# Patient Record
Sex: MALE | Race: WHITE | Employment: OTHER | URBAN - METROPOLITAN AREA
[De-identification: names, ages, dates, MRNs, and addresses within clinical notes are randomized per-mention and may not be internally consistent; named-entity substitution may affect disease eponyms.]

---

## 2017-03-05 DIAGNOSIS — I10 HYPERTENSION GOAL BP (BLOOD PRESSURE) < 140/90: ICD-10-CM

## 2017-03-07 NOTE — TELEPHONE ENCOUNTER
amLODIPine (NORVASC) 10 MG tablet      Last Written Prescription Date: 2/24/16  Last Fill Quantity: 90, # refills: 3    Last Office Visit with [facility], [facility] or [facility] prescribing provider:  2/24/16   Future Office Visit:        BP Readings from Last 3 Encounters:   02/24/16 138/82   02/02/15 126/79   01/15/14 138/80

## 2017-03-08 RX ORDER — AMLODIPINE BESYLATE 10 MG/1
TABLET ORAL
Qty: 90 TABLET | Refills: 0 | Status: SHIPPED | OUTPATIENT
Start: 2017-03-08 | End: 2017-06-27

## 2017-03-08 NOTE — TELEPHONE ENCOUNTER
Prescription approved per [facility] Refill Protocol.  Patient due for for office visit for further refills.    Sarahi Carroll RN - BC

## 2017-03-31 DIAGNOSIS — I10 HYPERTENSION GOAL BP (BLOOD PRESSURE) < 140/90: Primary | ICD-10-CM

## 2017-04-03 RX ORDER — METOPROLOL SUCCINATE 100 MG/1
TABLET, EXTENDED RELEASE ORAL
Qty: 90 TABLET | Refills: 0 | Status: ON HOLD | OUTPATIENT
Start: 2017-04-03 | End: 2017-07-25

## 2017-04-03 NOTE — TELEPHONE ENCOUNTER
metoprolol (TOPROL-XL) 100 MG 24 hr tablet      Last Written Prescription Date: 02/24/2016  Last Fill Quantity: 90, # refills: 3    Last Office Visit with AHMET, ALLIE or [facility] prescribing provider:  02/24/2016   Future Office Visit:        BP Readings from Last 3 Encounters:   02/24/16 138/82   02/02/15 126/79   01/15/14 138/80     Sanjuanita Liriano MA

## 2017-04-03 NOTE — TELEPHONE ENCOUNTER
Prescription approved per [facility] Refill Protocol. Patient due for office visit for further refills.    Sarahi Carroll RN - BC

## 2017-07-11 NOTE — PROGRESS NOTES
SUBJECTIVE:                                                    Dung Norton is a 71 year old male who presents to clinic today for the following health issues:      Chief Complaint   Patient presents with     Weight Loss     Dizziness     Pt comes in with 20 Pound weight loss  ,decreased appetite.  Pt says he Thinks he Lost weight because He is too lazy to cook for Himself  No abdominal pain  No change in Bowel habits  He has been seeing a chiropractor for Low back pain  Pt is a smoker        Problem list and histories reviewed & adjusted, as indicated.  Additional history: as documented    Patient Active Problem List   Diagnosis     Tobacco abuse     CARDIOVASCULAR SCREENING; LDL GOAL LESS THAN 130     Hypertension goal BP (blood pressure) < 140/90     Advanced directives, counseling/discussion     Elevated fasting glucose     Hypertriglyceridemia     CKD (chronic kidney disease) stage 3, GFR 30-59 ml/min     Past Surgical History:   Procedure Laterality Date     APPENDECTOMY  AGE 6     SINUS SURGERY  AGE 6     TONSILLECTOMY      CHILDHOOD       Social History   Substance Use Topics     Smoking status: Current Every Day Smoker     Packs/day: 0.25     Types: Cigarettes     Smokeless tobacco: Never Used      Comment: 3 cig a day     Alcohol use Yes      Comment: 2 per year     Family History   Problem Relation Age of Onset     C.A.D. Mother      d age 67         Current Outpatient Prescriptions   Medication Sig Dispense Refill     Multiple Vitamins-Minerals (MULTIVITAMIN MEN PO) Take 1 tablet by mouth daily       amLODIPine (NORVASC) 10 MG tablet TAKE 1 TABLET BY MOUTH ONCE DAILY 30 tablet 0     metoprolol (TOPROL-XL) 100 MG 24 hr tablet TAKE 1 TABLET BY MOUTH ONCE DAILY 90 tablet 0     nicotine (NICODERM CQ) 21 MG/24HR patch 2h hr Place 1 patch onto the skin every 24 hours 30 patch 1     fish oil-omega-3 fatty acids (FISH OIL) 1000 MG capsule Take 1 g by mouth daily       aspirin 81 MG tablet Take 1 tablet by  mouth daily.       hydrochlorothiazide (HYDRODIURIL) 25 MG tablet Take 0.5 tablets (12.5 mg) by mouth daily (Patient not taking: Reported on 7/12/2017) 45 tablet 1     No Known Allergies  Recent Labs   Lab Test  02/24/16   1044  02/02/15   1046  01/15/14   0950  02/28/13   0731   06/10/11   1028  09/21/09   1530   A1C  5.4   --   5.3   --    --    --    --    LDL  114*  128  119  111   --   105   --    HDL  29*  32*  33*  24*   --   32*   --    TRIG  199*  229*  230*  362*   --   234*   --    ALT   --    --   29  33   --    --    --    CR  2.56*  1.10  0.91  0.97   < >  0.97   --    GFRESTIMATED  25*  66  82  77   < >  77   --    GFRESTBLACK  30*  80  >90  >90   < >  >90   --    POTASSIUM  3.4  3.5  4.0  3.7   < >  4.4   --    TSH   --    --    --    --    --   4.86  4.59    < > = values in this interval not displayed.      BP Readings from Last 3 Encounters:   07/12/17 106/70   02/24/16 138/82   02/02/15 126/79    Wt Readings from Last 3 Encounters:   07/12/17 151 lb (68.5 kg)   02/24/16 181 lb 9.6 oz (82.4 kg)   02/02/15 188 lb (85.3 kg)                  Labs reviewed in EPIC    Reviewed and updated as needed this visit by clinical staff  Tobacco  Allergies  Meds  Problems  Med Hx  Surg Hx  Fam Hx  Soc Hx        Reviewed and updated as needed this visit by Provider  Patient Active Problem List   Diagnosis     Tobacco abuse     CARDIOVASCULAR SCREENING; LDL GOAL LESS THAN 130     Hypertension goal BP (blood pressure) < 140/90     Advanced directives, counseling/discussion     Elevated fasting glucose     Hypertriglyceridemia     CKD (chronic kidney disease) stage 3, GFR 30-59 ml/min       Family History   Problem Relation Age of Onset     C.A.D. Mother      d age 67           ROS:  C: NEGATIVE for fever, chills, change in weight  INTEGUMENTARY/SKIN: NEGATIVE for worrisome rashes, moles or lesions  E/M: NEGATIVE for ear, mouth and throat problems  R: NEGATIVE for significant cough or SOB  CV: NEGATIVE for  "chest pain, palpitations or peripheral edema  GI: as above  MUSCULOSKELETAL: NEGATIVE for significant arthralgias or myalgia  HEME/ALLERGY/IMMUNE: NEGATIVE for bleeding problems    OBJECTIVE:     /70  Pulse 89  Temp 97  F (36.1  C)  Resp 15  Ht 5' 9\" (1.753 m)  Wt 151 lb (68.5 kg)  SpO2 97%  BMI 22.3 kg/m2  Body mass index is 22.3 kg/(m^2).  GENERAL: alert elderly gentleman  HENT: ear canals and TM's normal, nose and mouth without ulcers or lesions  NECK: no adenopathy, no asymmetry, masses, or scars and thyroid normal to palpation  RESP: lungs clear to auscultation - no rales, rhonchi or wheezes  CV: regular rate and rhythm, normal S1 S2, no S3 or S4, no murmur, click or rub, no peripheral edema and peripheral pulses strong  ABDOMEN: bowel sounds normal and Mass palpable?-firmness  No tenderness   MS: no gross musculoskeletal defects noted, no edema  SKIN: no suspicious lesions or rashes    Diagnostic Test Results:  Results for orders placed or performed in visit on 07/12/17 (from the past 24 hour(s))   CBC with platelets differential   Result Value Ref Range    WBC 8.1 4.0 - 11.0 10e9/L    RBC Count 2.26 (L) 4.4 - 5.9 10e12/L    Hemoglobin 6.4 (LL) 13.3 - 17.7 g/dL    Hematocrit 19.6 (L) 40.0 - 53.0 %    MCV 87 78 - 100 fl    MCH 28.3 26.5 - 33.0 pg    MCHC 32.7 31.5 - 36.5 g/dL    RDW 14.5 10.0 - 15.0 %    Platelet Count 259 150 - 450 10e9/L    Diff Method Automated Method     % Neutrophils 73.6 %    % Lymphocytes 13.6 %    % Monocytes 8.0 %    % Eosinophils 4.2 %    % Basophils 0.6 %    Absolute Neutrophil 6.0 1.6 - 8.3 10e9/L    Absolute Lymphocytes 1.1 0.8 - 5.3 10e9/L    Absolute Monocytes 0.7 0.0 - 1.3 10e9/L    Absolute Eosinophils 0.3 0.0 - 0.7 10e9/L    Absolute Basophils 0.1 0.0 - 0.2 10e9/L   Erythrocyte sedimentation rate auto   Result Value Ref Range    Sed Rate 111 (H) 0 - 20 mm/h       ASSESSMENT/PLAN:         ICD-10-CM    1. Loss of weight R63.4 CBC with platelets differential     " Erythrocyte sedimentation rate auto     Comprehensive metabolic panel     TSH with free T4 reflex     CT Chest Lung Cancer Scrn Low Dose wo     CT Abdomen Pelvis w Contrast   2. Anemia, unspecified type D64.9    3. Tobacco abuse Z72.0    4. Hypertension goal BP (blood pressure) < 140/90 I10      Pt has had Decreased appetite /Loss of weight   Exam as above  Differential Includes Malignancy  Pt will need a CT scan abd /pelvis  Possible colonoscopy  After ths scan  He will also need to be Transfused  Haley Baker MD  [facility]

## 2017-07-11 NOTE — PATIENT INSTRUCTIONS
[facility]    If you have any questions regarding to your visit please contact your care team:       Team Red:   Clinic Hours Telephone Number   Dr. Toya Schwartz  (pediatrics)  Nohemy Rogers NP 7am-7pm  Monday - Thursday   7am-5pm  Fridays  [phone] (fax)    Nikki NAVA  [phone]   Urgent Care - [place] and [place] Monday-Friday  [place] - 11am-8pm  Saturday-Sunday  Both sites - 9am-5pm  [phone] - [facility]  [phone] - [place]       What options do I have for visits at the clinic other than the traditional office visit?  To expand how we care for you, many of our providers are utilizing electronic visits (e-visits) and telephone visits, when medically appropriate, for interactions with their patients rather than a visit in the clinic.   We also offer nurse visits for many medical concerns. Just like any other service, we will bill your insurance company for this type of visit based on time spent on the phone with your provider. Not all insurance companies cover these visits. Please check with your medical insurance if this type of visit is covered. You will be responsible for any charges that are not paid by your insurance.      E-visits via [employer]:  generally incur a $35.00 fee.  Telephone visits:  Time spent on the phone: *charged based on time that is spent on the phone in increments of 10 minutes. Estimated cost:   5-10 mins $30.00   11-20 mins. $59.00   21-30 mins. $85.00     As always, Thank you for trusting us with your health care needs!            Discharge VIANEY Cabrera CMA

## 2017-07-12 ENCOUNTER — HOSPITAL ENCOUNTER (INPATIENT)
Facility: CLINIC | Age: 72
LOS: 13 days | Discharge: HOME HEALTH | DRG: 668 | End: 2017-07-25
Attending: INTERNAL MEDICINE | Admitting: INTERNAL MEDICINE
Payer: MEDICARE

## 2017-07-12 ENCOUNTER — APPOINTMENT (OUTPATIENT)
Dept: GENERAL RADIOLOGY | Facility: CLINIC | Age: 72
DRG: 668 | End: 2017-07-12
Attending: INTERNAL MEDICINE
Payer: MEDICARE

## 2017-07-12 ENCOUNTER — OFFICE VISIT (OUTPATIENT)
Dept: FAMILY MEDICINE | Facility: CLINIC | Age: 72
End: 2017-07-12
Payer: COMMERCIAL

## 2017-07-12 VITALS
HEIGHT: 69 IN | OXYGEN SATURATION: 97 % | DIASTOLIC BLOOD PRESSURE: 70 MMHG | HEART RATE: 89 BPM | BODY MASS INDEX: 22.36 KG/M2 | SYSTOLIC BLOOD PRESSURE: 106 MMHG | WEIGHT: 151 LBS | RESPIRATION RATE: 15 BRPM | TEMPERATURE: 97 F

## 2017-07-12 DIAGNOSIS — E87.20 METABOLIC ACIDOSIS, NAG, BICARBONATE LOSSES: ICD-10-CM

## 2017-07-12 DIAGNOSIS — Z72.0 TOBACCO ABUSE: ICD-10-CM

## 2017-07-12 DIAGNOSIS — R63.4 LOSS OF WEIGHT: Primary | ICD-10-CM

## 2017-07-12 DIAGNOSIS — N40.1 BENIGN PROSTATIC HYPERPLASIA WITH URINARY RETENTION: ICD-10-CM

## 2017-07-12 DIAGNOSIS — I10 HYPERTENSION GOAL BP (BLOOD PRESSURE) < 140/90: ICD-10-CM

## 2017-07-12 DIAGNOSIS — R33.8 BENIGN PROSTATIC HYPERPLASIA WITH URINARY RETENTION: ICD-10-CM

## 2017-07-12 DIAGNOSIS — D64.9 ANEMIA, UNSPECIFIED TYPE: ICD-10-CM

## 2017-07-12 DIAGNOSIS — N18.30 CKD (CHRONIC KIDNEY DISEASE) STAGE 3, GFR 30-59 ML/MIN (H): Primary | ICD-10-CM

## 2017-07-12 LAB
ALBUMIN SERPL-MCNC: 3.1 G/DL (ref 3.4–5)
ALBUMIN SERPL-MCNC: 3.1 G/DL (ref 3.4–5)
ALP SERPL-CCNC: 65 U/L (ref 40–150)
ALP SERPL-CCNC: 67 U/L (ref 40–150)
ALT SERPL W P-5'-P-CCNC: 19 U/L (ref 0–70)
ALT SERPL W P-5'-P-CCNC: 20 U/L (ref 0–70)
ANION GAP SERPL CALCULATED.3IONS-SCNC: 15 MMOL/L (ref 3–14)
ANION GAP SERPL CALCULATED.3IONS-SCNC: 16 MMOL/L (ref 3–14)
AST SERPL W P-5'-P-CCNC: 12 U/L (ref 0–45)
AST SERPL W P-5'-P-CCNC: 8 U/L (ref 0–45)
BASOPHILS # BLD AUTO: 0 10E9/L (ref 0–0.2)
BASOPHILS # BLD AUTO: 0.1 10E9/L (ref 0–0.2)
BASOPHILS NFR BLD AUTO: 0.4 %
BASOPHILS NFR BLD AUTO: 0.6 %
BILIRUB DIRECT SERPL-MCNC: <0.1 MG/DL (ref 0–0.2)
BILIRUB SERPL-MCNC: 0.3 MG/DL (ref 0.2–1.3)
BILIRUB SERPL-MCNC: 0.6 MG/DL (ref 0.2–1.3)
BUN SERPL-MCNC: 173 MG/DL (ref 7–30)
BUN SERPL-MCNC: 177 MG/DL (ref 7–30)
CALCIUM SERPL-MCNC: 8.3 MG/DL (ref 8.5–10.1)
CALCIUM SERPL-MCNC: 8.5 MG/DL (ref 8.5–10.1)
CHLORIDE SERPL-SCNC: 108 MMOL/L (ref 94–109)
CHLORIDE SERPL-SCNC: 109 MMOL/L (ref 94–109)
CO2 SERPL-SCNC: 16 MMOL/L (ref 20–32)
CO2 SERPL-SCNC: 16 MMOL/L (ref 20–32)
CREAT SERPL-MCNC: 14.4 MG/DL (ref 0.66–1.25)
CREAT SERPL-MCNC: 14.7 MG/DL (ref 0.66–1.25)
DIFFERENTIAL METHOD BLD: ABNORMAL
DIFFERENTIAL METHOD BLD: NORMAL
EOSINOPHIL # BLD AUTO: 0.3 10E9/L (ref 0–0.7)
EOSINOPHIL # BLD AUTO: 0.3 10E9/L (ref 0–0.7)
EOSINOPHIL NFR BLD AUTO: 3.8 %
EOSINOPHIL NFR BLD AUTO: 4.2 %
ERYTHROCYTE [DISTWIDTH] IN BLOOD BY AUTOMATED COUNT: 14.5 % (ref 10–15)
ERYTHROCYTE [DISTWIDTH] IN BLOOD BY AUTOMATED COUNT: 14.8 % (ref 10–15)
ERYTHROCYTE [SEDIMENTATION RATE] IN BLOOD BY WESTERGREN METHOD: 111 MM/H (ref 0–20)
GFR SERPL CREATININE-BSD FRML MDRD: 3 ML/MIN/1.7M2
GFR SERPL CREATININE-BSD FRML MDRD: 3 ML/MIN/1.7M2
GLUCOSE SERPL-MCNC: 105 MG/DL (ref 70–99)
GLUCOSE SERPL-MCNC: 117 MG/DL (ref 70–99)
HCT VFR BLD AUTO: 19.6 % (ref 40–53)
HCT VFR BLD AUTO: 19.6 % (ref 40–53)
HGB BLD-MCNC: 6.4 G/DL (ref 13.3–17.7)
HGB BLD-MCNC: 6.5 G/DL (ref 13.3–17.7)
IMM GRANULOCYTES # BLD: 0 10E9/L (ref 0–0.4)
IMM GRANULOCYTES NFR BLD: 0.2 %
LYMPHOCYTES # BLD AUTO: 1.1 10E9/L (ref 0.8–5.3)
LYMPHOCYTES # BLD AUTO: 1.3 10E9/L (ref 0.8–5.3)
LYMPHOCYTES NFR BLD AUTO: 13.6 %
LYMPHOCYTES NFR BLD AUTO: 15.5 %
MCH RBC QN AUTO: 27.9 PG (ref 26.5–33)
MCH RBC QN AUTO: 28.3 PG (ref 26.5–33)
MCHC RBC AUTO-ENTMCNC: 32.7 G/DL (ref 31.5–36.5)
MCHC RBC AUTO-ENTMCNC: 33.2 G/DL (ref 31.5–36.5)
MCV RBC AUTO: 84 FL (ref 78–100)
MCV RBC AUTO: 87 FL (ref 78–100)
MONOCYTES # BLD AUTO: 0.5 10E9/L (ref 0–1.3)
MONOCYTES # BLD AUTO: 0.7 10E9/L (ref 0–1.3)
MONOCYTES NFR BLD AUTO: 5.7 %
MONOCYTES NFR BLD AUTO: 8 %
NEUTROPHILS # BLD AUTO: 6 10E9/L (ref 1.6–8.3)
NEUTROPHILS # BLD AUTO: 6 10E9/L (ref 1.6–8.3)
NEUTROPHILS NFR BLD AUTO: 73.6 %
NEUTROPHILS NFR BLD AUTO: 74.4 %
PLATELET # BLD AUTO: 259 10E9/L (ref 150–450)
PLATELET # BLD AUTO: 278 10E9/L (ref 150–450)
POTASSIUM SERPL-SCNC: 4.2 MMOL/L (ref 3.4–5.3)
POTASSIUM SERPL-SCNC: 4.5 MMOL/L (ref 3.4–5.3)
PROT SERPL-MCNC: 6.5 G/DL (ref 6.8–8.8)
PROT SERPL-MCNC: 6.8 G/DL (ref 6.8–8.8)
RBC # BLD AUTO: 2.26 10E12/L (ref 4.4–5.9)
RBC # BLD AUTO: 2.33 10E12/L (ref 4.4–5.9)
RETICS # AUTO: 25.9 10E9/L (ref 25–95)
RETICS/RBC NFR AUTO: 1.1 % (ref 0.5–2)
SODIUM SERPL-SCNC: 139 MMOL/L (ref 133–144)
SODIUM SERPL-SCNC: 140 MMOL/L (ref 133–144)
T4 FREE SERPL-MCNC: 0.7 NG/DL (ref 0.76–1.46)
TSH SERPL DL<=0.005 MIU/L-ACNC: 10.51 MU/L (ref 0.4–4)
WBC # BLD AUTO: 7.8 10E9/L (ref 4–11)
WBC # BLD AUTO: 8.1 10E9/L (ref 4–11)

## 2017-07-12 PROCEDURE — 85004 AUTOMATED DIFF WBC COUNT: CPT | Performed by: INTERNAL MEDICINE

## 2017-07-12 PROCEDURE — 86905 BLOOD TYPING RBC ANTIGENS: CPT | Performed by: INTERNAL MEDICINE

## 2017-07-12 PROCEDURE — 86901 BLOOD TYPING SEROLOGIC RH(D): CPT | Performed by: INTERNAL MEDICINE

## 2017-07-12 PROCEDURE — 36415 COLL VENOUS BLD VENIPUNCTURE: CPT | Performed by: FAMILY MEDICINE

## 2017-07-12 PROCEDURE — 71020 XR CHEST 2 VW: CPT

## 2017-07-12 PROCEDURE — 99214 OFFICE O/P EST MOD 30 MIN: CPT | Performed by: FAMILY MEDICINE

## 2017-07-12 PROCEDURE — 99223 1ST HOSP IP/OBS HIGH 75: CPT | Mod: AI | Performed by: INTERNAL MEDICINE

## 2017-07-12 PROCEDURE — 86850 RBC ANTIBODY SCREEN: CPT | Performed by: INTERNAL MEDICINE

## 2017-07-12 PROCEDURE — 84439 ASSAY OF FREE THYROXINE: CPT | Performed by: FAMILY MEDICINE

## 2017-07-12 PROCEDURE — 80048 BASIC METABOLIC PNL TOTAL CA: CPT | Performed by: INTERNAL MEDICINE

## 2017-07-12 PROCEDURE — 86900 BLOOD TYPING SEROLOGIC ABO: CPT | Performed by: INTERNAL MEDICINE

## 2017-07-12 PROCEDURE — 85045 AUTOMATED RETICULOCYTE COUNT: CPT | Performed by: INTERNAL MEDICINE

## 2017-07-12 PROCEDURE — 40000141 ZZH STATISTIC PERIPHERAL IV START W/O US GUIDANCE

## 2017-07-12 PROCEDURE — 80076 HEPATIC FUNCTION PANEL: CPT | Performed by: INTERNAL MEDICINE

## 2017-07-12 PROCEDURE — 80050 GENERAL HEALTH PANEL: CPT | Performed by: FAMILY MEDICINE

## 2017-07-12 PROCEDURE — 86922 COMPATIBILITY TEST ANTIGLOB: CPT | Performed by: INTERNAL MEDICINE

## 2017-07-12 PROCEDURE — 40000611 ZZHCL STATISTIC MORPHOLOGY W/INTERP HEMEPATH TC 85060: Performed by: INTERNAL MEDICINE

## 2017-07-12 PROCEDURE — 86870 RBC ANTIBODY IDENTIFICATION: CPT | Performed by: INTERNAL MEDICINE

## 2017-07-12 PROCEDURE — 85027 COMPLETE CBC AUTOMATED: CPT | Performed by: INTERNAL MEDICINE

## 2017-07-12 PROCEDURE — 85652 RBC SED RATE AUTOMATED: CPT | Performed by: FAMILY MEDICINE

## 2017-07-12 PROCEDURE — 12000001 ZZH R&B MED SURG/OB UMMC

## 2017-07-12 PROCEDURE — 25000128 H RX IP 250 OP 636: Performed by: STUDENT IN AN ORGANIZED HEALTH CARE EDUCATION/TRAINING PROGRAM

## 2017-07-12 PROCEDURE — 36415 COLL VENOUS BLD VENIPUNCTURE: CPT | Performed by: INTERNAL MEDICINE

## 2017-07-12 RX ORDER — NICOTINE 21 MG/24HR
1 PATCH, TRANSDERMAL 24 HOURS TRANSDERMAL EVERY 24 HOURS
Status: DISCONTINUED | OUTPATIENT
Start: 2017-07-12 | End: 2017-07-25 | Stop reason: HOSPADM

## 2017-07-12 RX ORDER — NALOXONE HYDROCHLORIDE 0.4 MG/ML
.1-.4 INJECTION, SOLUTION INTRAMUSCULAR; INTRAVENOUS; SUBCUTANEOUS
Status: DISCONTINUED | OUTPATIENT
Start: 2017-07-12 | End: 2017-07-25 | Stop reason: HOSPADM

## 2017-07-12 RX ORDER — SODIUM CHLORIDE 9 MG/ML
INJECTION, SOLUTION INTRAVENOUS CONTINUOUS
Status: DISCONTINUED | OUTPATIENT
Start: 2017-07-12 | End: 2017-07-13

## 2017-07-12 RX ORDER — METOPROLOL SUCCINATE 50 MG/1
100 TABLET, EXTENDED RELEASE ORAL DAILY
Status: DISCONTINUED | OUTPATIENT
Start: 2017-07-13 | End: 2017-07-15

## 2017-07-12 RX ORDER — ACETAMINOPHEN 325 MG/1
650 TABLET ORAL EVERY 4 HOURS PRN
Status: DISCONTINUED | OUTPATIENT
Start: 2017-07-12 | End: 2017-07-25 | Stop reason: HOSPADM

## 2017-07-12 RX ADMIN — SODIUM CHLORIDE 1000 ML: 9 INJECTION, SOLUTION INTRAVENOUS at 19:27

## 2017-07-12 NOTE — NURSING NOTE
"Chief Complaint   Patient presents with     Weight Loss     Dizziness       Initial /70  Pulse 89  Temp 97  F (36.1  C)  Resp 15  Ht 5' 9\" (1.753 m)  Wt 151 lb (68.5 kg)  SpO2 97%  BMI 22.3 kg/m2 Estimated body mass index is 22.3 kg/(m^2) as calculated from the following:    Height as of this encounter: 5' 9\" (1.753 m).    Weight as of this encounter: 151 lb (68.5 kg).  Medication Reconciliation: complete     Leticia Cabrera. MA      "

## 2017-07-12 NOTE — MR AVS SNAPSHOT
After Visit Summary   7/12/2017    Dung Norton    MRN: [id number]           Patient Information     Date Of Birth          1945        Visit Information        Provider Department      7/12/2017 2:00 PM Haley Baker MD [facility]        Today's Diagnoses     Loss of weight    -  1    Tobacco abuse        Encounter for abdominal aortic aneurysm screening        Abdominal swelling        Screen for colon cancer          Care Instructions    [facility]    If you have any questions regarding to your visit please contact your care team:       Team Red:   Clinic Hours Telephone Number   Dr. Toya Schwartz  (pediatrics)  Nohemy Rogers NP 7am-7pm  Monday - Thursday   7am-5pm  Fridays  [phone] (fax)    Nikki NAVA  [phone]   Urgent Care - [place] and [place] Monday-Friday  [place] - 11am-8pm  Saturday-Sunday  Both sites - 9am-5pm  [phone] - [facility]  [phone] - [place]       What options do I have for visits at the clinic other than the traditional office visit?  To expand how we care for you, many of our providers are utilizing electronic visits (e-visits) and telephone visits, when medically appropriate, for interactions with their patients rather than a visit in the clinic.   We also offer nurse visits for many medical concerns. Just like any other service, we will bill your insurance company for this type of visit based on time spent on the phone with your provider. Not all insurance companies cover these visits. Please check with your medical insurance if this type of visit is covered. You will be responsible for any charges that are not paid by your insurance.      E-visits via [employer]:  generally incur a $35.00 fee.  Telephone visits:  Time spent on the phone: *charged based on time that is spent on the phone in increments of 10 minutes. Estimated cost:   5-10 mins $30.00   11-20  mins. $59.00   21-30 mins. $85.00     As always, Thank you for trusting us with your health care needs!            Discharge VIANEY Cabrera  [facility]            Follow-ups after your visit        Your next 10 appointments already scheduled     Jul 13, 2017  9:45 AM CDT   US ABDOMINAL AORTIC LIMITED with BEUS1   [facility]    [address] [id number] [phone]           Please bring a list of your medicines (including vitamins, minerals and over-the-counter drugs). Also, tell your doctor about any allergies you may have. Wear comfortable clothes and leave your valuables at home.  Adults: No eating or drinking for 8 hours before the exam. You may take medicine with a small sip of water.  Children: - Children 6+ years: No food or drink for 6 hours before exam. - Children 1-5 years: No food or drink for 4 hours before exam. - Infants, breast-fed: may have breast milk up to 2 hours before exam. - Infants, formula: may have bottle until 4 hours before exam.  Please call the Imaging Department at your exam site with any questions.            Jul 13, 2017 12:30 PM CDT   CT LUNG RESEARCH VIGIL with BECT1   [facility]    [address] [id number] [phone]           Please bring any scans or X-rays taken at other hospitals, if similar tests were done. Also bring a list of your medicines, including vitamins, minerals and over-the-counter drugs. It is safest to leave personal items at home.  Be sure to tell your doctor:   If you have any allergies.   If there s any chance you are pregnant.   If you are breastfeeding.   If you have any special needs.  You do not need to do anything special to prepare.  Please wear loose clothing, such as a sweat suit or jogging clothes. Avoid snaps, zippers and other metal. We may ask you to undress and put on a hospital gown.            Jul 13, 2017 12:45 PM CDT    CT ABDOMEN PELVIS W CONTRAST with BECT1   [facility] [place] ([facility] [place])    [address] [address]  [place] [address] [phone]           Please bring any scans or X-rays taken at other hospitals, if similar tests were done. Also bring a list of your medicines, including vitamins, minerals and over-the-counter drugs. It is safest to leave personal items at home.  Be sure to tell your doctor:   If you have any allergies.   If there s any chance you are pregnant.   If you are breastfeeding.   If you have any special needs.  You may have contrast for this exam. To prepare:   Do not eat or drink for 2 hours before your exam. If you need to take medicine, you may take it with small sips of water. (We may ask you to take liquid medicine as well.)   The day before your exam, drink extra fluids at least six 8-ounce glasses (unless your doctor tells you to restrict your fluids).  Patients over 70 or patients with diabetes or kidney problems:   If you haven t had a blood test (creatinine test) within the last 30 days, go to your clinic or Diagnostic Imaging Department for this test.  If you have diabetes:   If your kidney function is normal, continue taking your metformin (Avandamet, Glucophage, Glucovance, Metaglip) on the day of your exam.   If your kidney function is abnormal, wait 48 hours before restarting this medicine.  You will have oral contrast for this exam:   You will drink the contrast at home. Get this from your clinic or Diagnostic Imaging Department. Please follow the directions given.  Please wear loose clothing, such as a sweat suit or jogging clothes. Avoid snaps, zippers and other metal. We may ask you to undress and put on a hospital gown.  If you have any questions, please call the Imaging Department where you will have your exam.            Jul 14, 2017  2:00 PM CDT   Office Visit with Haley Baker MD   [facility]    [address] [address]   [phone]           Bring a current list of meds and any records pertaining to this visit.  For Physicals, please bring immunization records and any forms needing to be filled out.  Please arrive 10 minutes early to complete paperwork.              Future tests that were ordered for you today     Open Future Orders        Priority Expected Expires Ordered    Fecal colorectal cancer screen FIT - Future (S+30) Routine 2017 8/10/2017 2017            Who to contact     If you have questions or need follow up information about today's clinic visit or your schedule please contact [facility] directly at [phone].  Normal or non-critical lab and imaging results will be communicated to you by [employer], letter or phone within 4 business days after the clinic has received the results. If you do not hear from us within 7 days, please contact the clinic through [employer] or phone. If you have a critical or abnormal lab result, we will notify you by phone as soon as possible.  Submit refill requests through [employer] or call your pharmacy and they will forward the refill request to us. Please allow 3 business days for your refill to be completed.          Additional Information About Your Visit        [employer] Information     [employer] lets you send messages to your doctor, view your test results, renew your prescriptions, schedule appointments and more. To sign up, go to [email or web address] . Click on \"Log in\" on the left side of the screen, which will take you to the Welcome page. Then click on \"Sign up Now\" on the right side of the page.     You will be asked to enter the access code listed below, as well as some personal information. Please follow the directions to create your username and password.     Your access code is: [email or web address]  Expires: 10/10/2017  2:21 PM     Your access code will  in 90 days. If you need help or a new code, please call your " "[facility] or [phone].        Care EveryWhere ID     This is your Care EveryWhere ID. This could be used by other organizations to access your [facility] medical records  [id number]        Your Vitals Were     Pulse Temperature Respirations Height Pulse Oximetry BMI (Body Mass Index)    89 97  F (36.1  C) 15 5' 9\" (1.753 m) 97% 22.3 kg/m2       Blood Pressure from Last 3 Encounters:   07/12/17 106/70   02/24/16 138/82   02/02/15 126/79    Weight from Last 3 Encounters:   07/12/17 151 lb (68.5 kg)   02/24/16 181 lb 9.6 oz (82.4 kg)   02/02/15 188 lb (85.3 kg)              We Performed the Following     CBC with platelets differential     Comprehensive metabolic panel     CT Abdomen Pelvis w Contrast     CT Chest Lung Cancer Scrn Low Dose wo     Erythrocyte sedimentation rate auto     TSH with free T4 reflex     US abdominal aorta limited        Primary Care Provider Office Phone # Fax #    Haley Baker [phone]       [address] [address]        Equal Access to Services     [facility] Center[facility]MPHREY : Hadii aad ku hadasho Soomaali, waaxda luqadaha, qaybta kaalmada adeegyada, gibson valenzuelain hayjefen paul green . So [facility] [phone].    ATENCIÓN: Si habla español, tiene a montague disposición servicios gratuitos de asistencia lingüística. Llame al [phone].    We comply with applicable federal civil rights laws and [place] laws. We do not discriminate on the basis of race, color, national origin, age, disability sex, sexual orientation or gender identity.            Thank you!     Thank you for choosing [facility]  for your care. Our goal is always to provide you with excellent care. Hearing back from our patients is one way we can continue to improve our services. Please take a few minutes to complete the written survey that you may receive in the mail after your visit with us. Thank you!             Your Updated Medication List - Protect " others around you: Learn how to safely use, store and throw away your medicines at www.disposemymeds.org.          This list is accurate as of: 7/12/17  2:37 PM.  Always use your most recent med list.                   Brand Name Dispense Instructions for use Diagnosis    amLODIPine 10 MG tablet    NORVASC    30 tablet    TAKE 1 TABLET BY MOUTH ONCE DAILY    Hypertension goal BP (blood pressure) < 140/90       aspirin 81 MG tablet      Take 1 tablet by mouth daily.        fish oil-omega-3 fatty acids 1000 MG capsule      Take 1 g by mouth daily        hydrochlorothiazide 25 MG tablet    HYDRODIURIL    45 tablet    Take 0.5 tablets (12.5 mg) by mouth daily    Hypertension goal BP (blood pressure) < 140/90       metoprolol 100 MG 24 hr tablet    TOPROL-XL    90 tablet    TAKE 1 TABLET BY MOUTH ONCE DAILY    Hypertension goal BP (blood pressure) < 140/90       MULTIVITAMIN MEN PO      Take 1 tablet by mouth daily    Screen for colon cancer, Loss of weight       nicotine 21 MG/24HR 24 hr patch    NICODERM CQ    30 patch    Place 1 patch onto the skin every 24 hours    Tobacco abuse

## 2017-07-12 NOTE — IP AVS SNAPSHOT
[address] [address]    Phone:  [phone]                                       After Visit Summary   7/12/2017    Dung Norton    MRN: [id number]           After Visit Summary Signature Page     I have received my discharge instructions, and my questions have been answered. I have discussed any challenges I see with this plan with the nurse or doctor.    ..........................................................................................................................................  Patient/Patient Representative Signature      ..........................................................................................................................................  Patient Representative Print Name and Relationship to Patient    ..................................................               ................................................  Date                                            Time    ..........................................................................................................................................  Reviewed by Signature/Title    ...................................................              ..............................................  Date                                                            Time

## 2017-07-12 NOTE — LETTER
Transition Communication Hand-off for Care Transitions to Next Level of Care Provider    Name: Dung Norton  MRN #: [id number]  Primary Care Provider: Haley Baker     Primary Clinic: [facility] [address]  [place] [address]     Reason for Hospitalization:  Hematuria [R31.9]  Admit Date/Time: 7/12/2017  4:06 PM  Discharge Date: 7/25/17  Payor Source: Payor: MEDICA / Plan: MEDICA PRIME SOLUTION / Product Type: Indemnity /            Reason for Communication Hand-off Referral: Fragility    Discharge Plan: home with [facility] and wood catheter        Concern for non-adherence with plan of care:   Y/N n  Discharge Needs Assessment:  Needs       Most Recent Value    Equipment Currently Used at Home none    Transportation Available car, family or friend will provide    Home Care [facility] [phone], Fax: [phone]          Already enrolled in Tele-monitoring program and name of program:    Follow-up specialty is recommended: Yes    Follow-up plan:  Future Appointments  Date Time Provider Department Center   7/26/2017 6:00 AM Irene Bass OT [facility]   7/28/2017 2:00 PM Nohemy Rogers, APRN CNP [facility]       Any outstanding tests or procedures:        Referrals     Future Labs/Procedures    Home care nursing referral     Comments:    [facility] [phone]  Fax [phone]    RN skilled nursing visit. RN to assess vital signs and weight, respiratory and cardiac status, pain level and activity tolerance, hydration, nutrition and bowel status and home safety.  RN to teach medication management.    Your provider has ordered home care nursing services. If you have not been contacted within 2 days of your discharge please call the inpatient department phone number at [phone] .    Home Care OT Referral for Hospital Discharge     Comments:    [facility] [phone]  Fax [phone]    OT to christie bullard  treat    Your provider has ordered home care - occupational therapy. If you have not been contacted within 2 days of your discharge please call the department phone number listed on the top of this document.    Home Care PT Referral for Hospital Discharge     Comments:    [facility] [phone]  Fax [phone]    PT to eval and treat    Your provider has ordered home care - physical therapy. If you have not been contacted within 2 days of your discharge please call the department phone number listed on the top of this document.            Key Recommendations:      Mindy Hughes    AVS/Discharge Summary is the source of truth; this is a helpful guide for improved communication of patient story

## 2017-07-12 NOTE — IP AVS SNAPSHOT
MRN:[id number]                      After Visit Summary   7/12/2017    Dung Norton    MRN: [id number]           Thank you!     Thank you for choosing [facility] for your care. Our goal is always to provide you with excellent care. Hearing back from our patients is one way we can continue to improve our services. Please take a few minutes to complete the written survey that you may receive in the mail after you visit with us. Thank you!        Patient Information     Date Of Birth          1945        Designated Caregiver       Most Recent Value    Caregiver    Will someone help with your care after discharge? no      About your hospital stay     You were admitted on:  July 12, 2017 You last received care in the:  [address] [facility]    You were discharged on:  July 25, 2017        Reason for your hospital stay       Urinary retention and kidney injury                  Who to Call     For medical emergencies, please call 911.  For non-urgent questions about your medical care, please call your primary care provider or clinic, [phone]  For questions related to your surgery, please call your surgery clinic        Attending Provider     Provider Specialty    Chau Sharma MD Internal Medicine    Demetrius Collins MD Internal Medicine       Primary Care Provider Office Phone # Fax #    Haley Baker [phone]       When to contact your care team       Call your primary doctor if you have any of the following: fevers, wood stops draining, increased pain of bladder or back, or large increase in bloody urine output.                  After Care Instructions     Activity       Your activity upon discharge: activity as tolerated            Diet       Follow this diet upon discharge:       Snacks/Supplements Adult: Nepro Oral Supplement; Between Meals      Snacks/Supplements Adult: Ensure Clear; Between Meals      Renal Diet            Supplies       List the  supplies the pt needs to go home: wood leg bag, teaching for wood.            Tubes and drains       You are going home with the following tubes or drains: wood catheter.  Tube cares per hospital or home care instructions.                  Follow-up Appointments     Follow Up and recommended labs and tests       Follow up with primary care provider, Haley Baker, this week (by Friday), for hospital follow- up. The following labs/tests are recommended: BMP.     Follow up in urology clinic, to discuss wood and discuss possible bladder outlet surgery. No follow up labs or test are needed.    Follow up with Nephrology, within one week  for hospital follow- up and kidney injury evaluation. The following labs/tests are recommended: BMP.                  Your next 10 appointments already scheduled     Jul 28, 2017  2:00 PM CDT   Office Visit with COY Atkinson CNP   [facility]    [address] [phone]           Bring a current list of meds and any records pertaining to this visit.  For Physicals, please bring immunization records and any forms needing to be filled out.  Please arrive 10 minutes early to complete paperwork.              Additional Services     Home Care OT Referral for Hospital Discharge       [facility] [phone]  Fax [phone]    OT to eval and treat    Your provider has ordered home care - occupational therapy. If you have not been contacted within 2 days of your discharge please call the department phone number listed on the top of this document.            Home Care PT Referral for Hospital Discharge       [facility] [phone]  Fax [phone]    PT to eval and treat    Your provider has ordered home care - physical therapy. If you have not been contacted within 2 days of your discharge please call the department phone number listed on the top of this document.            Home  "care nursing referral       [facility] [phone]  Fax [phone]    RN skilled nursing visit. RN to assess vital signs and weight, respiratory and cardiac status, pain level and activity tolerance, hydration, nutrition and bowel status and home safety.  RN to teach medication management.    Your provider has ordered home care nursing services. If you have not been contacted within 2 days of your discharge please call the inpatient department phone number at [phone] .                  Pending Results     No orders found from 7/10/2017 to 2017.            Statement of Approval     Ordered          17 1443  I have reviewed and agree with all the recommendations and orders detailed in this document.  EFFECTIVE NOW     Approved and electronically signed by:  Kay Bailey MD             Admission Information     Date & Time Provider Department Dept. Phone    2017 Demetrius Collins MD [address] [facility] [phone]      Your Vitals Were     Blood Pressure Pulse Temperature Respirations Weight Pulse Oximetry    128/70 (BP Location: Right arm) 70 98.1  F (36.7  C) (Oral) 16 76.6 kg (168 lb 14.4 oz) 100%    BMI (Body Mass Index)                   24.94 kg/m2           MyChart Information     [employer] lets you send messages to your doctor, view your test results, renew your prescriptions, schedule appointments and more. To sign up, go to [email or web address] . Click on \"Log in\" on the left side of the screen, which will take you to the Welcome page. Then click on \"Sign up Now\" on the right side of the page.     You will be asked to enter the access code listed below, as well as some personal information. Please follow the directions to create your username and password.     Your access code is: [email or web address]  Expires: 10/10/2017  2:21 PM     Your access code will  in 90 days. If you need help or a new code, please call your [facility] clinic or [phone].        Care " EveryWhere ID     This is your Care EveryWhere ID. This could be used by other organizations to access your [facility] medical records  [id number]        Equal Access to Services     BRIAN CERRATO : Justin Sagastume, osvaldo calle, lidatoño kayael jones, gibson biancain hayaahillary navarrokimberly fulton laJohnnysaadia solis. So [facility] [phone].    ATENCIÓN: Si habla español, tiene a montague disposición servicios gratuitos de asistencia lingüística. Llame al [phone].    We comply with applicable federal civil rights laws and [place] laws. We do not discriminate on the basis of race, color, national origin, age, disability sex, sexual orientation or gender identity.               Review of your medicines      START taking        Dose / Directions    finasteride 5 MG tablet   Commonly known as:  PROSCAR   Used for:  Benign prostatic hyperplasia with urinary retention        Dose:  5 mg   Take 1 tablet (5 mg) by mouth daily   Quantity:  30 tablet   Refills:  3       sodium bicarbonate 650 MG tablet   Used for:  Metabolic acidosis, NAG, bicarbonate losses        Dose:  650 mg   Take 1 tablet (650 mg) by mouth daily   Quantity:  30 tablet   Refills:  0       tamsulosin 0.4 MG capsule   Commonly known as:  FLOMAX   Used for:  Benign prostatic hyperplasia with urinary retention        Dose:  0.4 mg   Take 1 capsule (0.4 mg) by mouth daily   Quantity:  30 capsule   Refills:  3         CONTINUE these medicines which may have CHANGED, or have new prescriptions. If we are uncertain of the size of tablets/capsules you have at home, strength may be listed as something that might have changed.        Dose / Directions    amLODIPine 5 MG tablet   Commonly known as:  NORVASC   This may have changed:  See the new instructions.   Used for:  Hypertension goal BP (blood pressure) < 140/90        Dose:  5 mg   Take 1 tablet (5 mg) by mouth daily   Quantity:  30 tablet   Refills:  0         CONTINUE these medicines which have NOT CHANGED         Dose / Directions    aspirin 81 MG tablet        Dose:  1 tablet   Take 1 tablet by mouth daily.   Refills:  0       fish oil-omega-3 fatty acids 1000 MG capsule        Dose:  1 g   Take 1 g by mouth daily   Refills:  0       MULTIVITAMIN MEN PO        Dose:  1 tablet   Take 1 tablet by mouth daily   Refills:  0       nicotine 21 MG/24HR 24 hr patch   Commonly known as:  NICODERM CQ   Used for:  Tobacco abuse        Dose:  1 patch   Place 1 patch onto the skin every 24 hours   Quantity:  30 patch   Refills:  3         STOP taking     hydrochlorothiazide 25 MG tablet   Commonly known as:  HYDRODIURIL           metoprolol 100 MG 24 hr tablet   Commonly known as:  TOPROL-XL                Where to get your medicines      These medications were sent to [facility] [place], [place] - [address]  [address] [address]     Phone:  [phone]     amLODIPine 5 MG tablet    finasteride 5 MG tablet    nicotine 21 MG/24HR 24 hr patch    sodium bicarbonate 650 MG tablet    tamsulosin 0.4 MG capsule                Protect others around you: Learn how to safely use, store and throw away your medicines at www.disposemymeds.org.             Medication List: This is a list of all your medications and when to take them. Check marks below indicate your daily home schedule. Keep this list as a reference.      Medications           Morning Afternoon Evening Bedtime As Needed    amLODIPine 5 MG tablet   Commonly known as:  NORVASC   Take 1 tablet (5 mg) by mouth daily   Last time this was given:  5 mg on 7/25/2017  8:39 AM                                aspirin 81 MG tablet   Take 1 tablet by mouth daily.                                finasteride 5 MG tablet   Commonly known as:  PROSCAR   Take 1 tablet (5 mg) by mouth daily   Last time this was given:  5 mg on 7/25/2017  8:39 AM                                fish oil-omega-3 fatty acids 1000 MG capsule   Take 1 g by mouth daily                                 MULTIVITAMIN MEN PO   Take 1 tablet by mouth daily                                nicotine 21 MG/24HR 24 hr patch   Commonly known as:  NICODERM CQ   Place 1 patch onto the skin every 24 hours   Last time this was given:  1 patch on 7/24/2017  6:03 PM                                sodium bicarbonate 650 MG tablet   Take 1 tablet (650 mg) by mouth daily   Last time this was given:  650 mg on 7/25/2017  8:39 AM                                tamsulosin 0.4 MG capsule   Commonly known as:  FLOMAX   Take 1 capsule (0.4 mg) by mouth daily   Last time this was given:  0.4 mg on 7/25/2017  8:39 AM

## 2017-07-13 ENCOUNTER — APPOINTMENT (OUTPATIENT)
Dept: CT IMAGING | Facility: CLINIC | Age: 72
DRG: 668 | End: 2017-07-13
Attending: INTERNAL MEDICINE
Payer: MEDICARE

## 2017-07-13 ENCOUNTER — APPOINTMENT (OUTPATIENT)
Dept: ULTRASOUND IMAGING | Facility: CLINIC | Age: 72
DRG: 668 | End: 2017-07-13
Attending: INTERNAL MEDICINE
Payer: MEDICARE

## 2017-07-13 LAB
ALBUMIN UR-MCNC: 10 MG/DL
ALBUMIN UR-MCNC: 300 MG/DL
ANION GAP SERPL CALCULATED.3IONS-SCNC: 13 MMOL/L (ref 3–14)
ANION GAP SERPL CALCULATED.3IONS-SCNC: 13 MMOL/L (ref 3–14)
APPEARANCE UR: ABNORMAL
APPEARANCE UR: CLEAR
BACTERIA #/AREA URNS HPF: ABNORMAL /HPF
BILIRUB UR QL STRIP: NEGATIVE
BILIRUB UR QL STRIP: NEGATIVE
BLD PROD TYP BPU: NORMAL
BLD UNIT ID BPU: 0
BLOOD PRODUCT CODE: NORMAL
BPU ID: NORMAL
BUN SERPL-MCNC: 161 MG/DL (ref 7–30)
BUN SERPL-MCNC: 172 MG/DL (ref 7–30)
CALCIUM SERPL-MCNC: 7.8 MG/DL (ref 8.5–10.1)
CALCIUM SERPL-MCNC: 8 MG/DL (ref 8.5–10.1)
CHLORIDE SERPL-SCNC: 114 MMOL/L (ref 94–109)
CHLORIDE SERPL-SCNC: 115 MMOL/L (ref 94–109)
CO2 SERPL-SCNC: 16 MMOL/L (ref 20–32)
CO2 SERPL-SCNC: 16 MMOL/L (ref 20–32)
COLOR UR AUTO: ABNORMAL
COLOR UR AUTO: ABNORMAL
COPATH REPORT: NORMAL
CREAT SERPL-MCNC: 13.6 MG/DL (ref 0.66–1.25)
CREAT SERPL-MCNC: 14 MG/DL (ref 0.66–1.25)
ERYTHROCYTE [DISTWIDTH] IN BLOOD BY AUTOMATED COUNT: 14.7 % (ref 10–15)
GFR SERPL CREATININE-BSD FRML MDRD: 3 ML/MIN/1.7M2
GFR SERPL CREATININE-BSD FRML MDRD: 4 ML/MIN/1.7M2
GLUCOSE SERPL-MCNC: 158 MG/DL (ref 70–99)
GLUCOSE SERPL-MCNC: 91 MG/DL (ref 70–99)
GLUCOSE UR STRIP-MCNC: 30 MG/DL
GLUCOSE UR STRIP-MCNC: 70 MG/DL
HCT VFR BLD AUTO: 23.8 % (ref 40–53)
HGB BLD-MCNC: 7.8 G/DL (ref 13.3–17.7)
HGB UR QL STRIP: ABNORMAL
HGB UR QL STRIP: ABNORMAL
KETONES UR STRIP-MCNC: NEGATIVE MG/DL
KETONES UR STRIP-MCNC: NEGATIVE MG/DL
LEUKOCYTE ESTERASE UR QL STRIP: ABNORMAL
LEUKOCYTE ESTERASE UR QL STRIP: NEGATIVE
MCH RBC QN AUTO: 27.7 PG (ref 26.5–33)
MCHC RBC AUTO-ENTMCNC: 32.8 G/DL (ref 31.5–36.5)
MCV RBC AUTO: 84 FL (ref 78–100)
MUCOUS THREADS #/AREA URNS LPF: PRESENT /LPF
NITRATE UR QL: NEGATIVE
NITRATE UR QL: NEGATIVE
PH UR STRIP: 5.5 PH (ref 5–7)
PH UR STRIP: 7.5 PH (ref 5–7)
PLATELET # BLD AUTO: 246 10E9/L (ref 150–450)
POTASSIUM SERPL-SCNC: 4.2 MMOL/L (ref 3.4–5.3)
POTASSIUM SERPL-SCNC: 4.4 MMOL/L (ref 3.4–5.3)
PSA SERPL-MCNC: 1.72 UG/L (ref 0–4)
RBC # BLD AUTO: 2.82 10E12/L (ref 4.4–5.9)
RBC #/AREA URNS AUTO: 2 /HPF (ref 0–2)
RBC #/AREA URNS AUTO: >182 /HPF (ref 0–2)
SODIUM SERPL-SCNC: 143 MMOL/L (ref 133–144)
SODIUM SERPL-SCNC: 144 MMOL/L (ref 133–144)
SP GR UR STRIP: 1.01 (ref 1–1.03)
SP GR UR STRIP: 1.01 (ref 1–1.03)
TRANSFUSION STATUS PATIENT QL: NORMAL
TRANSFUSION STATUS PATIENT QL: NORMAL
URN SPEC COLLECT METH UR: ABNORMAL
URN SPEC COLLECT METH UR: ABNORMAL
UROBILINOGEN UR STRIP-MCNC: NORMAL MG/DL (ref 0–2)
UROBILINOGEN UR STRIP-MCNC: NORMAL MG/DL (ref 0–2)
WBC # BLD AUTO: 8.9 10E9/L (ref 4–11)
WBC #/AREA URNS AUTO: 9 /HPF (ref 0–2)
WBC #/AREA URNS AUTO: ABNORMAL /HPF (ref 0–2)

## 2017-07-13 PROCEDURE — 80048 BASIC METABOLIC PNL TOTAL CA: CPT | Performed by: INTERNAL MEDICINE

## 2017-07-13 PROCEDURE — 12000001 ZZH R&B MED SURG/OB UMMC

## 2017-07-13 PROCEDURE — 25000128 H RX IP 250 OP 636: Performed by: STUDENT IN AN ORGANIZED HEALTH CARE EDUCATION/TRAINING PROGRAM

## 2017-07-13 PROCEDURE — 25000128 H RX IP 250 OP 636

## 2017-07-13 PROCEDURE — 76770 US EXAM ABDO BACK WALL COMP: CPT

## 2017-07-13 PROCEDURE — 99233 SBSQ HOSP IP/OBS HIGH 50: CPT | Mod: GC | Performed by: INTERNAL MEDICINE

## 2017-07-13 PROCEDURE — 25000132 ZZH RX MED GY IP 250 OP 250 PS 637: Mod: GY | Performed by: STUDENT IN AN ORGANIZED HEALTH CARE EDUCATION/TRAINING PROGRAM

## 2017-07-13 PROCEDURE — 36415 COLL VENOUS BLD VENIPUNCTURE: CPT | Performed by: STUDENT IN AN ORGANIZED HEALTH CARE EDUCATION/TRAINING PROGRAM

## 2017-07-13 PROCEDURE — 85027 COMPLETE CBC AUTOMATED: CPT | Performed by: STUDENT IN AN ORGANIZED HEALTH CARE EDUCATION/TRAINING PROGRAM

## 2017-07-13 PROCEDURE — 81001 URINALYSIS AUTO W/SCOPE: CPT | Performed by: STUDENT IN AN ORGANIZED HEALTH CARE EDUCATION/TRAINING PROGRAM

## 2017-07-13 PROCEDURE — 74176 CT ABD & PELVIS W/O CONTRAST: CPT

## 2017-07-13 PROCEDURE — 81001 URINALYSIS AUTO W/SCOPE: CPT

## 2017-07-13 PROCEDURE — A9270 NON-COVERED ITEM OR SERVICE: HCPCS | Mod: GY | Performed by: STUDENT IN AN ORGANIZED HEALTH CARE EDUCATION/TRAINING PROGRAM

## 2017-07-13 PROCEDURE — 80048 BASIC METABOLIC PNL TOTAL CA: CPT | Performed by: STUDENT IN AN ORGANIZED HEALTH CARE EDUCATION/TRAINING PROGRAM

## 2017-07-13 PROCEDURE — P9016 RBC LEUKOCYTES REDUCED: HCPCS | Performed by: INTERNAL MEDICINE

## 2017-07-13 PROCEDURE — 84153 ASSAY OF PSA TOTAL: CPT | Performed by: STUDENT IN AN ORGANIZED HEALTH CARE EDUCATION/TRAINING PROGRAM

## 2017-07-13 RX ORDER — SODIUM BICARBONATE
POWDER (GRAM) MISCELLANEOUS EVERY 8 HOURS
Status: DISCONTINUED | OUTPATIENT
Start: 2017-07-13 | End: 2017-07-13

## 2017-07-13 RX ADMIN — SODIUM CHLORIDE, POTASSIUM CHLORIDE, SODIUM LACTATE AND CALCIUM CHLORIDE: 600; 310; 30; 20 INJECTION, SOLUTION INTRAVENOUS at 16:16

## 2017-07-13 RX ADMIN — SODIUM CHLORIDE, POTASSIUM CHLORIDE, SODIUM LACTATE AND CALCIUM CHLORIDE: 600; 310; 30; 20 INJECTION, SOLUTION INTRAVENOUS at 21:48

## 2017-07-13 RX ADMIN — SODIUM CHLORIDE, POTASSIUM CHLORIDE, SODIUM LACTATE AND CALCIUM CHLORIDE: 600; 310; 30; 20 INJECTION, SOLUTION INTRAVENOUS at 18:14

## 2017-07-13 RX ADMIN — SODIUM CHLORIDE, POTASSIUM CHLORIDE, SODIUM LACTATE AND CALCIUM CHLORIDE: 600; 310; 30; 20 INJECTION, SOLUTION INTRAVENOUS at 13:57

## 2017-07-13 RX ADMIN — METOPROLOL SUCCINATE 100 MG: 50 TABLET, FILM COATED, EXTENDED RELEASE ORAL at 08:22

## 2017-07-13 RX ADMIN — SODIUM CHLORIDE: 9 INJECTION, SOLUTION INTRAVENOUS at 01:37

## 2017-07-13 NOTE — PROGRESS NOTES
[facility] Internal Medicine Progress Note  Date: 07/13/2017  Date of admission: 7/12/2017          Assessment and Plan:   This is a 71 year old male with a history of CKD stage 3, HTN, and tobacco abuse admitted for anemia and >20 pound weight loss.     Postobstructive KRISTINA on CKD  Bladder Stretch Injury  Mass (bladder vs prostate)   Last creat in 2/2016 was 2.56. Creatinine on admission >14 with BUN >170. Wood placed with >4.5 L of urine output. Pt denying any urinary symptoms of issues emptying, weak stream or other difficulty urinating. Admits he was perhaps only urinating once a day.        -Follow BMP       -Avoid contrast and nephrotoxic agents       -Renal consulted   -Renal US today   -Monitor BMP       -Urology consulted    -PSA   -Cystoscopy (inpt if OR time available, otherwise outpt ok)   -Home with wood, to follow up outpt with urology      -CT scan chest/ab/pelvis today    Severe malnutrition  Anemia  Weight loss of >20 lbs  Hgb 6.5 on admission. Retic 1.1%, retic index is 0.2% - inadequate bone marrow response to anemia. Weight was 181 lbs at PCP appt 2/2016. Weight today is 150 lbs, per patient and daughter has lost at least 20 pounds in two months. Highly suspicious for malignancy (lung given smoking hx vs pancreas vs gastric vs renal vs other) vs chronic inflammatory disease vs GI blood loss (gastritis? Colon?) vs infection (although WBC wnl). Most likely related to bladder vs prostate mass seen on US 7/13.      -Follow up smear      -CT scan chest/ab/pelvis today      -CXR tonight      -Transfuse 1 unit today, recheck CBC in AM     Depression  Patient states feeling more depressed lately with weight loss and loss of independence. Talked about selling his home with his kids. Denies thoughts of suicide at this time.       -Continue to monitor       -Consider starting antidepressant     Chronic:  Hypertension  BP on admission 102/67.       -hold amlodipine and diuretic      -continue PTA  metoprolol     Tobacco abuse  Started smoking at age 12, at worst was smoking ~2 packs per day. Has cut way recently to 3-4 cigs per day.      -Continue PTA nicotine patch     FEN: Regular adult diet  Lines: PIV  DVT: ambulate (with anemia will hold off)  Code status: DNR/DNI  Dispo: Pending stabilization of kidney function and electrolytes as well as anemia, urology clearance for discharge, and evaluation by PT/OT    Clinical decision making discussed with Dr. Sharma who is in agreement with the above plan.     Kay Bailey MD  [facility] PGY-1   P: [phone]              Interval History:   Had wood placed overnight to have 4.5 L drained. Took a couple tries to pass wood, pt was uncomfortable with procedure - feeling better this AM.    Appetite improved today. Pt is nervous about possible upcoming procedures and overall change in his health within the past 24 hours.    Daughter updated at bedside. Concern about mass seen on US discussed with pt and daughter.           Last 24 hr care team notes reviewed.   ROS: 4 point ROS including Respiratory, CV, GI and , other than that noted in the HPI, is negative               Physical Exam:   Vitals were reviewed  /78 (BP Location: Right arm)  Pulse 71  Temp 96.8  F (36  C) (Oral)  Resp 16  Wt 62.3 kg (137 lb 6.4 oz)  SpO2 96%  BMI 20.29 kg/m2    Exam:  General: the patient is pleasant, resting comfortably, no acute distress. Cachectic.  HEENT: Normocephalic, atraumatic.  Lungs: Clear to auscultation, no wheezes or crackles.   CV: RRR, nl s1 and s2, systolic ejection murmur noted, greatest at left upper sternal boarder.  Abdomen: Nondistended. No fluid wave. Scar tissue in RLQ where appendectomy surgery performed. Soft, nontender. No rebound or guarding. Bowel sounds active.  Extremities: No lower extremity edema. Peripheral pulses strong and symmetric.   Skin: no appreciable skin lesions/rashes on exposed skin.   Neuro: Alert and oriented x4, grossly normal  neurologic exam..           Data:   Labs:  All laboratory and imaging data in the past 24 hours reviewed.    Imaging studies:  US renal complete 7/13:  1.  Vascular mass protruding into the bladder lumen originating at the  bladder neck, with bladder wall thickening, concerning for bladder or  prostate neoplasm.  Additional evaluation with CT would be  useful/recommended. Alternatively however given creatinine elevation,  may consider MRI or cystoscopy.    2.  Avascular complex material surrounding the Man balloon, which  could represent thrombus. Additional debris floating within the  bladder may represent blood products, infection/inflammatory cells, or  possibly tumor cells.  3.  Moderate hydroureteronephrosis bilaterally. No focal kidney mass  identified.        Internal Medicine Staff Addendum  Date of Service: 7/13/2017    I have seen and examined this patient, reviewed the data and discussed the plan of care. I agree with the above documentation including plan and ddx unless otherwise stated:     Garrett Sharma MD  Internal Medicine Hospitalist  [facility]  Attending pager: [phone]

## 2017-07-13 NOTE — CONSULTS
"  Nephrology Initial Consult  July 13, 2017      Dung Norton MRN:[id number] YOB: 1945  Date of Admission:7/12/2017  Primary care provider: Haley Baker  Requesting physician: Chau Sharma, *    ASSESSMENT AND RECOMMENDATIONS:   Dung Norton is a 71 year old with a PMH of CKDII , HTN . Presented for >20lbs weight loss , admitted with acute renal failure with 14 creatinine    1. Acute renal failure sec to obstructive nephropathy  Has a wood with bloody urine with debri  USG noted for the vascular mass and hydronephrosis  CT also shows a large prostate  At this time he likely has obstructive nephropathy and with this high creat acute rise this is expected ,   He is polyuric at this time with the relief of obstruction ,   Will recommend  - replete with 100ml/hr NSS for ongoing free water and solute losses  - continue with wood and monitor IO  - trend creatinine and daily electrolytes  Will follow on urology recommendations for further treatment plans    2. Electrolytes  At acceptable limits for now  Monitor electrolytes with mag and replete as needed with goal K ~4 and mag ~2    3. Volume status   euvolemic  Will hydrate IVF for ongoing losses from polyuria    4. Non gap acidosis from acute renal failure  Replete with PO bicarb  Goal >22     5. HTN   Management per primary    Recommendations were communicated to primary team     Seen and discussed with Dr. Kathie Colmenares MD   [phone]      REASON FOR CONSULT: acute renal failure    HISTORY OF PRESENT ILLNESS:  Dung Norton is a 71 year old with a PMH of CKDII , HTN . Presented for >20lbs weight loss , he reports poor appetite over the past few months , he also reports back pain that has been going on off and on. His creatinine at baseline is likely 2.5 from last year and he presented with acute increased creat at 14.4  He is not a good historian and reports that he has been urinating \"off and on\", he wakes uo at night but " not for urinary issues. He has had no prior obstructive issues with his urine and was not aware of any prostate issues  Since admission cathter was advised and he urinated >4L after the that. High volume outputs since then as expected from obstructive nephropathy.      PAST MEDICAL HISTORY:  Reviewed with patient on 07/13/2017     Past Medical History:   Diagnosis Date     HTN      Tobacco abuse        Past Surgical History:   Procedure Laterality Date     APPENDECTOMY  AGE 6     SINUS SURGERY  AGE 6     TONSILLECTOMY      CHILDHOOD        MEDICATIONS:  PTA Meds  Prior to Admission medications    Medication Sig Last Dose Taking? Auth Provider   Multiple Vitamins-Minerals (MULTIVITAMIN MEN PO) Take 1 tablet by mouth daily 7/12/2017 at AM Yes Reported, Patient   amLODIPine (NORVASC) 10 MG tablet TAKE 1 TABLET BY MOUTH ONCE DAILY 7/12/2017 at AM Yes Haley Baker MD   metoprolol (TOPROL-XL) 100 MG 24 hr tablet TAKE 1 TABLET BY MOUTH ONCE DAILY 7/12/2017 at AM Yes Haley Baker MD   fish oil-omega-3 fatty acids (FISH OIL) 1000 MG capsule Take 1 g by mouth daily 7/12/2017 at AM Yes Reported, Patient   aspirin 81 MG tablet Take 1 tablet by mouth daily. 7/12/2017 at AM Yes Reported, Patient   nicotine (NICODERM CQ) 21 MG/24HR patch 2h hr Place 1 patch onto the skin every 24 hours  Patient not taking: Reported on 7/13/2017 Not Taking at Unknown time  Haley Baker MD   hydrochlorothiazide (HYDRODIURIL) 25 MG tablet Take 0.5 tablets (12.5 mg) by mouth daily  Patient not taking: Reported on 7/12/2017 More than a month at Unknown time  Haley Baker MD      Current Meds    metoprolol  100 mg Oral Daily     nicotine  1 patch Transdermal Q24H     nicotine   Transdermal Daily     nicotine   Transdermal Q8H     Infusion Meds    IV fluid REPLACEMENT ONLY 125 mL/hr at 07/13/17 1616     - MEDICATION INSTRUCTIONS -         ALLERGIES:    No Known Allergies    REVIEW OF SYSTEMS:  A 10 point review of systems was negative except as noted  above.    SOCIAL HISTORY:   Social History     Social History     Marital status:      Spouse name: N/A     Number of children: 2     Years of education: N/A     Occupational History      [employer]     Social History Main Topics     Smoking status: Current Every Day Smoker     Packs/day: 0.25     Types: Cigarettes     Smokeless tobacco: Never Used      Comment: 3 cig a day     Alcohol use Yes      Comment: 2 per year     Drug use: No     Sexual activity: Not Currently     Other Topics Concern     Not on file     Social History Narrative     Reviewed with patient       FAMILY MEDICAL HISTORY:   Family History   Problem Relation Age of Onset     C.A.D. Mother      d age 67     Vision Loss Father      Hearing Loss Sister      DIABETES Sister      CANCER No family hx of      Reviewed with patient    PHYSICAL EXAM:   Temp  Av.5  F (35.8  C)  Min: 95.1  F (35.1  C)  Max: 97.3  F (36.3  C)      Pulse  Av.5  Min: 69  Max: 89 Resp  Av.7  Min: 15  Max: 18  SpO2  Av.7 %  Min: 96 %  Max: 100 %       /78 (BP Location: Right arm)  Pulse 71  Temp 96.8  F (36  C) (Oral)  Resp 16  Wt 62.3 kg (137 lb 6.4 oz)  SpO2 96%  BMI 20.29 kg/m2   Date 17 0700 - 17 0659   Shift 0076-8769 1354-1333 2578-0796 24 Hour Total   I  N  T  A  K  E   P.O. 120   120    Shift Total  (mL/kg) 120  (1.93)   120  (1.93)   O  U  T  P  U  T   Urine 1300   1300    Shift Total  (mL/kg) 1300  (20.86)   1300  (20.86)   Weight (kg) 62.32 62.32 62.32 62.32        Admit Weight: 68 kg (150 lb)     GENERAL APPEARANCE: no distress,  awake  EYES: - scleral icterus, pupils equal  HENT: NC/AT,  mouth  without ulcers or lesions  Lymphatics: no cervical or supraclavicular LAD  Pulmonary: lungs clear to auscultation with equal breath sounds bilaterally, no clubbing  CV: regular rhythm, normal rate, no rub   - JVP -   - Edema-  GI: soft, nontender, normal bowel sounds, no HSM   MS: no evidence of inflammation in  joints, no muscle tenderness  : no Man, - scrotal or labial edema  SKIN: no rash, warm, dry, no cyanosis  NEURO: mentation intact and speech normal    LABS:   CMP  Recent Labs  Lab 07/13/17  1503 07/13/17  0812 07/12/17  1740 07/12/17  1407    143 139 140   POTASSIUM 4.2 4.4 4.5 4.2   CHLORIDE 115* 114* 108 109   CO2 16* 16* 16* 16*   ANIONGAP 13 13 16* 15*   * 91 105* 117*   * 172* 173* 177*   CR 13.60* 14.00* 14.70* 14.40*   GFRESTIMATED 4* 3* 3* 3*   GFRESTBLACK 4* 4* 4* 4*   DERICK 7.8* 8.0* 8.5 8.3*   PROTTOTAL  --   --  6.8 6.5*   ALBUMIN  --   --  3.1* 3.1*   BILITOTAL  --   --  0.6 0.3   ALKPHOS  --   --  67 65   AST  --   --  12 8   ALT  --   --  19 20     CBC  Recent Labs  Lab 07/13/17  0812 07/12/17  1740 07/12/17  1407   HGB 7.8* 6.5* 6.4*   WBC 8.9 7.8 8.1   RBC 2.82* 2.33* 2.26*   HCT 23.8* 19.6* 19.6*   MCV 84 84 87   MCH 27.7 27.9 28.3   MCHC 32.8 33.2 32.7   RDW 14.7 14.8 14.5    278 259     INRNo lab results found in last 7 days.  ABGNo lab results found in last 7 days.   URINE STUDIES  Recent Labs   Lab Test  07/13/17   0650  07/13/17   0100  01/15/14   0936  06/10/11   1040  05/26/09   1137   COLOR  Light Yellow  Light Yellow  Yellow  Yellow  Yellow   APPEARANCE  Slightly Cloudy  Clear  Clear  Clear  Clear   URINEGLC  70*  30*  Negative  Negative  Negative   URINEBILI  Negative  Negative  Negative  Negative  Negative   URINEKETONE  Negative  Negative  Negative  Negative  Negative   SG  1.010  1.009  1.020  1.025  1.025   UBLD  Moderate*  Trace*  Small*  Negative  Trace*   URINEPH  7.5*  5.5  6.0  5.5  5.5   PROTEIN  300*  10*  Negative  Negative  Negative   UROBILINOGEN   --    --   0.2  0.2  0.2   NITRITE  Negative  Negative  Negative  Negative  Negative   LEUKEST  Negative  Small*  Negative  Negative  Negative   RBCU  >182*  2  10-25*  O - 2  O - 2   WBCU  None  9*  O - 2  O - 2  O - 2     No lab results found.  PTH  No lab results found.  IRON STUDIES  No lab  results found.    IMAGING:  All imaging studies reviewed by me.     USG:  FINDINGS:     Right kidney: Measures 10.4 cm in length. Thinning of the renal  cortical parenchyma. No focal mass. Moderate hydroureteronephrosis.     Left kidney: Measures 10.2 cm in length. Thinning of the renal  cortical parenchyma. No focal mass. Moderate hydroureteronephrosis.     Bladder: Man catheter and balloon present. Vascular mass protruding  into the bladder lumen originating at the bladder neck/prostate.  Avascular iso/hyperechoic complex material surrounding the Man  balloon. Debris is floating within the bladder.            IMPRESSION:  1.  Vascular mass protruding into the bladder lumen originating at the  bladder neck, with bladder wall thickening, concerning for bladder or  prostate neoplasm.  Additional evaluation with CT would be  useful/recommended. Alternatively however given creatinine elevation,  may consider MRI or cystoscopy.    2.  Avascular complex material surrounding the Man balloon, which  could represent thrombus. Additional debris floating within the  bladder may represent blood products, infection/inflammatory cells, or  possibly tumor cells.  3.  Moderate hydroureteronephrosis bilaterally. No focal kidney mass  identified.       CT chest abd pel  IMPRESSION:   1. Large amount of hyperdense clot within the urinary bladder. The  prostate is enlarged with a nodular projection into the base of the  bladder. Additionally, the bladder is moderately thickened with some  irregular asymmetrically thickened areas along its anterior wall.  These findings are nonspecific and could be explained by benign  prostate enlargement with a chronically obstructed trabeculated  bladder, however given the presence of hemorrhage is worrisome for  malignancy either of the bladder or prostate gland.  2. Moderate-severe bilateral hydronephrosis and hydroureter,  presumably related to obstructing mass at the trigone or  chronic  bladder outlet obstruction, either from benign prostate enlargement or  malignancy.  3. No evidence to suggest metastatic disease within the chest,  abdomen, pelvis or skeleton on this noncontrast CT.  4. 4.0 cm infrarenal abdominal aortic aneurysm, with moderate  aortobiiliac atherosclerotic disease.  5. A few small pulmonary nodules measuring up to 3 mm. These are  nonspecific. In a patient with a smoking history, a 12 month follow-up  CT is optional per Fleischner society criteria. If malignancy is  discovered, Fleischner society criteria no longer applies.     Allison Colmenares MD

## 2017-07-13 NOTE — PROGRESS NOTES
[facility], [place]    Internal Medicine Progress Note - [facility] Service    Main Plans for Today   -Follow Hgb, CBC  -Consult urology  -Plan outpatient management of prostate/kidney  -Recommend lung cancer screening outpatient (with PCP)    Assessment & Plan   Dung Norton is a 71 year old male admitted on 7/12/2017. He has a history of CKD stage 3, HTN and tobacco abuse since age 12 and is admitted for lower back pain, anemia and 20 lb weight loss over the last 4 months.    #Anemia  #Cachexia  #KRISTINA  #Weight loss of > 20 lbs  Hgb 6.5 on admission, has lost 20-30 lbs in last 4 months (weighed 181 with PCP 2/2016, 150 lbs yesterday before catheterization 7/12, and 137 lbs s/p catheterization 7/13). Retic 1.1%, index is 0.2%, showing inadequate bone marrow response. Renal US showed bladder mass, debris in bladder.  Suspicion is high for a malignant (or at least neoplastic) process causing his current obstruction and symptoms. It would explain his weight loss, hydronephrosis and acute kidney injury, as well as his anemia and decreased marrow response due to decreased EPO production.  -F/u with urology  -Consult nephrology  -Maintain wood catheter patency. If losing output, page urology to replace.  -F/u outpatient with oncology    #CKD Stage 3  Baseline creatinine in high 2 range (last one 2.56 2/2016).  -Follow BMP  -Avoid insulting kidneys (contrast, nephrotoxins)    #Depression  Patient grudgingly admits to feeling depressed with recent weakness, partial loss of independence and weight loss. Denies thoughts of self-harm or suicide.  -F/u outpatient, consider antidepressant therapy    Chronic:  Hypertension  BP on admission 102/67.       -hold amlodipine and diuretic      -continue PTA metoprolol     Tobacco abuse  Started smoking at age 12, cut back recently to 3-4 cigarettes/day. Has smoked up to 2 packs per day at times.      -Continue PTA nicotine patch    Diet: Combination Diet  Regular Diet Adult  Snacks/Supplements Adult: Ensure Plus (Adult); Between Meals  Fluids: PO, PIV x1  DVT Prophylaxis: Low Risk/Ambulatory with no VTE prophylaxis indicated  Code Status: DNR / DNI    Disposition Plan   Expected discharge: Today or tomorrow, recommended to prior living arrangement once OT evaluation conducted, anemia workup complete, and outpatient management planned.     Entered: Herbert Lopez 07/13/2017, 11:48 AM   Information in the above section will display in the discharge planner report.      The patient's care was discussed with the Attending Physician, Dr. Sharma and Patient.    Herbert Almodovar John  [facility]  Maroon: 2  Pager: [phone]  Please see sticky note for cross cover information    Interval History   Overnight course uncomplicated. Had 2 slightly loose BMs, wood catheter for urine. Drank some water, not feeling any nausea or pain.    4 point ROS negative except as noted above.    Physical Exam   Vital Signs: Temp: 96.1  F (35.6  C) Temp src: Oral BP: 121/72 Pulse: 70   Resp: 16 SpO2: 99 % O2 Device: None (Room air)    Weight: 137 lbs 6.4 oz  General Appearance: Alert, oriented, pleasant. In no acute distress. Noticeably thin with some visible bone structure, but would not describe as emaciated.  Respiratory: CTAB except for one possible inspiratory crackle in the right apex. No increased WOB.  Cardiovascular: RRR. Systolic ejection murmur detected most strongly at the base.  GI: Abdomen soft. No tenderness to palpation.  Extremities: intention tremor in arms and hands. Brief neuro exam WNL, strength equal and bilateral on hands and feet.    Data   Medications     - MEDICATION INSTRUCTIONS -       NaCl 50 mL/hr at 07/13/17 0137       metoprolol  100 mg Oral Daily     nicotine  1 patch Transdermal Q24H     nicotine   Transdermal Daily     nicotine   Transdermal Q8H     Data     Recent Labs  Lab 07/13/17  0812 07/12/17  1740 07/12/17  1407   WBC 8.9  7.8 8.1   HGB 7.8* 6.5* 6.4*   MCV 84 84 87    278 259    139 140   POTASSIUM 4.4 4.5 4.2   CHLORIDE 114* 108 109   CO2 16* 16* 16*   * 173* 177*   CR 14.00* 14.70* 14.40*   ANIONGAP 13 16* 15*   DERICK 8.0* 8.5 8.3*   GLC 91 105* 117*   ALBUMIN  --  3.1* 3.1*   PROTTOTAL  --  6.8 6.5*   BILITOTAL  --  0.6 0.3   ALKPHOS  --  67 65   ALT  --  19 20   AST  --  12 8     Recent Results (from the past 24 hour(s))   XR Chest 2 Views    Narrative    Examination: XR CHEST 2 VW, 7/12/2017 5:22 PM    Comparison: No comparison    History: Weight loss    Findings: The cardiomediastinal silhouette is within normal limits.  Calcification in the aortic arch. Presumed nipple shadows bilaterally.  Lungs are otherwise clear. No pleural effusion. No pneumothorax.      Impression    Impression: Presumed nipple shadows bilaterally. Lungs are otherwise  clear. If there is a high degree of clinical concern for a pulmonary  malignancy a CT would be more sensitive.    I have personally reviewed the examination and initial interpretation  and I agree with the findings.    GEOVANNA JUNIOR MD   US Renal Complete    Narrative    EXAMINATION: Renal ultrasound, 7/13/2017 10:36 AM     COMPARISON: None.    HISTORY: Elevated creatinine    FINDINGS:    Right kidney: Measures 10.4 cm in length. Thinning of the renal  cortical parenchyma. No focal mass. Moderate hydroureteronephrosis.    Left kidney: Measures 10.2 cm in length. Thinning of the renal  cortical parenchyma. No focal mass. Moderate hydroureteronephrosis.    Bladder: Man catheter and balloon present. Vascular mass protruding  into the bladder lumen originating at the bladder neck/prostate.  Avascular iso/hyperechoic complex material surrounding the Man  balloon. Debris is floating within the bladder.        Impression    IMPRESSION:  1.  Vascular mass protruding into the bladder lumen originating at the  bladder neck, with bladder wall thickening, concerning for  bladder or  prostate neoplasm.  Additional evaluation with CT is recommended.  Alternatively may be consider MRI or cystoscopy.    2.  Avascular complex material surrounding the Man balloon, most  likely thrombus. Additional debris floating within the bladder may  represent blood products, infection/inflammatory cells, or possibly  tumor cells.  3.  Moderate hydroureteronephrosis bilaterally. No focal kidney mass  identified.

## 2017-07-13 NOTE — PHARMACY-ADMISSION MEDICATION HISTORY
Admission medication history interview status for the 7/12/2017 admission is complete. See Epic admission navigator for allergy information, pharmacy, prior to admission medications and immunization status.     Medication history interview sources:  Patient    Changes made to PTA medication list (reason)  Added: none  Deleted: none  Changed: patient reports not taking HCTZ or nicotine patch    Additional medication history information (including reliability of information, actions taken by pharmacist):  -Patient was a moderate historian  -Patient reports he stopped taking his hydrochlorothiazide about 3 months ago (not at physician's orders)  -Patient reports not using nicotine patch  -Patient reports using Advil about once every 1-2 weeks (not added to list)  -Patient did not receive flu shot this past year      Prior to Admission medications    Medication Sig Last Dose Taking? Auth Provider   Multiple Vitamins-Minerals (MULTIVITAMIN MEN PO) Take 1 tablet by mouth daily 7/12/2017 at AM Yes Reported, Patient   amLODIPine (NORVASC) 10 MG tablet TAKE 1 TABLET BY MOUTH ONCE DAILY 7/12/2017 at AM Yes Haley Baker MD   metoprolol (TOPROL-XL) 100 MG 24 hr tablet TAKE 1 TABLET BY MOUTH ONCE DAILY 7/12/2017 at AM Yes Haley Baker MD   fish oil-omega-3 fatty acids (FISH OIL) 1000 MG capsule Take 1 g by mouth daily 7/12/2017 at AM Yes Reported, Patient   aspirin 81 MG tablet Take 1 tablet by mouth daily. 7/12/2017 at AM Yes Reported, Patient   nicotine (NICODERM CQ) 21 MG/24HR patch 2h hr Place 1 patch onto the skin every 24 hours  Patient not taking: Reported on 7/13/2017 Not Taking at Unknown time  Haley Baker MD   hydrochlorothiazide (HYDRODIURIL) 25 MG tablet Take 0.5 tablets (12.5 mg) by mouth daily  Patient not taking: Reported on 7/12/2017 More than a month at Unknown time  Haley Baker MD         Medication history completed by: Consuelo Rosado, Pharm.D. Student

## 2017-07-13 NOTE — PROGRESS NOTES
MAROON NIGHT PROGRESS NOTE:    Patient's BMP notable for a creatinine of >14 and BUN of 173. Potassium is normal and CO2 is 16. Patient is largely asymptomatic from this. He says that his urine has been normal (no blood, no difficulty). He denies itching, persistent nausea, or any other symptoms of uremia. BP is also low-normal at 102/67. Although patient is not meeting criteria for emergent dialysis at this time, given extensive nature of BUN/creatinine elevation I discussed the case with Nephrology staff. Their recs were as follows:    1) Place wood: both to relieve possible obstruction and for strict I/O monitoring  2) Gentle fluid hydration overnight  3) UA  4) Complete renal ultrasound with dopplers  5) Nephrology consult - they will see patient tomorrow    Migel Jones MD, MS  Internal Medicine-Pediatrics, PGY-4  [phone]

## 2017-07-13 NOTE — PLAN OF CARE
Problem: Goal Outcome Summary  Goal: Goal Outcome Summary  Outcome: No Change  Pt AOx4, VSS on RA. Transfers SBA. No c/o pain or nausea. Wood patent with red output. MD aware. Continuous LR infusing. Rate is determined by the amt of output of the wood checked every 4 hrs (i.e 500 mL output = 500 mL/hr). Family at bedside. No BM today. Regular diet, fair appetite. Received 1 unit PRBC overnight, recheck Hgb 7.8. Will continue to monitor and follow POC.

## 2017-07-13 NOTE — H&P
[facility] History and Physical    Dung Norton MRN# [id number]   Age: 71 year old YOB: 1945     Date of Admission:  7/12/2017    Home clinic: [facility]  Primary care provider: Haley Baker          Assessment and Plan:   This is a 71 year old male with a history of CKD stage 3, HTN, and tobacco abuse admitted for anemia and >20 pound weight loss.    Severe malnutrition  Anemia  Weight loss of >20 lbs  Hgb 6.5 on admission. Retic 1.1%, retic index is 0.2% - inadequate bone marrow response to anemia. Weight was 181 lbs at PCP appt 2/2016. Weight today is 150 lbs, per patient and daughter has lost at least 20 pounds in two months. Highly suspicious for malignancy (lung given smoking hx vs pancreas vs gastric vs renal vs other) vs chronic inflammatory disease vs GI blood loss (gastritis? Colon?) vs infection (although WBC wnl).      -Follow up smear      -CT scan chest/ab/pelvis tomorrow      -CXR tonight      -Transfuse 1 unit tonight, recheck CBC in AM      -CMP, TSH, free T4, ESR, CRP    CKD Stage 3   Last creat in 2/2016 was 2.56.       -Follow BMP       -Avoid contrast and nephrotoxic agents    Depression  Patient states feeling more depressed lately with weight loss and loss of independence. Talked about selling his home with his kids. Denies thoughts of suicide at this time.       -Continue to monitor       -Consider starting antidepressant    Chronic:  Hypertension  BP on admission 102/67.       -hold amlodipine and diuretic      -continue PTA metoprolol    Tobacco abuse  Started smoking at age 12, at worst was smoking ~2 packs per day. Has cut way recently to 3-4 cigs per day.      -Continue PTA nicotine patch    FEN: Regular adult diet  Lines: PIV  DVT: ambulate (with anemia will hold off)  Code status: DNR/DNI  Dispo: Pending workup for anemia and evaluation by PT/OT    Clinical decision making discussed with Dr. Sharma who is in agreement with the above plan.     Kay  "MD Leo  [facility] PGY-1   P: [phone]            Chief Complaint:   Anemia     History is obtained from the patient and patient's daughter          History of Present Illness:   This is a 71 year old male with a history of CKD stage 3, HTN, and tobacco abuse admitted from clinic for anemia and >20 pound weight loss.    Mr. Norton noticed his appetite decreasing about 4 months ago - feels like he doesn't have the ambition to cook and feels like that's why he lost weight. Although notes he may not eat a big meal even if it was right in front of him. His daughter notes increased belching with his eating. He does endorse vomiting after meals on occasion, the most recent episode being two days ago. No blood in emesis noted. He feels zantac has helped.    Weight loss was noted about 2 months ago by his daughter, but it became quite profound about a month ago. She notes it in his face and arms. She thinks in the last week he lost ten pounds since she saw him.    Four months ago his \"back went out\" and ever since then he has had a lot of back pain. Uses a heating pad at night and that helps a little bit. When he walks up and down stairs his back hurts on both sides and radiates down his right leg.     Having difficulty sleeping the past two weeks, waking up and unsure why. He notes some depression the last few weeks because he has not been able to do activities he used to be able to do. Talked about selling his home with his children. Denies any wishes or thoughts of suicide.     Always has a baseline cough, denies hemoptysis. No new lumps or bumps he has noticed. Bowel movements have been softer than normal lately, but not usually diarrhea. No BRBPR or melena.     Of note, he has been a smoker since age 12. Currently only smoking 3-4 cigarettes per day, but used to smoke 2 ppd when he was recently retired.             Past Medical History:     Past Medical History:   Diagnosis Date     HTN      Tobacco abuse           "    Past Surgical History:      Past Surgical History:   Procedure Laterality Date     APPENDECTOMY  AGE 6     SINUS SURGERY  AGE 6     TONSILLECTOMY      CHILDHOOD             Social History:   Lives independently alone in his own home in [place]. Two daughters nearby in [facility] and [place]. Grandkids come over to help out around the home.  Retired. Used to work as a  and a  - thinks he was around fumes of some sort at times, but not really sure.   Alcohol use: ~2 drinks per year.  Drug use: Denies.  Smoking: Since the age of 12, at the most two packs per day, now only 3-4 cigs per day.  Pets: None.         Family History:     Family History   Problem Relation Age of Onset     C.A.D. Mother      d age 67     Vision Loss Father      Hearing Loss Sister      DIABETES Sister      CANCER No family hx of               Allergies:   No Known Allergies          Medications:     Prescriptions Prior to Admission   Medication Sig Dispense Refill Last Dose     Multiple Vitamins-Minerals (MULTIVITAMIN MEN PO) Take 1 tablet by mouth daily        amLODIPine (NORVASC) 10 MG tablet TAKE 1 TABLET BY MOUTH ONCE DAILY 30 tablet 0 Taking     metoprolol (TOPROL-XL) 100 MG 24 hr tablet TAKE 1 TABLET BY MOUTH ONCE DAILY 90 tablet 0 Taking     nicotine (NICODERM CQ) 21 MG/24HR patch 2h hr Place 1 patch onto the skin every 24 hours 30 patch 1 Taking     hydrochlorothiazide (HYDRODIURIL) 25 MG tablet Take 0.5 tablets (12.5 mg) by mouth daily (Patient not taking: Reported on 7/12/2017) 45 tablet 1 Not Taking     fish oil-omega-3 fatty acids (FISH OIL) 1000 MG capsule Take 1 g by mouth daily   Taking     aspirin 81 MG tablet Take 1 tablet by mouth daily.   Taking             Review of Systems:   General: no fevers, chills. POsitive for weight loss.  Ears/Nose/Throat: no sinus congestion, sore throat, rhirorrhea, or ear pain  Eyes: No vision changes.    Skin: No rashes or lesions.   Respiratory: no shortness of breath.  Positive for cough.  Cardiovascular: no chest pain, lower extremity swelling, palpitations.   Gastrointestinal: no abdominal pain, nausea, diarrhea, constipation. Positive for vomiting after meals at times.  Genitourinary: no frequency, urgency or dysuria  MSK: no arthralgias or myalgias  Neuro: no headaches, weakness or numbness.  Psych: Has had some depression lately, denies suicidal thoughts.  Heme: no bleeding or bruising  Endocrine: no polyuria/polydipsia, no skin changes, no temperature intolerance.              Physical Exam:   Vitals were reviewed  /67 (BP Location: Right arm)  Pulse 79  Temp 96.1  F (35.6  C) (Oral)  Resp 16  Wt 68 kg (150 lb)  SpO2 96%  BMI 22.15 kg/m2    Exam:  General: the patient is pleasant, resting comfortably, no acute distress. Cachectic.  Neck: No lymphadenopathy.  HEENT: Normocephalic, atraumatic.  Lungs: Clear to auscultation, no wheezes or crackles.   CV: RRR, nl s1 and s2, prominent systolic ejection murmur noted, greatest at left upper sternal boarder.  Abdomen: Distended. No fluid wave. Scar tissue in RLQ where appendectomy surgery performed. Soft, nontender. No rebound or guarding. Bowel sounds active.  Extremities: No lower extremity edema. Peripheral pulses strong and symmetric.   Back: No point tenderness along spine.  Skin: no appreciable skin lesions/rashes on exposed skin.   Neuro: Alert and oriented x4, grossly normal neurologic exam.  Psych: normal affect, answering questions appropriately. No obvious depression or anxiety.           Data:   Labs:   Results for orders placed or performed during the hospital encounter of 07/12/17 (from the past 24 hour(s))   CBC with platelets   Result Value Ref Range    WBC 7.8 4.0 - 11.0 10e9/L    RBC Count 2.33 (L) 4.4 - 5.9 10e12/L    Hemoglobin 6.5 (LL) 13.3 - 17.7 g/dL    Hematocrit 19.6 (L) 40.0 - 53.0 %    MCV 84 78 - 100 fl    MCH 27.9 26.5 - 33.0 pg    MCHC 33.2 31.5 - 36.5 g/dL    RDW 14.8 10.0 - 15.0 %     Platelet Count 278 150 - 450 10e9/L   Reticulocyte count   Result Value Ref Range    % Retic 1.1 0.5 - 2.0 %    Absolute Retic 25.9 25 - 95 10e9/L   WBC Differential   Result Value Ref Range    Diff Method Automated Method     % Neutrophils 74.4 %    % Lymphocytes 15.5 %    % Monocytes 5.7 %    % Eosinophils 3.8 %    % Basophils 0.4 %    % Immature Granulocytes 0.2 %    Absolute Neutrophil 6.0 1.6 - 8.3 10e9/L    Absolute Lymphocytes 1.3 0.8 - 5.3 10e9/L    Absolute Monocytes 0.5 0.0 - 1.3 10e9/L    Absolute Eosinophils 0.3 0.0 - 0.7 10e9/L    Absolute Basophils 0.0 0.0 - 0.2 10e9/L    Abs Immature Granulocytes 0.0 0 - 0.4 10e9/L   ABO/Rh type and screen   Result Value Ref Range    ABO Pending     RH(D) Pending     Antibody Screen Pending     Test Valid Only At       [facility]    Specimen Expires 07/15/2017         Internal Medicine Staff Addendum  Date of Service: 7/12/2017    I have seen and examined this patient, reviewed the data and discussed the plan of care. I agree with the above documentation including plan and ddx unless otherwise stated:     Garrett Sharma MD  Internal Medicine Hospitalist  [facility]  Attending pager: [phone]

## 2017-07-13 NOTE — PLAN OF CARE
Problem: Goal Outcome Summary  Goal: Goal Outcome Summary  Outcome: No Change  Pt is an admit from [facility]. He is A&O x 4, VSS and on room air. Pt had lost over 25 lbs over the past 3-4 months. Hgb- 6.5, creat- 14.40, BUN- 177. Chest x-ray completed. Trying to get a blood type but antibodies came back positive. Strict I&O started, minimal urine output on shift, no BM. Neph consult scheduled for tomorrow. 1L bolus given at 250 ml/hr then switching to 50 ml/hr in L PIV. Unable to obtain UA sample. Pt tolerating regular diet well. Up independently in room and hallway. Pt complains of back pain but denies and medication. Continue to monitor and POC.

## 2017-07-13 NOTE — PLAN OF CARE
Problem: Goal Outcome Summary  Goal: Goal Outcome Summary  Outcome: No Change  A/Ox4, VSS on RA. Hgb 6.5, 1U PRBCs infusing. Denies dizziness. Wood placed, required 3 attempts. 5,000ml urine output since wood placement at 0100. Urine originally yellow, now red. UA sent x2. Small BM x2. Continuing MIVF at 50ml/hr. Up with SBA to BR. Plan for CT today.

## 2017-07-14 ENCOUNTER — APPOINTMENT (OUTPATIENT)
Dept: OCCUPATIONAL THERAPY | Facility: CLINIC | Age: 72
DRG: 668 | End: 2017-07-14
Attending: INTERNAL MEDICINE
Payer: MEDICARE

## 2017-07-14 ENCOUNTER — APPOINTMENT (OUTPATIENT)
Dept: PHYSICAL THERAPY | Facility: CLINIC | Age: 72
DRG: 668 | End: 2017-07-14
Attending: INTERNAL MEDICINE
Payer: MEDICARE

## 2017-07-14 LAB
ANION GAP SERPL CALCULATED.3IONS-SCNC: 12 MMOL/L (ref 3–14)
ANION GAP SERPL CALCULATED.3IONS-SCNC: 14 MMOL/L (ref 3–14)
BLD PROD TYP BPU: NORMAL
BLD UNIT ID BPU: 0
BLOOD PRODUCT CODE: NORMAL
BPU ID: NORMAL
BUN SERPL-MCNC: 140 MG/DL (ref 7–30)
BUN SERPL-MCNC: 148 MG/DL (ref 7–30)
CALCIUM SERPL-MCNC: 7.8 MG/DL (ref 8.5–10.1)
CALCIUM SERPL-MCNC: 7.9 MG/DL (ref 8.5–10.1)
CHLORIDE SERPL-SCNC: 111 MMOL/L (ref 94–109)
CHLORIDE SERPL-SCNC: 112 MMOL/L (ref 94–109)
CO2 SERPL-SCNC: 16 MMOL/L (ref 20–32)
CO2 SERPL-SCNC: 19 MMOL/L (ref 20–32)
CREAT SERPL-MCNC: 11 MG/DL (ref 0.66–1.25)
CREAT SERPL-MCNC: 12.2 MG/DL (ref 0.66–1.25)
ERYTHROCYTE [DISTWIDTH] IN BLOOD BY AUTOMATED COUNT: 14.9 % (ref 10–15)
ERYTHROCYTE [DISTWIDTH] IN BLOOD BY AUTOMATED COUNT: 15.1 % (ref 10–15)
GFR SERPL CREATININE-BSD FRML MDRD: 4 ML/MIN/1.7M2
GFR SERPL CREATININE-BSD FRML MDRD: 5 ML/MIN/1.7M2
GLUCOSE SERPL-MCNC: 106 MG/DL (ref 70–99)
GLUCOSE SERPL-MCNC: 130 MG/DL (ref 70–99)
HCT VFR BLD AUTO: 19.5 % (ref 40–53)
HCT VFR BLD AUTO: 20.8 % (ref 40–53)
HGB BLD-MCNC: 6.6 G/DL (ref 13.3–17.7)
HGB BLD-MCNC: 7.1 G/DL (ref 13.3–17.7)
MAGNESIUM SERPL-MCNC: 2.5 MG/DL (ref 1.6–2.3)
MAGNESIUM SERPL-MCNC: 2.6 MG/DL (ref 1.6–2.3)
MCH RBC QN AUTO: 28.3 PG (ref 26.5–33)
MCH RBC QN AUTO: 29 PG (ref 26.5–33)
MCHC RBC AUTO-ENTMCNC: 33.8 G/DL (ref 31.5–36.5)
MCHC RBC AUTO-ENTMCNC: 34.1 G/DL (ref 31.5–36.5)
MCV RBC AUTO: 84 FL (ref 78–100)
MCV RBC AUTO: 85 FL (ref 78–100)
PLATELET # BLD AUTO: 208 10E9/L (ref 150–450)
PLATELET # BLD AUTO: 214 10E9/L (ref 150–450)
POTASSIUM SERPL-SCNC: 4.1 MMOL/L (ref 3.4–5.3)
POTASSIUM SERPL-SCNC: 4.3 MMOL/L (ref 3.4–5.3)
RBC # BLD AUTO: 2.33 10E12/L (ref 4.4–5.9)
RBC # BLD AUTO: 2.45 10E12/L (ref 4.4–5.9)
SODIUM SERPL-SCNC: 142 MMOL/L (ref 133–144)
SODIUM SERPL-SCNC: 143 MMOL/L (ref 133–144)
TRANSFUSION STATUS PATIENT QL: NORMAL
TRANSFUSION STATUS PATIENT QL: NORMAL
WBC # BLD AUTO: 10 10E9/L (ref 4–11)
WBC # BLD AUTO: 8.9 10E9/L (ref 4–11)

## 2017-07-14 PROCEDURE — 97165 OT EVAL LOW COMPLEX 30 MIN: CPT | Mod: GO

## 2017-07-14 PROCEDURE — 97161 PT EVAL LOW COMPLEX 20 MIN: CPT | Mod: GP

## 2017-07-14 PROCEDURE — 85027 COMPLETE CBC AUTOMATED: CPT | Performed by: STUDENT IN AN ORGANIZED HEALTH CARE EDUCATION/TRAINING PROGRAM

## 2017-07-14 PROCEDURE — P9016 RBC LEUKOCYTES REDUCED: HCPCS | Performed by: INTERNAL MEDICINE

## 2017-07-14 PROCEDURE — 40000141 ZZH STATISTIC PERIPHERAL IV START W/O US GUIDANCE

## 2017-07-14 PROCEDURE — A9270 NON-COVERED ITEM OR SERVICE: HCPCS | Mod: GY | Performed by: STUDENT IN AN ORGANIZED HEALTH CARE EDUCATION/TRAINING PROGRAM

## 2017-07-14 PROCEDURE — 83735 ASSAY OF MAGNESIUM: CPT | Performed by: INTERNAL MEDICINE

## 2017-07-14 PROCEDURE — 80048 BASIC METABOLIC PNL TOTAL CA: CPT | Performed by: INTERNAL MEDICINE

## 2017-07-14 PROCEDURE — 36415 COLL VENOUS BLD VENIPUNCTURE: CPT | Performed by: STUDENT IN AN ORGANIZED HEALTH CARE EDUCATION/TRAINING PROGRAM

## 2017-07-14 PROCEDURE — 99233 SBSQ HOSP IP/OBS HIGH 50: CPT | Mod: GC | Performed by: INTERNAL MEDICINE

## 2017-07-14 PROCEDURE — 80048 BASIC METABOLIC PNL TOTAL CA: CPT | Performed by: STUDENT IN AN ORGANIZED HEALTH CARE EDUCATION/TRAINING PROGRAM

## 2017-07-14 PROCEDURE — 25000128 H RX IP 250 OP 636: Performed by: STUDENT IN AN ORGANIZED HEALTH CARE EDUCATION/TRAINING PROGRAM

## 2017-07-14 PROCEDURE — 97530 THERAPEUTIC ACTIVITIES: CPT | Mod: GP

## 2017-07-14 PROCEDURE — 36415 COLL VENOUS BLD VENIPUNCTURE: CPT | Performed by: INTERNAL MEDICINE

## 2017-07-14 PROCEDURE — 12000001 ZZH R&B MED SURG/OB UMMC

## 2017-07-14 PROCEDURE — 97535 SELF CARE MNGMENT TRAINING: CPT | Mod: GO

## 2017-07-14 PROCEDURE — 25000132 ZZH RX MED GY IP 250 OP 250 PS 637: Mod: GY | Performed by: STUDENT IN AN ORGANIZED HEALTH CARE EDUCATION/TRAINING PROGRAM

## 2017-07-14 PROCEDURE — 83735 ASSAY OF MAGNESIUM: CPT | Performed by: STUDENT IN AN ORGANIZED HEALTH CARE EDUCATION/TRAINING PROGRAM

## 2017-07-14 PROCEDURE — 40000193 ZZH STATISTIC PT WARD VISIT

## 2017-07-14 PROCEDURE — 40000133 ZZH STATISTIC OT WARD VISIT

## 2017-07-14 PROCEDURE — 25800025 ZZH RX 258: Performed by: STUDENT IN AN ORGANIZED HEALTH CARE EDUCATION/TRAINING PROGRAM

## 2017-07-14 PROCEDURE — 85027 COMPLETE CBC AUTOMATED: CPT | Performed by: INTERNAL MEDICINE

## 2017-07-14 RX ORDER — SODIUM BICARBONATE 650 MG/1
1300 TABLET ORAL 2 TIMES DAILY
Status: DISCONTINUED | OUTPATIENT
Start: 2017-07-14 | End: 2017-07-15

## 2017-07-14 RX ORDER — TAMSULOSIN HYDROCHLORIDE 0.4 MG/1
0.4 CAPSULE ORAL DAILY
Status: DISCONTINUED | OUTPATIENT
Start: 2017-07-14 | End: 2017-07-25 | Stop reason: HOSPADM

## 2017-07-14 RX ORDER — FINASTERIDE 5 MG/1
5 TABLET, FILM COATED ORAL DAILY
Status: DISCONTINUED | OUTPATIENT
Start: 2017-07-14 | End: 2017-07-25 | Stop reason: HOSPADM

## 2017-07-14 RX ADMIN — SODIUM CHLORIDE, POTASSIUM CHLORIDE, SODIUM LACTATE AND CALCIUM CHLORIDE: 600; 310; 30; 20 INJECTION, SOLUTION INTRAVENOUS at 17:07

## 2017-07-14 RX ADMIN — SODIUM CHLORIDE, POTASSIUM CHLORIDE, SODIUM LACTATE AND CALCIUM CHLORIDE: 600; 310; 30; 20 INJECTION, SOLUTION INTRAVENOUS at 12:28

## 2017-07-14 RX ADMIN — METOPROLOL SUCCINATE 100 MG: 50 TABLET, FILM COATED, EXTENDED RELEASE ORAL at 08:26

## 2017-07-14 RX ADMIN — SODIUM CHLORIDE, POTASSIUM CHLORIDE, SODIUM LACTATE AND CALCIUM CHLORIDE: 600; 310; 30; 20 INJECTION, SOLUTION INTRAVENOUS at 21:55

## 2017-07-14 RX ADMIN — SODIUM BICARBONATE 650 MG TABLET 1300 MG: at 08:26

## 2017-07-14 RX ADMIN — SODIUM CHLORIDE 3000 ML: 900 IRRIGANT IRRIGATION at 17:13

## 2017-07-14 RX ADMIN — TAMSULOSIN HYDROCHLORIDE 0.4 MG: 0.4 CAPSULE ORAL at 16:32

## 2017-07-14 RX ADMIN — FINASTERIDE 5 MG: 5 TABLET, FILM COATED ORAL at 16:32

## 2017-07-14 RX ADMIN — SODIUM CHLORIDE 3000 ML: 900 IRRIGANT IRRIGATION at 16:31

## 2017-07-14 RX ADMIN — SODIUM CHLORIDE 3000 ML: 900 IRRIGANT IRRIGATION at 14:58

## 2017-07-14 RX ADMIN — SODIUM CHLORIDE 3000 ML: 900 IRRIGANT IRRIGATION at 20:01

## 2017-07-14 RX ADMIN — SODIUM CHLORIDE 3000 ML: 900 IRRIGANT IRRIGATION at 21:47

## 2017-07-14 RX ADMIN — SODIUM BICARBONATE 650 MG TABLET 1300 MG: at 19:52

## 2017-07-14 RX ADMIN — SODIUM CHLORIDE 3000 ML: 900 IRRIGANT IRRIGATION at 19:24

## 2017-07-14 RX ADMIN — SODIUM CHLORIDE 3000 ML: 900 IRRIGANT IRRIGATION at 17:07

## 2017-07-14 RX ADMIN — SODIUM CHLORIDE 3000 ML: 900 IRRIGANT IRRIGATION at 18:08

## 2017-07-14 RX ADMIN — SODIUM CHLORIDE, POTASSIUM CHLORIDE, SODIUM LACTATE AND CALCIUM CHLORIDE: 600; 310; 30; 20 INJECTION, SOLUTION INTRAVENOUS at 08:23

## 2017-07-14 RX ADMIN — SODIUM CHLORIDE 3000 ML: 900 IRRIGANT IRRIGATION at 23:35

## 2017-07-14 RX ADMIN — SODIUM CHLORIDE 3000 ML: 900 IRRIGANT IRRIGATION at 21:01

## 2017-07-14 NOTE — PROGRESS NOTES
07/14/17 1500   Quick Adds   Type of Visit Initial PT Evaluation   Living Environment   Lives With alone   Living Arrangements house   Home Accessibility stairs to enter home;bed and bath on same level;stairs within home   Number of Stairs to Enter Home 2   Number of Stairs Within Home 10   Stair Railings at Home none   Transportation Available car;family or friend will provide   Living Environment Comment PT: Pt lives in single story home with 10 stairs to basement where the laundry is located.   Self-Care   Dominant Hand right   Usual Activity Tolerance moderate   Current Activity Tolerance moderate   Regular Exercise no   Equipment Currently Used at Home none   Activity/Exercise/Self-Care Comment Pt reports no regular exercise but stays active with mowing lawn and household chores   Functional Level Prior   Ambulation 0-->independent   Transferring 0-->independent   Toileting 0-->independent   Bathing 0-->independent   Dressing 0-->independent   Eating 0-->independent   Communication 0-->understands/communicates without difficulty   Swallowing 0-->swallows foods/liquids without difficulty   Cognition 0 - no cognition issues reported   Fall history within last six months no   Which of the above functional risks had a recent onset or change? ambulation   Prior Functional Level Comment Pt reports to be IND with all functional mobility and ADLs   General Information   Onset of Illness/Injury or Date of Surgery - Date 07/12/17   Referring Physician Kay Bailey MD   Pertinent History of Current Problem (include personal factors and/or comorbidities that impact the POC) Dung Norton is a 71 year old male admitted on 7/12/2017. He has a history of CKD stage 3, HTN and tobacco abuse since age 12 and is admitted for lower back pain, anemia and 20 lb weight loss over the last 4 months.   Precautions/Limitations fall precautions   Weight-Bearing Status - LUE full weight-bearing   Weight-Bearing Status - RUE full  weight-bearing   Weight-Bearing Status - LLE full weight-bearing   Weight-Bearing Status - RLE full weight-bearing   Heart Disease Risk Factors Smoking;Medical history   Cognitive Status Examination   Orientation orientation to person, place and time   Level of Consciousness alert   Follows Commands and Answers Questions 100% of the time   Personal Safety and Judgment intact   Memory intact   Pain Assessment   Patient Currently in Pain No   Integumentary/Edema   Integumentary/Edema no deficits were identifed   Posture    Posture Forward head position;Protracted shoulders   Posture Comments not disrupting pts mobility   Range of Motion (ROM)   ROM Comment WFL with all mobility performed   Strength   Strength Comments 4-/5 strength grossly in BLEs   Bed Mobility   Bed Mobility Comments IND   Transfer Skills   Transfer Comments sit<>stand transfers pt is SBA   Gait   Gait Comments Pt is close CGA with UE support on IV pole for 60 ft x2. 1 LOB noted with modA to recover   Balance   Balance Comments Normal sitting balance. Impaired dynamic standing balance d/t weakness   Sensory Examination   Sensory Perception no deficits were identified   General Therapy Interventions   Planned Therapy Interventions balance training;gait training;progressive activity/exercise;home program guidelines;risk factor education   Clinical Impression   Criteria for Skilled Therapeutic Intervention yes, treatment indicated   PT Diagnosis Impaired functional mobility   Influenced by the following impairments back pain, BLE weakness, deconditioning   Functional limitations due to impairments transfers, gait, stairs   Clinical Presentation Stable/Uncomplicated   Clinical Presentation Rationale Per pts PMHx and current medical and functional status are below baseline   Clinical Decision Making (Complexity) Low complexity   Therapy Frequency` 3 times/week   Predicted Duration of Therapy Intervention (days/wks) 1 week   Anticipated Equipment Needs at  "Discharge front wheeled walker   Anticipated Discharge Disposition Home with Outpatient Therapy   Risk & Benefits of therapy have been explained Yes   Patient, Family & other staff in agreement with plan of care Yes   [facility] TM \"6 Clicks\"   2016, Trustees of [facility], under license to [employer].  All rights reserved.   6 Clicks Short Forms Basic Mobility Inpatient Short Form   [facility] AM-PAC  \"6 Clicks\" V.2 Basic Mobility Inpatient Short Form   1. Turning from your back to your side while in a flat bed without using bedrails? 4 - None   2. Moving from lying on your back to sitting on the side of a flat bed without using bedrails? 4 - None   3. Moving to and from a bed to a chair (including a wheelchair)? 3 - A Little   4. Standing up from a chair using your arms (e.g., wheelchair, or bedside chair)? 3 - A Little   5. To walk in hospital room? 3 - A Little   6. Climbing 3-5 steps with a railing? 3 - A Little   Basic Mobility Raw Score (Score out of 24.Lower scores equate to lower levels of function) 20   Total Evaluation Time   Total Evaluation Time (Minutes) 10     "

## 2017-07-14 NOTE — PROGRESS NOTES
Care Coordinator Progress Note     Admission Date/Time:  7/12/2017  Attending MD:  Chau Sharma, *     Data  Chart reviewed, discussed with interdisciplinary team.   Patient was admitted for: CKD (chronic kidney disease) stage 3, GFR 30-59 ml/min.    Concerns with insurance coverage for discharge needs: None.  Current Living Situation: Patient lives alone.  Support System: Supportive and Involved  Services Involved: Home Care  Transportation: Family or Friend will provide  Barriers to Discharge: medical course    Coordination of Care and Referrals: Provided patient/family with options for Home Care.        Assessment  Patient admitted with anemia and KRISTINA. Patient lives alone and had been declining mobility wise. Met with patient and daughters at bedside, introduced self and RNCC role. Patient and family agreeable to home care for awhile so patient can get his strength up. After given choice, referral made to [facility] for RN, PT, and OT services. AVS updated. Family to proved transportation home at time of discharge.      Plan  Anticipated Discharge Date:  TBD  Anticipated Discharge Plan:  Home with home care    Mindy Hughes RN

## 2017-07-14 NOTE — PROGRESS NOTES
[facility], [place]    Internal Medicine Progress Note - [facility] Service    Main Plans for Today   -F/u Hgb, BMP, urine output and character  -Consult urology re possible cystoscopy    Assessment & Plan   Dung Norton is a 71 year old male admitted on 7/12/2017. He has a history of CKD stage 3, HTN and tobacco abuse since age 12 and is admitted for lower back pain, anemia and 20 lb weight loss over the last 4 months.     #Anemia  #Cachexia  #KRISTINA  #Weight loss of > 20 lbs  Hgb 6.5 on admission, has lost 20-30 lbs in last 4 months (weighed 181 with PCP 2/2016, 150 lbs 7/12, and 137 lbs s/p catheterization 7/13). Retic 1.1%, index is 0.2%, showing inadequate bone marrow response. Renal US showed bladder mass, debris in bladder. He has produced approximately 7L of moderately to very bloody urine over the last day. The volume is not unexpected considering his post-obstructive state, but it will be important to monitor his electrolytes to determine whether there is need for replacement. The blood could be from either stretch injury or the tumor, as it is very vascular, or a combination. Hgb was 6.6 today, gave 1 unit of blood and will f/u in evening.  CT abdomen and pelvis showed nodular mass projecting into bladder, concerning for malignancy. PSA 1.72 makes prostate cancer less likely than bladder cancer. Also moderate-severe hydroureter and hyrdonephros. Suspicion is high for a malignant (or at least neoplastic) process causing his symptoms. It would explain his weight loss, hydronephrosis and acute kidney injury, as well as his anemia and decreased marrow response due to decreased EPO production.  -F/u with urology re possible cystoscopy  -Maintain wood catheter patency. If losing output, page urology to replace.  -Follow BMP, Hgb every 12 hrs. Needed transfusion today. May need to replace electrolytes.  -Start on flomax, finasteride.  -Receiving CBI (continuous bladder  irrigation)     #CKD Stage 3  Baseline creatinine in high 2 range (last one 2.56 2/2016).  -Follow BMP  -Avoid insulting kidneys (contrast, nephrotoxins)     #Depression  Patient grudgingly admits to feeling depressed with recent weakness, partial loss of independence and weight loss. Denies thoughts of self-harm or suicide.  -F/u outpatient, consider antidepressant therapy     Chronic:  Hypertension  BP on admission 102/67.       -hold amlodipine and diuretic      -continue PTA metoprolol      Tobacco abuse  Started smoking at age 12, cut back recently to 3-4 cigarettes/day. Has smoked up to 2 packs per day at times.      -Continue PTA nicotine patch    Diet: Combination Diet Regular Diet Adult  Snacks/Supplements Adult: Ensure Plus (Adult); Between Meals  Fluids: PO, PIVx1  DVT Prophylaxis: Low Risk/Ambulatory with no VTE prophylaxis indicated  Code Status: DNR / DNI    Disposition Plan   Expected discharge: 2 - 3 days, recommended to prior living arrangement once KRISTINA resolving and bladder cancer evaluation conducted.     Entered: Herbert Lopez 07/14/2017, 10:45 AM   Information in the above section will display in the discharge planner report.      The patient's care was discussed with the Attending Physician, Dr. Sharma and Patient.    Herbert Lopez  [facility]  Maroon: 2  Pager: [phone]  Please see sticky note for cross cover information    Interval History   He slept well overnight and has a good appetite. Denies pain or nausea. Has not had a BM since yesterday, wood catheter in and drained approximately 2L very dark and bloody urine overnight. He is receiving IV fluids to compensate, continuously now at 250mL/h.    4 poin ROS negative except as noted above.    Physical Exam   Vital Signs: Temp: 96.5  F (35.8  C) Temp src: Oral BP: 123/68 Pulse: 70 Heart Rate: 66 Resp: 18 SpO2: 100 % O2 Device: None (Room air)    Weight: 137 lbs 6.4 oz  General Appearance: Alert,  oriented. Pleasant. In no acute distress.  Respiratory: CTAB. No increased WOB.  Cardiovascular: Systolic ejection murmur.   GI: Abdomen soft, no tenderness to palpation.     Data   Medications     IV fluid REPLACEMENT ONLY 250 mL/hr at 07/14/17 0932     - MEDICATION INSTRUCTIONS -         sodium bicarbonate  1,300 mg Oral BID     metoprolol  100 mg Oral Daily     nicotine  1 patch Transdermal Q24H     nicotine   Transdermal Daily     nicotine   Transdermal Q8H     Data     Recent Labs  Lab 07/14/17  0812 07/13/17  1503 07/13/17  0812 07/12/17  1740 07/12/17  1407   WBC 8.9  --  8.9 7.8 8.1   HGB 6.6*  --  7.8* 6.5* 6.4*   MCV 84  --  84 84 87     --  246 278 259    144 143 139 140   POTASSIUM 4.3 4.2 4.4 4.5 4.2   CHLORIDE 112* 115* 114* 108 109   CO2 16* 16* 16* 16* 16*   * 161* 172* 173* 177*   CR 12.20* 13.60* 14.00* 14.70* 14.40*   ANIONGAP 14 13 13 16* 15*   DERICK 7.9* 7.8* 8.0* 8.5 8.3*   * 158* 91 105* 117*   ALBUMIN  --   --   --  3.1* 3.1*   PROTTOTAL  --   --   --  6.8 6.5*   BILITOTAL  --   --   --  0.6 0.3   ALKPHOS  --   --   --  67 65   ALT  --   --   --  19 20   AST  --   --   --  12 8     Recent Results (from the past 24 hour(s))   US Renal Complete    Narrative    EXAMINATION: Renal ultrasound, 7/13/2017 10:36 AM     COMPARISON: None.    HISTORY: Elevated creatinine    FINDINGS:    Right kidney: Measures 10.4 cm in length. Thinning of the renal  cortical parenchyma. No focal mass. Moderate hydroureteronephrosis.    Left kidney: Measures 10.2 cm in length. Thinning of the renal  cortical parenchyma. No focal mass. Moderate hydroureteronephrosis.    Bladder: Man catheter and balloon present. Vascular mass protruding  into the bladder lumen originating at the bladder neck/prostate.  Avascular iso/hyperechoic complex material surrounding the Man  balloon. Debris is floating within the bladder.        Impression    IMPRESSION:  1.  Vascular mass protruding into the bladder  lumen originating at the  bladder neck, with bladder wall thickening, concerning for bladder or  prostate neoplasm.  Additional evaluation with CT would be  useful/recommended. Alternatively however given creatinine elevation,  may consider MRI or cystoscopy.    2.  Avascular complex material surrounding the Man balloon, which  could represent thrombus. Additional debris floating within the  bladder may represent blood products, infection/inflammatory cells, or  possibly tumor cells.  3.  Moderate hydroureteronephrosis bilaterally. No focal kidney mass  identified.    Findings discussed with Dr. Sharma by Dr. Yost.    I have personally reviewed the examination and initial interpretation  and I agree with the findings.    ELENI YOST MD   CT Chest Abdomen Pelvis w/o Contrast    Narrative    EXAMINATION: CT CHEST ABDOMEN PELVIS W/O CONTRAST, 7/13/2017 5:21 PM    TECHNIQUE:  Helical CT images from the thoracic inlet through the  symphysis pubis were obtained  without IV contrast.     COMPARISON: Ultrasound 7/13/2017, chest radiograph 7/12/2017    HISTORY: mass seen near bladder/prostate. Long time smoker. No  contrast - pt with severe kidney injury.    FINDINGS:    Chest: Heart size is normal. Borderline aneurysmal dilatation of the  ascending thoracic aorta, measuring up to 4.1 cm in diameter in the  coronal plane. Pulmonary artery is normal in caliber. Coronary artery,  aortic and mitral valve calcifications. Mildly prominent mediastinal  lymph nodes, for example 1.0 cm short axis diameter paratracheal node  (series 5 image 126). No pericardial or pleural effusion. The central  tracheobronchial tree is patent. Trace dependent atelectasis. There  are a few small pulmonary nodules, for example 3 mm left upper lobe  nodule (series 6 image 32) and 3 mm right apical nodule (series 6  image 31).    Abdomen and pelvis:     Moderate diffuse urinary bladder wall thickening, with asymmetric more  irregular  thickening along the anterior wall (series 5 image 536).  Bladder is filled with hyperdense clot. Smoothly marginated soft  tissue mass adjacent to the bladder neck appears to arise from the  prostate (series 5 image 561, series 9 image 66), which is enlarged.  Moderate-severe bilateral hydronephrosis and hydroureter. Bilateral  renal cortical thinning.    Normal unenhanced appearance of the liver, pancreas, spleen and  adrenals. Cholelithiasis without evidence of cholecystitis. The colon  and small bowel are within normal limits. Fusiform infrarenal  abdominal aortic aneurysm measuring up to 4.0 cm in diameter measured  in the coronal plane (series 7 image 50). Moderate aortobiiliac  atherosclerosis. No free air or significant free fluid. No evident  lymphadenopathy in the abdomen or pelvis.    Bones and soft tissues: No acute fracture or suspicious bone lesion.  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      Impression    IMPRESSION:   1. Large amount of hyperdense clot within the urinary bladder. The  prostate is enlarged with a nodular projection into the base of the  bladder. Additionally, the bladder is moderately thickened with some  irregular asymmetrically thickened areas along its anterior wall.  These findings could be explained by benign prostate enlargement with  a chronically obstructed trabeculated bladder, however the presence of  asymmetric thickening and hemorrhage are worrisome for malignancy of  the bladder.  2. Moderate-severe bilateral hydronephrosis and hydroureter of both  entire ureters to the bladder.  3. No evidence to suggest metastatic disease within the abdomen,  pelvis or skeleton on this noncontrast CT.   4. A few small pulmonary nodules measuring up to 3 mm. These are  nonspecific. In a patient with a smoking history, a 12 month follow-up  CT is optional per Fleischner society criteria. If malignancy is  discovered, Fleischner society  criteria no longer applies.  5. 4.0 cm infrarenal abdominal aortic aneurysm, with moderate  aortobiiliac atherosclerotic disease.    Pulmonary Nodule Size  Lung Nodule Type  Single vs. Multiple  Low Risk  Patient  High Risk Patient  < 6mm  Solid  Solitary  No Follow-Up  Optional CT in 12 months  (< 100mm3)      If suspicious morphology or upper lobe location,  consider 12-month follow-up.                        < 6mm    Multiple  No Follow-Up  Optional CT in 12 months  (< 100mm3)      If suspicious morphology or upper lobe location,  consider 12-month follow-up.                        < 6mm  Part-Solid  Solitary  No Follow-Up    (< 100mm3)  (Subsolid)                  < 6mm    Multiple  CT in 3 to 6 months. If unchanged, consider CT at 2  and 4 years.    (< 100mm3)                    < 6mm  Ground-Glass  Solitary  No Follow-Up    (< 100mm3)  (Subsolid)    If suspicious, consider follow-up at 2 and 4  years.? If grows or increasingly solid, consider resection.                        < 6mm    Multiple  CT in 3 to 6 months.? If unchanged, consider CT in  2 and 4 years.    (< 100mm3)                    6 to 8mm  Solid  Solitary  CT in 6 to 12 months, then?consider?CT in  18 to 24 months.  CT in 6 to 12 months, then?obtain?CT in 18 to 24  months.  (100-250mm3)                    6 to 8mm    Multiple  CT in 3 to 6 months, then?consider?CT in 18 to  24 months  CT in 3 to 6 months, then?obtain?CT in 18 to 24 months  (100-250mm3)                    6 to 8mm  Part-Solid  Solitary  CT in 3 to 6 months to confirm  persistence.? If unchanged and solid component below 6mm, CT annually  for 5 years.?    (100-250mm3)  (Subsolid)    Persistent part-solid nodules containing a  solid component > 6mm are highly suspicious.              6 to 8mm    Multiple  CT in 3 to 6 months.? Then management based on  most suspicious nodule(s).    (100-250mm3)                    6 to 8mm  Ground-Glass  Solitary  CT in 6 to 12 months to  confirm  persistence, then CT every 2 years until 5 years.    (100-250mm3)  (Subsolid)    If grows or increasingly solid, consider  resection.              6 to 8mm    Multiple  CT at 3 to 6 months.? Then management based on  most suspicious nodule(s).    (100-250mm3)                    > 8mm  Solid  Solitary  In 3 months consider either CT, Biopsy, or  PET-CT           (however, negative PET-CT does not exclude low-grade  malignancy, FDG uptake           may be underestimated in small nodules < 1cm, or those close to  diaphragm)    (> 250mm3)                    > 8mm    Multiple  CT in 3 to 6 months, then?consider?CT at 18 to 24  months  CT in 3 to 6 months, then?obtain?CT at 18 to 24 months  (> 250mm3)                    > 8mm  Part-Solid  Solitary  CT in 3 to 6 months to confirm  persistence.? If unchanged and solid component below 6mm, CT annually  for 5 years.    (> 250mm3)  (Subsolid)    Persistent part-solid nodules containing a  solid component > 6mm are highly suspicious.              > 8mm    Multiple  CT at 3 to 6 months.? Then management based on most  suspicious nodule(s).    (> 250mm3)                    > 8mm  Ground-Glass  Solitary  CT in 6 to 12 months to confirm  persistence, then CT every 2 years until 5 years.    (> 250mm3)      If grows or increasingly solid, consider resection.              > 8mm    Multiple  CT at 3 to 6 months.? Then management based on most  suspicious nodule(s).    (> 250mm3)            I have personally reviewed the examination and initial interpretation  and I agree with the findings.    IZA DUKE MD

## 2017-07-14 NOTE — PLAN OF CARE
Pt here w/ anemia and wt loss.  Calm and cooperative.  A&Ox4.  Up independently.  VSS on RA.  L PIV LR @ 50ml/hr.  Elevated BUN (>14) and Creat (>170) @ admission, w/ Hgb @ 6.5.  Man placed w/ 4.5L output w/in first hours.  RN to assess urine output q4h and adjust fluids, output @ 0100 was 225cc and IVF adjusted to 50ml/hr, output @ 0500 was 950cc and IVF adjusted to 100ml/hr per team. Team paged to verify amount to adjust IVF to and to discuss red, thick, bloody urine output.  Man flushed w/ 100ml NS this shift.  Urine continues to be bloody and red. No BM this shift, no c/o pain.  Pt has had >20# wt loss in 2 months, highly suspicious for malignancy.  Renal US on 7/13 shows mass (bladder vs prostate), f/u with smear and CT of abdomen, pelvis, and chest on eves.  TCY=181, Creat=13.6, Hgb=7.8, after 1 unit.  Tolerating regular diet.  Continue to monitor and follow POC.

## 2017-07-14 NOTE — PLAN OF CARE
Problem: Goal Outcome Summary  Goal: Goal Outcome Summary  Outcome: No Change  8145-3787: Vitals stable on room air. A/O. Up with SBA d/t lines and some unsteadiness. Hgb 6.6 this morning - 1 unit PRBC transfused with no s/s of transfusion reaction. Urology replaced wood catheter and placed patient on continuous bladder irrigation. Urine very red with gross blood in AM but after CBI started has been pink in color with some blood clots and sediment noted. Wood catheter bag has had to be replaced x 2 since placement d/t inability to empty bag (likely blood clot/sediment related at bottom of bag). Evening Hgb lab check in process. Titrating IV fluids approximately q 4 hours to urine output - last rate at 250mL/hr despite output that seemed to be up to 2000mL in a couple hours (nursing waiting to see if wood volume was related to issues measuring CBI fluid or actual increase in urine output). 3 soft/loose, urgent BMs today. PO sodium bicarb started. Nursing to continue CBI and monitoring urine/volume status.

## 2017-07-14 NOTE — PROGRESS NOTES
" 07/14/17 0802   Quick Adds   Type of Visit Initial Occupational Therapy Evaluation   Living Environment   Lives With alone   Living Arrangements house   Home Accessibility stairs to enter home;bed and bath on same level;stairs within home   Number of Stairs to Enter Home 2   Number of Stairs Within Home 10   Stair Railings at Home none   Transportation Available car;family or friend will provide   Living Environment Comment All needs met on main floor with exception of laundry in basement. Also, pt has access to tub shower on main floor but typically prefers using walk-in shower in basement. Non-slip mats in both showers.    Self-Care   Dominant Hand right   Usual Activity Tolerance moderate   Current Activity Tolerance fair   Regular Exercise no   Equipment Currently Used at Home none   Activity/Exercise/Self-Care Comment Pt reports significant decline in level of activity in last 3 months.    Functional Level Prior   Ambulation 0-->independent   Transferring 0-->independent   Toileting 0-->independent   Bathing 0-->independent   Dressing 0-->independent   Eating 0-->independent   Communication 0-->understands/communicates without difficulty   Swallowing 0-->swallows foods/liquids without difficulty   Cognition 0 - no cognition issues reported   Fall history within last six months no   Which of the above functional risks had a recent onset or change? ambulation   Prior Functional Level Comment Indepenent in all ADLs but endorses increasing difficulty secondary to fatigue and weakness.   General Information   Onset of Illness/Injury or Date of Surgery - Date 07/12/17   Referring Physician Kay Bailey MD   Patient/Family Goals Statement Return home   Additional Occupational Profile Info/Pertinent History of Current Problem \"71 year old male with a history of CKD stage 3, HTN, and tobacco abuse admitted for anemia and >20 pound weight loss.\"   Precautions/Limitations fall precautions   Weight-Bearing Status - LUE " full weight-bearing   Weight-Bearing Status - RUE full weight-bearing   Weight-Bearing Status - LLE full weight-bearing   Weight-Bearing Status - RLE full weight-bearing   General Info Comments Activity: up ad harper   Cognitive Status Examination   Orientation orientation to person, place and time   Level of Consciousness alert   Attention Distractible during evaluation   Cognitive Comment No notable concerns   Visual Perception   Visual Perception No deficits were identified;Wears glasses   Sensory Examination   Sensory Quick Adds No deficits were identified   Pain Assessment   Patient Currently in Pain No   Integumentary/Edema   Integumentary/Edema no deficits were identifed   Posture   Posture protracted shoulders;forward head position   Range of Motion (ROM)   ROM Comment BUE ROM WFL   Strength   Strength Comments MMT: shoulder flexion and elbow flex 5/5; shoulder abduction, elbow extension, internal and external rotation 4/5   Hand Strength   Hand Strength Comments bilateral gross grasp WFL   Coordination   Coordination Comments impaired FMC coordination bilaterally noted with thumb to fingertip assessment and manipulation of toothpaste container and cap. Pt also reports difficulty opening small containers at home   Mobility   Bed Mobility Bed mobility skill: Supine to sit   Bed Mobility Skill: Supine to Sit   Level of [place]: Supine/Sit stand-by assist   Transfer Skill: Sit to Stand   Level of [place]: Sit/Stand stand-by assist   Transfer Skill: Toilet Transfer   Level of [place]: Toilet stand-by assist   Balance   Balance Quick Add Sitting balance: static;Sitting balance: dynamic;Standing balance: static;Standing balance: dynamic   Sitting Balance: Static good balance   Sitting Balance: Dynamic good balance   Standing Balance: Static good balance   Standing Balance: Dynamic fair balance   Lower Body Dressing   Level of [place]: Dress Lower Body minimum assist (75% patients effort)  "  Toileting   Level of [place]: Toilet independent   Grooming   Level of [place]: Grooming stand-by assist   Eating/Self Feeding   Level of [place]: Eating independent   Instrumental Activities of Daily Living (IADL)   Previous Responsibilities meal prep;housekeeping;shopping;laundry;yardwork;driving;medication management;finances   IADL Comments Pt reports grandson has been assisting with yard work. Recently bought riding  to facilitate independence in IADLs.    Activities of Daily Living Analysis   Impairments Contributing to Impaired Activities of Daily Living strength decreased   General Therapy Interventions   Planned Therapy Interventions ADL retraining;IADL retraining;fine motor coordination training;strengthening;transfer training;visual perception;home program guidelines;progressive activity/exercise   Clinical Impression   Criteria for Skilled Therapeutic Interventions Met yes, treatment indicated   OT Diagnosis Decreased independence and tolerance for I/ADLs   Influenced by the following impairments strength, endurance   Assessment of Occupational Performance 1-3 Performance Deficits   Identified Performance Deficits home management, meal prep   Clinical Decision Making (Complexity) Low complexity   Therapy Frequency 5 times/wk   Predicted Duration of Therapy Intervention (days/wks) 2 week   Anticipated Equipment Needs at Discharge (tbd)   Anticipated Discharge Disposition Home with Outpatient Therapy   Risks and Benefits of Treatment have been explained. Yes   Patient, Family & other staff in agreement with plan of care Yes   [facility] AM-PAC TM \"6 Clicks\"   2016, Trustees of [facility], under license to [employer].  All rights reserved.   6 Clicks Short Forms Daily Activity Inpatient Short Form   [facility] AM-PAC  \"6 Clicks\" Daily Activity Inpatient Short Form   1. Putting on and taking off regular lower body clothing? 4 - None   2. Bathing (including " washing, rinsing, drying)? 3 - A Little   3. Toileting, which includes using toilet, bedpan or urinal? 4 - None   4. Putting on and taking off regular upper body clothing? 4 - None   5. Taking care of personal grooming such as brushing teeth? 4 - None   6. Eating meals? 4 - None   Daily Activity Raw Score (Score out of 24.Lower scores equate to lower levels of function) 23   Total Evaluation Time   Total Evaluation Time (Minutes) 10

## 2017-07-14 NOTE — PLAN OF CARE
Problem: Goal Outcome Summary  Goal: Goal Outcome Summary  PT/5B: PT evaluation complete and treatment initiated. Pt is IND with all bed mobility and SBA for sit<>stand transfers from various surface heights. Pt demonstrates impaired balance with dynamic standing and requires close CGA while amb. Pt amb in bennett 60 ft x2 with UE support on IV pole and had 1 LOB, requiring modA to recover. Pt denied feeling light headed when loosing balance. PT recommends DC home with HH PT and assist from family when medically stable.

## 2017-07-14 NOTE — PLAN OF CARE
Problem: Goal Outcome Summary  Goal: Goal Outcome Summary  Outcome: No Change  Pt is A&O x 4, VSS and on room air. Pt is having adequate out from wood, Q4 checks in place and volume change. 1800 check was 925, 2200 400, still red appearance. Q8 catheter flushes started. Pt denies any pain/nausea. Creat 13.6 and Hgb 7.8. CT completed of chest/abd/pelvis. Tolerating regular diet well. LR running in L PIV. Continue to monitor and POC.

## 2017-07-14 NOTE — PLAN OF CARE
Problem: Goal Outcome Summary  Goal: Goal Outcome Summary  OT 5B: OT eval and treatment initiated. Pt SBA for bed mobility, functional mobility within the room, and transfers, including toilet transfer with use of grab bar. Pt experience 1 minor LOB with change in body position standing at the sink; able to self-correct. Pt independent with pericares following bowel management and SBA for grooming/hygiene tasks at the sink. Pt and daughters educated on adaptive equipment to facilitate safety and independence in home environment, including shower chair vs extended bath bench and grab bars by toilet and in shower.  Rec: home with assist as needed for I/ADLs and outpatient PT to facilitate strength, endurance, and balance for functional mobility and daily activity.

## 2017-07-14 NOTE — PROGRESS NOTES
[facility]  Met with pt to discuss plans for HC.  Pt to be discharged home 7/15 or 7/16  and has agreed to have [facility] follow with services of SN, PT and OT.  Patient care support center processing referral.  Pt and family verbalized understanding that initial visit is scheduled for 1 to 2 days after discharge.    Pt has 24 hour phone number for [facility] for any questions or concerns.    Kalpana Huerta RN, BSN  [facility] Liaison  [phone]

## 2017-07-14 NOTE — PROGRESS NOTES
"CLINICAL NUTRITION SERVICES - ASSESSMENT NOTE      Nutrition Prescription     RECOMMENDATIONS FOR MDs/PROVIDERS TO ORDER:  -Recommend starting thera-vit-m (MVI w/ minerals) 1 tablet daily to meet micronutrient needs.   -Consider trial of appetite stimulant     Malnutrition Status:    Severe     Recommendations already ordered by Registered Dietitian (RD):  -Continue regular diet + supplements between meals     Future/Additional Recommendations:  -If patient eating <50% of meals recommend initiating calorie counts to assess need for further nutritional interventions.       REASON FOR ASSESSMENT  Dung Norton is a/an 71 year old male assessed by the dietitian for Admission Nutrition Risk Screen for unintentional loss of 10# or more in the past two months (25lbs in last 3-4 months)     NUTRITION HISTORY  Pt reports decreased appetite x 4 months with lack of ambition to cook. A usual day of eating includes:  B: usually skips  L: fruit (peaches, watermelon, pineapple)  D: 1 hamburger  Snacks: popcorn  *Pt notes eating ~50% of usual intake.     He vomits after meals occasionally.    PMH: CKD stage 3, HTN, and tobacco abuse admitted for anemia and >20 pound weight loss.      CURRENT NUTRITION ORDERS  Diet: Regular  Supplements:ensure chocolate between meals     LABS  7/12: , Cr 14.7- acute KRISTINA likely d/t obstructive nephropathy     MEDICATIONS  IVF @ 50mL/hr     ANTHROPOMETRICS  Height: 69\"  Most Recent Weight: 62.3 kg (137 lb 6.4 oz)    IBW: 72.7 kg  BMI: Normal BMI  Weight History:       Wt Readings from Last 15 Encounters:   07/13/17 62.3 kg (137 lb 6.4 oz)   07/12/17 68.5 kg (151 lb)   02/24/16 82.4 kg (181 lb 9.6 oz)   02/02/15 85.3 kg (188 lb)   01/15/14 86.2 kg (190 lb)   02/28/13 89.8 kg (198 lb)   06/04/12 89.6 kg (197 lb 9.6 oz)   05/21/12 91.3 kg (201 lb 3.2 oz)   04/23/12 92.1 kg (203 lb)   03/20/12 94.3 kg (208 lb)   08/16/11 88.5 kg (195 lb)   06/10/11 93 kg (205 lb)   12/02/10 92.1 kg (203 lb) "   03/12/10 92.1 kg (203 lb)   02/12/10 92.1 kg (203 lb)   ~24% weight loss in <5 months, 6.2kg weight loss in 1 day (likely fluids). Net I/O since admit: -5730mL     Dosing Weight: 62kg     ASSESSED NUTRITION NEEDS  Estimated Energy Needs: 4778-6580 kcals/day (30 - 35 kcals/kg )  Justification: Repletion and Underweight  Estimated Protein Needs: 74-93+ grams protein/day (1.2 - 1.5+ grams of pro/kg)  Justification: Hypercatabolism with critical illness  Estimated Fluid Needs: 1 mL/kcal  Justification: Maintenance     PHYSICAL FINDINGS  See malnutrition section below.        MALNUTRITION  % Intake: </=50% for >/= 1 month (severe)  % Weight Loss: ~24% weight loss in <5 months  Subcutaneous Fat Loss: Facial region/Upper arm:  moderate  Muscle Loss: Temporal/Thoracic region/ Upper arm (bicep, tricep):  moderate  Fluid Accumulation/Edema: None noted  Malnutrition Diagnosis: Severe malnutrition in the context of acute on likely chronic illness     NUTRITION DIAGNOSIS  Malnutrition related to decreased appetite/intake as evidenced by pt report of eating ~50% of usual intake, ~24% weight loss <5 months with moderate fat loss and muscle wasting     INTERVENTIONS  Implementation  Nutrition Education: Provided education on ways to increase kcals/protein in diet. Provided handouts: tips to increase calories in diet, tips to increase protein in diet, high calorie/protein recipes. Daughter wondering about meals on wheels or other delivery meal services. Writer left message for SW regarding this.       Goals  Patient to consume % of nutritionally adequate meal trays TID, or the equivalent with supplements/snacks.     Monitoring/Evaluation  Progress toward goals will be monitored and evaluated per protocol.     Saida Hernandez RD, LD  Pager: [phone]

## 2017-07-14 NOTE — PROGRESS NOTES
[facility] Internal Medicine Progress Note  Date: 07/14/2017  Date of admission: 7/12/2017          Assessment and Plan:   This is a 71 year old male with a history of CKD stage 3, HTN, and tobacco abuse admitted for anemia and >20 pound weight loss found to have acute renal failure secondary to postobstructive nephropathy now found to have bladder mass highly suspicious for malignancy, likely contributing to ongoing acute anemia.     Acute renal failure 2/2 Postobstructive nephropathy   Post-obstructive Diuresis  CKD  Bladder Stretch Injury  Mass (bladder vs prostate)  Non-anion gap acidosis 2/2 KRISTINA   Last creat in 2/2016 was 2.56. Creatinine on admission >14 with BUN >170. Wood placed with >4.5 L of urine output. Pt denying any urinary symptoms of issues emptying, weak stream or other difficulty urinating. Admits he was perhaps only urinating once a day. Now having post-obstructive diuresis with significant volume of bloody urine output.       -Follow BMP BID       -Avoid contrast and nephrotoxic agents       -Replace urine output with LR IVFs as able       -Renal consulted   -Supplementing with PO sodium bicarb 1,300 mg BID for goal bicarb >22   -Monitor BMP       -Urology consulted    -Cystoscopy (inpt if OR time available, otherwise outpt ok)   -Home with wood, to follow up outpt with urology              -Flush wood Qshift - if clogs/not draining - page urology emergently to replace wood    Severe malnutrition  Acute on Chronic Anemia 2/2 acute blood loss from likely bladder malignancy  Weight loss of >20 lbs  Hgb 6.5 on admission. Retic 1.1%, retic index is 0.2% - inadequate bone marrow response to anemia. Weight was 181 lbs at PCP appt 2/2016. Weight on 7/13 post-wood diuresis is 137 lbs. Highly suspicious for malignancy (lung given smoking hx vs pancreas vs gastric vs renal vs other) vs chronic inflammatory disease vs GI blood loss (gastritis? Colon?) vs infection (although WBC wnl). Most likely  related to bladder vs prostate mass seen on US 7/13. Chronic anemia likely related to CKD and possible malignancy. Acute anemia likely secondary bladder stretch injury vs vascular mass in bladder bleeding. S/p 2 units of PRBCs, last given on 7/14.      -Follow up smear      -Follow CBC BID, continue to transfuse if <7      -1 unit PRBCs 7/14      -PT/OT consulted to determine dispo     Depression  Patient states feeling more depressed lately with weight loss and loss of independence. Talked about selling his home with his kids. Denies thoughts of suicide at this time.       -Continue to monitor       -Consider starting antidepressant     Chronic:  Hypertension  BP on admission 102/67.       -hold amlodipine and diuretic      -continue PTA metoprolol     Tobacco abuse  Started smoking at age 12, at worst was smoking ~2 packs per day. Has cut way recently to 3-4 cigs per day.      -Continue PTA nicotine patch      -Cessation encouraged     FEN: Regular adult diet  Lines: PIV  DVT: ambulate (with anemia will hold off)  Code status: DNR/DNI  Dispo: Pending stabilization of kidney function and electrolytes as well as anemia, urology clearance for discharge, and evaluation by PT/OT    Clinical decision making discussed with Dr. Sharma who is in agreement with the above plan.     Kay Bailey MD  [facility] PGY-1   P: [phone]              Interval History:   No acute events overnight.    Feeling good this AM. No pain.  Nervous about possibly cystoscopy.  Hungry and eating well in hospital. No nausea/vomiting.    Slept well overnight. Looking forward to having some visitors today.      Last 24 hr care team notes reviewed.   ROS: 4 point ROS including Respiratory, CV, GI and , other than that noted in the HPI, is negative               Physical Exam:   Vitals were reviewed  /71 (BP Location: Right arm)  Pulse 70  Temp 96.5  F (35.8  C) (Oral)  Resp 18  Wt 62.3 kg (137 lb 6.4 oz)  SpO2 100%  BMI 20.29  kg/m2    Exam:  General: the patient is pleasant, resting comfortably, no acute distress. Cachectic.  HEENT: Normocephalic, atraumatic.  Lungs: Clear to auscultation, no wheezes or crackles.   CV: RRR, nl s1 and s2, systolic ejection murmur noted, greatest at left upper sternal boarder.  Abdomen: Nondistended. No fluid wave. Scar tissue in RLQ where appendectomy surgery performed. Soft, nontender. No rebound or guarding. Bowel sounds active.  Extremities: No lower extremity edema. Peripheral pulses strong and symmetric.   Skin: no appreciable skin lesions/rashes on exposed skin.   Man: Putting out very dark red urine - darker than on 7/13         Data:   Labs:  All laboratory and imaging data in the past 24 hours reviewed.    Imaging studies:  CT chest/ab/pelvis w/o contrast 7/13:  1. Large amount of hyperdense clot within the urinary bladder. The  prostate is enlarged with a nodular projection into the base of the  bladder. Additionally, the bladder is moderately thickened with some  irregular asymmetrically thickened areas along its anterior wall.  These findings could be explained by benign prostate enlargement with  a chronically obstructed trabeculated bladder, however the presence of  asymmetric thickening and hemorrhage are worrisome for malignancy of  the bladder.  2. Moderate-severe bilateral hydronephrosis and hydroureter of both  entire ureters to the bladder.  3. No evidence to suggest metastatic disease within the abdomen,  pelvis or skeleton on this noncontrast CT.   4. A few small pulmonary nodules measuring up to 3 mm. These are  nonspecific. In a patient with a smoking history, a 12 month follow-up  CT is optional per Fleischner society criteria. If malignancy is  discovered, Fleischner society criteria no longer applies.  5. 4.0 cm infrarenal abdominal aortic aneurysm, with moderate  aortobiiliac atherosclerotic disease.        Internal Medicine Staff Addendum  Date of Service: 7/14/2017    I  have seen and examined this patient, reviewed the data and discussed the plan of care. I agree with the above documentation including plan and ddx unless otherwise stated:     #    Chau Sharma MD  Internal Medicine [facility]  Attending pager: [phone]

## 2017-07-14 NOTE — PROGRESS NOTES
Nephrology Progress Note  07/14/2017         Assessment & Recommendations:     Dung Norton is a 71 year old with a PMH of CKDII , HTN . Presented for weight loss , and admitted with acute renal failure with 14 creatinine. He had a wood passed on admission that drained >4L urine and he has been urinating >1500 Qshift since then. Due to resistance 14Fr catheter was placed on admission and changed to 24 Fr rouche catheter placed and he is on continuous bladder irrigation started today 7/14     1. Acute renal failure sec to obstructive nephropathy  Creatinine is improving to 12 today and he is polyuric  >1500/shift with 150ml/hr IVF   USG noted for the vascular mass and hydronephrosis  CT also shows a large prostate    Will recommend  - replete with NSS/ isotonic IVF with  for ongoing free water and solute losses: noted 150ml/hr IVF  - continue with wood and monitor IO  - trend creatinine and daily electrolytes  Will follow on urology recommendations for further treatment plans     2. Electrolytes  At acceptable limits for now  Monitor electrolytes with mag and replete as needed with goal K ~4 and mag ~2  Bicarb replacement noted 1330 BID      3. Volume status   euvolemic  Will hydrate IVF for ongoing losses from polyuria     4. Non gap acidosis from acute renal failure  Replete with PO bicarb, dose noted   Goal >22      5. HTN   Management per primary     Recommendations were communicated to primary team     Seen and discussed with Dr. Kathie Colmenares MD   [phone]    Interval History :   In the last 24 hours Dung Norton has been improving renal functions and has been polyuric    Review of Systems:   (4 pt ROS reviewed alone is not adequate unless you detail systems you reviewed)  I reviewed the following systems:  GI: adequate  appetite. no nausea or vomiting or diarrhea.   Neuro:  no confusion  Constitutional:  no fever or chills  CV: - dyspnea or edema.  - chest pain.    Physical Exam:   I/O last  3 completed shifts:  In: 740 [P.O.:440]  Out: 4040 [Urine:4050]   /77  Pulse 70  Temp 96.2  F (35.7  C) (Oral)  Resp 16  Wt 63.9 kg (140 lb 14.4 oz)  SpO2 100%  BMI 20.81 kg/m2     GENERAL APPEARANCE: no acute distress  EYES:  - scleral icterus, pupils equal  HENT: mouth without ulcers or lesions  PULM: lungs clear to auscultation,  bilaterally, equal air movement, no clubbing  CV: regular rhythm, normal rate, no rub     -JVP -     -edema -   GI: soft, non tender, nondistended, bowel sounds are +  INTEGUMENT: no cyanosis, - rash  NEURO:  + mild asterixis         Labs:   All labs reviewed by me  Electrolytes/Renal -   Recent Labs   Lab Test  07/14/17   0812  07/13/17   1503  07/13/17   0812   NA  142  144  143   POTASSIUM  4.3  4.2  4.4   CHLORIDE  112*  115*  114*   CO2  16*  16*  16*   BUN  148*  161*  172*   CR  12.20*  13.60*  14.00*   GLC  106*  158*  91   DERICK  7.9*  7.8*  8.0*   MAG  2.6*   --    --        CBC -   Recent Labs   Lab Test  07/14/17   0812  07/13/17   0812  07/12/17   1740   WBC  8.9  8.9  7.8   HGB  6.6*  7.8*  6.5*   PLT  214  246  278       LFTs -   Recent Labs   Lab Test  07/12/17   1740  07/12/17   1407  01/15/14   0950   ALKPHOS  67  65  75   BILITOTAL  0.6  0.3  0.4   ALT  19  20  29   AST  12  8  27   PROTTOTAL  6.8  6.5*  7.2   ALBUMIN  3.1*  3.1*  3.9       Iron Panel - No lab results found.      Imaging:  All imaging studies reviewed by me.     Current Medications:    sodium bicarbonate  1,300 mg Oral BID     tamsulosin  0.4 mg Oral Daily     finasteride  5 mg Oral Daily     metoprolol  100 mg Oral Daily     nicotine  1 patch Transdermal Q24H     nicotine   Transdermal Daily     nicotine   Transdermal Q8H       sodium chloride 0.9% (bag)       IV fluid REPLACEMENT ONLY 150 mL/hr at 07/14/17 1436     - MEDICATION INSTRUCTIONS -       Allison Colmenares MD

## 2017-07-14 NOTE — PROGRESS NOTES
"Social Work: Assessment with Discharge Plan    Patient Name:  Dung Norton  :  1945  Age:  71 year old  MRN:  [id number]  Risk/Complexity Score:  Filed Complexity Screen Score: 4  Completed assessment with:  Patient, Chart review, two adult daugthers    Presenting Information   Reason for Referral:  Potential need for community services upon discharge Daughter requesting SW visit for resources for home delivered meals, and information about ongoing needs for services as patient ages.  Date of Intake:  2017  Referral Source:  Family  Decision Maker:  Self  Alternate Decision Maker:  Jacey Camarillo  Health Care Directive:  Declined completing  Living Situation:  House  Previous Functional Status:  Independent  Patient and family understanding of hospitalization:  appropriate  Cultural/Language/Spiritual Considerations:  None noted  Adjustment to Illness:  Appropriate, understanding of a gradual decline in recent independence. Family states patient stopped driving \"about a week\" prior to admission due to his health status. They state he was completely independent at home. He has a tub shower on main floor, and a walk in shower in the basement he prefers because of lifting his leg over the tub, however the stairs are becoming increasingly difficult for him to navigate. OT met with daughters and discussed installing grab bars in bathroom and shower. They are also going to get an extended bath bench that hangs over the edge of the tub so patient can sit prior to having to lift his legs over the tub, and also avoid the stairs. Daughters are aware of increasing needs, and insightful to likely future needs.    Physical Health  Reason for Admission:    1. CKD (chronic kidney disease) stage 3, GFR 30-59 ml/min      Services Needed/Recommended:  Home with homecare    Support System  Significant relationship at present time:  Daughter(s)Rabia  Family of origin is available for support:  yes  Other support " "available:  Two daughters, one son, patient has also had the same neighbors for \"about 40 years\" per daughter, who he is very connected with. Rabia states neighbors will be happy to check on him often. Patient's ex-wife lives in [place] and per daughter, they are still very good friends and the ex-wife often comes to support patient when she can and when he asks. Daughters also live in [place] and work, but do visit patient weekly, as well as the son.  Gaps in support system:  Patient is beginning to require more assistance at home. SW provided daughters with resources for \"[employer]\" representative Reena Kamon and explained how this free service can help them obtain proper in home assistance as patient continues to require more help at home. SW also gave them information on [employer] as they discussed wanting home delivered meals for patient. SW discussed at length how these services are billed and obtained and provided information to all of their questions, as well as a hand out of material and brochure.   Patient is caregiver to:  None     Provider Information   Primary Care Physician:  Haley Baker   [phone]   Cl[facility]  [address] [facility]      :  [place]    Financial   Income Source:  Social security  Financial Concerns:  None noted.  Insurance:    Payor/Plan Subscriber Name Rel Member # Group #   MEDICARE - MEDICARE F* FABRIZIO MIRANDA  [id number]       ATTN CLAIMS, [address]   MEDICA - MEDICA PRIME* FABRIZIO MIRANDA  [id number]      [address] [address]       Discharge Plan   Patient and family discharge goal:  Return to home with home care PT/OT, family and neighbor support  Provided education on discharge plan:  YES  Patient agreeable to discharge plan:  YES  A list of Medicare Certified Facilities was provided to the patient and/or family to encourage patient choice. Patient's choices for facility are:  " NA  Will NH provide Skilled rehabilitation or complex medical:  YES  General information regarding anticipated insurance coverage and possible out of pocket cost was discussed. Patient and patient's family are aware patient may incur the cost of transportation to the facility, pending insurance payment: YES  Barriers to discharge:  Medical readiness    Discharge Recommendations   Anticipated Disposition:  Home with services  Transportation Needs:  Family:  adult chlid  Name of Transportation Company and Phone:  NA    Additional comments   All questions addressed, resources provided.     Shayy CATALAN, MSW  5B  (Medical/Surgical)  Phone: [phone]  Pager: [phone]

## 2017-07-14 NOTE — PROGRESS NOTES
UROLOGY NOTE    CT reviewed with patient showing large prostate, no evidence of malignancy, however large amt of clot in bladder. Therefore patient counseled on irrigating clot out of bladder. Patient gave verbal consent.     PROCEDURE    Using sterile technique a sandhu wire was advanced through the lumen of the pre-existing 14 [place] catheter, the catheter was removed over there wire and a modified [place]-tipped 24 [place] 6 eye catheter was placed. The bladder was irrigated with normal saline, total of 3 liters, with return of about 50 cc of clot. The urine remained light pink after irrigation, but no further clots. At this time a wire was replaced, the 6 eye catheter was removed and a 24 [place] rouche catheter was placed with 30 cc of saline in the balloon. Continous bladder irrigation was then initiated.     Plan:   - continue continous bladder irrigation, titrate to clear but do not discontinue  - Start Flomax 0.4mg qhs.  - start Finasteride 5mg daily      Discussed with Xenia Dooley  PGY-5 Urology

## 2017-07-15 ENCOUNTER — APPOINTMENT (OUTPATIENT)
Dept: PHYSICAL THERAPY | Facility: CLINIC | Age: 72
DRG: 668 | End: 2017-07-15
Attending: INTERNAL MEDICINE
Payer: MEDICARE

## 2017-07-15 ENCOUNTER — APPOINTMENT (OUTPATIENT)
Dept: OCCUPATIONAL THERAPY | Facility: CLINIC | Age: 72
DRG: 668 | End: 2017-07-15
Attending: INTERNAL MEDICINE
Payer: MEDICARE

## 2017-07-15 LAB
ABO + RH BLD: ABNORMAL
ABO + RH BLD: ABNORMAL
ANION GAP SERPL CALCULATED.3IONS-SCNC: 13 MMOL/L (ref 3–14)
ANION GAP SERPL CALCULATED.3IONS-SCNC: 8 MMOL/L (ref 3–14)
BLD GP AB INVEST PLASRBC-IMP: ABNORMAL
BLD GP AB SCN SERPL QL: ABNORMAL
BLD PROD TYP BPU: ABNORMAL
BLD PROD TYP BPU: NORMAL
BLD UNIT ID BPU: 0
BLOOD BANK CMNT PATIENT-IMP: ABNORMAL
BLOOD PRODUCT CODE: NORMAL
BPU ID: NORMAL
BUN SERPL-MCNC: 128 MG/DL (ref 7–30)
BUN SERPL-MCNC: 135 MG/DL (ref 7–30)
CALCIUM SERPL-MCNC: 7.3 MG/DL (ref 8.5–10.1)
CALCIUM SERPL-MCNC: 7.9 MG/DL (ref 8.5–10.1)
CHLORIDE SERPL-SCNC: 113 MMOL/L (ref 94–109)
CHLORIDE SERPL-SCNC: 115 MMOL/L (ref 94–109)
CO2 SERPL-SCNC: 18 MMOL/L (ref 20–32)
CO2 SERPL-SCNC: 23 MMOL/L (ref 20–32)
CREAT SERPL-MCNC: 10.3 MG/DL (ref 0.66–1.25)
CREAT SERPL-MCNC: 9.52 MG/DL (ref 0.66–1.25)
ERYTHROCYTE [DISTWIDTH] IN BLOOD BY AUTOMATED COUNT: 15.1 % (ref 10–15)
ERYTHROCYTE [DISTWIDTH] IN BLOOD BY AUTOMATED COUNT: 15.2 % (ref 10–15)
GFR SERPL CREATININE-BSD FRML MDRD: 5 ML/MIN/1.7M2
GFR SERPL CREATININE-BSD FRML MDRD: 5 ML/MIN/1.7M2
GLUCOSE SERPL-MCNC: 105 MG/DL (ref 70–99)
GLUCOSE SERPL-MCNC: 132 MG/DL (ref 70–99)
HCT VFR BLD AUTO: 14.7 % (ref 40–53)
HCT VFR BLD AUTO: 20.5 % (ref 40–53)
HGB BLD-MCNC: 4.9 G/DL (ref 13.3–17.7)
HGB BLD-MCNC: 4.9 G/DL (ref 13.3–17.7)
HGB BLD-MCNC: 7 G/DL (ref 13.3–17.7)
MAGNESIUM SERPL-MCNC: 2.3 MG/DL (ref 1.6–2.3)
MCH RBC QN AUTO: 28.5 PG (ref 26.5–33)
MCH RBC QN AUTO: 28.9 PG (ref 26.5–33)
MCHC RBC AUTO-ENTMCNC: 33.3 G/DL (ref 31.5–36.5)
MCHC RBC AUTO-ENTMCNC: 34.1 G/DL (ref 31.5–36.5)
MCV RBC AUTO: 85 FL (ref 78–100)
MCV RBC AUTO: 86 FL (ref 78–100)
NUM BPU REQUESTED: 2
PLATELET # BLD AUTO: 176 10E9/L (ref 150–450)
PLATELET # BLD AUTO: 205 10E9/L (ref 150–450)
POTASSIUM SERPL-SCNC: 4.3 MMOL/L (ref 3.4–5.3)
POTASSIUM SERPL-SCNC: 4.5 MMOL/L (ref 3.4–5.3)
RBC # BLD AUTO: 1.72 10E12/L (ref 4.4–5.9)
RBC # BLD AUTO: 2.42 10E12/L (ref 4.4–5.9)
SODIUM SERPL-SCNC: 144 MMOL/L (ref 133–144)
SODIUM SERPL-SCNC: 146 MMOL/L (ref 133–144)
SPECIMEN EXP DATE BLD: ABNORMAL
TRANSFUSION STATUS PATIENT QL: NORMAL
TRANSFUSION STATUS PATIENT QL: NORMAL
WBC # BLD AUTO: 10.4 10E9/L (ref 4–11)
WBC # BLD AUTO: 8.6 10E9/L (ref 4–11)

## 2017-07-15 PROCEDURE — 86901 BLOOD TYPING SEROLOGIC RH(D): CPT | Performed by: INTERNAL MEDICINE

## 2017-07-15 PROCEDURE — 36415 COLL VENOUS BLD VENIPUNCTURE: CPT

## 2017-07-15 PROCEDURE — 25000132 ZZH RX MED GY IP 250 OP 250 PS 637: Mod: GY | Performed by: STUDENT IN AN ORGANIZED HEALTH CARE EDUCATION/TRAINING PROGRAM

## 2017-07-15 PROCEDURE — 40000193 ZZH STATISTIC PT WARD VISIT: Performed by: REHABILITATION PRACTITIONER

## 2017-07-15 PROCEDURE — 40000133 ZZH STATISTIC OT WARD VISIT

## 2017-07-15 PROCEDURE — 86922 COMPATIBILITY TEST ANTIGLOB: CPT | Performed by: INTERNAL MEDICINE

## 2017-07-15 PROCEDURE — 25800025 ZZH RX 258: Performed by: STUDENT IN AN ORGANIZED HEALTH CARE EDUCATION/TRAINING PROGRAM

## 2017-07-15 PROCEDURE — 97530 THERAPEUTIC ACTIVITIES: CPT | Mod: GP | Performed by: REHABILITATION PRACTITIONER

## 2017-07-15 PROCEDURE — A9270 NON-COVERED ITEM OR SERVICE: HCPCS | Mod: GY | Performed by: STUDENT IN AN ORGANIZED HEALTH CARE EDUCATION/TRAINING PROGRAM

## 2017-07-15 PROCEDURE — 25000128 H RX IP 250 OP 636: Performed by: STUDENT IN AN ORGANIZED HEALTH CARE EDUCATION/TRAINING PROGRAM

## 2017-07-15 PROCEDURE — 97535 SELF CARE MNGMENT TRAINING: CPT | Mod: GO

## 2017-07-15 PROCEDURE — 36415 COLL VENOUS BLD VENIPUNCTURE: CPT | Performed by: INTERNAL MEDICINE

## 2017-07-15 PROCEDURE — 80048 BASIC METABOLIC PNL TOTAL CA: CPT | Performed by: INTERNAL MEDICINE

## 2017-07-15 PROCEDURE — 83735 ASSAY OF MAGNESIUM: CPT | Performed by: INTERNAL MEDICINE

## 2017-07-15 PROCEDURE — 40000556 ZZH STATISTIC PERIPHERAL IV START W US GUIDANCE

## 2017-07-15 PROCEDURE — P9016 RBC LEUKOCYTES REDUCED: HCPCS | Performed by: INTERNAL MEDICINE

## 2017-07-15 PROCEDURE — 85018 HEMOGLOBIN: CPT

## 2017-07-15 PROCEDURE — 99233 SBSQ HOSP IP/OBS HIGH 50: CPT | Mod: GC | Performed by: INTERNAL MEDICINE

## 2017-07-15 PROCEDURE — 85027 COMPLETE CBC AUTOMATED: CPT | Performed by: INTERNAL MEDICINE

## 2017-07-15 PROCEDURE — 86900 BLOOD TYPING SEROLOGIC ABO: CPT | Performed by: INTERNAL MEDICINE

## 2017-07-15 PROCEDURE — 97116 GAIT TRAINING THERAPY: CPT | Mod: GP | Performed by: REHABILITATION PRACTITIONER

## 2017-07-15 PROCEDURE — 12000001 ZZH R&B MED SURG/OB UMMC

## 2017-07-15 PROCEDURE — 86850 RBC ANTIBODY SCREEN: CPT | Performed by: INTERNAL MEDICINE

## 2017-07-15 RX ORDER — METOPROLOL SUCCINATE 50 MG/1
50 TABLET, EXTENDED RELEASE ORAL DAILY
Status: DISCONTINUED | OUTPATIENT
Start: 2017-07-15 | End: 2017-07-15

## 2017-07-15 RX ORDER — SODIUM BICARBONATE 650 MG/1
1300 TABLET ORAL 3 TIMES DAILY
Status: DISCONTINUED | OUTPATIENT
Start: 2017-07-15 | End: 2017-07-16

## 2017-07-15 RX ADMIN — SODIUM CHLORIDE 3000 ML: 900 IRRIGANT IRRIGATION at 03:33

## 2017-07-15 RX ADMIN — TAMSULOSIN HYDROCHLORIDE 0.4 MG: 0.4 CAPSULE ORAL at 08:39

## 2017-07-15 RX ADMIN — SODIUM CHLORIDE, POTASSIUM CHLORIDE, SODIUM LACTATE AND CALCIUM CHLORIDE: 600; 310; 30; 20 INJECTION, SOLUTION INTRAVENOUS at 16:56

## 2017-07-15 RX ADMIN — SODIUM CHLORIDE 3000 ML: 900 IRRIGANT IRRIGATION at 01:51

## 2017-07-15 RX ADMIN — SODIUM CHLORIDE 3000 ML: 900 IRRIGANT IRRIGATION at 10:21

## 2017-07-15 RX ADMIN — SODIUM CHLORIDE 3000 ML: 900 IRRIGANT IRRIGATION at 07:58

## 2017-07-15 RX ADMIN — SODIUM CHLORIDE, POTASSIUM CHLORIDE, SODIUM LACTATE AND CALCIUM CHLORIDE: 600; 310; 30; 20 INJECTION, SOLUTION INTRAVENOUS at 12:41

## 2017-07-15 RX ADMIN — SODIUM BICARBONATE 650 MG TABLET 1300 MG: at 20:06

## 2017-07-15 RX ADMIN — FINASTERIDE 5 MG: 5 TABLET, FILM COATED ORAL at 08:39

## 2017-07-15 RX ADMIN — SODIUM CHLORIDE 3000 ML: 900 IRRIGANT IRRIGATION at 00:24

## 2017-07-15 RX ADMIN — SODIUM CHLORIDE, POTASSIUM CHLORIDE, SODIUM LACTATE AND CALCIUM CHLORIDE: 600; 310; 30; 20 INJECTION, SOLUTION INTRAVENOUS at 08:42

## 2017-07-15 RX ADMIN — SODIUM CHLORIDE 6000 ML: 900 IRRIGANT IRRIGATION at 18:05

## 2017-07-15 RX ADMIN — SODIUM CHLORIDE 3000 ML: 900 IRRIGANT IRRIGATION at 15:49

## 2017-07-15 RX ADMIN — SODIUM CHLORIDE 3000 ML: 900 IRRIGANT IRRIGATION at 05:43

## 2017-07-15 RX ADMIN — SODIUM CHLORIDE 3000 ML: 900 IRRIGANT IRRIGATION at 15:14

## 2017-07-15 RX ADMIN — SODIUM BICARBONATE 650 MG TABLET 1300 MG: at 08:39

## 2017-07-15 RX ADMIN — SODIUM BICARBONATE 650 MG TABLET 1300 MG: at 14:44

## 2017-07-15 RX ADMIN — SODIUM CHLORIDE 6000 ML: 900 IRRIGANT IRRIGATION at 22:19

## 2017-07-15 RX ADMIN — SODIUM CHLORIDE 3000 ML: 900 IRRIGANT IRRIGATION at 11:37

## 2017-07-15 RX ADMIN — SODIUM CHLORIDE, POTASSIUM CHLORIDE, SODIUM LACTATE AND CALCIUM CHLORIDE: 600; 310; 30; 20 INJECTION, SOLUTION INTRAVENOUS at 04:26

## 2017-07-15 RX ADMIN — SODIUM CHLORIDE 3000 ML: 900 IRRIGANT IRRIGATION at 05:19

## 2017-07-15 RX ADMIN — SODIUM CHLORIDE 3000 ML: 900 IRRIGANT IRRIGATION at 04:26

## 2017-07-15 RX ADMIN — SODIUM CHLORIDE 3000 ML: 900 IRRIGANT IRRIGATION at 09:25

## 2017-07-15 RX ADMIN — SODIUM CHLORIDE, POTASSIUM CHLORIDE, SODIUM LACTATE AND CALCIUM CHLORIDE: 600; 310; 30; 20 INJECTION, SOLUTION INTRAVENOUS at 21:52

## 2017-07-15 RX ADMIN — SODIUM CHLORIDE 3000 ML: 900 IRRIGANT IRRIGATION at 02:48

## 2017-07-15 RX ADMIN — SODIUM CHLORIDE 6000 ML: 900 IRRIGANT IRRIGATION at 20:27

## 2017-07-15 NOTE — PLAN OF CARE
"Problem: Goal Outcome Summary  Goal: Goal Outcome Summary  Outcome: No Change  8430-5336: Vitals stable on room air with continued soft BPs since this morning - patient asymptomatic. Metoprolol dose held and MD team aware of BPs. A/O. IV fluids infusing via PIV - LR at 250mL/hr entire shift d/t high urine output in Man catheter. Continuous bladder irrigation stopped by Urology team for a couple hours - Urology paged by this writer as urine in Man was dark red with clots. Urology restarted CBI. Pt worked with therapy today. Fair appetite. Pt reporting that his \"butt is sore\". Blanchable redness and tenderness noted on coccyx - pressure ulcer prevention teaching started with patient and pillows utilized to relieve pressure. Hgb 7.0 this AM. Recheck this evening in process.            "

## 2017-07-15 NOTE — PROGRESS NOTES
[facility], [place]    Internal Medicine Progress Note - [facility] Service    Main Plans for Today   -Continue CBI  -F/u Hgb, CBC, BMP q12h  -Hgb 7.0 AM, if drops PM transfuse  -Hold today's metoprolol due to low AM BP (106/68).    Assessment & Plan   Dung Norton is a 71 year old male admitted on 7/12/2017. He has a history of CKD stage 3, HTN and tobacco abuse, and has a bladder mass, acute blood loss anemia and post-obstructive KRISTINA s/p wood placement and drainage.     #Bladder mass  Visualized on 7/12 US and 7/13 CT as nodular, vascular and projecting into the bladder. Considering smoking history, this is very concerning for malignancy (especially of bladder). Ddx: bladder cancer vs prostate cancer with a component of BPH.  -F/u with urology--biopsy will determine etiology  -Started on flomax (0.4mg qd), finasteride (5mg qd) for BPH.    #Acute Post-Obstructive on Chronic Kidney Disease (Stage 3)  Post-obstructive etiology most likely (mass pressing on urethra, bladder), US and CT showed hydroureteronephrosis. Wood has drained over 10L urine mixed with blood (7/13-7/15), which is not unexpected given his post-obstructive state. His labs are showing gradual but not marked improvement, which will need to be followed.  -Continue CBI q12h  -Follow BMP q12h  -Titrate fluids to maintain euvolemia  -Maintain wood catheter patency. If losing output, page urology to replace.  -PO bicarb 1300mg TID  -Baseline creatinine in high 2 range (last one 2.56 2/2016).  -Avoid insulting kidneys (contrast, nephrotoxins)    #Anemia  Hgb 6.5 on admission. Retic 1.1%, index is 0.2%, showing inadequate bone marrow response, likely due to decreased EPO production from stage 3 CKD. Renal US showed debris in bladder, which are probably clots, also found on CT. The blood could be from either stretch injury or the (vascular) tumor. Has received 2 units of PRBC in the last 2 days to maintain Hgb above  "7.0.  -Follow CBC, Hgb every 12 hrs. Transfuse if under Hgb 7.0.    #Weight Loss of > 20 lbs  #Cachexia  Has lost 20-30 lbs in last 4 months (weighed 181 with PCP 2/2016, 150 lbs 7/12, and 137 lbs s/p catheterization 7/13). Weight loss likely due to combination of suspected malignancy and stage 3 CKD.   -Encurage PO intake  -PT/OT recommend outpatient therapy      #Depression  Patient grudgingly admits to feeling depressed with recent weakness, partial loss of independence and weight loss. Denies thoughts of self-harm or suicide.  -F/u outpatient, consider antidepressant therapy      Chronic:  Hypertension  BP on admission 102/67.       -hold amlodipine and diuretic      -continue PTA metoprolol 100mg qd      Tobacco abuse  Started smoking at age 12, cut back recently to 3-4 cigarettes/day. Has smoked up to 2 packs per day at times.      -Continue PTA nicotine patch    Diet: Combination Diet Regular Diet Adult  Snacks/Supplements Adult: Ensure Plus (Adult); Between Meals  Fluids: PIVx1, PO  DVT Prophylaxis: Not very ambulatory; no chemoprophylaxis indicated because of anemia  Code Status: DNR / DNI    Disposition Plan   Expected discharge: 2 - 3 days, recommended to prior living arrangement with outpatient PT/OT once KRISTINA resolving.     Entered: Herbert Lopez 07/15/2017, 11:23 AM   Information in the above section will display in the discharge planner report.      The patient's care was discussed with the Attending Physician, Dr. Sharma and Patient.    Herbert Lopez  [facility]  Maroon: 2  Pager: [phone]  Please see sticky note for cross cover information    Interval History   No acute events overnight. He was started on CBI yesterday afternoon (24F catheter), and fluids are being titrated to compensate. They were between 30mL/hr this morning to 250mL/hr last night. He reports 4-5 BMs over the last day that were \"soft.\" He has not had any pain around the catheter or with BMs. He " slept in between being woken up by nurses for meds. He has a good appetite and has been eating well. He denies pain and nausea, but endorses bladder spasms overnight lasting between 30 seconds and 15 minutes.    4 point ROS negative except as noted above.    Physical Exam   Vital Signs: Temp: 97.3  F (36.3  C) Temp src: Oral BP: 102/54   Heart Rate: 84 Resp: 18 SpO2: 100 % O2 Device: None (Room air)     /68 this AM  Weight: 143 lbs 11.2 oz  General Appearance: Alert, oriented, pleasant. In no acute distress.  Respiratory: CTAB. No increased WOB.  Cardiovascular: RRR. Mild systolic ejection murmur.  GI: Abdomen soft, nontender. No organomegaly.  Extremities: Radial pulses noted. Mild pitting edema in ankles on morning exam.  : No leakage around wood evident on exam. Urine in wood bag light pink.    Data   Medications     sodium chloride 0.9% (bag)       IV fluid REPLACEMENT ONLY 250 mL/hr at 07/15/17 0842     - MEDICATION INSTRUCTIONS -         sodium bicarbonate  1,300 mg Oral TID     tamsulosin  0.4 mg Oral Daily     finasteride  5 mg Oral Daily     nicotine  1 patch Transdermal Q24H     nicotine   Transdermal Daily     nicotine   Transdermal Q8H     Data     Recent Labs  Lab 07/15/17  0632 07/14/17  1817 07/14/17  0812  07/12/17  1740 07/12/17  1407   WBC 10.4 10.0 8.9  < > 7.8 8.1   HGB 7.0* 7.1* 6.6*  < > 6.5* 6.4*   MCV 85 85 84  < > 84 87    208 214  < > 278 259    143 142  < > 139 140   POTASSIUM 4.5 4.1 4.3  < > 4.5 4.2   CHLORIDE 113* 111* 112*  < > 108 109   CO2 18* 19* 16*  < > 16* 16*   * 140* 148*  < > 173* 177*   CR 10.30* 11.00* 12.20*  < > 14.70* 14.40*   ANIONGAP 13 12 14  < > 16* 15*   DERICK 7.9* 7.8* 7.9*  < > 8.5 8.3*   * 130* 106*  < > 105* 117*   ALBUMIN  --   --   --   --  3.1* 3.1*   PROTTOTAL  --   --   --   --  6.8 6.5*   BILITOTAL  --   --   --   --  0.6 0.3   ALKPHOS  --   --   --   --  67 65   ALT  --   --   --   --  19 20   AST  --   --   --   --  12 8    < > = values in this interval not displayed.  No results found for this or any previous visit (from the past 24 hour(s)).

## 2017-07-15 NOTE — PLAN OF CARE
Problem: Goal Outcome Summary  Goal: Goal Outcome Summary  Outcome: No Change  Pt A&O, VSS on RA, and up with SBA to bathroom.  Pt has no c/o pain.  Tolerating regular diet with no nausea reported.  Wood patent with CBI.  Urine output is pink color with clots/sedimentation noted.  Wood bag changed x 1 overnight, staff emptying bag frequently.  2 BM this shift.  IVMF titrated to net urine output, PIV currently infusing LR at 30 mL/hr.  Last Hgb check of 7.1, rechecks Q12hrs.  At 0600 wood started to leak at insertion site, writer unable to advance catheter, urology paged.  Awaiting provider call back.  Pt is able to call and make needs known.  Will continue to monitor and follow POC.

## 2017-07-15 NOTE — PLAN OF CARE
Problem: Goal Outcome Summary  Goal: Goal Outcome Summary  Outcome: Therapy, progress toward functional goals as expected  5A PT- Pt. ind with bed mobility, SBA sit<>stand, and close CGA with gait. Pt. Experienced dizziness upon sitting at EOB today, continue to monitor BP in sitting and standing, ensuring plenty of time before getting up and moving. Impulsive with transferring and ambulation. Recommend discharge to home with HH PT and assist from family when medically stable.

## 2017-07-15 NOTE — PLAN OF CARE
Problem: Goal Outcome Summary  Goal: Goal Outcome Summary  OT 5A: Education provided on ECWS strategies to promote fatigue management for participation in I/ADLs upon dc from hospital, pt receptive and thankful for all education provided, handout left to increase recall/carryover.      Per plan established by the OT, the recommendation for dc location is to home with assist as needed for I/ADLs and outpatient PT to facilitate strength, endurance, and balance for functional mobility and daily activity.

## 2017-07-15 NOTE — PROGRESS NOTES
[facility] Internal Medicine Progress Note  Date: 07/15/2017  Date of admission: 7/12/2017          Assessment and Plan:   This is a 71 year old male with a history of CKD stage 3, HTN, and tobacco abuse admitted for anemia and >20 pound weight loss found to have acute renal failure secondary to postobstructive nephropathy now found to have bladder mass highly suspicious for malignancy, likely contributing to ongoing acute anemia.     Today:  -Increase PO sodium bicarb from 1300 mg BID to TID  -Continue bladder irrigation per urology  -Hold metoprolol for SBP in 100s    Acute renal failure 2/2 Postobstructive nephropathy   Post-obstructive Diuresis  CKD  Bladder Stretch Injury  Vascular Mass (bladder vs prostate)  Non-anion gap acidosis 2/2 KRISTINA  Last creat in 2/2016 was 2.56. Creatinine on admission >14 with BUN >170. Wood placed with >4.5 L of urine output. Pt denying any urinary symptoms of issues emptying, weak stream or other difficulty urinating. Admits he was perhaps only urinating once a day. Now having post-obstructive diuresis with significant volume of bloody urine output. Concern is for bladder vs prostate malignancy. US noted vascular mass and hydronephrosis. CT on 7/13 demonstrated a large prostate and a large amount of clot in the bladder. PSA is wnl.       -Follow BMP BID       -Avoid contrast and nephrotoxic agents       -Replace urine output with LR IVFs as able       -VS by nursing Q4H       -Renal consulted   -Supplementing with PO sodium bicarb 1,300 mg TID for goal bicarb >22   -Monitor BMP   -Keep K~4 and mag ~2       -Urology consulted    -Cystoscopy (inpt if OR time available, otherwise outpt ok) - if pt continues to have lots of bleeding will plan for inpt cystoscopy   -Home with wood, to follow up outpt with urology              -Continuous bladder irrigation, titrate to clear, but do not discontinue   -Flomax 0.4mg qhs   -Finasteride 5 mg daily    Bladder Spasms  Started AM of  7/15 - having some urgency and leaking around the wood. First episode lasted ~15 min. Later episodes <30 seconds.           -Consider ditropan if persistent.      Severe malnutrition  Acute on Chronic Anemia 2/2 acute blood loss from likely bladder malignancy  Weight loss of >20 lbs  Hgb 6.5 on admission. Retic 1.1%, retic index is 0.2% - inadequate bone marrow response to anemia.  Weight was 181 lbs at PCP appt 2/2016. Weight on 7/13 post-wood diuresis is 137 lbs. Highly suspicious for malignancy - most likely related to bladder vs prostate mass seen on US 7/13. Chronic anemia likely related to CKD and possible malignancy. Acute anemia likely secondary bladder stretch injury vs vascular mass in bladder bleeding, large clot in bladder seen on 7/13 CT. S/p 2 units of PRBCs, last given on 7/14. Smear demonstrated marked normochromic, normocytic anemia; no increase in erythrocyte regeneration; occasional to numerous echinocytes.      -Follow CBC BID, continue to transfuse if <7      -PT/OT consulted to determine dispo     Depression  Patient states feeling more depressed lately with weight loss and loss of independence. Talked about selling his home with his kids. Denies thoughts of suicide at this time.       -Continue to monitor       -Consider starting antidepressant    Subclinical Hypothyroid  TSH 10.51, T4 free 0.70. Patient without clinical symptoms of hypothyroidism.        -Follow up outpatient with PCP for recheck     Chronic:  Hypertension  BP on admission 102/67.       -hold amlodipine and diuretic      -hold PTA metoprolol 7/15 for SBPs in 100s     Tobacco abuse  Started smoking at age 12, at worst was smoking ~2 packs per day. Has cut way recently to 3-4 cigs per day.      -Continue PTA nicotine patch      -Cessation encouraged     FEN: Regular adult diet  Lines: PIV  DVT: ambulate (with anemia will hold off)  Code status: DNR/DNI  Dispo: Pending stabilization of kidney function and electrolytes as well  as anemia, urology clearance for discharge, and evaluation by PT/OT    Clinical decision making discussed with Dr. Sharma who is in agreement with the above plan.     Kay Bailey MD  [facility] PGY-1   P: [phone]              Interval History:   No acute events overnight. Nursing notes reviewed.    Had wood replaced by urology team yesterday (from 14 to 24 Pashto) - continuous bladder irrigation was started. Patient feels good this AM. Had his brothers visit him yesterday.    This AM wood was leaking nursing noted, unable to advance wood. Urology was paged, felt this was a bladder spasm. Patient experienced urgency with this episode, now not having this feeling or leaking.     No pain, nausea, or vomiting. Eating well here. Not having as much belching with eating.  Tired this AM, was up a lot of the night with bladder cares.        Last 24 hr care team notes reviewed.   ROS: 4 point ROS including Respiratory, CV, GI and , other than that noted in the HPI, is negative               Physical Exam:   Vitals were reviewed  /68 (BP Location: Right arm)  Pulse 70  Temp 95.8  F (35.4  C) (Oral)  Resp 16  Wt 63.9 kg (140 lb 14.4 oz)  SpO2 97%  BMI 20.81 kg/m2    Exam:  General: the patient is pleasant, resting comfortably, no acute distress. Cachectic.  HEENT: Normocephalic, atraumatic.  Lungs: Clear to auscultation, no wheezes or crackles.   CV: RRR, nl s1 and s2, systolic ejection murmur noted, greatest at left upper sternal boarder.  Abdomen: Nondistended. No fluid wave. Scar tissue in RLQ where appendectomy surgery performed. Soft, nontender. No rebound or guarding. Bowel sounds active.  Extremities: No lower extremity edema. Peripheral pulses strong and symmetric.   Skin: no appreciable skin lesions/rashes on exposed skin.   Wood: Putting out light pink urine - continuous bladder irrigation in place.         Data:   Labs:  All laboratory and imaging data in the past 24 hours reviewed.    Imaging  studies:  No new studies.    Internal Medicine Staff Addendum  Date of Service: 7/15/2017    I have seen and examined this patient, reviewed the data and discussed the plan of care. I agree with the above documentation including plan and ddx unless otherwise stated:     #    Chau Sharma MD  Internal Medicine [facility]  Attending pager: [phone]

## 2017-07-15 NOTE — PROGRESS NOTES
Urology Daily Progress Note    24 hour events/Subjective:     - No acute events overnight   - Overnight catheter did not require irrigation for clots. Patient denies any bladder spasms or suprapubic pain       O:  Vitals: Afebrile, VSS  General: Alert, interactive, in NAD  Resp: Non-labored breathing on RA  Abdomen: Soft, non-tender, non distended.  Ext: Warm and well perfused, No LE edema   Wood: Light pink/watermelon with CBI slow rate    I&O  CBI     Labs/Imaging  Heme:  Recent Labs  Lab 07/14/17 1817 07/14/17  0812 07/13/17  0812 07/12/17  1740   WBC 10.0 8.9 8.9 7.8   HGB 7.1* 6.6* 7.8* 6.5*    214 246 278     Chem:  Recent Labs  Lab 07/14/17 1817 07/14/17  0812 07/13/17  1503 07/13/17  0812   POTASSIUM 4.1 4.3 4.2 4.4   CR 11.00* 12.20* 13.60* 14.00*       Assessment/Plan  71 year old y/o male admitted for obstructive uropathy,  hematuria and retention. Irrigated and started on CBI.      - CBI clamped this AM. Please have patient ambulate and call urology resident regarding color. If remains see through/pink in tube may discontinue wood catheter   - Urology will continue to follow   - Continue catheter                    Seen and examined with chief resident and urology staff.     --    Tamiko Brower MD  Urology Resident           Contacting the Urology Team     Please use the following job codes to reach the Urology Team. Note that you must use an in house phone and that job codes cannot receive text pages.     On weekdays, dial [phone] (or star-star-star [phone] on the new [employer] telephones) then [phone] to reach the Adult Urology resident or PA on call    On weekdays, dial [phone] (or star-star-star [phone] on the new [employer] telephones) then [phone] to reach the Pediatric Urology resident    On weeknights and weekends, dial [phone] (or star-star-star [phone] on the new [employer] telephones) then [phone] to reach the Urology resident on call (for both Adult and Pediatrics)

## 2017-07-15 NOTE — PROVIDER NOTIFICATION
MD team paged to notify that patient had 2600mL out in Man in past 4 hours (per nursing documentation). /65. Nursing increased LR rate to 250mL/hr and awaiting call back about giving scheduled AM metoprolol and about rate of IV fluids.

## 2017-07-15 NOTE — PROGRESS NOTES
Urology resident:    - Reassessed wood catheter. Urine dark red. Irrigated wood catheter with 1 L NS, 15 ml of clot retrieved. CBI resumed at moderate rate. Discussed with his nurse to irrigate wood catheter prn.     Tamiko Brower MD   Urology

## 2017-07-16 ENCOUNTER — ANESTHESIA (OUTPATIENT)
Dept: SURGERY | Facility: CLINIC | Age: 72
DRG: 668 | End: 2017-07-16
Payer: MEDICARE

## 2017-07-16 ENCOUNTER — APPOINTMENT (OUTPATIENT)
Dept: PHYSICAL THERAPY | Facility: CLINIC | Age: 72
DRG: 668 | End: 2017-07-16
Attending: INTERNAL MEDICINE
Payer: MEDICARE

## 2017-07-16 ENCOUNTER — ANESTHESIA EVENT (OUTPATIENT)
Dept: SURGERY | Facility: CLINIC | Age: 72
DRG: 668 | End: 2017-07-16
Payer: MEDICARE

## 2017-07-16 LAB
ANION GAP SERPL CALCULATED.3IONS-SCNC: 10 MMOL/L (ref 3–14)
ANION GAP SERPL CALCULATED.3IONS-SCNC: 9 MMOL/L (ref 3–14)
BLD PROD TYP BPU: NORMAL
BLD PROD TYP BPU: NORMAL
BLD UNIT ID BPU: 0
BLD UNIT ID BPU: 0
BLOOD PRODUCT CODE: NORMAL
BLOOD PRODUCT CODE: NORMAL
BPU ID: NORMAL
BPU ID: NORMAL
BUN SERPL-MCNC: 113 MG/DL (ref 7–30)
BUN SERPL-MCNC: 123 MG/DL (ref 7–30)
CALCIUM SERPL-MCNC: 7.2 MG/DL (ref 8.5–10.1)
CALCIUM SERPL-MCNC: 7.7 MG/DL (ref 8.5–10.1)
CHLORIDE SERPL-SCNC: 113 MMOL/L (ref 94–109)
CHLORIDE SERPL-SCNC: 114 MMOL/L (ref 94–109)
CO2 SERPL-SCNC: 22 MMOL/L (ref 20–32)
CO2 SERPL-SCNC: 24 MMOL/L (ref 20–32)
CREAT SERPL-MCNC: 8.61 MG/DL (ref 0.66–1.25)
CREAT SERPL-MCNC: 8.89 MG/DL (ref 0.66–1.25)
ERYTHROCYTE [DISTWIDTH] IN BLOOD BY AUTOMATED COUNT: 15.2 % (ref 10–15)
ERYTHROCYTE [DISTWIDTH] IN BLOOD BY AUTOMATED COUNT: 15.2 % (ref 10–15)
GFR SERPL CREATININE-BSD FRML MDRD: 6 ML/MIN/1.7M2
GFR SERPL CREATININE-BSD FRML MDRD: 6 ML/MIN/1.7M2
GLUCOSE SERPL-MCNC: 123 MG/DL (ref 70–99)
GLUCOSE SERPL-MCNC: 92 MG/DL (ref 70–99)
HCT VFR BLD AUTO: 19.4 % (ref 40–53)
HCT VFR BLD AUTO: 22.4 % (ref 40–53)
HGB BLD-MCNC: 6.3 G/DL (ref 13.3–17.7)
HGB BLD-MCNC: 7.4 G/DL (ref 13.3–17.7)
HGB BLD-MCNC: 7.4 G/DL (ref 13.3–17.7)
MCH RBC QN AUTO: 28.3 PG (ref 26.5–33)
MCH RBC QN AUTO: 28.9 PG (ref 26.5–33)
MCHC RBC AUTO-ENTMCNC: 32.5 G/DL (ref 31.5–36.5)
MCHC RBC AUTO-ENTMCNC: 33 G/DL (ref 31.5–36.5)
MCV RBC AUTO: 87 FL (ref 78–100)
MCV RBC AUTO: 88 FL (ref 78–100)
PLATELET # BLD AUTO: 134 10E9/L (ref 150–450)
PLATELET # BLD AUTO: 164 10E9/L (ref 150–450)
POTASSIUM SERPL-SCNC: 4.4 MMOL/L (ref 3.4–5.3)
POTASSIUM SERPL-SCNC: 4.9 MMOL/L (ref 3.4–5.3)
RBC # BLD AUTO: 2.23 10E12/L (ref 4.4–5.9)
RBC # BLD AUTO: 2.56 10E12/L (ref 4.4–5.9)
SODIUM SERPL-SCNC: 146 MMOL/L (ref 133–144)
SODIUM SERPL-SCNC: 146 MMOL/L (ref 133–144)
TRANSFUSION STATUS PATIENT QL: NORMAL
WBC # BLD AUTO: 10.2 10E9/L (ref 4–11)
WBC # BLD AUTO: 9.4 10E9/L (ref 4–11)

## 2017-07-16 PROCEDURE — 25000128 H RX IP 250 OP 636: Performed by: STUDENT IN AN ORGANIZED HEALTH CARE EDUCATION/TRAINING PROGRAM

## 2017-07-16 PROCEDURE — 40000170 ZZH STATISTIC PRE-PROCEDURE ASSESSMENT II: Performed by: UROLOGY

## 2017-07-16 PROCEDURE — 97530 THERAPEUTIC ACTIVITIES: CPT | Mod: GP | Performed by: REHABILITATION PRACTITIONER

## 2017-07-16 PROCEDURE — 97110 THERAPEUTIC EXERCISES: CPT | Mod: GP | Performed by: REHABILITATION PRACTITIONER

## 2017-07-16 PROCEDURE — 36000053 ZZH SURGERY LEVEL 2 EA 15 ADDTL MIN - UMMC: Performed by: UROLOGY

## 2017-07-16 PROCEDURE — 93005 ELECTROCARDIOGRAM TRACING: CPT

## 2017-07-16 PROCEDURE — 0TCB8ZZ EXTIRPATION OF MATTER FROM BLADDER, VIA NATURAL OR ARTIFICIAL OPENING ENDOSCOPIC: ICD-10-PCS | Performed by: UROLOGY

## 2017-07-16 PROCEDURE — 25000132 ZZH RX MED GY IP 250 OP 250 PS 637: Mod: GY | Performed by: STUDENT IN AN ORGANIZED HEALTH CARE EDUCATION/TRAINING PROGRAM

## 2017-07-16 PROCEDURE — P9016 RBC LEUKOCYTES REDUCED: HCPCS | Performed by: INTERNAL MEDICINE

## 2017-07-16 PROCEDURE — 36000051 ZZH SURGERY LEVEL 2 1ST 30 MIN - UMMC: Performed by: UROLOGY

## 2017-07-16 PROCEDURE — 12000001 ZZH R&B MED SURG/OB UMMC

## 2017-07-16 PROCEDURE — 25800025 ZZH RX 258: Performed by: UROLOGY

## 2017-07-16 PROCEDURE — 25800025 ZZH RX 258: Performed by: STUDENT IN AN ORGANIZED HEALTH CARE EDUCATION/TRAINING PROGRAM

## 2017-07-16 PROCEDURE — 99233 SBSQ HOSP IP/OBS HIGH 50: CPT | Mod: GC | Performed by: INTERNAL MEDICINE

## 2017-07-16 PROCEDURE — 93010 ELECTROCARDIOGRAM REPORT: CPT | Performed by: INTERNAL MEDICINE

## 2017-07-16 PROCEDURE — A9270 NON-COVERED ITEM OR SERVICE: HCPCS | Mod: GY | Performed by: STUDENT IN AN ORGANIZED HEALTH CARE EDUCATION/TRAINING PROGRAM

## 2017-07-16 PROCEDURE — 0TBB8ZX EXCISION OF BLADDER, VIA NATURAL OR ARTIFICIAL OPENING ENDOSCOPIC, DIAGNOSTIC: ICD-10-PCS | Performed by: UROLOGY

## 2017-07-16 PROCEDURE — C1769 GUIDE WIRE: HCPCS | Performed by: UROLOGY

## 2017-07-16 PROCEDURE — 85027 COMPLETE CBC AUTOMATED: CPT | Performed by: STUDENT IN AN ORGANIZED HEALTH CARE EDUCATION/TRAINING PROGRAM

## 2017-07-16 PROCEDURE — 97116 GAIT TRAINING THERAPY: CPT | Mod: GP | Performed by: REHABILITATION PRACTITIONER

## 2017-07-16 PROCEDURE — 25000128 H RX IP 250 OP 636: Performed by: ANESTHESIOLOGY

## 2017-07-16 PROCEDURE — 27210794 ZZH OR GENERAL SUPPLY STERILE: Performed by: UROLOGY

## 2017-07-16 PROCEDURE — 40000193 ZZH STATISTIC PT WARD VISIT: Performed by: REHABILITATION PRACTITIONER

## 2017-07-16 PROCEDURE — 25000125 ZZHC RX 250: Performed by: ANESTHESIOLOGY

## 2017-07-16 PROCEDURE — 80048 BASIC METABOLIC PNL TOTAL CA: CPT | Performed by: STUDENT IN AN ORGANIZED HEALTH CARE EDUCATION/TRAINING PROGRAM

## 2017-07-16 PROCEDURE — 85027 COMPLETE CBC AUTOMATED: CPT | Performed by: INTERNAL MEDICINE

## 2017-07-16 PROCEDURE — 25000566 ZZH SEVOFLURANE, EA 15 MIN: Performed by: UROLOGY

## 2017-07-16 PROCEDURE — 37000009 ZZH ANESTHESIA TECHNICAL FEE, EACH ADDTL 15 MIN: Performed by: UROLOGY

## 2017-07-16 PROCEDURE — 71000014 ZZH RECOVERY PHASE 1 LEVEL 2 FIRST HR: Performed by: UROLOGY

## 2017-07-16 PROCEDURE — 88305 TISSUE EXAM BY PATHOLOGIST: CPT | Performed by: UROLOGY

## 2017-07-16 PROCEDURE — 80048 BASIC METABOLIC PNL TOTAL CA: CPT | Performed by: INTERNAL MEDICINE

## 2017-07-16 PROCEDURE — 36415 COLL VENOUS BLD VENIPUNCTURE: CPT | Performed by: STUDENT IN AN ORGANIZED HEALTH CARE EDUCATION/TRAINING PROGRAM

## 2017-07-16 PROCEDURE — C9399 UNCLASSIFIED DRUGS OR BIOLOG: HCPCS | Performed by: ANESTHESIOLOGY

## 2017-07-16 PROCEDURE — 37000008 ZZH ANESTHESIA TECHNICAL FEE, 1ST 30 MIN: Performed by: UROLOGY

## 2017-07-16 PROCEDURE — 85018 HEMOGLOBIN: CPT | Performed by: STUDENT IN AN ORGANIZED HEALTH CARE EDUCATION/TRAINING PROGRAM

## 2017-07-16 PROCEDURE — 27211024 ZZHC OR SUPPLY OTHER OPNP: Performed by: UROLOGY

## 2017-07-16 RX ORDER — EPHEDRINE SULFATE 50 MG/ML
INJECTION, SOLUTION INTRAMUSCULAR; INTRAVENOUS; SUBCUTANEOUS PRN
Status: DISCONTINUED | OUTPATIENT
Start: 2017-07-16 | End: 2017-07-16

## 2017-07-16 RX ORDER — CEFAZOLIN SODIUM 1 G/3ML
1 INJECTION, POWDER, FOR SOLUTION INTRAMUSCULAR; INTRAVENOUS SEE ADMIN INSTRUCTIONS
Status: DISCONTINUED | OUTPATIENT
Start: 2017-07-16 | End: 2017-07-16 | Stop reason: HOSPADM

## 2017-07-16 RX ORDER — LIDOCAINE HYDROCHLORIDE 20 MG/ML
INJECTION, SOLUTION INFILTRATION; PERINEURAL PRN
Status: DISCONTINUED | OUTPATIENT
Start: 2017-07-16 | End: 2017-07-16

## 2017-07-16 RX ORDER — ONDANSETRON 2 MG/ML
INJECTION INTRAMUSCULAR; INTRAVENOUS PRN
Status: DISCONTINUED | OUTPATIENT
Start: 2017-07-16 | End: 2017-07-16

## 2017-07-16 RX ORDER — NALOXONE HYDROCHLORIDE 0.4 MG/ML
.1-.4 INJECTION, SOLUTION INTRAMUSCULAR; INTRAVENOUS; SUBCUTANEOUS
Status: DISCONTINUED | OUTPATIENT
Start: 2017-07-16 | End: 2017-07-16 | Stop reason: HOSPADM

## 2017-07-16 RX ORDER — ONDANSETRON 2 MG/ML
4 INJECTION INTRAMUSCULAR; INTRAVENOUS EVERY 30 MIN PRN
Status: DISCONTINUED | OUTPATIENT
Start: 2017-07-16 | End: 2017-07-16 | Stop reason: HOSPADM

## 2017-07-16 RX ORDER — FENTANYL CITRATE 50 UG/ML
25-50 INJECTION, SOLUTION INTRAMUSCULAR; INTRAVENOUS
Status: DISCONTINUED | OUTPATIENT
Start: 2017-07-16 | End: 2017-07-16 | Stop reason: HOSPADM

## 2017-07-16 RX ORDER — SODIUM BICARBONATE 650 MG/1
650 TABLET ORAL DAILY
Status: DISCONTINUED | OUTPATIENT
Start: 2017-07-17 | End: 2017-07-25 | Stop reason: HOSPADM

## 2017-07-16 RX ORDER — SODIUM CHLORIDE, SODIUM LACTATE, POTASSIUM CHLORIDE, CALCIUM CHLORIDE 600; 310; 30; 20 MG/100ML; MG/100ML; MG/100ML; MG/100ML
INJECTION, SOLUTION INTRAVENOUS CONTINUOUS
Status: DISCONTINUED | OUTPATIENT
Start: 2017-07-16 | End: 2017-07-16 | Stop reason: HOSPADM

## 2017-07-16 RX ORDER — PROPOFOL 10 MG/ML
INJECTION, EMULSION INTRAVENOUS PRN
Status: DISCONTINUED | OUTPATIENT
Start: 2017-07-16 | End: 2017-07-16

## 2017-07-16 RX ORDER — ALBUTEROL SULFATE 0.83 MG/ML
2.5 SOLUTION RESPIRATORY (INHALATION) EVERY 4 HOURS PRN
Status: DISCONTINUED | OUTPATIENT
Start: 2017-07-16 | End: 2017-07-16 | Stop reason: HOSPADM

## 2017-07-16 RX ORDER — MEPERIDINE HYDROCHLORIDE 25 MG/ML
12.5 INJECTION INTRAMUSCULAR; INTRAVENOUS; SUBCUTANEOUS
Status: DISCONTINUED | OUTPATIENT
Start: 2017-07-16 | End: 2017-07-16 | Stop reason: HOSPADM

## 2017-07-16 RX ORDER — ONDANSETRON 4 MG/1
4 TABLET, ORALLY DISINTEGRATING ORAL EVERY 30 MIN PRN
Status: DISCONTINUED | OUTPATIENT
Start: 2017-07-16 | End: 2017-07-16 | Stop reason: HOSPADM

## 2017-07-16 RX ORDER — CEFAZOLIN SODIUM 2 G/100ML
2 INJECTION, SOLUTION INTRAVENOUS
Status: COMPLETED | OUTPATIENT
Start: 2017-07-16 | End: 2017-07-16

## 2017-07-16 RX ORDER — SODIUM CHLORIDE, SODIUM LACTATE, POTASSIUM CHLORIDE, CALCIUM CHLORIDE 600; 310; 30; 20 MG/100ML; MG/100ML; MG/100ML; MG/100ML
INJECTION, SOLUTION INTRAVENOUS CONTINUOUS PRN
Status: DISCONTINUED | OUTPATIENT
Start: 2017-07-16 | End: 2017-07-16

## 2017-07-16 RX ORDER — HYDROMORPHONE HYDROCHLORIDE 1 MG/ML
.3-.5 INJECTION, SOLUTION INTRAMUSCULAR; INTRAVENOUS; SUBCUTANEOUS EVERY 10 MIN PRN
Status: DISCONTINUED | OUTPATIENT
Start: 2017-07-16 | End: 2017-07-16 | Stop reason: HOSPADM

## 2017-07-16 RX ORDER — FENTANYL CITRATE 50 UG/ML
INJECTION, SOLUTION INTRAMUSCULAR; INTRAVENOUS PRN
Status: DISCONTINUED | OUTPATIENT
Start: 2017-07-16 | End: 2017-07-16

## 2017-07-16 RX ADMIN — CEFAZOLIN SODIUM 2 G: 2 INJECTION, SOLUTION INTRAVENOUS at 16:32

## 2017-07-16 RX ADMIN — VASOPRESSIN 0.5 UNITS: 20 INJECTION, SOLUTION INTRAMUSCULAR; SUBCUTANEOUS at 17:07

## 2017-07-16 RX ADMIN — VASOPRESSIN 0.5 UNITS: 20 INJECTION, SOLUTION INTRAMUSCULAR; SUBCUTANEOUS at 17:08

## 2017-07-16 RX ADMIN — LIDOCAINE HYDROCHLORIDE 80 MG: 20 INJECTION, SOLUTION INFILTRATION; PERINEURAL at 16:29

## 2017-07-16 RX ADMIN — PHENYLEPHRINE HYDROCHLORIDE 100 MCG: 10 INJECTION, SOLUTION INTRAMUSCULAR; INTRAVENOUS; SUBCUTANEOUS at 17:03

## 2017-07-16 RX ADMIN — ONDANSETRON 4 MG: 2 INJECTION INTRAMUSCULAR; INTRAVENOUS at 18:12

## 2017-07-16 RX ADMIN — SODIUM CHLORIDE 6000 ML: 900 IRRIGANT IRRIGATION at 06:45

## 2017-07-16 RX ADMIN — SODIUM CHLORIDE 3000 ML: 900 IRRIGANT IRRIGATION at 19:19

## 2017-07-16 RX ADMIN — VASOPRESSIN 0.5 UNITS: 20 INJECTION, SOLUTION INTRAMUSCULAR; SUBCUTANEOUS at 17:40

## 2017-07-16 RX ADMIN — SODIUM CHLORIDE 3000 ML: 900 IRRIGANT IRRIGATION at 19:20

## 2017-07-16 RX ADMIN — VASOPRESSIN 0.5 UNITS: 20 INJECTION, SOLUTION INTRAMUSCULAR; SUBCUTANEOUS at 17:27

## 2017-07-16 RX ADMIN — SODIUM CHLORIDE, POTASSIUM CHLORIDE, SODIUM LACTATE AND CALCIUM CHLORIDE: 600; 310; 30; 20 INJECTION, SOLUTION INTRAVENOUS at 11:26

## 2017-07-16 RX ADMIN — SODIUM CHLORIDE 6000 ML: 900 IRRIGANT IRRIGATION at 00:03

## 2017-07-16 RX ADMIN — Medication 5 MG: at 17:00

## 2017-07-16 RX ADMIN — Medication 10 MG: at 16:45

## 2017-07-16 RX ADMIN — PHENYLEPHRINE HYDROCHLORIDE 100 MCG: 10 INJECTION, SOLUTION INTRAMUSCULAR; INTRAVENOUS; SUBCUTANEOUS at 16:38

## 2017-07-16 RX ADMIN — SODIUM CHLORIDE, POTASSIUM CHLORIDE, SODIUM LACTATE AND CALCIUM CHLORIDE: 600; 310; 30; 20 INJECTION, SOLUTION INTRAVENOUS at 05:12

## 2017-07-16 RX ADMIN — Medication 5 MG: at 17:02

## 2017-07-16 RX ADMIN — SODIUM CHLORIDE 6000 ML: 900 IRRIGANT IRRIGATION at 14:11

## 2017-07-16 RX ADMIN — SODIUM CHLORIDE, POTASSIUM CHLORIDE, SODIUM LACTATE AND CALCIUM CHLORIDE: 600; 310; 30; 20 INJECTION, SOLUTION INTRAVENOUS at 16:25

## 2017-07-16 RX ADMIN — SODIUM BICARBONATE 650 MG TABLET 1300 MG: at 07:43

## 2017-07-16 RX ADMIN — SUGAMMADEX 140 MG: 100 INJECTION, SOLUTION INTRAVENOUS at 18:13

## 2017-07-16 RX ADMIN — ROCURONIUM BROMIDE 25 MG: 10 INJECTION INTRAVENOUS at 16:29

## 2017-07-16 RX ADMIN — SODIUM CHLORIDE 6000 ML: 900 IRRIGANT IRRIGATION at 23:33

## 2017-07-16 RX ADMIN — PROPOFOL 100 MG: 10 INJECTION, EMULSION INTRAVENOUS at 16:29

## 2017-07-16 RX ADMIN — SODIUM CHLORIDE 6000 ML: 900 IRRIGANT IRRIGATION at 02:30

## 2017-07-16 RX ADMIN — SODIUM CHLORIDE 6000 ML: 900 IRRIGANT IRRIGATION at 15:27

## 2017-07-16 RX ADMIN — FENTANYL CITRATE 100 MCG: 50 INJECTION, SOLUTION INTRAMUSCULAR; INTRAVENOUS at 16:29

## 2017-07-16 RX ADMIN — SODIUM CHLORIDE 6000 ML: 900 IRRIGANT IRRIGATION at 04:47

## 2017-07-16 RX ADMIN — SODIUM CHLORIDE 3000 ML: 900 IRRIGANT IRRIGATION at 21:44

## 2017-07-16 RX ADMIN — FINASTERIDE 5 MG: 5 TABLET, FILM COATED ORAL at 07:43

## 2017-07-16 RX ADMIN — TAMSULOSIN HYDROCHLORIDE 0.4 MG: 0.4 CAPSULE ORAL at 07:43

## 2017-07-16 RX ADMIN — SODIUM CHLORIDE 6000 ML: 900 IRRIGANT IRRIGATION at 10:21

## 2017-07-16 ASSESSMENT — LIFESTYLE VARIABLES: TOBACCO_USE: 1

## 2017-07-16 NOTE — PROGRESS NOTES
Nephrology Progress Note  07/16/2017         Assessment & Recommendations:     Dung Norton is a 71 year old with a PMH of CKDII , HTN . Presented for weight loss , and admitted with acute renal failure with 14 creatinine. He had a wood passed on admission that drained >4L urine and he has been urinating >1500 Qshift since then. Due to resistance 14Fr catheter was placed on admission and changed to 24 Fr rouche catheter placed and he is on continuous bladder irrigation started 7/14     1. Acute renal failure sec to obstructive nephropathy  Creatinine is improving to 9 today awith good uop  USG noted for the vascular mass and hydronephrosis further urology eval in monday  CT also shows a large prostate    Will recommend  - cont ivf consider LR  - continue with wood and monitor IO  - trend creatinine and daily electrolytes  Will follow on urology recommendations for further treatment plans     2. Electrolytes  At acceptable limits for now  Monitor electrolytes with mag and replete as needed with goal K ~4 and mag ~2  Bicarb replacement noted 1330 BID   Hypernatremia encouraged free fluid intake     3. Volume status   euvolemic  Will hydrate IVF for ongoing losses from polyuria     4. Non gap acidosis from acute renal failure  Replete with PO bicarb, dose noted   Goal >22      5. HTN   Management per primary        6. Anemia due to hemorrrhage , transfuse PRBC as needed    Will sign off call if needed    Discharge recommendation please arrange follow up with outpt nephrology for KRISTINA  And kidney disease evaluation       Recommendations were communicated to primary team     Seen and discussed with Dr. Kathie Amaral MD   [phone]    Interval History :   In the last 24 hours Dung Norton has been improving renal functions and has been good urine output, no acute events, he received 2 unit PBC but no other events     Review of Systems:   (4 pt ROS reviewed alone is not adequate unless you detail  systems you reviewed)  I reviewed the following systems:  GI: adequate  appetite. no nausea or vomiting or diarrhea.   Neuro:  no confusion  Constitutional:  no fever or chills  CV: - dyspnea or edema.  - chest pain.    Physical Exam:   I/O last 3 completed shifts:  In: 5134.83 [P.O.:240; I.V.:4299]  Out: 36471 [Urine:75718]   /69 (BP Location: Left arm)  Pulse 77  Temp 97.5  F (36.4  C) (Oral)  Resp 18  Wt 67.2 kg (148 lb 1.6 oz)  SpO2 99%  BMI 21.87 kg/m2     GENERAL APPEARANCE: no acute distress  EYES:  - scleral icterus, pupils equal  HENT: mouth without ulcers or lesions  PULM: lungs clear to auscultation,  bilaterally, equal air movement, no clubbing  CV: regular rhythm, normal rate, no rub     -JVP -     -edema -   GI: soft, non tender, nondistended, bowel sounds are +  INTEGUMENT: no cyanosis, - rash  NEURO:  + mild asterixis         Labs:   All labs reviewed by me  Electrolytes/Renal -   Recent Labs   Lab Test  07/16/17   0835  07/15/17   1804  07/15/17   0632  07/14/17   1817  07/14/17   0812   NA  146*  146*  144  143  142   POTASSIUM  4.9  4.3  4.5  4.1  4.3   CHLORIDE  114*  115*  113*  111*  112*   CO2  22  23  18*  19*  16*   BUN  123*  128*  135*  140*  148*   CR  8.89*  9.52*  10.30*  11.00*  12.20*   GLC  92  132*  105*  130*  106*   DERICK  7.7*  7.3*  7.9*  7.8*  7.9*   MAG   --    --   2.3  2.5*  2.6*       CBC -   Recent Labs   Lab Test  07/16/17   0835  07/16/17   0346  07/15/17   2018  07/15/17   1804  07/15/17   0632   WBC  10.2   --    --   8.6  10.4   HGB  7.4*  7.4*  4.9*  4.9*  7.0*   PLT  164   --    --   176  205       LFTs -   Recent Labs   Lab Test  07/12/17   1740  07/12/17   1407  01/15/14   0950   ALKPHOS  67  65  75   BILITOTAL  0.6  0.3  0.4   ALT  19  20  29   AST  12  8  27   PROTTOTAL  6.8  6.5*  7.2   ALBUMIN  3.1*  3.1*  3.9       Iron Panel -     Imaging:  All imaging studies reviewed by me.     Current Medications:    [START ON 7/17/2017] sodium bicarbonate  650  mg Oral Daily     ceFAZolin  2 g Intravenous Pre-Op/Pre-procedure x 1 dose     ceFAZolin  1 g Intravenous See Admin Instructions     lidocaine 2 %  5-15 mL Urethral Once     tamsulosin  0.4 mg Oral Daily     finasteride  5 mg Oral Daily     nicotine  1 patch Transdermal Q24H     nicotine   Transdermal Daily     nicotine   Transdermal Q8H       sodium chloride 0.9% (bag) 0 mL (07/15/17 1241)     IV fluid REPLACEMENT ONLY 250 mL/hr at 07/16/17 1126     - MEDICATION INSTRUCTIONS -       Naresh Amaral MD

## 2017-07-16 NOTE — PLAN OF CARE
Problem: Goal Outcome Summary  Goal: Goal Outcome Summary  Outcome: No Change  1882-9301: Vitals stable on room air. A/O. PIV infusing LR at 250mL since 0900 for urine output >1000mL q 4 hours. Hgb stable at 7.4 today. Continuous bladder irrigation running - urine is pink to red at times with some blood clots while running at a moderate rate. Urology irrigated patient at bedside and then decided to take patient to OR. Consent signed with patient and daughter at bedside. SBA with gaitbelt. Walked in hallway with PT today. NPO ex oral meds x 1 in morning. Surgical scrub completed and patient left for pre-op area at 1555 with patient transport.

## 2017-07-16 NOTE — ANESTHESIA CARE TRANSFER NOTE
Patient: Dung Norton    Procedure(s):  NERVE BLOCK PERIPHERAL - Wound Class: II-Clean Contaminated    Diagnosis: Bladder Clots  Diagnosis Additional Information: No value filed.    Anesthesia Type:   General, ETT     Note:  Airway :Face Mask  Patient transferred to:PACU  Comments: VSS. Breathing spontaneously at a regular rate with adequate tidal volumes and maintaining O2 sats on 6L facemask. Denies nausea or pain. No apparent complications from anesthesia.     Julita Edwards MD  Anesthesiology resident   [facility]        Vitals: (Last set prior to Anesthesia Care Transfer)    CRNA VITALS  7/16/2017 1800 - 7/16/2017 1837      7/16/2017             NIBP: 124/71    Ht Rate: 81                Electronically Signed By: Julita Edwards MD  July 16, 2017  6:37 PM

## 2017-07-16 NOTE — ANESTHESIA PREPROCEDURE EVALUATION
Anesthesia Evaluation     . Pt has not had prior anesthetic            ROS/MED HX    ENT/Pulmonary:     (+)tobacco use, Current use , . .    Neurologic:  - neg neurologic ROS     Cardiovascular:     (+) Dyslipidemia, hypertension----. : . . . :. .       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 He has obstructive uropathy due to a mass in the bladder which has produced some KRISTINA in the context of some chronic renal disease. He otherwise is a smoker with HL and HTN, but it appears no significant cardiac disease and unknown pulmonary disease. The patient has had anesthesia previously without issue.     PMHx/PSHx/ROS:  Past Medical History:   Diagnosis Date     HTN      Tobacco abuse        Past Surgical History:   Procedure Laterality Date     APPENDECTOMY  AGE 6     SINUS SURGERY  AGE 6     TONSILLECTOMY      CHILDHOOD       ROS as stated above    Soc Hx:   Social History   Substance Use Topics     Smoking status: Current Every Day Smoker     Packs/day: 0.25     Types: Cigarettes     Smokeless tobacco: Never Used      Comment: 3 cig a day     Alcohol use Yes      Comment: 2 per year       Allergies: No Known Allergies    Meds:    Prescriptions Prior to Admission   Medication Sig Dispense Refill Last Dose     Multiple Vitamins-Minerals (MULTIVITAMIN MEN PO) Take 1 tablet by mouth daily   7/12/2017 at AM     amLODIPine (NORVASC) 10 MG tablet TAKE 1 TABLET BY MOUTH ONCE DAILY 30 tablet 0 7/12/2017 at AM     metoprolol (TOPROL-XL) 100 MG 24 hr tablet TAKE 1 TABLET BY MOUTH ONCE DAILY 90 tablet 0 7/12/2017 at AM     fish oil-omega-3 fatty acids (FISH OIL) 1000 MG capsule Take 1 g by mouth daily   7/12/2017 at AM     aspirin 81 MG tablet Take 1 tablet by mouth daily.   7/12/2017 at AM     nicotine (NICODERM CQ) 21 MG/24HR patch 2h hr Place 1 patch onto the skin every 24 hours (Patient not taking: Reported on 7/13/2017) 30 patch 1 Not Taking at Unknown time     hydrochlorothiazide (HYDRODIURIL) 25 MG tablet Take 0.5 tablets (12.5 mg) by mouth daily (Patient not taking: Reported on 7/12/2017) 45 tablet 1 More than a month at Unknown time       No current outpatient prescriptions on file.       Physical Exam:  VS: Temp:  [35.7  C (96.3  F)-37.2  C (98.9  F)] 35.7  C (96.3  F)  Pulse:  [77-87] 77  Heart Rate:  [80-94] 89  Resp:  [14-18] 17  BP: ()/(43-69) 98/58  SpO2:  [95 %-100 %] 100 %   100%, Weight   Wt Readings from Last 2 Encounters:   07/16/17 67.2 kg (148 lb 1.6 oz)   07/12/17 68.5 kg (151 lb)       Labs:    BMP:  Recent Labs   Lab Test  07/16/17   0835   NA  146*   POTASSIUM  4.9   CHLORIDE  114*   CO2  22   BUN  123*   CR  8.89*   GLC  92   DERICK  7.7*     LFTs:   Recent Labs   Lab Test  07/12/17   1740   PROTTOTAL  6.8   ALBUMIN  3.1*   BILITOTAL  0.6   ALKPHOS  67   AST  12   ALT  19     CBC:   Recent Labs   Lab Test  07/16/17   0835   WBC  10.2   RBC  2.56*   HGB  7.4*   HCT  22.4*   MCV  88   MCH  28.9   MCHC  33.0   RDW  15.2*   PLT  164     Coags:  No results for input(s): INR, PTT, FIBR in the last 75403 hours.  Physical Exam  Normal systems: pulmonary    Airway   Mallampati: II  TM distance: >3 FB  Neck ROM: full    Dental    (+) missing  Comment: Poor Dentition    Cardiovascular   Rhythm and rate: regular and normal  (+) murmur     PE comment: 1 to 2 / 6 systolic murmur    Pulmonary    breath sounds clear to auscultation                    Anesthesia Plan      History & Physical Review  History and physical reviewed and following examination; no interval change.    ASA Status:  3 emergent.    NPO Status:  > 8 hours    Plan for General and ETT with Intravenous and Propofol induction. Maintenance will be Balanced.    PONV prophylaxis:  Ondansetron (or other 5HT-3)       Postoperative Care  Postoperative pain management:  IV analgesics, Oral pain medications and Multi-modal analgesia.      Consents  Anesthetic plan, risks, benefits and alternatives discussed with:  Patient..          Patient discussed with Staff Anesthesiologist.    Ramone Layne  Anesthesia Resident CA2  Pager [phone]  July 16, 2017, 12:43 PM

## 2017-07-16 NOTE — OP NOTE
OPERATIVE REPORT    PREOPERATIVE DIAGNOSIS:  Hematuria     POSTOPERATIVE DIAGNOSIS: Same as above     PROCEDURES PERFORMED:   1. Cystourethroscopy  2. Clot Evacuation   3. Bladder biosy  3. Fulguration of biopsied areas  4. Fulguration of prostate     STAFF SURGEON: Tamiko Bond MD, available for entire case.     RESIDENT(S):  Tamiko Brower MD    ANESTHESIA: General    ESTIMATED BLOOD LOSS: < 10 mL.     DRAINS: None    OPERATIVE INDICATIONS:   Dung Norton is a(n) 71 year old male with a history of hypertension admitted for KRISTINA 2/2 to obstructive uropathy. Patient developed hematuria after wood catheter placement not managed with CBI requiring transfusion. Of note, there was also concern for possible prostatic or bladder tumor seen on US but not clearly identified on CT. After discussion of all risks and beenfits the patient and family elected for the procedures as stated above    DESCRIPTION OF PROCEDURE:   After verification of informed consent was obtained, the patient was brought to the operating room, and moved to the operating table. After adequate anesthesia was induced, the patient was repositioned in dorsal lithotomy position and prepped and draped in the usual sterile fashion. A formal timeout was performed to confirm the correct patient, procedure and operative site.     A 22-Kiswahili rigid cystoscope was inserted into a well lubricated urethra. We began by performing a white light cystourethroscopy. The urethra was unremarkable, and the prostate was 4.5 cm, hypervascular and with severe trilobar hypertrophy with median lobe ramp. Due to poor visualization,  we opted to use the 26 Kiswahili resectoscope to optimize visualization. Clot evacuation was perfromed using  Ellik evacuator to retrieve a ~ 15 ml of clot. Once the media was clear, a thorough cystoscopy was performed. The bladder was signficantly trabeculated with multiple large diverticuli. There was diffuse bladder wall changes consistent with  recent catheterization. We performed a bladder biopsy of this area and this was passed off for pathology. A bipolar rollerball was used to the prostatic tissues. The bladder was re-examined under low pressure and hemostasis was confirmed. At this point, the resectoscope was removed and a 24 Greek 3- way wood catheter was placed with 30 ml within the balloon.   The procedure was then concluded. The patient was transferred to the postanesthesia care unit in stable condition and tolerated the procedure well.    POSTOP PLAN:  1. Continue to wean CBI   2. Transfer to primary team   3. Transfuse as indicated   4. Would evaluate for other etiologies of anemia   5. Urology will continue to follow       I was present and scrubbed for the entire case on 07/16/2017.

## 2017-07-16 NOTE — PROGRESS NOTES
[facility] Internal Medicine Progress Note  Date: 07/16/2017  Date of admission: 7/12/2017          Assessment and Plan:   This is a 71 year old male with a history of CKD stage 3, HTN, and tobacco abuse admitted for anemia and >20 pound weight loss found to have acute renal failure secondary to postobstructive nephropathy now found to have bladder mass highly suspicious for malignancy contributing to ongoing acute anemia.     Today:  - cysto per urology   - NPO    Acute renal failure 2/2 Postobstructive nephropathy   Post-obstructive Diuresis  CKD  Bladder Stretch Injury  Vascular Mass (bladder vs prostate)  Non-anion gap acidosis 2/2 KRISTINA  Last creat in 2/2016 was 2.56. Creatinine on admission >14 with BUN >170. Wood placed with >4.5 L of urine output. Pt denying any urinary symptoms of issues emptying, weak stream or other difficulty urinating. Admits he was perhaps only urinating once a day. Now having post-obstructive diuresis with significant volume of bloody urine output. Concern is for bladder vs prostate malignancy. US noted vascular mass and hydronephrosis. CT on 7/13 demonstrated a large prostate and a large amount of clot in the bladder. PSA is wnl.       -Follow BMP BID        -Avoid contrast and nephrotoxic agents       -Replace urine output with LR IVFs as able       -VS by nursing Q4H       -Renal consulted   -Supplementing with PO sodium bicarb 650 mg daily    -Monitor BMP   -Keep K~4 and mag ~2       -Urology consulted    -Cystoscopy (inpt if OR time available, otherwise outpt ok) - if pt continues to have lots of bleeding will plan for  inpt cystoscopy, tentative plan for 7/16/2017   -Home with wood, to follow up outpt with urology              -Continuous bladder irrigation, titrate to clear, but do not discontinue   -Flomax 0.4mg qhs   -Finasteride 5 mg daily    Bladder Spasms  Started AM of 7/15 - having some urgency and leaking around the wood. First episode lasted ~15 min. Later  episodes <30 seconds.           -Consider ditropan if persistent.      Severe malnutrition  Acute on Chronic Anemia 2/2 acute blood loss from likely bladder malignancy  Weight loss of >20 lbs  Hgb 6.5 on admission. Retic 1.1%, retic index is 0.2% - inadequate bone marrow response to anemia.  Weight was 181 lbs at PCP appt 2/2016. Weight on 7/13 post-wood diuresis is 137 lbs. Highly suspicious for malignancy - most likely related to bladder vs prostate mass seen on US 7/13. Chronic anemia likely related to CKD and possible malignancy. Acute anemia likely secondary bladder stretch injury vs vascular mass in bladder bleeding, large clot in bladder seen on 7/13 CT. S/p 4 units of PRBCs, last given on 7/16. Smear demonstrated marked normochromic, normocytic anemia; no increase in erythrocyte regeneration; occasional to numerous echinocytes.      -Follow CBC BID, continue to transfuse if <7      -PT/OT consulted to determine dispo     Depression  Patient states feeling more depressed lately with weight loss and loss of independence. Talked about selling his home with his kids. Denies thoughts of suicide at this time.       -Continue to monitor       -Consider starting antidepressant    Subclinical Hypothyroid  TSH 10.51, T4 free 0.70. Patient without clinical symptoms of hypothyroidism.        -Follow up outpatient with PCP for recheck     Chronic:  Hypertension      -hold amlodipine and diuretic      -hold PTA metoprolol 7/15      Tobacco abuse  Started smoking at age 12, at worst was smoking ~2 packs per day. Has cut way recently to 3-4 cigs per day.      -Continue PTA nicotine patch      -Cessation encouraged     FEN: Regular adult diet  Lines: PIV  DVT: ambulate (with anemia will hold off)  Code status: DNR/DNI  Dispo: Pending stabilization of kidney function and electrolytes as well as anemia, urology clearance for discharge, and evaluation by PT/OT    Clinical decision making discussed with Dr. Sharma who is in  agreement with the above plan.     Kiel Saavedra   PGY-3 IM  Pager #[phone]                Interval History:   No acute events overnight. Nursing notes reviewed.    Anemia, 2 units overnight    Energy better after tranfussion. Otherwise no change in symptoms. Occasional bladder spasm short lasting. Denies fevers, chills. Hungry this morning. No chest pain        Last 24 hr care team notes reviewed.   ROS: 4 point ROS including Respiratory, CV, GI and , other than that noted in the HPI, is negative               Physical Exam:   Vitals were reviewed  /69 (BP Location: Left arm)  Pulse 77  Temp 97.6  F (36.4  C) (Oral)  Resp 14  Wt 65.2 kg (143 lb 11.2 oz)  SpO2 100%  BMI 21.22 kg/m2    Exam:  General: the patient is pleasant, resting comfortably, under weight   HEENT: Normocephalic, atraumatic.  Lungs: Clear to auscultation, no wheezes or crackles.   CV: RRR, nl s1 and s2, systolic ejection murmur noted,   Abdomen: Nondistended. No fluid wave. Scar tissue in RLQ where appendectomy surgery performed. Soft, nontender. No rebound or guarding. Bowel sounds active.  Extremities: No lower extremity edema.   Skin: no appreciable skin lesions/rashes on exposed skin.   Man: urine pink-dark pink          Data:   Labs:  All laboratory and imaging data in the past 24 hours reviewed.    Imaging studies:  No new studies.    Internal Medicine Staff Addendum  Date of Service: 7/16/2017    I have seen and examined this patient, reviewed the data and discussed the plan of care. I agree with the above documentation including plan and ddx unless otherwise stated:     # acute blood loss anemia 2/2 hematuria, likely 2/2 bladder tumor. Appreciate urology input. Pending cystoscopy. Consider IR embolization of bladder lesion if unable to cauterize via cystoscopy.    Chau Sharma MD  Internal Medicine Hospitalist  [facility]  Attending pager: [phone]

## 2017-07-16 NOTE — PROGRESS NOTES
Urology Daily Progress Note    24 hour events/Subjective:     - Patient required 2U PRBC for hgb 4.5. Hgb stable this Am    - Overnight catheter did not require irrigation for clots. Patient denies any bladder spasms or suprapubic pain       O:  Vitals: Afebrile, VSS  General: Alert, interactive, in NAD  Resp: Non-labored breathing on RA  Abdomen: Soft, non-tender, non distended.  Ext: Warm and well perfused, No LE edema   Man: Light pink/watermelon with CBI fast rate    I&O  CBI     Labs/Imaging  Heme:  Recent Labs  Lab 07/16/17  0835 07/16/17  0346 07/15/17  2018 07/15/17  1804 07/15/17  0632 07/14/17  1817   WBC 10.2  --   --  8.6 10.4 10.0   HGB 7.4* 7.4* 4.9* 4.9* 7.0* 7.1*     --   --  176 205 208     Chem:  Recent Labs  Lab 07/16/17  0835 07/15/17  1804 07/15/17  0632 07/14/17  1817   POTASSIUM 4.9 4.3 4.5 4.1   CR 8.89* 9.52* 10.30* 11.00*       Assessment/Plan  71 year old y/o male admitted for obstructive uropathy, hematuria and retention. Irrigated and started on CBI.        - Irrigated this AM on rounds with 15 ml of clot retrieved. Urine remained bright cherry red.   - Will plan for OR today. Please keep patient NPO   - Urology will continue to follow   - Continue CBI        Seen and examined with staff, Dr. Vanegas     --    Tamiko Brower MD  Urology Resident           Contacting the Urology Team     Please use the following job codes to reach the Urology Team. Note that you must use an in house phone and that job codes cannot receive text pages.     On weekdays, dial [phone] (or star-star-star [phone] on the new [employer] telephones) then [phone] to reach the Adult Urology resident or PA on call    On weekdays, dial [phone] (or star-star-star [phone] on the new [employer] telephones) then [phone] to reach the Pediatric Urology resident    On weeknights and weekends, dial [phone] (or star-star-star [phone] on the new [employer] telephones) then [phone] to reach the Urology resident on call (for both Adult and Pediatrics)

## 2017-07-16 NOTE — PLAN OF CARE
Problem: Goal Outcome Summary  Goal: Goal Outcome Summary  Outcome: No Change  Pt A&O, VSS on RA, and up to bathroom with SBA. Pt has no complaints of pain but does report a few bladder spasms overnight. Declined need for medications. CBI continued, running at moderate to high rate. Urine has been mostly clear, a few small blood clots noted, light pink in color overnight. 2 new PIVs placed, upper IV infusing LR at 80 mL/hr, lower PIV saline locked. Hgb of 4.9 resulted around 1800-STAT recheck of the same value. 2 units of RBCs given overnight with no complications or reactions. Recheck at 0345 of 7.4. Pt made NPO with ice chips at 0000 for possible urology procedure today. Buttocks has blanchable redness; encourage frequent repositioning and use of pillows. Able to call and make needs known. Will continue to monitor and follow POC.

## 2017-07-16 NOTE — PLAN OF CARE
Problem: Goal Outcome Summary  Goal: Goal Outcome Summary  OT 5A: Cancel - pt with nursing staff preparing to go to OR for procedure.

## 2017-07-16 NOTE — PLAN OF CARE
Problem: Goal Outcome Summary  Goal: Goal Outcome Summary  Outcome: Therapy, progress toward functional goals as expected  5A PT- Pt very motivated to walk today, significantly increased distance of ambulation to 495 ft. BP monitored with supine to sit and sit to stand as mild light headedness reported by pt. Close CGA with gait and wc follow at this date. Recommend discharge to home with  PT and assist from family when medically stable

## 2017-07-16 NOTE — PROGRESS NOTES
Nephrology Progress Note  07/15/2017         Assessment & Recommendations:     Dung Norton is a 71 year old with a PMH of CKDII , HTN . Presented for weight loss , and admitted with acute renal failure with 14 creatinine. He had a wood passed on admission that drained >4L urine and he has been urinating >1500 Qshift since then. Due to resistance 14Fr catheter was placed on admission and changed to 24 Fr rouche catheter placed and he is on continuous bladder irrigation started 7/14     1. Acute renal failure sec to obstructive nephropathy  Creatinine is improving to 9 today awith good uop  USG noted for the vascular mass and hydronephrosis further urology eval in monday  CT also shows a large prostate    Will recommend  - cont ivf consider LR  - continue with wood and monitor IO  - trend creatinine and daily electrolytes  Will follow on urology recommendations for further treatment plans     2. Electrolytes  At acceptable limits for now  Monitor electrolytes with mag and replete as needed with goal K ~4 and mag ~2  Bicarb replacement noted 1330 BID      3. Volume status   euvolemic  Will hydrate IVF for ongoing losses from polyuria     4. Non gap acidosis from acute renal failure  Replete with PO bicarb, dose noted   Goal >22      5. HTN   Management per primary    6. Anemia due to hemorrrhage , transfuse PRBC as needed     Recommendations were communicated to primary team     Seen and discussed with Dr. Kathie Amaral MD   [phone]    Interval History :   In the last 24 hours Dung Norton has been improving renal functions and has been good urine output, no acute events, his H&H drop and become anemic but no symptoms    Review of Systems:   (4 pt ROS reviewed alone is not adequate unless you detail systems you reviewed)  I reviewed the following systems:  GI: adequate  appetite. no nausea or vomiting or diarrhea.   Neuro:  no confusion  Constitutional:  no fever or chills  CV: - dyspnea or  edema.  - chest pain.    Physical Exam:   I/O last 3 completed shifts:  In: 7790.9 [P.O.:600; I.V.:7190.9]  Out: 8000 [Urine:8000]   /62 (BP Location: Right arm)  Pulse 70  Temp 98  F (36.7  C) (Oral)  Resp 16  Wt 65.2 kg (143 lb 11.2 oz)  SpO2 99%  BMI 21.22 kg/m2     GENERAL APPEARANCE: no acute distress  EYES:  - scleral icterus, pupils equal  HENT: mouth without ulcers or lesions  PULM: lungs clear to auscultation,  bilaterally, equal air movement, no clubbing  CV: regular rhythm, normal rate, no rub     -JVP -     -edema -   GI: soft, non tender, nondistended, bowel sounds are +  INTEGUMENT: no cyanosis, - rash  NEURO:  + mild asterixis         Labs:   All labs reviewed by me  Electrolytes/Renal -   Recent Labs   Lab Test  07/15/17   1804  07/15/17   0632  07/14/17   1817  07/14/17   0812   NA  146*  144  143  142   POTASSIUM  4.3  4.5  4.1  4.3   CHLORIDE  115*  113*  111*  112*   CO2  23  18*  19*  16*   BUN  128*  135*  140*  148*   CR  9.52*  10.30*  11.00*  12.20*   GLC  132*  105*  130*  106*   DERICK  7.3*  7.9*  7.8*  7.9*   MAG   --   2.3  2.5*  2.6*       CBC -   Recent Labs   Lab Test  07/15/17   1804  07/15/17   0632  07/14/17   1817   WBC  8.6  10.4  10.0   HGB  4.9*  7.0*  7.1*   PLT  176  205  208       LFTs -   Recent Labs   Lab Test  07/12/17   1740  07/12/17   1407  01/15/14   0950   ALKPHOS  67  65  75   BILITOTAL  0.6  0.3  0.4   ALT  19  20  29   AST  12  8  27   PROTTOTAL  6.8  6.5*  7.2   ALBUMIN  3.1*  3.1*  3.9       Iron Panel -     Imaging:  All imaging studies reviewed by me.     Current Medications:    sodium bicarbonate  1,300 mg Oral TID     tamsulosin  0.4 mg Oral Daily     finasteride  5 mg Oral Daily     nicotine  1 patch Transdermal Q24H     nicotine   Transdermal Daily     nicotine   Transdermal Q8H       sodium chloride 0.9% (bag) 0 mL (07/15/17 1241)     IV fluid REPLACEMENT ONLY 250 mL/hr at 07/15/17 1656     - MEDICATION INSTRUCTIONS -       Naresh Amaral,  MD

## 2017-07-16 NOTE — BRIEF OP NOTE
[facility], [place]    Brief Operative Note    Pre-operative diagnosis: Bladder Clots  Post-operative diagnosis Bladder clots, prostatomegaly   Procedure: Cystoscopy, clot evacuation, bladder biopsy, fulguration     Surgeon: Surgeon(s) and Role:     * Tamiko Vanegas MD - Primary     * Gianni Huerta MD - Resident - Assisting     * Tamiko Brower MD - Resident - Assisting  Anesthesia: General   Estimated blood loss: Less than 50 ml  Drains: 24 Swedish 3- way wood catheter   Specimens:   ID Type Source Tests Collected by Time Destination   A :  Biopsy Bladder SURGICAL PATHOLOGY EXAM Tamiko Vanegas MD 7/16/2017  6:02 PM      Findings:   ~ 20 ml of clot seen within the bladder, severe trabeculation, multiple diverticuli, prostatomegaly with 5 cm prostate with trilobar hypertropy and elevated ramp of median lobe. Areas of bladder with cystic changes most likely catheter changes, sample biopsied. No obvious tumors.  No active areas of bleeding seen in bladder. Small areas of  prostate with slow ooze which were fulgurated.   Complications: None.  Implants: None.    Plan:   - Return to floor   - Continue pre-op orders   - Would recommend evaluation of other etiologies for hemoglobin drop (i.e guaiac test)  - Continue wood catheter, wean as tolerated   - Patient will need to follow up with urology in 7-10 days for pathology review

## 2017-07-17 ENCOUNTER — APPOINTMENT (OUTPATIENT)
Dept: OCCUPATIONAL THERAPY | Facility: CLINIC | Age: 72
DRG: 668 | End: 2017-07-17
Attending: INTERNAL MEDICINE
Payer: MEDICARE

## 2017-07-17 ENCOUNTER — APPOINTMENT (OUTPATIENT)
Dept: PHYSICAL THERAPY | Facility: CLINIC | Age: 72
DRG: 668 | End: 2017-07-17
Attending: INTERNAL MEDICINE
Payer: MEDICARE

## 2017-07-17 LAB
ANION GAP SERPL CALCULATED.3IONS-SCNC: 12 MMOL/L (ref 3–14)
ANION GAP SERPL CALCULATED.3IONS-SCNC: 9 MMOL/L (ref 3–14)
BLD PROD TYP BPU: NORMAL
BLD UNIT ID BPU: 0
BLOOD PRODUCT CODE: NORMAL
BPU ID: NORMAL
BUN SERPL-MCNC: 104 MG/DL (ref 7–30)
BUN SERPL-MCNC: 111 MG/DL (ref 7–30)
CA-I SERPL ISE-MCNC: 4 MG/DL (ref 4.4–5.2)
CALCIUM SERPL-MCNC: 7.4 MG/DL (ref 8.5–10.1)
CALCIUM SERPL-MCNC: 7.4 MG/DL (ref 8.5–10.1)
CHLORIDE SERPL-SCNC: 109 MMOL/L (ref 94–109)
CHLORIDE SERPL-SCNC: 110 MMOL/L (ref 94–109)
CO2 SERPL-SCNC: 22 MMOL/L (ref 20–32)
CO2 SERPL-SCNC: 22 MMOL/L (ref 20–32)
CREAT SERPL-MCNC: 7.66 MG/DL (ref 0.66–1.25)
CREAT SERPL-MCNC: 7.86 MG/DL (ref 0.66–1.25)
ERYTHROCYTE [DISTWIDTH] IN BLOOD BY AUTOMATED COUNT: 15.5 % (ref 10–15)
ERYTHROCYTE [DISTWIDTH] IN BLOOD BY AUTOMATED COUNT: 15.6 % (ref 10–15)
GFR SERPL CREATININE-BSD FRML MDRD: 7 ML/MIN/1.7M2
GFR SERPL CREATININE-BSD FRML MDRD: 7 ML/MIN/1.7M2
GLUCOSE SERPL-MCNC: 134 MG/DL (ref 70–99)
GLUCOSE SERPL-MCNC: 141 MG/DL (ref 70–99)
HCT VFR BLD AUTO: 23.5 % (ref 40–53)
HCT VFR BLD AUTO: 27.5 % (ref 40–53)
HGB BLD-MCNC: 7.6 G/DL (ref 13.3–17.7)
HGB BLD-MCNC: 8.8 G/DL (ref 13.3–17.7)
INTERPRETATION ECG - MUSE: NORMAL
MAGNESIUM SERPL-MCNC: 1.9 MG/DL (ref 1.6–2.3)
MCH RBC QN AUTO: 29.2 PG (ref 26.5–33)
MCH RBC QN AUTO: 29.3 PG (ref 26.5–33)
MCHC RBC AUTO-ENTMCNC: 32 G/DL (ref 31.5–36.5)
MCHC RBC AUTO-ENTMCNC: 32.3 G/DL (ref 31.5–36.5)
MCV RBC AUTO: 91 FL (ref 78–100)
MCV RBC AUTO: 91 FL (ref 78–100)
PLATELET # BLD AUTO: 151 10E9/L (ref 150–450)
PLATELET # BLD AUTO: 153 10E9/L (ref 150–450)
POTASSIUM SERPL-SCNC: 4.5 MMOL/L (ref 3.4–5.3)
POTASSIUM SERPL-SCNC: 4.6 MMOL/L (ref 3.4–5.3)
RBC # BLD AUTO: 2.59 10E12/L (ref 4.4–5.9)
RBC # BLD AUTO: 3.01 10E12/L (ref 4.4–5.9)
SODIUM SERPL-SCNC: 141 MMOL/L (ref 133–144)
SODIUM SERPL-SCNC: 144 MMOL/L (ref 133–144)
TRANSFUSION STATUS PATIENT QL: NORMAL
TRANSFUSION STATUS PATIENT QL: NORMAL
WBC # BLD AUTO: 9.5 10E9/L (ref 4–11)
WBC # BLD AUTO: 9.6 10E9/L (ref 4–11)

## 2017-07-17 PROCEDURE — 40000193 ZZH STATISTIC PT WARD VISIT: Performed by: REHABILITATION PRACTITIONER

## 2017-07-17 PROCEDURE — 25800025 ZZH RX 258: Performed by: STUDENT IN AN ORGANIZED HEALTH CARE EDUCATION/TRAINING PROGRAM

## 2017-07-17 PROCEDURE — 25000128 H RX IP 250 OP 636: Performed by: STUDENT IN AN ORGANIZED HEALTH CARE EDUCATION/TRAINING PROGRAM

## 2017-07-17 PROCEDURE — A9270 NON-COVERED ITEM OR SERVICE: HCPCS | Mod: GY | Performed by: STUDENT IN AN ORGANIZED HEALTH CARE EDUCATION/TRAINING PROGRAM

## 2017-07-17 PROCEDURE — 36415 COLL VENOUS BLD VENIPUNCTURE: CPT | Performed by: STUDENT IN AN ORGANIZED HEALTH CARE EDUCATION/TRAINING PROGRAM

## 2017-07-17 PROCEDURE — 99233 SBSQ HOSP IP/OBS HIGH 50: CPT | Mod: GC | Performed by: INTERNAL MEDICINE

## 2017-07-17 PROCEDURE — 25000132 ZZH RX MED GY IP 250 OP 250 PS 637: Mod: GY | Performed by: STUDENT IN AN ORGANIZED HEALTH CARE EDUCATION/TRAINING PROGRAM

## 2017-07-17 PROCEDURE — 12000001 ZZH R&B MED SURG/OB UMMC

## 2017-07-17 PROCEDURE — 80048 BASIC METABOLIC PNL TOTAL CA: CPT | Performed by: STUDENT IN AN ORGANIZED HEALTH CARE EDUCATION/TRAINING PROGRAM

## 2017-07-17 PROCEDURE — 85027 COMPLETE CBC AUTOMATED: CPT | Performed by: STUDENT IN AN ORGANIZED HEALTH CARE EDUCATION/TRAINING PROGRAM

## 2017-07-17 PROCEDURE — 83735 ASSAY OF MAGNESIUM: CPT | Performed by: STUDENT IN AN ORGANIZED HEALTH CARE EDUCATION/TRAINING PROGRAM

## 2017-07-17 PROCEDURE — 97530 THERAPEUTIC ACTIVITIES: CPT | Mod: GP | Performed by: REHABILITATION PRACTITIONER

## 2017-07-17 PROCEDURE — 40000133 ZZH STATISTIC OT WARD VISIT

## 2017-07-17 PROCEDURE — 85027 COMPLETE CBC AUTOMATED: CPT | Performed by: INTERNAL MEDICINE

## 2017-07-17 PROCEDURE — 97110 THERAPEUTIC EXERCISES: CPT | Mod: GP | Performed by: REHABILITATION PRACTITIONER

## 2017-07-17 PROCEDURE — 97535 SELF CARE MNGMENT TRAINING: CPT | Mod: GO

## 2017-07-17 PROCEDURE — 97530 THERAPEUTIC ACTIVITIES: CPT | Mod: GO

## 2017-07-17 PROCEDURE — 82330 ASSAY OF CALCIUM: CPT | Performed by: STUDENT IN AN ORGANIZED HEALTH CARE EDUCATION/TRAINING PROGRAM

## 2017-07-17 PROCEDURE — P9016 RBC LEUKOCYTES REDUCED: HCPCS | Performed by: INTERNAL MEDICINE

## 2017-07-17 PROCEDURE — 97116 GAIT TRAINING THERAPY: CPT | Mod: GP | Performed by: REHABILITATION PRACTITIONER

## 2017-07-17 PROCEDURE — 36415 COLL VENOUS BLD VENIPUNCTURE: CPT | Performed by: INTERNAL MEDICINE

## 2017-07-17 RX ADMIN — SODIUM CHLORIDE 3000 ML: 900 IRRIGANT IRRIGATION at 19:00

## 2017-07-17 RX ADMIN — TAMSULOSIN HYDROCHLORIDE 0.4 MG: 0.4 CAPSULE ORAL at 07:51

## 2017-07-17 RX ADMIN — SODIUM CHLORIDE 3000 ML: 900 IRRIGANT IRRIGATION at 15:09

## 2017-07-17 RX ADMIN — SODIUM CHLORIDE 6000 ML: 900 IRRIGANT IRRIGATION at 02:32

## 2017-07-17 RX ADMIN — FINASTERIDE 5 MG: 5 TABLET, FILM COATED ORAL at 07:51

## 2017-07-17 RX ADMIN — SODIUM CHLORIDE 3000 ML: 900 IRRIGANT IRRIGATION at 21:33

## 2017-07-17 RX ADMIN — SODIUM BICARBONATE 650 MG TABLET 650 MG: at 07:51

## 2017-07-17 RX ADMIN — SODIUM CHLORIDE, POTASSIUM CHLORIDE, SODIUM LACTATE AND CALCIUM CHLORIDE: 600; 310; 30; 20 INJECTION, SOLUTION INTRAVENOUS at 16:55

## 2017-07-17 RX ADMIN — CALCIUM GLUCONATE 1 G: 94 INJECTION, SOLUTION INTRAVENOUS at 14:08

## 2017-07-17 RX ADMIN — SODIUM CHLORIDE 6000 ML: 900 IRRIGANT IRRIGATION at 05:00

## 2017-07-17 RX ADMIN — SODIUM CHLORIDE 3000 ML: 900 IRRIGANT IRRIGATION at 12:32

## 2017-07-17 RX ADMIN — SODIUM CHLORIDE 3000 ML: 900 IRRIGANT IRRIGATION at 23:27

## 2017-07-17 RX ADMIN — SODIUM CHLORIDE, POTASSIUM CHLORIDE, SODIUM LACTATE AND CALCIUM CHLORIDE: 600; 310; 30; 20 INJECTION, SOLUTION INTRAVENOUS at 11:38

## 2017-07-17 NOTE — PLAN OF CARE
Problem: Goal Outcome Summary  Goal: Goal Outcome Summary  Outcome: Therapy, progress toward functional goals as expected  5A PT- Pt motivated to walk and participate in PT today. Gait speed, technique, and step length significantly decreased when amb with SEC compared to FWW at this date. Difficulty following directions on proper use of SEC. Close CGA required during amb activity, increased unsteadiness when using SEC. Recommend TCU vs. discharge to home with use of walker at all times,  PT and 24 hr assist from family (unclear if has 24/7 A), pending progress with stairs and level of A at home

## 2017-07-17 NOTE — PLAN OF CARE
Problem: Goal Outcome Summary  Goal: Goal Outcome Summary  Outcome: Therapy, progress toward functional goals as expected  OT/5A - Facilitated functional mobility ~200 feet x2 with fww and CGA; pt demos impulsivity during mobility/transfers with lines.  Educated on adaptive equipment for increased safety with bathing. CGA for tub/shower transfer with use of grab bars and shower chair.       Recommend shower chair and grab bars inside shower and near toilet for increased safety and IND with ADL.     REC: Home with assist for ADL/IADL PRN and home PT to address remaining strength/activity tolerance deficits.

## 2017-07-17 NOTE — PLAN OF CARE
Problem: Goal Outcome Summary  Goal: Goal Outcome Summary  Outcome: No Change  Pt A&O, VSS, and up to bathroom with SBA. Pt has no complaints of pain or bladder spasms overnight. CBI continued following return from PACU. Urine remains light pink in color, few clots noted. No BM overnight. Hgb of 6.3, transfused 2 units overnight without complications. Recheck scheduled for this AM. LR infusing at 25 mL/hr. Second PIV saline locked following blood transfusions. Tolerating regular diet with good PO intake. No nicotine patch in place. Pt is able to call and make needs known. Will continue to monitor and follow POC.

## 2017-07-17 NOTE — OR NURSING
Lab called on critically low HGB 6.3 Dr. Vanegas notified and is ordering PRBC's to be given on 5A-VSS.

## 2017-07-17 NOTE — PROGRESS NOTES
Urology Daily Progress Note    24 hour events/Subjective:     - Cysto, clot evacuation yesterday    - Patient required 2U PRBC post operatively   - Overnight catheter did not require irrigation for clots. Patient denies any bladder spasms or suprapubic pain       O:  Vitals: Afebrile, VSS  General: Alert, interactive, in NAD  Resp: Non-labored breathing on RA  Abdomen: Soft, non-tender, non distended.  Ext: Warm and well perfused, No LE edema   Man: Light pink/watermelon off CBI     I&O  CBI     Labs/Imaging  Heme:  Recent Labs  Lab 07/17/17  0536 07/16/17  1835 07/16/17  0835 07/16/17  0346  07/15/17  1804   WBC 9.6 9.4 10.2  --   --  8.6   HGB 8.8* 6.3* 7.4* 7.4*  < > 4.9*    134* 164  --   --  176   < > = values in this interval not displayed.  Chem:    Recent Labs  Lab 07/16/17  1835 07/16/17  0835 07/15/17  1804 07/15/17  0632   POTASSIUM 4.4 4.9 4.3 4.5   CR 8.61* 8.89* 9.52* 10.30*       Assessment/Plan  71 year old y/o male admitted for obstructive uropathy, hematuria and retention. Irrigated and started on CBI.        - CBI stopped this AM. Please have patient ambulate. If urine remains clear and d/c CBI   - AM Hemoglobin   - Continue catheter to drainage       Seen and examined with chief resident to be discussed with  staff, Dr. Vanegas     --    Tamiko Brower MD  Urology Resident           Contacting the Urology Team     Please use the following job codes to reach the Urology Team. Note that you must use an in house phone and that job codes cannot receive text pages.     On weekdays, dial [phone] (or star-star-star [phone] on the new [employer] telephones) then [phone] to reach the Adult Urology resident or PA on call    On weekdays, dial [phone] (or star-star-star [phone] on the new [employer] telephones) then [phone] to reach the Pediatric Urology resident    On weeknights and weekends, dial [phone] (or star-star-star [phone] on the new [employer] telephones) then [phone] to reach the Urology resident on call (for both Adult and  Pediatrics)

## 2017-07-17 NOTE — PLAN OF CARE
Problem: Goal Outcome Summary  Goal: Goal Outcome Summary  Outcome: Improving  A&O. VSS on RA. Denies pain. CBI infusing at moderate rate. U.O. Watermelon color with small clots noted t/o day. LR infusing to match u.o. Trending Hgb. Calcium gluconate given as ordered. Tolerating diet. No BM this shift. Family at bedside. Will continue with POC.

## 2017-07-17 NOTE — PROGRESS NOTES
[facility] Internal Medicine Progress Note  Date: 07/17/2017  Date of admission: 7/12/2017          Assessment and Plan:   This is a 71 year old male with a history of CKD stage 3, HTN, and tobacco abuse admitted for anemia and >20 pound weight loss found to have acute renal failure secondary to postobstructive nephropathy now found to have bladder mass highly suspicious for malignancy contributing to ongoing acute anemia.     Today:  -IV calcium gluconate 1 g  -Check Magnesium   -CBI per urology  -Follow BMP and CBC Q12H    Acute renal failure 2/2 Postobstructive nephropathy   Post-obstructive/ATN Diuresis  CKD  Bladder Stretch Injury  Vascular Mass (bladder vs prostate)  Non-anion gap acidosis 2/2 KRISTINA  Last creat in 2/2016 was 2.56. Creatinine on admission >14 with BUN >170. Wood placed with >4.5 L of urine output. Pt denying any urinary symptoms of issues emptying, weak stream or other difficulty urinating. Admits he was perhaps only urinating once a day. Now having post-obstructive diuresis with significant volume of bloody urine output. Concern is for bladder vs prostate malignancy. US noted vascular mass and hydronephrosis. CT on 7/13 demonstrated a large prostate and a large amount of clot in the bladder. PSA is wnl.       -Follow BMP BID        -Avoid contrast and nephrotoxic agents       -Replace urine output with LR IVFs as able       -VS by nursing Q4H       -Renal consulted - signed off, can call with qs   -Supplementing with PO sodium bicarb 650 mg daily    -Monitor BMP   -Keep K~4 and mag ~2     -Monitor free water deficit (245 mL on 7/17 - will continue to monitor and encourage PO intake)       -Urology consulted    -Cystoscopy on 7/16 - biopsies taken - no active bleeding seen at that time - some areas of prostate with slow ooze were fulgurated   -Home with wood, to follow up outpt with urology              -Continuous bladder irrigation per urology   -Flomax 0.4mg qhs   -Finasteride 5 mg  daily    Hypocalcemia  Likely 2/2 post-obstructive/ATN diuresis.   -IV calcium gluconate 1 g on 7/17    -Recheck ionized calcium on 7/18    Bladder Spasms  Started AM of 7/15 - having some urgency and leaking around the wood. First episode lasted ~15 min. Later episodes <30 seconds.           -Consider ditropan if persistent.    Severe malnutrition  Acute on Chronic Anemia 2/2 acute blood loss from likely bladder malignancy  Weight loss of >20 lbs  Hgb 6.5 on admission. Retic 1.1%, retic index is 0.2% - inadequate bone marrow response to anemia.  Weight was 181 lbs at PCP appt 2/2016. Weight on 7/13 post-wood diuresis is 137 lbs. Highly suspicious for malignancy - most likely related to bladder vs prostate mass seen on US 7/13. Chronic anemia likely related to CKD and possible malignancy. Acute anemia likely secondary bladder stretch injury vs vascular mass in bladder bleeding, large clot in bladder seen on 7/13 CT. S/p 6 units of PRBCs, last given on 7/17. Smear demonstrated marked normochromic, normocytic anemia; no increase in erythrocyte regeneration; occasional to numerous echinocytes.      -Follow CBC BID, continue to transfuse if <7      -PT/OT consulted to determine dispo     Depression  Patient states feeling more depressed lately with weight loss and loss of independence. Talked about selling his home with his kids. Denies thoughts of suicide at this time.       -Continue to monitor       -Consider starting antidepressant    Subclinical Hypothyroid  TSH 10.51, T4 free 0.70. Patient without clinical symptoms of hypothyroidism.        -Follow up outpatient with PCP for recheck     Chronic:  Hypertension      -hold amlodipine and diuretic      -hold PTA metoprolol 7/15      Tobacco abuse  Started smoking at age 12, at worst was smoking ~2 packs per day. Has cut way recently to 3-4 cigs per day.      -Continue PTA nicotine patch      -Cessation encouraged     FEN: Regular adult diet  Lines: PIV  DVT:  "ambulate (with anemia will hold off)  Code status: DNR/DNI  Dispo: Pending stabilization of kidney function and electrolytes as well as anemia, urology clearance for discharge, and evaluation by PT/OT    Clinical decision making discussed with Dr. Sharma who is in agreement with the above plan.     Kay Bailey MD  [facility] PGY-1   P: [phone]            Interval History:   No acute events overnight. Nursing notes reviewed.    Anemia, 2 units overnight following cytoscopy.    Feels good this AM, \"better than in a long time.\" Back pain is completely resolved.    Had BM x1 this AM, brown. No melena or BRBPR.   No nausea or vomiting. No other signs of bleeding.         Last 24 hr care team notes reviewed.   ROS: 4 point ROS including Respiratory, CV, GI and , other than that noted in the HPI, is negative               Physical Exam:   Vitals were reviewed  /54 (BP Location: Left arm)  Pulse 100  Temp 97.3  F (36.3  C) (Oral)  Resp 15  Wt 69.1 kg (152 lb 6.4 oz)  SpO2 97%  BMI 22.51 kg/m2    Exam:  General: the patient is pleasant, resting comfortably, under weight   HEENT: Normocephalic, atraumatic.  Lungs: Clear to auscultation, some wheezing at lung bases bilaterally. Good air movement.  CV: RRR, nl s1 and s2, systolic ejection murmur noted. JVP ~10cm.  Abdomen: Nondistended. No fluid wave. Scar tissue in RLQ where appendectomy surgery performed. Soft, nontender. No rebound or guarding. Bowel sounds active.  Extremities: Trace lower extremity edema.   Skin: no appreciable skin lesions/rashes on exposed skin.   Mna: urine maroon with some clots noted         Data:   Labs:  All laboratory and imaging data in the past 24 hours reviewed.    Imaging studies:  No new studies.    Internal Medicine Staff Addendum  Date of Service: 7/17/2017    I have seen and examined this patient, reviewed the data and discussed the plan of care. I agree with the above documentation including plan and ddx unless otherwise " stated:     #    Chau Sharma MD  Internal Medicine Hospitalist  [facility]  Attending pager: [phone]

## 2017-07-17 NOTE — ANESTHESIA POSTPROCEDURE EVALUATION
Patient: Dung Norton    Procedure(s):  NERVE BLOCK PERIPHERAL - Wound Class: II-Clean Contaminated    Diagnosis:Bladder Clots  Diagnosis Additional Information: No value filed.    Anesthesia Type:  General, ETT    Note:  Anesthesia Post Evaluation    Patient location during evaluation: PACU  Patient participation: Able to fully participate in evaluation  Level of consciousness: awake  Pain management: adequate  Airway patency: patent  Cardiovascular status: acceptable  Respiratory status: acceptable  Hydration status: acceptable  PONV: none     Anesthetic complications: None          Last vitals:  Vitals:    07/17/17 0259 07/17/17 0414 07/17/17 0553   BP: 110/58 134/76 129/69   Pulse: 84 79    Resp: 16 14 14   Temp: 36.1  C (96.9  F) 35.6  C (96  F) 36.1  C (97  F)   SpO2: 99% 100% 100%         Electronically Signed By: Edward Green MD  July 17, 2017  6:15 AM

## 2017-07-17 NOTE — DISCHARGE SUMMARY
Medicine Discharge Summary  Dung Norton MRN: [id number]  1945  Primary care provider: Haley Baker  ___________________________________          Date of Admission:  7/12/2017  Date of Discharge:  7/25/2017  Admitting Physician:  Chau Sharma MD  Discharge Physician:  Mary Brock MD  Discharging Service:  Internal Medicine, [place] [address]     Primary Provider: Haley Baker           Outpatient To Do:     -Follow up with PCP or other provider Thursday or Friday this week, have BMP and CBC drawn.   -Follow up with PCP for TSH and free T4 recheck when not acutely ill    -Follow up with urology outpatient to discuss wood management.    -Follow up with nephrology in one week to discuss kidney function.    -Repeat CT chest in 12 months to evaluate pulmonary nodules seen on CT           Reason for Admission:   This is a 71 year old male with a history of CKD stage 3, HTN, and tobacco abuse admitted from clinic for anemia and >20 pound weight loss found to have acute renal failure secondary to postobstructive nephropathy, found to have bladder mass/clot contributing to ongoing acute anemia.    See HandP dated 7/12/2017 for further details         Discharge Diagnosis:     -Acute renal failure 2/2 postobstructive nephropathy  -Post obstructive/ATN diuresis  -CKD  -Non anion gap acidosis 2/2 KRISTINA  -BPH  -Severe malnutrition, weight loss >20 lbs  -Hypoalbuminemia  -Hypocalcemia  -Bladder spasms  -Subclinical hypothyroidism  -Acute on Chronic Anemia 2/2 acute blood loss from bladder  -Hypertension  -Tobacco Abuse  -Pulmonary nodules           Procedures & Significant Findings:     Cystoscopy 7/16:  ~ 20 ml of clot seen within the bladder, severe trabeculation, multiple diverticuli, prostatomegaly with 5 cm prostate with trilobar hypertropy and elevated ramp of median lobe. Areas of bladder with cystic changes most likely catheter changes, sample  biopsied. No obvious tumors.  No active areas of bleeding seen in bladder. Small areas of  prostate with slow ooze which were fulgurated.     CT Chest Abdomen Pelvis 7/13:  1. Large amount of hyperdense clot within the urinary bladder. The prostate is enlarged with a nodular projection into the base of the bladder. Additionally, the bladder is moderately thickened with some irregular asymmetrically thickened areas along its anterior wall. These findings could be explained by benign prostate enlargement with a chronically obstructed trabeculated bladder, however the presence of asymmetric thickening and hemorrhage are worrisome for malignancy of the bladder.  2. Moderate-severe bilateral hydronephrosis and hydroureter of both entire ureters to the bladder.  3. No evidence to suggest metastatic disease within the abdomen, pelvis or skeleton on this noncontrast CT.   4. A few small pulmonary nodules measuring up to 3 mm. These are nonspecific. In a patient with a smoking history, a 12 month follow-up CT is optional per Fleischner society criteria. If malignancy is discovered, Fleischner society criteria no longer applies.  5. 4.0 cm infrarenal abdominal aortic aneurysm, with moderate aortobiiliac atherosclerotic disease.    US Renal Complete 7/13:  1.  Vascular mass protruding into the bladder lumen originating at the bladder neck, with bladder wall thickening, concerning for bladder or prostate neoplasm.  Additional evaluation with CT would be useful/recommended. Alternatively however given creatinine elevation, may consider MRI or cystoscopy.    2.  Avascular complex material surrounding the Man balloon, which could represent thrombus. Additional debris floating within the bladder may represent blood products, infection/inflammatory cells, or possibly tumor cells.  3.  Moderate hydroureteronephrosis bilaterally. No focal kidney mass identified.         Consultations:     Nephrology     Urology     PT and OT          Hospital Course by Problem:      Acute renal failure 2/2 Postobstructive nephropathy   Post-obstructive/ATN Diuresis  CKD  Non-anion gap acidosis 2/2 KRISTINA  Creatinine on admission >14 with BUN >170. VS stable and no AMS. Patient with no complaints except some back pain. Wood placed with >4.5 L of urine output. Consulted nephrology and urology. Was having post-obstructive diuresis with significant volume of bloody urine output, resolved with CBI, wood traction, and bedrest for x1 day. Concern was for bladder vs prostate malignancy - PSA wnl and cystoscopy did not show any suspicious masses; biopsies taken in cystocscopy were negative for malignancy. Creatinine slowly improving with fluid management and IV albumin. Plateaued ~6.3 on discharge. Nephrology comfortable with discharge, no more to do inpatient to improve renal function.       -BMP with PCP on 7/28 - monitor electrolytes closely       -Renal diet (nutrition met with patient and daughter to discuss)       -Supplementing with PO sodium bicarb 650 mg daily to maintain bicarb >22 per nephrology recs       -Follow up with nephrology outpatient in 1 week    BPH  Likely the cause of urinary retention 2/2 obstruction. Urology was consulted. PSA wnl at 1.72. Patient had back pain that resolved with decompression of bladder. Cystoscopy on 7/16 - biopsies taken - negative for malignancy.   -Home with wood, leg bag, supplies, and teaching prior to discharge (for pt and daughter)   -Follow up with urology outpatient to discuss wood and management going forward   -Flomax 0.4mg qhs   -Finasteride 5 mg daily   -If wood stops draining, pt instructed to call urology or PCP office immediately       Severe malnutrition  Weight loss of >20 lbs  Weight 181 lbs 2/2016. Weight on 7/13 post-wood diuresis is 137 lbs. Likely 2/2 inability to eat much and vomiting following meals (due to size of bladder and compression). Bladder biopsies negative for malignancy. Weight gained  during inpatient stay due to fluids administered. Patient with no emesis after eating while inpatient since bladder pressure relieved.   -Follow up with PCP, continue to monitor nutrition   -Renal diet, low K supplements sure as ensure clear OK    Acute on Chronic Anemia 2/2 acute blood loss from bladder  Hgb 6.5 on admission. Retics with inadequate bone marrow response to anemia. Chronic anemia likely related to CKD. Acute anemia 2/2 bladder stretch injury and wood irritation in bladder. S/p 8 units of PRBCs, last given on 7/19. Cystoscopy did not reveal vascular mass, prostate was fulgurated at areas of oozing. Bleeding from wood resolved with continuous bladder irrigation, wood traction, and bedrest x1 day. Hgb stable upon discharge.       -Follow by PCP and nephrology       Hypoalbuminemia   Was 3.1 on 7/12, reassessed on 7/19 found to be 1.9. Likely 2/2 to ongoing kidney injury and diuresis. Third spaced lots of IV fluids. Given 100 g IV albumin daily 7/19 - 7/23 with good effect, kidney function improved.   -Continue to monitor with PCP and nephrology     Hypocalcemia  Likely 2/2 KRISTINA. Replaced x1 with IV calcium gluconate 1 g on 7/17. Stable.    Bladder Spasms, resolved  Secondary to wood placement and CBI. Consider ditropan if persists. Completely resolved while inpatient.     Subclinical Hypothyroid  TSH 10.51, T4 free 0.70. Patient without clinical symptoms of hypothyroidism.      -Follow up outpatient with PCP for recheck      Hypertension  Held BP meds upon admission. Blood pressure stable, restarted amlodipine 5 mg (PTA dose was 10mg).      -Continue amlodipine 5 mg      -Discontinue PTA metoprolol and hydrochlorothiazide       Tobacco abuse  Started smoking at age 12, at worst was smoking ~2 packs per day. Has cut way recently to 3-4 cigarettes per day.    -Continue nicotine patch outpatient   -Cessation encouraged - patient requesting patches and stating he is planning to quit upon  discharge.    Pulmonary Nodules  A few small pulmonary nodules measuring up to 3 mm. These are nonspecific. In a patient with a smoking history, a 12 month follow-up CT is optional per Fleischner society criteria. If malignancy is discovered, Fleischner society criteria no longer applies.   -Chest CT in 12 months      Physical Exam on day of Discharge:  Blood pressure 128/70, pulse 70, temperature 98.1  F (36.7  C), temperature source Oral, resp. rate 16, weight 76.6 kg (168 lb 14.4 oz), SpO2 100 %.  General: AAOx3, NAD, pleasant   Skin: Not jaundiced, no rash, no ecchymoses  HEENT: MMM, PERRLA, EOM intact  CV: RRR, normal S1S2, systolic ejection murmur present, no clicks, rubs  Resp: Clear to auscultation bilaterally, no wheezes, rhonchi  Abd: Soft, non-tender, BS+, no masses appreciated  Extremities: warm and well perfused, palpable pulses, trace edema  Neuro: No lateralizing symptoms or focal neurologic deficits    Lines/Tubes:  Man in place         Pending Results:     None.         Discharge Medications:     Discharge Medication List as of 7/25/2017  3:11 PM      START taking these medications    Details   sodium bicarbonate 650 MG tablet Take 1 tablet (650 mg) by mouth daily, Disp-30 tablet, R-0, E-Prescribe      finasteride (PROSCAR) 5 MG tablet Take 1 tablet (5 mg) by mouth daily, Disp-30 tablet, R-3, E-Prescribe      tamsulosin (FLOMAX) 0.4 MG capsule Take 1 capsule (0.4 mg) by mouth daily, Disp-30 capsule, R-3, E-Prescribe         CONTINUE these medications which have CHANGED    Details   amLODIPine (NORVASC) 5 MG tablet Take 1 tablet (5 mg) by mouth daily, Disp-30 tablet, R-0, E-Prescribe      nicotine (NICODERM CQ) 21 MG/24HR 24 hr patch Place 1 patch onto the skin every 24 hours, Disp-30 patch, R-3, E-Prescribe         CONTINUE these medications which have NOT CHANGED    Details   Multiple Vitamins-Minerals (MULTIVITAMIN MEN PO) Take 1 tablet by mouth daily, Historical      fish oil-omega-3 fatty  acids (FISH OIL) 1000 MG capsule Take 1 g by mouth daily, Historical      aspirin 81 MG tablet Take 1 tablet by mouth daily., Historical         STOP taking these medications       metoprolol (TOPROL-XL) 100 MG 24 hr tablet Comments:   Reason for Stopping:         hydrochlorothiazide (HYDRODIURIL) 25 MG tablet Comments:   Reason for Stopping:                    Discharge Instructions and Follow-Up:     Discharge Procedure Orders  Home care nursing referral   Referral Type: Home Health Therapies & Aides     Home Care PT Referral for Hospital Discharge   Referral Type: Home Health Therapies & Aides     Home Care OT Referral for Hospital Discharge     MD face to face encounter   Order Comments: Documentation of Face to Face and Certification for Home Health Services    I certify that patient: Dung Norton is under my care and that I, or a nurse practitioner or physician's assistant working with me, had a face-to-face encounter that meets the physician face-to-face encounter requirements with this patient on: 7/14/2017.    This encounter with the patient was in whole, or in part, for the following medical condition, which is the primary reason for home health care: lightheadedness and KRISTINA.    I certify that, based on my findings, the following services are medically necessary home health services: Nursing and Occupational Therapy and Physical therapy.    My clinical findings support the need for the above services because: Nurse is needed: assessment and treatment, VS, medication management., Occupational Therapy Services are needed to assess and treat cognitive ability and address ADL safety due to impairment in mobility. and Physical Therapy Services are needed to assess and treat the following functional impairments: mobility.    Further, I certify that my clinical findings support that this patient is homebound (i.e. absences from home require considerable and taxing effort and are for medical reasons or  [employer] services or infrequently or of short duration when for other reasons) because: Requires assistance of another person or specialized equipment to access medical services because patient: Requires supervision of another for safe transfer...    Based on the above findings. I certify that this patient is confined to the home and needs intermittent skilled nursing care, physical therapy and/or speech therapy.  The patient is under my care, and I have initiated the establishment of the plan of care.  This patient will be followed by a physician who will periodically review the plan of care.  Physician/Provider to provide follow up care: Haley Baker    Attending hospital physician (the Medicare certified PEC provider): Dr. Riky Sharma  Physician Signature: See electronic signature associated with these discharge orders.  Date: 7/14/2017     Reason for your hospital stay   Order Comments: Urinary retention and kidney injury     Activity   Order Comments: Your activity upon discharge: activity as tolerated   Order Specific Question Answer Comments   Is discharge order? Yes      Tubes and drains   Order Comments: You are going home with the following tubes or drains: wood catheter.  Tube cares per hospital or home care instructions.     Follow Up and recommended labs and tests   Order Comments: Follow up with primary care provider, Haley Baker, this week (by Friday), for hospital follow- up. The following labs/tests are recommended: BMP.     Follow up in urology clinic, to discuss wood and discuss possible bladder outlet surgery. No follow up labs or test are needed.    Follow up with Nephrology, within one week  for hospital follow- up and kidney injury evaluation. The following labs/tests are recommended: BMP.     When to contact your care team   Order Comments: Call your primary doctor if you have any of the following: fevers, wood stops draining, increased pain of bladder or back, or large increase in  bloody urine output.     Supplies   Order Comments: List the supplies the pt needs to go home: wood leg bag, teaching for wood.     DNR/DNI     Diet   Order Comments: Follow this diet upon discharge:      Snacks/Supplements Adult: Nepro Oral Supplement; Between Meals     Snacks/Supplements Adult: Ensure Clear; Between Meals     Renal Diet   Order Specific Question Answer Comments   Is discharge order? Yes                  Discharge Disposition:   To home with assist from family as well as home PT.         Condition on Discharge:   Discharge condition: Stable   Code status on discharge: DNR / DNI        Date of service: 7/17/2017    The patient was discussed with staff physician  Dr. Brock who agrees with the discharge plan detailed above    Kay Bailey MD  [facility] PGY-1   P: [phone]          Pt was seen and examined by me on the day of discharge; confirmed benign abdominal exam;  I agree with the detailed follow up plans as documented above.    Mary Jones MD

## 2017-07-17 NOTE — PROGRESS NOTES
[facility], [place]    Internal Medicine Progress Note - [facility] Service    Main Plans for Today   -F/u Hgb q12h, transfuse if below 7.0  -F/u BMP, CBC q12h  -Continue PO bicarb, dose changed to 1300mg qd    Assessment & Plan   Dung Norton is a 71 year old male admitted on 7/12/2017. He has a history of CKD stage 3, HTN and tobacco abuse, and has a bladder mass, acute blood loss anemia and post-obstructive KRISTINA s/p wood placement and drainage.      #Bladder mass  Visualized on 7/12 US and 7/13 CT as nodular, vascular and projecting into the bladder. Considering smoking history, this is concerning for malignancy (especially of bladder). Ddx: bladder cancer vs prostate cancer with a component of BPH.  -F/u with urology--biopsy will determine etiology--results pending  -Started on flomax (0.4mg qd), finasteride (5mg qd) for BPH.  -Cystoscopy found no obvious infiltration into bladder.     #Acute Post-Obstructive on Chronic Kidney Disease (Stage 3)  Post-obstructive etiology most likely (mass pressing on urethra, bladder), US and CT showed hydroureteronephrosis. Wood has drained over 10L urine mixed with blood (7/13-7/15), and several more liters since, which is not unexpected given his post-obstructive state. His labs are showing gradual but not marked improvement, which will need to be followed.  -Continue CBI q12h  -Follow BMP q12h  -Titrate fluids to maintain euvolemia  -Maintain wood catheter patency. If losing output, page urology to replace.  -PO bicarb 1300mg qD, goal > 22  -Baseline creatinine in high 2 range (last one 2.56 2/2016).  -Avoid insulting kidneys (contrast, nephrotoxins)     #Anemia  Hgb 6.5 on admission. Retic 1.1%, index is 0.2%, showing inadequate bone marrow response, likely due to decreased EPO production from stage 3 CKD. Renal US showed debris in bladder, which are probably clots, also found on CT. The blood could be from either stretch injury or the  "(vascular) tumor. Received 2 units of PRBC 7/16 to maintain Hgb above 7.0. Has received 6 units total since admission as of 7/17 10:19am. MARKED  -Cystoscopy found no sources of acute bleeding, fulgurated several areas of \"ooze\" on the prostate.  -Follow CBC, Hgb every 12 hrs. Transfuse if under Hgb 7.0.     #Weight Loss of > 20 lbs  #Cachexia  Has lost 20-30 lbs in last 4 months (weighed 181 with PCP 2/2016, 150 lbs 7/12, and 137 lbs s/p catheterization 7/13). Weight loss likely due to combination of suspected malignancy and stage 3 CKD.   -Encurage PO intake  -PT/OT recommend d/c to home when medically stable, then outpatient therapy      #Depression  Patient grudgingly admits to feeling depressed with recent weakness, partial loss of independence and weight loss. Denies thoughts of self-harm or suicide.  -F/u outpatient, consider antidepressant therapy      Chronic:  Hypertension  BP on admission 102/67.       -hold amlodipine and diuretic      -continue PTA metoprolol 100mg qd      Tobacco abuse  Started smoking at age 12, cut back recently to 3-4 cigarettes/day. Has smoked up to 2 packs per day at times.      -Continue PTA nicotine patch    Diet: Snacks/Supplements Adult: Ensure Plus (Adult); Between Meals  Regular Diet Adult  Fluids: PO, PIVx1  DVT Prophylaxis: Pneumatic Compression Devices  Code Status: DNR / DNI    Disposition Plan   Expected discharge: when medically stable, recommended to prior living arrangement.     Entered: Herbert Lopez 07/17/2017, 9:05 AM   Information in the above section will display in the discharge planner report.      The patient's care was discussed with the Attending Physician, Dr. Sharma and Patient.    Herbert Lopez  [facility]  Maroon: 2  Pager: [phone]  Please see sticky note for cross cover information    Interval History   Slept sporadically due to interruptions by nursing staff and his roommate, but no complaints. Underwent " "cystoscopy yesterday. Says his bladder spasms stopped since the cystoscopy. He was transfused with 2u PRBC yesterday evening due to low Hgb. He has a good appetite and ate dinner after the procedure after being NPO all day beforehand, and had a \"medium consistency\" BM. From 9376-1383 he had approx. 2500mL output, and roughly that much input through his IV. 250ML/hr much of yesterday, now at 25mL/hr.    4 point ROS negative except as noted above.    Physical Exam   Vital Signs: Temp: 97  F (36.1  C) Temp src: Oral BP: 129/69 Pulse: 79 Heart Rate: 85 Resp: 14 SpO2: 100 % O2 Device: None (Room air) Oxygen Delivery: 2 LPM  Weight: 148 lbs 1.6 oz  General Appearance: Alert, pleasant. In no acute distress.  Respiratory: CTAB. No increased WOB.  Cardiovascular: RRR. Systolic ejection murmur. Radial and pedal pulses present.  GI: Abdomen soft and nontender with no palpable organomegaly.  Extremities:  Mild peripheral edema in ankles bilaterally. Moderate/severe trembling in his hands and arms, also feet, when doing anything but resting on the bed.      Data   Medications     sodium chloride 0.9% (bag) 0 mL (07/15/17 1241)     IV fluid REPLACEMENT ONLY 25 mL/hr at 07/17/17 0755     - MEDICATION INSTRUCTIONS -         sodium bicarbonate  650 mg Oral Daily     tamsulosin  0.4 mg Oral Daily     finasteride  5 mg Oral Daily     nicotine  1 patch Transdermal Q24H     nicotine   Transdermal Daily     nicotine   Transdermal Q8H     Data     Recent Labs  Lab 07/17/17  0536 07/16/17  1835 07/16/17  0835  07/15/17  1804  07/12/17  1740 07/12/17  1407   WBC 9.6 9.4 10.2  --  8.6  < > 7.8 8.1   HGB 8.8* 6.3* 7.4*  < > 4.9*  < > 6.5* 6.4*   MCV 91 87 88  --  86  < > 84 87    134* 164  --  176  < > 278 259   NA  --  146* 146*  --  146*  < > 139 140   POTASSIUM  --  4.4 4.9  --  4.3  < > 4.5 4.2   CHLORIDE  --  113* 114*  --  115*  < > 108 109   CO2  --  24 22  --  23  < > 16* 16*   BUN  --  113* 123*  --  128*  < > 173* 177*   CR  " --  8.61* 8.89*  --  9.52*  < > 14.70* 14.40*   ANIONGAP  --  9 10  --  8  < > 16* 15*   DERICK  --  7.2* 7.7*  --  7.3*  < > 8.5 8.3*   GLC  --  123* 92  --  132*  < > 105* 117*   ALBUMIN  --   --   --   --   --   --  3.1* 3.1*   PROTTOTAL  --   --   --   --   --   --  6.8 6.5*   BILITOTAL  --   --   --   --   --   --  0.6 0.3   ALKPHOS  --   --   --   --   --   --  67 65   ALT  --   --   --   --   --   --  19 20   AST  --   --   --   --   --   --  12 8   < > = values in this interval not displayed.  No results found for this or any previous visit (from the past 24 hour(s)).

## 2017-07-18 ENCOUNTER — APPOINTMENT (OUTPATIENT)
Dept: PHYSICAL THERAPY | Facility: CLINIC | Age: 72
DRG: 668 | End: 2017-07-18
Attending: INTERNAL MEDICINE
Payer: MEDICARE

## 2017-07-18 ENCOUNTER — APPOINTMENT (OUTPATIENT)
Dept: OCCUPATIONAL THERAPY | Facility: CLINIC | Age: 72
DRG: 668 | End: 2017-07-18
Attending: INTERNAL MEDICINE
Payer: MEDICARE

## 2017-07-18 LAB
ABO + RH BLD: ABNORMAL
ABO + RH BLD: ABNORMAL
ANION GAP SERPL CALCULATED.3IONS-SCNC: 10 MMOL/L (ref 3–14)
ANION GAP SERPL CALCULATED.3IONS-SCNC: 5 MMOL/L (ref 3–14)
BLD GP AB SCN SERPL QL: ABNORMAL
BLD PROD TYP BPU: ABNORMAL
BLD PROD TYP BPU: NORMAL
BLD UNIT ID BPU: 0
BLOOD BANK CMNT PATIENT-IMP: ABNORMAL
BLOOD BANK CMNT PATIENT-IMP: ABNORMAL
BLOOD PRODUCT CODE: NORMAL
BPU ID: NORMAL
BUN SERPL-MCNC: 93 MG/DL (ref 7–30)
BUN SERPL-MCNC: 99 MG/DL (ref 7–30)
CA-I SERPL ISE-MCNC: 4 MG/DL (ref 4.4–5.2)
CALCIUM SERPL-MCNC: 7 MG/DL (ref 8.5–10.1)
CALCIUM SERPL-MCNC: 7.3 MG/DL (ref 8.5–10.1)
CHLORIDE SERPL-SCNC: 112 MMOL/L (ref 94–109)
CHLORIDE SERPL-SCNC: 112 MMOL/L (ref 94–109)
CO2 SERPL-SCNC: 22 MMOL/L (ref 20–32)
CO2 SERPL-SCNC: 26 MMOL/L (ref 20–32)
CREAT SERPL-MCNC: 7.02 MG/DL (ref 0.66–1.25)
CREAT SERPL-MCNC: 7.22 MG/DL (ref 0.66–1.25)
ERYTHROCYTE [DISTWIDTH] IN BLOOD BY AUTOMATED COUNT: 15.6 % (ref 10–15)
ERYTHROCYTE [DISTWIDTH] IN BLOOD BY AUTOMATED COUNT: 15.7 % (ref 10–15)
GFR SERPL CREATININE-BSD FRML MDRD: 7 ML/MIN/1.7M2
GFR SERPL CREATININE-BSD FRML MDRD: 8 ML/MIN/1.7M2
GLUCOSE SERPL-MCNC: 103 MG/DL (ref 70–99)
GLUCOSE SERPL-MCNC: 126 MG/DL (ref 70–99)
HCT VFR BLD AUTO: 21.3 % (ref 40–53)
HCT VFR BLD AUTO: 23.1 % (ref 40–53)
HGB BLD-MCNC: 6.8 G/DL (ref 13.3–17.7)
HGB BLD-MCNC: 7.6 G/DL (ref 13.3–17.7)
MAGNESIUM SERPL-MCNC: 1.9 MG/DL (ref 1.6–2.3)
MCH RBC QN AUTO: 29.6 PG (ref 26.5–33)
MCH RBC QN AUTO: 30.2 PG (ref 26.5–33)
MCHC RBC AUTO-ENTMCNC: 31.9 G/DL (ref 31.5–36.5)
MCHC RBC AUTO-ENTMCNC: 32.9 G/DL (ref 31.5–36.5)
MCV RBC AUTO: 92 FL (ref 78–100)
MCV RBC AUTO: 93 FL (ref 78–100)
NUM BPU REQUESTED: 5
PLATELET # BLD AUTO: 156 10E9/L (ref 150–450)
PLATELET # BLD AUTO: 165 10E9/L (ref 150–450)
POTASSIUM SERPL-SCNC: 4.9 MMOL/L (ref 3.4–5.3)
POTASSIUM SERPL-SCNC: 5.1 MMOL/L (ref 3.4–5.3)
RBC # BLD AUTO: 2.3 10E12/L (ref 4.4–5.9)
RBC # BLD AUTO: 2.52 10E12/L (ref 4.4–5.9)
SODIUM SERPL-SCNC: 143 MMOL/L (ref 133–144)
SODIUM SERPL-SCNC: 144 MMOL/L (ref 133–144)
SPECIMEN EXP DATE BLD: ABNORMAL
TRANSFUSION STATUS PATIENT QL: NORMAL
TRANSFUSION STATUS PATIENT QL: NORMAL
WBC # BLD AUTO: 8.3 10E9/L (ref 4–11)
WBC # BLD AUTO: 9 10E9/L (ref 4–11)

## 2017-07-18 PROCEDURE — 97530 THERAPEUTIC ACTIVITIES: CPT | Mod: GP | Performed by: REHABILITATION PRACTITIONER

## 2017-07-18 PROCEDURE — 36415 COLL VENOUS BLD VENIPUNCTURE: CPT | Performed by: STUDENT IN AN ORGANIZED HEALTH CARE EDUCATION/TRAINING PROGRAM

## 2017-07-18 PROCEDURE — 83735 ASSAY OF MAGNESIUM: CPT | Performed by: STUDENT IN AN ORGANIZED HEALTH CARE EDUCATION/TRAINING PROGRAM

## 2017-07-18 PROCEDURE — 97535 SELF CARE MNGMENT TRAINING: CPT | Mod: GO

## 2017-07-18 PROCEDURE — 25800025 ZZH RX 258: Performed by: STUDENT IN AN ORGANIZED HEALTH CARE EDUCATION/TRAINING PROGRAM

## 2017-07-18 PROCEDURE — 25000132 ZZH RX MED GY IP 250 OP 250 PS 637: Mod: GY | Performed by: STUDENT IN AN ORGANIZED HEALTH CARE EDUCATION/TRAINING PROGRAM

## 2017-07-18 PROCEDURE — 12000008 ZZH R&B INTERMEDIATE UMMC

## 2017-07-18 PROCEDURE — 25000128 H RX IP 250 OP 636: Performed by: STUDENT IN AN ORGANIZED HEALTH CARE EDUCATION/TRAINING PROGRAM

## 2017-07-18 PROCEDURE — 80048 BASIC METABOLIC PNL TOTAL CA: CPT | Performed by: STUDENT IN AN ORGANIZED HEALTH CARE EDUCATION/TRAINING PROGRAM

## 2017-07-18 PROCEDURE — 82330 ASSAY OF CALCIUM: CPT | Performed by: STUDENT IN AN ORGANIZED HEALTH CARE EDUCATION/TRAINING PROGRAM

## 2017-07-18 PROCEDURE — 40000133 ZZH STATISTIC OT WARD VISIT

## 2017-07-18 PROCEDURE — 25000125 ZZHC RX 250

## 2017-07-18 PROCEDURE — A9270 NON-COVERED ITEM OR SERVICE: HCPCS | Mod: GY | Performed by: STUDENT IN AN ORGANIZED HEALTH CARE EDUCATION/TRAINING PROGRAM

## 2017-07-18 PROCEDURE — 40000556 ZZH STATISTIC PERIPHERAL IV START W US GUIDANCE

## 2017-07-18 PROCEDURE — 99233 SBSQ HOSP IP/OBS HIGH 50: CPT | Mod: GC | Performed by: INTERNAL MEDICINE

## 2017-07-18 PROCEDURE — P9016 RBC LEUKOCYTES REDUCED: HCPCS | Performed by: INTERNAL MEDICINE

## 2017-07-18 PROCEDURE — 85027 COMPLETE CBC AUTOMATED: CPT | Performed by: STUDENT IN AN ORGANIZED HEALTH CARE EDUCATION/TRAINING PROGRAM

## 2017-07-18 PROCEDURE — 97116 GAIT TRAINING THERAPY: CPT | Mod: GP | Performed by: REHABILITATION PRACTITIONER

## 2017-07-18 PROCEDURE — 97530 THERAPEUTIC ACTIVITIES: CPT | Mod: GO

## 2017-07-18 PROCEDURE — 40000193 ZZH STATISTIC PT WARD VISIT: Performed by: REHABILITATION PRACTITIONER

## 2017-07-18 RX ORDER — SODIUM CHLORIDE, SODIUM LACTATE, POTASSIUM CHLORIDE, CALCIUM CHLORIDE 600; 310; 30; 20 MG/100ML; MG/100ML; MG/100ML; MG/100ML
INJECTION, SOLUTION INTRAVENOUS CONTINUOUS
Status: DISCONTINUED | OUTPATIENT
Start: 2017-07-18 | End: 2017-07-20

## 2017-07-18 RX ADMIN — SODIUM CHLORIDE 3000 ML: 900 IRRIGANT IRRIGATION at 07:41

## 2017-07-18 RX ADMIN — ACETAMINOPHEN 650 MG: 325 TABLET, FILM COATED ORAL at 18:11

## 2017-07-18 RX ADMIN — SODIUM CHLORIDE 3000 ML: 900 IRRIGANT IRRIGATION at 01:30

## 2017-07-18 RX ADMIN — ACETAMINOPHEN 650 MG: 325 TABLET, FILM COATED ORAL at 22:27

## 2017-07-18 RX ADMIN — SODIUM CHLORIDE 3000 ML: 900 IRRIGANT IRRIGATION at 09:42

## 2017-07-18 RX ADMIN — TAMSULOSIN HYDROCHLORIDE 0.4 MG: 0.4 CAPSULE ORAL at 07:45

## 2017-07-18 RX ADMIN — NICOTINE 1 PATCH: 21 PATCH, EXTENDED RELEASE TRANSDERMAL at 17:35

## 2017-07-18 RX ADMIN — FINASTERIDE 5 MG: 5 TABLET, FILM COATED ORAL at 07:45

## 2017-07-18 RX ADMIN — SODIUM CHLORIDE, POTASSIUM CHLORIDE, SODIUM LACTATE AND CALCIUM CHLORIDE: 600; 310; 30; 20 INJECTION, SOLUTION INTRAVENOUS at 12:05

## 2017-07-18 RX ADMIN — SODIUM CHLORIDE 3000 ML: 900 IRRIGANT IRRIGATION at 12:05

## 2017-07-18 RX ADMIN — SODIUM CHLORIDE 3000 ML: 900 IRRIGANT IRRIGATION at 12:49

## 2017-07-18 RX ADMIN — SODIUM CHLORIDE 3000 ML: 900 IRRIGANT IRRIGATION at 05:20

## 2017-07-18 RX ADMIN — SODIUM CHLORIDE, POTASSIUM CHLORIDE, SODIUM LACTATE AND CALCIUM CHLORIDE: 600; 310; 30; 20 INJECTION, SOLUTION INTRAVENOUS at 01:08

## 2017-07-18 RX ADMIN — SODIUM CHLORIDE 3000 ML: 900 IRRIGANT IRRIGATION at 02:34

## 2017-07-18 RX ADMIN — SODIUM BICARBONATE 650 MG TABLET 650 MG: at 07:45

## 2017-07-18 RX ADMIN — SODIUM CHLORIDE, POTASSIUM CHLORIDE, SODIUM LACTATE AND CALCIUM CHLORIDE: 600; 310; 30; 20 INJECTION, SOLUTION INTRAVENOUS at 02:34

## 2017-07-18 NOTE — PROGRESS NOTES
Urology Daily Progress Note    24 hour events/Subjective:     - NAEO   - Unable to wean CBI yesterday- continues to be cherry red on moderate drip       O:  Vitals: Afebrile, VSS  General: Alert, interactive, in NAD  Resp: Non-labored breathing on RA  Abdomen: Soft, non-tender, non distended.  Ext: Warm and well perfused, No LE edema   Man: Cherry red on moderate rate gtt    I&O  CBI     Labs/Imaging  Heme:    Recent Labs  Lab 07/18/17  0603 07/17/17  1803 07/17/17  0536 07/16/17  1835   WBC 9.0 9.5 9.6 9.4   HGB 7.6* 7.6* 8.8* 6.3*    153 151 134*     Chem:    Recent Labs  Lab 07/18/17  0603 07/17/17  1803 07/17/17  1118 07/16/17  1835   POTASSIUM 4.9 4.6 4.5 4.4   CR 7.22* 7.66* 7.86* 8.61*       Assessment/Plan  71 year old y/o male admitted with urinary retention, subsequently developed hematuria with clot retention.  To OR on 7/16 with Dr. Vanegas for cysto clot evac.  Suspected source is enlarged prostate.  Unable to wean CBI postop        - Catheter irrigated on rounds, 10 cc clot removed.  Remains red on moderate rate drip.  Catheter placed on traction with some improved in color  - Urology will reassess this afternoon.  If bleeding does not resolve with traction may require prostate embolization vs HoLEP       Seen and examined on rounds, to be discussed with staff, Dr. Vanegas     --    Jayson Izquierdo MD  Urology Resident         Contacting the Urology Team     Please use the following job codes to reach the Urology Team. Note that you must use an in house phone and that job codes cannot receive text pages.     On weekdays, dial [phone] (or star-star-star [phone] on the new [place] telephones) then [phone] to reach the Adult Urology resident or PA on call    On weekdays, dial [phone] (or star-star-star [phone] on the new [place] telephones) then [phone] to reach the Pediatric Urology resident    On weeknights and weekends, dial [phone] (or star-star-star [phone] on the new [place] telephones) then [phone] to reach the Urology resident  on call (for both Adult and Pediatrics)

## 2017-07-18 NOTE — PROGRESS NOTES
CLINICAL NUTRITION SERVICES - REASSESSMENT NOTE     Nutrition Prescription    RECOMMENDATIONS FOR MDs/PROVIDERS TO ORDER:  -None at this time    Malnutrition Status:    -Severe    Recommendations already ordered by Registered Dietitian (RD):  -Continue regular diet  -Continue ensure supplements     EVALUATION OF THE PROGRESS TOWARD GOALS   Diet: Regular  Supplements: ensure between meals  Intake: Pt and flowsheets report good PO intakes. Pt likes ensure and is also drinking this between meals.      NEW FINDINGS   -Some weight fluctuations: latest weight (7/17): 69.1kg, admit weight (7/12): 68kg, lowest weight this admit (7/13): 62.3kg- likely fluid related.     -SW met with patient and patient's daughters regarding meal delivery services available upon DC.     MALNUTRITION  % Intake: No decreased intake noted  % Weight Loss: ~24% weight loss in <5 months-based on lowest weight this admit  Subcutaneous Fat Loss: Facial region/Upper arm: moderate  Muscle Loss: Temporal/Thoracic region/ Upper/ower arm (bicep, tricep):  moderate  Fluid Accumulation/Edema: None noted  Malnutrition Diagnosis: Severe malnutrition in the context of chronic illness    Previous Goals   Patient to consume % of nutritionally adequate meal trays TID, or the equivalent with supplements/snacks.  Evaluation: Met    Previous Nutrition Diagnosis  Malnutrition related to decreased appetite/intake as evidenced by pt report of eating ~50% of usual intake, ~24% weight loss <5 months with moderate fat loss and muscle wasting  Evaluation: Improving    CURRENT NUTRITION DIAGNOSIS  Predicted inadequate nutrient intake related to history of decreased appetite/intake     INTERVENTIONS  Implementation  -Met with patient, encouraged patient to continue with good PO intakes including meals and supplements to optimize nutrition.     Goals  Patient to consume % of nutritionally adequate meal trays TID + 1-2 ensure  daily    Monitoring/Evaluation  Progress toward goals will be monitored and evaluated per protocol.    Saida Hernandez RD, LD  Pager: [phone]

## 2017-07-18 NOTE — PROGRESS NOTES
[facility] acute hospital, [place]    Internal Medicine Progress Note    Date of Service (when Attending saw the patient): 07/18/2017    Interval History   No acute events overnight. CBI ongoing, attempted to wean yesterday but unable to because he is still bleeding. He slept in between nursing visits and his 3 BMs, one of which was soft (but not loose) and urgent enough that he did not make it to the toilet. His appetite is good and he has been eating well.    4 point ROS negative except as noted above.    Assessment & Plan   Medical Student Note Resident Note   Assessment and Plan (Student)    Assessment:  Mr. Norton is a 72 yo male with HTN and h/o tobacco abuse who presents with acute blood loss anemia, post-obstructive acute on stage 3 chronic kidney injury, and bladder/prostate mass.    Plan:  #Bladder mass  Visualized on 7/12 US and 7/13 CT as nodular, vascular and projecting into the bladder. Considering smoking history, this is concerning for malignancy (especially of bladder). Ddx: bladder cancer vs prostate cancer with a component of BPH.  -F/u with urology--biopsy will determine etiology--results pending  -Started on flomax (0.4mg qd), finasteride (5mg qd) for BPH.  -Cystoscopy found no obvious infiltration into bladder.      #Acute Post-Obstructive on Chronic Kidney Disease (Stage 3)  Post-obstructive etiology most likely (mass pressing on urethra, bladder), US and CT showed hydroureteronephrosis. Wood has drained over 10L urine mixed with blood (7/13-7/15), and multiple liters per day consistently since, which is not unexpected given his post-obstructive state. His labs are showing gradual but not marked improvement, which will need to be followed.  -Continue CBI q12h  -Follow BMP q12h  -Titrate fluids to maintain euvolemia (hold for now, see below)  -Maintain wood catheter patency. If losing output, page urology to replace.  -PO bicarb 1300mg qD, goal > 22  -Baseline creatinine in high  "2 range (last one 2.56 2/2016).  -Avoid insulting kidneys (contrast, nephrotoxins)  -Concern for volume overload: maintain fluids at 100mL/hr starting 7/18 until discontinued, as was approx. 2000mL over output on 7/17 and weight has been increasing.  -Continue to monitor daily weights      #Anemia  Hgb 6.5 on admission. Retic 1.1%, index is 0.2%, showing inadequate bone marrow response, likely due to decreased EPO production from stage 3 CKD. Renal US showed debris in bladder, which are probably clots, also found on CT. The blood could be from either stretch injury or bleeding higher in the urinary tract, but seems unlikely to be from the mass as cystoscopy found no acute bleeding from it. Received 2 units of PRBC 7/16 to maintain Hgb above 7.0. Has received 6 units total since admission. MARKED 7/17 10:19am   -Cystoscopy found no sources of acute bleeding, fulgurated several areas of \"ooze\" on the prostate.  -Follow CBC, Hgb every 12 hrs. Transfuse if under Hgb 7.0.  -Urology monitoring, considering prostate embolization and/or HoLEP      #Weight Loss of > 20 lbs  #Cachexia  Has lost 20-30 lbs in last 4 months (weighed 181 with PCP 2/2016, 150 lbs 7/12, and 137 lbs s/p catheterization 7/13). Weight loss likely due to combination of suspected malignancy and stage 3 CKD.   -Encurage PO intake  -PT/OT recommend d/c to home when medically stable, then outpatient therapy      #Depression  Patient grudgingly admits to feeling depressed with recent weakness, partial loss of independence and weight loss. Denies thoughts of self-harm or suicide.  -F/u outpatient, consider antidepressant therapy      Chronic:  #Hypertension  BP on admission 102/67.       -hold amlodipine and diuretic      -continue PTA metoprolol 100mg qd      #Tobacco abuse  Started smoking at age 12, cut back recently to 3-4 cigarettes/day. Has smoked up to 2 packs per day at times.      -Continue PTA nicotine patch     Diet: Snacks/Supplements Adult: " Ensure Plus (Adult); Between Meals  Regular Diet Adult  Fluids: PO, PIVx1  DVT Prophylaxis: Pneumatic Compression Devices  Code Status: DNR / DNI    Disposition Plan: When medically stable, to home with outpatient PT/OT. Assessment and Plan (Resident)    Assessment:  71 year old male with a history of CKD stage 3, HTN, and tobacco abuse admitted for anemia and >20 pound weight loss found to have acute renal failure secondary to postobstructive nephropathy-found to have bladder mass suspicious for malignancy contributing to ongoing acute anemia.    Plan:  Acute renal failure 2/2 Postobstructive nephropathy   Post-obstructive/ATN Diuresis  CKD  Bladder Stretch Injury  Vascular Mass (bladder vs prostate)  Non-anion gap acidosis 2/2 KRISTINA  Last creat in 2/2016 was 2.56. Creatinine on admission >14 with BUN >170. Wood placed with >4.5 L of urine output. Pt denying any urinary symptoms of issues emptying, weak stream or other difficulty urinating. Admits he was perhaps only urinating once a day. Now having post-obstructive diuresis with significant volume of bloody urine output. Concern is for bladder vs prostate malignancy. US noted vascular mass and hydronephrosis. CT on 7/13 demonstrated a large prostate and a large amount of clot in the bladder. PSA is wnl.       -Follow BMP BID        -Avoid contrast and nephrotoxic agents       -Replace urine output with LR IVFs as able       -VS by nursing Q4H       -Renal consulted - signed off, can call with qs              -Supplementing with PO sodium bicarb 650 mg daily               -Keep K~4 and mag ~2       -Urology consulted               -Cystoscopy on 7/16 - biopsies taken - no active bleeding seen at that time - some areas of prostate with slow ooze were fulgurated              -Will go home with wood, follow up with urology              -Continuous bladder irrigation per urology              -Flomax 0.4mg qhs              -Finasteride 5 mg daily     Severe  malnutrition  Acute on Chronic Anemia 2/2 acute blood loss from bladder  Weight loss of >20 lbs  Hgb 6.5 on admission. Retics with inadequate bone marrow response to anemia.  Weight 181 lbs 2/2016. Weight on 7/13 post-wood diuresis is 137 lbs. Suspicious for malignancy - likely related to bladder vs prostate mass. Chronic anemia likely related to CKD and possible malignancy. Acute anemia 2/2 bladder stretch injury vs vascular mass in bladder bleeding. S/p 6 units of PRBCs, last given on 7/17.       -Follow CBC BID, continue to transfuse if <7      -PT/OT consulted to determine dispo - likely to home      -Follow up biopsies from cystoscopy       -Pending urology decision - if pt still actively bleeding, may require prostate embolization vs HoLEP    Hypocalcemia  Likely 2/2 post-obstructive/ATN diuresis. IV calcium gluconate 1 g on 7/17.           -Follow calcium     Bladder Spasms  Started AM of 7/15 - having some urgency and leaking around the wood. First episode lasted ~15 min. Later episodes <30 seconds.          -Consider ditropan if persistent.      Depression  More depressed lately with weight loss and loss of some independence. Talked about selling his home with his kids PTA. Denies thoughts of suicide at this time.       -Continue to monitor     Subclinical Hypothyroid  TSH 10.51, T4 free 0.70. Patient without clinical symptoms of hypothyroidism.        -Follow up outpatient with PCP for recheck      Chronic:  Hypertension      -hold amlodipine and diuretic      -hold PTA metoprolol 7/15       Tobacco abuse  Started smoking at age 12, at worst was smoking ~2 packs per day. Has cut way recently to 3-4 cigs per day.      -Continue PTA nicotine patch      -Cessation encouraged      FEN: Regular adult diet  Lines: PIV  DVT: ambulate (with anemia will hold off)  Code status: DNR/DNI  Dispo: Pending stabilization of kidney function and electrolytes as well as anemia, urology clearance for discharge, and  evaluation by PT/OT  - likely to home      Physical Exam (Student)  /78 (BP Location: Right arm)  Pulse 101  Temp 97.2  F (36.2  C) (Oral)  Resp 16  Wt 72.4 kg (159 lb 9.6 oz)  SpO2 98%  BMI 23.57 kg/m2    He was +2000mL for 7/17 as of 0000.  Output 5282-9928 approx. 2500mL, same as input through PIV at 250mL/hr.    Constitutional: Alert, oriented, pleasant. In no acute distress. Fairly emaciated.  HEENT: Normocephalic. Conjunctivae anicteric without injection.  Respiratory: CTAB. No increased WOB.  Cardiovascular: RRR, systolic ejection murmur.  GI: Abdomen soft, nontender.  Extremities: Trace edema. Moderate-severe intention tremor/shaking in hands which extends to his whole body when he sits up.    Physical Exam (Resident)  Vitals: /52 (BP Location: Right arm)  Pulse 117  Temp 97.2  F (36.2  C) (Oral)  Resp 16  Wt 72.4 kg (159 lb 9.6 oz)  SpO2 98%  BMI 23.57 kg/m2    I/Os: +2 L on 7/17    General: the patient is pleasant, resting comfortably, under weight  HEENT: Normocephalic, atraumatic.  Lungs: Clear to auscultation. Good air movement. Bedside US used, A lines present, no B lines seen.  CV: RRR, nl s1 and s2, systolic ejection murmur noted. Bedside US used to assess JVP ~10.  Abdomen: Nondistended. No fluid wave. Scar tissue in RLQ where appendectomy surgery performed. Soft, nontender. No rebound or guarding. Bowel sounds active.  Extremities: Trace lower extremity edema. Some generalized edema noted in UEs.   Skin: no appreciable skin lesions/rashes on exposed skin.   Man: urine red with CBI running.      Data Interpretation  I have reviewed today's vital signs, medications, labs and imaging.     Herbert Lopez 7/18/2017 2:53 PM Kay BARBI Bailey 7/18/2017 6:20 PM       Data     Recent Labs  Lab 07/18/17  0603 07/17/17  1803 07/17/17  1118 07/17/17  0536  07/12/17  1740 07/12/17  1407   WBC 9.0 9.5  --  9.6  < > 7.8 8.1   HGB 7.6* 7.6*  --  8.8*  < > 6.5* 6.4*   MCV 92 91  --   91  < > 84 87    153  --  151  < > 278 259    144 141  --   < > 139 140   POTASSIUM 4.9 4.6 4.5  --   < > 4.5 4.2   CHLORIDE 112* 110* 109  --   < > 108 109   CO2 22 22 22  --   < > 16* 16*   BUN 99* 104* 111*  --   < > 173* 177*   CR 7.22* 7.66* 7.86*  --   < > 14.70* 14.40*   ANIONGAP 10 12 9  --   < > 16* 15*   DERICK 7.3* 7.4* 7.4*  --   < > 8.5 8.3*   * 141* 134*  --   < > 105* 117*   ALBUMIN  --   --   --   --   --  3.1* 3.1*   PROTTOTAL  --   --   --   --   --  6.8 6.5*   BILITOTAL  --   --   --   --   --  0.6 0.3   ALKPHOS  --   --   --   --   --  67 65   ALT  --   --   --   --   --  19 20   AST  --   --   --   --   --  12 8   < > = values in this interval not displayed.    No results found for this or any previous visit (from the past 24 hour(s)).    Attending Attestation   This patient has been seen and evaluated by me, Ever Collins MD.  I have discussed the patient and today's care plan with the house staff team and agree with the findings and plan in this note and any edits by me are indicated above in blue.      I have reviewed today's care team notes, Medications, Vital Signs and Labs    Ever Collins MD  Med-Peds Hospitalist  Pager [phone]

## 2017-07-18 NOTE — PLAN OF CARE
Problem: Goal Outcome Summary  Goal: Goal Outcome Summary  Outcome: No Change  A/Ox4. VSS on RA. Denies pain. CBI infusing at moderate-high rate. Urine watermelon-red color, clots noted when irrigation slowed down. Continuing LR MIVF at 250ml/hr d/t wheezes in lungs. Trending Hgb, last check 7.6. BMx3 overnight. Will continue with POC.

## 2017-07-18 NOTE — PLAN OF CARE
Problem: Goal Outcome Summary  Goal: Goal Outcome Summary  Outcome: Therapy, progress toward functional goals as expected  OT/5A - Facilitated functional mobility >500 feet with fww and CGA with instability and balance deficits noted during turns.  Implemented MoCA version 7.1 to assess cognition. Pt scores 19/30 indicating cognitive impairment; a score of 26 or greater is considered normal.  Educated on score results and recommended use of pill box and assist/supervision with medication management for increased safety due to deficits with short term memory.      Recommend shower chair and grab bars inside shower and near toilet for increased safety and IND with ADL.     REC: Home with increased assist for ADL/IADL (including medication management) and home PT to address remaining deficits.

## 2017-07-18 NOTE — PLAN OF CARE
Problem: Goal Outcome Summary  Goal: Goal Outcome Summary  Outcome: No Change  A&O. VSS on RA. Denies pain. CBI infusing at moderate rate with cherry red output with small clots noted. LR infusing at 100ml/hr. Catheter to traction per urology. PT/OT consulted. BMx1. Tolerating diet. Ambulating SBA. Will continue with POC.

## 2017-07-18 NOTE — PLAN OF CARE
Problem: Goal Outcome Summary  Goal: Goal Outcome Summary  Outcome: Therapy, progress toward functional goals as expected  5A PT- Stairs initiated today, pt able to ascend/descend 5 stairs x2 with close CGA and use of rail. Pt impulsive following transfers and on stairs, frequent VCs required. Recommend discharge to TCU vs home with use of FWW at all times, HH PT, and assist from family (unsure if 24 hr assist available), pending assistance level at home.

## 2017-07-19 LAB
ABO + RH BLD: NORMAL
ABO + RH BLD: NORMAL
ALBUMIN SERPL-MCNC: 1.9 G/DL (ref 3.4–5)
ALP SERPL-CCNC: 51 U/L (ref 40–150)
ALT SERPL W P-5'-P-CCNC: 17 U/L (ref 0–70)
ANION GAP SERPL CALCULATED.3IONS-SCNC: 10 MMOL/L (ref 3–14)
ANION GAP SERPL CALCULATED.3IONS-SCNC: 7 MMOL/L (ref 3–14)
AST SERPL W P-5'-P-CCNC: 32 U/L (ref 0–45)
BILIRUB DIRECT SERPL-MCNC: <0.1 MG/DL (ref 0–0.2)
BILIRUB SERPL-MCNC: 0.2 MG/DL (ref 0.2–1.3)
BLD GP AB SCN SERPL QL: NORMAL
BLD PROD TYP BPU: NORMAL
BLD PROD TYP BPU: NORMAL
BLD UNIT ID BPU: 0
BLOOD BANK CMNT PATIENT-IMP: NORMAL
BLOOD PRODUCT CODE: NORMAL
BPU ID: NORMAL
BUN SERPL-MCNC: 86 MG/DL (ref 7–30)
BUN SERPL-MCNC: 91 MG/DL (ref 7–30)
CA-I SERPL ISE-MCNC: 3.9 MG/DL (ref 4.4–5.2)
CALCIUM SERPL-MCNC: 7 MG/DL (ref 8.5–10.1)
CALCIUM SERPL-MCNC: 7.3 MG/DL (ref 8.5–10.1)
CHLORIDE SERPL-SCNC: 112 MMOL/L (ref 94–109)
CHLORIDE SERPL-SCNC: 113 MMOL/L (ref 94–109)
CO2 SERPL-SCNC: 21 MMOL/L (ref 20–32)
CO2 SERPL-SCNC: 24 MMOL/L (ref 20–32)
COPATH REPORT: NORMAL
CREAT SERPL-MCNC: 6.87 MG/DL (ref 0.66–1.25)
CREAT SERPL-MCNC: 7.19 MG/DL (ref 0.66–1.25)
ERYTHROCYTE [DISTWIDTH] IN BLOOD BY AUTOMATED COUNT: 16.2 % (ref 10–15)
ERYTHROCYTE [DISTWIDTH] IN BLOOD BY AUTOMATED COUNT: 16.4 % (ref 10–15)
GFR SERPL CREATININE-BSD FRML MDRD: 8 ML/MIN/1.7M2
GFR SERPL CREATININE-BSD FRML MDRD: 8 ML/MIN/1.7M2
GLUCOSE SERPL-MCNC: 116 MG/DL (ref 70–99)
GLUCOSE SERPL-MCNC: 90 MG/DL (ref 70–99)
HCT VFR BLD AUTO: 20.7 % (ref 40–53)
HCT VFR BLD AUTO: 25.1 % (ref 40–53)
HGB BLD-MCNC: 6.7 G/DL (ref 13.3–17.7)
HGB BLD-MCNC: 7.9 G/DL (ref 13.3–17.7)
MAGNESIUM SERPL-MCNC: 1.8 MG/DL (ref 1.6–2.3)
MCH RBC QN AUTO: 28.9 PG (ref 26.5–33)
MCH RBC QN AUTO: 29 PG (ref 26.5–33)
MCHC RBC AUTO-ENTMCNC: 31.5 G/DL (ref 31.5–36.5)
MCHC RBC AUTO-ENTMCNC: 32.4 G/DL (ref 31.5–36.5)
MCV RBC AUTO: 90 FL (ref 78–100)
MCV RBC AUTO: 92 FL (ref 78–100)
NUM BPU REQUESTED: 1
PLATELET # BLD AUTO: 151 10E9/L (ref 150–450)
PLATELET # BLD AUTO: 178 10E9/L (ref 150–450)
POTASSIUM SERPL-SCNC: 5.1 MMOL/L (ref 3.4–5.3)
POTASSIUM SERPL-SCNC: 5.2 MMOL/L (ref 3.4–5.3)
PROT SERPL-MCNC: 4.4 G/DL (ref 6.8–8.8)
RBC # BLD AUTO: 2.31 10E12/L (ref 4.4–5.9)
RBC # BLD AUTO: 2.73 10E12/L (ref 4.4–5.9)
SODIUM SERPL-SCNC: 144 MMOL/L (ref 133–144)
SODIUM SERPL-SCNC: 144 MMOL/L (ref 133–144)
SPECIMEN EXP DATE BLD: NORMAL
TRANSFUSION STATUS PATIENT QL: NORMAL
TRANSFUSION STATUS PATIENT QL: NORMAL
WBC # BLD AUTO: 7.2 10E9/L (ref 4–11)
WBC # BLD AUTO: 8 10E9/L (ref 4–11)

## 2017-07-19 PROCEDURE — 99233 SBSQ HOSP IP/OBS HIGH 50: CPT | Mod: GC | Performed by: INTERNAL MEDICINE

## 2017-07-19 PROCEDURE — 80076 HEPATIC FUNCTION PANEL: CPT | Performed by: STUDENT IN AN ORGANIZED HEALTH CARE EDUCATION/TRAINING PROGRAM

## 2017-07-19 PROCEDURE — 40000141 ZZH STATISTIC PERIPHERAL IV START W/O US GUIDANCE

## 2017-07-19 PROCEDURE — 82330 ASSAY OF CALCIUM: CPT | Performed by: STUDENT IN AN ORGANIZED HEALTH CARE EDUCATION/TRAINING PROGRAM

## 2017-07-19 PROCEDURE — A9270 NON-COVERED ITEM OR SERVICE: HCPCS | Mod: GY | Performed by: STUDENT IN AN ORGANIZED HEALTH CARE EDUCATION/TRAINING PROGRAM

## 2017-07-19 PROCEDURE — P9016 RBC LEUKOCYTES REDUCED: HCPCS | Performed by: INTERNAL MEDICINE

## 2017-07-19 PROCEDURE — 25000132 ZZH RX MED GY IP 250 OP 250 PS 637: Mod: GY | Performed by: STUDENT IN AN ORGANIZED HEALTH CARE EDUCATION/TRAINING PROGRAM

## 2017-07-19 PROCEDURE — 86901 BLOOD TYPING SEROLOGIC RH(D): CPT | Performed by: INTERNAL MEDICINE

## 2017-07-19 PROCEDURE — 86923 COMPATIBILITY TEST ELECTRIC: CPT | Performed by: INTERNAL MEDICINE

## 2017-07-19 PROCEDURE — 12000008 ZZH R&B INTERMEDIATE UMMC

## 2017-07-19 PROCEDURE — 80048 BASIC METABOLIC PNL TOTAL CA: CPT | Performed by: STUDENT IN AN ORGANIZED HEALTH CARE EDUCATION/TRAINING PROGRAM

## 2017-07-19 PROCEDURE — 85027 COMPLETE CBC AUTOMATED: CPT | Performed by: STUDENT IN AN ORGANIZED HEALTH CARE EDUCATION/TRAINING PROGRAM

## 2017-07-19 PROCEDURE — P9047 ALBUMIN (HUMAN), 25%, 50ML: HCPCS | Performed by: STUDENT IN AN ORGANIZED HEALTH CARE EDUCATION/TRAINING PROGRAM

## 2017-07-19 PROCEDURE — 86850 RBC ANTIBODY SCREEN: CPT | Performed by: INTERNAL MEDICINE

## 2017-07-19 PROCEDURE — 83735 ASSAY OF MAGNESIUM: CPT | Performed by: STUDENT IN AN ORGANIZED HEALTH CARE EDUCATION/TRAINING PROGRAM

## 2017-07-19 PROCEDURE — 36415 COLL VENOUS BLD VENIPUNCTURE: CPT | Performed by: STUDENT IN AN ORGANIZED HEALTH CARE EDUCATION/TRAINING PROGRAM

## 2017-07-19 PROCEDURE — 86900 BLOOD TYPING SEROLOGIC ABO: CPT | Performed by: INTERNAL MEDICINE

## 2017-07-19 PROCEDURE — 25000128 H RX IP 250 OP 636: Performed by: STUDENT IN AN ORGANIZED HEALTH CARE EDUCATION/TRAINING PROGRAM

## 2017-07-19 RX ORDER — ALBUMIN (HUMAN) 12.5 G/50ML
100 SOLUTION INTRAVENOUS ONCE
Status: COMPLETED | OUTPATIENT
Start: 2017-07-19 | End: 2017-07-19

## 2017-07-19 RX ADMIN — NICOTINE 1 PATCH: 21 PATCH, EXTENDED RELEASE TRANSDERMAL at 16:59

## 2017-07-19 RX ADMIN — SODIUM CHLORIDE, POTASSIUM CHLORIDE, SODIUM LACTATE AND CALCIUM CHLORIDE: 600; 310; 30; 20 INJECTION, SOLUTION INTRAVENOUS at 14:40

## 2017-07-19 RX ADMIN — ACETAMINOPHEN 650 MG: 325 TABLET, FILM COATED ORAL at 19:03

## 2017-07-19 RX ADMIN — FINASTERIDE 5 MG: 5 TABLET, FILM COATED ORAL at 08:28

## 2017-07-19 RX ADMIN — ALBUMIN (HUMAN) 100 G: 12.5 SOLUTION INTRAVENOUS at 11:30

## 2017-07-19 RX ADMIN — TAMSULOSIN HYDROCHLORIDE 0.4 MG: 0.4 CAPSULE ORAL at 08:28

## 2017-07-19 RX ADMIN — SODIUM CHLORIDE 3000 ML: 900 IRRIGANT IRRIGATION at 06:21

## 2017-07-19 RX ADMIN — SODIUM CHLORIDE, POTASSIUM CHLORIDE, SODIUM LACTATE AND CALCIUM CHLORIDE: 600; 310; 30; 20 INJECTION, SOLUTION INTRAVENOUS at 04:49

## 2017-07-19 RX ADMIN — SODIUM BICARBONATE 650 MG TABLET 650 MG: at 08:28

## 2017-07-19 NOTE — PLAN OF CARE
Problem: Goal Outcome Summary  Goal: Goal Outcome Summary  5A PT- Cancel, due to pt on bedrest entire day per urology team.

## 2017-07-19 NOTE — PLAN OF CARE
Problem: Goal Outcome Summary  Goal: Goal Outcome Summary  OT 5A: Pt on bedrest with bathroom privileges upon attempt this AM. Unable to check back per schedule demands. Will reschedule

## 2017-07-19 NOTE — PROGRESS NOTES
Urology Daily Progress Note    24 hour events/Subjective:     - NAEO.  Catheter did not require irrigation   - Placed on traction yesterday       O:  Vitals: Afebrile, VSS  General: Alert, interactive, in NAD  Resp: Non-labored breathing on RA  Abdomen: Soft, non-tender, non distended.  Ext: Warm and well perfused, No LE edema   Man: Light pink with CBI clamped off x 3 hours    I&O  CBI     Labs/Imaging  Heme:    Recent Labs  Lab 07/19/17  0537 07/18/17  1755 07/18/17  0603 07/17/17  1803   WBC 8.0 8.3 9.0 9.5   HGB 7.9* 6.8* 7.6* 7.6*    165 156 153     Chem:    Recent Labs  Lab 07/19/17  0537 07/18/17  1755 07/18/17  0603 07/17/17  1803   POTASSIUM 5.2 5.1 4.9 4.6   CR 7.19* 7.02* 7.22* 7.66*       Assessment/Plan  71 year old y/o male admitted with urinary retention, subsequently developed hematuria with clot retention.  To OR on 7/16 with Dr. Vanegas for cysto clot evac.  Suspected source is enlarged prostate.  Unable to wean CBI postop. Placed on traction midday 7/18       - Continue catheter with CBI clamped off- urology will reassess in a few hours.  Page urology prior to restarting CBI if there are concerns  - Bedrest while catheter is on traction this morning  - Urology will reassess this afternoon, if urine stays light pink will take off traction.  If bleeding does not resolve with traction may require prostate embolization vs HoLEP       Seen and examined on rounds, to be discussed with staff, Dr. Vanegas     --    Jayson Izquierdo MD  Urology Resident         Contacting the Urology Team     Please use the following job codes to reach the Urology Team. Note that you must use an in house phone and that job codes cannot receive text pages.     On weekdays, dial [phone] (or star-star-star [phone] on the new [employer]) then [phone] to reach the Adult Urology resident or PA on call    On weekdays, dial [phone] (or star-star-star [phone] on the new [employer] telephones) then [phone] to reach the Pediatric Urology  resident    On weeknights and weekends, dial [phone] (or star-star-star [phone] on the new [employer] telephones) then [phone] to reach the Urology resident on call (for both Adult and Pediatrics)

## 2017-07-19 NOTE — PROGRESS NOTES
[facility], [place]    Internal Medicine Progress Note    Date of Service (when Attending saw the patient): 07/19/2017    Interval History   Mr. Norton was transfused 1u PRBC again last night due to low Hgb. Otherwise, he slept well and has good appetite. He had a BM this morning. Wood still placed and patent, placed to traction by urology. CBI discontinued this AM by urology, to reassess this PM. Denies pain or nausea.    4 point ROS negative except as noted above.    Assessment & Plan   Medical Student Note Resident Note   Assessment and Plan (Student)    Assessment:  Mr. Norton is a 70 yo male with HTN and h/o tobacco abuse who presents with acute blood loss anemia, post-obstructive acute on stage 3 chronic kidney injury, and bladder/prostate mass.     Plan:  #Bladder mass  Visualized on 7/12 US and 7/13 CT as nodular, vascular and projecting into the bladder. Considering smoking history, this is concerning for malignancy (especially of bladder). Ddx: bladder cancer vs prostate cancer with a component of BPH.  -F/u with urology--biopsy will determine etiology--results pending  -Started on flomax (0.4mg qd), finasteride (5mg qd) for BPH.  -Cystoscopy found no obvious infiltration into bladder.      #Acute Post-Obstructive on Chronic Kidney Disease (Stage 3)  Post-obstructive etiology most likely (mass pressing on urethra, bladder), US and CT showed hydroureteronephrosis. Wood has drained over 10L urine mixed with blood (7/13-7/15), and multiple liters per day consistently since, which is not unexpected given his post-obstructive state. His labs are showing gradual but not marked improvement, which will need to be followed.  -Continue CBI q12h  -Follow BMP q12h  -Titrate fluids to maintain euvolemia (hold for now, see below in bold)  -Maintain wood catheter patency. If losing output, page urology to replace.  -PO bicarb 1300mg qD, goal > 22  -Baseline creatinine in high 2 range (last  "one 2.56 2/2016).  -Avoid insulting kidneys (contrast, nephrotoxins)  -Concern for volume overload: maintain fluids at 100mL/hr starting 7/18 until discontinued, as was approx. 2000mL over output on 7/17 and weight has been increasing.  -Continue to monitor daily weights-up to 160 lbs 7/19 from 137 lbs 7/13  -Ordered 100g 25% albumin-creatinine values worsened after dropping fluids to 100mL/hr  -Monitor K+, gradually increasing, especially in the setting of multiple transfusions      #Anemia  Hgb 6.5 on admission. Retic 1.1%, index is 0.2%, showing inadequate bone marrow response, likely due to decreased EPO production from stage 3 CKD. Renal US showed debris in bladder, which are probably clots, also found on CT. The blood could be from either stretch injury or bleeding higher in the urinary tract, but seems unlikely to be from the mass as cystoscopy found no acute bleeding from it. Received 1 unit of PRBC 7/19 to maintain Hgb above 7.0. Has received 7 units total since admission. MARKED 7/19 12:03pm   -Cystoscopy found no sources of acute bleeding, fulgurated several areas of \"ooze\" on the prostate.  -Follow CBC, Hgb every 12 hrs. Transfuse if under Hgb 7.0.  -Urology monitoring, considering prostate embolization and/or HoLEP      #Weight Loss of > 20 lbs  #Cachexia  Has lost 20-30 lbs in last 4 months (weighed 181 with PCP 2/2016, 150 lbs 7/12, and 137 lbs s/p catheterization 7/13). Weight loss likely due to combination of suspected malignancy and stage 3 CKD.   -Encurage PO intake  -PT/OT recommend d/c to home when medically stable, then outpatient therapy      #Depression  Patient grudgingly admits to feeling depressed with recent weakness, partial loss of independence and weight loss. Denies thoughts of self-harm or suicide.  -F/u outpatient, consider antidepressant therapy      Chronic:  #Hypertension  BP on admission 102/67.       -hold amlodipine and diuretic      -continue PTA metoprolol 100mg " qd      #Tobacco abuse  Started smoking at age 12, cut back recently to 3-4 cigarettes/day. Has smoked up to 2 packs per day at times.      -Continue PTA nicotine patch      -Wore nicotine patch 7/19, helped his tremor      Diet: Snacks/Supplements Adult: Ensure Plus (Adult); Between Meals  Regular Diet Adult  Fluids: PO, PIVx1  DVT Prophylaxis: Pneumatic Compression Devices  Code Status: DNR / DNI     Disposition Plan: When medically stable, to home with outpatient PT/OT. Assessment and Plan (Resident)    Assessment:  71 year old male with a history of CKD stage 3, HTN, and tobacco abuse admitted for anemia and >20 pound weight loss found to have acute renal failure secondary to postobstructive nephropathy-found to have bladder mass suspicious for malignancy contributing to ongoing acute anemia.    Plan:    Acute renal failure 2/2 Postobstructive nephropathy   Post-obstructive/ATN Diuresis  CKD  Bladder Stretch Injury  Vascular Mass (bladder vs prostate)  Non-anion gap acidosis 2/2 KRISTINA  Last creat in 2/2016 was 2.56. Creatinine on admission >14 with BUN >170. Man placed with >4.5 L of urine output. Pt denying any urinary symptoms of issues emptying, weak stream or other difficulty urinating. Admits he was perhaps only urinating once a day. Now having post-obstructive diuresis with significant volume of bloody urine output. Concern is for bladder vs prostate malignancy. US noted vascular mass and hydronephrosis. CT on 7/13 demonstrated a large prostate and a large amount of clot in the bladder. PSA is wnl.       -Follow BMP BID        -Avoid contrast and nephrotoxic agents       -Replace urine output with LR IVFs as able       -VS by nursing Q4H       -Renal consulted - signed off, can call with qs              -Supplementing with PO sodium bicarb 650 mg daily              -Keep K~4 and mag ~2       -Urology consulted               -Cystoscopy on 7/16 - biopsies taken - no active bleeding seen at that time - some  areas of prostate with slow ooze were fulgurated              -Will go home with wood, follow up with urology              -Continuous bladder irrigation per urology              -Flomax 0.4mg qhs              -Finasteride 5 mg daily      Severe malnutrition  Acute on Chronic Anemia 2/2 acute blood loss from bladder  Weight loss of >20 lbs  Hgb 6.5 on admission. Retics with inadequate bone marrow response to anemia.  Weight 181 lbs 2/2016. Weight on 7/13 post-wood diuresis is 137 lbs. Suspicious for malignancy - likely related to bladder vs prostate mass. Chronic anemia likely related to CKD and possible malignancy. Acute anemia 2/2 bladder stretch injury vs vascular mass in bladder bleeding. S/p 7 units of PRBCs, last given on 7/18.       -Follow CBC BID, continue to transfuse if <7      -PT/OT consulted to determine dispo - likely to home      -Follow up biopsies from cystoscopy       -Pending urology decision - if pt still actively bleeding, may require prostate embolization vs HoLEP       -Wood to traction, pt not ambulating 7/19 per urology instruction - pending further decision     Hypoalbuminemia   Was 3.1 on 7/12, reassessed on 7/19 found to be 1.9. Likely 2/2 to ongoing kidney injury and diuresis. Is third spacing lots of IV fluids.       -100 g IV albumin today       -Recheck BMP post-albumin    Hypocalcemia  Likely 2/2 post-obstructive/ATN diuresis. IV calcium gluconate 1 g on 7/17.           -Follow calcium      Bladder Spasms  Started AM of 7/15 - having some urgency and leaking around the wood. First episode lasted ~15 min. Later episodes <30 seconds.          -Consider ditropan if persistent.      Depression  More depressed lately with weight loss and loss of some independence. Talked about selling his home with his kids PTA. Denies thoughts of suicide at this time.       -Continue to monitor      Subclinical Hypothyroid  TSH 10.51, T4 free 0.70. Patient without clinical symptoms of  hypothyroidism.        -Follow up outpatient with PCP for recheck      Chronic:  Hypertension      -hold amlodipine and diuretic      -hold PTA metoprolol 7/15       Tobacco abuse  Started smoking at age 12, at worst was smoking ~2 packs per day. Has cut way recently to 3-4 cigs per day.      -Continue PTA nicotine patch      -Cessation encouraged      FEN: Regular adult diet  Lines: PIV  DVT: ambulate (with anemia will hold off)  Code status: DNR/DNI  Dispo: Pending stabilization of kidney function and electrolytes as well as anemia, urology clearance for discharge, and evaluation by PT/OT  - likely to home    Physical Exam (Student)  /76 (BP Location: Right arm)  Pulse 78  Temp 97.2  F (36.2  C) (Oral)  Resp 16  Wt 72.6 kg (160 lb 1.6 oz)  SpO2 100%  BMI 23.64 kg/m2    Intake/Output Summary (Last 24 hours) at 07/19/17 1207  Last data filed at 07/19/17 0800   Gross per 24 hour   Intake          2832.92 ml   Output             1000 ml   Net          1832.92 ml   100mL/hr LR MIVF in PIV    Constitutional: Alert, oriented, pleasant. In no acute distress. Fairly emaciated.  HEENT: Normocephalic, atraumatic. Conjunctivae anicteric without injection.  Respiratory: CTAB. No increased WOB.  Cardiovascular: RRR, systolic ejection murmur.  GI: Abdomen soft, nontender. Bowel sounds present.  Extremities: Trace edema more noticeable than yesterday. Tremor less noticeable after nicotine patch application.    Physical Exam (Resident)  Vitals: /71 (BP Location: Right arm)  Pulse 78  Temp 98.7  F (37.1  C) (Oral)  Resp 16  Wt 72.6 kg (160 lb 1.6 oz)  SpO2 99%  BMI 23.64 kg/m2    Net -4 L yesterday per I/O review, per nursing may not be entirely accurate due to CBI running.    General: the patient is pleasant, resting comfortably, under weight, edematous  HEENT: Normocephalic, atraumatic.  Lungs: Clear to auscultation, faint crackles at right lung base. Good air movement.   CV: RRR, nl s1 and s2, systolic  ejection murmur noted.   Abdomen: Nondistended. No fluid wave. Scar tissue in RLQ where appendectomy surgery performed. Soft, nontender. No rebound or guarding. Bowel sounds active.  Extremities: Trace lower extremity edema. Some generalized edema noted in UEs.   Skin: no appreciable skin lesions/rashes on exposed skin.   Man: urine red with CBI running.    Data Interpretation  I have reviewed today's vital signs, medications, labs and imaging.   Herbert Abhilashzuleika John 7/19/2017 12:05 PM Kay Bailey 7/19/2017 1:16 PM       Data     Recent Labs  Lab 07/19/17  0537 07/18/17  1755 07/18/17  0603  07/12/17  1740   WBC 8.0 8.3 9.0  < > 7.8   HGB 7.9* 6.8* 7.6*  < > 6.5*   MCV 92 93 92  < > 84    165 156  < > 278    143 144  < > 139   POTASSIUM 5.2 5.1 4.9  < > 4.5   CHLORIDE 113* 112* 112*  < > 108   CO2 24 26 22  < > 16*   BUN 91* 93* 99*  < > 173*   CR 7.19* 7.02* 7.22*  < > 14.70*   ANIONGAP 7 5 10  < > 16*   DERICK 7.0* 7.0* 7.3*  < > 8.5   GLC 90 126* 103*  < > 105*   ALBUMIN 1.9*  --   --   --  3.1*   PROTTOTAL 4.4*  --   --   --  6.8   BILITOTAL 0.2  --   --   --  0.6   ALKPHOS 51  --   --   --  67   ALT 17  --   --   --  19   AST 32  --   --   --  12   < > = values in this interval not displayed.    No results found for this or any previous visit (from the past 24 hour(s)).     Attending Attestation   This patient has been seen and evaluated by me, Ever Collins MD.  I have discussed the patient and today's care plan with the house staff team and agree with the findings and plan in this note and any edits by me are indicated above in blue.      I have reviewed today's care team notes, Medications, Vital Signs and Labs    Ever Collins MD  Med-Peds Hospitalist  Pager [phone]

## 2017-07-19 NOTE — PLAN OF CARE
Problem: Goal Outcome Summary  Goal: Goal Outcome Summary  Outcome: No Change  VSS. On RA. A&O. CBI adjusted to a slower rate per urology and catheter is to traction. Output is light red/transparent. No clots seen. 650mg Tylenol given for discomfort to coccyx. Also enforced repositioning with pillows. Nicotine patch on left deltoid. 1800 Hmg check 6.8. 1 unit PRBC's currently infusing. Continue with POC.

## 2017-07-19 NOTE — PLAN OF CARE
Problem: Goal Outcome Summary  Goal: Goal Outcome Summary  Outcome: No Change  A/Ox4. VSS on RA. Denies pain. CBI infusing at slow rate, catheter to traction. Urine watermelon-red color, some clots noted. Continuing LR MIVF at 100ml/hr. Unit of blood completed, Hgb recheck 7.9. Nicotine patch in place. Will continue with POC.

## 2017-07-19 NOTE — PLAN OF CARE
Problem: Goal Outcome Summary  Goal: Goal Outcome Summary  Outcome: Improving  3358-4238: Vitals stable on room air. A/O. Bedrest with bathroom privileges per Urology team d/t traction on Man catheter. Man intact with pink output and a few blood clots. CBI has been off this entire shift (by Urology team this AM). LR running via PIV as ordered. Albumin given today via PIV. Some edema noted in legs and arms - MD team aware. No SOB noted. 1 BM today.

## 2017-07-20 ENCOUNTER — APPOINTMENT (OUTPATIENT)
Dept: PHYSICAL THERAPY | Facility: CLINIC | Age: 72
DRG: 668 | End: 2017-07-20
Attending: INTERNAL MEDICINE
Payer: MEDICARE

## 2017-07-20 ENCOUNTER — APPOINTMENT (OUTPATIENT)
Dept: OCCUPATIONAL THERAPY | Facility: CLINIC | Age: 72
DRG: 668 | End: 2017-07-20
Attending: INTERNAL MEDICINE
Payer: MEDICARE

## 2017-07-20 LAB
ANION GAP SERPL CALCULATED.3IONS-SCNC: 8 MMOL/L (ref 3–14)
ANION GAP SERPL CALCULATED.3IONS-SCNC: 8 MMOL/L (ref 3–14)
BUN SERPL-MCNC: 90 MG/DL (ref 7–30)
BUN SERPL-MCNC: 97 MG/DL (ref 7–30)
CALCIUM SERPL-MCNC: 7.2 MG/DL (ref 8.5–10.1)
CALCIUM SERPL-MCNC: 7.2 MG/DL (ref 8.5–10.1)
CHLORIDE SERPL-SCNC: 111 MMOL/L (ref 94–109)
CHLORIDE SERPL-SCNC: 113 MMOL/L (ref 94–109)
CO2 SERPL-SCNC: 23 MMOL/L (ref 20–32)
CO2 SERPL-SCNC: 24 MMOL/L (ref 20–32)
CREAT SERPL-MCNC: 6.83 MG/DL (ref 0.66–1.25)
CREAT SERPL-MCNC: 6.91 MG/DL (ref 0.66–1.25)
ERYTHROCYTE [DISTWIDTH] IN BLOOD BY AUTOMATED COUNT: 16.2 % (ref 10–15)
ERYTHROCYTE [DISTWIDTH] IN BLOOD BY AUTOMATED COUNT: 16.6 % (ref 10–15)
GFR SERPL CREATININE-BSD FRML MDRD: 8 ML/MIN/1.7M2
GFR SERPL CREATININE-BSD FRML MDRD: 8 ML/MIN/1.7M2
GLUCOSE SERPL-MCNC: 112 MG/DL (ref 70–99)
GLUCOSE SERPL-MCNC: 86 MG/DL (ref 70–99)
HCT VFR BLD AUTO: 24.1 % (ref 40–53)
HCT VFR BLD AUTO: 24.9 % (ref 40–53)
HGB BLD-MCNC: 7.7 G/DL (ref 13.3–17.7)
HGB BLD-MCNC: 7.8 G/DL (ref 13.3–17.7)
MCH RBC QN AUTO: 28.4 PG (ref 26.5–33)
MCH RBC QN AUTO: 29.4 PG (ref 26.5–33)
MCHC RBC AUTO-ENTMCNC: 30.9 G/DL (ref 31.5–36.5)
MCHC RBC AUTO-ENTMCNC: 32.4 G/DL (ref 31.5–36.5)
MCV RBC AUTO: 91 FL (ref 78–100)
MCV RBC AUTO: 92 FL (ref 78–100)
PLATELET # BLD AUTO: 166 10E9/L (ref 150–450)
PLATELET # BLD AUTO: 193 10E9/L (ref 150–450)
POTASSIUM SERPL-SCNC: 5.2 MMOL/L (ref 3.4–5.3)
POTASSIUM SERPL-SCNC: 5.2 MMOL/L (ref 3.4–5.3)
RBC # BLD AUTO: 2.65 10E12/L (ref 4.4–5.9)
RBC # BLD AUTO: 2.71 10E12/L (ref 4.4–5.9)
SODIUM SERPL-SCNC: 144 MMOL/L (ref 133–144)
SODIUM SERPL-SCNC: 144 MMOL/L (ref 133–144)
WBC # BLD AUTO: 8 10E9/L (ref 4–11)
WBC # BLD AUTO: 8.5 10E9/L (ref 4–11)

## 2017-07-20 PROCEDURE — A9270 NON-COVERED ITEM OR SERVICE: HCPCS | Mod: GY | Performed by: STUDENT IN AN ORGANIZED HEALTH CARE EDUCATION/TRAINING PROGRAM

## 2017-07-20 PROCEDURE — 25000132 ZZH RX MED GY IP 250 OP 250 PS 637: Mod: GY | Performed by: STUDENT IN AN ORGANIZED HEALTH CARE EDUCATION/TRAINING PROGRAM

## 2017-07-20 PROCEDURE — 97530 THERAPEUTIC ACTIVITIES: CPT | Mod: GO

## 2017-07-20 PROCEDURE — 36415 COLL VENOUS BLD VENIPUNCTURE: CPT | Performed by: STUDENT IN AN ORGANIZED HEALTH CARE EDUCATION/TRAINING PROGRAM

## 2017-07-20 PROCEDURE — 99233 SBSQ HOSP IP/OBS HIGH 50: CPT | Mod: GC | Performed by: INTERNAL MEDICINE

## 2017-07-20 PROCEDURE — 97110 THERAPEUTIC EXERCISES: CPT | Mod: GP

## 2017-07-20 PROCEDURE — 25000128 H RX IP 250 OP 636: Performed by: STUDENT IN AN ORGANIZED HEALTH CARE EDUCATION/TRAINING PROGRAM

## 2017-07-20 PROCEDURE — P9047 ALBUMIN (HUMAN), 25%, 50ML: HCPCS | Performed by: STUDENT IN AN ORGANIZED HEALTH CARE EDUCATION/TRAINING PROGRAM

## 2017-07-20 PROCEDURE — 40000133 ZZH STATISTIC OT WARD VISIT

## 2017-07-20 PROCEDURE — 97535 SELF CARE MNGMENT TRAINING: CPT | Mod: GO

## 2017-07-20 PROCEDURE — 40000193 ZZH STATISTIC PT WARD VISIT

## 2017-07-20 PROCEDURE — 85027 COMPLETE CBC AUTOMATED: CPT | Performed by: STUDENT IN AN ORGANIZED HEALTH CARE EDUCATION/TRAINING PROGRAM

## 2017-07-20 PROCEDURE — 97116 GAIT TRAINING THERAPY: CPT | Mod: GP

## 2017-07-20 PROCEDURE — 12000001 ZZH R&B MED SURG/OB UMMC

## 2017-07-20 PROCEDURE — 80048 BASIC METABOLIC PNL TOTAL CA: CPT | Performed by: STUDENT IN AN ORGANIZED HEALTH CARE EDUCATION/TRAINING PROGRAM

## 2017-07-20 RX ORDER — ALBUMIN (HUMAN) 12.5 G/50ML
100 SOLUTION INTRAVENOUS ONCE
Status: COMPLETED | OUTPATIENT
Start: 2017-07-20 | End: 2017-07-20

## 2017-07-20 RX ADMIN — ALBUMIN (HUMAN) 100 G: 12.5 SOLUTION INTRAVENOUS at 23:36

## 2017-07-20 RX ADMIN — TAMSULOSIN HYDROCHLORIDE 0.4 MG: 0.4 CAPSULE ORAL at 07:53

## 2017-07-20 RX ADMIN — SODIUM BICARBONATE 650 MG TABLET 650 MG: at 07:53

## 2017-07-20 RX ADMIN — FINASTERIDE 5 MG: 5 TABLET, FILM COATED ORAL at 07:53

## 2017-07-20 RX ADMIN — NICOTINE 1 PATCH: 21 PATCH, EXTENDED RELEASE TRANSDERMAL at 19:50

## 2017-07-20 RX ADMIN — SODIUM CHLORIDE, POTASSIUM CHLORIDE, SODIUM LACTATE AND CALCIUM CHLORIDE: 600; 310; 30; 20 INJECTION, SOLUTION INTRAVENOUS at 00:51

## 2017-07-20 NOTE — PLAN OF CARE
Problem: Goal Outcome Summary  Goal: Goal Outcome Summary  VSS, A&O x4. Per pt denied pain throughout shift. Able to get some rest between cares. Bedrest per traction on wood, bathroom privelege. SBA. UOP adequate, watermelon color with few small clots seen, CBI clamped per Urology. hgb 7.8 this am. Slight edema to legs and arms. LR infusing at 100cc/hr. Continue with POC.

## 2017-07-20 NOTE — PLAN OF CARE
Problem: Goal Outcome Summary  Goal: Goal Outcome Summary  Outcome: Improving  2726-8111: Vitals stable on room air. A/O. Ambulating often in hallway with SBA, gaitbelt belt and walker. PIV saline locked - D/Antwon IV fluids today. Man draining yellow/clear urine with slight pink tinge. No blood noted. Nephrology consult today. No reports of pain. Stable Hgb this morning (>7).

## 2017-07-20 NOTE — PLAN OF CARE
Problem: Goal Outcome Summary  Goal: Goal Outcome Summary  PT/5A: pt continues to make steady progress towards goals. Pt demonstrated improved tolerance to ambulation, in total ambulated ~10 minutes progressing from FWW > no AD however demo increased instability, path deviation, and LOB w/o AD, close CGA provided. Pt self corrected LOB. Pt cont to demo minor impulsivity as well as decreased control of movements (LE exercises). Pt limited by LE strength, balance, endurance at this time.         Rec Dc to home w/ 24/7 assist and HHPT, if assist is not available rec DC to TCu to improve balance and functional mobility

## 2017-07-20 NOTE — PROGRESS NOTES
Urology Daily Progress Note    24 hour events/Subjective:     - NAEO.  Catheter did not require irrigation   - Off CBI all day, off traction       O:  Vitals: Afebrile, VSS  General: Alert, interactive, in NAD  Resp: Non-labored breathing on RA  Abdomen: Soft, non-tender, non distended.  Ext: Warm and well perfused, No LE edema   Man: Crystal clear off traction and off CBI    Labs/Imaging  Heme:    Recent Labs  Lab 07/20/17  0354 07/19/17  1724 07/19/17  0537 07/18/17  1755   WBC 8.0 7.2 8.0 8.3   HGB 7.8* 6.7* 7.9* 6.8*    151 178 165     Chem:    Recent Labs  Lab 07/20/17  0354 07/19/17  1724 07/19/17  0537 07/18/17  1755   POTASSIUM 5.2 5.1 5.2 5.1   CR 6.83* 6.87* 7.19* 7.02*       Assessment/Plan  71 year old y/o male admitted with urinary retention, subsequently developed hematuria with clot retention.  To OR on 7/16 with Dr. Vanegas for cysto clot evac.  Suspected source is enlarged prostate.  Unable to wean CBI postop. Placed on traction midday 7/18 - now clear off traction and off CBI       - Okay to be up ad harper - expect urine to have some blood in it with ambulation. Call urology if any concerns  - Discharge to home with catheter, please provide leg bag, supplies, teaching  - Will arrange for follow-up with urology to discuss possible bladder outlet surgery  - Urology will sign off, please call with any questions or concerns       Seen and examined on rounds, to be discussed with staff, Dr. Vanegas     --    Jayson Izquierdo MD  Urology Resident         Contacting the Urology Team     Please use the following job codes to reach the Urology Team. Note that you must use an in house phone and that job codes cannot receive text pages.     On weekdays, dial [phone] (or star-star-star [phone] on the new [employer]) then [phone] to reach the Adult Urology resident or PA on call    On weekdays, dial [phone] (or star-star-star [phone] on the new [employer] telephones) then [phone] to reach the Pediatric Urology resident    On  weeknights and weekends, dial [phone] (or star-star-star [phone] on the new [employer] telephones) then [phone] to reach the Urology resident on call (for both Adult and Pediatrics)

## 2017-07-20 NOTE — PLAN OF CARE
Problem: Goal Outcome Summary  Goal: Goal Outcome Summary  Outcome: Therapy, progress toward functional goals as expected  OT/5A - Facilitated medication management activity with use of pillbox to assess safety. Pt requires specific verbal cues to complete 4/4 medications as he was unable to complete independently.  Educated on recommendation for assist and use of pillbox for increased safety with medication management. SBA for standing ADL at sink.     REC: Home with increased assist for IADLs due to cognitive deficits and safety concerns and home PT to address remaining deficits. Pt unable to complete medication management activity independently and requires cues for 75% of turns during path finding activity posing safety concerns for community mobility. Recommend shower chair and grab bars inside shower and near toilet for increased safety and IND with ADL.

## 2017-07-20 NOTE — PROGRESS NOTES
"[facility], [place]    Internal Medicine Progress Note    Date of Service (when Attending saw the patient): 07/20/2017    Interval History   Mr. Norton was transfused 1u PRBC again last night due to low Hgb. Otherwise, he slept well and has good appetite. He had a BM, which he describes as \"medium consistency\" this morning. Wood still placed and patent, no longer under traction. Urine clear and yellow. CBI completely discontinued now. Denies pain or nausea. Ambulated with PT this morning including stairs.    4 point ROS negative except as noted above.    Assessment & Plan   Medical Student Note Resident Note   Assessment and Plan (Student)    Assessment:  Mr. Norton is a 72 yo male with HTN and h/o tobacco abuse who presents with acute blood loss anemia, post-obstructive acute on stage 3 chronic kidney injury, and bladder/prostate mass.      Plan:  #Bladder mass  Visualized on 7/12 US and 7/13 CT as nodular, vascular and projecting into the bladder. Considering smoking history, this is concerning for malignancy (especially of bladder). Ddx: likely BPH.  -Biopsy negative for malignancy  -Started on flomax (0.4mg qd), finasteride (5mg qd) for BPH.  -Cystoscopy found no obvious infiltration into bladder.  -Will need to f/u outpatient      #Acute Post-Obstructive on Chronic Kidney Disease (Stage 3)  #Hypoalbuminemia  Post-obstructive etiology most likely (mass pressing on urethra, bladder), US and CT showed hydroureteronephrosis. Wood has drained over 10L urine mixed with blood (7/13-7/15), and multiple liters per day consistently since, which is not unexpected given his post-obstructive state. His labs are showing gradual but not marked improvement, which will need to be followed.  -CBI d/cd  -Follow BMP q12h  -Titrate fluids to maintain euvolemia (hold for now, see below in bold)  -Maintain wood catheter patency. If losing output, page urology to replace.  -PO bicarb 1300mg qD, goal > " "22  -Baseline creatinine in high 2 range (last one 2.56 2/2016).  -Avoid insulting kidneys (contrast, nephrotoxins)  -Concern for volume overload: trial of stopped IV fluids starting 7/20 until needs indicated, as was approx. 2000mL over output on 7/19, weight has been increasing, and he is becoming somewhat more edematous.  -Continue to monitor daily weights-up to 164 lbs 7/20 from 137 lbs 7/13  -Albumin 1.9 7/19, down from 3.1 on admission  -7/19 ordered 100g 25% albumin-helped drop Cr values very slightly  -Monitor K+, gradually increasing, especially in the setting of multiple transfusions-5.2 7/20 AM  -Nephrology consult initiated      #Anemia  Hgb 6.5 on admission. Retic 1.1%, index is 0.2%, showing inadequate bone marrow response, likely due to decreased EPO production from stage 3 CKD. Renal US showed debris in bladder, which are probably clots, also found on CT. The blood could be from either stretch injury or bleeding higher in the urinary tract, but seems unlikely to be from the mass as cystoscopy found no acute bleeding from it. Received 1 unit of PRBC 7/20 to maintain Hgb above 7.0. Has received 8 units total since admission. MARKED 7/20 11:26am   -Cystoscopy found no sources of acute bleeding, fulgurated several areas of \"ooze\" on the prostate.  -Follow CBC, Hgb every 12 hrs. Transfuse if under Hgb 7.0.  -Urology placed on traction with wood 7/18  -Urology monitoring, considering prostate embolization and/or HoLEP if bleeding continues  -7/20 AM d/cd traction and CBI, urine free of blood on morning rounds      #Weight Loss of > 20 lbs  #Cachexia  Has lost 20-30 lbs in last 4 months (weighed 181 with PCP 2/2016, 150 lbs 7/12, and 137 lbs s/p catheterization 7/13). Weight loss likely due to combination of suspected malignancy and stage 3 CKD.   -Encurage PO intake  -PT/OT recommend d/c to home when medically stable, then outpatient therapy      #Depression  Patient grudgingly admits to feeling depressed " with recent weakness, partial loss of independence and weight loss. Denies thoughts of self-harm or suicide.  -F/u outpatient, consider antidepressant therapy      Chronic:  #Hypertension  BP on admission 102/67.       -hold amlodipine and diuretic      -continue PTA metoprolol 100mg qd      -Cut metoprolol to 50mg qd inpatient due to low pressures      #Tobacco abuse  Started smoking at age 12, cut back recently to 3-4 cigarettes/day. Has smoked up to 2 packs per day at times.      -Continue PTA nicotine patch      -Wore nicotine patch 7/19, 7/20, helped his tremor      Diet: Snacks/Supplements Adult: Ensure Plus (Adult); Between Meals  Regular Diet Adult  Fluids: PO, PIVx2  DVT Prophylaxis: Mechanical  Code Status: DNR / DNI      Disposition Plan: When medically stable, to home with outpatient PT/OT. Assessment and Plan (Resident)    Assessment:  71 year old male with a history of CKD stage 3, HTN, and tobacco abuse admitted for anemia and >20 pound weight loss found to have acute renal failure secondary to postobstructive nephropathy-found to have bladder mass suspicious for malignancy contributing to ongoing acute anemia.    Today:  -Re consult nephrology  -Discontinue IVFs  -Discussed negative for malignancy biopsy results with pt and family  -Discontinue CBI  -Patient can ambulate with PT/OT    Plan:     Acute renal failure 2/2 Postobstructive nephropathy   Post-obstructive/ATN Diuresis  CKD  Non-anion gap acidosis 2/2 KRISTINA  Last creat in 2/2016 was 2.56. Creatinine on admission >14 with BUN >170. Man placed with >4.5 L of urine output. Pt denying any urinary symptoms of issues emptying, weak stream or other difficulty urinating. Admits he was perhaps only urinating once a day. Was having post-obstructive diuresis with significant volume of bloody urine output, now urine is slowing and is yellow. Concern was for bladder vs prostate malignancy - PSA wnl and cystoscopy did not show any suspicious masses and  biopsies taken were negative for malignancy.        -Follow BMP BID        -Avoid contrast and nephrotoxic agents       -Discontinue IVFs 7/20       -VS by nursing Q8H       -Renal consulted - signed off, can call with qs              -Supplementing with PO sodium bicarb 650 mg daily              -Keep K~4 and mag ~2              -Reconsulted nephrology on 7/20 to assist with management of fluids (pt third spacing, hypoalbuminemia, rising K)       -Urology consulted, signed off 7/20              -Cystoscopy on 7/16 - biopsies taken - negative for malignancy              -Will go home with wood, follow up with urology              -Need to provide leg bag, supplies, and teaching prior to discharge              -Flomax 0.4mg qhs              -Finasteride 5 mg daily    Severe malnutrition  Acute on Chronic Anemia 2/2 acute blood loss from bladder, resolving  Weight loss of >20 lbs  Hgb 6.5 on admission. Retics with inadequate bone marrow response to anemia.  Weight 181 lbs 2/2016. Weight on 7/13 post-wood diuresis is 137 lbs. Likely 2/2 inability to eat much and vomiting following meals (due to size of bladder and compression). Bladder biopsies negative for malignancy. Chronic anemia likely related to CKD. Acute anemia 2/2 bladder stretch injury and wood irrigation in bladder. S/p 7 units of PRBCs, last given on 7/18.       -Follow CBC BID, continue to transfuse if <7      -PT/OT consulted to determine dispo - likely to home      Hypoalbuminemia   Was 3.1 on 7/12, reassessed on 7/19 found to be 1.9. Likely 2/2 to ongoing kidney injury and diuresis. Is third spacing lots of IV fluids. S/p 100 g IV albumin on 7/19.       -Consult to nephrology     Hypocalcemia  Likely 2/2 post-obstructive/ATN diuresis. IV calcium gluconate 1 g on 7/17.           -Follow calcium      Bladder Spasms  Started AM of 7/15 - having some urgency and leaking around the wood. First episode lasted ~15 min. Later episodes <30  seconds.          -Consider ditropan if persistent.      Subclinical Hypothyroid  TSH 10.51, T4 free 0.70. Patient without clinical symptoms of hypothyroidism.        -Follow up outpatient with PCP for recheck      Chronic:  Hypertension      -hold amlodipine and diuretic      -hold PTA metoprolol 7/15       Tobacco abuse  Started smoking at age 12, at worst was smoking ~2 packs per day. Has cut way recently to 3-4 cigs per day.      -Continue PTA nicotine patch      -Cessation encouraged      FEN: Regular adult diet  Lines: PIV  DVT: ambulate (with anemia will hold off)  Code status: DNR/DNI  Dispo: Pending stabilization of kidney function and electrolytes as well as anemia - likely to home    Physical Exam (Student)  /70 (BP Location: Right arm)  Pulse 67  Temp 97.6  F (36.4  C) (Oral)  Resp 16  Wt 74.4 kg (164 lb)  SpO2 100%  BMI 24.22 kg/m2      Intake/Output Summary (Last 24 hours) at 07/20/17 1136  Last data filed at 07/20/17 0945   Gross per 24 hour   Intake             3390 ml   Output             3175 ml   Net              215 ml     Constitutional: Alert, oriented, pleasant. In no acute distress. Fairly emaciated.  HEENT: Normocephalic, atraumatic. Conjunctivae anicteric without injection.  Respiratory: CTAB. No increased WOB.  Cardiovascular: RRR, systolic ejection murmur.  GI: Abdomen soft, nontender. Bowel sounds present.  Extremities: Trace edema similar to yesterday. Tremor less noticeable after nicotine patch application.    Physical Exam (Resident)  Vitals: /70 (BP Location: Right arm)  Pulse 67  Temp 97.6  F (36.4  C) (Oral)  Resp 16  Wt 74.4 kg (164 lb)  SpO2 100%  BMI 24.22 kg/m2    I/Os: net positive 1.9 L yesterday    General: the patient is pleasant, resting comfortably, under weight, edematous, sitting up in chair  HEENT: Normocephalic, atraumatic.  Lungs: Clear to auscultation, faint crackles at right lung base. Good air movement.   CV: RRR, nl s1 and s2, systolic  ejection murmur noted.   Abdomen: Nondistended. No fluid wave. Scar tissue in RLQ where appendectomy surgery performed. Soft, nontender. Bowel sounds active.  Extremities: Trace lower extremity edema. Some generalized edema noted in UEs.   Skin: no appreciable skin lesions/rashes on exposed skin.   Man: urine yellow - no blood seen    Data Interpretation  I have reviewed today's vital signs, medications, labs and imaging.   Herbert Almodovar John 7/20/2017 11:25 AM Kay Bailey 7/20/2017 12:06 PM       Data     Recent Labs  Lab 07/20/17  0354 07/19/17  1724 07/19/17  0537   WBC 8.0 7.2 8.0   HGB 7.8* 6.7* 7.9*   MCV 91 90 92    151 178    144 144   POTASSIUM 5.2 5.1 5.2   CHLORIDE 113* 112* 113*   CO2 23 21 24   BUN 90* 86* 91*   CR 6.83* 6.87* 7.19*   ANIONGAP 8 10 7   DERICK 7.2* 7.3* 7.0*   GLC 86 116* 90   ALBUMIN  --   --  1.9*   PROTTOTAL  --   --  4.4*   BILITOTAL  --   --  0.2   ALKPHOS  --   --  51   ALT  --   --  17   AST  --   --  32       No results found for this or any previous visit (from the past 24 hour(s)).     Attending Attestation   This patient has been seen and evaluated by me, Ever Collins MD.  I have discussed the patient and today's care plan with the house staff team and agree with the findings and plan in this note and any edits by me are indicated above in blue.      I have reviewed today's care team notes, Medications, Vital Signs and Labs    Ever Collins MD  Med-Peds Hospitalist  Pager [phone]

## 2017-07-20 NOTE — PLAN OF CARE
Problem: Goal Outcome Summary  Goal: Goal Outcome Summary  Outcome: No Change  A&Ox4, VSS on RA. Tylenol given for T 99.6. 2 left PIV's. Infusing  ml/hr. Received 1 unit RBC for hgbn 6.7 (monitoring). Urine clear pink/red. 1,000 ml out this shift (wood). Pt on bedrest with bathroom privlages. BM today. Continue monitoring and following POC.

## 2017-07-21 ENCOUNTER — APPOINTMENT (OUTPATIENT)
Dept: OCCUPATIONAL THERAPY | Facility: CLINIC | Age: 72
DRG: 668 | End: 2017-07-21
Attending: INTERNAL MEDICINE
Payer: MEDICARE

## 2017-07-21 ENCOUNTER — APPOINTMENT (OUTPATIENT)
Dept: PHYSICAL THERAPY | Facility: CLINIC | Age: 72
DRG: 668 | End: 2017-07-21
Attending: INTERNAL MEDICINE
Payer: MEDICARE

## 2017-07-21 LAB
ANION GAP SERPL CALCULATED.3IONS-SCNC: 10 MMOL/L (ref 3–14)
BUN SERPL-MCNC: 94 MG/DL (ref 7–30)
CALCIUM SERPL-MCNC: 7.7 MG/DL (ref 8.5–10.1)
CHLORIDE SERPL-SCNC: 112 MMOL/L (ref 94–109)
CO2 SERPL-SCNC: 21 MMOL/L (ref 20–32)
CREAT SERPL-MCNC: 6.92 MG/DL (ref 0.66–1.25)
ERYTHROCYTE [DISTWIDTH] IN BLOOD BY AUTOMATED COUNT: 16 % (ref 10–15)
FERRITIN SERPL-MCNC: 1209 NG/ML (ref 26–388)
GFR SERPL CREATININE-BSD FRML MDRD: 8 ML/MIN/1.7M2
GLUCOSE SERPL-MCNC: 83 MG/DL (ref 70–99)
HCT VFR BLD AUTO: 24.9 % (ref 40–53)
HGB BLD-MCNC: 7.9 G/DL (ref 13.3–17.7)
IRON SATN MFR SERPL: 19 % (ref 15–46)
IRON SERPL-MCNC: 24 UG/DL (ref 35–180)
MCH RBC QN AUTO: 29.5 PG (ref 26.5–33)
MCHC RBC AUTO-ENTMCNC: 31.7 G/DL (ref 31.5–36.5)
MCV RBC AUTO: 93 FL (ref 78–100)
PLATELET # BLD AUTO: 192 10E9/L (ref 150–450)
POTASSIUM SERPL-SCNC: 5.2 MMOL/L (ref 3.4–5.3)
RBC # BLD AUTO: 2.68 10E12/L (ref 4.4–5.9)
SODIUM SERPL-SCNC: 144 MMOL/L (ref 133–144)
TIBC SERPL-MCNC: 125 UG/DL (ref 240–430)
TRANSFERRIN SERPL-MCNC: 76 MG/DL (ref 210–360)
WBC # BLD AUTO: 7.2 10E9/L (ref 4–11)

## 2017-07-21 PROCEDURE — 97116 GAIT TRAINING THERAPY: CPT | Mod: GP | Performed by: REHABILITATION PRACTITIONER

## 2017-07-21 PROCEDURE — 25000128 H RX IP 250 OP 636: Performed by: STUDENT IN AN ORGANIZED HEALTH CARE EDUCATION/TRAINING PROGRAM

## 2017-07-21 PROCEDURE — P9047 ALBUMIN (HUMAN), 25%, 50ML: HCPCS | Performed by: STUDENT IN AN ORGANIZED HEALTH CARE EDUCATION/TRAINING PROGRAM

## 2017-07-21 PROCEDURE — 25000128 H RX IP 250 OP 636

## 2017-07-21 PROCEDURE — A9270 NON-COVERED ITEM OR SERVICE: HCPCS | Mod: GY | Performed by: STUDENT IN AN ORGANIZED HEALTH CARE EDUCATION/TRAINING PROGRAM

## 2017-07-21 PROCEDURE — 83540 ASSAY OF IRON: CPT | Performed by: STUDENT IN AN ORGANIZED HEALTH CARE EDUCATION/TRAINING PROGRAM

## 2017-07-21 PROCEDURE — 82728 ASSAY OF FERRITIN: CPT | Performed by: STUDENT IN AN ORGANIZED HEALTH CARE EDUCATION/TRAINING PROGRAM

## 2017-07-21 PROCEDURE — 97110 THERAPEUTIC EXERCISES: CPT | Mod: GP | Performed by: REHABILITATION PRACTITIONER

## 2017-07-21 PROCEDURE — 85027 COMPLETE CBC AUTOMATED: CPT | Performed by: STUDENT IN AN ORGANIZED HEALTH CARE EDUCATION/TRAINING PROGRAM

## 2017-07-21 PROCEDURE — 40000133 ZZH STATISTIC OT WARD VISIT

## 2017-07-21 PROCEDURE — 36415 COLL VENOUS BLD VENIPUNCTURE: CPT | Performed by: STUDENT IN AN ORGANIZED HEALTH CARE EDUCATION/TRAINING PROGRAM

## 2017-07-21 PROCEDURE — 84466 ASSAY OF TRANSFERRIN: CPT | Performed by: STUDENT IN AN ORGANIZED HEALTH CARE EDUCATION/TRAINING PROGRAM

## 2017-07-21 PROCEDURE — 25000132 ZZH RX MED GY IP 250 OP 250 PS 637: Mod: GY | Performed by: STUDENT IN AN ORGANIZED HEALTH CARE EDUCATION/TRAINING PROGRAM

## 2017-07-21 PROCEDURE — 97535 SELF CARE MNGMENT TRAINING: CPT | Mod: GO

## 2017-07-21 PROCEDURE — 83550 IRON BINDING TEST: CPT | Performed by: STUDENT IN AN ORGANIZED HEALTH CARE EDUCATION/TRAINING PROGRAM

## 2017-07-21 PROCEDURE — 40000193 ZZH STATISTIC PT WARD VISIT: Performed by: REHABILITATION PRACTITIONER

## 2017-07-21 PROCEDURE — 40000141 ZZH STATISTIC PERIPHERAL IV START W/O US GUIDANCE

## 2017-07-21 PROCEDURE — 97530 THERAPEUTIC ACTIVITIES: CPT | Mod: GO

## 2017-07-21 PROCEDURE — 99233 SBSQ HOSP IP/OBS HIGH 50: CPT | Mod: GC | Performed by: INTERNAL MEDICINE

## 2017-07-21 PROCEDURE — 80048 BASIC METABOLIC PNL TOTAL CA: CPT | Performed by: STUDENT IN AN ORGANIZED HEALTH CARE EDUCATION/TRAINING PROGRAM

## 2017-07-21 PROCEDURE — 12000001 ZZH R&B MED SURG/OB UMMC

## 2017-07-21 RX ORDER — ALBUMIN (HUMAN) 12.5 G/50ML
100 SOLUTION INTRAVENOUS ONCE
Status: COMPLETED | OUTPATIENT
Start: 2017-07-21 | End: 2017-07-21

## 2017-07-21 RX ORDER — FUROSEMIDE 10 MG/ML
40 INJECTION INTRAMUSCULAR; INTRAVENOUS ONCE
Status: COMPLETED | OUTPATIENT
Start: 2017-07-21 | End: 2017-07-21

## 2017-07-21 RX ORDER — SODIUM CHLORIDE, SODIUM LACTATE, POTASSIUM CHLORIDE, CALCIUM CHLORIDE 600; 310; 30; 20 MG/100ML; MG/100ML; MG/100ML; MG/100ML
INJECTION, SOLUTION INTRAVENOUS CONTINUOUS
Status: DISCONTINUED | OUTPATIENT
Start: 2017-07-21 | End: 2017-07-22

## 2017-07-21 RX ADMIN — SODIUM CHLORIDE, POTASSIUM CHLORIDE, SODIUM LACTATE AND CALCIUM CHLORIDE: 600; 310; 30; 20 INJECTION, SOLUTION INTRAVENOUS at 15:35

## 2017-07-21 RX ADMIN — TAMSULOSIN HYDROCHLORIDE 0.4 MG: 0.4 CAPSULE ORAL at 07:53

## 2017-07-21 RX ADMIN — ALBUMIN (HUMAN) 100 G: 12.5 SOLUTION INTRAVENOUS at 10:55

## 2017-07-21 RX ADMIN — FINASTERIDE 5 MG: 5 TABLET, FILM COATED ORAL at 07:53

## 2017-07-21 RX ADMIN — SODIUM BICARBONATE 650 MG TABLET 650 MG: at 07:53

## 2017-07-21 RX ADMIN — FUROSEMIDE 40 MG: 10 INJECTION, SOLUTION INTRAVENOUS at 10:47

## 2017-07-21 RX ADMIN — NICOTINE 1 PATCH: 21 PATCH, EXTENDED RELEASE TRANSDERMAL at 19:39

## 2017-07-21 NOTE — PLAN OF CARE
Problem: Goal Outcome Summary  Goal: Goal Outcome Summary  Outcome: Improving  1900-0730  Pt A&O. VSS on RA. Pt up SBS with gait belt and walker. Calling appropriately. Catheter in place, good urine output. Catheter cares done last evening. Urine is yellow and pink tinged. No complaints of pain or nausea overnight. IV albumin given x1. Tolerating regular diet. Fair appetite. Nicotine patch placed on R deltoid. Pt slept well between cares. LPIV saline locked. Blanchable redness on bottom, repositioning self as needed. Nephrology following. Will eventually d/c home with owod.

## 2017-07-21 NOTE — PLAN OF CARE
Problem: Goal Outcome Summary  Goal: Goal Outcome Summary  Outcome: Improving  Pt AO. VSS on RA. Up with SBA, up in chair and ambulated halls. Denies pain, nausea, or SOB. Tolerating PO. Man in place with good UOP, urine clear with slight pink tinge. BM x2. New PIV placed, received IV lasix and albumin. Daughter and grandson visited and was updated. Seen by nephrology.

## 2017-07-21 NOTE — PROGRESS NOTES
Nephrology Progress Note  07/21/2017         Assessment & Recommendations:   Dung Norton is a 71 year old year old male with a PMH of CKDIII , HTN, presented for weight loss and poor PO intake and was found to have creat of 14.4 on admission with obstructive nephropathy from BPH and bladder mass. He has been polyuric since then and creatinine improved to 6.9-7 and has been in that range wo improvement, last creat available is 2.5 from a year before admission.   Will recommend renal diet with low K supplements    1. KRISTINA / acute renal failure sec to Obstructive nephropathy from BPH and bladder mass  Likely baseline CKDIII from the chronic obstruction and HTN disease  Creatinine is stuck between 6.9-7  This is likely sec to relative intravascular depletion with polyuria post obstruction   This is unlikely his baseline but we doubt he will improve to his baseline of 2.5 from the chronic obstructions ongoing an hx of back pain ongoing for months prior to presentation.    Will recommend  LR @100ml/hr for the nxt 24hrs and will reassess tomorrow  No need for additional diuresis at this time  Noted albumin infusion -- may benefit from it in light of hypoalbuminemia as well  Order iron studies with ferritin  Change diet to renal with low K supplements    2. Hyperkalemia  Has been stable at 5.1-5.2 in last few days  Likely from the KRISTINA and no improvement in the renal functions and dietary with KRISTINA  Will recommend renal diet with low K supplements  Bicarb is 21 on bicarb pills     3. Volume status  He looks more volume depleted katie in light of inappropriate polyuria with volume depletion  Hydration goals as above    4. Metabolic status acceptable    5. BPH and bladder mass  Urology following   Path for the mass is benign     6. Anemia  Hb 7.9  Transfuse goal<7  Will recommend iron studies    Recommendations were communicated to primary team     Seen and discussed with Dr. Jerson Colmenares MD    [phone]    Interval History :   In the last 24 hours Dung Norton has continued to be polyuric and creatinine has been plateau wo improvement in the last few days    Review of Systems:   (4 pt ROS reviewed alone is not adequate unless you detail systems you reviewed)  I reviewed the following systems:  GI: good appetite. - nausea or vomiting or diarrhea.   Neuro:  - confusion  Constitutional:  - fever or chills  CV: - dyspnea or edema.  - chest pain.    Physical Exam:   I/O last 3 completed shifts:  In: 1276.67 [P.O.:600; I.V.:676.67]  Out: 3250 [Urine:3250]   /72 (BP Location: Right arm)  Pulse 67  Temp 97.9  F (36.6  C) (Oral)  Resp 16  Wt 74.7 kg (164 lb 11.2 oz)  SpO2 100%  BMI 24.32 kg/m2     GENERAL APPEARANCE: well appearing  EYES:  - scleral icterus, pupils equal  HENT: mouth without ulcers or lesions  PULM: lungs clear to auscultation,  bilaterally, equal air movement, no clubbing  CV: regular rhythm, normal rate, no rub     -JVP -     -edema -   GI: soft, non tender, nondistended, bowel sounds are +  INTEGUMENT: no cyanosis, - rash  NEURO:  - asterixis         Labs:   All labs reviewed by me  Electrolytes/Renal -   Recent Labs   Lab Test  07/21/17   0652  07/20/17   1805  07/20/17   0354   07/19/17   0537   07/18/17   0603   07/17/17   1118   NA  144  144  144   < >  144   < >  144   < >  141   POTASSIUM  5.2  5.2  5.2   < >  5.2   < >  4.9   < >  4.5   CHLORIDE  112*  111*  113*   < >  113*   < >  112*   < >  109   CO2  21  24  23   < >  24   < >  22   < >  22   BUN  94*  97*  90*   < >  91*   < >  99*   < >  111*   CR  6.92*  6.91*  6.83*   < >  7.19*   < >  7.22*   < >  7.86*   GLC  83  112*  86   < >  90   < >  103*   < >  134*   DERICK  7.7*  7.2*  7.2*   < >  7.0*   < >  7.3*   < >  7.4*   MAG   --    --    --    --   1.8   --   1.9   --   1.9    < > = values in this interval not displayed.       CBC -   Recent Labs   Lab Test  07/21/17   0652  07/20/17   1805  07/20/17   0354   WBC   7.2  8.5  8.0   HGB  7.9*  7.7*  7.8*   PLT  192  193  166       LFTs -   Recent Labs   Lab Test  07/19/17   0537  07/12/17   1740  07/12/17   1407   ALKPHOS  51  67  65   BILITOTAL  0.2  0.6  0.3   ALT  17  19  20   AST  32  12  8   PROTTOTAL  4.4*  6.8  6.5*   ALBUMIN  1.9*  3.1*  3.1*       Iron Panel - No lab results found.      Imaging:  All imaging studies reviewed by me.     Current Medications:    sodium bicarbonate  650 mg Oral Daily     tamsulosin  0.4 mg Oral Daily     finasteride  5 mg Oral Daily     nicotine  1 patch Transdermal Q24H     nicotine   Transdermal Daily     nicotine   Transdermal Q8H       sodium chloride 0.9% (bag) 0 mL (07/15/17 1241)     - MEDICATION INSTRUCTIONS -       Allison Colmenares MD

## 2017-07-21 NOTE — PLAN OF CARE
Problem: Goal Outcome Summary  Goal: Goal Outcome Summary  PT - per plan established by the Physical Therapist, according to functional mobility the  discharge recommendation is  Home with 24/7 assist. Pt is progressing well with all PT. Pt limited by general fatigue and weakness. Pt demo amb with and without AD needing close SBA. Pt demo seated and standing there ex. 10.

## 2017-07-21 NOTE — PROGRESS NOTES
[facility], [place]    Internal Medicine Progress Note    Date of Service (when Attending saw the patient): 07/21/2017    Interval History   No acute events. Nursing notes reviewed. He received 100mg albumin overnight. His appetite is good and he ate well. He had a BM of medium consistency yesterday evening. He is feeling strong and ambulating well with PT. He is not receiving IV fluids. He denies nausea or pain, slept well and is alert and oriented.    4 point ROS negative except as noted above.    Assessment & Plan   Medical Student Note Resident Note   Assessment and Plan (Student)    Assessment:  Mr. Norton is a 70 yo male with HTN and h/o tobacco abuse who presents with acute blood loss anemia, post-obstructive acute on stage 3 chronic kidney injury, and bladder/prostate mass.      Plan:  #Bladder mass  Visualized on 7/12 US and 7/13 CT as nodular, vascular and projecting into the bladder. Ddx: likely BPH.  -Biopsy negative for malignancy  -Started on flomax (0.4mg qd), finasteride (5mg qd) for BPH.  -Cystoscopy found no obvious infiltration into bladder.  -Will need to f/u outpatient with cystoscopy      #Acute Post-Obstructive on Chronic Kidney Disease (Stage 3) with Hypoalbuminemia  Post-obstructive etiology most likely (mass pressing on urethra, bladder), US and CT showed hydroureteronephrosis. Wood still draining 2-3L urine per day, which is not unexpected given his post-obstructive state. His lab values have plateaued.  -CBI d/cd  -Follow BMP q24h  -Maintain wood catheter patency. If losing output, page urology to replace.  -PO bicarb 1300mg qD, goal > 22  -Baseline creatinine in high 2 range (last one 2.56 2/2016).  -Avoid insulting kidneys (contrast, nephrotoxins)  -Concern for volume overload (daily weights show up 25 lbs since admission): stopped IV fluids  -Albumin 1.9 7/19, down from 3.1 on admission  -Received 100mg albumin 7/19, 7/20, may have helped Cr values  "slightly  -7/21 receiving 100mg albumin and Lasix 80mg  -Monitor K+, gradually increasing, especially in the setting of multiple transfusions-5.2 7/21 AM  -Nephrology consult initiated       #Anemia  Hgb 6.5 on admission. Retic 1.1%, index is 0.2%, showing inadequate bone marrow response, likely due to decreased EPO production from stage 3 CKD. Renal US and CT showed debris in bladder, which are probably clots. The blood could have been from stretch injury, mass or higher in urinary tract. Has received 8 units PRBC total since admission. MARKED 7/21 11:59am    -Cystoscopy found no sources of acute bleeding, fulgurated several areas of \"ooze\" on the prostate.  -Follow CBC, Hgb every 24 hrs. Transfuse if under Hgb 7.0.  -Urology placed on traction with wood 7/18, d/cd traction and CBI 7/20  -Urine free of blood as of 7/20 morning rounds      #Cachexia, Weight Loss > 20 lbs  Has lost 20-30 lbs in last 4 months (weighed 181 with PCP 2/2016, 150 lbs 7/12, and 137 lbs s/p catheterization 7/13). Weight loss possibly due to stage 3 CKD.   -Encurage PO intake  -PT/OT recommend d/c to home with 24 hr assist when medically stable      #Depression  Patient grudgingly admits to feeling depressed with recent weakness, partial loss of independence and weight loss. Denies thoughts of self-harm or suicide.  -F/u outpatient, consider antidepressant therapy      Chronic:  #Hypertension  BP on admission 102/67.       -hold amlodipine and diuretic      -continue PTA metoprolol 100mg qd      -Cut metoprolol to 50mg qd inpatient due to low pressures      #Tobacco abuse  Started smoking at age 12, cut back recently to 3-4 cigarettes/day. Has smoked up to 2 packs per day at times.      -Continue PTA nicotine patch      -Wore nicotine patch 7/19-7/21, helped his tremor      Diet: Snacks/Supplements Adult: Ensure Plus (Adult); Between Meals  Regular Diet Adult  Fluids: PO  Lines: PIVx2  DVT Prophylaxis: Mechanical  Code Status: DNR / " DNI      Disposition Plan: When medically stable, to home with 24 hr assist. Assessment and Plan (Resident)    Assessment:  71 year old male with a history of CKD stage 3, HTN, and tobacco abuse admitted for anemia and >20 pound weight loss found to have acute renal failure secondary to postobstructive nephropathy, bladder now negative for malignancy, continuing to have elevated creatinine.    Plan:  Today:  -Discuss plan with nephrology  -100 g IV albumin  -40 mg IV lasix  -Follow I/Os  -Q24 BMP and CBC now  -Patient can ambulate with PT/OT    Plan:    Acute renal failure 2/2 Postobstructive nephropathy   Post-obstructive/ATN Diuresis  CKD  Non-anion gap acidosis 2/2 KRISTINA  Last creat in 2/2016 was 2.56. Creatinine on admission >14 with BUN >170. Wood placed with >4.5 L of urine output. Pt denying any urinary symptoms of issues emptying, weak stream or other difficulty urinating. Admits he was perhaps only urinating once a day. Was having post-obstructive diuresis with significant volume of bloody urine output, now urine is slowing and is yellow. Concern was for bladder vs prostate malignancy - PSA wnl and cystoscopy did not show any suspicious masses and biopsies taken were negative for malignancy.        -Follow BMP daily       -Avoid contrast and nephrotoxic agents       -Discontinue IVFs 7/20       -VS by nursing Q8H       -Renal consulted - signed off, can call with qs              -Supplementing with PO sodium bicarb 650 mg daily              -Keep K~4 and mag ~2              -Reconsulted nephrology on 7/20 to assist with management of fluids (pt third spacing, hypoalbuminemia, rising K)       -Urology consulted, signed off 7/20              -Cystoscopy on 7/16 - biopsies taken - negative for malignancy              -Will go home with wood, follow up with urology              -Need to provide leg bag, supplies, and teaching prior to discharge              -Flomax 0.4mg qhs              -Finasteride 5 mg  daily     Severe malnutrition  Acute on Chronic Anemia 2/2 acute blood loss from bladder, resolving  Weight loss of >20 lbs  Hgb 6.5 on admission. Retics with inadequate bone marrow response to anemia.  Weight 181 lbs 2/2016. Weight on 7/13 post-wood diuresis is 137 lbs. Likely 2/2 inability to eat much and vomiting following meals (due to size of bladder and compression). Bladder biopsies negative for malignancy. Chronic anemia likely related to CKD. Acute anemia 2/2 bladder stretch injury and wood irrigation in bladder. S/p 8 units of PRBCs, last given on 7/19.       -Follow CBC daily, continue to transfuse if <7      -Could consider epo in the future if persistently anemic      -PT/OT consulted to determine dispo - will need assist at home with home PT      Hypoalbuminemia   Was 3.1 on 7/12, reassessed on 7/19 found to be 1.9. Likely 2/2 to ongoing kidney injury and diuresis. Is third spacing lots of IV fluids. S/p 100 g IV albumin on 7/19, 7/20.       -Consult to nephrology       -IV albumin today 100 g    Hypocalcemia  Likely 2/2 post-obstructive/ATN diuresis. IV calcium gluconate 1 g on 7/17.           -Follow calcium      Bladder Spasms, resolved          -Consider ditropan if persistent.      Subclinical Hypothyroid  TSH 10.51, T4 free 0.70. Patient without clinical symptoms of hypothyroidism.        -Follow up outpatient with PCP for recheck      Chronic:  Hypertension      -hold amlodipine and diuretic      -hold PTA metoprolol 7/15       Tobacco abuse  Started smoking at age 12, at worst was smoking ~2 packs per day. Has cut way recently to 3-4 cigs per day.      -Continue PTA nicotine patch      -Cessation encouraged      FEN: Regular adult diet  Lines: PIV  DVT: ambulate (with anemia will hold off)  Code status: DNR/DNI  Dispo: Pending stabilization of kidney function and electrolytes as well as anemia - likely to home with assist and home PT      Physical Exam (Student)  /72 (BP Location: Right  arm)  Pulse 67  Temp 97.9  F (36.6  C) (Oral)  Resp 16  Wt 74.4 kg (164 lb)  SpO2 100%  BMI 24.22 kg/m2      Intake/Output Summary (Last 24 hours) at 07/21/17 0848  Last data filed at 07/21/17 0800   Gross per 24 hour   Intake          1396.67 ml   Output             3250 ml   Net         -1853.33 ml     Constitutional: Alert, oriented, pleasant. In no acute distress. Fairly emaciated.  Head: Normocephalic, atraumatic.  Eyes: Conjunctivae anicteric without injection.  Nose: Nasal passages patent.  Respiratory: CTAB. No increased WOB.  Cardiovascular: RRR, systolic ejection murmur.  GI: Abdomen soft, nontender. Bowel sounds present.  Extremities: Trace edema, not quite as severe as yesterday. Tremor less noticeable after nicotine patch application.     Physical Exam (Resident)  /72 (BP Location: Right arm)  Pulse 67  Temp 97.9  F (36.6  C) (Oral)  Resp 16  Wt 74.7 kg (164 lb 11.2 oz)  SpO2 100%  BMI 24.32 kg/m2    Net negative 1.5 L yesterday    General: the patient is pleasant, resting comfortably, under weight, edematous, sitting up in chair  HEENT: Normocephalic, atraumatic.  Lungs: Clear to auscultation. Good air movement.   CV: RRR, nl s1 and s2, systolic ejection murmur noted.   Extremities: Trace lower extremity edema. Some generalized edema noted in UEs.   Skin: no appreciable skin lesions/rashes on exposed skin.   Man: urine yellow - no blood seen    Data Interpretation  I have reviewed today's vital signs, medications, labs and imaging.   Herbert Lopez 7/21/2017 11:04 AM Kay Bailey 7/21/2017 12:30 PM       Data     Recent Labs  Lab 07/21/17  0652 07/20/17  1805 07/20/17  0354  07/19/17  0537   WBC 7.2 8.5 8.0  < > 8.0   HGB 7.9* 7.7* 7.8*  < > 7.9*   MCV 93 92 91  < > 92    193 166  < > 178    144 144  < > 144   POTASSIUM 5.2 5.2 5.2  < > 5.2   CHLORIDE 112* 111* 113*  < > 113*   CO2 21 24 23  < > 24   BUN 94* 97* 90*  < > 91*   CR 6.92* 6.91* 6.83*  < > 7.19*    ANIONGAP 10 8 8  < > 7   DERICK 7.7* 7.2* 7.2*  < > 7.0*   GLC 83 112* 86  < > 90   ALBUMIN  --   --   --   --  1.9*   PROTTOTAL  --   --   --   --  4.4*   BILITOTAL  --   --   --   --  0.2   ALKPHOS  --   --   --   --  51   ALT  --   --   --   --  17   AST  --   --   --   --  32   < > = values in this interval not displayed.    No results found for this or any previous visit (from the past 24 hour(s)).       Attending Attestation   This patient has been seen and evaluated by me, Ever Collins MD.  I have discussed the patient and today's care plan with the house staff team and agree with the findings and plan in this note and any edits by me are indicated above in blue.      I have reviewed today's care team notes, Medications, Vital Signs and Labs    Ever Collins MD  Med-Peds Hospitalist  Pager [phone]

## 2017-07-21 NOTE — PLAN OF CARE
Problem: Goal Outcome Summary  Goal: Goal Outcome Summary  Outcome: Therapy, progress toward functional goals as expected  OT/5A - SBA and FWW for functional mobility ~500 feet. Set up assist required to complete seated shower. CGA for transfer into shower with use of shower chair.      REC: Home with increased assist for IADLs due to cognitive deficits and safety concerns and home PT to address remaining activity tolerance and balance deficits.

## 2017-07-22 ENCOUNTER — APPOINTMENT (OUTPATIENT)
Dept: OCCUPATIONAL THERAPY | Facility: CLINIC | Age: 72
DRG: 668 | End: 2017-07-22
Attending: INTERNAL MEDICINE
Payer: MEDICARE

## 2017-07-22 ENCOUNTER — APPOINTMENT (OUTPATIENT)
Dept: PHYSICAL THERAPY | Facility: CLINIC | Age: 72
DRG: 668 | End: 2017-07-22
Attending: INTERNAL MEDICINE
Payer: MEDICARE

## 2017-07-22 LAB
ANION GAP SERPL CALCULATED.3IONS-SCNC: 8 MMOL/L (ref 3–14)
BUN SERPL-MCNC: 92 MG/DL (ref 7–30)
CALCIUM SERPL-MCNC: 7.9 MG/DL (ref 8.5–10.1)
CHLORIDE SERPL-SCNC: 111 MMOL/L (ref 94–109)
CO2 SERPL-SCNC: 24 MMOL/L (ref 20–32)
CREAT SERPL-MCNC: 6.78 MG/DL (ref 0.66–1.25)
ERYTHROCYTE [DISTWIDTH] IN BLOOD BY AUTOMATED COUNT: 15.7 % (ref 10–15)
GFR SERPL CREATININE-BSD FRML MDRD: 8 ML/MIN/1.7M2
GLUCOSE SERPL-MCNC: 78 MG/DL (ref 70–99)
HCT VFR BLD AUTO: 25.9 % (ref 40–53)
HGB BLD-MCNC: 8.1 G/DL (ref 13.3–17.7)
MCH RBC QN AUTO: 29.2 PG (ref 26.5–33)
MCHC RBC AUTO-ENTMCNC: 31.3 G/DL (ref 31.5–36.5)
MCV RBC AUTO: 94 FL (ref 78–100)
PLATELET # BLD AUTO: 226 10E9/L (ref 150–450)
POTASSIUM SERPL-SCNC: 5.4 MMOL/L (ref 3.4–5.3)
RBC # BLD AUTO: 2.77 10E12/L (ref 4.4–5.9)
SODIUM SERPL-SCNC: 143 MMOL/L (ref 133–144)
WBC # BLD AUTO: 8.6 10E9/L (ref 4–11)

## 2017-07-22 PROCEDURE — 99232 SBSQ HOSP IP/OBS MODERATE 35: CPT | Mod: GC | Performed by: INTERNAL MEDICINE

## 2017-07-22 PROCEDURE — 12000001 ZZH R&B MED SURG/OB UMMC

## 2017-07-22 PROCEDURE — 25000132 ZZH RX MED GY IP 250 OP 250 PS 637: Mod: GY | Performed by: STUDENT IN AN ORGANIZED HEALTH CARE EDUCATION/TRAINING PROGRAM

## 2017-07-22 PROCEDURE — 40000193 ZZH STATISTIC PT WARD VISIT: Performed by: REHABILITATION PRACTITIONER

## 2017-07-22 PROCEDURE — 97535 SELF CARE MNGMENT TRAINING: CPT | Mod: GO

## 2017-07-22 PROCEDURE — A9270 NON-COVERED ITEM OR SERVICE: HCPCS | Mod: GY | Performed by: STUDENT IN AN ORGANIZED HEALTH CARE EDUCATION/TRAINING PROGRAM

## 2017-07-22 PROCEDURE — 40000133 ZZH STATISTIC OT WARD VISIT

## 2017-07-22 PROCEDURE — 85027 COMPLETE CBC AUTOMATED: CPT | Performed by: STUDENT IN AN ORGANIZED HEALTH CARE EDUCATION/TRAINING PROGRAM

## 2017-07-22 PROCEDURE — 97112 NEUROMUSCULAR REEDUCATION: CPT | Mod: GP | Performed by: REHABILITATION PRACTITIONER

## 2017-07-22 PROCEDURE — 25000128 H RX IP 250 OP 636: Performed by: STUDENT IN AN ORGANIZED HEALTH CARE EDUCATION/TRAINING PROGRAM

## 2017-07-22 PROCEDURE — 97116 GAIT TRAINING THERAPY: CPT | Mod: GP | Performed by: REHABILITATION PRACTITIONER

## 2017-07-22 PROCEDURE — 36415 COLL VENOUS BLD VENIPUNCTURE: CPT | Performed by: STUDENT IN AN ORGANIZED HEALTH CARE EDUCATION/TRAINING PROGRAM

## 2017-07-22 PROCEDURE — P9047 ALBUMIN (HUMAN), 25%, 50ML: HCPCS | Performed by: STUDENT IN AN ORGANIZED HEALTH CARE EDUCATION/TRAINING PROGRAM

## 2017-07-22 PROCEDURE — 80048 BASIC METABOLIC PNL TOTAL CA: CPT | Performed by: STUDENT IN AN ORGANIZED HEALTH CARE EDUCATION/TRAINING PROGRAM

## 2017-07-22 RX ORDER — SODIUM CHLORIDE 9 MG/ML
INJECTION, SOLUTION INTRAVENOUS CONTINUOUS
Status: DISCONTINUED | OUTPATIENT
Start: 2017-07-22 | End: 2017-07-24

## 2017-07-22 RX ORDER — ALBUMIN (HUMAN) 12.5 G/50ML
100 SOLUTION INTRAVENOUS ONCE
Status: COMPLETED | OUTPATIENT
Start: 2017-07-22 | End: 2017-07-22

## 2017-07-22 RX ADMIN — ALBUMIN (HUMAN) 100 G: 12.5 SOLUTION INTRAVENOUS at 16:59

## 2017-07-22 RX ADMIN — SODIUM CHLORIDE: 9 INJECTION, SOLUTION INTRAVENOUS at 20:25

## 2017-07-22 RX ADMIN — FINASTERIDE 5 MG: 5 TABLET, FILM COATED ORAL at 08:20

## 2017-07-22 RX ADMIN — SODIUM BICARBONATE 650 MG TABLET 650 MG: at 08:20

## 2017-07-22 RX ADMIN — TAMSULOSIN HYDROCHLORIDE 0.4 MG: 0.4 CAPSULE ORAL at 08:20

## 2017-07-22 RX ADMIN — NICOTINE 1 PATCH: 21 PATCH, EXTENDED RELEASE TRANSDERMAL at 17:49

## 2017-07-22 NOTE — PLAN OF CARE
Problem: Goal Outcome Summary  Goal: Goal Outcome Summary  Outcome: Improving  Pt AO. VSS on RA. Up with SBA. Denies pain. Tolerating PO with no nausea. Man patent, good UOP noted. Reports having BM x1 this AM. Daughter visited. Albumin ordered, currently infusing. Cr 6.78. Followed by nephrology.

## 2017-07-22 NOTE — PLAN OF CARE
Problem: Goal Outcome Summary  Goal: Goal Outcome Summary  OT/5A: Pt performed standing ADL and G/H tasks with SBA.            Per plan established by the OT, the recommendation for dc location is Home with increased assist for IADLs due to cognitive deficits and safety concerns and home PT to address remaining activity tolerance and balance deficits.

## 2017-07-22 NOTE — PROGRESS NOTES
[facility] Internal Medicine Progress Note  Date: 07/22/2017  Date of admission: 7/12/2017          Assessment and Plan:   71 year old male with a history of CKD stage 3, HTN, and tobacco abuse admitted for anemia and >20 pound weight loss found to have acute renal failure secondary to postobstructive nephropathy, bladder now negative for malignancy, continuing to have elevated creatinine.     Today:  -Discontinue IV fluids  -Renal diet  -Nutrition consult     Plan:     Kidney Failure  Acute renal failure 2/2 Postobstructive nephropathy   Post-obstructive/ATN Diuresis  CKD  Non-anion gap acidosis 2/2 KRISTINA  Last creat in 2/2016 was 2.56. Creatinine on admission >14 with BUN >170. Wood placed with >4.5 L of urine output. Pt denying any urinary symptoms of issues emptying, weak stream or other difficulty urinating. Admits he was perhaps only urinating once a day. Was having post-obstructive diuresis with significant volume of bloody urine output, now urine is slowing and is yellow. Concern was for bladder vs prostate malignancy - PSA wnl and cystoscopy did not show any suspicious masses and biopsies taken were negative for malignancy.        -Follow BMP daily       -Avoid contrast and nephrotoxic agents       -Discontinue IVFs 7/20       -VS by nursing Q8H       -Renal diet - nutrition consult       -Renal consulted - signed off, can call with qs              -Supplementing with PO sodium bicarb 650 mg daily              -Keep K~4 and mag ~2              -Reconsulted nephrology on 7/20 to assist with management of fluids (pt third spacing, hypoalbuminemia, rising K)       -Urology consulted, signed off 7/20              -Cystoscopy on 7/16 - biopsies taken - negative for malignancy              -Will go home with wood, follow up with urology              -Need to provide leg bag, supplies, and teaching prior to discharge              -Flomax 0.4mg qhs              -Finasteride 5 mg daily      Severe  malnutrition  Acute on Chronic Anemia 2/2 acute blood loss from bladder, resolving  Weight loss of >20 lbs  Hgb 6.5 on admission. Retics with inadequate bone marrow response to anemia.  Weight 181 lbs 2/2016. Weight on 7/13 post-wood diuresis is 137 lbs. Likely 2/2 inability to eat much and vomiting following meals (due to size of bladder and compression). Bladder biopsies negative for malignancy. Chronic anemia likely related to CKD. Acute anemia 2/2 bladder stretch injury and wood irrigation in bladder. S/p 8 units of PRBCs, last given on 7/19.       -Follow CBC daily, continue to transfuse if <7      -Could consider epo in the future if persistently anemic      -PT/OT consulted to determine dispo - will need assist at home with home PT      Hypoalbuminemia   Was 3.1 on 7/12, reassessed on 7/19 found to be 1.9. Likely 2/2 to ongoing kidney injury and diuresis. Is third spacing lots of IV fluids. S/p 100 g IV albumin on 7/19, 7/20, and 7/21.       -Consult to nephrology     Hypocalcemia  Likely 2/2 post-obstructive/ATN diuresis. IV calcium gluconate 1 g on 7/17.           -Follow calcium      Bladder Spasms, resolved          -Consider ditropan if persistent.      Subclinical Hypothyroid  TSH 10.51, T4 free 0.70. Patient without clinical symptoms of hypothyroidism.        -Follow up outpatient with PCP for recheck      Chronic:  Hypertension      -hold amlodipine and diuretic      -hold PTA metoprolol 7/15       Tobacco abuse  Started smoking at age 12, at worst was smoking ~2 packs per day. Has cut way recently to 3-4 cigs per day.      -Continue PTA nicotine patch      -Cessation encouraged      FEN: Regular adult diet  Lines: PIV  DVT: ambulate (with anemia will hold off)  Code status: DNR/DNI  Dispo: Pending stabilization of kidney function and electrolytes as well as anemia - likely to home with assist and home PT    Clinical decision making discussed with Dr. Torres who is in agreement with the above plan.      Kay Bailey MD  MedPeds PGY-1   P: [phone]            Interval History:   No acute events overnight. Care notes reviewed.    Feeling well this morning. Started renal diet and would like to discuss diet with a nutritionist if possible.     Slept well overnight. Walking well with PT - feels he is getting stronger.      Last 24 hr care team notes reviewed.   ROS: 4 point ROS including Respiratory, CV, GI and , other than that noted in the HPI, is negative               Physical Exam:   Vitals were reviewed  /66 (BP Location: Left arm)  Pulse 85  Temp 97.3  F (36.3  C) (Oral)  Resp 18  Wt 74.4 kg (164 lb 1.6 oz)  SpO2 97%  BMI 24.23 kg/m2    Exam:  General: the patient is pleasant, resting comfortably, under weight, edematous, sitting back in bed.  HEENT: Normocephalic, atraumatic.  Lungs: Clear to auscultation. Good air movement.   CV: RRR, nl s1 and s2, systolic ejection murmur noted.   Extremities: Trace lower extremity edema. Some generalized edema noted in UEs.   Skin: no appreciable skin lesions/rashes on exposed skin.   Man: urine yellow - no blood seen           Data:   Labs:  All laboratory and imaging data in the past 24 hours reviewed.        Attending Attestation   This patient has been seen and evaluated by me, Ever Collins MD.  I have discussed the patient and today's care plan with the house staff team and agree with the findings and plan in this note and any edits by me are indicated above in blue.      I have reviewed today's care team notes, Medications, Vital Signs and Labs    Ever Collins MD  Med-Peds Hospitalist  Pager [phone]

## 2017-07-22 NOTE — PLAN OF CARE
Problem: Goal Outcome Summary  Goal: Goal Outcome Summary  Outcome: No Change  Patient quiet this evening.  No changes.  Still slight pink tinge to urine but barely detectable.  Up with assist of one. IVF infusing.  Continue to monitor kidney function.

## 2017-07-22 NOTE — PLAN OF CARE
Problem: Goal Outcome Summary  Goal: Goal Outcome Summary  Outcome: Therapy, progress toward functional goals as expected  5A PT- Pt continues to make progress towards goals. Demonstrates unsteadiness on stairs without railing, requiring close CGA for safety. Amb 300 ft with FWW, VCs for pathfinding to room. Standing balance exercises initiated, significant difficulty with SLS with frequent LOB requiring Taz to recover.      Anticipate discharge to home with occasional assist (pt notes that he will be home alone for most of day) and HHPT to progress strengthening, balance, and safe mobility pending progress with safety and impulsivity with transfers and gait.

## 2017-07-22 NOTE — PROGRESS NOTES
CLINICAL NUTRITION SERVICES    Reason for Assessment:  Renal diet education, received consult.    Diet History:  Pt reports no history of receiving renal diet nutrition education in the past. He is not on dialysis presently.   His potassium has ranged 5.2-5.4 in the past several days. He is eating well in addition to ensure plus BID, which he likes.    Nutrition Diagnosis:  Food- and nutrition-related knowledge deficit r/t no previous knowledge of renal diet AEB pt report of no previous formal renal diet education, high potassium of 5.4.    Nutrition Prescription/Recs:  Continue renal diet per providers.   Changed supplement to nepro berry and ensure clear per pt request.    Interventions:  Nutrition Education  1.  Provided verbal instruction on a renal diet   2.  Provided handouts: Controlling your potassium, Controlling your phosphorus, Foods Low in Potassium and Phosphorus,  renal menu   3.  Anticipate fair compliance.  Pt asked appropriate questions.    Goals:    1.  Pt will verbalize at least four foods high potassium and five foods high in potassium.      2.  Pt will list at least two interventions to make current meal plan closer to renal diet.     Follow-up:   Patient to ask any further nutrition-related questions before discharge.  In addition, pt may request outpatient RD appointment.    Herbert Damon RD, LD  Wkend Pager: [phone]  5A Wkday Pager: [phone]

## 2017-07-22 NOTE — PROGRESS NOTES
"  Nephrology Progress Note  07/22/2017         Assessment & Recommendations:   Dung Norton is a 71 year old year old male with a PMH of CKDIII , HTN, presented for weight loss and poor PO intake and was found to have creat of 14.4 on admission with obstructive nephropathy from BPH and bladder mass. He has been polyuric since then and creatinine improved to 6.9-7 and has been in that range wo improvement, last creat available is 2.5 from a year before admission.      # obstructive KRISTINA due to BPH and bladder mass on CKD  # polyuric  Creatinine continues to be plateaued and unclear volume status of pt, though previously creatinine improved with IVFs, including albumin. Unclear hypervolemia to drive polyuric state, c/f inappropriate UOP and thus would recommend another trial of IVFs, though didn't improve eGFR yesterday.   -Recommend 100g albumin followed by 125mL/hr of NS overnight    # hyperkalemia  Likely from KRISTINA on CKD, LR used, and prior not on low K diet, now on \"renal diet.\"   -continue renal diet   -NS as above, holding LR given it contains potassium   -recommend recheck in AM given only K 5.4    # anemia  Pt with elevated ferritin at 1209 and % sat of 19, suggesting inflammatory state and also WILVER. Ferritin elevated enough where we typically don't give iron despite low saturation. Transfusion per primary team. Hgb 8s, chronic goal 9-10 but inpt transfusion goals per primary team.     # acid-base  Serum bicarb WNL.     # BPH  # bladder mass  Urology following. Benign mass.       Recommendations were communicated to primary team via phone call.     Seen and discussed with Dr. Arthur.     Norris Cartagena MD   [phone]  Renal Fellow     Interval History :   In the last 24 hours Dung Norton without acute events. He reports drinking lots of fluids. Denies lightheadedness.       Review of Systems:   (4 pt ROS reviewed alone is not adequate unless you detail systems you reviewed)  I reviewed the " following systems:  GI: no loss of appetite. no nausea or vomiting or diarrhea.   Neuro:  no confusion  Constitutional:  no fever or chills  CV: no SOB    Physical Exam:   I/O last 3 completed shifts:  In: 1000 [P.O.:600]  Out: 2775 [Urine:2775]   /68 (BP Location: Right arm)  Pulse 79  Temp 98.9  F (37.2  C) (Oral)  Resp 16  Wt 74.4 kg (164 lb 1.6 oz)  SpO2 96%  BMI 24.23 kg/m2     GENERAL APPEARANCE: NAD  PULM: CTAB  CV: regular rhythm, normal rate, no rub  GI: soft, NTND  NEURO:  no asterixis   Ext: trace to no LE edema    Labs:   All labs reviewed by me  Electrolytes/Renal -   Recent Labs   Lab Test  07/22/17   0759  07/21/17   0652  07/20/17   1805   07/19/17   0537   07/18/17   0603   07/17/17   1118   NA  143  144  144   < >  144   < >  144   < >  141   POTASSIUM  5.4*  5.2  5.2   < >  5.2   < >  4.9   < >  4.5   CHLORIDE  111*  112*  111*   < >  113*   < >  112*   < >  109   CO2  24  21  24   < >  24   < >  22   < >  22   BUN  92*  94*  97*   < >  91*   < >  99*   < >  111*   CR  6.78*  6.92*  6.91*   < >  7.19*   < >  7.22*   < >  7.86*   GLC  78  83  112*   < >  90   < >  103*   < >  134*   DERICK  7.9*  7.7*  7.2*   < >  7.0*   < >  7.3*   < >  7.4*   MAG   --    --    --    --   1.8   --   1.9   --   1.9    < > = values in this interval not displayed.       CBC -   Recent Labs   Lab Test  07/22/17   0759  07/21/17   0652  07/20/17   1805   WBC  8.6  7.2  8.5   HGB  8.1*  7.9*  7.7*   PLT  226  192  193       LFTs -   Recent Labs   Lab Test  07/19/17   0537  07/12/17   1740  07/12/17   1407   ALKPHOS  51  67  65   BILITOTAL  0.2  0.6  0.3   ALT  17  19  20   AST  32  12  8   PROTTOTAL  4.4*  6.8  6.5*   ALBUMIN  1.9*  3.1*  3.1*       Iron Panel -   Recent Labs   Lab Test  07/21/17   0652   IRON  24*   IRONSAT  19   NO  1209*         Imaging:  None new    Current Medications:    sodium bicarbonate  650 mg Oral Daily     tamsulosin  0.4 mg Oral Daily     finasteride  5 mg Oral Daily     nicotine   1 patch Transdermal Q24H     nicotine   Transdermal Daily     nicotine   Transdermal Q8H       sodium chloride 0.9% (bag) 0 mL (07/15/17 1241)     - MEDICATION INSTRUCTIONS -       Norris Cartagena MD

## 2017-07-22 NOTE — PLAN OF CARE
Problem: Goal Outcome Summary  Goal: Goal Outcome Summary  Patient alert and oriented x4. Virals stable on room air Patient denies pain no nausea, vomiting or SOB. Man catheter patent, draining clear yellow urine. PIV patent infusing LR at 100 ml/hr. Patient slept between cares. Continue with POC

## 2017-07-23 ENCOUNTER — APPOINTMENT (OUTPATIENT)
Dept: PHYSICAL THERAPY | Facility: CLINIC | Age: 72
DRG: 668 | End: 2017-07-23
Attending: INTERNAL MEDICINE
Payer: MEDICARE

## 2017-07-23 LAB
ANION GAP SERPL CALCULATED.3IONS-SCNC: 8 MMOL/L (ref 3–14)
BUN SERPL-MCNC: 82 MG/DL (ref 7–30)
CALCIUM SERPL-MCNC: 8 MG/DL (ref 8.5–10.1)
CHLORIDE SERPL-SCNC: 111 MMOL/L (ref 94–109)
CO2 SERPL-SCNC: 22 MMOL/L (ref 20–32)
CREAT SERPL-MCNC: 6.63 MG/DL (ref 0.66–1.25)
ERYTHROCYTE [DISTWIDTH] IN BLOOD BY AUTOMATED COUNT: 15.3 % (ref 10–15)
GFR SERPL CREATININE-BSD FRML MDRD: 8 ML/MIN/1.7M2
GLUCOSE SERPL-MCNC: 105 MG/DL (ref 70–99)
HCT VFR BLD AUTO: 25.1 % (ref 40–53)
HGB BLD-MCNC: 7.8 G/DL (ref 13.3–17.7)
MCH RBC QN AUTO: 29 PG (ref 26.5–33)
MCHC RBC AUTO-ENTMCNC: 31.1 G/DL (ref 31.5–36.5)
MCV RBC AUTO: 93 FL (ref 78–100)
PLATELET # BLD AUTO: 240 10E9/L (ref 150–450)
POTASSIUM SERPL-SCNC: 4.9 MMOL/L (ref 3.4–5.3)
RBC # BLD AUTO: 2.69 10E12/L (ref 4.4–5.9)
SODIUM SERPL-SCNC: 141 MMOL/L (ref 133–144)
WBC # BLD AUTO: 8.2 10E9/L (ref 4–11)

## 2017-07-23 PROCEDURE — 36415 COLL VENOUS BLD VENIPUNCTURE: CPT | Performed by: STUDENT IN AN ORGANIZED HEALTH CARE EDUCATION/TRAINING PROGRAM

## 2017-07-23 PROCEDURE — 85027 COMPLETE CBC AUTOMATED: CPT | Performed by: STUDENT IN AN ORGANIZED HEALTH CARE EDUCATION/TRAINING PROGRAM

## 2017-07-23 PROCEDURE — 12000001 ZZH R&B MED SURG/OB UMMC

## 2017-07-23 PROCEDURE — 25000132 ZZH RX MED GY IP 250 OP 250 PS 637: Mod: GY | Performed by: STUDENT IN AN ORGANIZED HEALTH CARE EDUCATION/TRAINING PROGRAM

## 2017-07-23 PROCEDURE — 97116 GAIT TRAINING THERAPY: CPT | Mod: GP | Performed by: REHABILITATION PRACTITIONER

## 2017-07-23 PROCEDURE — 25000128 H RX IP 250 OP 636

## 2017-07-23 PROCEDURE — 99232 SBSQ HOSP IP/OBS MODERATE 35: CPT | Mod: GC | Performed by: INTERNAL MEDICINE

## 2017-07-23 PROCEDURE — 40000193 ZZH STATISTIC PT WARD VISIT: Performed by: REHABILITATION PRACTITIONER

## 2017-07-23 PROCEDURE — P9047 ALBUMIN (HUMAN), 25%, 50ML: HCPCS

## 2017-07-23 PROCEDURE — A9270 NON-COVERED ITEM OR SERVICE: HCPCS | Mod: GY | Performed by: STUDENT IN AN ORGANIZED HEALTH CARE EDUCATION/TRAINING PROGRAM

## 2017-07-23 PROCEDURE — 25000128 H RX IP 250 OP 636: Performed by: STUDENT IN AN ORGANIZED HEALTH CARE EDUCATION/TRAINING PROGRAM

## 2017-07-23 PROCEDURE — 80048 BASIC METABOLIC PNL TOTAL CA: CPT | Performed by: STUDENT IN AN ORGANIZED HEALTH CARE EDUCATION/TRAINING PROGRAM

## 2017-07-23 PROCEDURE — 97110 THERAPEUTIC EXERCISES: CPT | Mod: GP | Performed by: REHABILITATION PRACTITIONER

## 2017-07-23 RX ORDER — ALBUMIN (HUMAN) 12.5 G/50ML
100 SOLUTION INTRAVENOUS ONCE
Status: COMPLETED | OUTPATIENT
Start: 2017-07-23 | End: 2017-07-23

## 2017-07-23 RX ADMIN — TAMSULOSIN HYDROCHLORIDE 0.4 MG: 0.4 CAPSULE ORAL at 08:05

## 2017-07-23 RX ADMIN — FINASTERIDE 5 MG: 5 TABLET, FILM COATED ORAL at 08:05

## 2017-07-23 RX ADMIN — SODIUM BICARBONATE 650 MG TABLET 650 MG: at 08:05

## 2017-07-23 RX ADMIN — SODIUM CHLORIDE: 9 INJECTION, SOLUTION INTRAVENOUS at 04:17

## 2017-07-23 RX ADMIN — ALBUMIN (HUMAN) 100 G: 12.5 SOLUTION INTRAVENOUS at 12:20

## 2017-07-23 RX ADMIN — SODIUM CHLORIDE: 9 INJECTION, SOLUTION INTRAVENOUS at 12:18

## 2017-07-23 RX ADMIN — SODIUM CHLORIDE: 9 INJECTION, SOLUTION INTRAVENOUS at 20:21

## 2017-07-23 RX ADMIN — NICOTINE 1 PATCH: 21 PATCH, EXTENDED RELEASE TRANSDERMAL at 17:58

## 2017-07-23 NOTE — PLAN OF CARE
Problem: Goal Outcome Summary  Goal: Goal Outcome Summary  Outcome: No Change  Pt AO. VSS on RA. Up with SBA. Denies pain, nausea, or SOB. Tolerating renal diet. Donovan patent, good UOP. Reports BM this AM. MIVF infusing. Received albumin. Cr 6.63 today. Family visited.

## 2017-07-23 NOTE — PLAN OF CARE
Problem: Goal Outcome Summary  Goal: Goal Outcome Summary  Outcome: No Change  DNR/DNI. Pt A/Ox4, VSS and denies pain. Up with SBA to toilet, no BM. Wood catheter in place, cares done overnight. Clear output. Renal diet. R PIV infusing NS at 125/hr. Cr 6.78 (baseline around 2.5). Nephro following. Urology has signed off and is rec: F/U OP with Urology, home with wood and start meds (flomax and finasteride) for BPH. PT/OT rec home w/ assist and home PT at [place].

## 2017-07-23 NOTE — PROGRESS NOTES
"[facility], [place]    Internal Medicine Progress Note    Date of Service (when Attending saw the patient): 07/23/2017    Interval History   No acute events overnight, no pain or nausea. Slept poorly due to activity in his room. Had a \"normal\" BM, no discomfort with his wood. 850mL non-bloody output [id number], eating and drinking normally except on a renal diet.    4 point ROS negative except as noted above.    Assessment & Plan   Medical Student Note Resident Note   Assessment and Plan (Student)    Assessment:  Mr. Norton is a 70 yo male with HTN and h/o tobacco abuse who presents with acute blood loss anemia, post-obstructive acute on stage 3 chronic kidney injury, and bladder/prostate mass.      Plan:  #Acute Post-Obstructive on Chronic Kidney Disease (Stage 3) with Hypoalbuminemia  Post-obstructive etiology most likely (mass pressing on urethra, bladder), US and CT showed hydroureteronephrosis. Wood still draining substantial urine output per day, which is not unexpected given his post-obstructive state. His lab values have plateaued.  -CBI d/cd  -Follow BMP q24h  -Maintain wood catheter patency. If losing output, page urology to replace.  -PO bicarb 1300mg qD, goal > 22  -Baseline creatinine in high 2 range (last one 2.56 2/2016).  -Avoid insulting kidneys (contrast, nephrotoxins)  -Concern for volume overload (daily weights show up 25-30 lbs since admission)  -Albumin 1.9 7/19, down from 3.1 on admission  -Receiving 100mg albumin boluses several times over last several days to assess renal function. Results mixed to positive.  -Overnight received NS bolus + 100mg albumin, Cr dropped to 6.63 7/23AM. Will repeat today to assess value. Still suggests poor baseline kidney function.  -Monitor K+ (4.9 7/23 AM)  -Renal diet, avoid lactated ringers or other K+ vehicles  -Nephrology following      #Anemia  Hgb 6.5 on admission. Retic 1.1%, index is 0.2%, showing inadequate bone marrow " "response, likely due to decreased EPO production from stage 3 CKD. Renal US and CT showed debris in bladder, which are probably clots. The blood could have been from stretch injury, mass or higher in urinary tract.  -Has received 8 units PRBC total since admission. MARKED 7/23 9:38am    -Cystoscopy found no sources of acute bleeding, fulgurated several areas of \"ooze\" on the prostate.  -Follow CBC, Hgb every 24 hrs. Transfuse if under Hgb 7.0.  -Urology placed on traction with wood 7/18, d/cd traction and CBI 7/20  -Urine free of blood as of 7/23 morning rounds  -Hgb seems to be gradually improving since then, 8.1 7/22 PM but 7.8 7/23 AM, continue to follow      #Bladder mass  Visualized on 7/12 US and 7/13 CT as nodular, vascular and projecting into the bladder. Ddx: likely BPH.  -Biopsy negative for malignancy  -Started on flomax (0.4mg qd), finasteride (5mg qd) for BPH.  -Cystoscopy found no obvious infiltration into bladder.  -Will need to f/u outpatient with cystoscopy    #Cachexia, Weight Loss > 20 lbs  Has lost 20-30 lbs in last 4 months (weighed 181 with PCP 2/2016, 150 lbs 7/12, and 137 lbs s/p catheterization 7/13). Weight loss possibly due to stage 3 CKD.   -Encurage PO intake  -PT/OT recommend d/c to home with 24 hr assist when medically stable      #Depression  Patient grudgingly admits to feeling depressed with recent weakness, partial loss of independence and weight loss. Denies thoughts of self-harm or suicide.  -F/u outpatient, consider antidepressant therapy      Chronic:  #Hypertension  BP on admission 102/67.       -hold amlodipine and diuretic      -continue PTA metoprolol, normally 100mg qd but cut to 50mg qd inpatient due to low pressures      #Tobacco abuse  Started smoking at age 12, cut back recently to 3-4 cigarettes/day. Has smoked up to 2 packs per day at times.      -Continue PTA nicotine patch, helps his tremor      Diet: Snacks/Supplements Adult: Ensure Plus (Adult); Between Meals, " Renal diet  Fluids: PO  Lines: PIVx2  DVT Prophylaxis: Mechanical  Code Status: DNR / DNI      Disposition Plan: When medically stable, to home with 24 hr assist per PT/OT. Assessment and Plan (Resident)  71 year old male with a history of CKD stage 3, HTN, and tobacco abuse admitted for anemia and >20 pound weight loss found to have acute renal failure secondary to postobstructive nephropathy, bladder now negative for malignancy, continuing to have elevated creatinine.    Today:  -Discuss plan with nephrology - continue NS @ 125/hr  -100 g IV albumin  -Follow I/Os  -Q24 BMP and hgb  -Patient can ambulate with PT/OT    Acute renal failure 2/2 Postobstructive nephropathy   Post-obstructive/ATN Diuresis  CKD  Non-anion gap acidosis 2/2 KRISTINA  Creatinine on admission >14 with BUN >170. Wood placed with >4.5 L of urine output. Was having post-obstructive diuresis with significant volume of bloody urine output, now urine is slowing and is yellow. Concern was for bladder vs prostate malignancy - PSA wnl and cystoscopy did not show any suspicious masses and biopsies taken were negative for malignancy.        -Follow BMP daily       -Supplementing with PO sodium bicarb 650 mg daily              -Keep K~4 and mag ~2              -Reconsulted nephrology on 7/20 to assist with management of fluids       -Urology consulted, signed off 7/20              -Cystoscopy on 7/16 - biopsies taken - negative for malignancy              -Will go home with wood, follow up with urology              -Need to provide leg bag, supplies, and teaching prior to discharge              -Flomax 0.4mg qhs              -Finasteride 5 mg daily     Severe malnutrition  Acute on Chronic Anemia 2/2 acute blood loss from bladder, resolving  Weight loss of >20 lbs  Hgb 6.5 on admission. Retics with inadequate bone marrow response to anemia.  Weight 181 lbs 2/2016. Weight on 7/13 post-wood diuresis is 137 lbs. Likely 2/2 inability to eat much and vomiting  following meals (due to size of bladder and compression). Bladder biopsies negative for malignancy. Chronic anemia likely related to CKD. Acute anemia 2/2 bladder stretch injury and wood irrigation in bladder. S/p 8 units of PRBCs, last given on 7/19.       -Hgb daily, continue to transfuse if <7      -Could consider epo in the future if persistently anemic      Hypoalbuminemia   Was 3.1 on 7/12, reassessed on 7/19 found to be 1.9. Likely 2/2 to ongoing kidney injury and diuresis. Is third spacing lots of IV fluids. S/p 100 g IV albumin daily on 7/19 - 7/23.       -IV albumin today 100 g    Hypocalcemia: IV calcium gluconate 1 g on 7/17.  Bladder Spasms,resolved: Consider ditropan if persists   Subclinical Hypothyroid  TSH 10.51, T4 free 0.70. Patient without clinical symptoms of hypothyroidism.        -Follow up outpatient with PCP for recheck      Chronic:  Hypertension      -hold amlodipine and diuretic      -hold PTA metoprolol 7/15       Tobacco abuse  Started smoking at age 12, at worst was smoking ~2 packs per day. Has cut way recently to 3-4 cigs per day.      -Continue PTA nicotine patch      -Cessation encouraged      FEN: Renal diet  Lines: PIV  DVT: ambulate (with anemia will hold off)  Code status: DNR/DNI  Dispo: Pending stabilization of kidney function and electrolytes as well as anemia - likely to home with assist and home PT    Discussed with staff.    Migel Jones MD, MS  Internal Medicine-Pediatrics, PGY-4  [phone]      Physical Exam (Student)  /64 (BP Location: Right arm)  Pulse 68  Temp 98.6  F (37  C) (Oral)  Resp 18  Wt 78.8 kg (173 lb 11.6 oz)  SpO2 97%  BMI 25.65 kg/m2    Intake/Output Summary (Last 24 hours) at 07/23/17 0935  Last data filed at 07/23/17 0837   Gross per 24 hour   Intake          3071.67 ml   Output             2675 ml   Net           396.67 ml     Constitutional: Alert, oriented, pleasant. In no acute distress. Fairly emaciated.  Head: Normocephalic,  atraumatic.  Eyes: Conjunctivae anicteric without injection.  Nose: Nasal passages patent.  Respiratory: CTAB. No increased WOB.  Cardiovascular: RRR, systolic ejection murmur.  GI: Abdomen soft, nontender. Bowel sounds present.  Extremities: Trace edema in ankles, somewhat more severe than previously. Arms look a bit more filled out, but not obviously edematous. Tremor less noticeable after nicotine patch application.     Physical Exam (Resident)  /64 (BP Location: Right arm)  Pulse 68  Temp 98.6  F (37  C) (Oral)  Resp 18  Wt 78.8 kg (173 lb 11.6 oz)  SpO2 97%  BMI 25.65 kg/m2    Net negative 133 cc yesterday    General: NAD, pleasant  HEENT: NC/AT. EOMI  Lungs: CTAB. Good air movement.   CV: RRR, nl s1 and s2, systolic ejection murmur noted.   Extremities: Trace lower extremity edema unchanged from prior exam.   Skin: no appreciable skin lesions/rashes on exposed skin.   Man: urine yellow - no blood seen    Data Interpretation  I have reviewed today's vital signs, medications, labs and imaging.   Herbert Lopez 7/21/2017 11:04 AM Migel Jones MD, MS       Data     Recent Labs  Lab 07/23/17  0858 07/22/17  0759 07/21/17  0652  07/19/17  0537   WBC 8.2 8.6 7.2  < > 8.0   HGB 7.8* 8.1* 7.9*  < > 7.9*   MCV 93 94 93  < > 92    226 192  < > 178    143 144  < > 144   POTASSIUM 4.9 5.4* 5.2  < > 5.2   CHLORIDE 111* 111* 112*  < > 113*   CO2 22 24 21  < > 24   BUN 82* 92* 94*  < > 91*   CR 6.63* 6.78* 6.92*  < > 7.19*   ANIONGAP 8 8 10  < > 7   DERICK 8.0* 7.9* 7.7*  < > 7.0*   * 78 83  < > 90   ALBUMIN  --   --   --   --  1.9*   PROTTOTAL  --   --   --   --  4.4*   BILITOTAL  --   --   --   --  0.2   ALKPHOS  --   --   --   --  51   ALT  --   --   --   --  17   AST  --   --   --   --  32   < > = values in this interval not displayed.    No results found for this or any previous visit (from the past 24 hour(s)).       Attending Attestation   This patient has been seen and  evaluated by me, Ever Collins MD.  I have discussed the patient and today's care plan with the house staff team and agree with the findings and plan in this note and any edits by me are indicated above in blue.      I have reviewed today's care team notes, Medications, Vital Signs and Labs    Ever Collins MD  Med-Peds Hospitalist  Pager [phone]

## 2017-07-23 NOTE — PROGRESS NOTES
Nephrology Progress Note  07/23/2017         Assessment & Recommendations:   Dung Norton is a 71 year old year old male with a PMH of CKDIII , HTN, presented for weight loss and poor PO intake and was found to have creat of 14.4 on admission with obstructive nephropathy from BPH and bladder mass. He has been polyuric since then and creatinine improved to 6.9-7 and has been in that range wo improvement, last creat available is 2.5 from a year before admission.       # obstructive KRISTINA due to BPH and bladder mass on CKD  # polyuria   Slight creatinine improvement with albumin followed by mIVFs. I and O's matched. Would agree with another albumin bolus today and ongoing mIVFs.      # hyperkalemia  Resolved with renal diet and NS in place of LR.       # anemia  Pt with elevated ferritin at 1209 and % sat of 19, suggesting inflammatory state and also WILVER. Ferritin elevated enough where we typically don't give iron despite low saturation. Transfusion per primary team. Hgb 8s, chronic goal 9-10 but inpt transfusion goals per primary team.      # acid-base  Serum bicarb WNL.      # BPH  # bladder mass  Urology following. Benign mass.         Recommendations were communicated to primary team via phone call.     Seen and discussed with Dr. Arthur.     Norris Cartagena MD   [phone]  Renal Fellow    Interval History :   In the last 24 hours Dung Norton net even with IVFs, including albumin, yesterday with UOP 3.3L. Creatinine slightly improved.       Review of Systems:   (4 pt ROS reviewed alone is not adequate unless you detail systems you reviewed)  I reviewed the following systems:  GI: no change in appetite. no nausea or vomiting or diarrhea.   Neuro:  no confusion  Constitutional:  no fever or chills  CV: no dyspnea or edema.  no chest pain.    Physical Exam:   I/O last 3 completed shifts:  In: 3351.67 [P.O.:1080; I.V.:1871.67]  Out: 2675 [Urine:2675]   /64 (BP Location: Right arm)  Pulse 68  Temp  98.6  F (37  C) (Oral)  Resp 18  Wt 78.8 kg (173 lb 11.6 oz)  SpO2 97%  BMI 25.65 kg/m2     GENERAL APPEARANCE: NAD  PULM: CTAB  CV: regular rhythm, normal rate, no rub  GI: soft, NTND  NEURO:  no asterixis   Ext: trace to no LE edema    Labs:   All labs reviewed by me  Electrolytes/Renal -   Recent Labs   Lab Test  07/23/17   0858  07/22/17   0759  07/21/17   0652   07/19/17   0537   07/18/17   0603   07/17/17   1118   NA  141  143  144   < >  144   < >  144   < >  141   POTASSIUM  4.9  5.4*  5.2   < >  5.2   < >  4.9   < >  4.5   CHLORIDE  111*  111*  112*   < >  113*   < >  112*   < >  109   CO2  22  24  21   < >  24   < >  22   < >  22   BUN  82*  92*  94*   < >  91*   < >  99*   < >  111*   CR  6.63*  6.78*  6.92*   < >  7.19*   < >  7.22*   < >  7.86*   GLC  105*  78  83   < >  90   < >  103*   < >  134*   DERICK  8.0*  7.9*  7.7*   < >  7.0*   < >  7.3*   < >  7.4*   MAG   --    --    --    --   1.8   --   1.9   --   1.9    < > = values in this interval not displayed.       CBC -   Recent Labs   Lab Test  07/23/17   0858  07/22/17   0759  07/21/17   0652   WBC  8.2  8.6  7.2   HGB  7.8*  8.1*  7.9*   PLT  240  226  192       LFTs -   Recent Labs   Lab Test  07/19/17   0537  07/12/17   1740  07/12/17   1407   ALKPHOS  51  67  65   BILITOTAL  0.2  0.6  0.3   ALT  17  19  20   AST  32  12  8   PROTTOTAL  4.4*  6.8  6.5*   ALBUMIN  1.9*  3.1*  3.1*       Iron Panel -   Recent Labs   Lab Test  07/21/17   0652   IRON  24*   IRONSAT  19   NO  1209*         Imaging:  None new    Current Medications:    sodium bicarbonate  650 mg Oral Daily     tamsulosin  0.4 mg Oral Daily     finasteride  5 mg Oral Daily     nicotine  1 patch Transdermal Q24H     nicotine   Transdermal Daily     nicotine   Transdermal Q8H       NaCl 125 mL/hr at 07/23/17 1218     sodium chloride 0.9% (bag) 0 mL (07/15/17 1241)     - MEDICATION INSTRUCTIONS -       Norris Cartagena MD

## 2017-07-24 LAB
ALBUMIN SERPL-MCNC: 4 G/DL (ref 3.4–5)
ANION GAP SERPL CALCULATED.3IONS-SCNC: 12 MMOL/L (ref 3–14)
BUN SERPL-MCNC: 88 MG/DL (ref 7–30)
CALCIUM SERPL-MCNC: 7.9 MG/DL (ref 8.5–10.1)
CHLORIDE SERPL-SCNC: 111 MMOL/L (ref 94–109)
CO2 SERPL-SCNC: 20 MMOL/L (ref 20–32)
CREAT SERPL-MCNC: 6.33 MG/DL (ref 0.66–1.25)
GFR SERPL CREATININE-BSD FRML MDRD: 9 ML/MIN/1.7M2
GLUCOSE SERPL-MCNC: 82 MG/DL (ref 70–99)
HGB BLD-MCNC: 7.8 G/DL (ref 13.3–17.7)
POTASSIUM SERPL-SCNC: 5.3 MMOL/L (ref 3.4–5.3)
SODIUM SERPL-SCNC: 142 MMOL/L (ref 133–144)

## 2017-07-24 PROCEDURE — 25000128 H RX IP 250 OP 636

## 2017-07-24 PROCEDURE — 25000132 ZZH RX MED GY IP 250 OP 250 PS 637: Mod: GY | Performed by: STUDENT IN AN ORGANIZED HEALTH CARE EDUCATION/TRAINING PROGRAM

## 2017-07-24 PROCEDURE — 36415 COLL VENOUS BLD VENIPUNCTURE: CPT

## 2017-07-24 PROCEDURE — 80048 BASIC METABOLIC PNL TOTAL CA: CPT

## 2017-07-24 PROCEDURE — 12000001 ZZH R&B MED SURG/OB UMMC

## 2017-07-24 PROCEDURE — 85018 HEMOGLOBIN: CPT

## 2017-07-24 PROCEDURE — A9270 NON-COVERED ITEM OR SERVICE: HCPCS | Mod: GY | Performed by: STUDENT IN AN ORGANIZED HEALTH CARE EDUCATION/TRAINING PROGRAM

## 2017-07-24 PROCEDURE — 82040 ASSAY OF SERUM ALBUMIN: CPT

## 2017-07-24 PROCEDURE — 99232 SBSQ HOSP IP/OBS MODERATE 35: CPT | Mod: GC | Performed by: INTERNAL MEDICINE

## 2017-07-24 RX ORDER — AMLODIPINE BESYLATE 5 MG/1
5 TABLET ORAL DAILY
Status: DISCONTINUED | OUTPATIENT
Start: 2017-07-24 | End: 2017-07-25 | Stop reason: HOSPADM

## 2017-07-24 RX ADMIN — SODIUM BICARBONATE 650 MG TABLET 650 MG: at 09:27

## 2017-07-24 RX ADMIN — TAMSULOSIN HYDROCHLORIDE 0.4 MG: 0.4 CAPSULE ORAL at 09:27

## 2017-07-24 RX ADMIN — FINASTERIDE 5 MG: 5 TABLET, FILM COATED ORAL at 09:27

## 2017-07-24 RX ADMIN — NICOTINE 1 PATCH: 21 PATCH, EXTENDED RELEASE TRANSDERMAL at 18:03

## 2017-07-24 RX ADMIN — SODIUM CHLORIDE: 9 INJECTION, SOLUTION INTRAVENOUS at 04:04

## 2017-07-24 RX ADMIN — AMLODIPINE BESYLATE 5 MG: 5 TABLET ORAL at 09:27

## 2017-07-24 NOTE — PROGRESS NOTES
"[facility], [place]    Internal Medicine Progress Note    Date of Service (when Attending saw the patient): 07/24/2017    Interval History   No acute events overnight, no pain or nausea. Slept well. Had a \"soft\" BM, no discomfort with his wood. 1350mL non-bloody output [phone], eating and drinking normally except on a renal diet. +1890mL 7/24.    4 point ROS negative except as noted above.    Assessment & Plan   Medical Student Note Resident Note   Assessment and Plan (Student)    Assessment:  Mr. Norton is a 72 yo male with HTN and h/o tobacco abuse who presents with acute blood loss anemia, and post-obstructive acute on stage 3 chronic kidney injury.      Plan:  #Acute Post-Obstructive on Chronic Kidney Disease (Stage 3)  Post-obstructive etiology. US and CT showed hydroureteronephrosis. Wood still draining substantial urine output per day and his lab values have plateaued, indicating poor kidney function and little regained function.  -Follow BMP q24h  -Maintain wood catheter patency. If losing output, page urology to replace.  -PO bicarb 650mg qD, goal > 22  -Baseline creatinine in high 2 range (last one 2.56 2/2016).  -Avoid insulting kidneys (contrast, nephrotoxins)  -Concern for volume overload (daily weights show up 30 lbs since admission)-169lbs today (7/24) from 137lbs on admission after catheter drainage  -Albumin 1.9 7/19, down from 3.1 on admission  -Receiving 100mg albumin boluses several times over last several days to assess renal function. Results mixed to positive.  -Overnight received NS bolus + 100mg albumin, Cr dropped to 6.33 7/24AM. Will repeat 7/24 day.  -Monitor K+ (5.3 7/23 AM), renal diet, avoid lactated ringers or other K+ vehicles  -Nephrology following      #Anemia  Hgb 6.5 on admission. Retic 1.1%, index is 0.2%, showing inadequate bone marrow response, likely due to decreased EPO production from stage 3 CKD. Renal US and CT showed debris in bladder, " "which are probably clots. The blood could have been from stretch injury or higher in urinary tract.  -Has received 8 units PRBC total since admission. MARKED 7/24 11:32am    -Cystoscopy found no sources of acute bleeding, fulgurated several areas of \"ooze\" on the prostate.  -Follow CBC, Hgb every 24 hrs. Transfuse if under Hgb 7.0.      #Bladder mass  Visualized on 7/12 US and 7/13 CT as nodular, vascular and projecting into the bladder. Ddx: likely BPH.  -Biopsy negative for malignancy  -Started on flomax (0.4mg qd), finasteride (5mg qd)  -Cystoscopy found no obvious infiltration into bladder.  -Will need to f/u outpatient with cystoscopy  -Now suspected to be just clots from bleeding    #Cachexia, Weight Loss > 20 lbs  Has lost 20-30 lbs in last 4 months (weighed 181 with PCP 2/2016, 150 lbs 7/12, and 137 lbs s/p catheterization 7/13). Weight loss possibly due to stage 3 CKD.   -Encurage PO intake  -PT/OT recommend d/c to home with 24 hr assist when medically stable      #Depression  Patient grudgingly admits to feeling depressed with recent weakness, partial loss of independence and weight loss. Denies thoughts of self-harm or suicide.  -F/u outpatient, consider antidepressant therapy      Chronic:  #Hypertension  BP on admission 102/67.       -hold amlodipine and diuretic      -continue PTA metoprolol, normally 100mg qd but cut to 50mg qd inpatient due to low pressures      #Tobacco abuse  Started smoking at age 12, cut back recently to 3-4 cigarettes/day. Has smoked up to 2 packs per day at times.      -Continue PTA nicotine patch, helps his tremor      Diet: Snacks/Supplements Adult: Ensure Plus (Adult); Between Meals, Renal diet  Fluids: PO  Lines: PIVx2  DVT Prophylaxis: Mechanical  Code Status: DNR / DNI      Disposition Plan: When medically stable, to home with 24 hr assist per PT/OT. Assessment and Plan (Resident)  71 year old male with a history of CKD stage 3, HTN, and tobacco abuse admitted for anemia " and >20 pound weight loss found to have acute renal failure secondary to postobstructive nephropathy, bladder now negative for malignancy, continuing to have elevated creatinine.    Today:  -Discuss plan with nephrology - signing off, follow up with nephrology in 1 week. Renal panel with PCP within 1 week.  - Restart PTA amlodipine 5 mg  -Follow I/Os  -Q24 BMP and hgb  -Patient can ambulate with PT/OT  -Update family re discharge plan  -Ordered wood teaching, wood leg bag, and wood supplies  -Possible d/c tomorrow pending stable labs in AM    Acute renal failure 2/2 Postobstructive nephropathy   Post-obstructive/ATN Diuresis  CKD  Non-anion gap acidosis 2/2 KRISTINA  Creatinine on admission >14 with BUN >170. Wood placed with >4.5 L of urine output. Was having post-obstructive diuresis with significant volume of bloody urine output, now urine is slowing and is yellow. Concern was for bladder vs prostate malignancy - PSA wnl and cystoscopy did not show any suspicious masses and biopsies taken were negative for malignancy.        -Follow BMP daily       -Supplementing with PO sodium bicarb 650 mg daily              -Keep K~4 and mag ~2              -Reconsulted nephrology on 7/20 to assist with management of fluids - will sign off 7/24 - follow up outpt within one week. OK to d/c per nephrology standpoint       -Urology consulted, signed off 7/20              -Cystoscopy on 7/16 - biopsies taken - negative for malignancy              -Will go home with wood, follow up with urology              -Need to provide leg bag, supplies, and teaching prior to discharge              -Flomax 0.4mg qhs              -Finasteride 5 mg daily     Severe malnutrition  Acute on Chronic Anemia 2/2 acute blood loss from bladder, resolving  Weight loss of >20 lbs  Hgb 6.5 on admission. Retics with inadequate bone marrow response to anemia.  Weight 181 lbs 2/2016. Weight on 7/13 post-wood diuresis is 137 lbs. Likely 2/2 inability to eat  much and vomiting following meals (due to size of bladder and compression). Bladder biopsies negative for malignancy. Chronic anemia likely related to CKD. Acute anemia 2/2 bladder stretch injury and wood irrigation in bladder. S/p 8 units of PRBCs, last given on 7/19.       -Hgb daily, continue to transfuse if <7      -Could consider epo in the future if persistently anemic      Hypoalbuminemia   Was 3.1 on 7/12, reassessed on 7/19 found to be 1.9. Likely 2/2 to ongoing kidney injury and diuresis. Is third spacing lots of IV fluids. S/p 100 g IV albumin daily on 7/19 - 7/23.       -Hold off on any further IV albumin 7/24    Hypocalcemia: IV calcium gluconate 1 g on 7/17.  Bladder Spasms,resolved: Consider ditropan if persists   Subclinical Hypothyroid  TSH 10.51, T4 free 0.70. Patient without clinical symptoms of hypothyroidism.        -Follow up outpatient with PCP for recheck      Chronic:  Hypertension      -restart amlodipine 5 mg 7/24      -hold PTA metoprolol      Tobacco abuse  Started smoking at age 12, at worst was smoking ~2 packs per day. Has cut way recently to 3-4 cigs per day.      -Continue PTA nicotine patch      -Cessation encouraged      FEN: Renal diet  Lines: PIV  DVT: ambulate (with anemia will hold off)  Code status: DNR/DNI  Dispo: Pending stabilization of kidney function and electrolytes as well as anemia - likely to home with assist and home PT - possibly tomorrow pending BMP stable in the AM    Discussed with staff.    Kay Bailey MD  [facility] PGY[facility]   P: [phone]      Physical Exam (Student)  /68  Pulse 64  Temp 98.1  F (36.7  C) (Oral)  Resp 16  Wt 77 kg (169 lb 12.8 oz)  SpO2 98%  BMI 25.08 kg/m2    Intake/Output Summary (Last 24 hours) at 07/24/17 1135  Last data filed at 07/24/17 1000   Gross per 24 hour   Intake          5400.84 ml   Output             3650 ml   Net          1750.84 ml     Constitutional: Alert, oriented, pleasant. In no acute distress. Fairly  emaciated.  Head: Normocephalic, atraumatic.  Eyes: Conjunctivae anicteric without injection.  Nose: Nasal passages patent.  Respiratory: CTAB. No increased WOB.  Cardiovascular: RRR, systolic ejection murmur.  GI: Abdomen soft, nontender. Bowel sounds present.  Extremities: Trace edema in ankles. Arms look a bit more filled out, but not obviously edematous. Tremor less noticeable after nicotine patch application.     Physical Exam (Resident)  /68  Pulse 64  Temp 98.1  F (36.7  C) (Oral)  Resp 16  Wt 77 kg (169 lb 12.8 oz)  SpO2 98%  BMI 25.08 kg/m2    Net positive 1.9 L yesterday    General: NAD, pleasant  HEENT: NC/AT. EOMI  Lungs: CTAB. Good air movement.   CV: RRR, nl s1 and s2, systolic ejection murmur noted.  GI: Bowel sounds present. Nontender to exam.   Extremities: Trace lower extremity edema unchanged from prior exam.   Skin: no appreciable skin lesions/rashes on exposed skin.   Man: urine yellow - no blood seen    Data Interpretation  I have reviewed today's vital signs, medications, labs and imaging.   Herbert Abhilashduncanbernardo Lopez 7/21/2017 11:04 AM Kay Bailey MD  MedPeds PGY-1   P: [phone]       Data     Recent Labs  Lab 07/24/17  0745 07/23/17  0858 07/22/17  0759 07/21/17  0652  07/19/17  0537   WBC  --  8.2 8.6 7.2  < > 8.0   HGB 7.8* 7.8* 8.1* 7.9*  < > 7.9*   MCV  --  93 94 93  < > 92   PLT  --  240 226 192  < > 178    141 143 144  < > 144   POTASSIUM 5.3 4.9 5.4* 5.2  < > 5.2   CHLORIDE 111* 111* 111* 112*  < > 113*   CO2 20 22 24 21  < > 24   BUN 88* 82* 92* 94*  < > 91*   CR 6.33* 6.63* 6.78* 6.92*  < > 7.19*   ANIONGAP 12 8 8 10  < > 7   DERICK 7.9* 8.0* 7.9* 7.7*  < > 7.0*   GLC 82 105* 78 83  < > 90   ALBUMIN 4.0  --   --   --   --  1.9*   PROTTOTAL  --   --   --   --   --  4.4*   BILITOTAL  --   --   --   --   --  0.2   ALKPHOS  --   --   --   --   --  51   ALT  --   --   --   --   --  17   AST  --   --   --   --   --  32   < > = values in this interval not  displayed.    No results found for this or any previous visit (from the past 24 hour(s)).     Attending Attestation   This patient has been seen and evaluated by me, Ever Collins MD.  I have discussed the patient and today's care plan with the house staff team and agree with the findings and plan in this note and any edits by me are indicated above in blue.      I have reviewed today's care team notes, Medications, Vital Signs and Labs    Ever Collins MD  Med-Peds Hospitalist  Pager [phone]

## 2017-07-24 NOTE — PROGRESS NOTES
Nephrology Progress Note  07/24/2017         Assessment & Recommendations:   Dung Norton is a 71 year old year old male with a PMH of CKDIII , HTN, presented for weight loss and poor PO intake and was found to have creat of 14.4 on admission with obstructive nephropathy from BPH and bladder mass. He has been polyuric since then and creatinine improved to 6.9-7 and has been in that range wo improvement, last creat available is 2.5 from a year before admission.     todays recommendations  Will recommend renal diet with low K supplements at discharge  Outpatient nephrology follow up  DC IVF     1. KRISTINA / acute renal failure sec to Obstructive nephropathy from BPH and bladder mass  Likely baseline CKDIII from the chronic obstruction and HTN disease  Creatinine is stable in ~6 range with polyuria post obstruction  He has not shown any change with hydration and stayed stable  Will sign off for now and follow outpatient    2. Hyperkalemia  Has been stable at 5.1-5.2 in last few days  Stable  Dietary changes noted  Will recommend renal diet with low K supplements on discharge as well  Bicarb is 21 on bicarb pills     3. Volume status  Stable  Adequate PO intake    4. Metabolic status acceptable  On PO bicarb  Will continue at discharge    5. BPH and bladder mass  Urology following   Path for the mass is benign     6. Anemia  Transfuse goal<7  Will recommend iron studies    Recommendations were communicated to primary team     Seen and discussed with Dr. Jerson Colmenares MD   [phone]    Interval History :   In the last 24 hours Dung Norton has continued to be polyuric and creatinine has been plateau wo improvement in the last few days    Review of Systems:   (4 pt ROS reviewed alone is not adequate unless you detail systems you reviewed)  I reviewed the following systems:  GI: good appetite. - nausea or vomiting or diarrhea.   Neuro:  - confusion  Constitutional:  - fever or chills  CV: - dyspnea or edema.   - chest pain.    Physical Exam:   I/O last 3 completed shifts:  In: 5640.84 [P.O.:920; I.V.:4320.84]  Out: 3250 [Urine:3250]   /68  Pulse 64  Temp 98.1  F (36.7  C) (Oral)  Resp 16  Wt 77 kg (169 lb 12.8 oz)  SpO2 98%  BMI 25.08 kg/m2     GENERAL APPEARANCE: well appearing  EYES:  - scleral icterus, pupils equal  HENT: mouth without ulcers or lesions  PULM: lungs clear to auscultation,  bilaterally, equal air movement, no clubbing  CV: regular rhythm, normal rate, no rub     -JVP -     -edema -   GI: soft, non tender, nondistended, bowel sounds are +  INTEGUMENT: no cyanosis, - rash  NEURO:  - asterixis         Labs:   All labs reviewed by me  Electrolytes/Renal -   Recent Labs   Lab Test  07/24/17   0745  07/23/17   0858  07/22/17   0759   07/19/17   0537   07/18/17   0603   07/17/17   1118   NA  142  141  143   < >  144   < >  144   < >  141   POTASSIUM  5.3  4.9  5.4*   < >  5.2   < >  4.9   < >  4.5   CHLORIDE  111*  111*  111*   < >  113*   < >  112*   < >  109   CO2  20  22  24   < >  24   < >  22   < >  22   BUN  88*  82*  92*   < >  91*   < >  99*   < >  111*   CR  6.33*  6.63*  6.78*   < >  7.19*   < >  7.22*   < >  7.86*   GLC  82  105*  78   < >  90   < >  103*   < >  134*   DERICK  7.9*  8.0*  7.9*   < >  7.0*   < >  7.3*   < >  7.4*   MAG   --    --    --    --   1.8   --   1.9   --   1.9    < > = values in this interval not displayed.       CBC -   Recent Labs   Lab Test  07/24/17   0745  07/23/17   0858  07/22/17   0759  07/21/17   0652   WBC   --   8.2  8.6  7.2   HGB  7.8*  7.8*  8.1*  7.9*   PLT   --   240  226  192       LFTs -   Recent Labs   Lab Test  07/24/17   0745  07/19/17   0537  07/12/17   1740  07/12/17   1407   ALKPHOS   --   51  67  65   BILITOTAL   --   0.2  0.6  0.3   ALT   --   17 19 20   AST   --   32  12  8   PROTTOTAL   --   4.4*  6.8  6.5*   ALBUMIN  4.0  1.9*  3.1*  3.1*       Iron Panel -   Recent Labs   Lab Test  07/21/17   0652   IRON  24*   IRONSAT  19   NO   1204*         Imaging:  All imaging studies reviewed by me.     Current Medications:    amLODIPine  5 mg Oral Daily     sodium bicarbonate  650 mg Oral Daily     tamsulosin  0.4 mg Oral Daily     finasteride  5 mg Oral Daily     nicotine  1 patch Transdermal Q24H     nicotine   Transdermal Daily     nicotine   Transdermal Q8H       sodium chloride 0.9% (bag) 0 mL (07/15/17 1241)     - MEDICATION INSTRUCTIONS -       Allison Colmenares MD

## 2017-07-24 NOTE — PLAN OF CARE
Problem: Goal Outcome Summary  Goal: Goal Outcome Summary  Outcome: Improving  2202-5999: Vitals stable on room air. Restated one BP medication this AM. A/O. SBA. PIV saline locked. D/Antwon IV fluids in afternoon. Nephrology following. Planning to D/C home tomorrow if labs stable. Patient still needs wood teaching but family had wanted to be present for teaching and have not been here yet today. Yellow urine - no s/s bleeding.

## 2017-07-24 NOTE — PLAN OF CARE
Problem: Goal Outcome Summary  Goal: Goal Outcome Summary  Outcome: Improving  Pt. A/Ox4, VSS on RA. Denies pain/discomfort/nausea. Up w/ SBA. Man intact w/ good urine output. Man care completed. PIV infusing 100 mL NS. 1 BM overnight. Discharge pending on improvement of electrolytes and anemia, possible disposition to home w/ assist and home PT. Nursing will continue to monitor and follow POC.

## 2017-07-25 ENCOUNTER — APPOINTMENT (OUTPATIENT)
Dept: OCCUPATIONAL THERAPY | Facility: CLINIC | Age: 72
DRG: 668 | End: 2017-07-25
Attending: INTERNAL MEDICINE
Payer: MEDICARE

## 2017-07-25 ENCOUNTER — APPOINTMENT (OUTPATIENT)
Dept: PHYSICAL THERAPY | Facility: CLINIC | Age: 72
DRG: 668 | End: 2017-07-25
Attending: INTERNAL MEDICINE
Payer: MEDICARE

## 2017-07-25 LAB
ANION GAP SERPL CALCULATED.3IONS-SCNC: 11 MMOL/L (ref 3–14)
BUN SERPL-MCNC: 91 MG/DL (ref 7–30)
CALCIUM SERPL-MCNC: 8.2 MG/DL (ref 8.5–10.1)
CHLORIDE SERPL-SCNC: 111 MMOL/L (ref 94–109)
CO2 SERPL-SCNC: 19 MMOL/L (ref 20–32)
CREAT SERPL-MCNC: 6.25 MG/DL (ref 0.66–1.25)
ERYTHROCYTE [DISTWIDTH] IN BLOOD BY AUTOMATED COUNT: 15 % (ref 10–15)
GFR SERPL CREATININE-BSD FRML MDRD: 9 ML/MIN/1.7M2
GLUCOSE SERPL-MCNC: 74 MG/DL (ref 70–99)
HCT VFR BLD AUTO: 24 % (ref 40–53)
HGB BLD-MCNC: 7.5 G/DL (ref 13.3–17.7)
MCH RBC QN AUTO: 28.8 PG (ref 26.5–33)
MCHC RBC AUTO-ENTMCNC: 31.3 G/DL (ref 31.5–36.5)
MCV RBC AUTO: 92 FL (ref 78–100)
PLATELET # BLD AUTO: 253 10E9/L (ref 150–450)
POTASSIUM SERPL-SCNC: 4.6 MMOL/L (ref 3.4–5.3)
RBC # BLD AUTO: 2.6 10E12/L (ref 4.4–5.9)
SODIUM SERPL-SCNC: 142 MMOL/L (ref 133–144)
WBC # BLD AUTO: 5.8 10E9/L (ref 4–11)

## 2017-07-25 PROCEDURE — 25000132 ZZH RX MED GY IP 250 OP 250 PS 637: Mod: GY | Performed by: STUDENT IN AN ORGANIZED HEALTH CARE EDUCATION/TRAINING PROGRAM

## 2017-07-25 PROCEDURE — 85027 COMPLETE CBC AUTOMATED: CPT | Performed by: STUDENT IN AN ORGANIZED HEALTH CARE EDUCATION/TRAINING PROGRAM

## 2017-07-25 PROCEDURE — 40000133 ZZH STATISTIC OT WARD VISIT

## 2017-07-25 PROCEDURE — 97116 GAIT TRAINING THERAPY: CPT | Mod: GP | Performed by: REHABILITATION PRACTITIONER

## 2017-07-25 PROCEDURE — 97535 SELF CARE MNGMENT TRAINING: CPT | Mod: GO

## 2017-07-25 PROCEDURE — 97530 THERAPEUTIC ACTIVITIES: CPT | Mod: GO

## 2017-07-25 PROCEDURE — A9270 NON-COVERED ITEM OR SERVICE: HCPCS | Mod: GY | Performed by: STUDENT IN AN ORGANIZED HEALTH CARE EDUCATION/TRAINING PROGRAM

## 2017-07-25 PROCEDURE — 99238 HOSP IP/OBS DSCHRG MGMT 30/<: CPT | Mod: GC | Performed by: INTERNAL MEDICINE

## 2017-07-25 PROCEDURE — 36415 COLL VENOUS BLD VENIPUNCTURE: CPT | Performed by: STUDENT IN AN ORGANIZED HEALTH CARE EDUCATION/TRAINING PROGRAM

## 2017-07-25 PROCEDURE — 80048 BASIC METABOLIC PNL TOTAL CA: CPT | Performed by: STUDENT IN AN ORGANIZED HEALTH CARE EDUCATION/TRAINING PROGRAM

## 2017-07-25 PROCEDURE — 40000193 ZZH STATISTIC PT WARD VISIT: Performed by: REHABILITATION PRACTITIONER

## 2017-07-25 RX ORDER — TAMSULOSIN HYDROCHLORIDE 0.4 MG/1
0.4 CAPSULE ORAL DAILY
Qty: 30 CAPSULE | Refills: 3 | Status: SHIPPED | OUTPATIENT
Start: 2017-07-25 | End: 2017-08-16

## 2017-07-25 RX ORDER — NICOTINE 21 MG/24HR
1 PATCH, TRANSDERMAL 24 HOURS TRANSDERMAL EVERY 24 HOURS
Qty: 30 PATCH | Refills: 3 | Status: SHIPPED | OUTPATIENT
Start: 2017-07-25 | End: 2017-08-16

## 2017-07-25 RX ORDER — FINASTERIDE 5 MG/1
5 TABLET, FILM COATED ORAL DAILY
Qty: 30 TABLET | Refills: 3 | Status: SHIPPED | OUTPATIENT
Start: 2017-07-25 | End: 2017-08-16

## 2017-07-25 RX ORDER — AMLODIPINE BESYLATE 5 MG/1
5 TABLET ORAL DAILY
Qty: 30 TABLET | Refills: 0 | Status: SHIPPED | OUTPATIENT
Start: 2017-07-25 | End: 2017-08-16

## 2017-07-25 RX ORDER — SODIUM BICARBONATE 650 MG/1
650 TABLET ORAL DAILY
Qty: 30 TABLET | Refills: 0 | Status: SHIPPED | OUTPATIENT
Start: 2017-07-25 | End: 2017-08-16

## 2017-07-25 RX ADMIN — SODIUM BICARBONATE 650 MG TABLET 650 MG: at 08:39

## 2017-07-25 RX ADMIN — FINASTERIDE 5 MG: 5 TABLET, FILM COATED ORAL at 08:39

## 2017-07-25 RX ADMIN — TAMSULOSIN HYDROCHLORIDE 0.4 MG: 0.4 CAPSULE ORAL at 08:39

## 2017-07-25 RX ADMIN — AMLODIPINE BESYLATE 5 MG: 5 TABLET ORAL at 08:39

## 2017-07-25 NOTE — PLAN OF CARE
Problem: Goal Outcome Summary  Goal: Goal Outcome Summary  Outcome: Therapy, progress toward functional goals as expected  5A PT- Pt amb 500+ ft (400 ft FWW, 100 ft SEC), SBA-CGA with FWW, 2 LOB with SEC requiring Taz to recover balance, therefore strongly recommend use of FWW at all times upon discharge to home with pt in agreement.      Anticipate discharge later this afternoon to home with occasional assist (pt notes that he will be home alone for most of day; daughter will be able to assist occasionally and will be providing transportation), HHPT to progress strengthening, balance, and safe mobility pending progress with safety and impulsivity with transfers and gait, and use of FWW at all times in home and outside for safety.

## 2017-07-25 NOTE — PLAN OF CARE
Problem: Goal Outcome Summary  Goal: Goal Outcome Summary  Outcome: Therapy, progress toward functional goals as expected  OT/5A - Pt correctly and independently answers 6/10 recall questions and requires assist for 4/10 recall questions due to difficulty with short term memory.  SBA for functional mobility ~500 feet with FWW and SBA, no LOB noted.      REC: Home with increased assist for IADLs due to cognitive deficits (medication management) and safety concerns. Pt would benefit from home PT to address remaining activity tolerance and balance deficits.

## 2017-07-25 NOTE — PLAN OF CARE
Problem: Goal Outcome Summary  Goal: Goal Outcome Summary  Outcome: Improving  Blood pressure 132/71, pulse 64, temperature 98  F (36.7  C), temperature source Oral, resp. rate 15, weight 77 kg (169 lb 12.8 oz), SpO2 97 %.    Pt is A&Ox4, able to make needs known. SBA with transfers. PIV saline locked. Pt denies pain. Planning to discharge home today if labs stable. Man is intact, yellow urinary output, no bleeding. Pt has been resting appropriately. Will continue to assess per POC.

## 2017-07-25 NOTE — PROGRESS NOTES
Care Coordinator- Discharge Planning     Admission Date/Time:  7/12/2017  Attending MD:  Demetrius Collins MD     Data  Date of initial CC assessment:  7/14/17  Chart reviewed, discussed with interdisciplinary team.   Patient was admitted for:   1. CKD (chronic kidney disease) stage 3, GFR 30-59 ml/min         Assessment  Full assessment completed in previous note    Coordination of Care and Referrals: Provided patient/family with options for discharge.  Patient medically ready to discharge home today. HC notified. Daughter to provide transportation home.      Plan  Anticipated Discharge Date:  today  Anticipated Discharge Plan:  Home with home care        Mindy Hughes RN

## 2017-07-26 ENCOUNTER — CARE COORDINATION (OUTPATIENT)
Dept: CARE COORDINATION | Facility: CLINIC | Age: 72
End: 2017-07-26

## 2017-07-26 ENCOUNTER — TELEPHONE (OUTPATIENT)
Dept: FAMILY MEDICINE | Facility: CLINIC | Age: 72
End: 2017-07-26

## 2017-07-26 VITALS
HEART RATE: 70 BPM | WEIGHT: 168.9 LBS | RESPIRATION RATE: 16 BRPM | BODY MASS INDEX: 24.94 KG/M2 | SYSTOLIC BLOOD PRESSURE: 128 MMHG | DIASTOLIC BLOOD PRESSURE: 70 MMHG | TEMPERATURE: 98.1 F | OXYGEN SATURATION: 100 %

## 2017-07-26 NOTE — PLAN OF CARE
Problem: Goal Outcome Summary  Goal: Goal Outcome Summary  Outcome: Adequate for Discharge Date Met:  07/26/17  Physical Therapy Discharge Summary  Reason for discharge:   Discharge from hospital to home with assist and home PT.   Progress towards goals: See goals on Care Plan in [facility] electronic health record for goal details.  Goals partially met; CGA for gait and stairs. Barriers to achieving goals: decreased strength and balance, impulsivity.  Recommendation(s):   Continued therapy is recommended. Rationale/Recommendations: Discharge to home with occasional assist (pt notes that he will be home alone for most of day, daughter will be able to assist occasionally and will be providing transportation), HHPT to progress strengthening, balance, safe mobility, and address unmet goals; use of FWW at all times in home and outside for safety.

## 2017-07-26 NOTE — PROGRESS NOTES
Patient has clinic visit within 24-72 hours of Discharge so no post DC follow up call is needed          Jul 28, 2017  2:00 PM CDT   Office Visit with COY Atkinson CNP   [facility]     [address] [address]   [phone]         Follow-up Appointments           Follow Up and recommended labs and tests         Follow up with primary care provider, Haley Baker, this week (by Friday), for hospital follow- up.

## 2017-07-26 NOTE — TELEPHONE ENCOUNTER
This patient was discharged from [facility] on 7/25/2017.    Discharge Diagnosis:Ckd (Chronic Kidney Disease) Stage 3, Gfr 30-59 Ml/Min     A follow-up visit has been scheduled.   Next 5 appointments (look out 90 days)     Jul 28, 2017  2:00 PM CDT   [facility]    [address] [id number] [phone]              Number of ED/ER visits in the last 12 months:       Please follow-up with patient.    Bess Salomon,

## 2017-07-26 NOTE — PLAN OF CARE
Problem: Goal Outcome Summary  Goal: Goal Outcome Summary  Outcome: Adequate for Discharge Date Met:  07/25/17  Vitals stable. A/O. SBA with walker. Patient changed to leg bag for D/C. Teaching completed with patient and family for wood cares/maintanence. PIV removed. Discharge teaching completed with patient and daughter. Verbalized understanding. PT had obtained walker for patient. Patient dressed and packed belongings with assist of daughter. Family took patient via WC to discharge pharmacy and are transporting home.

## 2017-07-26 NOTE — PLAN OF CARE
Problem: Goal Outcome Summary  Goal: Goal Outcome Summary  Occupational Therapy Discharge Summary     Reason for therapy discharge:    Discharged to home with home therapy.     Progress towards therapy goal(s). See goals on Care Plan in [facility] electronic health record for goal details.  Goals partially met.  Barriers to achieving goals:   discharge from facility.     Therapy recommendation(s):    Continued therapy is recommended.  Rationale/Recommendations:  Decreased IND in ADLs and endurance for ADL tolerance.

## 2017-07-26 NOTE — TELEPHONE ENCOUNTER
ED / Discharge Outreach Protocol    Patient Contact    Attempt # 1    Was call answered?  No.  Left message on voicemail with information to call me back.      Nikki VALVERDE, RN, BSN

## 2017-07-28 ENCOUNTER — OFFICE VISIT (OUTPATIENT)
Dept: FAMILY MEDICINE | Facility: CLINIC | Age: 72
End: 2017-07-28
Payer: COMMERCIAL

## 2017-07-28 VITALS
TEMPERATURE: 97.5 F | OXYGEN SATURATION: 93 % | SYSTOLIC BLOOD PRESSURE: 120 MMHG | WEIGHT: 165 LBS | HEIGHT: 69 IN | DIASTOLIC BLOOD PRESSURE: 73 MMHG | HEART RATE: 81 BPM | BODY MASS INDEX: 24.44 KG/M2

## 2017-07-28 DIAGNOSIS — Z74.09 IMPAIRED MOBILITY: ICD-10-CM

## 2017-07-28 DIAGNOSIS — R91.8 PULMONARY NODULES: ICD-10-CM

## 2017-07-28 DIAGNOSIS — N40.0 BENIGN PROSTATIC HYPERPLASIA, PRESENCE OF LOWER URINARY TRACT SYMPTOMS UNSPECIFIED: ICD-10-CM

## 2017-07-28 DIAGNOSIS — D64.9 SYMPTOMATIC ANEMIA: ICD-10-CM

## 2017-07-28 DIAGNOSIS — N17.9 ACUTE RENAL FAILURE, UNSPECIFIED ACUTE RENAL FAILURE TYPE (H): ICD-10-CM

## 2017-07-28 DIAGNOSIS — Z09 HOSPITAL DISCHARGE FOLLOW-UP: Primary | ICD-10-CM

## 2017-07-28 LAB
SODIUM SERPL-SCNC: NORMAL MMOL/L (ref 133–144)
WBC # BLD AUTO: NORMAL 10E9/L (ref 4–11)

## 2017-07-28 PROCEDURE — 99214 OFFICE O/P EST MOD 30 MIN: CPT | Performed by: NURSE PRACTITIONER

## 2017-07-28 ASSESSMENT — PAIN SCALES - GENERAL: PAINLEVEL: NO PAIN (0)

## 2017-07-28 NOTE — MR AVS SNAPSHOT
After Visit Summary   7/28/2017    Dung Norton    MRN: [id number]           Patient Information     Date Of Birth          1945        Visit Information        Provider Department      7/28/2017 2:00 PM Nohemy Rogers APRN [facility]        Today's Diagnoses     Hospital discharge follow-up    -  1    Acute renal failure, unspecified acute renal failure type (H)        Symptomatic anemia        Benign prostatic hyperplasia, presence of lower urinary tract symptoms unspecified        Pulmonary nodules        Impaired mobility           Follow-ups after your visit        Additional Services     [facility] PT, HAND, AND CHIROPRACTIC REFERRAL       === This order will print in the [facility] Scheduling  Office ===    Physical therapy, hand therapy and chiropractic care are available through:    [facility]  [facility]    Call one easy number to schedule at any of the above locations:  [phone].    Your provider has referred you to Physical Therapy at [facility] or [facility]    Indication/Reason for Referral: Impaired mobility secondary to recent prolonged hospitalization and malnutrition  Onset of Illness:  Acute on chronic  Therapy Orders:  Evaluate and Treat  Special Programs:  None  Special Request:  Derrick Tony      Additional Comments for the therapist or chiropractor:      Please be aware that coverage of these services is subject to the terms and limitations of your health insurance plan.  Call member services at your health plan with any benefit or coverage questions.      Please bring the following to your appointment:    >>   Your personal calendar for scheduling future appointments  >>   Comfortable clothing                  Your next 10 appointments already scheduled     Aug 04, 2017  2:00 PM CDT   Lab with  LAB    [facility] ([facility])    [address] [place] [address]  "[facility] [address]   [phone]            Aug 04, 2017  3:00 PM CDT   (Arrive by 2:30 PM)   Return Visit with Oralia De La Torre NP   [facility]    [address] [facility]  [address]rd [facility] [address] [phone]              Who to contact     If you have questions or need follow up information about today's clinic visit or your schedule please contact [facility] [place] directly at [phone].  Normal or non-critical lab and imaging results will be communicated to you by Guerillappshart, letter or phone within 4 business days after the clinic has received the results. If you do not hear from us within 7 days, please contact the clinic through Guerillappshart or phone. If you have a critical or abnormal lab result, we will notify you by phone as soon as possible.  Submit refill requests through NetDragon or call your pharmacy and they will forward the refill request to us. Please allow 3 business days for your refill to be completed.          Additional Information About Your Visit        MyChart Information     NetDragon lets you send messages to your doctor, view your test results, renew your prescriptions, schedule appointments and more. To sign up, go to [email or web address] . Click on \"Log in\" on the left side of the screen, which will take you to the Welcome page. Then click on \"Sign up Now\" on the right side of the page.     You will be asked to enter the access code listed below, as well as some personal information. Please follow the directions to create your username and password.     Your access code is: [email or web address]  Expires: 10/10/2017  2:21 PM     Your access code will  in 90 days. If you need help or a new code, please call your [facility] or [phone].        Care EveryWhere ID     This is your Care EveryWhere ID. This could be used by other organizations to access your [place] medical records  [id number]        Your " "Vitals Were     Pulse Temperature Height Pulse Oximetry BMI (Body Mass Index)       81 97.5  F (36.4  C) (Oral) 5' 9\" (1.753 m) 93% 24.37 kg/m2        Blood Pressure from Last 3 Encounters:   07/28/17 120/73   07/25/17 128/70   07/12/17 106/70    Weight from Last 3 Encounters:   07/28/17 165 lb (74.8 kg)   07/25/17 168 lb 14.4 oz (76.6 kg)   07/12/17 151 lb (68.5 kg)              We Performed the Following     Basic metabolic panel     CBC with platelets differential     [facility] PT, HAND, AND CHIROPRACTIC REFERRAL        Primary Care Provider Office Phone # Fax #    Haley Baker [phone]       [address] [address]        Equal Access to Services     [facility] : Hadii aad ku hadasho Soomaali, waaxda luqadaha, qaybta kaalmada adeegyada, gibson valenzuelain hayjefen paul green . So [facility] [phone].    ATENCIÓN: Si habla español, tiene a montague disposición servicios gratuitos de asistencia lingüística. Llame al [phone].    We comply with applicable federal civil rights laws and [place] laws. We do not discriminate on the basis of race, color, national origin, age, disability sex, sexual orientation or gender identity.            Thank you!     Thank you for choosing [facility]  for your care. Our goal is always to provide you with excellent care. Hearing back from our patients is one way we can continue to improve our services. Please take a few minutes to complete the written survey that you may receive in the mail after your visit with us. Thank you!             Your Updated Medication List - Protect others around you: Learn how to safely use, store and throw away your medicines at www.disposemymeds.org.          This list is accurate as of: 7/28/17  3:09 PM.  Always use your most recent med list.                   Brand Name Dispense Instructions for use Diagnosis    amLODIPine 5 MG tablet    NORVASC    30 tablet    Take 1 tablet (5 " mg) by mouth daily    Hypertension goal BP (blood pressure) < 140/90       aspirin 81 MG tablet      Take 1 tablet by mouth daily.        finasteride 5 MG tablet    PROSCAR    30 tablet    Take 1 tablet (5 mg) by mouth daily    Benign prostatic hyperplasia with urinary retention       fish oil-omega-3 fatty acids 1000 MG capsule      Take 1 g by mouth daily        MULTIVITAMIN MEN PO      Take 1 tablet by mouth daily        nicotine 21 MG/24HR 24 hr patch    NICODERM CQ    30 patch    Place 1 patch onto the skin every 24 hours    Tobacco abuse       sodium bicarbonate 650 MG tablet     30 tablet    Take 1 tablet (650 mg) by mouth daily    Metabolic acidosis, NAG, bicarbonate losses       tamsulosin 0.4 MG capsule    FLOMAX    30 capsule    Take 1 capsule (0.4 mg) by mouth daily    Benign prostatic hyperplasia with urinary retention

## 2017-07-28 NOTE — NURSING NOTE
"Chief Complaint   Patient presents with     Hospital F/U       Initial /73  Pulse 81  Temp 97.5  F (36.4  C) (Oral)  Ht 5' 9\" (1.753 m)  Wt 165 lb (74.8 kg)  SpO2 93%  BMI 24.37 kg/m2 Estimated body mass index is 24.37 kg/(m^2) as calculated from the following:    Height as of this encounter: 5' 9\" (1.753 m).    Weight as of this encounter: 165 lb (74.8 kg).  Medication Reconciliation: complete       Peyton Jensen CMA      "

## 2017-07-28 NOTE — PROGRESS NOTES
SUBJECTIVE:                                                    Dung Norton is a 71 year old male who presents to clinic today for the following health issues:          Hospital Follow-up Visit:    Hospital/Nursing Home/IP Rehab Facility: [facility]  Date of Admission: 07/12/17  Date of Discharge: 07/25/17  Reason(s) for Admission: Anemia, Acute Kidney Failure            Problems taking medications regularly:  None       Medication changes since discharge: yes       Problems adhering to non-medication therapy:  None    Summary of hospitalization:  [facility] discharge summary reviewed  Diagnostic Tests/Treatments reviewed.  Follow up needed: CBC and BMP today  Other Healthcare Providers Involved in Patient s Care:         Homecare, Specialist appointment - Nephrology in 1 week, Urology in 2 weeks and Physical Therapy  Update since discharge: improved.     Post Discharge Medication Reconciliation: discharge medications reconciled, continue medications without change.  Plan of care communicated with patient     Coding guidelines for this visit:  Type of Medical   Decision Making Face-to-Face Visit       within 7 Days of discharge Face-to-Face Visit        within 14 days of discharge   Moderate Complexity [id number]   High Complexity 35611 [id number]          Urine is clear and Man draining well.  Hasn't smoked since discharge.   Energy levels improving. Strength improving and weight up- using walker when up at nighttime.    Problem list and histories reviewed & adjusted, as indicated.  Additional history: as documented    Patient Active Problem List   Diagnosis     Tobacco abuse     CARDIOVASCULAR SCREENING; LDL GOAL LESS THAN 130     Hypertension goal BP (blood pressure) < 140/90     Advanced directives, counseling/discussion     Elevated fasting glucose     Hypertriglyceridemia     CKD (chronic kidney disease) stage 3, GFR 30-59 ml/min     Symptomatic anemia     Pulmonary nodules      "Past Surgical History:   Procedure Laterality Date     APPENDECTOMY  AGE 6     CYSTOSCOPY, FULGURATE BLEEDERS, EVACUATE CLOT(S), COMBINED N/A 7/16/2017    Procedure: COMBINED CYSTOSCOPY, FULGURATE BLEEDERS, EVACUATE CLOT(S);  Cystoscopy clot evacuation, bladder biopsy and fulguration (per surgeons note) NERVE BLOCK PERIPHERAL;  Surgeon: Tamiko Vanegas MD;  Location: [facility] OR     SINUS SURGERY  AGE 6     TONSILLECTOMY      CHILDHOOD       Social History   Substance Use Topics     Smoking status: Current Every Day Smoker     Packs/day: 0.25     Types: Cigarettes     Smokeless tobacco: Never Used      Comment: 3 cig a day     Alcohol use Yes      Comment: 2 per year     Family History   Problem Relation Age of Onset     C.A.D. Mother      d age 67     Vision Loss Father      Hearing Loss Sister      DIABETES Sister      CANCER No family hx of              Reviewed and updated as needed this visit by clinical staffTobacco  Allergies  Meds  Med Hx  Surg Hx  Fam Hx  Soc Hx      Reviewed and updated as needed this visit by Provider         ROS:  Constitutional, cardiovascular, pulmonary, GI, , musculoskeletal, neuro, skin systems are negative, except as otherwise noted.      OBJECTIVE:   /73  Pulse 81  Temp 97.5  F (36.4  C) (Oral)  Ht 5' 9\" (1.753 m)  Wt 165 lb (74.8 kg)  SpO2 93%  BMI 24.37 kg/m2  Body mass index is 24.37 kg/(m^2).  GENERAL: healthy, alert and no distress  RESP: lungs clear to auscultation - no rales, rhonchi or wheezes  CV: regular rate and rhythm, normal S1 S2, no S3 or S4, no murmur, click or rub, 1+ peripheral edema and peripheral pulses strong  ABDOMEN: soft, nontender, no hepatosplenomegaly, no masses and bowel sounds normal  : Urine clear in Man  MS: no gross musculoskeletal defects noted, no edema  SKIN: pallor, no suspicious lesions or rashes  NEURO: Normal strength and tone, mentation intact and speech normal  PSYCH: mentation appears normal, affect " normal/bright    Diagnostic Test Results:  Results for orders placed or performed in visit on 07/28/17 (from the past 24 hour(s))   Basic metabolic panel   Result Value Ref Range    Sodium  133 - 144 mmol/L     Canceled, Test credited   Unable to obtain specimen  Patient will report to [place] location on 7/29/2017 to collect specimens.   Future orders entered.  Notification of test cancellation was given to  VIANEY Todd on 7/28/2017 at 15:48 by [facility].     CBC with platelets differential   Result Value Ref Range    WBC  4.0 - 11.0 10e9/L     Canceled, Test credited   Unable to obtain specimen  Patient will report to [place] location on 7/29/2017 to collect specimens.   Future orders entered.  Notification of test cancellation was given to  VIANEY Todd on 7/28/2017 at 15:48 by [facility].         ASSESSMENT/PLAN:       1. Hospital discharge follow-up      2. Acute renal failure, unspecified acute renal failure type (H)  Needs labs today, follow up with nephrology next week.  - Basic metabolic panel  - CBC with platelets differential  - Basic metabolic panel; Future  - CBC with platelets differential; Future    3. Symptomatic anemia  Needs labs today, follow up with nephrology next week.  - CBC with platelets differential  - CBC with platelets differential; Future    4. Benign prostatic hyperplasia, presence of lower urinary tract symptoms unspecified  Will follow up with Urology    5. Pulmonary nodules  Needs repeat imaging in 12 months    6. Impaired mobility  Was told he does not qualify for home Physical Therapy, patient agreeable to outpatient Physical Therapy  - [facility] PT, HAND, AND CHIROPRACTIC REFERRAL    Follow up with Nephrology and Urology    CO[facility]

## 2017-07-29 DIAGNOSIS — D64.9 SYMPTOMATIC ANEMIA: ICD-10-CM

## 2017-07-29 DIAGNOSIS — N17.9 ACUTE RENAL FAILURE, UNSPECIFIED ACUTE RENAL FAILURE TYPE (H): ICD-10-CM

## 2017-07-29 LAB
BASOPHILS # BLD AUTO: 0.1 10E9/L (ref 0–0.2)
BASOPHILS NFR BLD AUTO: 1.2 %
DIFFERENTIAL METHOD BLD: ABNORMAL
EOSINOPHIL # BLD AUTO: 0.7 10E9/L (ref 0–0.7)
EOSINOPHIL NFR BLD AUTO: 10.3 %
ERYTHROCYTE [DISTWIDTH] IN BLOOD BY AUTOMATED COUNT: 14.4 % (ref 10–15)
HCT VFR BLD AUTO: 24.5 % (ref 40–53)
HGB BLD-MCNC: 7.5 G/DL (ref 13.3–17.7)
LYMPHOCYTES # BLD AUTO: 1 10E9/L (ref 0.8–5.3)
LYMPHOCYTES NFR BLD AUTO: 15.5 %
MCH RBC QN AUTO: 29.2 PG (ref 26.5–33)
MCHC RBC AUTO-ENTMCNC: 30.6 G/DL (ref 31.5–36.5)
MCV RBC AUTO: 95 FL (ref 78–100)
MONOCYTES # BLD AUTO: 0.6 10E9/L (ref 0–1.3)
MONOCYTES NFR BLD AUTO: 8.8 %
NEUTROPHILS # BLD AUTO: 4.2 10E9/L (ref 1.6–8.3)
NEUTROPHILS NFR BLD AUTO: 64.2 %
PLATELET # BLD AUTO: 332 10E9/L (ref 150–450)
RBC # BLD AUTO: 2.57 10E12/L (ref 4.4–5.9)
WBC # BLD AUTO: 6.6 10E9/L (ref 4–11)

## 2017-07-29 PROCEDURE — 80048 BASIC METABOLIC PNL TOTAL CA: CPT | Performed by: NURSE PRACTITIONER

## 2017-07-29 PROCEDURE — 85025 COMPLETE CBC W/AUTO DIFF WBC: CPT | Performed by: NURSE PRACTITIONER

## 2017-07-29 PROCEDURE — 36415 COLL VENOUS BLD VENIPUNCTURE: CPT | Performed by: NURSE PRACTITIONER

## 2017-07-29 NOTE — LETTER
[address] [place] [address]    August 1, 2017    Dung Norton  [address] [facility] [address] [facility] [address]  [place] [address]          Dear Dung,    Your kidney function and anemia are stable since your discharge from the hospital. Please follow up with your Nephrologist (kidney doctor) as planned    Enclosed is a copy of your results.     Results for orders placed or performed in visit on 07/29/17   Basic metabolic panel   Result Value Ref Range    Sodium 142 133 - 144 mmol/L    Potassium 5.4 (H) 3.4 - 5.3 mmol/L    Chloride 109 94 - 109 mmol/L    Carbon Dioxide 22 20 - 32 mmol/L    Anion Gap 11 3 - 14 mmol/L    Glucose 86 70 - 99 mg/dL    Urea Nitrogen 89 (H) 7 - 30 mg/dL    Creatinine 6.43 (H) 0.66 - 1.25 mg/dL    GFR Estimate 9 (L) >60 mL/min/1.7m2    GFR Estimate If Black 10 (L) >60 mL/min/1.7m2    Calcium 8.7 8.5 - 10.1 mg/dL   CBC with platelets differential   Result Value Ref Range    WBC 6.6 4.0 - 11.0 10e9/L    RBC Count 2.57 (L) 4.4 - 5.9 10e12/L    Hemoglobin 7.5 (L) 13.3 - 17.7 g/dL    Hematocrit 24.5 (L) 40.0 - 53.0 %    MCV 95 78 - 100 fl    MCH 29.2 26.5 - 33.0 pg    MCHC 30.6 (L) 31.5 - 36.5 g/dL    RDW 14.4 10.0 - 15.0 %    Platelet Count 332 150 - 450 10e9/L    Diff Method Automated Method     % Neutrophils 64.2 %    % Lymphocytes 15.5 %    % Monocytes 8.8 %    % Eosinophils 10.3 %    % Basophils 1.2 %    Absolute Neutrophil 4.2 1.6 - 8.3 10e9/L    Absolute Lymphocytes 1.0 0.8 - 5.3 10e9/L    Absolute Monocytes 0.6 0.0 - 1.3 10e9/L    Absolute Eosinophils 0.7 0.0 - 0.7 10e9/L    Absolute Basophils 0.1 0.0 - 0.2 10e9/L       If you have any questions or concerns, please call myself or my nurse at [phone].      Sincerely,        Nohemy Rogers, APRN CNP /LOMBARDO

## 2017-07-31 LAB
ANION GAP SERPL CALCULATED.3IONS-SCNC: 11 MMOL/L (ref 3–14)
BUN SERPL-MCNC: 89 MG/DL (ref 7–30)
CALCIUM SERPL-MCNC: 8.7 MG/DL (ref 8.5–10.1)
CHLORIDE SERPL-SCNC: 109 MMOL/L (ref 94–109)
CO2 SERPL-SCNC: 22 MMOL/L (ref 20–32)
CREAT SERPL-MCNC: 6.43 MG/DL (ref 0.66–1.25)
GFR SERPL CREATININE-BSD FRML MDRD: 9 ML/MIN/1.7M2
GLUCOSE SERPL-MCNC: 86 MG/DL (ref 70–99)
POTASSIUM SERPL-SCNC: 5.4 MMOL/L (ref 3.4–5.3)
SODIUM SERPL-SCNC: 142 MMOL/L (ref 133–144)

## 2017-07-31 NOTE — PROGRESS NOTES
Please mail letter:    Your kidney function and anemia are stable since your discharge from the hospital. Please follow up with your Nephrologist (kidney doctor) as planned.    Nohemy Rogers, CNP

## 2017-07-31 NOTE — TELEPHONE ENCOUNTER
ED / Discharge Outreach Protocol    Patient Contact    Attempt # 2    Was call answered?  No.  Left message on voicemail with information to call me back.    Nikki VALVERDE, RN, BSN

## 2017-08-02 DIAGNOSIS — N18.30 CKD (CHRONIC KIDNEY DISEASE) STAGE 3, GFR 30-59 ML/MIN (H): Primary | ICD-10-CM

## 2017-08-04 ENCOUNTER — OFFICE VISIT (OUTPATIENT)
Dept: NEPHROLOGY | Facility: CLINIC | Age: 72
End: 2017-08-04
Payer: MEDICARE

## 2017-08-04 VITALS
DIASTOLIC BLOOD PRESSURE: 75 MMHG | SYSTOLIC BLOOD PRESSURE: 135 MMHG | OXYGEN SATURATION: 100 % | HEART RATE: 79 BPM | WEIGHT: 151.6 LBS | BODY MASS INDEX: 22.45 KG/M2 | TEMPERATURE: 98.2 F | HEIGHT: 69 IN

## 2017-08-04 DIAGNOSIS — N17.9 ACUTE KIDNEY INJURY (H): ICD-10-CM

## 2017-08-04 DIAGNOSIS — E87.5 HYPERKALEMIA: ICD-10-CM

## 2017-08-04 DIAGNOSIS — I10 BENIGN ESSENTIAL HYPERTENSION: ICD-10-CM

## 2017-08-04 DIAGNOSIS — N18.5 ANEMIA OF CHRONIC RENAL FAILURE, STAGE 5 (H): ICD-10-CM

## 2017-08-04 DIAGNOSIS — D63.1 ANEMIA OF CHRONIC RENAL FAILURE, STAGE 5 (H): ICD-10-CM

## 2017-08-04 DIAGNOSIS — N18.30 CKD (CHRONIC KIDNEY DISEASE) STAGE 3, GFR 30-59 ML/MIN (H): ICD-10-CM

## 2017-08-04 DIAGNOSIS — N18.5 CKD (CHRONIC KIDNEY DISEASE) STAGE 5, GFR LESS THAN 15 ML/MIN (H): Primary | ICD-10-CM

## 2017-08-04 LAB
ALBUMIN SERPL-MCNC: 4.1 G/DL (ref 3.4–5)
ANION GAP SERPL CALCULATED.3IONS-SCNC: 11 MMOL/L (ref 3–14)
BUN SERPL-MCNC: 91 MG/DL (ref 7–30)
CALCIUM SERPL-MCNC: 8.6 MG/DL (ref 8.5–10.1)
CHLORIDE SERPL-SCNC: 109 MMOL/L (ref 94–109)
CO2 SERPL-SCNC: 20 MMOL/L (ref 20–32)
CREAT SERPL-MCNC: 5.98 MG/DL (ref 0.66–1.25)
CREAT UR-MCNC: 55 MG/DL
ERYTHROCYTE [DISTWIDTH] IN BLOOD BY AUTOMATED COUNT: 14.7 % (ref 10–15)
GFR SERPL CREATININE-BSD FRML MDRD: 9 ML/MIN/1.7M2
GLUCOSE SERPL-MCNC: 100 MG/DL (ref 70–99)
HCT VFR BLD AUTO: 26.8 % (ref 40–53)
HGB BLD-MCNC: 8.3 G/DL (ref 13.3–17.7)
MCH RBC QN AUTO: 29.6 PG (ref 26.5–33)
MCHC RBC AUTO-ENTMCNC: 31 G/DL (ref 31.5–36.5)
MCV RBC AUTO: 96 FL (ref 78–100)
PHOSPHATE SERPL-MCNC: 5.6 MG/DL (ref 2.5–4.5)
PLATELET # BLD AUTO: 348 10E9/L (ref 150–450)
POTASSIUM SERPL-SCNC: 5.4 MMOL/L (ref 3.4–5.3)
PROT UR-MCNC: 1.56 G/L
PROT/CREAT 24H UR: 2.85 G/G CR (ref 0–0.2)
RBC # BLD AUTO: 2.8 10E12/L (ref 4.4–5.9)
SODIUM SERPL-SCNC: 140 MMOL/L (ref 133–144)
WBC # BLD AUTO: 8.3 10E9/L (ref 4–11)

## 2017-08-04 PROCEDURE — 36415 COLL VENOUS BLD VENIPUNCTURE: CPT

## 2017-08-04 PROCEDURE — 99213 OFFICE O/P EST LOW 20 MIN: CPT | Mod: ZF

## 2017-08-04 PROCEDURE — 84156 ASSAY OF PROTEIN URINE: CPT

## 2017-08-04 PROCEDURE — 80069 RENAL FUNCTION PANEL: CPT

## 2017-08-04 PROCEDURE — 85027 COMPLETE CBC AUTOMATED: CPT

## 2017-08-04 RX ORDER — FUROSEMIDE 20 MG
20 TABLET ORAL DAILY
Qty: 90 TABLET | Refills: 1 | Status: SHIPPED | OUTPATIENT
Start: 2017-08-04 | End: 2017-08-08

## 2017-08-04 ASSESSMENT — PAIN SCALES - GENERAL: PAINLEVEL: NO PAIN (0)

## 2017-08-04 NOTE — LETTER
8/4/2017      RE: Dung Norton  [address]  [place] [address]       Nephrology Clinic Visit 8/4/17    Assessment and Plan:     1. KRISTINA - Patient has had slow improvement of his KRISTINA, but it is unclear how long his creat had been so elevated. He feels good, no uremic symptoms and other than his borderline hyperkalemia has no indications to begin dialysis. We did discuss dialysis today and he would be willing if needed.    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 This is likely inaccurate given ongoing KRISTINA. Would recheck when stabilized    2. Electrolytes - Mild hyperkalemia with K 5.4   - Will begin Lasix 20 mg qd   - Reviewed Potassium foods to avoid    3. Volume status - Mild hypervolemia with edema. No dyspnea. Weight today 68.8 kg, down from 77 kg on 7/24/17. Albumin 4.1. B/P 130's/ x 3. Unclear where his ideal body weight would be.    - Begin Lasix 20 mg qd for treatment of hypervolemia and hyperkalemia    4. HTN - Well controlled on Amlodipine 5 mg qd. Previously also on Toprol  mg qd and HCTZ 25 mg qd. Currently holding both. Beginning Lasix    5. BPH/bladder mass - Has wood. To see Urology in 2 wks. Currently on Flomax and Proscar    6. Acid base - Borderline metabolic acidosis 2/2 KRISTINA. Bicarb is 20. On low dose Bicarb   - Continue current dose 650 mg qd for now    7. BMD - Ca 8.6, Phos 5.6, albumin 4.1   - Check PTH, Vit D    8. Nutrition - Has good appetite. Albumin is normal.     9. Anemia - Hgb 8.3, was 13.3 2016. Was 7 range during hospital admission. He received RBCs on 7/19/17. He has had no colon cancer screening.   Will continue to monitor for now given rise in Hgb since discharge.     10. Disposition - RTC in 2 wks for f/u    Assessment and plan was discussed with patient and he voiced his  "understanding and agreement.    Reason for Visit:  Post hospital KRISTINA follow up    HPI:  Dung Norton is a 71 year old year old male with a PMH of CKDIII , HTN, presented for weight loss and poor PO intake and was found to have creat of 14.4 on admission with obstructive nephropathy from BPH and bladder mass. Bladder mass benign. Creat declined to 6.3 on day of discharge on 7/24/17 but has not improved significantly since. Patient has wood, is living at home. He is tolerating diet.   Review of his renal function is sparse. He had normal renal function into the early 2015. A single creat was 2.5 on 2/24/16 and then upon admission was 14.4. He does have HTN, no diabetes. He is in clinic today with his daughter    ROS:  Major concern is \"what can I eat?\" He reports that he feels good. He is living independently. Has good appetite. No dyspnea, chest pain, abdominal pain. Has wood.     Chronic Medical Problems:    HTN  BPH  Obstructive uropathy   HTN  Tobacco abuse  HLD  Anemia  Pulmonary nodules  KRISTINA  CKD    Personal Hx:   Social History     Social History     Marital status:      Spouse name: N/A     Number of children: 2     Years of education: N/A     Occupational History     [employer]     Social History Main Topics     Smoking status: Current Every Day Smoker     Packs/day: 0.25     Types: Cigarettes     Smokeless tobacco: Never Used      Comment: 3 cig a day     Alcohol use Yes      Comment: 2 per year     Drug use: No     Sexual activity: Not Currently     Other Topics Concern     Not on file     Social History Narrative       Allergies:  No Known Allergies    Medications:  Prior to Admission medications    Medication Sig Start Date End Date Taking? Authorizing Provider   furosemide (LASIX) 20 MG tablet Take 1 tablet (20 mg) by mouth daily 8/4/17  Yes Oralia De La Torre NP   sodium bicarbonate 650 MG tablet Take 1 tablet (650 mg) by mouth daily 7/25/17  Yes Mary Avelar, " "MD   amLODIPine (NORVASC) 5 MG tablet Take 1 tablet (5 mg) by mouth daily 7/25/17  Yes Mary Avelar MD   finasteride (PROSCAR) 5 MG tablet Take 1 tablet (5 mg) by mouth daily 7/25/17  Yes Mary Avelar MD   tamsulosin (FLOMAX) 0.4 MG capsule Take 1 capsule (0.4 mg) by mouth daily 7/25/17  Yes Mary Avelar MD   nicotine (NICODERM CQ) 21 MG/24HR 24 hr patch Place 1 patch onto the skin every 24 hours 7/25/17  Yes Mary Avelar MD   Multiple Vitamins-Minerals (MULTIVITAMIN MEN PO) Take 1 tablet by mouth daily   Yes Reported, Patient   fish oil-omega-3 fatty acids (FISH OIL) 1000 MG capsule Take 1 g by mouth daily   Yes Reported, Patient   aspirin 81 MG tablet Take 1 tablet by mouth daily.   Yes Reported, Patient       Vitals:  /75  Pulse 79  Temp 98.2  F (36.8  C) (Oral)  Ht 1.753 m (5' 9\")  Wt 68.8 kg (151 lb 9.6 oz)  SpO2 100%  BMI 22.39 kg/m2    Exam:  GEN: Frail male in NAD. Well groomed. Dtr present  CARDIAC RRR  LUNGS: CTA  ABDOMEN: Soft, NT  : Man present  EXT: 1+ edema    Results:    Results for FABRIZIO MIRANDA (MRN [id number]) as of 8/5/2017 14:28   Ref. Range 8/4/2017 13:53 8/4/2017 13:59   Sodium Latest Ref Range: 133 - 144 mmol/L 140    Potassium Latest Ref Range: 3.4 - 5.3 mmol/L 5.4 (H)    Chloride Latest Ref Range: 94 - 109 mmol/L 109    Carbon Dioxide Latest Ref Range: 20 - 32 mmol/L 20    Urea Nitrogen Latest Ref Range: 7 - 30 mg/dL 91 (H)    Creatinine Latest Ref Range: 0.66 - 1.25 mg/dL 5.98 (H)    GFR Estimate Latest Ref Range: >60 mL/min/1.7m2 9 (L)    GFR Estimate If Black Latest Ref Range: >60 mL/min/1.7m2 11 (L)    Calcium Latest Ref Range: 8.5 - 10.1 mg/dL 8.6    Anion Gap Latest Ref Range: 3 - 14 mmol/L 11    Phosphorus Latest Ref Range: 2.5 - 4.5 mg/dL 5.6 (H)    Albumin Latest Ref Range: 3.4 - 5.0 g/dL 4.1    Creatinine Urine Latest Units: mg/dL  55   Protein Random Urine Latest Units: g/L  1.56   Protein Total " Urine g/gr Creatinine Latest Ref Range: 0 - 0.2 g/g Cr  2.85 (H)   Glucose Latest Ref Range: 70 - 99 mg/dL 100 (H)    WBC Latest Ref Range: 4.0 - 11.0 10e9/L 8.3    Hemoglobin Latest Ref Range: 13.3 - 17.7 g/dL 8.3 (L)    Hematocrit Latest Ref Range: 40.0 - 53.0 % 26.8 (L)    Platelet Count Latest Ref Range: 150 - 450 10e9/L 348    RBC Count Latest Ref Range: 4.4 - 5.9 10e12/L 2.80 (L)    MCV Latest Ref Range: 78 - 100 fl 96    MCH Latest Ref Range: 26.5 - 33.0 pg 29.6    MCHC Latest Ref Range: 31.5 - 36.5 g/dL 31.0 (L)    RDW Latest Ref Range: 10.0 - 15.0 % 14.7      7/16/17  SPECIMEN(S):   Bladder, biopsy     FINAL DIAGNOSIS:   Bladder, biopsy:   -Urothelial mucosa with lamina propria edema   -Detached fragment of blood and fibrin   -Negative for malignancy        Oralia De La Torre, NP

## 2017-08-04 NOTE — MR AVS SNAPSHOT
After Visit Summary   8/4/2017    Dung Norton    MRN: [id number]           Patient Information     Date Of Birth          1945        Visit Information        Provider Department      8/4/2017 3:00 PM Oralia De La Torre NP [facility]        Today's Diagnoses     CKD (chronic kidney disease) stage 5, GFR less than 15 ml/min (H)    -  1    Acute kidney injury (H)        Benign essential hypertension        Hyperkalemia           Follow-ups after your visit        Follow-up notes from your care team     Return in about 2 weeks (around 8/18/2017).      Your next 10 appointments already scheduled     Aug 11, 2017  4:00 PM CDT   MAY Extremity with Girish Warren, PT   [facility] (MAY Ajay)    [address] [facility] [address]  [facility] [address]   [phone]            Aug 16, 2017  3:30 PM CDT   (Arrive by 3:00 PM)   Return Visit with Oralia De La Torre NP   [facility] ([facility])    [address] [place] [id number] [phone]            Aug 18, 2017  2:40 PM CDT   MAY Extremity with Girish Warren, PT   [facility] PT (MAY Ajay)    [address] [facility] [address]  [facility] [address]   [phone]              Who to contact     If you have questions or need follow up information about today's clinic visit or your schedule please contact [facility] directly at [phone].  Normal or non-critical lab and imaging results will be communicated to you by MyChart, letter or phone within 4 business days after the clinic has received the results. If you do not hear from us within 7 days, please contact the clinic through MyChart or phone. If you have a critical or abnormal lab result, we will notify you by phone as soon as possible.  Submit refill requests through [employer] or call your pharmacy and they will forward the refill request to us. Please allow 3 business days for your refill to be  "completed.          Additional Information About Your Visit        [employer] Information     [employer] lets you send messages to your doctor, view your test results, renew your prescriptions, schedule appointments and more. To sign up, go to [email or web address] . Click on \"Log in\" on the left side of the screen, which will take you to the Welcome page. Then click on \"Sign up Now\" on the right side of the page.     You will be asked to enter the access code listed below, as well as some personal information. Please follow the directions to create your username and password.     Your access code is: [email or web address]  Expires: 10/10/2017  2:21 PM     Your access code will  in 90 days. If you need help or a new code, please call your [facility] clinic or [phone].        Care EveryWhere ID     This is your Care EveryWhere ID. This could be used by other organizations to access your [facility] medical records  [id number]        Your Vitals Were     Pulse Temperature Height Pulse Oximetry BMI (Body Mass Index)       79 98.2  F (36.8  C) (Oral) 1.753 m (5' 9\") 100% 22.39 kg/m2        Blood Pressure from Last 3 Encounters:   17 135/75   17 120/73   17 128/70    Weight from Last 3 Encounters:   17 68.8 kg (151 lb 9.6 oz)   17 74.8 kg (165 lb)   17 76.6 kg (168 lb 14.4 oz)              Today, you had the following     No orders found for display         Today's Medication Changes          These changes are accurate as of: 17  4:02 PM.  If you have any questions, ask your nurse or doctor.               Start taking these medicines.        Dose/Directions    furosemide 20 MG tablet   Commonly known as:  LASIX   Used for:  CKD (chronic kidney disease) stage 5, GFR less than 15 ml/min (H)   Started by:  Oralia De La Torre, INGE        Dose:  20 mg   Take 1 tablet (20 mg) by mouth daily   Quantity:  90 tablet   Refills:  1            Where to get your medicines      Some of these will " need a paper prescription and others can be bought over the counter.  Ask your nurse if you have questions.     Bring a paper prescription for each of these medications     furosemide 20 MG tablet                Primary Care Provider Office Phone # Fax #    Haley Baker [phone]       [facility] [address] [place] [address]        Equal Access to Services     [facility] : Hadii aad ku hadasho Soomaali, waaxda luqadaha, qaybta kaalmada adeegyada, waxay idiin hayaan adeeg kharash la'aan ah. So [facility] [phone].    ATENCIÓN: Si habla español, tiene a montague disposición servicios gratuitos de asistencia lingüística. Noéame al [phone].    We comply with applicable federal civil rights laws and [place] laws. We do not discriminate on the basis of race, color, national origin, age, disability sex, sexual orientation or gender identity.            Thank you!     Thank you for choosing [facility]  for your care. Our goal is always to provide you with excellent care. Hearing back from our patients is one way we can continue to improve our services. Please take a few minutes to complete the written survey that you may receive in the mail after your visit with us. Thank you!             Your Updated Medication List - Protect others around you: Learn how to safely use, store and throw away your medicines at www.disposemymeds.org.          This list is accurate as of: 8/4/17  4:02 PM.  Always use your most recent med list.                   Brand Name Dispense Instructions for use Diagnosis    amLODIPine 5 MG tablet    NORVASC    30 tablet    Take 1 tablet (5 mg) by mouth daily    Hypertension goal BP (blood pressure) < 140/90       aspirin 81 MG tablet      Take 1 tablet by mouth daily.        finasteride 5 MG tablet    PROSCAR    30 tablet    Take 1 tablet (5 mg) by mouth daily    Benign prostatic hyperplasia with urinary retention       fish oil-omega-3 fatty acids 1000  MG capsule      Take 1 g by mouth daily        furosemide 20 MG tablet    LASIX    90 tablet    Take 1 tablet (20 mg) by mouth daily    CKD (chronic kidney disease) stage 5, GFR less than 15 ml/min (H)       MULTIVITAMIN MEN PO      Take 1 tablet by mouth daily        nicotine 21 MG/24HR 24 hr patch    NICODERM CQ    30 patch    Place 1 patch onto the skin every 24 hours    Tobacco abuse       sodium bicarbonate 650 MG tablet     30 tablet    Take 1 tablet (650 mg) by mouth daily    Metabolic acidosis, NAG, bicarbonate losses       tamsulosin 0.4 MG capsule    FLOMAX    30 capsule    Take 1 capsule (0.4 mg) by mouth daily    Benign prostatic hyperplasia with urinary retention

## 2017-08-04 NOTE — NURSING NOTE
"Chief Complaint   Patient presents with     RECHECK     Follow up CKD stage 3.       Initial /75  Pulse 79  Temp 98.2  F (36.8  C) (Oral)  Ht 1.753 m (5' 9\")  Wt 68.8 kg (151 lb 9.6 oz)  SpO2 100%  BMI 22.39 kg/m2 Estimated body mass index is 22.39 kg/(m^2) as calculated from the following:    Height as of this encounter: 1.753 m (5' 9\").    Weight as of this encounter: 68.8 kg (151 lb 9.6 oz).  Medication Reconciliation: complete.  Peyton Calles., CMA    "

## 2017-08-05 NOTE — PROGRESS NOTES
Nephrology Clinic Visit 8/4/17    Assessment and Plan:     1. KRISTINA - Patient has had slow improvement of his KRISTINA, but it is unclear how long his creat had been so elevated. He feels good, no uremic symptoms and other than his borderline hyperkalemia has no indications to begin dialysis. We did discuss dialysis today and he would be willing if needed.    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 This is likely inaccurate given ongoing KRISTINA. Would recheck when stabilized    2. Electrolytes - Mild hyperkalemia with K 5.4   - Will begin Lasix 20 mg qd   - Reviewed Potassium foods to avoid    3. Volume status - Mild hypervolemia with edema. No dyspnea. Weight today 68.8 kg, down from 77 kg on 7/24/17. Albumin 4.1. B/P 130's/ x 3. Unclear where his ideal body weight would be.    - Begin Lasix 20 mg qd for treatment of hypervolemia and hyperkalemia    4. HTN - Well controlled on Amlodipine 5 mg qd. Previously also on Toprol  mg qd and HCTZ 25 mg qd. Currently holding both. Beginning Lasix    5. BPH/bladder mass - Has wood. To see Urology in 2 wks. Currently on Flomax and Proscar    6. Acid base - Borderline metabolic acidosis 2/2 KRISTINA. Bicarb is 20. On low dose Bicarb   - Continue current dose 650 mg qd for now    7. BMD - Ca 8.6, Phos 5.6, albumin 4.1   - Check PTH, Vit D    8. Nutrition - Has good appetite. Albumin is normal.     9. Anemia - Hgb 8.3, was 13.3 2016. Was 7 range during hospital admission. He received RBCs on 7/19/17. He has had no colon cancer screening.   Will continue to monitor for now given rise in Hgb since discharge.     10. Disposition - RTC in 2 wks for f/u    Assessment and plan was discussed with patient and he voiced his understanding and agreement.    Reason for Visit:  Post hospital KRISTINA follow  "up    HPI:  Dung Norton is a 71 year old year old male with a PMH of CKDIII , HTN, presented for weight loss and poor PO intake and was found to have creat of 14.4 on admission with obstructive nephropathy from BPH and bladder mass. Bladder mass benign. Creat declined to 6.3 on day of discharge on 7/24/17 but has not improved significantly since. Patient has wood, is living at home. He is tolerating diet.   Review of his renal function is sparse. He had normal renal function into the early 2015. A single creat was 2.5 on 2/24/16 and then upon admission was 14.4. He does have HTN, no diabetes. He is in clinic today with his daughter    ROS:  Major concern is \"what can I eat?\" He reports that he feels good. He is living independently. Has good appetite. No dyspnea, chest pain, abdominal pain. Has wood.     Chronic Medical Problems:    HTN  BPH  Obstructive uropathy   HTN  Tobacco abuse  HLD  Anemia  Pulmonary nodules  KRISTINA  CKD    Personal Hx:   Social History     Social History     Marital status:      Spouse name: N/A     Number of children: 2     Years of education: N/A     Occupational History     Conform[employer]     Social History Main Topics     Smoking status: Current Every Day Smoker     Packs/day: 0.25     Types: Cigarettes     Smokeless tobacco: Never Used      Comment: 3 cig a day     Alcohol use Yes      Comment: 2 per year     Drug use: No     Sexual activity: Not Currently     Other Topics Concern     Not on file     Social History Narrative       Allergies:  No Known Allergies    Medications:  Prior to Admission medications    Medication Sig Start Date End Date Taking? Authorizing Provider   furosemide (LASIX) 20 MG tablet Take 1 tablet (20 mg) by mouth daily 8/4/17  Yes Oralia De La Torre NP   sodium bicarbonate 650 MG tablet Take 1 tablet (650 mg) by mouth daily 7/25/17  Yes Mary Avelar MD   amLODIPine (NORVASC) 5 MG tablet Take 1 tablet (5 mg) by mouth daily " "7/25/17  Yes Mary Avelar MD   finasteride (PROSCAR) 5 MG tablet Take 1 tablet (5 mg) by mouth daily 7/25/17  Yes Mary Avelar MD   tamsulosin (FLOMAX) 0.4 MG capsule Take 1 capsule (0.4 mg) by mouth daily 7/25/17  Yes Mary Avelar MD   nicotine (NICODERM CQ) 21 MG/24HR 24 hr patch Place 1 patch onto the skin every 24 hours 7/25/17  Yes Mary Avelar MD   Multiple Vitamins-Minerals (MULTIVITAMIN MEN PO) Take 1 tablet by mouth daily   Yes Reported, Patient   fish oil-omega-3 fatty acids (FISH OIL) 1000 MG capsule Take 1 g by mouth daily   Yes Reported, Patient   aspirin 81 MG tablet Take 1 tablet by mouth daily.   Yes Reported, Patient       Vitals:  /75  Pulse 79  Temp 98.2  F (36.8  C) (Oral)  Ht 1.753 m (5' 9\")  Wt 68.8 kg (151 lb 9.6 oz)  SpO2 100%  BMI 22.39 kg/m2    Exam:  GEN: Frail male in NAD. Well groomed. Dtr present  CARDIAC RRR  LUNGS: CTA  ABDOMEN: Soft, NT  : Man present  EXT: 1+ edema    Results:    Results for FABRIZIO MIRANDA (MRN [id number]) as of 8/5/2017 14:28   Ref. Range 8/4/2017 13:53 8/4/2017 13:59   Sodium Latest Ref Range: 133 - 144 mmol/L 140    Potassium Latest Ref Range: 3.4 - 5.3 mmol/L 5.4 (H)    Chloride Latest Ref Range: 94 - 109 mmol/L 109    Carbon Dioxide Latest Ref Range: 20 - 32 mmol/L 20    Urea Nitrogen Latest Ref Range: 7 - 30 mg/dL 91 (H)    Creatinine Latest Ref Range: 0.66 - 1.25 mg/dL 5.98 (H)    GFR Estimate Latest Ref Range: >60 mL/min/1.7m2 9 (L)    GFR Estimate If Black Latest Ref Range: >60 mL/min/1.7m2 11 (L)    Calcium Latest Ref Range: 8.5 - 10.1 mg/dL 8.6    Anion Gap Latest Ref Range: 3 - 14 mmol/L 11    Phosphorus Latest Ref Range: 2.5 - 4.5 mg/dL 5.6 (H)    Albumin Latest Ref Range: 3.4 - 5.0 g/dL 4.1    Creatinine Urine Latest Units: mg/dL  55   Protein Random Urine Latest Units: g/L  1.56   Protein Total Urine g/gr Creatinine Latest Ref Range: 0 - 0.2 g/g Cr  2.85 (H)   Glucose " Latest Ref Range: 70 - 99 mg/dL 100 (H)    WBC Latest Ref Range: 4.0 - 11.0 10e9/L 8.3    Hemoglobin Latest Ref Range: 13.3 - 17.7 g/dL 8.3 (L)    Hematocrit Latest Ref Range: 40.0 - 53.0 % 26.8 (L)    Platelet Count Latest Ref Range: 150 - 450 10e9/L 348    RBC Count Latest Ref Range: 4.4 - 5.9 10e12/L 2.80 (L)    MCV Latest Ref Range: 78 - 100 fl 96    MCH Latest Ref Range: 26.5 - 33.0 pg 29.6    MCHC Latest Ref Range: 31.5 - 36.5 g/dL 31.0 (L)    RDW Latest Ref Range: 10.0 - 15.0 % 14.7      7/16/17  SPECIMEN(S):   Bladder, biopsy     FINAL DIAGNOSIS:   Bladder, biopsy:   -Urothelial mucosa with lamina propria edema   -Detached fragment of blood and fibrin   -Negative for malignancy

## 2017-08-08 ENCOUNTER — TELEPHONE (OUTPATIENT)
Dept: NEPHROLOGY | Facility: CLINIC | Age: 72
End: 2017-08-08

## 2017-08-08 ENCOUNTER — PRE VISIT (OUTPATIENT)
Dept: UROLOGY | Facility: CLINIC | Age: 72
End: 2017-08-08

## 2017-08-08 DIAGNOSIS — N18.5 CKD (CHRONIC KIDNEY DISEASE) STAGE 5, GFR LESS THAN 15 ML/MIN (H): ICD-10-CM

## 2017-08-08 DIAGNOSIS — D63.1 ANEMIA OF CHRONIC RENAL FAILURE, STAGE 5 (H): ICD-10-CM

## 2017-08-08 DIAGNOSIS — N18.5 ANEMIA OF CHRONIC RENAL FAILURE, STAGE 5 (H): ICD-10-CM

## 2017-08-08 LAB
ALBUMIN SERPL-MCNC: 4.3 G/DL (ref 3.4–5)
ANION GAP SERPL CALCULATED.3IONS-SCNC: 11 MMOL/L (ref 3–14)
BUN SERPL-MCNC: 119 MG/DL (ref 7–30)
CALCIUM SERPL-MCNC: 8.8 MG/DL (ref 8.5–10.1)
CHLORIDE SERPL-SCNC: 108 MMOL/L (ref 94–109)
CO2 SERPL-SCNC: 21 MMOL/L (ref 20–32)
CREAT SERPL-MCNC: 6.01 MG/DL (ref 0.66–1.25)
FERRITIN SERPL-MCNC: 679 NG/ML (ref 26–388)
GFR SERPL CREATININE-BSD FRML MDRD: 9 ML/MIN/1.7M2
GLUCOSE SERPL-MCNC: 122 MG/DL (ref 70–99)
IRON SATN MFR SERPL: 32 % (ref 15–46)
IRON SERPL-MCNC: 54 UG/DL (ref 35–180)
PHOSPHATE SERPL-MCNC: 6.5 MG/DL (ref 2.5–4.5)
POTASSIUM SERPL-SCNC: 5.4 MMOL/L (ref 3.4–5.3)
PTH-INTACT SERPL-MCNC: 120 PG/ML (ref 12–72)
SODIUM SERPL-SCNC: 140 MMOL/L (ref 133–144)
TIBC SERPL-MCNC: 167 UG/DL (ref 240–430)
TRANSFERRIN SERPL-MCNC: 144 MG/DL (ref 210–360)

## 2017-08-08 PROCEDURE — 82728 ASSAY OF FERRITIN: CPT

## 2017-08-08 PROCEDURE — 82306 VITAMIN D 25 HYDROXY: CPT

## 2017-08-08 PROCEDURE — 84466 ASSAY OF TRANSFERRIN: CPT

## 2017-08-08 PROCEDURE — 36415 COLL VENOUS BLD VENIPUNCTURE: CPT

## 2017-08-08 PROCEDURE — 80069 RENAL FUNCTION PANEL: CPT

## 2017-08-08 PROCEDURE — 83970 ASSAY OF PARATHORMONE: CPT

## 2017-08-08 PROCEDURE — 83540 ASSAY OF IRON: CPT

## 2017-08-08 RX ORDER — FUROSEMIDE 20 MG
20 TABLET ORAL 2 TIMES DAILY
Qty: 180 TABLET | Refills: 1 | COMMUNITY
Start: 2017-08-08 | End: 2017-08-09

## 2017-08-08 NOTE — TELEPHONE ENCOUNTER
Received call from Triage regarding critical value Cr 6.0, . Reviewed with Cecilia De La Torre NP, labs are about the same, no new changes. Would like patient to start lasix 20 mg BID. Call to patent, left VM with recommendation. Will try again.  Suad Cid LPN  Nephrology  [facility]  [phone]

## 2017-08-08 NOTE — TELEPHONE ENCOUNTER
DATE:  8/8/2017   TIME OF RECEIPT FROM LAB:  9269  LAB TEST:  Creatinine, BUN  LAB VALUE:  6.01; 119  RESULTS GIVEN WITH READ-BACK TO (PROVIDER):  LAKESHIA Borjas  TIME LAB VALUE REPORTED TO PROVIDER:   [id number]    Cayugajemal Moss reported critical values to Marcial Curtis RN, who reported it to Ryan Borjas LPN. Sent to LAKESHIA Borjas.

## 2017-08-09 LAB — DEPRECATED CALCIDIOL+CALCIFEROL SERPL-MC: 29 UG/L (ref 20–75)

## 2017-08-09 RX ORDER — FUROSEMIDE 20 MG
20 TABLET ORAL 2 TIMES DAILY
Qty: 180 TABLET | Refills: 1 | Status: SHIPPED | OUTPATIENT
Start: 2017-08-09 | End: 2017-08-16

## 2017-08-09 NOTE — TELEPHONE ENCOUNTER
Called patient to ensure he is aware of dose increase of furosemide from 20 mg daily to 20 mg BID. Patient verbalized understanding. Updated Rx sent to patient's pharmacy.    Jose Juan Shea RN

## 2017-08-11 ENCOUNTER — THERAPY VISIT (OUTPATIENT)
Dept: PHYSICAL THERAPY | Facility: CLINIC | Age: 72
End: 2017-08-11
Payer: COMMERCIAL

## 2017-08-11 DIAGNOSIS — R29.898 BILATERAL LEG WEAKNESS: Primary | ICD-10-CM

## 2017-08-11 DIAGNOSIS — R29.898 WEAKNESS OF SHOULDER: ICD-10-CM

## 2017-08-11 PROCEDURE — 97110 THERAPEUTIC EXERCISES: CPT | Mod: GP | Performed by: PHYSICAL THERAPIST

## 2017-08-11 PROCEDURE — 97161 PT EVAL LOW COMPLEX 20 MIN: CPT | Mod: GP | Performed by: PHYSICAL THERAPIST

## 2017-08-11 NOTE — PROGRESS NOTES
Subjective:    Patient is a 71 year old male presenting with rehab general hpi. The history is provided by the patient.   Dung Norton is a 71 year old male with deconditioning and weakness condition which occurred with .      This is a new condition  Pt was admitted to hospital during July, for 13 days. He was having abdominal pains.  He had a noninvasive surgery for internal bleeding. He now reports weakness/deconditioned state and desires home exs.  .    Site of Pain: Denies any pain             Symptoms are exacerbated by activity, standing, walking, lifting and carrying             Pertinent medical history includes:  Unexplained weight loss, high blood pressure, kidney disease and smoking.  Current occupation is Retired  .                                        Objective:    System    Physical Exam        General Evaluation:  AROM:  normal              Gross Strength:  Gross strength wnl general: UE 3/5 demo'd, quick fatique bilat                 Lower Extremity Strength:  Strength wnl lower extremity general: +3/5 hip abd bilat, 4/5 generally               Sensation:  normal      Edema:  normal        Balance:  normal                  Gait:    Significant findings:  Mild unsteadiness                                      ROS    Assessment/Plan:      Patient is a 71 year old male with weakness complaints.    Patient has the following significant findings with corresponding treatment plan.                Diagnosis 1:  Weakness UE and LE   Decreased strength - therapeutic exercise, therapuetic activities  and home program  Impaired muscle performance - neuro re-education and home program  Decreased function - therapeutic activities    Therapy Evaluation Codes:   1) History comprised of:   Personal factors that impact the plan of care:      Age and Past/current experiences.    Comorbidity factors that impact the plan of care are:      Osteoarthritis, Smoking and Weakness.     Medications impacting  care: Pain  2) Examination of Body Systems comprised of:   Body structures and functions that impact the plan of care:      Shoulder.   Activity limitations that impact the plan of care are:      Bathing, Cooking, Dressing, Lifting, Squatting/kneeling and Walking.  3) Clinical presentation characteristics are:   Stable/Uncomplicated.  4) Decision-Making    High complexity using standardized patient assessment instrument and/or measureable assessment of functional outcome.  Cumulative Therapy Evaluation is: Low complexity.    Previous and current functional limitations:  (See Goal Flow Sheet for this information)    Short term and Long term goals: (See Goal Flow Sheet for this information)     Communication ability:  Patient appears to be able to clearly communicate and understand verbal and written communication and follow directions correctly.  Treatment Explanation - The following has been discussed with the patient:   RX ordered/plan of care  Anticipated outcomes  Possible risks and side effects  This patient would benefit from PT intervention to resume normal activities.   Rehab potential is good.    Frequency:  1 X week, once daily every other week   Duration:  for 6 weeks  Discharge Plan:  Achieve all LTG.  Independent in home treatment program.  Reach maximal therapeutic benefit.    Please refer to the daily flowsheet for treatment today, total treatment time and time spent performing 1:1 timed codes.

## 2017-08-11 NOTE — MR AVS SNAPSHOT
After Visit Summary   8/11/2017    Dung Norton    MRN: [id number]           Patient Information     Date Of Birth          1945        Visit Information        Provider Department      8/11/2017 4:00 PM Girish Warren PT [facility]        Today's Diagnoses     Bilateral leg weakness    -  1    Weakness of shoulder           Follow-ups after your visit        Your next 10 appointments already scheduled     Aug 16, 2017  2:30 PM CDT   Lab with  LAB   [facility] ([facility])    [address] [place] [address]  1st [facility] [id number] [phone]            Aug 16, 2017  3:30 PM CDT   (Arrive by 3:00 PM)   Return Visit with Oralia De La Torre NP   [facility] Nephrology ([facility])    [address] [place] [address]  3rd [facility] [id number] [phone]            Sep 01, 2017  4:40 PM CDT   MAY Extremity with Girish Warren PT   [facility] ([facility])    [address] [facility]  [address]  [facility] [id number] [phone]            Sep 05, 2017  4:00 PM CDT   (Arrive by 3:45 PM)   New Patient Visit with WALESKA Valadez   [facility] Urology and [facility] for Prostate and Urologic Cancers ([facility])    [address] [place] [address]  4th [facility] [id number] [phone]              Who to contact     If you have questions or need follow up information about today's clinic visit or your schedule please contact [facility] directly at [phone].  Normal or non-critical lab and imaging results will be communicated to you by MyChart, letter or phone within 4 business days after the clinic has received the results. If you do not hear from us within 7 days, please contact the clinic through MyChart or phone. If you have a critical or abnormal lab result, we will notify you by phone as soon as possible.  Submit refill requests through [employer] or call your pharmacy and they will  "forward the refill request to us. Please allow 3 business days for your refill to be completed.          Additional Information About Your Visit        [employer] Information     [employer] lets you send messages to your doctor, view your test results, renew your prescriptions, schedule appointments and more. To sign up, go to [email or web address] . Click on \"Log in\" on the left side of the screen, which will take you to the Welcome page. Then click on \"Sign up Now\" on the right side of the page.     You will be asked to enter the access code listed below, as well as some personal information. Please follow the directions to create your username and password.     Your access code is: [email or web address]  Expires: 10/10/2017  2:21 PM     Your access code will  in 90 days. If you need help or a new code, please call your [facility] clinic or [phone].        Care EveryWhere ID     This is your Care EveryWhere ID. This could be used by other organizations to access your [facility] medical records  [id number]         Blood Pressure from Last 3 Encounters:   17 135/75   17 120/73   17 128/70    Weight from Last 3 Encounters:   17 68.8 kg (151 lb 9.6 oz)   17 74.8 kg (165 lb)   17 76.6 kg (168 lb 14.4 oz)              We Performed the Following     HC PT EVAL, LOW COMPLEXITY     MAY INITIAL EVAL REPORT     THERAPEUTIC EXERCISES        Primary Care Provider Office Phone # Fax #    Haley Baker [phone] [address] [facility]  [place] [address]        Equal Access to Services     [facility] Hospital[facility]MP[facility]REY : Hadii cristine Sagastume, osvaldo calle, qagibson gregorio . So [facility] [phone].    ATENCIÓN: Si habla español, tiene a montague disposición servicios gratuitos de asistencia lingüística. Llame al [phone].    We comply with applicable federal civil rights laws and [place] laws. We do not discriminate on the basis of " race, color, national origin, age, disability sex, sexual orientation or gender identity.            Thank you!     Thank you for choosing [facility]  for your care. Our goal is always to provide you with excellent care. Hearing back from our patients is one way we can continue to improve our services. Please take a few minutes to complete the written survey that you may receive in the mail after your visit with us. Thank you!             Your Updated Medication List - Protect others around you: Learn how to safely use, store and throw away your medicines at www.disposemymeds.org.          This list is accurate as of: 8/11/17  5:07 PM.  Always use your most recent med list.                   Brand Name Dispense Instructions for use Diagnosis    amLODIPine 5 MG tablet    NORVASC    30 tablet    Take 1 tablet (5 mg) by mouth daily    Hypertension goal BP (blood pressure) < 140/90       aspirin 81 MG tablet      Take 1 tablet by mouth daily.        finasteride 5 MG tablet    PROSCAR    30 tablet    Take 1 tablet (5 mg) by mouth daily    Benign prostatic hyperplasia with urinary retention       fish oil-omega-3 fatty acids 1000 MG capsule      Take 1 g by mouth daily        furosemide 20 MG tablet    LASIX    180 tablet    Take 1 tablet (20 mg) by mouth 2 times daily    CKD (chronic kidney disease) stage 5, GFR less than 15 ml/min (H)       MULTIVITAMIN MEN PO      Take 1 tablet by mouth daily        nicotine 21 MG/24HR 24 hr patch    NICODERM CQ    30 patch    Place 1 patch onto the skin every 24 hours    Tobacco abuse       sodium bicarbonate 650 MG tablet     30 tablet    Take 1 tablet (650 mg) by mouth daily    Metabolic acidosis, NAG, bicarbonate losses       tamsulosin 0.4 MG capsule    FLOMAX    30 capsule    Take 1 capsule (0.4 mg) by mouth daily    Benign prostatic hyperplasia with urinary retention

## 2017-08-15 DIAGNOSIS — N18.30 CKD (CHRONIC KIDNEY DISEASE) STAGE 3, GFR 30-59 ML/MIN (H): Primary | ICD-10-CM

## 2017-08-16 ENCOUNTER — OFFICE VISIT (OUTPATIENT)
Dept: NEPHROLOGY | Facility: CLINIC | Age: 72
End: 2017-08-16
Payer: MEDICARE

## 2017-08-16 VITALS
TEMPERATURE: 97.6 F | WEIGHT: 143.4 LBS | HEART RATE: 90 BPM | BODY MASS INDEX: 21.24 KG/M2 | HEIGHT: 69 IN | OXYGEN SATURATION: 100 % | SYSTOLIC BLOOD PRESSURE: 125 MMHG | DIASTOLIC BLOOD PRESSURE: 81 MMHG

## 2017-08-16 DIAGNOSIS — R33.8 BENIGN PROSTATIC HYPERPLASIA WITH URINARY RETENTION: ICD-10-CM

## 2017-08-16 DIAGNOSIS — N18.30 CKD (CHRONIC KIDNEY DISEASE) STAGE 3, GFR 30-59 ML/MIN (H): ICD-10-CM

## 2017-08-16 DIAGNOSIS — N40.1 BENIGN PROSTATIC HYPERPLASIA WITH URINARY RETENTION: ICD-10-CM

## 2017-08-16 DIAGNOSIS — I10 HYPERTENSION GOAL BP (BLOOD PRESSURE) < 140/90: ICD-10-CM

## 2017-08-16 DIAGNOSIS — E87.20 METABOLIC ACIDOSIS, NAG, BICARBONATE LOSSES: ICD-10-CM

## 2017-08-16 DIAGNOSIS — N18.5 CHRONIC KIDNEY DISEASE, STAGE V (H): Primary | ICD-10-CM

## 2017-08-16 LAB
ALBUMIN SERPL-MCNC: 3.9 G/DL (ref 3.4–5)
ANION GAP SERPL CALCULATED.3IONS-SCNC: 16 MMOL/L (ref 3–14)
BUN SERPL-MCNC: 136 MG/DL (ref 7–30)
CALCIUM SERPL-MCNC: 8.9 MG/DL (ref 8.5–10.1)
CHLORIDE SERPL-SCNC: 107 MMOL/L (ref 94–109)
CO2 SERPL-SCNC: 15 MMOL/L (ref 20–32)
CREAT SERPL-MCNC: 6.38 MG/DL (ref 0.66–1.25)
ERYTHROCYTE [DISTWIDTH] IN BLOOD BY AUTOMATED COUNT: 14.7 % (ref 10–15)
GFR SERPL CREATININE-BSD FRML MDRD: 9 ML/MIN/1.7M2
GLUCOSE SERPL-MCNC: 121 MG/DL (ref 70–99)
HCT VFR BLD AUTO: 29.1 % (ref 40–53)
HGB BLD-MCNC: 9.1 G/DL (ref 13.3–17.7)
MCH RBC QN AUTO: 29.2 PG (ref 26.5–33)
MCHC RBC AUTO-ENTMCNC: 31.3 G/DL (ref 31.5–36.5)
MCV RBC AUTO: 93 FL (ref 78–100)
PHOSPHATE SERPL-MCNC: 7 MG/DL (ref 2.5–4.5)
PLATELET # BLD AUTO: 245 10E9/L (ref 150–450)
POTASSIUM SERPL-SCNC: 5.1 MMOL/L (ref 3.4–5.3)
RBC # BLD AUTO: 3.12 10E12/L (ref 4.4–5.9)
SODIUM SERPL-SCNC: 138 MMOL/L (ref 133–144)
WBC # BLD AUTO: 10.6 10E9/L (ref 4–11)

## 2017-08-16 PROCEDURE — 85027 COMPLETE CBC AUTOMATED: CPT

## 2017-08-16 PROCEDURE — 80069 RENAL FUNCTION PANEL: CPT

## 2017-08-16 PROCEDURE — 36415 COLL VENOUS BLD VENIPUNCTURE: CPT

## 2017-08-16 PROCEDURE — 99212 OFFICE O/P EST SF 10 MIN: CPT

## 2017-08-16 RX ORDER — FINASTERIDE 5 MG/1
5 TABLET, FILM COATED ORAL DAILY
Qty: 90 TABLET | Refills: 3 | Status: SHIPPED | OUTPATIENT
Start: 2017-08-16 | End: 2018-07-25

## 2017-08-16 RX ORDER — SODIUM BICARBONATE 650 MG/1
650 TABLET ORAL DAILY
Qty: 90 TABLET | Refills: 3 | Status: SHIPPED | OUTPATIENT
Start: 2017-08-16 | End: 2017-08-24

## 2017-08-16 RX ORDER — TAMSULOSIN HYDROCHLORIDE 0.4 MG/1
0.4 CAPSULE ORAL DAILY
Qty: 90 CAPSULE | Refills: 3 | Status: ON HOLD | OUTPATIENT
Start: 2017-08-16 | End: 2017-09-05

## 2017-08-16 RX ORDER — AMLODIPINE BESYLATE 5 MG/1
5 TABLET ORAL DAILY
Qty: 90 TABLET | Refills: 3 | Status: ON HOLD | OUTPATIENT
Start: 2017-08-16 | End: 2017-09-05

## 2017-08-16 ASSESSMENT — PAIN SCALES - GENERAL: PAINLEVEL: NO PAIN (0)

## 2017-08-16 NOTE — NURSING NOTE
"Chief Complaint   Patient presents with     RECHECK     Follow up CKD stage 3.       Initial /81  Pulse 90  Temp 97.6  F (36.4  C) (Oral)  Ht 1.753 m (5' 9\")  Wt 65 kg (143 lb 6.4 oz)  SpO2 100%  BMI 21.18 kg/m2 Estimated body mass index is 21.18 kg/(m^2) as calculated from the following:    Height as of this encounter: 1.753 m (5' 9\").    Weight as of this encounter: 65 kg (143 lb 6.4 oz).  Medication Reconciliation: complete   Peyton Calles., CMA    "

## 2017-08-16 NOTE — LETTER
8/16/2017      RE: Dung Norton  [address]  [place] [address]       Nephrology Clinic Visit 8/16/17    Assessment and Plan:      1. KRISTINA - Patient had uptick in creat following increase in Furosemide. He is likely a bit dry. Creat 6.3 today from 6.0 on 8/8/17. No diarrhea and he is tolerating fluids w/o difficulty. He is not using NSAIDs. He has no uremic symptoms. We have discussed dialysis if needed and  he would be willing.    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 This is likely inaccurate given ongoing KRISTINA. Would recheck when stabilized     2. Electrolytes - No acute concerns. K 5.1.    - Reviewed Potassium foods to avoid and he appears to have a good understanding     3. Volume status - Mild hypovolemia with increase in Furosemide last week. No further edema. No dyspnea. Weight today 65.1 kg, down from 68.8 kg on 8/4 and 77 kg on 7/24/17. Albumin 3.9. B/P 120's/ x 3. Unclear where his ideal body weight would be.    -  Hold Lasix given increase in Creat     4. HTN - Well controlled on Amlodipine 5 mg qd and Furosemide 40 mg qd. Previously also on Toprol  mg qd and HCTZ 25 mg qd (both on hold).    - Hold Lasix for now     5. BPH/bladder mass - Has wood. To see Urology on 9/4/17 for f/u. Currently on Flomax and Proscar     6. Acid base - Metabolic acidosis 2/2 KRISTINA, worse today following rise in creat.  Bicarb is 15. On low dose Bicarb   - Continue current dose 650 mg qd for now. Will likely have to increase.      7. BMD - Ca 8.9, Phos 7.0, albumin 3.9, Vit D 29, PTH intact 120   -  Phos has increased 2/2 improvement in dietary intake and rise in creat. Will reassess when labs settle out. Will likely need to start a Phos Binder.      8. Nutrition - Has good appetite. Albumin is normal.      9. Anemia -  Hgb 9.1, was 13.3 2016. Was 7 range during hospital admission. He received RBCs on 7/19/17. He has had no colon cancer screening.   Will continue to monitor for now given rise in Hgb since discharge.      10. Disposition - RTC in 3 wks for f/u     Assessment and plan was discussed with patient and daughter, then both voice understanding and agreement.     Reason for Visit:  KRISTINA      HPI:  Dung Norton is a 71 year old year old male with a PMH of CKDIII , HTN, presented for weight loss and poor PO intake and was found to have creat of 14.4 on admission with obstructive nephropathy from BPH and bladder mass. Bladder mass benign. Creat declined to 6.3 on day of discharge on 7/24/17 but has not improved significantly since. Patient has wodo, is living at home. He is tolerating diet.   Review of his renal function is sparse. He had normal renal function into the early 2015. A single creat was 2.5 on 2/24/16 and then upon admission was 14.4. He does have HTN, no diabetes. He is in clinic today with his daughter for f/u. They have appt with Urology on 9/4/17     ROS:  Patient has no complaints. He is very happy to have increased his food selection and appears to have a good understanding of which foods to avoid. He reports that he feels good. Has more energy. He is living independently. Has good appetite. No dyspnea, chest pain, abdominal pain. Has wood. Edema has resolved.      Chronic Medical Problems:     HTN  BPH  Obstructive uropathy   HTN  Tobacco abuse  HLD  Anemia  Pulmonary nodules  KRISTINA  CKD    Personal Hx:   Social History     Social History     Marital status:      Spouse name: N/A     Number of children: 2     Years of education: N/A     Occupational History     [employer]     Social History Main Topics     Smoking status: Current Every Day Smoker     Packs/day: 0.25     Types: Cigarettes     Smokeless tobacco: Never Used      Comment: 3 cig a day     Alcohol use Yes      Comment: 2 per  "year     Drug use: No     Sexual activity: Not Currently     Other Topics Concern     Not on file     Social History Narrative       Allergies:  No Known Allergies    Medications:  Prior to Admission medications    Medication Sig Start Date End Date Taking? Authorizing Provider   amLODIPine (NORVASC) 5 MG tablet Take 1 tablet (5 mg) by mouth daily 8/16/17  Yes Oralia De La Torre NP   finasteride (PROSCAR) 5 MG tablet Take 1 tablet (5 mg) by mouth daily 8/16/17  Yes Oralia De La Torre NP   sodium bicarbonate 650 MG tablet Take 1 tablet (650 mg) by mouth daily 8/16/17  Yes Oralia De La Torre NP   tamsulosin (FLOMAX) 0.4 MG capsule Take 1 capsule (0.4 mg) by mouth daily 8/16/17  Yes Oralia De La Torre NP   Multiple Vitamins-Minerals (MULTIVITAMIN MEN PO) Take 1 tablet by mouth daily   Yes Reported, Patient   fish oil-omega-3 fatty acids (FISH OIL) 1000 MG capsule Take 1 g by mouth daily   Yes Reported, Patient   aspirin 81 MG tablet Take 1 tablet by mouth daily.   Yes Reported, Patient       Vitals:  /81  Pulse 90  Temp 97.6  F (36.4  C) (Oral)  Ht 1.753 m (5' 9\")  Wt 65 kg (143 lb 6.4 oz)  SpO2 100%  BMI 21.18 kg/m2    Exam:  GEN: Frail male in NAD. Well groomed. Dtr present  CARDIAC RRR  LUNGS: CTA  ABDOMEN: Soft, NT  : Man present  EXT: 0 edema    Results:  Results for RUTH FABRIZIO LAY (MRN [id number]) as of 8/17/2017 14:28   Ref. Range 8/16/2017 14:26   Sodium Latest Ref Range: 133 - 144 mmol/L 138   Potassium Latest Ref Range: 3.4 - 5.3 mmol/L 5.1   Chloride Latest Ref Range: 94 - 109 mmol/L 107   Carbon Dioxide Latest Ref Range: 20 - 32 mmol/L 15 (L)   Urea Nitrogen Latest Ref Range: 7 - 30 mg/dL 136 (H)   Creatinine Latest Ref Range: 0.66 - 1.25 mg/dL 6.38 (H)   GFR Estimate Latest Ref Range: >60 mL/min/1.7m2 9 (L)   GFR Estimate If Black Latest Ref Range: >60 mL/min/1.7m2 10 (L)   Calcium Latest Ref Range: 8.5 - 10.1 mg/dL 8.9   Anion Gap Latest Ref Range: 3 - 14 mmol/L 16 (H) "   Phosphorus Latest Ref Range: 2.5 - 4.5 mg/dL 7.0 (H)   Albumin Latest Ref Range: 3.4 - 5.0 g/dL 3.9   Glucose Latest Ref Range: 70 - 99 mg/dL 121 (H)   WBC Latest Ref Range: 4.0 - 11.0 10e9/L 10.6   Hemoglobin Latest Ref Range: 13.3 - 17.7 g/dL 9.1 (L)   Hematocrit Latest Ref Range: 40.0 - 53.0 % 29.1 (L)   Platelet Count Latest Ref Range: 150 - 450 10e9/L 245   RBC Count Latest Ref Range: 4.4 - 5.9 10e12/L 3.12 (L)   MCV Latest Ref Range: 78 - 100 fl 93   MCH Latest Ref Range: 26.5 - 33.0 pg 29.2   MCHC Latest Ref Range: 31.5 - 36.5 g/dL 31.3 (L)   RDW Latest Ref Range: 10.0 - 15.0 % 14.7       Oralia De La Torre, NP

## 2017-08-16 NOTE — MR AVS SNAPSHOT
After Visit Summary   8/16/2017    Dung Norton    MRN: [id number]           Patient Information     Date Of Birth          1945        Visit Information        Provider Department      8/16/2017 3:30 PM Oralia De La Torre NP [facility] Nephrology        Today's Diagnoses     Hypertension goal BP (blood pressure) < 140/90        Benign prostatic hyperplasia with urinary retention        Metabolic acidosis, NAG, bicarbonate losses          Care Instructions    1. Stop Lasix  2. Recheck labs on Monday          Follow-ups after your visit        Follow-up notes from your care team     Return in about 3 weeks (around 9/6/2017).      Your next 10 appointments already scheduled     Sep 01, 2017  4:40 PM CDT   MAY Extremity with Girish Warren PT   [facility] ([facility])    [address] [facility]  [address]  [facility] [address]   [phone]            Sep 05, 2017  4:00 PM CDT   (Arrive by 3:45 PM)   New Patient Visit with WALESKA Valadez   [facility] Urology and [facility] for Prostate and Urologic Cancers ([facility])    [address] [place] [address]  4th [facility] [address] [phone]            Sep 08, 2017  3:30 PM CDT   (Arrive by 3:00 PM)   Return Visit with Oralia De La Torre NP   [facility] Nephrology ([facility])    [address] [place] [id number] [phone]              Who to contact     If you have questions or need follow up information about today's clinic visit or your schedule please contact [facility] NEPHROLOGY directly at [phone].  Normal or non-critical lab and imaging results will be communicated to you by MyChart, letter or phone within 4 business days after the clinic has received the results. If you do not hear from us within 7 days, please contact the clinic through MyChart or phone. If you have a critical or abnormal lab result, we will notify you by phone as soon as  "possible.  Submit refill requests through [employer] or call your pharmacy and they will forward the refill request to us. Please allow 3 business days for your refill to be completed.          Additional Information About Your Visit        [employer] Information     [employer] lets you send messages to your doctor, view your test results, renew your prescriptions, schedule appointments and more. To sign up, go to [email or web address] . Click on \"Log in\" on the left side of the screen, which will take you to the Welcome page. Then click on \"Sign up Now\" on the right side of the page.     You will be asked to enter the access code listed below, as well as some personal information. Please follow the directions to create your username and password.     Your access code is: [email or web address]  Expires: 10/10/2017  2:21 PM     Your access code will  in 90 days. If you need help or a new code, please call your [facility] clinic or [phone].        Care EveryWhere ID     This is your Care EveryWhere ID. This could be used by other organizations to access your [facility] medical records  [id number]        Your Vitals Were     Pulse Temperature Height Pulse Oximetry BMI (Body Mass Index)       90 97.6  F (36.4  C) (Oral) 1.753 m (5' 9\") 100% 21.18 kg/m2        Blood Pressure from Last 3 Encounters:   17 125/81   17 135/75   17 120/73    Weight from Last 3 Encounters:   17 65 kg (143 lb 6.4 oz)   17 68.8 kg (151 lb 9.6 oz)   17 74.8 kg (165 lb)              Today, you had the following     No orders found for display         Where to get your medicines      These medications were sent to [id number] - [place] - [address] AT [facility] [address]  [address] [place] [address]     Phone:  [phone]     amLODIPine 5 MG tablet    finasteride 5 MG tablet    sodium bicarbonate 650 MG tablet    tamsulosin 0.4 MG capsule          Primary Care " Provider Office Phone # Fax #    Haley Baker [phone]       [address] [facility]  [place] [address]        Equal Access to Services     ROSEJOSS SAQIB : Justin cristine marin azamcallie Tanika, wakathleenda luqadaha, qaybta kanestorda robert, gibson fulton lamalickhillary irma. So [facility] [phone].    ATENCIÓN: Si habla español, tiene a montague disposición servicios gratuitos de asistencia lingüística. Llame al [phone].    We comply with applicable federal civil rights laws and [place] laws. We do not discriminate on the basis of race, color, national origin, age, disability sex, sexual orientation or gender identity.            Thank you!     Thank you for choosing [facility] NEPHROLOGY  for your care. Our goal is always to provide you with excellent care. Hearing back from our patients is one way we can continue to improve our services. Please take a few minutes to complete the written survey that you may receive in the mail after your visit with us. Thank you!             Your Updated Medication List - Protect others around you: Learn how to safely use, store and throw away your medicines at www.disposemymeds.org.          This list is accurate as of: 8/16/17  4:20 PM.  Always use your most recent med list.                   Brand Name Dispense Instructions for use Diagnosis    amLODIPine 5 MG tablet    NORVASC    90 tablet    Take 1 tablet (5 mg) by mouth daily    Hypertension goal BP (blood pressure) < 140/90       aspirin 81 MG tablet      Take 1 tablet by mouth daily.        finasteride 5 MG tablet    PROSCAR    90 tablet    Take 1 tablet (5 mg) by mouth daily    Benign prostatic hyperplasia with urinary retention       fish oil-omega-3 fatty acids 1000 MG capsule      Take 1 g by mouth daily        MULTIVITAMIN MEN PO      Take 1 tablet by mouth daily        sodium bicarbonate 650 MG tablet     90 tablet    Take 1 tablet (650 mg) by mouth daily    Metabolic acidosis, NAG, bicarbonate losses        tamsulosin 0.4 MG capsule    FLOMAX    90 capsule    Take 1 capsule (0.4 mg) by mouth daily    Benign prostatic hyperplasia with urinary retention

## 2017-08-17 ENCOUNTER — TELEPHONE (OUTPATIENT)
Dept: NEPHROLOGY | Facility: CLINIC | Age: 72
End: 2017-08-17

## 2017-08-17 DIAGNOSIS — R79.89 ELEVATED SERUM CREATININE: Primary | ICD-10-CM

## 2017-08-17 NOTE — TELEPHONE ENCOUNTER
Called patient and reminded him that he should have repeat labs on Monday 8/21 and then weekly. Labs entered. Patient said he would get done at [facility].    Jose Juan Shea RN

## 2017-08-17 NOTE — PROGRESS NOTES
Nephrology Clinic Visit 8/16/17    Assessment and Plan:      1. KRISTINA - Patient had uptick in creat following increase in Furosemide. He is likely a bit dry. Creat 6.3 today from 6.0 on 8/8/17. No diarrhea and he is tolerating fluids w/o difficulty. He is not using NSAIDs. He has no uremic symptoms. We have discussed dialysis if needed and  he would be willing.    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 This is likely inaccurate given ongoing KRISTINA. Would recheck when stabilized     2. Electrolytes - No acute concerns. K 5.1.    - Reviewed Potassium foods to avoid and he appears to have a good understanding     3. Volume status - Mild hypovolemia with increase in Furosemide last week. No further edema. No dyspnea. Weight today 65.1 kg, down from 68.8 kg on 8/4 and 77 kg on 7/24/17. Albumin 3.9. B/P 120's/ x 3. Unclear where his ideal body weight would be.    - Hold Lasix given increase in Creat     4. HTN - Well controlled on Amlodipine 5 mg qd and Furosemide 40 mg qd. Previously also on Toprol  mg qd and HCTZ 25 mg qd (both on hold).    - Hold Lasix for now     5. BPH/bladder mass - Has wood. To see Urology on 9/4/17 for f/u. Currently on Flomax and Proscar     6. Acid base - Metabolic acidosis 2/2 KRISTINA, worse today following rise in creat.  Bicarb is 15. On low dose Bicarb   - Continue current dose 650 mg qd for now. Will likely have to increase.      7. BMD - Ca 8.9, Phos 7.0, albumin 3.9, Vit D 29, PTH intact 120   - Phos has increased 2/2 improvement in dietary intake and rise in creat. Will reassess when labs settle out. Will likely need to start a Phos Binder.      8. Nutrition - Has good appetite. Albumin is normal.      9. Anemia - Hgb 9.1, was 13.3 2016. Was 7 range during hospital admission. He received RBCs on 7/19/17.  He has had no colon cancer screening.   Will continue to monitor for now given rise in Hgb since discharge.      10. Disposition - RTC in 3 wks for f/u     Assessment and plan was discussed with patient and daughter, then both voice understanding and agreement.     Reason for Visit:  KRISTINA      HPI:  Dung Norton is a 71 year old year old male with a PMH of CKDIII , HTN, presented for weight loss and poor PO intake and was found to have creat of 14.4 on admission with obstructive nephropathy from BPH and bladder mass. Bladder mass benign. Creat declined to 6.3 on day of discharge on 7/24/17 but has not improved significantly since. Patient has wood, is living at home. He is tolerating diet.   Review of his renal function is sparse. He had normal renal function into the early 2015. A single creat was 2.5 on 2/24/16 and then upon admission was 14.4. He does have HTN, no diabetes. He is in clinic today with his daughter for f/u. They have appt with Urology on 9/4/17     ROS:  Patient has no complaints. He is very happy to have increased his food selection and appears to have a good understanding of which foods to avoid. He reports that he feels good. Has more energy. He is living independently. Has good appetite. No dyspnea, chest pain, abdominal pain. Has wood. Edema has resolved.      Chronic Medical Problems:     HTN  BPH  Obstructive uropathy   HTN  Tobacco abuse  HLD  Anemia  Pulmonary nodules  KRISTINA  CKD    Personal Hx:   Social History     Social History     Marital status:      Spouse name: N/A     Number of children: 2     Years of education: N/A     Occupational History      [employer]     Social History Main Topics     Smoking status: Current Every Day Smoker     Packs/day: 0.25     Types: Cigarettes     Smokeless tobacco: Never Used      Comment: 3 cig a day     Alcohol use Yes      Comment: 2 per year     Drug use: No     Sexual activity: Not Currently     Other Topics Concern     Not on  "file     Social History Narrative       Allergies:  No Known Allergies    Medications:  Prior to Admission medications    Medication Sig Start Date End Date Taking? Authorizing Provider   amLODIPine (NORVASC) 5 MG tablet Take 1 tablet (5 mg) by mouth daily 8/16/17  Yes Oralia De La Torre NP   finasteride (PROSCAR) 5 MG tablet Take 1 tablet (5 mg) by mouth daily 8/16/17  Yes Oralia De La Torre NP   sodium bicarbonate 650 MG tablet Take 1 tablet (650 mg) by mouth daily 8/16/17  Yes Oralia De La Torre NP   tamsulosin (FLOMAX) 0.4 MG capsule Take 1 capsule (0.4 mg) by mouth daily 8/16/17  Yes Oralia De La Torre NP   Multiple Vitamins-Minerals (MULTIVITAMIN MEN PO) Take 1 tablet by mouth daily   Yes Reported, Patient   fish oil-omega-3 fatty acids (FISH OIL) 1000 MG capsule Take 1 g by mouth daily   Yes Reported, Patient   aspirin 81 MG tablet Take 1 tablet by mouth daily.   Yes Reported, Patient       Vitals:  /81  Pulse 90  Temp 97.6  F (36.4  C) (Oral)  Ht 1.753 m (5' 9\")  Wt 65 kg (143 lb 6.4 oz)  SpO2 100%  BMI 21.18 kg/m2    Exam:  GEN: Frail male in NAD. Well groomed. Dtr present  CARDIAC RRR  LUNGS: CTA  ABDOMEN: Soft, NT  : Man present  EXT: 0 edema    Results:  Results for FABRIZIO MIRANDA (MRN [id number]) as of 8/17/2017 14:28   Ref. Range 8/16/2017 14:26   Sodium Latest Ref Range: 133 - 144 mmol/L 138   Potassium Latest Ref Range: 3.4 - 5.3 mmol/L 5.1   Chloride Latest Ref Range: 94 - 109 mmol/L 107   Carbon Dioxide Latest Ref Range: 20 - 32 mmol/L 15 (L)   Urea Nitrogen Latest Ref Range: 7 - 30 mg/dL 136 (H)   Creatinine Latest Ref Range: 0.66 - 1.25 mg/dL 6.38 (H)   GFR Estimate Latest Ref Range: >60 mL/min/1.7m2 9 (L)   GFR Estimate If Black Latest Ref Range: >60 mL/min/1.7m2 10 (L)   Calcium Latest Ref Range: 8.5 - 10.1 mg/dL 8.9   Anion Gap Latest Ref Range: 3 - 14 mmol/L 16 (H)   Phosphorus Latest Ref Range: 2.5 - 4.5 mg/dL 7.0 (H)   Albumin Latest Ref Range: 3.4 - 5.0 g/dL " 3.9   Glucose Latest Ref Range: 70 - 99 mg/dL 121 (H)   WBC Latest Ref Range: 4.0 - 11.0 10e9/L 10.6   Hemoglobin Latest Ref Range: 13.3 - 17.7 g/dL 9.1 (L)   Hematocrit Latest Ref Range: 40.0 - 53.0 % 29.1 (L)   Platelet Count Latest Ref Range: 150 - 450 10e9/L 245   RBC Count Latest Ref Range: 4.4 - 5.9 10e12/L 3.12 (L)   MCV Latest Ref Range: 78 - 100 fl 93   MCH Latest Ref Range: 26.5 - 33.0 pg 29.2   MCHC Latest Ref Range: 31.5 - 36.5 g/dL 31.3 (L)   RDW Latest Ref Range: 10.0 - 15.0 % 14.7

## 2017-08-21 ENCOUNTER — TELEPHONE (OUTPATIENT)
Dept: NEPHROLOGY | Facility: CLINIC | Age: 72
End: 2017-08-21

## 2017-08-21 DIAGNOSIS — E87.20 METABOLIC ACIDOSIS, NAG, BICARBONATE LOSSES: ICD-10-CM

## 2017-08-21 DIAGNOSIS — N18.5 CHRONIC KIDNEY DISEASE, STAGE V (H): ICD-10-CM

## 2017-08-21 LAB
ALBUMIN SERPL-MCNC: 3.7 G/DL (ref 3.4–5)
ANION GAP SERPL CALCULATED.3IONS-SCNC: 17 MMOL/L (ref 3–14)
BUN SERPL-MCNC: 145 MG/DL (ref 7–30)
CALCIUM SERPL-MCNC: 8.8 MG/DL (ref 8.5–10.1)
CHLORIDE SERPL-SCNC: 107 MMOL/L (ref 94–109)
CO2 SERPL-SCNC: 14 MMOL/L (ref 20–32)
CREAT SERPL-MCNC: 6.45 MG/DL (ref 0.66–1.25)
GFR SERPL CREATININE-BSD FRML MDRD: 9 ML/MIN/1.7M2
GLUCOSE SERPL-MCNC: 143 MG/DL (ref 70–99)
PHOSPHATE SERPL-MCNC: 7.8 MG/DL (ref 2.5–4.5)
POTASSIUM SERPL-SCNC: 5 MMOL/L (ref 3.4–5.3)
SODIUM SERPL-SCNC: 138 MMOL/L (ref 133–144)

## 2017-08-21 PROCEDURE — 36415 COLL VENOUS BLD VENIPUNCTURE: CPT

## 2017-08-21 PROCEDURE — 80069 RENAL FUNCTION PANEL: CPT

## 2017-08-21 NOTE — TELEPHONE ENCOUNTER
Received call from Tonie at [facility] lab. , Creatinine 6.45. Paged Cecilia Negrete with the results.    Giselle Benoit RN

## 2017-08-22 NOTE — TELEPHONE ENCOUNTER
Per Cecilia:  Could you ask Mr Norton to increase his Bicarb to 650 mg TID?   Also, could you set up an IV fluid bolus of  cc x 1 for him somewhere close to home. He's dry from his current diuretic.     Left voicemail for patient to call back.    Giselle Benoit RN

## 2017-08-23 NOTE — TELEPHONE ENCOUNTER
Made attempt to contact patient. Line picked up but no one there. Will try again at a later time.    Giselle Benoit RN

## 2017-08-24 RX ORDER — SODIUM BICARBONATE 650 MG/1
650 TABLET ORAL 3 TIMES DAILY
Qty: 270 TABLET | Refills: 3 | Status: ON HOLD | OUTPATIENT
Start: 2017-08-24 | End: 2017-09-05

## 2017-08-24 NOTE — TELEPHONE ENCOUNTER
Spoke with Rabia, patient's daughter. Scheduled first available infusion at [facility] on 9/1 at 1:30pm. Denied further questions at this time.    Giselle Benoit RN

## 2017-08-24 NOTE — TELEPHONE ENCOUNTER
Spoke with patient, relayed provider's message. He agreed to dose increase. Will discuss infusion with his daughter and call back to schedule.    Giselle Benoit RN

## 2017-08-28 ENCOUNTER — HOSPITAL ENCOUNTER (INPATIENT)
Facility: CLINIC | Age: 72
LOS: 8 days | Discharge: HOME | DRG: 725 | End: 2017-09-05
Attending: FAMILY MEDICINE | Admitting: INTERNAL MEDICINE
Payer: MEDICARE

## 2017-08-28 ENCOUNTER — TELEPHONE (OUTPATIENT)
Dept: NEPHROLOGY | Facility: CLINIC | Age: 72
End: 2017-08-28
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
## 2017-08-28 DIAGNOSIS — N18.9 CKD (CHRONIC KIDNEY DISEASE): ICD-10-CM

## 2017-08-28 DIAGNOSIS — I95.1 ORTHOSTATIC HYPOTENSION: ICD-10-CM

## 2017-08-28 DIAGNOSIS — I10 HYPERTENSION GOAL BP (BLOOD PRESSURE) < 140/90: ICD-10-CM

## 2017-08-28 DIAGNOSIS — R79.89 ELEVATED SERUM CREATININE: ICD-10-CM

## 2017-08-28 DIAGNOSIS — N18.9 CHRONIC KIDNEY DISEASE, UNSPECIFIED CKD STAGE: ICD-10-CM

## 2017-08-28 PROBLEM — N17.9 AKI (ACUTE KIDNEY INJURY) (H): Status: ACTIVE | Noted: 2017-08-28

## 2017-08-28 LAB
ALBUMIN SERPL-MCNC: 3 G/DL (ref 3.4–5)
ALBUMIN SERPL-MCNC: 3.2 G/DL (ref 3.4–5)
ALBUMIN UR-MCNC: 300 MG/DL
ALP SERPL-CCNC: 95 U/L (ref 40–150)
ALT SERPL W P-5'-P-CCNC: 17 U/L (ref 0–70)
ANION GAP SERPL CALCULATED.3IONS-SCNC: 15 MMOL/L (ref 3–14)
ANION GAP SERPL CALCULATED.3IONS-SCNC: 17 MMOL/L (ref 3–14)
APPEARANCE UR: ABNORMAL
APTT PPP: 45 SEC (ref 22–37)
AST SERPL W P-5'-P-CCNC: 9 U/L (ref 0–45)
BACTERIA #/AREA URNS HPF: ABNORMAL /HPF
BASE DEFICIT BLDV-SCNC: 13.8 MMOL/L
BASOPHILS # BLD AUTO: 0 10E9/L (ref 0–0.2)
BASOPHILS NFR BLD AUTO: 0.1 %
BILIRUB SERPL-MCNC: 0.3 MG/DL (ref 0.2–1.3)
BILIRUB UR QL STRIP: NEGATIVE
BUN SERPL-MCNC: 162 MG/DL (ref 7–30)
BUN SERPL-MCNC: 170 MG/DL (ref 7–30)
CALCIUM SERPL-MCNC: 8.9 MG/DL (ref 8.5–10.1)
CALCIUM SERPL-MCNC: 9.1 MG/DL (ref 8.5–10.1)
CHLORIDE SERPL-SCNC: 104 MMOL/L (ref 94–109)
CHLORIDE SERPL-SCNC: 108 MMOL/L (ref 94–109)
CO2 SERPL-SCNC: 12 MMOL/L (ref 20–32)
CO2 SERPL-SCNC: 17 MMOL/L (ref 20–32)
COLOR UR AUTO: ABNORMAL
CREAT SERPL-MCNC: 7.49 MG/DL (ref 0.66–1.25)
CREAT SERPL-MCNC: 7.8 MG/DL (ref 0.66–1.25)
DIFFERENTIAL METHOD BLD: ABNORMAL
EOSINOPHIL # BLD AUTO: 0 10E9/L (ref 0–0.7)
EOSINOPHIL NFR BLD AUTO: 0.3 %
ERYTHROCYTE [DISTWIDTH] IN BLOOD BY AUTOMATED COUNT: 14.6 % (ref 10–15)
GFR SERPL CREATININE-BSD FRML MDRD: 7 ML/MIN/1.7M2
GFR SERPL CREATININE-BSD FRML MDRD: 7 ML/MIN/1.7M2
GLUCOSE SERPL-MCNC: 157 MG/DL (ref 70–99)
GLUCOSE SERPL-MCNC: 172 MG/DL (ref 70–99)
GLUCOSE UR STRIP-MCNC: NEGATIVE MG/DL
HCO3 BLDV-SCNC: 13 MMOL/L (ref 21–28)
HCT VFR BLD AUTO: 26.4 % (ref 40–53)
HGB BLD-MCNC: 8.6 G/DL (ref 13.3–17.7)
HGB UR QL STRIP: ABNORMAL
IMM GRANULOCYTES # BLD: 0.1 10E9/L (ref 0–0.4)
IMM GRANULOCYTES NFR BLD: 0.4 %
INR PPP: 1.19 (ref 0.86–1.14)
KETONES UR STRIP-MCNC: NEGATIVE MG/DL
LACTATE BLD-SCNC: 0.9 MMOL/L (ref 0.7–2)
LEUKOCYTE ESTERASE UR QL STRIP: ABNORMAL
LYMPHOCYTES # BLD AUTO: 0.7 10E9/L (ref 0.8–5.3)
LYMPHOCYTES NFR BLD AUTO: 5.7 %
MAGNESIUM SERPL-MCNC: 2.5 MG/DL (ref 1.6–2.3)
MCH RBC QN AUTO: 28 PG (ref 26.5–33)
MCHC RBC AUTO-ENTMCNC: 32.6 G/DL (ref 31.5–36.5)
MCV RBC AUTO: 86 FL (ref 78–100)
MONOCYTES # BLD AUTO: 0.7 10E9/L (ref 0–1.3)
MONOCYTES NFR BLD AUTO: 6 %
NEUTROPHILS # BLD AUTO: 10 10E9/L (ref 1.6–8.3)
NEUTROPHILS NFR BLD AUTO: 87.5 %
NITRATE UR QL: NEGATIVE
NRBC # BLD AUTO: 0 10*3/UL
NRBC BLD AUTO-RTO: 0 /100
O2/TOTAL GAS SETTING VFR VENT: 21 %
PCO2 BLDV: 30 MM HG (ref 40–50)
PH BLDV: 7.23 PH (ref 7.32–7.43)
PH UR STRIP: 6 PH (ref 5–7)
PHOSPHATE SERPL-MCNC: 8.4 MG/DL (ref 2.5–4.5)
PHOSPHATE SERPL-MCNC: 8.4 MG/DL (ref 2.5–4.5)
PLATELET # BLD AUTO: 256 10E9/L (ref 150–450)
PO2 BLDV: 24 MM HG (ref 25–47)
POTASSIUM SERPL-SCNC: 4.3 MMOL/L (ref 3.4–5.3)
POTASSIUM SERPL-SCNC: 4.7 MMOL/L (ref 3.4–5.3)
PROT SERPL-MCNC: 7.2 G/DL (ref 6.8–8.8)
RBC # BLD AUTO: 3.07 10E12/L (ref 4.4–5.9)
RBC #/AREA URNS AUTO: 15 /HPF (ref 0–2)
SODIUM SERPL-SCNC: 135 MMOL/L (ref 133–144)
SODIUM SERPL-SCNC: 137 MMOL/L (ref 133–144)
SOURCE: ABNORMAL
SP GR UR STRIP: 1.02 (ref 1–1.03)
SPECIMEN VOL UR: ABNORMAL ML
UROBILINOGEN UR STRIP-MCNC: NORMAL MG/DL (ref 0–2)
WBC # BLD AUTO: 11.4 10E9/L (ref 4–11)
WBC #/AREA URNS AUTO: 2598 /HPF (ref 0–2)
WBC CLUMPS #/AREA URNS HPF: PRESENT /HPF

## 2017-08-28 PROCEDURE — 99223 1ST HOSP IP/OBS HIGH 75: CPT | Mod: AI | Performed by: INTERNAL MEDICINE

## 2017-08-28 PROCEDURE — 99285 EMERGENCY DEPT VISIT HI MDM: CPT | Mod: 25 | Performed by: FAMILY MEDICINE

## 2017-08-28 PROCEDURE — 36415 COLL VENOUS BLD VENIPUNCTURE: CPT

## 2017-08-28 PROCEDURE — 83735 ASSAY OF MAGNESIUM: CPT | Performed by: FAMILY MEDICINE

## 2017-08-28 PROCEDURE — 84100 ASSAY OF PHOSPHORUS: CPT | Performed by: FAMILY MEDICINE

## 2017-08-28 PROCEDURE — 51798 US URINE CAPACITY MEASURE: CPT | Performed by: FAMILY MEDICINE

## 2017-08-28 PROCEDURE — 85610 PROTHROMBIN TIME: CPT | Performed by: FAMILY MEDICINE

## 2017-08-28 PROCEDURE — 87086 URINE CULTURE/COLONY COUNT: CPT | Performed by: FAMILY MEDICINE

## 2017-08-28 PROCEDURE — 93005 ELECTROCARDIOGRAM TRACING: CPT | Performed by: FAMILY MEDICINE

## 2017-08-28 PROCEDURE — 85730 THROMBOPLASTIN TIME PARTIAL: CPT | Performed by: FAMILY MEDICINE

## 2017-08-28 PROCEDURE — 87088 URINE BACTERIA CULTURE: CPT | Performed by: FAMILY MEDICINE

## 2017-08-28 PROCEDURE — 85025 COMPLETE CBC W/AUTO DIFF WBC: CPT | Performed by: FAMILY MEDICINE

## 2017-08-28 PROCEDURE — 83605 ASSAY OF LACTIC ACID: CPT | Performed by: PHYSICIAN ASSISTANT

## 2017-08-28 PROCEDURE — 82803 BLOOD GASES ANY COMBINATION: CPT | Performed by: PHYSICIAN ASSISTANT

## 2017-08-28 PROCEDURE — 80053 COMPREHEN METABOLIC PANEL: CPT | Performed by: FAMILY MEDICINE

## 2017-08-28 PROCEDURE — 36415 COLL VENOUS BLD VENIPUNCTURE: CPT | Performed by: PHYSICIAN ASSISTANT

## 2017-08-28 PROCEDURE — 12000025 ZZH R&B TRANSPLANT INTERMEDIATE

## 2017-08-28 PROCEDURE — 81001 URINALYSIS AUTO W/SCOPE: CPT | Performed by: FAMILY MEDICINE

## 2017-08-28 PROCEDURE — 87186 SC STD MICRODIL/AGAR DIL: CPT | Performed by: FAMILY MEDICINE

## 2017-08-28 PROCEDURE — 80069 RENAL FUNCTION PANEL: CPT

## 2017-08-28 PROCEDURE — 93010 ELECTROCARDIOGRAM REPORT: CPT | Mod: Z6 | Performed by: FAMILY MEDICINE

## 2017-08-28 RX ORDER — SODIUM BICARBONATE 650 MG/1
650 TABLET ORAL 3 TIMES DAILY
Status: DISCONTINUED | OUTPATIENT
Start: 2017-08-28 | End: 2017-08-31

## 2017-08-28 RX ORDER — LIDOCAINE 40 MG/G
CREAM TOPICAL
Status: DISCONTINUED | OUTPATIENT
Start: 2017-08-28 | End: 2017-09-05 | Stop reason: HOSPADM

## 2017-08-28 RX ORDER — SODIUM CHLORIDE 9 MG/ML
INJECTION, SOLUTION INTRAVENOUS CONTINUOUS
Status: DISCONTINUED | OUTPATIENT
Start: 2017-08-28 | End: 2017-08-30

## 2017-08-28 RX ORDER — FINASTERIDE 5 MG/1
5 TABLET, FILM COATED ORAL DAILY
Status: CANCELLED | OUTPATIENT
Start: 2017-08-29

## 2017-08-28 RX ORDER — NICOTINE 21 MG/24HR
1 PATCH, TRANSDERMAL 24 HOURS TRANSDERMAL DAILY
Status: DISCONTINUED | OUTPATIENT
Start: 2017-08-28 | End: 2017-09-05 | Stop reason: HOSPADM

## 2017-08-28 RX ORDER — LIDOCAINE 40 MG/G
CREAM TOPICAL
Status: DISCONTINUED | OUTPATIENT
Start: 2017-08-28 | End: 2017-08-28 | Stop reason: CLARIF

## 2017-08-28 RX ORDER — ASPIRIN 81 MG/1
81 TABLET ORAL DAILY
Status: DISCONTINUED | OUTPATIENT
Start: 2017-08-29 | End: 2017-09-05 | Stop reason: HOSPADM

## 2017-08-28 RX ORDER — TAMSULOSIN HYDROCHLORIDE 0.4 MG/1
0.4 CAPSULE ORAL DAILY
Status: CANCELLED | OUTPATIENT
Start: 2017-08-29

## 2017-08-28 NOTE — ED NOTES
Bed: IN01  Expected date: 8/28/17  Expected time:   Means of arrival:   Comments:  Dung [id number]  70 yo with ckd  With increasing  and Creat 7.5  From [facility].

## 2017-08-28 NOTE — ED PROVIDER NOTES
[facility] EMERGENCY DEPARTMENT ([facility])  8/28/17 ED 20 6:32 PM   History     Chief Complaint   Patient presents with     Abnormal Labs     The history is provided by the patient, medical records and a relative.     Dung Norton is a 71 year old male who presents for further evaluation of increased creatinine. Patient is accompanied by daughter who provides detailed history on patient. Patient had recent lengthy hospitalization from 7/12 to 7/25/17 with difficulty urinating,  KRISTINA with creatinine of 14 due to obstructive uropathy. He had a wood catheter placed at that time. He was found to have an enlarged prostate. He underwent workup including cystoscopy and biopsies. His doctors are trying to determine if it is acute vs traumatic hypertrophy of the prostate, and have been monitoring his kidney function as well as fluid balance. He still has a urinary catheter in place attached to a leg bag. He was discharged to home   Patient is doing better.  Last week they did increase his sodium bicarb to 3 times a day which he states now he feels worse.  Patient feels lightheaded and has been falling.  No reports of fever no chest pain or palpitations.  As noted catheter still draining.  Seen in clinic today labs are drawn and concern for escalating kidney function with weakness and falls.         Daughter notes that the cap on his Wood has been popping out and he has been pushing it back in; she is concerned it's not sterile.       Supposed to have infusion at [facility]     he has had some drainage in there    No blood clots    He denies bladder fullness, nausea or vomiting.       He has had some lightheadedness and fell a couple of times in the bathroom on his buttocks this morning. No head, neck or back injury with these falls.     He endorses back pain which is new. He states he didn t have any back pain at time of discharge, but now has back pain     Per [facility] records patient is on  anticoagulants.   Daughter states he had been doing well until about a week ago. he has had multiple med adjustments including titrated doses of his diuretics.   Took 2 doses of diuretics  151 lbs. had increased dose, dropped 10 lbs. told to stop diuretics  He was discontinued and then started on a new med on Thursday.   Daughter states that when he loses weight he does so precipitously.   Creatinine of 5.9  Seen in clinic weekly        I have reviewed the Medications, Allergies, Past Medical and Surgical History, and Social History in the Epic system.    Review of Systems   Constitutional: Positive for activity change (patient difficulty lightheaded), appetite change (decreased) and fatigue. Negative for fever.   HENT: Negative for congestion, facial swelling and trouble swallowing.    Eyes: Negative for redness and visual disturbance.   Respiratory: Negative for cough, chest tightness and shortness of breath.    Cardiovascular: Negative for chest pain, palpitations and leg swelling.   Gastrointestinal: Negative for abdominal pain, blood in stool, diarrhea, nausea and vomiting.   Genitourinary: Positive for difficulty urinating (Man catheter currently). Negative for flank pain (Minimal currently right flank).   Musculoskeletal: Positive for gait problem. Negative for arthralgias, neck pain and neck stiffness.   Skin: Negative for color change, rash and wound.   Allergic/Immunologic: Negative for immunocompromised state.   Neurological: Positive for weakness and light-headedness. Negative for seizures, syncope, numbness and headaches.   Psychiatric/Behavioral: Positive for decreased concentration and dysphoric mood. Negative for confusion.   All other systems reviewed and are negative.      Physical Exam   BP: 94/63  Heart Rate: 69  Temp: 96  F (35.6  C)  Resp: 16  SpO2: 98 %  Physical Exam   Constitutional: He is oriented to person, place, and time. He appears well-developed and well-nourished. He appears  distressed.   Patient mildly distressed.  Feels better laying flat daughter also present gives information records reviewed in the Epic also.   HENT:   Head: Normocephalic and atraumatic.   Dry mucous membranes   Eyes: Conjunctivae and EOM are normal. Pupils are equal, round, and reactive to light. No scleral icterus.   Neck: Normal range of motion. Neck supple.   Cardiovascular: Regular rhythm.    Pulmonary/Chest: No stridor. No respiratory distress.   Abdominal: He exhibits no distension. There is no tenderness. There is no rebound and no guarding.   Genitourinary:   Genitourinary Comments: Man catheter in place   Musculoskeletal: He exhibits no edema, tenderness or deformity.   Neurological: He is alert and oriented to person, place, and time. He has normal reflexes. No cranial nerve deficit. Coordination normal.   Skin: Skin is warm and dry. No rash noted. He is not diaphoretic. No erythema. No pallor.   Psychiatric:   Mildly flattened otherwise appropriate   Nursing note and vitals reviewed.      ED Course     ED Course       Patient evaluated in the ER EKG did not show ischemic changes.  IV established.  Labs are drawn.  As noted records reviewed and Epic with patient's recent hospitalization for obstructive uropathy and now increasing renal functions again.  Magnesium 2.5.  Sodium 135 potassium 4.7I 17 and a gap 15 creatinine 7.8 .    White count 11.4 hemoglobin 8.6.  Urine was sent off with many white cells and red cells.    Patient noted be markedly orthostatic discussed with medicine who is aware.    Patient is noted given a liter normal saline here in the ER.  Patient stable will be admitted to medicine for workup of recurrent acute on chronic kidney disease with orthostatic hypotension.    Procedures             EKG Interpretation:      Interpreted by Kevin Dudley  Time reviewed: 1947  Symptoms at time of EKG: Lightheadedness   Rhythm: normal sinus occasional premature atrial  complexes  Rate: normal  Axis: normal  Ectopy: none  Conduction: normal  ST Segments/ T Waves: No hyperacute ST-T wave changes  Q Waves: none  Comparison to prior: No old EKG available    Clinical Impression: Normal sinus rhythm with premature atrial complexes            Critical Care time:  none           Labs Ordered and Resulted from Time of ED Arrival Up to the Time of Departure from the ED   CBC WITH PLATELETS DIFFERENTIAL - Abnormal; Notable for the following:        Result Value    WBC 11.4 (*)     RBC Count 3.07 (*)     Hemoglobin 8.6 (*)     Hematocrit 26.4 (*)     Absolute Neutrophil 10.0 (*)     Absolute Lymphocytes 0.7 (*)     All other components within normal limits   PARTIAL THROMBOPLASTIN TIME - Abnormal; Notable for the following:     PTT 45 (*)     All other components within normal limits   INR - Abnormal; Notable for the following:     INR 1.19 (*)     All other components within normal limits   COMPREHENSIVE METABOLIC PANEL - Abnormal; Notable for the following:     Carbon Dioxide 17 (*)     Anion Gap 15 (*)     Glucose 157 (*)     Urea Nitrogen 162 (*)     Creatinine 7.80 (*)     GFR Estimate 7 (*)     GFR Estimate If Black 8 (*)     Albumin 3.0 (*)     All other components within normal limits   MAGNESIUM - Abnormal; Notable for the following:     Magnesium 2.5 (*)     All other components within normal limits   PHOSPHORUS - Abnormal; Notable for the following:     Phosphorus 8.4 (*)     All other components within normal limits   UA MACROSCOPIC WITH REFLEX TO MICRO AND CULTURE - Abnormal; Notable for the following:     Blood Urine Moderate (*)     Protein Albumin Urine 300 (*)     Leukocyte Esterase Urine Large (*)     RBC Urine 15 (*)     WBC Urine 2598 (*)     WBC Clumps Present (*)     Bacteria Urine Moderate (*)     All other components within normal limits   BLADDER SCAN - Abnormal; Notable for the following:    PULSE OXIMETRY NURSING   PERIPHERAL IV CATHETER     Results for orders  placed or performed during the hospital encounter of 08/28/17   CBC with platelets differential   Result Value Ref Range    WBC 11.4 (H) 4.0 - 11.0 10e9/L    RBC Count 3.07 (L) 4.4 - 5.9 10e12/L    Hemoglobin 8.6 (L) 13.3 - 17.7 g/dL    Hematocrit 26.4 (L) 40.0 - 53.0 %    MCV 86 78 - 100 fl    MCH 28.0 26.5 - 33.0 pg    MCHC 32.6 31.5 - 36.5 g/dL    RDW 14.6 10.0 - 15.0 %    Platelet Count 256 150 - 450 10e9/L    Diff Method Automated Method     % Neutrophils 87.5 %    % Lymphocytes 5.7 %    % Monocytes 6.0 %    % Eosinophils 0.3 %    % Basophils 0.1 %    % Immature Granulocytes 0.4 %    Nucleated RBCs 0 0 /100    Absolute Neutrophil 10.0 (H) 1.6 - 8.3 10e9/L    Absolute Lymphocytes 0.7 (L) 0.8 - 5.3 10e9/L    Absolute Monocytes 0.7 0.0 - 1.3 10e9/L    Absolute Eosinophils 0.0 0.0 - 0.7 10e9/L    Absolute Basophils 0.0 0.0 - 0.2 10e9/L    Abs Immature Granulocytes 0.1 0 - 0.4 10e9/L    Absolute Nucleated RBC 0.0    Partial thromboplastin time   Result Value Ref Range    PTT 45 (H) 22 - 37 sec   INR   Result Value Ref Range    INR 1.19 (H) 0.86 - 1.14   Comprehensive metabolic panel   Result Value Ref Range    Sodium 135 133 - 144 mmol/L    Potassium 4.7 3.4 - 5.3 mmol/L    Chloride 104 94 - 109 mmol/L    Carbon Dioxide 17 (L) 20 - 32 mmol/L    Anion Gap 15 (H) 3 - 14 mmol/L    Glucose 157 (H) 70 - 99 mg/dL    Urea Nitrogen 162 (H) 7 - 30 mg/dL    Creatinine 7.80 (H) 0.66 - 1.25 mg/dL    GFR Estimate 7 (L) >60 mL/min/1.7m2    GFR Estimate If Black 8 (L) >60 mL/min/1.7m2    Calcium 8.9 8.5 - 10.1 mg/dL    Bilirubin Total 0.3 0.2 - 1.3 mg/dL    Albumin 3.0 (L) 3.4 - 5.0 g/dL    Protein Total 7.2 6.8 - 8.8 g/dL    Alkaline Phosphatase 95 40 - 150 U/L    ALT 17 0 - 70 U/L    AST 9 0 - 45 U/L   Magnesium   Result Value Ref Range    Magnesium 2.5 (H) 1.6 - 2.3 mg/dL   Phosphorus   Result Value Ref Range    Phosphorus 8.4 (H) 2.5 - 4.5 mg/dL   UA reflex to Microscopic and Culture   Result Value Ref Range    Color Urine  Light Yellow     Appearance Urine Cloudy     Glucose Urine Negative NEG^Negative mg/dL    Bilirubin Urine Negative NEG^Negative    Ketones Urine Negative NEG^Negative mg/dL    Specific Gravity Urine 1.016 1.003 - 1.035    Blood Urine Moderate (A) NEG^Negative    pH Urine 6.0 5.0 - 7.0 pH    Protein Albumin Urine 300 (A) NEG^Negative mg/dL    Urobilinogen mg/dL Normal 0.0 - 2.0 mg/dL    Nitrite Urine Negative NEG^Negative    Leukocyte Esterase Urine Large (A) NEG^Negative    Source Midstream Urine     RBC Urine 15 (H) 0 - 2 /HPF    WBC Urine 2598 (H) 0 - 2 /HPF    WBC Clumps Present (A) NEG^Negative /HPF    Bacteria Urine Moderate (A) NEG^Negative /HPF   Blood gas venous   Result Value Ref Range    Ph Venous 7.23 (L) 7.32 - 7.43 pH    PCO2 Venous 30 (L) 40 - 50 mm Hg    PO2 Venous 24 (L) 25 - 47 mm Hg    Bicarbonate Venous 13 (L) 21 - 28 mmol/L    Base Deficit Venous 13.8 mmol/L    FIO2 21    Lactic acid whole blood   Result Value Ref Range    Lactic Acid 0.9 0.7 - 2.0 mmol/L   EKG 12 lead   Result Value Ref Range    Interpretation ECG Click View Image link to view waveform and result    Bladder scan   Result Value Ref Range    Urine Volume 171 mL    Urine Culture Aerobic Bacterial   Result Value Ref Range    Specimen Description Midstream Urine     Special Requests Specimen received in preservative     Culture Micro PENDING             Assessments & Plan (with Medical Decision Making)  71-year-old male history of recent obstructive uropathy with a Man catheter and now presents with orthostatic hypotension along with this worsening renal dysfunction again.  Creatinine 7.8 BUN was 162.   Given IV 1 L normal saline bolus.  Also some of the symptoms may be prerenal patient currently off diuretics states he feels worse taking sodium bicarbonate 3 times a day.  Will admit to medicine for further workup.             I have reviewed the nursing notes.    I have reviewed the findings, diagnosis, plan and need for follow up  with the patient.    Current Discharge Medication List          Final diagnoses:   CKD (chronic kidney disease)   Orthostatic hypotension   Fall, initial encounter   I, Belinda Jackson, am serving as a trained medical scribe to document services personally performed by Kevin Dudley MD, based on the provider's statements to me on August 28, 2017.  This document has been checked and approved by the attending provider.    IKevin MD, was physically present and have reviewed and verified the accuracy of this note documented by florecita Doran scribe.      8/28/2017   [facility] EMERGENCY DEPARTMENT    This note was created at least in part by the use of dragon voice dictation system. Inadvertent typographical errors may still exist.  Kevin Dudley MD.         Kevin Dudley MD  08/29/17 0123

## 2017-08-28 NOTE — TELEPHONE ENCOUNTER
Received call about critical labs for this patient: Cr 7.4, , and bicarb 12.     Called patient to check in; he said he's feeling okay other than some dizziness- he's been trying to push fluids today. Confirmed that he is not taking diuretics (his lasix has been on hold). He denies weakness, nausea, vomiting, chest pain, or shortness of breath.  Cecilia Negrete NP had arranged for patient to get an outpatient infusion to help with volume status, but unfortunately this couldn't be scheduled until Friday 9/1.    Paged Dr. Daley to discuss labs. She recommends that patient be seen in the ER for further evaluation and management. Called patient and he verbalized understanding. I also called patient's daughter and left a VM with these recommendations, per patient's request.    Jose Juan Shea, RN, BAN  Nephrology RN Care Coordinator

## 2017-08-28 NOTE — IP AVS SNAPSHOT
[address] [address]    Phone:  [phone]                                       After Visit Summary   8/28/2017    Dung Norton    MRN: [id number]           After Visit Summary Signature Page     I have received my discharge instructions, and my questions have been answered. I have discussed any challenges I see with this plan with the nurse or doctor.    ..........................................................................................................................................  Patient/Patient Representative Signature      ..........................................................................................................................................  Patient Representative Print Name and Relationship to Patient    ..................................................               ................................................  Date                                            Time    ..........................................................................................................................................  Reviewed by Signature/Title    ...................................................              ..............................................  Date                                                            Time

## 2017-08-28 NOTE — IP AVS SNAPSHOT
MRN:[id number]                      After Visit Summary   8/28/2017    Dung Norton    MRN: [id number]           Thank you!     Thank you for choosing [facility] for your care. Our goal is always to provide you with excellent care. Hearing back from our patients is one way we can continue to improve our services. Please take a few minutes to complete the written survey that you may receive in the mail after you visit with us. Thank you!        Patient Information     Date Of Birth          1945        Designated Caregiver       Most Recent Value    Caregiver    Will someone help with your care after discharge? no      About your hospital stay     You were admitted on:  August 28, 2017 You last received care in the:  [address] [facility]    You were discharged on:  September 5, 2017        Reason for your hospital stay       Obstructive BPH and orthostatic hypotension                  Who to Call     For medical emergencies, please call 911.  For non-urgent questions about your medical care, please call your primary care provider or clinic, [phone]          Attending Provider     Provider Specialty    Kevin Dudley MD Emergency Medicine    Alonso, Tammi Quiñones MD Internal Medicine    Shana Villegas MD Internal Medicine    Rich, Liseth Jernigan MD Internal Medicine       Primary Care Provider Office Phone # Fax #    Haley Baker [phone]       When to contact your care team       Call your primary doctor if you have any of the following: temperature greater than 100.4F or low urine output or dizziness/imbalance.                  After Care Instructions     Activity       Your activity upon discharge: activity as tolerated            Diet       Follow this diet upon discharge: Orders Placed This Encounter      Snacks/Supplements Adult: Other - Please comment; AM, HS snack times - please send berry Ensure Clear, PM snack time - send berry Nepro supplement;  Between Meals      3 Gram Sodium Diet                  Follow-up Appointments     Adult [facility] Follow-up and recommended labs and tests       Follow up with primary care provider, Haley Baker, within 7 days to evaluate medication change.  The following labs/tests are recommended: BMP.      Follow up with Nephrology on Sept 8 as previously planned.    Follow up with Urology for a trial of void.    Appointments on [place] and/or [facility] (with [facility] or [facility] provider or service). Call [phone] if you haven't heard regarding these appointments within 7 days of discharge.                  Your next 10 appointments already scheduled     Sep 08, 2017  2:30 PM CDT   Lab with  LAB   [facility] ([facility])    [address] [place] [address]  1st Floor  [facility] [address] [phone]            Sep 08, 2017  3:30 PM CDT   (Arrive by 3:00 PM)   Return Visit with Oralia De La Torre NP   [facility] ([facility])    [address] [place] [address]  3rd [facility] [id number] [phone]            Sep 12, 2017 10:30 AM CDT   (Arrive by 10:15 AM)   New Patient Visit with WALESKA Valadez   [facility] ([facility])    [address] [place] [address]  4th Floor  [facility] [id number] [phone]            Sep 12, 2017  2:00 PM CDT   Office Visit with Haley Baker MD   [facility] ([facility])    [address] [facility] [id number] [phone]           Bring a current list of meds and any records pertaining to this visit. For Physicals, please bring immunization records and any forms needing to be filled out. Please arrive 10 minutes early to complete paperwork.              Additional Services     Home care nursing referral       RN skilled nursing visit. RN to assess vital signs and weight and home safety.  RN to provide tube site care and  "management.    Your provider has ordered home care nursing services. If you have not been contacted within 2 days of your discharge please call the inpatient department phone number at [phone] .            Physical Therapy Referral       *This therapy referral will be filtered to a centralized scheduling office at [facility] and the patient will receive a call to schedule an appointment at a [facility] location most convenient for them. *     [facility] provides Physical Therapy evaluation and treatment and many specialty services across the [facility] system.  If requesting a specialty program, please choose from the list below.    If you have not heard from the scheduling office within 2 business days, please call [phone] for all locations, with the exception of [place], please call [phone].  Treatment: Evaluation & Treatment  Special Instructions/Modalities: balance training/ orthostatic hypotension  Special Programs: Balance/Vestibular    Please be aware that coverage of these services is subject to the terms and limitations of your health insurance plan.  Call member services at your health plan with any benefit or coverage questions.      **Note to Provider:  If you are referring outside of [facility] for the therapy appointment, please list the name of the location in the \"special instructions\" above, print the referral and give to the patient to schedule the appointment.                  Further instructions from your care team       You will be receiving a call from the ENT clinic to confirm the time and date of your follow up appointment. This appointment should be within 3-4 weeks of discharge. If you do not hear from them within 2 business days, please call them at [phone].      Pending Results     No orders found from 8/26/2017 to 8/29/2017.            Statement of Approval     Ordered          09/05/17 1635  I have reviewed and agree with all the " "recommendations and orders detailed in this document.  EFFECTIVE NOW     Approved and electronically signed by:  Liseth Villanueva MD             Admission Information     Date & Time Provider Department Dept. Phone    2017 Liseth Villanueva MD [address] [facility] [phone]      Your Vitals Were     Blood Pressure Pulse Temperature Respirations Height Weight    134/86 (BP Location: Right arm) 81 98.1  F (36.7  C) (Oral) 18 1.778 m (5' 10\") 65.1 kg (143 lb 9.6 oz)    Pulse Oximetry BMI (Body Mass Index)                100% 20.6 kg/m2          MyChart Information     Spotted lets you send messages to your doctor, view your test results, renew your prescriptions, schedule appointments and more. To sign up, go to [email or web address] . Click on \"Log in\" on the left side of the screen, which will take you to the Welcome page. Then click on \"Sign up Now\" on the right side of the page.     You will be asked to enter the access code listed below, as well as some personal information. Please follow the directions to create your username and password.     Your access code is: [email or web address]  Expires: 10/10/2017  2:21 PM     Your access code will  in 90 days. If you need help or a new code, please call your [facility] clinic or [phone].        Care EveryWhere ID     This is your Care EveryWhere ID. This could be used by other organizations to access your [facility] medical records  [id number]        Equal Access to Services     BRINA CERRATO : Hadjakob verdin Sochalo, waaxda luqadaha, qaybta kaalmada robert, gibson solis. So [facility] [phone].    ATENCIÓN: Si habla español, tiene a montague disposición servicios gratuitos de asistencia lingüística. Llame al [phone].    We comply with applicable federal civil rights laws and [place] laws. We do not discriminate on the basis of race, color, national origin, age, disability sex, sexual orientation or gender " identity.               Review of your medicines      START taking        Dose / Directions    calcium acetate 667 MG Caps capsule   Commonly known as:  PHOSLO   Used for:  Chronic kidney disease, unspecified CKD stage        Dose:  667 mg   Take 1 capsule (667 mg) by mouth 3 times daily (with meals)   Quantity:  180 capsule   Refills:  0       fludrocortisone 0.1 MG tablet   Commonly known as:  FLORINEF   Used for:  Orthostatic hypotension        Dose:  0.2 mg   Take 2 tablets (0.2 mg) by mouth daily   Quantity:  60 tablet   Refills:  0         CONTINUE these medicines which have NOT CHANGED        Dose / Directions    aspirin 81 MG tablet        Dose:  1 tablet   Take 1 tablet by mouth daily.   Refills:  0       finasteride 5 MG tablet   Commonly known as:  PROSCAR   Used for:  Benign prostatic hyperplasia with urinary retention        Dose:  5 mg   Take 1 tablet (5 mg) by mouth daily   Quantity:  90 tablet   Refills:  3       fish oil-omega-3 fatty acids 1000 MG capsule        Dose:  1 g   Take 1 g by mouth daily   Refills:  0       MULTIVITAMIN MEN PO        Dose:  1 tablet   Take 1 tablet by mouth daily   Refills:  0         STOP taking     amLODIPine 5 MG tablet   Commonly known as:  NORVASC           sodium bicarbonate 650 MG tablet           tamsulosin 0.4 MG capsule   Commonly known as:  FLOMAX                Where to get your medicines      [id number]  [place] - [address] AT [facility] & [place]  [address] [facility] [place] [address]     Phone:  [phone]     calcium acetate 667 MG Caps capsule    fludrocortisone 0.1 MG tablet                Protect others around you: Learn how to safely use, store and throw away your medicines at www.disposemymeds.org.             Medication List: This is a list of all your medications and when to take them. Check marks below indicate your daily home schedule. Keep this list as a  reference.      Medications           Morning Afternoon Evening Bedtime As Needed    aspirin 81 MG tablet   Take 1 tablet by mouth daily.                                calcium acetate 667 MG Caps capsule   Commonly known as:  PHOSLO   Take 1 capsule (667 mg) by mouth 3 times daily (with meals)   Last time this was given:  667 mg on 9/5/2017 11:54 AM                                finasteride 5 MG tablet   Commonly known as:  PROSCAR   Take 1 tablet (5 mg) by mouth daily   Last time this was given:  5 mg on 9/5/2017  9:04 AM                                fish oil-omega-3 fatty acids 1000 MG capsule   Take 1 g by mouth daily                                fludrocortisone 0.1 MG tablet   Commonly known as:  FLORINEF   Take 2 tablets (0.2 mg) by mouth daily   Last time this was given:  200 mcg on 9/5/2017  9:04 AM                                MULTIVITAMIN MEN PO   Take 1 tablet by mouth daily

## 2017-08-29 ENCOUNTER — APPOINTMENT (OUTPATIENT)
Dept: ULTRASOUND IMAGING | Facility: CLINIC | Age: 72
DRG: 725 | End: 2017-08-29
Attending: PHYSICIAN ASSISTANT
Payer: MEDICARE

## 2017-08-29 LAB
ALBUMIN UR-MCNC: 100 MG/DL
ANION GAP SERPL CALCULATED.3IONS-SCNC: 16 MMOL/L (ref 3–14)
APPEARANCE UR: ABNORMAL
BACTERIA #/AREA URNS HPF: ABNORMAL /HPF
BILIRUB UR QL STRIP: NEGATIVE
BUN SERPL-MCNC: 158 MG/DL (ref 7–30)
CALCIUM SERPL-MCNC: 8.4 MG/DL (ref 8.5–10.1)
CHLORIDE SERPL-SCNC: 109 MMOL/L (ref 94–109)
CO2 SERPL-SCNC: 13 MMOL/L (ref 20–32)
COLOR UR AUTO: ABNORMAL
CREAT SERPL-MCNC: 7.41 MG/DL (ref 0.66–1.25)
ERYTHROCYTE [DISTWIDTH] IN BLOOD BY AUTOMATED COUNT: 14.6 % (ref 10–15)
GFR SERPL CREATININE-BSD FRML MDRD: 7 ML/MIN/1.7M2
GLUCOSE SERPL-MCNC: 98 MG/DL (ref 70–99)
GLUCOSE UR STRIP-MCNC: NEGATIVE MG/DL
HCT VFR BLD AUTO: 26.7 % (ref 40–53)
HGB BLD-MCNC: 8.8 G/DL (ref 13.3–17.7)
HGB UR QL STRIP: ABNORMAL
INTERPRETATION ECG - MUSE: NORMAL
KETONES UR STRIP-MCNC: NEGATIVE MG/DL
LACTATE BLD-SCNC: 1.2 MMOL/L (ref 0.7–2)
LEUKOCYTE ESTERASE UR QL STRIP: ABNORMAL
MCH RBC QN AUTO: 28.9 PG (ref 26.5–33)
MCHC RBC AUTO-ENTMCNC: 33 G/DL (ref 31.5–36.5)
MCV RBC AUTO: 88 FL (ref 78–100)
NITRATE UR QL: NEGATIVE
PH UR STRIP: 6 PH (ref 5–7)
PLATELET # BLD AUTO: 246 10E9/L (ref 150–450)
POTASSIUM SERPL-SCNC: 4.5 MMOL/L (ref 3.4–5.3)
RBC # BLD AUTO: 3.04 10E12/L (ref 4.4–5.9)
RBC #/AREA URNS AUTO: 10 /HPF (ref 0–2)
SODIUM SERPL-SCNC: 137 MMOL/L (ref 133–144)
SOURCE: ABNORMAL
SP GR UR STRIP: 1.01 (ref 1–1.03)
TRANS CELLS #/AREA URNS HPF: <1 /HPF (ref 0–1)
UROBILINOGEN UR STRIP-MCNC: NORMAL MG/DL (ref 0–2)
WBC # BLD AUTO: 10.9 10E9/L (ref 4–11)
WBC #/AREA URNS AUTO: >182 /HPF (ref 0–2)
WBC CLUMPS #/AREA URNS HPF: PRESENT /HPF

## 2017-08-29 PROCEDURE — 99211 OFF/OP EST MAY X REQ PHY/QHP: CPT

## 2017-08-29 PROCEDURE — 83605 ASSAY OF LACTIC ACID: CPT | Performed by: PHYSICIAN ASSISTANT

## 2017-08-29 PROCEDURE — 25000128 H RX IP 250 OP 636: Performed by: INTERNAL MEDICINE

## 2017-08-29 PROCEDURE — 12000025 ZZH R&B TRANSPLANT INTERMEDIATE

## 2017-08-29 PROCEDURE — A9270 NON-COVERED ITEM OR SERVICE: HCPCS | Mod: GY | Performed by: INTERNAL MEDICINE

## 2017-08-29 PROCEDURE — 81001 URINALYSIS AUTO W/SCOPE: CPT | Performed by: PHYSICIAN ASSISTANT

## 2017-08-29 PROCEDURE — 99232 SBSQ HOSP IP/OBS MODERATE 35: CPT | Performed by: INTERNAL MEDICINE

## 2017-08-29 PROCEDURE — 36415 COLL VENOUS BLD VENIPUNCTURE: CPT | Performed by: PHYSICIAN ASSISTANT

## 2017-08-29 PROCEDURE — 87086 URINE CULTURE/COLONY COUNT: CPT | Performed by: PHYSICIAN ASSISTANT

## 2017-08-29 PROCEDURE — 25000125 ZZHC RX 250

## 2017-08-29 PROCEDURE — 87186 SC STD MICRODIL/AGAR DIL: CPT | Performed by: PHYSICIAN ASSISTANT

## 2017-08-29 PROCEDURE — 25000132 ZZH RX MED GY IP 250 OP 250 PS 637: Mod: GY | Performed by: PHYSICIAN ASSISTANT

## 2017-08-29 PROCEDURE — 76770 US EXAM ABDO BACK WALL COMP: CPT

## 2017-08-29 PROCEDURE — 80048 BASIC METABOLIC PNL TOTAL CA: CPT | Performed by: PHYSICIAN ASSISTANT

## 2017-08-29 PROCEDURE — 25000128 H RX IP 250 OP 636: Performed by: PHYSICIAN ASSISTANT

## 2017-08-29 PROCEDURE — A9270 NON-COVERED ITEM OR SERVICE: HCPCS | Mod: GY | Performed by: PHYSICIAN ASSISTANT

## 2017-08-29 PROCEDURE — 85027 COMPLETE CBC AUTOMATED: CPT | Performed by: PHYSICIAN ASSISTANT

## 2017-08-29 PROCEDURE — 87088 URINE BACTERIA CULTURE: CPT | Performed by: PHYSICIAN ASSISTANT

## 2017-08-29 PROCEDURE — 25000132 ZZH RX MED GY IP 250 OP 250 PS 637: Mod: GY | Performed by: INTERNAL MEDICINE

## 2017-08-29 RX ORDER — CEFTRIAXONE 1 G/1
1 INJECTION, POWDER, FOR SOLUTION INTRAMUSCULAR; INTRAVENOUS EVERY 24 HOURS
Status: DISCONTINUED | OUTPATIENT
Start: 2017-08-29 | End: 2017-08-31

## 2017-08-29 RX ORDER — FINASTERIDE 5 MG/1
5 TABLET, FILM COATED ORAL DAILY
Status: DISCONTINUED | OUTPATIENT
Start: 2017-08-29 | End: 2017-09-05 | Stop reason: HOSPADM

## 2017-08-29 RX ORDER — CEFTRIAXONE 1 G/1
1 INJECTION, POWDER, FOR SOLUTION INTRAMUSCULAR; INTRAVENOUS ONCE
Status: DISCONTINUED | OUTPATIENT
Start: 2017-08-29 | End: 2017-08-29

## 2017-08-29 RX ADMIN — SODIUM BICARBONATE 650 MG TABLET 650 MG: at 15:03

## 2017-08-29 RX ADMIN — NICOTINE 1 PATCH: 14 PATCH, EXTENDED RELEASE TRANSDERMAL at 00:36

## 2017-08-29 RX ADMIN — SODIUM CHLORIDE 1000 ML: 9 INJECTION, SOLUTION INTRAVENOUS at 13:22

## 2017-08-29 RX ADMIN — CEFTRIAXONE 1 G: 1 INJECTION, POWDER, FOR SOLUTION INTRAMUSCULAR; INTRAVENOUS at 05:34

## 2017-08-29 RX ADMIN — ASPIRIN 81 MG: 81 TABLET, COATED ORAL at 10:09

## 2017-08-29 RX ADMIN — NICOTINE 1 PATCH: 14 PATCH, EXTENDED RELEASE TRANSDERMAL at 10:09

## 2017-08-29 RX ADMIN — SODIUM BICARBONATE 650 MG TABLET 650 MG: at 11:59

## 2017-08-29 RX ADMIN — SODIUM CHLORIDE: 9 INJECTION, SOLUTION INTRAVENOUS at 00:16

## 2017-08-29 RX ADMIN — LIDOCAINE HYDROCHLORIDE: 20 JELLY TOPICAL at 02:32

## 2017-08-29 RX ADMIN — FINASTERIDE 5 MG: 5 TABLET, FILM COATED ORAL at 17:26

## 2017-08-29 RX ADMIN — SODIUM CHLORIDE: 9 INJECTION, SOLUTION INTRAVENOUS at 22:34

## 2017-08-29 RX ADMIN — SODIUM CHLORIDE: 9 INJECTION, SOLUTION INTRAVENOUS at 13:09

## 2017-08-29 RX ADMIN — SODIUM BICARBONATE 650 MG TABLET 650 MG: at 20:23

## 2017-08-29 ASSESSMENT — ENCOUNTER SYMPTOMS
DIFFICULTY URINATING: 1
LIGHT-HEADEDNESS: 1
ARTHRALGIAS: 0
ACTIVITY CHANGE: 1
SHORTNESS OF BREATH: 0
DECREASED CONCENTRATION: 1
FEVER: 0
APPETITE CHANGE: 1
WOUND: 0
TROUBLE SWALLOWING: 0
COLOR CHANGE: 0
FACIAL SWELLING: 0
SEIZURES: 0
CHEST TIGHTNESS: 0
BLOOD IN STOOL: 0
CONFUSION: 0
WEAKNESS: 1
VOMITING: 0
NECK STIFFNESS: 0
FLANK PAIN: 0
DYSPHORIC MOOD: 1
HEADACHES: 0
COUGH: 0
PALPITATIONS: 0
ABDOMINAL PAIN: 0
NAUSEA: 0
DIARRHEA: 0
FATIGUE: 1
NUMBNESS: 0
NECK PAIN: 0
EYE REDNESS: 0

## 2017-08-29 NOTE — PHARMACY-ADMISSION MEDICATION HISTORY
Admission medication history interview status for the 8/28/2017 admission is complete. See Epic admission navigator for allergy information, pharmacy, prior to admission medications and immunization status.     Medication history interview sources:  patient, pharmacy to verify strengths    Changes made to PTA medication list (reason)  Added: none  Deleted: none  Changed: none    Additional medication history information (including reliability of information, actions taken by pharmacist):  Patient knew how many medications he took at what time of the day but was unfamiliar with strengths & names -> verified with pharmacy  Patient was previously taking fish oil and multivitamin regularly but hasn't been for the past few weeks, he wanted them to remain on the list as he plans on continuing them in the future  Flu shot - patient unsure when he last received, per [facility] he last got flu shot in Fall 2016.      Prior to Admission medications    Medication Sig Last Dose Taking? Auth Provider   sodium bicarbonate 650 MG tablet Take 1 tablet (650 mg) by mouth 3 times daily 8/28/2017 at Unknown time Yes Oralia De La Torre NP   amLODIPine (NORVASC) 5 MG tablet Take 1 tablet (5 mg) by mouth daily 8/28/2017 at Unknown time Yes Oralia De La Torre NP   finasteride (PROSCAR) 5 MG tablet Take 1 tablet (5 mg) by mouth daily 8/28/2017 at Unknown time Yes Oralia De La Torre NP   tamsulosin (FLOMAX) 0.4 MG capsule Take 1 capsule (0.4 mg) by mouth daily 8/28/2017 at Unknown time Yes Oralia De La Torre NP   aspirin 81 MG tablet Take 1 tablet by mouth daily. 8/28/2017 at Unknown time Yes Reported, Patient   Multiple Vitamins-Minerals (MULTIVITAMIN MEN PO) Take 1 tablet by mouth daily Unknown at Unknown time  Reported, Patient   fish oil-omega-3 fatty acids (FISH OIL) 1000 MG capsule Take 1 g by mouth daily Unknown at Unknown time  Reported, Patient         Medication history completed by: Olivia Ty, PharmD

## 2017-08-29 NOTE — PLAN OF CARE
Problem: Goal Outcome Summary  Goal: Goal Outcome Summary  OT 7A: eval orders received, chart reviewed.  Pt. with another caregiver X 2 attempts; u/s, WOC nurse.  Re schedule eval for 08/30/17.

## 2017-08-29 NOTE — PROGRESS NOTES
Received consult for suspected pressure injury on coccyx. Assessed the area and noted blanchable erythema to coccyx, sacrum and perineal area. No pressure injury detected. He is skinny and less adipose tissue. Able to shift weight from side to side. Educated pt on pressure injury, risk factors, and preventive measures. Verbalized understanding. He is not able to clean himself completely, noted stool on his underwear. Mesh panties given to change.  P. Continue to follow pressure injury prevention and incontinence protocol. Apply criticaid barrier cream if needed. No further visit planned, will sign off.  Face to face time-15 mins

## 2017-08-29 NOTE — H&P
[facility] [place]    Internal Medicine History and Physical - Gold Service       Date of Admission: 8/28/2017    Assessment & Plan  Dung Norton is a 71 year old man with a history of HTN and tobacco abuse who was admitted to [facility] 7/12-7/25/17 w/ severe KRISTINA 2/2 obstructive uropathy attributed to BPH. Now readmitted w/ worsening KRISTINA - appears hypovolemic.     #KRISTINA w/ AGMA. Cr 7.8 and , up from more recent baseline of Cr low-mid 6s and BUN 80s-90s. K stable at 4.7. CO2 low at 17 (12 earlier today) from low 20s previously and reports difficulty tolerating PO sodium bicarb at currently prescribed TID dosing. Has new AGMA w/ AG 15. UA w/ large LE, moderate blood, protein, 2598 WBC w/ WBC clumps (? AIN but no recent exposures, no eosinophilia noted). Is orthostatic w/ BP 96/67-->63/30 and HR 75-->153 lying to standing - suspect that he is hypovolemic w/ recent OP initiation of Lasix 20 mg on 8/4, increased to BID 8/8, held since 8/16 when weight was down to 65.1 kg (from 68.8 kg on 8/4 and 77 kg on 7/24) although this doesn't necessarily seem like the lone contributor for this presentation. Suspect poor renal perfusion w/ hypotension and has continued on home Norvasc despite this (also Proscar, Flomax). No evidence of GIB. No NSAIDs. Emptying clear yellow UOP frequently but not measuring at home. No acute indication for dialysis although would be open to this as OP nephrology felt that return to normal baseline was unlikely at this time given many prior months w/ obstructive insult.   - Agree w/ 1L NS bolus started in ED and will continue maintenance IVF overnight w/ f/u orthostatics in AM.   - D/c Norvasc and avoid hypotension (also hold Proscar, Flomax for now).   - Continue Man but RN to exchange d/t leakage.   - Urine cx pending. Would also re-send UA/UC after Man is exchanged.  - VBG and lactic acid ordered.   - Will continue PO sodium bicarb TID if patient willing to take  (is attributing his malaise to this), no acute indication for bicarb gtt.   - Strict I&Os and daily weights.   - Avoid nephrotoxic meds, IV contrast.   - Renal US ordered (most recently done 7/13 w/ moderate hydroureteronephrosis bilaterally).   - Phos is high, would discuss starting binders.  - Nephrology consult ordered for AM (Dr. Daley had recommended that patient present to ED for admission).     #BPH. Had cystoscopy on 7/16 w/ no masses and biopsies taken w/o evidence of malignancy. PSA was wnl at 1.72. Was started on Flomax and Proscar w/ plan for OP urology f/u. Holding these for now as above, will need to reassess.     #Tobacco Abuse. Has not smoked since last d/c but remains on 21 mg nicotine patch which he thinks is too strong and perhaps making him feel shaky. Would like to continue patch but will step down to 14 mg and should have clear plan to taper off.    #Pulmonary Nodules. Noted last hospitalization w/ few small pulmonary nodules measuring up to 3 mm. A 12 month f/u chest CT was recommended per Fleischner society criteria.     #Chronic Normocytic Anemia. Hgb 8.6 from 7.5 at most recent d/c. Was thought d/t CKD but also w/ oozing from prostate during cystoscopy.     #Subclinical Hypothyroidism. TSH 10.51 and FT4 0.71 checked during last hospitalization. Not on meds and was advised to have recheck once over acute illness.     Diet: renal   Fluids: NS @ 100 cc/hr   DVT Prophylaxis: mechanical; consider pharmacologic in coming days if not ambulating and no plan for intervention (dialysis line)   Code Status: DNR/DNI - confirmed w/ patient, would benefit from completing formal healthcare directive     Disposition Plan   Expected discharge: 2 - 3 days; recommended to prior living arrangement once medically stable and cleared by therapy. Anticipate longer stay and need for new dialysis unit set up if initiating dialysis this admission.     Qiana Chaparro PA-C  Hospitalist Service  [facility]  [place] [facility]  Pager [phone]    Chief Complaint   Fatigue     History is obtained from the patient    History of Present Illness   Patient presented to the ED for further evaluation of fatigue and worsening renal function on outpatient labs. He was first admitted to [facility] 7/12-7/25/17 after presenting w/ fatigue and low back pain, and found to have severe KRISTINA due to obstructive uropathy. Was seen by urology w/ cystoscopy performed - found to have enlarged prostate but no evidence of malignancy. Has had Man in place since; changed x 1 about 2-3 weeks ago and has been leaking quite a bit. Has followed in urology clinic since d/c from hospital - first on 8/4 at which time he was started on Lasix 20 mg daily for mild hypervolemia (weight was noted to be 68.8 kg from 77 kg on 7/24, no dry weight had been established). This was increased to 20 mg BID on 8/8 and then stopped on 8/16 due to concern for hypovolemia w/ weight down to 65.1 kg. Had been on daily dose of sodium bicarb but notes that this was increased on 8/21 to TID and he has felt poorly ever since. Complains of extreme fatigue. No abdominal pain or N/V. No diarrhea or dark, tarry, bloody stools. Reports ok appetite eating oatmeal for breakfast, corn and chicken salad sandwich for lunch, and corn and chicken wings for dinner. Drinks 1L of 7 Up daily as well as multiple glasses of water. Has had to buy smaller pants. Still has some mild low back pain. Does not measure UOP but reports emptying multiple times per day and x 1 overnight w/ clear yellow non bloody output. Has been lightheaded when standing up (no symptoms lying down) and fell in his bathroom a couple of times a few days ago due to lightheadedness/shakiness (but denies LOC, head trauma, other injuries). Denies any NSAID use. Is interested in pursuing dialysis if this is recommended to him.     Review of Systems   10 point ROS performed and negative unless otherwise noted in HPI    Past  Medical History    I have reviewed this patient's medical history and updated it with pertinent information if needed.   Past Medical History:   Diagnosis Date     BPH (benign prostatic hyperplasia)      Chronic kidney disease      HTN      Pulmonary nodules      Tobacco abuse        Past Surgical History   I have reviewed this patient's surgical history and updated it with pertinent information if needed.  Past Surgical History:   Procedure Laterality Date     APPENDECTOMY  AGE 6     CYSTOSCOPY, FULGURATE BLEEDERS, EVACUATE CLOT(S), COMBINED N/A 7/16/2017    Procedure: COMBINED CYSTOSCOPY, FULGURATE BLEEDERS, EVACUATE CLOT(S);  Cystoscopy clot evacuation, bladder biopsy and fulguration (per surgeons note) NERVE BLOCK PERIPHERAL;  Surgeon: Tamiko Vanegas MD;  Location: [facility] OR     SINUS SURGERY  AGE 6     TONSILLECTOMY      CHILDHOOD       Social History   Social History   Substance Use Topics     Smoking status: Former Smoker - Quit since last hospitalization, and had been down to a few cigarettes per day prior to that. Smoked since teenage years. Continues to wear 21 mg nicotine patch but feels that this is too much.      Smokeless tobacco: Never Used     Alcohol use No     Lives at home alone. Daughter involved in care. Previously worked as  and  and was very active around home before last hospitalization.     Family History   I have reviewed this patient's family history and updated it with pertinent information if needed.   Family History   Problem Relation Age of Onset     C.A.D. Mother      d age 67     Vision Loss Father      Hearing Loss Sister      DIABETES Sister      CANCER No family hx of        Prior to Admission Medications   Prior to Admission Medications   Prescriptions Last Dose Informant Patient Reported? Taking?   Multiple Vitamins-Minerals (MULTIVITAMIN MEN PO)   Yes No   Sig: Take 1 tablet by mouth daily   amLODIPine (NORVASC) 5 MG tablet 8/28/2017 at Unknown time  No  Yes   Sig: Take 1 tablet (5 mg) by mouth daily   aspirin 81 MG tablet 8/28/2017 at Unknown time  Yes Yes   Sig: Take 1 tablet by mouth daily.   finasteride (PROSCAR) 5 MG tablet 8/28/2017 at Unknown time  No Yes   Sig: Take 1 tablet (5 mg) by mouth daily   fish oil-omega-3 fatty acids (FISH OIL) 1000 MG capsule   Yes No   Sig: Take 1 g by mouth daily   sodium bicarbonate 650 MG tablet 8/28/2017 at Unknown time  No Yes   Sig: Take 1 tablet (650 mg) by mouth 3 times daily   tamsulosin (FLOMAX) 0.4 MG capsule 8/28/2017 at Unknown time  No Yes   Sig: Take 1 capsule (0.4 mg) by mouth daily      Facility-Administered Medications: None     Allergies   No Known Allergies    Physical Exam   BP 96/67  Temp 96  F (35.6  C) (Oral)  Resp 16  SpO2 100%  GENERAL: Alert and oriented x 3. Sitting up in bed, appears comfortable. Pleasant and conversant.   HEENT: Anicteric sclera. PERRL. Mucous membranes moist.   CV: RRR. S1, S2. Systolic ejection murmur present.   RESPIRATORY: Effort normal on room air. Lungs CTAB with no wheezing, rales, rhonchi.   GI: Abdomen soft and non distended, bowel sounds present. No tenderness, rebound, guarding.   : Man w/ clear yellow UOP.   NEUROLOGICAL: No focal deficits. Moves all extremities.   EXTREMITIES: No peripheral edema. Intact bilateral pedal pulses.   SKIN: No jaundice. No rashes.     Data   Data reviewed today: I reviewed all medications, new labs and imaging results over the last 24 hours.

## 2017-08-29 NOTE — PROVIDER NOTIFICATION
Dr. Silva called to get clarification if pt needed his 3 way wood replaced with another 3 way wood.

## 2017-08-29 NOTE — PLAN OF CARE
Problem: Individualization  Goal: Patient Preferences  Outcome: Declining  AVSS. Man catheter exchanged and UA/UC was sent. UA positive and pt started on Rocephin. Man output has been 500 cc so far this shift. Denies pain or nausea. Non blanchable redness noted of coccyx. Pt informed of the redness and instructed to lay on his sides as much as possible.

## 2017-08-29 NOTE — PLAN OF CARE
Problem: Goal Outcome Summary  Goal: Goal Outcome Summary  Outcome: No Change  D: Herb seemed very calm throughout the day.  No c/o pain or nausea.  He had difficulty getting out of bed, however: he reported going to the bathroom once this morning, unassisted (see further).  Later, an orthostatic blood pressure was ordered and performed, revealing very strong orthostasis (111/76 laying down, 76/65 while trying to stand up): see record for more details.  I:   In light of his seeing himself as independent, the bed alarm has been activated to prevent a potential fall.  A:  Herb seems very accepting of this and states that he will call from now on when he gets out of bed.

## 2017-08-29 NOTE — PROGRESS NOTES
Care Coordinator Progress Note     Admission Date/Time:  8/28/2017  Attending MD:  Tammi Gupta MD     Data  Chart reviewed, discussed with interdisciplinary team.   Patient was admitted for:    CKD (chronic kidney disease)  Orthostatic hypotension  Fall, initial encounter  Chronic kidney disease, unspecified CKD stage.    Concerns with insurance coverage for discharge needs: None.  Current Living Situation: Patient lives alone.  Support System: Supportive and Involved. Daughters.  Services Involved:      Transportation: Family or Friend will provide  Barriers to Discharge: medical course    Coordination of Care and Referrals: Provided patient/family with options for Home Care.        Assessment  Patient known to writer from previous admission. Admitted with increasing creatinine. Upon last discharge, writer set him up with [facility].   Met with patient at bedside to discuss discharge planning and needs. Patient states that home care came out to visit him, but on assessment, they felt he did not require home care and discharge him. Patient agreed to this and still feels that he no longer needs home care. Writer explained that if patient were to change and feel different about it, his clinic could also assist with home care set up. RNCC to continue to monitor for discharge planning and needs.     Plan  Anticipated Discharge Date:  TBD  Anticipated Discharge Plan:  Home  Mindy Hughes RN

## 2017-08-29 NOTE — CONSULTS
Urology Consult    Name: Dung Norton    MRN: [id number]   YOB: 1945               Chief Complaint:   Concern for obstructive nephropathy     History is obtained from the patient and chart review          History of Present Illness:   Dung Norton is a 71 year old male with PMH of CKD, known to urology service for admission on 7/12/17 with urinary retention due to BPH with bilateral hydro and subsequent development of clot retention requiring takeback to OR on 7/16 for cysto clot evac and multiple days of CBI postop. Cystoscopy remarkable for BPH.  He was eventually discharged home on 7/25/17 with catheter in place on flomax and finasteride with plan to follow-up with urology for consideration of possible TURP. He never made it to this follow-up appointment before being readmitted.    Admitted last night with fatigue, worsening KRISTINA on CKD. Orthostatic hypotension.  Cr up to 7.8 from baseline of ~6.  Urology consulted for concern of obstructive nephropathy.  Being treated with fluids with presumed prerenal source. Renal u/s today demonstrates complete resolution of right sided hydro and improved left sided hydro with persistent mild left hydro.     He reports he feels better today with fluids.  3-way catheter from previous admission exchanged for 18Fr coude catheter, reports this feels much better.  Has been draining without problems.           Past Medical History:     Past Medical History:   Diagnosis Date     BPH (benign prostatic hyperplasia)      Chronic kidney disease      HTN      Pulmonary nodules      Tobacco abuse             Past Surgical History:     Past Surgical History:   Procedure Laterality Date     APPENDECTOMY  AGE 6     CYSTOSCOPY, FULGURATE BLEEDERS, EVACUATE CLOT(S), COMBINED N/A 7/16/2017    Procedure: COMBINED CYSTOSCOPY, FULGURATE BLEEDERS, EVACUATE CLOT(S);  Cystoscopy clot evacuation, bladder biopsy and fulguration (per surgeons note) NERVE BLOCK PERIPHERAL;  Surgeon:  Tamiko Vanegas MD;  Location: [facility] OR     SINUS SURGERY  AGE 6     TONSILLECTOMY      CHILDHOOD            Social History:     Social History   Substance Use Topics     Smoking status: Former Smoker     Types: Cigarettes     Smokeless tobacco: Never Used     Alcohol use No            Family History:     Family History   Problem Relation Age of Onset     C.A.D. Mother      d age 67     Vision Loss Father      Hearing Loss Sister      DIABETES Sister      CANCER No family hx of             Allergies:   No Known Allergies         Medications:     Current Facility-Administered Medications   Medication     cefTRIAXone (ROCEPHIN) 1 g vial to attach to  mL bag for ADULTS or NS 50 mL bag for PEDS     0.9% sodium chloride BOLUS     aspirin EC EC tablet 81 mg     lidocaine 1 % 1 mL     lidocaine (LMX4) kit     sodium chloride (PF) 0.9% PF flush 3 mL     sodium chloride (PF) 0.9% PF flush 3 mL     0.9% sodium chloride infusion     nicotine Patch in Place     nicotine patch REMOVAL     nicotine (NICODERM CQ) 14 MG/24HR 24 hr patch 1 patch     sodium bicarbonate tablet 650 mg             Review of Systems:    ROS: 10 point ROS neg other than the symptoms noted above in the HPI           Physical Exam:   VS:  T: 98.3    HR: Data Unavailable    BP: 76/65[standing-->became dizzy when standing: sat down during BP[    RR: 16   GEN:  AOx3.    CV:  RRR  LUNGS: Non-labored breathing.   BACK:  No midline or CVA tenderness.  ABD:  Soft.  NT.  ND.  No rebound or guarding.  No masses.  :  circumcised. 18Fr wood draining clear yellow urine  EXT:  Warm, well perfused.  .  SKIN:  Warm.  Dry.  No rashes.  NEURO:  CN grossly intact.            Data:   All laboratory data reviewed:      Recent Labs  Lab 08/29/17  0720 08/28/17 1909   WBC 10.9 11.4*   HGB 8.8* 8.6*    256       Recent Labs  Lab 08/29/17  0720 08/28/17 1909 08/28/17  1001    135 137   POTASSIUM 4.5 4.7 4.3   CHLORIDE 109 104 108   CO2 13* 17* 12*  "  * 162* 170*   CR 7.41* 7.80* 7.49*   GLC 98 157* 172*   DERICK 8.4* 8.9 9.1   MAG  --  2.5*  --    PHOS  --  8.4* 8.4*       Recent Labs  Lab 08/29/17  0235   COLOR Light Yellow   APPEARANCE Cloudy   URINEGLC Negative   URINEBILI Negative   URINEKETONE Negative   SG 1.014   URINEPH 6.0   PROTEIN 100*   NITRITE Negative   LEUKEST Large*   RBCU 10*   WBCU >182*       All pertinent imaging reviewed:    8/29/17 Renal ultrasound:   IMPRESSION:  1.  Resolution of right-sided hydronephrosis with persistent mild  left-sided hydronephrosis.  2.  Increased echogenicity of the renal parenchyma with mild loss of  cortical medullary differentiation can be seen in chronic medical  renal disease.  3.  A vascular mass protrudes into the bladder lumen with adjacent  debris. These findings remain concerning for malignancy with possible  superimposed bladder clot.            Impression and Plan:   Impression:   Dung Norton is a 71 year old male with PMH of CKD (baseline Cr 6) and urologic hx of BPH with urinary retention who is currently admitted with KRISTINA on CKD.  Urology consulted for consideration of obstructive nephropathy given patient's previous admission with retention, bilateral hydro and elevated Cr.    Suspect current admission is more likely pre-renal or intrarenal given patient has wood in place that is draining well, improvement in Cr with fluid resuscitation and improvement in previously visualized hydronephrosis on new renal u/s.  The \"vascular mass\" reported on ultrasound read is likely large median lobe of of prostate given there was no large bladder mass seen on recent cystoscopy.        Plan:     - Continue wood catheter drainage.  Instructed patient to keep urology follow-up as currently scheduled.     - Would restart finasteride 5 mg qd.  If there is concern about orthostatic hypotension can stop his flomax, however this typically has lowest rates of orthostatic hypotension among alpha blockers so there " probably isn't another good option.  He would likely benefit from bladder outlet procedure (TURP vs HoLEP) at some point in the future if he is deemed surgical candidate    - Urology will sign off. Please contact resident/PA on call with any questions or concerns.         This patient's exam findings, labs, and imaging discussed with urology staff surgeon Dr. Veronica, who developed the treatment plan.    Jayson Izquierdo MD  Urology Resident

## 2017-08-30 ENCOUNTER — APPOINTMENT (OUTPATIENT)
Dept: PHYSICAL THERAPY | Facility: CLINIC | Age: 72
DRG: 725 | End: 2017-08-30
Attending: PHYSICIAN ASSISTANT
Payer: MEDICARE

## 2017-08-30 LAB
ANION GAP SERPL CALCULATED.3IONS-SCNC: 12 MMOL/L (ref 3–14)
BACTERIA SPEC CULT: ABNORMAL
BASE DEFICIT BLDV-SCNC: 15.7 MMOL/L
BUN SERPL-MCNC: 138 MG/DL (ref 7–30)
CALCIUM SERPL-MCNC: 7.7 MG/DL (ref 8.5–10.1)
CHLORIDE SERPL-SCNC: 116 MMOL/L (ref 94–109)
CO2 SERPL-SCNC: 12 MMOL/L (ref 20–32)
CREAT SERPL-MCNC: 6.57 MG/DL (ref 0.66–1.25)
GFR SERPL CREATININE-BSD FRML MDRD: 8 ML/MIN/1.7M2
GLUCOSE SERPL-MCNC: 87 MG/DL (ref 70–99)
HCO3 BLDV-SCNC: 10 MMOL/L (ref 21–28)
LACTATE BLD-SCNC: 0.9 MMOL/L (ref 0.7–2)
Lab: ABNORMAL
MAGNESIUM SERPL-MCNC: 2 MG/DL (ref 1.6–2.3)
O2/TOTAL GAS SETTING VFR VENT: 21 %
PCO2 BLDV: 24 MM HG (ref 40–50)
PH BLDV: 7.25 PH (ref 7.32–7.43)
PHOSPHATE SERPL-MCNC: 6.5 MG/DL (ref 2.5–4.5)
PO2 BLDV: 59 MM HG (ref 25–47)
POTASSIUM SERPL-SCNC: 4.3 MMOL/L (ref 3.4–5.3)
SODIUM SERPL-SCNC: 141 MMOL/L (ref 133–144)
SPECIMEN SOURCE: ABNORMAL

## 2017-08-30 PROCEDURE — 40000193 ZZH STATISTIC PT WARD VISIT

## 2017-08-30 PROCEDURE — 97110 THERAPEUTIC EXERCISES: CPT | Mod: GP

## 2017-08-30 PROCEDURE — 82803 BLOOD GASES ANY COMBINATION: CPT | Performed by: INTERNAL MEDICINE

## 2017-08-30 PROCEDURE — 25000132 ZZH RX MED GY IP 250 OP 250 PS 637: Mod: GY | Performed by: PHYSICIAN ASSISTANT

## 2017-08-30 PROCEDURE — 99233 SBSQ HOSP IP/OBS HIGH 50: CPT | Performed by: INTERNAL MEDICINE

## 2017-08-30 PROCEDURE — 36415 COLL VENOUS BLD VENIPUNCTURE: CPT | Performed by: INTERNAL MEDICINE

## 2017-08-30 PROCEDURE — 84100 ASSAY OF PHOSPHORUS: CPT | Performed by: INTERNAL MEDICINE

## 2017-08-30 PROCEDURE — 25000128 H RX IP 250 OP 636: Performed by: INTERNAL MEDICINE

## 2017-08-30 PROCEDURE — A9270 NON-COVERED ITEM OR SERVICE: HCPCS | Mod: GY | Performed by: PHYSICIAN ASSISTANT

## 2017-08-30 PROCEDURE — 97116 GAIT TRAINING THERAPY: CPT | Mod: GP

## 2017-08-30 PROCEDURE — A9270 NON-COVERED ITEM OR SERVICE: HCPCS | Mod: GY | Performed by: INTERNAL MEDICINE

## 2017-08-30 PROCEDURE — 25000132 ZZH RX MED GY IP 250 OP 250 PS 637: Mod: GY | Performed by: INTERNAL MEDICINE

## 2017-08-30 PROCEDURE — 97530 THERAPEUTIC ACTIVITIES: CPT | Mod: GP

## 2017-08-30 PROCEDURE — 83605 ASSAY OF LACTIC ACID: CPT | Performed by: INTERNAL MEDICINE

## 2017-08-30 PROCEDURE — 80048 BASIC METABOLIC PNL TOTAL CA: CPT | Performed by: INTERNAL MEDICINE

## 2017-08-30 PROCEDURE — 83735 ASSAY OF MAGNESIUM: CPT | Performed by: INTERNAL MEDICINE

## 2017-08-30 PROCEDURE — 12000026 ZZH R&B TRANSPLANT

## 2017-08-30 PROCEDURE — 97161 PT EVAL LOW COMPLEX 20 MIN: CPT | Mod: GP

## 2017-08-30 RX ADMIN — SODIUM BICARBONATE 650 MG TABLET 650 MG: at 14:23

## 2017-08-30 RX ADMIN — CALCIUM ACETATE 667 MG: 667 CAPSULE ORAL at 17:42

## 2017-08-30 RX ADMIN — ASPIRIN 81 MG: 81 TABLET, COATED ORAL at 08:46

## 2017-08-30 RX ADMIN — SODIUM BICARBONATE 650 MG TABLET 650 MG: at 19:50

## 2017-08-30 RX ADMIN — Medication: at 13:20

## 2017-08-30 RX ADMIN — NICOTINE 1 PATCH: 14 PATCH, EXTENDED RELEASE TRANSDERMAL at 08:43

## 2017-08-30 RX ADMIN — SODIUM CHLORIDE: 9 INJECTION, SOLUTION INTRAVENOUS at 04:53

## 2017-08-30 RX ADMIN — FINASTERIDE 5 MG: 5 TABLET, FILM COATED ORAL at 08:45

## 2017-08-30 RX ADMIN — CEFTRIAXONE 1 G: 1 INJECTION, POWDER, FOR SOLUTION INTRAMUSCULAR; INTRAVENOUS at 04:52

## 2017-08-30 RX ADMIN — SODIUM BICARBONATE 650 MG TABLET 650 MG: at 08:45

## 2017-08-30 RX ADMIN — CALCIUM ACETATE 667 MG: 667 CAPSULE ORAL at 14:28

## 2017-08-30 NOTE — PROGRESS NOTES
Nephrology Progress Note  08/30/2017         Assessment & Recommendations:   Dung Norton is a 71 year old year old male    KRISTINA on CKD: stage 5 ckd GFR down to 7, Cr 6-7,due to obstructive uropathy started July this year and his KRISTINA fail to resolve he is in wood that was replaced, no metabolic or volume indication for acute HD no uremic symptoms.   8/30 improving non oliguric with IV volume expansion, Cr trending down,  UOP was 2500 over 24 hr this is approaching polyuria and raises concern of chronic tubular damage due to chronic obstruction.  Will need to consider that at this point fluids may be driving an osmotic diuresis.  If high UOP continues then would d/c IV fluids and monitor urine osm.       Orthostasis: cont IV isotonic fluids given his symptoms but this may need to be stopped, repeat orthostatic blood pressures.  Please check cortisol  May consider fludrocortisone regardless of cortisol level.     Prostate hypertrophy: cont proscar     Bladder mass benign: biopsy shows no malignancy      Electrolyte: Na 141, K4.3 - these are acceptable    BMD - Ca 7.7 with albumin of 3 corrects to 8.5, Phos 6.5 which is improved.  PTH was 120 on 8/8/17 - I would recommend against vitamin D at this time given recent significant hyperphosphatemia.  His vitamin D defic screen from 8/8/17 was 29 which is at goal.     Acid base metabolic acidosis with anion gap mostly related renal failure, bicarb worse since admission with getting high chloride solution.  consider bicard replacement with this isotonic volume expansion, calculate bicarb deficit.  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  Given persistent Hgb <9 I would consider EARELNE.     UTI : culture shows serratia and he is getting ceftriaxone.     Nutrition:  poor recommends dietary consult  "     Recommendations were communicated to primary team via note     Patient seen and discussed  with Dr. Daley.  Naresh Amaral  Nephrology Fellow  Pager: [phone]     Attestation:  This patient has been seen and evaluated by me, Ciera Daley MD on 8/30/17 .  Discussed with the fellow or resident and agree with the findings and plan in this note.     I have reviewed 8/30/17 Medications, Vital Signs and Labs.    Ciera Daley MD  [facility]  Department of Medicine  Division of Renal Disease and Hypertension  [phone]        Interval History :   In the last 24 hours Dung Norton has no specific complaint , good UOP 2500 cc, cr improved, no uremic symptoms.   Review of Systems:   I reviewed the following systems:  GI: good appetite. no nausea or vomiting or diarrhea.   Neuro:  no confusion  Constitutional:  no fever or chills  CV: no dyspnea or edema.  no chest pain.    Physical Exam:   I/O last 3 completed shifts:  In: 2510 [I.V.:2510]  Out: 3450 [Urine:3450]   BP (!) 83/58 (BP Location: Right arm)  Temp 97.8  F (36.6  C) (Oral)  Resp 18  Ht 1.778 m (5' 10\")  Wt 59.2 kg (130 lb 8 oz)  SpO2 100%  BMI 18.72 kg/m2     GENERAL APPEARANCE: NAD  EYES:  no scleral icterus, pupils equal  HENT: mouth without ulcers or lesions  PULM: lungs clear to auscultation,  bilaterally, equal air movement, no clubbing  CV: regular rhythm, normal rate, no rub     -JVP no     -edema no   GI: soft, not tender, not distended, bowel sounds are positive  INTEGUMENT: no cyanosis, no rash  NEURO:  no asterixis   Access no    Labs:   All labs reviewed by me  Electrolytes/Renal -   Recent Labs   Lab Test  08/30/17   0721  08/29/17   0720  08/28/17   1909  08/28/17   1001   07/19/17   0537   NA  141  137  135  137   < >  144   POTASSIUM  4.3  4.5  4.7  4.3   < >  5.2   CHLORIDE  116*  109  104  108   < >  113*   CO2  12*  13*  17*  12*   < >  24   BUN  138*  158*  162*  170*   < >  91*   CR  " 6.57*  7.41*  7.80*  7.49*   < >  7.19*   GLC  87  98  157*  172*   < >  90   DERICK  7.7*  8.4*  8.9  9.1   < >  7.0*   MAG  2.0   --   2.5*   --    --   1.8   PHOS  6.5*   --   8.4*  8.4*   < >   --     < > = values in this interval not displayed.       CBC -   Recent Labs   Lab Test  08/29/17   0720  08/28/17 1909 08/16/17   1426   WBC  10.9  11.4*  10.6   HGB  8.8*  8.6*  9.1*   PLT  246  256  245       LFTs -   Recent Labs   Lab Test  08/28/17   1909 08/28/17   1001  08/21/17   0952   07/19/17   0537  07/12/17   1740   ALKPHOS  95   --    --    --   51  67   BILITOTAL  0.3   --    --    --   0.2  0.6   ALT  17   --    --    --   17  19   AST  9   --    --    --   32  12   PROTTOTAL  7.2   --    --    --   4.4*  6.8   ALBUMIN  3.0*  3.2*  3.7   < >  1.9*  3.1*    < > = values in this interval not displayed.       Iron Panel -   Recent Labs   Lab Test  08/08/17   1051  07/21/17   0652   IRON  54  24*   IRONSAT  32  19   NO  679*  1209*         Imaging:  All imaging studies reviewed by me.     Current Medications:    calcium acetate  667 mg Oral TID w/meals     cefTRIAXone  1 g Intravenous Q24H     finasteride  5 mg Oral Daily     sodium chloride 0.9%  1,000 mL Intravenous Once     aspirin EC  81 mg Oral Daily     sodium chloride (PF)  3 mL Intracatheter Q8H     nicotine   Transdermal Q8H     nicotine   Transdermal Daily     nicotine  1 patch Transdermal Daily     sodium bicarbonate  650 mg Oral TID       sodium bicabonate in 5% dextrose for infusion 100 mL/hr at 08/30/17 1320     Naresh Amaral MD

## 2017-08-30 NOTE — PROGRESS NOTES
"CLINICAL NUTRITION SERVICES - ASSESSMENT NOTE     Nutrition Prescription    RECOMMENDATIONS FOR MDs/PROVIDERS TO ORDER:  Given poor weight and nutritional status - may consider liberalizing diet to \"Combination Diet: 3 gm K+ and Low Phosphorus\"    Malnutrition Status:    (at least) Non-severe malnutrition in the context of chronic illness     Recommendations already ordered by Registered Dietitian (RD):  Calorie counts  Ensure Clear and Nepro oral supplements     Future/Additional Recommendations:  Start daily MVI.  If patient begins dialysis, switch to renal MVI.       REASON FOR ASSESSMENT  Dung Norton is a/an 71 year old male assessed by the dietitian for Provider Order - significant wt loss, lack of appetite    NUTRITION HISTORY  PMH of CKD stage 5     Patient seen last month during previous hospital stay.  He noted eating about 50% of usual intakes in the 4 months prior d/t lack of ambition to cook.  He also noted some vomiting after meals.  He received renal diet education during this hospital stay as well.  Enjoys Ensure Plus but was encouraged to try Nepro and Ensure Clear instead.     CURRENT NUTRITION ORDERS  Diet: Renal  Intake/Tolerance:  Patient is ordering small meals.  Unable to assess intake/tolerance as patient sleeping during visit.     LABS  Phos 6.5 (elevated)    MEDICATIONS  PhosLo TID with meals    ANTHROPOMETRICS  Height: 177.8 cm (5' 10\")  Most Recent Weight: 59.2 kg (130 lb 8 oz)    IBW: 75.5 kg  BMI: Normal BMI (borderline underweight status)  Weight History:  Weight loss of 13% over the last few weeks per chart review, seems likely to be related to increase in diuretics.  During previous admission, lowest BW was noted to be 62.3 kg (7/13/17).  Down 3.1 kg (5%) over the last 6 weeks.   Wt Readings from Last 15 Encounters:   08/28/17 59.2 kg (130 lb 8 oz)   08/16/17 65 kg (143 lb 6.4 oz)   08/04/17 68.8 kg (151 lb 9.6 oz)   07/28/17 74.8 kg (165 lb)   07/25/17 76.6 kg (168 lb 14.4 oz) "   07/12/17 68.5 kg (151 lb)   02/24/16 82.4 kg (181 lb 9.6 oz)   02/02/15 85.3 kg (188 lb)   01/15/14 86.2 kg (190 lb)   02/28/13 89.8 kg (198 lb)   06/04/12 89.6 kg (197 lb 9.6 oz)   05/21/12 91.3 kg (201 lb 3.2 oz)   04/23/12 92.1 kg (203 lb)   03/20/12 94.3 kg (208 lb)   08/16/11 88.5 kg (195 lb)     Dosing Weight: 59 kg    ASSESSED NUTRITION NEEDS  Estimated Energy Needs: 4289-8377 kcals/day (30 - 35 kcals/kg )  Justification: Repletion  Estimated Protein Needs: 35-59 grams protein/day (0.6 - 1 grams of pro/kg)  Justification: CKD  Estimated Fluid Needs: Per provider pending fluid status    PHYSICAL FINDINGS  See malnutrition section below.     MALNUTRITION  % Intake: Suspect poor <75% for > 1 month per previous (severe)  % Weight Loss: Down 5% over the last 6 weeks (non-severe)  Subcutaneous Fat Loss: Unable to assess  Muscle Loss: Unable to assess  Fluid Accumulation/Edema: Unable to assess  Malnutrition Diagnosis: (at least) Non-severe malnutrition in the context of chronic illness     NUTRITION DIAGNOSIS  Inadequate oral intake related to poor appetite, weakness as evidenced by patient report of eating ~50% of oral intake over the last several months.      INTERVENTIONS  Implementation  Attempted to meet with patient, however patient asleep during visit.     Goals  PO intake of at least % of meals, or the equivalent with oral supplements.     Monitoring/Evaluation  Progress toward goals will be monitored and evaluated per protocol.    Melissa Vickers MS, RD, LD  Pager [phone]

## 2017-08-30 NOTE — PROVIDER NOTIFICATION
Critical result ABG bicarb 10 reported by lab. Reported to Dr. Villegas ([phone]) and patient's bedside RN, Fran. No new orders at this time.

## 2017-08-30 NOTE — PLAN OF CARE
Problem: Individualization  Goal: Patient Preferences  Outcome: No Change     RN:  Afebrile, 's/50-60's, -110's, patient received NS bolus at PM shift, bed alarm activated for safety d/t orthostatic hypotension, if patient forgot to use call light for assistance to get out of bed to use the bathroom. On Rocephin [id number],  MIV NS @150cc/hr. Urethral catheter with good urine output which was cloudy, urine culture positive for aerobic bacteria. Patient denies pain and nausea. Resting between cares, will continue to monitor.

## 2017-08-30 NOTE — PROGRESS NOTES
08/30/17 1300   Quick Adds   Type of Visit Initial PT Evaluation   Living Environment   Lives With alone   Living Arrangements house   Home Accessibility stairs to enter home;bed and bath on same level;stairs within home   Number of Stairs to Enter Home 2   Number of Stairs Within Home 10   Stair Railings at Home none   Transportation Available car;family or friend will provide   Living Environment Comment Pt does not need to access basement. 2 stairs to enter home    Self-Care   Usual Activity Tolerance moderate   Current Activity Tolerance fair   Regular Exercise no   Equipment Currently Used at Home none   Activity/Exercise/Self-Care Comment Pt reports sedentary lifestyle. Indep with ADLs, receives assist from family for iaDLs   Functional Level Prior   Ambulation 0-->independent   Transferring 0-->independent   Toileting 0-->independent   Bathing 0-->independent   Dressing 0-->independent   Eating 0-->independent   Communication 0-->understands/communicates without difficulty   Swallowing 0-->swallows foods/liquids without difficulty   Cognition 0 - no cognition issues reported   Fall history within last six months yes   Number of times patient has fallen within last six months 2   Which of the above functional risks had a recent onset or change? fall history   Prior Functional Level Comment Indep with functional mobility   General Information   Onset of Illness/Injury or Date of Surgery - Date 08/28/17   Referring Physician Qiana Chaparro PA   Patient/Family Goals Statement to return home   Pertinent History of Current Problem (include personal factors and/or comorbidities that impact the POC) Dung Norton is a 71 year old man with a history of HTN and tobacco abuse who was admitted to [facility] 7/12-7/25/17 w/ severe KRISTINA 2/2 obstructive uropathy attributed to BPH. Now readmitted w/ worsening KRISTINA - appears hypovolemic.    Precautions/Limitations fall precautions   General Info Comments Activity: up with  assist   Cognitive Status Examination   Orientation orientation to person, place and time   Level of Consciousness alert   Follows Commands and Answers Questions 100% of the time;able to follow multistep instructions   Personal Safety and Judgment intact   Memory intact   Integumentary/Edema   Integumentary/Edema no deficits were identifed   Posture    Posture Not impaired   Range of Motion (ROM)   ROM Quick Adds No deficits were identified   Strength   Strength Comments 3+/5 strength grossly assessed at B hip, knee, ankle   Bed Mobility   Bed Mobility Comments Indep   Transfer Skills   Transfer Comments Indep   Gait   Gait Comments  feet no AD   Balance   Balance Comments Dynamic balance impairments, patient with lateral path deviation and benefits from 1 UE support during ambulation    Sensory Examination   Sensory Perception no deficits were identified   Coordination   Coordination no deficits were identified   Muscle Tone   Muscle Tone no deficits were identified   General Therapy Interventions   Planned Therapy Interventions balance training;bed mobility training;gait training;neuromuscular re-education;strengthening;home program guidelines;progressive activity/exercise;risk factor education   Clinical Impression   Criteria for Skilled Therapeutic Intervention yes, treatment indicated   PT Diagnosis Decreased functional mobility   Influenced by the following impairments Weakness, decreased activity tolerance, impaired balance   Functional limitations due to impairments Decreased indep and impaired safety with functional mobility   Clinical Presentation Stable/Uncomplicated   Clinical Presentation Rationale Clinical judgment, uncomplicated medical history   Clinical Decision Making (Complexity) Low complexity   Therapy Frequency` 3 times/week   Predicted Duration of Therapy Intervention (days/wks) 1 week   Anticipated Equipment Needs at Discharge (none anticipated)   Anticipated Discharge Disposition Home  "with Home Therapy;Home with Outpatient Therapy   Risk & Benefits of therapy have been explained Yes   Patient, Family & other staff in agreement with plan of care Yes   [facility] TM \"6 Clicks\"   2016, Trustees of [facility], under license to [employer].  All rights reserved.   6 Clicks Short Forms Basic Mobility Inpatient Short Form   [facility] AM-PAC  \"6 Clicks\" V.2 Basic Mobility Inpatient Short Form   1. Turning from your back to your side while in a flat bed without using bedrails? 4 - None   2. Moving from lying on your back to sitting on the side of a flat bed without using bedrails? 4 - None   3. Moving to and from a bed to a chair (including a wheelchair)? 4 - None   4. Standing up from a chair using your arms (e.g., wheelchair, or bedside chair)? 3 - A Little   5. To walk in hospital room? 3 - A Little   6. Climbing 3-5 steps with a railing? 3 - A Little   Basic Mobility Raw Score (Score out of 24.Lower scores equate to lower levels of function) 21   Total Evaluation Time   Total Evaluation Time (Minutes) 10     "

## 2017-08-30 NOTE — PROGRESS NOTES
[facility], [place]    Internal Medicine Progress Note - Gold Service      Assessment & Plan   Dung Norton is a 71 year old man with a history of HTN and tobacco abuse who was admitted to [facility] 7/12-7/25/17 w/ severe KRISTINA 2/2 obstructive uropathy attributed to BPH. Now readmitted w/ worsening KRISTINA - appears hypovolemic.      #KRISTINA w/ AGMA. Cr 7.8 and , up from more recent baseline of Cr low-mid 6s and BUN 80s-90s. K stable at 4.7. CO2 low at 17 (12 earlier today) from low 20s previously and reports difficulty tolerating PO sodium bicarb at currently prescribed TID dosing. Has new AGMA w/ AG 15. UA w/ large LE, moderate blood, protein, 2598 WBC w/ WBC clumps (? AIN but no recent exposures, no eosinophilia noted). Is orthostatic w/ BP 96/67-->63/30 and HR 75-->153 lying to standing - suspect that he is hypovolemic w/ recent OP initiation of Lasix 20 mg on 8/4, increased to BID 8/8, held since 8/16 when weight was down to 65.1 kg (from 68.8 kg on 8/4 and 77 kg on 7/24) although this doesn't necessarily seem like the lone contributor for this presentation. Suspect poor renal perfusion w/ hypotension and has continued on home Norvasc despite this (also Proscar, Flomax). No evidence of GIB. No NSAIDs. Emptying clear yellow UOP frequently but not measuring at home. No acute indication for dialysis although would be open to this as OP nephrology felt that return to normal baseline was unlikely at this time given many prior months w/ obstructive insult.     - pt with still orthostatic hypotension ; continues to receive 1 bolus and increased maintenance changed to bicarb drip at 100ml/hr   -nephrology consulted -appreciate recs- no indication of HD  -continue to hold amlodipine and furosemide and flomax  -continue to use sodium bicarbonate 650 mg PO TID ( doubt his compliance at home )  -started on phoslo and bicarb drip    #Concern for UTI: pending culture and plan to complete 7 days course  of ab   -unclear picture with cath and lack of symptoms   -but significant WBC with large LE  -will continue with ceftriaxone and transition to levofloxacin on d/c         #BPH. Had cystoscopy on 7/16 w/ no masses and biopsies taken w/o evidence of malignancy. PSA was wnl at 1.72. Was started on Flomax and Proscar w/ plan for OP urology f/u.   -urology was consulted for eval of bladder mass which would be as an outpatient     #Tobacco Abuse. Has not smoked since last d/c but remains on 21 mg nicotine patch which he thinks is too strong and perhaps making him feel shaky. Would like to continue patch but will step down to 14 mg and should have clear plan to taper off.     #Pulmonary Nodules. Noted last hospitalization w/ few small pulmonary nodules measuring up to 3 mm. A 12 month f/u chest CT was recommended per Fleischner society criteria.      #Chronic Normocytic Anemia. Hgb 8.6 from 7.5 at most recent d/c. Was thought d/t CKD but also w/ oozing from prostate during cystoscopy.      #Subclinical Hypothyroidism. TSH 10.51 and FT4 0.71 checked during last hospitalization. Not on meds and was advised to have recheck once over acute illness.      Diet: renal   Fluids: NS @ 150 cc/hr   DVT Prophylaxis: mechanical; consider pharmacologic in coming days if not ambulating and no plan for intervention (dialysis line)   Code Status: DNR/DNI - confirmed w/ patient, would benefit from completing formal healthcare directive      Disposition Plan   Expected discharge: Tomorrow; recommended to prior living arrangement once   Pending eval for esolution of orthostatic hypotension.     Entered: Shana Villegas 08/30/2017, 5:36 PM   Information in the above section will display in the discharge planner report.      The patient's care was discussed with the Bedside Nurse and daughter    Shana Villegas  Internal Medicine Staff Hospitalist Service  [facility]  Pager: [phone]  Please see sticky note for cross cover  information    Interval History   Feels better in am ; continues to make urine     Last 24 hr care team notes reviewed.   ROS:  4 point ROS including Respiratory, CV, GI and , other than that noted in the HPI, is negative          Physical Exam   Vital Signs: Temp: 97.9  F (36.6  C) Temp src: Oral BP: 104/68 Pulse: 95 Heart Rate: 95 Resp: 16 SpO2: 100 % O2 Device: None (Room air)    Weight: 130 lbs 8 oz  GENERAL: Alert and oriented x 3. Sitting up in bed, appears comfortable. Pleasant and conversant.   HEENT: Anicteric sclera. PERRL. Mucous membranes moist.   CV: RRR. S1, S2. Systolic ejection murmur present.   RESPIRATORY: Effort normal on room air. Lungs CTAB with no wheezing, rales, rhonchi.   GI: Abdomen soft and non distended, bowel sounds present. No tenderness, rebound, guarding.   : Man w/ clear yellow UOP.   NEUROLOGICAL: No focal deficits. Moves all extremities.   EXTREMITIES: No peripheral edema. Intact bilateral pedal pulses.   SKIN: No jaundice. No rashes.       Data   Data     Recent Labs  Lab 08/30/17  0721 08/29/17  0720 08/28/17  1909 08/28/17  1001   WBC  --  10.9 11.4*  --    HGB  --  8.8* 8.6*  --    MCV  --  88 86  --    PLT  --  246 256  --    INR  --   --  1.19*  --     137 135 137   POTASSIUM 4.3 4.5 4.7 4.3   CHLORIDE 116* 109 104 108   CO2 12* 13* 17* 12*   * 158* 162* 170*   CR 6.57* 7.41* 7.80* 7.49*   ANIONGAP 12 16* 15* 17*   DERICK 7.7* 8.4* 8.9 9.1   GLC 87 98 157* 172*   ALBUMIN  --   --  3.0* 3.2*   PROTTOTAL  --   --  7.2  --    BILITOTAL  --   --  0.3  --    ALKPHOS  --   --  95  --    ALT  --   --  17  --    AST  --   --  9  --      Reviewed renal USG and UA

## 2017-08-30 NOTE — PLAN OF CARE
Problem: Goal Outcome Summary  Goal: Goal Outcome Summary  PT 7A: evaluation complete, treatment initiated. Patient continues with symptomatic OH; supine: 107/68 106 bpm, sitting 99/65 111 bpm, and standing 85/65 126 bpm. Patient reporting lightheadedness with position changes is improving. Education provided on OH and safety implications including fall risk. Patient performs seated BLE exercises for circulation (ankle pumps, long arc quad, and repeat sit to stands) prior to ambulating. Able to ambulate 150 feet x2 without AD, does present with some minor balance impairments with ambulation.     Anticipate patient will be safe to dc home, PT to follow for aerobic exercise and balance training while hospitalized; encourage OP PT to address strength and activity tolerance. Patient reports very sedentary at home.

## 2017-08-30 NOTE — PROGRESS NOTES
[facility], [place]    Internal Medicine Progress Note - Gold Service      Assessment & Plan   Dung Norton is a 71 year old man with a history of HTN and tobacco abuse who was admitted to [facility] 7/12-7/25/17 w/ severe KRISTINA 2/2 obstructive uropathy attributed to BPH. Now readmitted w/ worsening KRISTINA - appears hypovolemic.      #KRISTINA w/ AGMA. Cr 7.8 and , up from more recent baseline of Cr low-mid 6s and BUN 80s-90s. K stable at 4.7. CO2 low at 17 (12 earlier today) from low 20s previously and reports difficulty tolerating PO sodium bicarb at currently prescribed TID dosing. Has new AGMA w/ AG 15. UA w/ large LE, moderate blood, protein, 2598 WBC w/ WBC clumps (? AIN but no recent exposures, no eosinophilia noted). Is orthostatic w/ BP 96/67-->63/30 and HR 75-->153 lying to standing - suspect that he is hypovolemic w/ recent OP initiation of Lasix 20 mg on 8/4, increased to BID 8/8, held since 8/16 when weight was down to 65.1 kg (from 68.8 kg on 8/4 and 77 kg on 7/24) although this doesn't necessarily seem like the lone contributor for this presentation. Suspect poor renal perfusion w/ hypotension and has continued on home Norvasc despite this (also Proscar, Flomax). No evidence of GIB. No NSAIDs. Emptying clear yellow UOP frequently but not measuring at home. No acute indication for dialysis although would be open to this as OP nephrology felt that return to normal baseline was unlikely at this time given many prior months w/ obstructive insult.     - pt with still orthostatic hypotension ; continues to receive 1 bolus and increased maintenance to 150 ml/hr  -pending nephrology consult  -continue to hold amlodipine and furosemide and flomax  -continue to use sodium bicarbonate 650 mg PO TID ( doubt his compliance at home )  -not on phoslo , will discuss with renal    #Concern for UTI: pending culture and plan to complete 7 days course of ab   -unclear picture with cath and lack of  symptoms   -but significant WBC with large LE         #BPH. Had cystoscopy on 7/16 w/ no masses and biopsies taken w/o evidence of malignancy. PSA was wnl at 1.72. Was started on Flomax and Proscar w/ plan for OP urology f/u.   -urology was consulted for eval of bladder mass which would be as an outpatient     #Tobacco Abuse. Has not smoked since last d/c but remains on 21 mg nicotine patch which he thinks is too strong and perhaps making him feel shaky. Would like to continue patch but will step down to 14 mg and should have clear plan to taper off.     #Pulmonary Nodules. Noted last hospitalization w/ few small pulmonary nodules measuring up to 3 mm. A 12 month f/u chest CT was recommended per Fleischner society criteria.      #Chronic Normocytic Anemia. Hgb 8.6 from 7.5 at most recent d/c. Was thought d/t CKD but also w/ oozing from prostate during cystoscopy.      #Subclinical Hypothyroidism. TSH 10.51 and FT4 0.71 checked during last hospitalization. Not on meds and was advised to have recheck once over acute illness.      Diet: renal   Fluids: NS @ 150 cc/hr   DVT Prophylaxis: mechanical; consider pharmacologic in coming days if not ambulating and no plan for intervention (dialysis line)   Code Status: DNR/DNI - confirmed w/ patient, would benefit from completing formal healthcare directive      Disposition Plan   Expected discharge: Tomorrow; recommended to prior living arrangement once   Pending eval by renal and resolution of orthostatic hypotension.     Entered: Shana Villegas 08/29/2017, 7:15 PM   Information in the above section will display in the discharge planner report.      The patient's care was discussed with the Bedside Nurse and daughter    Shana Villegas  Internal Medicine Staff Hospitalist Service  [facility]  Pager: [phone]  Please see sticky note for cross cover information    Interval History   Feels better in am ; continues to make urine     Last 24 hr care team notes  reviewed.   ROS:  4 point ROS including Respiratory, CV, GI and , other than that noted in the HPI, is negative          Physical Exam   Vital Signs: Temp: 97.5  F (36.4  C) Temp src: Oral BP: 113/75   Heart Rate: 107 Resp: 16 SpO2: 100 % O2 Device: None (Room air)    Weight: 130 lbs 8 oz  GENERAL: Alert and oriented x 3. Sitting up in bed, appears comfortable. Pleasant and conversant.   HEENT: Anicteric sclera. PERRL. Mucous membranes moist.   CV: RRR. S1, S2. Systolic ejection murmur present.   RESPIRATORY: Effort normal on room air. Lungs CTAB with no wheezing, rales, rhonchi.   GI: Abdomen soft and non distended, bowel sounds present. No tenderness, rebound, guarding.   : Man w/ clear yellow UOP.   NEUROLOGICAL: No focal deficits. Moves all extremities.   EXTREMITIES: No peripheral edema. Intact bilateral pedal pulses.   SKIN: No jaundice. No rashes.       Data   Data     Recent Labs  Lab 08/29/17  0720 08/28/17  1909 08/28/17  1001   WBC 10.9 11.4*  --    HGB 8.8* 8.6*  --    MCV 88 86  --     256  --    INR  --  1.19*  --     135 137   POTASSIUM 4.5 4.7 4.3   CHLORIDE 109 104 108   CO2 13* 17* 12*   * 162* 170*   CR 7.41* 7.80* 7.49*   ANIONGAP 16* 15* 17*   DERICK 8.4* 8.9 9.1   GLC 98 157* 172*   ALBUMIN  --  3.0* 3.2*   PROTTOTAL  --  7.2  --    BILITOTAL  --  0.3  --    ALKPHOS  --  95  --    ALT  --  17  --    AST  --  9  --      Reviewed renal USG and UA

## 2017-08-30 NOTE — CONSULTS
Nephrology Initial Consult  August 29, 2017      Dung Norton MRN:[id number] YOB: 1945  Date of Admission:8/28/2017  Primary care provider: Haley Baker  Requesting physician: Shana Villegas MD    ASSESSMENT AND RECOMMENDATIONS:   KRISTINA on CKD: stage 5 ckd GFR down to 7, Cr 6-7,due to obstructive uropathy started July this year and his KRISTINA fail to resolve he is in wood that was replaced, no indication for acute HD no uremic symptoms.     Orthostasis: cont ivf hydration, repeat orthostasis     Prostate hypertrophy: cont proscar    Bladder mass benign: biopsy shows no malignancy     Electrolyte: Na 137, K4.5, Ca 8.9, Phos 8.4 consider phoslo, check PTH,     Acid base metabolic acidosis with anion gap mostly related renal failure, consider bicard replacement with the fluid hydration, calculate bicarb deficit.  Bicarb deficit 330MEq, can replace 150meq in 1 liter sterile water over 24 hr x2     Anemia : Hgb 8.8 no hx of bleeding anemia of chronic dz, check iron study    UTI : GNR follow up c/s cont abx     Nutrition:  poor recommends dietary consult     Recommendations were communicated to primary team via note    Patient seen and discussed  with Dr. Daley.  Naresh Amaral  Nephrology Fellow  Pager: [phone]       REASON FOR CONSULT: elevated Cr    HISTORY OF PRESENT ILLNESS:  Dung Norton is a 71 year old with PMH HTN, BPH with obstructive uropathy, he was initially presented to the clinic for weight loss 20lb and anemia, found out that his Cr 14, bun 173, wood was inserted and it was obstructive uropathy, large amount of bloody urine around 4 liter was obtained, US shows bladder mass,  large prostate with psa of 1.7, CT scan shows jf hydronephrosis and hydroureter, 4 cm infrarenal AAA, urology consult and cystoscopy and biopsy done shows benign lesion, pt was asymptomatic and his cr start to improve he was discharge to follow up with nephrology, pt with initial baseline Cr was  around 2, in clinic he was found to have proteinuria of 2.8 gram, hyperkalemia K wa 5.4,  after wood cathter it failed to go back to baseline he remain in the cr range of 6 to 7 with GFR below 10,  His Cr failed to go back to his baseline,  This time pt was feeling lethargic and lightheaded presented to the ER His CR 7.5, his BP drop from 111 laying to 76 sitting positive for orthostais, he was admitted for further evaluations, he was hydrated with fluid, nephrology consulted for evaluation and further recommendations.   Today he is feeling fine with out specific complaint, he denies fever no sob no c/p no nausea/vomiting or diarrhea, wood was changed and urine was moving freely, denies any abdominal pain no acute bleeding, but still feeling tired and fatigue, he denies any itching and no loss of appetite, no uremic symptoms.     PAST MEDICAL HISTORY:  Reviewed with patient on 08/29/2017     Past Medical History:   Diagnosis Date     BPH (benign prostatic hyperplasia)      Chronic kidney disease      HTN      Pulmonary nodules      Tobacco abuse        Past Surgical History:   Procedure Laterality Date     APPENDECTOMY  AGE 6     CYSTOSCOPY, FULGURATE BLEEDERS, EVACUATE CLOT(S), COMBINED N/A 7/16/2017    Procedure: COMBINED CYSTOSCOPY, FULGURATE BLEEDERS, EVACUATE CLOT(S);  Cystoscopy clot evacuation, bladder biopsy and fulguration (per surgeons note) NERVE BLOCK PERIPHERAL;  Surgeon: Tamiko Vanegas MD;  Location: [facility] OR     SINUS SURGERY  AGE 6     TONSILLECTOMY      CHILDHOOD        MEDICATIONS:  PTA Meds  Prior to Admission medications    Medication Sig Last Dose Taking? Auth Provider   sodium bicarbonate 650 MG tablet Take 1 tablet (650 mg) by mouth 3 times daily 8/28/2017 at Unknown time Yes Oralia De La Torre NP   amLODIPine (NORVASC) 5 MG tablet Take 1 tablet (5 mg) by mouth daily 8/28/2017 at Unknown time Yes Oralia De La Torre NP   finasteride (PROSCAR) 5 MG tablet Take 1 tablet (5 mg) by  mouth daily 2017 at Unknown time Yes Oralia De La Torre NP   tamsulosin (FLOMAX) 0.4 MG capsule Take 1 capsule (0.4 mg) by mouth daily 2017 at Unknown time Yes Oralia De La Torre NP   aspirin 81 MG tablet Take 1 tablet by mouth daily. 2017 at Unknown time Yes Reported, Patient   Multiple Vitamins-Minerals (MULTIVITAMIN MEN PO) Take 1 tablet by mouth daily Unknown at Unknown time  Reported, Patient   fish oil-omega-3 fatty acids (FISH OIL) 1000 MG capsule Take 1 g by mouth daily Unknown at Unknown time  Reported, Patient      Current Meds    cefTRIAXone  1 g Intravenous Q24H     finasteride  5 mg Oral Daily     sodium chloride 0.9%  1,000 mL Intravenous Once     aspirin EC  81 mg Oral Daily     sodium chloride (PF)  3 mL Intracatheter Q8H     nicotine   Transdermal Q8H     nicotine   Transdermal Daily     nicotine  1 patch Transdermal Daily     sodium bicarbonate  650 mg Oral TID     Infusion Meds    NaCl 150 mL/hr at 17 1316       ALLERGIES:    No Known Allergies    REVIEW OF SYSTEMS:  A comprehensive of systems was negative except as noted above.    SOCIAL HISTORY:   Social History     Social History     Marital status:      Spouse name: N/A     Number of children: 2     Years of education: N/A     Occupational History     [employer]     Social History Main Topics     Smoking status: Former Smoker     Types: Cigarettes     Smokeless tobacco: Never Used     Alcohol use No     Drug use: No     Sexual activity: Not Currently     Other Topics Concern     Not on file     Social History Narrative     Reviewed with patient   daughter accompanies Dung Norton in hospital room    FAMILY MEDICAL HISTORY:   Family History   Problem Relation Age of Onset     C.A.D. Mother      d age 67     Vision Loss Father      Hearing Loss Sister      DIABETES Sister      CANCER No family hx of      Reviewed with patient     PHYSICAL EXAM:   Temp  Av.7  F (36.5  C)  Min: 96  F (35.6  C)   "Max: 98.3  F (36.8  C)      Pulse  Av.5  Min: 79  Max: 90 Resp  Av.5  Min: 16  Max: 18  SpO2  Av.7 %  Min: 87 %  Max: 100 %       /75  Temp 97.5  F (36.4  C) (Oral)  Resp 16  Ht 1.778 m (5' 10\")  Wt 59.2 kg (130 lb 8 oz)  SpO2 100%  BMI 18.72 kg/m2   Date 17 07 - 17 0659   Shift 9199-9128 8317-8826 1581-0846 24 Hour Total   I  N  T  A  K  E   I.V. 428.33   428.33    IV Piggyback 350   350    Shift Total  (mL/kg) 778.33  (13.15)   778.33  (13.15)   O  U  T  P  U  T   Urine 620 825  1445    Shift Total  (mL/kg) 620  (10.47) 825  (13.94)  1445  (24.41)   Weight (kg) 59.19 59.19 59.19 59.19        Admit Weight: 59.2 kg (130 lb 8 oz)     GENERAL APPEARANCE: no distress,  awake  EYES: no scleral icterus, pupils equal  HENT: NC/AT,  mouth  without ulcers or lesions  Lymphatics: no cervical or supraclavicular LAD  Endo: no moon facies, no goiter  Pulmonary: lungs clear to auscultation with equal breath sounds bilaterally, no clubbing  CV: regular rhythm, normal rate, no rub   - JVP no   - [place]  GI: soft, nontender, normal bowel sounds, no HSM   MS: no evidence of inflammation in joints, no muscle tenderness  :  wood  SKIN: no rash, warm, dry, no cyanosis  NEURO: face symmetric, no asterixis     LABS:   CMP  Recent Labs  Lab 17  0720 17  1909 17  1001    135 137   POTASSIUM 4.5 4.7 4.3   CHLORIDE 109 104 108   CO2 13* 17* 12*   ANIONGAP 16* 15* 17*   GLC 98 157* 172*   * 162* 170*   CR 7.41* 7.80* 7.49*   GFRESTIMATED 7* 7* 7*   GFRESTBLACK 9* 8* 9*   DERICK 8.4* 8.9 9.1   MAG  --  2.5*  --    PHOS  --  8.4* 8.4*   PROTTOTAL  --  7.2  --    ALBUMIN  --  3.0* 3.2*   BILITOTAL  --  0.3  --    ALKPHOS  --  95  --    AST  --  9  --    ALT  --  17  --      CBC  Recent Labs  Lab 17  0720 17  1909   HGB 8.8* 8.6*   WBC 10.9 11.4*   RBC 3.04* 3.07*   HCT 26.7* 26.4*   MCV 88 86   MCH 28.9 28.0   MCHC 33.0 32.6   RDW 14.6 14.6    256 "     INR  Recent Labs  Lab 08/28/17  1909   INR 1.19*   PTT 45*     ABG  Recent Labs  Lab 08/28/17  2248   O2PER 21      URINE STUDIES  Recent Labs   Lab Test  08/29/17   0235  08/28/17   2100  07/13/17   0650  07/13/17   0100  01/15/14   0936  06/10/11   1040   COLOR  Light Yellow  Light Yellow  Light Yellow  Light Yellow  Yellow  Yellow   APPEARANCE  Cloudy  Cloudy  Slightly Cloudy  Clear  Clear  Clear   URINEGLC  Negative  Negative  70*  30*  Negative  Negative   URINEBILI  Negative  Negative  Negative  Negative  Negative  Negative   URINEKETONE  Negative  Negative  Negative  Negative  Negative  Negative   SG  1.014  1.016  1.010  1.009  1.020  1.025   UBLD  Moderate*  Moderate*  Moderate*  Trace*  Small*  Negative   URINEPH  6.0  6.0  7.5*  5.5  6.0  5.5   PROTEIN  100*  300*  300*  10*  Negative  Negative   UROBILINOGEN   --    --    --    --   0.2  0.2   NITRITE  Negative  Negative  Negative  Negative  Negative  Negative   LEUKEST  Large*  Large*  Negative  Small*  Negative  Negative   RBCU  10*  15*  >182*  2  10-25*  O - 2   WBCU  >182*  2598*  None  9*  O - 2  O - 2     Recent Labs   Lab Test  08/04/17   1359   UTPG  2.85*     PTH  Recent Labs   Lab Test  08/08/17   1051   PTHI  120*     IRON STUDIES  Recent Labs   Lab Test  08/08/17   1051  07/21/17   0652   IRON  54  24*   FEB  167*  125*   IRONSAT  32  19   NO  679*  1209*       IMAGING:  All imaging studies reviewed by me.     Naresh Amaral MD

## 2017-08-30 NOTE — PLAN OF CARE
Problem: Goal Outcome Summary  Goal: Goal Outcome Summary  OT 7A: OT orders received and acknowledged. Per discussion with PT and pt, pt with no acute OT needs as primarily limited by orthostatic hypertension and mild balance impairments. Since last admit, pt reports son installing handheld showerhead and obtaining shower chair to facilitate safety. OT to defer. Please see PT care plan note for discharge recommendations.

## 2017-08-31 LAB
ANION GAP SERPL CALCULATED.3IONS-SCNC: 12 MMOL/L (ref 3–14)
BACTERIA SPEC CULT: ABNORMAL
BASE DEFICIT BLDV-SCNC: 5.7 MMOL/L
BUN SERPL-MCNC: 125 MG/DL (ref 7–30)
CALCIUM SERPL-MCNC: 7.4 MG/DL (ref 8.5–10.1)
CHLORIDE SERPL-SCNC: 111 MMOL/L (ref 94–109)
CO2 SERPL-SCNC: 18 MMOL/L (ref 20–32)
CREAT SERPL-MCNC: 5.88 MG/DL (ref 0.66–1.25)
GFR SERPL CREATININE-BSD FRML MDRD: 9 ML/MIN/1.7M2
GLUCOSE SERPL-MCNC: 105 MG/DL (ref 70–99)
HBA1C MFR BLD: 5.6 % (ref 4.3–6)
HCO3 BLDV-SCNC: 18 MMOL/L (ref 21–28)
Lab: ABNORMAL
MAGNESIUM SERPL-MCNC: 1.8 MG/DL (ref 1.6–2.3)
O2/TOTAL GAS SETTING VFR VENT: 21 %
PCO2 BLDV: 29 MM HG (ref 40–50)
PH BLDV: 7.41 PH (ref 7.32–7.43)
PHOSPHATE SERPL-MCNC: 5.4 MG/DL (ref 2.5–4.5)
PO2 BLDV: 66 MM HG (ref 25–47)
POTASSIUM SERPL-SCNC: 3.6 MMOL/L (ref 3.4–5.3)
SODIUM SERPL-SCNC: 140 MMOL/L (ref 133–144)
SPECIMEN SOURCE: ABNORMAL

## 2017-08-31 PROCEDURE — 83735 ASSAY OF MAGNESIUM: CPT | Performed by: INTERNAL MEDICINE

## 2017-08-31 PROCEDURE — A9270 NON-COVERED ITEM OR SERVICE: HCPCS | Mod: GY | Performed by: PHYSICIAN ASSISTANT

## 2017-08-31 PROCEDURE — 83036 HEMOGLOBIN GLYCOSYLATED A1C: CPT | Performed by: INTERNAL MEDICINE

## 2017-08-31 PROCEDURE — 84100 ASSAY OF PHOSPHORUS: CPT | Performed by: INTERNAL MEDICINE

## 2017-08-31 PROCEDURE — 36415 COLL VENOUS BLD VENIPUNCTURE: CPT | Performed by: INTERNAL MEDICINE

## 2017-08-31 PROCEDURE — 25000132 ZZH RX MED GY IP 250 OP 250 PS 637: Mod: GY | Performed by: INTERNAL MEDICINE

## 2017-08-31 PROCEDURE — 25000132 ZZH RX MED GY IP 250 OP 250 PS 637: Mod: GY | Performed by: PHYSICIAN ASSISTANT

## 2017-08-31 PROCEDURE — 80048 BASIC METABOLIC PNL TOTAL CA: CPT | Performed by: INTERNAL MEDICINE

## 2017-08-31 PROCEDURE — 82306 VITAMIN D 25 HYDROXY: CPT | Performed by: INTERNAL MEDICINE

## 2017-08-31 PROCEDURE — 82803 BLOOD GASES ANY COMBINATION: CPT | Performed by: INTERNAL MEDICINE

## 2017-08-31 PROCEDURE — 25000128 H RX IP 250 OP 636: Performed by: INTERNAL MEDICINE

## 2017-08-31 PROCEDURE — A9270 NON-COVERED ITEM OR SERVICE: HCPCS | Mod: GY | Performed by: INTERNAL MEDICINE

## 2017-08-31 PROCEDURE — 12000026 ZZH R&B TRANSPLANT

## 2017-08-31 RX ORDER — FLUDROCORTISONE ACETATE 0.1 MG/1
100 TABLET ORAL DAILY
Status: DISCONTINUED | OUTPATIENT
Start: 2017-08-31 | End: 2017-09-01

## 2017-08-31 RX ORDER — LEVOFLOXACIN 250 MG/1
750 TABLET, FILM COATED ORAL ONCE
Status: COMPLETED | OUTPATIENT
Start: 2017-08-31 | End: 2017-08-31

## 2017-08-31 RX ORDER — FLUDROCORTISONE ACETATE 0.1 MG/1
100 TABLET ORAL DAILY
Status: DISCONTINUED | OUTPATIENT
Start: 2017-08-31 | End: 2017-08-31

## 2017-08-31 RX ORDER — LEVOFLOXACIN 250 MG/1
500 TABLET, FILM COATED ORAL EVERY OTHER DAY
Status: DISCONTINUED | OUTPATIENT
Start: 2017-09-01 | End: 2017-09-02

## 2017-08-31 RX ORDER — SODIUM BICARBONATE 650 MG/1
1300 TABLET ORAL 2 TIMES DAILY
Status: DISCONTINUED | OUTPATIENT
Start: 2017-08-31 | End: 2017-09-02

## 2017-08-31 RX ADMIN — CALCIUM ACETATE 667 MG: 667 CAPSULE ORAL at 13:44

## 2017-08-31 RX ADMIN — SODIUM BICARBONATE 650 MG TABLET 1300 MG: at 20:04

## 2017-08-31 RX ADMIN — Medication: at 00:50

## 2017-08-31 RX ADMIN — ASPIRIN 81 MG: 81 TABLET, COATED ORAL at 08:25

## 2017-08-31 RX ADMIN — CALCIUM ACETATE 667 MG: 667 CAPSULE ORAL at 08:27

## 2017-08-31 RX ADMIN — SODIUM CHLORIDE, POTASSIUM CHLORIDE, SODIUM LACTATE AND CALCIUM CHLORIDE 1000 ML: 600; 310; 30; 20 INJECTION, SOLUTION INTRAVENOUS at 13:10

## 2017-08-31 RX ADMIN — NICOTINE 1 PATCH: 14 PATCH, EXTENDED RELEASE TRANSDERMAL at 08:25

## 2017-08-31 RX ADMIN — SODIUM BICARBONATE 650 MG TABLET 650 MG: at 08:27

## 2017-08-31 RX ADMIN — CEFTRIAXONE 1 G: 1 INJECTION, POWDER, FOR SOLUTION INTRAMUSCULAR; INTRAVENOUS at 04:20

## 2017-08-31 RX ADMIN — SODIUM BICARBONATE 650 MG TABLET 650 MG: at 13:44

## 2017-08-31 RX ADMIN — FLUDROCORTISONE ACETATE 100 MCG: 0.1 TABLET ORAL at 17:20

## 2017-08-31 RX ADMIN — CALCIUM ACETATE 667 MG: 667 CAPSULE ORAL at 17:20

## 2017-08-31 RX ADMIN — FINASTERIDE 5 MG: 5 TABLET, FILM COATED ORAL at 08:27

## 2017-08-31 RX ADMIN — LEVOFLOXACIN 750 MG: 250 TABLET, FILM COATED ORAL at 17:21

## 2017-08-31 NOTE — PLAN OF CARE
"Problem: Goal Outcome Summary  Goal: Goal Outcome Summary  Outcome: No Change  /73 (BP Location: Right arm)  Pulse 114  Temp 99.2  F (37.3  C) (Oral)  Resp 16  Ht 1.778 m (5' 10\")  Wt 59.2 kg (130 lb 8 oz)  SpO2 100%  BMI 18.72 kg/m2  Pt resting throughout shift. Family in room throughout evening;very supportive. Sodium bicarc 150 mEq MIV running at 150ml/hr to MARIAM Man with adequate OP; clear/pale urine; total 1450 throughout shift. Plan for d/c today pending resolution of orthostatic hypotension.      "

## 2017-08-31 NOTE — PROGRESS NOTES
Nephrology Progress Note  08/31/2017         Assessment & Recommendations:   Dung Norton is a 71 year old year old male    KRISTINA on CKD: stage 5 ckd GFR down to 7, Cr 6-7,due to obstructive uropathy started July this year and his KRISTINA fail to resolve he is in wood that was replaced, no metabolic or volume indication for acute HD no uremic symptoms.   8/30 improving non oliguric with IV volume expansion, Cr trending down,  UOP was 2500 over 24 hr this is approaching polyuria and raises concern of chronic tubular damage due to chronic obstruction.  Will need to consider that at this point fluids may be driving an osmotic diuresis.  If high UOP continues then would d/c IV fluids and monitor urine osm.       Orthostasis: dc ivf , ok with florenif   Please check cortisol  May consider fludrocortisone regardless of cortisol level.     Prostate hypertrophy: cont proscar     Bladder mass benign: biopsy shows no malignancy      Electrolyte: Na 141, K4.3 - these are acceptable    BMD - Ca 7.7 with albumin of 3 corrects to 8.5, Phos 6.5 which is improved.  PTH was 120 on 8/8/17 - I would recommend against vitamin D at this time given recent significant hyperphosphatemia.  His vitamin D defic screen from 8/8/17 was 29 which is at goal.     Acid base metabolic acidosis with anion gap mostly related renal failure, bicarb worse since admission with getting high chloride solution.  consider bicard replacement with this isotonic volume expansion, calculate bicarb deficit.  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  Given persistent Hgb <9 I would consider EARLENE.     UTI : culture shows serratia and he is getting ceftriaxone.     Nutrition:  poor recommends dietary consult      Recommendations were communicated to primary team via note     Patient seen and discussed  with Dr. Daley.  Naresh  "Cristin  Nephrology Fellow  Pager: [phone]             Interval History :   In the last 24 hours Dung Norton has no specific complaint , good UOP 2500 cc, cr improved, no uremic symptoms. No dizziness or light headedness but his blood pressure still orthostatic   Review of Systems:   I reviewed the following systems:  GI: good appetite. no nausea or vomiting or diarrhea.   Neuro:  no confusion  Constitutional:  no fever or chills  CV: no dyspnea or edema.  no chest pain.    Physical Exam:   I/O last 3 completed shifts:  In: 1216.67 [P.O.:750; I.V.:466.67]  Out: 3430 [Urine:3430]   /72 (BP Location: Right arm)  Pulse 64  Temp 97.8  F (36.6  C) (Oral)  Resp 16  Ht 1.778 m (5' 10\")  Wt 61.4 kg (135 lb 6.4 oz)  SpO2 98%  BMI 19.43 kg/m2     GENERAL APPEARANCE: NAD  EYES:  no scleral icterus, pupils equal  HENT: mouth without ulcers or lesions  PULM: lungs clear to auscultation,  bilaterally, equal air movement, no clubbing  CV: regular rhythm, normal rate, no rub     -JVP no     -edema no   GI: soft, not tender, not distended, bowel sounds are positive  INTEGUMENT: no cyanosis, no rash  NEURO:  no asterixis   Access no    Labs:   All labs reviewed by me  Electrolytes/Renal -   Recent Labs   Lab Test  08/31/17   0609  08/30/17   0721  08/29/17   0720  08/28/17   1909   NA  140  141  137  135   POTASSIUM  3.6  4.3  4.5  4.7   CHLORIDE  111*  116*  109  104   CO2  18*  12*  13*  17*   BUN  125*  138*  158*  162*   CR  5.88*  6.57*  7.41*  7.80*   GLC  105*  87  98  157*   DERICK  7.4*  7.7*  8.4*  8.9   MAG  1.8  2.0   --   2.5*   PHOS  5.4*  6.5*   --   8.4*       CBC -   Recent Labs   Lab Test  08/29/17   0720  08/28/17   1909  08/16/17   1426   WBC  10.9  11.4*  10.6   HGB  8.8*  8.6*  9.1*   PLT  246  256  245       LFTs -   Recent Labs   Lab Test  08/28/17   1909  08/28/17   1001  08/21/17   0952   07/19/17   0537  07/12/17   1740   ALKPHOS  95   --    --    --   51  67   BILITOTAL  0.3   --    --   "  --   0.2  0.6   ALT  17   --    --    --   17  19   AST  9   --    --    --   32  12   PROTTOTAL  7.2   --    --    --   4.4*  6.8   ALBUMIN  3.0*  3.2*  3.7   < >  1.9*  3.1*    < > = values in this interval not displayed.       Iron Panel -   Recent Labs   Lab Test  08/08/17   1051  07/21/17   0652   IRON  54  24*   IRONSAT  32  19   NO  679*  1209*         Imaging:  All imaging studies reviewed by me.     Current Medications:    calcium acetate  667 mg Oral TID w/meals     cefTRIAXone  1 g Intravenous Q24H     finasteride  5 mg Oral Daily     aspirin EC  81 mg Oral Daily     sodium chloride (PF)  3 mL Intracatheter Q8H     nicotine   Transdermal Q8H     nicotine   Transdermal Daily     nicotine  1 patch Transdermal Daily     sodium bicarbonate  650 mg Oral TID       sodium bicabonate in 5% dextrose for infusion 100 mL/hr at 08/31/17 0050     Naresh Amaral MD

## 2017-08-31 NOTE — PROVIDER NOTIFICATION
"The following text message was just now sent to pager #[phone]: \"FYI: Herb's potassium level=3.6 and his magnesium level=1.8. Thank you.\"  "

## 2017-08-31 NOTE — PROGRESS NOTES
[facility], [place]    Internal Medicine Progress Note - Gold Service      Assessment & Plan   Dung Norton is a 71 year old man with a history of HTN and tobacco abuse who was admitted to [facility] 7/12-7/25/17 w/ severe KRISTINA 2/2 obstructive uropathy attributed to BPH. Now readmitted w/ worsening KRISTINA - appears hypovolemic.      #KRISTINA w/ AGMA. Cr 7.8 and , up from more recent baseline of Cr low-mid 6s and BUN 80s-90s. K stable at 4.7. CO2 low at 17 (12 earlier today) from low 20s previously and reports difficulty tolerating PO sodium bicarb at currently prescribed TID dosing. Has new AGMA w/ AG 15. UA w/ large LE, moderate blood, protein, 2598 WBC w/ WBC clumps (? AIN but no recent exposures, no eosinophilia noted). Is orthostatic w/ BP 96/67-->63/30 and HR 75-->153 lying to standing - suspect that he is hypovolemic w/ recent OP initiation of Lasix 20 mg on 8/4, increased to BID 8/8, held since 8/16 when weight was down to 65.1 kg (from 68.8 kg on 8/4 and 77 kg on 7/24) although this doesn't necessarily seem like the lone contributor for this presentation. Suspect poor renal perfusion w/ hypotension and has continued on home Norvasc despite this (also Proscar, Flomax). No evidence of GIB. No NSAIDs. Emptying clear yellow UOP frequently but not measuring at home. No acute indication for dialysis although would be open to this as OP nephrology felt that return to normal baseline was unlikely at this time given many prior months w/ obstructive insult.     - pt with still orthostatic hypotension ; continues to receive 1 bolus and increased maintenance changed to bicarb drip at 150 ml/hr  -currently pt feels asymptomatic on getting up but still has lower blood pressure of 71/49 when he gets up and HR goes to 126  -other possibilities of this orthostatic hypotension could be autonomic failure secondary to age or uremia; his hba1c last was 5.6   -nevertheless will send hba1c and vitamin D  (which has been associated with orthostatic hypotension), cortisol in am  -will initiate  Florinef 0.1 mg and increase 0.1 mg every week to dose of 1 mg depending on tolerance   -nephrology consulted -appreciate recs- no indication of HD  -continue to hold amlodipine and furosemide and flomax for now and on d/c   -continue to use sodium bicarbonate 650 mg PO TID ( doubt his compliance at home )  -continue on phoslo and bicarb drip    #Concern for UTI: serratia grew  -unclear picture with cath and lack of symptoms   -but significant WBC with large LE  -transition to levaquin today given uncomplicated course and sensitivity          #BPH. Had cystoscopy on 7/16 w/ no masses and biopsies taken w/o evidence of malignancy. PSA was wnl at 1.72. Was started on Flomax and Proscar w/ plan for OP urology f/u.   -urology was consulted for eval of bladder mass which would be as an outpatient     #Tobacco Abuse. Has not smoked since last d/c but remains on 21 mg nicotine patch which he thinks is too strong and perhaps making him feel shaky. Would like to continue patch but will step down to 14 mg and should have clear plan to taper off.     #Pulmonary Nodules. Noted last hospitalization w/ few small pulmonary nodules measuring up to 3 mm. A 12 month f/u chest CT was recommended per Fleischner society criteria.      #Chronic Normocytic Anemia. Hgb 8.6 from 7.5 at most recent d/c. Was thought d/t CKD but also w/ oozing from prostate during cystoscopy.      #Subclinical Hypothyroidism. TSH 10.51 and FT4 0.71 checked during last hospitalization. Not on meds and was advised to have recheck once over acute illness.      Diet: renal   Fluids: NS @ 150 cc/hr   DVT Prophylaxis: mechanical; consider pharmacologic in coming days if not ambulating and no plan for intervention (dialysis line)   Code Status: DNR/DNI - confirmed w/ patient, would benefit from completing formal healthcare directive      Disposition Plan   Expected discharge:  Tomorrow; recommended to prior living arrangement once   Pending eval for esolution of orthostatic hypotension.     Entered: Shana Villegas 08/31/2017, 3:01 PM   Information in the above section will display in the discharge planner report.      The patient's care was discussed with the Bedside Nurse and daughter    Shana Villegas  Internal Medicine Staff Hospitalist Service  [facility]  Pager: [phone]  Please see sticky note for cross cover information    Interval History   Feels better in am ; continues to feel better  Still orthostatic in the morning; ; BP from 120/76 to 71/49  HR to 125   Denies dizziness on standing   Last 24 hr care team notes reviewed.   ROS:  4 point ROS including Respiratory, CV, GI and , other than that noted in the HPI, is negative          Physical Exam   Vital Signs: Temp: 97.8  F (36.6  C) Temp src: Oral BP: 110/72 Pulse: 64 Heart Rate: 99 Resp: 16 SpO2: 98 % O2 Device: None (Room air)    Weight: 135 lbs 6.4 oz  GENERAL: Alert and oriented x 3. Sitting up in bed, appears comfortable. Pleasant and conversant.   HEENT: Anicteric sclera. PERRL. Mucous membranes moist.   CV: RRR. S1, S2. Systolic ejection murmur present.   RESPIRATORY: Effort normal on room air. Lungs CTAB with no wheezing, rales, rhonchi.   GI: Abdomen soft and non distended, bowel sounds present. No tenderness, rebound, guarding.   : Man w/ clear yellow UOP.    NEUROLOGICAL: No focal deficits. Moves all extremities.   EXTREMITIES: No peripheral edema. Intact bilateral pedal pulses.   SKIN: No jaundice. No rashes.       Data   Data     Recent Labs  Lab 08/31/17  0609 08/30/17  0721 08/29/17  0720 08/28/17  1909 08/28/17  1001   WBC  --   --  10.9 11.4*  --    HGB  --   --  8.8* 8.6*  --    MCV  --   --  88 86  --    PLT  --   --  246 256  --    INR  --   --   --  1.19*  --     141 137 135 137   POTASSIUM 3.6 4.3 4.5 4.7 4.3   CHLORIDE 111* 116* 109 104 108   CO2 18* 12* 13* 17* 12*   BUN  125* 138* 158* 162* 170*   CR 5.88* 6.57* 7.41* 7.80* 7.49*   ANIONGAP 12 12 16* 15* 17*   DERICK 7.4* 7.7* 8.4* 8.9 9.1   * 87 98 157* 172*   ALBUMIN  --   --   --  3.0* 3.2*   PROTTOTAL  --   --   --  7.2  --    BILITOTAL  --   --   --  0.3  --    ALKPHOS  --   --   --  95  --    ALT  --   --   --  17  --    AST  --   --   --  9  --      Reviewed renal USG and UA

## 2017-08-31 NOTE — PROVIDER NOTIFICATION
"The following text message was just now sent to pager #[phone]: \"His laying down BP = 112/76, and his standing up BP = 71/49, pulses = 82 & 121, respectively. He was completely asymptomatic when standing, and able to walk into the bathroom afterward (with assist, for safety).\"  "

## 2017-08-31 NOTE — PLAN OF CARE
Problem: Goal Outcome Summary  Goal: Goal Outcome Summary  Outcome: No Change  D: Herb's VSS, except when an orthostatic pressure was checked: very significant drop when he stood, with some symptoms; but the blood pressure and symptoms were not as severe today as they were yesterday.  Also, Herb's bicarb level was only 12, which the team noted.  I:   We now are increasing Herb's fall risk category, not allowing him to walk alone.  And sodium bicarb has now in been added to his MIV.  A:  Herb says that he does not mind the increased restriction on his freedom, for the sake of his safety.  P:  Reinforce safety issues with him.

## 2017-09-01 ENCOUNTER — PRE VISIT (OUTPATIENT)
Dept: UROLOGY | Facility: CLINIC | Age: 72
End: 2017-09-01

## 2017-09-01 ENCOUNTER — APPOINTMENT (OUTPATIENT)
Dept: PHYSICAL THERAPY | Facility: CLINIC | Age: 72
DRG: 725 | End: 2017-09-01
Payer: MEDICARE

## 2017-09-01 LAB
ANION GAP SERPL CALCULATED.3IONS-SCNC: 12 MMOL/L (ref 3–14)
BASE DEFICIT BLDV-SCNC: 0.2 MMOL/L
BUN SERPL-MCNC: 112 MG/DL (ref 7–30)
CALCIUM SERPL-MCNC: 7.5 MG/DL (ref 8.5–10.1)
CHLORIDE SERPL-SCNC: 108 MMOL/L (ref 94–109)
CO2 SERPL-SCNC: 23 MMOL/L (ref 20–32)
CORTIS SERPL-MCNC: 13.3 UG/DL (ref 4–22)
CREAT SERPL-MCNC: 5.26 MG/DL (ref 0.66–1.25)
GFR SERPL CREATININE-BSD FRML MDRD: 11 ML/MIN/1.7M2
GLUCOSE SERPL-MCNC: 104 MG/DL (ref 70–99)
HCO3 BLDV-SCNC: 24 MMOL/L (ref 21–28)
MAGNESIUM SERPL-MCNC: 1.7 MG/DL (ref 1.6–2.3)
O2/TOTAL GAS SETTING VFR VENT: 70 %
PCO2 BLDV: 35 MM HG (ref 40–50)
PH BLDV: 7.44 PH (ref 7.32–7.43)
PHOSPHATE SERPL-MCNC: 4.6 MG/DL (ref 2.5–4.5)
PO2 BLDV: 166 MM HG (ref 25–47)
POTASSIUM SERPL-SCNC: 3.6 MMOL/L (ref 3.4–5.3)
SODIUM SERPL-SCNC: 142 MMOL/L (ref 133–144)

## 2017-09-01 PROCEDURE — 40000193 ZZH STATISTIC PT WARD VISIT

## 2017-09-01 PROCEDURE — 83735 ASSAY OF MAGNESIUM: CPT | Performed by: INTERNAL MEDICINE

## 2017-09-01 PROCEDURE — 97750 PHYSICAL PERFORMANCE TEST: CPT | Mod: GP

## 2017-09-01 PROCEDURE — 80048 BASIC METABOLIC PNL TOTAL CA: CPT | Performed by: INTERNAL MEDICINE

## 2017-09-01 PROCEDURE — 40000141 ZZH STATISTIC PERIPHERAL IV START W/O US GUIDANCE

## 2017-09-01 PROCEDURE — 12000026 ZZH R&B TRANSPLANT

## 2017-09-01 PROCEDURE — A9270 NON-COVERED ITEM OR SERVICE: HCPCS | Mod: GY | Performed by: PHYSICIAN ASSISTANT

## 2017-09-01 PROCEDURE — 97530 THERAPEUTIC ACTIVITIES: CPT | Mod: GP

## 2017-09-01 PROCEDURE — 25000132 ZZH RX MED GY IP 250 OP 250 PS 637: Mod: GY | Performed by: PHYSICIAN ASSISTANT

## 2017-09-01 PROCEDURE — 97116 GAIT TRAINING THERAPY: CPT | Mod: GP

## 2017-09-01 PROCEDURE — 25000128 H RX IP 250 OP 636: Performed by: INTERNAL MEDICINE

## 2017-09-01 PROCEDURE — 99233 SBSQ HOSP IP/OBS HIGH 50: CPT | Performed by: INTERNAL MEDICINE

## 2017-09-01 PROCEDURE — 82533 TOTAL CORTISOL: CPT | Performed by: INTERNAL MEDICINE

## 2017-09-01 PROCEDURE — 84100 ASSAY OF PHOSPHORUS: CPT | Performed by: INTERNAL MEDICINE

## 2017-09-01 PROCEDURE — 36415 COLL VENOUS BLD VENIPUNCTURE: CPT | Performed by: INTERNAL MEDICINE

## 2017-09-01 PROCEDURE — 82803 BLOOD GASES ANY COMBINATION: CPT | Performed by: INTERNAL MEDICINE

## 2017-09-01 PROCEDURE — A9270 NON-COVERED ITEM OR SERVICE: HCPCS | Mod: GY | Performed by: INTERNAL MEDICINE

## 2017-09-01 PROCEDURE — 25000132 ZZH RX MED GY IP 250 OP 250 PS 637: Mod: GY | Performed by: INTERNAL MEDICINE

## 2017-09-01 RX ORDER — FLUDROCORTISONE ACETATE 0.1 MG/1
100 TABLET ORAL 2 TIMES DAILY
Status: DISCONTINUED | OUTPATIENT
Start: 2017-09-01 | End: 2017-09-02

## 2017-09-01 RX ORDER — HEPARIN SODIUM 5000 [USP'U]/.5ML
5000 INJECTION, SOLUTION INTRAVENOUS; SUBCUTANEOUS EVERY 12 HOURS
Status: DISCONTINUED | OUTPATIENT
Start: 2017-09-01 | End: 2017-09-05 | Stop reason: HOSPADM

## 2017-09-01 RX ADMIN — NICOTINE 1 PATCH: 14 PATCH, EXTENDED RELEASE TRANSDERMAL at 07:48

## 2017-09-01 RX ADMIN — SODIUM BICARBONATE 650 MG TABLET 1300 MG: at 20:05

## 2017-09-01 RX ADMIN — CALCIUM ACETATE 667 MG: 667 CAPSULE ORAL at 17:25

## 2017-09-01 RX ADMIN — LEVOFLOXACIN 500 MG: 250 TABLET, FILM COATED ORAL at 07:44

## 2017-09-01 RX ADMIN — ASPIRIN 81 MG: 81 TABLET, COATED ORAL at 07:43

## 2017-09-01 RX ADMIN — HEPARIN SODIUM 5000 UNITS: 5000 INJECTION, SOLUTION INTRAVENOUS; SUBCUTANEOUS at 18:18

## 2017-09-01 RX ADMIN — CALCIUM ACETATE 667 MG: 667 CAPSULE ORAL at 13:08

## 2017-09-01 RX ADMIN — FLUDROCORTISONE ACETATE 100 MCG: 0.1 TABLET ORAL at 20:05

## 2017-09-01 RX ADMIN — SODIUM BICARBONATE 650 MG TABLET 1300 MG: at 07:43

## 2017-09-01 RX ADMIN — FINASTERIDE 5 MG: 5 TABLET, FILM COATED ORAL at 07:43

## 2017-09-01 RX ADMIN — FLUDROCORTISONE ACETATE 100 MCG: 0.1 TABLET ORAL at 07:44

## 2017-09-01 RX ADMIN — CALCIUM ACETATE 667 MG: 667 CAPSULE ORAL at 07:43

## 2017-09-01 NOTE — PROGRESS NOTES
Nephrology Progress Note  09/01/2017         Assessment & Recommendations:   Dung Norton is a 71 year old year old male    KRISTINA on CKD: stage 5 ckd GFR down to 7, Cr 6-7,due to obstructive uropathy started July this year and his KRISTINA fail to resolve he is in wood that was replaced, no metabolic or volume indication for acute HD no uremic symptoms.   8/30 improving non oliguric with IV volume expansion, Cr trending down,  UOP was 2500 over 24 hr this is approaching polyuria and raises concern of chronic tubular damage due to chronic obstruction.  Will need to consider that at this point fluids may be driving an osmotic diuresis.  If high UOP continues then would d/c IV fluids and monitor urine osm.    9/1 kidney function improving cr down to 5.2, no indication for renal replacement therapy.     Orthostasis: thought could be osmotic diuresis due to ivf will dc ivf , possible mineralocorticoid defecincey ok with florenif   9/1  Probably not adrenal insuffiencey  With morning Cortisol 13.3 , cont florenif 0.1 mcg daily.     Prostate hypertrophy: cont proscar     Bladder mass benign: biopsy shows no malignancy      Electrolyte: no issue    BMD - Ca 7.7 with albumin of 3 corrects to 8.5, Phos 6.5 which is improved.  PTH was 120 on 8/8/17 - I would recommend against vitamin D at this time given recent significant hyperphosphatemia.  His vitamin D defic screen from 8/8/17 was 29 which is at goal.     Acid base metabolic acidosis with anion gap mostly related renal failure, bicarb worse since admission with getting high chloride solution.  consider bicard replacement with this isotonic volume expansion, calculate bicarb deficit.  Bicarb deficit 330MEq,  Can give oral bicarb 1300mg bid    9/1 bicarb is 23, decrease bicarb dose to 650 mg daily.     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  Given persistent Hgb <9 I would consider EARLENE.     UTI :  "culture shows serratia and he is getting ceftriaxone.     Nutrition:  poor recommends dietary consult      Recommendations were communicated to primary team via note     Patient seen and discussed  with Dr. Daley.  Naresh Amaral  Nephrology Fellow  Pager: [phone]             Interval History :   In the last 24 hours Dung Norton has no specific complaint , he denies any symptoms UOP slowed down from 3500 to 2500 , given dose of florenif, he is positive orthostatics with out any symptoms.   Review of Systems:   I reviewed the following systems:  GI: good appetite. no nausea or vomiting or diarrhea.   Neuro:  no confusion  Constitutional:  no fever or chills  CV: no dyspnea or edema.  no chest pain.    Physical Exam:   I/O last 3 completed shifts:  In: 2000 [P.O.:2000]  Out: 2350 [Urine:2350]   /69 (BP Location: Right arm)  Pulse 84  Temp 98  F (36.7  C) (Oral)  Resp 16  Ht 1.778 m (5' 10\")  Wt 61.4 kg (135 lb 6.4 oz)  SpO2 99%  BMI 19.43 kg/m2     GENERAL APPEARANCE: NAD  EYES:  no scleral icterus, pupils equal  HENT: mouth without ulcers or lesions  PULM: lungs clear to auscultation,  bilaterally, equal air movement, no clubbing  CV: regular rhythm, normal rate, no rub     -JVP no     -edema no   GI: soft, not tender, not distended, bowel sounds are positive  INTEGUMENT: no cyanosis, no rash  NEURO:  no asterixis   Access no    Labs:   All labs reviewed by me  Electrolytes/Renal -   Recent Labs   Lab Test  09/01/17   0633  08/31/17   0609  08/30/17   0721   NA  142  140  141   POTASSIUM  3.6  3.6  4.3   CHLORIDE  108  111*  116*   CO2  23  18*  12*   BUN  112*  125*  138*   CR  5.26*  5.88*  6.57*   GLC  104*  105*  87   DERICK  7.5*  7.4*  7.7*   MAG  1.7  1.8  2.0   PHOS  4.6*  5.4*  6.5*       CBC -   Recent Labs   Lab Test  08/29/17   0720  08/28/17   1909  08/16/17   1426   WBC  10.9  11.4*  10.6   HGB  8.8*  8.6*  9.1*   PLT  246  256  245       LFTs -   Recent Labs   Lab Test  " 08/28/17   1909  08/28/17   1001  08/21/17   0952   07/19/17   0537  07/12/17   1740   ALKPHOS  95   --    --    --   51  67   BILITOTAL  0.3   --    --    --   0.2  0.6   ALT  17   --    --    --   17  19   AST  9   --    --    --   32  12   PROTTOTAL  7.2   --    --    --   4.4*  6.8   ALBUMIN  3.0*  3.2*  3.7   < >  1.9*  3.1*    < > = values in this interval not displayed.       Iron Panel -   Recent Labs   Lab Test  08/08/17   1051  07/21/17   0652   IRON  54  24*   IRONSAT  32  19   NO  679*  1209*         Imaging:  All imaging studies reviewed by me.     Current Medications:    levofloxacin  500 mg Oral Every Other Day     sodium bicarbonate  1,300 mg Oral BID     fludrocortisone  100 mcg Oral Daily     calcium acetate  667 mg Oral TID w/meals     finasteride  5 mg Oral Daily     aspirin EC  81 mg Oral Daily     sodium chloride (PF)  3 mL Intracatheter Q8H     nicotine   Transdermal Q8H     nicotine   Transdermal Daily     nicotine  1 patch Transdermal Daily        Naresh Amaral MD

## 2017-09-01 NOTE — PROGRESS NOTES
[facility], [place]    Internal Medicine Progress Note - Gold Service      Assessment & Plan   Dung Norton is a 71 year old man with a history of HTN and tobacco abuse who was admitted to [facility] 7/12-7/25/17 w/ severe KRISTINA 2/2 obstructive uropathy attributed to BPH. Now readmitted w/ worsening KRISTINA - appears hypovolemic.      #KRISTINA w/ AGMA. Cr 7.8 and , up from more recent baseline of Cr low-mid 6s and BUN 80s-90s. K stable at 4.7. CO2 low at 17 (12 earlier today) from low 20s previously and reports difficulty tolerating PO sodium bicarb at currently prescribed TID dosing. Has new AGMA w/ AG 15. UA w/ large LE, moderate blood, protein, 2598 WBC w/ WBC clumps (? AIN but no recent exposures, no eosinophilia noted). Is orthostatic w/ BP 96/67-->63/30 and HR 75-->153 lying to standing - suspect that he is hypovolemic w/ recent OP initiation of Lasix 20 mg on 8/4, increased to BID 8/8, held since 8/16 when weight was down to 65.1 kg (from 68.8 kg on 8/4 and 77 kg on 7/24) although this doesn't necessarily seem like the lone contributor for this presentation. Suspect poor renal perfusion w/ hypotension and has continued on home Norvasc despite this (also Proscar, Flomax). No evidence of GIB. No NSAIDs. Emptying clear yellow UOP frequently but not measuring at home. No acute indication for dialysis although would be open to this as OP nephrology felt that return to normal baseline was unlikely at this time given many prior months w/ obstructive insult.     - pt with still orthostatic hypotension ; unclear etiology; still increased urine output  -increased fludrocortisone to 100 mcg BID  -currently pt feels asymptomatic on getting up but still has lower blood pressure of 71/49 when he gets up and HR goes to 126  -other possibilities of this orthostatic hypotension could be autonomic failure secondary to age or uremia; his hba1c last was 5.6   -nevertheless will send hba1c and vitamin D  (which has been associated with orthostatic hypotension), AM cortisol was normal  -florinef increased to 100 mcg BID  -nephrology consulted -appreciate recs- no indication of HD  -continue to hold amlodipine and furosemide and flomax for now and on d/c   -continue to use sodium bicarbonate 1300 BID  -continue phoslo    #Concern for UTI: serratia grew  -unclear picture with cath and lack of symptoms   -but significant WBC with large LE  -transition to levaquin today given uncomplicated course and sensitivity          #BPH. Had cystoscopy on 7/16 w/ no masses and biopsies taken w/o evidence of malignancy. PSA was wnl at 1.72. Was started on Flomax and Proscar w/ plan for OP urology f/u.   -urology was consulted for eval of bladder mass which would be as an outpatient     #Tobacco Abuse. Has not smoked since last d/c but remains on 21 mg nicotine patch which he thinks is too strong and perhaps making him feel shaky. Would like to continue patch but will step down to 14 mg and should have clear plan to taper off.     #Pulmonary Nodules. Noted last hospitalization w/ few small pulmonary nodules measuring up to 3 mm. A 12 month f/u chest CT was recommended per Fleischner society criteria.      #Chronic Normocytic Anemia. Hgb 8.6 from 7.5 at most recent d/c. Was thought d/t CKD but also w/ oozing from prostate during cystoscopy.      #Subclinical Hypothyroidism. TSH 10.51 and FT4 0.71 checked during last hospitalization. Not on meds and was advised to have recheck once over acute illness.      Diet: renal   Fluids: NS @ 150 cc/hr   DVT Prophylaxis: mechanical; consider pharmacologic in coming days if not ambulating and no plan for intervention (dialysis line)   Code Status: DNR/DNI - confirmed w/ patient, would benefit from completing formal healthcare directive      Disposition Plan   Expected discharge: Tomorrow; recommended to prior living arrangement once   Pending eval for esolution of orthostatic hypotension.      Entered: Shana Villegas 09/01/2017, 5:43 PM   Information in the above section will display in the discharge planner report.      The patient's care was discussed with the Bedside Nurse and daughter    Shana Villegas  Internal Medicine Staff Hospitalist Service  [facility]  Pager: [phone]  Please see sticky note for cross cover information    Interval History   Feels better in am ; continues to feel better  Still orthostatic in the morning; ; BP  Dropped to 60s on standing ; HR to 126    Denies dizziness on standing   Last 24 hr care team notes reviewed.   ROS:  4 point ROS including Respiratory, CV, GI and , other than that noted in the HPI, is negative          Physical Exam   Vital Signs: Temp: 98.1  F (36.7  C) Temp src: Oral BP: 107/76 Pulse: 80 Heart Rate: 98 Resp: 16 SpO2: 98 % O2 Device: None (Room air)    Weight: 135 lbs 8 oz  GENERAL: Alert and oriented x 3. Sitting up in bed, appears comfortable. Pleasant and conversant.   HEENT: Anicteric sclera. PERRL. Mucous membranes moist.   CV: RRR. S1, S2. Systolic ejection murmur present.   RESPIRATORY: Effort normal on room air. Lungs CTAB with no wheezing, rales, rhonchi.   GI: Abdomen soft and non distended, bowel sounds present. No tenderness, rebound, guarding.   : Man w/ clear yellow UOP.    NEUROLOGICAL: No focal deficits. Moves all extremities.   EXTREMITIES: No peripheral edema. Intact bilateral pedal pulses.   SKIN: No jaundice. No rashes.       Data   Data     Recent Labs  Lab 09/01/17  0633 08/31/17  0609 08/30/17  0721 08/29/17  0720 08/28/17  1909 08/28/17  1001   WBC  --   --   --  10.9 11.4*  --    HGB  --   --   --  8.8* 8.6*  --    MCV  --   --   --  88 86  --    PLT  --   --   --  246 256  --    INR  --   --   --   --  1.19*  --     140 141 137 135 137   POTASSIUM 3.6 3.6 4.3 4.5 4.7 4.3   CHLORIDE 108 111* 116* 109 104 108   CO2 23 18* 12* 13* 17* 12*   * 125* 138* 158* 162* 170*   CR 5.26* 5.88* 6.57*  7.41* 7.80* 7.49*   ANIONGAP 12 12 12 16* 15* 17*   DERICK 7.5* 7.4* 7.7* 8.4* 8.9 9.1   * 105* 87 98 157* 172*   ALBUMIN  --   --   --   --  3.0* 3.2*   PROTTOTAL  --   --   --   --  7.2  --    BILITOTAL  --   --   --   --  0.3  --    ALKPHOS  --   --   --   --  95  --    ALT  --   --   --   --  17  --    AST  --   --   --   --  9  --      Reviewed renal USG and UA

## 2017-09-01 NOTE — PLAN OF CARE
Problem: Goal Outcome Summary  Goal: Goal Outcome Summary  PT-7A: Pt demonstrating 14/24 on Dynamic Gait Index indicating increased risk for falling (see full note for details).  OH BP taken with functional mobility, asymptomatic throughout.  Bed mobility and transfers completed independently.  Demonstrating moderate pathway deviations and running into wall and carts in hallway with CGA, increased pathway deviations with added balance challenges.      Pt is not safe to discharge home secondary to increased risk for falling at this time.  Recommend TCU for safe discharge plan.  Will continue to assess need for use of AD with amb for safety and independence with functional mobility.

## 2017-09-01 NOTE — PLAN OF CARE
Problem: Goal Outcome Summary  Goal: Goal Outcome Summary  Outcome: Improving  D: Herb's VSS, except when he gets up: orthostatic BP dropped to 71/49, even though that was asymptomatic for him.  No c/o pain.  I:   LR bolus given today and MIV maintained at 150 cc/hr.  Assisted to bathroom, bed alarm set for safety.  P:  Possible DC tomorrow or the next day.

## 2017-09-01 NOTE — PLAN OF CARE
Problem: Individualization  Goal: Patient Preferences     Afebrile, orthostatic upon assessment, other VSS, and denies pain.  Patient up independently in room, wood patent with good output, passing flatus, reported BM this AM, and tolerating diet with good appetite.  Scheduled meds given as ordered.  Continue to monitor, treat as ordered, notify team with changes.

## 2017-09-01 NOTE — PLAN OF CARE
"Problem: Goal Outcome Summary  Goal: Goal Outcome Summary  Outcome: No Change  /76 (BP Location: Right arm)  Pulse 84  Temp 98  F (36.7  C) (Oral)  Resp 16  Ht 1.778 m (5' 10\")  Wt 61.4 kg (135 lb 6.4 oz)  SpO2 98%  BMI 19.43 kg/m2  No changes overnight. Pt stable. Plan for hba1c and vitamin D (which has been associated with orthostatic hypotension), cortisol this AM for orthostatic hypotension evaluation. Man in place with adequate OP. No pain or nausea. Complaints. Call light in reach. Able to voice needs and wants. Hourly rounding preformed.      "

## 2017-09-01 NOTE — PROGRESS NOTES
Dynamic Gait Index (DGI):The DGI is a measure of balance during gait that is reliable and valid for the elderly and individuals with Parkinson's disease, MS, vestibular disorders, or s/p stroke. Gait assistive device used: none    Patient score: 14/24  Scores ?19/24 indicate an increased risk for falls according to Qiana et al 2000  Minimal Detectable Change = 2.9 in community dwelling elderly according to Aguila et al 2011    Assessment (rationale for performing, application to patient s function & care plan): safety with amb, falls risk  Minutes billed as physical performance test: 4       09/01/17 1600   Signing Clinician's Name / Credentials   Signing clinician's name / credentials Melissa Boyd, MSPT   Dynamic Gait Index (Robert and Ramos [place], 1995)   Gait Level Surface 1   Change in Gait Speed 2   Gait and Horizontal Head Turns 2   Gait with Vertical Head Turns 2   Gait and Pivot Turns 2   Step Over Obstacle 2   Step Around Obstacles 2   Steps 1   Total Dynamic Gait Index Score  (A score of 19 or less has been correlated to an increased risk of falls in community dwelling older adults, patients with vestibular disorders, and patients with MS.)   Total Score (out of 24) 14

## 2017-09-02 LAB
ANION GAP SERPL CALCULATED.3IONS-SCNC: 11 MMOL/L (ref 3–14)
BUN SERPL-MCNC: 107 MG/DL (ref 7–30)
CALCIUM SERPL-MCNC: 7.9 MG/DL (ref 8.5–10.1)
CHLORIDE SERPL-SCNC: 106 MMOL/L (ref 94–109)
CO2 SERPL-SCNC: 24 MMOL/L (ref 20–32)
CREAT SERPL-MCNC: 5.31 MG/DL (ref 0.66–1.25)
GFR SERPL CREATININE-BSD FRML MDRD: 11 ML/MIN/1.7M2
GLUCOSE SERPL-MCNC: 95 MG/DL (ref 70–99)
POTASSIUM SERPL-SCNC: 3.5 MMOL/L (ref 3.4–5.3)
SODIUM SERPL-SCNC: 141 MMOL/L (ref 133–144)

## 2017-09-02 PROCEDURE — 80048 BASIC METABOLIC PNL TOTAL CA: CPT | Performed by: INTERNAL MEDICINE

## 2017-09-02 PROCEDURE — 25000132 ZZH RX MED GY IP 250 OP 250 PS 637: Mod: GY | Performed by: INTERNAL MEDICINE

## 2017-09-02 PROCEDURE — A9270 NON-COVERED ITEM OR SERVICE: HCPCS | Mod: GY | Performed by: INTERNAL MEDICINE

## 2017-09-02 PROCEDURE — 25000132 ZZH RX MED GY IP 250 OP 250 PS 637: Mod: GY | Performed by: PHYSICIAN ASSISTANT

## 2017-09-02 PROCEDURE — 25000128 H RX IP 250 OP 636: Performed by: INTERNAL MEDICINE

## 2017-09-02 PROCEDURE — 12000026 ZZH R&B TRANSPLANT

## 2017-09-02 PROCEDURE — 36415 COLL VENOUS BLD VENIPUNCTURE: CPT | Performed by: INTERNAL MEDICINE

## 2017-09-02 PROCEDURE — 99233 SBSQ HOSP IP/OBS HIGH 50: CPT | Performed by: INTERNAL MEDICINE

## 2017-09-02 PROCEDURE — A9270 NON-COVERED ITEM OR SERVICE: HCPCS | Mod: GY | Performed by: PHYSICIAN ASSISTANT

## 2017-09-02 RX ORDER — ONDANSETRON 2 MG/ML
4 INJECTION INTRAMUSCULAR; INTRAVENOUS EVERY 6 HOURS PRN
Status: DISCONTINUED | OUTPATIENT
Start: 2017-09-02 | End: 2017-09-05 | Stop reason: HOSPADM

## 2017-09-02 RX ORDER — FLUDROCORTISONE ACETATE 0.1 MG/1
200 TABLET ORAL DAILY
Status: DISCONTINUED | OUTPATIENT
Start: 2017-09-03 | End: 2017-09-05 | Stop reason: HOSPADM

## 2017-09-02 RX ORDER — SODIUM BICARBONATE 650 MG/1
650 TABLET ORAL 2 TIMES DAILY
Status: DISCONTINUED | OUTPATIENT
Start: 2017-09-02 | End: 2017-09-04

## 2017-09-02 RX ORDER — LEVOFLOXACIN 250 MG/1
250 TABLET, FILM COATED ORAL EVERY OTHER DAY
Status: DISCONTINUED | OUTPATIENT
Start: 2017-09-03 | End: 2017-09-04

## 2017-09-02 RX ADMIN — SODIUM CHLORIDE, POTASSIUM CHLORIDE, SODIUM LACTATE AND CALCIUM CHLORIDE 500 ML: 600; 310; 30; 20 INJECTION, SOLUTION INTRAVENOUS at 20:48

## 2017-09-02 RX ADMIN — FLUDROCORTISONE ACETATE 100 MCG: 0.1 TABLET ORAL at 07:21

## 2017-09-02 RX ADMIN — SODIUM BICARBONATE 650 MG TABLET 650 MG: at 20:48

## 2017-09-02 RX ADMIN — CALCIUM ACETATE 667 MG: 667 CAPSULE ORAL at 07:21

## 2017-09-02 RX ADMIN — SODIUM CHLORIDE 1000 ML: 9 INJECTION, SOLUTION INTRAVENOUS at 13:14

## 2017-09-02 RX ADMIN — HEPARIN SODIUM 5000 UNITS: 5000 INJECTION, SOLUTION INTRAVENOUS; SUBCUTANEOUS at 06:00

## 2017-09-02 RX ADMIN — SODIUM CHLORIDE, POTASSIUM CHLORIDE, SODIUM LACTATE AND CALCIUM CHLORIDE 1000 ML: 600; 310; 30; 20 INJECTION, SOLUTION INTRAVENOUS at 14:59

## 2017-09-02 RX ADMIN — ASPIRIN 81 MG: 81 TABLET, COATED ORAL at 07:21

## 2017-09-02 RX ADMIN — NICOTINE 1 PATCH: 14 PATCH, EXTENDED RELEASE TRANSDERMAL at 07:21

## 2017-09-02 RX ADMIN — HEPARIN SODIUM 5000 UNITS: 5000 INJECTION, SOLUTION INTRAVENOUS; SUBCUTANEOUS at 18:19

## 2017-09-02 RX ADMIN — CALCIUM ACETATE 667 MG: 667 CAPSULE ORAL at 18:19

## 2017-09-02 RX ADMIN — FINASTERIDE 5 MG: 5 TABLET, FILM COATED ORAL at 07:21

## 2017-09-02 RX ADMIN — CALCIUM ACETATE 667 MG: 667 CAPSULE ORAL at 11:58

## 2017-09-02 RX ADMIN — SODIUM BICARBONATE 650 MG TABLET 1300 MG: at 07:21

## 2017-09-02 NOTE — PROGRESS NOTES
Nephrology Progress Note  09/02/2017         Assessment & Recommendations:   Dung Norton is a 71 year old year old male    KRISTINA on CKD: stage 5 ckd GFR down to 7, Cr 6-7,due to obstructive uropathy started July this year and his KRISTINA fail to resolve he is in wood that was replaced, no metabolic or volume indication for acute HD no uremic symptoms.   8/30 improving non oliguric with IV volume expansion, Cr trending down,  UOP was 2500 over 24 hr this is approaching polyuria and raises concern of chronic tubular damage due to chronic obstruction.  Will need to consider that at this point fluids may be driving an osmotic diuresis.  If high UOP continues then would d/c IV fluids and monitor urine osm.    9/1 kidney function improving cr down to 5.2, no indication for renal replacement therapy.   9/2 improving KRISTINA on CKD, CR 5.3 , and UOP still high 2900, no indication for RRT now. Will cont to monitor.     Orthostasis: thought could be osmotic diuresis due to ivf will dc ivf , possible mineralocorticoid defecincey 9/1  Probably not adrenal insuffiencey  With morning Cortisol 13.3 , cont florenif 0.1 mcg daily. 9/2 increase florenif to 0.2 daily, high salt diet, if still symptomatic might bolus with fluid    Prostate hypertrophy: cont proscar     Bladder mass benign: biopsy shows no malignancy      Electrolyte: no issue    BMD - Ca 7.7 with albumin of 3 corrects to 8.5, Phos 6.5 which is improved.  PTH was 120 on 8/8/17 - I would recommend against vitamin D at this time given recent significant hyperphosphatemia.  His vitamin D defic screen from 8/8/17 was 29 which is at goal.     Acid base metabolic acidosis with anion gap mostly related renal failure, bicarb worse since admission with getting high chloride solution.  consider bicard replacement with this isotonic volume expansion, calculate bicarb deficit.  Bicarb deficit 330MEq,  Can give oral bicarb 1300mg bid    9/1 bicarb is 23, decrease bicarb dose to 650 mg  "daily.     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  Given persistent Hgb <9 I would consider EARLENE.   Would like him follow up with anemia clinic after discharge.     UTI : culture shows serratia and he is getting ceftriaxone.     Nutrition:  poor recommends dietary consult      Recommendations were communicated to primary team via in person     Patient seen and discussed  with Dr. Daley.  Naresh Amaral  Nephrology Fellow  Pager: [phone]             Interval History :   In the last 24 hours Dung sylvester am felt nauseated and then vomits, he felt better after that but he felt dizzy and light headed during orthostatic exam, his BP also drop down to 59 systolic standing from 97 sitting and 118 laying. No other issue his UOP 2800, he received 2 doses of florenif,   Review of Systems:   I reviewed the following systems:  GI: good appetite. no nausea or vomiting or diarrhea.   Neuro:  no confusion  Constitutional:  no fever or chills  CV: no dyspnea or edema.  no chest pain.    Physical Exam:   I/O last 3 completed shifts:  In: 480 [P.O.:480]  Out: 2925 [Urine:2925]   /75 (BP Location: Right arm)  Pulse 79  Temp 98.5  F (36.9  C) (Oral)  Resp 16  Ht 1.778 m (5' 10\")  Wt 61.1 kg (134 lb 11.2 oz)  SpO2 100%  BMI 19.33 kg/m2     GENERAL APPEARANCE: NAD  EYES:  no scleral icterus, pupils equal  HENT: mouth without ulcers or lesions  PULM: lungs clear to auscultation,  bilaterally, equal air movement, no clubbing  CV: regular rhythm, normal rate, no rub     -JVP not elevated      -edema no   GI: soft, not tender, not distended, bowel sounds are positive  INTEGUMENT: no cyanosis, no rash  NEURO:  no asterixis   Access no    Labs:   All labs reviewed by me  Electrolytes/Renal -   Recent Labs   Lab Test  09/02/17   0641  09/01/17   0633  08/31/17   0609  08/30/17   0721   NA  141  142  140  141   POTASSIUM  3.5  3.6  3.6  4.3 "   CHLORIDE  106  108  111*  116*   CO2  24  23  18*  12*   BUN  107*  112*  125*  138*   CR  5.31*  5.26*  5.88*  6.57*   GLC  95  104*  105*  87   DERICK  7.9*  7.5*  7.4*  7.7*   MAG   --   1.7  1.8  2.0   PHOS   --   4.6*  5.4*  6.5*       CBC -   Recent Labs   Lab Test  08/29/17   0720  08/28/17 1909  08/16/17   1426   WBC  10.9  11.4*  10.6   HGB  8.8*  8.6*  9.1*   PLT  246  256  245       LFTs -   Recent Labs   Lab Test  08/28/17   1909  08/28/17   1001  08/21/17   0952   07/19/17   0537  07/12/17   1740   ALKPHOS  95   --    --    --   51  67   BILITOTAL  0.3   --    --    --   0.2  0.6   ALT  17   --    --    --   17  19   AST  9   --    --    --   32  12   PROTTOTAL  7.2   --    --    --   4.4*  6.8   ALBUMIN  3.0*  3.2*  3.7   < >  1.9*  3.1*    < > = values in this interval not displayed.       Iron Panel -   Recent Labs   Lab Test  08/08/17   1051  07/21/17   0652   IRON  54  24*   IRONSAT  32  19   NO  679*  1209*         Imaging:  All imaging studies reviewed by me.     Current Medications:    sodium bicarbonate  650 mg Oral BID     fludrocortisone  100 mcg Oral BID     heparin  5,000 Units Subcutaneous Q12H     levofloxacin  500 mg Oral Every Other Day     calcium acetate  667 mg Oral TID w/meals     finasteride  5 mg Oral Daily     aspirin EC  81 mg Oral Daily     sodium chloride (PF)  3 mL Intracatheter Q8H     nicotine   Transdermal Q8H     nicotine   Transdermal Daily     nicotine  1 patch Transdermal Daily        Naresh Amaral MD

## 2017-09-02 NOTE — PLAN OF CARE
Problem: Individualization  Goal: Patient Preferences     Afebrile, orthostatic upon assessment this AM, other VSS, and denies pain.  Patient receive 2 1-liter boluses for orthostasis.  Orthostasis improved after intervention.  Patient up independently in room, wood patent with good output, passing flatus, reported BM this afternoon, and tolerating diet with good appetite (high salt diet encouraged) .  Scheduled meds given as ordered.  Continue to monitor, treat as ordered, notify team with changes.  Patient to received 500ml bolus this evening followed by reassessment of orthostasis.

## 2017-09-02 NOTE — PLAN OF CARE
Problem: Goal Outcome Summary  Goal: Goal Outcome Summary  Outcome: No Change  [id number]: Afebrile. VSS on RA. Orthostatic BPs checked around 2000. Pt denies pain. Pt on renal diet, no complaints of nausea. PIV L Forearm infiltrated at the beginning of the shift, new IV placed. PIV SL. Man catheter with adequate amounts of urine output, 1200 mL emptied this shift. No BM this shift, per pt report last BM was yesterday morning, pt is passing flatus. Pt up independently, pt has been sleeping throughout the night. Call light in reach. Will continue to monitor and follow plan of care.

## 2017-09-02 NOTE — PROGRESS NOTES
[facility], [place]    Internal Medicine Progress Note - Gold Service      Assessment & Plan   Dung Norton is a 71 year old man with a history of HTN and tobacco abuse who was admitted to [facility] 7/12-7/25/17 w/ severe KRISTINA 2/2 obstructive uropathy attributed to BPH. Now readmitted w/ worsening KRISTINA - appears hypovolemic.      #KRISTINA w/ AGMA. Cr 7.8 and , up from more recent baseline of Cr low-mid 6s and BUN 80s-90s. K stable at 4.7. CO2 low at 17 (12 earlier today) from low 20s previously and reports difficulty tolerating PO sodium bicarb at currently prescribed TID dosing. Has new AGMA w/ AG 15. UA w/ large LE, moderate blood, protein, 2598 WBC w/ WBC clumps (? AIN but no recent exposures, no eosinophilia noted). Is orthostatic w/ BP 96/67-->63/30 and HR 75-->153 lying to standing - suspect that he is hypovolemic w/ recent OP initiation of Lasix 20 mg on 8/4, increased to BID 8/8, held since 8/16 when weight was down to 65.1 kg (from 68.8 kg on 8/4 and 77 kg on 7/24) although this doesn't necessarily seem like the lone contributor for this presentation. Suspect poor renal perfusion w/ hypotension and has continued on home Norvasc despite this (also Proscar, Flomax). No evidence of GIB. No NSAIDs. Emptying clear yellow UOP frequently but not measuring at home. No acute indication for dialysis although would be open to this as OP nephrology felt that return to normal baseline was unlikely at this time given many prior months w/ obstructive insult.     - pt with still orthostatic hypotension ; worsened today and became symptomatic  -increased fludrocortisone to 200 mcg BID; did have good response with fluid bolus (trial of 1000ml bolus)  -work up negative with Hba1c, vitamin D (pending) and cortisol level in am was 13.  -reasoning could be osmotic diuresis 2/2 uremia leading to dehydration-  Will continue with fluid bolus aiming for net even;  -salt liberal diet, compression  stocking      -also discussed in details with patient about potential neurological symptoms- he denies any other neurological symptoms and seem like this orthostatic issue since past 2 week    #Concern for UTI: serratia grew  -unclear picture with cath and lack of symptoms   -but significant WBC with large LE  -transition to levaquin today given uncomplicated course and sensitivity -will continue total course of 5 days       #BPH. Had cystoscopy on 7/16 w/ no masses and biopsies taken w/o evidence of malignancy. PSA was wnl at 1.72. Was started on Flomax and Proscar w/ plan for OP urology f/u.   -urology was consulted for eval of bladder mass which would be as an outpatient     #Tobacco Abuse. Has not smoked since last d/c but remains on 21 mg nicotine patch which he thinks is too strong and perhaps making him feel shaky. Would like to continue patch but will step down to 14 mg and should have clear plan to taper off.     #Pulmonary Nodules. Noted last hospitalization w/ few small pulmonary nodules measuring up to 3 mm. A 12 month f/u chest CT was recommended per Fleischner society criteria.      #Chronic Normocytic Anemia. Hgb 8.6 from 7.5 at most recent d/c. Was thought d/t CKD but also w/ oozing from prostate during cystoscopy.      #Subclinical Hypothyroidism. TSH 10.51 and FT4 0.71 checked during last hospitalization. Not on meds and was advised to have recheck once over acute illness.      Diet: renal   Fluids: intermittent bolus (2.5 l today)  DVT Prophylaxis: mechanical; consider pharmacologic in coming days if not ambulating and no plan for intervention (dialysis line)   Code Status: DNR/DNI - confirmed w/ patient, would benefit from completing formal healthcare directive      Disposition Plan   Expected discharge: Tomorrow; recommended to TCU   Pending eval for resolution of orthostatic hypotension; will need reassessment by physical therapy     Entered: Shana Villegas 09/02/2017, 4:08 PM   Information in  the above section will display in the discharge planner report.      The patient's care was discussed with the Bedside Nurse     Shana Villegas  Internal Medicine Staff Hospitalist Service  [facility]  Pager: [phone]  Please see sticky note for cross cover information    Interval History   Feels better in am ; continues to feel better  Still orthostatic in the morning; ; BP  Dropped to 50s on standing ; HR to 126 and pt symptomatic with dizziness       Last 24 hr care team notes reviewed.   ROS:  4 point ROS including Respiratory, CV, GI and , other than that noted in the HPI, is negative          Physical Exam   Vital Signs: Temp: 98.5  F (36.9  C) Temp src: Oral BP: 122/77 Pulse: 82 Heart Rate: 82 Resp: 16 SpO2: 100 % O2 Device: None (Room air)    Weight: 134 lbs 11.2 oz  GENERAL: Alert and oriented x 3. Sitting up in bed, appears comfortable. Pleasant and conversant.   HEENT: Anicteric sclera. PERRL. Mucous membranes moist.   CV: RRR. S1, S2. Systolic ejection murmur present.   RESPIRATORY: Effort normal on room air. Lungs CTAB with no wheezing, rales, rhonchi.   GI: Abdomen soft and non distended, bowel sounds present. No tenderness, rebound, guarding.   : Man w/ clear yellow UOP.    NEUROLOGICAL: No focal deficits. Moves all extremities.   EXTREMITIES: No peripheral edema. Intact bilateral pedal pulses.   SKIN: No jaundice. No rashes.       Data   Data     Recent Labs  Lab 09/02/17  0641 09/01/17  0633 08/31/17  0609  08/29/17  0720 08/28/17  1909 08/28/17  1001   WBC  --   --   --   --  10.9 11.4*  --    HGB  --   --   --   --  8.8* 8.6*  --    MCV  --   --   --   --  88 86  --    PLT  --   --   --   --  246 256  --    INR  --   --   --   --   --  1.19*  --     142 140  < > 137 135 137   POTASSIUM 3.5 3.6 3.6  < > 4.5 4.7 4.3   CHLORIDE 106 108 111*  < > 109 104 108   CO2 24 23 18*  < > 13* 17* 12*   * 112* 125*  < > 158* 162* 170*   CR 5.31* 5.26* 5.88*  < > 7.41* 7.80*  7.49*   ANIONGAP 11 12 12  < > 16* 15* 17*   DERICK 7.9* 7.5* 7.4*  < > 8.4* 8.9 9.1   GLC 95 104* 105*  < > 98 157* 172*   ALBUMIN  --   --   --   --   --  3.0* 3.2*   PROTTOTAL  --   --   --   --   --  7.2  --    BILITOTAL  --   --   --   --   --  0.3  --    ALKPHOS  --   --   --   --   --  95  --    ALT  --   --   --   --   --  17  --    AST  --   --   --   --   --  9  --    < > = values in this interval not displayed.  Reviewed renal USG and UA

## 2017-09-03 ENCOUNTER — APPOINTMENT (OUTPATIENT)
Dept: PHYSICAL THERAPY | Facility: CLINIC | Age: 72
DRG: 725 | End: 2017-09-03
Payer: MEDICARE

## 2017-09-03 LAB
ANION GAP SERPL CALCULATED.3IONS-SCNC: 8 MMOL/L (ref 3–14)
BUN SERPL-MCNC: 91 MG/DL (ref 7–30)
CALCIUM SERPL-MCNC: 7.4 MG/DL (ref 8.5–10.1)
CHLORIDE SERPL-SCNC: 105 MMOL/L (ref 94–109)
CO2 SERPL-SCNC: 27 MMOL/L (ref 20–32)
CREAT SERPL-MCNC: 4.9 MG/DL (ref 0.66–1.25)
GFR SERPL CREATININE-BSD FRML MDRD: 12 ML/MIN/1.7M2
GLUCOSE SERPL-MCNC: 88 MG/DL (ref 70–99)
POTASSIUM SERPL-SCNC: 3.6 MMOL/L (ref 3.4–5.3)
SODIUM SERPL-SCNC: 141 MMOL/L (ref 133–144)

## 2017-09-03 PROCEDURE — 25000132 ZZH RX MED GY IP 250 OP 250 PS 637: Mod: GY | Performed by: PHYSICIAN ASSISTANT

## 2017-09-03 PROCEDURE — 36415 COLL VENOUS BLD VENIPUNCTURE: CPT | Performed by: INTERNAL MEDICINE

## 2017-09-03 PROCEDURE — 80048 BASIC METABOLIC PNL TOTAL CA: CPT | Performed by: INTERNAL MEDICINE

## 2017-09-03 PROCEDURE — 40000193 ZZH STATISTIC PT WARD VISIT: Performed by: PHYSICAL THERAPIST

## 2017-09-03 PROCEDURE — 25000132 ZZH RX MED GY IP 250 OP 250 PS 637: Mod: GY | Performed by: INTERNAL MEDICINE

## 2017-09-03 PROCEDURE — A9270 NON-COVERED ITEM OR SERVICE: HCPCS | Mod: GY | Performed by: INTERNAL MEDICINE

## 2017-09-03 PROCEDURE — 12000026 ZZH R&B TRANSPLANT

## 2017-09-03 PROCEDURE — 40000141 ZZH STATISTIC PERIPHERAL IV START W/O US GUIDANCE

## 2017-09-03 PROCEDURE — 25000128 H RX IP 250 OP 636: Performed by: INTERNAL MEDICINE

## 2017-09-03 PROCEDURE — 97530 THERAPEUTIC ACTIVITIES: CPT | Mod: GP | Performed by: PHYSICAL THERAPIST

## 2017-09-03 PROCEDURE — A9270 NON-COVERED ITEM OR SERVICE: HCPCS | Mod: GY | Performed by: PHYSICIAN ASSISTANT

## 2017-09-03 PROCEDURE — 99232 SBSQ HOSP IP/OBS MODERATE 35: CPT | Performed by: INTERNAL MEDICINE

## 2017-09-03 PROCEDURE — 97116 GAIT TRAINING THERAPY: CPT | Mod: GP | Performed by: PHYSICAL THERAPIST

## 2017-09-03 RX ADMIN — ASPIRIN 81 MG: 81 TABLET, COATED ORAL at 07:39

## 2017-09-03 RX ADMIN — NICOTINE 1 PATCH: 14 PATCH, EXTENDED RELEASE TRANSDERMAL at 07:39

## 2017-09-03 RX ADMIN — HEPARIN SODIUM 5000 UNITS: 5000 INJECTION, SOLUTION INTRAVENOUS; SUBCUTANEOUS at 07:00

## 2017-09-03 RX ADMIN — CALCIUM ACETATE 667 MG: 667 CAPSULE ORAL at 07:39

## 2017-09-03 RX ADMIN — HEPARIN SODIUM 5000 UNITS: 5000 INJECTION, SOLUTION INTRAVENOUS; SUBCUTANEOUS at 18:15

## 2017-09-03 RX ADMIN — CALCIUM ACETATE 667 MG: 667 CAPSULE ORAL at 18:10

## 2017-09-03 RX ADMIN — SODIUM CHLORIDE, POTASSIUM CHLORIDE, SODIUM LACTATE AND CALCIUM CHLORIDE 1000 ML: 600; 310; 30; 20 INJECTION, SOLUTION INTRAVENOUS at 11:00

## 2017-09-03 RX ADMIN — FINASTERIDE 5 MG: 5 TABLET, FILM COATED ORAL at 07:39

## 2017-09-03 RX ADMIN — SODIUM CHLORIDE, POTASSIUM CHLORIDE, SODIUM LACTATE AND CALCIUM CHLORIDE 1000 ML: 600; 310; 30; 20 INJECTION, SOLUTION INTRAVENOUS at 13:24

## 2017-09-03 RX ADMIN — CALCIUM ACETATE 667 MG: 667 CAPSULE ORAL at 11:03

## 2017-09-03 RX ADMIN — SODIUM CHLORIDE, POTASSIUM CHLORIDE, SODIUM LACTATE AND CALCIUM CHLORIDE 1000 ML: 600; 310; 30; 20 INJECTION, SOLUTION INTRAVENOUS at 18:08

## 2017-09-03 RX ADMIN — FLUDROCORTISONE ACETATE 200 MCG: 0.1 TABLET ORAL at 07:38

## 2017-09-03 RX ADMIN — LEVOFLOXACIN 250 MG: 250 TABLET, FILM COATED ORAL at 07:39

## 2017-09-03 RX ADMIN — SODIUM BICARBONATE 650 MG TABLET 650 MG: at 20:29

## 2017-09-03 RX ADMIN — SODIUM BICARBONATE 650 MG TABLET 650 MG: at 07:39

## 2017-09-03 RX ADMIN — SODIUM CHLORIDE, POTASSIUM CHLORIDE, SODIUM LACTATE AND CALCIUM CHLORIDE 1000 ML: 600; 310; 30; 20 INJECTION, SOLUTION INTRAVENOUS at 21:17

## 2017-09-03 NOTE — PROGRESS NOTES
Nephrology Progress Note  09/03/2017         Assessment & Recommendations:   Dung Norton is a 71 year old year old male    KRISTINA on CKD: stage 5 ckd GFR down to 7, Cr 6-7,due to obstructive uropathy started July this year and his KRISTINA fail to resolve he is in wood that was replaced, no metabolic or volume indication for acute HD no uremic symptoms.   8/30 improving non oliguric with IV volume expansion, Cr trending down,  UOP was 2500 over 24 hr this is approaching polyuria and raises concern of chronic tubular damage due to chronic obstruction.  Will need to consider that at this point fluids may be driving an osmotic diuresis.  If high UOP continues then would d/c IV fluids and monitor urine osm.    9/1 kidney function improving cr down to 5.2, no indication for renal replacement therapy.   9/2 improving KRISTINA on CKD, CR 5.3 , and UOP still high 2900, no indication for RRT now. Will cont to monitor.   9/3 recommendations for discharge is to follow up with nepherology office at [facility] in 1 wk    Orthostasis: thought could be osmotic diuresis due to ivf will dc ivf , possible mineralocorticoid defecincey 9/1  Probably not adrenal insuffiencey  With morning Cortisol 13.3 , cont florenif 0.1 mcg daily. 9/2 increase florenif to 0.2 daily, high salt diet, if still symptomatic might bolus with fluid  9/3 if no more orthostasis will need to be sent home in florenif po 0.2 daily    Prostate hypertrophy: cont proscar     Bladder mass benign: biopsy shows no malignancy      Electrolyte: no issue    BMD - Ca 7.7 with albumin of 3 corrects to 8.5, Phos 6.5 which is improved.  PTH was 120 on 8/8/17 - I would recommend against vitamin D at this time given recent significant hyperphosphatemia.  His vitamin D defic screen from 8/8/17 was 29 which is at goal.     Acid base metabolic acidosis with anion gap mostly related renal failure, bicarb worse since admission with getting high chloride solution.  consider bicard replacement  "with this isotonic volume expansion, calculate bicarb deficit.  Bicarb deficit 330MEq,  Can give oral bicarb 1300mg bid    9/1 bicarb is 23, decrease bicarb dose to 650 mg daily.     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  Given persistent Hgb <9 I would consider EARLENE.   Would like him follow up with anemia clinic after discharge.     UTI : culture shows serratia and he is getting ceftriaxone.     Nutrition:  poor recommends dietary consult      Recommendations were communicated to primary team via in person     Patient seen and discussed  with Dr. Daley.  Naresh Amaral  Nephrology Fellow  Pager: [phone]             Interval History :   In the last 24 hours Dung Norton he received 1500 cc of LR, 1liter NS and his orthostatic symptoms resolved, he also received 0.2 florenif, this am his BP drop from 125 laying to 104 sitting to 96 standing, but with out symptoms. No more lightheaded or dizziness, no NV no other complaint.      Review of Systems:   I reviewed the following systems:  GI: good appetite. no nausea or vomiting or diarrhea.   Neuro:  no confusion  Constitutional:  no fever or chills  CV: no dyspnea or edema.  no chest pain.    Physical Exam:   I/O last 3 completed shifts:  In: 3500 [IV Piggyback:3500]  Out: 2400 [Urine:2400]   /74 (BP Location: Left arm)  Pulse 87  Temp 98.3  F (36.8  C) (Oral)  Resp 16  Ht 1.778 m (5' 10\")  Wt 63 kg (138 lb 12.8 oz)  SpO2 99%  BMI 19.92 kg/m2     GENERAL APPEARANCE: NAD  EYES:  no scleral icterus, pupils equal  HENT: mouth without ulcers or lesions  PULM: lungs clear to auscultation,  bilaterally, equal air movement, no clubbing  CV: regular rhythm, normal rate, no rub     -JVP not elevated      -edema no   GI: soft, not tender, not distended, bowel sounds are positive  INTEGUMENT: no cyanosis, no rash  NEURO:  no asterixis   Access no    Labs:   All labs reviewed by " me  Electrolytes/Renal -   Recent Labs   Lab Test  09/03/17   0710  09/02/17   0641  09/01/17   0633  08/31/17   0609  08/30/17   0721   NA  141  141  142  140  141   POTASSIUM  3.6  3.5  3.6  3.6  4.3   CHLORIDE  105  106  108  111*  116*   CO2  27  24  23  18*  12*   BUN  91*  107*  112*  125*  138*   CR  4.90*  5.31*  5.26*  5.88*  6.57*   GLC  88  95  104*  105*  87   DERICK  7.4*  7.9*  7.5*  7.4*  7.7*   MAG   --    --   1.7  1.8  2.0   PHOS   --    --   4.6*  5.4*  6.5*       CBC -   Recent Labs   Lab Test  08/29/17   0720  08/28/17   1909  08/16/17   1426   WBC  10.9  11.4*  10.6   HGB  8.8*  8.6*  9.1*   PLT  246  256  245       LFTs -   Recent Labs   Lab Test  08/28/17   1909 08/28/17   1001  08/21/17   0952   07/19/17   0537  07/12/17   1740   ALKPHOS  95   --    --    --   51  67   BILITOTAL  0.3   --    --    --   0.2  0.6   ALT  17   --    --    --   17  19   AST  9   --    --    --   32  12   PROTTOTAL  7.2   --    --    --   4.4*  6.8   ALBUMIN  3.0*  3.2*  3.7   < >  1.9*  3.1*    < > = values in this interval not displayed.       Iron Panel -   Recent Labs   Lab Test  08/08/17   1051  07/21/17   0652   IRON  54  24*   IRONSAT  32  19   NO  679*  1209*         Imaging:  All imaging studies reviewed by me.     Current Medications:    sodium bicarbonate  650 mg Oral BID     fludrocortisone  200 mcg Oral Daily     levofloxacin  250 mg Oral Every Other Day     heparin  5,000 Units Subcutaneous Q12H     calcium acetate  667 mg Oral TID w/meals     finasteride  5 mg Oral Daily     aspirin EC  81 mg Oral Daily     sodium chloride (PF)  3 mL Intracatheter Q8H     nicotine   Transdermal Q8H     nicotine   Transdermal Daily     nicotine  1 patch Transdermal Daily        Naresh Amaral MD

## 2017-09-03 NOTE — PLAN OF CARE
Problem: Individualization  Goal: Patient Preferences     Afebrile, slightly orthostatic upon assessment this AM, other VSS, and denies pain.  Patient receive liter bolus for orthostasis.  Orthostasis improved after intervention.  Patient up independently in room, wood patent with good output, passing flatus, reported BM this AM, and tolerating diet with good appetite (high salt diet encouraged) .  Scheduled meds given as ordered.  Continue to monitor, treat as ordered, notify team with changes.

## 2017-09-03 NOTE — PLAN OF CARE
Problem: Goal Outcome Summary  Goal: Goal Outcome Summary  PT 7A: Pt is IND with bed mobility and STS. Pt amb ~1200 feet with SBA and no AD. Pt with slight instability d/t R hip weakness, however no overt LOB or need for physical assist with dynamic balance tasks such as: head turns, pivoting, or obstacle negotiation. Pt with slowed mirella and minimal change in gait speed when cued, however, able to follow commands and complete all mobility safely with mod I-supervision. Pt mod I with FWW (trialed x 200 ft), requires min VCs to prevent kyphotic posture. Per pt balance is much progressed this day with improved hemodynamic stability. Currently recommend disch home with FWW (pt owns) and HHPT to continue progressing IND with dynamic balance and community amb.    Strongly encourage pt amb 3x/day with Ax1 for supervision, no AD required, for continued progression of IND mobility.

## 2017-09-03 NOTE — PLAN OF CARE
Problem: Individualization  Goal: Patient Preferences  Outcome: No Change  AVSS.  Pt. denies pain or nausea.  PIV saline locked.  Man with good output, no stools reported overnight.  Pt. slept well between cares.  Continue to monitor & treat per POC & notify team with changes.

## 2017-09-03 NOTE — PROGRESS NOTES
[facility], [place]    Internal Medicine Progress Note - Gold Service      Assessment & Plan   Dung Norton is a 71 year old man with a history of HTN and tobacco abuse who was admitted to [facility] 7/12-7/25/17 w/ severe KRISTINA 2/2 obstructive uropathy attributed to BPH. Now readmitted w/ worsening KRISTINA - appears hypovolemic.      #KRISTINA w/ AGMA. Cr 7.8 and , up from more recent baseline of Cr low-mid 6s and BUN 80s-90s. K stable at 4.7. CO2 low at 17 (12 earlier today) from low 20s previously and reports difficulty tolerating PO sodium bicarb at currently prescribed TID dosing. Has new AGMA w/ AG 15. UA w/ large LE, moderate blood, protein, 2598 WBC w/ WBC clumps (? AIN but no recent exposures, no eosinophilia noted). Is orthostatic w/ BP 96/67-->63/30 and HR 75-->153 lying to standing - suspect that he is hypovolemic w/ recent OP initiation of Lasix 20 mg on 8/4, increased to BID 8/8, held since 8/16 when weight was down to 65.1 kg (from 68.8 kg on 8/4 and 77 kg on 7/24) although this doesn't necessarily seem like the lone contributor for this presentation. Suspect poor renal perfusion w/ hypotension and has continued on home Norvasc despite this (also Proscar, Flomax). No evidence of GIB. No NSAIDs. Emptying clear yellow UOP frequently but not measuring at home. No acute indication for dialysis although would be open to this as OP nephrology felt that return to normal baseline was unlikely at this time given many prior months w/ obstructive insult.     Overall- resolving and creatinine to 4.9 than before; related to resolution vs osmotic diuresis 2/2 uremia     - pt with still orthostatic hypotension but status improving 2.5 l of fluids  -surprisingly he is still clinically hypovolemic and but still orthostatic though asymptomatic = will continue intermittent bolus  -increased fludrocortisone to 200 mcg daily    -other possibilities of this orthostatic hypotension could be autonomic  failure secondary to age or uremia; his hba1c last was 5.6   -nevertheless will send hba1c and vitamin D (which has been associated with orthostatic hypotension), AM cortisol was normal  -nephrology consulted -appreciate recs- no indication of HD  -continue to hold amlodipine and furosemide and flomax for now and on d/c   -continue to use sodium bicarbonate 1300 BID  -continue phoslo    #Concern for UTI: serratia grew  -unclear picture with cath and lack of symptoms   -but significant WBC with large LE  -transition to levaquin today given uncomplicated course and sensitivity ; plan to complete total 7 day course which will be tomorrow         #BPH. Had cystoscopy on 7/16 w/ no masses and biopsies taken w/o evidence of malignancy. PSA was wnl at 1.72. Was started on Flomax and Proscar w/ plan for OP urology f/u.   -urology was consulted for eval of bladder mass which would be as an outpatient     #Tobacco Abuse. Has not smoked since last d/c but remains on 21 mg nicotine patch which he thinks is too strong and perhaps making him feel shaky. Would like to continue patch but will step down to 14 mg and should have clear plan to taper off.     #Pulmonary Nodules. Noted last hospitalization w/ few small pulmonary nodules measuring up to 3 mm. A 12 month f/u chest CT was recommended per Fleischner society criteria.      #Chronic Normocytic Anemia. Hgb 8.6 from 7.5 at most recent d/c. Was thought d/t CKD but also w/ oozing from prostate during cystoscopy.      #Subclinical Hypothyroidism. TSH 10.51 and FT4 0.71 checked during last hospitalization. Not on meds and was advised to have recheck once over acute illness.      Diet: renal   Fluids: intermittent boluses   DVT Prophylaxis: mechanical; consider pharmacologic in coming days if not ambulating and no plan for intervention (dialysis line)   Code Status: DNR/DNI - confirmed w/ patient, would benefit from completing formal healthcare directive      Disposition Plan    Expected discharge: Tomorrow; recommended to prior living arrangement once   Pending eval for esolution of orthostatic hypotension.     Entered: Shana Villegas 09/03/2017, 5:43 PM   Information in the above section will display in the discharge planner report.      The patient's care was discussed with the Bedside Nurse and daughter    Shana Villegas  Internal Medicine Staff Hospitalist Service  [facility]  Pager: [phone]  Please see sticky note for cross cover information    Interval History   Feels better in am ; continues to feel better  Orthostatic vitals better in am; to 96/65  Denies dizziness on standing   Last 24 hr care team notes reviewed.   ROS:  4 point ROS including Respiratory, CV, GI and , other than that noted in the HPI, is negative          Physical Exam   Vital Signs: Temp: 98.3  F (36.8  C) Temp src: Oral BP: 123/74 Pulse: 87 Heart Rate: 87 Resp: 16 SpO2: 99 % O2 Device: None (Room air)    Weight: 138 lbs 12.8 oz  GENERAL: Alert and oriented x 3. Sitting up in bed, appears comfortable. Pleasant and conversant.   HEENT: Anicteric sclera. PERRL. Mucous membranes moist.   CV: RRR. S1, S2. Systolic ejection murmur present.   RESPIRATORY: Effort normal on room air. Lungs CTAB with no wheezing, rales, rhonchi.   GI: Abdomen soft and non distended, bowel sounds present. No tenderness, rebound, guarding.   : Man w/ clear yellow UOP.    NEUROLOGICAL: No focal deficits. Moves all extremities.   EXTREMITIES: No peripheral edema. Intact bilateral pedal pulses.   SKIN: No jaundice. No rashes.       Data   Data     Recent Labs  Lab 09/03/17  0710 09/02/17  0641 09/01/17  0633  08/29/17  0720 08/28/17  1909 08/28/17  1001   WBC  --   --   --   --  10.9 11.4*  --    HGB  --   --   --   --  8.8* 8.6*  --    MCV  --   --   --   --  88 86  --    PLT  --   --   --   --  246 256  --    INR  --   --   --   --   --  1.19*  --     141 142  < > 137 135 137   POTASSIUM 3.6 3.5 3.6  < > 4.5  4.7 4.3   CHLORIDE 105 106 108  < > 109 104 108   CO2 27 24 23  < > 13* 17* 12*   BUN 91* 107* 112*  < > 158* 162* 170*   CR 4.90* 5.31* 5.26*  < > 7.41* 7.80* 7.49*   ANIONGAP 8 11 12  < > 16* 15* 17*   DEIRCK 7.4* 7.9* 7.5*  < > 8.4* 8.9 9.1   GLC 88 95 104*  < > 98 157* 172*   ALBUMIN  --   --   --   --   --  3.0* 3.2*   PROTTOTAL  --   --   --   --   --  7.2  --    BILITOTAL  --   --   --   --   --  0.3  --    ALKPHOS  --   --   --   --   --  95  --    ALT  --   --   --   --   --  17  --    AST  --   --   --   --   --  9  --    < > = values in this interval not displayed.  Reviewed renal USG and UA

## 2017-09-04 ENCOUNTER — APPOINTMENT (OUTPATIENT)
Dept: PHYSICAL THERAPY | Facility: CLINIC | Age: 72
DRG: 725 | End: 2017-09-04
Payer: MEDICARE

## 2017-09-04 LAB
ANION GAP SERPL CALCULATED.3IONS-SCNC: 7 MMOL/L (ref 3–14)
BUN SERPL-MCNC: 79 MG/DL (ref 7–30)
CALCIUM SERPL-MCNC: 7.4 MG/DL (ref 8.5–10.1)
CHLORIDE SERPL-SCNC: 106 MMOL/L (ref 94–109)
CO2 SERPL-SCNC: 29 MMOL/L (ref 20–32)
CREAT SERPL-MCNC: 4.6 MG/DL (ref 0.66–1.25)
GFR SERPL CREATININE-BSD FRML MDRD: 13 ML/MIN/1.7M2
GLUCOSE SERPL-MCNC: 86 MG/DL (ref 70–99)
MAGNESIUM SERPL-MCNC: 1.6 MG/DL (ref 1.6–2.3)
PHOSPHATE SERPL-MCNC: 3.2 MG/DL (ref 2.5–4.5)
POTASSIUM SERPL-SCNC: 3.7 MMOL/L (ref 3.4–5.3)
SODIUM SERPL-SCNC: 142 MMOL/L (ref 133–144)

## 2017-09-04 PROCEDURE — 97750 PHYSICAL PERFORMANCE TEST: CPT | Mod: GP | Performed by: PHYSICAL THERAPIST

## 2017-09-04 PROCEDURE — 84100 ASSAY OF PHOSPHORUS: CPT | Performed by: INTERNAL MEDICINE

## 2017-09-04 PROCEDURE — 25000128 H RX IP 250 OP 636: Performed by: INTERNAL MEDICINE

## 2017-09-04 PROCEDURE — 97110 THERAPEUTIC EXERCISES: CPT | Mod: GP | Performed by: PHYSICAL THERAPIST

## 2017-09-04 PROCEDURE — 83735 ASSAY OF MAGNESIUM: CPT | Performed by: INTERNAL MEDICINE

## 2017-09-04 PROCEDURE — 99232 SBSQ HOSP IP/OBS MODERATE 35: CPT | Performed by: INTERNAL MEDICINE

## 2017-09-04 PROCEDURE — A9270 NON-COVERED ITEM OR SERVICE: HCPCS | Mod: GY | Performed by: PHYSICIAN ASSISTANT

## 2017-09-04 PROCEDURE — A9270 NON-COVERED ITEM OR SERVICE: HCPCS | Mod: GY | Performed by: INTERNAL MEDICINE

## 2017-09-04 PROCEDURE — 36415 COLL VENOUS BLD VENIPUNCTURE: CPT | Performed by: INTERNAL MEDICINE

## 2017-09-04 PROCEDURE — 40000193 ZZH STATISTIC PT WARD VISIT: Performed by: PHYSICAL THERAPIST

## 2017-09-04 PROCEDURE — 25000132 ZZH RX MED GY IP 250 OP 250 PS 637: Mod: GY | Performed by: PHYSICIAN ASSISTANT

## 2017-09-04 PROCEDURE — 97116 GAIT TRAINING THERAPY: CPT | Mod: GP | Performed by: PHYSICAL THERAPIST

## 2017-09-04 PROCEDURE — 25000132 ZZH RX MED GY IP 250 OP 250 PS 637: Mod: GY | Performed by: INTERNAL MEDICINE

## 2017-09-04 PROCEDURE — 12000026 ZZH R&B TRANSPLANT

## 2017-09-04 PROCEDURE — 80048 BASIC METABOLIC PNL TOTAL CA: CPT | Performed by: INTERNAL MEDICINE

## 2017-09-04 RX ORDER — MAGNESIUM OXIDE 400 MG/1
400 TABLET ORAL DAILY
Status: DISCONTINUED | OUTPATIENT
Start: 2017-09-04 | End: 2017-09-05 | Stop reason: HOSPADM

## 2017-09-04 RX ADMIN — FLUDROCORTISONE ACETATE 200 MCG: 0.1 TABLET ORAL at 08:45

## 2017-09-04 RX ADMIN — CALCIUM ACETATE 667 MG: 667 CAPSULE ORAL at 08:45

## 2017-09-04 RX ADMIN — SODIUM BICARBONATE 650 MG TABLET 650 MG: at 08:45

## 2017-09-04 RX ADMIN — MAGNESIUM OXIDE TAB 400 MG (241.3 MG ELEMENTAL MG) 400 MG: 400 (241.3 MG) TAB at 13:53

## 2017-09-04 RX ADMIN — HEPARIN SODIUM 5000 UNITS: 5000 INJECTION, SOLUTION INTRAVENOUS; SUBCUTANEOUS at 05:29

## 2017-09-04 RX ADMIN — FINASTERIDE 5 MG: 5 TABLET, FILM COATED ORAL at 08:45

## 2017-09-04 RX ADMIN — ASPIRIN 81 MG: 81 TABLET, COATED ORAL at 08:45

## 2017-09-04 RX ADMIN — CALCIUM ACETATE 667 MG: 667 CAPSULE ORAL at 18:43

## 2017-09-04 RX ADMIN — HEPARIN SODIUM 5000 UNITS: 5000 INJECTION, SOLUTION INTRAVENOUS; SUBCUTANEOUS at 18:43

## 2017-09-04 RX ADMIN — NICOTINE 1 PATCH: 14 PATCH, EXTENDED RELEASE TRANSDERMAL at 08:47

## 2017-09-04 RX ADMIN — CALCIUM ACETATE 667 MG: 667 CAPSULE ORAL at 13:26

## 2017-09-04 NOTE — PLAN OF CARE
Problem: Goal Outcome Summary  Goal: Goal Outcome Summary  PT 7A: Pt is IND with bed mobility, STS and amb within room. Pt amb with no AD and SBA in halls x 10 minutes consecutively, with added balance challenges. Pt demonstrating improved gait speed and stability, 1 minor LOB noted which pt self-corrected with stepping strategy. Completed DGI assessment (see below), pt scoring at cusp of cut-off for increased falls risk. Educated pt on results of assessment, recommending use of FWW for any community/extended mobility and no stairs without close SBA from family. Recommend disch home with HHPT vs OP PT for further balance training.            Dynamic Gait Index (DGI):The DGI is a measure of balance during gait that is reliable and valid for the elderly and individuals with Parkinson's disease, MS, vestibular disorders, or s/p stroke. Gait assistive device used: none     Patient score: 19/24  Scores ?19/24 indicate an increased risk for falls according to Qiana et al 2000  Minimal Detectable Change = 2.9 in community dwelling elderly according to Aguila et al 2011     Assessment (rationale for performing, application to patient s function & care plan): Falls risk assessment for disch home, AD recommendations.  Minutes billed as physical performance test: 8

## 2017-09-04 NOTE — PLAN OF CARE
Problem: Goal Outcome Summary  Goal: Goal Outcome Summary  Outcome: Improving  VSS on RA. Denies pain or nausea. Tolerating a renal diet. PIV to RFA saline locked. Man has had 1125mL of cloudy, yellow urine this shift. No reports of a BM. Skin intact. Able to ambulate independently.

## 2017-09-04 NOTE — PLAN OF CARE
Problem: Urinary Elimination/Continence, Impaired (Adult)  Intervention: Promote Urinary Elimination/Continence     Afebrile, VSS with RA.  Positive orthostatic BP when checked this AM, order modified to have ortho BP checked q6hr now.  MD notified of Mg++ level 1.6 with AM labs, ordered for oral replacement.  Tolerating renal diet, eating well; Nephrology indicated pt could advance to regular, no new order as of this time.  Up in room with SBA, gait steady.  Using call light appropriately.  Adequate UOP via wood, BM x1 this shift.  Discharge plans  to return home today now on hold, will re-assess tomorrow per Gold 1 team.  Pt's daughter, Rabia, aware of this and aware of plan to return home rather than TCU.  Showered this afternoon.  Continue to encourage activity with assistance.  Update Gold team prn any change in pt status.

## 2017-09-04 NOTE — PLAN OF CARE
Problem: Goal Outcome Summary  Goal: Goal Outcome Summary     Orthostatic hypertension (see vital sign flow sheet), 2 liter LR boluses given per orders. No complaints of pain or nausea. Up and walking halls with PT. Good appetite on renal diet. Adequate urine output via Man, no BM this shift.

## 2017-09-04 NOTE — PROGRESS NOTES
[facility], [place]    Internal Medicine Progress Note - Gold Service      Assessment & Plan   Dung Norton is a 71 year old man with a history of HTN and tobacco abuse who was admitted to [facility] 7/12-7/25/17 w/ severe KRISTINA 2/2 obstructive uropathy attributed to BPH, previously d/c on wood cath and baseline crt to 6 on d.c . Now readmitted w/ worsening KRISTINA and orthostatic hypotension    Overall clinical issue of orthostatic hypotension which seem to be consistent with recovery diuresis post obstruction and dehydration as as a result of polyuria      #KRISTINA w/ AGMA.   Due to hypovolemia 2/2 polyuria related to post obstructive diuresis  Resolving; he is achieving his new baseline at this time ; previous discharge baseline of 6, was 7.49 on presentation and 4.6; unclear what would be his new baseline    AGMA related to ATN and now back to baseline; will d/c sodium bicarb     Continue with wood catheter and appt with renal on 9/6 after d/c   Will need to contact urology to visit him tm as he was supposed to have appointment with them tm        #Orthostatic hypotension:  Related to post obstructive diuresis vs osmotic diuresis 2/2 uremia leading to dehydration  - pt with still orthostatic hypotension but status improving 2.5 l of fluids  -received 4 l of IV fluids yesterday to achieve net even during this hospitalization  --increased fludrocortisone to 200 mcg daily  -will monitor him today with no IV fluids to see if resolution of orthostatic hypotension persists and also needs to have better control on polyuria     -other possibilities of this orthostatic hypotension could be autonomic failure secondary to age or uremia; his hba1c last was 5.6   -nevertheless will send hba1c and vitamin D (which has been associated with orthostatic hypotension), AM cortisol was normal  --continue to hold amlodipine and furosemide and flomax for now and on d/c   -continue phoslo      #Concern for UTI:  serratia grew  -unclear picture with cath and lack of symptoms   -but significant WBC with large LE  -transition to levaquin  given uncomplicated course and sensitivity ; completed 7 days course while in hospital       #BPH. Had cystoscopy on 7/16 w/ no masses and biopsies taken w/o evidence of malignancy. PSA was wnl at 1.72. Was started on Flomax and Proscar w/ plan for OP urology f/u.   -continue to be in wood cath  -consulted urology during this hospitalization and will need to talk them tm to see him while he is inpatient as he was supposed to have outpatient appointment         #Tobacco Abuse. Has not smoked since last d/c but remains on 21 mg nicotine patch which he thinks is too strong and perhaps making him feel shaky. Would like to continue patch but will step down to 14 mg.  -continue same dose      #Pulmonary Nodules. Noted last hospitalization w/ few small pulmonary nodules measuring up to 3 mm. A 12 month f/u chest CT was recommended per Fleischner society criteria.      #Chronic Normocytic Anemia. Hgb 8.6 from 7.5 at most recent d/c. Was thought d/t CKD but also w/ oozing from prostate during cystoscopy.     Stable during this hospitalization     #Subclinical Hypothyroidism. TSH 10.51 and FT4 0.71 checked during last hospitalization. Not on meds and was advised to have recheck once over acute illness.      Diet: changed to high salt diet   Fluids: oral   DVT Prophylaxis: chemical prophylaxis  Code Status: DNR/DNI - confirmed w/ patient, would benefit from completing formal healthcare directive      Disposition Plan   Expected discharge: Tomorrow; recommended to prior living arrangement ; needs home PT on d/c     Monitoring of orthostatic BP with no IV fluids      Entered: Shana Villegas 09/04/2017, 3:35 PM   Information in the above section will display in the discharge planner report.      The patient's care was discussed with the Bedside Nurse and daughter    Shana Villegas  Internal Medicine  Staff Hospitalist Service  [facility]  Pager: [phone]  Please see sticky note for cross cover information    Interval History   Feels better in am ; continues to feel better  Orthostatic vitals better in am; to 108/71 and HR to 109  Denies dizziness on standing   Last 24 hr care team notes reviewed.   ROS:  4 point ROS including Respiratory, CV, GI and , other than that noted in the HPI, is negative          Physical Exam   Vital Signs: Temp: 98  F (36.7  C) Temp src: Oral BP: 114/79 Pulse: 77 Heart Rate: 79 Resp: 18 SpO2: 100 % O2 Device: None (Room air)    Weight: 138 lbs 12.8 oz  GENERAL: Alert and oriented x 3. Sitting up in bed, appears comfortable. Pleasant and conversant.   HEENT: Anicteric sclera. PERRL. Mucous membranes moist.   CV: RRR. S1, S2. Systolic ejection murmur present.   RESPIRATORY: Effort normal on room air. Lungs CTAB with no wheezing, rales, rhonchi.   GI: Abdomen soft and non distended, bowel sounds present. No tenderness, rebound, guarding.   : Man w/ clear yellow UOP.    NEUROLOGICAL: No focal deficits. Moves all extremities.   EXTREMITIES: No peripheral edema. Intact bilateral pedal pulses.   SKIN: No jaundice. No rashes.       Data   Data     Recent Labs  Lab 09/04/17  0607 09/03/17  0710 09/02/17  0641  08/29/17  0720 08/28/17  1909   WBC  --   --   --   --  10.9 11.4*   HGB  --   --   --   --  8.8* 8.6*   MCV  --   --   --   --  88 86   PLT  --   --   --   --  246 256   INR  --   --   --   --   --  1.19*    141 141  < > 137 135   POTASSIUM 3.7 3.6 3.5  < > 4.5 4.7   CHLORIDE 106 105 106  < > 109 104   CO2 29 27 24  < > 13* 17*   BUN 79* 91* 107*  < > 158* 162*   CR 4.60* 4.90* 5.31*  < > 7.41* 7.80*   ANIONGAP 7 8 11  < > 16* 15*   DERICK 7.4* 7.4* 7.9*  < > 8.4* 8.9   GLC 86 88 95  < > 98 157*   ALBUMIN  --   --   --   --   --  3.0*   PROTTOTAL  --   --   --   --   --  7.2   BILITOTAL  --   --   --   --   --  0.3   ALKPHOS  --   --   --   --   --  95   ALT  --    --   --   --   --  17   AST  --   --   --   --   --  9   < > = values in this interval not displayed.  Reviewed renal USG and UA

## 2017-09-04 NOTE — PROGRESS NOTES
Nephrology Progress Note  09/04/2017         Assessment & Recommendations:   Dung Norton is a 71 year old year old male  With Kristina due to obstructive uropathy started July this year and his KRISTINA fail to resolve he is in wood that was replaced, no metabolic or volume indication for acute HD no uremic symptoms.     KRISTINA on CKD: he was seen with Cr of 14 in July due to obstructive uropathy now kidney function UOP 2700 /day, improving CR down to 4.3, would recommends fowllow up nephrology outpt.     Orthostasis: started on florenif po 0.2 daily, he was +ve yesterday and was given 4 liter LR  Would recommends to stop IVF today and reassess orthostatics if normal then can be discharge with florenif 0.2 daily. Recommends high salt diet , please discontinue renal diet.     Acid base 9/4 bicarb now 29, metabolic acidosis resolved please stop sodium bicarb.    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  Given persistent Hgb <9 I would consider EARLENE.   Would like him follow up with anemia clinic after discharge.     Prostate hypertrophy: cont proscar  Bladder mass benign: biopsy shows no malignancy   Electrolyte: no issue  BMD - Ca 7.7 with albumin of 3 corrects to 8.5, Phos 6.5 which is improved.  PTH was 120 on 8/8/17 - I would recommend against vitamin D at this time given recent significant hyperphosphatemia.  His vitamin D defic screen from 8/8/17 was 29 which is at goal.  UTI : culture shows serratia and he is getting ceftriaxone.   Nutrition:  poor, recommends dietary consult      Recommendations were communicated to primary team via in person     Patient seen and discussed  with Dr. Daley.  Naresh Amaral  Nephrology Fellow  Pager: [phone]             Interval History :   In the last 24 hours Dugn Norton he received 4 liter of Lr yesterday because he remain orthostatic, his UOP 2700, he is less orthostatic today and denies any  "symptoms, //81--116/77     Review of Systems:   I reviewed the following systems:  GI: good appetite. no nausea or vomiting or diarrhea.   Neuro:  no confusion  Constitutional:  no fever or chills  CV: no dyspnea or edema.  no chest pain.    Physical Exam:   I/O last 3 completed shifts:  In: 4000 [IV Piggyback:4000]  Out: 3125 [Urine:3125]   /79 (BP Location: Left arm)  Pulse 77  Temp 98  F (36.7  C) (Oral)  Resp 18  Ht 1.778 m (5' 10\")  Wt 63 kg (138 lb 12.8 oz)  SpO2 100%  BMI 19.92 kg/m2     GENERAL APPEARANCE: NAD  EYES:  no scleral icterus, pupils equal  HENT: mouth without ulcers or lesions  PULM: lungs clear to auscultation,  bilaterally, equal air movement, no clubbing  CV: regular rhythm, normal rate, no rub     -JVP not elevated      -edema no   GI: soft, not tender, not distended, bowel sounds are positive  INTEGUMENT: no cyanosis, no rash  NEURO:  no asterixis   Access no    Labs:   All labs reviewed by me  Electrolytes/Renal -   Recent Labs   Lab Test  09/04/17   0607  09/03/17   0710  09/02/17   0641  09/01/17   0633  08/31/17   0609   NA  142  141  141  142  140   POTASSIUM  3.7  3.6  3.5  3.6  3.6   CHLORIDE  106  105  106  108  111*   CO2  29  27  24  23  18*   BUN  79*  91*  107*  112*  125*   CR  4.60*  4.90*  5.31*  5.26*  5.88*   GLC  86  88  95  104*  105*   DERICK  7.4*  7.4*  7.9*  7.5*  7.4*   MAG  1.6   --    --   1.7  1.8   PHOS  3.2   --    --   4.6*  5.4*       CBC -   Recent Labs   Lab Test  08/29/17   0720  08/28/17   1909  08/16/17   1426   WBC  10.9  11.4*  10.6   HGB  8.8*  8.6*  9.1*   PLT  246  256  245       LFTs -   Recent Labs   Lab Test  08/28/17   1909 08/28/17   1001  08/21/17   0952   07/19/17   0537  07/12/17   1740   ALKPHOS  95   --    --    --   51  67   BILITOTAL  0.3   --    --    --   0.2  0.6   ALT  17   --    --    --   17  19   AST  9   --    --    --   32  12   PROTTOTAL  7.2   --    --    --   4.4*  6.8   ALBUMIN  3.0*  3.2*  3.7   < >  " 1.9*  3.1*    < > = values in this interval not displayed.       Iron Panel -   Recent Labs   Lab Test  08/08/17   1051  07/21/17   0652   IRON  54  24*   IRONSAT  32  19   NO  679*  1209*         Imaging:  All imaging studies reviewed by me.     Current Medications:    magnesium oxide  400 mg Oral Daily     sodium bicarbonate  650 mg Oral BID     fludrocortisone  200 mcg Oral Daily     heparin  5,000 Units Subcutaneous Q12H     calcium acetate  667 mg Oral TID w/meals     finasteride  5 mg Oral Daily     aspirin EC  81 mg Oral Daily     sodium chloride (PF)  3 mL Intracatheter Q8H     nicotine   Transdermal Q8H     nicotine   Transdermal Daily     nicotine  1 patch Transdermal Daily        Naresh Amaral MD

## 2017-09-05 ENCOUNTER — APPOINTMENT (OUTPATIENT)
Dept: PHYSICAL THERAPY | Facility: CLINIC | Age: 72
DRG: 725 | End: 2017-09-05
Payer: MEDICARE

## 2017-09-05 VITALS
HEIGHT: 70 IN | OXYGEN SATURATION: 100 % | HEART RATE: 81 BPM | WEIGHT: 143.6 LBS | BODY MASS INDEX: 20.56 KG/M2 | TEMPERATURE: 98.1 F | DIASTOLIC BLOOD PRESSURE: 86 MMHG | SYSTOLIC BLOOD PRESSURE: 134 MMHG | RESPIRATION RATE: 18 BRPM

## 2017-09-05 LAB
ANION GAP SERPL CALCULATED.3IONS-SCNC: 8 MMOL/L (ref 3–14)
BUN SERPL-MCNC: 74 MG/DL (ref 7–30)
CALCIUM SERPL-MCNC: 7.4 MG/DL (ref 8.5–10.1)
CHLORIDE SERPL-SCNC: 105 MMOL/L (ref 94–109)
CO2 SERPL-SCNC: 29 MMOL/L (ref 20–32)
CREAT SERPL-MCNC: 4.64 MG/DL (ref 0.66–1.25)
DEPRECATED CALCIDIOL+CALCIFEROL SERPL-MC: <31 UG/L (ref 20–75)
GFR SERPL CREATININE-BSD FRML MDRD: 12 ML/MIN/1.7M2
GLUCOSE SERPL-MCNC: 83 MG/DL (ref 70–99)
MAGNESIUM SERPL-MCNC: 1.7 MG/DL (ref 1.6–2.3)
POTASSIUM SERPL-SCNC: 3.7 MMOL/L (ref 3.4–5.3)
SODIUM SERPL-SCNC: 141 MMOL/L (ref 133–144)
VITAMIN D2 SERPL-MCNC: <5 UG/L
VITAMIN D3 SERPL-MCNC: 26 UG/L

## 2017-09-05 PROCEDURE — 97110 THERAPEUTIC EXERCISES: CPT | Mod: GP

## 2017-09-05 PROCEDURE — 36415 COLL VENOUS BLD VENIPUNCTURE: CPT | Performed by: INTERNAL MEDICINE

## 2017-09-05 PROCEDURE — A9270 NON-COVERED ITEM OR SERVICE: HCPCS | Mod: GY | Performed by: PHYSICIAN ASSISTANT

## 2017-09-05 PROCEDURE — 25000128 H RX IP 250 OP 636: Performed by: INTERNAL MEDICINE

## 2017-09-05 PROCEDURE — 83735 ASSAY OF MAGNESIUM: CPT | Performed by: INTERNAL MEDICINE

## 2017-09-05 PROCEDURE — 40000193 ZZH STATISTIC PT WARD VISIT

## 2017-09-05 PROCEDURE — 97116 GAIT TRAINING THERAPY: CPT | Mod: GP

## 2017-09-05 PROCEDURE — A9270 NON-COVERED ITEM OR SERVICE: HCPCS | Mod: GY | Performed by: INTERNAL MEDICINE

## 2017-09-05 PROCEDURE — 99239 HOSP IP/OBS DSCHRG MGMT >30: CPT | Performed by: INTERNAL MEDICINE

## 2017-09-05 PROCEDURE — 80048 BASIC METABOLIC PNL TOTAL CA: CPT | Performed by: INTERNAL MEDICINE

## 2017-09-05 PROCEDURE — 97530 THERAPEUTIC ACTIVITIES: CPT | Mod: GP

## 2017-09-05 PROCEDURE — 25000132 ZZH RX MED GY IP 250 OP 250 PS 637: Mod: GY | Performed by: INTERNAL MEDICINE

## 2017-09-05 PROCEDURE — 25000132 ZZH RX MED GY IP 250 OP 250 PS 637: Mod: GY | Performed by: PHYSICIAN ASSISTANT

## 2017-09-05 RX ORDER — FLUDROCORTISONE ACETATE 0.1 MG/1
0.2 TABLET ORAL DAILY
Qty: 60 TABLET | Refills: 0 | Status: SHIPPED | OUTPATIENT
Start: 2017-09-05 | End: 2017-10-06

## 2017-09-05 RX ADMIN — MAGNESIUM OXIDE TAB 400 MG (241.3 MG ELEMENTAL MG) 400 MG: 400 (241.3 MG) TAB at 09:04

## 2017-09-05 RX ADMIN — HEPARIN SODIUM 5000 UNITS: 5000 INJECTION, SOLUTION INTRAVENOUS; SUBCUTANEOUS at 06:01

## 2017-09-05 RX ADMIN — ASPIRIN 81 MG: 81 TABLET, COATED ORAL at 09:04

## 2017-09-05 RX ADMIN — FINASTERIDE 5 MG: 5 TABLET, FILM COATED ORAL at 09:04

## 2017-09-05 RX ADMIN — NICOTINE 1 PATCH: 14 PATCH, EXTENDED RELEASE TRANSDERMAL at 09:04

## 2017-09-05 RX ADMIN — CALCIUM ACETATE 667 MG: 667 CAPSULE ORAL at 09:04

## 2017-09-05 RX ADMIN — FLUDROCORTISONE ACETATE 200 MCG: 0.1 TABLET ORAL at 09:04

## 2017-09-05 RX ADMIN — CALCIUM ACETATE 667 MG: 667 CAPSULE ORAL at 11:54

## 2017-09-05 NOTE — PROGRESS NOTES
UROLOGY BRIEF NOTE    Paged regarding patient discharging today. He has urologic follow-up arranged for 9/12/17. Please keep catheter in and provide and catheter teaching as necessary.      Jayson Izquierdo MD  Urology Resident

## 2017-09-05 NOTE — PATIENT INSTRUCTIONS
[facility]    If you have any questions regarding to your visit please contact your care team:       Team Red:   Clinic Hours Telephone Number   Dr. Toya Schwartz  (pediatrics)  Nohemy Rogers NP 7am-7pm  Monday - Thursday   7am-5pm  Fridays  [phone] (fax)    Nikki NAVA  [phone]   Urgent Care - [place] and [place] Monday-Friday  [place] - 11am-8pm  Saturday-Sunday  Both sites - 9am-5pm  [phone] - [facility]  [phone] - [place]       What options do I have for visits at the clinic other than the traditional office visit?  To expand how we care for you, many of our providers are utilizing electronic visits (e-visits) and telephone visits, when medically appropriate, for interactions with their patients rather than a visit in the clinic.   We also offer nurse visits for many medical concerns. Just like any other service, we will bill your insurance company for this type of visit based on time spent on the phone with your provider. Not all insurance companies cover these visits. Please check with your medical insurance if this type of visit is covered. You will be responsible for any charges that are not paid by your insurance.      E-visits via [employer]:  generally incur a $35.00 fee.  Telephone visits:  Time spent on the phone: *charged based on time that is spent on the phone in increments of 10 minutes. Estimated cost:   5-10 mins $30.00   11-20 mins. $59.00   21-30 mins. $85.00     As always, Thank you for trusting us with your health care needs!            Discharged by Bev Rosa MA.

## 2017-09-05 NOTE — PROGRESS NOTES
SUBJECTIVE:   Dung Norton is a 71 year old male who presents to clinic today for the following health issues:          Hospital Follow-up Visit:    Hospital/Nursing Home/IP Rehab Facility: [facility]  Date of Admission: 08/28/2017  Date of Discharge: 09/05/2017  Reason(s) for Admission: Acute kidney injury with anion gap metabolic acidosis secondary to post-obstructive diuresis in the setting of obstructive uropathy from BPH  Orthostatic hypotension  Concern for serratia UTI  Pulmonary nodules  Subclinical hypothyroidism            Problems taking medications regularly:  None       Medication changes since discharge: None       Problems adhering to non-medication therapy:  None    Summary of hospitalization:  [facility] discharge summary reviewed  Diagnostic Tests/Treatments reviewed.  Follow up needed: yes with Nephrologist  Pt saw Urology Today  Other Healthcare Providers Involved in Patient s Care:         nephrology  Update since discharge: stable.     Post Discharge Medication Reconciliation: discharge medications reconciled, continue medications without change.  Plan of care communicated with patient     Coding guidelines for this visit:  Type of Medical   Decision Making Face-to-Face Visit       within 7 Days of discharge Face-to-Face Visit        within 14 days of discharge   Moderate Complexity [id number]   High Complexity [id number]                  Problem list and histories reviewed & adjusted, as indicated.  Additional history: as documented    Patient Active Problem List   Diagnosis     Tobacco abuse     CARDIOVASCULAR SCREENING; LDL GOAL LESS THAN 130     Hypertension goal BP (blood pressure) < 140/90     Advanced directives, counseling/discussion     Elevated fasting glucose     Hypertriglyceridemia     CKD (chronic kidney disease) stage 3, GFR 30-59 ml/min     Symptomatic anemia     Pulmonary nodules     Bilateral leg weakness     Weakness of shoulder     KRISTINA  (acute kidney injury) (H)     Past Surgical History:   Procedure Laterality Date     APPENDECTOMY  AGE 6     CYSTOSCOPY, FULGURATE BLEEDERS, EVACUATE CLOT(S), COMBINED N/A 7/16/2017    Procedure: COMBINED CYSTOSCOPY, FULGURATE BLEEDERS, EVACUATE CLOT(S);  Cystoscopy clot evacuation, bladder biopsy and fulguration (per surgeons note) NERVE BLOCK PERIPHERAL;  Surgeon: Tamiko Vanegas MD;  Location: [facility] OR     SINUS SURGERY  AGE 6     TONSILLECTOMY      CHILDHOOD       Social History   Substance Use Topics     Smoking status: Former Smoker     Types: Cigarettes     Smokeless tobacco: Never Used     Alcohol use No     Family History   Problem Relation Age of Onset     C.A.D. Mother      d age 67     Vision Loss Father      Hearing Loss Sister      DIABETES Sister      CANCER No family hx of          Current Outpatient Prescriptions   Medication Sig Dispense Refill     nicotine (NICODERM CQ) 14 MG/24HR 24 hr patch Place 1 patch onto the skin every 24 hours 30 patch 0     fludrocortisone (FLORINEF) 0.1 MG tablet Take 2 tablets (0.2 mg) by mouth daily 60 tablet 0     calcium acetate (PHOSLO) 667 MG CAPS capsule Take 1 capsule (667 mg) by mouth 3 times daily (with meals) 180 capsule 0     finasteride (PROSCAR) 5 MG tablet Take 1 tablet (5 mg) by mouth daily 90 tablet 3     Multiple Vitamins-Minerals (MULTIVITAMIN MEN PO) Take 1 tablet by mouth daily       fish oil-omega-3 fatty acids (FISH OIL) 1000 MG capsule Take 1 g by mouth daily       aspirin 81 MG tablet Take 1 tablet by mouth daily.       NICOTINE STEP 1 21 MG/24HR 24 hr patch PLACE 1 PATCH ONTO SKIN Q 24 H.  1     sodium bicarbonate 650 MG tablet TK 1 T PO TID  3     No Known Allergies  Recent Labs   Lab Test  09/08/17   1449  09/05/17   0645   08/31/17   1738   08/28/17   1909   07/19/17   0537   07/12/17   1740  07/12/17   1407  02/24/16   1044  02/02/15   1046  01/15/14   0950   06/10/11   1028   A1C   --    --    --   5.6   --    --    --    --    --     "--    --   5.4   --   5.3   --    --    LDL   --    --    --    --    --    --    --    --    --    --    --   114*  128  119   < >  105   HDL   --    --    --    --    --    --    --    --    --    --    --   29*  32*  33*   < >  32*   TRIG   --    --    --    --    --    --    --    --    --    --    --   199*  229*  230*   < >  234*   ALT   --    --    --    --    --   17   --   17   --   19  20   --    --   29   < >   --    CR  5.31*  4.64*   < >   --    < >  7.80*   < >  7.19*   < >  14.70*  14.40*  2.56*  1.10  0.91   < >  0.97   GFRESTIMATED  11*  12*   < >   --    < >  7*   < >  8*   < >  3*  3*  25*  66  82   < >  77   GFRESTBLACK  13*  15*   < >   --    < >  8*   < >  9*   < >  4*  4*  30*  80  >90   < >  >90   POTASSIUM  3.8  3.7   < >   --    < >  4.7   < >  5.2   < >  4.5  4.2  3.4  3.5  4.0   < >  4.4   TSH   --    --    --    --    --    --    --    --    --    --   10.51*   --    --    --    --   4.86    < > = values in this interval not displayed.      BP Readings from Last 3 Encounters:   09/12/17 118/70   09/12/17 119/70   09/08/17 111/74    Wt Readings from Last 3 Encounters:   09/12/17 150 lb (68 kg)   09/12/17 150 lb 11.2 oz (68.4 kg)   09/08/17 147 lb 9.6 oz (67 kg)                  Labs reviewed in EPIC          Reviewed and updated as needed this visit by clinical staff       Reviewed and updated as needed this visit by Provider         ROS:  C: NEGATIVE for fever, chills, change in weight  E/M: NEGATIVE for ear, mouth and throat problems  R: NEGATIVE for significant cough or SOB  CV: NEGATIVE for chest pain, palpitations or peripheral edema  GI: NEGATIVE for nausea, abdominal pain, heartburn, or change in bowel habits  : pt is going to self cath  NEURO: NEGATIVE for weakness, dizziness or paresthesias    OBJECTIVE:     /70 (BP Location: Right arm, Patient Position: Chair, Cuff Size: Adult Regular)  Pulse 93  Temp 98.3  F (36.8  C) (Oral)  Ht 5' 10\" (1.778 m)  Wt 150 lb (68 kg)  " SpO2 99%  BMI 21.52 kg/m2  Body mass index is 21.52 kg/(m^2).  GENERAL: alert and no distress  EYES: Eyes grossly normal to inspection, PERRL and conjunctivae and sclerae normal  HENT: ear canals and TM's normal, nose and mouth without ulcers or lesions  NECK: no adenopathy, no asymmetry, masses, or scars and thyroid normal to palpation  RESP: lungs clear to auscultation - no rales, rhonchi or wheezes  CV: regular rate and rhythm, normal S1 S2, no S3 or S4, no murmur, click or rub, no peripheral edema and peripheral pulses strong  ABDOMEN: soft, nontender, no hepatosplenomegaly, no masses and bowel sounds normal  MS: no gross musculoskeletal defects noted, no edema    Diagnostic Test Results:  No results found for this or any previous visit (from the past 24 hour(s)).  Results for orders placed or performed in visit on 09/08/17   Renal panel   Result Value Ref Range    Sodium 142 133 - 144 mmol/L    Potassium 3.8 3.4 - 5.3 mmol/L    Chloride 107 94 - 109 mmol/L    Carbon Dioxide 28 20 - 32 mmol/L    Anion Gap 8 3 - 14 mmol/L    Glucose 112 (H) 70 - 99 mg/dL    Urea Nitrogen 82 (H) 7 - 30 mg/dL    Creatinine 5.31 (H) 0.66 - 1.25 mg/dL    GFR Estimate 11 (L) >60 mL/min/1.7m2    GFR Estimate If Black 13 (L) >60 mL/min/1.7m2    Calcium 7.8 (L) 8.5 - 10.1 mg/dL    Phosphorus 3.8 2.5 - 4.5 mg/dL    Albumin 2.4 (L) 3.4 - 5.0 g/dL       ASSESSMENT/PLAN:         ICD-10-CM    1. Orthostatic hypotension I95.1    2. Acute renal failure, unspecified acute renal failure type (H) N17.9 CANCELED: Basic metabolic panel   3. End stage renal disease (H) N18.6    4. Acquired hypothyroidism E03.9 TSH with free T4 reflex     CALL IT QUITS (QUITPLAN) REFERRAL   5. Anemia of chronic renal failure, stage 5 (H) N18.5     D63.1    6. Obstructive uropathy N13.9    7. Ex-smoker Z87.891 nicotine (NICODERM CQ) 14 MG/24HR 24 hr patch   8. Screen for colon cancer Z12.11 Fecal colorectal cancer screen (FIT)   9. Need for prophylactic vaccination  and inoculation against influenza Z23 FLU VACCINE, INCREASED ANTIGEN, PRESV FREE, AGE 65+ [21053]     Vaccine Administration, Initial [82189]     Pt is Going to be starting Dialysis  Renal Failure due to obstructive Uropathy-/BPH-notes reviewed   He is also going to get epo shots  Pt saw Urology and is going to Self cath  He is in the process of moving to a senior Building  Had a Long discussion with daughter  Will also have care coordination follow up   Pt has quit smoking but unable to afford patches  Will see if quit smoking plan can help  Follow up 3 months  Advised To hold on amlodipine and lasix for Now as Blood Pressure is normal  Haley Baker MD  [facility]

## 2017-09-05 NOTE — DISCHARGE INSTRUCTIONS
You will be receiving a call from the ENT clinic to confirm the time and date of your follow up appointment. This appointment should be within 3-4 weeks of discharge. If you do not hear from them within 2 business days, please call them at [phone].

## 2017-09-05 NOTE — PLAN OF CARE
Problem: Goal Outcome Summary  Goal: Goal Outcome Summary  PT: patient on track for d/c to home from a PT stand point. Patient displayed improvement with gait training for continuous activity for 15 minutes and transfers. Patient met 2 out of 4 goals with 2 out of 4 goals continue to be progressing. Patient ambulated 15 minutes continuous with supervision and modified independent with transfers. Patient tolerated standing exercises x 10 minutes. Balance assessment yesterday and scored 19/24  With an increase of 3 items from evaluation. Recommend d/c to home with family assist and home PT per plan of care established by physical therapist.

## 2017-09-05 NOTE — PROGRESS NOTES
Care Coordinator Progress Note     Admission Date/Time:  8/28/2017  Attending MD:  Liseth Villanueva, *     Data  Chart reviewed, discussed with interdisciplinary team.   Patient was admitted for:    CKD (chronic kidney disease)  Orthostatic hypotension  Fall, initial encounter  Chronic kidney disease, unspecified CKD stage.    Concerns with insurance coverage for discharge needs: None.  Current Living Situation: Patient lives alone.  Support System: Supportive and Involved  Services Involved: none prior to admission  Transportation: Family or Friend will provide  Barriers to Discharge: pending urology to see then dc to home today per care team     Assessment  Dung Norton is a 71 year old man with a history of HTN and tobacco abuse who was admitted to [facility] 7/12-7/25/17 w/ severe KRISTINA 2/2 obstructive uropathy attributed to BPH, previously d/c on wood cath and baseline crt to 6 on d.c . Now readmitted w/ worsening KRISTINA and orthostatic hypotension.  Per care team pt to Dc to home, pt states that he lives alone, but his children can assist as needed.  Pt states his daughter will pick him up today at time of dc.  Had wood prior to admission but is unsure as to how to get additional bags and clean bag/wood.  Bedside RN to teach and send pt home with leg bag and night bag/ instructions.  Pt declined [facility] and wants to go to out pt PT at a clinic in [place].  Out pt referral placed in chart for MD review.       Plan  Dc home today with wood.  No additional needs.      Ruchi Catherine, RNCC, BSN    [facility]    Medicine Group  [address] [place] [id number]    [email or web address]  [email or web address]    Office: [phone] Pager: [phone]

## 2017-09-05 NOTE — PROGRESS NOTES
Nephrology Progress Note  09/05/2017       Mr Norton is a 70 yo male with h/o obstructive uropathy from BPH/bladder mass with KRISTINA (peak 14.7 on 7/12/17), CKD3, HTN admitted on 8/28 due to hypovolemia.     Assessment & Recommendations:     1. KRISTINA - Improved with rehydration. Creat has declined to 4.6 over the last 48 hrs. Peak creat was 7.8 on 8/28. He remains non oliguric. Has wood.     - Has appt already scheduled with me in Nephrology clinic for f/u on Friday, 9/8/17 at 3:30 pm.    - Unclear if he will return to his previous baseline. He has likely developed ESRD due to prolonged obstructive uropathy   - Urine protein is 2.8 g/gCr. This is likely inaccurate given ongoing KRISTINA. Would recheck when stabilized      2. Electrolytes - No acute concerns. K 3.7      3. Volume status - Euvolemic. No edema, dyspnea. Tolerating oral intake. UO 2575 cc over last 24 hrs. / lying, teens - 130/ sitting, / standing. Weight  65.1 kg. Admission weight 59.2 kg. Was 65.1 kg at my last clinic visit with him 3 wks ago. Unclear where his ideal body weight would be.       4. HTN - Was hypotensive upon admission with hypovolemia. Amlodipine and Furosemide on hold. On Florinef 200 mcg daily for orthostasis       5. BPH/bladder mass - Has nina. Was to see Urology on 9/4/17 for f/u. Currently on Proscar. Had also been on Flomax which can cause hypotension.       6. Acid base - No acute concerns. Bicarb 29.    - Bicarb has been discontinued.       7. BMD - Ca 7.4, no recent albumin, Phos 3.2. Vit D 29, PTH intact 120   - Continue Phoslo 667 mg TID w/meals      8. Nutrition - Has good appetite. Albumin was 3.0 on 8/28      9. Anemia - Hgb 8.8 on 8/29. Was 13.3 2016.  Iron studies 8/8/17: Tsat 32 %, Ferritin 679, Iron 54. He has had no colon cancer screening.     - Will set up for anemia management in outpt Nephrology clinic Friday    Recommendations were communicated to primary team via progress note    Seen and discussed with  "Dr. Donovan De La Torre, NP   [phone]    Interval History :   Creat is stable  Remains non oliguric  No further dizziness  Appetite has increased with liberalization of diet    Review of Systems:   I reviewed the following systems:  GI: Good appetite. No nausea/vomiting or diarrhea.   Neuro:  no confusion. No further dizziness  Constitutional:  no fever or chills  CV: denies dyspnea, CP or edema.      Physical Exam:   I/O last 3 completed shifts:  In: 400 [P.O.:400]  Out: 2525 [Urine:2525]   /82 (BP Location: Right arm)  Pulse 74  Temp 98  F (36.7  C) (Oral)  Resp 18  Ht 1.778 m (5' 10\")  Wt 65.1 kg (143 lb 9.6 oz)  SpO2 100%  BMI 20.6 kg/m2     GENERAL APPEARANCE: comfortable, pleasant  EYES:  no scleral icterus, pupils equal  PULM: lungs CTA. Breathing is non labored.   CV: RRR       -edema - none  GI: soft, NT, Non distended.   : wood present  INTEGUMENT: no cyanosis or rash  NEURO:  A/O    Labs:   All labs reviewed by me  Electrolytes/Renal -   Recent Labs   Lab Test  09/05/17   0645  09/04/17   0607  09/03/17   0710   09/01/17   0633  08/31/17   0609   NA  141  142  141   < >  142  140   POTASSIUM  3.7  3.7  3.6   < >  3.6  3.6   CHLORIDE  105  106  105   < >  108  111*   CO2  29  29  27   < >  23  18*   BUN  74*  79*  91*   < >  112*  125*   CR  4.64*  4.60*  4.90*   < >  5.26*  5.88*   GLC  83  86  88   < >  104*  105*   DERICK  7.4*  7.4*  7.4*   < >  7.5*  7.4*   MAG  1.7  1.6   --    --   1.7  1.8   PHOS   --   3.2   --    --   4.6*  5.4*    < > = values in this interval not displayed.       CBC -   Recent Labs   Lab Test  08/29/17   0720  08/28/17   1909  08/16/17   1426   WBC  10.9  11.4*  10.6   HGB  8.8*  8.6*  9.1*   PLT  246  256  245       LFTs -   Recent Labs   Lab Test  08/28/17   1909  08/28/17   1001  08/21/17   0952   07/19/17   0537  07/12/17   1740   ALKPHOS  95   --    --    --   51  67   BILITOTAL  0.3   --    --    --   0.2  0.6   ALT  17   --    --    --   17  19 "   AST  9   --    --    --   32  12   PROTTOTAL  7.2   --    --    --   4.4*  6.8   ALBUMIN  3.0*  3.2*  3.7   < >  1.9*  3.1*    < > = values in this interval not displayed.       Iron Panel -   Recent Labs   Lab Test  08/08/17   1051  07/21/17   0652   IRON  54  24*   IRONSAT  32  19   NO  679*  1209*         Imaging:  EXAMINATION: US RENAL COMPLETE, 8/29/2017 8:45 AM      COMPARISON: CT dated 7/13/2017, ultrasound dated 7/13/2017     HISTORY: Acute kidney injury with recent obstructive uropathy     FINDINGS:     Right kidney: Measures 9.7 cm in length. Parenchyma is of normal  thickness. Slightly increased echogenicity with mild loss of cortical  medullary differentiation. No focal mass. Resolution of hydronephrosis  compared with exam dated 7/13/2017.     Left kidney: Measures approximately 10.5 cm in length. Parenchyma is  of normal thickness. Slightly increased echogenicity with mild loss of  cortical medullary differentiation. No focal mass. Mild  hydronephrosis, improved when compared with exam dated 7/13/2017.     Bladder: The bladder is largely decompressed with Man catheter in  place. There is heterogeneous debris within the urinary bladder and  vascular mass protruding into the bladder lumen. This is similar in  appearance to exam dated 7/13/2017.         IMPRESSION:  1.  Resolution of right-sided hydronephrosis with persistent mild  left-sided hydronephrosis.  2.  Increased echogenicity of the renal parenchyma with mild loss of  cortical medullary differentiation can be seen in chronic medical  renal disease.  3.  A vascular mass protrudes into the bladder lumen with adjacent  debris. These findings remain concerning for malignancy with possible  superimposed bladder clot.    Current Medications:    magnesium oxide  400 mg Oral Daily     fludrocortisone  200 mcg Oral Daily     heparin  5,000 Units Subcutaneous Q12H     calcium acetate  667 mg Oral TID w/meals     finasteride  5 mg Oral Daily      aspirin EC  81 mg Oral Daily     sodium chloride (PF)  3 mL Intracatheter Q8H     nicotine   Transdermal Q8H     nicotine   Transdermal Daily     nicotine  1 patch Transdermal Daily        Oralia De La Torre, NP

## 2017-09-05 NOTE — PLAN OF CARE
Problem: Goal Outcome Summary  Goal: Goal Outcome Summary  Outcome: Improving  VSS on RA. Ortho's this shift as follows, 125/83, 113/74, 93/68. Denies pain or nausea. Tolerating a 3gm Na+ diet. PIV saline locked. Wood patent draining cloudy, pale urine. No BM this shift. Skin intact. Able to ambulate with standby assistance. Plan to discharge home today with wood catheter.

## 2017-09-05 NOTE — PLAN OF CARE
"Problem: Goal Outcome Summary  Goal: Goal Outcome Summary  Outcome: Improving  /75 (BP Location: Right arm)  Pulse 74  Temp 98.1  F (36.7  C) (Oral)  Resp 16  Ht 1.778 m (5' 10\")  Wt 65.7 kg (144 lb 12.8 oz)  SpO2 100%  BMI 20.78 kg/m2    Pt not complained of any pain or nausea. On a 3 gram Na+ diet. PIV SL'd. Wood with adequate amounts of clear yellow output. Stand by assist. Orthostatic BP every 6 hours. Last set at 2100. Pt is DNR, DNI. Gold 1. Goal to DC tomorrow and will be going home with a wood.   "

## 2017-09-06 ENCOUNTER — CARE COORDINATION (OUTPATIENT)
Dept: CARE COORDINATION | Facility: CLINIC | Age: 72
End: 2017-09-06

## 2017-09-06 NOTE — PROGRESS NOTES
Patient has clinic visit within 24-72 hours of Discharge so no post DC follow up call is needed          Follow-up Appointments           Adult [facility] Follow-up and recommended labs and tests         Follow up with primary care provider, Haley Baker, within 7 days to evaluate medication change.  The following labs/tests are recommended: BMP.       Follow up with Nephrology on Sept 8 as previously planned.     Follow up with Urology for a trial of void.     Appointments on [place] and/or [facility] (with [facility] or [facility] provider or service). Call [phone] if you haven't heard regarding these appointments within 7 days of discharge.                        Your next 10 appointments already scheduled            Sep 08, 2017  2:30 PM CDT   Lab with  LAB   [facility] ([facility])     [address] [place] [id number] [phone]                  Sep 08, 2017  3:30 PM CDT   (Arrive by 3:00 PM)   Return Visit with Oralia De La Torre NP   [facility] Nephrology ([facility])     [address] [place] [id number] [phone]

## 2017-09-06 NOTE — DISCHARGE SUMMARY
[facility]    Internal Medicine Discharge Summary- Gold Service    Date of Admission:  8/28/2017  Date of Discharge:  9/5/2017  Discharging Provider: Liseth Villanueva  Discharge Team: Gold 1    Discharge Diagnoses   Acute kidney injury with anion gap metabolic acidosis secondary to post-obstructive diuresis in the setting of obstructive uropathy from BPH  Orthostatic hypotension  Concern for serratia UTI  Pulmonary nodules  Subclinical hypothyroidism    Follow-ups Needed After Discharge   Urology -- September 12 -- To address obstructive uropathy from BPH, catheter needs, when to resume tamsulosin (held during admission and on discharge given orthostasis)  Nephrology -- September 8 -- To follow his creatinine, titrate meds for orthostatic hypotension, hx of hypertension  PCP  --  To address orthostatic hypotension, when to resume tamsulosin, furosemide, amlodipine (held during admission and on discharge given orthostasis), also to recheck TSH when over his acute illness  Home health RN  --  Check vitals, home safety check, assist with catheter management    Hospital Course   Dung Norton was admitted on 8/28/2017 for worsening KRISTINA and orthostatic hypotension.  The following problems were addressed during his hospitalization:    Acute kidney injury with anion gap metabolic acidosis secondary to post-obstructive diuresis in the setting of obstructive uropathy from BPH  BPH led to obstructive uropathy.  Had hypovolemia from polyuria related to post-obstructive diuresis.  Creatinine was up to 7.49, improved throughout admission down to 4.6.  Unclear what his baseline will be.  Previous discharge creatinine was 6.    Orthostatic hypotension  Likely related to hypovolemia from marked diuresis.  Had improvement with IV fluids, fludrocortisone, and holding meds that may contribute to hypotension.  Holding amlodipine, furosemide, and tamsulosin for now.  Will need to be reviewed  by PCP regarding appropriate time to resume.    Concern for serratia UTI  Asymptomatic, but unclear situation given wood catheter and significant WBC and large LE.  Therefore, elected to treat.  Completed 7 day course of levofloxacin while inpatient.    Pulmonary nodules  Noted last hospitalization w/ few small pulmonary nodules measuring up to 3 mm. A 12 month f/u chest CT was recommended per Fleischner society criteria.     Subclinical hypothyroidism  TSH 10.51 and FT4 0.71 checked during last hospitalization. Not on meds and was advised to have recheck once over acute illness.     Consultations This Hospital Stay   NEPHROLOGY GENERAL ADULT IP CONSULT  PHYSICAL THERAPY ADULT IP CONSULT  OCCUPATIONAL THERAPY ADULT IP CONSULT  WOUND OSTOMY CONTINENCE NURSE  IP CONSULT  UROLOGY IP CONSULT  MEDICATION HISTORY IP PHARMACY CONSULT  NUTRITION SERVICES ADULT IP CONSULT  VASCULAR ACCESS CARE ADULT IP CONSULT  VASCULAR ACCESS CARE ADULT IP CONSULT     Code Status   DNR / DNI    Time Spent on this Encounter   I, Liseth Villanueva, personally saw the patient today and spent greater than 30 minutes discharging this patient.       Liseth Villanueva  Internal Medicine Staff Hospitalist Service  [facility]  Pager: [phone]  ______________________________________________________________________    Physical Exam   Vital Signs: Temp: 98.1  F (36.7  C) Temp src: Oral BP: 134/86 Pulse: 81 Heart Rate: 81 Resp: 18 SpO2: 100 % O2 Device: None (Room air)    Weight: 143 lbs 9.6 oz    General Appearance: Sitting in bed in NAD  Respiratory: CTAB, breathing comfortably on room air  Cardiovascular: RRR, no m/r/g  GI: NABS, soft, NT, ND.  :  Wood in place, draining clear yellow urine    Significant Results and Procedures   Results for orders placed or performed during the hospital encounter of 08/28/17   US Renal Complete    Narrative    EXAMINATION: US RENAL COMPLETE, 8/29/2017 8:45 AM     COMPARISON: CT dated  7/13/2017, ultrasound dated 7/13/2017    HISTORY: Acute kidney injury with recent obstructive uropathy    FINDINGS:    Right kidney: Measures 9.7 cm in length. Parenchyma is of normal  thickness. Slightly increased echogenicity with mild loss of cortical  medullary differentiation. No focal mass. Resolution of hydronephrosis  compared with exam dated 7/13/2017.    Left kidney: Measures approximately 10.5 cm in length. Parenchyma is  of normal thickness. Slightly increased echogenicity with mild loss of  cortical medullary differentiation. No focal mass. Mild  hydronephrosis, improved when compared with exam dated 7/13/2017.    Bladder: The bladder is largely decompressed with Man catheter in  place. There is heterogeneous debris within the urinary bladder and  vascular mass protruding into the bladder lumen. This is similar in  appearance to exam dated 7/13/2017.      Impression    IMPRESSION:  1.  Resolution of right-sided hydronephrosis with persistent mild  left-sided hydronephrosis.  2.  Increased echogenicity of the renal parenchyma with mild loss of  cortical medullary differentiation can be seen in chronic medical  renal disease.  3.  A vascular mass protrudes into the bladder lumen with adjacent  debris. These findings remain concerning for malignancy with possible  superimposed bladder clot.    I have personally reviewed the examination and initial interpretation  and I agree with the findings.    PRADEEP JJ MD           Primary Care Physician   Haley Baker    Discharge Disposition   Discharged to home  Condition at discharge: Good    Discharge Orders     Physical Therapy Referral     Home care nursing referral     Reason for your hospital stay   Obstructive BPH and orthostatic hypotension     Adult [facility] Follow-up and recommended labs and tests   Follow up with primary care provider, Haley Baker, within 7 days to evaluate medication change.  The following labs/tests are recommended: BMP.       Follow up with Nephrology on Sept 8 as previously planned.    Follow up with Urology for a trial of void.    Appointments on [place] and/or [facility] (with [facility] or [facility] provider or service). Call [phone] if you haven't heard regarding these appointments within 7 days of discharge.     Activity   Your activity upon discharge: activity as tolerated     When to contact your care team   Call your primary doctor if you have any of the following: temperature greater than 100.4F or low urine output or dizziness/imbalance.     DNR/DNI     Diet   Follow this diet upon discharge: Orders Placed This Encounter     Snacks/Supplements Adult: Other - Please comment; AM, HS snack times - please send berry Ensure Clear, PM snack time - send berry Nepro supplement; Between Meals     3 Gram Sodium Diet       Discharge Medications   Current Discharge Medication List      START taking these medications    Details   fludrocortisone (FLORINEF) 0.1 MG tablet Take 2 tablets (0.2 mg) by mouth daily  Qty: 60 tablet, Refills: 0    Associated Diagnoses: Orthostatic hypotension      calcium acetate (PHOSLO) 667 MG CAPS capsule Take 1 capsule (667 mg) by mouth 3 times daily (with meals)  Qty: 180 capsule, Refills: 0    Associated Diagnoses: Chronic kidney disease, unspecified CKD stage         CONTINUE these medications which have NOT CHANGED    Details   finasteride (PROSCAR) 5 MG tablet Take 1 tablet (5 mg) by mouth daily  Qty: 90 tablet, Refills: 3    Associated Diagnoses: Benign prostatic hyperplasia with urinary retention      aspirin 81 MG tablet Take 1 tablet by mouth daily.      Multiple Vitamins-Minerals (MULTIVITAMIN MEN PO) Take 1 tablet by mouth daily      fish oil-omega-3 fatty acids (FISH OIL) 1000 MG capsule Take 1 g by mouth daily         STOP taking these medications       sodium bicarbonate 650 MG tablet Comments:   Reason for Stopping:         amLODIPine (NORVASC) 5 MG tablet Comments:   Reason for Stopping:          tamsulosin (FLOMAX) 0.4 MG capsule Comments:   Reason for Stopping:             Allergies   No Known Allergies

## 2017-09-08 ENCOUNTER — OFFICE VISIT (OUTPATIENT)
Dept: NEPHROLOGY | Facility: CLINIC | Age: 72
End: 2017-09-08
Payer: MEDICARE

## 2017-09-08 VITALS
HEART RATE: 95 BPM | DIASTOLIC BLOOD PRESSURE: 74 MMHG | BODY MASS INDEX: 21.13 KG/M2 | OXYGEN SATURATION: 99 % | HEIGHT: 70 IN | SYSTOLIC BLOOD PRESSURE: 111 MMHG | WEIGHT: 147.6 LBS

## 2017-09-08 DIAGNOSIS — R79.89 ELEVATED SERUM CREATININE: ICD-10-CM

## 2017-09-08 DIAGNOSIS — D63.1 ANEMIA OF CHRONIC RENAL FAILURE, STAGE 5 (H): ICD-10-CM

## 2017-09-08 DIAGNOSIS — N18.5 ANEMIA OF CHRONIC RENAL FAILURE, STAGE 5 (H): ICD-10-CM

## 2017-09-08 DIAGNOSIS — N18.5 CHRONIC KIDNEY DISEASE, STAGE V (H): Primary | ICD-10-CM

## 2017-09-08 LAB
ALBUMIN SERPL-MCNC: 2.4 G/DL (ref 3.4–5)
ANION GAP SERPL CALCULATED.3IONS-SCNC: 8 MMOL/L (ref 3–14)
BUN SERPL-MCNC: 82 MG/DL (ref 7–30)
CALCIUM SERPL-MCNC: 7.8 MG/DL (ref 8.5–10.1)
CHLORIDE SERPL-SCNC: 107 MMOL/L (ref 94–109)
CO2 SERPL-SCNC: 28 MMOL/L (ref 20–32)
CREAT SERPL-MCNC: 5.31 MG/DL (ref 0.66–1.25)
GFR SERPL CREATININE-BSD FRML MDRD: 11 ML/MIN/1.7M2
GLUCOSE SERPL-MCNC: 112 MG/DL (ref 70–99)
PHOSPHATE SERPL-MCNC: 3.8 MG/DL (ref 2.5–4.5)
POTASSIUM SERPL-SCNC: 3.8 MMOL/L (ref 3.4–5.3)
SODIUM SERPL-SCNC: 142 MMOL/L (ref 133–144)

## 2017-09-08 PROCEDURE — 99213 OFFICE O/P EST LOW 20 MIN: CPT | Mod: ZF

## 2017-09-08 PROCEDURE — 36415 COLL VENOUS BLD VENIPUNCTURE: CPT

## 2017-09-08 PROCEDURE — 80069 RENAL FUNCTION PANEL: CPT

## 2017-09-08 ASSESSMENT — PAIN SCALES - GENERAL: PAINLEVEL: NO PAIN (0)

## 2017-09-08 NOTE — PATIENT INSTRUCTIONS
1. You will be hearing from the Kidney Smart program  2. You will be hearing from Tereza Paul regarding the Aranesp shot for anemia  3. No medications changes  4. RTC in one month

## 2017-09-08 NOTE — MR AVS SNAPSHOT
After Visit Summary   9/8/2017    Dung Norton    MRN: [id number]           Patient Information     Date Of Birth          1945        Visit Information        Provider Department      9/8/2017 3:30 PM Oralia De La Torre NP [facility] Nephrology        Care Instructions    1. You will be hearing from the Kidney Smart program  2. You will be hearing from Tereza Paul regarding the Aranesp shot for anemia  3. No medications changes  4. RTC in one month          Follow-ups after your visit        Follow-up notes from your care team     Return in about 4 weeks (around 10/6/2017).      Your next 10 appointments already scheduled     Sep 12, 2017 10:30 AM CDT   (Arrive by 10:15 AM)   New Patient Visit with WALESKA Valadez   [facility] ([facility])    [address] [place] [address]  4th [facility] [id number] [phone]            Sep 12, 2017  2:00 PM CDT   Office Visit with Haley Baker MD   [facility] ([address] [id number] [phone]           Bring a current list of meds and any records pertaining to this visit. For Physicals, please bring immunization records and any forms needing to be filled out. Please arrive 10 minutes early to complete paperwork.            Oct 06, 2017  1:00 PM CDT   (Arrive by 12:30 PM)   Return Visit with Oralia De La Torre NP   [facility] Nephrology ([facility])    [address] [place] [address]  3rd [facility] [address] [phone]              Who to contact     If you have questions or need follow up information about today's clinic visit or your schedule please contact [facility] NEPHROLOGY directly at [phone].  Normal or non-critical lab and imaging results will be communicated to you by MyChart, letter or phone within 4 business days after the clinic has received the results. If  "you do not hear from us within 7 days, please contact the clinic through [employer] or phone. If you have a critical or abnormal lab result, we will notify you by phone as soon as possible.  Submit refill requests through [employer] or call your pharmacy and they will forward the refill request to us. Please allow 3 business days for your refill to be completed.          Additional Information About Your Visit        [employer] Information     [employer] lets you send messages to your doctor, view your test results, renew your prescriptions, schedule appointments and more. To sign up, go to [email or web address] . Click on \"Log in\" on the left side of the screen, which will take you to the Welcome page. Then click on \"Sign up Now\" on the right side of the page.     You will be asked to enter the access code listed below, as well as some personal information. Please follow the directions to create your username and password.     Your access code is: [email or web address]  Expires: 10/10/2017  2:21 PM     Your access code will  in 90 days. If you need help or a new code, please call your [place] clinic or [phone].        Care EveryWhere ID     This is your Care EveryWhere ID. This could be used by other organizations to access your [place] medical records  [id number]        Your Vitals Were     Pulse Height Pulse Oximetry BMI (Body Mass Index)          95 1.778 m (5' 10\") 99% 21.18 kg/m2         Blood Pressure from Last 3 Encounters:   17 111/74   17 134/86   17 125/81    Weight from Last 3 Encounters:   17 67 kg (147 lb 9.6 oz)   17 65.1 kg (143 lb 9.6 oz)   17 65 kg (143 lb 6.4 oz)              Today, you had the following     No orders found for display       Primary Care Provider Office Phone # Fax #    Haley Baker [phone] [address]  [place] [address]        Equal Access to Services     BRIAN CERRATO AH: osvaldo Romero " alva sandralonnie mooreyael ramongibson bran. So [facility] [phone].    ATENCIÓN: Si betty hernandez, tiene a montague disposición servicios gratuitos de asistencia lingüística. Jose Manuel al [phone].    We comply with applicable federal civil rights laws and [place] laws. We do not discriminate on the basis of race, color, national origin, age, disability sex, sexual orientation or gender identity.            Thank you!     Thank you for choosing [facility] NEPHROLOGY  for your care. Our goal is always to provide you with excellent care. Hearing back from our patients is one way we can continue to improve our services. Please take a few minutes to complete the written survey that you may receive in the mail after your visit with us. Thank you!             Your Updated Medication List - Protect others around you: Learn how to safely use, store and throw away your medicines at www.disposemymeds.org.          This list is accurate as of: 9/8/17  4:05 PM.  Always use your most recent med list.                   Brand Name Dispense Instructions for use Diagnosis    aspirin 81 MG tablet      Take 1 tablet by mouth daily.        calcium acetate 667 MG Caps capsule    PHOSLO    180 capsule    Take 1 capsule (667 mg) by mouth 3 times daily (with meals)    Chronic kidney disease, unspecified CKD stage       finasteride 5 MG tablet    PROSCAR    90 tablet    Take 1 tablet (5 mg) by mouth daily    Benign prostatic hyperplasia with urinary retention       fish oil-omega-3 fatty acids 1000 MG capsule      Take 1 g by mouth daily        fludrocortisone 0.1 MG tablet    FLORINEF    60 tablet    Take 2 tablets (0.2 mg) by mouth daily    Orthostatic hypotension       MULTIVITAMIN MEN PO      Take 1 tablet by mouth daily

## 2017-09-08 NOTE — LETTER
9/8/2017      RE: Dung Norton  [address]  [place] [address]       Nephrology clinic Visit 9/8/17    Assessment and Plan:       1. KRISTINA/CKD - It is unlikely that patient will return to his baseline creat of 2.5 from 2/24/16. He has had long standing obstructive uropathy and has developed ESRD. Even with a creat of 4.6 his GFR is 13 ml/mn and this is likely over estimated given patient's poor muscle mass.    - Had long discussion with patient and daughter today regarding preparations for dialysis. We discussed the benefits of being proactive. He is not interested in PD. He is interested in HD.    - Patient interested in Kidney Smart program so this will be arranged   - Will arrange for Vasc Surgery consult next visit   - He is drinking fluids adequately and does not use NSAIDs   - Wood remains intact. He is measuring his urine and is putting out > 1.5 liters/day.       2. Electrolytes - No acute concerns. K 3.8.    -  continue Florinef      3. Volume status - Euvolemic. No dyspnea, has trace edema. Making ~ 1.5 liters/urine/day. Weight 67 kg, up from 65.1 kg at our last visit. Albumin 2.4 on 9/8/17. B/P teens/ x 3. Unclear where his ideal body weight would be.    - No need for diuretics at this time      4. HTN - Normotensive off antihypertensives. Clinic B/Ps today all in the teens/. Was taken off Amlodipine 5 mg qd and Furosemide 40 mg qd during hospital admission and started on Florinef for orthostasis. Flomax held given hypotension.       5. BPH/bladder mass - Has wood. To see Urology on 9/12/17 for f/u. Currently on Proscar. Flomax discontinued due to hypotension      6. Acid base - No acute concerns. Bicarb is 28. Bicarb was discontinued during admission.       7. BMD - Corrected Ca 9.0, Phos 3.8, albumin 2.4, Vit D 29, PTH intact 120   - Continue Phoslo      8. Nutrition - Despite good appetite albumin has declined from 3.9 to 2.4.    - Will monitor nutrition status closely   - Will check  urine protein next visit       9. Anemia - Hgb 8.8 w/normal RBC indices on 8/29/17.  Etiology is likely anemia of renal disease   - He should have routine colon cancer screening   - Iron studies 8/8/17: Fe 54, Ferritin 679, Tsat 32%   - Will enroll in anemia management program for EARLENE       10. Disposition - RTC in 4 wks for f/u with ongoing weekly labs      Assessment and plan was discussed with patient and daughter, then both voice understanding and agreement.      Reason for Visit:  KRISTINA/CKD      HPI:  Dung Norton is a 71 year old year old male with a PMH of CKDIII , HTN, Obstructive uropathy 2/2 BPH/benign bladder mass with creat as high as 14.4. He did not require RRT and creat declined with insertion of wood catheter. At the time of  hospital discharge on 7/24/17 creat was 6.3. Post hospital creat declined to a low of 5.9 on 8/4, but then began rising to a high of 7.8 on the day of most recent hospital admission on 8/28. He was also found to be hypovolemic and was given IVF.   During hospital admission creat declined to a low of 4.6 while getting IVF, but now has begun to rise again and is 5.3 today. Patient was also treated for a Serratia UTI during admission and new wood was inserted. He was found to have orthostatic hypotension and was started on Florinef      ROS:  Patient has no complaints. He is very happy that his dietary restriction was lifted while in hospital, but continues to be careful with his potassium intake. Energy is not quite what is has been in the past per daughter. He is living independently. Has good appetite. No dyspnea, chest pain, abdominal pain. Has wood. Has mild Edema.       Chronic Medical Problems:      HTN  BPH  Obstructive uropathy   HTN  Tobacco abuse  HLD  Anemia  Pulmonary nodules  KRISTINA  CKD5    Personal Hx:   Social History     Social History     Marital status:      Spouse name: N/A     Number of children: 2     Years of education: N/A     Occupational History  "    [employer]     Social History Main Topics     Smoking status: Former Smoker     Types: Cigarettes     Smokeless tobacco: Never Used     Alcohol use No     Drug use: No     Sexual activity: Not Currently     Other Topics Concern     Not on file     Social History Narrative       Allergies:  No Known Allergies    Medications:  Prior to Admission medications    Medication Sig Start Date End Date Taking? Authorizing Provider   finasteride (PROSCAR) 5 MG tablet Take 1 tablet (5 mg) by mouth daily 8/16/17  Yes BrittOralia, NP   fludrocortisone (FLORINEF) 0.1 MG tablet Take 2 tablets (0.2 mg) by mouth daily 9/5/17   Liseth Villanueva MD   calcium acetate (PHOSLO) 667 MG CAPS capsule Take 1 capsule (667 mg) by mouth 3 times daily (with meals) 9/5/17   Liseth Villanueva MD   Multiple Vitamins-Minerals (MULTIVITAMIN MEN PO) Take 1 tablet by mouth daily    Reported, Patient   fish oil-omega-3 fatty acids (FISH OIL) 1000 MG capsule Take 1 g by mouth daily    Reported, Patient   aspirin 81 MG tablet Take 1 tablet by mouth daily.    Reported, Patient       Vitals:  /74  Pulse 95  Ht 1.778 m (5' 10\")  Wt 67 kg (147 lb 9.6 oz)  SpO2 99%  BMI 21.18 kg/m2    Exam:  GEN: Frail male in NAD. Well groomed. Dtr present  CARDIAC RRR  LUNGS: CTA  ABDOMEN: Soft, NT  : Man present  EXT: tr edema    Results:  Results for FABRIZIO MIRANDA (MRN [id number]) as of 9/9/2017 08:41   Ref. Range 9/8/2017 14:49   Sodium Latest Ref Range: 133 - 144 mmol/L 142   Potassium Latest Ref Range: 3.4 - 5.3 mmol/L 3.8   Chloride Latest Ref Range: 94 - 109 mmol/L 107   Carbon Dioxide Latest Ref Range: 20 - 32 mmol/L 28   Urea Nitrogen Latest Ref Range: 7 - 30 mg/dL 82 (H)   Creatinine Latest Ref Range: 0.66 - 1.25 mg/dL 5.31 (H)   GFR Estimate Latest Ref Range: >60 mL/min/1.7m2 11 (L)   GFR Estimate If Black Latest Ref Range: >60 mL/min/1.7m2 13 (L)   Calcium Latest Ref Range: 8.5 - 10.1 mg/dL 7.8 (L)   Anion Gap " Latest Ref Range: 3 - 14 mmol/L 8   Phosphorus Latest Ref Range: 2.5 - 4.5 mg/dL 3.8   Albumin Latest Ref Range: 3.4 - 5.0 g/dL 2.4 (L)   Glucose Latest Ref Range: 70 - 99 mg/dL 112 (H)               Oralia De La Torre, NP

## 2017-09-08 NOTE — NURSING NOTE
"Chief Complaint   Patient presents with     RECHECK     Follow up KRISTINA, CKD stage 3.       Initial /74  Pulse 95  Ht 1.778 m (5' 10\")  Wt 67 kg (147 lb 9.6 oz)  SpO2 99%  BMI 21.18 kg/m2 Estimated body mass index is 21.18 kg/(m^2) as calculated from the following:    Height as of this encounter: 1.778 m (5' 10\").    Weight as of this encounter: 67 kg (147 lb 9.6 oz).  Medication Reconciliation: incomplete   Pt did not bring list of medications with him and he does not know them by name. All he knows is there are some changes that have been made recently to his medications at the hospital.  Peyton Calles., CMA    "

## 2017-09-09 NOTE — PROGRESS NOTES
Nephrology clinic Visit 9/8/17    Assessment and Plan:       1. KRISTINA/CKD - It is unlikely that patient will return to his baseline creat of 2.5 from 2/24/16. He has had long standing obstructive uropathy and has developed ESRD. Even with a creat of 4.6 his GFR is 13 ml/mn and this is likely over estimated given patient's poor muscle mass.    - Had long discussion with patient and daughter today regarding preparations for dialysis. We discussed the benefits of being proactive. He is not interested in PD. He is interested in HD.    - Patient interested in Kidney Smart program so this will be arranged   - Will arrange for Vasc Surgery consult next visit   - He is drinking fluids adequately and does not use NSAIDs   - Wood remains intact. He is measuring his urine and is putting out > 1.5 liters/day.       2. Electrolytes - No acute concerns. K 3.8.    - continue Florinef      3. Volume status - Euvolemic. No dyspnea, has trace edema. Making ~ 1.5 liters/urine/day. Weight 67 kg, up from 65.1 kg at our last visit. Albumin 2.4 on 9/8/17. B/P teens/ x 3. Unclear where his ideal body weight would be.    - No need for diuretics at this time      4. HTN - Normotensive off antihypertensives. Clinic B/Ps today all in the teens/. Was taken off Amlodipine 5 mg qd and Furosemide 40 mg qd during hospital admission and started on Florinef for orthostasis. Flomax held given hypotension.       5. BPH/bladder mass - Has wood. To see Urology on 9/12/17 for f/u. Currently on Proscar. Flomax discontinued due to hypotension      6. Acid base - No acute concerns. Bicarb is 28. Bicarb was discontinued during admission.       7. BMD - Corrected Ca 9.0, Phos 3.8, albumin 2.4, Vit D 29, PTH intact 120   - Continue Phoslo      8. Nutrition - Despite good appetite albumin has declined from 3.9 to 2.4.    - Will monitor nutrition status closely   - Will check urine protein next visit       9. Anemia - Hgb 8.8 w/normal RBC indices on 8/29/17.   Etiology is likely anemia of renal disease   - He should have routine colon cancer screening   - Iron studies 8/8/17: Fe 54, Ferritin 679, Tsat 32%   - Will enroll in anemia management program for EARLENE       10. Disposition - RTC in 4 wks for f/u with ongoing weekly labs      Assessment and plan was discussed with patient and daughter, then both voice understanding and agreement.      Reason for Visit:  KRISTINA/CKD      HPI:  Dung Norton is a 71 year old year old male with a PMH of CKDIII , HTN, Obstructive uropathy 2/2 BPH/benign bladder mass with creat as high as 14.4. He did not require RRT and creat declined with insertion of wood catheter. At the time of  hospital discharge on 7/24/17 creat was 6.3. Post hospital creat declined to a low of 5.9 on 8/4, but then began rising to a high of 7.8 on the day of most recent hospital admission on 8/28. He was also found to be hypovolemic and was given IVF.   During hospital admission creat declined to a low of 4.6 while getting IVF, but now has begun to rise again and is 5.3 today. Patient was also treated for a Serratia UTI during admission and new wood was inserted. He was found to have orthostatic hypotension and was started on Florinef      ROS:  Patient has no complaints. He is very happy that his dietary restriction was lifted while in hospital, but continues to be careful with his potassium intake. Energy is not quite what is has been in the past per daughter. He is living independently. Has good appetite. No dyspnea, chest pain, abdominal pain. Has wood. Has mild Edema.       Chronic Medical Problems:      HTN  BPH  Obstructive uropathy   HTN  Tobacco abuse  HLD  Anemia  Pulmonary nodules  KRISTINA  CKD5    Personal Hx:   Social History     Social History     Marital status:      Spouse name: N/A     Number of children: 2     Years of education: N/A     Occupational History      [employer]     Social History Main Topics     Smoking status: Former  "Smoker     Types: Cigarettes     Smokeless tobacco: Never Used     Alcohol use No     Drug use: No     Sexual activity: Not Currently     Other Topics Concern     Not on file     Social History Narrative       Allergies:  No Known Allergies    Medications:  Prior to Admission medications    Medication Sig Start Date End Date Taking? Authorizing Provider   finasteride (PROSCAR) 5 MG tablet Take 1 tablet (5 mg) by mouth daily 8/16/17  Yes Britt, Oralia Santiago, NP   fludrocortisone (FLORINEF) 0.1 MG tablet Take 2 tablets (0.2 mg) by mouth daily 9/5/17   Liseth Villanueva MD   calcium acetate (PHOSLO) 667 MG CAPS capsule Take 1 capsule (667 mg) by mouth 3 times daily (with meals) 9/5/17   Liseth Villanueva MD   Multiple Vitamins-Minerals (MULTIVITAMIN MEN PO) Take 1 tablet by mouth daily    Reported, Patient   fish oil-omega-3 fatty acids (FISH OIL) 1000 MG capsule Take 1 g by mouth daily    Reported, Patient   aspirin 81 MG tablet Take 1 tablet by mouth daily.    Reported, Patient       Vitals:  /74  Pulse 95  Ht 1.778 m (5' 10\")  Wt 67 kg (147 lb 9.6 oz)  SpO2 99%  BMI 21.18 kg/m2    Exam:  GEN: Frail male in NAD. Well groomed. Dtr present  CARDIAC RRR  LUNGS: CTA  ABDOMEN: Soft, NT  : Man present  EXT: tr edema    Results:  Results for FABRIZIO MIRANDA (MRN [id number]) as of 9/9/2017 08:41   Ref. Range 9/8/2017 14:49   Sodium Latest Ref Range: 133 - 144 mmol/L 142   Potassium Latest Ref Range: 3.4 - 5.3 mmol/L 3.8   Chloride Latest Ref Range: 94 - 109 mmol/L 107   Carbon Dioxide Latest Ref Range: 20 - 32 mmol/L 28   Urea Nitrogen Latest Ref Range: 7 - 30 mg/dL 82 (H)   Creatinine Latest Ref Range: 0.66 - 1.25 mg/dL 5.31 (H)   GFR Estimate Latest Ref Range: >60 mL/min/1.7m2 11 (L)   GFR Estimate If Black Latest Ref Range: >60 mL/min/1.7m2 13 (L)   Calcium Latest Ref Range: 8.5 - 10.1 mg/dL 7.8 (L)   Anion Gap Latest Ref Range: 3 - 14 mmol/L 8   Phosphorus Latest Ref Range: 2.5 - 4.5 mg/dL " 3.8   Albumin Latest Ref Range: 3.4 - 5.0 g/dL 2.4 (L)   Glucose Latest Ref Range: 70 - 99 mg/dL 112 (H)

## 2017-09-11 DIAGNOSIS — N18.5 CKD (CHRONIC KIDNEY DISEASE) STAGE 5, GFR LESS THAN 15 ML/MIN (H): Primary | ICD-10-CM

## 2017-09-12 ENCOUNTER — TELEPHONE (OUTPATIENT)
Dept: PHARMACY | Facility: CLINIC | Age: 72
End: 2017-09-12

## 2017-09-12 ENCOUNTER — OFFICE VISIT (OUTPATIENT)
Dept: FAMILY MEDICINE | Facility: CLINIC | Age: 72
End: 2017-09-12
Payer: COMMERCIAL

## 2017-09-12 ENCOUNTER — OFFICE VISIT (OUTPATIENT)
Dept: UROLOGY | Facility: CLINIC | Age: 72
End: 2017-09-12

## 2017-09-12 VITALS
HEIGHT: 70 IN | TEMPERATURE: 98.3 F | SYSTOLIC BLOOD PRESSURE: 118 MMHG | OXYGEN SATURATION: 99 % | DIASTOLIC BLOOD PRESSURE: 70 MMHG | HEART RATE: 93 BPM | WEIGHT: 150 LBS | BODY MASS INDEX: 21.47 KG/M2

## 2017-09-12 VITALS
DIASTOLIC BLOOD PRESSURE: 70 MMHG | HEART RATE: 92 BPM | SYSTOLIC BLOOD PRESSURE: 119 MMHG | HEIGHT: 70 IN | WEIGHT: 150.7 LBS | BODY MASS INDEX: 21.58 KG/M2

## 2017-09-12 DIAGNOSIS — Z12.11 SCREEN FOR COLON CANCER: ICD-10-CM

## 2017-09-12 DIAGNOSIS — D63.1 ANEMIA OF CHRONIC RENAL FAILURE, STAGE 5 (H): ICD-10-CM

## 2017-09-12 DIAGNOSIS — N18.5 CKD (CHRONIC KIDNEY DISEASE) STAGE 5, GFR LESS THAN 15 ML/MIN (H): ICD-10-CM

## 2017-09-12 DIAGNOSIS — E03.9 ACQUIRED HYPOTHYROIDISM: ICD-10-CM

## 2017-09-12 DIAGNOSIS — N18.5 ANEMIA OF CHRONIC RENAL FAILURE, STAGE 5 (H): ICD-10-CM

## 2017-09-12 DIAGNOSIS — Z87.891 EX-SMOKER: ICD-10-CM

## 2017-09-12 DIAGNOSIS — N18.5 ANEMIA OF CHRONIC RENAL FAILURE, STAGE 5 (H): Primary | ICD-10-CM

## 2017-09-12 DIAGNOSIS — I95.1 ORTHOSTATIC HYPOTENSION: Primary | ICD-10-CM

## 2017-09-12 DIAGNOSIS — N18.6 END STAGE RENAL DISEASE (H): ICD-10-CM

## 2017-09-12 DIAGNOSIS — N13.9 OBSTRUCTIVE UROPATHY: ICD-10-CM

## 2017-09-12 DIAGNOSIS — R33.9 URINARY RETENTION: Primary | ICD-10-CM

## 2017-09-12 DIAGNOSIS — N17.9 ACUTE RENAL FAILURE, UNSPECIFIED ACUTE RENAL FAILURE TYPE (H): ICD-10-CM

## 2017-09-12 DIAGNOSIS — D63.1 ANEMIA OF CHRONIC RENAL FAILURE, STAGE 5 (H): Primary | ICD-10-CM

## 2017-09-12 DIAGNOSIS — Z23 NEED FOR PROPHYLACTIC VACCINATION AND INOCULATION AGAINST INFLUENZA: ICD-10-CM

## 2017-09-12 PROCEDURE — G0008 ADMIN INFLUENZA VIRUS VAC: HCPCS | Performed by: FAMILY MEDICINE

## 2017-09-12 PROCEDURE — 99214 OFFICE O/P EST MOD 30 MIN: CPT | Mod: 25 | Performed by: FAMILY MEDICINE

## 2017-09-12 PROCEDURE — 84439 ASSAY OF FREE THYROXINE: CPT | Performed by: FAMILY MEDICINE

## 2017-09-12 PROCEDURE — 90662 IIV NO PRSV INCREASED AG IM: CPT | Performed by: FAMILY MEDICINE

## 2017-09-12 PROCEDURE — 36415 COLL VENOUS BLD VENIPUNCTURE: CPT | Performed by: FAMILY MEDICINE

## 2017-09-12 PROCEDURE — 84443 ASSAY THYROID STIM HORMONE: CPT | Performed by: FAMILY MEDICINE

## 2017-09-12 RX ORDER — TAMSULOSIN HYDROCHLORIDE 0.4 MG/1
CAPSULE ORAL
Refills: 3 | COMMUNITY
Start: 2017-08-17 | End: 2017-09-12

## 2017-09-12 RX ORDER — AMLODIPINE BESYLATE 5 MG/1
TABLET ORAL
Refills: 3 | COMMUNITY
Start: 2017-08-17 | End: 2017-09-12

## 2017-09-12 RX ORDER — NICOTINE 21 MG/24HR
1 PATCH, TRANSDERMAL 24 HOURS TRANSDERMAL EVERY 24 HOURS
Qty: 30 PATCH | Refills: 0 | Status: SHIPPED | OUTPATIENT
Start: 2017-09-12 | End: 2017-10-06

## 2017-09-12 RX ORDER — SODIUM BICARBONATE 650 MG/1
TABLET ORAL
Refills: 3 | COMMUNITY
Start: 2017-08-24 | End: 2017-10-06

## 2017-09-12 RX ORDER — FUROSEMIDE 20 MG
TABLET ORAL
Refills: 1 | COMMUNITY
Start: 2017-08-04 | End: 2017-09-12

## 2017-09-12 ASSESSMENT — ENCOUNTER SYMPTOMS
CHILLS: 0
BLOOD IN STOOL: 0
DIZZINESS: 1
NAUSEA: 1
JAUNDICE: 0
ALTERED TEMPERATURE REGULATION: 0
TREMORS: 0
HYPOTENSION: 1
ARTHRALGIAS: 0
WEIGHT GAIN: 0
VOMITING: 1
CLAUDICATION: 0
MEMORY LOSS: 0
NECK PAIN: 0
BACK PAIN: 0
MYALGIAS: 0
DIARRHEA: 0
BOWEL INCONTINENCE: 0
POLYPHAGIA: 0
WEIGHT LOSS: 1
ABDOMINAL PAIN: 1
MUSCLE WEAKNESS: 1
INCREASED ENERGY: 1
DECREASED APPETITE: 1
DISTURBANCES IN COORDINATION: 0
HALLUCINATIONS: 0
CONSTIPATION: 0
MUSCLE CRAMPS: 0
STIFFNESS: 1
HEMATURIA: 0
SPEECH CHANGE: 0
POLYDIPSIA: 1
HEARTBURN: 0
BRUISES/BLEEDS EASILY: 0
SYNCOPE: 1
RECTAL PAIN: 0
JOINT SWELLING: 0
PARALYSIS: 0
SWOLLEN GLANDS: 0
FATIGUE: 1
SEIZURES: 0
DYSURIA: 0
LOSS OF CONSCIOUSNESS: 1
ORTHOPNEA: 0
PALPITATIONS: 0
NUMBNESS: 1
SLEEP DISTURBANCES DUE TO BREATHING: 0
FLANK PAIN: 0
DIFFICULTY URINATING: 0
FEVER: 0
HEADACHES: 1
RECTAL BLEEDING: 0
HYPERTENSION: 1
LEG PAIN: 1
LIGHT-HEADEDNESS: 1
TINGLING: 0
TACHYCARDIA: 0
EXERCISE INTOLERANCE: 0
NIGHT SWEATS: 0
BLOATING: 0
WEAKNESS: 1
LEG SWELLING: 1

## 2017-09-12 ASSESSMENT — PAIN SCALES - GENERAL: PAINLEVEL: NO PAIN (0)

## 2017-09-12 NOTE — MR AVS SNAPSHOT
After Visit Summary   9/12/2017    Dung Norton    MRN: [id number]           Patient Information     Date Of Birth          1945        Visit Information        Provider Department      9/12/2017 2:00 PM Haley Baker MD [facility]        Today's Diagnoses     Orthostatic hypotension    -  1    Screen for colon cancer        Need for prophylactic vaccination and inoculation against influenza        Acute renal failure, unspecified acute renal failure type (H)        Acquired hypothyroidism        Ex-smoker        ESRD (end stage renal disease) on dialysis (H)          Care Instructions    [facility]    If you have any questions regarding to your visit please contact your care team:       Team Red:   Clinic Hours Telephone Number   Dr. Toya Schwartz  (pediatrics)  Nohemy Rogers NP 7am-7pm  Monday - Thursday   7am-5pm  Fridays  [phone] (fax)    Nikki NAVA  [phone]   Urgent Care - [place] and [place] Monday-Friday  [place] - 11am-8pm  Saturday-Sunday  Both sites - 9am-5pm  [phone] - [facility]  [phone] [facility]       What options do I have for visits at the clinic other than the traditional office visit?  To expand how we care for you, many of our providers are utilizing electronic visits (e-visits) and telephone visits, when medically appropriate, for interactions with their patients rather than a visit in the clinic.   We also offer nurse visits for many medical concerns. Just like any other service, we will bill your insurance company for this type of visit based on time spent on the phone with your provider. Not all insurance companies cover these visits. Please check with your medical insurance if this type of visit is covered. You will be responsible for any charges that are not paid by your insurance.      E-visits via EDMdesigner:  generally incur a $35.00 fee.  Telephone  visits:  Time spent on the phone: *charged based on time that is spent on the phone in increments of 10 minutes. Estimated cost:   5-10 mins $30.00   11-20 mins. $59.00   21-30 mins. $85.00     As always, Thank you for trusting us with your health care needs!            Discharged by Bev Rosa MA.            Follow-ups after your visit        Additional Services     CALL IT QUITS (QUITPLAN) REFERRAL       [place] [employer] QUITLINES FAX FORM  Fax form to: 1 [phone]    The clinic will facilitate the referral to the quitline.    Provider Information:  ===============================================================  Haley Baker MD  ID#: [id number] - [place] [facility] [place] [phone] Fax: [phone]   [email or web address]  Payor: MEDICA / Plan: MEDICA PRIME SOLUTION / Product Type: Indemnity /   ===============================================================    The Public Health Service Guideline does not recommend providing over-the-counter nicotine replacement therapy products without physician authorization to patients with the following conditions: pregnancy, uncontrolled high blood pressure, or cardiovascular diseases.     I authorize the [place] Tobacco Quitlines to provide over-the-counter nicotine replacement products for the patient listed below if the patient's health plan benefits cover NRT or if the patient is eligible for QUITPLAN services.    Patient Consented to:  ===============================================================  - YES - I am ready to quit tobacco and request the above information be given to the quitline so they may contact me.  I understand that one of [place]'s Tobacco Quitlines will inform my provider about my participation.  ===============================================================  Please check the BEST 3-hour call window for them to reach you: 7am - 11am  May we leave a message?  YES  Language Preference:  English  Phone  Number: Home Phone      [phone]  Mobile          [phone]     E-mail Address: No e-mail address on record    ========================================================================  FOR QUITLINE USE ONLY:  THIS INFORMATION WILL BE PROVIDED BACK TO THE PROVIDER  Contact date: __/ __/__ or ____ Did not reach after three attempts.    Outcome:  __ Enrolled in telephone counseling program  __ Declined  __ Not Reached    Stage of readiness: _______________________  Planned Quit Date: ___/ ___/ ___  Comments:      2011 [facility]   This message funded by Blue Cross and Blue [employer] [facility], an independent licensee of the Blue Cross and Blue Shield Association. Rev. 11/1/12            CARE COORDINATION REFERRAL       Services are provided by a Care Coordinator for people with complex needs such as: medical, social, or financial troubles.  The Care Coordinator works with the patient and their Primary Care Provider to determine health goals, obtain resources, achieve outcomes, and develop care plans that help coordinate the patient's care.     Reason for Referral: Recent Discharge From Hospital and Uncontrolled Chronic Disease    Provide additional details for Care Coordination to best meet the patient's current needs:     Clinical Staff have discussed the Care Coordination Referral with the patient and/or caregiver: yes                  Your next 10 appointments already scheduled     Sep 22, 2017  1:30 PM CDT   (Arrive by 1:15 PM)   Return Visit with  Prostate Cancer Ctr Nurse   [facility] Prostate and Urologic Cancers ([facility])    [address]  [address] [facility] [id number] [phone]            Oct 06, 2017  1:00 PM CDT   (Arrive by 12:30 PM)   Return Visit with Oralia De La Torre NP   [facility] ([facility])    [address]  [address] [facility] [id number] [phone]          "   Oct 13, 2017  8:15 AM CDT   (Arrive by 8:00 AM)   Return Visit with Tamiko Vanegas MD   [facility]rolog[facility] ([facility])    [address] [place] [id number] [phone]              Future tests that were ordered for you today     Open Future Orders        Priority Expected Expires Ordered    Fecal colorectal cancer screen (FIT) Routine 10/3/2017 2017 2017            Who to contact     If you have questions or need follow up information about today's clinic visit or your schedule please contact [facility] directly at [phone].  Normal or non-critical lab and imaging results will be communicated to you by Nautithart, letter or phone within 4 business days after the clinic has received the results. If you do not hear from us within 7 days, please contact the clinic through Nautithart or phone. If you have a critical or abnormal lab result, we will notify you by phone as soon as possible.  Submit refill requests through Clipmarks or call your pharmacy and they will forward the refill request to us. Please allow 3 business days for your refill to be completed.          Additional Information About Your Visit        Nautithart Information     Clipmarks lets you send messages to your doctor, view your test results, renew your prescriptions, schedule appointments and more. To sign up, go to [email or web address] . Click on \"Log in\" on the left side of the screen, which will take you to the Welcome page. Then click on \"Sign up Now\" on the right side of the page.     You will be asked to enter the access code listed below, as well as some personal information. Please follow the directions to create your username and password.     Your access code is: [email or web address]  Expires: 10/10/2017  2:21 PM     Your access code will  in 90 days. If you need help or a new code, please call your [facility] or " "[phone].        Care EveryWhere ID     This is your Care EveryWhere ID. This could be used by other organizations to access your [facility] medical records  [id number]        Your Vitals Were     Pulse Temperature Height Pulse Oximetry BMI (Body Mass Index)       93 98.3  F (36.8  C) (Oral) 5' 10\" (1.778 m) 99% 21.52 kg/m2        Blood Pressure from Last 3 Encounters:   09/12/17 118/70   09/12/17 119/70   09/08/17 111/74    Weight from Last 3 Encounters:   09/12/17 150 lb (68 kg)   09/12/17 150 lb 11.2 oz (68.4 kg)   09/08/17 147 lb 9.6 oz (67 kg)              We Performed the Following     CALL IT QUITS (QUITPLAN) REFERRAL     CARE COORDINATION REFERRAL     TSH with free T4 reflex          Today's Medication Changes          These changes are accurate as of: 9/12/17  2:58 PM.  If you have any questions, ask your nurse or doctor.               These medicines have changed or have updated prescriptions.        Dose/Directions    * NICOTINE STEP 1 21 MG/24HR 24 hr patch   This may have changed:  Another medication with the same name was added. Make sure you understand how and when to take each.   Generic drug:  nicotine   Changed by:  Leda Baxter PA        PLACE 1 PATCH ONTO SKIN Q 24 H.   Refills:  1       * nicotine 14 MG/24HR 24 hr patch   Commonly known as:  NICODERM CQ   This may have changed:  You were already taking a medication with the same name, and this prescription was added. Make sure you understand how and when to take each.   Used for:  Ex-smoker   Changed by:  Haley Baker MD        Dose:  1 patch   Place 1 patch onto the skin every 24 hours   Quantity:  30 patch   Refills:  0       * Notice:  This list has 2 medication(s) that are the same as other medications prescribed for you. Read the directions carefully, and ask your doctor or other care provider to review them with you.      Stop taking these medicines if you haven't already. Please contact your care team if you have questions.  "    amLODIPine 5 MG tablet   Commonly known as:  NORVASC   Stopped by:  Haley Baker MD           furosemide 20 MG tablet   Commonly known as:  LASIX   Stopped by:  Haley Baker MD           tamsulosin 0.4 MG capsule   Commonly known as:  FLOMAX   Stopped by:  Haley Baker MD                Where to get your medicines      These medications were sent to [id number] - [place], [place] - [address] AT [facility] [address] [place]  [address], [place] [address]     Phone:  [phone]     nicotine 14 MG/24HR 24 hr patch                Primary Care Provider Office Phone # Fax #    Haley Baker [phone] [address]  [place] [address]        Equal Access to Services     [facility] : Hadii cristine marin hadasho Soomaali, waaxda luqadaha, qaybta kaalmada adeegyada, gibson duong haysaadia green . So [facility] [phone].    ATENCIÓN: Si habla español, tiene a montague disposición servicios gratuitos de asistencia lingüística. Llame al [phone].    We comply with applicable federal civil rights laws and [place] laws. We do not discriminate on the basis of race, color, national origin, age, disability sex, sexual orientation or gender identity.            Thank you!     Thank you for choosing [facility]  for your care. Our goal is always to provide you with excellent care. Hearing back from our patients is one way we can continue to improve our services. Please take a few minutes to complete the written survey that you may receive in the mail after your visit with us. Thank you!             Your Updated Medication List - Protect others around you: Learn how to safely use, store and throw away your medicines at www.disposemymeds.org.          This list is accurate as of: 9/12/17  2:58 PM.  Always use your most recent med list.                   Brand Name Dispense Instructions for use Diagnosis    aspirin 81 MG tablet      Take 1  tablet by mouth daily.        calcium acetate 667 MG Caps capsule    PHOSLO    180 capsule    Take 1 capsule (667 mg) by mouth 3 times daily (with meals)    Chronic kidney disease, unspecified CKD stage       finasteride 5 MG tablet    PROSCAR    90 tablet    Take 1 tablet (5 mg) by mouth daily    Benign prostatic hyperplasia with urinary retention       fish oil-omega-3 fatty acids 1000 MG capsule      Take 1 g by mouth daily        fludrocortisone 0.1 MG tablet    FLORINEF    60 tablet    Take 2 tablets (0.2 mg) by mouth daily    Orthostatic hypotension       MULTIVITAMIN MEN PO      Take 1 tablet by mouth daily        * NICOTINE STEP 1 21 MG/24HR 24 hr patch   Generic drug:  nicotine      PLACE 1 PATCH ONTO SKIN Q 24 H.        * nicotine 14 MG/24HR 24 hr patch    NICODERM CQ    30 patch    Place 1 patch onto the skin every 24 hours    Ex-smoker       sodium bicarbonate 650 MG tablet      TK 1 T PO TID        * Notice:  This list has 2 medication(s) that are the same as other medications prescribed for you. Read the directions carefully, and ask your doctor or other care provider to review them with you.

## 2017-09-12 NOTE — LETTER
"9/12/2017       RE: Dung Norton  [address]  [place] [address]     Dear Colleague,    Thank you for referring your patient, Dung Norton, to the [facility] at [facility]. Please see a copy of my visit note below.    It was my pleasure to meet . Dung Norton, a 71 year old year old male seen in consultation today for chief complaint: Consult (New patient consult for urinary retention)    HPI: . Dung Norton has PMH significant for HTN, CKD, BPH, tobacco abuse, pumonary nodules who was admitted 7/12/17 for weight loss and ARF (Creatinine 14) with a CT showing BL hydronephrosis and a vascular bladder mass.  A Man was placed precipitating gross hematuria.  CBI was begun amnd patient was taken to the OR the same date for cystoscopy with clot evac, revealing severe trilobar hypertrophy.  A region of \"diffuse bladder wall changes\" d/t catheterization was biopsied but this was negative for malignancy.  No masses were seen.  CBI was able to be weaned and he was discharged with plans for outpatient Urology voiding trial.  Unfortunately before this could occur, he was readmitted 8/28 with orthostatic hypotension and worsening renal function (Cr 7.8mg/dL).  A renal US on that date showed resolution of right-sided hydro with persistent mild left-sided hydro.   The vascular bladder mass was again noted.  Urology was consulted and explained that the Man was draining, UOP was robust and the bladder mass was merely a median prostatic lobe.  It was recommended that both finasteride and flomax be restarted with followup in Urology Clinic.  The KRISTINA was thought to be prerenal in nature and Cr came down to 4.64mg/dL prior to discharge on 9/5.     Today patient is feeling well.  Not dizzy.     Not on antibiotics currently   On finasteride.  Tamsulosin has been d/c'd due to dizziness.  Fludrocortisone has been started. "   Prior to July was voiding poorly - with hesitancy and sensation of incomplete emptying. No previous urologic visits or procedures. No h/o UTIs. No previous episodes of hematuria (prior to July hospitalization), stones. No PSAs in our EMR  Drinking and eating fine    Two adult children.  Presents with his daughter.   Has quit - last cigarette was in July. Nicoderm patches.     Past Medical History:   Diagnosis Date     BPH (benign prostatic hyperplasia)      Chronic kidney disease      HTN      Pulmonary nodules      Tobacco abuse        Past Surgical History:   Procedure Laterality Date     APPENDECTOMY  AGE 6     CYSTOSCOPY, FULGURATE BLEEDERS, EVACUATE CLOT(S), COMBINED N/A 7/16/2017    Procedure: COMBINED CYSTOSCOPY, FULGURATE BLEEDERS, EVACUATE CLOT(S);  Cystoscopy clot evacuation, bladder biopsy and fulguration (per surgeons note) NERVE BLOCK PERIPHERAL;  Surgeon: Tamiko Vanegas MD;  Location: [facility] OR     SINUS SURGERY  AGE 6     TONSILLECTOMY      CHILDHOOD       FAMILY HISTORY: Denies family history of urologic cancer.     SOCIAL HISTORY:   Retired /    reports that he has quit smoking. His smoking use included Cigarettes. He has never used smokeless tobacco.    Current Outpatient Prescriptions   Medication Sig Dispense Refill     amLODIPine (NORVASC) 5 MG tablet   3     furosemide (LASIX) 20 MG tablet TK 1 T PO ONCE D  1     NICOTINE STEP 1 21 MG/24HR 24 hr patch PLACE 1 PATCH ONTO SKIN Q 24 H.  1     sodium bicarbonate 650 MG tablet TK 1 T PO TID  3     tamsulosin (FLOMAX) 0.4 MG capsule   3     fludrocortisone (FLORINEF) 0.1 MG tablet Take 2 tablets (0.2 mg) by mouth daily 60 tablet 0     calcium acetate (PHOSLO) 667 MG CAPS capsule Take 1 capsule (667 mg) by mouth 3 times daily (with meals) 180 capsule 0     finasteride (PROSCAR) 5 MG tablet Take 1 tablet (5 mg) by mouth daily 90 tablet 3     Multiple Vitamins-Minerals (MULTIVITAMIN MEN PO) Take 1 tablet by mouth daily       fish  oil-omega-3 fatty acids (FISH OIL) 1000 MG capsule Take 1 g by mouth daily       aspirin 81 MG tablet Take 1 tablet by mouth daily.         ALLERGIES: Review of patient's allergies indicates no known allergies.      REVIEW OF SYSTEMS:  Answers for HPI/ROS submitted by the patient on 9/12/2017   General Symptoms: Yes  Skin Symptoms: No  HENT Symptoms: No  EYE SYMPTOMS: No  HEART SYMPTOMS: Yes  LUNG SYMPTOMS: No  INTESTINAL SYMPTOMS: Yes  URINARY SYMPTOMS: Yes  REPRODUCTIVE SYMPTOMS: No  SKELETAL SYMPTOMS: Yes  BLOOD SYMPTOMS: Yes  NERVOUS SYSTEM SYMPTOMS: Yes  MENTAL HEALTH SYMPTOMS: No  Fever: No  Loss of appetite: Yes  Weight loss: Yes  Weight gain: No  Fatigue: Yes  Night sweats: No  Chills: No  Increased stress: Yes  Excessive hunger: No  Excessive thirst: Yes  Feeling hot or cold when others believe the temperature is normal: No  Loss of height: No  Post-operative complications: No  Surgical site pain: No  Hallucinations: No  Change in or Loss of Energy: Yes  Hyperactivity: No  Confusion: No  Chest pain or pressure: No  Fast or irregular heartbeat: No  Pain in legs with walking: Yes  Swelling in feet or ankles: Yes  Trouble breathing while lying down: No  Fingers or Toes appear blue: No  High blood pressure: Yes  Low blood pressure: Yes  Fainting: Yes  Murmurs: Yes  Chest pain on exertion: No  Chest pain at rest: No  Cramping pain in leg during exercise: No  Pacemaker: No  Varicose veins: No  Edema or swelling: Yes  Fast heart beat: No  Wake up at night with shortness of breath: No  Heart flutters: No  Light-headedness: Yes  Exercise intolerance: No  Heart burn or indigestion: No  Nausea: Yes  Vomiting: Yes  Abdominal pain: Yes  Bloating: No  Constipation: No  Diarrhea: No  Blood in stool: No  Black stools: No  Rectal or Anal pain: No  Fecal incontinence: No  Rectal bleeding: No  Yellowing of skin or eyes: No  Vomit with blood: No  Change in stools: No  Hemorrhoids: No  Trouble holding urine or incontinence:  "No  Pain or burning: No  Trouble starting or stopping: No  Increased frequency of urination: Yes  Blood in urine: No  Decreased frequency of urination: No  Frequent nighttime urination: Yes  Flank pain: No  Difficulty emptying bladder: No  Back pain: No  Muscle aches: No  Neck pain: No  Swollen joints: No  Joint pain: No  Bone pain: No  Muscle cramps: No  Muscle weakness: Yes  Joint stiffness: Yes  Bone fracture: No  Anemia: Yes  Swollen glands: No  Easy bleeding or bruising: No  Trouble with coordination: No  Dizziness or trouble with balance: Yes  Fainting or black-out spells: Yes  Memory loss: No  Headache: Yes  Seizures: No  Speech problems: No  Tingling: No  Tremor: No  Weakness: Yes  Difficulty walking: Yes  Paralysis: No  Numbness: Yes    GENERAL PHYSICAL EXAM:   Vitals: /70  Pulse 92  Ht 1.778 m (5' 10\")  Wt 68.4 kg (150 lb 11.2 oz)  BMI 21.62 kg/m2  Body mass index is 21.62 kg/(m^2).    GENERAL: Well groomed, well developed, thin male in NAD.  NECK: Neck supple. No adenopathy. Thyroid symmetric, normal size  GI: Soft, NT, ND, no palpable masses.  + scar RLQ from open appendectomy.   SKIN: Warm to touch, dry.  No visible rashes or lesions on examined areas.  HEMATOLOGIC/LYMPHATIC/IMMUNOLOGIC: normal inguinal nodes. No LE edema.  NEURO: Alert and oriented x 3.  PSYCH: Normal mood and affect, pleasant and agreeable during interview and exam.     :      Inguinal: no hernias or palpable lymph nodes      Circumcised penis, no penile plaques or lesions. Orthotopic location of the urethral meatus.       Scrotum normal.      Testicles of normal firmness and consistency, no masses. + BL varicoceles L>R    LEONELA:      normal rectal tone, small soft amount of stool in the rectal vault.      Large sized prostate, firm but without tenderness, nodules or asymmetry.    PVR: Residual urine by ultrasound was ~700 ml.      RADIOLOGY: The following tests were reviewed:   EXAMINATION: US RENAL COMPLETE, 8/29/2017 8:45 " AM      COMPARISON: CT dated 7/13/2017, ultrasound dated 7/13/2017     HISTORY: Acute kidney injury with recent obstructive uropathy     FINDINGS:     Right kidney: Measures 9.7 cm in length. Parenchyma is of normal  thickness. Slightly increased echogenicity with mild loss of cortical  medullary differentiation. No focal mass. Resolution of hydronephrosis  compared with exam dated 7/13/2017.     Left kidney: Measures approximately 10.5 cm in length. Parenchyma is  of normal thickness. Slightly increased echogenicity with mild loss of  cortical medullary differentiation. No focal mass. Mild  hydronephrosis, improved when compared with exam dated 7/13/2017.     Bladder: The bladder is largely decompressed with Man catheter in  place. There is heterogeneous debris within the urinary bladder and  vascular mass protruding into the bladder lumen. This is similar in  appearance to exam dated 7/13/2017.         IMPRESSION:  1.  Resolution of right-sided hydronephrosis with persistent mild  left-sided hydronephrosis.  2.  Increased echogenicity of the renal parenchyma with mild loss of  cortical medullary differentiation can be seen in chronic medical  renal disease.  3.  A vascular mass protrudes into the bladder lumen with adjacent  debris. These findings remain concerning for malignancy with possible  superimposed bladder clot.    EXAMINATION: CT CHEST ABDOMEN PELVIS W/O CONTRAST, 7/13/2017 5:21 PM  TECHNIQUE:  Helical CT images from the thoracic inlet through the  symphysis pubis were obtained  without IV contrast.      COMPARISON: Ultrasound 7/13/2017, chest radiograph 7/12/2017     HISTORY: mass seen near bladder/prostate. Long time smoker. No  contrast - pt with severe kidney injury.     FINDINGS:     Chest: Heart size is normal. Borderline aneurysmal dilatation of the  ascending thoracic aorta, measuring up to 4.1 cm in diameter in the  coronal plane. Pulmonary artery is normal in caliber. Coronary artery,  aortic  and mitral valve calcifications. Mildly prominent mediastinal  lymph nodes, for example 1.0 cm short axis diameter paratracheal node  (series 5 image 126). No pericardial or pleural effusion. The central  tracheobronchial tree is patent. Trace dependent atelectasis. There  are a few small pulmonary nodules, for example 3 mm left upper lobe  nodule (series 6 image 32) and 3 mm right apical nodule (series 6  image 31).     Abdomen and pelvis:      Moderate diffuse urinary bladder wall thickening, with asymmetric more  irregular thickening along the anterior wall (series 5 image 536).  Bladder is filled with hyperdense clot. Smoothly marginated soft  tissue mass adjacent to the bladder neck appears to arise from the  prostate (series 5 image 561, series 9 image 66), which is enlarged.  Moderate-severe bilateral hydronephrosis and hydroureter. Bilateral  renal cortical thinning.     Normal unenhanced appearance of the liver, pancreas, spleen and  adrenals. Cholelithiasis without evidence of cholecystitis. The colon  and small bowel are within normal limits. Fusiform infrarenal  abdominal aortic aneurysm measuring up to 4.0 cm in diameter measured  in the coronal plane (series 7 image 50). Moderate aortobiiliac  atherosclerosis. No free air or significant free fluid. No evident  lymphadenopathy in the abdomen or pelvis.     Bones and soft tissues: No acute fracture or suspicious bone lesion.  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         IMPRESSION:   1. Large amount of hyperdense clot within the urinary bladder. The  prostate is enlarged with a nodular projection into the base of the  bladder. Additionally, the bladder is moderately thickened with some  irregular asymmetrically thickened areas along its anterior wall.  These findings could be explained by benign prostate enlargement with  a chronically obstructed trabeculated bladder, however the presence of  asymmetric  thickening and hemorrhage are worrisome for malignancy of  the bladder.  2. Moderate-severe bilateral hydronephrosis and hydroureter of both  entire ureters to the bladder.  3. No evidence to suggest metastatic disease within the abdomen,  pelvis or skeleton on this noncontrast CT.   4. A few small pulmonary nodules measuring up to 3 mm. These are  nonspecific. In a patient with a smoking history, a 12 month follow-up  CT is optional per Fleischner society criteria. If malignancy is  discovered, Fleischner society criteria no longer applies.  5. 4.0 cm infrarenal abdominal aortic aneurysm, with moderate  aortobiiliac atherosclerotic disease.       LABS: The last test results for Ms. Dung Norton were reviewed.   PSA -   Lab Results   Component Value Date    PSA 1.72 07/13/2017     BMP -   Recent Labs   Lab Test  09/08/17   1449  09/05/17   0645  09/04/17   0607   09/01/17   0633   NA  142  141  142   < >  142   POTASSIUM  3.8  3.7  3.7   < >  3.6   CHLORIDE  107  105  106   < >  108   CO2  28  29  29   < >  23   BUN  82*  74*  79*   < >  112*   CR  5.31*  4.64*  4.60*   < >  5.26*   GLC  112*  83  86   < >  104*   DERICK  7.8*  7.4*  7.4*   < >  7.5*   MAG   --   1.7  1.6   --   1.7   PHOS  3.8   --   3.2   --   4.6*    < > = values in this interval not displayed.       CBC -   Recent Labs   Lab Test  08/29/17   0720  08/28/17   1909  08/16/17   1426   WBC  10.9  11.4*  10.6   HGB  8.8*  8.6*  9.1*   PLT  246  256  245       ASSESSMENT:   1) BPH h/o gross hematuria (resolved) presumed 2/2 friable prostate  2) Fragile renal fxn --> h/o KRISTINA on CKD initially d/t urinary retention with hydronephrosis, most recently d/t prerenal etiology  3) Residual mild left hydronephrosis (s/p Man placement) in a patient with smoking history    4) Recent hospitalization for dizziness - Flomax has since been stopped, but patient continues on finasteride    PLAN:   - 250mg CIpro given today prior to a voiding trial, which he failed.   > 700cc instilled until patient had urge to void.  Man removed and patient voided 50cc.  At that point his urge to void completely dissipated.    - Taught straight catheterization using a 16Fr latex coude catheter.  He will perform four times daily.    - Followup in 10-14 days with a Urology nurse.  At this appointment patient should DEMONSTRATE ability to catheterize himself and DESCRIBE a consistent schedule of straight-catheterizing four times daily. Please record the output when the patient catheterizes himself - would anticipate that he would put out no more than 500cc.  With any concerns (ie. He cannot catheterize, he doesn't sound like he is cath'ing reliably, or the output is considerably more than 500cc), would place a Man and have him follow up for urodynamics. He has had acute renal failure in the past secondary to urinary retention.   - Continue finasteride  - Will stay off the FLomax.  Patient was recently hospitalized for dizziness and is now taking fludrocortisone.   - Follow up with Dr. Vanegas on 10-13-17 to discuss possible surgical options.  She has seen him in the hospital and scoped him (see OpReport from 7/12/17)    Anaid Baxter PA-C  Department of Urologic Surgery      Again, thank you for allowing me to participate in the care of your patient.      Sincerely,    WALESKA Branham

## 2017-09-12 NOTE — PROGRESS NOTES
Injectable Influenza Immunization Documentation    1.  Are you sick today? (Fever of 100.5 or higher on the day of the clinic)   No    2.  Have you ever had Guillain-Newkirk Syndrome within 6 weeks of an influenza vaccionation?  No    3. Do you have a life-threatening allergy to eggs?  No    4. Do you have a life-threatening allergy to a component of the vaccine? May include antibiotics, gelatin or latex.  No     5. Have you ever had a reaction to a dose of flu vaccine that needed immediate medical attention?  No     Form completed by Bev Rosa MA

## 2017-09-12 NOTE — NURSING NOTE
"Chief Complaint   Patient presents with     Hospital F/U     Flu Shot       Initial /70 (BP Location: Right arm, Patient Position: Chair, Cuff Size: Adult Regular)  Pulse 93  Temp 98.3  F (36.8  C) (Oral)  Ht 5' 10\" (1.778 m)  Wt 150 lb (68 kg)  SpO2 99%  BMI 21.52 kg/m2 Estimated body mass index is 21.52 kg/(m^2) as calculated from the following:    Height as of this encounter: 5' 10\" (1.778 m).    Weight as of this encounter: 150 lb (68 kg).  Medication Reconciliation: complete    "

## 2017-09-12 NOTE — LETTER
[address] [place] [address]    September 14, 2017    Dung Norton  [address] [place] [id number]          Dear Dung,    Thyroid test is Borderline   Take care    Enclosed is a copy of your results.     Results for orders placed or performed in visit on 09/12/17   TSH with free T4 reflex   Result Value Ref Range    TSH 5.32 (H) 0.40 - 4.00 mU/L   T4 free   Result Value Ref Range    T4 Free 0.77 0.76 - 1.46 ng/dL       If you have any questions or concerns, please call myself or my nurse at [phone].      Sincerely,        Haley Baker MD/GRUPO

## 2017-09-12 NOTE — PATIENT INSTRUCTIONS
Please catheterize yourself FOUR times per day.  Cathing supplies have been sent home with you today.  A prescription for catheters has been faxed.  A representative will be in contact with you today.    Please follow up with a Urology nurse on 9-22-17 to demonstrate how you are catheterizing yourself at home.    Follow up with Dr. Vanegas on 10-13-17 to discuss possible surgical options.    It was a pleasure meeting with you today.  Thank you for allowing me and my team the privilege of caring for you today.  YOU are the reason we are here, and I truly hope we provided you with the excellent service you deserve.  Please let us know if there is anything else we can do for you so that we can be sure you are leaving completely satisfied with your care experience.      JENY Evans

## 2017-09-12 NOTE — NURSING NOTE
"Chief Complaint   Patient presents with     Consult     New patient consult for urinary retention       Initial Ht 1.778 m (5' 10\")  Wt 68.4 kg (150 lb 11.2 oz)  BMI 21.62 kg/m2 Estimated body mass index is 21.62 kg/(m^2) as calculated from the following:    Height as of this encounter: 1.778 m (5' 10\").    Weight as of this encounter: 68.4 kg (150 lb 11.2 oz).  Medication Reconciliation: complete     JENY Evans    "

## 2017-09-12 NOTE — LETTER
September 13, 2017      Dung Norton  [address] [place] [address]  [place] [address]        Dear Dung,     Welcome to the [facility]emia Clinic!  You have been referred to our clinic by Cecilia De La Torre CNP for the monitoring of your anemia therapy.  The Anemia Clinic is a referral clinic staffed by [facility] pharmacists.    Our goals in monitoring your anemia therapy include:       Optimizing your anemia therapy.    Providing you with appropriate education and resources concerning your anemia therapy.       Monitoring your lab values (Hemoglobin and iron) and adjusting your anemia therapy as needed.  Your Hemoglobin Goal = 9-10 g/dl    If you use an outside lab to obtain blood draws, it will be your responsibility to report all lab results to the Anemia Clinic.  Follow-up attempts will be made for one month, at that time if we have not heard back from you we will inactive your anemia prescriptions and refer your management back to the referring provider.    Please call the Anemia Clinic at [phone] if you have any questions.  Sincerely,    Tereza Paul, GabriellaD, BCACP  Malina Hussein,[facility]  [phone]  September 13, 2017

## 2017-09-12 NOTE — PROGRESS NOTES
"It was my pleasure to meet Mr. Dung Norton, a 71 year old year old male seen in consultation today for chief complaint: Consult (New patient consult for urinary retention)    HPI: Mr. Dung Norton has PMH significant for HTN, CKD, BPH, tobacco abuse, pumonary nodules who was admitted 7/12/17 for weight loss and ARF (Creatinine 14) with a CT showing BL hydronephrosis and a vascular bladder mass.  A Man was placed precipitating gross hematuria.  CBI was begun amnd patient was taken to the OR the same date for cystoscopy with clot evac, revealing severe trilobar hypertrophy.  A region of \"diffuse bladder wall changes\" d/t catheterization was biopsied but this was negative for malignancy.  No masses were seen.  CBI was able to be weaned and he was discharged with plans for outpatient Urology voiding trial.  Unfortunately before this could occur, he was readmitted 8/28 with orthostatic hypotension and worsening renal function (Cr 7.8mg/dL).  A renal US on that date showed resolution of right-sided hydro with persistent mild left-sided hydro.   The vascular bladder mass was again noted.  Urology was consulted and explained that the Man was draining, UOP was robust and the bladder mass was merely a median prostatic lobe.  It was recommended that both finasteride and flomax be restarted with followup in Urology Clinic.  The KRISTINA was thought to be prerenal in nature and Cr came down to 4.64mg/dL prior to discharge on 9/5.     Today patient is feeling well.  Not dizzy.     Not on antibiotics currently   On finasteride.  Tamsulosin has been d/c'd due to dizziness.  Fludrocortisone has been started.   Prior to July was voiding poorly - with hesitancy and sensation of incomplete emptying. No previous urologic visits or procedures. No h/o UTIs. No previous episodes of hematuria (prior to July hospitalization), stones. No PSAs in our EMR  Drinking and eating fine    Two adult children.  Presents with his daughter.   Has " quit - last cigarette was in July. Nicoderm patches.     Past Medical History:   Diagnosis Date     BPH (benign prostatic hyperplasia)      Chronic kidney disease      HTN      Pulmonary nodules      Tobacco abuse        Past Surgical History:   Procedure Laterality Date     APPENDECTOMY  AGE 6     CYSTOSCOPY, FULGURATE BLEEDERS, EVACUATE CLOT(S), COMBINED N/A 7/16/2017    Procedure: COMBINED CYSTOSCOPY, FULGURATE BLEEDERS, EVACUATE CLOT(S);  Cystoscopy clot evacuation, bladder biopsy and fulguration (per surgeons note) NERVE BLOCK PERIPHERAL;  Surgeon: Tamiko Vanegas MD;  Location: [facility] OR     SINUS SURGERY  AGE 6     TONSILLECTOMY      CHILDHOOD       FAMILY HISTORY: Denies family history of urologic cancer.     SOCIAL HISTORY:   Retired /    reports that he has quit smoking. His smoking use included Cigarettes. He has never used smokeless tobacco.    Current Outpatient Prescriptions   Medication Sig Dispense Refill     amLODIPine (NORVASC) 5 MG tablet   3     furosemide (LASIX) 20 MG tablet TK 1 T PO ONCE D  1     NICOTINE STEP 1 21 MG/24HR 24 hr patch PLACE 1 PATCH ONTO SKIN Q 24 H.  1     sodium bicarbonate 650 MG tablet TK 1 T PO TID  3     tamsulosin (FLOMAX) 0.4 MG capsule   3     fludrocortisone (FLORINEF) 0.1 MG tablet Take 2 tablets (0.2 mg) by mouth daily 60 tablet 0     calcium acetate (PHOSLO) 667 MG CAPS capsule Take 1 capsule (667 mg) by mouth 3 times daily (with meals) 180 capsule 0     finasteride (PROSCAR) 5 MG tablet Take 1 tablet (5 mg) by mouth daily 90 tablet 3     Multiple Vitamins-Minerals (MULTIVITAMIN MEN PO) Take 1 tablet by mouth daily       fish oil-omega-3 fatty acids (FISH OIL) 1000 MG capsule Take 1 g by mouth daily       aspirin 81 MG tablet Take 1 tablet by mouth daily.         ALLERGIES: Review of patient's allergies indicates no known allergies.      REVIEW OF SYSTEMS:  Answers for HPI/ROS submitted by the patient on 9/12/2017   General Symptoms: Yes  Skin  Symptoms: No  HENT Symptoms: No  EYE SYMPTOMS: No  HEART SYMPTOMS: Yes  LUNG SYMPTOMS: No  INTESTINAL SYMPTOMS: Yes  URINARY SYMPTOMS: Yes  REPRODUCTIVE SYMPTOMS: No  SKELETAL SYMPTOMS: Yes  BLOOD SYMPTOMS: Yes  NERVOUS SYSTEM SYMPTOMS: Yes  MENTAL HEALTH SYMPTOMS: No  Fever: No  Loss of appetite: Yes  Weight loss: Yes  Weight gain: No  Fatigue: Yes  Night sweats: No  Chills: No  Increased stress: Yes  Excessive hunger: No  Excessive thirst: Yes  Feeling hot or cold when others believe the temperature is normal: No  Loss of height: No  Post-operative complications: No  Surgical site pain: No  Hallucinations: No  Change in or Loss of Energy: Yes  Hyperactivity: No  Confusion: No  Chest pain or pressure: No  Fast or irregular heartbeat: No  Pain in legs with walking: Yes  Swelling in feet or ankles: Yes  Trouble breathing while lying down: No  Fingers or Toes appear blue: No  High blood pressure: Yes  Low blood pressure: Yes  Fainting: Yes  Murmurs: Yes  Chest pain on exertion: No  Chest pain at rest: No  Cramping pain in leg during exercise: No  Pacemaker: No  Varicose veins: No  Edema or swelling: Yes  Fast heart beat: No  Wake up at night with shortness of breath: No  Heart flutters: No  Light-headedness: Yes  Exercise intolerance: No  Heart burn or indigestion: No  Nausea: Yes  Vomiting: Yes  Abdominal pain: Yes  Bloating: No  Constipation: No  Diarrhea: No  Blood in stool: No  Black stools: No  Rectal or Anal pain: No  Fecal incontinence: No  Rectal bleeding: No  Yellowing of skin or eyes: No  Vomit with blood: No  Change in stools: No  Hemorrhoids: No  Trouble holding urine or incontinence: No  Pain or burning: No  Trouble starting or stopping: No  Increased frequency of urination: Yes  Blood in urine: No  Decreased frequency of urination: No  Frequent nighttime urination: Yes  Flank pain: No  Difficulty emptying bladder: No  Back pain: No  Muscle aches: No  Neck pain: No  Swollen joints: No  Joint pain:  "No  Bone pain: No  Muscle cramps: No  Muscle weakness: Yes  Joint stiffness: Yes  Bone fracture: No  Anemia: Yes  Swollen glands: No  Easy bleeding or bruising: No  Trouble with coordination: No  Dizziness or trouble with balance: Yes  Fainting or black-out spells: Yes  Memory loss: No  Headache: Yes  Seizures: No  Speech problems: No  Tingling: No  Tremor: No  Weakness: Yes  Difficulty walking: Yes  Paralysis: No  Numbness: Yes    GENERAL PHYSICAL EXAM:   Vitals: /70  Pulse 92  Ht 1.778 m (5' 10\")  Wt 68.4 kg (150 lb 11.2 oz)  BMI 21.62 kg/m2  Body mass index is 21.62 kg/(m^2).    GENERAL: Well groomed, well developed, thin male in NAD.  NECK: Neck supple. No adenopathy. Thyroid symmetric, normal size  GI: Soft, NT, ND, no palpable masses.  + scar RLQ from open appendectomy.   SKIN: Warm to touch, dry.  No visible rashes or lesions on examined areas.  HEMATOLOGIC/LYMPHATIC/IMMUNOLOGIC: normal inguinal nodes. No LE edema.  NEURO: Alert and oriented x 3.  PSYCH: Normal mood and affect, pleasant and agreeable during interview and exam.     :      Inguinal: no hernias or palpable lymph nodes      Circumcised penis, no penile plaques or lesions. Orthotopic location of the urethral meatus.       Scrotum normal.      Testicles of normal firmness and consistency, no masses. + BL varicoceles L>R    LEONELA:      normal rectal tone, small soft amount of stool in the rectal vault.      Large sized prostate, firm but without tenderness, nodules or asymmetry.    PVR: Residual urine by ultrasound was ~700 ml.      RADIOLOGY: The following tests were reviewed:   EXAMINATION: US RENAL COMPLETE, 8/29/2017 8:45 AM      COMPARISON: CT dated 7/13/2017, ultrasound dated 7/13/2017     HISTORY: Acute kidney injury with recent obstructive uropathy     FINDINGS:     Right kidney: Measures 9.7 cm in length. Parenchyma is of normal  thickness. Slightly increased echogenicity with mild loss of cortical  medullary differentiation. No " focal mass. Resolution of hydronephrosis  compared with exam dated 7/13/2017.     Left kidney: Measures approximately 10.5 cm in length. Parenchyma is  of normal thickness. Slightly increased echogenicity with mild loss of  cortical medullary differentiation. No focal mass. Mild  hydronephrosis, improved when compared with exam dated 7/13/2017.     Bladder: The bladder is largely decompressed with Man catheter in  place. There is heterogeneous debris within the urinary bladder and  vascular mass protruding into the bladder lumen. This is similar in  appearance to exam dated 7/13/2017.         IMPRESSION:  1.  Resolution of right-sided hydronephrosis with persistent mild  left-sided hydronephrosis.  2.  Increased echogenicity of the renal parenchyma with mild loss of  cortical medullary differentiation can be seen in chronic medical  renal disease.  3.  A vascular mass protrudes into the bladder lumen with adjacent  debris. These findings remain concerning for malignancy with possible  superimposed bladder clot.    EXAMINATION: CT CHEST ABDOMEN PELVIS W/O CONTRAST, 7/13/2017 5:21 PM  TECHNIQUE:  Helical CT images from the thoracic inlet through the  symphysis pubis were obtained  without IV contrast.      COMPARISON: Ultrasound 7/13/2017, chest radiograph 7/12/2017     HISTORY: mass seen near bladder/prostate. Long time smoker. No  contrast - pt with severe kidney injury.     FINDINGS:     Chest: Heart size is normal. Borderline aneurysmal dilatation of the  ascending thoracic aorta, measuring up to 4.1 cm in diameter in the  coronal plane. Pulmonary artery is normal in caliber. Coronary artery,  aortic and mitral valve calcifications. Mildly prominent mediastinal  lymph nodes, for example 1.0 cm short axis diameter paratracheal node  (series 5 image 126). No pericardial or pleural effusion. The central  tracheobronchial tree is patent. Trace dependent atelectasis. There  are a few small pulmonary nodules, for  example 3 mm left upper lobe  nodule (series 6 image 32) and 3 mm right apical nodule (series 6  image 31).     Abdomen and pelvis:      Moderate diffuse urinary bladder wall thickening, with asymmetric more  irregular thickening along the anterior wall (series 5 image 536).  Bladder is filled with hyperdense clot. Smoothly marginated soft  tissue mass adjacent to the bladder neck appears to arise from the  prostate (series 5 image 561, series 9 image 66), which is enlarged.  Moderate-severe bilateral hydronephrosis and hydroureter. Bilateral  renal cortical thinning.     Normal unenhanced appearance of the liver, pancreas, spleen and  adrenals. Cholelithiasis without evidence of cholecystitis. The colon  and small bowel are within normal limits. Fusiform infrarenal  abdominal aortic aneurysm measuring up to 4.0 cm in diameter measured  in the coronal plane (series 7 image 50). Moderate aortobiiliac  atherosclerosis. No free air or significant free fluid. No evident  lymphadenopathy in the abdomen or pelvis.     Bones and soft tissues: No acute fracture or suspicious bone lesion.  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         IMPRESSION:   1. Large amount of hyperdense clot within the urinary bladder. The  prostate is enlarged with a nodular projection into the base of the  bladder. Additionally, the bladder is moderately thickened with some  irregular asymmetrically thickened areas along its anterior wall.  These findings could be explained by benign prostate enlargement with  a chronically obstructed trabeculated bladder, however the presence of  asymmetric thickening and hemorrhage are worrisome for malignancy of  the bladder.  2. Moderate-severe bilateral hydronephrosis and hydroureter of both  entire ureters to the bladder.  3. No evidence to suggest metastatic disease within the abdomen,  pelvis or skeleton on this noncontrast CT.   4. A few small pulmonary  nodules measuring up to 3 mm. These are  nonspecific. In a patient with a smoking history, a 12 month follow-up  CT is optional per Fleischner society criteria. If malignancy is  discovered, Fleischner society criteria no longer applies.  5. 4.0 cm infrarenal abdominal aortic aneurysm, with moderate  aortobiiliac atherosclerotic disease.       LABS: The last test results for Ms. Dung Norton were reviewed.   PSA -   Lab Results   Component Value Date    PSA 1.72 07/13/2017     BMP -   Recent Labs   Lab Test  09/08/17   1449  09/05/17   0645  09/04/17   0607   09/01/17   0633   NA  142  141  142   < >  142   POTASSIUM  3.8  3.7  3.7   < >  3.6   CHLORIDE  107  105  106   < >  108   CO2  28  29  29   < >  23   BUN  82*  74*  79*   < >  112*   CR  5.31*  4.64*  4.60*   < >  5.26*   GLC  112*  83  86   < >  104*   DERICK  7.8*  7.4*  7.4*   < >  7.5*   MAG   --   1.7  1.6   --   1.7   PHOS  3.8   --   3.2   --   4.6*    < > = values in this interval not displayed.       CBC -   Recent Labs   Lab Test  08/29/17   0720  08/28/17   1909  08/16/17   1426   WBC  10.9  11.4*  10.6   HGB  8.8*  8.6*  9.1*   PLT  246  256  245       ASSESSMENT:   1) BPH h/o gross hematuria (resolved) presumed 2/2 friable prostate  2) Fragile renal fxn --> h/o KRISTINA on CKD initially d/t urinary retention with hydronephrosis, most recently d/t prerenal etiology  3) Residual mild left hydronephrosis (s/p Man placement) in a patient with smoking history    4) Recent hospitalization for dizziness - Flomax has since been stopped, but patient continues on finasteride    PLAN:   - 250mg CIpro given today prior to a voiding trial, which he failed.  > 700cc instilled until patient had urge to void.  Man removed and patient voided 50cc.  At that point his urge to void completely dissipated.    - Taught straight catheterization using a 16Fr latex coude catheter.  He will perform four times daily.    - Followup in 10-14 days with a Urology nurse.  At this  appointment patient should DEMONSTRATE ability to catheterize himself and DESCRIBE a consistent schedule of straight-catheterizing four times daily. Please record the output when the patient catheterizes himself - would anticipate that he would put out no more than 500cc.  With any concerns (ie. He cannot catheterize, he doesn't sound like he is cath'ing reliably, or the output is considerably more than 500cc), would place a Man and have him follow up for urodynamics. He has had acute renal failure in the past secondary to urinary retention.   - Continue finasteride  - Will stay off the FLomax.  Patient was recently hospitalized for dizziness and is now taking fludrocortisone.   - Follow up with Dr. Vanegas on 10-13-17 to discuss possible surgical options.  She has seen him in the hospital and scoped him (see OpReport from 7/12/17)    Anaid Baxter PA-C  Department of Urologic Surgery

## 2017-09-12 NOTE — MR AVS SNAPSHOT
After Visit Summary   9/12/2017    Dung Norton    MRN: [id number]           Patient Information     Date Of Birth          1945        Visit Information        Provider Department      9/12/2017 10:30 AM Leda Baxter PA [facility] and [facility] [place] for Prostate and Urologic Cancers        Care Instructions    Please catheterize yourself FOUR times per day.  Cathing supplies have been sent home with you today.  A prescription for catheters has been faxed.  A representative will be in contact with you today.    Please follow up with a Urology nurse on 9-22-17 to demonstrate how you are catheterizing yourself at home.    Follow up with Dr. Vanegas on 10-13-17 to discuss possible surgical options.    It was a pleasure meeting with you today.  Thank you for allowing me and my team the privilege of caring for you today.  YOU are the reason we are here, and I truly hope we provided you with the excellent service you deserve.  Please let us know if there is anything else we can do for you so that we can be sure you are leaving completely satisfied with your care experience.      JENY Evans          Follow-ups after your visit        Your next 10 appointments already scheduled     Sep 12, 2017  2:00 PM CDT   Office Visit with Haley Baker MD   [facility] ([facility])    [address] [place] [id number] [phone]           Bring a current list of meds and any records pertaining to this visit. For Physicals, please bring immunization records and any forms needing to be filled out. Please arrive 10 minutes early to complete paperwork.            Sep 22, 2017  1:30 PM CDT   (Arrive by 1:15 PM)   Return Visit with  Prostate Cancer Ctr Nurse   [facility] and [facility] [place] for Prostate and Urologic Cancers ([facility])    [address]  [address] [facility] [address] [phone]            Oct 06, 2017  1:00  PM CDT   (Arrive by 12:30 PM)   Return Visit with Oralia De La Torre NP   [facility] ([facility])    [address]  [address] [facility] [address] [phone]            Oct 13, 2017  8:15 AM CDT   (Arrive by 8:00 AM)   Return Visit with Tamiko Vanegas MD   [facility] and [facility] for Prostate and Urologic Cancers ([facility])    [address] [place] [address]  [address] [facility] [address] [phone]              Who to contact     Please call your clinic at [phone] to:    Ask questions about your health    Make or cancel appointments    Discuss your medicines    Learn about your test results    Speak to your doctor   If you have compliments or concerns about an experience at your clinic, or if you wish to file a complaint, please contact [facility] Patient Relations at [phone] or email us at [email or web address]         Additional Information About Your Visit        [employer] Information     [employer] is an electronic gateway that provides easy, online access to your medical records. With [employer], you can request a clinic appointment, read your test results, renew a prescription or communicate with your care team.     To sign up for [employer] visit the website at [email or web address]   You will be asked to enter the access code listed below, as well as some personal information. Please follow the directions to create your username and password.     Your access code is: [email or web address]  Expires: 10/10/2017  2:21 PM     Your access code will  in 90 days. If you need help or a new code, please contact your [facility] or call [phone] for assistance.        Care EveryWhere ID     This is your Care EveryWhere ID. This could be used by other organizations to access your [place] medical records  [id number]        Your Vitals Were     Pulse  "Height BMI (Body Mass Index)             92 1.778 m (5' 10\") 21.62 kg/m2          Blood Pressure from Last 3 Encounters:   09/12/17 119/70   09/08/17 111/74   09/05/17 134/86    Weight from Last 3 Encounters:   09/12/17 68.4 kg (150 lb 11.2 oz)   09/08/17 67 kg (147 lb 9.6 oz)   09/05/17 65.1 kg (143 lb 9.6 oz)              Today, you had the following     No orders found for display       Primary Care Provider Office Phone # Fax #    Haley Baker [phone]       [address]  [place] [address]        Equal Access to Services     BRIAN CERRATO : Hadii cristine nascimentoo Sochalo, waaxda luqadaha, qaybta kaalmada adeegyada, gibson green . So [facility] [phone].    ATENCIÓN: Si habla español, tiene a montague disposición servicios gratuitos de asistencia lingüística. [facility] [phone].    We comply with applicable federal civil rights laws and [place] laws. We do not discriminate on the basis of race, color, national origin, age, disability sex, sexual orientation or gender identity.            Thank you!     Thank you for choosing [facility]OLOGY AND [facility] FOR PROSTATE AND UROLOGIC CANCERS  for your care. Our goal is always to provide you with excellent care. Hearing back from our patients is one way we can continue to improve our services. Please take a few minutes to complete the written survey that you may receive in the mail after your visit with us. Thank you!             Your Updated Medication List - Protect others around you: Learn how to safely use, store and throw away your medicines at www.disposemymeds.org.          This list is accurate as of: 9/12/17 11:42 AM.  Always use your most recent med list.                   Brand Name Dispense Instructions for use Diagnosis    amLODIPine 5 MG tablet    NORVASC          aspirin 81 MG tablet      Take 1 tablet by mouth daily.        calcium acetate 667 MG Caps capsule    PHOSLO    180 capsule    Take 1 capsule " (667 mg) by mouth 3 times daily (with meals)    Chronic kidney disease, unspecified CKD stage       finasteride 5 MG tablet    PROSCAR    90 tablet    Take 1 tablet (5 mg) by mouth daily    Benign prostatic hyperplasia with urinary retention       fish oil-omega-3 fatty acids 1000 MG capsule      Take 1 g by mouth daily        fludrocortisone 0.1 MG tablet    FLORINEF    60 tablet    Take 2 tablets (0.2 mg) by mouth daily    Orthostatic hypotension       furosemide 20 MG tablet    LASIX     TK 1 T PO ONCE D        MULTIVITAMIN MEN PO      Take 1 tablet by mouth daily        NICOTINE STEP 1 21 MG/24HR 24 hr patch   Generic drug:  nicotine      PLACE 1 PATCH ONTO SKIN Q 24 H.        sodium bicarbonate 650 MG tablet      TK 1 T PO TID        tamsulosin 0.4 MG capsule    FLOMAX

## 2017-09-12 NOTE — NURSING NOTE
Chief Complaint   Patient presents with     Consult     New patient consult for urinary retention       Patient Active Problem List   Diagnosis     Tobacco abuse     CARDIOVASCULAR SCREENING; LDL GOAL LESS THAN 130     Hypertension goal BP (blood pressure) < 140/90     Advanced directives, counseling/discussion     Elevated fasting glucose     Hypertriglyceridemia     CKD (chronic kidney disease) stage 3, GFR 30-59 ml/min     Symptomatic anemia     Pulmonary nodules     Bilateral leg weakness     Weakness of shoulder     KRISTINA (acute kidney injury) (H)       No Known Allergies    Current Outpatient Prescriptions   Medication Sig Dispense Refill     NICOTINE STEP 1 21 MG/24HR 24 hr patch PLACE 1 PATCH ONTO SKIN Q 24 H.  1     fludrocortisone (FLORINEF) 0.1 MG tablet Take 2 tablets (0.2 mg) by mouth daily 60 tablet 0     calcium acetate (PHOSLO) 667 MG CAPS capsule Take 1 capsule (667 mg) by mouth 3 times daily (with meals) 180 capsule 0     finasteride (PROSCAR) 5 MG tablet Take 1 tablet (5 mg) by mouth daily 90 tablet 3     Multiple Vitamins-Minerals (MULTIVITAMIN MEN PO) Take 1 tablet by mouth daily       fish oil-omega-3 fatty acids (FISH OIL) 1000 MG capsule Take 1 g by mouth daily       aspirin 81 MG tablet Take 1 tablet by mouth daily.       amLODIPine (NORVASC) 5 MG tablet   3     furosemide (LASIX) 20 MG tablet TK 1 T PO ONCE D  1     sodium bicarbonate 650 MG tablet TK 1 T PO TID  3     tamsulosin (FLOMAX) 0.4 MG capsule   3       Social History   Substance Use Topics     Smoking status: Former Smoker     Types: Cigarettes     Smokeless tobacco: Never Used     Alcohol use No       Dung Norton comes into clinic today at the request of Referred Self for trial of void.    This service provided today was under the direct supervision of Anaid Baxter PA-C, who was available if needed.    Dung Norton presented today for a trial of void.  Approximately 700 mL of normal saline instilled into bladder via  catheter.  Patient stated He had urge to urinate and catheter was removed without difficulty.  Patient was given a urinal to measure urine output.  Patient voided approximately 50 mL of clear urine.    Cipro 250 given per protocol: Yes    Patient did tolerate procedure well.    Teaching done with patient verbally as where to call or go if pain, fever, or unable to urinate post catheter removal.    JENY Evans  9/12/2017  11:15 AM

## 2017-09-12 NOTE — NURSING NOTE
Patient taught SIC using a 16 F coude Tiemann catheter.  Patient was able to cath himself without difficulty.  Patient was sent home with cathing supplies and RX for catheters faxed to [facility].    JENY Evans

## 2017-09-13 LAB
T4 FREE SERPL-MCNC: 0.77 NG/DL (ref 0.76–1.46)
TSH SERPL DL<=0.005 MIU/L-ACNC: 5.32 MU/L (ref 0.4–4)

## 2017-09-13 NOTE — TELEPHONE ENCOUNTER
Anemia Management Note - Enrollment  SUBJECTIVE/OBJECTIVE:  Patient called today for enrollment in Anemia Management Service.      Referred by Dr. Cecilia De La Torre on 9/11/2017  Primary Diagnosis: Anemia in Chronic Kidney Disease (N18.5, D63.1)     Secondary Diagnosis:  Chronic Kidney Disease, Stage 5 (N18.5)  Hgb goal range:  9-10  Epo/Darbo: None  Iron regimen:  None  Labs exp: 9/12/2018    Anemia Latest Ref Rng & Units 7/25/2017 7/29/2017 8/4/2017 8/8/2017 8/16/2017 8/28/2017 8/29/2017   Hemoglobin 13.3 - 17.7 g/dL 7.5(L) 7.5(L) 8.3(L) - 9.1(L) 8.6(L) 8.8(L)   TSAT 15 - 46 % - - - 32 - - -   Ferritin 26 - 388 ng/mL - - - 679(H) - - -     BP Readings from Last 3 Encounters:   09/12/17 118/70   09/12/17 119/70   09/08/17 111/74     Wt Readings from Last 2 Encounters:   09/12/17 150 lb (68 kg)   09/12/17 150 lb 11.2 oz (68.4 kg)     Current Outpatient Prescriptions   Medication Sig Dispense Refill     NICOTINE STEP 1 21 MG/24HR 24 hr patch PLACE 1 PATCH ONTO SKIN Q 24 H.  1     sodium bicarbonate 650 MG tablet TK 1 T PO TID  3     nicotine (NICODERM CQ) 14 MG/24HR 24 hr patch Place 1 patch onto the skin every 24 hours 30 patch 0     fludrocortisone (FLORINEF) 0.1 MG tablet Take 2 tablets (0.2 mg) by mouth daily 60 tablet 0     calcium acetate (PHOSLO) 667 MG CAPS capsule Take 1 capsule (667 mg) by mouth 3 times daily (with meals) 180 capsule 0     finasteride (PROSCAR) 5 MG tablet Take 1 tablet (5 mg) by mouth daily 90 tablet 3     Multiple Vitamins-Minerals (MULTIVITAMIN MEN PO) Take 1 tablet by mouth daily       fish oil-omega-3 fatty acids (FISH OIL) 1000 MG capsule Take 1 g by mouth daily       aspirin 81 MG tablet Take 1 tablet by mouth daily.       ASSESSMENT:  Hgb Not at goal   Ferritin: At goal (>100ng/mL)  TSat: at goal >30%  Iron regimen recommended: None  Recommended EARLENE regimen: Aranesp 60mcg every 2 weeks    PLAN:  Discussed:  anemia overview, monitoring service and goal hemoglobin range and rationale and  risks of EARLENE blood clots, stroke and increase in blood pressure  Dose location: [facility]    He will come to [facility] 9/15 10am for hgb and aranesp    Iron labs due:  With next labs    Next call date:  9/15    New patient letter with medication guide was sent to patient.  Patient verbalized understanding of the plan.     Anemia Management Service  Tereza Paul,GabriellaD and Malina Hussein CPhT  Phone: [phone]  Fax: [phone]

## 2017-09-15 ENCOUNTER — TELEPHONE (OUTPATIENT)
Dept: PHARMACY | Facility: CLINIC | Age: 72
End: 2017-09-15

## 2017-09-15 ENCOUNTER — ALLIED HEALTH/NURSE VISIT (OUTPATIENT)
Dept: TRANSPLANT | Facility: CLINIC | Age: 72
End: 2017-09-15
Payer: MEDICARE

## 2017-09-15 ENCOUNTER — TELEPHONE (OUTPATIENT)
Dept: FAMILY MEDICINE | Facility: CLINIC | Age: 72
End: 2017-09-15

## 2017-09-15 VITALS — DIASTOLIC BLOOD PRESSURE: 78 MMHG | SYSTOLIC BLOOD PRESSURE: 130 MMHG | HEART RATE: 99 BPM

## 2017-09-15 DIAGNOSIS — D63.1 ANEMIA OF CHRONIC RENAL FAILURE, STAGE 5 (H): ICD-10-CM

## 2017-09-15 DIAGNOSIS — N18.5 ANEMIA OF CHRONIC RENAL FAILURE, STAGE 5 (H): ICD-10-CM

## 2017-09-15 DIAGNOSIS — R79.89 ELEVATED SERUM CREATININE: ICD-10-CM

## 2017-09-15 DIAGNOSIS — N18.5 CHRONIC KIDNEY DISEASE, STAGE V (H): ICD-10-CM

## 2017-09-15 DIAGNOSIS — N18.5 CKD (CHRONIC KIDNEY DISEASE) STAGE 5, GFR LESS THAN 15 ML/MIN (H): ICD-10-CM

## 2017-09-15 DIAGNOSIS — N18.5 CKD (CHRONIC KIDNEY DISEASE) STAGE 5, GFR LESS THAN 15 ML/MIN (H): Primary | ICD-10-CM

## 2017-09-15 LAB
ALBUMIN SERPL-MCNC: 2.6 G/DL (ref 3.4–5)
ANION GAP SERPL CALCULATED.3IONS-SCNC: 8 MMOL/L (ref 3–14)
BASOPHILS # BLD AUTO: 0 10E9/L (ref 0–0.2)
BASOPHILS NFR BLD AUTO: 0.4 %
BUN SERPL-MCNC: 87 MG/DL (ref 7–30)
CALCIUM SERPL-MCNC: 8 MG/DL (ref 8.5–10.1)
CHLORIDE SERPL-SCNC: 109 MMOL/L (ref 94–109)
CO2 SERPL-SCNC: 24 MMOL/L (ref 20–32)
CREAT SERPL-MCNC: 5.29 MG/DL (ref 0.66–1.25)
DIFFERENTIAL METHOD BLD: ABNORMAL
EOSINOPHIL # BLD AUTO: 0.3 10E9/L (ref 0–0.7)
EOSINOPHIL NFR BLD AUTO: 4.9 %
ERYTHROCYTE [DISTWIDTH] IN BLOOD BY AUTOMATED COUNT: 13.7 % (ref 10–15)
FERRITIN SERPL-MCNC: 923 NG/ML (ref 26–388)
GFR SERPL CREATININE-BSD FRML MDRD: 11 ML/MIN/1.7M2
GLUCOSE SERPL-MCNC: 111 MG/DL (ref 70–99)
HCT VFR BLD AUTO: 24.1 % (ref 40–53)
HGB BLD-MCNC: 7.1 G/DL (ref 13.3–17.7)
IMM GRANULOCYTES # BLD: 0 10E9/L (ref 0–0.4)
IMM GRANULOCYTES NFR BLD: 0.4 %
IRON SATN MFR SERPL: 18 % (ref 15–46)
IRON SERPL-MCNC: 33 UG/DL (ref 35–180)
LYMPHOCYTES # BLD AUTO: 0.7 10E9/L (ref 0.8–5.3)
LYMPHOCYTES NFR BLD AUTO: 14.4 %
MCH RBC QN AUTO: 28.1 PG (ref 26.5–33)
MCHC RBC AUTO-ENTMCNC: 29.5 G/DL (ref 31.5–36.5)
MCV RBC AUTO: 95 FL (ref 78–100)
MONOCYTES # BLD AUTO: 0.6 10E9/L (ref 0–1.3)
MONOCYTES NFR BLD AUTO: 10.7 %
NEUTROPHILS # BLD AUTO: 3.6 10E9/L (ref 1.6–8.3)
NEUTROPHILS NFR BLD AUTO: 69.2 %
NRBC # BLD AUTO: 0 10*3/UL
NRBC BLD AUTO-RTO: 0 /100
PHOSPHATE SERPL-MCNC: 5 MG/DL (ref 2.5–4.5)
PLATELET # BLD AUTO: 308 10E9/L (ref 150–450)
POTASSIUM SERPL-SCNC: 4 MMOL/L (ref 3.4–5.3)
RBC # BLD AUTO: 2.53 10E12/L (ref 4.4–5.9)
SODIUM SERPL-SCNC: 141 MMOL/L (ref 133–144)
TIBC SERPL-MCNC: 184 UG/DL (ref 240–430)
WBC # BLD AUTO: 5.1 10E9/L (ref 4–11)

## 2017-09-15 PROCEDURE — 83550 IRON BINDING TEST: CPT

## 2017-09-15 PROCEDURE — 83540 ASSAY OF IRON: CPT

## 2017-09-15 PROCEDURE — 96372 THER/PROPH/DIAG INJ SC/IM: CPT

## 2017-09-15 PROCEDURE — 82728 ASSAY OF FERRITIN: CPT

## 2017-09-15 PROCEDURE — 85025 COMPLETE CBC W/AUTO DIFF WBC: CPT

## 2017-09-15 PROCEDURE — 25000128 H RX IP 250 OP 636: Mod: ZF,EC

## 2017-09-15 PROCEDURE — 36415 COLL VENOUS BLD VENIPUNCTURE: CPT

## 2017-09-15 PROCEDURE — 80069 RENAL FUNCTION PANEL: CPT

## 2017-09-15 RX ADMIN — DARBEPOETIN ALFA 60 MCG: 60 INJECTION, SOLUTION INTRAVENOUS; SUBCUTANEOUS at 10:52

## 2017-09-15 NOTE — TELEPHONE ENCOUNTER
Follow-up with anemia management service:    Documented aranesp dose.     Anemia Latest Ref Rng & Units 2017 2017 2017 2017 2017 2017 9/15/2017   Hemoglobin 13.3 - 17.7 g/dL 7.5(L) 8.3(L) - 9.1(L) 8.6(L) 8.8(L) 7.1(L)   TSAT 15 - 46 % - - 32 - - - 18   Ferritin 26 - 388 ng/mL - - 679(H) - - - 923(H)     Orders needed to be renewed (for next follow-up date) in EPIC: None   Med order expires: 18   Lab orders : 18    Follow-up call date:     [facility]    Anemia Management Service  Tereza Paul,PharmD and Malina Hussein CPhT  Phone: [phone]  Fax: [phone]

## 2017-09-15 NOTE — TELEPHONE ENCOUNTER
RN spoke with Rabia and states patient will need to get an injection of Darbepoetin william every 2 weeks which is ordered by his nephrologist over at [facility].   Rabia is wondering if this can be administrated in the clinic every 2 weeks for him for his anemia?   Will huddle with Tonie Rojas, SANA for further advise.    Nikki VALVERDE RN, BSN

## 2017-09-15 NOTE — MR AVS SNAPSHOT
After Visit Summary   9/15/2017    Dung Norton    MRN: [id number]           Patient Information     Date Of Birth          1945        Visit Information        Provider Department      9/15/2017 10:30 AM Nurse, [facility] Solid Organ Transplant        Today's Diagnoses     CKD (chronic kidney disease) stage 5, GFR less than 15 ml/min (H)    -  1    Anemia of chronic renal failure, stage 5 (H)           Follow-ups after your visit        Your next 10 appointments already scheduled     Sep 22, 2017  1:30 PM CDT   (Arrive by 1:15 PM)   Return Visit with  Prostate Cancer Ctr Nurse   [facility] Urology and Inst for Prostate and Urologic Cancers ([facility])    [address]  [address] [facility] [address]   [phone]            Sep 29, 2017 10:00 AM CDT   Lab with  LAB   [facility] Lab ([facility])    [address]  1st Floor  [facility] [address]   [phone]            Sep 29, 2017 10:30 AM CDT   (Arrive by 10:15 AM)   INJECTION with [facility] Nurse   [facility] Solid Organ Transplant ([facility])    [address]  [address] [facility] [address]   [phone]            Oct 06, 2017  1:00 PM CDT   (Arrive by 12:30 PM)   Return Visit with Oralia De La Torre NP   [facility] Nephrology ([facility])    [address]  [address] [facility] [address]   [phone]            Oct 13, 2017  8:15 AM CDT   (Arrive by 8:00 AM)   Return Visit with Tamiko Vanegas MD   [facility] Inst for Prostate and Urologic Cancers ([facility])    [address]  4th Floor  [facility] [address] [phone]            Dec 12, 2017 12:10 PM CST   Office Visit with Haley Baker MD   [facility] [place] ([facility])    [address] [place] [address]  [place] [address]   [phone]           Bring a  "current list of meds and any records pertaining to this visit. For Physicals, please bring immunization records and any forms needing to be filled out. Please arrive 10 minutes early to complete paperwork.              Who to contact     If you have questions or need follow up information about today's clinic visit or your schedule please contact [facility] directly at [phone].  Normal or non-critical lab and imaging results will be communicated to you by MyChart, letter or phone within 4 business days after the clinic has received the results. If you do not hear from us within 7 days, please contact the clinic through Nodalityhart or phone. If you have a critical or abnormal lab result, we will notify you by phone as soon as possible.  Submit refill requests through SOHM or call your pharmacy and they will forward the refill request to us. Please allow 3 business days for your refill to be completed.          Additional Information About Your Visit        NodalityharAlwaySupport Information     SOHM lets you send messages to your doctor, view your test results, renew your prescriptions, schedule appointments and more. To sign up, go to [email or web address] . Click on \"Log in\" on the left side of the screen, which will take you to the Welcome page. Then click on \"Sign up Now\" on the right side of the page.     You will be asked to enter the access code listed below, as well as some personal information. Please follow the directions to create your username and password.     Your access code is: [email or web address]  Expires: 10/10/2017  2:21 PM     Your access code will  in 90 days. If you need help or a new code, please call your [place] clinic or [phone].        Care EveryWhere ID     This is your Care EveryWhere ID. This could be used by other organizations to access your [facility] medical records  [id number]        Your Vitals Were     Pulse                   99            Blood Pressure from " Last 3 Encounters:   09/15/17 130/78   09/12/17 118/70   09/12/17 119/70    Weight from Last 3 Encounters:   09/12/17 68 kg (150 lb)   09/12/17 68.4 kg (150 lb 11.2 oz)   09/08/17 67 kg (147 lb 9.6 oz)              Today, you had the following     No orders found for display       Primary Care Provider Office Phone # Fax #    Haley Baker [phone] [address] [facility]  [facility] [address]        Equal Access to Services     [facility]: Hadii aad ku hadasho Soomaali, waaxda luqadaha, qaybta kaalmada robert, gibson green . So [facility] [phone].    ATENCIÓN: Si habla español, tiene a montague disposición servicios gratuitos de asistencia lingüística. [facility] [phone].    We comply with applicable federal civil rights laws and [place] laws. We do not discriminate on the basis of race, color, national origin, age, disability sex, sexual orientation or gender identity.            Thank you!     Thank you for choosing [facility]  for your care. Our goal is always to provide you with excellent care. Hearing back from our patients is one way we can continue to improve our services. Please take a few minutes to complete the written survey that you may receive in the mail after your visit with us. Thank you!             Your Updated Medication List - Protect others around you: Learn how to safely use, store and throw away your medicines at www.disposemymeds.org.          This list is accurate as of: 9/15/17 10:58 AM.  Always use your most recent med list.                   Brand Name Dispense Instructions for use Diagnosis    aspirin 81 MG tablet      Take 1 tablet by mouth daily.        calcium acetate 667 MG Caps capsule    PHOSLO    180 capsule    Take 1 capsule (667 mg) by mouth 3 times daily (with meals)    Chronic kidney disease, unspecified CKD stage       darbepoetin william 60 MCG/0.3ML injection    ARANESP (ALBUMIN FREE)    0.3 mL    Inject  0.3 mLs (60 mcg) Subcutaneous every 14 days As needed for hgb<10g/dL.  If Hgb increases >1 point in 2 weeks (if blood transfusion given, use hgb PRIOR to this), SYSTOLIC BP > 180 mmHg or hgb>=10g/dL, HOLD DOSE. Dose must be within 1 week of Hgb.  Per anemia protocol with Cecilia De La Torre MD/Tereza Paul,PharmD [phone]    Anemia of chronic renal failure, stage 5 (H), CKD (chronic kidney disease) stage 5, GFR less than 15 ml/min (H)       finasteride 5 MG tablet    PROSCAR    90 tablet    Take 1 tablet (5 mg) by mouth daily    Benign prostatic hyperplasia with urinary retention       fish oil-omega-3 fatty acids 1000 MG capsule      Take 1 g by mouth daily        fludrocortisone 0.1 MG tablet    FLORINEF    60 tablet    Take 2 tablets (0.2 mg) by mouth daily    Orthostatic hypotension       MULTIVITAMIN MEN PO      Take 1 tablet by mouth daily        * NICOTINE STEP 1 21 MG/24HR 24 hr patch   Generic drug:  nicotine      PLACE 1 PATCH ONTO SKIN Q 24 H.        * nicotine 14 MG/24HR 24 hr patch    NICODERM CQ    30 patch    Place 1 patch onto the skin every 24 hours    Ex-smoker       sodium bicarbonate 650 MG tablet      TK 1 T PO TID        * Notice:  This list has 2 medication(s) that are the same as other medications prescribed for you. Read the directions carefully, and ask your doctor or other care provider to review them with you.

## 2017-09-15 NOTE — TELEPHONE ENCOUNTER
Reason for Call:  Other call back    Detailed comments: Rabia the caller would like to know if the Aranes injection can be administered here within the clinic, please call to discuss further    Phone Number Patient can be reached at: Home number on file [phone] (H)  Best Time: today    Can we leave a detailed message on this number? YES    Call taken on 9/15/2017 at 11:31 AM by Milton Alexander

## 2017-09-15 NOTE — NURSING NOTE
Frequency: 14 days  Dose: 60 mcg  Route: SQ  Location: RIGHT ARM  Lot #: [id number]  Exp: 05/31/2018    Most recent or today's HGB: 7.1 Date:9.15.17  Blood Pressure: 130/78    Diagnosis:Ckd stage 5    Ordered by:Oralia De La Torre MD  VIS Offered:yes    Double Checked by:Brittney rosenbaum

## 2017-09-18 NOTE — TELEPHONE ENCOUNTER
Back charting - 9/15/17.  RN huddled with Tonie saleh, SANA and per Tonie she will need to check on this and will get back to writer with an answer sometimes this week.    Nikki VALVERDE RN, BSN

## 2017-09-19 ENCOUNTER — PRE VISIT (OUTPATIENT)
Dept: UROLOGY | Facility: CLINIC | Age: 72
End: 2017-09-19

## 2017-09-19 NOTE — TELEPHONE ENCOUNTER
Per Tonie - Patient can be scheduled with ancillary for this injection, Tonie will be ordering the medication.   Per Tonie to have patient check with his insurance to be sure they will cover it and he won't get billed. Patient will be signing a waiver in the clinic that he is responsible for the cost if his insurance doesn't cover it.    RN called and spoke with Rabia and notified of the message above per Tonie.  Rabia agrees to check with patient's insurance and states his next appointment for the injection should be scheduled for 9/29/17.  Rabia states she will call back to schedule with ancillary and notify the team once they get an answer from their insurance.     Tonie has been notified of this plan as well.  We will wait for Rabia to call back.     Nikki VALVERDE RN, BSN

## 2017-09-22 ENCOUNTER — ALLIED HEALTH/NURSE VISIT (OUTPATIENT)
Dept: UROLOGY | Facility: CLINIC | Age: 72
End: 2017-09-22

## 2017-09-22 VITALS
SYSTOLIC BLOOD PRESSURE: 143 MMHG | WEIGHT: 151.4 LBS | HEIGHT: 72 IN | DIASTOLIC BLOOD PRESSURE: 88 MMHG | BODY MASS INDEX: 20.51 KG/M2 | HEART RATE: 89 BPM

## 2017-09-22 DIAGNOSIS — R33.9 URINARY RETENTION: Primary | ICD-10-CM

## 2017-09-22 ASSESSMENT — PAIN SCALES - GENERAL: PAINLEVEL: NO PAIN (0)

## 2017-09-22 NOTE — NURSING NOTE
Patient states that he is catheterizing at home without diffuculty, generally 4 times daily, lastly just before bedtime.    He states that he generally has volumes less than 500, with the exception of first thing in the morning, when he sometimes gets as much as 900.    Today in clinic, he demonstrated catheterization technique satisfactorily, however we did review some necessary steps to reduce possibility of infection including the importance of setting all supplies up on sterile field, and washing hands needs to be done directly before handling the catheter for insertion.  I explained that he can touch the colored plastic end of the catheter in order to set it upon the sterile field as this part does not enter his body.  Showed him how to wash hands, dry hands, use towel to turn off faucet handle, and keep hands away from body and clothing after washing.    250ml yellow clear urine returned when he demonstrated catheterization today.    Patient instructed to call clinic with any concerns.     Jabier Garay LPN

## 2017-09-22 NOTE — MR AVS SNAPSHOT
After Visit Summary   9/22/2017    Dung Norton    MRN: [id number]           Patient Information     Date Of Birth          1945        Visit Information        Provider Department      9/22/2017 1:30 PM Nurse, [facility] and Inst for Prostate and Urologic Cancers        Today's Diagnoses     Urinary retention    -  1       Follow-ups after your visit        Your next 10 appointments already scheduled     Sep 25, 2017 10:45 AM CDT   LAB with  LAB   [facility] ([facility])    [address] [facility] [address] [phone]           Patient must bring picture ID. Patient should be prepared to give a urine specimen  Please do not eat 10-12 hours before your appointment if you are coming in fasting for labs on lipids, cholesterol, or glucose (sugar). Pregnant women should follow their Care Team instructions. Water with medications is okay. Do not drink coffee or other fluids. If you have concerns about taking  your medications, please ask at office or if scheduling via SustainUhart, send a message by clicking on Secure Messaging, Message Your Care Team.            Sep 29, 2017 10:00 AM CDT   Lab with [facility]   [facility] Lab ([facility])    [address] [address] [phone]            Sep 29, 2017 10:30 AM CDT   (Arrive by 10:15 AM)   INJECTION with  [facility] Nurse   [facility] Solid Organ Transplant ([facility])    [address]  [address] [facility] [address] [phone]            Oct 06, 2017  1:00 PM CDT   (Arrive by 12:30 PM)   Return Visit with Oralia De La Torre NP   [facility] Nephrology ([facility])    [address]  [address] [facility] [address] [phone]            Oct 13, 2017  8:15 AM CDT   (Arrive by 8:00 AM)   Return Visit with Tamiko Vanegas MD   [facility] Urology  and [facility] for Prostate and Urologic Cancers ([facility])    [address]  4th Floor  [facility] [address] [phone]            Dec 12, 2017 12:10 PM CST   Office Visit with Haley Baker MD   [facility] ([facility])    [address] [facility]  [place] [address] [phone]           Bring a current list of meds and any records pertaining to this visit. For Physicals, please bring immunization records and any forms needing to be filled out. Please arrive 10 minutes early to complete paperwork.              Who to contact     Please call your clinic at [phone] to:    Ask questions about your health    Make or cancel appointments    Discuss your medicines    Learn about your test results    Speak to your doctor   If you have compliments or concerns about an experience at your clinic, or if you wish to file a complaint, please contact [facility] Patient Relations at [phone] or email us at [email or web address]         Additional Information About Your Visit        MailFrontier Information     MailFrontier is an electronic gateway that provides easy, online access to your medical records. With MailFrontier, you can request a clinic appointment, read your test results, renew a prescription or communicate with your care team.     To sign up for MailFrontier visit the website at [email or web address]   You will be asked to enter the access code listed below, as well as some personal information. Please follow the directions to create your username and password.     Your access code is: [email or web address]  Expires: 10/10/2017  2:21 PM     Your access code will  in 90 days. If you need help or a new code, please contact your [facility] Clinic or call [phone] for assistance.        Care EveryWhere ID     This is your Care EveryWhere ID. This could be used by other organizations to access your  [place] medical records  [id number]        Your Vitals Were     Pulse Height BMI (Body Mass Index)             89 1.829 m (6') 20.53 kg/m2          Blood Pressure from Last 3 Encounters:   09/22/17 143/88   09/15/17 130/78   09/12/17 118/70    Weight from Last 3 Encounters:   09/22/17 68.7 kg (151 lb 6.4 oz)   09/12/17 68 kg (150 lb)   09/12/17 68.4 kg (150 lb 11.2 oz)              Today, you had the following     No orders found for display       Primary Care Provider Office Phone # Fax #    Haley Baker [phone] [address]  [place] [address]        Equal Access to Services     BRIAN CERRATO : Hadjakob nascimentoo Sochalo, waaxda luqadaha, qaybta kaalmada adeegyada, gibson green . So [facility] [phone].    ATENCIÓN: Si habla español, tiene a montague disposición servicios gratuitos de asistencia lingüística. Llame al [phone].    We comply with applicable federal civil rights laws and [place] laws. We do not discriminate on the basis of race, color, national origin, age, disability sex, sexual orientation or gender identity.            Thank you!     Thank you for choosing [facility] UROLOGY AND [facility] FOR PROSTATE AND UROLOGIC CANCERS  for your care. Our goal is always to provide you with excellent care. Hearing back from our patients is one way we can continue to improve our services. Please take a few minutes to complete the written survey that you may receive in the mail after your visit with us. Thank you!             Your Updated Medication List - Protect others around you: Learn how to safely use, store and throw away your medicines at www.disposemymeds.org.          This list is accurate as of: 9/22/17  3:24 PM.  Always use your most recent med list.                   Brand Name Dispense Instructions for use Diagnosis    aspirin 81 MG tablet      Take 1 tablet by mouth daily.        calcium acetate 667 MG Caps capsule    PHOSLO    180 capsule     Take 1 capsule (667 mg) by mouth 3 times daily (with meals)    Chronic kidney disease, unspecified CKD stage       darbepoetin william 60 MCG/0.3ML injection    ARANESP (ALBUMIN FREE)    0.3 mL    Inject 0.3 mLs (60 mcg) Subcutaneous every 14 days As needed for hgb<10g/dL.  If Hgb increases >1 point in 2 weeks (if blood transfusion given, use hgb PRIOR to this), SYSTOLIC BP > 180 mmHg or hgb>=10g/dL, HOLD DOSE. Dose must be within 1 week of Hgb.  Per anemia protocol with Cecilia De La Torre MD/Tereza Paul,PharmD [phone]    Anemia of chronic renal failure, stage 5 (H), CKD (chronic kidney disease) stage 5, GFR less than 15 ml/min (H)       finasteride 5 MG tablet    PROSCAR    90 tablet    Take 1 tablet (5 mg) by mouth daily    Benign prostatic hyperplasia with urinary retention       fish oil-omega-3 fatty acids 1000 MG capsule      Take 1 g by mouth daily        fludrocortisone 0.1 MG tablet    FLORINEF    60 tablet    Take 2 tablets (0.2 mg) by mouth daily    Orthostatic hypotension       MULTIVITAMIN MEN PO      Take 1 tablet by mouth daily        * NICOTINE STEP 1 21 MG/24HR 24 hr patch   Generic drug:  nicotine      PLACE 1 PATCH ONTO SKIN Q 24 H.        * nicotine 14 MG/24HR 24 hr patch    NICODERM CQ    30 patch    Place 1 patch onto the skin every 24 hours    Ex-smoker       sodium bicarbonate 650 MG tablet      TK 1 T PO TID        * Notice:  This list has 2 medication(s) that are the same as other medications prescribed for you. Read the directions carefully, and ask your doctor or other care provider to review them with you.

## 2017-09-25 ENCOUNTER — DOCUMENTATION ONLY (OUTPATIENT)
Dept: LAB | Facility: CLINIC | Age: 72
End: 2017-09-25

## 2017-09-25 ENCOUNTER — TELEPHONE (OUTPATIENT)
Dept: NEPHROLOGY | Facility: CLINIC | Age: 72
End: 2017-09-25

## 2017-09-25 ENCOUNTER — CARE COORDINATION (OUTPATIENT)
Dept: CARE COORDINATION | Facility: CLINIC | Age: 72
End: 2017-09-25

## 2017-09-25 DIAGNOSIS — N18.5 CKD (CHRONIC KIDNEY DISEASE) STAGE 5, GFR LESS THAN 15 ML/MIN (H): ICD-10-CM

## 2017-09-25 DIAGNOSIS — D63.1 ANEMIA OF CHRONIC RENAL FAILURE, STAGE 5 (H): ICD-10-CM

## 2017-09-25 DIAGNOSIS — E78.1 HYPERTRIGLYCERIDEMIA: ICD-10-CM

## 2017-09-25 DIAGNOSIS — R79.89 ELEVATED SERUM CREATININE: ICD-10-CM

## 2017-09-25 DIAGNOSIS — E78.1 HYPERTRIGLYCERIDEMIA: Primary | ICD-10-CM

## 2017-09-25 DIAGNOSIS — N18.5 ANEMIA OF CHRONIC RENAL FAILURE, STAGE 5 (H): ICD-10-CM

## 2017-09-25 DIAGNOSIS — N18.5 CHRONIC KIDNEY DISEASE, STAGE V (H): ICD-10-CM

## 2017-09-25 LAB
ALBUMIN SERPL-MCNC: 2.8 G/DL (ref 3.4–5)
ANION GAP SERPL CALCULATED.3IONS-SCNC: 10 MMOL/L (ref 3–14)
BUN SERPL-MCNC: 83 MG/DL (ref 7–30)
CALCIUM SERPL-MCNC: 8.6 MG/DL (ref 8.5–10.1)
CHLORIDE SERPL-SCNC: 110 MMOL/L (ref 94–109)
CHOLEST SERPL-MCNC: 161 MG/DL
CO2 SERPL-SCNC: 24 MMOL/L (ref 20–32)
CREAT SERPL-MCNC: 5.32 MG/DL (ref 0.66–1.25)
GFR SERPL CREATININE-BSD FRML MDRD: 11 ML/MIN/1.7M2
GLUCOSE SERPL-MCNC: 103 MG/DL (ref 70–99)
HCT VFR BLD AUTO: 26.8 % (ref 40–53)
HDLC SERPL-MCNC: 38 MG/DL
HGB BLD-MCNC: 8.2 G/DL (ref 13.3–17.7)
LDLC SERPL CALC-MCNC: 100 MG/DL
NONHDLC SERPL-MCNC: 123 MG/DL
PHOSPHATE SERPL-MCNC: 4.7 MG/DL (ref 2.5–4.5)
POTASSIUM SERPL-SCNC: 3.9 MMOL/L (ref 3.4–5.3)
SODIUM SERPL-SCNC: 144 MMOL/L (ref 133–144)
TRIGL SERPL-MCNC: 114 MG/DL

## 2017-09-25 PROCEDURE — 80061 LIPID PANEL: CPT | Performed by: FAMILY MEDICINE

## 2017-09-25 PROCEDURE — 85014 HEMATOCRIT: CPT | Performed by: FAMILY MEDICINE

## 2017-09-25 PROCEDURE — 36415 COLL VENOUS BLD VENIPUNCTURE: CPT | Performed by: FAMILY MEDICINE

## 2017-09-25 PROCEDURE — 85018 HEMOGLOBIN: CPT | Performed by: FAMILY MEDICINE

## 2017-09-25 PROCEDURE — 80069 RENAL FUNCTION PANEL: CPT | Performed by: FAMILY MEDICINE

## 2017-09-25 NOTE — TELEPHONE ENCOUNTER
Critical Cr 5.3 called in from [place] [facility]. Will update Cecilia De La Torre NP.  Suad Cid LPN  Nephrology  [facility]  [phone]

## 2017-09-25 NOTE — LETTER
[address] [place] [address]  [phone]    September 25, 2017    Dung Norton  [address] [facility] [address] [place] [address]    Dear Dung,  I am the Clinic Care Coordinator that works with your primary care provider's clinic. I wanted to thank you for spending the time to talk with me.  Below is a description of what Clinic Care Coordination is and how I can further assist you.      The Clinic Care Coordinator role is a Registered Nurse and/or  who understands the health care system. The goal of Clinic Care Coordination is to help you manage your health and improve access to the [facility] in the most efficient manner.  The Registered Nurse can assist you in meeting your health care goals by providing education, coordinating services, and strengthening the communication among your providers. The  can assist you with financial, behavioral, psychosocial, and chemical dependency and counseling/psychiatric resources.    Please feel free to keep this letter and contact information to contact me at [phone] with any further questions or concerns that may arise. We at [facility] are focused on providing you with the highest-quality healthcare experience possible and that all starts with you.       Sincerely,     Belkis Sexton RN BSN, PHN RN Care Coordinator  [facility]  [email or web address]  [phone]  9/25/2017 2:50 PM    Enclosed: I have enclosed a copy of a 24 Hour Access Plan. This has helpful phone numbers for you to call when needed. Please keep this in an easy to access place to use as needed.

## 2017-09-25 NOTE — PROGRESS NOTES
Please review, associate diagnosis and sign laboratory orders for patient upcoming appointment on 9/25/17. Thank you.    Koki Laura  [facility]

## 2017-09-25 NOTE — PROGRESS NOTES
Clinic Care Coordination Contact  OUTREACH    Referral Information:  Referral Source: PCP  Reason for Contact: Referral from PCP  Concerns noted with uncontrolled chronic disease  Care Conference: No     Universal Utilization:   ED Visits in last year: 1  Hospital visits in last year: 1  Last PCP appointment: 09/12/17  Missed Appointments: 1  Concerns: CKD  Multiple Providers or Specialists: Urology    Clinical Concerns:  Current Medical Concerns: Patient notes no concerns at this time.  Pt is self cathing 4x's per day.  He is recording his urine output.  Pt states dialysis may not be needed.   Pt is keeping himself hydrated.  General urine output in a 24 hour period is 1500 cc's (900 cc in the morning and 200-300 cc during the day and prior to going to bed).  Pt is following up with urology as recommended.        Current Behavioral Concerns: No concerns  Education Provided to patient:CC role  Clinical Pathway Name: None    Medication Management:  Pt has no questions concerning his medications.  Patient manages his own medications.    Functional Status:  Mobility Status: Independent     Transportation: Pt is independent with is driving.        Psychosocial:  Current living arrangement:: I live alone  Financial/Insurance: No concerns noted at this time.   Resources and Interventions:  Current Resources: Pt is not currently receiving any home care services.  Pt lives in his own home and is considering moving into a senior living facility - independent vs AL.        Goals: TBD     Frequency of Care Coordination: TBD  Upcoming appointment: 09/27/17 (Lab draw)     Plan:  Pt will follow current plan of care as directed by his PCP and speciality providers.  Will mail patient information on CC role and agreed to f/u with pt in 10-14 days.    Belkis Sexton RN BSN, PHN RN Care Coordinator  [facility]  [email or web address]  [phone]  9/25/2017 2:48 PM

## 2017-09-25 NOTE — LETTER
Health Care Home - Access Care Plan    About Me  Patient Name:  Dung Norton    YOB: 1945  Age:                            71 year old   Hayden MRN:         [id number] Telephone Information:     Home Phone [phone]   Mobile [phone]       [place]    [address]  [place] [address] Email address:  No e-mail address on record      Emergency Contact(s)  Name Relationship Lgl Grd Work Phone Home Phone Mobile Phone   1. CHENTE, H* Daughter   [phone]    2. JOANNE NORTON Brother   [phone]              Health Maintenance: Routine Health maintenance Reviewed: Up to date    My Access Plan  Medical Emergency 911   Questions or concerns during clinic hours Primary Clinic Line, I will call the clinic directly: Primary Clinic: [facility] [phone]   24 Hour Appointment Line [phone] or  [phone])  (toll free)   24 Hour Nurse Line [phone] (toll free)   Questions or concerns outside clinic hours 24 Hour Appointment Line, I will call the after-hours on-call line:   [facility] [phone] or [phone]) (toll-free)   Preferred Urgent Care     Preferred Hospital     Preferred Pharmacy [facility] [address] [place] [address] [address] AT [facility] [place]     Behavioral Health Crisis Line The National Suicide Prevention Lifeline at 1-853.810.4579 or 911     My Care Team Members  Patient Care Team       Relationship Specialty Notifications Start End    Haley Baker MD PCP - General   3/8/09     Phone: [phone] Fax: [phone] [address] [facility] [address]    Leda Baxter PA Physician Assistant Physician Assistant  8/7/17     Phone: [phone] Fax: [phone]         [address] [address]    Melissa Gaytan, RN Registered Nurse Oncology  8/8/17     Phone: [phone] Pager: [phone]            My Medical and Care  Information  Problem List   Patient Active Problem List   Diagnosis     Tobacco abuse     CARDIOVASCULAR SCREENING; LDL GOAL LESS THAN 130     Hypertension goal BP (blood pressure) < 140/90     Advanced directives, counseling/discussion     Elevated fasting glucose     Hypertriglyceridemia     Symptomatic anemia     Pulmonary nodules     Bilateral leg weakness     Weakness of shoulder     KRISTINA (acute kidney injury) (H)     Anemia of chronic renal failure, stage 5 (H)     Orthostatic hypotension     Acute renal failure, unspecified acute renal failure type (H)     End stage renal disease (H)     Acquired hypothyroidism     Obstructive uropathy     CKD (chronic kidney disease) stage 5, GFR less than 15 ml/min (H)      Current Medications and Allergies:  See printed Medication Report

## 2017-09-26 NOTE — TELEPHONE ENCOUNTER
Patient's daughter didn't call back the clinic.  RN noted patient has been scheduled with [facility] for the Aranesp injection on 9/29/17.  No further action needed.    Nikki VALVERDE RN, BSN

## 2017-09-28 ENCOUNTER — TELEPHONE (OUTPATIENT)
Dept: FAMILY MEDICINE | Facility: CLINIC | Age: 72
End: 2017-09-28

## 2017-09-28 NOTE — TELEPHONE ENCOUNTER
See telephone encounter on 9/15/17.  RN informed patient that we can give the injection as long as his insurance covers it and if they don't he will be responsible of paying for it.  Also, medication has to be picked up from our pharmacy or order by our clinic in order for us to administrate the injection. As we can't administrate medications that comes from outside.  Patient agrees and verbalized understanding.       Per Tonie - Patient can be scheduled with ancillary for this injection, Tonie will be ordering the medication.   Per Tonie to have patient check with his insurance to be sure they will cover it and he won't get billed. Patient will be signing a waiver in the clinic that he is responsible for the cost if his insurance doesn't cover it.     RN called and spoke with Rabia and notified of the message above per Tonie.  Rabia agrees to check with patient's insurance and states his next appointment for the injection should be scheduled for 9/29/17.  Rabia states she will call back to schedule with ancillary and notify the team once they get an answer from their insurance.      Tonie has been notified of this plan as well.  We will wait for Rabia to call back.      Nikki VALVERDE, RN, BSN             Nikki VALVERDE RN, BSN

## 2017-09-28 NOTE — TELEPHONE ENCOUNTER
Reason for Call:  Other Questions regarding a injection    Detailed comments: Patient came into clinic wondering if he could have a Darbepoetin Emerson 60 injection here at [place] instead of having to go all the way down to the [facility] to have this done. Please call to discuss thank you    Phone Number Patient can be reached at: Home number on file [phone] (home)    Best Time: any    Can we leave a detailed message on this number? YES    Call taken on 9/28/2017 at 10:38 AM by SHEBA PIERCE

## 2017-09-29 ENCOUNTER — TELEPHONE (OUTPATIENT)
Dept: PHARMACY | Facility: CLINIC | Age: 72
End: 2017-09-29

## 2017-09-29 NOTE — TELEPHONE ENCOUNTER
Anemia Management Note  SUBJECTIVE/OBJECTIVE:  Referred by Dr. Cecilia De La Torre on 9/11/2017  Primary Diagnosis: Anemia in Chronic Kidney Disease (N18.5, D63.1)     Secondary Diagnosis:  Chronic Kidney Disease, Stage 5 (N18.5)  Hgb goal range:  9-10  Epo/Darbo: None  Iron regimen:  None  Labs exp: 9/12/2018    Anemia Latest Ref Rng & Units 8/4/2017 8/8/2017 8/16/2017 8/28/2017 8/29/2017 9/15/2017 9/25/2017   EARLENE Dose - - - - - - 60 mcg -   Hemoglobin 13.3 - 17.7 g/dL 8.3(L) - 9.1(L) 8.6(L) 8.8(L) 7.1(L) 8.2(L)   TSAT 15 - 46 % - 32 - - - 18 -   Ferritin 26 - 388 ng/mL - 679(H) - - - 923(H) -     BP Readings from Last 3 Encounters:   09/22/17 143/88   09/15/17 130/78   09/12/17 118/70     Wt Readings from Last 2 Encounters:   09/22/17 151 lb 6.4 oz (68.7 kg)   09/12/17 150 lb (68 kg)     Dung cancelled his appt and will try to schedule at [place].    ASSESSMENT:  Hgb:At goal but rapid rise in hgb (>1pt/2 weeks) - recommend hold dose. However, hgb was drawn at 10 days and >1 pt increase so will need another blood draw to determine if aranesp is neeced  TSat: not at goal (>30%) but ferritin >500ng/mL.  IV iron not indicated at this time per anemia protocol.     PLAN:  RTC for hgb then aranesp if needed next week. Lab appt scheduled for 11am. Ask if he had success scheduling aranesp injection at [place].  If not, he can go to [place], [place] if any of these are more convenient.    Orders needed to be renewed (for next follow-up date) in EPIC: None    Iron labs due:  10/15    Plan discussed with:  No call made yet, call 10/2  Plan provided by:  Tereza    NEXT FOLLOW-UP DATE:  10/2    Anemia Management Service  Gabriella PalD and Malina Hussein CPhT  Phone: [phone]  Fax: [phone]

## 2017-10-02 ENCOUNTER — TELEPHONE (OUTPATIENT)
Dept: PHARMACY | Facility: CLINIC | Age: 72
End: 2017-10-02

## 2017-10-02 ENCOUNTER — TELEPHONE (OUTPATIENT)
Dept: FAMILY MEDICINE | Facility: CLINIC | Age: 72
End: 2017-10-02

## 2017-10-02 DIAGNOSIS — N18.5 CKD (CHRONIC KIDNEY DISEASE) STAGE 5, GFR LESS THAN 15 ML/MIN (H): ICD-10-CM

## 2017-10-02 DIAGNOSIS — N18.5 ANEMIA OF CHRONIC RENAL FAILURE, STAGE 5 (H): ICD-10-CM

## 2017-10-02 DIAGNOSIS — D63.1 ANEMIA OF CHRONIC RENAL FAILURE, STAGE 5 (H): ICD-10-CM

## 2017-10-02 DIAGNOSIS — R79.89 ELEVATED SERUM CREATININE: ICD-10-CM

## 2017-10-02 LAB
ALBUMIN SERPL-MCNC: 2.8 G/DL (ref 3.4–5)
ANION GAP SERPL CALCULATED.3IONS-SCNC: 11 MMOL/L (ref 3–14)
BUN SERPL-MCNC: 99 MG/DL (ref 7–30)
CALCIUM SERPL-MCNC: 8.8 MG/DL (ref 8.5–10.1)
CHLORIDE SERPL-SCNC: 105 MMOL/L (ref 94–109)
CO2 SERPL-SCNC: 25 MMOL/L (ref 20–32)
CREAT SERPL-MCNC: 5.44 MG/DL (ref 0.66–1.25)
GFR SERPL CREATININE-BSD FRML MDRD: 10 ML/MIN/1.7M2
GLUCOSE SERPL-MCNC: 92 MG/DL (ref 70–99)
HCT VFR BLD AUTO: 27.6 % (ref 40–53)
HGB BLD-MCNC: 8.5 G/DL (ref 13.3–17.7)
PHOSPHATE SERPL-MCNC: 6.2 MG/DL (ref 2.5–4.5)
POTASSIUM SERPL-SCNC: 4.5 MMOL/L (ref 3.4–5.3)
SODIUM SERPL-SCNC: 141 MMOL/L (ref 133–144)

## 2017-10-02 PROCEDURE — 85018 HEMOGLOBIN: CPT

## 2017-10-02 PROCEDURE — 36415 COLL VENOUS BLD VENIPUNCTURE: CPT

## 2017-10-02 PROCEDURE — 80069 RENAL FUNCTION PANEL: CPT

## 2017-10-02 PROCEDURE — 85014 HEMATOCRIT: CPT

## 2017-10-02 NOTE — TELEPHONE ENCOUNTER
Received critical creatinine value from lab. Patient with known renal failure and following with Nephrology, creatinine stable from last check. Routing to Primary Care Provider and ordering provider as KIMI.  Nohemy Rogers CNP

## 2017-10-03 NOTE — TELEPHONE ENCOUNTER
Anemia Management Note  SUBJECTIVE/OBJECTIVE:  Referred by Cecilia De La Torre on 9/11/2017  Primary Diagnosis: Anemia in Chronic Kidney Disease (N18.5, D63.1)     Secondary Diagnosis:  Chronic Kidney Disease, Stage 5 (N18.5)  Hgb goal range:  9-10  Epo/Darbo: Aranesp 60 mcg/0.3 ml every 14 days As needed for hgb<10g/dL  RX expires on 9/12/2018  Iron regimen:  None  Labs exp: 9/12/2018    Anemia Latest Ref Rng & Units 8/8/2017 8/16/2017 8/28/2017 8/29/2017 9/15/2017 9/25/2017 10/2/2017   EARLENE Dose - - - - - 60 mcg - -   Hemoglobin 13.3 - 17.7 g/dL - 9.1(L) 8.6(L) 8.8(L) 7.1(L) 8.2(L) 8.5(L)   TSAT 15 - 46 % 32 - - - 18 - -   Ferritin 26 - 388 ng/mL 679(H) - - - 923(H) - -     BP Readings from Last 3 Encounters:   09/22/17 143/88   09/15/17 130/78   09/12/17 118/70     Wt Readings from Last 2 Encounters:   09/22/17 151 lb 6.4 oz (68.7 kg)   09/12/17 150 lb (68 kg)     ASSESSMENT:  Hgb: Not at goal but rapid rise in hgb (>1pt/2 weeks) - recommend hold dose  TSat: not at goal (>30%) but ferritin >500ng/mL.  IV iron not indicated at this time per anemia protocol. Ferritin: At goal (>100ng/mL)    PLAN:  Hold Aranesp  10/3: I spoke to Rabia daughter. Would like to get dose at [facility]. They have checked with their insurance as the clinic requested and were told they need to schedule appt but this is not done yet. I called [facility] to make sure they have all they need from Cecilia De La Torre and myself. If so, Rabia would like a call to schedule. I will f/u tomorrow to see where this is at. -keiko    Orders needed to be renewed (for next follow-up date) in EPIC: None    Iron labs due:  10/13/17    Plan discussed with:  Dung  Plan provided by:  Judith    NEXT FOLLOW-UP DATE:  10/4    Anemia Management Service  Tereza Paul PharmD and Malina Hussein CPhT  Phone: [phone]  Fax: [phone]

## 2017-10-03 NOTE — TELEPHONE ENCOUNTER
Labs are in EPIC this was viewed via EPIC. No need to fax.     Patient is scheduled 10/6/2017 with Nephrology. Bess Salomon,

## 2017-10-04 ENCOUNTER — TELEPHONE (OUTPATIENT)
Dept: PHARMACY | Facility: CLINIC | Age: 72
End: 2017-10-04

## 2017-10-04 NOTE — TELEPHONE ENCOUNTER
10/6: LM for RN at [facility] to call back to f/u on status of patient getting aranesp injections at their clinic. Then f/u with daughter Rabia.  He will have labs 10/9. I gave Rabia update.    Call date: 10/10    Tereza Paul, PharmD, BCACP  [phone]  October 6, 2017

## 2017-10-06 ENCOUNTER — TELEPHONE (OUTPATIENT)
Dept: FAMILY MEDICINE | Facility: CLINIC | Age: 72
End: 2017-10-06

## 2017-10-06 ENCOUNTER — OFFICE VISIT (OUTPATIENT)
Dept: NEPHROLOGY | Facility: CLINIC | Age: 72
End: 2017-10-06
Payer: MEDICARE

## 2017-10-06 VITALS
DIASTOLIC BLOOD PRESSURE: 90 MMHG | WEIGHT: 148.6 LBS | BODY MASS INDEX: 20.15 KG/M2 | RESPIRATION RATE: 18 BRPM | SYSTOLIC BLOOD PRESSURE: 159 MMHG | HEART RATE: 94 BPM

## 2017-10-06 DIAGNOSIS — I95.1 ORTHOSTATIC HYPOTENSION: ICD-10-CM

## 2017-10-06 DIAGNOSIS — N18.5 CKD (CHRONIC KIDNEY DISEASE) STAGE 5, GFR LESS THAN 15 ML/MIN (H): Primary | ICD-10-CM

## 2017-10-06 DIAGNOSIS — I10 BENIGN ESSENTIAL HYPERTENSION: ICD-10-CM

## 2017-10-06 DIAGNOSIS — N25.81 SECONDARY RENAL HYPERPARATHYROIDISM (H): ICD-10-CM

## 2017-10-06 PROCEDURE — 99213 OFFICE O/P EST LOW 20 MIN: CPT | Mod: ZF

## 2017-10-06 RX ORDER — FLUDROCORTISONE ACETATE 0.1 MG/1
0.2 TABLET ORAL DAILY
Qty: 60 TABLET | Refills: 0 | Status: CANCELLED | OUTPATIENT
Start: 2017-10-06

## 2017-10-06 ASSESSMENT — PAIN SCALES - GENERAL: PAINLEVEL: NO PAIN (0)

## 2017-10-06 NOTE — TELEPHONE ENCOUNTER
Geneva Ruso can administer Aranesp injections and patient can be scheduled on Ancillary Nurse Schedule. Waiting to hear back from patients daughter to know if the patients insurance will cover the injection to be given in clinic (clinic will order the medication) or a prescription should be sent to the [facility] for the patient to pay for and clinical staff  to administer in clinic.  Nikki Rojas RN 10/6/2017 3:26 PM

## 2017-10-06 NOTE — TELEPHONE ENCOUNTER
Please check with Clinic Manager if we can Give Aranesp Injections in clinic  And call patient and Tereza

## 2017-10-06 NOTE — TELEPHONE ENCOUNTER
Reason for Call:  Other call back    Detailed comments: Tereza calling from the Anemia clinic at the [facility] requesting a return call to discuss patients care    Phone Number Patient can be reached at: Other phone number:  [phone]    Best Time: any    Can we leave a detailed message on this number? Not Applicable    Call taken on 10/6/2017 at 11:06 AM by SHEBA PIERCE

## 2017-10-06 NOTE — PATIENT INSTRUCTIONS
1. Stop the Florinef  2. Take Phoslo right before your meals  3. Have blood pressure checked every Monday with labs, record and bring next time  4. Call if you start feeling dizzy  5. We will call you regarding the Vascular Surgery appt for fistula placement

## 2017-10-06 NOTE — NURSING NOTE
Chief Complaint   Patient presents with     RECHECK     kidney follow up       Initial /90  Pulse 94  Resp 18  Wt 67.4 kg (148 lb 9.6 oz)  BMI 20.15 kg/m2 Estimated body mass index is 20.15 kg/(m^2) as calculated from the following:    Height as of 9/22/17: 1.829 m (6').    Weight as of this encounter: 67.4 kg (148 lb 9.6 oz).  Medication Reconciliation: aly MUÑOZ CMA

## 2017-10-06 NOTE — PROGRESS NOTES
Nephrology Clinic Visit 10/6/17    Assessment and Plan:       1. CKD5 - Patient has developed CKD 2/2 long standing obstructive uropathy. His GFR has been < 15 ml/mn since July 2017. He is not uremic.       - Patient and daughter have decided upon HD as his RRT option.     - He is scheduled to attend the Kidney Smart program later this month   - Will arrange for [facility] consult for access placement   - He is drinking fluids adequately and does not use NSAIDs   - He is straight cathing QID w/o difficulty      2. Electrolytes - No acute concerns. K 4.5.      3. Volume status - Euvolemic. No dyspnea or edema.  Making ~ 1.5 liters/urine/day. Weight 67.5 kg which is stable from last visit. Albumin 2.8. B/P 150-160/ on Florinef. Unclear where his ideal body weight would be.    - No need for diuretics at this time      4. HTN - Blood pressure has become elevated in setting of being off antihypertensives and taking Florinef 0.1 mcg bid. No dizziness. No home blood pressures.  Clinic B/Ps today all in the 150-160/. Was taken off Amlodipine 5 mg qd and Furosemide 40 mg qd during hospital admission in August and started on Florinef due to orthostatic hypotension. His Flomax was stopped at that time also.    - Discontinue Florinef at this time   - Check blood pressure weekly when getting labs drawn. He will record and bring to next visit       5. BPH - Man has been removed. He is straight cathing QID w/o difficulty. Currently on Proscar. Flomax discontinued due to hypotension.    - Urology managing      6. Acid base - No acute concerns. Bicarb is 25. Bicarb was discontinued during admission.       7. BMD - Corrected Ca 9.0, Phos 6.2, albumin 2.8, Vit D 29, PTH intact 120   - Continue Phoslo, but encouraged to take at the beginning of his meals. He was taking post meals      8. Nutrition - Improving. Albumin now up to 2.8. Reports good appetite.     - Will monitor nutrition status closely      9. Anemia - Hgb 8.5 w/normal  RBC indices on 8/29/17.  Etiology is likely anemia of renal disease   - He should have routine colon cancer screening   - Iron studies 9/15/17: Fe 33, Ferritin 923, Tsat 18%   - Has been enrolled in anemia management program for EARLENE       10. Disposition - RTC in 4 wks for f/u with ongoing weekly labs      Assessment and plan was discussed with patient and daughter, then both voice understanding and agreement.      Reason for Visit:  CKD5 follow up      HPI:  Dung Norton is a 71 year old year old male with a PMH of CKDIII , HTN, Obstructive uropathy 2/2 BPH/benign bladder mass with creat as high as 14.4. He did not require RRT and creat declined with insertion of wood catheter. At the time of  hospital discharge on 7/24/17 creat was 6.3. Post hospital creat declined to a low of 5.9 on 8/4, but then began rising to a high of 7.8 on the day of most recent hospital admission on 8/28. He was also found to be hypovolemic and was given IVF.   During hospital admission creat declined to a low of 4.6 while getting IVF, but began rising at the time of our last visit on 9/8/17 to 5.3. Patient was also treated for a Serratia UTI during admission and new wood was inserted. He was found to have orthostatic hypotension and was started on Florinef    Since our last visit patient has had no hospital admissions. Wood was removed and patient taught to straight cath 4 times daily. Our Anemia manager is working on arranging Aranesp injections closer to his home in [place].       ROS:  Patient feeling well. He is having no difficulty with straight cathing 4 times daily. No dizziness or syncope. Energy is stable. He is living independently. Has good appetite. No dyspnea, chest pain, abdominal pain, or edema.        Chronic Medical Problems:      HTN  BPH  Obstructive uropathy   HTN  Tobacco abuse  HLD  Anemia  Pulmonary nodules  KRISTINA  CKD5    Personal Hx:   Social History     Social History     Marital status:      Spouse  name: N/A     Number of children: 2     Years of education: N/A     Occupational History      [employer]     Social History Main Topics     Smoking status: Former Smoker     Types: Cigarettes     Smokeless tobacco: Never Used     Alcohol use No     Drug use: No     Sexual activity: Not Currently     Other Topics Concern     Not on file     Social History Narrative       Allergies:  No Known Allergies    Medications:  Prior to Admission medications    Medication Sig Start Date End Date Taking? Authorizing Provider   darbepoetin william (ARANESP, ALBUMIN FREE,) 60 MCG/0.3ML injection Inject 0.3 mLs (60 mcg) Subcutaneous every 14 days As needed for hgb<10g/dL.  If Hgb increases >1 point in 2 weeks (if blood transfusion given, use hgb PRIOR to this), SYSTOLIC BP > 180 mmHg or hgb>=10g/dL, HOLD DOSE. Dose must be within 1 week of Hgb.  Per anemia protocol with Cecilia De La Torre MD/Tereza Paul,PharmD [phone] 9/13/17  Yes Oralia De La Torre NP   calcium acetate (PHOSLO) 667 MG CAPS capsule Take 1 capsule (667 mg) by mouth 3 times daily (with meals) 9/5/17  Yes Liseth Villanueva MD   finasteride (PROSCAR) 5 MG tablet Take 1 tablet (5 mg) by mouth daily 8/16/17  Yes Oralia De La Torre NP   Multiple Vitamins-Minerals (MULTIVITAMIN MEN PO) Take 1 tablet by mouth daily   Yes Reported, Patient   fish oil-omega-3 fatty acids (FISH OIL) 1000 MG capsule Take 1 g by mouth daily   Yes Reported, Patient   aspirin 81 MG tablet Take 1 tablet by mouth daily.   Yes Reported, Patient       Vitals:  /90  Pulse 94  Resp 18  Wt 67.4 kg (148 lb 9.6 oz)  BMI 20.15 kg/m2    Exam:  GEN: Frail male in NAD. Well groomed. Dtr present  CARDIAC RRR  LUNGS: CTA  ABDOMEN: Soft, NT  EXT: no edema    Results:  Results for FABRIZIO MIRANDA (MRN [id number]) as of 10/6/2017 14:50   Ref. Range 10/2/2017 10:57   Sodium Latest Ref Range: 133 - 144 mmol/L 141   Potassium Latest Ref Range: 3.4 - 5.3 mmol/L 4.5   Chloride Latest Ref  Range: 94 - 109 mmol/L 105   Carbon Dioxide Latest Ref Range: 20 - 32 mmol/L 25   Urea Nitrogen Latest Ref Range: 7 - 30 mg/dL 99 (H)   Creatinine Latest Ref Range: 0.66 - 1.25 mg/dL 5.44 (H)   GFR Estimate Latest Ref Range: >60 mL/min/1.7m2 10 (L)   GFR Estimate If Black Latest Ref Range: >60 mL/min/1.7m2 13 (L)   Calcium Latest Ref Range: 8.5 - 10.1 mg/dL 8.8   Anion Gap Latest Ref Range: 3 - 14 mmol/L 11   Phosphorus Latest Ref Range: 2.5 - 4.5 mg/dL 6.2 (H)   Albumin Latest Ref Range: 3.4 - 5.0 g/dL 2.8 (L)   Glucose Latest Ref Range: 70 - 99 mg/dL 92   Hemoglobin Latest Ref Range: 13.3 - 17.7 g/dL 8.5 (L)   Hematocrit Latest Ref Range: 40.0 - 53.0 % 27.6 (L)

## 2017-10-06 NOTE — LETTER
10/6/2017      RE: Dung Norton  [address]  [place] [address]       Nephrology Clinic Visit 10/6/17    Assessment and Plan:       1. CKD5 -  Patient has developed CKD 2/2 long standing obstructive uropathy. His GFR has been < 15 ml/mn since July 2017. He is not uremic.       -  Patient and daughter have decided upon HD as his RRT option.     -  He is scheduled to attend the Kidney Smart program later this month   - Will arrange for [facility] Surgery consult for access placement   - He is drinking fluids adequately and does not use NSAIDs   -  He is straight cathing QID w/o difficulty      2. Electrolytes - No acute concerns. K 4.5.      3. Volume status - Euvolemic. No dyspnea or edema.  Making ~ 1.5 liters/urine/day. Weight 67.5 kg which is stable from last visit. Albumin 2.8. B/P 150-160/ on Florinef. Unclear where his ideal body weight would be.    - No need for diuretics at this time      4. HTN - Blood pressure has become elevated in setting of being off antihypertensives and taking Florinef 0.1 mcg bid. No dizziness. No home blood pressures.  Clinic B/Ps today all in the 150-160/. Was taken off Amlodipine 5 mg qd and Furosemide 40 mg qd during hospital admission in August and started on Florinef due to orthostatic hypotension. His Flomax was stopped at that time also.    - Discontinue Florinef at this time   - Check blood pressure weekly when getting labs drawn. He will record and bring to next visit       5. BPH - Man has been removed. He is straight cathing QID w/o difficulty. Currently on Proscar. Flomax discontinued due to hypotension.    - Urology managing      6. Acid base - No acute concerns. Bicarb is 25. Bicarb was discontinued during admission.       7. BMD - Corrected Ca 9.0, Phos 6.2, albumin 2.8, Vit D 29, PTH intact 120   - Continue Phoslo, but encouraged to take at the beginning of his meals. He was taking post meals      8. Nutrition - Improving. Albumin now up to 2.8. Reports  good appetite.     - Will monitor nutrition status closely      9. Anemia - Hgb 8.5 w/normal RBC indices on 8/29/17.  Etiology is likely anemia of renal disease   - He should have routine colon cancer screening   - Iron studies 9/15/17: Fe 33, Ferritin 923, Tsat 18%   -  Has been enrolled in anemia management program for EARLENE       10. Disposition - RTC in 4 wks for f/u with ongoing weekly labs      Assessment and plan was discussed with patient and daughter, then both voice understanding and agreement.      Reason for Visit:  CKD5 follow up      HPI:  Dung Norton is a 71 year old year old male with a PMH of CKDIII , HTN, Obstructive uropathy 2/2 BPH/benign bladder mass with creat as high as 14.4. He did not require RRT and creat declined with insertion of wood catheter. At the time of  hospital discharge on 7/24/17 creat was 6.3. Post hospital creat declined to a low of 5.9 on 8/4, but then began rising to a high of 7.8 on the day of most recent hospital admission on 8/28. He was also found to be hypovolemic and was given IVF.   During hospital admission creat declined to a low of 4.6 while getting IVF, but began rising at the time of our last visit on 9/8/17 to 5.3. Patient was also treated for a Serratia UTI during admission and new wood was inserted. He was found to have orthostatic hypotension and was started on Florinef    Since our last visit patient has had no hospital admissions. Wood was removed and patient taught to straight cath 4 times daily. Our Anemia manager is working on arranging Aranesp injections closer to his home in [place].       ROS:  Patient feeling well. He is having no difficulty with straight cathing 4 times daily. No dizziness or syncope. Energy is stable. He is living independently. Has good appetite. No dyspnea, chest pain, abdominal pain, or edema.        Chronic Medical Problems:      HTN  BPH  Obstructive uropathy   HTN  Tobacco abuse  HLD  Anemia  Pulmonary  nodules  KRISTINA  CKD5    Personal Hx:   Social History     Social History     Marital status:      Spouse name: N/A     Number of children: 2     Years of education: N/A     Occupational History      [employer]     Social History Main Topics     Smoking status: Former Smoker     Types: Cigarettes     Smokeless tobacco: Never Used     Alcohol use No     Drug use: No     Sexual activity: Not Currently     Other Topics Concern     Not on file     Social History Narrative       Allergies:  No Known Allergies    Medications:  Prior to Admission medications    Medication Sig Start Date End Date Taking? Authorizing Provider   darbepoetin william (ARANESP, ALBUMIN FREE,) 60 MCG/0.3ML injection Inject 0.3 mLs (60 mcg) Subcutaneous every 14 days As needed for hgb<10g/dL.  If Hgb increases >1 point in 2 weeks (if blood transfusion given, use hgb PRIOR to this), SYSTOLIC BP > 180 mmHg or hgb>=10g/dL, HOLD DOSE. Dose must be within 1 week of Hgb.  Per anemia protocol with Cecilia De La Torre MD/Tereza Paul,PharmD [phone] 9/13/17  Yes Oralia De La Torre NP   calcium acetate (PHOSLO) 667 MG CAPS capsule Take 1 capsule (667 mg) by mouth 3 times daily (with meals) 9/5/17  Yes Liseth Villanueva MD   finasteride (PROSCAR) 5 MG tablet Take 1 tablet (5 mg) by mouth daily 8/16/17  Yes Oralia De La Torre NP   Multiple Vitamins-Minerals (MULTIVITAMIN MEN PO) Take 1 tablet by mouth daily   Yes Reported, Patient   fish oil-omega-3 fatty acids (FISH OIL) 1000 MG capsule Take 1 g by mouth daily   Yes Reported, Patient   aspirin 81 MG tablet Take 1 tablet by mouth daily.   Yes Reported, Patient       Vitals:  /90  Pulse 94  Resp 18  Wt 67.4 kg (148 lb 9.6 oz)  BMI 20.15 kg/m2    Exam:  GEN: Frail male in NAD. Well groomed. Dtr present  CARDIAC RRR  LUNGS: CTA  ABDOMEN: Soft, NT  EXT: no edema    Results:  Results for FABRIZIO MIRANDA (MRN [id number]) as of 10/6/2017 14:50   Ref. Range 10/2/2017 10:57   Sodium  Latest Ref Range: 133 - 144 mmol/L 141   Potassium Latest Ref Range: 3.4 - 5.3 mmol/L 4.5   Chloride Latest Ref Range: 94 - 109 mmol/L 105   Carbon Dioxide Latest Ref Range: 20 - 32 mmol/L 25   Urea Nitrogen Latest Ref Range: 7 - 30 mg/dL 99 (H)   Creatinine Latest Ref Range: 0.66 - 1.25 mg/dL 5.44 (H)   GFR Estimate Latest Ref Range: >60 mL/min/1.7m2 10 (L)   GFR Estimate If Black Latest Ref Range: >60 mL/min/1.7m2 13 (L)   Calcium Latest Ref Range: 8.5 - 10.1 mg/dL 8.8   Anion Gap Latest Ref Range: 3 - 14 mmol/L 11   Phosphorus Latest Ref Range: 2.5 - 4.5 mg/dL 6.2 (H)   Albumin Latest Ref Range: 3.4 - 5.0 g/dL 2.8 (L)   Glucose Latest Ref Range: 70 - 99 mg/dL 92   Hemoglobin Latest Ref Range: 13.3 - 17.7 g/dL 8.5 (L)   Hematocrit Latest Ref Range: 40.0 - 53.0 % 27.6 (L)           Oralia De La Torre, NP

## 2017-10-06 NOTE — MR AVS SNAPSHOT
After Visit Summary   10/6/2017    Dung Norton    MRN: [id number]           Patient Information     Date Of Birth          1945        Visit Information        Provider Department      10/6/2017 1:00 PM Oralia De La Torre NP [facility] Nephrology        Today's Diagnoses     Orthostatic hypotension          Care Instructions    1. Stop the Florinef  2. Take Phoslo right before your meals  3. Have blood pressure checked every Monday with labs, record and bring next time  4. Call if you start feeling dizzy  5. We will call you regarding the Vascular Surgery appt for fistula placement          Follow-ups after your visit        Follow-up notes from your care team     Return in about 4 weeks (around 11/3/2017).      Your next 10 appointments already scheduled     Oct 09, 2017 10:00 AM CDT   LAB with  LAB   [facility] ([address] [address] [phone]           Patient must bring picture ID. Patient should be prepared to give a urine specimen  Please do not eat 10-12 hours before your appointment if you are coming in fasting for labs on lipids, cholesterol, or glucose (sugar). Pregnant women should follow their Care Team instructions. Water with medications is okay. Do not drink coffee or other fluids. If you have concerns about taking  your medications, please ask at office or if scheduling via [employer], send a message by clicking on Secure Messaging, Message Your Care Team.            Oct 13, 2017  8:15 AM CDT   (Arrive by 8:00 AM)   Return Visit with Tamiko Vanegas MD   [facility] Urology and [facility] Pro[facility]rolog[facility] C[facility] ([facility])    [address] [place] [address] [phone]            Nov 08, 2017  1:00 PM CST   (Arrive by 12:30 PM)   Return Visit with Oralia De La Torre NP   [facility] Nephrology ([facility])    [facility]  "[facility]  [address] [facility] [address] [phone]            Dec 12, 2017 12:10 PM CST   Office Visit with Haley Baker MD   [facility] ([address]  [place] [address] [phone]           Bring a current list of meds and any records pertaining to this visit. For Physicals, please bring immunization records and any forms needing to be filled out. Please arrive 10 minutes early to complete paperwork.              Who to contact     If you have questions or need follow up information about today's clinic visit or your schedule please contact [facility] NEPHROLOGY directly at [phone].  Normal or non-critical lab and imaging results will be communicated to you by [employer], letter or phone within 4 business days after the clinic has received the results. If you do not hear from us within 7 days, please contact the clinic through [employer] or phone. If you have a critical or abnormal lab result, we will notify you by phone as soon as possible.  Submit refill requests through [facility] or call your pharmacy and they will forward the refill request to us. Please allow 3 business days for your refill to be completed.          Additional Information About Your Visit        [facility] Information     [facility] lets you send messages to your doctor, view your test results, renew your prescriptions, schedule appointments and more. To sign up, go to [email or web address] . Click on \"Log in\" on the left side of the screen, which will take you to the Welcome page. Then click on \"Sign up Now\" on the right side of the page.     You will be asked to enter the access code listed below, as well as some personal information. Please follow the directions to create your username and password.     Your access code is: [email or web address]  Expires: 10/10/2017  2:21 PM     Your access code will  in 90 days. If you need help or a new code, please call your [place] " clinic or [phone].        Care EveryWhere ID     This is your Care EveryWhere ID. This could be used by other organizations to access your [place] medical records  [id number]        Your Vitals Were     Pulse Respirations BMI (Body Mass Index)             94 18 20.15 kg/m2          Blood Pressure from Last 3 Encounters:   10/06/17 159/90   09/22/17 143/88   09/15/17 130/78    Weight from Last 3 Encounters:   10/06/17 67.4 kg (148 lb 9.6 oz)   09/22/17 68.7 kg (151 lb 6.4 oz)   09/12/17 68 kg (150 lb)              Today, you had the following     No orders found for display       Primary Care Provider Office Phone # Fax #    Haley Baker [phone] [address] [facility] [address]        Equal Access to Services     [facility]: Hadii cristine marin hadasho Soomaali, waaxda luqadaha, qaybta kaalmada adeegyada, gibson green . So [facility] [phone].    ATENCIÓN: Si habla español, tiene a montague disposición servicios gratuitos de asistencia lingüística. Llame al [phone].    We comply with applicable federal civil rights laws and [place] laws. We do not discriminate on the basis of race, color, national origin, age, disability, sex, sexual orientation, or gender identity.            Thank you!     Thank you for choosing [facility] NEPHROLOGY  for your care. Our goal is always to provide you with excellent care. Hearing back from our patients is one way we can continue to improve our services. Please take a few minutes to complete the written survey that you may receive in the mail after your visit with us. Thank you!             Your Updated Medication List - Protect others around you: Learn how to safely use, store and throw away your medicines at www.disposemymeds.org.          This list is accurate as of: 10/6/17  1:36 PM.  Always use your most recent med list.                   Brand Name Dispense Instructions for use Diagnosis    aspirin 81 MG tablet       Take 1 tablet by mouth daily.        calcium acetate 667 MG Caps capsule    PHOSLO    180 capsule    Take 1 capsule (667 mg) by mouth 3 times daily (with meals)    Chronic kidney disease, unspecified CKD stage       darbepoetin william 60 MCG/0.3ML injection    ARANESP (ALBUMIN FREE)    0.3 mL    Inject 0.3 mLs (60 mcg) Subcutaneous every 14 days As needed for hgb<10g/dL.  If Hgb increases >1 point in 2 weeks (if blood transfusion given, use hgb PRIOR to this), SYSTOLIC BP > 180 mmHg or hgb>=10g/dL, HOLD DOSE. Dose must be within 1 week of Hgb.  Per anemia protocol with Cecilia De La Torre MD/Tereza aPul,PharmD [phone]    Anemia of chronic renal failure, stage 5 (H), CKD (chronic kidney disease) stage 5, GFR less than 15 ml/min (H)       finasteride 5 MG tablet    PROSCAR    90 tablet    Take 1 tablet (5 mg) by mouth daily    Benign prostatic hyperplasia with urinary retention       fish oil-omega-3 fatty acids 1000 MG capsule      Take 1 g by mouth daily        MULTIVITAMIN MEN PO      Take 1 tablet by mouth daily

## 2017-10-09 ENCOUNTER — ALLIED HEALTH/NURSE VISIT (OUTPATIENT)
Dept: FAMILY MEDICINE | Facility: CLINIC | Age: 72
End: 2017-10-09
Payer: COMMERCIAL

## 2017-10-09 VITALS — SYSTOLIC BLOOD PRESSURE: 138 MMHG | DIASTOLIC BLOOD PRESSURE: 75 MMHG

## 2017-10-09 DIAGNOSIS — R79.89 ELEVATED SERUM CREATININE: ICD-10-CM

## 2017-10-09 DIAGNOSIS — N18.5 CKD (CHRONIC KIDNEY DISEASE) STAGE 5, GFR LESS THAN 15 ML/MIN (H): ICD-10-CM

## 2017-10-09 DIAGNOSIS — I10 HYPERTENSION GOAL BP (BLOOD PRESSURE) < 140/90: Primary | ICD-10-CM

## 2017-10-09 DIAGNOSIS — D63.1 ANEMIA OF CHRONIC RENAL FAILURE, STAGE 5 (H): ICD-10-CM

## 2017-10-09 DIAGNOSIS — N18.5 ANEMIA OF CHRONIC RENAL FAILURE, STAGE 5 (H): ICD-10-CM

## 2017-10-09 LAB
ALBUMIN SERPL-MCNC: 3.1 G/DL (ref 3.4–5)
ANION GAP SERPL CALCULATED.3IONS-SCNC: 14 MMOL/L (ref 3–14)
BUN SERPL-MCNC: 105 MG/DL (ref 7–30)
CALCIUM SERPL-MCNC: 9.6 MG/DL (ref 8.5–10.1)
CHLORIDE SERPL-SCNC: 106 MMOL/L (ref 94–109)
CO2 SERPL-SCNC: 19 MMOL/L (ref 20–32)
CREAT SERPL-MCNC: 5.2 MG/DL (ref 0.66–1.25)
GFR SERPL CREATININE-BSD FRML MDRD: 11 ML/MIN/1.7M2
GLUCOSE SERPL-MCNC: 91 MG/DL (ref 70–99)
HCT VFR BLD AUTO: 28.7 % (ref 40–53)
HGB BLD-MCNC: 8.9 G/DL (ref 13.3–17.7)
PHOSPHATE SERPL-MCNC: 5.5 MG/DL (ref 2.5–4.5)
POTASSIUM SERPL-SCNC: 4.3 MMOL/L (ref 3.4–5.3)
SODIUM SERPL-SCNC: 139 MMOL/L (ref 133–144)

## 2017-10-09 PROCEDURE — 80069 RENAL FUNCTION PANEL: CPT

## 2017-10-09 PROCEDURE — 85018 HEMOGLOBIN: CPT

## 2017-10-09 PROCEDURE — 85014 HEMATOCRIT: CPT

## 2017-10-09 PROCEDURE — 36415 COLL VENOUS BLD VENIPUNCTURE: CPT

## 2017-10-09 PROCEDURE — 99207 ZZC NO CHARGE NURSE ONLY: CPT | Performed by: FAMILY MEDICINE

## 2017-10-09 NOTE — MR AVS SNAPSHOT
After Visit Summary   10/9/2017    Dung Norton    MRN: [id number]           Patient Information     Date Of Birth          1945        Visit Information        Provider Department      10/9/2017 11:11 AM Haley Baker MD [facility]        Today's Diagnoses     Hypertension goal BP (blood pressure) < 140/90    -  1       Follow-ups after your visit        Your next 10 appointments already scheduled     Oct 13, 2017  8:15 AM CDT   (Arrive by 8:00 AM)   Return Visit with Tamiko Vanegas MD   [facility] Urology and [facility]rologic C[facility] ([facility] [place])    [address]  4th Floor  [facility] [id number] [phone]            Oct 13, 2017 10:30 AM CDT   US UPPER EXTREMITY VENOUS MAPPING BILATERAL with [email or web address]   [facility] [place] US ([facility])    [address]  [address] [facility] [id number] [phone]           Please bring a list of your medicines (including vitamins, minerals and over-the-counter drugs). Also, tell your doctor about any allergies you may have. Wear comfortable clothes and leave your valuables at home.  You do not need to do anything special to prepare for your exam.  Please call the Imaging Department at your exam site with any questions.            Oct 16, 2017 11:15 AM CDT   LAB with FZ LAB   [facility] ([facility] [place])    [address] [address]   [phone]           Patient must bring picture ID. Patient should be prepared to give a urine specimen  Please do not eat 10-12 hours before your appointment if you are coming in fasting for labs on lipids, cholesterol, or glucose (sugar). Pregnant women should follow their Care Team instructions. Water with medications is okay. Do not drink coffee or other fluids. If you have concerns about taking  your medications, please ask at office or if scheduling via  [employer], send a message by clicking on Secure Messaging, Message Your Care Team.            Nov 07, 2017  9:20 AM CST   (Arrive by 9:05 AM)   Fistula Consult with Eduardo Pabon MD   [facility] Solid Organ Transplant ([facility])    [address] [place] [address]   [phone]            Nov 08, 2017  1:00 PM CST   (Arrive by 12:30 PM)   Return Visit with Oralia De La Torre NP   [facility] Nephrology ([facility])    [address] [place] [address]   [phone]            Dec 12, 2017 12:10 PM CST   [facility] Visit with [facility]   [facility] ([address] [address] [phone]           Bring a current list of meds and any records pertaining to this visit. For Physicals, please bring immunization records and any forms needing to be filled out. Please arrive 10 minutes early to complete paperwork.              Future tests that were ordered for you today     Open Future Orders        Priority Expected Expires Ordered    US Upper Extremity Venous Mapping Bilateral Routine 10/10/2017 10/11/2018 10/10/2017            Who to contact     If you have questions or need follow up information about today's clinic visit or your schedule please contact [facility] directly at [phone].  Normal or non-critical lab and imaging results will be communicated to you by [employer], letter or phone within 4 business days after the clinic has received the results. If you do not hear from us within 7 days, please contact the clinic through [employer] or phone. If you have a critical or abnormal lab result, we will notify you by phone as soon as possible.  Submit refill requests through [employer] or call your pharmacy and they will forward the refill request to us. Please allow 3 business days for your refill to be completed.          Additional  "Information About Your Visit        MyChart Information     [employer] lets you send messages to your doctor, view your test results, renew your prescriptions, schedule appointments and more. To sign up, go to [email or web address] . Click on \"Log in\" on the left side of the screen, which will take you to the Welcome page. Then click on \"Sign up Now\" on the right side of the page.     You will be asked to enter the access code listed below, as well as some personal information. Please follow the directions to create your username and password.     Your access code is: [email or web address]  Expires: 2018  4:46 PM     Your access code will  in 90 days. If you need help or a new code, please call your [place] clinic or [phone].        Care EveryWhere ID     This is your Care EveryWhere ID. This could be used by other organizations to access your [place] medical records  [id number]         Blood Pressure from Last 3 Encounters:   10/09/17 138/75   10/06/17 159/90   17 143/88    Weight from Last 3 Encounters:   10/06/17 148 lb 9.6 oz (67.4 kg)   17 151 lb 6.4 oz (68.7 kg)   17 150 lb (68 kg)              Today, you had the following     No orders found for display       Primary Care Provider Office Phone # Fax #    Haley Baker [phone] [address] [facility] [address]        Equal Access to Services     [facility]: Hadii aad ku hadasho Soomaali, waaxda luqadaha, qaybta kaalmada adeegyada, gibson solis. So [facility] [phone].    ATENCIÓN: Si habla español, tiene a montague disposición servicios gratuitos de asistencia lingüística. Llame al [phone].    We comply with applicable federal civil rights laws and [place] laws. We do not discriminate on the basis of race, color, national origin, age, disability, sex, sexual orientation, or gender identity.            Thank you!     Thank you for choosing [facility]  for " your care. Our goal is always to provide you with excellent care. Hearing back from our patients is one way we can continue to improve our services. Please take a few minutes to complete the written survey that you may receive in the mail after your visit with us. Thank you!             Your Updated Medication List - Protect others around you: Learn how to safely use, store and throw away your medicines at www.disposemymeds.org.          This list is accurate as of: 10/9/17 11:59 PM.  Always use your most recent med list.                   Brand Name Dispense Instructions for use Diagnosis    aspirin 81 MG tablet      Take 1 tablet by mouth daily.        calcium acetate 667 MG Caps capsule    PHOSLO    180 capsule    Take 1 capsule (667 mg) by mouth 3 times daily (with meals)    Chronic kidney disease, unspecified CKD stage       darbepoetin william 60 MCG/0.3ML injection    ARANESP (ALBUMIN FREE)    0.3 mL    Inject 0.3 mLs (60 mcg) Subcutaneous every 14 days As needed for hgb<10g/dL.  If Hgb increases >1 point in 2 weeks (if blood transfusion given, use hgb PRIOR to this), SYSTOLIC BP > 180 mmHg or hgb>=10g/dL, HOLD DOSE. Dose must be within 1 week of Hgb.  Per anemia protocol with Cecilia De La Torre MD/Tereza Paul,PharmD [phone]    Anemia of chronic renal failure, stage 5 (H), CKD (chronic kidney disease) stage 5, GFR less than 15 ml/min (H)       finasteride 5 MG tablet    PROSCAR    90 tablet    Take 1 tablet (5 mg) by mouth daily    Benign prostatic hyperplasia with urinary retention       fish oil-omega-3 fatty acids 1000 MG capsule      Take 1 g by mouth daily        MULTIVITAMIN MEN PO      Take 1 tablet by mouth daily

## 2017-10-09 NOTE — PROGRESS NOTES
Dung Norton is enrolled/participating in the retail pharmacy Blood Pressure Goals Achievement Program (BPGAP).  Dung Norton was evaluated at [facility] on October 9, 2017 at which time his blood pressure was:    BP Readings from Last 3 Encounters:   10/09/17 138/75   10/06/17 159/90   09/22/17 143/88     Reviewed lifestyle modifications for blood pressure control and reduction: including making healthy food choices, managing weight, getting regular exercise, smoking cessation, reducing alcohol consumption, monitoring blood pressure regularly.     Dung Norton is not experiencing symptoms.    Follow-Up: BP is at goal of < 140/90mmHg (patient 18+ years of age with or without diabetes).  Recommended follow-up at pharmacy in 6 months.     Recommendation to Provider: None at this time. Patient stated that his blood pressure was elevated at one of his [facility] appts and to get his blood pressure checked when taking labs.     Dung Norton was evaluated for enrollment into the PGEN study today.    Patient eligible for enrollment:  No  Patient interested in enrollment:  No    Completed by: Thank you,  Cheri Winslow, PharmD  [facility]  [phone]

## 2017-10-10 ENCOUNTER — TELEPHONE (OUTPATIENT)
Dept: PHARMACY | Facility: CLINIC | Age: 72
End: 2017-10-10

## 2017-10-10 DIAGNOSIS — N18.5 CKD (CHRONIC KIDNEY DISEASE) STAGE 5, GFR LESS THAN 15 ML/MIN (H): Primary | ICD-10-CM

## 2017-10-10 DIAGNOSIS — Z45.2 ADJUSTMENT AND MANAGEMENT OF VASCULAR ACCESS DEVICE: ICD-10-CM

## 2017-10-10 NOTE — TELEPHONE ENCOUNTER
Anemia Management Note  SUBJECTIVE/OBJECTIVE:  Referred by Cecilia De La Torre on 9/11/2017  Primary Diagnosis: Anemia in Chronic Kidney Disease (N18.5, D63.1)     Secondary Diagnosis:  Chronic Kidney Disease, Stage 5 (N18.5)  Hgb goal range:  9-10  Epo/Darbo: Aranesp 60 mcg/0.3 ml every 14 days As needed for hgb<10g/dL  RX expires on 9/12/2018  Iron regimen:  None  Labs exp: 9/12/2018    Anemia Latest Ref Rng & Units 8/16/2017 8/28/2017 8/29/2017 9/15/2017 9/25/2017 10/2/2017 10/9/2017   EARLENE Dose - - - - 60 mcg - - -   Hemoglobin 13.3 - 17.7 g/dL 9.1(L) 8.6(L) 8.8(L) 7.1(L) 8.2(L) 8.5(L) 8.9(L)   TSAT 15 - 46 % - - - 18 - - -   Ferritin 26 - 388 ng/mL - - - 923(H) - - -     BP Readings from Last 3 Encounters:   10/09/17 138/75   10/06/17 159/90   09/22/17 143/88     Wt Readings from Last 2 Encounters:   10/06/17 148 lb 9.6 oz (67.4 kg)   09/22/17 151 lb 6.4 oz (68.7 kg)     ASSESSMENT:  Hgb:At goal but rapid rise in hgb (>1pt/2 weeks) - recommend hold dose  TSat: not at goal (>30%) but ferritin >500ng/mL.  IV iron not indicated at this time per anemia protocol.    PLAN:  RTC for hgb then aranesp if needed in 1 week(s)  He has labs mondays    Orders needed to be renewed (for next follow-up date) in EPIC: None    Iron labs due:  10/13    Plan discussed with:  No call made, Rabia is working with [facility] to get doses there  Plan provided by: Tereza    NEXT FOLLOW-UP DATE:  10/17    Anemia Management Service  Tereza Paul,GabriellaD and Malina Hussein,CPMurali  Phone: [phone]  Fax: [phone]

## 2017-10-11 ENCOUNTER — CARE COORDINATION (OUTPATIENT)
Dept: CARE COORDINATION | Facility: CLINIC | Age: 72
End: 2017-10-11

## 2017-10-11 NOTE — PROGRESS NOTES
"Clinic Care Coordination Contact  OUTREACH    Referral Information:  Referral Source:PCP  Reason for Contact: Proactive outreach  Care Conference: No     Universal Utilization:   ED Visits in last year: 1  Hospital visits in last year: 1  Last PCP appointment: 09/12/17  Missed Appointments: 1  Concerns: CKD  Multiple Providers or Specialists: Urology    Clinical Concerns:  Current Medical Concerns: Patient notes no concerns at this time.   Discussed/reviewed upcoming appointments.  Discussed q week BP checks.  Discussed fluid intake and diet.     Pt is scheduled for \"Kidney Smart Program\" on 10/17.  Pt has vascular surgery evaluation on 10/13 as well as mapping for possible fistula placement.  Pt notes that plan for dialysis has not been finalized but has f/u with Nephrology in November for further evaluation/discussion.  Pt is coming in q week for BP checks at the [facility].    Current Behavioral Concerns: No concerns noted    Education Provided to patient: f/u plan, diet, fluid intake.  Clinical Pathway Name: None    Medication Management:  Reviewed.  Pt confirmed recent changes made by Nephrologist - stopped Florinef and is taking Phoslo before meals.  .  Pt notes no questions or concerns.    Functional Status:  Mobility Status: Independent     Transportation: Self/family     Psychosocial:  Current living arrangement:: I live alone.  Pt states daughter is actively involved and supportive.  Financial/Insurance: [employer]     Resources and Interventions:  Current Resources:  None       Frequency of Care Coordination: q month  Upcoming appointment: 10/13 - vascular surgery and vein mapping  Plan:   Pt will continue to follow plan of care as directed by PCP and speciality providers.  RN CC to f/u with patient in one month.    Belkis Sexton RN BSN, PHN RN Care Coordinator  [facility][note 3]  [email or web address]  [phone]  10/11/2017 12:42 " PM

## 2017-10-13 ENCOUNTER — OFFICE VISIT (OUTPATIENT)
Dept: UROLOGY | Facility: CLINIC | Age: 72
End: 2017-10-13

## 2017-10-13 VITALS
HEART RATE: 102 BPM | SYSTOLIC BLOOD PRESSURE: 126 MMHG | WEIGHT: 147 LBS | HEIGHT: 72 IN | BODY MASS INDEX: 19.91 KG/M2 | DIASTOLIC BLOOD PRESSURE: 80 MMHG

## 2017-10-13 DIAGNOSIS — R33.9 URINARY RETENTION: Primary | ICD-10-CM

## 2017-10-13 ASSESSMENT — PAIN SCALES - GENERAL: PAINLEVEL: NO PAIN (0)

## 2017-10-13 NOTE — PROGRESS NOTES
UROLOGIC DIAGNOSES:       CURRENT INTERVENTIONS:       HISTORY:     Patient with history of AUR and hematuria requiring cystoscopy and clot evacuation. Had failed TOV and has been performing CIC.   Patient states he is performing CIC regularly about 4x per trady.   He would like to discuss further management of urinary retention.     We discussed UDS and possible TURP. Also noted that TURP would likely help long term even if patient has poor detrusor function as this would make CIC easier in the long term.         PAST MEDICAL HISTORY:   Past Medical History:   Diagnosis Date     BPH (benign prostatic hyperplasia)      Chronic kidney disease      HTN      Pulmonary nodules      Tobacco abuse        PAST SURGICAL HISTORY:   Past Surgical History:   Procedure Laterality Date     APPENDECTOMY  AGE 6     CYSTOSCOPY, FULGURATE BLEEDERS, EVACUATE CLOT(S), COMBINED N/A 7/16/2017    Procedure: COMBINED CYSTOSCOPY, FULGURATE BLEEDERS, EVACUATE CLOT(S);  Cystoscopy clot evacuation, bladder biopsy and fulguration (per surgeons note) NERVE BLOCK PERIPHERAL;  Surgeon: Tamiko Vanegas MD;  Location: [facility]     SINUS SURGERY  AGE 6     TONSILLECTOMY      CHILDHOOD       FAMILY HISTORY:   Family History   Problem Relation Age of Onset     C.A.D. Mother      d age 67     Vision Loss Father      Hearing Loss Sister      DIABETES Sister      CANCER No family hx of        SOCIAL HISTORY:   Social History   Substance Use Topics     Smoking status: Former Smoker     Types: Cigarettes     Smokeless tobacco: Never Used     Alcohol use No       Current Outpatient Prescriptions   Medication     darbepoetin william (ARANESP, ALBUMIN FREE,) 60 MCG/0.3ML injection     calcium acetate (PHOSLO) 667 MG CAPS capsule     finasteride (PROSCAR) 5 MG tablet     Multiple Vitamins-Minerals (MULTIVITAMIN MEN PO)     fish oil-omega-3 fatty acids (FISH OIL) 1000 MG capsule     aspirin 81 MG tablet     No current facility-administered medications for  this visit.          PHYSICAL EXAM:    /80  Pulse 102  Ht 1.829 m (6')  Wt 66.7 kg (147 lb)  BMI 19.94 kg/m2    HEENT: Normocephalic and atraumatic   Cardiac: Not done  Back/Flank: Not done  CNS/PNS: Not done  Respiratory: Normal non-labored breathing  Abdomen: Soft nontender and nondistended  Peripheral Vascular: Not done  Mental Status: Not done    Penis: Not done  Scrotal Skin: Not done  Testicles: Not done  Epididymis: Not done  Digital Rectal Exam:     Cystoscopy: Not done    Imaging: None    Urinalysis: [facility] RESULTS:  Recent Labs   Lab Test  08/29/17   0235   01/15/14   0936   COLOR  Light Yellow   < >  Yellow   APPEARANCE  Cloudy   < >  Clear   URINEGLC  Negative   < >  Negative   URINEBILI  Negative   < >  Negative   URINEKETONE  Negative   < >  Negative   SG  1.014   < >  1.020   UBLD  Moderate*   < >  Small*   URINEPH  6.0   < >  6.0   PROTEIN  100*   < >  Negative   UROBILINOGEN   --    --   0.2   NITRITE  Negative   < >  Negative   LEUKEST  Large*   < >  Negative   RBCU  10*   < >  10-25*   WBCU  >182*   < >  O - 2    < > = values in this interval not displayed.       PSA:     Post Void Residual:     Other labs: None today      IMPRESSION:  72 y/o M with urinary retention requiring CIC     PLAN:  Will refer for UDS   Follow up after UDS       Total Time: 15 minutes                                       Total in Consultation: greater than 50%

## 2017-10-13 NOTE — LETTER
10/13/2017       RE: Dung Norton  [address] [place] [address]     Dear Colleague,    Thank you for referring your patient, Dung Norton, to the [facility] UROLOGY AND INST FOR PROSTATE AND UROLOGIC CANCERS at [facility]. Please see a copy of my visit note below.    UROLOGIC DIAGNOSES:       CURRENT INTERVENTIONS:       HISTORY:     Patient with history of AUR and hematuria requiring cystoscopy and clot evacuation. Had failed TOV and has been performing CIC.   Patient states he is performing CIC regularly about 4x per trady.   He would like to discuss further management of urinary retention.     We discussed UDS and possible TURP. Also noted that TURP would likely help long term even if patient has poor detrusor function as this would make CIC easier in the long term.         PAST MEDICAL HISTORY:   Past Medical History:   Diagnosis Date     BPH (benign prostatic hyperplasia)      Chronic kidney disease      HTN      Pulmonary nodules      Tobacco abuse        PAST SURGICAL HISTORY:   Past Surgical History:   Procedure Laterality Date     APPENDECTOMY  AGE 6     CYSTOSCOPY, FULGURATE BLEEDERS, EVACUATE CLOT(S), COMBINED N/A 7/16/2017    Procedure: COMBINED CYSTOSCOPY, FULGURATE BLEEDERS, EVACUATE CLOT(S);  Cystoscopy clot evacuation, bladder biopsy and fulguration (per surgeons note) NERVE BLOCK PERIPHERAL;  Surgeon: Tamiko Vanegas MD;  Location: [facility]     SINUS SURGERY  AGE 6     TONSILLECTOMY      CHILDHOOD       FAMILY HISTORY:   Family History   Problem Relation Age of Onset     C.A.D. Mother      d age 67     Vision Loss Father      Hearing Loss Sister      DIABETES Sister      CANCER No family hx of        SOCIAL HISTORY:   Social History   Substance Use Topics     Smoking status: Former Smoker     Types: Cigarettes     Smokeless tobacco: Never Used     Alcohol use No       Current Outpatient Prescriptions   Medication     darbepoetin william  (ARANESP, ALBUMIN FREE,) 60 MCG/0.3ML injection     calcium acetate (PHOSLO) 667 MG CAPS capsule     finasteride (PROSCAR) 5 MG tablet     Multiple Vitamins-Minerals (MULTIVITAMIN MEN PO)     fish oil-omega-3 fatty acids (FISH OIL) 1000 MG capsule     aspirin 81 MG tablet     No current facility-administered medications for this visit.          PHYSICAL EXAM:    /80  Pulse 102  Ht 1.829 m (6')  Wt 66.7 kg (147 lb)  BMI 19.94 kg/m2    HEENT: Normocephalic and atraumatic   Cardiac: Not done  Back/Flank: Not done  CNS/PNS: Not done  Respiratory: Normal non-labored breathing  Abdomen: Soft nontender and nondistended  Peripheral Vascular: Not done  Mental Status: Not done    Penis: Not done  Scrotal Skin: Not done  Testicles: Not done  Epididymis: Not done  Digital Rectal Exam:     Cystoscopy: Not done    Imaging: None    Urinalysis: UA RESULTS:  Recent Labs   Lab Test  08/29/17   0235   01/15/14   0936   COLOR  Light Yellow   < >  Yellow   APPEARANCE  Cloudy   < >  Clear   URINEGLC  Negative   < >  Negative   URINEBILI  Negative   < >  Negative   URINEKETONE  Negative   < >  Negative   SG  1.014   < >  1.020   UBLD  Moderate*   < >  Small*   URINEPH  6.0   < >  6.0   PROTEIN  100*   < >  Negative   UROBILINOGEN   --    --   0.2   NITRITE  Negative   < >  Negative   LEUKEST  Large*   < >  Negative   RBCU  10*   < >  10-25*   WBCU  >182*   < >  O - 2    < > = values in this interval not displayed.       PSA:     Post Void Residual:     Other labs: None today      IMPRESSION:  72 y/o M with urinary retention requiring CIC     PLAN:  Will refer for UDS   Follow up after UDS       Total Time: 15 minutes                                       Total in Consultation: greater than 50%         Again, thank you for allowing me to participate in the care of your patient.      Sincerely,    Tamiko Vanegas MD

## 2017-10-13 NOTE — MR AVS SNAPSHOT
After Visit Summary   10/13/2017    Dung Norton    MRN: [id number]           Patient Information     Date Of Birth          1945        Visit Information        Provider Department      10/13/2017 8:15 AM Tamiko Vanegas MD [facility] Urology and [facility] for Prostate and Urologic Cancers        Care Instructions    Schedule Urodynamics and follow up with Dr. Vanegas to discuss results.    It was a pleasure meeting with you today.  Thank you for allowing me and my team the privilege of caring for you today.  YOU are the reason we are here, and I truly hope we provided you with the excellent service you deserve.  Please let us know if there is anything else we can do for you so that we can be sure you are leaving completely satisfied with your care experience.                  Follow-ups after your visit        Your next 10 appointments already scheduled     Oct 13, 2017 10:30 AM CDT   US UPPER EXTREMITY VENOUS MAPPING BILATERAL with UCUSV2   [facility] ([facility])    [address] [address] [phone]           Please bring a list of your medicines (including vitamins, minerals and over-the-counter drugs). Also, tell your doctor about any allergies you may have. Wear comfortable clothes and leave your valuables at home.  You do not need to do anything special to prepare for your exam.  Please call the Imaging Department at your exam site with any questions.            Oct 16, 2017 11:15 AM CDT   LAB with FZ LAB   [facility] ([facility])    [address] [address] [phone]           Patient must bring picture ID. Patient should be prepared to give a urine specimen  Please do not eat 10-12 hours before your appointment if you are coming in fasting for labs on lipids, cholesterol, or glucose (sugar). Pregnant women should follow their Care Team instructions.  Water with medications is okay. Do not drink coffee or other fluids. If you have concerns about taking  your medications, please ask at office or if scheduling via Deskwantedhart, send a message by clicking on Secure Messaging, Message Your Care Team.            Nov 07, 2017  9:20 AM CST   (Arrive by 9:05 AM)   Fistula Consult with Eduardo Pabon MD   [facility] ([facility])    [address] [place] [address]   [phone]            Nov 08, 2017  1:00 PM CST   (Arrive by 12:30 PM)   Return Visit with Oralia De La Torre NP   [facility]phrology ([facility])    [address] [place] [address]   [phone]            Nov 29, 2017 11:30 AM CST   (Arrive by 11:15 AM)   Urodynamics with Nicki Spear PA-C   [facility] for Prostate and Urologic Cancers ([facility])    [address] [place] [id number] [phone]            Dec 01, 2017 10:45 AM CST   (Arrive by 10:30 AM)   Return Visit with Tamiko Vanegas MD   [facility] for Prostate and Urologic Cancers ([facility])    [address] [place] [id number] [phone]            Dec 12, 2017 12:10 PM CST   [facility]    [address] [id number] [phone]           Bring a current list of meds and any records pertaining to this visit. For Physicals, please bring immunization records and any forms needing to be filled out. Please arrive 10 minutes early to complete paperwork.              Who to contact     Please call your clinic at [phone] to:    Ask questions about your health    Make or cancel appointments    Discuss your medicines    Learn about your test results    Speak to your doctor   If you  have compliments or concerns about an experience at your clinic, or if you wish to file a complaint, please contact [facility] Patient Relations at [phone] or email us at [email or web address]         Additional Information About Your Visit        Quinyx ABhart Information     SimScalet is an electronic gateway that provides easy, online access to your medical records. With RuffaloCODY, you can request a clinic appointment, read your test results, renew a prescription or communicate with your care team.     To sign up for RuffaloCODY visit the website at [email or web address]   You will be asked to enter the access code listed below, as well as some personal information. Please follow the directions to create your username and password.     Your access code is: [email or web address]  Expires: 2018  4:46 PM     Your access code will  in 90 days. If you need help or a new code, please contact your [facility] or call [phone] for assistance.        Care EveryWhere ID     This is your Care EveryWhere ID. This could be used by other organizations to access your [facility] medical records  [id number]        Your Vitals Were     Pulse Height BMI (Body Mass Index)             102 1.829 m (6') 19.94 kg/m2          Blood Pressure from Last 3 Encounters:   10/13/17 126/80   10/09/17 138/75   10/06/17 159/90    Weight from Last 3 Encounters:   10/13/17 66.7 kg (147 lb)   10/06/17 67.4 kg (148 lb 9.6 oz)   17 68.7 kg (151 lb 6.4 oz)              Today, you had the following     No orders found for display       Primary Care Provider Office Phone # Fax #    Haley Baker [phone] [address] [place] [address]        Equal Access to Services     BRIAN CERRATO : Justin Sagastume, osvaldo calle, gibson nesbitt. [facility] [phone].    ATENCIÓN: Si habla  español, tiene a montague disposición servicios gratuitos de asistencia lingüística. Jose Manuel brink [phone].    We comply with applicable federal civil rights laws and [place] laws. We do not discriminate on the basis of race, color, national origin, age, disability, sex, sexual orientation, or gender identity.            Thank you!     Thank you for choosing [facility] UROLOGY AND [facility] FOR PROSTATE AND UROLOGIC CANCERS  for your care. Our goal is always to provide you with excellent care. Hearing back from our patients is one way we can continue to improve our services. Please take a few minutes to complete the written survey that you may receive in the mail after your visit with us. Thank you!             Your Updated Medication List - Protect others around you: Learn how to safely use, store and throw away your medicines at www.disposemymeds.org.          This list is accurate as of: 10/13/17  9:02 AM.  Always use your most recent med list.                   Brand Name Dispense Instructions for use Diagnosis    aspirin 81 MG tablet      Take 1 tablet by mouth daily.        calcium acetate 667 MG Caps capsule    PHOSLO    180 capsule    Take 1 capsule (667 mg) by mouth 3 times daily (with meals)    Chronic kidney disease, unspecified CKD stage       darbepoetin william 60 MCG/0.3ML injection    ARANESP (ALBUMIN FREE)    0.3 mL    Inject 0.3 mLs (60 mcg) Subcutaneous every 14 days As needed for hgb<10g/dL.  If Hgb increases >1 point in 2 weeks (if blood transfusion given, use hgb PRIOR to this), SYSTOLIC BP > 180 mmHg or hgb>=10g/dL, HOLD DOSE. Dose must be within 1 week of Hgb.  Per anemia protocol with Cecilia De La Torre MD/Tereza Paul,PharmD [phone]    Anemia of chronic renal failure, stage 5 (H), CKD (chronic kidney disease) stage 5, GFR less than 15 ml/min (H)       finasteride 5 MG tablet    PROSCAR    90 tablet    Take 1 tablet (5 mg) by mouth daily    Benign prostatic hyperplasia with urinary retention       fish  oil-omega-3 fatty acids 1000 MG capsule      Take 1 g by mouth daily        MULTIVITAMIN MEN PO      Take 1 tablet by mouth daily

## 2017-10-13 NOTE — NURSING NOTE
Chief Complaint   Patient presents with     RECHECK     BPH consult       Blood pressure 126/80, pulse 102, height 1.829 m (6'), weight 66.7 kg (147 lb). Body mass index is 19.94 kg/(m^2).    Patient Active Problem List   Diagnosis     Tobacco abuse     CARDIOVASCULAR SCREENING; LDL GOAL LESS THAN 130     Hypertension goal BP (blood pressure) < 140/90     Advanced directives, counseling/discussion     Elevated fasting glucose     Hypertriglyceridemia     Symptomatic anemia     Pulmonary nodules     Bilateral leg weakness     Weakness of shoulder     KRISTINA (acute kidney injury) (H)     Anemia of chronic renal failure, stage 5 (H)     Orthostatic hypotension     Acute renal failure, unspecified acute renal failure type (H)     End stage renal disease (H)     Acquired hypothyroidism     Obstructive uropathy     CKD (chronic kidney disease) stage 5, GFR less than 15 ml/min (H)       No Known Allergies    Current Outpatient Prescriptions   Medication Sig Dispense Refill     darbepoetin william (ARANESP, ALBUMIN FREE,) 60 MCG/0.3ML injection Inject 0.3 mLs (60 mcg) Subcutaneous every 14 days As needed for hgb<10g/dL.  If Hgb increases >1 point in 2 weeks (if blood transfusion given, use hgb PRIOR to this), SYSTOLIC BP > 180 mmHg or hgb>=10g/dL, HOLD DOSE. Dose must be within 1 week of Hgb.  Per anemia protocol with Cecilia De La Torre MD/Tereza Paul,PharmD [phone] 0.3 mL 99     calcium acetate (PHOSLO) 667 MG CAPS capsule Take 1 capsule (667 mg) by mouth 3 times daily (with meals) 180 capsule 0     finasteride (PROSCAR) 5 MG tablet Take 1 tablet (5 mg) by mouth daily 90 tablet 3     Multiple Vitamins-Minerals (MULTIVITAMIN MEN PO) Take 1 tablet by mouth daily       fish oil-omega-3 fatty acids (FISH OIL) 1000 MG capsule Take 1 g by mouth daily       aspirin 81 MG tablet Take 1 tablet by mouth daily.         Social History   Substance Use Topics     Smoking status: Former Smoker     Types: Cigarettes     Smokeless tobacco: Never  Used     Alcohol use No       JENY Monk  10/13/2017  8:14 AM

## 2017-10-13 NOTE — PATIENT INSTRUCTIONS
Schedule Urodynamics and follow up with Dr. Vanegas to discuss results.    It was a pleasure meeting with you today.  Thank you for allowing me and my team the privilege of caring for you today.  YOU are the reason we are here, and I truly hope we provided you with the excellent service you deserve.  Please let us know if there is anything else we can do for you so that we can be sure you are leaving completely satisfied with your care experience.

## 2017-10-16 ENCOUNTER — TELEPHONE (OUTPATIENT)
Dept: NEPHROLOGY | Facility: CLINIC | Age: 72
End: 2017-10-16

## 2017-10-16 ENCOUNTER — ALLIED HEALTH/NURSE VISIT (OUTPATIENT)
Dept: FAMILY MEDICINE | Facility: CLINIC | Age: 72
End: 2017-10-16
Payer: COMMERCIAL

## 2017-10-16 VITALS — SYSTOLIC BLOOD PRESSURE: 136 MMHG | DIASTOLIC BLOOD PRESSURE: 82 MMHG | HEART RATE: 96 BPM

## 2017-10-16 DIAGNOSIS — R79.89 ELEVATED SERUM CREATININE: ICD-10-CM

## 2017-10-16 DIAGNOSIS — Z01.30 BP CHECK: Primary | ICD-10-CM

## 2017-10-16 DIAGNOSIS — N18.5 CKD (CHRONIC KIDNEY DISEASE) STAGE 5, GFR LESS THAN 15 ML/MIN (H): ICD-10-CM

## 2017-10-16 DIAGNOSIS — N18.5 ANEMIA OF CHRONIC RENAL FAILURE, STAGE 5 (H): ICD-10-CM

## 2017-10-16 DIAGNOSIS — D63.1 ANEMIA OF CHRONIC RENAL FAILURE, STAGE 5 (H): ICD-10-CM

## 2017-10-16 LAB
ALBUMIN SERPL-MCNC: 3 G/DL (ref 3.4–5)
ANION GAP SERPL CALCULATED.3IONS-SCNC: 8 MMOL/L (ref 3–14)
BUN SERPL-MCNC: 115 MG/DL (ref 7–30)
CALCIUM SERPL-MCNC: 9.6 MG/DL (ref 8.5–10.1)
CHLORIDE SERPL-SCNC: 106 MMOL/L (ref 94–109)
CO2 SERPL-SCNC: 24 MMOL/L (ref 20–32)
CREAT SERPL-MCNC: 5.66 MG/DL (ref 0.66–1.25)
FERRITIN SERPL-MCNC: 1110 NG/ML (ref 26–388)
GFR SERPL CREATININE-BSD FRML MDRD: 10 ML/MIN/1.7M2
GLUCOSE SERPL-MCNC: 111 MG/DL (ref 70–99)
HCT VFR BLD AUTO: 28.9 % (ref 40–53)
HGB BLD-MCNC: 9.1 G/DL (ref 13.3–17.7)
IRON SATN MFR SERPL: 19 % (ref 15–46)
IRON SERPL-MCNC: 36 UG/DL (ref 35–180)
PHOSPHATE SERPL-MCNC: 6.7 MG/DL (ref 2.5–4.5)
POTASSIUM SERPL-SCNC: 4.7 MMOL/L (ref 3.4–5.3)
SODIUM SERPL-SCNC: 138 MMOL/L (ref 133–144)
TIBC SERPL-MCNC: 192 UG/DL (ref 240–430)

## 2017-10-16 PROCEDURE — 83540 ASSAY OF IRON: CPT

## 2017-10-16 PROCEDURE — 99207 ZZC NO CHARGE NURSE ONLY: CPT | Performed by: FAMILY MEDICINE

## 2017-10-16 PROCEDURE — 85018 HEMOGLOBIN: CPT

## 2017-10-16 PROCEDURE — 80069 RENAL FUNCTION PANEL: CPT

## 2017-10-16 PROCEDURE — 36415 COLL VENOUS BLD VENIPUNCTURE: CPT

## 2017-10-16 PROCEDURE — 82728 ASSAY OF FERRITIN: CPT

## 2017-10-16 PROCEDURE — 83550 IRON BINDING TEST: CPT

## 2017-10-16 PROCEDURE — 85014 HEMATOCRIT: CPT

## 2017-10-16 NOTE — PROGRESS NOTES
Dung Norton is enrolled/participating in the retail pharmacy Blood Pressure Goals Achievement Program (BPGAP).  Dung Norton was evaluated at [facility] on October 16, 2017 at which time his blood pressure was:    BP Readings from Last 3 Encounters:   10/16/17 136/82   10/13/17 126/80   10/09/17 138/75     Reviewed lifestyle modifications for blood pressure control and reduction: including making healthy food choices, managing weight, getting regular exercise, smoking cessation, reducing alcohol consumption, monitoring blood pressure regularly.     Dung Norton is not experiencing symptoms.    Follow-Up: BP is at goal of < 140/90mmHg (patient 18+ years of age with or without diabetes).  Recommended follow-up at pharmacy in 6 months.     Recommendation to Provider: non    Dung Norton was evaluated for enrollment into the PGEN study today.    Patient eligible for enrollment unknown  Patient interested in enrollment unknown    Completed by: Ricarda Andino RPh  [facility]  Phone [phone]  Fax      [phone]

## 2017-10-16 NOTE — MR AVS SNAPSHOT
After Visit Summary   10/16/2017    Dung Norton    MRN: [id number]           Patient Information     Date Of Birth          1945        Visit Information        Provider Department      10/16/2017 1:28 PM Haley Baker MD [facility]        Today's Diagnoses     BP check    -  1       Follow-ups after your visit        Your next 10 appointments already scheduled     Oct 23, 2017 10:15 AM CDT   LAB with FZ LAB   [facility] ([facility])    [address] [facility] [address] [phone]           Patient must bring picture ID. Patient should be prepared to give a urine specimen  Please do not eat 10-12 hours before your appointment if you are coming in fasting for labs on lipids, cholesterol, or glucose (sugar). Pregnant women should follow their Care Team instructions. Water with medications is okay. Do not drink coffee or other fluids. If you have concerns about taking  your medications, please ask at office or if scheduling via [employer], send a message by clicking on Secure Messaging, Message Your Care Team.            Nov 07, 2017  9:20 AM CST   (Arrive by 9:05 AM)   Fistula Consult with Eduardo Pabon MD   [facility] ([facility])    [address] [place] [address]  3rd [facility] [id number] [phone]            Nov 08, 2017  1:00 PM CST   (Arrive by 12:30 PM)   Return Visit with Oralia De La Torre NP   [facility] Nephrology ([facility])    [address] [id number] [phone]            Nov 29, 2017 11:30 AM CST   (Arrive by 11:15 AM)   Urodynamics with Nicki Spear PA-C   [facility] for Prostate and Urologic Cancers ([facility])    [address] [place] [address]  4th [facility] [id number] [phone]            Dec 01, 2017 10:45 AM CST   (Arrive by 10:30 AM)   Return Visit  "with Tamiko Vanegas MD   [facility] for Prostate and Urologic Cancers ([facility])    [address] [facility] [address]  4th Floor  [facility] [address] [phone]            Dec 12, 2017 12:10 PM CST   Office Visit with Haley Baker MD   [facility] ([facility])    [address] [place] [id number] [phone]           Bring a current list of meds and any records pertaining to this visit. For Physicals, please bring immunization records and any forms needing to be filled out. Please arrive 10 minutes early to complete paperwork.              Who to contact     If you have questions or need follow up information about today's clinic visit or your schedule please contact [facility] directly at [phone].  Normal or non-critical lab and imaging results will be communicated to you by MyChart, letter or phone within 4 business days after the clinic has received the results. If you do not hear from us within 7 days, please contact the clinic through MyChart or phone. If you have a critical or abnormal lab result, we will notify you by phone as soon as possible.  Submit refill requests through [employer] or call your pharmacy and they will forward the refill request to us. Please allow 3 business days for your refill to be completed.          Additional Information About Your Visit        MyChart Information     Adimab lets you send messages to your doctor, view your test results, renew your prescriptions, schedule appointments and more. To sign up, go to [email or web address] . Click on \"Log in\" on the left side of the screen, which will take you to the Welcome page. Then click on \"Sign up Now\" on the right side of the page.     You will be asked to enter the access code listed below, as well as some personal information. Please follow the directions to create your username and password.     Your access code is: " [email or web address]  Expires: 2018  4:46 PM     Your access code will  in 90 days. If you need help or a new code, please call your [place] clinic or [phone].        Care EveryWhere ID     This is your Care EveryWhere ID. This could be used by other organizations to access your [place] medical records  [id number]        Your Vitals Were     Pulse                   96            Blood Pressure from Last 3 Encounters:   10/16/17 136/82   10/13/17 126/80   10/09/17 138/75    Weight from Last 3 Encounters:   10/13/17 147 lb (66.7 kg)   10/06/17 148 lb 9.6 oz (67.4 kg)   17 151 lb 6.4 oz (68.7 kg)              Today, you had the following     No orders found for display       Primary Care Provider Office Phone # Fax #    Haley Baker [phone] [address] [facility] [address]        Equal Access to Services     [facility]: Hadii aad ku hadasho Soomaali, waaxda luqadaha, qaybta kaalmada adeegyada, waxay biancain haysaadia green . So [facility] [phone].    ATENCIÓN: Si habla español, tiene a montague disposición servicios gratuitos de asistencia lingüística. Llame al [phone].    We comply with applicable federal civil rights laws and [place] laws. We do not discriminate on the basis of race, color, national origin, age, disability, sex, sexual orientation, or gender identity.            Thank you!     Thank you for choosing [facility]  for your care. Our goal is always to provide you with excellent care. Hearing back from our patients is one way we can continue to improve our services. Please take a few minutes to complete the written survey that you may receive in the mail after your visit with us. Thank you!             Your Updated Medication List - Protect others around you: Learn how to safely use, store and throw away your medicines at www.disposemymeds.org.          This list is accurate as of: 10/16/17 11:59 PM.  Always use your most  recent med list.                   Brand Name Dispense Instructions for use Diagnosis    aspirin 81 MG tablet      Take 1 tablet by mouth daily.        calcium acetate 667 MG Caps capsule    PHOSLO    180 capsule    Take 1 capsule (667 mg) by mouth 3 times daily (with meals)    Chronic kidney disease, unspecified CKD stage       darbepoetin william 60 MCG/0.3ML injection    ARANESP (ALBUMIN FREE)    0.3 mL    Inject 0.3 mLs (60 mcg) Subcutaneous every 14 days As needed for hgb<10g/dL.  If Hgb increases >1 point in 2 weeks (if blood transfusion given, use hgb PRIOR to this), SYSTOLIC BP > 180 mmHg or hgb>=10g/dL, HOLD DOSE. Dose must be within 1 week of Hgb.  Per anemia protocol with Cecilia De La Torre MD/Tereza Paul,PharmD [phone]    Anemia of chronic renal failure, stage 5 (H), CKD (chronic kidney disease) stage 5, GFR less than 15 ml/min (H)       finasteride 5 MG tablet    PROSCAR    90 tablet    Take 1 tablet (5 mg) by mouth daily    Benign prostatic hyperplasia with urinary retention       fish oil-omega-3 fatty acids 1000 MG capsule      Take 1 g by mouth daily        MULTIVITAMIN MEN PO      Take 1 tablet by mouth daily

## 2017-10-16 NOTE — TELEPHONE ENCOUNTER
Time of Call: 353 pm    Person reporting results:     Depart/facility results are coming from:[facility]    Phone number:     CRITICAL RESULTS: Cr 5.66,      Results taken by: Suad Cid LPN                Diagnosis: CKD 5     Ordering provider: Cecilia De La Torre NP    Course of Action: message sent to Cecilia

## 2017-10-17 ENCOUNTER — TELEPHONE (OUTPATIENT)
Dept: PHARMACY | Facility: CLINIC | Age: 72
End: 2017-10-17

## 2017-10-17 NOTE — TELEPHONE ENCOUNTER
Spoke to Mireya. Mireya will put note in patients chart regarding message below. Will have Tereza return call tomorrow.   Tereza Garcia RN

## 2017-10-18 NOTE — TELEPHONE ENCOUNTER
Anemia Management Note  SUBJECTIVE/OBJECTIVE:  Referred by Cecilia De La Torre on 9/11/2017  Primary Diagnosis: Anemia in Chronic Kidney Disease (N18.5, D63.1)     Secondary Diagnosis:  Chronic Kidney Disease, Stage 5 (N18.5)  Hgb goal range:  9-10  Epo/Darbo: Aranesp 60 mcg/0.3 ml every 14 days As needed for hgb<10g/dL  RX expires on 9/12/2018  Iron regimen:  None  Labs exp: 9/12/2018    Anemia Latest Ref Rng & Units 8/28/2017 8/29/2017 9/15/2017 9/25/2017 10/2/2017 10/9/2017 10/16/2017   EARLENE Dose - - - 60 mcg - - - -   Hemoglobin 13.3 - 17.7 g/dL 8.6(L) 8.8(L) 7.1(L) 8.2(L) 8.5(L) 8.9(L) 9.1(L)   TSAT 15 - 46 % - - 18 - - - 19   Ferritin 26 - 388 ng/mL - - 923(H) - - - 1110(H)     BP Readings from Last 3 Encounters:   10/16/17 136/82   10/13/17 126/80   10/09/17 138/75     Wt Readings from Last 2 Encounters:   10/13/17 147 lb (66.7 kg)   10/06/17 148 lb 9.6 oz (67.4 kg)     ASSESSMENT:  Hgb continues to improve right at max recommended rate 1pt/2weeks. Will hold off on EARLENE this week and determine need for dose next week  TSat: not at goal (>30%) but ferritin >1000ng/mL.  PO iron not indicated at this time per anemia protocol.      PLAN:  RTC for hgb then aranesp if needed in 1 week(s)  If dose needed, wants to get at [facility]. Unclear if this is worked out yet. Will probably be best to touch base with daughter Rabia though she wants her dad to take over responsibility for this.    Orders needed to be renewed (for next follow-up date) in EPIC: None    Iron labs due:  11/16    Plan discussed with:  No call made yet  Plan provided by:  Tereza    NEXT FOLLOW-UP DATE:  10/24    Anemia Management Service  Tereza Anabell Paul and Malina Hussein CPhT  Phone: [phone]  Fax: [phone]

## 2017-10-23 ENCOUNTER — ALLIED HEALTH/NURSE VISIT (OUTPATIENT)
Dept: FAMILY MEDICINE | Facility: CLINIC | Age: 72
End: 2017-10-23
Payer: COMMERCIAL

## 2017-10-23 ENCOUNTER — NURSE TRIAGE (OUTPATIENT)
Dept: NURSING | Facility: CLINIC | Age: 72
End: 2017-10-23

## 2017-10-23 VITALS — SYSTOLIC BLOOD PRESSURE: 130 MMHG | DIASTOLIC BLOOD PRESSURE: 70 MMHG

## 2017-10-23 DIAGNOSIS — I10 HYPERTENSION GOAL BP (BLOOD PRESSURE) < 140/90: Primary | ICD-10-CM

## 2017-10-23 DIAGNOSIS — R79.89 ELEVATED SERUM CREATININE: ICD-10-CM

## 2017-10-23 DIAGNOSIS — N18.5 CKD (CHRONIC KIDNEY DISEASE) STAGE 5, GFR LESS THAN 15 ML/MIN (H): ICD-10-CM

## 2017-10-23 DIAGNOSIS — N18.5 ANEMIA OF CHRONIC RENAL FAILURE, STAGE 5 (H): ICD-10-CM

## 2017-10-23 DIAGNOSIS — D63.1 ANEMIA OF CHRONIC RENAL FAILURE, STAGE 5 (H): ICD-10-CM

## 2017-10-23 LAB
ALBUMIN SERPL-MCNC: 3.2 G/DL (ref 3.4–5)
ANION GAP SERPL CALCULATED.3IONS-SCNC: 10 MMOL/L (ref 3–14)
BUN SERPL-MCNC: 118 MG/DL (ref 7–30)
CALCIUM SERPL-MCNC: 10.3 MG/DL (ref 8.5–10.1)
CHLORIDE SERPL-SCNC: 108 MMOL/L (ref 94–109)
CO2 SERPL-SCNC: 21 MMOL/L (ref 20–32)
CREAT SERPL-MCNC: 5.82 MG/DL (ref 0.66–1.25)
GFR SERPL CREATININE-BSD FRML MDRD: 10 ML/MIN/1.7M2
GLUCOSE SERPL-MCNC: 97 MG/DL (ref 70–99)
HCT VFR BLD AUTO: 29.1 % (ref 40–53)
HGB BLD-MCNC: 9.1 G/DL (ref 13.3–17.7)
PHOSPHATE SERPL-MCNC: 7.9 MG/DL (ref 2.5–4.5)
POTASSIUM SERPL-SCNC: 4.6 MMOL/L (ref 3.4–5.3)
SODIUM SERPL-SCNC: 139 MMOL/L (ref 133–144)

## 2017-10-23 PROCEDURE — 85018 HEMOGLOBIN: CPT

## 2017-10-23 PROCEDURE — 36415 COLL VENOUS BLD VENIPUNCTURE: CPT

## 2017-10-23 PROCEDURE — 99207 ZZC NO CHARGE NURSE ONLY: CPT | Performed by: FAMILY MEDICINE

## 2017-10-23 PROCEDURE — 85014 HEMATOCRIT: CPT

## 2017-10-23 PROCEDURE — 80069 RENAL FUNCTION PANEL: CPT

## 2017-10-23 NOTE — MR AVS SNAPSHOT
After Visit Summary   10/23/2017    Dung Norton    MRN: [id number]           Patient Information     Date Of Birth          1945        Visit Information        Provider Department      10/23/2017 11:04 AM Haley Baker MD [place] [facility]        Today's Diagnoses     Hypertension goal BP (blood pressure) < 140/90    -  1       Follow-ups after your visit        Your next 10 appointments already scheduled     Oct 30, 2017 10:45 AM CDT   LAB with FZ LAB   [facility] ([facility])    [address] [facility] [address] [phone]           Patient must bring picture ID. Patient should be prepared to give a urine specimen  Please do not eat 10-12 hours before your appointment if you are coming in fasting for labs on lipids, cholesterol, or glucose (sugar). Pregnant women should follow their Care Team instructions. Water with medications is okay. Do not drink coffee or other fluids. If you have concerns about taking  your medications, please ask at office or if scheduling via Local[employer], send a message by clicking on Secure Messaging, Message Your Care Team.            Nov 07, 2017  9:20 AM CST   (Arrive by 9:05 AM)   Fistula Consult with Eduardo Pabon MD   [facility] Trans[facility] ([facility])    [address] [place] [id number] [phone]            Nov 08, 2017  1:00 PM CST   (Arrive by 12:30 PM)   Return Visit with Oralia De La Torre NP   [facility] Neph[facility] ([facility])    [address] [place] [id number] [phone]            Nov 29, 2017 11:30 AM CST   (Arrive by 11:15 AM)   Urodynamics with Nicki Spear PA-C   [facility] for Prostate and Urologic Cancers ([facility])    [address] [place] [id number] [phone]            Dec 01, 2017 10:45 AM CST  "  (Arrive by 10:30 AM)   Return Visit with Tamiko Vanegas MD   [facility] for Prostate and Urologic Cancers ([facility])    [address] [facility]  4th Floor  [facility] [address] [phone]            Dec 12, 2017 12:10 PM CST   Office Visit with Haley Baker MD   [facility] ([facility])    [address] [place] [id number] [phone]           Bring a current list of meds and any records pertaining to this visit. For Physicals, please bring immunization records and any forms needing to be filled out. Please arrive 10 minutes early to complete paperwork.              Who to contact     If you have questions or need follow up information about today's clinic visit or your schedule please contact [facility] directly at [phone].  Normal or non-critical lab and imaging results will be communicated to you by MyChart, letter or phone within 4 business days after the clinic has received the results. If you do not hear from us within 7 days, please contact the clinic through [employer] or phone. If you have a critical or abnormal lab result, we will notify you by phone as soon as possible.  Submit refill requests through [employer] or call your pharmacy and they will forward the refill request to us. Please allow 3 business days for your refill to be completed.          Additional Information About Your Visit        [employer] Qu[employer] Information     [employer] lets you send messages to your doctor, view your test results, renew your prescriptions, schedule appointments and more. To sign up, go to [email or web address] . Click on \"Log in\" on the left side of the screen, which will take you to the Welcome page. Then click on \"Sign up Now\" on the right side of the page.     You will be asked to enter the access code listed below, as well as some personal information. Please follow the directions to create your username and " password.     Your access code is: [email or web address]  Expires: 2018  4:46 PM     Your access code will  in 90 days. If you need help or a new code, please call your [place] clinic or [phone].        Care EveryWhere ID     This is your Care EveryWhere ID. This could be used by other organizations to access your [place] medical records  [id number]         Blood Pressure from Last 3 Encounters:   10/23/17 130/70   10/16/17 136/82   10/13/17 126/80    Weight from Last 3 Encounters:   10/13/17 147 lb (66.7 kg)   10/06/17 148 lb 9.6 oz (67.4 kg)   17 151 lb 6.4 oz (68.7 kg)              Today, you had the following     No orders found for display       Primary Care Provider Office Phone # Fax #    Haley Baker [phone] [address] [facility] [address]        Equal Access to Services     [facility]: Hadii aad ku hadasho Soomaali, waaxda luqadaha, qaybta kaalmada adeegyada, gibson green . So [facility] [place] [phone].    ATENCIÓN: Si habla español, tiene a montague disposición servicios gratuitos de asistencia lingüística. Llame al [phone].    We comply with applicable federal civil rights laws and [place] laws. We do not discriminate on the basis of race, color, national origin, age, disability, sex, sexual orientation, or gender identity.            Thank you!     Thank you for choosing [facility]  for your care. Our goal is always to provide you with excellent care. Hearing back from our patients is one way we can continue to improve our services. Please take a few minutes to complete the written survey that you may receive in the mail after your visit with us. Thank you!             Your Updated Medication List - Protect others around you: Learn how to safely use, store and throw away your medicines at www.disposemymeds.org.          This list is accurate as of: 10/23/17 11:05 AM.  Always use your most recent med list.                    Brand Name Dispense Instructions for use Diagnosis    aspirin 81 MG tablet      Take 1 tablet by mouth daily.        calcium acetate 667 MG Caps capsule    PHOSLO    180 capsule    Take 1 capsule (667 mg) by mouth 3 times daily (with meals)    Chronic kidney disease, unspecified CKD stage       darbepoetin william 60 MCG/0.3ML injection    ARANESP (ALBUMIN FREE)    0.3 mL    Inject 0.3 mLs (60 mcg) Subcutaneous every 14 days As needed for hgb<10g/dL.  If Hgb increases >1 point in 2 weeks (if blood transfusion given, use hgb PRIOR to this), SYSTOLIC BP > 180 mmHg or hgb>=10g/dL, HOLD DOSE. Dose must be within 1 week of Hgb.  Per anemia protocol with Cecilia De La Torre MD/Tereza Paul,PharmD [phone]    Anemia of chronic renal failure, stage 5 (H), CKD (chronic kidney disease) stage 5, GFR less than 15 ml/min (H)       finasteride 5 MG tablet    PROSCAR    90 tablet    Take 1 tablet (5 mg) by mouth daily    Benign prostatic hyperplasia with urinary retention       fish oil-omega-3 fatty acids 1000 MG capsule      Take 1 g by mouth daily        MULTIVITAMIN MEN PO      Take 1 tablet by mouth daily

## 2017-10-23 NOTE — PROGRESS NOTES
Dung Norton is enrolled/participating in the retail pharmacy Blood Pressure Goals Achievement Program (BPGAP).  Dung Norton was evaluated at [facility] on October 23, 2017 at which time his blood pressure was:    BP Readings from Last 3 Encounters:   10/23/17 130/70   10/16/17 136/82   10/13/17 126/80     Reviewed lifestyle modifications for blood pressure control and reduction: including making healthy food choices, managing weight, getting regular exercise, smoking cessation, reducing alcohol consumption, monitoring blood pressure regularly.     Dung Norton is not experiencing symptoms.    Follow-Up: BP is at goal of < 140/90mmHg (patient 18+ years of age with or without diabetes).  Recommended follow-up at pharmacy in 6 months.     Recommendation to Provider: None at this time.     Dung Norton was evaluated for enrollment into the PGEN study today.    Patient eligible for enrollment:  No  Patient interested in enrollment:  No    Completed by: Thank you,  Cheri Winslow, PharmD  [facility]  [phone]

## 2017-10-23 NOTE — TELEPHONE ENCOUNTER
Clinic Action Needed:No  Reason for Call: Ivone with [facility] calling to report a critical lab value to the on call provider for [facility].  Paged on call provider to speak to caller at [phone].  Dr. Chambers is on call, page sent via [facility]  at 6:15 pm.   Routed to: Not routed.    Sofia Worrell, RN  [facility]

## 2017-10-24 ENCOUNTER — TELEPHONE (OUTPATIENT)
Dept: PHARMACY | Facility: CLINIC | Age: 72
End: 2017-10-24

## 2017-10-25 ENCOUNTER — TELEPHONE (OUTPATIENT)
Dept: FAMILY MEDICINE | Facility: CLINIC | Age: 72
End: 2017-10-25

## 2017-10-25 NOTE — TELEPHONE ENCOUNTER
Follow-up with anemia management service:    I spoke with Safia at [facility] to f/u if aranesp injections can be administered there. [phone]. She will take a look and call me back.  Per Safia, insurance issues are resolved and he can call to schedule ancillary visit for appt for aranesp if needed [phone]. She will watch if appt is scheduled to make sure that the drug gets ordered.     Anemia Latest Ref Rng & Units 2017 9/15/2017 2017 10/2/2017 10/9/2017 10/16/2017 10/23/2017   EARLENE Dose - - 60 mcg - - - - -   Hemoglobin 13.3 - 17.7 g/dL 8.8(L) 7.1(L) 8.2(L) 8.5(L) 8.9(L) 9.1(L) 9.1(L)   TSAT 15 - 46 % - 18 - - - 19 -   Ferritin 26 - 388 ng/mL - 923(H) - - - 1110(H) -     Orders needed to be renewed (for next follow-up date) in EPIC: None   Med order expires: 2018   Lab orders : 2018    Follow-up call date: 10/31    [facility]    Anemia Management Service  Tereza Paul,Anabell and Malina Hussein CPhT  Phone: [phone]  Fax: [phone]

## 2017-10-25 NOTE — TELEPHONE ENCOUNTER
See 10/2/17 TE, per daughter Aransep injections are covered through insurance and patient can have injection at clinic.  See following TE  Safia Elder RN

## 2017-10-25 NOTE — TELEPHONE ENCOUNTER
Tereza the [facility] pharmacist called asking if patient has been cleared to have injections at [facility].    Per 10/6/17 TE we are awaiting call back from daughter regarding coverage for medication.   Per 10/2/17 TE insurance issues have been resolved, patient can come in to clinic to schedule.   Spoke with Tereza and confirmed insurance issues have been resolved.  Patient can schedule ancillary visit for injection once scheduled medication will be ordered.     Safia Elder RN

## 2017-10-27 ENCOUNTER — TELEPHONE (OUTPATIENT)
Dept: NEPHROLOGY | Facility: CLINIC | Age: 72
End: 2017-10-27

## 2017-10-27 DIAGNOSIS — N18.9 CHRONIC KIDNEY DISEASE, UNSPECIFIED CKD STAGE: ICD-10-CM

## 2017-10-27 NOTE — TELEPHONE ENCOUNTER
Per Cecilia: Could you have Dung increase his Phoslo to 2 with each meal ( 2 tabs TID)     Attempted to call patient. Call rang out, no voicemail. Will try again at a later time.    Giselle Benoit RN

## 2017-10-27 NOTE — TELEPHONE ENCOUNTER
ancillary will order medication prior to injection date, see Pharm D encounters, patient has injection depending on labs, patient will only be scheduled if he needs the injection.   Safia Elder RN

## 2017-10-30 ENCOUNTER — TELEPHONE (OUTPATIENT)
Dept: NEPHROLOGY | Facility: CLINIC | Age: 72
End: 2017-10-30

## 2017-10-30 ENCOUNTER — ALLIED HEALTH/NURSE VISIT (OUTPATIENT)
Dept: FAMILY MEDICINE | Facility: CLINIC | Age: 72
End: 2017-10-30
Payer: COMMERCIAL

## 2017-10-30 VITALS — SYSTOLIC BLOOD PRESSURE: 134 MMHG | DIASTOLIC BLOOD PRESSURE: 78 MMHG

## 2017-10-30 DIAGNOSIS — R79.89 ELEVATED SERUM CREATININE: ICD-10-CM

## 2017-10-30 DIAGNOSIS — D63.1 ANEMIA OF CHRONIC RENAL FAILURE, STAGE 5 (H): ICD-10-CM

## 2017-10-30 DIAGNOSIS — I10 HYPERTENSION GOAL BP (BLOOD PRESSURE) < 140/90: Primary | ICD-10-CM

## 2017-10-30 DIAGNOSIS — N18.5 ANEMIA OF CHRONIC RENAL FAILURE, STAGE 5 (H): ICD-10-CM

## 2017-10-30 DIAGNOSIS — N18.5 CKD (CHRONIC KIDNEY DISEASE) STAGE 5, GFR LESS THAN 15 ML/MIN (H): ICD-10-CM

## 2017-10-30 LAB
ALBUMIN SERPL-MCNC: 3 G/DL (ref 3.4–5)
ANION GAP SERPL CALCULATED.3IONS-SCNC: 14 MMOL/L (ref 3–14)
BUN SERPL-MCNC: 133 MG/DL (ref 7–30)
CALCIUM SERPL-MCNC: 9.7 MG/DL (ref 8.5–10.1)
CHLORIDE SERPL-SCNC: 107 MMOL/L (ref 94–109)
CO2 SERPL-SCNC: 20 MMOL/L (ref 20–32)
CREAT SERPL-MCNC: 5.9 MG/DL (ref 0.66–1.25)
GFR SERPL CREATININE-BSD FRML MDRD: 9 ML/MIN/1.7M2
GLUCOSE SERPL-MCNC: 99 MG/DL (ref 70–99)
HCT VFR BLD AUTO: 28.9 % (ref 40–53)
HGB BLD-MCNC: 9 G/DL (ref 13.3–17.7)
PHOSPHATE SERPL-MCNC: 7.4 MG/DL (ref 2.5–4.5)
POTASSIUM SERPL-SCNC: 5 MMOL/L (ref 3.4–5.3)
SODIUM SERPL-SCNC: 141 MMOL/L (ref 133–144)

## 2017-10-30 PROCEDURE — 85018 HEMOGLOBIN: CPT

## 2017-10-30 PROCEDURE — 80069 RENAL FUNCTION PANEL: CPT

## 2017-10-30 PROCEDURE — 99207 ZZC NO CHARGE NURSE ONLY: CPT | Performed by: FAMILY MEDICINE

## 2017-10-30 PROCEDURE — 36415 COLL VENOUS BLD VENIPUNCTURE: CPT

## 2017-10-30 PROCEDURE — 85014 HEMATOCRIT: CPT

## 2017-10-30 NOTE — PROGRESS NOTES
Dung Norton is enrolled/participating in the retail pharmacy Blood Pressure Goals Achievement Program (BPGAP).  Dung Norton was evaluated at [facility] on October 30, 2017 at which time his blood pressure was:    BP Readings from Last 3 Encounters:   10/30/17 134/78   10/23/17 130/70   10/16/17 136/82     Reviewed lifestyle modifications for blood pressure control and reduction: including making healthy food choices, managing weight, getting regular exercise, smoking cessation, reducing alcohol consumption, monitoring blood pressure regularly.     Dung Norton is not experiencing symptoms.    Follow-Up: BP is at goal of < 140/90mmHg (patient 18+ years of age with or without diabetes).  Recommended follow-up at pharmacy in 6 months.     Recommendation to Provider: None at this time.     Dung Norton was evaluated for enrollment into the PGEN study today.    Patient eligible for enrollment:  No  Patient interested in enrollment:  No    Completed by: Thank you,  Cheri Winslow, PharmD  [facility]  [phone]

## 2017-10-30 NOTE — TELEPHONE ENCOUNTER
DATE:  10/30/2017   TIME OF RECEIPT FROM LAB:  1950  LAB TEST:  BUN, Creatinine  LAB VALUE:  133; 5.90  RESULTS GIVEN WITH READ-BACK TO (PROVIDER):  BRANDY Desai  TIME LAB VALUE REPORTED TO PROVIDER:   6780    Ivone (lab, [facility]) reported critical values to Ever Triage RN, who reported them to Giselle, nephrology RN. Ivone can be reached at [phone]. Sent to BRANDY Desai.

## 2017-10-30 NOTE — TELEPHONE ENCOUNTER
Spoke with patient, relayed Cecilia's message. Patient agreed to plan. Updated rx sent.    Giselle Benoit RN

## 2017-10-30 NOTE — TELEPHONE ENCOUNTER
Received update of critical BUN, Creatinine lab values. Update sent to Cecilia Negrete.    Giselle Benoit RN

## 2017-10-30 NOTE — MR AVS SNAPSHOT
After Visit Summary   10/30/2017    Dung Norton    MRN: [id number]           Patient Information     Date Of Birth          1945        Visit Information        Provider Department      10/30/2017 11:12 AM Haley Baker MD [facility]        Today's Diagnoses     Hypertension goal BP (blood pressure) < 140/90    -  1       Follow-ups after your visit        Your next 10 appointments already scheduled     Nov 06, 2017 10:45 AM CST   LAB with FZ LAB   [facility] ([facility])    [address] [place] [id number] [phone]           Patient must bring picture ID. Patient should be prepared to give a urine specimen  Please do not eat 10-12 hours before your appointment if you are coming in fasting for labs on lipids, cholesterol, or glucose (sugar). Pregnant women should follow their Care Team instructions. Water with medications is okay. Do not drink coffee or other fluids. If you have concerns about taking  your medications, please ask at office or if scheduling via Tufinhart, send a message by clicking on Secure Messaging, Message Your Care Team.            Nov 07, 2017  9:20 AM CST   (Arrive by 9:05 AM)   Fistula Consult with Eduardo Pabon MD   [facility] Transplant ([facility])    [address] [place] [address] [id number] [phone]            Nov 08, 2017  1:00 PM CST   (Arrive by 12:30 PM)   Return Visit with Oralia De La Torre NP   [facility] Nephrology ([facility])    [address] [place] [address] [id number] [phone]            Nov 29, 2017 11:30 AM CST   (Arrive by 11:15 AM)   Urodynamics with Nicki Spear PA-C   [facility] for Prostate and Urologic Cancers ([facility])    [address] [place] [id number] [phone]            Dec 01, 2017 10:45 AM CST  "  (Arrive by 10:30 AM)   Return Visit with Tamiko Vanegas MD   [facility] for Prostate and Urologic Cancers ([facility])    [address]  4th Floor  [facility] [address] [phone]            Dec 12, 2017 12:10 PM CST   Office Visit with Haley Baker MD   [facility] ([facility])    [address] [place] [id number] [phone]           Bring a current list of meds and any records pertaining to this visit. For Physicals, please bring immunization records and any forms needing to be filled out. Please arrive 10 minutes early to complete paperwork.              Who to contact     If you have questions or need follow up information about today's clinic visit or your schedule please contact [facility] directly at [phone].  Normal or non-critical lab and imaging results will be communicated to you by MyChart, letter or phone within 4 business days after the clinic has received the results. If you do not hear from us within 7 days, please contact the clinic through Post.Bid.Shiphart or phone. If you have a critical or abnormal lab result, we will notify you by phone as soon as possible.  Submit refill requests through [employer] or call your pharmacy and they will forward the refill request to us. Please allow 3 business days for your refill to be completed.          Additional Information About Your Visit        [employer] Information     [employer] lets you send messages to your doctor, view your test results, renew your prescriptions, schedule appointments and more. To sign up, go to [email or web address] . Click on \"Log in\" on the left side of the screen, which will take you to the Welcome page. Then click on \"Sign up Now\" on the right side of the page.     You will be asked to enter the access code listed below, as well as some personal information. Please follow the directions to create your username and " password.     Your access code is: [email or web address]  Expires: 2018  4:46 PM     Your access code will  in 90 days. If you need help or a new code, please call your [facility] clinic or [phone].        Care EveryWhere ID     This is your Care EveryWhere ID. This could be used by other organizations to access your [facility] medical records  [id number]         Blood Pressure from Last 3 Encounters:   10/30/17 134/78   10/23/17 130/70   10/16/17 136/82    Weight from Last 3 Encounters:   10/13/17 147 lb (66.7 kg)   10/06/17 148 lb 9.6 oz (67.4 kg)   17 151 lb 6.4 oz (68.7 kg)              Today, you had the following     No orders found for display       Primary Care Provider Office Phone # Fax #    Haley Baker [phone] [address] [facility] [address]        Equal Access to Services     [facility]: Hadii aad ku hadasho Soomaali, waaxda luqadaha, qaybta kaalmada adeegyada, gibson green . So [facility] [phone].    ATENCIÓN: Si habla español, tiene a montague disposición servicios gratuitos de asistencia lingüística. Llame al [phone].    We comply with applicable federal civil rights laws and [place] laws. We do not discriminate on the basis of race, color, national origin, age, disability, sex, sexual orientation, or gender identity.            Thank you!     Thank you for choosing [facility]  for your care. Our goal is always to provide you with excellent care. Hearing back from our patients is one way we can continue to improve our services. Please take a few minutes to complete the written survey that you may receive in the mail after your visit with us. Thank you!             Your Updated Medication List - Protect others around you: Learn how to safely use, store and throw away your medicines at www.disposemymeds.org.          This list is accurate as of: 10/30/17 11:13 AM.  Always use your most recent med list.                    Brand Name Dispense Instructions for use Diagnosis    aspirin 81 MG tablet      Take 1 tablet by mouth daily.        calcium acetate 667 MG Caps capsule    PHOSLO    540 capsule    Take 2 capsules (1,334 mg) by mouth 3 times daily (with meals)    Chronic kidney disease, unspecified CKD stage       darbepoetin william 60 MCG/0.3ML injection    ARANESP (ALBUMIN FREE)    0.3 mL    Inject 0.3 mLs (60 mcg) Subcutaneous every 14 days As needed for hgb<10g/dL.  If Hgb increases >1 point in 2 weeks (if blood transfusion given, use hgb PRIOR to this), SYSTOLIC BP > 180 mmHg or hgb>=10g/dL, HOLD DOSE. Dose must be within 1 week of Hgb.  Per anemia protocol with Cecilia De La Torre MD/Tereza Paul,PharmD [phone]    Anemia of chronic renal failure, stage 5 (H), CKD (chronic kidney disease) stage 5, GFR less than 15 ml/min (H)       finasteride 5 MG tablet    PROSCAR    90 tablet    Take 1 tablet (5 mg) by mouth daily    Benign prostatic hyperplasia with urinary retention       fish oil-omega-3 fatty acids 1000 MG capsule      Take 1 g by mouth daily        MULTIVITAMIN MEN PO      Take 1 tablet by mouth daily

## 2017-10-31 ENCOUNTER — TELEPHONE (OUTPATIENT)
Dept: PHARMACY | Facility: CLINIC | Age: 72
End: 2017-10-31

## 2017-10-31 ENCOUNTER — TELEPHONE (OUTPATIENT)
Dept: FAMILY MEDICINE | Facility: CLINIC | Age: 72
End: 2017-10-31

## 2017-10-31 ENCOUNTER — PRE VISIT (OUTPATIENT)
Dept: UROLOGY | Facility: CLINIC | Age: 72
End: 2017-10-31

## 2017-10-31 NOTE — TELEPHONE ENCOUNTER
Anemia Management Note  SUBJECTIVE/OBJECTIVE:  Referred by Cecilia De La Torre on 9/11/2017  Primary Diagnosis: Anemia in Chronic Kidney Disease (N18.5, D63.1)     Secondary Diagnosis:  Chronic Kidney Disease, Stage 5 (N18.5)  Hgb goal range:  9-10  Epo/Darbo: Aranesp 60 mcg/0.3 ml every 14 days As needed for hgb<10g/dL  RX expires on 9/12/2018  Iron regimen:  None  Labs exp: 9/12/2018    Anemia Latest Ref Rng & Units 9/15/2017 9/25/2017 10/2/2017 10/9/2017 10/16/2017 10/23/2017 10/30/2017   EARLENE Dose - 60 mcg - - - - - -   Hemoglobin 13.3 - 17.7 g/dL 7.1(L) 8.2(L) 8.5(L) 8.9(L) 9.1(L) 9.1(L) 9.0(L)   TSAT 15 - 46 % 18 - - - 19 - -   Ferritin 26 - 388 ng/mL 923(H) - - - 1110(H) - -     BP Readings from Last 3 Encounters:   10/30/17 134/78   10/23/17 130/70   10/16/17 136/82     Wt Readings from Last 2 Encounters:   10/13/17 147 lb (66.7 kg)   10/06/17 148 lb 9.6 oz (67.4 kg)     ASSESSMENT:  Hgb:at goal - recommend dose  TSat: not at goal (>30%) but ferritin >1000ng/mL.  PO iron not indicated at this time per anemia protocol.      PLAN:  Dose with aranesp and RTC for hgb then aranesp if needed in 2 week(s)  He will call [facility] to schedule. Provided phone number [phone] and instruction to schedule ancillary visit for aranesp injection. Sent message to Safia Elder RN as they will need to order med    Orders needed to be renewed (for next follow-up date) in EPIC: None    Iron labs due:  1/16    Plan discussed with:  Dung  Plan provided by:  Tereza    NEXT FOLLOW-UP DATE:  11/2 to document dose then 11/14    Anemia Management Service  Gabriella PalD and Malina Hussein CPhT  Phone: [phone]  Fax: [phone]

## 2017-10-31 NOTE — TELEPHONE ENCOUNTER
Reason for Call:  Other returning call    Detailed comments:  Daughter calling back regarding getting an injection. Please call her.     Phone Number Patient can be reached at: Other phone number:   [phone].     Best Time:  Any     Can we leave a detailed message on this number? YES    Call taken on 10/31/2017 at 4:14 PM by Reena Izquierdo

## 2017-11-01 DIAGNOSIS — N18.5 CKD (CHRONIC KIDNEY DISEASE) STAGE 5, GFR LESS THAN 15 ML/MIN (H): Primary | ICD-10-CM

## 2017-11-01 NOTE — TELEPHONE ENCOUNTER
Spoke with daughter, patient was not sure how to schedule Aranesp injection.  See note from Pharm D with injection orders. Ancillary appointment scheduled, daughter will notify patient.   Safia Elder RN

## 2017-11-02 ENCOUNTER — ALLIED HEALTH/NURSE VISIT (OUTPATIENT)
Dept: NURSING | Facility: CLINIC | Age: 72
End: 2017-11-02
Payer: COMMERCIAL

## 2017-11-02 ENCOUNTER — TELEPHONE (OUTPATIENT)
Dept: PHARMACY | Facility: CLINIC | Age: 72
End: 2017-11-02

## 2017-11-02 DIAGNOSIS — N18.5 ANEMIA OF CHRONIC RENAL FAILURE, STAGE 5 (H): Primary | ICD-10-CM

## 2017-11-02 DIAGNOSIS — D63.1 ANEMIA OF CHRONIC RENAL FAILURE, STAGE 5 (H): Primary | ICD-10-CM

## 2017-11-02 PROCEDURE — 96372 THER/PROPH/DIAG INJ SC/IM: CPT

## 2017-11-02 PROCEDURE — 99207 ZZC NO CHARGE LOS: CPT

## 2017-11-02 NOTE — TELEPHONE ENCOUNTER
Patient was in today and received Aransep injection. Patient would like to know when he needs to come in for his next appointment. Please advise.  Zainab SERRATO CMA ([facility])

## 2017-11-02 NOTE — NURSING NOTE
Chief Complaint   Patient presents with     Imm/Inj     Aranesp       Initial There were no vitals taken for this visit. Estimated body mass index is 19.94 kg/(m^2) as calculated from the following:    Height as of 10/13/17: 6' (1.829 m).    Weight as of 10/13/17: 147 lb (66.7 kg).  Medication Reconciliation: unable or not appropriate to perform   Prior to injection verified patient identity using patient's name and date of birth.  The following medication was given:     MEDICATION: Aranesp 60 mcg/0.3 mL  ROUTE: SQ  SITE: Arm - Right  DOSE: 60 mcg/0.3 mL  LOT #: [id number]  :  Jumia  EXPIRATION DATE:  12/2018  NDC#: 46401-162-79  Zainab SERRATO CMA ([facility])

## 2017-11-02 NOTE — MR AVS SNAPSHOT
After Visit Summary   11/2/2017    Dung Norton    MRN: [id number]           Patient Information     Date Of Birth          1945        Visit Information        Provider Department      11/2/2017 10:00 AM FZ ANCILL[facility]        Today's Diagnoses     Anemia of chronic renal failure, stage 5 (H)    -  1       Follow-ups after your visit        Your next 10 appointments already scheduled     Nov 06, 2017 10:45 AM CST   LAB with FZ LAB   [facility] ([facility])    [address] [place] [address] [address] [phone]           Please do not eat 10-12 hours before your appointment if you are coming in fasting for labs on lipids, cholesterol, or glucose (sugar). This does not apply to pregnant women. Water, hot tea and black coffee (with nothing added) are okay. Do not drink other fluids, diet soda or chew gum.            Nov 07, 2017  9:20 AM CST   (Arrive by 9:05 AM)   Fistula Consult with Eduardo Pabon MD   [facility] Solid Organ Transplant ([facility])    [address] [place] [address] [id number] [phone]            Nov 08, 2017  1:00 PM CST   (Arrive by 12:30 PM)   Return Visit with Oralia De La Torre NP   [facility] Nephrology ([facility])    [address] [place] [phone] [phone]            Nov 29, 2017 11:30 AM CST   (Arrive by 11:15 AM)   Urodynamics with Nicki Spear PA-C   [facility] Inst for Prostate and Urologic Cancers ([facility])    [address] [place] [id number] [phone]            Dec 01, 2017 10:45 AM CST   (Arrive by 10:30 AM)   Return Visit with Tamiko Vanegas MD   [facility] for Prostate and Urologic Cancers ([facility])    [address] [place] [phone]  "  [phone]            Dec 12, 2017 12:10 PM CST   Office Visit with Haley Baker MD   [facility] ([facility])    [address]  [place] [address] [phone]           Bring a current list of meds and any records pertaining to this visit. For Physicals, please bring immunization records and any forms needing to be filled out. Please arrive 10 minutes early to complete paperwork.              Future tests that were ordered for you today     Open Future Orders        Priority Expected Expires Ordered    Renal panel Routine 11/8/2017 1/30/2018 11/1/2017    CBC with platelets differential Routine 11/8/2017 1/30/2018 11/1/2017            Who to contact     If you have questions or need follow up information about today's clinic visit or your schedule please contact [facility] directly at [phone].  Normal or non-critical lab and imaging results will be communicated to you by MyChart, letter or phone within 4 business days after the clinic has received the results. If you do not hear from us within 7 days, please contact the clinic through [employer] or phone. If you have a critical or abnormal lab result, we will notify you by phone as soon as possible.  Submit refill requests through [employer] or call your pharmacy and they will forward the refill request to us. Please allow 3 business days for your refill to be completed.          Additional Information About Your Visit        [employer] Information     [employer] lets you send messages to your doctor, view your test results, renew your prescriptions, schedule appointments and more. To sign up, go to [email or web address] . Click on \"Log in\" on the left side of the screen, which will take you to the Welcome page. Then click on \"Sign up Now\" on the right side of the page.     You will be asked to enter the access code listed below, as well as some personal information. Please follow the directions to create your " username and password.     Your access code is: [email or web address]  Expires: 2018  4:46 PM     Your access code will  in 90 days. If you need help or a new code, please call your [facility] clinic or [phone].        Care EveryWhere ID     This is your Care EveryWhere ID. This could be used by other organizations to access your [facility] medical records  [id number]         Blood Pressure from Last 3 Encounters:   10/30/17 134/78   10/23/17 130/70   10/16/17 136/82    Weight from Last 3 Encounters:   10/13/17 147 lb (66.7 kg)   10/06/17 148 lb 9.6 oz (67.4 kg)   17 151 lb 6.4 oz (68.7 kg)              Today, you had the following     No orders found for display       Primary Care Provider Office Phone # Fax #    Haley Baker [phone]       [address] [facility] [address]        Equal Access to Services     [facility]: Hadii aad ku hadasho Soomaali, waaxda luqadaha, qaybta kaalmada adeegyada, waxniall duong haysaadia green . So [facility] [phone].    ATENCIÓN: Si habla español, tiene a montague disposición servicios gratuitos de asistencia lingüística. Llame al [phone].    We comply with applicable federal civil rights laws and [place] laws. We do not discriminate on the basis of race, color, national origin, age, disability, sex, sexual orientation, or gender identity.            Thank you!     Thank you for choosing [facility]  for your care. Our goal is always to provide you with excellent care. Hearing back from our patients is one way we can continue to improve our services. Please take a few minutes to complete the written survey that you may receive in the mail after your visit with us. Thank you!             Your Updated Medication List - Protect others around you: Learn how to safely use, store and throw away your medicines at www.disposemymeds.org.          This list is accurate as of: 17 10:11 AM.  Always use your most recent med list.                    Brand Name Dispense Instructions for use Diagnosis    aspirin 81 MG tablet      Take 1 tablet by mouth daily.        calcium acetate 667 MG Caps capsule    PHOSLO    540 capsule    Take 2 capsules (1,334 mg) by mouth 3 times daily (with meals)    Chronic kidney disease, unspecified CKD stage       darbepoetin william 60 MCG/0.3ML injection    ARANESP (ALBUMIN FREE)    0.3 mL    Inject 0.3 mLs (60 mcg) Subcutaneous every 14 days As needed for hgb<10g/dL.  If Hgb increases >1 point in 2 weeks (if blood transfusion given, use hgb PRIOR to this), SYSTOLIC BP > 180 mmHg or hgb>=10g/dL, HOLD DOSE. Dose must be within 1 week of Hgb.  Per anemia protocol with Cecilia De La Torre MD/Tereza Paul,PharmD [phone]    Anemia of chronic renal failure, stage 5 (H), CKD (chronic kidney disease) stage 5, GFR less than 15 ml/min (H)       finasteride 5 MG tablet    PROSCAR    90 tablet    Take 1 tablet (5 mg) by mouth daily    Benign prostatic hyperplasia with urinary retention       fish oil-omega-3 fatty acids 1000 MG capsule      Take 1 g by mouth daily        MULTIVITAMIN MEN PO      Take 1 tablet by mouth daily

## 2017-11-02 NOTE — TELEPHONE ENCOUNTER
Anemia Management Note  SUBJECTIVE/OBJECTIVE:  Referred by Cecilia De La Torre on 9/11/2017  Primary Diagnosis: Anemia in Chronic Kidney Disease (N18.5, D63.1)     Secondary Diagnosis:  Chronic Kidney Disease, Stage 5 (N18.5)  Hgb goal range:  9-10  Epo/Darbo: Aranesp 60 mcg/0.3 ml every 14 days As needed for hgb<10g/dL  RX expires on 9/12/2018  Iron regimen:  None  Labs exp: 9/12/2018    Anemia Latest Ref Rng & Units 9/25/2017 10/2/2017 10/9/2017 10/16/2017 10/23/2017 10/30/2017 11/2/2017   EARLENE Dose - - - - - - - 60 mcg   Hemoglobin 13.3 - 17.7 g/dL 8.2(L) 8.5(L) 8.9(L) 9.1(L) 9.1(L) 9.0(L) -   TSAT 15 - 46 % - - - 19 - - -   Ferritin 26 - 388 ng/mL - - - 1110(H) - - -     BP Readings from Last 3 Encounters:   10/30/17 134/78   10/23/17 130/70   10/16/17 136/82     Wt Readings from Last 2 Encounters:   10/13/17 147 lb (66.7 kg)   10/06/17 148 lb 9.6 oz (67.4 kg)     ASSESSMENT:  Hgb: at goal - received dose in clinic - recommend continue current regimen  TSat: not at goal of >30% Ferritin: Elevated (>1000ng/mL)    PLAN:  RTC for hgb, ferritin and iron labs then aranesp if needed in 2 week(s)    Orders needed to be renewed (for next follow-up date) in EPIC: None    Iron labs due:  11/16/17    Plan discussed with:  No call made  Plan provided by:  Judith    NEXT FOLLOW-UP DATE:  11/16/17    Anemia Management Service  Tereza Paul,GabriellaD and Malina Hussein CPhT  Phone: [phone]  Fax: [phone]

## 2017-11-06 ENCOUNTER — TELEPHONE (OUTPATIENT)
Dept: NEPHROLOGY | Facility: CLINIC | Age: 72
End: 2017-11-06

## 2017-11-06 ENCOUNTER — ALLIED HEALTH/NURSE VISIT (OUTPATIENT)
Dept: FAMILY MEDICINE | Facility: CLINIC | Age: 72
End: 2017-11-06
Payer: COMMERCIAL

## 2017-11-06 VITALS — SYSTOLIC BLOOD PRESSURE: 118 MMHG | DIASTOLIC BLOOD PRESSURE: 74 MMHG

## 2017-11-06 DIAGNOSIS — I10 HYPERTENSION GOAL BP (BLOOD PRESSURE) < 140/90: Primary | ICD-10-CM

## 2017-11-06 DIAGNOSIS — N18.5 CKD (CHRONIC KIDNEY DISEASE) STAGE 5, GFR LESS THAN 15 ML/MIN (H): ICD-10-CM

## 2017-11-06 LAB
ALBUMIN SERPL-MCNC: 2.8 G/DL (ref 3.4–5)
ANION GAP SERPL CALCULATED.3IONS-SCNC: 7 MMOL/L (ref 3–14)
BASOPHILS # BLD AUTO: 0 10E9/L (ref 0–0.2)
BASOPHILS NFR BLD AUTO: 0.4 %
BUN SERPL-MCNC: 132 MG/DL (ref 7–30)
CALCIUM SERPL-MCNC: 10 MG/DL (ref 8.5–10.1)
CHLORIDE SERPL-SCNC: 110 MMOL/L (ref 94–109)
CO2 SERPL-SCNC: 23 MMOL/L (ref 20–32)
CREAT SERPL-MCNC: 5.82 MG/DL (ref 0.66–1.25)
DIFFERENTIAL METHOD BLD: ABNORMAL
EOSINOPHIL # BLD AUTO: 0.4 10E9/L (ref 0–0.7)
EOSINOPHIL NFR BLD AUTO: 3.3 %
ERYTHROCYTE [DISTWIDTH] IN BLOOD BY AUTOMATED COUNT: 15.4 % (ref 10–15)
GFR SERPL CREATININE-BSD FRML MDRD: 10 ML/MIN/1.7M2
GLUCOSE SERPL-MCNC: 103 MG/DL (ref 70–99)
HCT VFR BLD AUTO: 27.7 % (ref 40–53)
HGB BLD-MCNC: 8.4 G/DL (ref 13.3–17.7)
LYMPHOCYTES # BLD AUTO: 1.3 10E9/L (ref 0.8–5.3)
LYMPHOCYTES NFR BLD AUTO: 11.1 %
MCH RBC QN AUTO: 27.1 PG (ref 26.5–33)
MCHC RBC AUTO-ENTMCNC: 30.3 G/DL (ref 31.5–36.5)
MCV RBC AUTO: 89 FL (ref 78–100)
MONOCYTES # BLD AUTO: 0.8 10E9/L (ref 0–1.3)
MONOCYTES NFR BLD AUTO: 7.3 %
NEUTROPHILS # BLD AUTO: 8.8 10E9/L (ref 1.6–8.3)
NEUTROPHILS NFR BLD AUTO: 77.9 %
PHOSPHATE SERPL-MCNC: 5.7 MG/DL (ref 2.5–4.5)
PLATELET # BLD AUTO: 374 10E9/L (ref 150–450)
POTASSIUM SERPL-SCNC: 5 MMOL/L (ref 3.4–5.3)
RBC # BLD AUTO: 3.1 10E12/L (ref 4.4–5.9)
SODIUM SERPL-SCNC: 140 MMOL/L (ref 133–144)
WBC # BLD AUTO: 11.3 10E9/L (ref 4–11)

## 2017-11-06 PROCEDURE — 99207 ZZC NO CHARGE NURSE ONLY: CPT | Performed by: FAMILY MEDICINE

## 2017-11-06 PROCEDURE — 85025 COMPLETE CBC W/AUTO DIFF WBC: CPT

## 2017-11-06 PROCEDURE — 36415 COLL VENOUS BLD VENIPUNCTURE: CPT

## 2017-11-06 PROCEDURE — 80069 RENAL FUNCTION PANEL: CPT

## 2017-11-06 NOTE — TELEPHONE ENCOUNTER
DATE:  11/6/2017   TIME OF RECEIPT FROM LAB:  2050  LAB TEST:  BUN, Creatinine  LAB VALUE:  132, 5.82  RESULTS GIVEN WITH READ-BACK TO (PROVIDER):  BRANDY Desai  TIME LAB VALUE REPORTED TO PROVIDER:   1672    Ivone ([facility] lab) reported critical value to Marcial Curtis RN, who reported it to BRANDY Desai.  Ivone can be reached at [phone]. Sent to BRANDY Desai.

## 2017-11-06 NOTE — PROGRESS NOTES
Dung Norton is enrolled/participating in the retail pharmacy Blood Pressure Goals Achievement Program (BPGAP).  Dung Norton was evaluated at [facility] on November 6, 2017 at which time his blood pressure was:    BP Readings from Last 3 Encounters:   11/06/17 118/74   10/30/17 134/78   10/23/17 130/70     Reviewed lifestyle modifications for blood pressure control and reduction: including making healthy food choices, managing weight, getting regular exercise, smoking cessation, reducing alcohol consumption, monitoring blood pressure regularly.     Dung Norton is not experiencing symptoms.    Follow-Up: BP is at goal of < 140/90mmHg (patient 18+ years of age with or without diabetes).  Recommended follow-up at pharmacy in 6 months.     Recommendation to Provider: None at this time.     Dung Norton was evaluated for enrollment into the PGEN study today.    Patient eligible for enrollment:  No  Patient interested in enrollment:  No    Completed by: Thank you,  Cheri Winslow, PharmD  [facility]  [phone]

## 2017-11-06 NOTE — TELEPHONE ENCOUNTER
Updated Cecilia with critical lab results. Labs are stable or unchanged from last draw, he has an appointment on 11/8.    Giselle Benoit RN

## 2017-11-06 NOTE — MR AVS SNAPSHOT
After Visit Summary   11/6/2017    Dung Norton    MRN: [id number]           Patient Information     Date Of Birth          1945        Visit Information        Provider Department      11/6/2017 11:42 AM Haley Baker MD [facility]        Today's Diagnoses     Hypertension goal BP (blood pressure) < 140/90    -  1       Follow-ups after your visit        Your next 10 appointments already scheduled     Nov 07, 2017  9:20 AM CST   (Arrive by 9:05 AM)   Fistula Consult with Eduardo Pabon MD   [facility] ([facility])    [address] [place] [address]   [phone]            Nov 08, 2017  1:00 PM CST   (Arrive by 12:30 PM)   Return Visit with Oralia De La Torre NP   [facility] Nephrology ([facility])    [address] [place] [address]   [phone]            Nov 13, 2017 10:30 AM CST   LAB with  LAB   [facility] ([facility])    [address] [place] [id number] [phone]           Please do not eat 10-12 hours before your appointment if you are coming in fasting for labs on lipids, cholesterol, or glucose (sugar). This does not apply to pregnant women. Water, hot tea and black coffee (with nothing added) are okay. Do not drink other fluids, diet soda or chew gum.            Nov 29, 2017 11:30 AM CST   (Arrive by 11:15 AM)   Urodynamics with Nicki Spear PA-C   [facility] for Prostate and Urologic Cancers ([facility])    [address] [place] [address] [phone]            Dec 01, 2017 10:45 AM CST   (Arrive by 10:30 AM)   Return Visit with Tamiko Vanegas MD   [facility] Urologic C[facility] ([facility])    [address] [place] [address]  "  [phone]            Dec 12, 2017 12:10 PM CST   Office Visit with Haley Baker MD   [facility] ([facility])    [address]  [place] [address] [phone]           Bring a current list of meds and any records pertaining to this visit. For Physicals, please bring immunization records and any forms needing to be filled out. Please arrive 10 minutes early to complete paperwork.              Who to contact     If you have questions or need follow up information about today's clinic visit or your schedule please contact [facility] directly at [phone].  Normal or non-critical lab and imaging results will be communicated to you by MyChart, letter or phone within 4 business days after the clinic has received the results. If you do not hear from us within 7 days, please contact the clinic through [employer] or phone. If you have a critical or abnormal lab result, we will notify you by phone as soon as possible.  Submit refill requests through [employer] or call your pharmacy and they will forward the refill request to us. Please allow 3 business days for your refill to be completed.          Additional Information About Your Visit        MyChart Information     [employer] lets you send messages to your doctor, view your test results, renew your prescriptions, schedule appointments and more. To sign up, go to [email or web address] . Click on \"Log in\" on the left side of the screen, which will take you to the Welcome page. Then click on \"Sign up Now\" on the right side of the page.     You will be asked to enter the access code listed below, as well as some personal information. Please follow the directions to create your username and password.     Your access code is: [email or web address]  Expires: 2018  3:46 PM     Your access code will  in 90 days. If you need help or a new code, please call your [facility] or [phone].        Care EveryWhere ID     " This is your Care EveryWhere ID. This could be used by other organizations to access your [facility] medical records  [id number]         Blood Pressure from Last 3 Encounters:   11/06/17 118/74   10/30/17 134/78   10/23/17 130/70    Weight from Last 3 Encounters:   10/13/17 147 lb (66.7 kg)   10/06/17 148 lb 9.6 oz (67.4 kg)   09/22/17 151 lb 6.4 oz (68.7 kg)              Today, you had the following     No orders found for display       Primary Care Provider Office Phone # Fax #    Haley Baker [phone]       [address] [facility] [address]        Equal Access to Services     JOSS CERRATO : Hadii aad ku hadasho Sochalo, waaxda luqadaha, qaybta kaalmada adeegyada, gibson green . So [facility] [phone].    ATENCIÓN: Si habla español, tiene a montague disposición servicios gratuitos de asistencia lingüística. [facility] [phone].    We comply with applicable federal civil rights laws and [place] laws. We do not discriminate on the basis of race, color, national origin, age, disability, sex, sexual orientation, or gender identity.            Thank you!     Thank you for choosing [facility]  for your care. Our goal is always to provide you with excellent care. Hearing back from our patients is one way we can continue to improve our services. Please take a few minutes to complete the written survey that you may receive in the mail after your visit with us. Thank you!             Your Updated Medication List - Protect others around you: Learn how to safely use, store and throw away your medicines at www.disposemymeds.org.          This list is accurate as of: 11/6/17 11:43 AM.  Always use your most recent med list.                   Brand Name Dispense Instructions for use Diagnosis    aspirin 81 MG tablet      Take 1 tablet by mouth daily.        calcium acetate 667 MG Caps capsule    PHOSLO    540 capsule    Take 2 capsules (1,334 mg) by mouth 3 times  daily (with meals)    Chronic kidney disease, unspecified CKD stage       darbepoetin william 60 MCG/0.3ML injection    ARANESP (ALBUMIN FREE)    0.3 mL    Inject 0.3 mLs (60 mcg) Subcutaneous every 14 days As needed for hgb<10g/dL.  If Hgb increases >1 point in 2 weeks (if blood transfusion given, use hgb PRIOR to this), SYSTOLIC BP > 180 mmHg or hgb>=10g/dL, HOLD DOSE. Dose must be within 1 week of Hgb.  Per anemia protocol with Cecilia De La Torre MD/Tereza Paul,PharmD [phone]    Anemia of chronic renal failure, stage 5 (H), CKD (chronic kidney disease) stage 5, GFR less than 15 ml/min (H)       finasteride 5 MG tablet    PROSCAR    90 tablet    Take 1 tablet (5 mg) by mouth daily    Benign prostatic hyperplasia with urinary retention       fish oil-omega-3 fatty acids 1000 MG capsule      Take 1 g by mouth daily        MULTIVITAMIN MEN PO      Take 1 tablet by mouth daily

## 2017-11-07 ENCOUNTER — CARE COORDINATION (OUTPATIENT)
Dept: CARE COORDINATION | Facility: CLINIC | Age: 72
End: 2017-11-07

## 2017-11-07 ENCOUNTER — OFFICE VISIT (OUTPATIENT)
Dept: TRANSPLANT | Facility: CLINIC | Age: 72
End: 2017-11-07
Attending: SURGERY
Payer: MEDICARE

## 2017-11-07 VITALS
HEIGHT: 72 IN | TEMPERATURE: 97.7 F | SYSTOLIC BLOOD PRESSURE: 122 MMHG | DIASTOLIC BLOOD PRESSURE: 66 MMHG | OXYGEN SATURATION: 100 % | HEART RATE: 98 BPM | WEIGHT: 145.72 LBS | BODY MASS INDEX: 19.74 KG/M2 | RESPIRATION RATE: 16 BRPM

## 2017-11-07 DIAGNOSIS — N18.5 CKD (CHRONIC KIDNEY DISEASE) STAGE 5, GFR LESS THAN 15 ML/MIN (H): Primary | ICD-10-CM

## 2017-11-07 PROCEDURE — 99212 OFFICE O/P EST SF 10 MIN: CPT | Mod: ZF

## 2017-11-07 ASSESSMENT — PAIN SCALES - GENERAL: PAINLEVEL: NO PAIN (0)

## 2017-11-07 NOTE — MR AVS SNAPSHOT
After Visit Summary   11/7/2017    Dung Norton    MRN: [id number]           Patient Information     Date Of Birth          1945        Visit Information        Provider Department      11/7/2017 9:20 AM Eduardo Pabon MD [facility]        Today's Diagnoses     CKD (chronic kidney disease) stage 5, GFR less than 15 ml/min (H)    -  1       Follow-ups after your visit        Your next 10 appointments already scheduled     Nov 08, 2017  1:00 PM CST   (Arrive by 12:30 PM)   Return Visit with Oralia De La Torre NP   [facility] ([facility])    [address] [place] [address]  [address] [facility] [address]   [phone]            Nov 13, 2017 10:30 AM CST   LAB with  LAB   [facility] ([facility])    [address] [place] [address] [phone]           Please do not eat 10-12 hours before your appointment if you are coming in fasting for labs on lipids, cholesterol, or glucose (sugar). This does not apply to pregnant women. Water, hot tea and black coffee (with nothing added) are okay. Do not drink other fluids, diet soda or chew gum.            Nov 29, 2017 11:30 AM CST   (Arrive by 11:15 AM)   Urodynamics with Nicki Spear PA-C   [facility] Prostate and Urologic Cancers ([facility])    [address] [place] [address]   [phone]            Dec 01, 2017 10:45 AM CST   (Arrive by 10:30 AM)   Return Visit with Tamiko Vanegas MD   [facility]state [facility] Urologic Cancers ([facility])    [address] [place] [address]   [phone]            Dec 12, 2017 12:10 PM CST   Office Visit with Haley Baker MD   [facility] ([facility])    [address] [place] [place] [address] [address] [phone]           Bring a  "current list of meds and any records pertaining to this visit. For Physicals, please bring immunization records and any forms needing to be filled out. Please arrive 10 minutes early to complete paperwork.              Who to contact     If you have questions or need follow up information about today's clinic visit or your schedule please contact [facility] directly at [phone].  Normal or non-critical lab and imaging results will be communicated to you by MyChart, letter or phone within 4 business days after the clinic has received the results. If you do not hear from us within 7 days, please contact the clinic through [employer] or phone. If you have a critical or abnormal lab result, we will notify you by phone as soon as possible.  Submit refill requests through [employer] or call your pharmacy and they will forward the refill request to us. Please allow 3 business days for your refill to be completed.          Additional Information About Your Visit        [employer] Information     [employer] lets you send messages to your doctor, view your test results, renew your prescriptions, schedule appointments and more. To sign up, go to [email or web address] . Click on \"Log in\" on the left side of the screen, which will take you to the Welcome page. Then click on \"Sign up Now\" on the right side of the page.     You will be asked to enter the access code listed below, as well as some personal information. Please follow the directions to create your username and password.     Your access code is: [email or web address]  Expires: 2018  3:46 PM     Your access code will  in 90 days. If you need help or a new code, please call your [facility] clinic or [phone].        Care EveryWhere ID     This is your Care EveryWhere ID. This could be used by other organizations to access your [facility] medical records  [id number]        Your Vitals Were     Pulse Temperature Respirations Height Pulse Oximetry BMI " (Body Mass Index)    98 97.7  F (36.5  C) (Oral) 16 1.829 m (6') 100% 19.76 kg/m2       Blood Pressure from Last 3 Encounters:   11/07/17 122/66   11/06/17 118/74   10/30/17 134/78    Weight from Last 3 Encounters:   11/07/17 66.1 kg (145 lb 11.6 oz)   10/13/17 66.7 kg (147 lb)   10/06/17 67.4 kg (148 lb 9.6 oz)              Today, you had the following     No orders found for display       Primary Care Provider Office Phone # Fax #    Haley Baker [phone] [address]  [place] [address]        Equal Access to Services     BRIAN CERRATO : Justin verdin Sochalo, waaxda luqadaha, qaybta kaalmada adeegyada, gibson green . So [facility] [phone].    ATENCIÓN: Si habla español, tiene a montague disposición servicios gratuitos de asistencia lingüística. Llame al [phone].    We comply with applicable federal civil rights laws and [place] laws. We do not discriminate on the basis of race, color, national origin, age, disability, sex, sexual orientation, or gender identity.            Thank you!     Thank you for choosing [facility]  for your care. Our goal is always to provide you with excellent care. Hearing back from our patients is one way we can continue to improve our services. Please take a few minutes to complete the written survey that you may receive in the mail after your visit with us. Thank you!             Your Updated Medication List - Protect others around you: Learn how to safely use, store and throw away your medicines at www.disposemymeds.org.          This list is accurate as of: 11/7/17 11:34 AM.  Always use your most recent med list.                   Brand Name Dispense Instructions for use Diagnosis    aspirin 81 MG tablet      Take 1 tablet by mouth daily.        calcium acetate 667 MG Caps capsule    PHOSLO    540 capsule    Take 2 capsules (1,334 mg) by mouth 3 times daily (with meals)    Chronic kidney  disease, unspecified CKD stage       darbepoetin william 60 MCG/0.3ML injection    ARANESP (ALBUMIN FREE)    0.3 mL    Inject 0.3 mLs (60 mcg) Subcutaneous every 14 days As needed for hgb<10g/dL.  If Hgb increases >1 point in 2 weeks (if blood transfusion given, use hgb PRIOR to this), SYSTOLIC BP > 180 mmHg or hgb>=10g/dL, HOLD DOSE. Dose must be within 1 week of Hgb.  Per anemia protocol with Cecilia De La Torre MD/Tereza Paul,PharmD [phone]    Anemia of chronic renal failure, stage 5 (H), CKD (chronic kidney disease) stage 5, GFR less than 15 ml/min (H)       finasteride 5 MG tablet    PROSCAR    90 tablet    Take 1 tablet (5 mg) by mouth daily    Benign prostatic hyperplasia with urinary retention       fish oil-omega-3 fatty acids 1000 MG capsule      Take 1 g by mouth daily        MULTIVITAMIN MEN PO      Take 1 tablet by mouth daily

## 2017-11-07 NOTE — NURSING NOTE
Chief Complaint   Patient presents with     Vascular Access Problem       Initial /66  Pulse 98  Temp 97.7  F (36.5  C) (Oral)  Resp 16  Ht 1.829 m (6')  Wt 66.1 kg (145 lb 11.6 oz)  SpO2 100%  BMI 19.76 kg/m2 Estimated body mass index is 19.76 kg/(m^2) as calculated from the following:    Height as of this encounter: 1.829 m (6').    Weight as of this encounter: 66.1 kg (145 lb 11.6 oz).

## 2017-11-07 NOTE — PROGRESS NOTES
Clinic Care Coordination Contact  [facility]    Referral Source: PCP  Clinical Data: Care Coordinator Outreach  Outreach attempted x 1.  Unable to leave a .  Plan: Care Coordinator mailed out care coordination introduction letter on 10/11/17. Care Coordinator will try to reach patient again in 3-5 business days.      Belkis Sexton, RN BSN, PHN RN Care Coordinator  [facility]  [email or web address]  [phone]  11/7/2017 9:10 AM

## 2017-11-07 NOTE — LETTER
11/7/2017       RE: Dung Norton  [address]  [place] [address]     Dear Colleague,    Thank you for referring your patient, Dung Norton, to the [facility] at [facility]. Please see a copy of my visit note below.    Dialysis Access Service  Consult Note    Referred by Dr. Daley for creation of permanent dialysis access.    HPI: Mr. Norton is being seen today for placement of permanent dialysis access due to Chronic renal failure from Obstructive Uropathy.  He is right handed. Mr. Norton is not dialyzing at (Not currently on dialysis).      Past Surgical History:   Procedure Laterality Date     APPENDECTOMY  AGE 6     CYSTOSCOPY, FULGURATE BLEEDERS, EVACUATE CLOT(S), COMBINED N/A 7/16/2017    Procedure: COMBINED CYSTOSCOPY, FULGURATE BLEEDERS, EVACUATE CLOT(S);  Cystoscopy clot evacuation, bladder biopsy and fulguration (per surgeons note) NERVE BLOCK PERIPHERAL;  Surgeon: Tamiko Vanegas MD;  Location: [facility] OR     SINUS SURGERY  AGE 6     TONSILLECTOMY      CHILDHOOD       Risk factors for vascular access are:     Hx of CVC    []    Comment:   Hx of PICC line         []    Comment:   Hx of Pacemaker    []    Comment:   History of failed access:  []           SVC syndrome   []       Heart Failure    []    EF:    Periph arterial disease  []      Prior Fracture/Surgery  []    Location:   DVT    []    Location:  Diabetes   []         Neuropathy   []    Comment:   Anticoagulation:   [x]    Agent:    ASAS [id number]  Anticoagulation contraindication: []    Details:                   Pediatric    []                           Hx of transplant   []   Comment:     Current immunosuppression []   Comment:                                  PHYSICAL EXAM:  Temp:  [97.7  F (36.5  C)] 97.7  F (36.5  C)  Pulse:  [98] 98  Resp:  [16] 16  BP: (122)/(66) 122/66  SpO2:  [100 %] 100 %  healthy, alert and cooperative  EXTREMITY EXAM:   Vein Exam: Obvious  suitable veins?    Left arm: YES   []           NO  []       Comment:    Right arm: YES   []           NO  []       Comment:   Arterial Exam:   Radial   L: 2+ R: 2+   Ulnar   L: 2+;R: 2+  Patent Palmar arch  L: YES   []  NO   []       R: YES   []   NO   []        Capillary refill:  L: <2sec, R:<2sec    Sensory exam:   Left hand: Normal   []       Abnormal   []     Comment:    Right hand: Normal   []       Abnormal   []     Comment:     Motor exam normal:   Left hand: Normal   []       Abnormal   []     Comment:     Right hand: Normal   []       Abnormal   []     Comment:                       Mapping Reviewed:  Target vein: 2.5-3mm left cephalic vein to radial artery at the wrist or upper arm.   Exam is better than mapping and Block may better inform suitable site under intraop ultrasound.    Assessment & Plan: Mr. Norton is a good candidate for placement of permanent dialysis access.  I would recommend left cephalic vein to radial artery AVF creation under Local with MAC with Block.     The surgical risks and benefits were reviewed and questions were answered. We discussed the day of surgery plan, anesthesia, postop care, risk of infection, numbness, injury to surrounding structures, bleeding, thrombosis, steal syndrome, possible need for future angioplasty or surgical revision, as well as nonmaturation or need for site abandonment. This was contrasted with morbidity and mortality risk of long-term catheter based hemodialysis access. The patient does wish to proceed with surgery for permanent access creation at this time. The patient was counselled to contact our nurse coordinator, COY Paulino (Sum), CNS at [phone] with any questions or concerns.  Thank you for the opportunity to participate in Mr. Norton's care.    TT: 35 min, CT: 25 min.            Eduardo Pabon MD  Department of Surgery

## 2017-11-07 NOTE — PROGRESS NOTES
Dialysis Access Service  Consult Note    Referred by Dr. Daley for creation of permanent dialysis access.    HPI: Mr. Norton is being seen today for placement of permanent dialysis access due to Chronic renal failure from Obstructive Uropathy.  He is right handed. Mr. Norton is not dialyzing at (Not currently on dialysis).      Past Surgical History:   Procedure Laterality Date     APPENDECTOMY  AGE 6     CYSTOSCOPY, FULGURATE BLEEDERS, EVACUATE CLOT(S), COMBINED N/A 7/16/2017    Procedure: COMBINED CYSTOSCOPY, FULGURATE BLEEDERS, EVACUATE CLOT(S);  Cystoscopy clot evacuation, bladder biopsy and fulguration (per surgeons note) NERVE BLOCK PERIPHERAL;  Surgeon: Tamiko Vanegas MD;  Location: [facility] OR     SINUS SURGERY  AGE 6     TONSILLECTOMY      CHILDHOOD       Risk factors for vascular access are:     Hx of CVC    []    Comment:   Hx of PICC line         []    Comment:   Hx of Pacemaker    []    Comment:   History of failed access:  []           SVC syndrome   []       Heart Failure    []    EF:    Periph arterial disease  []      Prior Fracture/Surgery  []    Location:   DVT    []    Location:  Diabetes   []         Neuropathy   []    Comment:   Anticoagulation:   [x]    Agent:    ASAS [id number]  Anticoagulation contraindication: []    Details:                   Pediatric    []                           Hx of transplant   []   Comment:     Current immunosuppression []   Comment:                                  PHYSICAL EXAM:  Temp:  [97.7  F (36.5  C)] 97.7  F (36.5  C)  Pulse:  [98] 98  Resp:  [16] 16  BP: (122)/(66) 122/66  SpO2:  [100 %] 100 %  healthy, alert and cooperative  EXTREMITY EXAM:   Vein Exam: Obvious suitable veins?    Left arm: YES   []           NO  []       Comment:    Right arm: YES   []           NO  []       Comment:   Arterial Exam:   Radial   L: 2+ R: 2+   Ulnar   L: 2+;R: 2+  Patent Palmar arch  L: YES   []  NO   []       R: YES   []   NO   []        Capillary refill:  L: <2sec,  R:<2sec    Sensory exam:   Left hand: Normal   []       Abnormal   []     Comment:    Right hand: Normal   []       Abnormal   []     Comment:     Motor exam normal:   Left hand: Normal   []       Abnormal   []     Comment:     Right hand: Normal   []       Abnormal   []     Comment:                       Mapping Reviewed:  Target vein: 2.5-3mm left cephalic vein to radial artery at the wrist or upper arm.   Exam is better than mapping and Block may better inform suitable site under intraop ultrasound.    Assessment & Plan: Mr. Norton is a good candidate for placement of permanent dialysis access.  I would recommend left cephalic vein to radial artery AVF creation under Local with MAC with Block.     The surgical risks and benefits were reviewed and questions were answered. We discussed the day of surgery plan, anesthesia, postop care, risk of infection, numbness, injury to surrounding structures, bleeding, thrombosis, steal syndrome, possible need for future angioplasty or surgical revision, as well as nonmaturation or need for site abandonment. This was contrasted with morbidity and mortality risk of long-term catheter based hemodialysis access. The patient does wish to proceed with surgery for permanent access creation at this time. The patient was counselled to contact our nurse coordinator, COY Paulino (Sum), CNS at [phone] with any questions or concerns.  Thank you for the opportunity to participate in Mr. Norton's care.    TT: 35 min, CT: 25 min.            Eduardo Pabon MD  Department of Surgery

## 2017-11-08 ENCOUNTER — OFFICE VISIT (OUTPATIENT)
Dept: NEPHROLOGY | Facility: CLINIC | Age: 72
End: 2017-11-08
Payer: MEDICARE

## 2017-11-08 ENCOUNTER — PRE VISIT (OUTPATIENT)
Dept: UROLOGY | Facility: CLINIC | Age: 72
End: 2017-11-08

## 2017-11-08 VITALS
WEIGHT: 148 LBS | BODY MASS INDEX: 21.19 KG/M2 | HEART RATE: 95 BPM | OXYGEN SATURATION: 100 % | DIASTOLIC BLOOD PRESSURE: 72 MMHG | HEIGHT: 70 IN | SYSTOLIC BLOOD PRESSURE: 112 MMHG

## 2017-11-08 DIAGNOSIS — N18.5 CKD (CHRONIC KIDNEY DISEASE) STAGE 5, GFR LESS THAN 15 ML/MIN (H): Primary | ICD-10-CM

## 2017-11-08 DIAGNOSIS — I10 BENIGN ESSENTIAL HYPERTENSION: ICD-10-CM

## 2017-11-08 DIAGNOSIS — Z01.818 PRE-OP EXAM: ICD-10-CM

## 2017-11-08 DIAGNOSIS — E86.1 HYPOVOLEMIA: ICD-10-CM

## 2017-11-08 DIAGNOSIS — Z01.818 PRE-OP EXAM: Primary | ICD-10-CM

## 2017-11-08 PROCEDURE — 99212 OFFICE O/P EST SF 10 MIN: CPT | Mod: ZF

## 2017-11-08 ASSESSMENT — PAIN SCALES - GENERAL: PAINLEVEL: MILD PAIN (2)

## 2017-11-08 NOTE — LETTER
11/8/2017      RE: Dung Norton  [address] [facility] [address]  [place] [address]       Nephrology Clinic Visit 11/8/17  Pre op Physical for AVF Placement 11/17/17    Assessment and Plan:       1. CKD5 - Patient has developed ESRD 2/2 long standing obstructive uropathy. His GFR has been < 15 ml/mn since July 2017. He is not uremic.       - Patient and daughter have decided upon HD as his RRT option.     - He has attended the Kidney Smart program    - He will undergo AVF placement with Dr Eduardo Pabon on 11/17/17.    - We reviewed today that there is a high likelihood of needing to initiate HD before his AVF is mature enough to use and in that situation a TDC would be placed   - I suspect he is not drinking fluids adequately given his high BUN. I have encouraged him to try to drink more fluids.    - He does not use NSAIDs   - He is straight cathing QID w/o difficulty      2. Electrolytes - No acute concerns. K 5.0.      3. Volume status - Hypervolemia with lower extremity edema, but this is likely third spaced. No dyspnea. Albumin only 2.8. Not on diuretics. Making ~ 1 liter/urine/day. Weight 67.2 #, stable from last visit. B/P teens - 120/ x 3 today in clinic.     - No need for diuretics at this time   - Recommend compression wraps if edema persists      4. HTN - Well controlled off Florinef. SBP teens - 120/ x 3 today in clinic. Home blood pressures teens - 130/.  Off all antihypertensives.    -  No intervention required at this time.       5. BPH - He is straight cathing QID w/o difficulty. Currently on Proscar. Flomax discontinued due to hypotension.    - Urologist wants him to continue Proscar in order to keep prostate small and allow for more easy straight cathing   - Urology managing      6. Acid base - No acute concerns. Bicarb is 23. Bicarb was discontinued during admission.       7. BMD - Ca 10.0, Phos 5.7, albumin 2.8, Vit D 29, PTH intact 120   - He is doing much better with taking the Phoslo correctly   - Ca  is elevated 2/2 hypovolemia      8. Nutrition - Improving. Albumin stable at 2.8. Reports good appetite.     - Will monitor nutrition status closely      9. Anemia - Hgb 8.4 w/normal RBC indices on 8/29/17.  Etiology is likely anemia of renal disease   - He should have routine colon cancer screening   - Iron studies 10/16/17: Fe 36, Ferritin 1110, Tsat 19%   - Has been enrolled in anemia management program for EARLENE     10. Pre OP for AVF - Surgery scheduled with Dr Pabon for 11/17/17. Anesthesia is nerve block. He is on ASA 81 mg qd.    - Will have him hold ASA beginning 11/13/17   - ECG was normal   - Has routine weekly labs drawn for CKD, so another set will be available prior to surgery   - No contraindications to surgery      11. Disposition - RTC in 4 wks for f/u with ongoing weekly labs      Assessment and plan was discussed with patient and daughter, then both voice understanding and agreement.      Reason for Visit:  CKD5 follow up      HPI:  Dung CHUN Norton is a 71 year old year old male with a PMH of CKDIII , HTN, Obstructive uropathy 2/2 BPH/benign bladder mass with creat as high as 14.7 in July 2017. He has not required RRT in the past, but renal function has not recovered despite correction of the obstructive uropathy and he has developed End stage Kidney disease.      Since our last visit patient has had no hospital admissions. Patient continues to straight cath 4 times daily. He has begun Aranesp through our Anemia management clinic.        ROS:  Patient has no complaints. He reports feeling well. He is having no difficulty with straight cathing 4 times daily. No dizziness or syncope. Energy is stable. He is living independently. Has good appetite. No dyspnea, chest pain, abdominal pain, or edema.        Chronic Medical Problems:      HTN  BPH  Obstructive uropathy   HTN  Tobacco abuse  HLD  Anemia  Pulmonary nodules  KRISTINA  CKD5    Personal Hx:   Social History     Social History     Marital status:  "     Spouse name: N/A     Number of children: 2     Years of education: N/A     Occupational History      [employer]     Social History Main Topics     Smoking status: Former Smoker     Types: Cigarettes     Smokeless tobacco: Never Used     Alcohol use No     Drug use: No     Sexual activity: Not Currently     Other Topics Concern     Not on file     Social History Narrative       Allergies:  No Known Allergies    Medications:  Prior to Admission medications    Medication Sig Start Date End Date Taking? Authorizing Provider   calcium acetate (PHOSLO) 667 MG CAPS capsule Take 2 capsules (1,334 mg) by mouth 3 times daily (with meals) 10/30/17  Yes Oralia De La Torre NP   darbepoetin william (ARANESP, ALBUMIN FREE,) 60 MCG/0.3ML injection Inject 0.3 mLs (60 mcg) Subcutaneous every 14 days As needed for hgb<10g/dL.  If Hgb increases >1 point in 2 weeks (if blood transfusion given, use hgb PRIOR to this), SYSTOLIC BP > 180 mmHg or hgb>=10g/dL, HOLD DOSE. Dose must be within 1 week of Hgb.  Per anemia protocol with Cecilia De La Torre MD/Tereza Paul,PharmD [phone] 9/13/17  Yes Oralia De La Torre NP   finasteride (PROSCAR) 5 MG tablet Take 1 tablet (5 mg) by mouth daily 8/16/17  Yes Oralia De La Torre NP   Multiple Vitamins-Minerals (MULTIVITAMIN MEN PO) Take 1 tablet by mouth daily   Yes Reported, Patient   fish oil-omega-3 fatty acids (FISH OIL) 1000 MG capsule Take 1 g by mouth daily   Yes Reported, Patient   aspirin 81 MG tablet Take 1 tablet by mouth daily.   Yes Reported, Patient       Vitals:  /72  Pulse 95  Ht 1.778 m (5' 10\")  Wt 67.1 kg (148 lb)  SpO2 100%  BMI 21.24 kg/m2    Exam:  GEN: Frail male in NAD. Well groomed. Dtr present  CARDIAC RRR  LUNGS: CTA  ABDOMEN: Soft, NT  EXT: 1+ edema    Results:  Results for FABRIZIO MIRANDA (MRN [id number]) as of 11/10/2017 08:17   Ref. Range 11/6/2017 10:29 11/8/2017 14:22   Sodium Latest Ref Range: 133 - 144 mmol/L 140    Potassium " Latest Ref Range: 3.4 - 5.3 mmol/L 5.0    Chloride Latest Ref Range: 94 - 109 mmol/L 110 (H)    Carbon Dioxide Latest Ref Range: 20 - 32 mmol/L 23    Urea Nitrogen Latest Ref Range: 7 - 30 mg/dL 132 (H)    Creatinine Latest Ref Range: 0.66 - 1.25 mg/dL 5.82 (H)    GFR Estimate Latest Ref Range: >60 mL/min/1.7m2 10 (L)    GFR Estimate If Black Latest Ref Range: >60 mL/min/1.7m2 12 (L)    Calcium Latest Ref Range: 8.5 - 10.1 mg/dL 10.0    Anion Gap Latest Ref Range: 3 - 14 mmol/L 7    Phosphorus Latest Ref Range: 2.5 - 4.5 mg/dL 5.7 (H)    Albumin Latest Ref Range: 3.4 - 5.0 g/dL 2.8 (L)    Glucose Latest Ref Range: 70 - 99 mg/dL 103 (H)    WBC Latest Ref Range: 4.0 - 11.0 10e9/L 11.3 (H)    Hemoglobin Latest Ref Range: 13.3 - 17.7 g/dL 8.4 (L)    Hematocrit Latest Ref Range: 40.0 - 53.0 % 27.7 (L)    Platelet Count Latest Ref Range: 150 - 450 10e9/L 374    RBC Count Latest Ref Range: 4.4 - 5.9 10e12/L 3.10 (L)    MCV Latest Ref Range: 78 - 100 fl 89    MCH Latest Ref Range: 26.5 - 33.0 pg 27.1    MCHC Latest Ref Range: 31.5 - 36.5 g/dL 30.3 (L)    RDW Latest Ref Range: 10.0 - 15.0 % 15.4 (H)    Diff Method Unknown Automated Method    % Neutrophils Latest Units: % 77.9    % Lymphocytes Latest Units: % 11.1    % Monocytes Latest Units: % 7.3    % Eosinophils Latest Units: % 3.3    % Basophils Latest Units: % 0.4    Absolute Neutrophil Latest Ref Range: 1.6 - 8.3 10e9/L 8.8 (H)    Absolute Lymphocytes Latest Ref Range: 0.8 - 5.3 10e9/L 1.3    Absolute Monocytes Latest Ref Range: 0.0 - 1.3 10e9/L 0.8    Absolute Eosinophils Latest Ref Range: 0.0 - 0.7 10e9/L 0.4    Absolute Basophils Latest Ref Range: 0.0 - 0.2 10e9/L 0.0    EKG 12-LEAD COMPLETE W/READ - CLINICS Unknown  Rpt       Oralia De La Torre, NP

## 2017-11-08 NOTE — MR AVS SNAPSHOT
After Visit Summary   11/8/2017    Dung Norton    MRN: [id number]           Patient Information     Date Of Birth          1945        Visit Information        Provider Department      11/8/2017 1:00 PM Oralia De La Torre NP [facility] Nephrology        Today's Diagnoses     CKD (chronic kidney disease) stage 5, GFR less than 15 ml/min (H)    -  1    Pre-op exam        Benign essential hypertension        Hypovolemia           Follow-ups after your visit        Follow-up notes from your care team     Return in about 4 weeks (around 12/6/2017).      Your next 10 appointments already scheduled     Nov 13, 2017 10:30 AM CST   LAB with  LAB   [facility] ([facility])    [address] [place] [id number] [phone]           Please do not eat 10-12 hours before your appointment if you are coming in fasting for labs on lipids, cholesterol, or glucose (sugar). This does not apply to pregnant women. Water, hot tea and black coffee (with nothing added) are okay. Do not drink other fluids, diet soda or chew gum.            Nov 17, 2017   Procedure with Eduardo Pabon MD   [facility], [place], Same Day Surgery (--)    [address] [address]   [phone]            Nov 29, 2017 11:30 AM CST   (Arrive by 11:15 AM)   Urodynamics with Nicki Spear PA-C   [facility] Pro[facility] Urologic C[facility] ([facility])    [address] [place] [address]   [phone]            Dec 01, 2017 10:45 AM CST   (Arrive by 10:30 AM)   Return Visit with Tamiko Vanegas MD   [facility]rologic Cancers ([facility])    [address] [place] [address]   [phone]            Dec 01, 2017  1:00 PM CST   (Arrive by 12:45 PM)   Post-Op with COY Vargas [facility] Solid Organ Transplant ([facility]  "Clinics and Surgery Center)    [address]  3rd Floor  [facility] [address] [phone]            Dec 12, 2017 12:10 PM CST   Office Visit with Haley Baker MD   [facility] ([facility])    [address] [place] [address]  [place] [address] [phone]           Bring a current list of meds and any records pertaining to this visit. For Physicals, please bring immunization records and any forms needing to be filled out. Please arrive 10 minutes early to complete paperwork.              Who to contact     If you have questions or need follow up information about today's clinic visit or your schedule please contact [facility] NEPHROLOGY directly at [phone].  Normal or non-critical lab and imaging results will be communicated to you by MyChart, letter or phone within 4 business days after the clinic has received the results. If you do not hear from us within 7 days, please contact the clinic through [employer] or phone. If you have a critical or abnormal lab result, we will notify you by phone as soon as possible.  Submit refill requests through [employer] or call your pharmacy and they will forward the refill request to us. Please allow 3 business days for your refill to be completed.          Additional Information About Your Visit        [employer] Information     [employer] lets you send messages to your doctor, view your test results, renew your prescriptions, schedule appointments and more. To sign up, go to [email or web address] . Click on \"Log in\" on the left side of the screen, which will take you to the Welcome page. Then click on \"Sign up Now\" on the right side of the page.     You will be asked to enter the access code listed below, as well as some personal information. Please follow the directions to create your username and password.     Your access code is: [email or web address]  Expires: 2018  3:46 PM     Your access code will  in 90 days. If you need help or a new " "code, please call your [place] clinic or [phone].        Care EveryWhere ID     This is your Care EveryWhere ID. This could be used by other organizations to access your [place] medical records  [id number]        Your Vitals Were     Pulse Height Pulse Oximetry BMI (Body Mass Index)          95 1.778 m (5' 10\") 100% 21.24 kg/m2         Blood Pressure from Last 3 Encounters:   11/08/17 112/72   11/07/17 122/66   11/06/17 118/74    Weight from Last 3 Encounters:   11/08/17 67.1 kg (148 lb)   11/07/17 66.1 kg (145 lb 11.6 oz)   10/13/17 66.7 kg (147 lb)              Today, you had the following     No orders found for display       Primary Care Provider Office Phone # Fax #    Haley Baker [phone] [address] [facility] [address]        Equal Access to Services     [facility]: Hadii aad ku hadasho Soomaali, waaxda luqadaha, qaybta kaalmada adeegyada, waxay idiin hayjefen paul green . So [facility] [phone].    ATENCIÓN: Si habla español, tiene a montague disposición servicios gratuitos de asistencia lingüística. Llame al [phone].    We comply with applicable federal civil rights laws and [place] laws. We do not discriminate on the basis of race, color, national origin, age, disability, sex, sexual orientation, or gender identity.            Thank you!     Thank you for choosing [facility] NEPHROLOGY  for your care. Our goal is always to provide you with excellent care. Hearing back from our patients is one way we can continue to improve our services. Please take a few minutes to complete the written survey that you may receive in the mail after your visit with us. Thank you!             Your Updated Medication List - Protect others around you: Learn how to safely use, store and throw away your medicines at www.disposemymeds.org.          This list is accurate as of: 11/8/17 11:59 PM.  Always use your most recent med list.                   Brand Name Dispense " Instructions for use Diagnosis    aspirin 81 MG tablet      Take 1 tablet by mouth daily.        calcium acetate 667 MG Caps capsule    PHOSLO    540 capsule    Take 2 capsules (1,334 mg) by mouth 3 times daily (with meals)    Chronic kidney disease, unspecified CKD stage       darbepoetin william 60 MCG/0.3ML injection    ARANESP (ALBUMIN FREE)    0.3 mL    Inject 0.3 mLs (60 mcg) Subcutaneous every 14 days As needed for hgb<10g/dL.  If Hgb increases >1 point in 2 weeks (if blood transfusion given, use hgb PRIOR to this), SYSTOLIC BP > 180 mmHg or hgb>=10g/dL, HOLD DOSE. Dose must be within 1 week of Hgb.  Per anemia protocol with Cecilia De La Torre MD/Tereza Paul,PharmD [phone]    Anemia of chronic renal failure, stage 5 (H), CKD (chronic kidney disease) stage 5, GFR less than 15 ml/min (H)       finasteride 5 MG tablet    PROSCAR    90 tablet    Take 1 tablet (5 mg) by mouth daily    Benign prostatic hyperplasia with urinary retention       fish oil-omega-3 fatty acids 1000 MG capsule      Take 1 g by mouth daily        MULTIVITAMIN MEN PO      Take 1 tablet by mouth daily

## 2017-11-08 NOTE — NURSING NOTE
"Chief Complaint   Patient presents with     RECHECK     Follow up for CKD stage 5       Initial /72  Pulse 95  Ht 1.778 m (5' 10\")  Wt 67.1 kg (148 lb)  SpO2 100%  BMI 21.24 kg/m2 Estimated body mass index is 21.24 kg/(m^2) as calculated from the following:    Height as of this encounter: 1.778 m (5' 10\").    Weight as of this encounter: 67.1 kg (148 lb).  Medication Reconciliation: complete   Christine Bermudez CMA    "

## 2017-11-09 ENCOUNTER — TELEPHONE (OUTPATIENT)
Dept: NEPHROLOGY | Facility: CLINIC | Age: 72
End: 2017-11-09

## 2017-11-09 LAB — INTERPRETATION ECG - MUSE: NORMAL

## 2017-11-09 NOTE — TELEPHONE ENCOUNTER
Per Cecilia De La Torre, NP please call patient and let him know that spoke to Urologist and would like patient to continue taking Proscar. Communicated to patient, no other questions or concerns.  Suad Cid LPN  Nephrology  [facility]  [phone]

## 2017-11-10 ENCOUNTER — TELEPHONE (OUTPATIENT)
Dept: NEPHROLOGY | Facility: CLINIC | Age: 72
End: 2017-11-10

## 2017-11-10 NOTE — PROGRESS NOTES
Nephrology Clinic Visit 11/8/17  Pre op Physical for AVF Placement 11/17/17    Assessment and Plan:       1. CKD5 - Patient has developed ESRD 2/2 long standing obstructive uropathy. His GFR has been < 15 ml/mn since July 2017. He is not uremic.       - Patient and daughter have decided upon HD as his RRT option.     - He has attended the Kidney Smart program    - He will undergo AVF placement with Dr Eduardo Pabon on 11/17/17.    - We reviewed today that there is a high likelihood of needing to initiate HD before his AVF is mature enough to use and in that situation a TDC would be placed   - I suspect he is not drinking fluids adequately given his high BUN. I have encouraged him to try to drink more fluids.    - He does not use NSAIDs   - He is straight cathing QID w/o difficulty      2. Electrolytes - No acute concerns. K 5.0.      3. Volume status - Hypervolemia with lower extremity edema, but this is likely third spaced. No dyspnea. Albumin only 2.8. Not on diuretics. Making ~ 1 liter/urine/day. Weight 67.2 #, stable from last visit. B/P teens - 120/ x 3 today in clinic.     - No need for diuretics at this time   - Recommend compression wraps if edema persists      4. HTN - Well controlled off Florinef. SBP teens - 120/ x 3 today in clinic. Home blood pressures teens - 130/.  Off all antihypertensives.    - No intervention required at this time.       5. BPH - He is straight cathing QID w/o difficulty. Currently on Proscar. Flomax discontinued due to hypotension.    - Urologist wants him to continue Proscar in order to keep prostate small and allow for more easy straight cathing   - Urology managing      6. Acid base - No acute concerns. Bicarb is 23. Bicarb was discontinued during admission.       7. BMD - Ca 10.0, Phos 5.7, albumin 2.8, Vit D 29, PTH intact 120   - He is doing much better with taking the Phoslo correctly   - Ca is elevated 2/2 hypovolemia      8. Nutrition - Improving. Albumin stable at 2.8. Reports  good appetite.     - Will monitor nutrition status closely      9. Anemia - Hgb 8.4 w/normal RBC indices on 8/29/17.  Etiology is likely anemia of renal disease   - He should have routine colon cancer screening   - Iron studies 10/16/17: Fe 36, Ferritin 1110, Tsat 19%   - Has been enrolled in anemia management program for EARLENE     10. Pre OP for AVF - Surgery scheduled with Dr Pabon for 11/17/17. Anesthesia is nerve block. He is on ASA 81 mg qd.    - Will have him hold ASA beginning 11/13/17   - ECG was normal   - Has routine weekly labs drawn for CKD, so another set will be available prior to surgery   - No contraindications to surgery      11. Disposition - RTC in 4 wks for f/u with ongoing weekly labs      Assessment and plan was discussed with patient and daughter, then both voice understanding and agreement.      Reason for Visit:  CKD5 follow up      HPI:  Dung Norton is a 71 year old year old male with a PMH of CKDIII , HTN, Obstructive uropathy 2/2 BPH/benign bladder mass with creat as high as 14.7 in July 2017. He has not required RRT in the past, but renal function has not recovered despite correction of the obstructive uropathy and he has developed End stage Kidney disease.      Since our last visit patient has had no hospital admissions. Patient continues to straight cath 4 times daily. He has begun Aranesp through our Anemia management clinic.        ROS:  Patient has no complaints. He reports feeling well. He is having no difficulty with straight cathing 4 times daily. No dizziness or syncope. Energy is stable. He is living independently. Has good appetite. No dyspnea, chest pain, abdominal pain, or edema.        Chronic Medical Problems:      HTN  BPH  Obstructive uropathy   HTN  Tobacco abuse  HLD  Anemia  Pulmonary nodules  KRISTINA  CKD5    Personal Hx:   Social History     Social History     Marital status:      Spouse name: N/A     Number of children: 2     Years of education: N/A  "    Occupational History      [employer]     Social History Main Topics     Smoking status: Former Smoker     Types: Cigarettes     Smokeless tobacco: Never Used     Alcohol use No     Drug use: No     Sexual activity: Not Currently     Other Topics Concern     Not on file     Social History Narrative       Allergies:  No Known Allergies    Medications:  Prior to Admission medications    Medication Sig Start Date End Date Taking? Authorizing Provider   calcium acetate (PHOSLO) 667 MG CAPS capsule Take 2 capsules (1,334 mg) by mouth 3 times daily (with meals) 10/30/17  Yes Oralia De La Torre NP   darbepoetin william (ARANESP, ALBUMIN FREE,) 60 MCG/0.3ML injection Inject 0.3 mLs (60 mcg) Subcutaneous every 14 days As needed for hgb<10g/dL.  If Hgb increases >1 point in 2 weeks (if blood transfusion given, use hgb PRIOR to this), SYSTOLIC BP > 180 mmHg or hgb>=10g/dL, HOLD DOSE. Dose must be within 1 week of Hgb.  Per anemia protocol with Cecilia De La Torre MD/Tereza Paul,PharmD [phone] 9/13/17  Yes Oralia De La Torre NP   finasteride (PROSCAR) 5 MG tablet Take 1 tablet (5 mg) by mouth daily 8/16/17  Yes Oralia De La Torre NP   Multiple Vitamins-Minerals (MULTIVITAMIN MEN PO) Take 1 tablet by mouth daily   Yes Reported, Patient   fish oil-omega-3 fatty acids (FISH OIL) 1000 MG capsule Take 1 g by mouth daily   Yes Reported, Patient   aspirin 81 MG tablet Take 1 tablet by mouth daily.   Yes Reported, Patient       Vitals:  /72  Pulse 95  Ht 1.778 m (5' 10\")  Wt 67.1 kg (148 lb)  SpO2 100%  BMI 21.24 kg/m2    Exam:  GEN: Frail male in NAD. Well groomed. Dtr present  CARDIAC RRR  LUNGS: CTA  ABDOMEN: Soft, NT  EXT: 1+ edema    Results:  Results for FABRIZIO MIRANDA (MRN [id number]) as of 11/10/2017 08:17   Ref. Range 11/6/2017 10:29 11/8/2017 14:22   Sodium Latest Ref Range: 133 - 144 mmol/L 140    Potassium Latest Ref Range: 3.4 - 5.3 mmol/L 5.0    Chloride Latest Ref Range: 94 - 109 mmol/L 110 " (H)    Carbon Dioxide Latest Ref Range: 20 - 32 mmol/L 23    Urea Nitrogen Latest Ref Range: 7 - 30 mg/dL 132 (H)    Creatinine Latest Ref Range: 0.66 - 1.25 mg/dL 5.82 (H)    GFR Estimate Latest Ref Range: >60 mL/min/1.7m2 10 (L)    GFR Estimate If Black Latest Ref Range: >60 mL/min/1.7m2 12 (L)    Calcium Latest Ref Range: 8.5 - 10.1 mg/dL 10.0    Anion Gap Latest Ref Range: 3 - 14 mmol/L 7    Phosphorus Latest Ref Range: 2.5 - 4.5 mg/dL 5.7 (H)    Albumin Latest Ref Range: 3.4 - 5.0 g/dL 2.8 (L)    Glucose Latest Ref Range: 70 - 99 mg/dL 103 (H)    WBC Latest Ref Range: 4.0 - 11.0 10e9/L 11.3 (H)    Hemoglobin Latest Ref Range: 13.3 - 17.7 g/dL 8.4 (L)    Hematocrit Latest Ref Range: 40.0 - 53.0 % 27.7 (L)    Platelet Count Latest Ref Range: 150 - 450 10e9/L 374    RBC Count Latest Ref Range: 4.4 - 5.9 10e12/L 3.10 (L)    MCV Latest Ref Range: 78 - 100 fl 89    MCH Latest Ref Range: 26.5 - 33.0 pg 27.1    MCHC Latest Ref Range: 31.5 - 36.5 g/dL 30.3 (L)    RDW Latest Ref Range: 10.0 - 15.0 % 15.4 (H)    Diff Method Unknown Automated Method    % Neutrophils Latest Units: % 77.9    % Lymphocytes Latest Units: % 11.1    % Monocytes Latest Units: % 7.3    % Eosinophils Latest Units: % 3.3    % Basophils Latest Units: % 0.4    Absolute Neutrophil Latest Ref Range: 1.6 - 8.3 10e9/L 8.8 (H)    Absolute Lymphocytes Latest Ref Range: 0.8 - 5.3 10e9/L 1.3    Absolute Monocytes Latest Ref Range: 0.0 - 1.3 10e9/L 0.8    Absolute Eosinophils Latest Ref Range: 0.0 - 0.7 10e9/L 0.4    Absolute Basophils Latest Ref Range: 0.0 - 0.2 10e9/L 0.0    EKG 12-LEAD COMPLETE W/READ - CLINICS Unknown  Rpt

## 2017-11-10 NOTE — TELEPHONE ENCOUNTER
Per Cecilia Negrete:    Looks like Mr Norton is scheduled for his AVF surgery on 11/17/17. Could you ask him to hold his ASA beginning 11/13/17?    Called patient, who verbalized understanding.    Giselle Benoit RN

## 2017-11-12 ENCOUNTER — ANESTHESIA EVENT (OUTPATIENT)
Dept: SURGERY | Facility: CLINIC | Age: 72
End: 2017-11-12
Payer: MEDICARE

## 2017-11-13 ENCOUNTER — ALLIED HEALTH/NURSE VISIT (OUTPATIENT)
Dept: FAMILY MEDICINE | Facility: CLINIC | Age: 72
End: 2017-11-13
Payer: COMMERCIAL

## 2017-11-13 ENCOUNTER — CARE COORDINATION (OUTPATIENT)
Dept: CARE COORDINATION | Facility: CLINIC | Age: 72
End: 2017-11-13

## 2017-11-13 ENCOUNTER — TELEPHONE (OUTPATIENT)
Dept: NEPHROLOGY | Facility: CLINIC | Age: 72
End: 2017-11-13

## 2017-11-13 VITALS — SYSTOLIC BLOOD PRESSURE: 116 MMHG | DIASTOLIC BLOOD PRESSURE: 69 MMHG

## 2017-11-13 DIAGNOSIS — R79.89 ELEVATED SERUM CREATININE: ICD-10-CM

## 2017-11-13 DIAGNOSIS — D63.1 ANEMIA OF CHRONIC RENAL FAILURE, STAGE 5 (H): ICD-10-CM

## 2017-11-13 DIAGNOSIS — N18.5 ANEMIA OF CHRONIC RENAL FAILURE, STAGE 5 (H): ICD-10-CM

## 2017-11-13 DIAGNOSIS — N18.5 CKD (CHRONIC KIDNEY DISEASE) STAGE 5, GFR LESS THAN 15 ML/MIN (H): ICD-10-CM

## 2017-11-13 DIAGNOSIS — I10 HYPERTENSION GOAL BP (BLOOD PRESSURE) < 140/90: Primary | ICD-10-CM

## 2017-11-13 LAB
ALBUMIN SERPL-MCNC: 2.6 G/DL (ref 3.4–5)
ANION GAP SERPL CALCULATED.3IONS-SCNC: 12 MMOL/L (ref 3–14)
BUN SERPL-MCNC: 116 MG/DL (ref 7–30)
CALCIUM SERPL-MCNC: 9.4 MG/DL (ref 8.5–10.1)
CHLORIDE SERPL-SCNC: 106 MMOL/L (ref 94–109)
CO2 SERPL-SCNC: 20 MMOL/L (ref 20–32)
CREAT SERPL-MCNC: 5.57 MG/DL (ref 0.66–1.25)
FERRITIN SERPL-MCNC: 1206 NG/ML (ref 26–388)
GFR SERPL CREATININE-BSD FRML MDRD: 10 ML/MIN/1.7M2
GLUCOSE SERPL-MCNC: 107 MG/DL (ref 70–99)
HCT VFR BLD AUTO: 25.5 % (ref 40–53)
HGB BLD-MCNC: 7.9 G/DL (ref 13.3–17.7)
IRON SATN MFR SERPL: 12 % (ref 15–46)
IRON SERPL-MCNC: 21 UG/DL (ref 35–180)
PHOSPHATE SERPL-MCNC: 5.8 MG/DL (ref 2.5–4.5)
POTASSIUM SERPL-SCNC: 4.4 MMOL/L (ref 3.4–5.3)
SODIUM SERPL-SCNC: 138 MMOL/L (ref 133–144)
TIBC SERPL-MCNC: 175 UG/DL (ref 240–430)

## 2017-11-13 PROCEDURE — 83550 IRON BINDING TEST: CPT

## 2017-11-13 PROCEDURE — 85014 HEMATOCRIT: CPT

## 2017-11-13 PROCEDURE — 83540 ASSAY OF IRON: CPT

## 2017-11-13 PROCEDURE — 36415 COLL VENOUS BLD VENIPUNCTURE: CPT

## 2017-11-13 PROCEDURE — 82728 ASSAY OF FERRITIN: CPT

## 2017-11-13 PROCEDURE — 80069 RENAL FUNCTION PANEL: CPT

## 2017-11-13 PROCEDURE — 85018 HEMOGLOBIN: CPT

## 2017-11-13 PROCEDURE — 99207 ZZC NO CHARGE NURSE ONLY: CPT | Performed by: FAMILY MEDICINE

## 2017-11-13 NOTE — TELEPHONE ENCOUNTER
DATE:  11/13/2017   TIME OF RECEIPT FROM LAB:  11:38am  LAB TEST:  Hemoglobin  LAB VALUE:  7.8  RESULTS GIVEN WITH READ-BACK TO (PROVIDER):  Message sent to Cecilia Negrete and Tereza Paul  TIME LAB VALUE REPORTED TO PROVIDER:   11:40am       Giselle Benoit RN

## 2017-11-13 NOTE — MR AVS SNAPSHOT
After Visit Summary   11/13/2017    Dung Norton    MRN: [id number]           Patient Information     Date Of Birth          1945        Visit Information        Provider Department      11/13/2017 11:14 AM Haley Baker MD [facility]        Today's Diagnoses     Hypertension goal BP (blood pressure) < 140/90    -  1       Follow-ups after your visit        Your next 10 appointments already scheduled     Nov 17, 2017   Procedure with Eduardo Pabon MD   [facility] [place], Same Day Surgery (--)    [address]  [place] [address]   [phone]            Nov 20, 2017 10:15 AM CST   LAB with FZ LAB   [facility] ([facility]    [address] [place] [address] [phone]           Please do not eat 10-12 hours before your appointment if you are coming in fasting for labs on lipids, cholesterol, or glucose (sugar). This does not apply to pregnant women. Water, hot tea and black coffee (with nothing added) are okay. Do not drink other fluids, diet soda or chew gum.            Nov 29, 2017 11:30 AM CST   (Arrive by 11:15 AM)   Urodynamics with Nicki Spear PA-C   [facility] ([facility])    [address] [place] [address]   [phone]            Dec 01, 2017 10:45 AM CST   (Arrive by 10:30 AM)   Return Visit with Tamiko Vanegas MD   [facility] ([facility])    [address] [place] [address]   [phone]            Dec 01, 2017  1:00 PM CST   (Arrive by 12:45 PM)   Post-Op with COY Vargas [facility] Trans[facility] ([facility])    [address] [place] [address]   [phone]            Dec 08, 2017  1:30 PM CST   (Arrive by 1:00 PM)   Return Visit with  "Oralia De La Torre NP   [facility] ([facility])    [address]  3rd Floor  [facility] [address] [phone]            Dec 12, 2017 12:10 PM CST   Office Visit with Haley Baker MD   [facility] ([facility])    [address] [place] [address]  [place] [address] [phone]           Bring a current list of meds and any records pertaining to this visit. For Physicals, please bring immunization records and any forms needing to be filled out. Please arrive 10 minutes early to complete paperwork.              Who to contact     If you have questions or need follow up information about today's clinic visit or your schedule please contact [facility] directly at [phone].  Normal or non-critical lab and imaging results will be communicated to you by MyChart, letter or phone within 4 business days after the clinic has received the results. If you do not hear from us within 7 days, please contact the clinic through MyChart or phone. If you have a critical or abnormal lab result, we will notify you by phone as soon as possible.  Submit refill requests through [employer] or call your pharmacy and they will forward the refill request to us. Please allow 3 business days for your refill to be completed.          Additional Information About Your Visit        [employer] Information     [employer] lets you send messages to your doctor, view your test results, renew your prescriptions, schedule appointments and more. To sign up, go to [email or web address] . Click on \"Log in\" on the left side of the screen, which will take you to the Welcome page. Then click on \"Sign up Now\" on the right side of the page.     You will be asked to enter the access code listed below, as well as some personal information. Please follow the directions to create your username and password.     Your access code is: [email or web address]  Expires: 1/9/2018  3:46 PM     Your " access code will  in 90 days. If you need help or a new code, please call your [facility] clinic or [phone].        Care EveryWhere ID     This is your Care EveryWhere ID. This could be used by other organizations to access your [facility] medical records  [id number]         Blood Pressure from Last 3 Encounters:   17 116/69   17 112/72   17 122/66    Weight from Last 3 Encounters:   17 148 lb (67.1 kg)   17 145 lb 11.6 oz (66.1 kg)   10/13/17 147 lb (66.7 kg)              Today, you had the following     No orders found for display       Primary Care Provider Office Phone # Fax #    Haley Baker [phone] [address] [facility] [address]        Equal Access to Services     [facility]: Hadii aad ku hadasho Soomaali, waaxda luqadaha, qaybta kaalmada adeegyada, waxay biancain hayaan adekimberly green . So [facility] [phone].    ATENCIÓN: Si habla español, tiene a motnague disposición servicios gratuitos de asistencia lingüística. Llame al [phone].    We comply with applicable federal civil rights laws and [place] laws. We do not discriminate on the basis of race, color, national origin, age, disability, sex, sexual orientation, or gender identity.            Thank you!     Thank you for choosing [facility]  for your care. Our goal is always to provide you with excellent care. Hearing back from our patients is one way we can continue to improve our services. Please take a few minutes to complete the written survey that you may receive in the mail after your visit with us. Thank you!             Your Updated Medication List - Protect others around you: Learn how to safely use, store and throw away your medicines at www.disposemymeds.org.          This list is accurate as of: 17 11:15 AM.  Always use your most recent med list.                   Brand Name Dispense Instructions for use Diagnosis    aspirin 81 MG tablet       Take 1 tablet by mouth daily.        calcium acetate 667 MG Caps capsule    PHOSLO    540 capsule    Take 2 capsules (1,334 mg) by mouth 3 times daily (with meals)    Chronic kidney disease, unspecified CKD stage       darbepoetin william 60 MCG/0.3ML injection    ARANESP (ALBUMIN FREE)    0.3 mL    Inject 0.3 mLs (60 mcg) Subcutaneous every 14 days As needed for hgb<10g/dL.  If Hgb increases >1 point in 2 weeks (if blood transfusion given, use hgb PRIOR to this), SYSTOLIC BP > 180 mmHg or hgb>=10g/dL, HOLD DOSE. Dose must be within 1 week of Hgb.  Per anemia protocol with Cecilia De La Torre MD/Tereza Paul,PharmD [phone]    Anemia of chronic renal failure, stage 5 (H), CKD (chronic kidney disease) stage 5, GFR less than 15 ml/min (H)       finasteride 5 MG tablet    PROSCAR    90 tablet    Take 1 tablet (5 mg) by mouth daily    Benign prostatic hyperplasia with urinary retention       fish oil-omega-3 fatty acids 1000 MG capsule      Take 1 g by mouth daily        MULTIVITAMIN MEN PO      Take 1 tablet by mouth daily

## 2017-11-13 NOTE — PROGRESS NOTES
Clinic Care Coordination Contact  OUTREACH    Referral Information:  Referral Source: Proactive outreach  Reason for Contact: Follow up with patient.  Care Conference: No     Universal Utilization:   ED Visits in last year: 1  Hospital visits in last year: 1  Last PCP appointment: 09/12/17  Missed Appointments: 1  Concerns: CKD  Multiple Providers or Specialists: Urology    Clinical Concerns:  Current Medical Concerns:  Pt notes concerns at this time.  Confirmed that he is scheduled to have a AV Fistula built on Friday 11/17.  Pt notes that he has been in contact with the Nephrology team and has had his questions answered.   Pt requested confirmation of the address of the [facility] hospital.   Pt notes that he comes into clinic every Monday to have his BP checked and that he has labs scheduled to be drawn today.    Current Behavioral Concerns: No concerns  Education Provided to patient: CC role, address to [facility]  Clinical Pathway Name: None    Medication Management:  Pt notes no concerns or questions regarding his medications.  Pt states he is holding his ASA as directed d/t his upcoming procedure.    Functional Status:  Mobility Status: Independent     Transportation: Family/self      Psychosocial:  Current living arrangement:: I live alone  Financial/Insurance: No concerns noted     Resources and Interventions:  Current Resources:       Patient/Caregiver understanding: Pt is engaged and asking appropriate questions regarding his medical care/status.     Frequency of Care Coordination: TBD  Upcoming appointment: Nov 13th for labs     Plan:   Patient will continue to follow his plan of care as directed by his specialists and primary care provider.  Pt has CC contact information should he have any questions or concerns.  RN CC to f/u with patient in one month.    Belkis Sexton RN BSN, PHN RN Care Coordinator  [facility] [place]  [facility]  [email or web address]  [phone]  11/13/2017 9:11 AM

## 2017-11-13 NOTE — TELEPHONE ENCOUNTER
Ena called back from [place] lab with Critical Cr 5.57 and . Will update Cecilia De La Torre. NP,.  Suad Cid LPN  Nephrology  [facility]  [phone]

## 2017-11-13 NOTE — TELEPHONE ENCOUNTER
Writer received message regarding critical value from [facility]. Writer called [facility] [phone] and was told that there was no critical value to report as she (Janet) did not see any call or note.  Suad Cid LPN  Nephrology  [facility]  [phone]

## 2017-11-13 NOTE — PROGRESS NOTES
Dung Norton is enrolled/participating in the retail pharmacy Blood Pressure Goals Achievement Program (BPGAP).  Dung Norton was evaluated at [facility] on November 13, 2017 at which time his blood pressure was:    BP Readings from Last 3 Encounters:   11/13/17 116/69   11/08/17 112/72   11/07/17 122/66     Reviewed lifestyle modifications for blood pressure control and reduction: including making healthy food choices, managing weight, getting regular exercise, smoking cessation, reducing alcohol consumption, monitoring blood pressure regularly.     Dung Norton is not experiencing symptoms.    Follow-Up: BP is at goal of < 140/90mmHg (patient 18+ years of age with or without diabetes).  Recommended follow-up at pharmacy in 6 months.     Recommendation to Provider: None at this time.     Dung Norton was evaluated for enrollment into the PGEN study today.    Patient eligible for enrollment:  No  Patient interested in enrollment:  No    Completed by: Thank you,  Cheri Winslow, PharmD  [facility]  [phone]

## 2017-11-14 ENCOUNTER — TELEPHONE (OUTPATIENT)
Dept: PHARMACY | Facility: CLINIC | Age: 72
End: 2017-11-14

## 2017-11-14 NOTE — TELEPHONE ENCOUNTER
Anemia Management Note  SUBJECTIVE/OBJECTIVE:  Referred by Cecilia De La Torre on 9/11/2017  Primary Diagnosis: Anemia in Chronic Kidney Disease (N18.5, D63.1)     Secondary Diagnosis:  Chronic Kidney Disease, Stage 5 (N18.5)  Hgb goal range:  9-10  Epo/Darbo: Aranesp 60 mcg/0.3 ml every 14 days As needed for hgb<10g/dL  RX expires on 9/12/2018  Iron regimen:  None  Labs exp: 9/12/2018  **Provide phone number for [facility] [phone] and instruction to schedule ancillary visit for aranesp injection. Send message to Safia Elder RN as they will need to order med**    Anemia Latest Ref Rng & Units 10/9/2017 10/16/2017 10/23/2017 10/30/2017 11/2/2017 11/6/2017 11/13/2017   EARLENE Dose - - - - - 60 mcg - -   Hemoglobin 13.3 - 17.7 g/dL 8.9(L) 9.1(L) 9.1(L) 9.0(L) - 8.4(L) 7.9(L)   TSAT 15 - 46 % - 19 - - - - 12(L)   Ferritin 26 - 388 ng/mL - 1110(H) - - - - 1206(H)     BP Readings from Last 3 Encounters:   11/13/17 116/69   11/08/17 112/72   11/07/17 122/66     Wt Readings from Last 2 Encounters:   11/08/17 148 lb (67.1 kg)   11/07/17 145 lb 11.6 oz (66.1 kg)     ASSESSMENT:  Hgb:Not at goal - recommend dose and continue current regimen  TSat: not at goal (>30%) but ferritin >1000ng/mL.  PO iron not indicated at this time per anemia protocol.       PLAN:  Dose with aranesp. He will call to schedule at  Clinic  Then RTC for hgb and aranesp if needed in 2 weeks    Orders needed to be renewed (for next follow-up date) in EPIC: None    Iron labs due:  2/13    Plan discussed with:  Dung  Plan provided by:  Tereza    NEXT FOLLOW-UP DATE:  11/16 to document dose then 11/28    Anemia Management Service  Tereza Paul,Anabell and Malina Hussein CPhT  Phone: [phone]  Fax: [phone]

## 2017-11-16 ENCOUNTER — ALLIED HEALTH/NURSE VISIT (OUTPATIENT)
Dept: NURSING | Facility: CLINIC | Age: 72
End: 2017-11-16
Payer: COMMERCIAL

## 2017-11-16 DIAGNOSIS — D64.9 SYMPTOMATIC ANEMIA: Primary | ICD-10-CM

## 2017-11-16 PROCEDURE — 96372 THER/PROPH/DIAG INJ SC/IM: CPT

## 2017-11-16 NOTE — PROGRESS NOTES
Prior to injection verified patient identity using patient's name and date of birth.        Peyton Jensen, CMA

## 2017-11-16 NOTE — MR AVS SNAPSHOT
After Visit Summary   11/16/2017    Dung Norton    MRN: [id number]           Patient Information     Date Of Birth          1945        Visit Information        Provider Department      11/16/2017 10:30 AM FZ ANCILLARY [facility]        Today's Diagnoses     Symptomatic anemia    -  1       Follow-ups after your visit        Your next 10 appointments already scheduled     Nov 17, 2017   Procedure with Eduardo Pabon MD   [facility] [place], Same Day Surgery (--)    [address]  [place] [address]   [phone]            Nov 20, 2017 10:15 AM CST   LAB with FZ LAB   [facility] ([facility])    [address] [address] [phone]           Please do not eat 10-12 hours before your appointment if you are coming in fasting for labs on lipids, cholesterol, or glucose (sugar). This does not apply to pregnant women. Water, hot tea and black coffee (with nothing added) are okay. Do not drink other fluids, diet soda or chew gum.            Nov 22, 2017  7:30 AM CST   (Arrive by 7:15 AM)   Urodynamics with Nicki Spear PA-C   [facility]rolog[facility] ([facility])    [address] [place] [address] [phone]            Dec 01, 2017 10:45 AM CST   (Arrive by 10:30 AM)   Return Visit with Tamiko Vanegas MD   [facility]rolog[facility] ([facility])    [address] [place] [address]   [phone]            Dec 01, 2017  1:00 PM CST   (Arrive by 12:45 PM)   Post-Op with COY Vargas [facility] ([facility])    [address] [place] [address]  [address] [facility] [address]   [phone]            Dec 08, 2017  1:30 PM CST   (Arrive by 1:00 PM)   Return Visit with Oralia De La Torre NP   [facility]  "Nephrology ([facility] [place] [facility])    [address]  3rd Floor  [facility] [id number] [phone]            Dec 12, 2017 12:10 PM CST   Office Visit with Haley Baker MD   [facility]    [address] [place] [address]  [place] [id number] [phone]           Bring a current list of meds and any records pertaining to this visit. For Physicals, please bring immunization records and any forms needing to be filled out. Please arrive 10 minutes early to complete paperwork.              Who to contact     If you have questions or need follow up information about today's clinic visit or your schedule please contact [facility] directly at [phone].  Normal or non-critical lab and imaging results will be communicated to you by MyChart, letter or phone within 4 business days after the clinic has received the results. If you do not hear from us within 7 days, please contact the clinic through [employer] or phone. If you have a critical or abnormal lab result, we will notify you by phone as soon as possible.  Submit refill requests through [employer] or call your pharmacy and they will forward the refill request to us. Please allow 3 business days for your refill to be completed.          Additional Information About Your Visit        [employer] Information     [employer] lets you send messages to your doctor, view your test results, renew your prescriptions, schedule appointments and more. To sign up, go to [email or web address] . Click on \"Log in\" on the left side of the screen, which will take you to the Welcome page. Then click on \"Sign up Now\" on the right side of the page.     You will be asked to enter the access code listed below, as well as some personal information. Please follow the directions to create your username and password.     Your access code is: [email or web address]  Expires: 2018  3:46 PM     Your access code will  in 90 days. If " you need help or a new code, please call your [facility] clinic or [phone].        Care EveryWhere ID     This is your Care EveryWhere ID. This could be used by other organizations to access your [facility] medical records  [id number]         Blood Pressure from Last 3 Encounters:   11/13/17 116/69   11/08/17 112/72   11/07/17 122/66    Weight from Last 3 Encounters:   11/08/17 148 lb (67.1 kg)   11/07/17 145 lb 11.6 oz (66.1 kg)   10/13/17 147 lb (66.7 kg)              Today, you had the following     No orders found for display       Primary Care Provider Office Phone # Fax #    Haley Baker [phone] [address] [facility] [address]        Equal Access to Services     [facility] : Hadii cristine marin hadasho Soomaali, waaxda luqadaha, qaybta kaalmada adekimberlyyada, gibson green . So [facility] [phone].    ATENCIÓN: Si habla español, tiene a montague disposición servicios gratuitos de asistencia lingüística. Jose Manuel al [phone].    We comply with applicable federal civil rights laws and [place] laws. We do not discriminate on the basis of race, color, national origin, age, disability, sex, sexual orientation, or gender identity.            Thank you!     Thank you for choosing [facility]  for your care. Our goal is always to provide you with excellent care. Hearing back from our patients is one way we can continue to improve our services. Please take a few minutes to complete the written survey that you may receive in the mail after your visit with us. Thank you!             Your Updated Medication List - Protect others around you: Learn how to safely use, store and throw away your medicines at www.disposemymeds.org.          This list is accurate as of: 11/16/17 10:52 AM.  Always use your most recent med list.                   Brand Name Dispense Instructions for use Diagnosis    aspirin 81 MG tablet      Take 1 tablet by mouth daily.         calcium acetate 667 MG Caps capsule    PHOSLO    540 capsule    Take 2 capsules (1,334 mg) by mouth 3 times daily (with meals)    Chronic kidney disease, unspecified CKD stage       darbepoetin william 60 MCG/0.3ML injection    ARANESP (ALBUMIN FREE)    0.3 mL    Inject 0.3 mLs (60 mcg) Subcutaneous every 14 days As needed for hgb<10g/dL.  If Hgb increases >1 point in 2 weeks (if blood transfusion given, use hgb PRIOR to this), SYSTOLIC BP > 180 mmHg or hgb>=10g/dL, HOLD DOSE. Dose must be within 1 week of Hgb.  Per anemia protocol with Cecilia De La Torre MD/Tereza Paul,PharmD [phone]    Anemia of chronic renal failure, stage 5 (H), CKD (chronic kidney disease) stage 5, GFR less than 15 ml/min (H)       finasteride 5 MG tablet    PROSCAR    90 tablet    Take 1 tablet (5 mg) by mouth daily    Benign prostatic hyperplasia with urinary retention       fish oil-omega-3 fatty acids 1000 MG capsule      Take 1 g by mouth daily        MULTIVITAMIN MEN PO      Take 1 tablet by mouth daily

## 2017-11-17 ENCOUNTER — HOSPITAL ENCOUNTER (OUTPATIENT)
Facility: CLINIC | Age: 72
Discharge: HOME | End: 2017-11-17
Attending: SURGERY | Admitting: SURGERY
Payer: MEDICARE

## 2017-11-17 ENCOUNTER — SURGERY (OUTPATIENT)
Age: 72
End: 2017-11-17

## 2017-11-17 ENCOUNTER — ANESTHESIA (OUTPATIENT)
Dept: SURGERY | Facility: CLINIC | Age: 72
End: 2017-11-17
Payer: MEDICARE

## 2017-11-17 VITALS
WEIGHT: 152.12 LBS | RESPIRATION RATE: 20 BRPM | HEIGHT: 70 IN | TEMPERATURE: 97.4 F | DIASTOLIC BLOOD PRESSURE: 72 MMHG | SYSTOLIC BLOOD PRESSURE: 119 MMHG | BODY MASS INDEX: 21.78 KG/M2 | OXYGEN SATURATION: 100 %

## 2017-11-17 DIAGNOSIS — N17.9 ACUTE RENAL FAILURE, UNSPECIFIED ACUTE RENAL FAILURE TYPE (H): ICD-10-CM

## 2017-11-17 DIAGNOSIS — I77.0 ARTERIOVENOUS FISTULA (H): ICD-10-CM

## 2017-11-17 DIAGNOSIS — N18.5 CKD (CHRONIC KIDNEY DISEASE) STAGE 5, GFR LESS THAN 15 ML/MIN (H): ICD-10-CM

## 2017-11-17 DIAGNOSIS — R73.01 ELEVATED FASTING GLUCOSE: ICD-10-CM

## 2017-11-17 DIAGNOSIS — N18.5 ANEMIA OF CHRONIC RENAL FAILURE, STAGE 5 (H): ICD-10-CM

## 2017-11-17 DIAGNOSIS — I10 HYPERTENSION GOAL BP (BLOOD PRESSURE) < 140/90: ICD-10-CM

## 2017-11-17 DIAGNOSIS — N17.9 AKI (ACUTE KIDNEY INJURY) (H): ICD-10-CM

## 2017-11-17 DIAGNOSIS — D64.9 SYMPTOMATIC ANEMIA: ICD-10-CM

## 2017-11-17 DIAGNOSIS — N13.9 OBSTRUCTIVE UROPATHY: ICD-10-CM

## 2017-11-17 DIAGNOSIS — Z72.0 TOBACCO ABUSE: Primary | ICD-10-CM

## 2017-11-17 DIAGNOSIS — E03.9 ACQUIRED HYPOTHYROIDISM: ICD-10-CM

## 2017-11-17 DIAGNOSIS — N18.6 END STAGE RENAL DISEASE (H): ICD-10-CM

## 2017-11-17 DIAGNOSIS — D63.1 ANEMIA OF CHRONIC RENAL FAILURE, STAGE 5 (H): ICD-10-CM

## 2017-11-17 DIAGNOSIS — Z13.6 CARDIOVASCULAR SCREENING; LDL GOAL LESS THAN 130: ICD-10-CM

## 2017-11-17 DIAGNOSIS — E78.1 HYPERTRIGLYCERIDEMIA: ICD-10-CM

## 2017-11-17 DIAGNOSIS — R91.8 PULMONARY NODULES: ICD-10-CM

## 2017-11-17 DIAGNOSIS — R29.898 BILATERAL LEG WEAKNESS: ICD-10-CM

## 2017-11-17 DIAGNOSIS — I95.1 ORTHOSTATIC HYPOTENSION: ICD-10-CM

## 2017-11-17 DIAGNOSIS — R29.898 WEAKNESS OF SHOULDER: ICD-10-CM

## 2017-11-17 DIAGNOSIS — Z71.89 ADVANCED DIRECTIVES, COUNSELING/DISCUSSION: ICD-10-CM

## 2017-11-17 LAB
ABO + RH BLD: ABNORMAL
ABO + RH BLD: ABNORMAL
APTT PPP: 51 SEC (ref 22–37)
BASOPHILS # BLD AUTO: 0.1 10E9/L (ref 0–0.2)
BASOPHILS NFR BLD AUTO: 0.6 %
BLD GP AB SCN SERPL QL: ABNORMAL
BLD PROD TYP BPU: ABNORMAL
BLOOD BANK CMNT PATIENT-IMP: ABNORMAL
BLOOD BANK CMNT PATIENT-IMP: ABNORMAL
CREAT SERPL-MCNC: 5.3 MG/DL (ref 0.66–1.25)
DIFFERENTIAL METHOD BLD: ABNORMAL
EOSINOPHIL # BLD AUTO: 0.5 10E9/L (ref 0–0.7)
EOSINOPHIL NFR BLD AUTO: 4.2 %
ERYTHROCYTE [DISTWIDTH] IN BLOOD BY AUTOMATED COUNT: 14.9 % (ref 10–15)
GFR SERPL CREATININE-BSD FRML MDRD: 11 ML/MIN/1.7M2
GLUCOSE BLDC GLUCOMTR-MCNC: 100 MG/DL (ref 70–99)
GLUCOSE SERPL-MCNC: 95 MG/DL (ref 70–99)
HCT VFR BLD AUTO: 26.1 % (ref 40–53)
HGB BLD-MCNC: 7.8 G/DL (ref 13.3–17.7)
IMM GRANULOCYTES # BLD: 0.1 10E9/L (ref 0–0.4)
IMM GRANULOCYTES NFR BLD: 0.5 %
INR PPP: 1.11 (ref 0.86–1.14)
LYMPHOCYTES # BLD AUTO: 1.3 10E9/L (ref 0.8–5.3)
LYMPHOCYTES NFR BLD AUTO: 10.3 %
MCH RBC QN AUTO: 26.4 PG (ref 26.5–33)
MCHC RBC AUTO-ENTMCNC: 29.9 G/DL (ref 31.5–36.5)
MCV RBC AUTO: 88 FL (ref 78–100)
MONOCYTES # BLD AUTO: 1.1 10E9/L (ref 0–1.3)
MONOCYTES NFR BLD AUTO: 8.5 %
NEUTROPHILS # BLD AUTO: 9.7 10E9/L (ref 1.6–8.3)
NEUTROPHILS NFR BLD AUTO: 75.9 %
NRBC # BLD AUTO: 0 10*3/UL
NRBC BLD AUTO-RTO: 0 /100
NUM BPU REQUESTED: 1
PLATELET # BLD AUTO: 421 10E9/L (ref 150–450)
POTASSIUM SERPL-SCNC: 4.4 MMOL/L (ref 3.4–5.3)
RBC # BLD AUTO: 2.96 10E12/L (ref 4.4–5.9)
SPECIMEN EXP DATE BLD: ABNORMAL
WBC # BLD AUTO: 12.8 10E9/L (ref 4–11)

## 2017-11-17 PROCEDURE — 85730 THROMBOPLASTIN TIME PARTIAL: CPT | Performed by: CLINICAL NURSE SPECIALIST

## 2017-11-17 PROCEDURE — 86901 BLOOD TYPING SEROLOGIC RH(D): CPT | Performed by: SURGERY

## 2017-11-17 PROCEDURE — 25000125 ZZHC RX 250: Performed by: ANESTHESIOLOGY

## 2017-11-17 PROCEDURE — 37000009 ZZH ANESTHESIA TECHNICAL FEE, EACH ADDTL 15 MIN: Performed by: SURGERY

## 2017-11-17 PROCEDURE — 36415 COLL VENOUS BLD VENIPUNCTURE: CPT | Performed by: CLINICAL NURSE SPECIALIST

## 2017-11-17 PROCEDURE — 36000057 ZZH SURGERY LEVEL 3 1ST 30 MIN - UMMC: Performed by: SURGERY

## 2017-11-17 PROCEDURE — 25000128 H RX IP 250 OP 636: Performed by: SURGERY

## 2017-11-17 PROCEDURE — 25000128 H RX IP 250 OP 636: Performed by: CLINICAL NURSE SPECIALIST

## 2017-11-17 PROCEDURE — 40000171 ZZH STATISTIC PRE-PROCEDURE ASSESSMENT III: Performed by: SURGERY

## 2017-11-17 PROCEDURE — 85025 COMPLETE CBC W/AUTO DIFF WBC: CPT | Performed by: CLINICAL NURSE SPECIALIST

## 2017-11-17 PROCEDURE — 27210794 ZZH OR GENERAL SUPPLY STERILE: Performed by: SURGERY

## 2017-11-17 PROCEDURE — 82947 ASSAY GLUCOSE BLOOD QUANT: CPT | Mod: 59 | Performed by: CLINICAL NURSE SPECIALIST

## 2017-11-17 PROCEDURE — 82962 GLUCOSE BLOOD TEST: CPT

## 2017-11-17 PROCEDURE — 37000008 ZZH ANESTHESIA TECHNICAL FEE, 1ST 30 MIN: Performed by: SURGERY

## 2017-11-17 PROCEDURE — 36000059 ZZH SURGERY LEVEL 3 EA 15 ADDTL MIN UMMC: Performed by: SURGERY

## 2017-11-17 PROCEDURE — 84132 ASSAY OF SERUM POTASSIUM: CPT | Performed by: CLINICAL NURSE SPECIALIST

## 2017-11-17 PROCEDURE — S0020 INJECTION, BUPIVICAINE HYDRO: HCPCS | Performed by: ANESTHESIOLOGY

## 2017-11-17 PROCEDURE — 25000128 H RX IP 250 OP 636: Performed by: STUDENT IN AN ORGANIZED HEALTH CARE EDUCATION/TRAINING PROGRAM

## 2017-11-17 PROCEDURE — 86900 BLOOD TYPING SEROLOGIC ABO: CPT | Performed by: SURGERY

## 2017-11-17 PROCEDURE — 82565 ASSAY OF CREATININE: CPT | Performed by: CLINICAL NURSE SPECIALIST

## 2017-11-17 PROCEDURE — 71000027 ZZH RECOVERY PHASE 2 EACH 15 MINS: Performed by: SURGERY

## 2017-11-17 PROCEDURE — 85610 PROTHROMBIN TIME: CPT | Performed by: CLINICAL NURSE SPECIALIST

## 2017-11-17 PROCEDURE — A9270 NON-COVERED ITEM OR SERVICE: HCPCS | Mod: GY | Performed by: STUDENT IN AN ORGANIZED HEALTH CARE EDUCATION/TRAINING PROGRAM

## 2017-11-17 PROCEDURE — 25000128 H RX IP 250 OP 636: Performed by: NURSE ANESTHETIST, CERTIFIED REGISTERED

## 2017-11-17 PROCEDURE — 25000132 ZZH RX MED GY IP 250 OP 250 PS 637: Mod: GY | Performed by: STUDENT IN AN ORGANIZED HEALTH CARE EDUCATION/TRAINING PROGRAM

## 2017-11-17 PROCEDURE — 86922 COMPATIBILITY TEST ANTIGLOB: CPT | Performed by: SURGERY

## 2017-11-17 PROCEDURE — 86850 RBC ANTIBODY SCREEN: CPT | Performed by: SURGERY

## 2017-11-17 RX ORDER — PROPOFOL 10 MG/ML
INJECTION, EMULSION INTRAVENOUS CONTINUOUS PRN
Status: DISCONTINUED | OUTPATIENT
Start: 2017-11-17 | End: 2017-11-17

## 2017-11-17 RX ORDER — NALOXONE HYDROCHLORIDE 0.4 MG/ML
.1-.4 INJECTION, SOLUTION INTRAMUSCULAR; INTRAVENOUS; SUBCUTANEOUS
Status: DISCONTINUED | OUTPATIENT
Start: 2017-11-17 | End: 2017-11-17 | Stop reason: HOSPADM

## 2017-11-17 RX ORDER — ACETAMINOPHEN 325 MG/1
975 TABLET ORAL ONCE
Status: COMPLETED | OUTPATIENT
Start: 2017-11-17 | End: 2017-11-17

## 2017-11-17 RX ORDER — ONDANSETRON 4 MG/1
4 TABLET, ORALLY DISINTEGRATING ORAL EVERY 30 MIN PRN
Status: DISCONTINUED | OUTPATIENT
Start: 2017-11-17 | End: 2017-11-17 | Stop reason: HOSPADM

## 2017-11-17 RX ORDER — HEPARIN SODIUM 1000 [USP'U]/ML
INJECTION, SOLUTION INTRAVENOUS; SUBCUTANEOUS PRN
Status: DISCONTINUED | OUTPATIENT
Start: 2017-11-17 | End: 2017-11-17

## 2017-11-17 RX ORDER — FENTANYL CITRATE 50 UG/ML
25-50 INJECTION, SOLUTION INTRAMUSCULAR; INTRAVENOUS
Status: DISCONTINUED | OUTPATIENT
Start: 2017-11-17 | End: 2017-11-17 | Stop reason: HOSPADM

## 2017-11-17 RX ORDER — CEFAZOLIN SODIUM 2 G/100ML
2 INJECTION, SOLUTION INTRAVENOUS
Status: DISCONTINUED | OUTPATIENT
Start: 2017-11-17 | End: 2017-11-17 | Stop reason: HOSPADM

## 2017-11-17 RX ORDER — OXYCODONE HYDROCHLORIDE 5 MG/1
5 TABLET ORAL EVERY 4 HOURS PRN
Qty: 18 TABLET | Refills: 0 | Status: SHIPPED | OUTPATIENT
Start: 2017-11-17 | End: 2018-01-17

## 2017-11-17 RX ORDER — SODIUM CHLORIDE, SODIUM LACTATE, POTASSIUM CHLORIDE, CALCIUM CHLORIDE 600; 310; 30; 20 MG/100ML; MG/100ML; MG/100ML; MG/100ML
INJECTION, SOLUTION INTRAVENOUS CONTINUOUS
Status: DISCONTINUED | OUTPATIENT
Start: 2017-11-17 | End: 2017-11-17 | Stop reason: HOSPADM

## 2017-11-17 RX ORDER — MEPERIDINE HYDROCHLORIDE 25 MG/ML
12.5 INJECTION INTRAMUSCULAR; INTRAVENOUS; SUBCUTANEOUS
Status: DISCONTINUED | OUTPATIENT
Start: 2017-11-17 | End: 2017-11-17 | Stop reason: HOSPADM

## 2017-11-17 RX ORDER — HEPARIN SODIUM 1000 [USP'U]/ML
INJECTION, SOLUTION INTRAVENOUS; SUBCUTANEOUS PRN
Status: DISCONTINUED | OUTPATIENT
Start: 2017-11-17 | End: 2017-11-17 | Stop reason: HOSPADM

## 2017-11-17 RX ORDER — FENTANYL CITRATE 50 UG/ML
25-50 INJECTION, SOLUTION INTRAMUSCULAR; INTRAVENOUS EVERY 5 MIN PRN
Status: DISCONTINUED | OUTPATIENT
Start: 2017-11-17 | End: 2017-11-17 | Stop reason: HOSPADM

## 2017-11-17 RX ORDER — SENNOSIDES A AND B 8.6 MG/1
1 TABLET, FILM COATED ORAL 2 TIMES DAILY PRN
Qty: 20 TABLET | Refills: 0 | Status: SHIPPED | OUTPATIENT
Start: 2017-11-17 | End: 2018-01-17

## 2017-11-17 RX ORDER — FLUMAZENIL 0.1 MG/ML
0.2 INJECTION, SOLUTION INTRAVENOUS
Status: DISCONTINUED | OUTPATIENT
Start: 2017-11-17 | End: 2017-11-17 | Stop reason: HOSPADM

## 2017-11-17 RX ORDER — CEFAZOLIN SODIUM 500 MG/2.2ML
500 INJECTION, POWDER, FOR SOLUTION INTRAMUSCULAR; INTRAVENOUS SEE ADMIN INSTRUCTIONS
Status: DISCONTINUED | OUTPATIENT
Start: 2017-11-17 | End: 2017-11-17 | Stop reason: HOSPADM

## 2017-11-17 RX ORDER — FENTANYL CITRATE 50 UG/ML
INJECTION, SOLUTION INTRAMUSCULAR; INTRAVENOUS PRN
Status: DISCONTINUED | OUTPATIENT
Start: 2017-11-17 | End: 2017-11-17

## 2017-11-17 RX ORDER — BUPIVACAINE HYDROCHLORIDE 5 MG/ML
INJECTION, SOLUTION EPIDURAL; INTRACAUDAL PRN
Status: DISCONTINUED | OUTPATIENT
Start: 2017-11-17 | End: 2017-11-17

## 2017-11-17 RX ORDER — ONDANSETRON 4 MG/1
4 TABLET, FILM COATED ORAL EVERY 6 HOURS PRN
Qty: 20 TABLET | Refills: 0 | Status: SHIPPED | OUTPATIENT
Start: 2017-11-17 | End: 2018-01-17

## 2017-11-17 RX ORDER — ONDANSETRON 2 MG/ML
4 INJECTION INTRAMUSCULAR; INTRAVENOUS EVERY 30 MIN PRN
Status: DISCONTINUED | OUTPATIENT
Start: 2017-11-17 | End: 2017-11-17 | Stop reason: HOSPADM

## 2017-11-17 RX ORDER — LIDOCAINE 40 MG/G
CREAM TOPICAL
Status: DISCONTINUED | OUTPATIENT
Start: 2017-11-17 | End: 2017-11-17 | Stop reason: HOSPADM

## 2017-11-17 RX ORDER — SODIUM CHLORIDE 9 MG/ML
INJECTION, SOLUTION INTRAVENOUS CONTINUOUS PRN
Status: DISCONTINUED | OUTPATIENT
Start: 2017-11-17 | End: 2017-11-17

## 2017-11-17 RX ADMIN — MIDAZOLAM HYDROCHLORIDE 0.5 MG: 1 INJECTION, SOLUTION INTRAMUSCULAR; INTRAVENOUS at 07:35

## 2017-11-17 RX ADMIN — PHENYLEPHRINE HYDROCHLORIDE 100 MCG: 10 INJECTION, SOLUTION INTRAMUSCULAR; INTRAVENOUS; SUBCUTANEOUS at 08:27

## 2017-11-17 RX ADMIN — CEFAZOLIN 500 MG: 225 INJECTION, POWDER, FOR SOLUTION INTRAMUSCULAR; INTRAVENOUS at 07:50

## 2017-11-17 RX ADMIN — HEPARIN SODIUM 3000 UNITS: 1000 INJECTION, SOLUTION INTRAVENOUS; SUBCUTANEOUS at 08:34

## 2017-11-17 RX ADMIN — BUPIVACAINE HYDROCHLORIDE 30 ML: 5 INJECTION, SOLUTION EPIDURAL; INTRACAUDAL at 06:50

## 2017-11-17 RX ADMIN — PROPOFOL 75 MCG/KG/MIN: 10 INJECTION, EMULSION INTRAVENOUS at 07:35

## 2017-11-17 RX ADMIN — SODIUM CHLORIDE: 9 INJECTION, SOLUTION INTRAVENOUS at 07:22

## 2017-11-17 RX ADMIN — FENTANYL CITRATE 50 MCG: 50 INJECTION, SOLUTION INTRAMUSCULAR; INTRAVENOUS at 07:35

## 2017-11-17 RX ADMIN — ACETAMINOPHEN 975 MG: 325 TABLET, FILM COATED ORAL at 06:00

## 2017-11-17 RX ADMIN — FENTANYL CITRATE 25 MCG: 50 INJECTION INTRAMUSCULAR; INTRAVENOUS at 06:56

## 2017-11-17 RX ADMIN — PHENYLEPHRINE HYDROCHLORIDE 100 MCG: 10 INJECTION, SOLUTION INTRAMUSCULAR; INTRAVENOUS; SUBCUTANEOUS at 09:04

## 2017-11-17 RX ADMIN — HEPARIN SODIUM 75 ML: 1000 INJECTION, SOLUTION INTRAVENOUS; SUBCUTANEOUS at 09:22

## 2017-11-17 ASSESSMENT — LIFESTYLE VARIABLES: TOBACCO_USE: 1

## 2017-11-17 NOTE — IP AVS SNAPSHOT
MRN:[id number]                      After Visit Summary   11/17/2017    Dung Norton    MRN: [id number]           Thank you!     Thank you for choosing [facility] for your care. Our goal is always to provide you with excellent care. Hearing back from our patients is one way we can continue to improve our services. Please take a few minutes to complete the written survey that you may receive in the mail after you visit with us. Thank you!        Patient Information     Date Of Birth          1945        About your hospital stay     You were admitted on:  November 17, 2017 You last received care in the:  Same Day Surgery [facility]    You were discharged on:  November 17, 2017        Reason for your hospital stay       The patient underwent on 11/17/17 a left side radial artery cephalic vein arteriovenous fistula under MAC and scalene block. The patient tolerated well the anesthesia and surgery.                  Who to Call     For medical emergencies, please call [phone].  For non-urgent questions about your medical care, please call your primary care provider or clinic, [phone]  For questions related to your surgery, please call your surgery clinic        Attending Provider     Provider Specialty    Eduardo Pabon MD Transplant       Primary Care Provider Office Phone # Fax #    Haley Baker [phone]      After Care Instructions     Diet       Follow this diet upon discharge: Regular            Discharge Instructions       Call [phone] (Sum) or [phone] (after hours) if you have any of the following:   - Temperature greater than 101 F ,   - increased drainage from the incision,  - increased swelling,  - increased pain,   - not able to feel the thrill or auscultate the bruit in the graft;   - numbness, tingling, cold fingers or loss of strength in the right hand.    Keep the wound clean and dry  You can shower after 2 days, but do not scrub over the incision                   Follow-up Appointments     Adult [facility] Follow-up and recommended labs and tests       Follow up with Dr. Chawla, at (location with clinic name or city) Outpatient Transplant Clinic, within 2 weeks  to evaluate after surgery.     Appointments on [place] and/or [facility] (with [facility] or [facility] provider or service). Call [phone] if you haven't heard regarding these appointments within 7 days of discharge.                  Your next 10 appointments already scheduled     Nov 20, 2017 10:15 AM CST   LAB with  LAB   [facility]    [address] [place] [id number] [phone]           Please do not eat 10-12 hours before your appointment if you are coming in fasting for labs on lipids, cholesterol, or glucose (sugar). This does not apply to pregnant women. Water, hot tea and black coffee (with nothing added) are okay. Do not drink other fluids, diet soda or chew gum.            Nov 22, 2017  7:30 AM CST   (Arrive by 7:15 AM)   Urodynamics with Nicki Spear PA-C   [facility] ([facility])    [address] [place] [address]  [address] [facility] [id number] [phone]            Dec 01, 2017 10:45 AM CST   (Arrive by 10:30 AM)   Return Visit with Tmaiko Vanegas MD   [facility] ([facility])    [address] [place] [address]  [address] [facility] [address]   [phone]            Dec 01, 2017  1:00 PM CST   (Arrive by 12:45 PM)   Post-Op with COY Vargas   [facility] ([facility])    [address] [place] [address]  [address] [facility] [address]   [phone]            Dec 08, 2017  1:30 PM CST   (Arrive by 1:00 PM)   Return Visit with Oralia De La Torre NP   [facility] ([facility])    [address] [place] [address]  [address]  [address] [facility] [address] [phone]            Dec 12, 2017 12:10 PM CST   Office Visit with Haley Baker MD   [facility] [place] ([facility])    [address] [facility]  [place] [address] [phone]           Bring a current list of meds and any records pertaining to this visit. For Physicals, please bring immunization records and any forms needing to be filled out. Please arrive 10 minutes early to complete paperwork.              Further instructions from your care team       [facility]  Same-Day Surgery   Adult Discharge Orders & Instructions     For 24 hours after surgery    1. Get plenty of rest.  A responsible adult must stay with you for at least 24 hours after you leave the hospital.   2. Do not drive or use heavy equipment.  If you have weakness or tingling, don't drive or use heavy equipment until this feeling goes away.  3. Do not drink alcohol.  4. Avoid strenuous or risky activities.  Ask for help when climbing stairs.   5. You may feel lightheaded.  IF so, sit for a few minutes before standing.  Have someone help you get up.   6. If you have nausea (feel sick to your stomach): Drink only clear liquids such as apple juice, ginger ale, broth or 7-Up.  Rest may also help.  Be sure to drink enough fluids.  Move to a regular diet as you feel able.  7. You may have a slight fever. Call the doctor if your fever is over 100 F (37.7 C) (taken under the tongue) or lasts longer than 24 hours.  8. You may have a dry mouth, a sore throat, muscle aches or trouble sleeping.  These should go away after 24 hours.  9. Do not make important or legal decisions.   Call your doctor for any of the followin.  Signs of infection (fever, growing tenderness at the surgery site, a large amount of drainage or bleeding, severe pain, foul-smelling drainage, redness, swelling).    2. It has been over 8 to 10 hours since surgery and you  "are still not able to urinate (pass water).    3.  Headache for over 24 hours.    To contact a doctor, call Dr Pabon's office at [phone] or:        [phone] and ask for the resident on call for Transplant (answered 24 hours a day)      Emergency Department:    [facility]: [phone]       (TTY for hearing impaired: [phone])    [facility]: [phone]       (TTY for hearing impaired: [phone])        Pending Results     No orders found from 11/15/2017 to 2017.            Admission Information     Date & Time Provider Department Dept. Phone    2017 Eduardo Pabon MD Same Day Surgery [facility] [phone]      Your Vitals Were     Blood Pressure Temperature Respirations Height Weight Pulse Oximetry    94/62 97.5  F (36.4  C) (Oral) 16 1.778 m (5' 10\") 69 kg (152 lb 1.9 oz) 100%    BMI (Body Mass Index)                   21.83 kg/m2           [employer] Information     [employer] lets you send messages to your doctor, view your test results, renew your prescriptions, schedule appointments and more. To sign up, go to [email or web address] . Click on \"Log in\" on the left side of the screen, which will take you to the Welcome page. Then click on \"Sign up Now\" on the right side of the page.     You will be asked to enter the access code listed below, as well as some personal information. Please follow the directions to create your username and password.     Your access code is: [email or web address]  Expires: 2018  3:46 PM     Your access code will  in 90 days. If you need help or a new code, please call your [facility] clinic or [phone].        Care EveryWhere ID     This is your Care EveryWhere ID. This could be used by other organizations to access your [facility] medical records  [id number]        Equal Access to Services     BRIAN CERRATO : Justin Sagastume, osvaldo calle, qaybta kaalmada adegibson bran. So [facility] " [phone].    ATENCIÓN: Si betty hernandez, tiene a montague disposición servicios gratuitos de asistencia lingüística. Jose Manuel brink [phone].    We comply with applicable federal civil rights laws and [place] laws. We do not discriminate on the basis of race, color, national origin, age, disability, sex, sexual orientation, or gender identity.               Review of your medicines      START taking        Dose / Directions    ondansetron 4 MG tablet   Commonly known as:  ZOFRAN   Used for:  Arteriovenous fistula (H)        Dose:  4 mg   Take 1 tablet (4 mg) by mouth every 6 hours as needed for nausea   Quantity:  20 tablet   Refills:  0       oxyCODONE IR 5 MG tablet   Commonly known as:  ROXICODONE   Used for:  Arteriovenous fistula (H)        Dose:  5 mg   Take 1 tablet (5 mg) by mouth every 4 hours as needed for pain maximum 6 tablet(s) per day   Quantity:  18 tablet   Refills:  0       senna 8.6 MG tablet   Commonly known as:  SENOKOT   Used for:  Arteriovenous fistula (H)        Dose:  1 tablet   Take 1 tablet by mouth 2 times daily as needed for constipation   Quantity:  20 tablet   Refills:  0         CONTINUE these medicines which have NOT CHANGED        Dose / Directions    aspirin 81 MG tablet        Dose:  1 tablet   Take 1 tablet by mouth daily.   Refills:  0       calcium acetate 667 MG Caps capsule   Commonly known as:  PHOSLO   Used for:  Chronic kidney disease, unspecified CKD stage        Dose:  1334 mg   Take 2 capsules (1,334 mg) by mouth 3 times daily (with meals)   Quantity:  540 capsule   Refills:  3       darbepoetin william 60 MCG/0.3ML injection   Commonly known as:  ARANESP (ALBUMIN FREE)   Used for:  Anemia of chronic renal failure, stage 5 (H), CKD (chronic kidney disease) stage 5, GFR less than 15 ml/min (H)        Dose:  60 mcg   Inject 0.3 mLs (60 mcg) Subcutaneous every 14 days As needed for hgb<10g/dL.  If Hgb increases >1 point in 2 weeks (if blood transfusion given, use hgb PRIOR to  this), SYSTOLIC BP > 180 mmHg or hgb>=10g/dL, HOLD DOSE. Dose must be within 1 week of Hgb.  Per anemia protocol with Cecilia De La Torre MD/Tereza Paul,PharmD [phone]   Quantity:  0.3 mL   Refills:  99       finasteride 5 MG tablet   Commonly known as:  PROSCAR   Used for:  Benign prostatic hyperplasia with urinary retention        Dose:  5 mg   Take 1 tablet (5 mg) by mouth daily   Quantity:  90 tablet   Refills:  3       fish oil-omega-3 fatty acids 1000 MG capsule        Dose:  1 g   Take 1 g by mouth daily   Refills:  0       MULTIVITAMIN MEN PO        Dose:  1 tablet   Take 1 tablet by mouth daily   Refills:  0            Where to get your medicines      Some of these will need a paper prescription and others can be bought over the counter. Ask your nurse if you have questions.     Bring a paper prescription for each of these medications     ondansetron 4 MG tablet    oxyCODONE IR 5 MG tablet    senna 8.6 MG tablet                Protect others around you: Learn how to safely use, store and throw away your medicines at www.disposemymeds.org.             Medication List: This is a list of all your medications and when to take them. Check marks below indicate your daily home schedule. Keep this list as a reference.      Medications           Morning Afternoon Evening Bedtime As Needed    aspirin 81 MG tablet   Take 1 tablet by mouth daily.                                calcium acetate 667 MG Caps capsule   Commonly known as:  PHOSLO   Take 2 capsules (1,334 mg) by mouth 3 times daily (with meals)                                darbepoetin william 60 MCG/0.3ML injection   Commonly known as:  ARANESP (ALBUMIN FREE)   Inject 0.3 mLs (60 mcg) Subcutaneous every 14 days As needed for hgb<10g/dL.  If Hgb increases >1 point in 2 weeks (if blood transfusion given, use hgb PRIOR to this), SYSTOLIC BP > 180 mmHg or hgb>=10g/dL, HOLD DOSE. Dose must be within 1 week of Hgb.  Per anemia protocol with Cecilia De La Torre MD/Tereza  Beverly,PharmD [phone]                                finasteride 5 MG tablet   Commonly known as:  PROSCAR   Take 1 tablet (5 mg) by mouth daily                                fish oil-omega-3 fatty acids 1000 MG capsule   Take 1 g by mouth daily                                MULTIVITAMIN MEN PO   Take 1 tablet by mouth daily                                ondansetron 4 MG tablet   Commonly known as:  ZOFRAN   Take 1 tablet (4 mg) by mouth every 6 hours as needed for nausea                                oxyCODONE IR 5 MG tablet   Commonly known as:  ROXICODONE   Take 1 tablet (5 mg) by mouth every 4 hours as needed for pain maximum 6 tablet(s) per day                                senna 8.6 MG tablet   Commonly known as:  SENOKOT   Take 1 tablet by mouth 2 times daily as needed for constipation

## 2017-11-17 NOTE — IP AVS SNAPSHOT
Same Day Surgery [address] [address]    Phone:  [phone]                                       After Visit Summary   11/17/2017    Dung Norton    MRN: [id number]           After Visit Summary Signature Page     I have received my discharge instructions, and my questions have been answered. I have discussed any challenges I see with this plan with the nurse or doctor.    ..........................................................................................................................................  Patient/Patient Representative Signature      ..........................................................................................................................................  Patient Representative Print Name and Relationship to Patient    ..................................................               ................................................  Date                                            Time    ..........................................................................................................................................  Reviewed by Signature/Title    ...................................................              ..............................................  Date                                                            Time

## 2017-11-17 NOTE — OP NOTE
Transplant Surgery  Operative Note  PREOP DIAGNOSIS: Chronic Renal Failure   POSTOP DIAGNOSIS: Same  OPERATION: Arteriovenous Fistula creation, left radial artery to cephalic vein. Intraoperative ultrasound  FACULTY: Eduardo Pabon M.D., M.S.  FELLOW: Munir Field,   ASSISTANT: Munir Field, fellow.    ANESTHESIA: Local with MAC and Scalene Block  EBL: 10 ml.  SPECIMEN: none  FLUIDS: 150 cc crystalloid  DRAIN: None    INDICATION: The patient was referred by Dr. Daley for permanent dialysis access creation due to renal failure. The indications, benefits, and risks of permanent vascular access creation were discussed, questions answered and informed consent was provided.   FINDINGS:   Artery quality was: Normal, with diameter of 3 mm.  Anastomosis diameter: 7 mm.  Vein quality was: Normal with diameter of 4 mm.     COMPLICATIONS: None.    PROCEDURE: The patient was positioned supine, and the left upper extremity was positioned, prepped and draped in the usual sterile fashion. An ultrasound was performed to assess the size, quality, and position of the artery and vein. The patient received preoperative IV antibiotics. An incision was made and extended through the subcutaneous tissues below the antecubital crease. The proximal radial artery and cephalic vein were circumferentially dissected. The median antecubital vein and a superficial inflow vein were divided, isolating the the cephalic vein for planned outflow.  The vein was probed and found to be without stricture, and flushed and distended with heparinized saline. The patient was heparinized.  Arterial clamps were placed and arteriotomy was made. The vein on a branch patch was tailored and anastomosed with 6-0 Prolene. The clamps were removed sequentially. The anastomosis was tied after vessel expansion. Hemostasis was obtained.  Fistula flow was excellent. The distal radial artery pulse was excellent and capillary refill excellent. The wound was closed in layers  with absorbable suture and dressed with Dermabond. Counts were correct. Faculty was present for the key portions of the procedure. The patient was then awakened and transferred to PACU in good condition.     Physician Attestation   I was present for the key portions of the procedure and I was immediately available for the entire procedure between opening and closing.    Eduardo Pabon  Date of Service (when I saw the patient): 11/17/17

## 2017-11-17 NOTE — ANESTHESIA POSTPROCEDURE EVALUATION
Patient: Dung Norton    Procedure(s):  Left Cephalic Vein To Radial Artery Arteriovenous Fistula Creation, Anesthesia Block - Wound Class: I-Clean    Diagnosis:Chronic Kidney Disease Stage 5  Diagnosis Additional Information: No value filed.    Anesthesia Type:  MAC, Peripheral Nerve Block    Note:  Anesthesia Post Evaluation    Patient location during evaluation: PACU  Patient participation: Able to fully participate in evaluation  Level of consciousness: awake and alert  Pain management: adequate  Airway patency: patent  Cardiovascular status: acceptable  Respiratory status: acceptable  Hydration status: acceptable  PONV: none     Anesthetic complications: None          Last vitals:  Vitals:    11/17/17 0541 11/17/17 0655 11/17/17 0700   BP: 126/81 108/69 114/71   Resp: 16 16 13   Temp: 36.7  C (98.1  F)     SpO2: 98% 100% 100%         Electronically Signed By: Kar Rincon MD  November 17, 2017  9:57 AM

## 2017-11-17 NOTE — OR NURSING
Pending transport to take patient and daughter to discharge pharmacy followed by lobby. Pt and family aware to POC. Pt discharged - daughter and patient verbalized understanding.

## 2017-11-17 NOTE — ANESTHESIA CARE TRANSFER NOTE
Patient: Dung Norton    Procedure(s):  Left Cephalic Vein To Radial Artery Arteriovenous Fistula Creation, Anesthesia Block - Wound Class: I-Clean    Diagnosis: Chronic Kidney Disease Stage 5  Diagnosis Additional Information: No value filed.    Anesthesia Type:   MAC, Peripheral Nerve Block     Note:    Patient transferred to:Phase II  Comments: Pt remains stable, monitors on alarms in place, report to PACU RN, no complicationsHandoff Report: Identifed the Patient, Identified the Reponsible Provider, Reviewed the pertinent medical history, Discussed the surgical course, Reviewed Intra-OP anesthesia mangement and issues during anesthesia, Set expectations for post-procedure period and Allowed opportunity for questions and acknowledgement of understanding      Vitals: (Last set prior to Anesthesia Care Transfer)    CRNA VITALS  11/17/2017 0918 - 11/17/2017 0952      11/17/2017             Resp Rate (observed): 12    EKG: NSR                Electronically Signed By: COY Esparza CRNA  November 17, 2017  9:52 AM

## 2017-11-17 NOTE — TELEPHONE ENCOUNTER
Documented aranesp dose was given.    Anemia Latest Ref Rng & Units 10/23/2017 10/30/2017 11/2/2017 11/6/2017 11/13/2017 11/16/2017 11/17/2017   EARLENE Dose - - - 60 mcg - - 60 mcg -   Hemoglobin 13.3 - 17.7 g/dL 9.1(L) 9.0(L) - 8.4(L) 7.9(L) - 7.8(L)   TSAT 15 - 46 % - - - - 12(L) - -   Ferritin 26 - 388 ng/mL - - - - 1206(H) - -      Call date: 11/28    Tereza Paul, PharmD, BCACP  [phone]  November 17, 2017

## 2017-11-17 NOTE — PROGRESS NOTES
Left supraclavicular block performed without complications.  VSS.  Pt tolerated well. 25mcg of Fentanyl given. Will continue to monitor.

## 2017-11-17 NOTE — DISCHARGE INSTRUCTIONS
[facility]  Same-Day Surgery   Adult Discharge Orders & Instructions     For 24 hours after surgery    1. Get plenty of rest.  A responsible adult must stay with you for at least 24 hours after you leave the hospital.   2. Do not drive or use heavy equipment.  If you have weakness or tingling, don't drive or use heavy equipment until this feeling goes away.  3. Do not drink alcohol.  4. Avoid strenuous or risky activities.  Ask for help when climbing stairs.   5. You may feel lightheaded.  IF so, sit for a few minutes before standing.  Have someone help you get up.   6. If you have nausea (feel sick to your stomach): Drink only clear liquids such as apple juice, ginger ale, broth or 7-Up.  Rest may also help.  Be sure to drink enough fluids.  Move to a regular diet as you feel able.  7. You may have a slight fever. Call the doctor if your fever is over 100 F (37.7 C) (taken under the tongue) or lasts longer than 24 hours.  8. You may have a dry mouth, a sore throat, muscle aches or trouble sleeping.  These should go away after 24 hours.  9. Do not make important or legal decisions.   Call your doctor for any of the followin.  Signs of infection (fever, growing tenderness at the surgery site, a large amount of drainage or bleeding, severe pain, foul-smelling drainage, redness, swelling).    2. It has been over 8 to 10 hours since surgery and you are still not able to urinate (pass water).    3.  Headache for over 24 hours.    To contact a doctor, call Dr Pabon's office at [phone] or:        [phone] and ask for the resident on call for Transplant (answered 24 hours a day)      Emergency Department:    [facility]: [phone]       (TTY for hearing impaired: [phone])    [facility]: [phone]       (TTY for hearing impaired: [phone])

## 2017-11-17 NOTE — ANESTHESIA PREPROCEDURE EVALUATION
Anesthesia Evaluation     . Pt has not had prior anesthetic            ROS/MED HX    ENT/Pulmonary:     (+)tobacco use, Current use , . .    Neurologic:  - neg neurologic ROS     Cardiovascular:     (+) Dyslipidemia, hypertension----. : . . . :. .       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    ASA Status:  3 .    NPO Status:  > 8 hours    Plan for MAC and Peripheral Nerve Block with Intravenous and Propofol induction. Maintenance will be Balanced.  Reason for MAC:  Chronic cardiopulmonary disease (G9) and Deep or markedly invasive procedure (G8)  PONV prophylaxis:  Ondansetron (or other 5HT-3) and Dexamethasone or Solumedrol  Additional equipment: Videolaryngoscope      Postoperative Care  Postoperative pain management:  IV analgesics, Multi-modal analgesia and Peripheral nerve block (Single Shot).  Plan for postoperative opioid use.    Consents  Anesthetic plan, risks, benefits and alternatives discussed with:  Patient.  Use of blood products discussed: Yes.   Use of blood products discussed with Patient.  Consented to blood products.  .                           .

## 2017-11-17 NOTE — OR NURSING
MDA at bedside and will place sign-out. Dr. Field paged for discharge to home order needed. Prescriptions sent to pharmacy for filling. Pt doing well, VSS.

## 2017-11-17 NOTE — ADDENDUM NOTE
Addendum  created 11/17/17 1011 by Willis Zuniga MD    Anesthesia Intra Blocks edited, Anesthesia Intra Meds edited, Child order released for a procedure order, Sign clinical note

## 2017-11-17 NOTE — ANESTHESIA PROCEDURE NOTES
Peripheral Nerve Block Procedure Note    Staff:     Anesthesiologist:  TY MARIN    Resident/CRNA:  STEPHIE QUIÑONES    Block performed by resident/CRNA in the presence of a teaching physician    Location: Pre-op  Procedure Start/Stop TImes:      11/17/2017 6:50 AM     11/17/2017 6:56 AM    patient identified, IV checked, site marked, risks and benefits discussed, informed consent, monitors and equipment checked, pre-op evaluation, at physician/surgeon's request and post-op pain management      Correct Patient: Yes      Correct Position: Yes      Correct Site: Yes      Correct Procedure: Yes      Correct Laterality:  Yes    Site Marked:  Yes  Procedure details:     Procedure:  Supraclavicular    Laterality:  Left    Position:  Sitting    Sterile Prep: chloraprep, mask and sterile gloves      Needle:  Short bevel and insulated    Needle length (inches):  2    Ultrasound: Yes      Ultrasound used to identify targeted nerve, plexus, or vascular structure and placed a needle adjacent to it      Permanent Image entered into patiient's record      Abnormal pain on injection: No      Blood Aspirated: No      Paresthesias:  No    Bleeding at site: No      Bolus via:  Needle    Infusion Method:  Single Shot    Complications:  None

## 2017-11-19 LAB
BLD PROD TYP BPU: NORMAL
BLD UNIT ID BPU: 0
BLOOD PRODUCT CODE: NORMAL
BPU ID: NORMAL
TRANSFUSION STATUS PATIENT QL: NORMAL
TRANSFUSION STATUS PATIENT QL: NORMAL

## 2017-11-20 ENCOUNTER — CARE COORDINATION (OUTPATIENT)
Dept: NEPHROLOGY | Facility: CLINIC | Age: 72
End: 2017-11-20

## 2017-11-20 ENCOUNTER — ALLIED HEALTH/NURSE VISIT (OUTPATIENT)
Dept: FAMILY MEDICINE | Facility: CLINIC | Age: 72
End: 2017-11-20
Payer: COMMERCIAL

## 2017-11-20 ENCOUNTER — TELEPHONE (OUTPATIENT)
Dept: NEPHROLOGY | Facility: CLINIC | Age: 72
End: 2017-11-20

## 2017-11-20 VITALS — DIASTOLIC BLOOD PRESSURE: 68 MMHG | SYSTOLIC BLOOD PRESSURE: 124 MMHG

## 2017-11-20 DIAGNOSIS — I10 HYPERTENSION GOAL BP (BLOOD PRESSURE) < 140/90: Primary | ICD-10-CM

## 2017-11-20 DIAGNOSIS — N18.5 ANEMIA OF CHRONIC RENAL FAILURE, STAGE 5 (H): ICD-10-CM

## 2017-11-20 DIAGNOSIS — N18.5 CKD (CHRONIC KIDNEY DISEASE) STAGE 5, GFR LESS THAN 15 ML/MIN (H): ICD-10-CM

## 2017-11-20 DIAGNOSIS — R79.89 ELEVATED SERUM CREATININE: ICD-10-CM

## 2017-11-20 DIAGNOSIS — D63.1 ANEMIA OF CHRONIC RENAL FAILURE, STAGE 5 (H): ICD-10-CM

## 2017-11-20 LAB
ALBUMIN SERPL-MCNC: 2.6 G/DL (ref 3.4–5)
ANION GAP SERPL CALCULATED.3IONS-SCNC: 11 MMOL/L (ref 3–14)
BUN SERPL-MCNC: 102 MG/DL (ref 7–30)
CALCIUM SERPL-MCNC: 9.2 MG/DL (ref 8.5–10.1)
CHLORIDE SERPL-SCNC: 108 MMOL/L (ref 94–109)
CO2 SERPL-SCNC: 21 MMOL/L (ref 20–32)
CREAT SERPL-MCNC: 5.29 MG/DL (ref 0.66–1.25)
GFR SERPL CREATININE-BSD FRML MDRD: 11 ML/MIN/1.7M2
GLUCOSE SERPL-MCNC: 97 MG/DL (ref 70–99)
HCT VFR BLD AUTO: 27.4 % (ref 40–53)
HGB BLD-MCNC: 8.2 G/DL (ref 13.3–17.7)
PHOSPHATE SERPL-MCNC: 4.8 MG/DL (ref 2.5–4.5)
POTASSIUM SERPL-SCNC: 4.5 MMOL/L (ref 3.4–5.3)
SODIUM SERPL-SCNC: 140 MMOL/L (ref 133–144)

## 2017-11-20 PROCEDURE — 85018 HEMOGLOBIN: CPT

## 2017-11-20 PROCEDURE — 99207 ZZC NO CHARGE NURSE ONLY: CPT | Performed by: FAMILY MEDICINE

## 2017-11-20 PROCEDURE — 85014 HEMATOCRIT: CPT

## 2017-11-20 PROCEDURE — 80069 RENAL FUNCTION PANEL: CPT

## 2017-11-20 PROCEDURE — 36415 COLL VENOUS BLD VENIPUNCTURE: CPT

## 2017-11-20 NOTE — TELEPHONE ENCOUNTER
DATE:  11/20/2017   TIME OF RECEIPT FROM LAB:  4:49pm  LAB TEST:  Creatinine  LAB VALUE:  5.29  RESULTS GIVEN WITH READ-BACK TO (PROVIDER):  NEETA JOSEPH  TIME LAB VALUE REPORTED TO PROVIDER:   4:50pm    Giselle Benoit RN

## 2017-11-20 NOTE — MR AVS SNAPSHOT
After Visit Summary   11/20/2017    Dung Norton    MRN: [id number]           Patient Information     Date Of Birth          1945        Visit Information        Provider Department      11/20/2017 11:40 AM Haley Baker MD [facility]        Today's Diagnoses     Hypertension goal BP (blood pressure) < 140/90    -  1       Follow-ups after your visit        Your next 10 appointments already scheduled     Nov 22, 2017  7:30 AM CST   (Arrive by 7:15 AM)   Urodynamics with Nicki Spear PA-C   [facility] Urology and [facility] Prostate and Urologic C[facility] ([facility])    [address] [place] [id number] [phone]            Nov 27, 2017 10:45 AM CST   LAB with  LAB   [facility] [place] ([facility])    [address] [place] [address] [id number] [phone]           Please do not eat 10-12 hours before your appointment if you are coming in fasting for labs on lipids, cholesterol, or glucose (sugar). This does not apply to pregnant women. Water, hot tea and black coffee (with nothing added) are okay. Do not drink other fluids, diet soda or chew gum.            Dec 01, 2017 10:45 AM CST   (Arrive by 10:30 AM)   Return Visit with Tamiko Vanegas MD   [facility] Urology and [facility] ([facility])    [address] [place] [address] [id number] [phone]            Dec 01, 2017  1:00 PM CST   (Arrive by 12:45 PM)   Post-Op with COY Vargas   [facility] Solid Organ Transplant ([facility])    [address] [place] [address] [id number] [phone]            Dec 08, 2017  1:30 PM CST   (Arrive by 1:00 PM)   Return Visit with Oralia De La Torre NP   [facility] Nephrology ([facility])    [address] [place] [id number]  "  [phone]            Dec 12, 2017 12:10 PM CST   Office Visit with Haley Baker MD   [facility]    [address]  [place] [address] [phone]           Bring a current list of meds and any records pertaining to this visit. For Physicals, please bring immunization records and any forms needing to be filled out. Please arrive 10 minutes early to complete paperwork.              Who to contact     If you have questions or need follow up information about today's clinic visit or your schedule please contact [facility] directly at [phone].  Normal or non-critical lab and imaging results will be communicated to you by MyChart, letter or phone within 4 business days after the clinic has received the results. If you do not hear from us within 7 days, please contact the clinic through [employer] or phone. If you have a critical or abnormal lab result, we will notify you by phone as soon as possible.  Submit refill requests through [employer] or call your pharmacy and they will forward the refill request to us. Please allow 3 business days for your refill to be completed.          Additional Information About Your Visit        MyChart Information     [employer] lets you send messages to your doctor, view your test results, renew your prescriptions, schedule appointments and more. To sign up, go to [email or web address] . Click on \"Log in\" on the left side of the screen, which will take you to the Welcome page. Then click on \"Sign up Now\" on the right side of the page.     You will be asked to enter the access code listed below, as well as some personal information. Please follow the directions to create your username and password.     Your access code is: [email or web address]  Expires: 2018  3:46 PM     Your access code will  in 90 days. If you need help or a new code, please call your [facility] or [phone].        Care EveryWhere ID     " This is your Care EveryWhere ID. This could be used by other organizations to access your [place] medical records  [id number]         Blood Pressure from Last 3 Encounters:   11/20/17 124/68   11/17/17 119/72   11/13/17 116/69    Weight from Last 3 Encounters:   11/17/17 152 lb 1.9 oz (69 kg)   11/08/17 148 lb (67.1 kg)   11/07/17 145 lb 11.6 oz (66.1 kg)              Today, you had the following     No orders found for display       Primary Care Provider Office Phone # Fax #    Haley Baker [phone]       [address] [address]        Equal Access to Services     BRIAN CERRATO : Hadii cristine marin hadasho Soomaali, waaxda luqadaha, qaybta kaalmada adeegyada, gibson green . So [facility] [phone].    ATENCIÓN: Si habla español, tiene a montague disposición servicios gratuitos de asistencia lingüística. [facility] [phone].    We comply with applicable federal civil rights laws and [place] laws. We do not discriminate on the basis of race, color, national origin, age, disability, sex, sexual orientation, or gender identity.            Thank you!     Thank you for choosing [facility]  for your care. Our goal is always to provide you with excellent care. Hearing back from our patients is one way we can continue to improve our services. Please take a few minutes to complete the written survey that you may receive in the mail after your visit with us. Thank you!             Your Updated Medication List - Protect others around you: Learn how to safely use, store and throw away your medicines at www.disposemymeds.org.          This list is accurate as of: 11/20/17 11:41 AM.  Always use your most recent med list.                   Brand Name Dispense Instructions for use Diagnosis    aspirin 81 MG tablet      Take 1 tablet by mouth daily.        calcium acetate 667 MG Caps capsule    PHOSLO    540 capsule    Take 2 capsules (1,334 mg) by mouth 3 times  daily (with meals)    Chronic kidney disease, unspecified CKD stage       darbepoetin william 60 MCG/0.3ML injection    ARANESP (ALBUMIN FREE)    0.3 mL    Inject 0.3 mLs (60 mcg) Subcutaneous every 14 days As needed for hgb<10g/dL.  If Hgb increases >1 point in 2 weeks (if blood transfusion given, use hgb PRIOR to this), SYSTOLIC BP > 180 mmHg or hgb>=10g/dL, HOLD DOSE. Dose must be within 1 week of Hgb.  Per anemia protocol with Cecilia De La Torre MD/Tereza Paul,PharmD [phone]    Anemia of chronic renal failure, stage 5 (H), CKD (chronic kidney disease) stage 5, GFR less than 15 ml/min (H)       finasteride 5 MG tablet    PROSCAR    90 tablet    Take 1 tablet (5 mg) by mouth daily    Benign prostatic hyperplasia with urinary retention       fish oil-omega-3 fatty acids 1000 MG capsule      Take 1 g by mouth daily        MULTIVITAMIN MEN PO      Take 1 tablet by mouth daily        ondansetron 4 MG tablet    ZOFRAN    20 tablet    Take 1 tablet (4 mg) by mouth every 6 hours as needed for nausea    Arteriovenous fistula (H)       oxyCODONE IR 5 MG tablet    ROXICODONE    18 tablet    Take 1 tablet (5 mg) by mouth every 4 hours as needed for pain maximum 6 tablet(s) per day    Arteriovenous fistula (H)       senna 8.6 MG tablet    SENOKOT    20 tablet    Take 1 tablet by mouth 2 times daily as needed for constipation    Arteriovenous fistula (H)

## 2017-11-20 NOTE — PROGRESS NOTES
Followed up with patient post fistula creation. Said he was a bit sore for the first day, but has felt fine since then. No symptoms or concerns at this time. Will call if any questions prior to scheduled follow up.    Giselle Benoit RN

## 2017-11-20 NOTE — PROGRESS NOTES
Dung Norton is enrolled/participating in the retail pharmacy Blood Pressure Goals Achievement Program (BPGAP).  Dung Norton was evaluated at [facility] on November 20, 2017 at which time his blood pressure was:    BP Readings from Last 3 Encounters:   11/20/17 124/68   11/17/17 119/72   11/13/17 116/69     Reviewed lifestyle modifications for blood pressure control and reduction: including making healthy food choices, managing weight, getting regular exercise, smoking cessation, reducing alcohol consumption, monitoring blood pressure regularly.     Dung Norton is not experiencing symptoms.    Follow-Up: BP is at goal of < 140/90mmHg (patient 18+ years of age with or without diabetes).  Recommended follow-up at pharmacy in 6 months.     Recommendation to Provider: None at this time.     Dung Norton was evaluated for enrollment into the PGEN study today.    Patient eligible for enrollment:  No  Patient interested in enrollment:  No    Completed by: Thank you,  Cheri Winslow, PharmD  [facility]  [phone]

## 2017-11-22 ENCOUNTER — OFFICE VISIT (OUTPATIENT)
Dept: UROLOGY | Facility: CLINIC | Age: 72
End: 2017-11-22

## 2017-11-22 VITALS
HEIGHT: 70 IN | HEART RATE: 93 BPM | SYSTOLIC BLOOD PRESSURE: 121 MMHG | WEIGHT: 152.8 LBS | BODY MASS INDEX: 21.88 KG/M2 | DIASTOLIC BLOOD PRESSURE: 75 MMHG

## 2017-11-22 DIAGNOSIS — R33.9 URINARY RETENTION: Primary | ICD-10-CM

## 2017-11-22 LAB
APPEARANCE UR: CLEAR
BILIRUB UR QL: NORMAL
COLOR UR: YELLOW
GLUCOSE URINE: NORMAL MG/DL
HGB UR QL: NORMAL
KETONES UR QL: NORMAL MG/DL
LEUKOCYTE ESTERASE URINE: NORMAL
NITRITE UR QL STRIP: NORMAL
PH UR STRIP: 5 PH (ref 5–7)
PROTEIN ALBUMIN URINE: 30 MG/DL
SOURCE: NORMAL
SP GR UR STRIP: 1.01 (ref 1–1.03)
UROBILINOGEN UR QL STRIP: 1 EU/DL (ref 0.2–1)

## 2017-11-22 RX ORDER — FUROSEMIDE 20 MG
TABLET ORAL
Refills: 1 | COMMUNITY
Start: 2017-11-05 | End: 2018-01-05

## 2017-11-22 ASSESSMENT — PAIN SCALES - GENERAL: PAINLEVEL: NO PAIN (0)

## 2017-11-22 NOTE — LETTER
11/22/2017   RE: Dung Norton  [address] [place] [address]     Dear Colleague,    Thank you for referring your patient, Dung Norton, to the [facility] UROLOGY AND INST FOR PROSTATE AND UROLOGIC CANCERS at [facility]. Please see a copy of my visit note below.    PREPROCEDURE DIAGNOSES:    1. Urinary retention - on CIC regimen 4x/day  2. BPH - on finasteride (did not tolerate Flomax 2/2 dizziness)  3. H/o gross hematuria - presumed secondary to friable prostate    POSTPROCEDURE DIAGNOSES:  1. Same as above.   2. UDS shows:  -Large bladder capacity (able to fill to 1200 mL without reaching subjective capacity; final PVR 1350 mL)  -Good bladder compliance  -Sensation: impaired/delayed  -Incontinence: none  -Detrusor activity during filling: no appreciable DO/UDC's  -Detrusor activity during voiding: no appreciable detrusor contraction after permission to void given. He is unable to void any volume.  -Detrusor sphincter dyssenergia: could not definitively assess as patient did not void, but no increased EMG activity during attempt to void to suggest this.  -Outlet obstruction: could not definitively assess as patient did not void.  -Fluoroscopy reveals a severely trabeculated bladder wall with few small diverticulae and cellules. No vesicoureteral reflux was observed.  The bladder neck was closed during filling and closed during attempt to void.     PROCEDURE:    1. Sterile urethral catheterization for measurement of postvoid residual urine volume.  2. Complex filling cystometrogram with measurement of bladder and rectal pressures.  3. Electromyography of the pelvic floor during urodynamics.  4. Fluoroscopic imaging of the bladder during urodynamics, at least 3 views.    5. Interpretation of urodynamics and flouroscopic imaging.      INDICATIONS FOR PROCEDURE:  Mr. Dung Norton is a pleasant 72 year old male with BPH and acute urinary retention. He currently  performs clean intermittent catheterization 4 times per day. On finasteride but did not tolerate flomax 2/2 dizziness. Baseline video urodynamic assessment is requested today by Dr. Vanegas to better characterize . Dung Norton's voiding dysfunction.      VOIDING DIARY:  Catheterizes 4x/day. Volumes range from 250-850 cc.   Does not void volitionally between catheterizations.   Episodes of incontinence: none.  Incontinence associated with: n/a.  Total Volume Intake: 2500 mL; mix of water and 7 up    DESCRIPTION OF PROCEDURE:  Risks, benefits, and alternatives to urodynamics were discussed with the patient and he wished to proceed.  Urodynamics are planned to better assess the primary etiology for Mr. Norton's urologic dysfunction.  The patient is taking finasteride but no alpha blockers or anticholinergic medications.  After informed consent was obtained, the patient was taken to the procedure room where uroflowmetry was performed. Findings below.     UROFLOWMETRY:  No pre-study uroflow performed as patient had no urge to void and does not currently void volitionally (performing CIC).  Postvoid residual by catheter: 800 mL.  Pretest urine dipstick was negative for nitrites but positive for small leukocytes. He denies any UTI symptoms - specifically denies fevers, chills, N/V, dysuria, frequency, urgency, hematuria. We discussed the risks vs benefits of proceeding with today's study. The patient verbalized understanding and wishes to proceed. In someone who self-catheterizes multiple times per day, colonization is likely and therefore some degree of pyuria is to be expected.    Next a 7F double-lumen urodynamics catheter was inserted into the bladder under sterile technique.  A 7F abdominal manometry catheter was placed in the rectum.  EMG pads were placed on both sides of the anal verge.  The bladder was filled with 200 mL of Cystografin at 35 mL/minute and serial pressures were recorded.  With coughing there  was an appropriate rise in vesical and abdominal pressures with no change in detrusor pressure, confirming good study catheter placement.    DURING THE FILLING PHASE:  First sensation: 463 mL.  First Desire: 1187 mL.  Strong Desire: not reported  Maximum Capacity: not reported. We were able to fill to 1200 mL at which point the filling was terminated per clinician discretion.    Uninhibited detrusor contractions: none.  Compliance: excellent. Pdet is essentially 0 cm H2O throughout filling.  Continence: no stress or urge incontinence  EMG: concordant during filling.    DURING THE VOIDING PHASE:  Maximum detrusor contraction with attempt to void: no appreciable detrusor contraction after permission to void given. He attempts to void with some Valsalva effort, but ultimately does not void any volume.  Voided volume: 0 mL.  Detrusor sphincter dyssynergia: no increased EMG activity during attempt to void to suggest DSD.  Postvoid Residual: 1350 mL.    FLUOROSCOPIC IMAGING OF THE BLADDER DURING URODYNAMICS:  Fluoroscopy during today's procedure demonstrated a severely trabeculated bladder wall with few small diverticulae and cellules.  No vesicoureteral reflux was observed.  The bladder neck was closed during filling and closed during attempt to void.  At the completion of today's study, all catheters were removed, the patient was straight catheterized for residual volume, and was brought back into the consultation room to further discuss today's study results.      ASSESSMENT/PLAN:  Mr. Dung Norton is a pleasant 72 year old male with BPH and acute urinary retention who demonstrated the following findings today on urodynamic evaluation:    -Large bladder capacity (able to fill to 1200 mL without reaching subjective capacity)  -Good bladder compliance  -Sensation: impaired/delayed  -Incontinence: none  -Detrusor activity during filling: no appreciable DO/UDC's  -Detrusor activity during voiding: no appreciable  detrusor contraction after permission to void given. He is unable to void any volume.  -Detrusor sphincter dyssenergia: could not definitively assess as patient did not void, but no increased EMG activity during attempt to void to suggest this.  -Outlet obstruction: could not definitively assess as patient did not void.  -Fluoroscopy reveals a severely trabeculated bladder wall with few small diverticulae and cellules. No vesicoureteral reflux was observed.  The bladder neck was closed during filling and closed during attempt to void.      The patient will follow up as scheduled with Dr. Vanegas to further discuss today's study results and make plans for how best to proceed.      - A single Cipro antibiotic was provided for UTI prophylaxis following completion of today's study per department protocol.  The risk of UTI with VUDS is low at ~2.5-3%.      Thank you for allowing me to participate in the care of Mr. Dung Norton and please don't hesitate to contact me with any questions or concerns.      Nicki Spear PA-C  Urology Physician Assistant

## 2017-11-22 NOTE — MR AVS SNAPSHOT
After Visit Summary   11/22/2017    Dung Norton    MRN: [id number]           Patient Information     Date Of Birth          1945        Visit Information        Provider Department      11/22/2017 7:30 AM Nicki Spear PA-C [facility] and [facility]rolog[facility]        Today's Diagnoses     Urinary retention    -  1      Care Instructions    UROLOGY CLINIC VISIT PATIENT INSTRUCTIONS    1) Follow up with Dr. Vanegas (bladder doctor) to review today's test results and discuss next steps. Your appointment is on Friday, 12/1/17 at 10:45 AM.    Continue to catheterize 4 times per day as you are currently doing.     If you have any issues, questions or concerns in the meantime, do not hesitate to contact us at [phone] or via [employer].     It was a pleasure meeting with you today.  Thank you for allowing me and my team the privilege of caring for you today.  YOU are the reason we are here, and I truly hope we provided you with the excellent service you deserve.  Please let us know if there is anything else we can do for you so that we can be sure you are leaving completely satisfied with your care experience.                Follow-ups after your visit        Your next 10 appointments already scheduled     Nov 27, 2017 10:45 AM CST   [facility]    [address] [place] [id number] [phone]           Please do not eat 10-12 hours before your appointment if you are coming in fasting for labs on lipids, cholesterol, or glucose (sugar). This does not apply to pregnant women. Water, hot tea and black coffee (with nothing added) are okay. Do not drink other fluids, diet soda or chew gum.            Dec 01, 2017 10:45 AM CST   (Arrive by 10:30 AM)   Return Visit with Tamiko Vanegas MD   [facility]rolog[facility] ([facility])    [address]  [address] [facility] [address]   [phone]            Dec 01, 2017  1:00 PM CST   (Arrive by 12:45 PM)   Post-Op with COY Vargas   [facility] Solid Organ Transplant ([facility])    [address]  [address] [facility] [address]   [phone]            Dec 08, 2017  1:30 PM CST   (Arrive by 1:00 PM)   Return Visit with Oarlia De La Torre NP   [facility] Nephrology ([facility])    [address]  [address] [facility] [address]   [phone]            Dec 12, 2017 12:10 PM CST   Office Visit with Haley Baker MD   [facility] ([address] [id number] [phone]           Bring a current list of meds and any records pertaining to this visit. For Physicals, please bring immunization records and any forms needing to be filled out. Please arrive 10 minutes early to complete paperwork.              Who to contact     Please call your clinic at [phone] to:    Ask questions about your health    Make or cancel appointments    Discuss your medicines    Learn about your test results    Speak to your doctor   If you have compliments or concerns about an experience at your clinic, or if you wish to file a complaint, please contact [facility] Patient Relations at [phone] or email us at [email or web address]         Additional Information About Your Visit        [employer] Information     [employer] is an electronic gateway that provides easy, online access to your medical records. With [employer], you can request a clinic appointment, read your test results, renew a prescription or communicate with your care team.     To sign up for [employer] visit the website at [email or web address]   You will be asked to enter the access code listed below, as well as some personal information. Please follow the directions to  "create your username and password.     Your access code is: [email or web address]  Expires: 2018  3:46 PM     Your access code will  in 90 days. If you need help or a new code, please contact your [facility] or call [phone] for assistance.        Care EveryWhere ID     This is your Care EveryWhere ID. This could be used by other organizations to access your [place] medical records  [id number]        Your Vitals Were     Pulse Height BMI (Body Mass Index)             93 1.778 m (5' 10\") 21.92 kg/m2          Blood Pressure from Last 3 Encounters:   17 121/75   17 124/68   17 119/72    Weight from Last 3 Encounters:   17 69.3 kg (152 lb 12.8 oz)   17 69 kg (152 lb 1.9 oz)   17 67.1 kg (148 lb)              We Performed the Following     Urinalysis chemical screen POCT        Primary Care Provider Office Phone # Fax #    Haley Baker [phone] [address] [address]        Equal Access to Services     JOSS CERRATO AH: Hadii cristine marin hadasho Soomaali, waaxda luqadaha, qaybta kaalmada adeegyada, gibson solis. So [facility] [phone].    ATENCIÓN: Si habla español, tiene a montague disposición servicios gratuitos de asistencia lingüística. Llame al [phone].    We comply with applicable federal civil rights laws and [place] laws. We do not discriminate on the basis of race, color, national origin, age, disability, sex, sexual orientation, or gender identity.            Thank you!     Thank you for choosing [facility]Y AND [facility] FOR PROSTATE AND UROLOGIC CANCERS  for your care. Our goal is always to provide you with excellent care. Hearing back from our patients is one way we can continue to improve our services. Please take a few minutes to complete the written survey that you may receive in the mail after your visit with us. Thank you!             Your Updated Medication List - " Protect others around you: Learn how to safely use, store and throw away your medicines at www.disposemymeds.org.          This list is accurate as of: 11/22/17  9:00 AM.  Always use your most recent med list.                   Brand Name Dispense Instructions for use Diagnosis    aspirin 81 MG tablet      Take 1 tablet by mouth daily.        calcium acetate 667 MG Caps capsule    PHOSLO    540 capsule    Take 2 capsules (1,334 mg) by mouth 3 times daily (with meals)    Chronic kidney disease, unspecified CKD stage       darbepoetin william 60 MCG/0.3ML injection    ARANESP (ALBUMIN FREE)    0.3 mL    Inject 0.3 mLs (60 mcg) Subcutaneous every 14 days As needed for hgb<10g/dL.  If Hgb increases >1 point in 2 weeks (if blood transfusion given, use hgb PRIOR to this), SYSTOLIC BP > 180 mmHg or hgb>=10g/dL, HOLD DOSE. Dose must be within 1 week of Hgb.  Per anemia protocol with Cecilia De La Torre MD/Tereza Paul,PharmD [phone]    Anemia of chronic renal failure, stage 5 (H), CKD (chronic kidney disease) stage 5, GFR less than 15 ml/min (H)       finasteride 5 MG tablet    PROSCAR    90 tablet    Take 1 tablet (5 mg) by mouth daily    Benign prostatic hyperplasia with urinary retention       fish oil-omega-3 fatty acids 1000 MG capsule      Take 1 g by mouth daily        furosemide 20 MG tablet    LASIX     TK 1 T PO ONCE D        MULTIVITAMIN MEN PO      Take 1 tablet by mouth daily        ondansetron 4 MG tablet    ZOFRAN    20 tablet    Take 1 tablet (4 mg) by mouth every 6 hours as needed for nausea    Arteriovenous fistula (H)       oxyCODONE IR 5 MG tablet    ROXICODONE    18 tablet    Take 1 tablet (5 mg) by mouth every 4 hours as needed for pain maximum 6 tablet(s) per day    Arteriovenous fistula (H)       senna 8.6 MG tablet    SENOKOT    20 tablet    Take 1 tablet by mouth 2 times daily as needed for constipation    Arteriovenous fistula (H)

## 2017-11-22 NOTE — PATIENT INSTRUCTIONS
UROLOGY CLINIC VISIT PATIENT INSTRUCTIONS    1) Follow up with Dr. Vanegas (bladder doctor) to review today's test results and discuss next steps. Your appointment is on Friday, 12/1/17 at 10:45 AM.    Continue to catheterize 4 times per day as you are currently doing.     If you have any issues, questions or concerns in the meantime, do not hesitate to contact us at [phone] or via [employer].     It was a pleasure meeting with you today.  Thank you for allowing me and my team the privilege of caring for you today.  YOU are the reason we are here, and I truly hope we provided you with the excellent service you deserve.  Please let us know if there is anything else we can do for you so that we can be sure you are leaving completely satisfied with your care experience.

## 2017-11-22 NOTE — PROGRESS NOTES
PREPROCEDURE DIAGNOSES:    1. Urinary retention - on CIC regimen 4x/day  2. BPH - on finasteride (did not tolerate Flomax 2/2 dizziness)  3. H/o gross hematuria - presumed secondary to friable prostate    POSTPROCEDURE DIAGNOSES:  1. Same as above.   2. UDS shows:  -Large bladder capacity (able to fill to 1200 mL without reaching subjective capacity; final PVR 1350 mL)  -Good bladder compliance  -Sensation: impaired/delayed  -Incontinence: none  -Detrusor activity during filling: no appreciable DO/UDC's  -Detrusor activity during voiding: no appreciable detrusor contraction after permission to void given. He is unable to void any volume.  -Detrusor sphincter dyssenergia: could not definitively assess as patient did not void, but no increased EMG activity during attempt to void to suggest this.  -Outlet obstruction: could not definitively assess as patient did not void.  -Fluoroscopy reveals a severely trabeculated bladder wall with few small diverticulae and cellules. No vesicoureteral reflux was observed.  The bladder neck was closed during filling and closed during attempt to void.     PROCEDURE:    1. Sterile urethral catheterization for measurement of postvoid residual urine volume.  2. Complex filling cystometrogram with measurement of bladder and rectal pressures.  3. Electromyography of the pelvic floor during urodynamics.  4. Fluoroscopic imaging of the bladder during urodynamics, at least 3 views.    5. Interpretation of urodynamics and flouroscopic imaging.      INDICATIONS FOR PROCEDURE:  Mr. Dung Norton is a pleasant 72 year old male with BPH and acute urinary retention. He currently performs clean intermittent catheterization 4 times per day. On finasteride but did not tolerate flomax 2/2 dizziness. Baseline video urodynamic assessment is requested today by Dr. Vanegas to better characterize Mr. Dung Norton's voiding dysfunction.      VOIDING DIARY:  Catheterizes 4x/day. Volumes range from  250-850 cc.   Does not void volitionally between catheterizations.   Episodes of incontinence: none.  Incontinence associated with: n/a.  Total Volume Intake: 2500 mL; mix of water and 7 up    DESCRIPTION OF PROCEDURE:  Risks, benefits, and alternatives to urodynamics were discussed with the patient and he wished to proceed.  Urodynamics are planned to better assess the primary etiology for Mr. Norton's urologic dysfunction.  The patient is taking finasteride but no alpha blockers or anticholinergic medications.  After informed consent was obtained, the patient was taken to the procedure room where uroflowmetry was performed. Findings below.     UROFLOWMETRY:  No pre-study uroflow performed as patient had no urge to void and does not currently void volitionally (performing CIC).  Postvoid residual by catheter: 800 mL.  Pretest urine dipstick was negative for nitrites but positive for small leukocytes. He denies any UTI symptoms - specifically denies fevers, chills, N/V, dysuria, frequency, urgency, hematuria. We discussed the risks vs benefits of proceeding with today's study. The patient verbalized understanding and wishes to proceed. In someone who self-catheterizes multiple times per day, colonization is likely and therefore some degree of pyuria is to be expected.    Next a 7F double-lumen urodynamics catheter was inserted into the bladder under sterile technique.  A 7F abdominal manometry catheter was placed in the rectum.  EMG pads were placed on both sides of the anal verge.  The bladder was filled with 200 mL of Cystografin at 35 mL/minute and serial pressures were recorded.  With coughing there was an appropriate rise in vesical and abdominal pressures with no change in detrusor pressure, confirming good study catheter placement.    DURING THE FILLING PHASE:  First sensation: 463 mL.  First Desire: 1187 mL.  Strong Desire: not reported  Maximum Capacity: not reported. We were able to fill to 1200 mL at  which point the filling was terminated per clinician discretion.    Uninhibited detrusor contractions: none.  Compliance: excellent. Pdet is essentially 0 cm H2O throughout filling.  Continence: no stress or urge incontinence  EMG: concordant during filling.    DURING THE VOIDING PHASE:  Maximum detrusor contraction with attempt to void: no appreciable detrusor contraction after permission to void given. He attempts to void with some Valsalva effort, but ultimately does not void any volume.  Voided volume: 0 mL.  Detrusor sphincter dyssynergia: no increased EMG activity during attempt to void to suggest DSD.  Postvoid Residual: 1350 mL.    FLUOROSCOPIC IMAGING OF THE BLADDER DURING URODYNAMICS:  Fluoroscopy during today's procedure demonstrated a severely trabeculated bladder wall with few small diverticulae and cellules.  No vesicoureteral reflux was observed.  The bladder neck was closed during filling and closed during attempt to void.  At the completion of today's study, all catheters were removed, the patient was straight catheterized for residual volume, and was brought back into the consultation room to further discuss today's study results.      ASSESSMENT/PLAN:  Mr. Dung Norton is a pleasant 72 year old male with BPH and acute urinary retention who demonstrated the following findings today on urodynamic evaluation:    -Large bladder capacity (able to fill to 1200 mL without reaching subjective capacity)  -Good bladder compliance  -Sensation: impaired/delayed  -Incontinence: none  -Detrusor activity during filling: no appreciable DO/UDC's  -Detrusor activity during voiding: no appreciable detrusor contraction after permission to void given. He is unable to void any volume.  -Detrusor sphincter dyssenergia: could not definitively assess as patient did not void, but no increased EMG activity during attempt to void to suggest this.  -Outlet obstruction: could not definitively assess as patient did not  void.  -Fluoroscopy reveals a severely trabeculated bladder wall with few small diverticulae and cellules. No vesicoureteral reflux was observed.  The bladder neck was closed during filling and closed during attempt to void.      The patient will follow up as scheduled with Dr. Vanegas to further discuss today's study results and make plans for how best to proceed.      - A single Cipro antibiotic was provided for UTI prophylaxis following completion of today's study per department protocol.  The risk of UTI with VUDS is low at ~2.5-3%.      Thank you for allowing me to participate in the care of . Dung Norton and please don't hesitate to contact me with any questions or concerns.      Nicki Spear PA-C  Urology Physician Assistant

## 2017-11-27 ENCOUNTER — TELEPHONE (OUTPATIENT)
Dept: NEPHROLOGY | Facility: CLINIC | Age: 72
End: 2017-11-27

## 2017-11-27 ENCOUNTER — ALLIED HEALTH/NURSE VISIT (OUTPATIENT)
Dept: FAMILY MEDICINE | Facility: CLINIC | Age: 72
End: 2017-11-27
Payer: COMMERCIAL

## 2017-11-27 VITALS — DIASTOLIC BLOOD PRESSURE: 72 MMHG | SYSTOLIC BLOOD PRESSURE: 126 MMHG

## 2017-11-27 DIAGNOSIS — N18.5 CKD (CHRONIC KIDNEY DISEASE) STAGE 5, GFR LESS THAN 15 ML/MIN (H): ICD-10-CM

## 2017-11-27 DIAGNOSIS — R79.89 ELEVATED SERUM CREATININE: ICD-10-CM

## 2017-11-27 DIAGNOSIS — I10 HYPERTENSION GOAL BP (BLOOD PRESSURE) < 140/90: Primary | ICD-10-CM

## 2017-11-27 DIAGNOSIS — N18.5 ANEMIA OF CHRONIC RENAL FAILURE, STAGE 5 (H): ICD-10-CM

## 2017-11-27 DIAGNOSIS — D63.1 ANEMIA OF CHRONIC RENAL FAILURE, STAGE 5 (H): ICD-10-CM

## 2017-11-27 LAB
ALBUMIN SERPL-MCNC: 2.6 G/DL (ref 3.4–5)
ANION GAP SERPL CALCULATED.3IONS-SCNC: 13 MMOL/L (ref 3–14)
BUN SERPL-MCNC: 104 MG/DL (ref 7–30)
CALCIUM SERPL-MCNC: 9.3 MG/DL (ref 8.5–10.1)
CHLORIDE SERPL-SCNC: 109 MMOL/L (ref 94–109)
CO2 SERPL-SCNC: 19 MMOL/L (ref 20–32)
CREAT SERPL-MCNC: 5.48 MG/DL (ref 0.66–1.25)
FERRITIN SERPL-MCNC: 818 NG/ML (ref 26–388)
GFR SERPL CREATININE-BSD FRML MDRD: 10 ML/MIN/1.7M2
GLUCOSE SERPL-MCNC: 99 MG/DL (ref 70–99)
HCT VFR BLD AUTO: 27.1 % (ref 40–53)
HGB BLD-MCNC: 8.1 G/DL (ref 13.3–17.7)
IRON SATN MFR SERPL: 14 % (ref 15–46)
IRON SERPL-MCNC: 23 UG/DL (ref 35–180)
PHOSPHATE SERPL-MCNC: 4.8 MG/DL (ref 2.5–4.5)
POTASSIUM SERPL-SCNC: 4.9 MMOL/L (ref 3.4–5.3)
SODIUM SERPL-SCNC: 141 MMOL/L (ref 133–144)
TIBC SERPL-MCNC: 169 UG/DL (ref 240–430)

## 2017-11-27 PROCEDURE — 83540 ASSAY OF IRON: CPT

## 2017-11-27 PROCEDURE — 99207 ZZC NO CHARGE NURSE ONLY: CPT | Performed by: FAMILY MEDICINE

## 2017-11-27 PROCEDURE — 36415 COLL VENOUS BLD VENIPUNCTURE: CPT

## 2017-11-27 PROCEDURE — 85014 HEMATOCRIT: CPT

## 2017-11-27 PROCEDURE — 82728 ASSAY OF FERRITIN: CPT

## 2017-11-27 PROCEDURE — 85018 HEMOGLOBIN: CPT

## 2017-11-27 PROCEDURE — 80069 RENAL FUNCTION PANEL: CPT

## 2017-11-27 PROCEDURE — 83550 IRON BINDING TEST: CPT

## 2017-11-27 NOTE — MR AVS SNAPSHOT
After Visit Summary   11/27/2017    Dung Norton    MRN: [id number]           Patient Information     Date Of Birth          1945        Visit Information        Provider Department      11/27/2017 1:06 PM Haley Baker MD [facility]        Today's Diagnoses     Hypertension goal BP (blood pressure) < 140/90    -  1       Follow-ups after your visit        Your next 10 appointments already scheduled     Dec 01, 2017 10:45 AM CST   (Arrive by 10:30 AM)   Return Visit with Tamiko Vanegas MD   [facility] for Prostate and Urologic Cancers ([facility])    [address] [place] [address]  [address] [facility] [address]   [phone]            Dec 01, 2017  1:00 PM CST   (Arrive by 12:45 PM)   Post-Op with COY Vargas [facility] Solid Organ Transplant ([facility])    [address] [place] [address]  [address] [facility] [address] [phone]            Dec 04, 2017 10:45 AM CST   LAB with  LAB   [facility] ([facility])    [address] [facility] [address] [address] [phone]           Please do not eat 10-12 hours before your appointment if you are coming in fasting for labs on lipids, cholesterol, or glucose (sugar). This does not apply to pregnant women. Water, hot tea and black coffee (with nothing added) are okay. Do not drink other fluids, diet soda or chew gum.            Dec 08, 2017  1:30 PM CST   (Arrive by 1:00 PM)   Return Visit with Oralia De La Torre NP   [facility] Nephrology ([facility])    [address] [facility]  [address] [facility] [address]   [phone]            Dec 12, 2017 12:10 PM CST   [facility]    [address] [place] [address] [phone]           Bring a current list of meds and any records pertaining to this visit. For  "Physicals, please bring immunization records and any forms needing to be filled out. Please arrive 10 minutes early to complete paperwork.              Who to contact     If you have questions or need follow up information about today's clinic visit or your schedule please contact [facility] [place] directly at [phone].  Normal or non-critical lab and imaging results will be communicated to you by MyChart, letter or phone within 4 business days after the clinic has received the results. If you do not hear from us within 7 days, please contact the clinic through MyChart or phone. If you have a critical or abnormal lab result, we will notify you by phone as soon as possible.  Submit refill requests through [employer] or call your pharmacy and they will forward the refill request to us. Please allow 3 business days for your refill to be completed.          Additional Information About Your Visit        MyChart Information     [employer] lets you send messages to your doctor, view your test results, renew your prescriptions, schedule appointments and more. To sign up, go to [email or web address] . Click on \"Log in\" on the left side of the screen, which will take you to the Welcome page. Then click on \"Sign up Now\" on the right side of the page.     You will be asked to enter the access code listed below, as well as some personal information. Please follow the directions to create your username and password.     Your access code is: [email or web address]  Expires: 2018  3:46 PM     Your access code will  in 90 days. If you need help or a new code, please call your [place] clinic or [phone].        Care EveryWhere ID     This is your Care EveryWhere ID. This could be used by other organizations to access your [place] medical records  [id number]         Blood Pressure from Last 3 Encounters:   17 126/72   17 121/75   17 124/68    Weight from Last 3 Encounters:   17 152 lb 12.8 oz " (69.3 kg)   11/17/17 152 lb 1.9 oz (69 kg)   11/08/17 148 lb (67.1 kg)              Today, you had the following     No orders found for display       Primary Care Provider Office Phone # Fax #    Haley Baker [phone] [address] [facility]  [place] [address]        Equal Access to Services     [facility]: Hadii aad ku hadasho Soomaali, waaxda luqadaha, qaybta kaalmada adeegyada, waxay idiin hayaan adeeg khjennifersh la'aan . So [facility] [phone].    ATENCIÓN: Si habla español, tiene a montague disposición servicios gratuitos de asistencia lingüística. Llame al [phone].    We comply with applicable federal civil rights laws and [place] laws. We do not discriminate on the basis of race, color, national origin, age, disability, sex, sexual orientation, or gender identity.            Thank you!     Thank you for choosing [facility]  for your care. Our goal is always to provide you with excellent care. Hearing back from our patients is one way we can continue to improve our services. Please take a few minutes to complete the written survey that you may receive in the mail after your visit with us. Thank you!             Your Updated Medication List - Protect others around you: Learn how to safely use, store and throw away your medicines at www.disposemymeds.org.          This list is accurate as of: 11/27/17  1:07 PM.  Always use your most recent med list.                   Brand Name Dispense Instructions for use Diagnosis    aspirin 81 MG tablet      Take 1 tablet by mouth daily.        calcium acetate 667 MG Caps capsule    PHOSLO    540 capsule    Take 2 capsules (1,334 mg) by mouth 3 times daily (with meals)    Chronic kidney disease, unspecified CKD stage       darbepoetin william 60 MCG/0.3ML injection    ARANESP (ALBUMIN FREE)    0.3 mL    Inject 0.3 mLs (60 mcg) Subcutaneous every 14 days As needed for hgb<10g/dL.  If Hgb increases >1 point in 2 weeks (if blood transfusion  given, use hgb PRIOR to this), SYSTOLIC BP > 180 mmHg or hgb>=10g/dL, HOLD DOSE. Dose must be within 1 week of Hgb.  Per anemia protocol with Cecilia De La Torre MD/Tereza Paul,PharmD [phone]    Anemia of chronic renal failure, stage 5 (H), CKD (chronic kidney disease) stage 5, GFR less than 15 ml/min (H)       finasteride 5 MG tablet    PROSCAR    90 tablet    Take 1 tablet (5 mg) by mouth daily    Benign prostatic hyperplasia with urinary retention       fish oil-omega-3 fatty acids 1000 MG capsule      Take 1 g by mouth daily        furosemide 20 MG tablet    LASIX     TK 1 T PO ONCE D        MULTIVITAMIN MEN PO      Take 1 tablet by mouth daily        ondansetron 4 MG tablet    ZOFRAN    20 tablet    Take 1 tablet (4 mg) by mouth every 6 hours as needed for nausea    Arteriovenous fistula (H)       oxyCODONE IR 5 MG tablet    ROXICODONE    18 tablet    Take 1 tablet (5 mg) by mouth every 4 hours as needed for pain maximum 6 tablet(s) per day    Arteriovenous fistula (H)       senna 8.6 MG tablet    SENOKOT    20 tablet    Take 1 tablet by mouth 2 times daily as needed for constipation    Arteriovenous fistula (H)

## 2017-11-27 NOTE — TELEPHONE ENCOUNTER
DATE:  11/27/2017   TIME OF RECEIPT FROM LAB:  4:35pm  LAB TEST:  Creatinine and BUN  LAB VALUE:  Cr 5.48,   RESULTS GIVEN WITH READ-BACK TO (PROVIDER):  NEETA JOSEPH  TIME LAB VALUE REPORTED TO PROVIDER:   4:44pm.    Giselle Benoit RN

## 2017-11-27 NOTE — PROGRESS NOTES
Dung Norton is enrolled/participating in the retail pharmacy Blood Pressure Goals Achievement Program (BPGAP).  Dung Norton was evaluated at [facility] on November 27, 2017 at which time his blood pressure was:    BP Readings from Last 3 Encounters:   11/27/17 126/72   11/22/17 121/75   11/20/17 124/68     Reviewed lifestyle modifications for blood pressure control and reduction: including making healthy food choices, managing weight, getting regular exercise, smoking cessation, reducing alcohol consumption, monitoring blood pressure regularly.     Dung Norton is not experiencing symptoms.    Follow-Up: BP is at goal of < 140/90 mmHg (patient 60+ years of age without diabetes).  Recommended follow-up at pharmacy in 6 months.     Recommendation to Provider: None at this time.     Dung Norton was evaluated for enrollment into the PGEN study today.    Patient eligible for enrollment:  No  Patient interested in enrollment:  No    Completed by: Thank you,  Cheri Winslow, PharmD  [facility]  [phone]

## 2017-11-28 ENCOUNTER — TELEPHONE (OUTPATIENT)
Dept: PHARMACY | Facility: CLINIC | Age: 72
End: 2017-11-28

## 2017-11-28 NOTE — TELEPHONE ENCOUNTER
Anemia Management Note  SUBJECTIVE/OBJECTIVE:  Referred by Cecilia De La Torre on 9/11/2017  Primary Diagnosis: Anemia in Chronic Kidney Disease (N18.5, D63.1)     Secondary Diagnosis:  Chronic Kidney Disease, Stage 5 (N18.5)  Hgb goal range:  9-10  Epo/Darbo: Aranesp 60 mcg/0.3 ml every 14 days As needed for hgb<10g/dL  RX expires on 9/12/2018  Iron regimen:  None  Labs exp: 9/12/2018  **Provide phone number for [facility] [phone] and instruction to schedule ancillary visit for aranesp injection. Send message to Safia Elder RN as they will need to order med**    Anemia Latest Ref Rng & Units 11/2/2017 11/6/2017 11/13/2017 11/16/2017 11/17/2017 11/20/2017 11/27/2017   EARLENE Dose - 60 mcg - - 60 mcg - - -   Hemoglobin 13.3 - 17.7 g/dL - 8.4(L) 7.9(L) - 7.8(L) 8.2(L) 8.1(L)   TSAT 15 - 46 % - - 12(L) - - - 14(L)   Ferritin 26 - 388 ng/mL - - 1206(H) - - - 818(H)     BP Readings from Last 3 Encounters:   11/27/17 126/72   11/22/17 121/75   11/20/17 124/68     Wt Readings from Last 2 Encounters:   11/22/17 152 lb 12.8 oz (69.3 kg)   11/17/17 152 lb 1.9 oz (69 kg)         ASSESSMENT:  Hgb: Not at goal but improving - recommend dose and continue current regimen  TSat: not at goal (>30%) but ferritin >500ng/mL.  IV iron not indicated at this time per anemia protocol. Ferritin: At goal (>100ng/mL)    PLAN:  I spoke to Dung, he will call to schedule an appt for his aranesp dose and RTC for hgb then aranesp if needed in 2 week(s)    Orders needed to be renewed (for next follow-up date) in EPIC: None    Iron labs due:  12/26/17    Plan discussed with:  Dung  Plan provided by:  Judith    NEXT FOLLOW-UP DATE:  11/30 to document dose then 12/14/17    Anemia Management Service  Tereza Paul PharmD and Malina Hussein CPhT  Phone: [phone]  Fax: [phone]

## 2017-11-29 ENCOUNTER — TELEPHONE (OUTPATIENT)
Dept: FAMILY MEDICINE | Facility: CLINIC | Age: 72
End: 2017-11-29

## 2017-11-29 NOTE — TELEPHONE ENCOUNTER
Patient's Hgb was 8.1 so he will need an aranesp dose.  Please order the med and I will call the patient to schedule an appt.     Mireya Hussein, [facility]   Anemia Clinic   [phone]

## 2017-11-30 ENCOUNTER — ALLIED HEALTH/NURSE VISIT (OUTPATIENT)
Dept: NURSING | Facility: CLINIC | Age: 72
End: 2017-11-30
Payer: COMMERCIAL

## 2017-11-30 ENCOUNTER — TELEPHONE (OUTPATIENT)
Dept: PHARMACY | Facility: CLINIC | Age: 72
End: 2017-11-30

## 2017-11-30 ENCOUNTER — CARE COORDINATION (OUTPATIENT)
Dept: NEPHROLOGY | Facility: CLINIC | Age: 72
End: 2017-11-30

## 2017-11-30 DIAGNOSIS — N18.5 ANEMIA OF CHRONIC RENAL FAILURE, STAGE 5 (H): Primary | ICD-10-CM

## 2017-11-30 DIAGNOSIS — D63.1 ANEMIA OF CHRONIC RENAL FAILURE, STAGE 5 (H): Primary | ICD-10-CM

## 2017-11-30 DIAGNOSIS — N18.5 CKD (CHRONIC KIDNEY DISEASE) STAGE 5, GFR LESS THAN 15 ML/MIN (H): ICD-10-CM

## 2017-11-30 PROCEDURE — 96372 THER/PROPH/DIAG INJ SC/IM: CPT

## 2017-11-30 NOTE — TELEPHONE ENCOUNTER
Anemia Management Note  SUBJECTIVE/OBJECTIVE:  Referred by Cecilia De La Torre on 9/11/2017  Primary Diagnosis: Anemia in Chronic Kidney Disease (N18.5, D63.1)     Secondary Diagnosis:  Chronic Kidney Disease, Stage 5 (N18.5)  Hgb goal range:  9-10  Epo/Darbo: Aranesp 100 mcg every 14 days As needed for hgb<10g/dL  RX expires on 12/5/2018  Iron regimen:  None  Labs exp: 9/12/2018  **Provide phone number for [facility] [phone] and instruction to schedule ancillary visit for aranesp injection. Send message to Safia Elder RN as they will need to order med**    Anemia Latest Ref Rng & Units 11/6/2017 11/13/2017 11/16/2017 11/17/2017 11/20/2017 11/27/2017 11/30/2017   EARLENE Dose - - - 60 mcg - - - 60 mcg   Hemoglobin 13.3 - 17.7 g/dL 8.4(L) 7.9(L) - 7.8(L) 8.2(L) 8.1(L) -   TSAT 15 - 46 % - 12(L) - - - 14(L) -   Ferritin 26 - 388 ng/mL - 1206(H) - - - 818(H) -     BP Readings from Last 3 Encounters:   11/27/17 126/72   11/22/17 121/75   11/20/17 124/68     Wt Readings from Last 2 Encounters:   11/22/17 152 lb 12.8 oz (69.3 kg)   11/17/17 152 lb 1.9 oz (69 kg)     Patient has his labs drawn every Monday    ASSESSMENT:  Hgb: Not at goal but improving - received aranesp dose in clinic. Recommend increase dose to 100mcg every 2 weeks. -lah12/6/17  TSat: not at goal (>30%) but ferritin >500ng/mL.  IV iron not indicated at this time per anemia protocol. Ferritin: At goal (>100ng/mL)    PLAN:  I spoke to Dung, he received his aranesp dose in clinic today (11/30/17). He will  RTC for hgb lab on 12/11 then aranesp if needed in 2 week(s) (12/14/17)  12/6: increased aranesp dose to 100mcg every 2 weeks. -keiko    Orders needed to be renewed (for next follow-up date) in EPIC: None    Iron labs due:  12/26/17    Plan discussed with:  Dung  Plan provided by:  Judith    NEXT FOLLOW-UP DATE:  12/11/17    Anemia Management Service  Tereza Paul PharmD and Malina Hussein CPhT  Phone: [phone]  Fax: [phone]

## 2017-11-30 NOTE — MR AVS SNAPSHOT
After Visit Summary   11/30/2017    Dung Norton    MRN: [id number]           Patient Information     Date Of Birth          1945        Visit Information        Provider Department      11/30/2017 10:15 AM [facility] [place] Chastity Moss        Today's Diagnoses     Anemia of chronic renal failure, stage 5 (H)    -  1       Follow-ups after your visit        Your next 10 appointments already scheduled     Dec 01, 2017 10:45 AM CST   (Arrive by 10:30 AM)   Return Visit with Tamiko Vanegas MD   [facility] Prostate [facility] Urologic Cancers ([facility])    [address] [place] [address]  4th [facility] [address]   [phone]            Dec 01, 2017  1:00 PM CST   (Arrive by 12:45 PM)   Post-Op with COY Vargas [facility] Solid Organ Transplant ([facility])    [address] [place] [id number]   [phone]            Dec 04, 2017 10:45 AM CST   LAB with [facility]   [facility] [place] ([facility])    [address] [place] [address]   [phone]           Please do not eat 10-12 hours before your appointment if you are coming in fasting for labs on lipids, cholesterol, or glucose (sugar). This does not apply to pregnant women. Water, hot tea and black coffee (with nothing added) are okay. Do not drink other fluids, diet soda or chew gum.            Dec 08, 2017  1:30 PM CST   (Arrive by 1:00 PM)   Return Visit with Oralia De La Torre NP   [facility] Nephrology ([facility])    [address] [place] [phone]   [phone]            Dec 12, 2017 12:10 PM CST   Office Visit with Haley Baker MD   [facility] [place] ([facility]    [address] [facility]  [place] [address] [phone]           Bring a current list of meds and any records pertaining to this visit. For  "Physicals, please bring immunization records and any forms needing to be filled out. Please arrive 10 minutes early to complete paperwork.              Who to contact     If you have questions or need follow up information about today's clinic visit or your schedule please contact [facility] directly at [phone].  Normal or non-critical lab and imaging results will be communicated to you by MyChart, letter or phone within 4 business days after the clinic has received the results. If you do not hear from us within 7 days, please contact the clinic through MyChart or phone. If you have a critical or abnormal lab result, we will notify you by phone as soon as possible.  Submit refill requests through [employer] or call your pharmacy and they will forward the refill request to us. Please allow 3 business days for your refill to be completed.          Additional Information About Your Visit        MyChart Information     [employer]eta[employer]ity lets you send messages to your doctor, view your test results, renew your prescriptions, schedule appointments and more. To sign up, go to [email or web address] . Click on \"Log in\" on the left side of the screen, which will take you to the Welcome page. Then click on \"Sign up Now\" on the right side of the page.     You will be asked to enter the access code listed below, as well as some personal information. Please follow the directions to create your username and password.     Your access code is: [email or web address]  Expires: 2018  3:46 PM     Your access code will  in 90 days. If you need help or a new code, please call your [place] clinic or [phone].        Care EveryWhere ID     This is your Care EveryWhere ID. This could be used by other organizations to access your [place] medical records  [id number]         Blood Pressure from Last 3 Encounters:   17 126/72   17 121/75   17 124/68    Weight from Last 3 Encounters:   17 152 lb 12.8 oz " (69.3 kg)   11/17/17 152 lb 1.9 oz (69 kg)   11/08/17 148 lb (67.1 kg)              We Performed the Following     DARBEPOETIN WILLIAM 1 MCG (NON-ESRD USE)        Primary Care Provider Office Phone # Fax #    Haley Baker [phone] [address] [facility] [address]        Equal Access to Services     [facility] AreaHUMPHREY : Hadii aad ku hadasho Soomaali, waaxda luqadaha, qaybta kaalmada adeegyada, waxay idiin hayaan adeeg donaldo latunde . So [facility] [phone].    ATENCIÓN: Si habla español, tiene a montague disposición servicios gratuitos de asistencia lingüística. Llame al [phone].    We comply with applicable federal civil rights laws and [place] laws. We do not discriminate on the basis of race, color, national origin, age, disability, sex, sexual orientation, or gender identity.            Thank you!     Thank you for choosing [facility]  for your care. Our goal is always to provide you with excellent care. Hearing back from our patients is one way we can continue to improve our services. Please take a few minutes to complete the written survey that you may receive in the mail after your visit with us. Thank you!             Your Updated Medication List - Protect others around you: Learn how to safely use, store and throw away your medicines at www.disposemymeds.org.          This list is accurate as of: 11/30/17 11:11 AM.  Always use your most recent med list.                   Brand Name Dispense Instructions for use Diagnosis    aspirin 81 MG tablet      Take 1 tablet by mouth daily.        calcium acetate 667 MG Caps capsule    PHOSLO    540 capsule    Take 2 capsules (1,334 mg) by mouth 3 times daily (with meals)    Chronic kidney disease, unspecified CKD stage       darbepoetin william 60 MCG/0.3ML injection    ARANESP (ALBUMIN FREE)    0.3 mL    Inject 0.3 mLs (60 mcg) Subcutaneous every 14 days As needed for hgb<10g/dL.  If Hgb increases >1 point in 2 weeks (if blood  transfusion given, use hgb PRIOR to this), SYSTOLIC BP > 180 mmHg or hgb>=10g/dL, HOLD DOSE. Dose must be within 1 week of Hgb.  Per anemia protocol with Cecilia De La Torre MD/Tereza Paul,PharmD [phone]    Anemia of chronic renal failure, stage 5 (H), CKD (chronic kidney disease) stage 5, GFR less than 15 ml/min (H)       finasteride 5 MG tablet    PROSCAR    90 tablet    Take 1 tablet (5 mg) by mouth daily    Benign prostatic hyperplasia with urinary retention       fish oil-omega-3 fatty acids 1000 MG capsule      Take 1 g by mouth daily        furosemide 20 MG tablet    LASIX     TK 1 T PO ONCE D        MULTIVITAMIN MEN PO      Take 1 tablet by mouth daily        ondansetron 4 MG tablet    ZOFRAN    20 tablet    Take 1 tablet (4 mg) by mouth every 6 hours as needed for nausea    Arteriovenous fistula (H)       oxyCODONE IR 5 MG tablet    ROXICODONE    18 tablet    Take 1 tablet (5 mg) by mouth every 4 hours as needed for pain maximum 6 tablet(s) per day    Arteriovenous fistula (H)       senna 8.6 MG tablet    SENOKOT    20 tablet    Take 1 tablet by mouth 2 times daily as needed for constipation    Arteriovenous fistula (H)

## 2017-11-30 NOTE — NURSING NOTE
"Chief Complaint   Patient presents with     Imm/Inj     Aranesp       Initial There were no vitals taken for this visit. Estimated body mass index is 21.92 kg/(m^2) as calculated from the following:    Height as of 11/22/17: 5' 10\" (1.778 m).    Weight as of 11/22/17: 152 lb 12.8 oz (69.3 kg).  Medication Reconciliation: unable or not appropriate to perform   Prior to injection verified patient identity using patient's name and date of birth.  The following medication was given:     MEDICATION: Aranesp 60 mcg/0.3 mL  ROUTE: SQ  SITE: Arm - Right- Patient had surgery on left   DOSE: 0.3 mL  LOT #: [id number]  :  [employer]  EXPIRATION DATE:  12/2018  NDC#: 98476-604-86  Zainab SERRATO CMA ([facility])         "

## 2017-11-30 NOTE — PROGRESS NOTES
Reason for Call    Followed up with patient on symptoms. Said he's feeling great at this time, denied any uremic symptoms. Discussed end stage renal disease, and the progression to dialysis. He didn't think he'd need to start soon. Will continue to monitor symptoms closely. He will call if any changes prior to 12/8 appointment.    Patient Education    1. Uremic symptoms and when to call clinic.    Plan    1. Follow up with Cecilia on 12/8  2. Call if any change with symptoms or concerns    Patient was given an opportunity to ask questions and have those questions answered to his satisfaction.  Patient verbalized understanding of instructions provided and agreed to plan of care.    Giselle Benoit RN

## 2017-12-01 ENCOUNTER — OFFICE VISIT (OUTPATIENT)
Dept: TRANSPLANT | Facility: CLINIC | Age: 72
End: 2017-12-01
Attending: CLINICAL NURSE SPECIALIST
Payer: MEDICARE

## 2017-12-01 ENCOUNTER — OFFICE VISIT (OUTPATIENT)
Dept: UROLOGY | Facility: CLINIC | Age: 72
End: 2017-12-01

## 2017-12-01 VITALS
HEIGHT: 70 IN | SYSTOLIC BLOOD PRESSURE: 123 MMHG | BODY MASS INDEX: 21.47 KG/M2 | DIASTOLIC BLOOD PRESSURE: 70 MMHG | WEIGHT: 150 LBS | HEART RATE: 92 BPM

## 2017-12-01 DIAGNOSIS — R33.9 URINARY RETENTION: Primary | ICD-10-CM

## 2017-12-01 DIAGNOSIS — N18.5 CKD (CHRONIC KIDNEY DISEASE) STAGE 5, GFR LESS THAN 15 ML/MIN (H): Primary | ICD-10-CM

## 2017-12-01 PROCEDURE — 99211 OFF/OP EST MAY X REQ PHY/QHP: CPT | Mod: ZF

## 2017-12-01 RX ORDER — CEFAZOLIN SODIUM 1 G/3ML
1 INJECTION, POWDER, FOR SOLUTION INTRAMUSCULAR; INTRAVENOUS SEE ADMIN INSTRUCTIONS
Status: CANCELLED | OUTPATIENT
Start: 2017-12-01

## 2017-12-01 ASSESSMENT — PAIN SCALES - GENERAL: PAINLEVEL: NO PAIN (0)

## 2017-12-01 NOTE — MR AVS SNAPSHOT
After Visit Summary   12/1/2017    Dung Norton    MRN: [id number]           Patient Information     Date Of Birth          1945        Visit Information        Provider Department      12/1/2017 1:00 PM Aida Bryant APRN CNS [facility] Solid Organ Transplant        Today's Diagnoses     CKD (chronic kidney disease) stage 5, GFR less than 15 ml/min (H)    -  1       Follow-ups after your visit        Your next 10 appointments already scheduled     Dec 04, 2017 10:45 AM CST   LAB with  LAB   [facility] ([facility])    [address] [place] [address]   [phone]           Please do not eat 10-12 hours before your appointment if you are coming in fasting for labs on lipids, cholesterol, or glucose (sugar). This does not apply to pregnant women. Water, hot tea and black coffee (with nothing added) are okay. Do not drink other fluids, diet soda or chew gum.            Dec 08, 2017  1:30 PM CST   (Arrive by 1:00 PM)   Return Visit with Oralia De La Torre NP   [facility] Nephrology ([facility])    [address] [place] [id number] [phone]            Dec 12, 2017 12:10 PM CST   Office Visit with Haley Baker MD   [facility] ([facility])    [address] [place] [id number] [phone]           Bring a current list of meds and any records pertaining to this visit. For Physicals, please bring immunization records and any forms needing to be filled out. Please arrive 10 minutes early to complete paperwork.            Jan 02, 2018 10:00 AM CST   (Arrive by 9:45 AM)   FISTULA FOLLOW UP with Eduardo Pabon MD   [facility] Solid Organ Transplant ([facility])    [address] [place] [id number] [phone]              Who to contact     If you have questions or need follow up information about today's clinic visit  "or your schedule please contact [facility] directly at [phone].  Normal or non-critical lab and imaging results will be communicated to you by MyChart, letter or phone within 4 business days after the clinic has received the results. If you do not hear from us within 7 days, please contact the clinic through [employer] or phone. If you have a critical or abnormal lab result, we will notify you by phone as soon as possible.  Submit refill requests through [employer] or call your pharmacy and they will forward the refill request to us. Please allow 3 business days for your refill to be completed.          Additional Information About Your Visit        15MinutesNOWhart Information     ActX lets you send messages to your doctor, view your test results, renew your prescriptions, schedule appointments and more. To sign up, go to [email or web address] . Click on \"Log in\" on the left side of the screen, which will take you to the Welcome page. Then click on \"Sign up Now\" on the right side of the page.     You will be asked to enter the access code listed below, as well as some personal information. Please follow the directions to create your username and password.     Your access code is: [email or web address]  Expires: 2018  3:46 PM     Your access code will  in 90 days. If you need help or a new code, please call your [place] clinic or [phone].        Care EveryWhere ID     This is your Care EveryWhere ID. This could be used by other organizations to access your [place] medical records  [id number]         Blood Pressure from Last 3 Encounters:   17 123/70   17 126/72   17 121/75    Weight from Last 3 Encounters:   17 68 kg (150 lb)   17 69.3 kg (152 lb 12.8 oz)   17 69 kg (152 lb 1.9 oz)              Today, you had the following     No orders found for display       Primary Care Provider Office Phone # Fax #    Haley Baker [phone]       " [address] [facility] [address]        Equal Access to Services     BRIAN VALENTINOHUMPHREY : Hadii aad ku lambert Sochalo, wakathleenda luqadaha, qaybta kanestorda robert, gibson biancain hayaahillary navarrokimberly fulton latunde solis. So [facility] [phone].    ATENCIÓN: Si habla español, tiene a montague disposición servicios gratuitos de asistencia lingüística. Llame al [phone].    We comply with applicable federal civil rights laws and [place] laws. We do not discriminate on the basis of race, color, national origin, age, disability, sex, sexual orientation, or gender identity.            Thank you!     Thank you for choosing [facility]  for your care. Our goal is always to provide you with excellent care. Hearing back from our patients is one way we can continue to improve our services. Please take a few minutes to complete the written survey that you may receive in the mail after your visit with us. Thank you!             Your Updated Medication List - Protect others around you: Learn how to safely use, store and throw away your medicines at www.disposemymeds.org.          This list is accurate as of: 12/1/17  1:03 PM.  Always use your most recent med list.                   Brand Name Dispense Instructions for use Diagnosis    aspirin 81 MG tablet      Take 1 tablet by mouth daily.        calcium acetate 667 MG Caps capsule    PHOSLO    540 capsule    Take 2 capsules (1,334 mg) by mouth 3 times daily (with meals)    Chronic kidney disease, unspecified CKD stage       darbepoetin william 60 MCG/0.3ML injection    ARANESP (ALBUMIN FREE)    0.3 mL    Inject 0.3 mLs (60 mcg) Subcutaneous every 14 days As needed for hgb<10g/dL.  If Hgb increases >1 point in 2 weeks (if blood transfusion given, use hgb PRIOR to this), SYSTOLIC BP > 180 mmHg or hgb>=10g/dL, HOLD DOSE. Dose must be within 1 week of Hgb.  Per anemia protocol with Cecilia De La Torre MD/Tereza Paul,PharmD [phone]    Anemia of chronic renal failure, stage 5 (H),  CKD (chronic kidney disease) stage 5, GFR less than 15 ml/min (H)       finasteride 5 MG tablet    PROSCAR    90 tablet    Take 1 tablet (5 mg) by mouth daily    Benign prostatic hyperplasia with urinary retention       fish oil-omega-3 fatty acids 1000 MG capsule      Take 1 g by mouth daily        furosemide 20 MG tablet    LASIX     TK 1 T PO ONCE D        MULTIVITAMIN MEN PO      Take 1 tablet by mouth daily        ondansetron 4 MG tablet    ZOFRAN    20 tablet    Take 1 tablet (4 mg) by mouth every 6 hours as needed for nausea    Arteriovenous fistula (H)       oxyCODONE IR 5 MG tablet    ROXICODONE    18 tablet    Take 1 tablet (5 mg) by mouth every 4 hours as needed for pain maximum 6 tablet(s) per day    Arteriovenous fistula (H)       senna 8.6 MG tablet    SENOKOT    20 tablet    Take 1 tablet by mouth 2 times daily as needed for constipation    Arteriovenous fistula (H)

## 2017-12-01 NOTE — LETTER
12/1/2017      RE: Dung Norton  [address] [facility] [place] [address]       UROLOGIC DIAGNOSES:       CURRENT INTERVENTIONS:       HISTORY:     Patient with history of AUR and hematuria requiring cystoscopy and clot evacuation. Had failed TOV and has been performing CIC.   Patient states he is performing CIC regularly about 4x per trady.   He would like to discuss further management of urinary retention.     We discussed UDS and possible TURP. Also noted that TURP would likely help long term even if patient has poor detrusor function as this would make CIC easier in the long term.      PAST MEDICAL HISTORY:   Past Medical History:   Diagnosis Date     BPH (benign prostatic hyperplasia)      Chronic kidney disease      HTN      Pulmonary nodules      Tobacco abuse        PAST SURGICAL HISTORY:   Past Surgical History:   Procedure Laterality Date     APPENDECTOMY  AGE 6     CREATE FISTULA ARTERIOVENOUS UPPER EXTREMITY Left 11/17/2017    Procedure: CREATE FISTULA ARTERIOVENOUS UPPER EXTREMITY;  Left Cephalic Vein To Radial Artery Arteriovenous Fistula Creation, Anesthesia Block;  Surgeon: Eduardo Pabon MD;  Location: [facility] OR     CYSTOSCOPY, FULGURATE BLEEDERS, EVACUATE CLOT(S), COMBINED N/A 7/16/2017    Procedure: COMBINED CYSTOSCOPY, FULGURATE BLEEDERS, EVACUATE CLOT(S);  Cystoscopy clot evacuation, bladder biopsy and fulguration (per surgeons note) NERVE BLOCK PERIPHERAL;  Surgeon: Tamiko Vanegas MD;  Location: [facility] OR     SINUS SURGERY  AGE 6     TONSILLECTOMY      CHILDHOOD       FAMILY HISTORY:   Family History   Problem Relation Age of Onset     C.A.D. Mother      d age 67     Vision Loss Father      Hearing Loss Sister      DIABETES Sister      CANCER No family hx of        SOCIAL HISTORY:   Social History   Substance Use Topics     Smoking status: Former Smoker     Types: Cigarettes     Smokeless tobacco: Never Used     Alcohol use No       Current Outpatient Prescriptions   Medication      "furosemide (LASIX) 20 MG tablet     ondansetron (ZOFRAN) 4 MG tablet     calcium acetate (PHOSLO) 667 MG CAPS capsule     darbepoetin william (ARANESP, ALBUMIN FREE,) 60 MCG/0.3ML injection     finasteride (PROSCAR) 5 MG tablet     Multiple Vitamins-Minerals (MULTIVITAMIN MEN PO)     fish oil-omega-3 fatty acids (FISH OIL) 1000 MG capsule     aspirin 81 MG tablet     oxyCODONE IR (ROXICODONE) 5 MG tablet     senna (SENOKOT) 8.6 MG tablet     No current facility-administered medications for this visit.          PHYSICAL EXAM:    /70  Pulse 92  Ht 1.778 m (5' 10\")  Wt 68 kg (150 lb)  BMI 21.52 kg/m2    HEENT: Normocephalic and atraumatic   Cardiac: Not done  Back/Flank: Not done  CNS/PNS: Not done  Respiratory: Normal non-labored breathing  Abdomen: Soft nontender and nondistended  Peripheral Vascular: Not done  Mental Status: Not done    Penis: Not done  Scrotal Skin: Not done  Testicles: Not done  Epididymis: Not done  Digital Rectal Exam:     Cystoscopy: Not done    Imaging: None    Urinalysis: [facility] RESULTS:  Recent Labs   Lab Test  08/29/17   0235   01/15/14   0936   COLOR  Light Yellow   < >  Yellow   APPEARANCE  Cloudy   < >  Clear   URINEGLC  Negative   < >  Negative   URINEBILI  Negative   < >  Negative   URINEKETONE  Negative   < >  Negative   SG  1.014   < >  1.020   UBLD  Moderate*   < >  Small*   URINEPH  6.0   < >  6.0   PROTEIN  100*   < >  Negative   UROBILINOGEN   --    --   0.2   NITRITE  Negative   < >  Negative   LEUKEST  Large*   < >  Negative   RBCU  10*   < >  10-25*   WBCU  >182*   < >  O - 2    < > = values in this interval not displayed.       PSA:     Post Void Residual:     Other labs: None today      IMPRESSION:  72 y/o M with urinary retention requiring CIC     PLAN:  Will schedule for TURP; patient understands that he may still require CIC post op but would like to proceed.       Total Time: 15 minutes                                       Total in Consultation: greater than 50% "         Tamiko Vanegas MD

## 2017-12-01 NOTE — LETTER
12/1/2017       RE: Dung Norton  [address] [facility] [place] [address]     Dear Colleague,    Thank you for referring your patient, Dung Norton, to the [facility] UROLOGY AND INST FOR PROSTATE AND UROLOGIC CANCERS at [facility]. Please see a copy of my visit note below.    UROLOGIC DIAGNOSES:       CURRENT INTERVENTIONS:       HISTORY:     Patient with history of AUR and hematuria requiring cystoscopy and clot evacuation. Had failed TOV and has been performing CIC.   Patient states he is performing CIC regularly about 4x per trady.   He would like to discuss further management of urinary retention.     We discussed UDS and possible TURP. Also noted that TURP would likely help long term even if patient has poor detrusor function as this would make CIC easier in the long term.      PAST MEDICAL HISTORY:   Past Medical History:   Diagnosis Date     BPH (benign prostatic hyperplasia)      Chronic kidney disease      HTN      Pulmonary nodules      Tobacco abuse        PAST SURGICAL HISTORY:   Past Surgical History:   Procedure Laterality Date     APPENDECTOMY  AGE 6     CREATE FISTULA ARTERIOVENOUS UPPER EXTREMITY Left 11/17/2017    Procedure: CREATE FISTULA ARTERIOVENOUS UPPER EXTREMITY;  Left Cephalic Vein To Radial Artery Arteriovenous Fistula Creation, Anesthesia Block;  Surgeon: Eduardo Pabon MD;  Location[facility]     CYSTOSCOPY, FULGURATE BLEEDERS, EVACUATE CLOT(S), COMBINED N/A 7/16/2017    Procedure: COMBINED CYSTOSCOPY, FULGURATE BLEEDERS, EVACUATE CLOT(S);  Cystoscopy clot evacuation, bladder biopsy and fulguration (per surgeons note) NERVE BLOCK PERIPHERAL;  Surgeon: Tamiko Vanegas MD;  Location: [facility] OR     SINUS SURGERY  AGE 6     TONSILLECTOMY      CHILDHOOD       FAMILY HISTORY:   Family History   Problem Relation Age of Onset     C.A.D. Mother      d age 67     Vision Loss Father      Hearing Loss Sister      DIABETES Sister      CANCER No  "family hx of        SOCIAL HISTORY:   Social History   Substance Use Topics     Smoking status: Former Smoker     Types: Cigarettes     Smokeless tobacco: Never Used     Alcohol use No       Current Outpatient Prescriptions   Medication     furosemide (LASIX) 20 MG tablet     ondansetron (ZOFRAN) 4 MG tablet     calcium acetate (PHOSLO) 667 MG CAPS capsule     darbepoetin william (ARANESP, ALBUMIN FREE,) 60 MCG/0.3ML injection     finasteride (PROSCAR) 5 MG tablet     Multiple Vitamins-Minerals (MULTIVITAMIN MEN PO)     fish oil-omega-3 fatty acids (FISH OIL) 1000 MG capsule     aspirin 81 MG tablet     oxyCODONE IR (ROXICODONE) 5 MG tablet     senna (SENOKOT) 8.6 MG tablet     No current facility-administered medications for this visit.          PHYSICAL EXAM:    /70  Pulse 92  Ht 1.778 m (5' 10\")  Wt 68 kg (150 lb)  BMI 21.52 kg/m2    HEENT: Normocephalic and atraumatic   Cardiac: Not done  Back/Flank: Not done  CNS/PNS: Not done  Respiratory: Normal non-labored breathing  Abdomen: Soft nontender and nondistended  Peripheral Vascular: Not done  Mental Status: Not done    Penis: Not done  Scrotal Skin: Not done  Testicles: Not done  Epididymis: Not done  Digital Rectal Exam:     Cystoscopy: Not done    Imaging: None    Urinalysis: [facility] RESULTS:  Recent Labs   Lab Test  08/29/17   0235   01/15/14   0936   COLOR  Light Yellow   < >  Yellow   APPEARANCE  Cloudy   < >  Clear   URINEGLC  Negative   < >  Negative   URINEBILI  Negative   < >  Negative   URINEKETONE  Negative   < >  Negative   SG  1.014   < >  1.020   UBLD  Moderate*   < >  Small*   URINEPH  6.0   < >  6.0   PROTEIN  100*   < >  Negative   UROBILINOGEN   --    --   0.2   NITRITE  Negative   < >  Negative   LEUKEST  Large*   < >  Negative   RBCU  10*   < >  10-25*   WBCU  >182*   < >  O - 2    < > = values in this interval not displayed.       PSA:     Post Void Residual:     Other labs: None today      IMPRESSION:  70 y/o M with urinary retention " requiring CIC     PLAN:  Will schedule for TURP; patient understands that he may still require CIC post op but would like to proceed.       Total Time: 15 minutes                                       Total in Consultation: greater than 50%         Again, thank you for allowing me to participate in the care of your patient.      Sincerely,    Tamiko Vanegas MD

## 2017-12-01 NOTE — LETTER
12/1/2017       RE: Dung Norton  [address] [facility] [place] [address]     Dear Colleague,    Thank you for referring your patient, Dung Norton, to the [facility] SOLID ORGAN TRANSPLANT at [facility]. Please see a copy of my visit note below.    Dialysis Access Service   Progress Note    S:  Mr. Norton is being seen today for surgical followup of his dialysis access.  He reports no issues with the wound, and  no steal syndrome of the distal extremity.  Denies pain, swelling, tingling/numbness or a change of color/temperature in left arm. S/P left upper arm radial artery to cephalic vein AV Fistula creation on 11/17/2017.    O:  Pulse:  [92] 92  BP: (123)/(70) 123/70  GENERAL: no distress  Circulation:   Radial pulse 3+  Ulnar pulse  3+   Capillary refill:  capillary refill < 3 sec    Sensory exam:   arm: Normal   [x]           Abnormal   []          Comment:    hand: Normal   [x]           Abnormal   []          Comment:   Motor exam:   arm: Normal   [x]           Abnormal   []          Comment:    hand: Normal   [x]           Abnormal   []          Comment:    Access: L upper extremity wound(s) healed. non-tender. Capillary refill < 3 sec, ++ thrill and bruit present via hand held doppler. No edema in left arm, without apparent infection.    Assessment & Plan: Mr. Weaver dialysis access has matured well at this time point.    1. Continue no blood draw and BP from left arm  2. Check thrill of LUE AV fistula twice a day  3. May start left arm hammer curl exercise with a squeeze ball. 15 minutes a time., 3-4 times a day as tolerated  4. F/U with Dr. Pabon in 4 weeks     We would like to see the patient back in the clinic in 4 weeks time to assess progress. The patient was counselled to contact our nurse coordinator, COY Paulino (Sum), CNS at [phone] with any questions or concerns.  Thank you for the opportunity to participate in Mr. Norton's care.    TT: 15  min  CT: 10 min    Aida (Ruiz) Manny CESPEDES, CNS  Dialysis access program   [facility]  Pager # [phone]

## 2017-12-01 NOTE — LETTER
12/1/2017       RE: Dung Norton  [address] [facility] [address]     Dear Colleague,    Thank you for referring your patient, Dung Norton, to the [facility] SOLID ORGAN TRANSPLANT at [facility]. Please see a copy of my visit note below.    Dialysis Access Service   Progress Note    S:  Mr. Norton is being seen today for surgical followup of his dialysis access.  He reports no issues with the wound, and  no steal syndrome of the distal extremity.  Denies pain, swelling, tingling/numbness or a change of color/temperature in left arm. S/P left upper arm radial artery to cephalic vein AV Fistula creation on 11/17/2017.    O:  Pulse:  [92] 92  BP: (123)/(70) 123/70  GENERAL: no distress  Circulation:   Radial pulse 3+  Ulnar pulse  3+   Capillary refill:  capillary refill < 3 sec    Sensory exam:   arm: Normal   [x]           Abnormal   []          Comment:    hand: Normal   [x]           Abnormal   []          Comment:   Motor exam:   arm: Normal   [x]           Abnormal   []          Comment:    hand: Normal   [x]           Abnormal   []          Comment:    Access: L upper extremity wound(s) healed. non-tender. Capillary refill < 3 sec, ++ thrill and bruit present via hand held doppler. No edema in left arm, without apparent infection.    Assessment & Plan: Mr. Weaver dialysis access has matured well at this time point.    1. Continue no blood draw and BP from left arm  2. Check thrill of LUE AV fistula twice a day  3. May start left arm hammer curl exercise with a squeeze ball. 15 minutes a time., 3-4 times a day as tolerated  4. F/U with Dr. Pabon in 4 weeks     We would like to see the patient back in the clinic in 4 weeks time to assess progress. The patient was counselled to contact our nurse coordinator, COY Paulino (Sum), CNS at [phone] with any questions or concerns.  Thank you for the opportunity to participate in Mr. Norton's care.    TT: 15  min  CT: 10 min    Aida (Ruiz) Manny CESPEDES, CNS  Dialysis access program   [facility]  Pager # [phone]    Again, thank you for allowing me to participate in the care of your patient.      Sincerely,    COY Arellano CNS

## 2017-12-01 NOTE — PROGRESS NOTES
Dialysis Access Service   Progress Note    S:  Mr. Norton is being seen today for surgical followup of his dialysis access.  He reports no issues with the wound, and  no steal syndrome of the distal extremity.  Denies pain, swelling, tingling/numbness or a change of color/temperature in left arm. S/P left upper arm radial artery to cephalic vein AV Fistula creation on 11/17/2017.    O:  Pulse:  [92] 92  BP: (123)/(70) 123/70  GENERAL: no distress  Circulation:   Radial pulse 3+  Ulnar pulse  3+   Capillary refill:  capillary refill < 3 sec    Sensory exam:   arm: Normal   [x]           Abnormal   []          Comment:    hand: Normal   [x]           Abnormal   []          Comment:   Motor exam:   arm: Normal   [x]           Abnormal   []          Comment:    hand: Normal   [x]           Abnormal   []          Comment:    Access: L upper extremity wound(s) healed. non-tender. Capillary refill < 3 sec, ++ thrill and bruit present via hand held doppler. No edema in left arm, without apparent infection.    Assessment & Plan: Mr. Weaver dialysis access has matured well at this time point.    1. Continue no blood draw and BP from left arm  2. Check thrill of LUE AV fistula twice a day  3. May start left arm hammer curl exercise with a squeeze ball. 15 minutes a time., 3-4 times a day as tolerated  4. F/U with Dr. Pabon in 4 weeks     We would like to see the patient back in the clinic in 4 weeks time to assess progress. The patient was counselled to contact our nurse coordinator, COY Paulino CNS (Sum) at [phone] with any questions or concerns.  Thank you for the opportunity to participate in Mr. Norton's care.    TT: 15 min  CT: 10 min    ROMMEL Mao (Sum)  Dialysis access program   [facility]  Pager # [phone]

## 2017-12-01 NOTE — MR AVS SNAPSHOT
After Visit Summary   12/1/2017    Dung Norton    MRN: [id number]           Patient Information     Date Of Birth          1945        Visit Information        Provider Department      12/1/2017 10:45 AM Tamiko Vanegas MD [facility] Urology and [facility] for Prostate and Urologic Cancers        Today's Diagnoses     Urinary retention    -  1       Follow-ups after your visit        Additional Services     PAC Visit Referral (For [facility] Only)       Does this visit require an Anesthesia consult?  No -  If this visit is not for evaluation for co-morbidity (such as patient request due to anxiety), what is the purpose of the visit?: H and P     H&P done by:  N/A      Please be aware that coverage of these services is subject to the terms and limitations of your health insurance plan.  Call member services at your health plan with any benefit or coverage questions.      Please bring the following to your appointment:  >>   Any x-rays, CTs or MRIs which have been performed.  Contact the facility where they were done to arrange for  prior to your scheduled appointment.  Any new CT, MRI or other procedures ordered by your specialist must be performed at a [facility] facility or coordinated by your clinic's referral office.    >>   List of current medications  >>   This referral request   >>   Any documents/labs given to you for this referral                  Your next 10 appointments already scheduled     Dec 08, 2017  1:30 PM CST   (Arrive by 1:00 PM)   Return Visit with Oralia De La Torre NP   [facility] Nephrology ([facility])    [address]  3rd Floor  [facility] [address] [phone]            Dec 11, 2017 10:45 AM CST   LAB with FZ LAB   [facility] ([facility])    [address] [place] [id number] [phone]           Please do not eat 10-12 hours before your appointment if you are coming in fasting for  labs on lipids, cholesterol, or glucose (sugar). This does not apply to pregnant women. Water, hot tea and black coffee (with nothing added) are okay. Do not drink other fluids, diet soda or chew gum.            Dec 12, 2017 12:10 PM CST   Office Visit with Haley Baker MD   [facility] ([facility])    [address] [facility] [id number] [phone]           Bring a current list of meds and any records pertaining to this visit. For Physicals, please bring immunization records and any forms needing to be filled out. Please arrive 10 minutes early to complete paperwork.            Jan 02, 2018 10:00 AM CST   (Arrive by 9:45 AM)   FISTULA FOLLOW UP with Eduardo Pabon MD   [facility] Solid Organ Transplant ([facility])    [address]  3rd Floor  [facility] [place] [id number] [phone]            Jan 29, 2018   Procedure with Tamiko Vanegas MD   [facility], Same Day Surgery (--)    [address] [id number] [phone]            Feb 09, 2018  9:15 AM CST   (Arrive by 9:00 AM)   Post-Op with Tamiko Vanegsa MD   [facility] Urology and [facility] for Prostate and Urologic Cancers ([facility])    [address]  4th Floor  [facility] [place] [address] [phone]              Who to contact     Please call your clinic at [phone] to:    Ask questions about your health    Make or cancel appointments    Discuss your medicines    Learn about your test results    Speak to your doctor   If you have compliments or concerns about an experience at your clinic, or if you wish to file a complaint, please contact [facility] Patient Relations at [phone] or email us at [email or web address]         Additional Information About Your Visit        LETSGROOPhart Information     Labfolder is an electronic gateway that provides easy, online access to your medical records.  "With MyChart, you can request a clinic appointment, read your test results, renew a prescription or communicate with your care team.     To sign up for [employer] visit the website at [email or web address]   You will be asked to enter the access code listed below, as well as some personal information. Please follow the directions to create your username and password.     Your access code is: [email or web address]  Expires: 2018  3:46 PM     Your access code will  in 90 days. If you need help or a new code, please contact your [facility] or call [phone] for assistance.        Care EveryWhere ID     This is your Care EveryWhere ID. This could be used by other organizations to access your [place] medical records  [id number]        Your Vitals Were     Pulse Height BMI (Body Mass Index)             92 1.778 m (5' 10\") 21.52 kg/m2          Blood Pressure from Last 3 Encounters:   17 132/70   17 123/70   17 126/72    Weight from Last 3 Encounters:   17 68 kg (150 lb)   17 69.3 kg (152 lb 12.8 oz)   17 69 kg (152 lb 1.9 oz)              We Performed the Following     PAC Visit Referral (For [facility] Only)     Nneka-Operative Worksheet  (Urology General)        Primary Care Provider Office Phone # Fax #    Haley Baker [phone] [address] [address]        Equal Access to Services     BRIAN CERRATO AH: Hadii cristine marin hadasho Sochalo, waaxda luqadaha, qaybta kaalmada robert, gibson solis. So [facility] [phone].    ATENCIÓN: Si habla español, tiene a montague disposición servicios gratuitos de asistencia lingüística. Llame al [phone].    We comply with applicable federal civil rights laws and [place] laws. We do not discriminate on the basis of race, color, national origin, age, disability, sex, sexual orientation, or gender identity.            Thank you!     Thank you for " choosing [facility] UROLOGY AND [facility] FOR PROSTATE AND UROLOGIC CANCERS  for your care. Our goal is always to provide you with excellent care. Hearing back from our patients is one way we can continue to improve our services. Please take a few minutes to complete the written survey that you may receive in the mail after your visit with us. Thank you!             Your Updated Medication List - Protect others around you: Learn how to safely use, store and throw away your medicines at www.disposemymeds.org.          This list is accurate as of: 12/1/17 11:59 PM.  Always use your most recent med list.                   Brand Name Dispense Instructions for use Diagnosis    aspirin 81 MG tablet      Take 1 tablet by mouth daily.        calcium acetate 667 MG Caps capsule    PHOSLO    540 capsule    Take 2 capsules (1,334 mg) by mouth 3 times daily (with meals)    Chronic kidney disease, unspecified CKD stage       darbepoetin william 60 MCG/0.3ML injection    ARANESP (ALBUMIN FREE)    0.3 mL    Inject 0.3 mLs (60 mcg) Subcutaneous every 14 days As needed for hgb<10g/dL.  If Hgb increases >1 point in 2 weeks (if blood transfusion given, use hgb PRIOR to this), SYSTOLIC BP > 180 mmHg or hgb>=10g/dL, HOLD DOSE. Dose must be within 1 week of Hgb.  Per anemia protocol with Cecilia De La Torre MD/Tereza Paul,PharmD [phone]    Anemia of chronic renal failure, stage 5 (H), CKD (chronic kidney disease) stage 5, GFR less than 15 ml/min (H)       finasteride 5 MG tablet    PROSCAR    90 tablet    Take 1 tablet (5 mg) by mouth daily    Benign prostatic hyperplasia with urinary retention       fish oil-omega-3 fatty acids 1000 MG capsule      Take 1 g by mouth daily        furosemide 20 MG tablet    LASIX     TK 1 T PO ONCE D        MULTIVITAMIN MEN PO      Take 1 tablet by mouth daily        ondansetron 4 MG tablet    ZOFRAN    20 tablet    Take 1 tablet (4 mg) by mouth every 6 hours as needed for nausea    Arteriovenous fistula (H)        oxyCODONE IR 5 MG tablet    ROXICODONE    18 tablet    Take 1 tablet (5 mg) by mouth every 4 hours as needed for pain maximum 6 tablet(s) per day    Arteriovenous fistula (H)       senna 8.6 MG tablet    SENOKOT    20 tablet    Take 1 tablet by mouth 2 times daily as needed for constipation    Arteriovenous fistula (H)

## 2017-12-02 NOTE — PROGRESS NOTES
UROLOGIC DIAGNOSES:       CURRENT INTERVENTIONS:       HISTORY:     Patient with history of AUR and hematuria requiring cystoscopy and clot evacuation. Had failed TOV and has been performing CIC.   Patient states he is performing CIC regularly about 4x per trady.   He would like to discuss further management of urinary retention.     We discussed UDS and possible TURP. Also noted that TURP would likely help long term even if patient has poor detrusor function as this would make CIC easier in the long term.      PAST MEDICAL HISTORY:   Past Medical History:   Diagnosis Date     BPH (benign prostatic hyperplasia)      Chronic kidney disease      HTN      Pulmonary nodules      Tobacco abuse        PAST SURGICAL HISTORY:   Past Surgical History:   Procedure Laterality Date     APPENDECTOMY  AGE 6     CREATE FISTULA ARTERIOVENOUS UPPER EXTREMITY Left 11/17/2017    Procedure: CREATE FISTULA ARTERIOVENOUS UPPER EXTREMITY;  Left Cephalic Vein To Radial Artery Arteriovenous Fistula Creation, Anesthesia Block;  Surgeon: Eduardo Pabon MD;  [facility]     CYSTOSCOPY, FULGURATE BLEEDERS, EVACUATE CLOT(S), COMBINED N/A 7/16/2017    Procedure: COMBINED CYSTOSCOPY, FULGURATE BLEEDERS, EVACUATE CLOT(S);  Cystoscopy clot evacuation, bladder biopsy and fulguration (per surgeons note) NERVE BLOCK PERIPHERAL;  Surgeon: Tamiko Vanegas MD;  Location: [facility] OR     SINUS SURGERY  AGE 6     TONSILLECTOMY      CHILDHOOD       FAMILY HISTORY:   Family History   Problem Relation Age of Onset     C.A.D. Mother      d age 67     Vision Loss Father      Hearing Loss Sister      DIABETES Sister      CANCER No family hx of        SOCIAL HISTORY:   Social History   Substance Use Topics     Smoking status: Former Smoker     Types: Cigarettes     Smokeless tobacco: Never Used     Alcohol use No       Current Outpatient Prescriptions   Medication     furosemide (LASIX) 20 MG tablet     ondansetron (ZOFRAN) 4 MG tablet     calcium acetate  "(PHOSLO) 667 MG CAPS capsule     darbepoetin william (ARANESP, ALBUMIN FREE,) 60 MCG/0.3ML injection     finasteride (PROSCAR) 5 MG tablet     Multiple Vitamins-Minerals (MULTIVITAMIN MEN PO)     fish oil-omega-3 fatty acids (FISH OIL) 1000 MG capsule     aspirin 81 MG tablet     oxyCODONE IR (ROXICODONE) 5 MG tablet     senna (SENOKOT) 8.6 MG tablet     No current facility-administered medications for this visit.          PHYSICAL EXAM:    /70  Pulse 92  Ht 1.778 m (5' 10\")  Wt 68 kg (150 lb)  BMI 21.52 kg/m2    HEENT: Normocephalic and atraumatic   Cardiac: Not done  Back/Flank: Not done  CNS/PNS: Not done  Respiratory: Normal non-labored breathing  Abdomen: Soft nontender and nondistended  Peripheral Vascular: Not done  Mental Status: Not done    Penis: Not done  Scrotal Skin: Not done  Testicles: Not done  Epididymis: Not done  Digital Rectal Exam:     Cystoscopy: Not done    Imaging: None    Urinalysis: [facility] RESULTS:  Recent Labs   Lab Test  08/29/17   0235   01/15/14   0936   COLOR  Light Yellow   < >  Yellow   APPEARANCE  Cloudy   < >  Clear   URINEGLC  Negative   < >  Negative   URINEBILI  Negative   < >  Negative   URINEKETONE  Negative   < >  Negative   SG  1.014   < >  1.020   UBLD  Moderate*   < >  Small*   URINEPH  6.0   < >  6.0   PROTEIN  100*   < >  Negative   UROBILINOGEN   --    --   0.2   NITRITE  Negative   < >  Negative   LEUKEST  Large*   < >  Negative   RBCU  10*   < >  10-25*   WBCU  >182*   < >  O - 2    < > = values in this interval not displayed.       PSA:     Post Void Residual:     Other labs: None today      IMPRESSION:  72 y/o M with urinary retention requiring CIC     PLAN:  Will schedule for TURP; patient understands that he may still require CIC post op but would like to proceed.       Total Time: 15 minutes                                       Total in Consultation: greater than 50%       "

## 2017-12-04 ENCOUNTER — ALLIED HEALTH/NURSE VISIT (OUTPATIENT)
Dept: FAMILY MEDICINE | Facility: CLINIC | Age: 72
End: 2017-12-04
Payer: COMMERCIAL

## 2017-12-04 VITALS — SYSTOLIC BLOOD PRESSURE: 132 MMHG | DIASTOLIC BLOOD PRESSURE: 70 MMHG

## 2017-12-04 DIAGNOSIS — N18.5 CKD (CHRONIC KIDNEY DISEASE) STAGE 5, GFR LESS THAN 15 ML/MIN (H): ICD-10-CM

## 2017-12-04 DIAGNOSIS — D63.1 ANEMIA OF CHRONIC RENAL FAILURE, STAGE 5 (H): ICD-10-CM

## 2017-12-04 DIAGNOSIS — I10 HYPERTENSION GOAL BP (BLOOD PRESSURE) < 140/90: Primary | ICD-10-CM

## 2017-12-04 DIAGNOSIS — N18.5 ANEMIA OF CHRONIC RENAL FAILURE, STAGE 5 (H): ICD-10-CM

## 2017-12-04 LAB
HCT VFR BLD AUTO: 26.9 % (ref 40–53)
HGB BLD-MCNC: 8.1 G/DL (ref 13.3–17.7)

## 2017-12-04 PROCEDURE — 99207 ZZC NO CHARGE NURSE ONLY: CPT | Performed by: FAMILY MEDICINE

## 2017-12-04 PROCEDURE — 36415 COLL VENOUS BLD VENIPUNCTURE: CPT

## 2017-12-04 PROCEDURE — 85018 HEMOGLOBIN: CPT

## 2017-12-04 PROCEDURE — 85014 HEMATOCRIT: CPT

## 2017-12-04 NOTE — PROGRESS NOTES
Dung Norton is enrolled/participating in the retail pharmacy Blood Pressure Goals Achievement Program (BPGAP).  Dung Norton was evaluated at [facility] on December 4, 2017 at which time his blood pressure was:    BP Readings from Last 3 Encounters:   12/04/17 132/70   12/01/17 123/70   11/27/17 126/72     Reviewed lifestyle modifications for blood pressure control and reduction: including making healthy food choices, managing weight, getting regular exercise, smoking cessation, reducing alcohol consumption, monitoring blood pressure regularly.     Dung Norton is not experiencing symptoms.    Follow-Up: BP is at goal of < 140/90mmHg (patient 18+ years of age with or without diabetes).  Recommended follow-up at pharmacy in 6 months.     Recommendation to Provider: None at this time.     Dung Norton was evaluated for enrollment into the PGEN study today.    Patient eligible for enrollment:  No  Patient interested in enrollment:  No    Completed by: Thank you,  Cheri Winslow, PharmD  [facility]  [phone]

## 2017-12-04 NOTE — MR AVS SNAPSHOT
After Visit Summary   12/4/2017    Dung Norton    MRN: [id number]           Patient Information     Date Of Birth          1945        Visit Information        Provider Department      12/4/2017 10:55 AM Haley Baker MD [facility]        Today's Diagnoses     Hypertension goal BP (blood pressure) < 140/90    -  1       Follow-ups after your visit        Your next 10 appointments already scheduled     Dec 08, 2017  1:30 PM CST   (Arrive by 1:00 PM)   Return Visit with Oralia De La Torre NP   [facility] ([facility])    [address] [place] [id number] [phone]            Dec 11, 2017 10:45 AM CST   LAB with  LAB   [facility] ([facility])    [address] [address] [phone]           Please do not eat 10-12 hours before your appointment if you are coming in fasting for labs on lipids, cholesterol, or glucose (sugar). This does not apply to pregnant women. Water, hot tea and black coffee (with nothing added) are okay. Do not drink other fluids, diet soda or chew gum.            Dec 12, 2017 12:10 PM CST   [facility]    [address] [address] [phone]           Bring a current list of meds and any records pertaining to this visit. For Physicals, please bring immunization records and any forms needing to be filled out. Please arrive 10 minutes early to complete paperwork.            Jan 02, 2018 10:00 AM CST   (Arrive by 9:45 AM)   FISTULA FOLLOW UP with Eduardo Pabon MD   [facility] Solid Organ Transplant ([facility])    [address] [place] [address] [phone]              Who to contact     If you have questions or need follow up information about today's clinic visit or your schedule please contact  "[facility] [place] directly at [phone].  Normal or non-critical lab and imaging results will be communicated to you by MyChart, letter or phone within 4 business days after the clinic has received the results. If you do not hear from us within 7 days, please contact the clinic through MyChart or phone. If you have a critical or abnormal lab result, we will notify you by phone as soon as possible.  Submit refill requests through [employer] or call your pharmacy and they will forward the refill request to us. Please allow 3 business days for your refill to be completed.          Additional Information About Your Visit        [facility] Information     [employer] lets you send messages to your doctor, view your test results, renew your prescriptions, schedule appointments and more. To sign up, go to [email or web address] . Click on \"Log in\" on the left side of the screen, which will take you to the Welcome page. Then click on \"Sign up Now\" on the right side of the page.     You will be asked to enter the access code listed below, as well as some personal information. Please follow the directions to create your username and password.     Your access code is: [email or web address]  Expires: 2018  3:46 PM     Your access code will  in 90 days. If you need help or a new code, please call your [place] clinic or [phone].        Care EveryWhere ID     This is your Care EveryWhere ID. This could be used by other organizations to access your [place] medical records  [id number]         Blood Pressure from Last 3 Encounters:   17 132/70   17 123/70   17 126/72    Weight from Last 3 Encounters:   17 150 lb (68 kg)   17 152 lb 12.8 oz (69.3 kg)   17 152 lb 1.9 oz (69 kg)              Today, you had the following     No orders found for display       Primary Care Provider Office Phone # Fax #    Haley Baker [phone] [address] " [phone]        Equal Access to Services     [facility]: Hadii cristine tomas Sagastume, waaxda luqadaha, qaybta kaalray jones, gibson biancain hayaahillary cotajenniefrkarsten solis. So [facility] [phone].    ATENCIÓN: Si habla español, tiene a montague disposición servicios gratuitos de asistencia lingüística. Llame al [phone].    We comply with applicable federal civil rights laws and [place] laws. We do not discriminate on the basis of race, color, national origin, age, disability, sex, sexual orientation, or gender identity.            Thank you!     Thank you for choosing [facility]  for your care. Our goal is always to provide you with excellent care. Hearing back from our patients is one way we can continue to improve our services. Please take a few minutes to complete the written survey that you may receive in the mail after your visit with us. Thank you!             Your Updated Medication List - Protect others around you: Learn how to safely use, store and throw away your medicines at www.disposemymeds.org.          This list is accurate as of: 12/4/17 10:56 AM.  Always use your most recent med list.                   Brand Name Dispense Instructions for use Diagnosis    aspirin 81 MG tablet      Take 1 tablet by mouth daily.        calcium acetate 667 MG Caps capsule    PHOSLO    540 capsule    Take 2 capsules (1,334 mg) by mouth 3 times daily (with meals)    Chronic kidney disease, unspecified CKD stage       darbepoetin william 60 MCG/0.3ML injection    ARANESP (ALBUMIN FREE)    0.3 mL    Inject 0.3 mLs (60 mcg) Subcutaneous every 14 days As needed for hgb<10g/dL.  If Hgb increases >1 point in 2 weeks (if blood transfusion given, use hgb PRIOR to this), SYSTOLIC BP > 180 mmHg or hgb>=10g/dL, HOLD DOSE. Dose must be within 1 week of Hgb.  Per anemia protocol with Cecilia De La Torre MD/Tereza Paul,PharmD [phone]    Anemia of chronic renal failure, stage 5 (H), CKD (chronic kidney disease) stage 5, GFR  less than 15 ml/min (H)       finasteride 5 MG tablet    PROSCAR    90 tablet    Take 1 tablet (5 mg) by mouth daily    Benign prostatic hyperplasia with urinary retention       fish oil-omega-3 fatty acids 1000 MG capsule      Take 1 g by mouth daily        furosemide 20 MG tablet    LASIX     TK 1 T PO ONCE D        MULTIVITAMIN MEN PO      Take 1 tablet by mouth daily        ondansetron 4 MG tablet    ZOFRAN    20 tablet    Take 1 tablet (4 mg) by mouth every 6 hours as needed for nausea    Arteriovenous fistula (H)       oxyCODONE IR 5 MG tablet    ROXICODONE    18 tablet    Take 1 tablet (5 mg) by mouth every 4 hours as needed for pain maximum 6 tablet(s) per day    Arteriovenous fistula (H)       senna 8.6 MG tablet    SENOKOT    20 tablet    Take 1 tablet by mouth 2 times daily as needed for constipation    Arteriovenous fistula (H)

## 2017-12-06 DIAGNOSIS — E55.9 VITAMIN D DEFICIENCY: ICD-10-CM

## 2017-12-06 DIAGNOSIS — R79.89 ELEVATED SERUM CREATININE: Primary | ICD-10-CM

## 2017-12-08 ENCOUNTER — OFFICE VISIT (OUTPATIENT)
Dept: NEPHROLOGY | Facility: CLINIC | Age: 72
End: 2017-12-08
Payer: MEDICARE

## 2017-12-08 VITALS
BODY MASS INDEX: 22.45 KG/M2 | WEIGHT: 156.8 LBS | TEMPERATURE: 97.6 F | SYSTOLIC BLOOD PRESSURE: 142 MMHG | DIASTOLIC BLOOD PRESSURE: 83 MMHG | HEIGHT: 70 IN | OXYGEN SATURATION: 100 % | HEART RATE: 82 BPM

## 2017-12-08 DIAGNOSIS — I10 BENIGN ESSENTIAL HYPERTENSION: ICD-10-CM

## 2017-12-08 DIAGNOSIS — E83.52 HYPERCALCEMIA: ICD-10-CM

## 2017-12-08 DIAGNOSIS — N18.5 ANEMIA OF CHRONIC RENAL FAILURE, STAGE 5 (H): ICD-10-CM

## 2017-12-08 DIAGNOSIS — E88.09 HYPOALBUMINEMIA: ICD-10-CM

## 2017-12-08 DIAGNOSIS — D63.1 ANEMIA OF CHRONIC RENAL FAILURE, STAGE 5 (H): ICD-10-CM

## 2017-12-08 DIAGNOSIS — N18.5 CKD (CHRONIC KIDNEY DISEASE) STAGE 5, GFR LESS THAN 15 ML/MIN (H): Primary | ICD-10-CM

## 2017-12-08 PROCEDURE — 99212 OFFICE O/P EST SF 10 MIN: CPT

## 2017-12-08 PROCEDURE — 99213 OFFICE O/P EST LOW 20 MIN: CPT | Mod: ZF

## 2017-12-08 ASSESSMENT — PAIN SCALES - GENERAL: PAINLEVEL: NO PAIN (0)

## 2017-12-08 NOTE — MR AVS SNAPSHOT
After Visit Summary   12/8/2017    Dung Norton    MRN: [id number]           Patient Information     Date Of Birth          1945        Visit Information        Provider Department      12/8/2017 1:30 PM Oralia De La Torre NP [facility] Nephrology         Follow-ups after your visit        Follow-up notes from your care team     Return in about 4 weeks (around 1/5/2018).      Your next 10 appointments already scheduled     Dec 11, 2017 10:45 AM CST   LAB with FZ LAB   [facility] ([facility])    [address] [address]   [phone]           Please do not eat 10-12 hours before your appointment if you are coming in fasting for labs on lipids, cholesterol, or glucose (sugar). This does not apply to pregnant women. Water, hot tea and black coffee (with nothing added) are okay. Do not drink other fluids, diet soda or chew gum.            Dec 12, 2017 12:10 PM CST   Office Visit with Haley Baker MD   [facility] ([facility])    [address] [address]   [phone]           Bring a current list of meds and any records pertaining to this visit. For Physicals, please bring immunization records and any forms needing to be filled out. Please arrive 10 minutes early to complete paperwork.            Jan 02, 2018 10:00 AM CST   (Arrive by 9:45 AM)   FISTULA FOLLOW UP with Eduardo Pabon MD   [facility] Solid Organ Transplant ([facility])    [address] [place] [id number] [phone]            Jan 05, 2018  1:30 PM CST   (Arrive by 1:00 PM)   Return Visit with Oralia De La Torre NP   [facility] Neph[facility] ([facility])    [address] [address] [phone]            Jan 17, 2018 10:30 AM CST   (Arrive by 10:15 AM)   PAC EVALUATION with  Pac Roopa [address] [place] [address] Preoperative Assessment  Center ([facility])    [address] [place] [address]   [phone]            Jan 17, 2018 11:30 AM CST   (Arrive by 11:15 AM)   PAC RN ASSESSMENT with Uc Pac Rn   [facility] Preoperative Assessment [place] ([facility])    [address] [place] [address]   [phone]            Jan 17, 2018 12:00 PM CST   (Arrive by 11:45 AM)   PAC Anesthesia Consult with Uc Pac Anesthesiologist   [facility] Preoperative Assessment [place] ([facility])    [address] [place] [address]   [phone]            Jan 29, 2018   Procedure with Tamiko Vanegas MD   [facility] [place] Same Day Surgery (--)    [address] [address]   [phone]            Feb 09, 2018  9:15 AM CST   (Arrive by 9:00 AM)   Post-Op with Tamiko Vanegas MD   [facility] Prostate and Urologic Cancers ([facility])    [address] [place] [address]   [phone]              Who to contact     If you have questions or need follow up information about today's clinic visit or your schedule please contact [facility] directly at [phone].  Normal or non-critical lab and imaging results will be communicated to you by [employer], letter or phone within 4 business days after the clinic has received the results. If you do not hear from us within 7 days, please contact the clinic through [employer] or phone. If you have a critical or abnormal lab result, we will notify you by phone as soon as possible.  Submit refill requests through [employer] or call your pharmacy and they will forward the refill request to us. Please allow 3 business days for your refill to be completed.          Additional Information About Your Visit        [employer] Information     [employer] lets you send messages to your doctor, view your test  "results, renew your prescriptions, schedule appointments and more. To sign up, go to [email or web address] . Click on \"Log in\" on the left side of the screen, which will take you to the Welcome page. Then click on \"Sign up Now\" on the right side of the page.     You will be asked to enter the access code listed below, as well as some personal information. Please follow the directions to create your username and password.     Your access code is: [email or web address]  Expires: 2018  3:46 PM     Your access code will  in 90 days. If you need help or a new code, please call your [place] clinic or [phone].        Care EveryWhere ID     This is your Care EveryWhere ID. This could be used by other organizations to access your [place] medical records  [id number]        Your Vitals Were     Pulse Temperature Height Pulse Oximetry BMI (Body Mass Index)       82 97.6  F (36.4  C) (Oral) 1.778 m (5' 10\") 100% 22.5 kg/m2        Blood Pressure from Last 3 Encounters:   17 142/83   17 132/70   17 123/70    Weight from Last 3 Encounters:   17 71.1 kg (156 lb 12.8 oz)   17 68 kg (150 lb)   17 69.3 kg (152 lb 12.8 oz)              Today, you had the following     No orders found for display       Primary Care Provider Office Phone # Fax #    Haley Baker [phone] [address] [facility] [address]        Equal Access to Services     [facility] : Hadjakob Sagastume, osvaldo calle, gibson nesbitt. So [facility] [phone].    ATENCIÓN: Si habla español, tiene a montague disposición servicios gratuitos de asistencia lingüística. Jose Manuel al [phone].    We comply with applicable federal civil rights laws and [place] laws. We do not discriminate on the basis of race, color, national origin, age, disability, sex, sexual orientation, or gender identity.            Thank you!     Thank you for " choosing [facility] NEPHROLOGY  for your care. Our goal is always to provide you with excellent care. Hearing back from our patients is one way we can continue to improve our services. Please take a few minutes to complete the written survey that you may receive in the mail after your visit with us. Thank you!             Your Updated Medication List - Protect others around you: Learn how to safely use, store and throw away your medicines at www.disposemymeds.org.          This list is accurate as of: 12/8/17  2:05 PM.  Always use your most recent med list.                   Brand Name Dispense Instructions for use Diagnosis    aspirin 81 MG tablet      Take 1 tablet by mouth daily.        calcium acetate 667 MG Caps capsule    PHOSLO    540 capsule    Take 2 capsules (1,334 mg) by mouth 3 times daily (with meals)    Chronic kidney disease, unspecified CKD stage       darbepoetin william 100 MCG/0.5ML injection    ARANESP (ALBUMIN FREE)    0.5 mL    Inject 0.5 mLs (100 mcg) Subcutaneous every 14 days As needed for hgb<10g/dL.  If Hgb increases >1 point in 2 weeks (if blood transfusion given, use hgb PRIOR to this), SYSTOLIC BP > 180 mmHg or hgb>=10g/dL, HOLD DOSE. Dose must be within 1 week of Hgb.  Per anemia protocol with Cecilia De La Torre CNP    Anemia of chronic renal failure, stage 5 (H), CKD (chronic kidney disease) stage 5, GFR less than 15 ml/min (H)       finasteride 5 MG tablet    PROSCAR    90 tablet    Take 1 tablet (5 mg) by mouth daily    Benign prostatic hyperplasia with urinary retention       fish oil-omega-3 fatty acids 1000 MG capsule      Take 1 g by mouth daily        furosemide 20 MG tablet    LASIX     TK 1 T PO ONCE D        MULTIVITAMIN MEN PO      Take 1 tablet by mouth daily        ondansetron 4 MG tablet    ZOFRAN    20 tablet    Take 1 tablet (4 mg) by mouth every 6 hours as needed for nausea    Arteriovenous fistula (H)       oxyCODONE IR 5 MG tablet    ROXICODONE    18 tablet    Take 1 tablet (5  mg) by mouth every 4 hours as needed for pain maximum 6 tablet(s) per day    Arteriovenous fistula (H)       senna 8.6 MG tablet    SENOKOT    20 tablet    Take 1 tablet by mouth 2 times daily as needed for constipation    Arteriovenous fistula (H)

## 2017-12-08 NOTE — NURSING NOTE
"Chief Complaint   Patient presents with     RECHECK     Follow up CKD stage 5.       Initial /83  Pulse 82  Temp 97.6  F (36.4  C) (Oral)  Ht 1.778 m (5' 10\")  Wt 71.1 kg (156 lb 12.8 oz)  SpO2 100%  BMI 22.5 kg/m2 Estimated body mass index is 22.5 kg/(m^2) as calculated from the following:    Height as of this encounter: 1.778 m (5' 10\").    Weight as of this encounter: 71.1 kg (156 lb 12.8 oz).  Medication Reconciliation: complete   Peyton Calles., CMA    "

## 2017-12-08 NOTE — LETTER
12/8/2017      RE: Dung Norton  [address]  [place] [address]       Nephrology Clinic Visit 12/8/17    Assessment and Plan:       1. CKD5 - Patient has developed ESRD 2/2 long standing obstructive uropathy. His GFR has been < 15 ml/mn since July 2017. He is not uremic.       - Patient and daughter have decided upon HD as his RRT option.     - He has attended the Kidney Smart program    - He underwent AVF creation with Dr Eduardo Pabon on 11/22/17. The AVF is maturing nicely   - Would like to consider initiating HD during admission for TURP at the end of January.    - Patient would like to have HD at [facility] unit, will d/w my nurse   - He does not use NSAIDs   - He is straight cathing QID w/o difficulty      2. Electrolytes - No acute concerns. K 4.9.      3. Volume status - Hypervolemia with lower extremity edema, but this is likely third spaced. No dyspnea. Albumin only 2.6. Not on diuretics. Making ~ 1 liter/urine/day. Weight 71 kg, up from last visit at 67.2 #. Clinic blood pressures elevated in the 140-150/ range, but he was upset about surgery scheduling. Home blood pressures remain in the 120-130/ range.      - No need for diuretics at this time   - Recommend compression wraps if edema persists      4. HTN - Generally well controlled. Home blood pressures 120 - 130/.  Off all antihypertensives.    - No intervention required at this time.       5. BPH - He is straight cathing QID w/o difficulty. Currently on Proscar. Flomax discontinued due to hypotension. Scheduled for cysto/TURP 1/29/17   - Urologist wants him to continue Proscar in order to keep prostate small and allow for more easy straight cathing   - Urology managing      6. Acid base - Bicarb is drifting down, currently 19. Bicarb was discontinued during admission, may need to restart. Will monitor       7. BMD - Corrected Ca 10.4, Phos 4.8, albumin 2.6, Vit D 29, PTH intact 120   - He is doing much better with taking the Phoslo  correctly   - Will check an ionized ca      8. Nutrition - Reports good appetite, but albumin remains low at 2.6.    - Check LFTs     - Will monitor nutrition status closely      9. Anemia - Hgb 8.1 w/normal RBC indices on 8/29/17.  Etiology is likely anemia of renal disease   - He should have routine colon cancer screening   - Iron studies 11/27/17: Fe 23, Ferritin 818, Tsat 14%   - Has been enrolled in anemia management program for EARLENE       10. Disposition - RTC in 4 wks for f/u with ongoing weekly labs      Assessment and plan was discussed with patient and daughter, they both voice understanding and agreement.      Reason for Visit:  CKD5 follow up      HPI:  Dung Norton is a 71 year old year old male with a PMH of CKDIII , HTN, Obstructive uropathy 2/2 BPH/benign bladder mass with creat as high as 14.7 in July 2017. He has not required RRT in the past, but renal function has not recovered despite correction of the obstructive uropathy and he has developed End stage Kidney disease.       Since our last visit patient has had no hospital admissions.  He did have AVF creation on 11/22/17. Patient continues to straight cath 4 times daily and will be undergoing TURP end of Jan.       ROS:  Patient has no complaints. He reports feeling well. He is exercising his AVF regularly. He is having no difficulty with straight cathing 4 times daily, but will be undergoing cysto/TURP in Jan. No dizziness or syncope. Energy is stable. He is living independently. Has good appetite. No dyspnea, chest pain, abdominal pain, or edema.        Chronic Medical Problems:      HTN  BPH  Obstructive uropathy   HTN  Tobacco abuse  HLD  Anemia  Pulmonary nodules  KRISTINA  CKD5      Personal Hx:   Social History     Social History     Marital status:      Spouse name: N/A     Number of children: 2     Years of education: N/A     Occupational History      [employer]     Social History Main Topics     Smoking status: Former  Smoker     Types: Cigarettes     Smokeless tobacco: Never Used     Alcohol use No     Drug use: No     Sexual activity: Not Currently     Other Topics Concern     Not on file     Social History Narrative       Allergies:  No Known Allergies    Medications:  Prior to Admission medications    Medication Sig Start Date End Date Taking? Authorizing Provider   darbepoetin william (ARANESP, ALBUMIN FREE,) 100 MCG/0.5ML injection Inject 0.5 mLs (100 mcg) Subcutaneous every 14 days As needed for hgb<10g/dL.  If Hgb increases >1 point in 2 weeks (if blood transfusion given, use hgb PRIOR to this), SYSTOLIC BP > 180 mmHg or hgb>=10g/dL, HOLD DOSE. Dose must be within 1 week of Hgb.  Per anemia protocol with Cecilia De La Torre CNP 12/6/17  Yes Oralia De La Torre NP   furosemide (LASIX) 20 MG tablet TK 1 T PO ONCE D 11/5/17  Yes Reported, Patient   ondansetron (ZOFRAN) 4 MG tablet Take 1 tablet (4 mg) by mouth every 6 hours as needed for nausea 11/17/17  Yes Munir Field MD   calcium acetate (PHOSLO) 667 MG CAPS capsule Take 2 capsules (1,334 mg) by mouth 3 times daily (with meals) 10/30/17  Yes Oralia De La Torre NP   finasteride (PROSCAR) 5 MG tablet Take 1 tablet (5 mg) by mouth daily 8/16/17  Yes Oralia De La Torre NP   Multiple Vitamins-Minerals (MULTIVITAMIN MEN PO) Take 1 tablet by mouth daily   Yes Reported, Patient   fish oil-omega-3 fatty acids (FISH OIL) 1000 MG capsule Take 1 g by mouth daily   Yes Reported, Patient   aspirin 81 MG tablet Take 1 tablet by mouth daily.   Yes Reported, Patient   oxyCODONE IR (ROXICODONE) 5 MG tablet Take 1 tablet (5 mg) by mouth every 4 hours as needed for pain maximum 6 tablet(s) per day  Patient not taking: Reported on 12/1/2017 11/17/17   Munir Field MD   senna (SENOKOT) 8.6 MG tablet Take 1 tablet by mouth 2 times daily as needed for constipation  Patient not taking: Reported on 12/1/2017 11/17/17   Munir Field MD       Vitals:  /83  Pulse 82  Temp 97.6  F (36.4  C)  "(Oral)  Ht 1.778 m (5' 10\")  Wt 71.1 kg (156 lb 12.8 oz)  SpO2 100%  BMI 22.5 kg/m2    Exam:  GEN: Pleasant, Well groomed. Dtr present  CARDIAC RRR  LUNGS: CTA  ABDOMEN: Soft, NT  EXT: 1+ edema  Access: CLIFFORD AVF + bruit    Results:  Results for FABRIZIO MIRANDA (MRN [id number]) as of 12/9/2017 13:02   Ref. Range 11/27/2017 10:39 12/4/2017 10:44   Sodium Latest Ref Range: 133 - 144 mmol/L 141    Potassium Latest Ref Range: 3.4 - 5.3 mmol/L 4.9    Chloride Latest Ref Range: 94 - 109 mmol/L 109    Carbon Dioxide Latest Ref Range: 20 - 32 mmol/L 19 (L)    Urea Nitrogen Latest Ref Range: 7 - 30 mg/dL 104 (H)    Creatinine Latest Ref Range: 0.66 - 1.25 mg/dL 5.48 (H)    GFR Estimate Latest Ref Range: >60 mL/min/1.7m2 10 (L)    GFR Estimate If Black Latest Ref Range: >60 mL/min/1.7m2 12 (L)    Calcium Latest Ref Range: 8.5 - 10.1 mg/dL 9.3    Anion Gap Latest Ref Range: 3 - 14 mmol/L 13    Phosphorus Latest Ref Range: 2.5 - 4.5 mg/dL 4.8 (H)    Albumin Latest Ref Range: 3.4 - 5.0 g/dL 2.6 (L)    Ferritin Latest Ref Range: 26 - 388 ng/mL 818 (H)    Iron Latest Ref Range: 35 - 180 ug/dL 23 (L)    Iron Binding Cap Latest Ref Range: 240 - 430 ug/dL 169 (L)    Iron Saturation Index Latest Ref Range: 15 - 46 % 14 (L)    Glucose Latest Ref Range: 70 - 99 mg/dL 99    Hemoglobin Latest Ref Range: 13.3 - 17.7 g/dL 8.1 (L) 8.1 (L)   Hematocrit Latest Ref Range: 40.0 - 53.0 % 27.1 (L) 26.9 (L)     Oralia De La Torre, NP      "

## 2017-12-09 NOTE — PROGRESS NOTES
Nephrology Clinic Visit 12/8/17    Assessment and Plan:       1. CKD5 - Patient has developed ESRD 2/2 long standing obstructive uropathy. His GFR has been < 15 ml/mn since July 2017. He is not uremic.       - Patient and daughter have decided upon HD as his RRT option.     - He has attended the Kidney Smart program    - He underwent AVF creation with Dr Eduardo Pabon on 11/22/17. The AVF is maturing nicely   - Would like to consider initiating HD during admission for TURP at the end of January.    - Patient would like to have HD at [facility], will d/w my nurse   - He does not use NSAIDs   - He is straight cathing QID w/o difficulty      2. Electrolytes - No acute concerns. K 4.9.      3. Volume status - Hypervolemia with lower extremity edema, but this is likely third spaced. No dyspnea. Albumin only 2.6. Not on diuretics. Making ~ 1 liter/urine/day. Weight 71 kg, up from last visit at 67.2 #. Clinic blood pressures elevated in the 140-150/ range, but he was upset about surgery scheduling. Home blood pressures remain in the 120-130/ range.      - No need for diuretics at this time   - Recommend compression wraps if edema persists      4. HTN - Generally well controlled. Home blood pressures 120 - 130/.  Off all antihypertensives.    - No intervention required at this time.       5. BPH - He is straight cathing QID w/o difficulty. Currently on Proscar. Flomax discontinued due to hypotension. Scheduled for cysto/TURP 1/29/17   - Urologist wants him to continue Proscar in order to keep prostate small and allow for more easy straight cathing   - Urology managing      6. Acid base - Bicarb is drifting down, currently 19. Bicarb was discontinued during admission, may need to restart. Will monitor       7. BMD - Corrected Ca 10.4, Phos 4.8, albumin 2.6, Vit D 29, PTH intact 120   - He is doing much better with taking the Phoslo correctly   - Will check an ionized ca      8. Nutrition - Reports good appetite, but  albumin remains low at 2.6.    - Check LFTs     - Will monitor nutrition status closely      9. Anemia - Hgb 8.1 w/normal RBC indices on 8/29/17.  Etiology is likely anemia of renal disease   - He should have routine colon cancer screening   - Iron studies 11/27/17: Fe 23, Ferritin 818, Tsat 14%   - Has been enrolled in anemia management program for EARLENE       10. Disposition - RTC in 4 wks for f/u with ongoing weekly labs      Assessment and plan was discussed with patient and daughter, they both voice understanding and agreement.      Reason for Visit:  CKD5 follow up      HPI:  Dung Norton is a 71 year old year old male with a PMH of CKDIII , HTN, Obstructive uropathy 2/2 BPH/benign bladder mass with creat as high as 14.7 in July 2017. He has not required RRT in the past, but renal function has not recovered despite correction of the obstructive uropathy and he has developed End stage Kidney disease.       Since our last visit patient has had no hospital admissions.  He did have AVF creation on 11/22/17. Patient continues to straight cath 4 times daily and will be undergoing TURP end of Jan.       ROS:  Patient has no complaints. He reports feeling well. He is exercising his AVF regularly. He is having no difficulty with straight cathing 4 times daily, but will be undergoing cysto/TURP in Jan. No dizziness or syncope. Energy is stable. He is living independently. Has good appetite. No dyspnea, chest pain, abdominal pain, or edema.        Chronic Medical Problems:      HTN  BPH  Obstructive uropathy   HTN  Tobacco abuse  HLD  Anemia  Pulmonary nodules  KRISTINA  CKD5      Personal Hx:   Social History     Social History     Marital status:      Spouse name: N/A     Number of children: 2     Years of education: N/A     Occupational History     [employer]     Social History Main Topics     Smoking status: Former Smoker     Types: Cigarettes     Smokeless tobacco: Never Used     Alcohol use No      "Drug use: No     Sexual activity: Not Currently     Other Topics Concern     Not on file     Social History Narrative       Allergies:  No Known Allergies    Medications:  Prior to Admission medications    Medication Sig Start Date End Date Taking? Authorizing Provider   darbepoetin william (ARANESP, ALBUMIN FREE,) 100 MCG/0.5ML injection Inject 0.5 mLs (100 mcg) Subcutaneous every 14 days As needed for hgb<10g/dL.  If Hgb increases >1 point in 2 weeks (if blood transfusion given, use hgb PRIOR to this), SYSTOLIC BP > 180 mmHg or hgb>=10g/dL, HOLD DOSE. Dose must be within 1 week of Hgb.  Per anemia protocol with Cecilia De La Torre CNP 12/6/17  Yes Oralia De La Torre NP   furosemide (LASIX) 20 MG tablet TK 1 T PO ONCE D 11/5/17  Yes Reported, Patient   ondansetron (ZOFRAN) 4 MG tablet Take 1 tablet (4 mg) by mouth every 6 hours as needed for nausea 11/17/17  Yes Munir Field MD   calcium acetate (PHOSLO) 667 MG CAPS capsule Take 2 capsules (1,334 mg) by mouth 3 times daily (with meals) 10/30/17  Yes Oralia De La Torre NP   finasteride (PROSCAR) 5 MG tablet Take 1 tablet (5 mg) by mouth daily 8/16/17  Yes Oralia De La Torre NP   Multiple Vitamins-Minerals (MULTIVITAMIN MEN PO) Take 1 tablet by mouth daily   Yes Reported, Patient   fish oil-omega-3 fatty acids (FISH OIL) 1000 MG capsule Take 1 g by mouth daily   Yes Reported, Patient   aspirin 81 MG tablet Take 1 tablet by mouth daily.   Yes Reported, Patient   oxyCODONE IR (ROXICODONE) 5 MG tablet Take 1 tablet (5 mg) by mouth every 4 hours as needed for pain maximum 6 tablet(s) per day  Patient not taking: Reported on 12/1/2017 11/17/17   Munir Field MD   senna (SENOKOT) 8.6 MG tablet Take 1 tablet by mouth 2 times daily as needed for constipation  Patient not taking: Reported on 12/1/2017 11/17/17   Munir Field MD       Vitals:  /83  Pulse 82  Temp 97.6  F (36.4  C) (Oral)  Ht 1.778 m (5' 10\")  Wt 71.1 kg (156 lb 12.8 oz)  SpO2 100%  BMI 22.5 " kg/m2    Exam:  GEN: Pleasant, Well groomed. Dtr present  CARDIAC RRR  LUNGS: CTA  ABDOMEN: Soft, NT  EXT: 1+ edema  Access: CLIFFORD AVF + bruit    Results:  Results for FABRIZIO MIRANDA (MRN [id number]) as of 12/9/2017 13:02   Ref. Range 11/27/2017 10:39 12/4/2017 10:44   Sodium Latest Ref Range: 133 - 144 mmol/L 141    Potassium Latest Ref Range: 3.4 - 5.3 mmol/L 4.9    Chloride Latest Ref Range: 94 - 109 mmol/L 109    Carbon Dioxide Latest Ref Range: 20 - 32 mmol/L 19 (L)    Urea Nitrogen Latest Ref Range: 7 - 30 mg/dL 104 (H)    Creatinine Latest Ref Range: 0.66 - 1.25 mg/dL 5.48 (H)    GFR Estimate Latest Ref Range: >60 mL/min/1.7m2 10 (L)    GFR Estimate If Black Latest Ref Range: >60 mL/min/1.7m2 12 (L)    Calcium Latest Ref Range: 8.5 - 10.1 mg/dL 9.3    Anion Gap Latest Ref Range: 3 - 14 mmol/L 13    Phosphorus Latest Ref Range: 2.5 - 4.5 mg/dL 4.8 (H)    Albumin Latest Ref Range: 3.4 - 5.0 g/dL 2.6 (L)    Ferritin Latest Ref Range: 26 - 388 ng/mL 818 (H)    Iron Latest Ref Range: 35 - 180 ug/dL 23 (L)    Iron Binding Cap Latest Ref Range: 240 - 430 ug/dL 169 (L)    Iron Saturation Index Latest Ref Range: 15 - 46 % 14 (L)    Glucose Latest Ref Range: 70 - 99 mg/dL 99    Hemoglobin Latest Ref Range: 13.3 - 17.7 g/dL 8.1 (L) 8.1 (L)   Hematocrit Latest Ref Range: 40.0 - 53.0 % 27.1 (L) 26.9 (L)

## 2017-12-11 ENCOUNTER — TELEPHONE (OUTPATIENT)
Dept: NEPHROLOGY | Facility: CLINIC | Age: 72
End: 2017-12-11

## 2017-12-11 ENCOUNTER — ALLIED HEALTH/NURSE VISIT (OUTPATIENT)
Dept: FAMILY MEDICINE | Facility: CLINIC | Age: 72
End: 2017-12-11
Payer: COMMERCIAL

## 2017-12-11 VITALS — DIASTOLIC BLOOD PRESSURE: 84 MMHG | SYSTOLIC BLOOD PRESSURE: 136 MMHG

## 2017-12-11 DIAGNOSIS — E88.09 HYPOALBUMINEMIA: ICD-10-CM

## 2017-12-11 DIAGNOSIS — N18.5 ANEMIA OF CHRONIC RENAL FAILURE, STAGE 5 (H): ICD-10-CM

## 2017-12-11 DIAGNOSIS — I10 HYPERTENSION GOAL BP (BLOOD PRESSURE) < 140/90: Primary | ICD-10-CM

## 2017-12-11 DIAGNOSIS — E55.9 VITAMIN D DEFICIENCY: ICD-10-CM

## 2017-12-11 DIAGNOSIS — R79.89 ELEVATED SERUM CREATININE: ICD-10-CM

## 2017-12-11 DIAGNOSIS — D63.1 ANEMIA OF CHRONIC RENAL FAILURE, STAGE 5 (H): ICD-10-CM

## 2017-12-11 DIAGNOSIS — E83.52 HYPERCALCEMIA: ICD-10-CM

## 2017-12-11 DIAGNOSIS — N18.5 CKD (CHRONIC KIDNEY DISEASE) STAGE 5, GFR LESS THAN 15 ML/MIN (H): ICD-10-CM

## 2017-12-11 LAB
ALBUMIN SERPL-MCNC: 3 G/DL (ref 3.4–5)
ALP SERPL-CCNC: 63 U/L (ref 40–150)
ALT SERPL W P-5'-P-CCNC: 17 U/L (ref 0–70)
ANION GAP SERPL CALCULATED.3IONS-SCNC: 12 MMOL/L (ref 3–14)
AST SERPL W P-5'-P-CCNC: 13 U/L (ref 0–45)
BASOPHILS # BLD AUTO: 0.1 10E9/L (ref 0–0.2)
BASOPHILS NFR BLD AUTO: 0.8 %
BILIRUB DIRECT SERPL-MCNC: <0.1 MG/DL (ref 0–0.2)
BILIRUB SERPL-MCNC: 0.2 MG/DL (ref 0.2–1.3)
BUN SERPL-MCNC: 105 MG/DL (ref 7–30)
CA-I BLD-MCNC: 5 MG/DL (ref 4.4–5.2)
CALCIUM SERPL-MCNC: 9 MG/DL (ref 8.5–10.1)
CHLORIDE SERPL-SCNC: 107 MMOL/L (ref 94–109)
CO2 SERPL-SCNC: 23 MMOL/L (ref 20–32)
CREAT SERPL-MCNC: 5.2 MG/DL (ref 0.66–1.25)
DEPRECATED CALCIDIOL+CALCIFEROL SERPL-MC: 36 UG/L (ref 20–75)
DIFFERENTIAL METHOD BLD: ABNORMAL
EOSINOPHIL # BLD AUTO: 0.6 10E9/L (ref 0–0.7)
EOSINOPHIL NFR BLD AUTO: 6 %
ERYTHROCYTE [DISTWIDTH] IN BLOOD BY AUTOMATED COUNT: 16.9 % (ref 10–15)
GFR SERPL CREATININE-BSD FRML MDRD: 11 ML/MIN/1.7M2
GLUCOSE SERPL-MCNC: 89 MG/DL (ref 70–99)
HCT VFR BLD AUTO: 28.2 % (ref 40–53)
HGB BLD-MCNC: 8.5 G/DL (ref 13.3–17.7)
LYMPHOCYTES # BLD AUTO: 1.4 10E9/L (ref 0.8–5.3)
LYMPHOCYTES NFR BLD AUTO: 15.1 %
MCH RBC QN AUTO: 26.7 PG (ref 26.5–33)
MCHC RBC AUTO-ENTMCNC: 30.1 G/DL (ref 31.5–36.5)
MCV RBC AUTO: 89 FL (ref 78–100)
MONOCYTES # BLD AUTO: 0.7 10E9/L (ref 0–1.3)
MONOCYTES NFR BLD AUTO: 8.1 %
NEUTROPHILS # BLD AUTO: 6.4 10E9/L (ref 1.6–8.3)
NEUTROPHILS NFR BLD AUTO: 70 %
PHOSPHATE SERPL-MCNC: 5.3 MG/DL (ref 2.5–4.5)
PLATELET # BLD AUTO: 336 10E9/L (ref 150–450)
POTASSIUM SERPL-SCNC: 4.3 MMOL/L (ref 3.4–5.3)
PROT SERPL-MCNC: 7.3 G/DL (ref 6.8–8.8)
PTH-INTACT SERPL-MCNC: 40 PG/ML (ref 12–72)
RBC # BLD AUTO: 3.18 10E12/L (ref 4.4–5.9)
SODIUM SERPL-SCNC: 142 MMOL/L (ref 133–144)
WBC # BLD AUTO: 9.1 10E9/L (ref 4–11)

## 2017-12-11 PROCEDURE — 82330 ASSAY OF CALCIUM: CPT

## 2017-12-11 PROCEDURE — 84450 TRANSFERASE (AST) (SGOT): CPT

## 2017-12-11 PROCEDURE — 36415 COLL VENOUS BLD VENIPUNCTURE: CPT

## 2017-12-11 PROCEDURE — 83970 ASSAY OF PARATHORMONE: CPT

## 2017-12-11 PROCEDURE — 82306 VITAMIN D 25 HYDROXY: CPT

## 2017-12-11 PROCEDURE — 82247 BILIRUBIN TOTAL: CPT

## 2017-12-11 PROCEDURE — 85025 COMPLETE CBC W/AUTO DIFF WBC: CPT

## 2017-12-11 PROCEDURE — 84460 ALANINE AMINO (ALT) (SGPT): CPT

## 2017-12-11 PROCEDURE — 84155 ASSAY OF PROTEIN SERUM: CPT

## 2017-12-11 PROCEDURE — 80069 RENAL FUNCTION PANEL: CPT

## 2017-12-11 PROCEDURE — 99207 ZZC NO CHARGE NURSE ONLY: CPT | Performed by: FAMILY MEDICINE

## 2017-12-11 PROCEDURE — 84075 ASSAY ALKALINE PHOSPHATASE: CPT

## 2017-12-11 PROCEDURE — 82248 BILIRUBIN DIRECT: CPT

## 2017-12-11 NOTE — MR AVS SNAPSHOT
After Visit Summary   12/11/2017    Dung Norton    MRN: [id number]           Patient Information     Date Of Birth          1945        Visit Information        Provider Department      12/11/2017 11:41 AM Haley Baker MD [facility]        Today's Diagnoses     Hypertension goal BP (blood pressure) < 140/90    -  1       Follow-ups after your visit        Your next 10 appointments already scheduled     Dec 12, 2017 12:10 PM CST   Office Visit with Haley Baker MD   [facility] ([facility]    [address] [id number] [phone]           Bring a current list of meds and any records pertaining to this visit. For Physicals, please bring immunization records and any forms needing to be filled out. Please arrive 10 minutes early to complete paperwork.            Dec 18, 2017 11:30 AM CST   LAB with [facility]   [facility] ([facility])    [address] [id number] [phone]           Please do not eat 10-12 hours before your appointment if you are coming in fasting for labs on lipids, cholesterol, or glucose (sugar). This does not apply to pregnant women. Water, hot tea and black coffee (with nothing added) are okay. Do not drink other fluids, diet soda or chew gum.            Jan 02, 2018 10:00 AM CST   (Arrive by 9:45 AM)   FISTULA FOLLOW UP with Eduardo Pabon MD   [facility] Solid Organ Transplant ([facility] [place])    [address] [place] [id number] [phone]            Jan 05, 2018  1:30 PM CST   (Arrive by 1:00 PM)   Return Visit with Oralia De La Torre NP   [facility] Nephrology ([facility] [place])    [address] [id number] [phone]            Jan 17, 2018 10:30 AM CST   (Arrive by 10:15 AM)   PAC EVALUATION with  Pac Roopa [address] [place] [address] Preoperative Assessment Center (  [facility])    [address] [place] [id number]   [phone]            Jan 17, 2018 11:30 AM CST   (Arrive by 11:15 AM)   PAC RN ASSESSMENT with Uc Pac Rn   [facility] Pre[facility] [place] ([facility])    [address] [place] [address]   [phone]            Jan 17, 2018 12:00 PM CST   (Arrive by 11:45 AM)   PAC Anesthesia Consult with Manohar Pac Anesthesiologist   [facility] Preoperative [facility] [place] ([facility])    [address] [place] [phone]   [phone]            Jan 29, 2018   Procedure with Tamiko Vanegas MD   [facility] [place], Same Day Surgery (--)    [address] [address]   [phone]            Feb 09, 2018  9:15 AM CST   (Arrive by 9:00 AM)   Post-Op with Tamiko Vanegas MD   [facility] ([facility])    [address] [place] [id number]   [phone]              Future tests that were ordered for you today     Open Future Orders        Priority Expected Expires Ordered    Protein  random urine with Creat Ratio Routine  12/11/2018 12/11/2017            Who to contact     If you have questions or need follow up information about today's clinic visit or your schedule please contact [facility] directly at [phone].  Normal or non-critical lab and imaging results will be communicated to you by MyChart, letter or phone within 4 business days after the clinic has received the results. If you do not hear from us within 7 days, please contact the clinic through [employer] or phone. If you have a critical or abnormal lab result, we will notify you by phone as soon as possible.  Submit refill requests through [employer] or call your pharmacy and they will forward the refill request to us. Please allow 3  "business days for your refill to be completed.          Additional Information About Your Visit        MyChart Information     Stio lets you send messages to your doctor, view your test results, renew your prescriptions, schedule appointments and more. To sign up, go to [email or web address] . Click on \"Log in\" on the left side of the screen, which will take you to the Welcome page. Then click on \"Sign up Now\" on the right side of the page.     You will be asked to enter the access code listed below, as well as some personal information. Please follow the directions to create your username and password.     Your access code is: [email or web address]  Expires: 2018  3:46 PM     Your access code will  in 90 days. If you need help or a new code, please call your [place] clinic or [phone].        Care EveryWhere ID     This is your Care EveryWhere ID. This could be used by other organizations to access your [place] medical records  [id number]         Blood Pressure from Last 3 Encounters:   17 136/84   17 142/83   17 132/70    Weight from Last 3 Encounters:   17 156 lb 12.8 oz (71.1 kg)   17 150 lb (68 kg)   17 152 lb 12.8 oz (69.3 kg)              Today, you had the following     No orders found for display       Primary Care Provider Office Phone # Fax #    Haley Baker [phone] [address] [facility] [address]        Equal Access to Services     [facility]: Hadii aad ku hadasho Soomaali, waaxda luqadaha, qaybta kaalmada robert, [facility] . So [facility] [phone].    ATENCIÓN: Si habla español, tiene a montague disposición servicios gratuitos de asistencia lingüística. Llame al [phone].    We comply with applicable federal civil rights laws and [place] laws. We do not discriminate on the basis of race, color, national origin, age, disability, sex, sexual orientation, or gender identity.          "   Thank you!     Thank you for choosing [facility]  for your care. Our goal is always to provide you with excellent care. Hearing back from our patients is one way we can continue to improve our services. Please take a few minutes to complete the written survey that you may receive in the mail after your visit with us. Thank you!             Your Updated Medication List - Protect others around you: Learn how to safely use, store and throw away your medicines at www.disposemymeds.org.          This list is accurate as of: 12/11/17 11:42 AM.  Always use your most recent med list.                   Brand Name Dispense Instructions for use Diagnosis    aspirin 81 MG tablet      Take 1 tablet by mouth daily.        calcium acetate 667 MG Caps capsule    PHOSLO    540 capsule    Take 2 capsules (1,334 mg) by mouth 3 times daily (with meals)    Chronic kidney disease, unspecified CKD stage       darbepoetin william 100 MCG/0.5ML injection    ARANESP (ALBUMIN FREE)    0.5 mL    Inject 0.5 mLs (100 mcg) Subcutaneous every 14 days As needed for hgb<10g/dL.  If Hgb increases >1 point in 2 weeks (if blood transfusion given, use hgb PRIOR to this), SYSTOLIC BP > 180 mmHg or hgb>=10g/dL, HOLD DOSE. Dose must be within 1 week of Hgb.  Per anemia protocol with Cecilia De La Torre CNP    Anemia of chronic renal failure, stage 5 (H), CKD (chronic kidney disease) stage 5, GFR less than 15 ml/min (H)       finasteride 5 MG tablet    PROSCAR    90 tablet    Take 1 tablet (5 mg) by mouth daily    Benign prostatic hyperplasia with urinary retention       fish oil-omega-3 fatty acids 1000 MG capsule      Take 1 g by mouth daily        furosemide 20 MG tablet    LASIX     TK 1 T PO ONCE D        MULTIVITAMIN MEN PO      Take 1 tablet by mouth daily        ondansetron 4 MG tablet    ZOFRAN    20 tablet    Take 1 tablet (4 mg) by mouth every 6 hours as needed for nausea    Arteriovenous fistula (H)       oxyCODONE IR 5 MG tablet     ROXICODONE    18 tablet    Take 1 tablet (5 mg) by mouth every 4 hours as needed for pain maximum 6 tablet(s) per day    Arteriovenous fistula (H)       senna 8.6 MG tablet    SENOKOT    20 tablet    Take 1 tablet by mouth 2 times daily as needed for constipation    Arteriovenous fistula (H)

## 2017-12-11 NOTE — TELEPHONE ENCOUNTER
DATE:  12/11/2017   TIME OF RECEIPT FROM LAB:  15:45  LAB TEST:  BUN, Creatinine  LAB VALUE:  105, 5.20  RESULTS GIVEN WITH READ-BACK TO (PROVIDER):  NEETA NEGRETE  TIME LAB VALUE REPORTED TO PROVIDER:   15:45      Labs are stable. Update sent to Cecilia Negrete.    Giselle Benoit RN

## 2017-12-11 NOTE — PROGRESS NOTES
Dung Norton is enrolled/participating in the retail pharmacy Blood Pressure Goals Achievement Program (BPGAP).  Dung Norton was evaluated at [facility] on December 11, 2017 at which time his blood pressure was:    BP Readings from Last 3 Encounters:   12/11/17 136/84   12/08/17 142/83   12/04/17 132/70     Reviewed lifestyle modifications for blood pressure control and reduction: including making healthy food choices, managing weight, getting regular exercise, smoking cessation, reducing alcohol consumption, monitoring blood pressure regularly.     Dung Norton is not experiencing symptoms.    Follow-Up: BP is at goal of < 140/90mmHg (patient 18+ years of age with or without diabetes).  Recommended follow-up at pharmacy in 6 months.     Recommendation to Provider: None at this time.     Dung Norton was evaluated for enrollment into the PGEN study today.    Patient eligible for enrollment:  No  Patient interested in enrollment:  No    Completed by: Thank you,  Cheri Winslow, PharmD  [facility]  [phone]

## 2017-12-12 ENCOUNTER — TELEPHONE (OUTPATIENT)
Dept: PHARMACY | Facility: CLINIC | Age: 72
End: 2017-12-12

## 2017-12-12 ENCOUNTER — OFFICE VISIT (OUTPATIENT)
Dept: FAMILY MEDICINE | Facility: CLINIC | Age: 72
End: 2017-12-12
Payer: COMMERCIAL

## 2017-12-12 VITALS
TEMPERATURE: 97 F | HEART RATE: 80 BPM | SYSTOLIC BLOOD PRESSURE: 140 MMHG | WEIGHT: 156 LBS | BODY MASS INDEX: 22.33 KG/M2 | DIASTOLIC BLOOD PRESSURE: 70 MMHG | HEIGHT: 70 IN | RESPIRATION RATE: 16 BRPM

## 2017-12-12 DIAGNOSIS — N18.6 END STAGE RENAL DISEASE (H): ICD-10-CM

## 2017-12-12 DIAGNOSIS — Z12.11 SPECIAL SCREENING FOR MALIGNANT NEOPLASMS, COLON: ICD-10-CM

## 2017-12-12 DIAGNOSIS — Z87.891 EX-SMOKER: ICD-10-CM

## 2017-12-12 DIAGNOSIS — R01.1 HEART MURMUR: ICD-10-CM

## 2017-12-12 DIAGNOSIS — N18.5 CHRONIC KIDNEY DISEASE, STAGE V (H): ICD-10-CM

## 2017-12-12 DIAGNOSIS — D63.1 ANEMIA OF CHRONIC RENAL FAILURE, STAGE 5 (H): ICD-10-CM

## 2017-12-12 DIAGNOSIS — N18.5 ANEMIA OF CHRONIC RENAL FAILURE, STAGE 5 (H): ICD-10-CM

## 2017-12-12 DIAGNOSIS — I10 HYPERTENSION GOAL BP (BLOOD PRESSURE) < 140/90: Primary | ICD-10-CM

## 2017-12-12 DIAGNOSIS — N13.9 OBSTRUCTIVE UROPATHY: ICD-10-CM

## 2017-12-12 LAB
CREAT UR-MCNC: 51 MG/DL
PROT UR-MCNC: 0.49 G/L
PROT/CREAT 24H UR: 0.96 G/G CR (ref 0–0.2)

## 2017-12-12 PROCEDURE — 99214 OFFICE O/P EST MOD 30 MIN: CPT | Performed by: FAMILY MEDICINE

## 2017-12-12 PROCEDURE — 84156 ASSAY OF PROTEIN URINE: CPT | Performed by: FAMILY MEDICINE

## 2017-12-12 RX ORDER — AMLODIPINE BESYLATE 2.5 MG/1
2.5 TABLET ORAL DAILY
Qty: 30 TABLET | Refills: 1 | Status: SHIPPED | OUTPATIENT
Start: 2017-12-12 | End: 2017-12-26

## 2017-12-12 NOTE — PROGRESS NOTES
SUBJECTIVE:   Dung Norton is a 72 year old male who presents to clinic today for the following health issues:      Hypertension Follow-up      Outpatient blood pressures are not being checked.    Low Salt Diet: no added salt        Amount of exercise or physical activity: walking     Problems taking medications regularly: No    Medication side effects: none    Diet: row salt    Pt is seeing Nephrology for ESRD  And is going to be starting Dialysis            Problem list and histories reviewed & adjusted, as indicated.  Additional history: as documented    Patient Active Problem List   Diagnosis     Tobacco abuse     CARDIOVASCULAR SCREENING; LDL GOAL LESS THAN 130     Hypertension goal BP (blood pressure) < 140/90     Advanced directives, counseling/discussion     Elevated fasting glucose     Hypertriglyceridemia     Symptomatic anemia     Pulmonary nodules     Bilateral leg weakness     Weakness of shoulder     KRISTINA (acute kidney injury) (H)     Anemia of chronic renal failure, stage 5 (H)     Orthostatic hypotension     Acute renal failure, unspecified acute renal failure type (H)     End stage renal disease (H)     Acquired hypothyroidism     Obstructive uropathy     CKD (chronic kidney disease) stage 5, GFR less than 15 ml/min (H)     Past Surgical History:   Procedure Laterality Date     APPENDECTOMY  AGE 6     CREATE FISTULA ARTERIOVENOUS UPPER EXTREMITY Left 11/17/2017    Procedure: CREATE FISTULA ARTERIOVENOUS UPPER EXTREMITY;  Left Cephalic Vein To Radial Artery Arteriovenous Fistula Creation, Anesthesia Block;  Surgeon: Eduardo Pabon MD;  Location: [facility]     CYSTOSCOPY, FULGURATE BLEEDERS, EVACUATE CLOT(S), COMBINED N/A 7/16/2017    Procedure: COMBINED CYSTOSCOPY, FULGURATE BLEEDERS, EVACUATE CLOT(S);  Cystoscopy clot evacuation, bladder biopsy and fulguration (per surgeons note) NERVE BLOCK PERIPHERAL;  Surgeon: Tamiko Vanegas MD;  Location: [facility] OR     SINUS SURGERY  AGE 6      TONSILLECTOMY      CHILDHOOD       Social History   Substance Use Topics     Smoking status: Former Smoker     Types: Cigarettes     Quit date: 6/12/2017     Smokeless tobacco: Never Used     Alcohol use No     Family History   Problem Relation Age of Onset     C.A.D. Mother      d age 67     Vision Loss Father      Hearing Loss Sister      DIABETES Sister      CANCER No family hx of          Current Outpatient Prescriptions   Medication Sig Dispense Refill     amLODIPine (NORVASC) 2.5 MG tablet Take 1 tablet (2.5 mg) by mouth daily 30 tablet 1     darbepoetin william (ARANESP, ALBUMIN FREE,) 100 MCG/0.5ML injection Inject 0.5 mLs (100 mcg) Subcutaneous every 14 days As needed for hgb<10g/dL.  If Hgb increases >1 point in 2 weeks (if blood transfusion given, use hgb PRIOR to this), SYSTOLIC BP > 180 mmHg or hgb>=10g/dL, HOLD DOSE. Dose must be within 1 week of Hgb.  Per anemia protocol with Cecilia Britt,CNP 0.5 mL 99     furosemide (LASIX) 20 MG tablet TK 1 T PO ONCE D  1     ondansetron (ZOFRAN) 4 MG tablet Take 1 tablet (4 mg) by mouth every 6 hours as needed for nausea 20 tablet 0     oxyCODONE IR (ROXICODONE) 5 MG tablet Take 1 tablet (5 mg) by mouth every 4 hours as needed for pain maximum 6 tablet(s) per day (Patient not taking: Reported on 12/1/2017) 18 tablet 0     senna (SENOKOT) 8.6 MG tablet Take 1 tablet by mouth 2 times daily as needed for constipation (Patient not taking: Reported on 12/1/2017) 20 tablet 0     calcium acetate (PHOSLO) 667 MG CAPS capsule Take 2 capsules (1,334 mg) by mouth 3 times daily (with meals) 540 capsule 3     finasteride (PROSCAR) 5 MG tablet Take 1 tablet (5 mg) by mouth daily 90 tablet 3     Multiple Vitamins-Minerals (MULTIVITAMIN MEN PO) Take 1 tablet by mouth daily       fish oil-omega-3 fatty acids (FISH OIL) 1000 MG capsule Take 1 g by mouth daily       aspirin 81 MG tablet Take 1 tablet by mouth daily.       No Known Allergies  Recent Labs   Lab Test  12/11/17   1018   11/27/17   1039   09/25/17   1028   09/12/17   1508   08/31/17   1738   08/28/17   1909   07/19/17   0537   07/12/17   1407  02/24/16   1044  02/02/15   1046  01/15/14   0950   A1C   --    --    --    --    --    --    --   5.6   --    --    --    --    --    --   5.4   --   5.3   LDL   --    --    --   100*   --    --    --    --    --    --    --    --    --    --   114*  128  119   HDL   --    --    --   38*   --    --    --    --    --    --    --    --    --    --   29*  32*  33*   TRIG   --    --    --   114   --    --    --    --    --    --    --    --    --    --   199*  229*  230*   ALT  17   --    --    --    --    --    --    --    --   17   --   17   < >  20   --    --   29   CR  5.20*  5.48*   < >  5.32*   < >   --    < >   --    < >  7.80*   < >  7.19*   < >  14.40*  2.56*  1.10  0.91   GFRESTIMATED  11*  10*   < >  11*   < >   --    < >   --    < >  7*   < >  8*   < >  3*  25*  66  82   GFRESTBLACK  13*  12*   < >  13*   < >   --    < >   --    < >  8*   < >  9*   < >  4*  30*  80  >90   POTASSIUM  4.3  4.9   < >  3.9   < >   --    < >   --    < >  4.7   < >  5.2   < >  4.2  3.4  3.5  4.0   TSH   --    --    --    --    --   5.32*   --    --    --    --    --    --    --   10.51*   --    --    --     < > = values in this interval not displayed.      BP Readings from Last 3 Encounters:   12/12/17 140/70   12/11/17 136/84   12/08/17 142/83    Wt Readings from Last 3 Encounters:   12/12/17 156 lb (70.8 kg)   12/08/17 156 lb 12.8 oz (71.1 kg)   12/01/17 150 lb (68 kg)                  Labs reviewed in EPIC          Reviewed and updated as needed this visit by clinical staff     Reviewed and updated as needed this visit by Provider         ROS:  C: NEGATIVE for fever, chills, change in weight  E/M: NEGATIVE for ear, mouth and throat problems  R: NEGATIVE for significant cough or SOB  CV: NEGATIVE for chest pain, palpitations or peripheral edema  GI: NEGATIVE for nausea, abdominal pain, heartburn, or  "change in bowel habits  HEME/ALLERGY/IMMUNE: NEGATIVE for bleeding problems  PSYCHIATRIC: NEGATIVE for changes in mood or affect    OBJECTIVE:     /70  Pulse 80  Temp 97  F (36.1  C)  Resp 16  Ht 5' 10\" (1.778 m)  Wt 156 lb (70.8 kg)  BMI 22.38 kg/m2  Body mass index is 22.38 kg/(m^2).  GENERAL: healthy, alert and no distress  NECK: no adenopathy, no asymmetry, masses, or scars and thyroid normal to palpation  RESP: lungs clear to auscultation - no rales, rhonchi or wheezes  CV: regular rates and rhythm, grade 3/6 systolic murmur murmur heard best over the LSB, peripheral pulses strong and no peripheral edema  ABDOMEN: soft, nontender, no hepatosplenomegaly, no masses and bowel sounds normal  MS: no gross musculoskeletal defects noted, no edema  NEURO: Normal strength and tone, mentation intact and speech normal    Diagnostic Test Results:  none     ASSESSMENT/PLAN:         1. Hypertension goal BP (blood pressure) < 140/90  Advised start Norvasc   SEE [facility] orders  The potential side effects of this medication have been discussed with the patient.  Call if any significant problems with these are experienced.  Follow up BP check 2 weeks  - amLODIPine (NORVASC) 2.5 MG tablet; Take 1 tablet (2.5 mg) by mouth daily  Dispense: 30 tablet; Refill: 1    2. End stage renal disease (H)  Sees Nephrology and starting Dialysis    3. Obstructive uropathy  Pt scheduled for surgery    4. Anemia of chronic renal failure, stage 5 (H)  Pt in anemia management Program    5. Special screening for malignant neoplasms, colon  Advised   - Fecal colorectal cancer screen (FIT); Future    6. Heart murmur  Advised   - Echocardiogram Complete; Future    7. Ex-smoker      Haley Baker MD  [facility]  "

## 2017-12-12 NOTE — PATIENT INSTRUCTIONS
[facility]    If you have any questions regarding to your visit please contact your care team:       Team Red:   Clinic Hours Telephone Number   Dr. Toya Schwartz  (pediatrics)  Nohemy Rogers NP 7am-7pm  Monday - Thursday   7am-5pm  Fridays  [phone] (fax)    Nikki NAVA  [phone]   Urgent Care - [place] and [place] Monday-Friday  [place] - 11am-8pm  Saturday-Sunday  Both sites - 9am-5pm  [phone] - [facility]  [phone] - [place]       What options do I have for visits at the clinic other than the traditional office visit?  To expand how we care for you, many of our providers are utilizing electronic visits (e-visits) and telephone visits, when medically appropriate, for interactions with their patients rather than a visit in the clinic.   We also offer nurse visits for many medical concerns. Just like any other service, we will bill your insurance company for this type of visit based on time spent on the phone with your provider. Not all insurance companies cover these visits. Please check with your medical insurance if this type of visit is covered. You will be responsible for any charges that are not paid by your insurance.      E-visits via ioBridge:  generally incur a $35.00 fee.  Telephone visits:  Time spent on the phone: *charged based on time that is spent on the phone in increments of 10 minutes. Estimated cost:   5-10 mins $30.00   11-20 mins. $59.00   21-30 mins. $85.00     As always, Thank you for trusting us with your health care needs!            Discharged by Bev Rosa MA.

## 2017-12-12 NOTE — PATIENT INSTRUCTIONS
[facility]    If you have any questions regarding to your visit please contact your care team:       Team Red:   Clinic Hours Telephone Number   Dr. Toya Schwartz  (pediatrics)  Nohemy Rogers NP 7am-7pm  Monday - Thursday   7am-5pm  Fridays  [phone] (fax)    Nikki NAVA  [phone]   Urgent Care - [place] and [place] Monday-Friday  [place] - 11am-8pm  Saturday-Sunday  Both sites - 9am-5pm  [phone] - [facility]  [phone] - [place]       What options do I have for visits at the clinic other than the traditional office visit?  To expand how we care for you, many of our providers are utilizing electronic visits (e-visits) and telephone visits, when medically appropriate, for interactions with their patients rather than a visit in the clinic.   We also offer nurse visits for many medical concerns. Just like any other service, we will bill your insurance company for this type of visit based on time spent on the phone with your provider. Not all insurance companies cover these visits. Please check with your medical insurance if this type of visit is covered. You will be responsible for any charges that are not paid by your insurance.      E-visits via CO-Value:  generally incur a $35.00 fee.  Telephone visits:  Time spent on the phone: *charged based on time that is spent on the phone in increments of 10 minutes. Estimated cost:   5-10 mins $30.00   11-20 mins. $59.00   21-30 mins. $85.00     As always, Thank you for trusting us with your health care needs!    Eli IQBAL MA    Please schedule CT chest as recommended   Please call [facility] at [phone] to schedule your  Imaging appointment .  Please discussed stool test  Please schedule Echocardiogram  Start Norvasc 2.5 mg daily for Blood Pressure  See Dr Baker in 2 weeks  Take care  Haley Baker MD

## 2017-12-12 NOTE — MR AVS SNAPSHOT
After Visit Summary   12/12/2017    Dung Norton    MRN: [id number]           Patient Information     Date Of Birth          1945        Visit Information        Provider Department      12/12/2017 12:10 PM Haley Baker MD [facility]        Today's Diagnoses     Hypertension goal BP (blood pressure) < 140/90    -  1    End stage renal disease (H)        Obstructive uropathy        Anemia of chronic renal failure, stage 5 (H)        Tobacco abuse        Special screening for malignant neoplasms, colon        Heart murmur          Care Instructions    [facility]    If you have any questions regarding to your visit please contact your care team:       Team Red:   Clinic Hours Telephone Number   Dr. Toya Schwartz  (pediatrics)  Nohemy Rogers NP 7am-7pm  Monday - Thursday   7am-5pm  Fridays  [phone] (fax)    Nikki NAVA  [phone]   Urgent Care - [place] and [place] Monday-Friday  [place] - 11am-8pm  Saturday-Sunday  Both sites - 9am-5pm  [phone] - [facility]  [phone] - [place]       What options do I have for visits at the clinic other than the traditional office visit?  To expand how we care for you, many of our providers are utilizing electronic visits (e-visits) and telephone visits, when medically appropriate, for interactions with their patients rather than a visit in the clinic.   We also offer nurse visits for many medical concerns. Just like any other service, we will bill your insurance company for this type of visit based on time spent on the phone with your provider. Not all insurance companies cover these visits. Please check with your medical insurance if this type of visit is covered. You will be responsible for any charges that are not paid by your insurance.      E-visits via Infogram:  generally incur a $35.00 fee.  Telephone visits:  Time spent on the phone: *charged  based on time that is spent on the phone in increments of 10 minutes. Estimated cost:   5-10 mins $30.00   11-20 mins. $59.00   21-30 mins. $85.00     As always, Thank you for trusting us with your health care needs!    lEi IQBAL MA    Please schedule CT chest as recommended   Please call [facility] at [phone] to schedule your  Imaging appointment .  Please discussed stool test  Please schedule Echocardiogram  Start Norvasc 2.5 mg daily for Blood Pressure  See Dr Baker in 2 weeks  Take care  Haley Baker MD                        Follow-ups after your visit        Your next 10 appointments already scheduled     Dec 12, 2017 12:10 PM CST   Office Visit with Haley Baker MD   [address] [id number] [phone]           Bring a current list of meds and any records pertaining to this visit. For Physicals, please bring immunization records and any forms needing to be filled out. Please arrive 10 minutes early to complete paperwork.            Dec 18, 2017 11:30 AM CST   LAB with [facility] LAB   [facility] ([address] [address]   [phone]           Please do not eat 10-12 hours before your appointment if you are coming in fasting for labs on lipids, cholesterol, or glucose (sugar). This does not apply to pregnant women. Water, hot tea and black coffee (with nothing added) are okay. Do not drink other fluids, diet soda or chew gum.            Jan 02, 2018 10:00 AM CST   (Arrive by 9:45 AM)   FISTULA FOLLOW UP with Eduardo Pabon MD   [facility] Solid Organ Transplant ([facility])    [address] [place] [id number] [phone]            Jan 05, 2018  1:30 PM CST   (Arrive by 1:00 PM)   Return Visit with Oralia De La Torre NP   [facility] Nephrology ([facility])    [address] [place] [address]  [facility] [address]   [phone]            Jan 17, 2018 10:30 AM CST   (Arrive by 10:15 AM)   PAC EVALUATION with Uc Pac Roopa 5   [facility] ([facility])    [address] [address]   [phone]            Jan 17, 2018 11:30 AM CST   (Arrive by 11:15 AM)   PAC RN ASSESSMENT with Uc Pac Rn   [facility] [place] ([facility])    [address] [address]   [phone]            Jan 17, 2018 12:00 PM CST   (Arrive by 11:45 AM)   PAC Anesthesia Consult with Uc Pac Anesthesiologist   [facility] [place] ([facility])    [address] [place] [address]   [phone]            Jan 29, 2018   Procedure with Tamiko Vanegas MD   [facility] [place] Same Day Surgery (--)    [address] [facility] [address]   [phone]            Feb 09, 2018  9:15 AM CST   (Arrive by 9:00 AM)   Post-Op with Tamiko Vanegas MD   [facility]rologic [facility] ([facility])    [address] [place] [address]   [phone]              Future tests that were ordered for you today     Open Future Orders        Priority Expected Expires Ordered    Echocardiogram Complete Routine  12/12/2018 12/12/2017    Fecal colorectal cancer screen (FIT) Routine 1/2/2018 3/6/2018 12/12/2017    Protein  random urine with Creat Ratio Routine  12/11/2018 12/11/2017            Who to contact     If you have questions or need follow up information about today's clinic visit or your schedule please contact [facility] directly at [phone].  Normal or non-critical lab and imaging results will be communicated to you by MyChart, letter or phone within 4 business days after the clinic has received the  "results. If you do not hear from us within 7 days, please contact the clinic through CloudTags or phone. If you have a critical or abnormal lab result, we will notify you by phone as soon as possible.  Submit refill requests through CloudTags or call your pharmacy and they will forward the refill request to us. Please allow 3 business days for your refill to be completed.          Additional Information About Your Visit        CloudTags Information     CloudTags lets you send messages to your doctor, view your test results, renew your prescriptions, schedule appointments and more. To sign up, go to [email or web address] . Click on \"Log in\" on the left side of the screen, which will take you to the Welcome page. Then click on \"Sign up Now\" on the right side of the page.     You will be asked to enter the access code listed below, as well as some personal information. Please follow the directions to create your username and password.     Your access code is: [email or web address]  Expires: 2018  3:46 PM     Your access code will  in 90 days. If you need help or a new code, please call your [place] clinic or [phone].        Care EveryWhere ID     This is your Care EveryWhere ID. This could be used by other organizations to access your [place] medical records  [id number]        Your Vitals Were     Pulse Temperature Respirations Height BMI (Body Mass Index)       80 97  F (36.1  C) 16 5' 10\" (1.778 m) 22.38 kg/m2        Blood Pressure from Last 3 Encounters:   17 140/70   17 136/84   17 142/83    Weight from Last 3 Encounters:   17 156 lb (70.8 kg)   17 156 lb 12.8 oz (71.1 kg)   17 150 lb (68 kg)                 Today's Medication Changes          These changes are accurate as of: 17 11:50 AM.  If you have any questions, ask your nurse or doctor.               Start taking these medicines.        Dose/Directions    amLODIPine 2.5 MG tablet   Commonly known as:  NORVASC "   Used for:  Hypertension goal BP (blood pressure) < 140/90   Started by:  Haley Baker MD        Dose:  2.5 mg   Take 1 tablet (2.5 mg) by mouth daily   Quantity:  30 tablet   Refills:  1            Where to get your medicines      These medications were sent to [facility]  [place], [place] - [address]  [address], [place] [address]     Phone:  [phone]     amLODIPine 2.5 MG tablet                Primary Care Provider Office Phone # Fax #    Haley Baker [phone] [address] [facility]  [place] [address]        Equal Access to Services     [facility]: Hadii cristine marin hadasho Soomaali, waaxda luqadaha, qaybta kaalmada adeegyada, gibson green . So [facility] [phone].    ATENCIÓN: Si habla español, tiene a montague disposición servicios gratuitos de asistencia lingüística. Llame al [phone].    We comply with applicable federal civil rights laws and [place] laws. We do not discriminate on the basis of race, color, national origin, age, disability, sex, sexual orientation, or gender identity.            Thank you!     Thank you for choosing [facility]  for your care. Our goal is always to provide you with excellent care. Hearing back from our patients is one way we can continue to improve our services. Please take a few minutes to complete the written survey that you may receive in the mail after your visit with us. Thank you!             Your Updated Medication List - Protect others around you: Learn how to safely use, store and throw away your medicines at www.disposemymeds.org.          This list is accurate as of: 12/12/17 11:50 AM.  Always use your most recent med list.                   Brand Name Dispense Instructions for use Diagnosis    amLODIPine 2.5 MG tablet    NORVASC    30 tablet    Take 1 tablet (2.5 mg) by mouth daily    Hypertension goal BP (blood pressure) < 140/90       aspirin 81 MG  tablet      Take 1 tablet by mouth daily.        calcium acetate 667 MG Caps capsule    PHOSLO    540 capsule    Take 2 capsules (1,334 mg) by mouth 3 times daily (with meals)    Chronic kidney disease, unspecified CKD stage       darbepoetin william 100 MCG/0.5ML injection    ARANESP (ALBUMIN FREE)    0.5 mL    Inject 0.5 mLs (100 mcg) Subcutaneous every 14 days As needed for hgb<10g/dL.  If Hgb increases >1 point in 2 weeks (if blood transfusion given, use hgb PRIOR to this), SYSTOLIC BP > 180 mmHg or hgb>=10g/dL, HOLD DOSE. Dose must be within 1 week of Hgb.  Per anemia protocol with Cecilia De La Torre CNP    Anemia of chronic renal failure, stage 5 (H), CKD (chronic kidney disease) stage 5, GFR less than 15 ml/min (H)       finasteride 5 MG tablet    PROSCAR    90 tablet    Take 1 tablet (5 mg) by mouth daily    Benign prostatic hyperplasia with urinary retention       fish oil-omega-3 fatty acids 1000 MG capsule      Take 1 g by mouth daily        furosemide 20 MG tablet    LASIX     TK 1 T PO ONCE D        MULTIVITAMIN MEN PO      Take 1 tablet by mouth daily        ondansetron 4 MG tablet    ZOFRAN    20 tablet    Take 1 tablet (4 mg) by mouth every 6 hours as needed for nausea    Arteriovenous fistula (H)       oxyCODONE IR 5 MG tablet    ROXICODONE    18 tablet    Take 1 tablet (5 mg) by mouth every 4 hours as needed for pain maximum 6 tablet(s) per day    Arteriovenous fistula (H)       senna 8.6 MG tablet    SENOKOT    20 tablet    Take 1 tablet by mouth 2 times daily as needed for constipation    Arteriovenous fistula (H)

## 2017-12-12 NOTE — PROGRESS NOTES
SUBJECTIVE:   Dung Norton is a 72 year old male who presents to clinic today for the following health issues:      Hypertension Follow-up      Outpatient blood pressures are being checked at the pharmacy.  Results have been 128/74.    Low Salt Diet: low salt        Amount of exercise or physical activity: exercises and shoveling    Problems taking medications regularly: No    Medication side effects: none    Diet: low salt            Problem list and histories reviewed & adjusted, as indicated.  Additional history: as documented    Patient Active Problem List   Diagnosis     CARDIOVASCULAR SCREENING; LDL GOAL LESS THAN 130     Hypertension goal BP (blood pressure) < 140/90     Advanced directives, counseling/discussion     Elevated fasting glucose     Hypertriglyceridemia     Symptomatic anemia     Pulmonary nodules     Bilateral leg weakness     Weakness of shoulder     KRISTINA (acute kidney injury) (H)     Anemia of chronic renal failure, stage 5 (H)     Orthostatic hypotension     Acute renal failure, unspecified acute renal failure type (H)     End stage renal disease (H)     Acquired hypothyroidism     Obstructive uropathy     CKD (chronic kidney disease) stage 5, GFR less than 15 ml/min (H)     Ex-smoker     Past Surgical History:   Procedure Laterality Date     APPENDECTOMY  AGE 6     CREATE FISTULA ARTERIOVENOUS UPPER EXTREMITY Left 11/17/2017    Procedure: CREATE FISTULA ARTERIOVENOUS UPPER EXTREMITY;  Left Cephalic Vein To Radial Artery Arteriovenous Fistula Creation, Anesthesia Block;  Surgeon: Eduardo Pabon MD;  Location: [facility] OR     CYSTOSCOPY, FULGURATE BLEEDERS, EVACUATE CLOT(S), COMBINED N/A 7/16/2017    Procedure: COMBINED CYSTOSCOPY, FULGURATE BLEEDERS, EVACUATE CLOT(S);  Cystoscopy clot evacuation, bladder biopsy and fulguration (per surgeons note) NERVE BLOCK PERIPHERAL;  Surgeon: Tamiko Vanegas MD;  Location: [facility] OR     SINUS SURGERY  AGE 6     TONSILLECTOMY      CHILDHOOD        Social History   Substance Use Topics     Smoking status: Former Smoker     Types: Cigarettes     Quit date: 6/12/2017     Smokeless tobacco: Never Used     Alcohol use No     Family History   Problem Relation Age of Onset     C.A.D. Mother      d age 67     Vision Loss Father      Hearing Loss Sister      DIABETES Sister      CANCER No family hx of          Current Outpatient Prescriptions   Medication Sig Dispense Refill     amLODIPine (NORVASC) 2.5 MG tablet Take 1 tablet (2.5 mg) by mouth daily 90 tablet 3     darbepoetin william (ARANESP, ALBUMIN FREE,) 100 MCG/0.5ML injection Inject 0.5 mLs (100 mcg) Subcutaneous every 14 days As needed for hgb<10g/dL.  If Hgb increases >1 point in 2 weeks (if blood transfusion given, use hgb PRIOR to this), SYSTOLIC BP > 180 mmHg or hgb>=10g/dL, HOLD DOSE. Dose must be within 1 week of Hgb.  Per anemia protocol with Cecilia Britt,CNP 0.5 mL 99     furosemide (LASIX) 20 MG tablet TK 1 T PO ONCE D  1     calcium acetate (PHOSLO) 667 MG CAPS capsule Take 2 capsules (1,334 mg) by mouth 3 times daily (with meals) 540 capsule 3     finasteride (PROSCAR) 5 MG tablet Take 1 tablet (5 mg) by mouth daily 90 tablet 3     Multiple Vitamins-Minerals (MULTIVITAMIN MEN PO) Take 1 tablet by mouth daily       fish oil-omega-3 fatty acids (FISH OIL) 1000 MG capsule Take 1 g by mouth daily       aspirin 81 MG tablet Take 1 tablet by mouth daily.       [DISCONTINUED] amLODIPine (NORVASC) 2.5 MG tablet Take 1 tablet (2.5 mg) by mouth daily 30 tablet 1     ondansetron (ZOFRAN) 4 MG tablet Take 1 tablet (4 mg) by mouth every 6 hours as needed for nausea (Patient not taking: Reported on 12/26/2017) 20 tablet 0     oxyCODONE IR (ROXICODONE) 5 MG tablet Take 1 tablet (5 mg) by mouth every 4 hours as needed for pain maximum 6 tablet(s) per day (Patient not taking: Reported on 12/1/2017) 18 tablet 0     senna (SENOKOT) 8.6 MG tablet Take 1 tablet by mouth 2 times daily as needed for constipation (Patient  not taking: Reported on 12/1/2017) 20 tablet 0     No Known Allergies  Recent Labs   Lab Test  12/18/17   1115  12/11/17   1018   09/25/17   1028   09/12/17   1508   08/31/17   1738   08/28/17   1909   07/19/17   0537   07/12/17   1407  02/24/16   1044  02/02/15   1046  01/15/14   0950   A1C   --    --    --    --    --    --    --   5.6   --    --    --    --    --    --   5.4   --   5.3   LDL   --    --    --   100*   --    --    --    --    --    --    --    --    --    --   114*  128  119   HDL   --    --    --   38*   --    --    --    --    --    --    --    --    --    --   29*  32*  33*   TRIG   --    --    --   114   --    --    --    --    --    --    --    --    --    --   199*  229*  230*   ALT   --   17   --    --    --    --    --    --    --   17   --   17   < >  20   --    --   29   CR  4.99*  5.20*   < >  5.32*   < >   --    < >   --    < >  7.80*   < >  7.19*   < >  14.40*  2.56*  1.10  0.91   GFRESTIMATED  11*  11*   < >  11*   < >   --    < >   --    < >  7*   < >  8*   < >  3*  25*  66  82   GFRESTBLACK  14*  13*   < >  13*   < >   --    < >   --    < >  8*   < >  9*   < >  4*  30*  80  >90   POTASSIUM  4.5  4.3   < >  3.9   < >   --    < >   --    < >  4.7   < >  5.2   < >  4.2  3.4  3.5  4.0   TSH   --    --    --    --    --   5.32*   --    --    --    --    --    --    --   10.51*   --    --    --     < > = values in this interval not displayed.      BP Readings from Last 3 Encounters:   12/26/17 134/82   12/18/17 128/76   12/12/17 140/70    Wt Readings from Last 3 Encounters:   12/26/17 164 lb (74.4 kg)   12/12/17 156 lb (70.8 kg)   12/08/17 156 lb 12.8 oz (71.1 kg)                  Labs reviewed in EPIC          Reviewed and updated as needed this visit by clinical staff       Reviewed and updated as needed this visit by Provider         ROS:  C: NEGATIVE for fever, chills, change in weight  E/M: NEGATIVE for ear, mouth and throat problems  R: NEGATIVE for significant cough or SOB  CV:  "NEGATIVE for chest pain, palpitations or peripheral edema  GI: NEGATIVE for nausea, abdominal pain, heartburn, or change in bowel habits    OBJECTIVE:     /82 (BP Location: Left arm, Patient Position: Chair, Cuff Size: Adult Regular)  Pulse 91  Temp 97.1  F (36.2  C) (Oral)  Ht 5' 10\" (1.778 m)  Wt 164 lb (74.4 kg)  SpO2 99%  BMI 23.53 kg/m2  Body mass index is 23.53 kg/(m^2).  GENERAL: healthy, alert and no distress  NECK: no adenopathy, no asymmetry, masses, or scars and thyroid normal to palpation  RESP: lungs clear to auscultation - no rales, rhonchi or wheezes  CV: regular rate and rhythm, normal S1 S2, no S3 or S4, no murmur, click or rub, no peripheral edema and peripheral pulses strong  ABDOMEN: soft, nontender, no hepatosplenomegaly, no masses and bowel sounds normal  MS: no gross musculoskeletal defects noted, no edema    Diagnostic Test Results:  No results found for this or any previous visit (from the past 24 hour(s)).  Results for orders placed or performed in visit on 12/18/17   Renal panel   Result Value Ref Range    Sodium 141 133 - 144 mmol/L    Potassium 4.5 3.4 - 5.3 mmol/L    Chloride 110 (H) 94 - 109 mmol/L    Carbon Dioxide 19 (L) 20 - 32 mmol/L    Anion Gap 12 3 - 14 mmol/L    Glucose 92 70 - 99 mg/dL    Urea Nitrogen 102 (H) 7 - 30 mg/dL    Creatinine 4.99 (H) 0.66 - 1.25 mg/dL    GFR Estimate 11 (L) >60 mL/min/1.7m2    GFR Estimate If Black 14 (L) >60 mL/min/1.7m2    Calcium 9.3 8.5 - 10.1 mg/dL    Phosphorus 6.5 (H) 2.5 - 4.5 mg/dL    Albumin 3.2 (L) 3.4 - 5.0 g/dL   Hemoglobin and hematocrit   Result Value Ref Range    Hemoglobin 9.1 (L) 13.3 - 17.7 g/dL    Hematocrit 30.3 (L) 40.0 - 53.0 %       ASSESSMENT/PLAN:       ICD-10-CM    1. Renal hypertension I12.9 amLODIPine (NORVASC) 2.5 MG tablet   2. ESRD (end stage renal disease) on dialysis (H) N18.6     Z99.2    3. Screen for colon cancer Z12.11      BP is stable   Pt seeing nephrology  Advised FIT card  [facility] " [place]

## 2017-12-12 NOTE — TELEPHONE ENCOUNTER
Anemia Management Note  SUBJECTIVE/OBJECTIVE:  Referred by Cecilia De La Torre on 9/11/2017  Primary Diagnosis: Anemia in Chronic Kidney Disease (N18.5, D63.1)     Secondary Diagnosis:  Chronic Kidney Disease, Stage 5 (N18.5)  Hgb goal range:  9-10  Epo/Darbo: Aranesp 100 mcg every 14 days As needed for hgb<10g/dL  RX expires on 12/5/2018  Iron regimen:  None  Labs exp: 9/12/2018  **Provide phone number for [facility] [phone] and instruction to schedule ancillary visit for aranesp injection. Send message to Zainab Heller and Emily Starkey as they will need to order med**    Contact: **AUTH TO DISCUSS**Tereza Norton(daughter) [phone], Rabia Purcell (daughter)[phone]     Ok to leave message regarding labs and appts per consent to communicate dated 12/12/17    OK to speak with daughters Tereza and Rabia regarding Dung's anemia care plan per consent to communicate dated 12/12/17    Anemia Latest Ref Rng & Units 11/16/2017 11/17/2017 11/20/2017 11/27/2017 11/30/2017 12/4/2017 12/11/2017   EARLENE Dose - 60 mcg - - - 60 mcg - -   Hemoglobin 13.3 - 17.7 g/dL - 7.8(L) 8.2(L) 8.1(L) - 8.1(L) 8.5(L)   TSAT 15 - 46 % - - - 14(L) - - -   Ferritin 26 - 388 ng/mL - - - 818(H) - - -     BP Readings from Last 3 Encounters:   12/12/17 140/70   12/11/17 136/84   12/08/17 142/83     Wt Readings from Last 2 Encounters:   12/12/17 156 lb (70.8 kg)   12/08/17 156 lb 12.8 oz (71.1 kg)     Sent message to Safia Elder RN to order aranesp on 12/12/17  Due for aranesp dose on 12/13/17    ASSESSMENT:  Hgb: Not at goal but improving - recommend dose and continue current regimen  TSat: not at goal (>30%) but ferritin >500ng/mL.  IV iron not indicated at this time per anemia protocol.    PLAN:  I spoke to Dung to schedule an appt to get an aranesp dose now and then he should return in 2 weeks for Hgb, ferritin and iron labs and another dose if his Hgb is < 9.5    Orders needed to be renewed (for next follow-up date) in EPIC:  None    Iron labs due:  12/26/17    Plan discussed with:  Dung  Plan provided by:  Judith    NEXT FOLLOW-UP DATE:  12/14 to document dose if given then 12/26/17    Anemia Management Service  Gabriella PalD and Malina Hussein CPhT  Phone: [phone]  Fax: [phone]

## 2017-12-14 ENCOUNTER — ALLIED HEALTH/NURSE VISIT (OUTPATIENT)
Dept: NURSING | Facility: CLINIC | Age: 72
End: 2017-12-14
Payer: COMMERCIAL

## 2017-12-14 DIAGNOSIS — Z53.9 ERRONEOUS ENCOUNTER--DISREGARD: Primary | ICD-10-CM

## 2017-12-14 NOTE — MR AVS SNAPSHOT
After Visit Summary   12/14/2017    Dung Norton    MRN: [id number]           Patient Information     Date Of Birth          1945        Visit Information        Provider Department      12/14/2017 10:30 AM [facility] ANCILLARY [facility]        Today's Diagnoses     ERRONEOUS ENCOUNTER--DISREGARD    -  1       Follow-ups after your visit        Your next 10 appointments already scheduled     Dec 18, 2017 11:30 AM CST   LAB with [facility]   [facility]    [address] [address]   [phone]           Please do not eat 10-12 hours before your appointment if you are coming in fasting for labs on lipids, cholesterol, or glucose (sugar). This does not apply to pregnant women. Water, hot tea and black coffee (with nothing added) are okay. Do not drink other fluids, diet soda or chew gum.            Dec 26, 2017 10:40 AM CST   SHORT with Haley Baker MD   [facility] ([facility]    [address] [address]   [phone]            Jan 02, 2018 10:00 AM CST   (Arrive by 9:45 AM)   FISTULA FOLLOW UP with Eduardo Pabon MD   [facility] Solid Organ Transplant ([facility])    [address] [place] [id number] [phone]            Jan 05, 2018  1:30 PM CST   (Arrive by 1:00 PM)   Return Visit with Oralia De La Torre NP   [facility] Nephrology ([facility])    [address] [place] [address]  [address] [facility] [id number] [phone]            Jan 17, 2018 10:30 AM CST   (Arrive by 10:15 AM)   PAC EVALUATION with Uc Pac Roopa 5   [facility] Preoperative Assessment [place] ([facility])    [address] [place] [address]  [address] [facility] [id number] [phone]            Jan 17, 2018 11:30 AM CST   (Arrive by 11:15 AM)   PAC RN ASSESSMENT with Uc Pac Rn   [facility] [place] (  "[facility] [place])    [address] [place] [address]   [phone]            Jan 17, 2018 12:00 PM CST   (Arrive by 11:45 AM)   PAC Anesthesia Consult with  Pac Anesthesiologist   [facility] ([facility] [place])    [address] [place] [address]   [phone]            Jan 29, 2018   Procedure with Tamiko Vanegas MD   [facility] [place] Same Day Surgery (--)    [address] [facility] [address]   [phone]            Feb 09, 2018  9:15 AM CST   (Arrive by 9:00 AM)   Post-Op with Tamiko Vanegas MD   [facility] Urology and [facility] ([facility])    [address] [place] [id number] [phone]              Who to contact     If you have questions or need follow up information about today's clinic visit or your schedule please contact [facility] directly at [phone].  Normal or non-critical lab and imaging results will be communicated to you by MyChart, letter or phone within 4 business days after the clinic has received the results. If you do not hear from us within 7 days, please contact the clinic through MyChart or phone. If you have a critical or abnormal lab result, we will notify you by phone as soon as possible.  Submit refill requests through [employer] or call your pharmacy and they will forward the refill request to us. Please allow 3 business days for your refill to be completed.          Additional Information About Your Visit        MyChart Information     [employer] lets you send messages to your doctor, view your test results, renew your prescriptions, schedule appointments and more. To sign up, go to [email or web address] . Click on \"Log in\" on the left side of the screen, which will take you to the Welcome page. Then click on \"Sign up Now\" on the right side of the " page.     You will be asked to enter the access code listed below, as well as some personal information. Please follow the directions to create your username and password.     Your access code is: [email or web address]  Expires: 2018  3:46 PM     Your access code will  in 90 days. If you need help or a new code, please call your [facility] clinic or [phone].        Care EveryWhere ID     This is your Care EveryWhere ID. This could be used by other organizations to access your [facility] medical records  [id number]         Blood Pressure from Last 3 Encounters:   17 140/70   17 136/84   17 142/83    Weight from Last 3 Encounters:   17 156 lb (70.8 kg)   17 156 lb 12.8 oz (71.1 kg)   17 150 lb (68 kg)              Today, you had the following     No orders found for display       Primary Care Provider Office Phone # Fax #    Haley Baker [phone] [address] [address]        Equal Access to Services     [facility]: Hadii aad ku hadasho Sokatali, waaxda luqadaha, qaybta kaalmada adekimberlyyada, gibson green . So [facility] [phone].    ATENCIÓN: Si habla español, tiene a montague disposición servicios gratuitos de asistencia lingüística. Llame al [phone].    We comply with applicable federal civil rights laws and [place] laws. We do not discriminate on the basis of race, color, national origin, age, disability, sex, sexual orientation, or gender identity.            Thank you!     Thank you for choosing [facility]  for your care. Our goal is always to provide you with excellent care. Hearing back from our patients is one way we can continue to improve our services. Please take a few minutes to complete the written survey that you may receive in the mail after your visit with us. Thank you!             Your Updated Medication List - Protect others around you: Learn how to safely use, store and throw  away your medicines at www.disposemymeds.org.          This list is accurate as of: 12/14/17 10:36 AM.  Always use your most recent med list.                   Brand Name Dispense Instructions for use Diagnosis    amLODIPine 2.5 MG tablet    NORVASC    30 tablet    Take 1 tablet (2.5 mg) by mouth daily    Hypertension goal BP (blood pressure) < 140/90       aspirin 81 MG tablet      Take 1 tablet by mouth daily.        calcium acetate 667 MG Caps capsule    PHOSLO    540 capsule    Take 2 capsules (1,334 mg) by mouth 3 times daily (with meals)    Chronic kidney disease, unspecified CKD stage       darbepoetin william 100 MCG/0.5ML injection    ARANESP (ALBUMIN FREE)    0.5 mL    Inject 0.5 mLs (100 mcg) Subcutaneous every 14 days As needed for hgb<10g/dL.  If Hgb increases >1 point in 2 weeks (if blood transfusion given, use hgb PRIOR to this), SYSTOLIC BP > 180 mmHg or hgb>=10g/dL, HOLD DOSE. Dose must be within 1 week of Hgb.  Per anemia protocol with Cecilia De La Torre CNP    Anemia of chronic renal failure, stage 5 (H), CKD (chronic kidney disease) stage 5, GFR less than 15 ml/min (H)       finasteride 5 MG tablet    PROSCAR    90 tablet    Take 1 tablet (5 mg) by mouth daily    Benign prostatic hyperplasia with urinary retention       fish oil-omega-3 fatty acids 1000 MG capsule      Take 1 g by mouth daily        furosemide 20 MG tablet    LASIX     TK 1 T PO ONCE D        MULTIVITAMIN MEN PO      Take 1 tablet by mouth daily        ondansetron 4 MG tablet    ZOFRAN    20 tablet    Take 1 tablet (4 mg) by mouth every 6 hours as needed for nausea    Arteriovenous fistula (H)       oxyCODONE IR 5 MG tablet    ROXICODONE    18 tablet    Take 1 tablet (5 mg) by mouth every 4 hours as needed for pain maximum 6 tablet(s) per day    Arteriovenous fistula (H)       senna 8.6 MG tablet    SENOKOT    20 tablet    Take 1 tablet by mouth 2 times daily as needed for constipation    Arteriovenous fistula (H)

## 2017-12-15 ENCOUNTER — TELEPHONE (OUTPATIENT)
Dept: PHARMACY | Facility: CLINIC | Age: 72
End: 2017-12-15

## 2017-12-15 NOTE — TELEPHONE ENCOUNTER
I spoke to Dung, he said they ordered the wrong dose for him. They will call him when they get the right dose in.    Call date: 12/18    Tereza Paul, PharmD, [facility]  [phone]  December 15, 2017

## 2017-12-18 ENCOUNTER — ALLIED HEALTH/NURSE VISIT (OUTPATIENT)
Dept: FAMILY MEDICINE | Facility: CLINIC | Age: 72
End: 2017-12-18

## 2017-12-18 VITALS — DIASTOLIC BLOOD PRESSURE: 76 MMHG | SYSTOLIC BLOOD PRESSURE: 128 MMHG

## 2017-12-18 DIAGNOSIS — N18.5 CKD (CHRONIC KIDNEY DISEASE) STAGE 5, GFR LESS THAN 15 ML/MIN (H): ICD-10-CM

## 2017-12-18 DIAGNOSIS — D63.1 ANEMIA OF CHRONIC RENAL FAILURE, STAGE 5 (H): ICD-10-CM

## 2017-12-18 DIAGNOSIS — R79.89 ELEVATED SERUM CREATININE: ICD-10-CM

## 2017-12-18 DIAGNOSIS — N18.5 ANEMIA OF CHRONIC RENAL FAILURE, STAGE 5 (H): ICD-10-CM

## 2017-12-18 DIAGNOSIS — I10 HYPERTENSION GOAL BP (BLOOD PRESSURE) < 140/90: Primary | ICD-10-CM

## 2017-12-18 LAB
ALBUMIN SERPL-MCNC: 3.2 G/DL (ref 3.4–5)
ANION GAP SERPL CALCULATED.3IONS-SCNC: 12 MMOL/L (ref 3–14)
BUN SERPL-MCNC: 102 MG/DL (ref 7–30)
CALCIUM SERPL-MCNC: 9.3 MG/DL (ref 8.5–10.1)
CHLORIDE SERPL-SCNC: 110 MMOL/L (ref 94–109)
CO2 SERPL-SCNC: 19 MMOL/L (ref 20–32)
CREAT SERPL-MCNC: 4.99 MG/DL (ref 0.66–1.25)
GFR SERPL CREATININE-BSD FRML MDRD: 11 ML/MIN/1.7M2
GLUCOSE SERPL-MCNC: 92 MG/DL (ref 70–99)
HCT VFR BLD AUTO: 30.3 % (ref 40–53)
HGB BLD-MCNC: 9.1 G/DL (ref 13.3–17.7)
PHOSPHATE SERPL-MCNC: 6.5 MG/DL (ref 2.5–4.5)
POTASSIUM SERPL-SCNC: 4.5 MMOL/L (ref 3.4–5.3)
SODIUM SERPL-SCNC: 141 MMOL/L (ref 133–144)

## 2017-12-18 PROCEDURE — 99207 ZZC NO CHARGE NURSE ONLY: CPT | Performed by: FAMILY MEDICINE

## 2017-12-18 PROCEDURE — 85018 HEMOGLOBIN: CPT

## 2017-12-18 PROCEDURE — 36415 COLL VENOUS BLD VENIPUNCTURE: CPT

## 2017-12-18 PROCEDURE — 80069 RENAL FUNCTION PANEL: CPT

## 2017-12-18 PROCEDURE — 85014 HEMATOCRIT: CPT

## 2017-12-18 NOTE — PROGRESS NOTES
Dung Norton is enrolled/participating in the retail pharmacy Blood Pressure Goals Achievement Program (BPGAP).  Dung Norton was evaluated at [facility] on December 18, 2017 at which time his blood pressure was:    BP Readings from Last 3 Encounters:   12/18/17 128/76   12/12/17 140/70   12/11/17 136/84     Reviewed lifestyle modifications for blood pressure control and reduction: including making healthy food choices, managing weight, getting regular exercise, smoking cessation, reducing alcohol consumption, monitoring blood pressure regularly.     Dung Norton is not experiencing symptoms.    Follow-Up: BP is at goal of < 150/90 mmHg (patient 60+ years of age without diabetes).  Recommended follow-up at pharmacy in 6 months.     Recommendation to Provider: None at this time.     Dung Norton was evaluated for enrollment into the PGEN study today.    Patient eligible for enrollment:  No  Patient interested in enrollment:  No    Completed by: Thank you,  Cheri Winslow, PharmD  [facility]  [phone]

## 2017-12-18 NOTE — MR AVS SNAPSHOT
After Visit Summary   12/18/2017    Dung Norton    MRN: [id number]           Patient Information     Date Of Birth          1945        Visit Information        Provider Department      12/18/2017 11:45 AM Haley Baker MD [facility]        Today's Diagnoses     Hypertension goal BP (blood pressure) < 140/90    -  1       Follow-ups after your visit        Your next 10 appointments already scheduled     Dec 19, 2017 11:00 AM CST   Nurse Only with FZ ANCILLARY   [facility]    [address] [address]   [phone]            Dec 26, 2017 10:15 AM CST   LAB with FZ LAB   [facility]    [address] [address]   [phone]           Please do not eat 10-12 hours before your appointment if you are coming in fasting for labs on lipids, cholesterol, or glucose (sugar). This does not apply to pregnant women. Water, hot tea and black coffee (with nothing added) are okay. Do not drink other fluids, diet soda or chew gum.            Dec 26, 2017 10:40 AM CST   SHORT with Haley Baker MD   [facility]    [address] [address]   [phone]            Jan 02, 2018 10:00 AM CST   (Arrive by 9:45 AM)   FISTULA FOLLOW UP with Eduardo Pabon MD   [facility] Solid Organ Transplant ([facility])    [address] [address] [phone]            Jan 04, 2018 11:00 AM CST   Ech Complete with FKECHR1   [facility] [place] ([facility])    [address] [place] [address] [phone]           1. Please bring or wear a comfortable two-piece outfit. 2. You may eat, drink and take your normal medicines. 3. For any questions that cannot be answered, please contact the ordering physician             Jan 05, 2018  1:30 PM CST   (Arrive by 1:00 PM)   Return Visit with Oralia De La Torre NP   [facility] Nephrology ([facility])    [address] [facility]  [address] [facility] [address]   [phone]            Jan 17, 2018 10:30 AM CST   (Arrive by 10:15 AM)   PAC EVALUATION with  Pac Roopa 5   [facility] Preoperative Assessment [place] ([facility])    [address] [facility] [address] [facility]  [address] [facility] [address]   [phone]            Jan 17, 2018 11:30 AM CST   (Arrive by 11:15 AM)   PAC RN ASSESSMENT with Manohar Pac Rn   [facility] Preoperative [facility] [place] ([facility])    [address] [place] [address]  4th [facility] [address]   [phone]            Jan 17, 2018 12:00 PM CST   (Arrive by 11:45 AM)   PAC Anesthesia Consult with  Pac Anesthesiologist   [facility] Preoperative [facility] [place] ([facility])    [address] [place] [address]  [address] [facility] [address]   [phone]            Jan 29, 2018   Procedure with Tamiko Vanegas MD   [facility] [place], Same Day Surgery (--)    [address] [facility] [address] [phone]              Who to contact     If you have questions or need follow up information about today's clinic visit or your schedule please contact [facility] directly at [phone].  Normal or non-critical lab and imaging results will be communicated to you by MyChart, letter or phone within 4 business days after the clinic has received the results. If you do not hear from us within 7 days, please contact the clinic through MyChart or phone. If you have a critical or abnormal lab result, we will notify you by phone as soon as possible.  Submit refill requests through [employer] or call your pharmacy and they will forward the refill request to us. Please allow 3 business days for your refill to be completed.          Additional Information About Your Visit       "  MyChart Information     [employer] lets you send messages to your doctor, view your test results, renew your prescriptions, schedule appointments and more. To sign up, go to [email or web address] . Click on \"Log in\" on the left side of the screen, which will take you to the Welcome page. Then click on \"Sign up Now\" on the right side of the page.     You will be asked to enter the access code listed below, as well as some personal information. Please follow the directions to create your username and password.     Your access code is: [email or web address]  Expires: 2018  3:46 PM     Your access code will  in 90 days. If you need help or a new code, please call your [place] clinic or [phone].        Care EveryWhere ID     This is your Care EveryWhere ID. This could be used by other organizations to access your [place] medical records  [id number]         Blood Pressure from Last 3 Encounters:   17 128/76   17 140/70   17 136/84    Weight from Last 3 Encounters:   17 156 lb (70.8 kg)   17 156 lb 12.8 oz (71.1 kg)   17 150 lb (68 kg)              Today, you had the following     No orders found for display       Primary Care Provider Office Phone # Fax #    Haley Baker [phone]       [address] [facility] [address]        Equal Access to Services     [facility]: Hadii aad ku hadasho Soomaali, waaxda luqadaha, qaybta kaalmada adeegyada, waxay rhoda green . So [facility] [phone].    ATENCIÓN: Si habla español, tiene a montague disposición servicios gratuitos de asistencia lingüística. Llame al [phone].    We comply with applicable federal civil rights laws and [place] laws. We do not discriminate on the basis of race, color, national origin, age, disability, sex, sexual orientation, or gender identity.            Thank you!     Thank you for choosing [facility] [place]  for your care. Our goal is always to provide " you with excellent care. Hearing back from our patients is one way we can continue to improve our services. Please take a few minutes to complete the written survey that you may receive in the mail after your visit with us. Thank you!             Your Updated Medication List - Protect others around you: Learn how to safely use, store and throw away your medicines at www.disposemymeds.org.          This list is accurate as of: 12/18/17 11:46 AM.  Always use your most recent med list.                   Brand Name Dispense Instructions for use Diagnosis    amLODIPine 2.5 MG tablet    NORVASC    30 tablet    Take 1 tablet (2.5 mg) by mouth daily    Hypertension goal BP (blood pressure) < 140/90       aspirin 81 MG tablet      Take 1 tablet by mouth daily.        calcium acetate 667 MG Caps capsule    PHOSLO    540 capsule    Take 2 capsules (1,334 mg) by mouth 3 times daily (with meals)    Chronic kidney disease, unspecified CKD stage       darbepoetin william 100 MCG/0.5ML injection    ARANESP (ALBUMIN FREE)    0.5 mL    Inject 0.5 mLs (100 mcg) Subcutaneous every 14 days As needed for hgb<10g/dL.  If Hgb increases >1 point in 2 weeks (if blood transfusion given, use hgb PRIOR to this), SYSTOLIC BP > 180 mmHg or hgb>=10g/dL, HOLD DOSE. Dose must be within 1 week of Hgb.  Per anemia protocol with Cecilia De La Torre CNP    Anemia of chronic renal failure, stage 5 (H), CKD (chronic kidney disease) stage 5, GFR less than 15 ml/min (H)       finasteride 5 MG tablet    PROSCAR    90 tablet    Take 1 tablet (5 mg) by mouth daily    Benign prostatic hyperplasia with urinary retention       fish oil-omega-3 fatty acids 1000 MG capsule      Take 1 g by mouth daily        furosemide 20 MG tablet    LASIX     TK 1 T PO ONCE D        MULTIVITAMIN MEN PO      Take 1 tablet by mouth daily        ondansetron 4 MG tablet    ZOFRAN    20 tablet    Take 1 tablet (4 mg) by mouth every 6 hours as needed for nausea    Arteriovenous fistula (H)        oxyCODONE IR 5 MG tablet    ROXICODONE    18 tablet    Take 1 tablet (5 mg) by mouth every 4 hours as needed for pain maximum 6 tablet(s) per day    Arteriovenous fistula (H)       senna 8.6 MG tablet    SENOKOT    20 tablet    Take 1 tablet by mouth 2 times daily as needed for constipation    Arteriovenous fistula (H)

## 2017-12-18 NOTE — NURSING NOTE
Patient was given a script on 9.12.17.  Patient needs a coude due to inability to pass straight tipped catheter.    Prema Pitts, BRANDY-BC, BSN  Care Coordinator - Reconstructive Urology

## 2017-12-19 ENCOUNTER — TELEPHONE (OUTPATIENT)
Dept: PHARMACY | Facility: CLINIC | Age: 72
End: 2017-12-19

## 2017-12-19 ENCOUNTER — ALLIED HEALTH/NURSE VISIT (OUTPATIENT)
Dept: NURSING | Facility: CLINIC | Age: 72
End: 2017-12-19
Payer: COMMERCIAL

## 2017-12-19 DIAGNOSIS — N18.5 ANEMIA OF CHRONIC RENAL FAILURE, STAGE 5 (H): Primary | ICD-10-CM

## 2017-12-19 DIAGNOSIS — D63.1 ANEMIA OF CHRONIC RENAL FAILURE, STAGE 5 (H): Primary | ICD-10-CM

## 2017-12-19 PROCEDURE — 96372 THER/PROPH/DIAG INJ SC/IM: CPT

## 2017-12-19 NOTE — MR AVS SNAPSHOT
After Visit Summary   12/19/2017    Dung Norton    MRN: [id number]           Patient Information     Date Of Birth          1945        Visit Information        Provider Department      12/19/2017 11:00 AM [facility]CILL[facility]        Today's Diagnoses     Anemia of chronic renal failure, stage 5 (H)    -  1       Follow-ups after your visit        Your next 10 appointments already scheduled     Dec 26, 2017 10:15 AM CST   [facility] LAB   [facility]    [address] [facility] [address]   [phone]           Please do not eat 10-12 hours before your appointment if you are coming in fasting for labs on lipids, cholesterol, or glucose (sugar). This does not apply to pregnant women. Water, hot tea and black coffee (with nothing added) are okay. Do not drink other fluids, diet soda or chew gum.            Dec 26, 2017 10:40 AM CST   SHORT [facility]   [facility]    [address] [facility] [address]   [phone]            Jan 02, 2018 10:00 AM CST   (Arrive by 9:45 AM)   FISTULA FOLLOW UP with Eduardo Pabon MD   [facility] Solid Organ Transplant ([facility])    [address] [place] [id number] [phone]            Jan 04, 2018 11:00 AM CST   Ech Complete with FKECHR1   [facility] ([facility])    [address] [facility] [address] [facility] [address]   [phone]           1. Please bring or wear a comfortable two-piece outfit. 2. You may eat, drink and take your normal medicines. 3. For any questions that cannot be answered, please contact the ordering physician            Jan 05, 2018  1:30 PM CST   (Arrive by 1:00 PM)   Return Visit with Oralia De La Torre NP   [facility] Nephrology ([facility])    [address] [place] [address]  [facility] [address]   [phone]            Jan 17, 2018 10:30 AM CST   (Arrive by 10:15 AM)   PAC EVALUATION with Uc Pac Roopa 5   [facility] Pre[facility] ([facility])    [address] [place] [address]   [phone]            Jan 17, 2018 11:30 AM CST   (Arrive by 11:15 AM)   PAC RN ASSESSMENT with Uc Pac Rn   [facility] Preoperative Assessment [place] ([facility])    [address] [place] [address]   [phone]            Jan 17, 2018 12:00 PM CST   (Arrive by 11:45 AM)   PAC Anesthesia Consult with  Pac Anesthesiologist   [facility] [place] ([facility])    [address] [place] [address]   [phone]            Jan 29, 2018   Procedure with Tamiko Vanegas MD   [facility], [place], Same Day Surgery (--)    [address] [facility] [address]   [phone]            Feb 09, 2018  9:15 AM CST   (Arrive by 9:00 AM)   Post-Op with Tamiko Vanegas MD   [facility] ([facility])    [address] [place] [address]   [phone]              Who to contact     If you have questions or need follow up information about today's clinic visit or your schedule please contact [facility] directly at [phone].  Normal or non-critical lab and imaging results will be communicated to you by MyChart, letter or phone within 4 business days after the clinic has received the results. If you do not hear from us within 7 days, please contact the clinic through MyChart or phone. If you have a critical or abnormal lab result, we will notify you by phone as soon as possible.  Submit refill requests through [employer] or call your pharmacy and they will forward the refill request to us. Please allow 3  "business days for your refill to be completed.          Additional Information About Your Visit        MyChart Information     [employer] lets you send messages to your doctor, view your test results, renew your prescriptions, schedule appointments and more. To sign up, go to [email or web address] . Click on \"Log in\" on the left side of the screen, which will take you to the Welcome page. Then click on \"Sign up Now\" on the right side of the page.     You will be asked to enter the access code listed below, as well as some personal information. Please follow the directions to create your username and password.     Your access code is: [email or web address]  Expires: 2018  3:46 PM     Your access code will  in 90 days. If you need help or a new code, please call your [place] clinic or [phone].        Care EveryWhere ID     This is your Care EveryWhere ID. This could be used by other organizations to access your [place] medical records  [id number]         Blood Pressure from Last 3 Encounters:   17 128/76   17 140/70   17 136/84    Weight from Last 3 Encounters:   17 156 lb (70.8 kg)   17 156 lb 12.8 oz (71.1 kg)   17 150 lb (68 kg)              We Performed the Following     DARBEPOETIN RONNIE 1 MCG (NON-ESRD USE)        Primary Care Provider Office Phone # Fax #    Haley Baker [phone]       [address] [facility] [address]        Equal Access to Services     [facility]: Hadii aad ku hadasho Soomaali, waaxda luqadaha, qaybta kaalmada robert, gibson green . So [facility] [phone].    ATENCIÓN: Si habla español, tiene a montague disposición servicios gratuitos de asistencia lingüística. Llame al [phone].    We comply with applicable federal civil rights laws and [place] laws. We do not discriminate on the basis of race, color, national origin, age, disability, sex, sexual orientation, or gender identity.          "   Thank you!     Thank you for choosing [facility]  for your care. Our goal is always to provide you with excellent care. Hearing back from our patients is one way we can continue to improve our services. Please take a few minutes to complete the written survey that you may receive in the mail after your visit with us. Thank you!             Your Updated Medication List - Protect others around you: Learn how to safely use, store and throw away your medicines at www.disposemymeds.org.          This list is accurate as of: 12/19/17 11:12 AM.  Always use your most recent med list.                   Brand Name Dispense Instructions for use Diagnosis    amLODIPine 2.5 MG tablet    NORVASC    30 tablet    Take 1 tablet (2.5 mg) by mouth daily    Hypertension goal BP (blood pressure) < 140/90       aspirin 81 MG tablet      Take 1 tablet by mouth daily.        calcium acetate 667 MG Caps capsule    PHOSLO    540 capsule    Take 2 capsules (1,334 mg) by mouth 3 times daily (with meals)    Chronic kidney disease, unspecified CKD stage       darbepoetin william 100 MCG/0.5ML injection    ARANESP (ALBUMIN FREE)    0.5 mL    Inject 0.5 mLs (100 mcg) Subcutaneous every 14 days As needed for hgb<10g/dL.  If Hgb increases >1 point in 2 weeks (if blood transfusion given, use hgb PRIOR to this), SYSTOLIC BP > 180 mmHg or hgb>=10g/dL, HOLD DOSE. Dose must be within 1 week of Hgb.  Per anemia protocol with Cecilia De La Torre CNP    Anemia of chronic renal failure, stage 5 (H), CKD (chronic kidney disease) stage 5, GFR less than 15 ml/min (H)       finasteride 5 MG tablet    PROSCAR    90 tablet    Take 1 tablet (5 mg) by mouth daily    Benign prostatic hyperplasia with urinary retention       fish oil-omega-3 fatty acids 1000 MG capsule      Take 1 g by mouth daily        furosemide 20 MG tablet    LASIX     TK 1 T PO ONCE D        MULTIVITAMIN MEN PO      Take 1 tablet by mouth daily        ondansetron 4 MG tablet    ZOFRAN    20  tablet    Take 1 tablet (4 mg) by mouth every 6 hours as needed for nausea    Arteriovenous fistula (H)       oxyCODONE IR 5 MG tablet    ROXICODONE    18 tablet    Take 1 tablet (5 mg) by mouth every 4 hours as needed for pain maximum 6 tablet(s) per day    Arteriovenous fistula (H)       senna 8.6 MG tablet    SENOKOT    20 tablet    Take 1 tablet by mouth 2 times daily as needed for constipation    Arteriovenous fistula (H)

## 2017-12-19 NOTE — NURSING NOTE
The following medication was given:     MEDICATION: darbepoetin william (ARANESP, ALBUMIN FREE,) 100 MCG/0.5ML injection  ROUTE: SQ  SITE: Arm - Left  DOSE: 60mcg/0.3mL  LOT #: [id number]  :  David  EXPIRATION DATE:  03/2019  NDC#: 70800-548-57    Tia Rendon MA

## 2017-12-19 NOTE — TELEPHONE ENCOUNTER
Anemia Management Note  SUBJECTIVE/OBJECTIVE:  Referred by Cecilia De La Torre on 9/11/2017  Primary Diagnosis: Anemia in Chronic Kidney Disease (N18.5, D63.1)     Secondary Diagnosis:  Chronic Kidney Disease, Stage 5 (N18.5)  Hgb goal range:  9-10  Epo/Darbo: Aranesp 100 mcg every 14 days As needed for hgb<10g/dL  RX expires on 12/5/2018  Iron regimen:  None  Labs exp: 9/12/2018  **Provide phone number for [facility] [phone] and instruction to schedule ancillary visit for aranesp injection. Send message to Zainab Heller and Emily Starkey as they will need to order med**     Contact: **AUTH TO DISCUSS**Tereza Norton(daughter) [phone], Rabia Purcell (daughter)[phone]                                                              Ok to leave message regarding labs and appts per consent to communicate dated 12/12/17                                                  OK to speak with daughters Tereza and Rabia regarding Dung's anemia care plan per consent to communicate dated 12/12/17       Anemia Latest Ref Rng & Units 11/20/2017 11/27/2017 11/30/2017 12/4/2017 12/11/2017 12/18/2017 12/19/2017   EARLENE Dose - - - 60 mcg - - - 60 mcg   Hemoglobin 13.3 - 17.7 g/dL 8.2(L) 8.1(L) - 8.1(L) 8.5(L) 9.1(L) -   TSAT 15 - 46 % - 14(L) - - - - -   Ferritin 26 - 388 ng/mL - 818(H) - - - - -     BP Readings from Last 3 Encounters:   12/18/17 128/76   12/12/17 140/70   12/11/17 136/84     Wt Readings from Last 2 Encounters:   12/12/17 156 lb (70.8 kg)   12/08/17 156 lb 12.8 oz (71.1 kg)     ASSESSMENT:  Hgb: at goal - received dose in clinic - recommend continue current regimen  TSat: not at goal (>30%) but ferritin >500ng/mL.  IV iron not indicated at this time per anemia protocol.     PLAN:  RTC for hgb, ferritin and iron labs then aranesp if needed in 2 week(s); labs mondays and dose if needed thursdays    Orders needed to be renewed (for next follow-up date) in EPIC: None    Iron labs due:  1/2/18    Plan discussed  with:  Dung  Plan provided by:  Judith    NEXT FOLLOW-UP DATE:  1/3/18    Anemia Management Service  Tereza Paul PharmD and Malina Hussein CPhT  Phone: [phone]  Fax: [phone]

## 2017-12-26 ENCOUNTER — OFFICE VISIT (OUTPATIENT)
Dept: FAMILY MEDICINE | Facility: CLINIC | Age: 72
End: 2017-12-26
Payer: COMMERCIAL

## 2017-12-26 ENCOUNTER — TELEPHONE (OUTPATIENT)
Dept: FAMILY MEDICINE | Facility: CLINIC | Age: 72
End: 2017-12-26

## 2017-12-26 VITALS
HEART RATE: 91 BPM | SYSTOLIC BLOOD PRESSURE: 134 MMHG | TEMPERATURE: 97.1 F | HEIGHT: 70 IN | WEIGHT: 164 LBS | OXYGEN SATURATION: 99 % | BODY MASS INDEX: 23.48 KG/M2 | DIASTOLIC BLOOD PRESSURE: 82 MMHG

## 2017-12-26 DIAGNOSIS — N18.5 CKD (CHRONIC KIDNEY DISEASE) STAGE 5, GFR LESS THAN 15 ML/MIN (H): ICD-10-CM

## 2017-12-26 DIAGNOSIS — I12.9 RENAL HYPERTENSION: Primary | ICD-10-CM

## 2017-12-26 DIAGNOSIS — R79.89 ELEVATED SERUM CREATININE: ICD-10-CM

## 2017-12-26 DIAGNOSIS — N18.6 ESRD (END STAGE RENAL DISEASE) ON DIALYSIS (H): ICD-10-CM

## 2017-12-26 DIAGNOSIS — D63.1 ANEMIA OF CHRONIC RENAL FAILURE, STAGE 5 (H): ICD-10-CM

## 2017-12-26 DIAGNOSIS — Z12.11 SCREEN FOR COLON CANCER: ICD-10-CM

## 2017-12-26 DIAGNOSIS — N18.5 ANEMIA OF CHRONIC RENAL FAILURE, STAGE 5 (H): ICD-10-CM

## 2017-12-26 DIAGNOSIS — Z99.2 ESRD (END STAGE RENAL DISEASE) ON DIALYSIS (H): ICD-10-CM

## 2017-12-26 LAB
ALBUMIN SERPL-MCNC: 3.3 G/DL (ref 3.4–5)
ANION GAP SERPL CALCULATED.3IONS-SCNC: 7 MMOL/L (ref 3–14)
BUN SERPL-MCNC: 104 MG/DL (ref 7–30)
CALCIUM SERPL-MCNC: 9 MG/DL (ref 8.5–10.1)
CHLORIDE SERPL-SCNC: 110 MMOL/L (ref 94–109)
CO2 SERPL-SCNC: 24 MMOL/L (ref 20–32)
CREAT SERPL-MCNC: 5.17 MG/DL (ref 0.66–1.25)
FERRITIN SERPL-MCNC: 518 NG/ML (ref 26–388)
GFR SERPL CREATININE-BSD FRML MDRD: 11 ML/MIN/1.7M2
GLUCOSE SERPL-MCNC: 88 MG/DL (ref 70–99)
HCT VFR BLD AUTO: 31.6 % (ref 40–53)
HGB BLD-MCNC: 9.6 G/DL (ref 13.3–17.7)
IRON SATN MFR SERPL: 19 % (ref 15–46)
IRON SERPL-MCNC: 46 UG/DL (ref 35–180)
PHOSPHATE SERPL-MCNC: 4.8 MG/DL (ref 2.5–4.5)
POTASSIUM SERPL-SCNC: 5 MMOL/L (ref 3.4–5.3)
SODIUM SERPL-SCNC: 141 MMOL/L (ref 133–144)
TIBC SERPL-MCNC: 237 UG/DL (ref 240–430)

## 2017-12-26 PROCEDURE — 83550 IRON BINDING TEST: CPT

## 2017-12-26 PROCEDURE — 85014 HEMATOCRIT: CPT

## 2017-12-26 PROCEDURE — 83540 ASSAY OF IRON: CPT

## 2017-12-26 PROCEDURE — 99213 OFFICE O/P EST LOW 20 MIN: CPT | Performed by: FAMILY MEDICINE

## 2017-12-26 PROCEDURE — 82728 ASSAY OF FERRITIN: CPT

## 2017-12-26 PROCEDURE — 85018 HEMOGLOBIN: CPT

## 2017-12-26 PROCEDURE — 80069 RENAL FUNCTION PANEL: CPT

## 2017-12-26 PROCEDURE — 36415 COLL VENOUS BLD VENIPUNCTURE: CPT

## 2017-12-26 RX ORDER — AMLODIPINE BESYLATE 2.5 MG/1
2.5 TABLET ORAL DAILY
Qty: 90 TABLET | Refills: 3 | Status: SHIPPED | OUTPATIENT
Start: 2017-12-26 | End: 2018-12-29

## 2017-12-26 NOTE — NURSING NOTE
"Chief Complaint   Patient presents with     Hypertension       Initial /82 (BP Location: Left arm, Patient Position: Chair, Cuff Size: Adult Regular)  Pulse 91  Temp 97.1  F (36.2  C) (Oral)  Ht 5' 10\" (1.778 m)  Wt 164 lb (74.4 kg)  SpO2 99%  BMI 23.53 kg/m2 Estimated body mass index is 23.53 kg/(m^2) as calculated from the following:    Height as of this encounter: 5' 10\" (1.778 m).    Weight as of this encounter: 164 lb (74.4 kg).  Medication Reconciliation: complete    "

## 2017-12-26 NOTE — TELEPHONE ENCOUNTER
RN received Critical lab value from  after patients PCP has left for the day.  RN huddled with another provider, Iram Hoskins and decided this was a normal lab for this patient.  -ESRD, Acute Renal Failure, CKD Stage 5 on Dialysis.   Last creatinine was 5.2 on 12/11/17, today 12/26/17 creatinine was 5.17.  Provider noticed this is trending and no emergent action was taken.     Dalia Poon RN

## 2017-12-26 NOTE — MR AVS SNAPSHOT
After Visit Summary   12/26/2017    Dung Norton    MRN: [id number]           Patient Information     Date Of Birth          1945        Visit Information        Provider Department      12/26/2017 10:40 AM Haley Baker MD [facility]        Today's Diagnoses     Screen for colon cancer    -  1    Hypertension goal BP (blood pressure) < 140/90          Care Instructions    [facility]    If you have any questions regarding to your visit please contact your care team:       Team Red:   Clinic Hours Telephone Number   Dr. Toya Schwartz  (pediatrics)  Nohemy Rogers NP 7am-7pm  Monday - Thursday   7am-5pm  Fridays  [phone] (fax)    Nikki NAVA  [phone]   Urgent Care - [place] and [place] Monday-Friday  [place] - 11am-8pm  Saturday-Sunday  Both sites - 9am-5pm  [phone] - [facility]  [phone] - [place]       What options do I have for visits at the clinic other than the traditional office visit?  To expand how we care for you, many of our providers are utilizing electronic visits (e-visits) and telephone visits, when medically appropriate, for interactions with their patients rather than a visit in the clinic.   We also offer nurse visits for many medical concerns. Just like any other service, we will bill your insurance company for this type of visit based on time spent on the phone with your provider. Not all insurance companies cover these visits. Please check with your medical insurance if this type of visit is covered. You will be responsible for any charges that are not paid by your insurance.      E-visits via [employer]:  generally incur a $35.00 fee.  Telephone visits:  Time spent on the phone: *charged based on time that is spent on the phone in increments of 10 minutes. Estimated cost:   5-10 mins $30.00   11-20 mins. $59.00   21-30 mins. $85.00     As always, Thank you for  trusting us with your health care needs!            Discharged by Bev Rosa MA.            Follow-ups after your visit        Your next 10 appointments already scheduled     Jan 02, 2018 10:00 AM CST   (Arrive by 9:45 AM)   FISTULA FOLLOW UP with Eduardo Pabon MD   [facility] Solid Organ Transplant ([facility] [place])    [address]  3rd [facility] [address] [phone]            Jan 03, 2018 10:30 AM CST   LAB with [facility] ([facility])    [address] [place] [address] [phone]           Please do not eat 10-12 hours before your appointment if you are coming in fasting for labs on lipids, cholesterol, or glucose (sugar). This does not apply to pregnant women. Water, hot tea and black coffee (with nothing added) are okay. Do not drink other fluids, diet soda or chew gum.            Jan 04, 2018 11:00 AM CST   Ech Complete with FKECHR1   [facility] ([facility])    [address] [place] [address] [address]nd [facility] [address]   [phone]           1. Please bring or wear a comfortable two-piece outfit. 2. You may eat, drink and take your normal medicines. 3. For any questions that cannot be answered, please contact the ordering physician            Jan 05, 2018  1:30 PM CST   (Arrive by 1:00 PM)   Return Visit with Oralia De La Torre NP   [facility] Nephrology ([facility] [place])    [address]  3rd [facility] [address] [phone]            Jan 17, 2018 10:30 AM CST   (Arrive by 10:15 AM)   PAC EVALUATION with Uc Pac Roopa 5   [facility] Pre[facility] ([facility] [place])    [address]  4th [facility] [address] [phone]            Jan 17, 2018 11:30 AM CST   (Arrive by 11:15 AM)   PAC RN ASSESSMENT with Uc Pac Rn   [facility] ([facility]  "[facility] [place] [facility])    [address] [place] [address]   [phone]            Jan 17, 2018 12:00 PM CST   (Arrive by 11:45 AM)   PAC Anesthesia Consult with  Pac Anesthesiologist   [facility] ([facility] [place])    [address] [place] [address] [phone]            Jan 29, 2018   Procedure with Tamiko Vanegas MD   [facility] [place], Same Day Surgery (--)    [address] [facility] [address]   [phone]            Feb 09, 2018  9:15 AM CST   (Arrive by 9:00 AM)   Post-Op with Tamiko Vanegas MD   [facility]rology and [facility] for Prostate and Urologic Cancers ([facility] [place])    [address] [place] [address] [phone]              Who to contact     If you have questions or need follow up information about today's clinic visit or your schedule please contact [facility] directly at [phone].  Normal or non-critical lab and imaging results will be communicated to you by MyChart, letter or phone within 4 business days after the clinic has received the results. If you do not hear from us within 7 days, please contact the clinic through goBramblehart or phone. If you have a critical or abnormal lab result, we will notify you by phone as soon as possible.  Submit refill requests through [employer] or call your pharmacy and they will forward the refill request to us. Please allow 3 business days for your refill to be completed.          Additional Information About Your Visit        MyChart Information     Mes[employer] lets you send messages to your doctor, view your test results, renew your prescriptions, schedule appointments and more. To sign up, go to [email or web address] . Click on \"Log in\" on the left side of the screen, which will take you to the Welcome page. Then click on \"Sign up Now\" on the right side of the page. " "    You will be asked to enter the access code listed below, as well as some personal information. Please follow the directions to create your username and password.     Your access code is: [email or web address]  Expires: 2018  3:46 PM     Your access code will  in 90 days. If you need help or a new code, please call your [place] clinic or [phone].        Care EveryWhere ID     This is your Care EveryWhere ID. This could be used by other organizations to access your [facility] medical records  [id number]        Your Vitals Were     Pulse Temperature Height Pulse Oximetry BMI (Body Mass Index)       91 97.1  F (36.2  C) (Oral) 5' 10\" (1.778 m) 99% 23.53 kg/m2        Blood Pressure from Last 3 Encounters:   17 134/82   17 128/76   17 140/70    Weight from Last 3 Encounters:   17 164 lb (74.4 kg)   17 156 lb (70.8 kg)   17 156 lb 12.8 oz (71.1 kg)              Today, you had the following     No orders found for display         Where to get your medicines      Information about where to get these medications is not yet available     ! Ask your nurse or doctor about these medications     amLODIPine 2.5 MG tablet          Primary Care Provider Office Phone # Fax #    Haley Baker [phone] [address] [facility] [address]        Equal Access to Services     [facility]: Hadii cristine ku hadasho Soomaali, waaxda luqadaha, qaybta kaalmada robert, gibson green . So [facility] [phone].    ATENCIÓN: Si habla español, tiene a montague disposición servicios gratuitos de asistencia lingüística. Llame al [phone].    We comply with applicable federal civil rights laws and [place] laws. We do not discriminate on the basis of race, color, national origin, age, disability, sex, sexual orientation, or gender identity.            Thank you!     Thank you for choosing [facility]  for your care. Our goal is always to " provide you with excellent care. Hearing back from our patients is one way we can continue to improve our services. Please take a few minutes to complete the written survey that you may receive in the mail after your visit with us. Thank you!             Your Updated Medication List - Protect others around you: Learn how to safely use, store and throw away your medicines at www.disposemymeds.org.          This list is accurate as of: 12/26/17 10:52 AM.  Always use your most recent med list.                   Brand Name Dispense Instructions for use Diagnosis    amLODIPine 2.5 MG tablet    NORVASC    90 tablet    Take 1 tablet (2.5 mg) by mouth daily    Hypertension goal BP (blood pressure) < 140/90       aspirin 81 MG tablet      Take 1 tablet by mouth daily.        calcium acetate 667 MG Caps capsule    PHOSLO    540 capsule    Take 2 capsules (1,334 mg) by mouth 3 times daily (with meals)    Chronic kidney disease, unspecified CKD stage       darbepoetin william 100 MCG/0.5ML injection    ARANESP (ALBUMIN FREE)    0.5 mL    Inject 0.5 mLs (100 mcg) Subcutaneous every 14 days As needed for hgb<10g/dL.  If Hgb increases >1 point in 2 weeks (if blood transfusion given, use hgb PRIOR to this), SYSTOLIC BP > 180 mmHg or hgb>=10g/dL, HOLD DOSE. Dose must be within 1 week of Hgb.  Per anemia protocol with Cecilia De La Torre CNP    Anemia of chronic renal failure, stage 5 (H), CKD (chronic kidney disease) stage 5, GFR less than 15 ml/min (H)       finasteride 5 MG tablet    PROSCAR    90 tablet    Take 1 tablet (5 mg) by mouth daily    Benign prostatic hyperplasia with urinary retention       fish oil-omega-3 fatty acids 1000 MG capsule      Take 1 g by mouth daily        furosemide 20 MG tablet    LASIX     TK 1 T PO ONCE D        MULTIVITAMIN MEN PO      Take 1 tablet by mouth daily        ondansetron 4 MG tablet    ZOFRAN    20 tablet    Take 1 tablet (4 mg) by mouth every 6 hours as needed for nausea    Arteriovenous fistula  (H)       oxyCODONE IR 5 MG tablet    ROXICODONE    18 tablet    Take 1 tablet (5 mg) by mouth every 4 hours as needed for pain maximum 6 tablet(s) per day    Arteriovenous fistula (H)       senna 8.6 MG tablet    SENOKOT    20 tablet    Take 1 tablet by mouth 2 times daily as needed for constipation    Arteriovenous fistula (H)

## 2018-01-02 ENCOUNTER — OFFICE VISIT (OUTPATIENT)
Dept: TRANSPLANT | Facility: CLINIC | Age: 73
End: 2018-01-02
Attending: SURGERY
Payer: MEDICARE

## 2018-01-02 VITALS
OXYGEN SATURATION: 97 % | WEIGHT: 165.34 LBS | HEART RATE: 80 BPM | HEIGHT: 70 IN | TEMPERATURE: 97.7 F | RESPIRATION RATE: 16 BRPM | DIASTOLIC BLOOD PRESSURE: 78 MMHG | SYSTOLIC BLOOD PRESSURE: 152 MMHG | BODY MASS INDEX: 23.67 KG/M2

## 2018-01-02 DIAGNOSIS — N18.5 CKD (CHRONIC KIDNEY DISEASE) STAGE 5, GFR LESS THAN 15 ML/MIN (H): Primary | ICD-10-CM

## 2018-01-02 PROCEDURE — G0463 HOSPITAL OUTPT CLINIC VISIT: HCPCS | Mod: ZF

## 2018-01-02 ASSESSMENT — PAIN SCALES - GENERAL: PAINLEVEL: NO PAIN (0)

## 2018-01-02 NOTE — LETTER
1/2/2018      RE: Dung Norton  [address]  [place] [address]       Dialysis Access Service   Progress Note    S:  Mr. Norton is being seen today for surgical followup of his dialysis access.  He reports no issues with the wound, and  no steal syndrome of the distal extremity.  He did not some transient Left elbow swelling which responded moderately to ice. Has no pain or fever.      O:  Temp:  [97.7  F (36.5  C)] 97.7  F (36.5  C)  Pulse:  [80] 80  Resp:  [16] 16  BP: (152)/(78) 152/78  SpO2:  [97 %] 97 %  GENERAL: alert, cooperative  Circulation:   Radial pulse 1+  Ulnar pulse  1+   Capillary refill:  capillary refill < 2 sec    Sensory exam:   arm: Normal   [x]           Abnormal   []          Comment:    hand: Normal   [x]           Abnormal   []          Comment:   Motor exam:   arm: Normal   [x]           Abnormal   []          Comment:    hand: Normal   [x]           Abnormal   []          Comment:    Access: L upper extremity wound(s) healed. non-tender. capillary refill < 2 sec, mild venous hypertension, normal maturation and edema isolated to the posterior elbow, non tender.  Doppler interrogation reveals patent palmar arch with biphasic flow.  No clear steal syndrome, but some pallor on elevation.     Assessment & Plan: Mr. Weaver dialysis access has matured very well at this time point.  It is ready to use if needed. We would like to see the patient back in the clinic on an as-needed basis for any access or left hand concerns. The patient was counselled to contact our nurse coordinator, COY Paulino (Sum), CNS at [phone] with any questions or concerns.  Thank you for the opportunity to participate in Mr. Norton's care.    TT: 25 min  CT: 20 min            Eduardo Pabon MD  Department of Surgery

## 2018-01-02 NOTE — PROGRESS NOTES
Dialysis Access Service   Progress Note    S:  Mr. Norton is being seen today for surgical followup of his dialysis access.  He reports no issues with the wound, and  no steal syndrome of the distal extremity.  He did not some transient Left elbow swelling which responded moderately to ice. Has no pain or fever.      O:  Temp:  [97.7  F (36.5  C)] 97.7  F (36.5  C)  Pulse:  [80] 80  Resp:  [16] 16  BP: (152)/(78) 152/78  SpO2:  [97 %] 97 %  GENERAL: alert, cooperative  Circulation:   Radial pulse 1+  Ulnar pulse  1+   Capillary refill:  capillary refill < 2 sec    Sensory exam:   arm: Normal   [x]           Abnormal   []          Comment:    hand: Normal   [x]           Abnormal   []          Comment:   Motor exam:   arm: Normal   [x]           Abnormal   []          Comment:    hand: Normal   [x]           Abnormal   []          Comment:    Access: L upper extremity wound(s) healed. non-tender. capillary refill < 2 sec, mild venous hypertension, normal maturation and edema isolated to the posterior elbow, non tender.  Doppler interrogation reveals patent palmar arch with biphasic flow.  No clear steal syndrome, but some pallor on elevation.     Assessment & Plan: Mr. Figueroas dialysis access has matured very well at this time point.  It is ready to use if needed. We would like to see the patient back in the clinic on an as-needed basis for any access or left hand concerns. The patient was counselled to contact our nurse coordinator, COY Paulino CNS (Sum) at [phone] with any questions or concerns.  Thank you for the opportunity to participate in Mr. Norton's care.    TT: 25 min  CT: 20 min            Eduardo Pabon MD  Department of Surgery

## 2018-01-02 NOTE — MR AVS SNAPSHOT
After Visit Summary   1/2/2018    Dung Norton    MRN: [id number]           Patient Information     Date Of Birth          1945        Visit Information        Provider Department      1/2/2018 10:00 AM Eduardo Pabon MD [facility] Solid Organ Transplant        Today's Diagnoses     CKD (chronic kidney disease) stage 5, GFR less than 15 ml/min (H)    -  1       Follow-ups after your visit        Your next 10 appointments already scheduled     Jan 03, 2018 10:30 AM CST   LAB with  LAB   [facility] ([facility])    [address] [place] [address] [phone]           Please do not eat 10-12 hours before your appointment if you are coming in fasting for labs on lipids, cholesterol, or glucose (sugar). This does not apply to pregnant women. Water, hot tea and black coffee (with nothing added) are okay. Do not drink other fluids, diet soda or chew gum.            Jan 04, 2018 11:00 AM CST   Ech Complete with FKECHR1   [facility] ([facility])    [address] [place] [address] 2nd [facility] [address] [phone]           1. Please bring or wear a comfortable two-piece outfit. 2. You may eat, drink and take your normal medicines. 3. For any questions that cannot be answered, please contact the ordering physician            Jan 05, 2018  1:30 PM CST   (Arrive by 1:00 PM)   Return Visit with Oralia De La Torre NP   [facility] Nephrology ([facility] [place])    [address] [facility] [address]  3rd Floor  [facility] [address] [phone]            Jan 17, 2018 10:30 AM CST   (Arrive by 10:15 AM)   PAC EVALUATION with Uc Pac Roopa [address] [place] [address] Preoperative Assessment Center ([facility] [place])    [address]  4th [facility] [address] [phone]            Jan 17, 2018 11:30 AM CST   (Arrive by 11:15 AM)   PAC RN ASSESSMENT with Uc Pac Rn   [facility]  "Preoperative Assessment Center ([facility] [place])    [address] [address] [facility]  4th [facility] [address] [phone]            Jan 17, 2018 12:00 PM CST   (Arrive by 11:45 AM)   PAC Anesthesia Consult with  Pac Anesthesiologist   [facility] Preoperative Assessment Center ([facility] [place])    [address] [address] [phone]            Jan 29, 2018   Procedure with Tamiko Vanegas MD   [facility] [place], Same Day Surgery (--)    [address] [facility] [address]   [phone]            Feb 09, 2018  9:15 AM CST   (Arrive by 9:00 AM)   Post-Op with Tamiko Vanegas MD   [facility] Urology and [facility] for Prostate and Urologic Cancers ([facility])    [address] [place] [id number] [phone]              Who to contact     If you have questions or need follow up information about today's clinic visit or your schedule please contact [facility] directly at [phone].  Normal or non-critical lab and imaging results will be communicated to you by Audem[employer]art, letter or phone within 4 business days after the clinic has received the results. If you do not hear from us within 7 days, please contact the clinic through Aud[employer] or phone. If you have a critical or abnormal lab result, we will notify you by phone as soon as possible.  Submit refill requests through [employer] or call your pharmacy and they will forward the refill request to us. Please allow 3 business days for your refill to be completed.          Additional Information About Your Visit        [email or web address] Information     [employer] lets you send messages to your doctor, view your test results, renew your prescriptions, schedule appointments and more. To sign up, go to [email or web address] . Click on \"Log in\" on the left side of the screen, which will take you to the Welcome page. Then click on " "\"Sign up Now\" on the right side of the page.     You will be asked to enter the access code listed below, as well as some personal information. Please follow the directions to create your username and password.     Your access code is: [email or web address]  Expires: 2018  3:46 PM     Your access code will  in 90 days. If you need help or a new code, please call your [place] clinic or [phone].        Care EveryWhere ID     This is your Care EveryWhere ID. This could be used by other organizations to access your [place] medical records  [id number]        Your Vitals Were     Pulse Temperature Respirations Height Pulse Oximetry BMI (Body Mass Index)    80 97.7  F (36.5  C) (Oral) 16 1.778 m (5' 10\") 97% 23.72 kg/m2       Blood Pressure from Last 3 Encounters:   18 152/78   17 134/82   17 128/76    Weight from Last 3 Encounters:   18 75 kg (165 lb 5.5 oz)   17 74.4 kg (164 lb)   17 70.8 kg (156 lb)              Today, you had the following     No orders found for display       Primary Care Provider Office Phone # Fax #    Haley Baker [phone] [address] [facility] [address]        Equal Access to Services     [facility]: Hadii cristine ku hadasho Sochalo, waaxda luqadaha, qaybta kaalmada adekimberlyyada, gibson green . So [facility] [phone].    ATENCIÓN: Si habla español, tiene a montague disposición servicios gratuitos de asistencia lingüística. Llame al [phone].    We comply with applicable federal civil rights laws and [place] laws. We do not discriminate on the basis of race, color, national origin, age, disability, sex, sexual orientation, or gender identity.            Thank you!     Thank you for choosing [facility]  for your care. Our goal is always to provide you with excellent care. Hearing back from our patients is one way we can continue to improve our services. Please take a few " minutes to complete the written survey that you may receive in the mail after your visit with us. Thank you!             Your Updated Medication List - Protect others around you: Learn how to safely use, store and throw away your medicines at www.disposemymeds.org.          This list is accurate as of: 1/2/18  5:19 PM.  Always use your most recent med list.                   Brand Name Dispense Instructions for use Diagnosis    amLODIPine 2.5 MG tablet    NORVASC    90 tablet    Take 1 tablet (2.5 mg) by mouth daily    Renal hypertension       aspirin 81 MG tablet      Take 1 tablet by mouth daily.        calcium acetate 667 MG Caps capsule    PHOSLO    540 capsule    Take 2 capsules (1,334 mg) by mouth 3 times daily (with meals)    Chronic kidney disease, unspecified CKD stage       darbepoetin william 100 MCG/0.5ML injection    ARANESP (ALBUMIN FREE)    0.5 mL    Inject 0.5 mLs (100 mcg) Subcutaneous every 14 days As needed for hgb<10g/dL.  If Hgb increases >1 point in 2 weeks (if blood transfusion given, use hgb PRIOR to this), SYSTOLIC BP > 180 mmHg or hgb>=10g/dL, HOLD DOSE. Dose must be within 1 week of Hgb.  Per anemia protocol with Cecilia De La Torre CNP    Anemia of chronic renal failure, stage 5 (H), CKD (chronic kidney disease) stage 5, GFR less than 15 ml/min (H)       finasteride 5 MG tablet    PROSCAR    90 tablet    Take 1 tablet (5 mg) by mouth daily    Benign prostatic hyperplasia with urinary retention       fish oil-omega-3 fatty acids 1000 MG capsule      Take 1 g by mouth daily        furosemide 20 MG tablet    LASIX     TK 1 T PO ONCE D        MULTIVITAMIN MEN PO      Take 1 tablet by mouth daily        ondansetron 4 MG tablet    ZOFRAN    20 tablet    Take 1 tablet (4 mg) by mouth every 6 hours as needed for nausea    Arteriovenous fistula (H)       oxyCODONE IR 5 MG tablet    ROXICODONE    18 tablet    Take 1 tablet (5 mg) by mouth every 4 hours as needed for pain maximum 6 tablet(s) per day     Arteriovenous fistula (H)       senna 8.6 MG tablet    SENOKOT    20 tablet    Take 1 tablet by mouth 2 times daily as needed for constipation    Arteriovenous fistula (H)

## 2018-01-03 ENCOUNTER — TELEPHONE (OUTPATIENT)
Dept: PHARMACY | Facility: CLINIC | Age: 73
End: 2018-01-03

## 2018-01-03 ENCOUNTER — ALLIED HEALTH/NURSE VISIT (OUTPATIENT)
Dept: FAMILY MEDICINE | Facility: CLINIC | Age: 73
End: 2018-01-03
Payer: COMMERCIAL

## 2018-01-03 ENCOUNTER — TELEPHONE (OUTPATIENT)
Dept: NEPHROLOGY | Facility: CLINIC | Age: 73
End: 2018-01-03

## 2018-01-03 VITALS — DIASTOLIC BLOOD PRESSURE: 74 MMHG | SYSTOLIC BLOOD PRESSURE: 140 MMHG

## 2018-01-03 DIAGNOSIS — N18.5 ANEMIA OF CHRONIC RENAL FAILURE, STAGE 5 (H): ICD-10-CM

## 2018-01-03 DIAGNOSIS — N18.5 CKD (CHRONIC KIDNEY DISEASE) STAGE 5, GFR LESS THAN 15 ML/MIN (H): ICD-10-CM

## 2018-01-03 DIAGNOSIS — Z01.30 BP CHECK: Primary | ICD-10-CM

## 2018-01-03 DIAGNOSIS — R79.89 ELEVATED SERUM CREATININE: ICD-10-CM

## 2018-01-03 DIAGNOSIS — D63.1 ANEMIA OF CHRONIC RENAL FAILURE, STAGE 5 (H): ICD-10-CM

## 2018-01-03 LAB
ALBUMIN SERPL-MCNC: 3.4 G/DL (ref 3.4–5)
ANION GAP SERPL CALCULATED.3IONS-SCNC: 10 MMOL/L (ref 3–14)
BUN SERPL-MCNC: 111 MG/DL (ref 7–30)
CALCIUM SERPL-MCNC: 9.1 MG/DL (ref 8.5–10.1)
CHLORIDE SERPL-SCNC: 108 MMOL/L (ref 94–109)
CO2 SERPL-SCNC: 24 MMOL/L (ref 20–32)
CREAT SERPL-MCNC: 5.26 MG/DL (ref 0.66–1.25)
GFR SERPL CREATININE-BSD FRML MDRD: 11 ML/MIN/1.7M2
GLUCOSE SERPL-MCNC: 92 MG/DL (ref 70–99)
HCT VFR BLD AUTO: 32.9 % (ref 40–53)
HGB BLD-MCNC: 10.2 G/DL (ref 13.3–17.7)
PHOSPHATE SERPL-MCNC: 5.8 MG/DL (ref 2.5–4.5)
POTASSIUM SERPL-SCNC: 4.8 MMOL/L (ref 3.4–5.3)
SODIUM SERPL-SCNC: 142 MMOL/L (ref 133–144)

## 2018-01-03 PROCEDURE — 80069 RENAL FUNCTION PANEL: CPT

## 2018-01-03 PROCEDURE — 85014 HEMATOCRIT: CPT

## 2018-01-03 PROCEDURE — 36415 COLL VENOUS BLD VENIPUNCTURE: CPT

## 2018-01-03 PROCEDURE — 85018 HEMOGLOBIN: CPT

## 2018-01-03 PROCEDURE — 99207 ZZC NO CHARGE NURSE ONLY: CPT | Performed by: FAMILY MEDICINE

## 2018-01-03 NOTE — MR AVS SNAPSHOT
After Visit Summary   1/3/2018    Dung Norton    MRN: [id number]           Patient Information     Date Of Birth          1945        Visit Information        Provider Department      1/3/2018 10:40 AM Haley Baker MD [facility]        Today's Diagnoses     BP check    -  1       Follow-ups after your visit        Your next 10 appointments already scheduled     Jan 04, 2018 11:00 AM CST   Ech Complete with [id number]   [facility] ([facility])    [address] [facility] [address] [phone]           1. Please bring or wear a comfortable two-piece outfit. 2. You may eat, drink and take your normal medicines. 3. For any questions that cannot be answered, please contact the ordering physician            Jan 05, 2018  1:30 PM CST   (Arrive by 1:00 PM)   Return Visit with Oralia De LaT orre NP   [facility] Nephrology ([facility])    [address]  [address]  [facility] [id number] [phone]            Jan 08, 2018 10:30 AM CST   [facility]Z [facility]   [facility]    [address] [place] [address] [phone]           Please do not eat 10-12 hours before your appointment if you are coming in fasting for labs on lipids, cholesterol, or glucose (sugar). This does not apply to pregnant women. Water, hot tea and black coffee (with nothing added) are okay. Do not drink other fluids, diet soda or chew gum.            Jan 17, 2018 10:30 AM CST   (Arrive by 10:15 AM)   PAC EVALUATION with Uc Pac Roopa 5   [facility] Preoperative Assessment Center ([facility])    [address]  4th Floor  [facility] [id number] [phone]            Jan 17, 2018 11:30 AM CST   (Arrive by 11:15 AM)   PAC RN ASSESSMENT with Manohar Pac Rn   [facility] Preoperative Assessment Center ([facility])     "[address] [place] [address] [phone]            Jan 17, 2018 12:00 PM CST   (Arrive by 11:45 AM)   PAC Anesthesia Consult with  Pac Anesthesiologist   [facility] Preoperative Assessment Center ([facility])    [address] [place] [address] [phone]            Jan 29, 2018   Procedure with Tamiko Vanegas MD   [facility] [place], Same Day Surgery (--)    [address] [address]   [phone]            Feb 09, 2018  9:15 AM CST   (Arrive by 9:00 AM)   Post-Op with Tamiko Vanegas MD   [facility] Urology and [facility] for Prostate and Urologic Cancers ([facility])    [address] [place] [id number] [phone]              Who to contact     If you have questions or need follow up information about today's clinic visit or your schedule please contact [facility] directly at [phone].  Normal or non-critical lab and imaging results will be communicated to you by Metrasenshart, letter or phone within 4 business days after the clinic has received the results. If you do not hear from us within 7 days, please contact the clinic through Metrasenshart or phone. If you have a critical or abnormal lab result, we will notify you by phone as soon as possible.  Submit refill requests through [employer] or call your pharmacy and they will forward the refill request to us. Please allow 3 business days for your refill to be completed.          Additional Information About Your Visit        MetrasensharTwillion Information     [employer] lets you send messages to your doctor, view your test results, renew your prescriptions, schedule appointments and more. To sign up, go to [email or web address] . Click on \"Log in\" on the left side of the screen, which will take you to the Welcome page. Then click on \"Sign up Now\" on the right side of the page.     You will be asked to enter " the access code listed below, as well as some personal information. Please follow the directions to create your username and password.     Your access code is: [email or web address]  Expires: 2018  3:46 PM     Your access code will  in 90 days. If you need help or a new code, please call your [place] clinic or [phone].        Care EveryWhere ID     This is your Care EveryWhere ID. This could be used by other organizations to access your [place] medical records  [id number]         Blood Pressure from Last 3 Encounters:   18 140/74   18 152/78   17 134/82    Weight from Last 3 Encounters:   18 165 lb 5.5 oz (75 kg)   17 164 lb (74.4 kg)   17 156 lb (70.8 kg)              Today, you had the following     No orders found for display       Primary Care Provider Office Phone # Fax #    Haley Baker [phone] [address] [facility] [address]        Equal Access to Services     [facility]: Hadii aad ku hadasho Sochalo, waaxda luqadaha, qaybta kaalmada robert, gibson green . So [facility] [phone].    ATENCIÓN: Si habla español, tiene a montague disposición servicios gratuitos de asistencia lingüística. [facility] [phone].    We comply with applicable federal civil rights laws and [place] laws. We do not discriminate on the basis of race, color, national origin, age, disability, sex, sexual orientation, or gender identity.            Thank you!     Thank you for choosing [facility]  for your care. Our goal is always to provide you with excellent care. Hearing back from our patients is one way we can continue to improve our services. Please take a few minutes to complete the written survey that you may receive in the mail after your visit with us. Thank you!             Your Updated Medication List - Protect others around you: Learn how to safely use, store and throw away your medicines at  www.disposemymeds.org.          This list is accurate as of: 1/3/18 11:59 PM.  Always use your most recent med list.                   Brand Name Dispense Instructions for use Diagnosis    amLODIPine 2.5 MG tablet    NORVASC    90 tablet    Take 1 tablet (2.5 mg) by mouth daily    Renal hypertension       aspirin 81 MG tablet      Take 1 tablet by mouth daily.        calcium acetate 667 MG Caps capsule    PHOSLO    540 capsule    Take 2 capsules (1,334 mg) by mouth 3 times daily (with meals)    Chronic kidney disease, unspecified CKD stage       darbepoetin william 100 MCG/0.5ML injection    ARANESP (ALBUMIN FREE)    0.5 mL    Inject 0.5 mLs (100 mcg) Subcutaneous every 14 days As needed for hgb<10g/dL.  If Hgb increases >1 point in 2 weeks (if blood transfusion given, use hgb PRIOR to this), SYSTOLIC BP > 180 mmHg or hgb>=10g/dL, HOLD DOSE. Dose must be within 1 week of Hgb.  Per anemia protocol with Cecilia De La Torre CNP    Anemia of chronic renal failure, stage 5 (H), CKD (chronic kidney disease) stage 5, GFR less than 15 ml/min (H)       finasteride 5 MG tablet    PROSCAR    90 tablet    Take 1 tablet (5 mg) by mouth daily    Benign prostatic hyperplasia with urinary retention       fish oil-omega-3 fatty acids 1000 MG capsule      Take 1 g by mouth daily        furosemide 20 MG tablet    LASIX     TK 1 T PO ONCE D        MULTIVITAMIN MEN PO      Take 1 tablet by mouth daily        ondansetron 4 MG tablet    ZOFRAN    20 tablet    Take 1 tablet (4 mg) by mouth every 6 hours as needed for nausea    Arteriovenous fistula (H)       oxyCODONE IR 5 MG tablet    ROXICODONE    18 tablet    Take 1 tablet (5 mg) by mouth every 4 hours as needed for pain maximum 6 tablet(s) per day    Arteriovenous fistula (H)       senna 8.6 MG tablet    SENOKOT    20 tablet    Take 1 tablet by mouth 2 times daily as needed for constipation    Arteriovenous fistula (H)

## 2018-01-03 NOTE — TELEPHONE ENCOUNTER
DATE:  1/3/2018   TIME OF RECEIPT FROM LAB:  4:12pm  LAB TEST:  Creatinine  LAB VALUE:  5.26  RESULTS GIVEN WITH READ-BACK TO (PROVIDER):  NEETA JOSEPH  TIME LAB VALUE REPORTED TO PROVIDER:   4:15pm    Giselle Benoit RN

## 2018-01-04 ENCOUNTER — RADIANT APPOINTMENT (OUTPATIENT)
Dept: CARDIOLOGY | Facility: CLINIC | Age: 73
End: 2018-01-04
Attending: FAMILY MEDICINE
Payer: COMMERCIAL

## 2018-01-04 DIAGNOSIS — R93.1 ABNORMAL ECHOCARDIOGRAM: ICD-10-CM

## 2018-01-04 DIAGNOSIS — I35.0 NONRHEUMATIC AORTIC VALVE STENOSIS: ICD-10-CM

## 2018-01-04 DIAGNOSIS — I71.20 THORACIC AORTIC ANEURYSM WITHOUT RUPTURE (H): Primary | ICD-10-CM

## 2018-01-04 PROCEDURE — 93306 TTE W/DOPPLER COMPLETE: CPT | Mod: GC | Performed by: INTERNAL MEDICINE

## 2018-01-04 NOTE — TELEPHONE ENCOUNTER
Anemia Management Note  SUBJECTIVE/OBJECTIVE:  Referred by Cecilia De La Torre on 9/11/2017  Primary Diagnosis: Anemia in Chronic Kidney Disease (N18.5, D63.1)     Secondary Diagnosis:  Chronic Kidney Disease, Stage 5 (N18.5)  Hgb goal range:  9-10  Epo/Darbo: Aranesp 100 mcg every 14 days As needed for hgb<10g/dL  RX expires on 12/5/2018  Iron regimen:  None  Labs exp: 9/12/2018  **Provide phone number for [facility] [phone] and instruction to schedule ancillary visit for aranesp injection. Send message to Zainab Heller and Emily Starkey as they will need to order med**      Contact: **AUTH TO DISCUSS**Tereza Norton(daughter) [phone], Rabia Purcell (daughter)[phone]                                                              Ok to leave message regarding labs and appts per consent to communicate dated 12/12/17                                                  OK to speak with daughters Tereza and Rabia regarding Dung's anemia care plan per consent to communicate dated 12/12/17    Anemia Latest Ref Rng & Units 11/30/2017 12/4/2017 12/11/2017 12/18/2017 12/19/2017 12/26/2017 1/3/2018   EARLENE Dose - 60 mcg - - - 60 mcg - -   Hemoglobin 13.3 - 17.7 g/dL - 8.1(L) 8.5(L) 9.1(L) - 9.6(L) 10.2(L)   TSAT 15 - 46 % - - - - - 19 -   Ferritin 26 - 388 ng/mL - - - - - 518(H) -     BP Readings from Last 3 Encounters:   01/03/18 140/74   01/02/18 152/78   12/26/17 134/82     Wt Readings from Last 2 Encounters:   01/02/18 165 lb 5.5 oz (75 kg)   12/26/17 164 lb (74.4 kg)     Usually has labs drawn on Mondays at [facility]    ASSESSMENT:  Hgb: Above goal - recommend hold dose  TSat: not at goal (>30%) but ferritin >500ng/mL.  IV iron not indicated at this time per anemia protocol. Ferritin: At goal (>100ng/mL)    PLAN:  Hold Aranesp and RTC for hgb then aranesp if needed in 2 week(s)    Orders needed to be renewed (for next follow-up date) in EPIC: None    Iron labs due:  1/31/18    Plan discussed with:   Dung  Plan provided by:  Judith    NEXT FOLLOW-UP DATE:  1/8/18    Anemia Management Service  Tereza Paul PharmD and Malina Hussein CPhT  Phone: [phone]  Fax: [phone]

## 2018-01-05 ENCOUNTER — OFFICE VISIT (OUTPATIENT)
Dept: NEPHROLOGY | Facility: CLINIC | Age: 73
End: 2018-01-05
Payer: MEDICARE

## 2018-01-05 VITALS
OXYGEN SATURATION: 100 % | DIASTOLIC BLOOD PRESSURE: 83 MMHG | HEART RATE: 80 BPM | BODY MASS INDEX: 23.45 KG/M2 | TEMPERATURE: 97.6 F | HEIGHT: 70 IN | WEIGHT: 163.8 LBS | SYSTOLIC BLOOD PRESSURE: 154 MMHG

## 2018-01-05 DIAGNOSIS — E87.20 ACIDOSIS: ICD-10-CM

## 2018-01-05 DIAGNOSIS — N18.5 CKD (CHRONIC KIDNEY DISEASE) STAGE 5, GFR LESS THAN 15 ML/MIN (H): Primary | ICD-10-CM

## 2018-01-05 DIAGNOSIS — I10 BENIGN ESSENTIAL HYPERTENSION: ICD-10-CM

## 2018-01-05 PROCEDURE — G0463 HOSPITAL OUTPT CLINIC VISIT: HCPCS | Mod: ZF

## 2018-01-05 ASSESSMENT — PAIN SCALES - GENERAL: PAINLEVEL: NO PAIN (0)

## 2018-01-05 NOTE — MR AVS SNAPSHOT
After Visit Summary   1/5/2018    Dung Norton    MRN: [id number]           Patient Information     Date Of Birth          1945        Visit Information        Provider Department      1/5/2018 1:30 PM Oralia De La Torre NP [facility] Nephrology        Care Instructions    1. Check blood pressure daily and record  2. Bring in blood pressure machine to next visit  3. Take blood pressures on right wrist           Follow-ups after your visit        Follow-up notes from your care team     Return in about 8 weeks (around 3/2/2018).      Your next 10 appointments already scheduled     Jan 08, 2018 10:30 AM CST   LAB with FZ LAB   [facility]    [address] [place] [address] [phone]           Please do not eat 10-12 hours before your appointment if you are coming in fasting for labs on lipids, cholesterol, or glucose (sugar). This does not apply to pregnant women. Water, hot tea and black coffee (with nothing added) are okay. Do not drink other fluids, diet soda or chew gum.            Jan 09, 2018  2:30 PM CST   New Visit with Lizandro Ng MD   [facility] PHYSICIANS HEART AT [facility] ([facility])    [address] [facility] [address]   [phone]            Jan 17, 2018 10:30 AM CST   (Arrive by 10:15 AM)   PAC EVALUATION with  Pac Roopa 5   [facility] Preoperative Assessment Center ([facility])    [address] [address] [phone]            Jan 17, 2018 11:30 AM CST   (Arrive by 11:15 AM)   PAC RN ASSESSMENT with Manohar Pac Rn   [facility] Preoperative Assessment Center ([facility])    [address] [address] [phone]            Jan 17, 2018 12:00 PM CST   (Arrive by 11:45 AM)   PAC Anesthesia Consult with Manohar Pac Anesthesiologist   [facility] Preoperative Assessment  "Center ([facility])    [address]  4th Floor  [facility] [address]   [phone]            Jan 29, 2018   Procedure with Tamiko Vanegas MD   [facility] [place], Same Day Surgery (--)    [address]  [facility] [address]   [phone]            Feb 09, 2018  9:15 AM CST   (Arrive by 9:00 AM)   Post-Op with Tamiko Vanegas MD   [facility] Urology and Inst for Prostate and Urologic Cancers ([facility])    [address]  4th Floor  [facility] [address]   [phone]            Mar 02, 2018  1:00 PM CST   (Arrive by 12:30 PM)   Return Visit with Oralia De La Torre NP   [facility] Nephrology ([facility])    [address]  [address]  [facility] [address] [phone]              Who to contact     If you have questions or need follow up information about today's clinic visit or your schedule please contact [facility] NEPHROLOGY directly at [phone].  Normal or non-critical lab and imaging results will be communicated to you by MyChart, letter or phone within 4 business days after the clinic has received the results. If you do not hear from us within 7 days, please contact the clinic through Fieldbookhart or phone. If you have a critical or abnormal lab result, we will notify you by phone as soon as possible.  Submit refill requests through Pharm[employer] or call your pharmacy and they will forward the refill request to us. Please allow 3 business days for your refill to be completed.          Additional Information About Your Visit        Pharmap[employer] Information     [employer] lets you send messages to your doctor, view your test results, renew your prescriptions, schedule appointments and more. To sign up, go to [email or web address] . Click on \"Log in\" on the left side of the screen, which will take you to the Welcome page. Then click on \"Sign up Now\" on the right side of the page.     You will be " "asked to enter the access code listed below, as well as some personal information. Please follow the directions to create your username and password.     Your access code is: [email or web address]  Expires: 2018  3:46 PM     Your access code will  in 90 days. If you need help or a new code, please call your [place] clinic or [phone].        Care EveryWhere ID     This is your Care EveryWhere ID. This could be used by other organizations to access your [place] medical records  [id number]        Your Vitals Were     Pulse Temperature Height Pulse Oximetry BMI (Body Mass Index)       80 97.6  F (36.4  C) (Oral) 1.778 m (5' 10\") 100% 23.5 kg/m2        Blood Pressure from Last 3 Encounters:   18 154/83   18 140/74   18 152/78    Weight from Last 3 Encounters:   18 74.3 kg (163 lb 12.8 oz)   18 75 kg (165 lb 5.5 oz)   17 74.4 kg (164 lb)              Today, you had the following     No orders found for display       Primary Care Provider Office Phone # Fax #    Haley Baker [phone] [address] [facility] [address]        Equal Access to Services     : Hadii cristine ku hadasho Sochalo, waaxda luqadaha, qaybta kaalmada adeshant, gibson green . So [facility] [place] [phone].    ATENCIÓN: Si habla español, tiene a montague disposición servicios gratuitos de asistencia lingüística. Llame al [phone].    We comply with applicable federal civil rights laws and [place] laws. We do not discriminate on the basis of race, color, national origin, age, disability, sex, sexual orientation, or gender identity.            Thank you!     Thank you for choosing [facility] NEPHROLOGY  for your care. Our goal is always to provide you with excellent care. Hearing back from our patients is one way we can continue to improve our services. Please take a few minutes to complete the written survey that you may receive in the mail after your " visit with us. Thank you!             Your Updated Medication List - Protect others around you: Learn how to safely use, store and throw away your medicines at www.disposemymeds.org.          This list is accurate as of: 1/5/18  2:09 PM.  Always use your most recent med list.                   Brand Name Dispense Instructions for use Diagnosis    amLODIPine 2.5 MG tablet    NORVASC    90 tablet    Take 1 tablet (2.5 mg) by mouth daily    Renal hypertension       aspirin 81 MG tablet      Take 1 tablet by mouth daily.        calcium acetate 667 MG Caps capsule    PHOSLO    540 capsule    Take 2 capsules (1,334 mg) by mouth 3 times daily (with meals)    Chronic kidney disease, unspecified CKD stage       darbepoetin william 100 MCG/0.5ML injection    ARANESP (ALBUMIN FREE)    0.5 mL    Inject 0.5 mLs (100 mcg) Subcutaneous every 14 days As needed for hgb<10g/dL.  If Hgb increases >1 point in 2 weeks (if blood transfusion given, use hgb PRIOR to this), SYSTOLIC BP > 180 mmHg or hgb>=10g/dL, HOLD DOSE. Dose must be within 1 week of Hgb.  Per anemia protocol with Cecilia De La Torre CNP    Anemia of chronic renal failure, stage 5 (H), CKD (chronic kidney disease) stage 5, GFR less than 15 ml/min (H)       finasteride 5 MG tablet    PROSCAR    90 tablet    Take 1 tablet (5 mg) by mouth daily    Benign prostatic hyperplasia with urinary retention       fish oil-omega-3 fatty acids 1000 MG capsule      Take 1 g by mouth daily        MULTIVITAMIN MEN PO      Take 1 tablet by mouth daily        ondansetron 4 MG tablet    ZOFRAN    20 tablet    Take 1 tablet (4 mg) by mouth every 6 hours as needed for nausea    Arteriovenous fistula (H)       oxyCODONE IR 5 MG tablet    ROXICODONE    18 tablet    Take 1 tablet (5 mg) by mouth every 4 hours as needed for pain maximum 6 tablet(s) per day    Arteriovenous fistula (H)       senna 8.6 MG tablet    SENOKOT    20 tablet    Take 1 tablet by mouth 2 times daily as needed for constipation     Arteriovenous fistula (H)

## 2018-01-05 NOTE — PATIENT INSTRUCTIONS
1. Check blood pressure daily and record  2. Bring in blood pressure machine to next visit  3. Take blood pressures on right wrist

## 2018-01-05 NOTE — NURSING NOTE
"Chief Complaint   Patient presents with     RECHECK     4 wk follow up       Initial /83  Pulse 80  Temp 97.6  F (36.4  C) (Oral)  Ht 1.778 m (5' 10\")  Wt 74.3 kg (163 lb 12.8 oz)  SpO2 100%  BMI 23.5 kg/m2 Estimated body mass index is 23.5 kg/(m^2) as calculated from the following:    Height as of this encounter: 1.778 m (5' 10\").    Weight as of this encounter: 74.3 kg (163 lb 12.8 oz).  Medication Reconciliation: complete    "

## 2018-01-05 NOTE — LETTER
1/5/2018      RE: Dung Norton  [address]  [place] [address]       Nephrology Clinic Visit 1/5/18    Assessment and Plan:       1. CKD5 - Patient has developed ESRD 2/2 long standing obstructive uropathy. His GFR has been < 15 ml/mn since July 2017. He is not uremic.       - Patient and daughter have decided upon HD as his RRT option.     - He has attended the [facility] Smart program    - He underwent AVF creation with Dr Eduardo Pabon on 11/22/17. It has matured nicely and ready to use when needed. Last seen by Dr Pabon on 1/2/18   - Given stability of renal function can hold off on initiating HD for now   - He does not use NSAIDs   - He is straight cathing QID w/o difficulty      2. Electrolytes - No acute concerns. K 4.8.      3. Volume status - Mild hypervolemia with trace lower extremity edema. No dyspnea. Albumin 3.4. Not on diuretics. Making ~ 1 liter/urine/day. Weight 74.3 kg, but appears more related to increase in body weight given improvement in albumin. Clinic blood pressures elevated in the 150/ range, but he notes that outside clinic blood pressures in the 130-140/ range.       - No need for diuretics at this time   - Recommend compression wraps if edema persists      4. HTN - Has been gradually increasing with increase in body weight. He was started on Amlodipine 2.5 mg daily by PCP. Does not check B/P at home, but will get a home monitor and begin checking.     - Will hold off on increasing amlodipine at this time until we have home blood pressure readings.       5. BPH - He is straight cathing QID w/o difficulty. Currently on Proscar. Flomax discontinued due to hypotension. Scheduled for cysto/TURP 1/29/17   - Urologist wants him to continue Proscar in order to keep prostate small and allow for more easy straight cathing   - Urology managing      6. Acid base - No acute concerns. Bicarb 23    7. BMD - Ca 9.1, Phos 5.8, albumin 3.4, Vit D 29, PTH intact 120   - He is doing much better with  taking the Phoslo correctly      8. Nutrition - Reports good appetite and albumin is increasing. Now up to 3.4. LFTs normal      9. Anemia - Hgb 10.2. Etiology is likely anemia of renal disease   - He should have routine colon cancer screening   - Iron studies 12/26/17: Fe 46, Ferritin 518, Tsat 19%   - Has been enrolled in anemia management program for EARLENE     10. Cardiac - It appears patient had echo for evaluation of heart murmur by PCP. Per Echo he does have valvular heart disease, but also has lateral wall hypokinesis. Patient not having CP with exertion or dyspnea   - Given advanced renal disease would prefer to hold off on CorAngio until after HD has been initiated if possible.       11. Disposition - RTC in 8 wks for f/u with ongoing weekly labs      Assessment and plan was discussed with patient and daughter, they both voice understanding and agreement.    Attestation:  This patient has been seen, examined and evaluated by me, Ciera Daley as a shared visit with the NP/PA above.      In brief:  Dung Norton is a 72 year old male with CKD stage 5 due to chronic obstruction.  He has fistula in place that is ready to be used though he does not have any signs or symptoms of uremia, including no nausea or vomiting, no weight loss (he is gaining weight) and his appetite is good.  He has no leg edema and no orthopnea or PND.  Home blood pressure is not recorded.  He is on low dose amlodipine for HTN secondary to renal disease.    Physical exam:  Vitals along with Ins/outs reviewed.    My key findings:  CV: no edema  Pulm: normal work of breathing, no cyanosis or clubbing    Key management decisions made by me:  Plan:  1. CKD 5 - due to chronic obstruction.  Dialysis access in place.  - no indication for dialysis.  OK to space labs to every 2 weeks and clinic visits every 6 weeks to assess for uremia or other indication to initiate dialysis  2. Wall motion abnormality on echo - new, referred to cardiology  by PCP to assess for CAD.  From renal perspective, we would prefer to hold off on any angiogram if able to preserve current level of kidney function  - OK to start low dose lisinopril and beta blocker    I have reviewed today's vitals and labs.    Ciera Daley MD   Date of service (date I saw patient) 1/5/18       Reason for Visit:  CKD5 follow up      HPI:  Dung Norton is a 71 year old year old male with a PMH of CKDIII , HTN, Obstructive uropathy 2/2 BPH/benign bladder mass with creat as high as 14.7 in July 2017. He did not required RRT at that time and renal function improved to a low of 4.6 in Sept . However despite correction of the obstructive uropathy his renal function did not normalize and he has developed End stage Kidney disease.       Since our last visit patient has had no hospital admissions.  He did have AVF creation on 11/22/17. Patient continues to straight cath 4 times daily and will be undergoing TURP end of Jan.   PCP began patient on low dose Amlodipine and obtained an Echocardiogram for evaluation of heart murmur.       ROS:  Patient feeling well. Shoveled his and his neighbors driveway w/o CP, dyspnea. He has been very compliant with AVF exercise and it is now ready for HD when the time comes. He continues to straight cathing 4 times daily. He will be undergoing cysto/TURP at the end of this month. No dizziness or syncope. Energy is stable. He is living independently. Has good appetite. No dyspnea, chest pain, abdominal pain. Only has trace  edema.        Chronic Medical Problems:      HTN  BPH  Obstructive uropathy   HTN  Tobacco abuse  HLD  Anemia  Pulmonary nodules  KRISTINA  CKD5    Personal Hx:   Single, has 2 daughters very involved in his care. Currently living in a house, but will be moving in the Spring into an apt. Former smoker    Allergies:  No Known Allergies    Medications:  Prior to Admission medications    Medication Sig Start Date End Date Taking? Authorizing Provider  "  amLODIPine (NORVASC) 2.5 MG tablet Take 1 tablet (2.5 mg) by mouth daily 12/26/17  Yes Haley Baker MD   calcium acetate (PHOSLO) 667 MG CAPS capsule Take 2 capsules (1,334 mg) by mouth 3 times daily (with meals) 10/30/17  Yes Oralia De La Torre NP   finasteride (PROSCAR) 5 MG tablet Take 1 tablet (5 mg) by mouth daily 8/16/17  Yes Oralia De La Torre NP   Multiple Vitamins-Minerals (MULTIVITAMIN MEN PO) Take 1 tablet by mouth daily   Yes Reported, Patient   fish oil-omega-3 fatty acids (FISH OIL) 1000 MG capsule Take 1 g by mouth daily   Yes Reported, Patient   aspirin 81 MG tablet Take 1 tablet by mouth daily.   Yes Reported, Patient   darbepoetin william (ARANESP, ALBUMIN FREE,) 100 MCG/0.5ML injection Inject 0.5 mLs (100 mcg) Subcutaneous every 14 days As needed for hgb<10g/dL.  If Hgb increases >1 point in 2 weeks (if blood transfusion given, use hgb PRIOR to this), SYSTOLIC BP > 180 mmHg or hgb>=10g/dL, HOLD DOSE. Dose must be within 1 week of Hgb.  Per anemia protocol with Cecilia De La Torre CNP  Patient not taking: Reported on 1/5/2018 12/6/17   Oralia De La Torre NP                               Vitals:  /83  Pulse 80  Temp 97.6  F (36.4  C) (Oral)  Ht 1.778 m (5' 10\")  Wt 74.3 kg (163 lb 12.8 oz)  SpO2 100%  BMI 23.5 kg/m2    Exam:  Repeat Blood pressure: 158/80  GEN: Pleasant, Well groomed. Dtr present  CARDIAC RRR  LUNGS: CTA  ABDOMEN: Soft, NT  EXT: trace edema  Access: CLIFFORD AVF + bruit, well developed    Results:    Results for FABRIZIO MIRANDA (MRN [id number]) as of 1/6/2018 09:05   Ref. Range 1/3/2018 10:29 1/4/2018 11:49   Sodium Latest Ref Range: 133 - 144 mmol/L 142    Potassium Latest Ref Range: 3.4 - 5.3 mmol/L 4.8    Chloride Latest Ref Range: 94 - 109 mmol/L 108    Carbon Dioxide Latest Ref Range: 20 - 32 mmol/L 24    Urea Nitrogen Latest Ref Range: 7 - 30 mg/dL 111 (H)    Creatinine Latest Ref Range: 0.66 - 1.25 mg/dL 5.26 (H)    GFR Estimate Latest Ref Range: >60 mL/min/1.7m2 11 " (L)    GFR Estimate If Black Latest Ref Range: >60 mL/min/1.7m2 13 (L)    Calcium Latest Ref Range: 8.5 - 10.1 mg/dL 9.1    Anion Gap Latest Ref Range: 3 - 14 mmol/L 10    Phosphorus Latest Ref Range: 2.5 - 4.5 mg/dL 5.8 (H)    Albumin Latest Ref Range: 3.4 - 5.0 g/dL 3.4    Glucose Latest Ref Range: 70 - 99 mg/dL 92    Hemoglobin Latest Ref Range: 13.3 - 17.7 g/dL 10.2 (L)    Hematocrit Latest Ref Range: 40.0 - 53.0 % 32.9 (L)    ECHO COMPLETE Unknown  Rpt     Interpretation Summary     The Ejection Fraction is estimated at 55-60%. Lateral wall hypokinesis is  present.  The right ventricle is normal size. Global right ventricular function is  normal.  Pulmonary artery systolic pressure is normal.  Mild aortic stenosis (Vmax 2.7 m/s, mean pressure gradient 14 mmHg, JESSIKA 2.1  cm2).  Dilated proximal ascending aorta measuring 4.0 cm (indexed at 2.1 cm/m2, Z-  score +2.5)  No pericardial effusion is present.  Previous study not available for comparison.  _____________________________________________________________________________  __        Left Ventricle  Left ventricular wall thickness is normal. Left ventricular size is normal.  The Ejection Fraction is estimated at 55-60%. Left ventricular diastolic  function is indeterminate. Lateral wall hypokinesis is present.     Right Ventricle  The right ventricle is normal size. Global right ventricular function is  normal.     Atria  Severe left atrial enlargement is present. Moderate right atrial enlargement  is present. The atrial septum is intact as assessed by color Doppler .     Mitral Valve  Moderate to severe mitral annular calcification is present. Mild to moderate  mitral insufficiency is present.        Aortic Valve  Mild AS (Vmax 2.7 m/s, mean pressure gradient 14 mmHg, JESSIKA 2.1 cm2). The  aortic valve is tricuspid.     Tricuspid Valve  Trace to mild tricuspid insufficiency is present. The right ventricular  systolic pressure is approximated at 25.8 mmHg plus the  right atrial pressure.  Pulmonary artery systolic pressure is normal.     Pulmonic Valve  The pulmonic valve is normal. Trace pulmonic insufficiency is present.     Vessels  Dilated proximal ascending aorta measuring 4.0 cm (indexed at 2.1 cm/m2, Z-  score +2.5). Dilated IVC (2.1 cm) with normal respirophasic variation.  Estimated mean right atrial pressure is 8 mmHg.     Pericardium  No pericardial effusion is present.        Compared to Previous Study  Previous study not available for comparison.     Attestation  I have personally viewed the imaging and agree with the interpretation and  report as documented by the fellow, Ezra Sanchez, and/or edited by me.      Oralia De La Torre, NP

## 2018-01-06 NOTE — PROGRESS NOTES
Nephrology Clinic Visit 1/5/18    Assessment and Plan:       1. CKD5 - Patient has developed ESRD 2/2 long standing obstructive uropathy. His GFR has been < 15 ml/mn since July 2017. He is not uremic.       - Patient and daughter have decided upon HD as his RRT option.     - He has attended the Kidney Smart program    - He underwent AVF creation with Dr Eduardo Pabon on 11/22/17. It has matured nicely and ready to use when needed. Last seen by Dr Pabon on 1/2/18   - Given stability of renal function can hold off on initiating HD for now   - He does not use NSAIDs   - He is straight cathing QID w/o difficulty      2. Electrolytes - No acute concerns. K 4.8.      3. Volume status - Mild hypervolemia with trace lower extremity edema. No dyspnea. Albumin 3.4. Not on diuretics. Making ~ 1 liter/urine/day. Weight 74.3 kg, but appears more related to increase in body weight given improvement in albumin. Clinic blood pressures elevated in the 150/ range, but he notes that outside clinic blood pressures in the 130-140/ range.       - No need for diuretics at this time   - Recommend compression wraps if edema persists      4. HTN - Has been gradually increasing with increase in body weight. He was started on Amlodipine 2.5 mg daily by PCP. Does not check B/P at home, but will get a home monitor and begin checking.     - Will hold off on increasing amlodipine at this time until we have home blood pressure readings.       5. BPH - He is straight cathing QID w/o difficulty. Currently on Proscar. Flomax discontinued due to hypotension. Scheduled for cysto/TURP 1/29/17   - Urologist wants him to continue Proscar in order to keep prostate small and allow for more easy straight cathing   - Urology managing      6. Acid base - No acute concerns. Bicarb 23    7. BMD - Ca 9.1, Phos 5.8, albumin 3.4, Vit D 29, PTH intact 120   - He is doing much better with taking the Phoslo correctly      8. Nutrition - Reports good appetite and albumin is  increasing. Now up to 3.4. LFTs normal      9. Anemia - Hgb 10.2. Etiology is likely anemia of renal disease   - He should have routine colon cancer screening   - Iron studies 12/26/17: Fe 46, Ferritin 518, Tsat 19%   - Has been enrolled in anemia management program for EARLENE     10. Cardiac - It appears patient had echo for evaluation of heart murmur by PCP. Per Echo he does have valvular heart disease, but also has lateral wall hypokinesis. Patient not having CP with exertion or dyspnea   - Given advanced renal disease would prefer to hold off on CorAngio until after HD has been initiated if possible.       11. Disposition - RTC in 8 wks for f/u with ongoing weekly labs      Assessment and plan was discussed with patient and daughter, they both voice understanding and agreement.    Attestation:  This patient has been seen, examined and evaluated by me, Ciera Daley as a shared visit with the NP/PA above.      In brief:  Dung Norton is a 72 year old male with CKD stage 5 due to chronic obstruction.  He has fistula in place that is ready to be used though he does not have any signs or symptoms of uremia, including no nausea or vomiting, no weight loss (he is gaining weight) and his appetite is good.  He has no leg edema and no orthopnea or PND.  Home blood pressure is not recorded.  He is on low dose amlodipine for HTN secondary to renal disease.    Physical exam:  Vitals along with Ins/outs reviewed.    My key findings:  CV: no edema  Pulm: normal work of breathing, no cyanosis or clubbing    Key management decisions made by me:  Plan:  1. CKD 5 - due to chronic obstruction.  Dialysis access in place.  - no indication for dialysis.  OK to space labs to every 2 weeks and clinic visits every 6 weeks to assess for uremia or other indication to initiate dialysis  2. Wall motion abnormality on echo - new, referred to cardiology by PCP to assess for CAD.  From renal perspective, we would prefer to hold off on any  angiogram if able to preserve current level of kidney function  - OK to start low dose lisinopril and beta blocker    I have reviewed today's vitals and labs.    Ciera Daley MD   Date of service (date I saw patient) 1/5/18       Reason for Visit:  CKD5 follow up      HPI:  Dung Norton is a 71 year old year old male with a PMH of CKDIII , HTN, Obstructive uropathy 2/2 BPH/benign bladder mass with creat as high as 14.7 in July 2017. He did not required RRT at that time and renal function improved to a low of 4.6 in Sept . However despite correction of the obstructive uropathy his renal function did not normalize and he has developed End stage Kidney disease.       Since our last visit patient has had no hospital admissions.  He did have AVF creation on 11/22/17. Patient continues to straight cath 4 times daily and will be undergoing TURP end of Jan.   PCP began patient on low dose Amlodipine and obtained an Echocardiogram for evaluation of heart murmur.       ROS:  Patient feeling well. Shoveled his and his neighbors driveway w/o CP, dyspnea. He has been very compliant with AVF exercise and it is now ready for HD when the time comes. He continues to straight cathing 4 times daily. He will be undergoing cysto/TURP at the end of this month. No dizziness or syncope. Energy is stable. He is living independently. Has good appetite. No dyspnea, chest pain, abdominal pain. Only has trace  edema.        Chronic Medical Problems:      HTN  BPH  Obstructive uropathy   HTN  Tobacco abuse  HLD  Anemia  Pulmonary nodules  KRISTINA  CKD5    Personal Hx:   Single, has 2 daughters very involved in his care. Currently living in a house, but will be moving in the Spring into an apt. Former smoker    Allergies:  No Known Allergies    Medications:  Prior to Admission medications    Medication Sig Start Date End Date Taking? Authorizing Provider   amLODIPine (NORVASC) 2.5 MG tablet Take 1 tablet (2.5 mg) by mouth daily 12/26/17   "Yes Haley Baker MD   calcium acetate (PHOSLO) 667 MG CAPS capsule Take 2 capsules (1,334 mg) by mouth 3 times daily (with meals) 10/30/17  Yes Oralia De La Torre NP   finasteride (PROSCAR) 5 MG tablet Take 1 tablet (5 mg) by mouth daily 8/16/17  Yes Oralia De La Torre NP   Multiple Vitamins-Minerals (MULTIVITAMIN MEN PO) Take 1 tablet by mouth daily   Yes Reported, Patient   fish oil-omega-3 fatty acids (FISH OIL) 1000 MG capsule Take 1 g by mouth daily   Yes Reported, Patient   aspirin 81 MG tablet Take 1 tablet by mouth daily.   Yes Reported, Patient   darbepoetin william (ARANESP, ALBUMIN FREE,) 100 MCG/0.5ML injection Inject 0.5 mLs (100 mcg) Subcutaneous every 14 days As needed for hgb<10g/dL.  If Hgb increases >1 point in 2 weeks (if blood transfusion given, use hgb PRIOR to this), SYSTOLIC BP > 180 mmHg or hgb>=10g/dL, HOLD DOSE. Dose must be within 1 week of Hgb.  Per anemia protocol with Cecilia De La Torre CNP  Patient not taking: Reported on 1/5/2018 12/6/17   Oralia De La Torre NP                               Vitals:  /83  Pulse 80  Temp 97.6  F (36.4  C) (Oral)  Ht 1.778 m (5' 10\")  Wt 74.3 kg (163 lb 12.8 oz)  SpO2 100%  BMI 23.5 kg/m2    Exam:  Repeat Blood pressure: 158/80  GEN: Pleasant, Well groomed. Dtr present  CARDIAC RRR  LUNGS: CTA  ABDOMEN: Soft, NT  EXT: trace edema  Access: CLIFFORD AVF + bruit, well developed    Results:    Results for FABRIZIO MIRANDA (MRN [id number]) as of 1/6/2018 09:05   Ref. Range 1/3/2018 10:29 1/4/2018 11:49   Sodium Latest Ref Range: 133 - 144 mmol/L 142    Potassium Latest Ref Range: 3.4 - 5.3 mmol/L 4.8    Chloride Latest Ref Range: 94 - 109 mmol/L 108    Carbon Dioxide Latest Ref Range: 20 - 32 mmol/L 24    Urea Nitrogen Latest Ref Range: 7 - 30 mg/dL 111 (H)    Creatinine Latest Ref Range: 0.66 - 1.25 mg/dL 5.26 (H)    GFR Estimate Latest Ref Range: >60 mL/min/1.7m2 11 (L)    GFR Estimate If Black Latest Ref Range: >60 mL/min/1.7m2 13 (L)    Calcium " Latest Ref Range: 8.5 - 10.1 mg/dL 9.1    Anion Gap Latest Ref Range: 3 - 14 mmol/L 10    Phosphorus Latest Ref Range: 2.5 - 4.5 mg/dL 5.8 (H)    Albumin Latest Ref Range: 3.4 - 5.0 g/dL 3.4    Glucose Latest Ref Range: 70 - 99 mg/dL 92    Hemoglobin Latest Ref Range: 13.3 - 17.7 g/dL 10.2 (L)    Hematocrit Latest Ref Range: 40.0 - 53.0 % 32.9 (L)    ECHO COMPLETE Unknown  Rpt     Interpretation Summary     The Ejection Fraction is estimated at 55-60%. Lateral wall hypokinesis is  present.  The right ventricle is normal size. Global right ventricular function is  normal.  Pulmonary artery systolic pressure is normal.  Mild aortic stenosis (Vmax 2.7 m/s, mean pressure gradient 14 mmHg, JESSIKA 2.1  cm2).  Dilated proximal ascending aorta measuring 4.0 cm (indexed at 2.1 cm/m2, Z-  score +2.5)  No pericardial effusion is present.  Previous study not available for comparison.  _____________________________________________________________________________  __        Left Ventricle  Left ventricular wall thickness is normal. Left ventricular size is normal.  The Ejection Fraction is estimated at 55-60%. Left ventricular diastolic  function is indeterminate. Lateral wall hypokinesis is present.     Right Ventricle  The right ventricle is normal size. Global right ventricular function is  normal.     Atria  Severe left atrial enlargement is present. Moderate right atrial enlargement  is present. The atrial septum is intact as assessed by color Doppler .     Mitral Valve  Moderate to severe mitral annular calcification is present. Mild to moderate  mitral insufficiency is present.        Aortic Valve  Mild AS (Vmax 2.7 m/s, mean pressure gradient 14 mmHg, JESSIKA 2.1 cm2). The  aortic valve is tricuspid.     Tricuspid Valve  Trace to mild tricuspid insufficiency is present. The right ventricular  systolic pressure is approximated at 25.8 mmHg plus the right atrial pressure.  Pulmonary artery systolic pressure is normal.     Pulmonic  Valve  The pulmonic valve is normal. Trace pulmonic insufficiency is present.     Vessels  Dilated proximal ascending aorta measuring 4.0 cm (indexed at 2.1 cm/m2, Z-  score +2.5). Dilated IVC (2.1 cm) with normal respirophasic variation.  Estimated mean right atrial pressure is 8 mmHg.     Pericardium  No pericardial effusion is present.        Compared to Previous Study  Previous study not available for comparison.     Attestation  I have personally viewed the imaging and agree with the interpretation and  report as documented by the fellow, Ezra Sanchez, and/or edited by me.

## 2018-01-08 ENCOUNTER — TELEPHONE (OUTPATIENT)
Dept: PHARMACY | Facility: CLINIC | Age: 73
End: 2018-01-08

## 2018-01-08 ENCOUNTER — ALLIED HEALTH/NURSE VISIT (OUTPATIENT)
Dept: FAMILY MEDICINE | Facility: CLINIC | Age: 73
End: 2018-01-08

## 2018-01-08 ENCOUNTER — TELEPHONE (OUTPATIENT)
Dept: GASTROENTEROLOGY | Facility: CLINIC | Age: 73
End: 2018-01-08

## 2018-01-08 VITALS — DIASTOLIC BLOOD PRESSURE: 78 MMHG | SYSTOLIC BLOOD PRESSURE: 138 MMHG

## 2018-01-08 DIAGNOSIS — N18.5 ANEMIA OF CHRONIC RENAL FAILURE, STAGE 5 (H): ICD-10-CM

## 2018-01-08 DIAGNOSIS — N18.5 CKD (CHRONIC KIDNEY DISEASE) STAGE 5, GFR LESS THAN 15 ML/MIN (H): ICD-10-CM

## 2018-01-08 DIAGNOSIS — R79.89 ELEVATED SERUM CREATININE: ICD-10-CM

## 2018-01-08 DIAGNOSIS — I10 HYPERTENSION GOAL BP (BLOOD PRESSURE) < 140/90: Primary | ICD-10-CM

## 2018-01-08 DIAGNOSIS — D63.1 ANEMIA OF CHRONIC RENAL FAILURE, STAGE 5 (H): ICD-10-CM

## 2018-01-08 LAB
ALBUMIN SERPL-MCNC: 3.4 G/DL (ref 3.4–5)
ANION GAP SERPL CALCULATED.3IONS-SCNC: 9 MMOL/L (ref 3–14)
BUN SERPL-MCNC: 115 MG/DL (ref 7–30)
CALCIUM SERPL-MCNC: 9.4 MG/DL (ref 8.5–10.1)
CHLORIDE SERPL-SCNC: 108 MMOL/L (ref 94–109)
CO2 SERPL-SCNC: 25 MMOL/L (ref 20–32)
CREAT SERPL-MCNC: 5.29 MG/DL (ref 0.66–1.25)
GFR SERPL CREATININE-BSD FRML MDRD: 11 ML/MIN/1.7M2
GLUCOSE SERPL-MCNC: 81 MG/DL (ref 70–99)
HCT VFR BLD AUTO: 33.4 % (ref 40–53)
HGB BLD-MCNC: 10.2 G/DL (ref 13.3–17.7)
PHOSPHATE SERPL-MCNC: 6 MG/DL (ref 2.5–4.5)
POTASSIUM SERPL-SCNC: 4.8 MMOL/L (ref 3.4–5.3)
SODIUM SERPL-SCNC: 142 MMOL/L (ref 133–144)

## 2018-01-08 PROCEDURE — 80069 RENAL FUNCTION PANEL: CPT

## 2018-01-08 PROCEDURE — 85018 HEMOGLOBIN: CPT

## 2018-01-08 PROCEDURE — 99207 ZZC NO CHARGE NURSE ONLY: CPT | Performed by: FAMILY MEDICINE

## 2018-01-08 PROCEDURE — 36415 COLL VENOUS BLD VENIPUNCTURE: CPT

## 2018-01-08 PROCEDURE — 85014 HEMATOCRIT: CPT

## 2018-01-08 NOTE — PROGRESS NOTES
Dung Norton is enrolled/participating in the retail pharmacy Blood Pressure Goals Achievement Program (BPGAP).  Dung Norton was evaluated at [facility] on January 8, 2018 at which time his blood pressure was:    BP Readings from Last 3 Encounters:   01/08/18 138/78   01/05/18 154/83   01/03/18 140/74     Reviewed lifestyle modifications for blood pressure control and reduction: including making healthy food choices, managing weight, getting regular exercise, smoking cessation, reducing alcohol consumption, monitoring blood pressure regularly.     Dung Norton is not experiencing symptoms.    Follow-Up: BP is at goal of < 140/90mmHg (patient 18+ years of age with or without diabetes).  Recommended follow-up at pharmacy in 6 months.     Recommendation to Provider: None at this time.     Dung Norton was evaluated for enrollment into the PGEN study today.    Patient eligible for enrollment:  No  Patient interested in enrollment:  No    Completed by: Thank you,  Cheri Winslow, PharmD  [facility]  [phone]

## 2018-01-08 NOTE — TELEPHONE ENCOUNTER
DATE:  1/8/2018   TIME OF RECEIPT FROM LAB:  3:35pm  LAB TEST:  Creatinine  LAB VALUE:  5.29  RESULTS GIVEN WITH READ-BACK TO (PROVIDER):  NEETA JOSEPH  TIME LAB VALUE REPORTED TO PROVIDER:   4:39pm    Lab is stable in comparison to recent labs.     Giselle Benoit RN

## 2018-01-08 NOTE — MR AVS SNAPSHOT
After Visit Summary   1/8/2018    Dung Norton    MRN: [id number]           Patient Information     Date Of Birth          1945        Visit Information        Provider Department      1/8/2018 11:33 AM Haley Baker MD [facility]        Today's Diagnoses     Hypertension goal BP (blood pressure) < 140/90    -  1       Follow-ups after your visit        Your next 10 appointments already scheduled     Jan 09, 2018  9:30 AM CST   [facility]urse [facility] with [facility]   [facility]    [address] [address]   [phone]            Jan 09, 2018  2:30 PM CST   New Visit with Lizandro Ng MD   [facility]YS[facility] HEART AT [facility] ([facility])    [address] [facility] [address]   [phone]            Gutierrez 15, 2018 10:30 AM CST   LAB with [facility] LAB   [facility]    [address] [address]   [phone]           Please do not eat 10-12 hours before your appointment if you are coming in fasting for labs on lipids, cholesterol, or glucose (sugar). This does not apply to pregnant women. Water, hot tea and black coffee (with nothing added) are okay. Do not drink other fluids, diet soda or chew gum.            Jan 17, 2018 10:30 AM CST   (Arrive by 10:15 AM)   PAC EVALUATION with  Pac Roopa 5   [facility] ([facility])    [address] [address]   [phone]            Jan 17, 2018 11:30 AM CST   (Arrive by 11:15 AM)   PAC RN ASSESSMENT with Manohar Pac Rn   [facility] ([facility])    [address] [address]   [phone]            Jan 17, 2018 12:00 PM CST   (Arrive by 11:45 AM)   PAC Anesthesia Consult with  Pac Anesthesiologist   [facility]  "Preoperative Assessment Center ([facility])    [address]  4th Floor  [place] [address]   [phone]            Jan 29, 2018   Procedure with Tamiko Vanegas MD   [facility] (--)    [address]  [facility] [facility] [address]   [phone]            Feb 09, 2018  9:15 AM CST   (Arrive by 9:00 AM)   Post-Op with Tamiko Vanegas MD   [facility]rologic [facility] ([facility])    [address]  4th [address]  [place] [address]   [phone]            Mar 02, 2018  1:00 PM CST   (Arrive by 12:30 PM)   Return Visit with Oralia De La Torre NP   [facility] Nephrology ([facility])    [address]  [address]  [place] [address] [phone]              Who to contact     If you have questions or need follow up information about today's clinic visit or your schedule please contact [facility] directly at [phone].  Normal or non-critical lab and imaging results will be communicated to you by MyChart, letter or phone within 4 business days after the clinic has received the results. If you do not hear from us within 7 days, please contact the clinic through MyChart or phone. If you have a critical or abnormal lab result, we will notify you by phone as soon as possible.  Submit refill requests through [employer] or call your pharmacy and they will forward the refill request to us. Please allow 3 business days for your refill to be completed.          Additional Information About Your Visit        [employer] Information     [employer] lets you send messages to your doctor, view your test results, renew your prescriptions, schedule appointments and more. To sign up, go to [email or web address] . Click on \"Log in\" on the left side of the screen, which will take you to the Welcome page. Then click on \"Sign up Now\" on the right side of " the page.     You will be asked to enter the access code listed below, as well as some personal information. Please follow the directions to create your username and password.     Your access code is: [email or web address]  Expires: 2018  3:46 PM     Your access code will  in 90 days. If you need help or a new code, please call your [facility] clinic or [phone].        Care EveryWhere ID     This is your Care EveryWhere ID. This could be used by other organizations to access your [facility] medical records  [id number]         Blood Pressure from Last 3 Encounters:   18 138/78   18 154/83   18 140/74    Weight from Last 3 Encounters:   18 163 lb 12.8 oz (74.3 kg)   18 165 lb 5.5 oz (75 kg)   17 164 lb (74.4 kg)              Today, you had the following     No orders found for display       Primary Care Provider Office Phone # Fax #    Haley Baker [phone]       [address] [address]        Equal Access to Services     [facility] AH: Hadii aad ku hadasho Soomaali, waaxda luqadaha, qaybta kaalmada adeegyada, gibson duong hayjefen paul perrinn ah. So [facility] [phone].    ATENCIÓN: Si habla español, tiene a montague disposición servicios gratuitos de asistencia lingüística. Llame al [phone].    We comply with applicable federal civil rights laws and [place] laws. We do not discriminate on the basis of race, color, national origin, age, disability, sex, sexual orientation, or gender identity.            Thank you!     Thank you for choosing [facility]  for your care. Our goal is always to provide you with excellent care. Hearing back from our patients is one way we can continue to improve our services. Please take a few minutes to complete the written survey that you may receive in the mail after your visit with us. Thank you!             Your Updated Medication List - Protect others around you: Learn how to safely use,  store and throw away your medicines at www.disposemymeds.org.          This list is accurate as of: 1/8/18 11:34 AM.  Always use your most recent med list.                   Brand Name Dispense Instructions for use Diagnosis    amLODIPine 2.5 MG tablet    NORVASC    90 tablet    Take 1 tablet (2.5 mg) by mouth daily    Renal hypertension       aspirin 81 MG tablet      Take 1 tablet by mouth daily.        calcium acetate 667 MG Caps capsule    PHOSLO    540 capsule    Take 2 capsules (1,334 mg) by mouth 3 times daily (with meals)    Chronic kidney disease, unspecified CKD stage       darbepoetin william 100 MCG/0.5ML injection    ARANESP (ALBUMIN FREE)    0.5 mL    Inject 0.5 mLs (100 mcg) Subcutaneous every 14 days As needed for hgb<10g/dL.  If Hgb increases >1 point in 2 weeks (if blood transfusion given, use hgb PRIOR to this), SYSTOLIC BP > 180 mmHg or hgb>=10g/dL, HOLD DOSE. Dose must be within 1 week of Hgb.  Per anemia protocol with Cecilia De La Torre CNP    Anemia of chronic renal failure, stage 5 (H), CKD (chronic kidney disease) stage 5, GFR less than 15 ml/min (H)       finasteride 5 MG tablet    PROSCAR    90 tablet    Take 1 tablet (5 mg) by mouth daily    Benign prostatic hyperplasia with urinary retention       fish oil-omega-3 fatty acids 1000 MG capsule      Take 1 g by mouth daily        MULTIVITAMIN MEN PO      Take 1 tablet by mouth daily        ondansetron 4 MG tablet    ZOFRAN    20 tablet    Take 1 tablet (4 mg) by mouth every 6 hours as needed for nausea    Arteriovenous fistula (H)       oxyCODONE IR 5 MG tablet    ROXICODONE    18 tablet    Take 1 tablet (5 mg) by mouth every 4 hours as needed for pain maximum 6 tablet(s) per day    Arteriovenous fistula (H)       senna 8.6 MG tablet    SENOKOT    20 tablet    Take 1 tablet by mouth 2 times daily as needed for constipation    Arteriovenous fistula (H)

## 2018-01-09 ENCOUNTER — ALLIED HEALTH/NURSE VISIT (OUTPATIENT)
Dept: NURSING | Facility: CLINIC | Age: 73
End: 2018-01-09
Payer: COMMERCIAL

## 2018-01-09 ENCOUNTER — OFFICE VISIT (OUTPATIENT)
Dept: CARDIOLOGY | Facility: CLINIC | Age: 73
End: 2018-01-09
Payer: COMMERCIAL

## 2018-01-09 VITALS
DIASTOLIC BLOOD PRESSURE: 79 MMHG | WEIGHT: 165 LBS | BODY MASS INDEX: 23.68 KG/M2 | SYSTOLIC BLOOD PRESSURE: 163 MMHG | OXYGEN SATURATION: 100 % | HEART RATE: 92 BPM

## 2018-01-09 VITALS — HEART RATE: 91 BPM | DIASTOLIC BLOOD PRESSURE: 80 MMHG | OXYGEN SATURATION: 100 % | SYSTOLIC BLOOD PRESSURE: 130 MMHG

## 2018-01-09 DIAGNOSIS — I35.0 NONRHEUMATIC AORTIC (VALVE) STENOSIS: Primary | ICD-10-CM

## 2018-01-09 DIAGNOSIS — I10 HYPERTENSION GOAL BP (BLOOD PRESSURE) < 140/90: Primary | ICD-10-CM

## 2018-01-09 PROCEDURE — 99207 ZZC NO CHARGE NURSE ONLY: CPT

## 2018-01-09 PROCEDURE — 99205 OFFICE O/P NEW HI 60 MIN: CPT | Performed by: INTERNAL MEDICINE

## 2018-01-09 ASSESSMENT — PAIN SCALES - GENERAL
PAINLEVEL: NO PAIN (0)
PAINLEVEL: NO PAIN (0)

## 2018-01-09 NOTE — LETTER
2018      RE: Dung Norton  [address]  [place] [address]       Dear Colleague,    Thank you for the opportunity to participate in the care of your patient, Dung Norton, at the [facility] HEART AT [facility]. Please see a copy of my visit note below.    Referring provider: Haley Baker MD    Chief complaint: Discuss TTE results.    HPI: . Dung Norton is a 72 year old  male with PMH significant for CKD, HTN and hx of heavy tobacco abuse.    Mr. Norton has recently been diagnosed with ESRD, has had his AVF and almost ready ready for HD though he doesnot need that right now. He has HT and is on amlodipine 2.5 mg. His BP in the office is high but he reports lower BP outside the hospital.     He has been diagnosed with heart murmur and subsequently underwent a TTE planned by  which showed mild aortic stenosis with mild dilatation of ascending aorta at 4 cm and hypokinesis of LV lateral wall. He is currently asymptomatic. He denies a history of chest discomfort, dyspnea, PND, orthopnea, pedal edema, palpitations, lightheadedness, and syncope. He has HTN and hx of 50 pack year tobacco abuse. He quit smoking last summer. He has family hx of CAD as well. His mother had MI and  at the age of 66.     I have reviewed his echocardiogram from 2018 which shows mild AS, normal LV function with LV lateral wall hypokinesis (contrast was not used to enhance endocardial borders). His ECG dated 2017 is normal.    Medications, personal, family, and social history reviewed with patient and revised.    PAST MEDICAL HISTORY:  Past Medical History:   Diagnosis Date     BPH (benign prostatic hyperplasia)      Chronic kidney disease      HTN      Pulmonary nodules      Tobacco abuse        CURRENT MEDICATIONS:  Current Outpatient Prescriptions   Medication Sig Dispense Refill     amLODIPine (NORVASC) 2.5 MG  tablet Take 1 tablet (2.5 mg) by mouth daily 90 tablet 3     calcium acetate (PHOSLO) 667 MG CAPS capsule Take 2 capsules (1,334 mg) by mouth 3 times daily (with meals) 540 capsule 3     aspirin 81 MG tablet Take 1 tablet by mouth daily.       darbepoetin william (ARANESP, ALBUMIN FREE,) 100 MCG/0.5ML injection Inject 0.5 mLs (100 mcg) Subcutaneous every 14 days As needed for hgb<10g/dL.  If Hgb increases >1 point in 2 weeks (if blood transfusion given, use hgb PRIOR to this), SYSTOLIC BP > 180 mmHg or hgb>=10g/dL, HOLD DOSE. Dose must be within 1 week of Hgb.  Per anemia protocol with Cecilia De La Torre CNP (Patient not taking: Reported on 1/5/2018) 0.5 mL 99     ondansetron (ZOFRAN) 4 MG tablet Take 1 tablet (4 mg) by mouth every 6 hours as needed for nausea (Patient not taking: Reported on 12/26/2017) 20 tablet 0     oxyCODONE IR (ROXICODONE) 5 MG tablet Take 1 tablet (5 mg) by mouth every 4 hours as needed for pain maximum 6 tablet(s) per day (Patient not taking: Reported on 12/1/2017) 18 tablet 0     senna (SENOKOT) 8.6 MG tablet Take 1 tablet by mouth 2 times daily as needed for constipation (Patient not taking: Reported on 12/1/2017) 20 tablet 0     finasteride (PROSCAR) 5 MG tablet Take 1 tablet (5 mg) by mouth daily (Patient not taking: Reported on 1/9/2018) 90 tablet 3     Multiple Vitamins-Minerals (MULTIVITAMIN MEN PO) Take 1 tablet by mouth daily       fish oil-omega-3 fatty acids (FISH OIL) 1000 MG capsule Take 1 g by mouth daily         PAST SURGICAL HISTORY:  Past Surgical History:   Procedure Laterality Date     APPENDECTOMY  AGE 6     CREATE FISTULA ARTERIOVENOUS UPPER EXTREMITY Left 11/17/2017    Procedure: CREATE FISTULA ARTERIOVENOUS UPPER EXTREMITY;  Left Cephalic Vein To Radial Artery Arteriovenous Fistula Creation, Anesthesia Block;  Surgeon: Eduardo Pabon MD;  Location: [facility] OR     CYSTOSCOPY, FULGURATE BLEEDERS, EVACUATE CLOT(S), COMBINED N/A 7/16/2017    Procedure: COMBINED CYSTOSCOPY, FULGURATE  BLEEDERS, EVACUATE CLOT(S);  Cystoscopy clot evacuation, bladder biopsy and fulguration (per surgeons note) NERVE BLOCK PERIPHERAL;  Surgeon: Tamiko Vanegas MD;  Location: [facility] OR     SINUS SURGERY  AGE 6     TONSILLECTOMY      CHILDHOOD       ALLERGIES:   No Known Allergies    FAMILY HISTORY:  Family History   Problem Relation Age of Onset     C.A.D. Mother      d age 67     Vision Loss Father      Hearing Loss Sister      DIABETES Sister      CANCER No family hx of          SOCIAL HISTORY:  Social History   Substance Use Topics     Smoking status: Former Smoker     Packs/day: 1.00     Years: 50.00     Types: Cigarettes     Quit date: 6/12/2017     Smokeless tobacco: Never Used     Alcohol use No       ROS:   Constitutional: No fever, chills, or sweats. Weight stable.   ENT: No visual disturbance, ear ache, epistaxis, sore throat.   Cardiovascular: As per HPI.   Respiratory: No cough, hemoptysis.    GI: No nausea, vomiting, hematemesis, melena, or hematochezia.   : No hematuria.   Integument: Negative.   Psychiatric: Negative.   Hematologic:  Easy bruising, no easy bleeding.  Neuro: Negative.   Endocrinology: No significant heat or cold intolerance   Musculoskeletal: No myalgia.    Exam:  /79 (BP Location: Right arm, Patient Position: Chair, Cuff Size: Adult Regular)  Pulse 92  Wt 74.8 kg (165 lb)  SpO2 100%  BMI 23.68 kg/m2  GENERAL APPEARANCE: healthy, alert and no distress  HEENT: no icterus, no central cyanosis  LYMPH/NECK: no adenopathy, no asymmetry, JVP not elevated, no carotid bruits.  RESPIRATORY: lungs clear to auscultation - no rales, rhonchi or wheezes, no use of accessory muscles, no retractions, respirations are unlabored, normal respiratory rate  CARDIOVASCULAR: regular rhythm, normal S1, S2, no S3 or S4, 2/6 LYLE on the LSB and aortic area.  GI: soft, non tender  EXTREMITIES: peripheral pulses normal, 1+ bilateral pretibial edema  NEURO: alert and oriented to person/place/time,  normal speech,and affect  VASC: Radial, dorsalis pedis and posterior tibialis pulses are normal in volumes and symmetric bilaterally.   SKIN: no ecchymoses, no rashes     I have reviewed the labs and personally reviewed the imaging below and made my comment in the assessment and plan.    Labs:  CBC RESULTS:   Lab Results   Component Value Date    WBC 9.1 12/11/2017    RBC 3.18 (L) 12/11/2017    HGB 10.2 (L) 01/08/2018    HCT 33.4 (L) 01/08/2018    MCV 89 12/11/2017    MCH 26.7 12/11/2017    MCHC 30.1 (L) 12/11/2017    RDW 16.9 (H) 12/11/2017     12/11/2017       BMP RESULTS:  Lab Results   Component Value Date     01/08/2018    POTASSIUM 4.8 01/08/2018    CHLORIDE 108 01/08/2018    CO2 25 01/08/2018    ANIONGAP 9 01/08/2018    GLC 81 01/08/2018     (H) 01/08/2018    CR 5.29 (H) 01/08/2018    GFRESTIMATED 11 (L) 01/08/2018    GFRESTBLACK 13 (L) 01/08/2018    DERICK 9.4 01/08/2018        INR RESULTS:  Lab Results   Component Value Date    INR 1.11 11/17/2017    INR 1.19 (H) 08/28/2017       Procedures:  TT Echocardiogram 1/4/2018  The Ejection Fraction is estimated at 55-60%. Lateral wall hypokinesis is  present.  The right ventricle is normal size. Global right ventricular function is normal.  Pulmonary artery systolic pressure is normal.  Mild aortic stenosis (Vmax 2.7 m/s, mean pressure gradient 14 mmHg, JESSIKA 2.1 cm2).  Dilated proximal ascending aorta measuring 4.0 cm (indexed at 2.1 cm/m2, Z-score +2.5)  No pericardial effusion is present.  Previous study not available for comparison.    EKG dated 11/8/2017 is normal    Assessment and Plan:   Mr. Dung Norton is a 72 year old  male with PMH significant for CKD, HTN and hx of heavy tobacco abuse.    1. Mild aortic stenosis: Asymptomatic pt. Will follow-up with repeat TTE next year.    2. Lateral wall hypokinesis reported on TTE: I personally reviewed the images. The hypokinesis is mild. He has ESRD and he was a heavy smoker for 50 years. Planned  exercise stress echocardiogram.    3. Hypertension: Followed up closely by his PCP. Currently on amlodipine 2.5 mg qday and ASA 81 mg.    He is scheduled for cysto/TURP on 1/29/2017. He has good functional capacity >4 METS.     It was my absolute pleasure to meet  in the office today. Please donot hesitate to contact me if you have any questions or concerns.    Lizandro DEVINE MD  [facility] Division of Cardiology  Pager [phone]    Haley Vazquez MD

## 2018-01-09 NOTE — NURSING NOTE
Cardiac Testing: Patient given instructions regarding  stress echocardiogram (on 1-10-18 at 11:00am). Discussed purpose, preparation, procedure and when to expect results reported back to the patient. Patient demonstrated understanding of this information and agreed to call with further questions or concerns.    Med Reconcile: Reviewed and verified all current medications with the patient. The updated medication list was printed and given to the patient.    Return Appointment: Patient given instructions regarding scheduling next clinic visit, in 1 year (January 2019 with echo prior). Patient demonstrated understanding of this information and agreed to call with further questions or concerns.    Patient stated he understood all health information given and agreed to call with further questions or concerns.    Sofie Page RN

## 2018-01-09 NOTE — PATIENT INSTRUCTIONS
Thank you for coming to the [facility] Heart @ [facility]; please note the following instructions:    1. Dr. Lizandro Ng has ordered a stress echocardiogram to be performed on you.  This test has been scheduled at the [facility] Imaging Department ([address] [place] [address]) on January 10th, 2018 at 11:00a.m.  Please arrive 15 minutes early to allow enough time for registration.  If this appointment should conflict with your schedule, please call [phone] to reschedule.     *PLEASE REFRAIN FROM USING SCENTED LOTIONS OR PERFUMES  *NO CAFFEINE, ALCOHOL, TOBACCO for 12 HOURS PRIOR.  *NOTHING TO EAT OR DRINK (except water) FOR 3 HOURS PRIOR.      2. Dr. Lizandro Ng would like you to return for a cardiac follow up in 1 year  (January 2019) with echocardiogram prior.  We will contact you regarding your appointment when the time draws closer or you may call [phone] to arrange an appointment.        If you have any questions regarding your visit please contact your care team:     Cardiology  Telephone Number   Stephanie TESFAYE, RN  Sofie IQBAL, RN   Pati LANDON, JENY LOUIE, ZOYA DOWNEY, MA   [phone]    *After hours: [phone]   For scheduling appts:     [phone] or    [phone] (select option 1)    *After hours: [phone]     For the Device Clinic (Pacemakers and ICD's)  RN's :  Beti Simons   During business hours: [phone]    *After business hours:  [phone] (select option 4)      Normal test result notifications will be released via [email or web address] or mailed within 7 business days.  All other test results, will be communicated via telephone once reviewed by your cardiologist.    If you need a medication refill please contact your pharmacy.  Please allow 3 business days for your refill to be completed.    As always, thank you for trusting us with your health care needs!

## 2018-01-09 NOTE — PROGRESS NOTES
Referring provider: Haley Baker MD    Chief complaint: Discuss TTE results.    HPI: Mr. Dung Norton is a 72 year old  male with PMH significant for CKD, HTN and hx of heavy tobacco abuse.    Mr. Norton has recently been diagnosed with ESRD, has had his AVF and almost ready ready for HD though he doesnot need that right now. He has HT and is on amlodipine 2.5 mg. His BP in the office is high but he reports lower BP outside the hospital.     He has been diagnosed with heart murmur and subsequently underwent a TTE planned by  which showed mild aortic stenosis with mild dilatation of ascending aorta at 4 cm and hypokinesis of LV lateral wall. He is currently asymptomatic. He denies a history of chest discomfort, dyspnea, PND, orthopnea, pedal edema, palpitations, lightheadedness, and syncope. He has HTN and hx of 50 pack year tobacco abuse. He quit smoking last summer. He has family hx of CAD as well. His mother had MI and  at the age of 66.     I have reviewed his echocardiogram from 2018 which shows mild AS, normal LV function with LV lateral wall hypokinesis (contrast was not used to enhance endocardial borders). His ECG dated 2017 is normal.    Medications, personal, family, and social history reviewed with patient and revised.    PAST MEDICAL HISTORY:  Past Medical History:   Diagnosis Date     BPH (benign prostatic hyperplasia)      Chronic kidney disease      HTN      Pulmonary nodules      Tobacco abuse        CURRENT MEDICATIONS:  Current Outpatient Prescriptions   Medication Sig Dispense Refill     amLODIPine (NORVASC) 2.5 MG tablet Take 1 tablet (2.5 mg) by mouth daily 90 tablet 3     calcium acetate (PHOSLO) 667 MG CAPS capsule Take 2 capsules (1,334 mg) by mouth 3 times daily (with meals) 540 capsule 3     aspirin 81 MG tablet Take 1 tablet by mouth daily.       darbepoetin william (ARANESP, ALBUMIN FREE,) 100 MCG/0.5ML injection Inject 0.5 mLs (100 mcg) Subcutaneous every 14 days As  needed for hgb<10g/dL.  If Hgb increases >1 point in 2 weeks (if blood transfusion given, use hgb PRIOR to this), SYSTOLIC BP > 180 mmHg or hgb>=10g/dL, HOLD DOSE. Dose must be within 1 week of Hgb.  Per anemia protocol with Cecilia De La Torre CNP (Patient not taking: Reported on 1/5/2018) 0.5 mL 99     ondansetron (ZOFRAN) 4 MG tablet Take 1 tablet (4 mg) by mouth every 6 hours as needed for nausea (Patient not taking: Reported on 12/26/2017) 20 tablet 0     oxyCODONE IR (ROXICODONE) 5 MG tablet Take 1 tablet (5 mg) by mouth every 4 hours as needed for pain maximum 6 tablet(s) per day (Patient not taking: Reported on 12/1/2017) 18 tablet 0     senna (SENOKOT) 8.6 MG tablet Take 1 tablet by mouth 2 times daily as needed for constipation (Patient not taking: Reported on 12/1/2017) 20 tablet 0     finasteride (PROSCAR) 5 MG tablet Take 1 tablet (5 mg) by mouth daily (Patient not taking: Reported on 1/9/2018) 90 tablet 3     Multiple Vitamins-Minerals (MULTIVITAMIN MEN PO) Take 1 tablet by mouth daily       fish oil-omega-3 fatty acids (FISH OIL) 1000 MG capsule Take 1 g by mouth daily         PAST SURGICAL HISTORY:  Past Surgical History:   Procedure Laterality Date     APPENDECTOMY  AGE 6     CREATE FISTULA ARTERIOVENOUS UPPER EXTREMITY Left 11/17/2017    Procedure: CREATE FISTULA ARTERIOVENOUS UPPER EXTREMITY;  Left Cephalic Vein To Radial Artery Arteriovenous Fistula Creation, Anesthesia Block;  Surgeon: Eduardo Pabon MD;  Location: [facility] OR     CYSTOSCOPY, FULGURATE BLEEDERS, EVACUATE CLOT(S), COMBINED N/A 7/16/2017    Procedure: COMBINED CYSTOSCOPY, FULGURATE BLEEDERS, EVACUATE CLOT(S);  Cystoscopy clot evacuation, bladder biopsy and fulguration (per surgeons note) NERVE BLOCK PERIPHERAL;  Surgeon: Tamiko Vanegas MD;  Location: [facility] OR     SINUS SURGERY  AGE 6     TONSILLECTOMY      CHILDHOOD       ALLERGIES:   No Known Allergies    FAMILY HISTORY:  Family History   Problem Relation Age of Onset     C.A.D.  Mother      d age 67     Vision Loss Father      Hearing Loss Sister      DIABETES Sister      CANCER No family hx of          SOCIAL HISTORY:  Social History   Substance Use Topics     Smoking status: Former Smoker     Packs/day: 1.00     Years: 50.00     Types: Cigarettes     Quit date: 6/12/2017     Smokeless tobacco: Never Used     Alcohol use No       ROS:   Constitutional: No fever, chills, or sweats. Weight stable.   ENT: No visual disturbance, ear ache, epistaxis, sore throat.   Cardiovascular: As per HPI.   Respiratory: No cough, hemoptysis.    GI: No nausea, vomiting, hematemesis, melena, or hematochezia.   : No hematuria.   Integument: Negative.   Psychiatric: Negative.   Hematologic:  Easy bruising, no easy bleeding.  Neuro: Negative.   Endocrinology: No significant heat or cold intolerance   Musculoskeletal: No myalgia.    Exam:  /79 (BP Location: Right arm, Patient Position: Chair, Cuff Size: Adult Regular)  Pulse 92  Wt 74.8 kg (165 lb)  SpO2 100%  BMI 23.68 kg/m2  GENERAL APPEARANCE: healthy, alert and no distress  HEENT: no icterus, no central cyanosis  LYMPH/NECK: no adenopathy, no asymmetry, JVP not elevated, no carotid bruits.  RESPIRATORY: lungs clear to auscultation - no rales, rhonchi or wheezes, no use of accessory muscles, no retractions, respirations are unlabored, normal respiratory rate  CARDIOVASCULAR: regular rhythm, normal S1, S2, no S3 or S4, 2/6 LYLE on the LSB and aortic area.  GI: soft, non tender  EXTREMITIES: peripheral pulses normal, 1+ bilateral pretibial edema  NEURO: alert and oriented to person/place/time, normal speech,and affect  VASC: Radial, dorsalis pedis and posterior tibialis pulses are normal in volumes and symmetric bilaterally.   SKIN: no ecchymoses, no rashes     I have reviewed the labs and personally reviewed the imaging below and made my comment in the assessment and plan.    Labs:  CBC RESULTS:   Lab Results   Component Value Date    WBC 9.1  12/11/2017    RBC 3.18 (L) 12/11/2017    HGB 10.2 (L) 01/08/2018    HCT 33.4 (L) 01/08/2018    MCV 89 12/11/2017    MCH 26.7 12/11/2017    MCHC 30.1 (L) 12/11/2017    RDW 16.9 (H) 12/11/2017     12/11/2017       BMP RESULTS:  Lab Results   Component Value Date     01/08/2018    POTASSIUM 4.8 01/08/2018    CHLORIDE 108 01/08/2018    CO2 25 01/08/2018    ANIONGAP 9 01/08/2018    GLC 81 01/08/2018     (H) 01/08/2018    CR 5.29 (H) 01/08/2018    GFRESTIMATED 11 (L) 01/08/2018    GFRESTBLACK 13 (L) 01/08/2018    DERICK 9.4 01/08/2018        INR RESULTS:  Lab Results   Component Value Date    INR 1.11 11/17/2017    INR 1.19 (H) 08/28/2017       Procedures:  TT Echocardiogram 1/4/2018  The Ejection Fraction is estimated at 55-60%. Lateral wall hypokinesis is  present.  The right ventricle is normal size. Global right ventricular function is normal.  Pulmonary artery systolic pressure is normal.  Mild aortic stenosis (Vmax 2.7 m/s, mean pressure gradient 14 mmHg, JESSIKA 2.1 cm2).  Dilated proximal ascending aorta measuring 4.0 cm (indexed at 2.1 cm/m2, Z-score +2.5)  No pericardial effusion is present.  Previous study not available for comparison.    EKG dated 11/8/2017 is normal    Assessment and Plan:   Mr. Dung Norton is a 72 year old  male with PMH significant for CKD, HTN and hx of heavy tobacco abuse.    1. Mild aortic stenosis: Asymptomatic pt. Will follow-up with repeat TTE next year.    2. Lateral wall hypokinesis reported on TTE: I personally reviewed the images. The hypokinesis is mild. He has ESRD and he was a heavy smoker for 50 years. Planned exercise stress echocardiogram.    3. Hypertension: Followed up closely by his PCP. Currently on amlodipine 2.5 mg qday and ASA 81 mg.    He is scheduled for cysto/TURP on 1/29/2017. He has good functional capacity >4 METS.     It was my absolute pleasure to meet  in the office today. Please donot hesitate to contact me if you have any questions  or concerns.    Lizandro DEVINE MD  [facility] Division of Cardiology  Pager [phone]    Haley Vazquez MD

## 2018-01-09 NOTE — TELEPHONE ENCOUNTER
Anemia Management Note  SUBJECTIVE/OBJECTIVE:  Referred by Cecilia De La Torre on 9/11/2017  Primary Diagnosis: Anemia in Chronic Kidney Disease (N18.5, D63.1)     Secondary Diagnosis:  Chronic Kidney Disease, Stage 5 (N18.5)  Hgb goal range:  9-10  Epo/Darbo: Aranesp 100 mcg every 14 days As needed for hgb<10g/dL  RX expires on 12/5/2018  Iron regimen:  None  Labs exp: 9/12/2018  **Provide phone number for [facility] [phone] and instruction to schedule ancillary visit for aranesp injection. Send message to Zainab Heller and Emily Starkey as they will need to order med**      Contact: **AUTH TO DISCUSS**Tereza Norton(daughter) [phone], Rabia Purcell (daughter)[phone]                                        Ok to leave message regarding labs and appts per consent to communicate dated 12/12/17                              OK to speak with daughters Tereza and Rabia regarding Dung's anemia care plan per consent to communicate dated 12/12/17    Anemia Latest Ref Rng & Units 12/4/2017 12/11/2017 12/18/2017 12/19/2017 12/26/2017 1/3/2018 1/8/2018   EARLENE Dose - - - - 60 mcg - - -   Hemoglobin 13.3 - 17.7 g/dL 8.1(L) 8.5(L) 9.1(L) - 9.6(L) 10.2(L) 10.2(L)   TSAT 15 - 46 % - - - - 19 - -   Ferritin 26 - 388 ng/mL - - - - 518(H) - -     BP Readings from Last 3 Encounters:   01/08/18 138/78   01/05/18 154/83   01/03/18 140/74     Wt Readings from Last 2 Encounters:   01/05/18 163 lb 12.8 oz (74.3 kg)   01/02/18 165 lb 5.5 oz (75 kg)     Usually has labs drawn on Mondays at the [facility]    ASSESSMENT:  Hgb:Above goal - recommend hold dose  TSat: not at goal (>30%) but ferritin >500ng/mL.  IV iron not indicated at this time per anemia protocol. Ferritin: At goal (>100ng/mL)    PLAN:  Hold Aranesp and RTC for hgb then aranesp if needed in 2 week(s)  1/9: he has labs weekly on mondays so would look at next week's hgb to determine need for EARLENE. -keiko    Orders needed to be renewed (for next follow-up date)  in EPIC: None    Iron labs due:  2/5/18    Plan discussed with:  No call made  Plan provided by:  Judith    NEXT FOLLOW-UP DATE:  1/15/18    Anemia Management Service  Tereza Paul PharmD and Malina Hussein CPhT  Phone: [phone]  Fax: [phone]

## 2018-01-09 NOTE — NURSING NOTE
Dung Norton is a 72 year old male who comes in today for a Blood Pressure check because of ongoing blood pressure monitoring.    *Document pulse and BP  *Use new set of vitals button for multiple readings.  *Use extended vitals for orthostatic    Vitals as recorded, a regular cuff was used.    Patient is taking medication as prescribed  Patient is tolerating medications well.  Patient is monitoring Blood Pressure at home.  Average readings if yes are 130's/70's    Current complaints: none    Disposition: results routed to MD/SNOW Tinajero CMA

## 2018-01-09 NOTE — MR AVS SNAPSHOT
After Visit Summary   1/9/2018    Dung Nortno    MRN: [id number]           Patient Information     Date Of Birth          1945        Visit Information        Provider Department      1/9/2018 2:30 PM Lizandro Ng MD [facility] AT [facility]'s Diagnoses     Nonrheumatic aortic (valve) stenosis    -  1      Care Instructions    Thank you for coming to the [facility] @ [facility]; please note the following instructions:    1. Dr. Lizandro Ng has ordered a stress echocardiogram to be performed on you.  This test has been scheduled at the [facility] Department ([address] [place] [address]) on January 10th, 2018 at 11:00a.m.  Please arrive 15 minutes early to allow enough time for registration.  If this appointment should conflict with your schedule, please call [phone] to reschedule.     *PLEASE REFRAIN FROM USING SCENTED LOTIONS OR PERFUMES  *NO CAFFEINE, ALCOHOL, TOBACCO for 12 HOURS PRIOR.  *NOTHING TO EAT OR DRINK (except water) FOR 3 HOURS PRIOR.      2. Dr. Lizandro Ng would like you to return for a cardiac follow up in 1 year  (January 2019) with echocardiogram prior.  We will contact you regarding your appointment when the time draws closer or you may call [phone] to arrange an appointment.        If you have any questions regarding your visit please contact your care team:     Cardiology  Telephone Number   Stephanie TESFAYE, BRANDY IQBAL, RN   Pati LANDON, JENY LOUIE, ZOYA DOWNEY MA   [phone]    *After hours: [phone]   For scheduling appts:     [phone] or    [phone] (select option 1)    *After hours: [phone]     For the Device Clinic (Pacemakers and ICD's)  RN's :  Beti Simons   During business hours: [phone]    *After business hours:  [phone] (select option 4)      Normal test result notifications will be released via fotopedia or mailed  within 7 business days.  All other test results, will be communicated via telephone once reviewed by your cardiologist.    If you need a medication refill please contact your pharmacy.  Please allow 3 business days for your refill to be completed.    As always, thank you for trusting us with your health care needs!            Follow-ups after your visit        Additional Services     Follow-Up with Cardiologist                 Your next 10 appointments already scheduled     Gutierrez 10, 2018 11:00 AM CST   Ech Stress Test with FKECHR1, FK STRESS RM   [facility] ([facility])    [address] [place] [address] [phone]           1. Please bring or wear a comfortable two-piece outfit and walking shoes. 2. Stop eating 3 hours before the test. You may drink water or juice. 3. Stop all caffeine 12 hours before the test. This includes coffee, tea, soda pop, chocolate and certain medicines (such as Anacin and Excederin). Also avoid decaf coffee and tea, as these contain small amounts of caffeine. 4. No alcohol, smoking or use of other tobacco products for 12 hours before the test. 5. Refer to your provider instructions to see if you need to stop any medications (such as beta-blockers or nitrates) for this test. 6. For patients with diabetes: - If you take insulin, call your diabetes care team. Ask if you should take a   dose the morning of your test. - If you take diabetes medicine by mouth, dont take it on the morning of your test. Bring it with you to take after the test. (If you have questions, call your diabetes care team) 7. When you arrive, please tell us if: - You have diabetes. - You have taken Viagra, Cialis or Levitra in the past 48 hours. 8. For any questions that cannot be answered, please contact the ordering physician            Gutierrez 15, 2018 10:30 AM CST   LAB with  LAB   [facility] ([facility])    [address]  [facility]  [place] [address]   [phone]           Please do not eat 10-12 hours before your appointment if you are coming in fasting for labs on lipids, cholesterol, or glucose (sugar). This does not apply to pregnant women. Water, hot tea and black coffee (with nothing added) are okay. Do not drink other fluids, diet soda or chew gum.            Jan 17, 2018 10:30 AM CST   (Arrive by 10:15 AM)   PAC EVALUATION with  Pac Roopa 5   [facility] ([facility])    [address] [place] [address]   [phone]            Jan 17, 2018 11:30 AM CST   (Arrive by 11:15 AM)   PAC RN ASSESSMENT with  Pac Rn   [facility] ([facility])    [address] [place] [address]   [phone]            Jan 17, 2018 12:00 PM CST   (Arrive by 11:45 AM)   PAC Anesthesia Consult with  Pac Anesthesiologist   [facility] ([facility])    [address] [place] [address]   [phone]            Jan 29, 2018   Procedure with Tamiko Vanegas MD   [facility] [place], Same Day Surgery (--)    [address] [facility] [address]   [phone]            Feb 09, 2018  9:15 AM CST   (Arrive by 9:00 AM)   Post-Op with Tamiko Vanegas MD   [facility] ([facility])    [address] [place] [address]  [address] [facility] [address] [phone]            Mar 02, 2018  1:00 PM CST   (Arrive by 12:30 PM)   Return Visit with Oralia De La Torre NP   [facility] ([facility])    [address] [place] [address]  [address]  [facility] [address] [phone]              Future tests that were ordered for you today     Open Future Orders        Priority Expected Expires Ordered    Exercise  "Stress Echocardiogram Routine  2019    Follow-Up with Cardiologist Routine 2019 1/10/2019 2018    Echo Complete Routine 2019 1/10/2019 2018            Who to contact     If you have questions or need follow up information about today's clinic visit or your schedule please contact [facility] PHYSIC[facility]ANS HEART AT [facility] directly at [phone].  Normal or non-critical lab and imaging results will be communicated to you by Tricidahart, letter or phone within 4 business days after the clinic has received the results. If you do not hear from us within 7 days, please contact the clinic through Careemt or phone. If you have a critical or abnormal lab result, we will notify you by phone as soon as possible.  Submit refill requests through Muxlim or call your pharmacy and they will forward the refill request to us. Please allow 3 business days for your refill to be completed.          Additional Information About Your Visit        Muxlim Information     Muxlim lets you send messages to your doctor, view your test results, renew your prescriptions, schedule appointments and more. To sign up, go to [email or web address] . Click on \"Log in\" on the left side of the screen, which will take you to the Welcome page. Then click on \"Sign up Now\" on the right side of the page.     You will be asked to enter the access code listed below, as well as some personal information. Please follow the directions to create your username and password.     Your access code is: [email or web address]  Expires: 2018  3:46 PM     Your access code will  in 90 days. If you need help or a new code, please call your [place] clinic or [phone].        Care EveryWhere ID     This is your Care EveryWhere ID. This could be used by other organizations to access your [place] medical records  [id number]        Your Vitals Were     Pulse Pulse Oximetry BMI (Body Mass Index)             92 100% 23.68 " kg/m2          Blood Pressure from Last 3 Encounters:   01/09/18 163/79   01/09/18 130/80   01/08/18 138/78    Weight from Last 3 Encounters:   01/09/18 74.8 kg (165 lb)   01/05/18 74.3 kg (163 lb 12.8 oz)   01/02/18 75 kg (165 lb 5.5 oz)               Primary Care Provider Office Phone # Fax #    Haley Baker [phone] [address] [facility] [address]        Equal Access to Services     [facility]: Hadii cristine ku hadasho Soomaali, waaxda luqadaha, qaybta kaalmada adekimberlyyarahul, [facility]son green . So [facility] [phone].    ATENCIÓN: Si habla español, tiene a montague disposición servicios gratuitos de asistencia lingüística. [facility] [phone].    We comply with applicable federal civil rights laws and [place] laws. We do not discriminate on the basis of race, color, national origin, age, disability, sex, sexual orientation, or gender identity.            Thank you!     Thank you for choosing [facility] PHYSICIANS HEART AT [facility]  for your care. Our goal is always to provide you with excellent care. Hearing back from our patients is one way we can continue to improve our services. Please take a few minutes to complete the written survey that you may receive in the mail after your visit with us. Thank you!             Your Updated Medication List - Protect others around you: Learn how to safely use, store and throw away your medicines at www.disposemymeds.org.          This list is accurate as of: 1/9/18  3:19 PM.  Always use your most recent med list.                   Brand Name Dispense Instructions for use Diagnosis    amLODIPine 2.5 MG tablet    NORVASC    90 tablet    Take 1 tablet (2.5 mg) by mouth daily    Renal hypertension       aspirin 81 MG tablet      Take 1 tablet by mouth daily.        calcium acetate 667 MG Caps capsule    PHOSLO    540 capsule    Take 2 capsules (1,334 mg) by mouth 3 times daily (with meals)    Chronic kidney  disease, unspecified CKD stage       darbepoetin william 100 MCG/0.5ML injection    ARANESP (ALBUMIN FREE)    0.5 mL    Inject 0.5 mLs (100 mcg) Subcutaneous every 14 days As needed for hgb<10g/dL.  If Hgb increases >1 point in 2 weeks (if blood transfusion given, use hgb PRIOR to this), SYSTOLIC BP > 180 mmHg or hgb>=10g/dL, HOLD DOSE. Dose must be within 1 week of Hgb.  Per anemia protocol with Cecilia De La Torre CNP    Anemia of chronic renal failure, stage 5 (H), CKD (chronic kidney disease) stage 5, GFR less than 15 ml/min (H)       finasteride 5 MG tablet    PROSCAR    90 tablet    Take 1 tablet (5 mg) by mouth daily    Benign prostatic hyperplasia with urinary retention       fish oil-omega-3 fatty acids 1000 MG capsule      Take 1 g by mouth daily        MULTIVITAMIN MEN PO      Take 1 tablet by mouth daily        ondansetron 4 MG tablet    ZOFRAN    20 tablet    Take 1 tablet (4 mg) by mouth every 6 hours as needed for nausea    Arteriovenous fistula (H)       oxyCODONE IR 5 MG tablet    ROXICODONE    18 tablet    Take 1 tablet (5 mg) by mouth every 4 hours as needed for pain maximum 6 tablet(s) per day    Arteriovenous fistula (H)       senna 8.6 MG tablet    SENOKOT    20 tablet    Take 1 tablet by mouth 2 times daily as needed for constipation    Arteriovenous fistula (H)

## 2018-01-09 NOTE — NURSING NOTE
"Chief Complaint   Patient presents with     New Patient     NEW Dr. Ng abnormal echo showing 4.0 cm aortic dilation and wall hypokinesis. Pt denies any cardiac sx's.       Initial /79 (BP Location: Right arm, Patient Position: Chair, Cuff Size: Adult Regular)  Pulse 92  Wt 74.8 kg (165 lb)  SpO2 100%  BMI 23.68 kg/m2 Estimated body mass index is 23.68 kg/(m^2) as calculated from the following:    Height as of 1/5/18: 1.778 m (5' 10\").    Weight as of this encounter: 74.8 kg (165 lb)..  BP completed using cuff size: regular    Nuria Guzmán MA    "

## 2018-01-09 NOTE — MR AVS SNAPSHOT
After Visit Summary   1/9/2018    Dung Norton    MRN: [id number]           Patient Information     Date Of Birth          1945        Visit Information        Provider Department      1/9/2018 9:30 AM FZ ANCILLARY [facility]        Today's Diagnoses     Hypertension goal BP (blood pressure) < 140/90    -  1       Follow-ups after your visit        Your next 10 appointments already scheduled     Jan 09, 2018  2:30 PM CST   New Visit with Lizandro Ng MD   [facility] PHYSICIANS HEART AT [facility] ([facility])    [address] [address]   [phone]            Gutierrez 15, 2018 10:30 AM CST   LAB with FZ LAB   [facility] ([facility])    [address] [place] [address] [phone]           Please do not eat 10-12 hours before your appointment if you are coming in fasting for labs on lipids, cholesterol, or glucose (sugar). This does not apply to pregnant women. Water, hot tea and black coffee (with nothing added) are okay. Do not drink other fluids, diet soda or chew gum.            Jan 17, 2018 10:30 AM CST   (Arrive by 10:15 AM)   PAC EVALUATION with  Pac Roopa 5   [facility] Preoperative Assessment [place] ([facility])    [address] [place] [address] [phone]            Jan 17, 2018 11:30 AM CST   (Arrive by 11:15 AM)   PAC RN ASSESSMENT with Manohar Pac Rn   [facility] Preoperative Assessment [place] ([facility])    [address] [place] [address]   [phone]            Jan 17, 2018 12:00 PM CST   (Arrive by 11:45 AM)   PAC Anesthesia Consult with Manohar Pac Anesthesiologist   [facility] Preoperative Assessment [place] ([facility])    [address] [place] [address]   [phone]            Jan 29, 2018   Procedure with Tamiko  "Susan Vanegas MD   [facility] [place], Same Day Surgery (--)    [address]  [facility] [place] [address]   [phone]            Feb 09, 2018  9:15 AM CST   (Arrive by 9:00 AM)   Post-Op with Tamiko Vanegas MD   [facility] Urology and [facility] for Prostate and Urologic Cancers ([facility] [place])    [address]  4th Floor  [facility] [id number] [phone]            Mar 02, 2018  1:00 PM CST   (Arrive by 12:30 PM)   Return Visit with Oralia De La Torre NP   [facility] Nephrology ([facility])    [address]  [address]  [facility] [address] [phone]              Who to contact     If you have questions or need follow up information about today's clinic visit or your schedule please contact [facility] directly at [phone].  Normal or non-critical lab and imaging results will be communicated to you by [employer], letter or phone within 4 business days after the clinic has received the results. If you do not hear from us within 7 days, please contact the clinic through M[employer]rx[employer] or phone. If you have a critical or abnormal lab result, we will notify you by phone as soon as possible.  Submit refill requests through [employer] or call your pharmacy and they will forward the refill request to us. Please allow 3 business days for your refill to be completed.          Additional Information About Your Visit        [employer] Information     [employer] lets you send messages to your doctor, view your test results, renew your prescriptions, schedule appointments and more. To sign up, go to [email or web address] . Click on \"Log in\" on the left side of the screen, which will take you to the Welcome page. Then click on \"Sign up Now\" on the right side of the page.     You will be asked to enter the access code listed below, as well as some personal information. Please follow the directions to create your username and password.     Your access " code is: [email or web address]  Expires: 2018  3:46 PM     Your access code will  in 90 days. If you need help or a new code, please call your [place] clinic or [phone].        Care EveryWhere ID     This is your Care EveryWhere ID. This could be used by other organizations to access your [place] medical records  [id number]        Your Vitals Were     Pulse Pulse Oximetry                91 100%           Blood Pressure from Last 3 Encounters:   18 130/80   18 138/78   18 154/83    Weight from Last 3 Encounters:   18 163 lb 12.8 oz (74.3 kg)   18 165 lb 5.5 oz (75 kg)   17 164 lb (74.4 kg)              Today, you had the following     No orders found for display       Primary Care Provider Office Phone # Fax #    Haley Baker [phone] [address] [facility] [address]        Equal Access to Services     [facility]: Hadii aad ku hadasho Soomaali, waaxda luqadaha, qaybta kaalmada adeegyada, gibson duong haysaadia green . So [facility] [phone].    ATENCIÓN: Si habla español, tiene a montague disposición servicios gratuitos de asistencia lingüística. Llame al [phone].    We comply with applicable federal civil rights laws and [place] laws. We do not discriminate on the basis of race, color, national origin, age, disability, sex, sexual orientation, or gender identity.            Thank you!     Thank you for choosing [facility]  for your care. Our goal is always to provide you with excellent care. Hearing back from our patients is one way we can continue to improve our services. Please take a few minutes to complete the written survey that you may receive in the mail after your visit with us. Thank you!             Your Updated Medication List - Protect others around you: Learn how to safely use, store and throw away your medicines at www.disposemymeds.org.          This list is accurate as of: 18  9:50 AM.   Always use your most recent med list.                   Brand Name Dispense Instructions for use Diagnosis    amLODIPine 2.5 MG tablet    NORVASC    90 tablet    Take 1 tablet (2.5 mg) by mouth daily    Renal hypertension       aspirin 81 MG tablet      Take 1 tablet by mouth daily.        calcium acetate 667 MG Caps capsule    PHOSLO    540 capsule    Take 2 capsules (1,334 mg) by mouth 3 times daily (with meals)    Chronic kidney disease, unspecified CKD stage       darbepoetin william 100 MCG/0.5ML injection    ARANESP (ALBUMIN FREE)    0.5 mL    Inject 0.5 mLs (100 mcg) Subcutaneous every 14 days As needed for hgb<10g/dL.  If Hgb increases >1 point in 2 weeks (if blood transfusion given, use hgb PRIOR to this), SYSTOLIC BP > 180 mmHg or hgb>=10g/dL, HOLD DOSE. Dose must be within 1 week of Hgb.  Per anemia protocol with Cecilia De La Torre,BRANDEN    Anemia of chronic renal failure, stage 5 (H), CKD (chronic kidney disease) stage 5, GFR less than 15 ml/min (H)       finasteride 5 MG tablet    PROSCAR    90 tablet    Take 1 tablet (5 mg) by mouth daily    Benign prostatic hyperplasia with urinary retention       fish oil-omega-3 fatty acids 1000 MG capsule      Take 1 g by mouth daily        MULTIVITAMIN MEN PO      Take 1 tablet by mouth daily        ondansetron 4 MG tablet    ZOFRAN    20 tablet    Take 1 tablet (4 mg) by mouth every 6 hours as needed for nausea    Arteriovenous fistula (H)       oxyCODONE IR 5 MG tablet    ROXICODONE    18 tablet    Take 1 tablet (5 mg) by mouth every 4 hours as needed for pain maximum 6 tablet(s) per day    Arteriovenous fistula (H)       senna 8.6 MG tablet    SENOKOT    20 tablet    Take 1 tablet by mouth 2 times daily as needed for constipation    Arteriovenous fistula (H)

## 2018-01-10 ENCOUNTER — RADIANT APPOINTMENT (OUTPATIENT)
Dept: CARDIOLOGY | Facility: CLINIC | Age: 73
End: 2018-01-10
Attending: INTERNAL MEDICINE
Payer: COMMERCIAL

## 2018-01-10 DIAGNOSIS — R94.31 ABNORMAL ELECTROCARDIOGRAM: ICD-10-CM

## 2018-01-10 DIAGNOSIS — I35.0 NONRHEUMATIC AORTIC (VALVE) STENOSIS: ICD-10-CM

## 2018-01-10 DIAGNOSIS — I35.0 NON-RHEUMATIC AORTIC STENOSIS: Primary | ICD-10-CM

## 2018-01-10 PROCEDURE — 40000264 ZZHC STATISTIC IV PUSH SINGLE INITIAL SUBSTANCE: Performed by: INTERNAL MEDICINE

## 2018-01-10 PROCEDURE — 93308 TTE F-UP OR LMTD: CPT | Performed by: INTERNAL MEDICINE

## 2018-01-10 PROCEDURE — 93321 DOPPLER ECHO F-UP/LMTD STD: CPT | Performed by: INTERNAL MEDICINE

## 2018-01-10 PROCEDURE — 93325 DOPPLER ECHO COLOR FLOW MAPG: CPT | Performed by: INTERNAL MEDICINE

## 2018-01-10 RX ADMIN — Medication 2.5 ML: at 11:30

## 2018-01-15 ENCOUNTER — TELEPHONE (OUTPATIENT)
Dept: NEPHROLOGY | Facility: CLINIC | Age: 73
End: 2018-01-15

## 2018-01-15 ENCOUNTER — TELEPHONE (OUTPATIENT)
Dept: PHARMACY | Facility: CLINIC | Age: 73
End: 2018-01-15

## 2018-01-15 ENCOUNTER — ALLIED HEALTH/NURSE VISIT (OUTPATIENT)
Dept: FAMILY MEDICINE | Facility: CLINIC | Age: 73
End: 2018-01-15

## 2018-01-15 VITALS — SYSTOLIC BLOOD PRESSURE: 140 MMHG | DIASTOLIC BLOOD PRESSURE: 80 MMHG

## 2018-01-15 DIAGNOSIS — N18.5 ANEMIA OF CHRONIC RENAL FAILURE, STAGE 5 (H): ICD-10-CM

## 2018-01-15 DIAGNOSIS — D63.1 ANEMIA OF CHRONIC RENAL FAILURE, STAGE 5 (H): ICD-10-CM

## 2018-01-15 DIAGNOSIS — I10 HYPERTENSION GOAL BP (BLOOD PRESSURE) < 140/90: Primary | ICD-10-CM

## 2018-01-15 DIAGNOSIS — R79.89 ELEVATED SERUM CREATININE: ICD-10-CM

## 2018-01-15 DIAGNOSIS — N18.5 CKD (CHRONIC KIDNEY DISEASE) STAGE 5, GFR LESS THAN 15 ML/MIN (H): ICD-10-CM

## 2018-01-15 LAB
ALBUMIN SERPL-MCNC: 3.6 G/DL (ref 3.4–5)
ANION GAP SERPL CALCULATED.3IONS-SCNC: 11 MMOL/L (ref 3–14)
BUN SERPL-MCNC: 110 MG/DL (ref 7–30)
CALCIUM SERPL-MCNC: 9.5 MG/DL (ref 8.5–10.1)
CHLORIDE SERPL-SCNC: 109 MMOL/L (ref 94–109)
CO2 SERPL-SCNC: 22 MMOL/L (ref 20–32)
CREAT SERPL-MCNC: 5.48 MG/DL (ref 0.66–1.25)
GFR SERPL CREATININE-BSD FRML MDRD: 10 ML/MIN/1.7M2
GLUCOSE SERPL-MCNC: 85 MG/DL (ref 70–99)
HCT VFR BLD AUTO: 33.1 % (ref 40–53)
HGB BLD-MCNC: 10.2 G/DL (ref 13.3–17.7)
PHOSPHATE SERPL-MCNC: 6.7 MG/DL (ref 2.5–4.5)
POTASSIUM SERPL-SCNC: 4.6 MMOL/L (ref 3.4–5.3)
SODIUM SERPL-SCNC: 142 MMOL/L (ref 133–144)

## 2018-01-15 PROCEDURE — 85018 HEMOGLOBIN: CPT

## 2018-01-15 PROCEDURE — 99207 ZZC NO CHARGE NURSE ONLY: CPT | Performed by: FAMILY MEDICINE

## 2018-01-15 PROCEDURE — 85014 HEMATOCRIT: CPT

## 2018-01-15 PROCEDURE — 36415 COLL VENOUS BLD VENIPUNCTURE: CPT

## 2018-01-15 PROCEDURE — 80069 RENAL FUNCTION PANEL: CPT

## 2018-01-15 NOTE — TELEPHONE ENCOUNTER
Anemia Management Note  SUBJECTIVE/OBJECTIVE:  Referred by Cecilia De La Torre on 9/11/2017  Primary Diagnosis: Anemia in Chronic Kidney Disease (N18.5, D63.1)     Secondary Diagnosis:  Chronic Kidney Disease, Stage 5 (N18.5)  Hgb goal range:  9-10  Epo/Darbo: Aranesp 100 mcg every 14 days As needed for hgb<10g/dL  RX expires on 12/5/2018  Iron regimen:  None  Labs exp: 9/12/2018  **Provide phone number for [facility] [phone] and instruction to schedule ancillary visit for aranesp injection. Send message to Zainab Heller and Emily Starkey as they will need to order med**      Contact: **AUTH TO DISCUSS**Tereza Norton(daughter) [phone], Rabia Purcell (daughter)[phone]                                        Ok to leave message regarding labs and appts per consent to communicate dated 12/12/17                              OK to speak with daughters Tereza and Rbaia regarding Dung's anemia care plan per consent to communicate dated 12/12/17       Anemia Latest Ref Rng & Units 12/11/2017 12/18/2017 12/19/2017 12/26/2017 1/3/2018 1/8/2018 1/15/2018   EARLENE Dose - - - 60 mcg - - - -   Hemoglobin 13.3 - 17.7 g/dL 8.5(L) 9.1(L) - 9.6(L) 10.2(L) 10.2(L) 10.2(L)   TSAT 15 - 46 % - - - 19 - - -   Ferritin 26 - 388 ng/mL - - - 518(H) - - -     BP Readings from Last 3 Encounters:   01/15/18 140/80   01/09/18 163/79   01/09/18 130/80     Wt Readings from Last 2 Encounters:   01/09/18 165 lb (74.8 kg)   01/05/18 163 lb 12.8 oz (74.3 kg)     Dung has his blood drawn every Monday    ASSESSMENT:  Hgb:Above goal - recommend hold dose  TSat: not at goal (>30%) but ferritin >500ng/mL.  IV iron not indicated at this time per anemia protocol.     PLAN:  Hold Aranesp and RTC for hgb, ferritin, and iron labs then aranesp if needed in 2 week(s)    Orders needed to be renewed (for next follow-up date) in EPIC: None    Iron labs due:  1/23/18    Plan discussed with:  Dung  Plan provided by:  Judith    NEXT FOLLOW-UP DATE:   1/29/18    Anemia Management Service  Tereza Paul,Anabell and Malina Hussein CPhT  Phone: [phone]  Fax: [phone]

## 2018-01-15 NOTE — TELEPHONE ENCOUNTER
DATE:  1/15/2018   TIME OF RECEIPT FROM LAB:  3:25pm  LAB TEST:  Creatinine  LAB VALUE:  5.48  RESULTS GIVEN WITH READ-BACK TO (PROVIDER):  NEETA JOSEPH  TIME LAB VALUE REPORTED TO PROVIDER:   3:26pm      Giselle Benoit RN

## 2018-01-15 NOTE — PROGRESS NOTES
Dung Norton is enrolled/participating in the retail pharmacy Blood Pressure Goals Achievement Program (BPGAP).  Dung Norton was evaluated at [facility] on January 15, 2018 at which time his blood pressure was:    BP Readings from Last 3 Encounters:   01/15/18 140/80   01/09/18 163/79   01/09/18 130/80     Reviewed lifestyle modifications for blood pressure control and reduction: including making healthy food choices, managing weight, getting regular exercise, smoking cessation, reducing alcohol consumption, monitoring blood pressure regularly.     Dung Norton is not experiencing symptoms.    Follow-Up: BP is at goal of < 140/90mmHg (patient 18+ years of age with or without diabetes).  Recommended follow-up at pharmacy in 6 months.     Recommendation to Provider: None at this time.  The patient came in to get his blood pressure checked after shoveling snow.     Dung Norton was evaluated for enrollment into the PGEN study today.    Patient eligible for enrollment:  No  Patient interested in enrollment:  No    Completed by: Thank you,  Cheri Winslow, PharmD  [facility]  [phone]

## 2018-01-15 NOTE — MR AVS SNAPSHOT
After Visit Summary   1/15/2018    Dung Norton    MRN: [id number]           Patient Information     Date Of Birth          1945        Visit Information        Provider Department      1/15/2018 11:15 AM Haley Baker MD [facility] [place]        Today's Diagnoses     Hypertension goal BP (blood pressure) < 140/90    -  1       Follow-ups after your visit        Your next 10 appointments already scheduled     Jan 17, 2018 10:30 AM CST   (Arrive by 10:15 AM)   PAC EVALUATION with  Pac Roopa 5   [facility] Preoperative Assessment [place] ([facility])    [address] [place] [address]   [phone]            Jan 17, 2018 11:30 AM CST   (Arrive by 11:15 AM)   PAC RN ASSESSMENT with Manohar Pac Rn   [facility] Preoperative Assessment [place] ([facility])    [address] [place] [id number] [phone]            Jan 17, 2018 12:00 PM CST   (Arrive by 11:45 AM)   PAC Anesthesia Consult with  Pac Anesthesiologist   [facility] Preoperative Assessment [place] ([facility])    [address] [place] [id number] [phone]            Jan 18, 2018  9:00 AM CST   (Arrive by 8:45 AM)   NM INJECTION with UUNMINJ2   [facility], Nuclear Medicine ([facility])    [address] [facility] [id number] [phone]            Jan 18, 2018  9:45 AM CST   (Arrive by 9:30 AM)   NM SCAN3 with UUNM1   [facility], Nuclear Medicine ([facility])    [address] [facility] [id number] [phone]            Jan 18, 2018 10:15 AM CST   Ekg Stress Nm Lexiscan with UUEKGNMS   [facility] ([facility])    [address] [place] [address]               Jan 18, 2018 11:15 AM CST   (Arrive by 11:00  AM)   NM MPI WITH LEXISCAN with UUNM1   [facility] [place], Nuclear Medicine ([facility] [place])    [address] [facility] [address] [phone]           For a ONE day exam: Allow 3-4 hours for test. For a TWO day exam: Allow 2 hours PER day for test.  You may need to stop some medicines before the test. Follow your doctor s orders. - If you take a beta blocker: Follow your doctor s specific instructions on taking it prior to and on the day of your exam. - If you take Aggrenox or dipyridamole (Persantine, Permole), stop taking it 48 hours before your test. - If you take Viagra, Cialis or Levitra, stop taking it 48 hours before your test. - If you take theophylline or aminophylline, stop taking it 12 hours before your test.  For patients with diabetes: - If you take insulin, call your diabetes care team. Ask if you should take a 1/2 dose the morning of your test. - If you take diabetes medicine by mouth, don t take it on the morning of your test. Bring it with you to take after the test. (If you have questions, call your diabetes care team.)  Do not take nitrates on the day of your test. Do not wear your Nitro-Patch.  Stop all caffeine 12 hours before the test. This includes coffee, tea, soda pop, chocolate and certain medicines (such as Anacin, Excedrin and NoDoz). Also avoid decaf coffee and tea, as these contain small amounts of caffeine.  No alcohol, smoking or other tobacco for 12 hours before the test.  Stop eating 3 hours before the test. You may drink water.  Please wear a loose two-piece outfit. If you will have an exercise test, bring rubber-soled walking shoes.  When you arrive, please tell us if you: - Have diabetes - Are breastfeeding - May be pregnant - Have a pacemaker of ICD (implantable defibrillator).  Please call your Imaging Department at your exam site with any questions.            Jan 22, 2018 11:30 AM CST   LAB with FZ LAB   [facility]  "[place] ([facility])    [address]  [place] [address]   [phone]           Please do not eat 10-12 hours before your appointment if you are coming in fasting for labs on lipids, cholesterol, or glucose (sugar). This does not apply to pregnant women. Water, hot tea and black coffee (with nothing added) are okay. Do not drink other fluids, diet soda or chew gum.            Jan 29, 2018   Procedure with Tamiko Vanegas MD   [facility], [place], Same Day Surgery (--)    [address]  [place] [address]   [phone]            Feb 09, 2018  9:15 AM CST   (Arrive by 9:00 AM)   Post-Op with Tamiko Vanegas MD   [facility] Urology and [facility] for Prostate and Urologic Cancers ([facility])    [address]  [address] [facility] [address] [phone]              Who to contact     If you have questions or need follow up information about today's clinic visit or your schedule please contact [facility] directly at [phone].  Normal or non-critical lab and imaging results will be communicated to you by MyChart, letter or phone within 4 business days after the clinic has received the results. If you do not hear from us within 7 days, please contact the clinic through MyChart or phone. If you have a critical or abnormal lab result, we will notify you by phone as soon as possible.  Submit refill requests through [employer] or call your pharmacy and they will forward the refill request to us. Please allow 3 business days for your refill to be completed.          Additional Information About Your Visit        [employer] Information     [employer] lets you send messages to your doctor, view your test results, renew your prescriptions, schedule appointments and more. To sign up, go to [email or web address] . Click on \"Log in\" on the left side of the screen, which will take you to the Welcome page. Then click on \"Sign up Now\" on the right " side of the page.     You will be asked to enter the access code listed below, as well as some personal information. Please follow the directions to create your username and password.     Your access code is: [email or web address]  Expires: 4/15/2018 11:20 AM     Your access code will  in 90 days. If you need help or a new code, please call your [place] clinic or [phone].        Care EveryWhere ID     This is your Care EveryWhere ID. This could be used by other organizations to access your [place] medical records  [id number]         Blood Pressure from Last 3 Encounters:   01/15/18 140/80   18 163/79   18 130/80    Weight from Last 3 Encounters:   18 165 lb (74.8 kg)   18 163 lb 12.8 oz (74.3 kg)   18 165 lb 5.5 oz (75 kg)              Today, you had the following     No orders found for display       Primary Care Provider Office Phone # Fax #    Haley Baker [phone] [address] [facility] [address]        Equal Access to Services     [facility]: Hadii aad ku hadasho Soomaali, waaxda luqadaha, qaybta kaalmada adeegyada, gibson duong hayjefen paul green ah. So [facility] [phone].    ATENCIÓN: Si habla español, tiene a montague disposición servicios gratuitos de asistencia lingüística. Jose Manuel al [phone].    We comply with applicable federal civil rights laws and [place] laws. We do not discriminate on the basis of race, color, national origin, age, disability, sex, sexual orientation, or gender identity.            Thank you!     Thank you for choosing [facility]  for your care. Our goal is always to provide you with excellent care. Hearing back from our patients is one way we can continue to improve our services. Please take a few minutes to complete the written survey that you may receive in the mail after your visit with us. Thank you!             Your Updated Medication List - Protect others around you: Learn how to safely  use, store and throw away your medicines at www.disposemymeds.org.          This list is accurate as of: 1/15/18 11:20 AM.  Always use your most recent med list.                   Brand Name Dispense Instructions for use Diagnosis    amLODIPine 2.5 MG tablet    NORVASC    90 tablet    Take 1 tablet (2.5 mg) by mouth daily    Renal hypertension       aspirin 81 MG tablet      Take 1 tablet by mouth daily.        calcium acetate 667 MG Caps capsule    PHOSLO    540 capsule    Take 2 capsules (1,334 mg) by mouth 3 times daily (with meals)    Chronic kidney disease, unspecified CKD stage       darbepoetin william 100 MCG/0.5ML injection    ARANESP (ALBUMIN FREE)    0.5 mL    Inject 0.5 mLs (100 mcg) Subcutaneous every 14 days As needed for hgb<10g/dL.  If Hgb increases >1 point in 2 weeks (if blood transfusion given, use hgb PRIOR to this), SYSTOLIC BP > 180 mmHg or hgb>=10g/dL, HOLD DOSE. Dose must be within 1 week of Hgb.  Per anemia protocol with Cecilia De La Torre CNP    Anemia of chronic renal failure, stage 5 (H), CKD (chronic kidney disease) stage 5, GFR less than 15 ml/min (H)       finasteride 5 MG tablet    PROSCAR    90 tablet    Take 1 tablet (5 mg) by mouth daily    Benign prostatic hyperplasia with urinary retention       fish oil-omega-3 fatty acids 1000 MG capsule      Take 1 g by mouth daily        MULTIVITAMIN MEN PO      Take 1 tablet by mouth daily        ondansetron 4 MG tablet    ZOFRAN    20 tablet    Take 1 tablet (4 mg) by mouth every 6 hours as needed for nausea    Arteriovenous fistula (H)       oxyCODONE IR 5 MG tablet    ROXICODONE    18 tablet    Take 1 tablet (5 mg) by mouth every 4 hours as needed for pain maximum 6 tablet(s) per day    Arteriovenous fistula (H)       senna 8.6 MG tablet    SENOKOT    20 tablet    Take 1 tablet by mouth 2 times daily as needed for constipation    Arteriovenous fistula (H)

## 2018-01-17 ENCOUNTER — ALLIED HEALTH/NURSE VISIT (OUTPATIENT)
Dept: SURGERY | Facility: CLINIC | Age: 73
End: 2018-01-17
Payer: COMMERCIAL

## 2018-01-17 ENCOUNTER — OFFICE VISIT (OUTPATIENT)
Dept: SURGERY | Facility: CLINIC | Age: 73
End: 2018-01-17
Payer: COMMERCIAL

## 2018-01-17 ENCOUNTER — APPOINTMENT (OUTPATIENT)
Dept: SURGERY | Facility: CLINIC | Age: 73
End: 2018-01-17
Payer: COMMERCIAL

## 2018-01-17 ENCOUNTER — ANESTHESIA EVENT (OUTPATIENT)
Dept: SURGERY | Facility: CLINIC | Age: 73
End: 2018-01-17

## 2018-01-17 ENCOUNTER — OFFICE VISIT (OUTPATIENT)
Dept: FAMILY MEDICINE | Facility: CLINIC | Age: 73
End: 2018-01-17
Payer: COMMERCIAL

## 2018-01-17 VITALS
OXYGEN SATURATION: 98 % | TEMPERATURE: 97.6 F | BODY MASS INDEX: 23.59 KG/M2 | HEART RATE: 90 BPM | DIASTOLIC BLOOD PRESSURE: 72 MMHG | SYSTOLIC BLOOD PRESSURE: 137 MMHG | HEIGHT: 70 IN | RESPIRATION RATE: 16 BRPM | WEIGHT: 164.8 LBS

## 2018-01-17 VITALS
HEART RATE: 85 BPM | WEIGHT: 166.4 LBS | SYSTOLIC BLOOD PRESSURE: 136 MMHG | DIASTOLIC BLOOD PRESSURE: 84 MMHG | BODY MASS INDEX: 23.82 KG/M2 | OXYGEN SATURATION: 95 % | RESPIRATION RATE: 14 BRPM | TEMPERATURE: 96.7 F | HEIGHT: 70 IN

## 2018-01-17 DIAGNOSIS — N40.1 BENIGN PROSTATIC HYPERPLASIA WITH URINARY RETENTION: ICD-10-CM

## 2018-01-17 DIAGNOSIS — M70.22 OLECRANON BURSITIS OF LEFT ELBOW: Primary | ICD-10-CM

## 2018-01-17 DIAGNOSIS — Z01.818 PRE-OP EXAMINATION: Primary | ICD-10-CM

## 2018-01-17 DIAGNOSIS — I10 HYPERTENSION GOAL BP (BLOOD PRESSURE) < 140/90: ICD-10-CM

## 2018-01-17 DIAGNOSIS — R33.8 BENIGN PROSTATIC HYPERPLASIA WITH URINARY RETENTION: ICD-10-CM

## 2018-01-17 PROCEDURE — 99213 OFFICE O/P EST LOW 20 MIN: CPT | Performed by: FAMILY MEDICINE

## 2018-01-17 ASSESSMENT — LIFESTYLE VARIABLES: TOBACCO_USE: 1

## 2018-01-17 NOTE — H&P
Pre-Operative H & P     CC:  Preoperative exam to assess for increased cardiopulmonary risk while undergoing surgery and anesthesia.    Date of Encounter: 1/17/2018  Primary Care Physician:  Haley Baker  Dung Norton is a 72 year old male who presents for pre-operative H & P in preparation for Cystoscopy, Transurethral Resection of Prostate on 1/29/2018 with Dr. Vanegas at [facility] under general anesthesia.  Mr. Norton was seen by Dr. Vanegas on 12/1/2017 a h/o urinary retention requiring clean intermittent catheterization.  The above procedure was recommended.  PAC referral for risk assessment and optimization of anesthesia with comorbid conditions of:  HTN; hypokinesis of LV lateral wall; mild aortic stenosis with mild dilation of ascending aorta at 4 cm;  h/o pulmonary nodules; ESRD with AVF placed; BPH and 50+ pack year smoking history.        History is obtained from the patient, electronic health record and patient's daughter.     Past Medical History  Past Medical History:   Diagnosis Date     BPH (benign prostatic hyperplasia)      Chronic kidney disease      HTN      Pulmonary nodules      Tobacco abuse        Past Surgical History  Past Surgical History:   Procedure Laterality Date     APPENDECTOMY  AGE 6     CREATE FISTULA ARTERIOVENOUS UPPER EXTREMITY Left 11/17/2017    Procedure: CREATE FISTULA ARTERIOVENOUS UPPER EXTREMITY;  Left Cephalic Vein To Radial Artery Arteriovenous Fistula Creation, Anesthesia Block;  Surgeon: Eduardo Pabon MD;  Location: [facility] OR     CYSTOSCOPY, FULGURATE BLEEDERS, EVACUATE CLOT(S), COMBINED N/A 7/16/2017    Procedure: COMBINED CYSTOSCOPY, FULGURATE BLEEDERS, EVACUATE CLOT(S);  Cystoscopy clot evacuation, bladder biopsy and fulguration (per surgeons note) NERVE BLOCK PERIPHERAL;  Surgeon: Tamiko Vanegas MD;  Location: [facility] OR     SINUS SURGERY  AGE 6     TONSILLECTOMY      CHILDHOOD       Hx of Blood  transfusions/reactions: yes with reaction.     Hx of abnormal bleeding or anti-platelet use: yes - ASA    Menstrual history: No LMP for male patient.: na    Steroid use in the last year: denies    Personal or FH with difficulty with Anesthesia:  denies    Prior to Admission Medications  Current Outpatient Prescriptions   Medication Sig Dispense Refill     amLODIPine (NORVASC) 2.5 MG tablet Take 1 tablet (2.5 mg) by mouth daily (Patient taking differently: Take 2.5 mg by mouth every morning ) 90 tablet 3     calcium acetate (PHOSLO) 667 MG CAPS capsule Take 2 capsules (1,334 mg) by mouth 3 times daily (with meals) 540 capsule 3     finasteride (PROSCAR) 5 MG tablet Take 1 tablet (5 mg) by mouth daily (Patient taking differently: Take 5 mg by mouth every morning ) 90 tablet 3     Multiple Vitamins-Minerals (MULTIVITAMIN MEN PO) Take 1 tablet by mouth every morning        fish oil-omega-3 fatty acids (FISH OIL) 1000 MG capsule Take 1 g by mouth every morning        aspirin 81 MG tablet Take 1 tablet by mouth every morning        darbepoetin william (ARANESP, ALBUMIN FREE,) 100 MCG/0.5ML injection Inject 0.5 mLs (100 mcg) Subcutaneous every 14 days As needed for hgb<10g/dL.  If Hgb increases >1 point in 2 weeks (if blood transfusion given, use hgb PRIOR to this), SYSTOLIC BP > 180 mmHg or hgb>=10g/dL, HOLD DOSE. Dose must be within 1 week of Hgb.  Per anemia protocol with Cecilia De La Torre CNP (Patient not taking: Reported on 1/5/2018) 0.5 mL 99       Allergies  No Known Allergies    Social History  Social History     Social History     Marital status:      Spouse name: N/A     Number of children: 2     Years of education: N/A     Occupational History      [employer]     Social History Main Topics     Smoking status: Former Smoker     Packs/day: 1.00     Years: 53.00     Types: Cigarettes     Quit date: 6/12/2017     Smokeless tobacco: Never Used     Alcohol use No     Drug use: No     Sexual activity: Not  "Currently     Other Topics Concern     Not on file     Social History Narrative       Family History  Family History   Problem Relation Age of Onset     C.A.D. Mother      d age 67     Vision Loss Father      Hearing Loss Sister      DIABETES Sister      CANCER No family hx of          ROS/MED HX    ENT/Pulmonary: Comment: H/O sinus surgery    (+)SONNY risk factors snores loudly, hypertension, tobacco use (50+ years), Past use 1 PPD packs/day  , . .    Neurologic:  - neg neurologic ROS     Cardiovascular:     (+) hypertension----. Taking blood thinners Pt has received instructions: Instructions Given to patient: ASA 81 mg po QD. . . :. valvular problems/murmurs type: AS . Previous cardiac testing Echodate:results:date: results:ECG reviewed date: results: date: results:          METS/Exercise Tolerance: Comment: Is able go up a flight of stairs and shovel his own driveway. >4 METS   Hematologic:     (+) Anemia, History of Transfusion no previous transfusion reaction -      Musculoskeletal:  - neg musculoskeletal ROS       GI/Hepatic:     (+) appendicitis (s/p appendectomy),       Renal/Genitourinary:     (+) chronic renal disease, type: ESRD, Pt does not require dialysis, Pt has no history of transplant, BPH,       Endo:  - neg endo ROS       Psychiatric:  - neg psychiatric ROS       Infectious Disease:  - neg infectious disease ROS       Malignancy:      - no malignancy   Other:    (+) No chance of pregnancy C-spine cleared: N/A, no H/O Chronic Pain,         Temp: 97.6  F (36.4  C) Temp src: Oral BP: 137/72 Pulse: 90   Resp: 16 SpO2: 98 %         164 lbs 12.8 oz  5' 10\"   Body mass index is 23.65 kg/(m^2).       Physical Exam  Constitutional: Awake, alert, cooperative, no apparent distress, and appears stated age.  Eyes: Pupils equal, round and reactive to light, extra ocular muscles intact, sclera clear, conjunctiva normal.  HENT: Normocephalic, oral pharynx with moist mucus membranes,  Dentition in poor repair with " chipped and missing teeth.  Denies any loose teeth. No goiter appreciated.   Respiratory: Clear to auscultation bilaterally, no crackles or wheezing.  Cardiovascular: Regular rate and rhythm, normal S1 and S2, and III/VI systolic murmur heard best at LSB.  Carotids +2, no bruits. No edema. Palpable pulses to radial  DP and PT arteries.   GI: Normal bowel sounds, soft, non-distended, non-tender, no masses palpated, no hepatosplenomegaly.   Lymph/Hematologic: No cervical lymphadenopathy and no supraclavicular lymphadenopathy.  Genitourinary:  deferred  Skin: Warm and dry.  Left posterior aspect of elbow has evidence of fluid filled mobile sack about the size of a ping-pong ball without tenderness.   Musculoskeletal: Full ROM of neck. Gross motor strength is normal.    Neurologic: Awake, alert, oriented to name, place and time. Cranial nerves II-XII are grossly intact. Gait is normal.   Neuropsychiatric: Calm, cooperative. Normal affect.     Labs: (personally reviewed)  Lab Results   Component Value Date    WBC 9.1 12/11/2017     Lab Results   Component Value Date    RBC 3.18 12/11/2017     Lab Results   Component Value Date    HGB 10.2 01/15/2018     Lab Results   Component Value Date    HCT 33.1 01/15/2018     Lab Results   Component Value Date    MCV 89 12/11/2017     Lab Results   Component Value Date    MCH 26.7 12/11/2017     Lab Results   Component Value Date    MCHC 30.1 12/11/2017     Lab Results   Component Value Date    RDW 16.9 12/11/2017     Lab Results   Component Value Date     12/11/2017       Last Basic Metabolic Panel:  Lab Results   Component Value Date     01/15/2018      Lab Results   Component Value Date    POTASSIUM 4.6 01/15/2018     Lab Results   Component Value Date    CHLORIDE 109 01/15/2018     Lab Results   Component Value Date    DERICK 9.5 01/15/2018     Lab Results   Component Value Date    CO2 22 01/15/2018     Lab Results   Component Value Date     01/15/2018     Lab  Results   Component Value Date    CR 5.48 01/15/2018     Lab Results   Component Value Date    GLC 85 01/15/2018     Procedures  Echo limited with definity 1/10/2018  Interpretation Summary     Technically limited study. Contrast utilized to enhance endocardial  delineation.     Left ventricular function, chamber size, wall motion, and wall thickness are  normal.The EF is 55-60%.  On contrast imaging, there is base-mid inferior, inferolateral, mid  anterolateral wall and apical lateral wall hypokinesis.  Mild aortic stenosis. AV Vmax 2.6 cm/s, mean gradient 14 mmHg.  Ascending aorta mildly dilated 4.0 cm (indexed to BSA 2.1 cm/m2). Root  similarly dilated at 4.0 cm with no effacement of the sinotubular junction.     Compared to prior study (1/4/2018), contrast was used to enhance wall motion  assessment and there appears to also be inferior wall hypokinesis. Findings of  lateral wall hypokinesis are also present on this study.    ECG SR 91 bpm 11/8/2017    ASSESSMENT and PLAN  Dung Norton is a 72 year old male scheduled to undergo Cystoscopy, Transurethral Resection of Prostate on 1/29/2018 with Dr. Vanegas at [facility] under general anesthesia.       He has the following specific operative considerations:     1.  Cardiology - Denies cardiopulmonary symptoms.  METS>4.   STEVE given cardiac murmur, longstanding smoking hx, HTN and CKD on 1/4/2018 >>> EF 55-60%, mild aortic stenosis with mild dilation of ascending aorta at 4 cm and hypokinesis of LV lateral wall.  Mr. Norton was seen by cardiology, Dr. Ng, on 1/9/2018.  He ordered Lexiscan which is scheduled for tomorrow.  Please see cardiology's note for cardiac clearance.       - HTN, take CCB DOS        - ASA for primary prevention.  Hold according to protocol for surgery.  2.  Pulmonary - 50+ pack smoking history.  Quit smoking July 2017.        - SONNY # of risks 3/8 = imtermediate       - Pulmonary nodules:  CT C/A/P  "7/13/17>>> \"A few small pulmonary nodules measuring up to 3 mm. These are nonspecific. In a patient with a smoking history, a 12 month follow-up CT is optional per Fleischner society criteria\".   3.  Hematology - VTE risk:  1.8%.  Increased VTE risk: Recommend use of mechanical/chemical VTE prophylaxis in the yoli- and postoperative periods.          - h/o multiple transfusions in last year>>>ordered T&S within three days of surgery  4.  GI - Risk of PONV score = 1.  If > 2, anti-emetic intervention recommended.  5.  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 Cr 5.29, GFR 11 (1/15/18).   LUE AVF placed 11/17/17 but has not started dialysis.  Patient reports swelling in left posterior aspect of elbow since fistula placed.  Instructed to have evaluated by PCP prior to surgery.          - BPH with urinary retention requiring CIC>>>planned procedure as above.     - Anesthesia considerations:  Refer to PAC assessment in anesthesia records  - Airway:  Appears feasible  - Post-op delirium risk: elevated given age    Arrival time, NPO, shower and medication instructions provided by nursing staff today.  Preparing For Your Surgery handout given.  Patient was discussed with Dr Hearn. I spent 20 minutes face to face with patient, assessing, examining, and educating.      COY Zavala CNP  Preoperative Assessment Center  [facility]  [facility]  Phone: [phone]  Fax: [phone]  "

## 2018-01-17 NOTE — MR AVS SNAPSHOT
After Visit Summary   1/17/2018    Dung Norton    MRN: [id number]           Patient Information     Date Of Birth          1945        Visit Information        Provider Department      1/17/2018 1:40 PM Haley Baker MD [facility]        Today's Diagnoses     Olecranon bursitis of left elbow    -  1    Hypertension goal BP (blood pressure) < 140/90          Care Instructions      Bursitis of the Elbow (Olecranon)  Your elbow joint contains a small fluid-filled sac called a bursa. The bursa helps the muscles and tendons move smoothly over the bone. It also cushions and protects your elbow. Bursitis is when the bursa is inflamed or swollen. This is most often due to overuse of or injury to the elbow. Symptoms include swelling and pain. If the elbow is red and feels warm to the touch, the bursa itself may be infected.  In most cases, elbow bursitis resolves with medicine and self-care at home. It may take several weeks for the bursa to heal and the swelling to go away. In some cases, your healthcare provider may drain excess fluid from the bursa. Or, he or she may inject medicine directly into the bursa to help relieve symptoms. In severe cases, you may need surgery to remove the bursa may. If there is concern that the bursa is infected, your healthcare provider may prescribe antibiotics to treat the infection.    Home care  Your healthcare provider may prescribe medicine to help relieve pain and swelling. This may be an over-the-counter pain reliever or prescription pain medicine. Take all medicines as directed. To help treat or prevent infection, your provider may prescribe antibiotics. If these are prescribed, take them as directed until they are gone.  The following are general care guidelines:    Apply an ice pack or bag of frozen peas wrapped in a thin towel to your elbow for 15 to 20 minutes at a time. Do this 3 to 4 times a day until pain and swelling improve.    Keep your  elbow raised above the level of your heart whenever possible. This helps reduce swelling. When sitting or lying down, place your arm on a pillow that rests on your chest or on a pillow at your side.    Use an elastic wrap around the elbow joint to compress the area while it is healing. Make the wrap snug but not tight to the point of causing pain.    Rest your elbow to give it time to heal. You may need to wear an elbow pad to help protect and limit the movement of your elbow. During and after healing, avoid leaning on your elbows.  Follow-up care  Follow up with your healthcare provider, or as advised. If you have been referred to a specialist, make that appointment promptly.  When to seek medical advice  Call your healthcare provider right away if any of these occur:    Fever of 100.4 F (38 C) or higher, or as advised    Chills    Increased pain, swelling, warmth, redness, or drainage from the joint    Trouble moving the elbow joint    Numbness or tingling in the hand    Severe pain or swelling in forearm or hand    Loss of pink color and slow return of color after squeezing fingertip or hand  Date Last Reviewed: 6/1/2016 2000-2017 [employer]. [address] [place] [address]. All rights reserved. This information is not intended as a substitute for professional medical care. Always follow your healthcare professional's instructions.      [facility]    If you have any questions regarding to your visit please contact your care team:       Team Red:   Clinic Hours Telephone Number   Dr. Toya Rogers, NP   7am-7pm  Monday - Thursday   7am-5pm  Fridays  [phone]  (Appointment scheduling available 24/7)    Questions about your visit?   Team Line  [phone]   Urgent Care - Amelie Muñoz and Daryl Muñoz - 11am-9pm Monday-Friday Saturday-Sunday- 9am-5pm   [place] - 5pm-9pm Monday-Friday Saturday-Sunday- 9am-5pm   [phone] - [place]   [phone] - [place]       What options do I have for visits at the clinic other than the traditional office visit?  To expand how we care for you, many of our providers are utilizing electronic visits (e-visits) and telephone visits, when medically appropriate, for interactions with their patients rather than a visit in the clinic.   We also offer nurse visits for many medical concerns. Just like any other service, we will bill your insurance company for this type of visit based on time spent on the phone with your provider. Not all insurance companies cover these visits. Please check with your medical insurance if this type of visit is covered. You will be responsible for any charges that are not paid by your insurance.      E-visits via [employer]:  generally incur a $35.00 fee.  Telephone visits:  Time spent on the phone: *charged based on time that is spent on the phone in increments of 10 minutes. Estimated cost:   5-10 mins $30.00   11-20 mins. $59.00   21-30 mins. $85.00     Use [employer] (secure email communication and access to your chart) to send your primary care provider a message or make an appointment. Ask someone on your Team how to sign up for [employer].  For a Price Quote for your services, please call our Consumer Price Line at [phone].      As always, Thank you for trusting us with your health care needs!    Tia Rendon MA            Follow-ups after your visit        Additional Services     ORTHOPEDICS ADULT REFERRAL       Your provider has referred you to: [facility] [place]: [facility] [place] [phone]    [email or web address]    Please be aware that coverage of these services is subject to the terms and limitations of your health insurance plan.  Call member services at your health plan with any benefit or coverage questions.      Please bring the following to your appointment:    >>   Any x-rays, CTs or MRIs which have been  performed.  Contact the facility where they were done to arrange for  prior to your scheduled appointment.    >>   List of current medications   >>   This referral request   >>   Any documents/labs given to you for this referral                  Your next 10 appointments already scheduled     Jan 18, 2018  9:00 AM CST   (Arrive by 8:45 AM)   NM INJECTION with UUNMINJ2   [facility], Nuclear Medicine ([facility])    [address] [address] [phone]            Jan 18, 2018  9:45 AM CST   (Arrive by 9:30 AM)   NM SCAN3 with UUNM1   [facility], Nuclear Medicine ([facility])    [address] [address] [phone]            Jan 18, 2018 10:15 AM CST   Ekg Stress Nm Lexiscan with UUEKGNMS   [facility] ([facility])    [address] [address]               Jan 18, 2018 11:15 AM CST   (Arrive by 11:00 AM)   NM MPI WITH LEXISCAN with UUNM1   [facility] Nuclear Medicine ([facility])    [address] [address] [phone]           For a ONE day exam: Allow 3-4 hours for test. For a TWO day exam: Allow 2 hours PER day for test.  You may need to stop some medicines before the test. Follow your doctor s orders. - If you take a beta blocker: Follow your doctor s specific instructions on taking it prior to and on the day of your exam. - If you take Aggrenox or dipyridamole (Persantine, Permole), stop taking it 48 hours before your test. - If you take Viagra, Cialis or Levitra, stop taking it 48 hours before your test. - If you take theophylline or aminophylline, stop taking it 12 hours before your test.  For patients with diabetes: - If you take insulin, call your diabetes care team. Ask if you should take a 1/2 dose the morning of  your test. - If you take diabetes medicine by mouth, don t take it on the morning of your test. Bring it with you to take after the test. (If you have questions, call your diabetes care team.)  Do not take nitrates on the day of your test. Do not wear your Nitro-Patch.  Stop all caffeine 12 hours before the test. This includes coffee, tea, soda pop, chocolate and certain medicines (such as Anacin, Excedrin and NoDoz). Also avoid decaf coffee and tea, as these contain small amounts of caffeine.  No alcohol, smoking or other tobacco for 12 hours before the test.  Stop eating 3 hours before the test. You may drink water.  Please wear a loose two-piece outfit. If you will have an exercise test, bring rubber-soled walking shoes.  When you arrive, please tell us if you: - Have diabetes - Are breastfeeding - May be pregnant - Have a pacemaker of ICD (implantable defibrillator).  Please call your Imaging Department at your exam site with any questions.            Jan 22, 2018 11:30 AM CST   LAB with  LAB   [facility] ([facility])    [address] [place] [address]   [phone]           Please do not eat 10-12 hours before your appointment if you are coming in fasting for labs on lipids, cholesterol, or glucose (sugar). This does not apply to pregnant women. Water, hot tea and black coffee (with nothing added) are okay. Do not drink other fluids, diet soda or chew gum.            Jan 29, 2018   Procedure with Tamiko Vanegas MD   [facility], Same Day Surgery (--)    [address] [address]   [phone]            Feb 09, 2018  9:15 AM CST   (Arrive by 9:00 AM)   Post-Op with Tamiko Vanegas MD   [facility] ([facility])    [address]  [address] [facility] [address]   [phone]            Mar 02, 2018  1:00 PM CST   (Arrive by 12:30 PM)   Return Visit with  "Oralia De La Torre NP   [facility] ([facility])    [address]  [address]  [facility] [place] [facility] [address] [phone]              Future tests that were ordered for you today     Open Future Orders        Priority Expected Expires Ordered    ABO/Rh type and screen Routine 2018            Who to contact     If you have questions or need follow up information about today's clinic visit or your schedule please contact [facility] [place] directly at [phone].  Normal or non-critical lab and imaging results will be communicated to you by [employer], letter or phone within 4 business days after the clinic has received the results. If you do not hear from us within 7 days, please contact the clinic through [employer] or phone. If you have a critical or abnormal lab result, we will notify you by phone as soon as possible.  Submit refill requests through [facility] or call your pharmacy and they will forward the refill request to us. Please allow 3 business days for your refill to be completed.          Additional Information About Your Visit        MyChart Information     [employer] [facility] lets you send messages to your doctor, view your test results, renew your prescriptions, schedule appointments and more. To sign up, go to [email or web address] . Click on \"Log in\" on the left side of the screen, which will take you to the Welcome page. Then click on \"Sign up Now\" on the right side of the page.     You will be asked to enter the access code listed below, as well as some personal information. Please follow the directions to create your username and password.     Your access code is: [email or web address]  Expires: 4/15/2018 11:20 AM     Your access code will  in 90 days. If you need help or a new code, please call your [facility] or [phone].        Care EveryWhere ID     This is your Care EveryWhere ID. This could be used by other organizations to access " "your [place] medical records  [id number]        Your Vitals Were     Pulse Temperature Respirations Height Pulse Oximetry BMI (Body Mass Index)    85 96.7  F (35.9  C) (Oral) 14 5' 10\" (1.778 m) 95% 23.88 kg/m2       Blood Pressure from Last 3 Encounters:   01/17/18 136/84   01/17/18 137/72   01/15/18 140/80    Weight from Last 3 Encounters:   01/17/18 166 lb 6.4 oz (75.5 kg)   01/17/18 164 lb 12.8 oz (74.8 kg)   01/09/18 165 lb (74.8 kg)              We Performed the Following     ORTHOPEDICS ADULT REFERRAL          Today's Medication Changes          These changes are accurate as of: 1/17/18  1:47 PM.  If you have any questions, ask your nurse or doctor.               Start taking these medicines.        Dose/Directions    order for DME   Used for:  Olecranon bursitis of left elbow   Started by:  Haley Baker MD        Equipment being ordered: olecranon bursa brace   Quantity:  1 each   Refills:  0         These medicines have changed or have updated prescriptions.        Dose/Directions    amLODIPine 2.5 MG tablet   Commonly known as:  NORVASC   This may have changed:  when to take this   Used for:  Renal hypertension        Dose:  2.5 mg   Take 1 tablet (2.5 mg) by mouth daily   Quantity:  90 tablet   Refills:  3       finasteride 5 MG tablet   Commonly known as:  PROSCAR   This may have changed:  when to take this   Used for:  Benign prostatic hyperplasia with urinary retention        Dose:  5 mg   Take 1 tablet (5 mg) by mouth daily   Quantity:  90 tablet   Refills:  3            Where to get your medicines      Some of these will need a paper prescription and others can be bought over the counter.  Ask your nurse if you have questions.     Bring a paper prescription for each of these medications     order for DME                Primary Care Provider Office Phone # Fax #    Haley Baker [phone] [address] [facility]  [place] [address]        Equal Access to Services     [facility] " GAAR : Hadii aad ku lambert Sagastume, waaxda luqadaha, qaybta kanestorda robert, gibson biancain hayaahillary miller moreliakarsten green . So [facility] [phone].    ATENCIÓN: Si habla mary, tiene a montague disposición servicios gratuitos de asistencia lingüística. Llame al [phone].    We comply with applicable federal civil rights laws and [place] laws. We do not discriminate on the basis of race, color, national origin, age, disability, sex, sexual orientation, or gender identity.            Thank you!     Thank you for choosing [facility] [place]  for your care. Our goal is always to provide you with excellent care. Hearing back from our patients is one way we can continue to improve our services. Please take a few minutes to complete the written survey that you may receive in the mail after your visit with us. Thank you!             Your Updated Medication List - Protect others around you: Learn how to safely use, store and throw away your medicines at www.disposemymeds.org.          This list is accurate as of: 1/17/18  1:47 PM.  Always use your most recent med list.                   Brand Name Dispense Instructions for use Diagnosis    amLODIPine 2.5 MG tablet    NORVASC    90 tablet    Take 1 tablet (2.5 mg) by mouth daily    Renal hypertension       aspirin 81 MG tablet      Take 1 tablet by mouth every morning        calcium acetate 667 MG Caps capsule    PHOSLO    540 capsule    Take 2 capsules (1,334 mg) by mouth 3 times daily (with meals)    Chronic kidney disease, unspecified CKD stage       darbepoetin william 100 MCG/0.5ML injection    ARANESP (ALBUMIN FREE)    0.5 mL    Inject 0.5 mLs (100 mcg) Subcutaneous every 14 days As needed for hgb<10g/dL.  If Hgb increases >1 point in 2 weeks (if blood transfusion given, use hgb PRIOR to this), SYSTOLIC BP > 180 mmHg or hgb>=10g/dL, HOLD DOSE. Dose must be within 1 week of Hgb.  Per anemia protocol with Cecilia De La Torre CNP    Anemia of chronic renal failure, stage 5  (H), CKD (chronic kidney disease) stage 5, GFR less than 15 ml/min (H)       finasteride 5 MG tablet    PROSCAR    90 tablet    Take 1 tablet (5 mg) by mouth daily    Benign prostatic hyperplasia with urinary retention       fish oil-omega-3 fatty acids 1000 MG capsule      Take 1 g by mouth every morning        MULTIVITAMIN MEN PO      Take 1 tablet by mouth every morning        order for DME     1 each    Equipment being ordered: olecranon bursa brace    Olecranon bursitis of left elbow

## 2018-01-17 NOTE — NURSING NOTE
"Chief Complaint   Patient presents with     Musculoskeletal Problem     big on the left elbow        Initial /85 (BP Location: Left arm, Patient Position: Chair, Cuff Size: Adult Regular)  Pulse 85  Temp 96.7  F (35.9  C) (Oral)  Resp 14  Ht 5' 10\" (1.778 m)  Wt 166 lb 6.4 oz (75.5 kg)  SpO2 95%  BMI 23.88 kg/m2 Estimated body mass index is 23.88 kg/(m^2) as calculated from the following:    Height as of this encounter: 5' 10\" (1.778 m).    Weight as of this encounter: 166 lb 6.4 oz (75.5 kg).  Medication Reconciliation: complete     Willis Sharma      "

## 2018-01-17 NOTE — PROGRESS NOTES
SUBJECTIVE:   Dung Norton is a 72 year old male who presents to clinic today for the following health issues:    Musculoskeletal problem/pain    Duration: x 2 weeks     Description  Location: Left elbow    Intensity:  mild, moderate    Accompanying signs and symptoms: swelling    No pain    History  Previous similar problem: no   Previous evaluation:  Her nephrologist wants This Drained    Precipitating or alleviating factors:  Trauma or overuse: no   Aggravating factors include: none    Therapies tried and outcome: ice        Problem list and histories reviewed & adjusted, as indicated.  Additional history: as documented    Patient Active Problem List   Diagnosis     CARDIOVASCULAR SCREENING; LDL GOAL LESS THAN 130     Hypertension goal BP (blood pressure) < 140/90     Advanced directives, counseling/discussion     Elevated fasting glucose     Hypertriglyceridemia     Symptomatic anemia     Pulmonary nodules     Bilateral leg weakness     Weakness of shoulder     KRISTINA (acute kidney injury) (H)     Anemia of chronic renal failure, stage 5 (H)     Orthostatic hypotension     Acute renal failure, unspecified acute renal failure type (H)     End stage renal disease (H)     Acquired hypothyroidism     Obstructive uropathy     CKD (chronic kidney disease) stage 5, GFR less than 15 ml/min (H)     Ex-smoker     Thoracic aortic aneurysm without rupture (H)     Nonrheumatic aortic valve stenosis     Past Surgical History:   Procedure Laterality Date     APPENDECTOMY  AGE 6     CREATE FISTULA ARTERIOVENOUS UPPER EXTREMITY Left 11/17/2017    Procedure: CREATE FISTULA ARTERIOVENOUS UPPER EXTREMITY;  Left Cephalic Vein To Radial Artery Arteriovenous Fistula Creation, Anesthesia Block;  Surgeon: Eduardo Pabon MD;  Location: [facility] OR     CYSTOSCOPY, FULGURATE BLEEDERS, EVACUATE CLOT(S), COMBINED N/A 7/16/2017    Procedure: COMBINED CYSTOSCOPY, FULGURATE BLEEDERS, EVACUATE CLOT(S);  Cystoscopy clot evacuation, bladder biopsy  and fulguration (per surgeons note) NERVE BLOCK PERIPHERAL;  Surgeon: Tamiko Vanegas MD;  Location: [facility] OR     SINUS SURGERY  AGE 6     TONSILLECTOMY      CHILDHOOD       Social History   Substance Use Topics     Smoking status: Former Smoker     Packs/day: 1.00     Years: 53.00     Types: Cigarettes     Quit date: 6/12/2017     Smokeless tobacco: Never Used     Alcohol use No     Family History   Problem Relation Age of Onset     C.A.D. Mother      d age 67     Vision Loss Father      Hearing Loss Sister      DIABETES Sister      CANCER No family hx of          Current Outpatient Prescriptions   Medication Sig Dispense Refill     order for DME Equipment being ordered: olecranon bursa brace 1 each 0     amLODIPine (NORVASC) 2.5 MG tablet Take 1 tablet (2.5 mg) by mouth daily (Patient taking differently: Take 2.5 mg by mouth every morning ) 90 tablet 3     darbepoetin william (ARANESP, ALBUMIN FREE,) 100 MCG/0.5ML injection Inject 0.5 mLs (100 mcg) Subcutaneous every 14 days As needed for hgb<10g/dL.  If Hgb increases >1 point in 2 weeks (if blood transfusion given, use hgb PRIOR to this), SYSTOLIC BP > 180 mmHg or hgb>=10g/dL, HOLD DOSE. Dose must be within 1 week of Hgb.  Per anemia protocol with Cecilia Britt,CNP 0.5 mL 99     calcium acetate (PHOSLO) 667 MG CAPS capsule Take 2 capsules (1,334 mg) by mouth 3 times daily (with meals) 540 capsule 3     finasteride (PROSCAR) 5 MG tablet Take 1 tablet (5 mg) by mouth daily (Patient taking differently: Take 5 mg by mouth every morning ) 90 tablet 3     Multiple Vitamins-Minerals (MULTIVITAMIN MEN PO) Take 1 tablet by mouth every morning        fish oil-omega-3 fatty acids (FISH OIL) 1000 MG capsule Take 1 g by mouth every morning        aspirin 81 MG tablet Take 1 tablet by mouth every morning        No Known Allergies  Recent Labs   Lab Test  01/15/18   1022  01/08/18   1023   12/11/17   1018   09/25/17   1028   09/12/17   1508   08/31/17   1738   08/28/17   1901    07/19/17   0537   07/12/17   1407  02/24/16   1044  02/02/15   1046  01/15/14   0950   A1C   --    --    --    --    --    --    --    --    --   5.6   --    --    --    --    --    --   5.4   --   5.3   LDL   --    --    --    --    --   100*   --    --    --    --    --    --    --    --    --    --   114*  128  119   HDL   --    --    --    --    --   38*   --    --    --    --    --    --    --    --    --    --   29*  32*  33*   TRIG   --    --    --    --    --   114   --    --    --    --    --    --    --    --    --    --   199*  229*  230*   ALT   --    --    --   17   --    --    --    --    --    --    --   17   --   17   < >  20   --    --   29   CR  5.48*  5.29*   < >  5.20*   < >  5.32*   < >   --    < >   --    < >  7.80*   < >  7.19*   < >  14.40*  2.56*  1.10  0.91   GFRESTIMATED  10*  11*   < >  11*   < >  11*   < >   --    < >   --    < >  7*   < >  8*   < >  3*  25*  66  82   GFRESTBLACK  12*  13*   < >  13*   < >  13*   < >   --    < >   --    < >  8*   < >  9*   < >  4*  30*  80  >90   POTASSIUM  4.6  4.8   < >  4.3   < >  3.9   < >   --    < >   --    < >  4.7   < >  5.2   < >  4.2  3.4  3.5  4.0   TSH   --    --    --    --    --    --    --   5.32*   --    --    --    --    --    --    --   10.51*   --    --    --     < > = values in this interval not displayed.      BP Readings from Last 3 Encounters:   01/17/18 136/84   01/17/18 137/72   01/15/18 140/80    Wt Readings from Last 3 Encounters:   01/17/18 166 lb 6.4 oz (75.5 kg)   01/17/18 164 lb 12.8 oz (74.8 kg)   01/09/18 165 lb (74.8 kg)                  Labs reviewed in EPIC          Reviewed and updated as needed this visit by clinical staffTobacco  Allergies  Meds  Med Hx  Surg Hx  Fam Hx  Soc Hx      Reviewed and updated as needed this visit by Provider       ROS:  C: NEGATIVE for fever, chills, change in weight  R: NEGATIVE for significant cough or SOB  CV: NEGATIVE for chest pain, palpitations or peripheral edema  GI:  "NEGATIVE for nausea, abdominal pain, heartburn, or change in bowel habits  NEURO: NEGATIVE for weakness, dizziness or paresthesias    OBJECTIVE:     /84  Pulse 85  Temp 96.7  F (35.9  C) (Oral)  Resp 14  Ht 5' 10\" (1.778 m)  Wt 166 lb 6.4 oz (75.5 kg)  SpO2 95%  BMI 23.88 kg/m2  Body mass index is 23.88 kg/(m^2).  GENERAL: healthy, alert and no distress  NECK: no adenopathy, no asymmetry, masses, or scars and thyroid normal to palpation  RESP: lungs clear to auscultation - no rales, rhonchi or wheezes  CV: regular rate and rhythm, normal S1 S2, no S3 or S4, no murmur, click or rub, no peripheral edema and peripheral pulses strong  ABDOMEN: soft, nontender, no hepatosplenomegaly, no masses and bowel sounds normal  MS: no gross musculoskeletal defects noted, no edema  Left elbow-he has Olecranon Bursitis  Swelling  No erythema  No tenderness   Diagnostic Test Results:  none     ASSESSMENT/PLAN:     1. Olecranon bursitis of left elbow  Advised wear Brace  - ORTHOPEDICS ADULT REFERRAL  - order for DME; Equipment being ordered: olecranon bursa brace  Dispense: 1 each; Refill: 0  Discussed if we Drain the fluid can come back  Follow up if any pain    2. Hypertension goal BP (blood pressure) < 140/90  controlled    Haley Baker MD  [facility]  "

## 2018-01-17 NOTE — PATIENT INSTRUCTIONS
Preparing for Your Surgery      Name:  Dung Norton   MRN:  [id number]   :  1945   Today's Date:  2018     Arriving for surgery:  Surgery date:  18  Arrival time:  1:10 p.m.  Please come to:       [facility] [address]  [address] [place]  [address]    -   parking is available in front of the hospital from 5:15 am to 8:00 pm    -  Stop at the Information Desk in the lobby    -   Inform the information person that you are here for surgery. An escort to 3c will be provided. If you would not like an escort, please proceed to 3C on the 3rd floor. [phone]     What can I eat or drink?  -  You may have solid food or milk products until 8 hours prior to your surgery  06:30 a.m.  -  You may have water, apple juice or 7up/Sprite until 2 hours prior to your surgery  1:10 p.m.    Which medicines can I take?(Please hold multivitamin and fish oil 7 days prior to procedure.  Hold aspirin 5 days prior to procedure)  -  Do NOT take these medications in the morning, the day of surgery:          -  Please take these medications the day of surgery:     Calcium acetate, finasteride, amlodipine    How do I prepare myself?  -  Take two showers: one the night before surgery; and one the morning of surgery.         Use Scrubcare or Hibiclens to wash from neck down.  You may use your own shampoo and conditioner. No other hair products.   -  Do NOT use lotion, powder, deodorant, or antiperspirant the day of your surgery.  -  Do NOT wear any makeup, fingernail polish or jewelry.  -Do not bring your own medications to the hospital, except for inhalers and eye drops.  -  Bring your ID and insurance card.    Questions or Concerns:  If you have questions or concerns, please call the  Preoperative Assessment Center, Monday-Friday 7AM-7PM:  [phone]          AFTER YOUR SURGERY  Breathing exercises   Breathing exercises help you recover faster. Take deep breaths and let  the air out slowly. This will:     Help you wake up after surgery.    Help prevent complications like pneumonia.  Preventing complications will help you go home sooner.   We may give you a breathing device (incentive spirometer) to encourage you to breathe deeply.   Nausea and vomiting   You may feel sick to your stomach after surgery; if so, let your nurse know.    Pain control:  After surgery, you may have pain. Our goal is to help you manage your pain. Pain medicine will help you feel comfortable enough to do activities that will help you heal.  These activities may include breathing exercises, walking and physical therapy.   To help your health care team treat your pain we will ask: 1) If you have pain  2) where it is located 3) describe your pain in your words  Methods of pain control include medications given by mouth, vein or by nerve block for some surgeries.  We may give you a pain control pump that will:  1) Deliver the medicine through a tube placed in your vein  2) Control the amount of medicine you receive  3) Allow you to push a button to deliver a dose of pain medicine  Sequential Compression Device (SCD) or Pneumo Boots:  You may need to wear SCD S on your legs or feet. These are wraps connected to a machine that pumps in air and releases it. The repeated pumping helps prevent blood clots from forming.

## 2018-01-17 NOTE — PATIENT INSTRUCTIONS
Bursitis of the Elbow (Olecranon)  Your elbow joint contains a small fluid-filled sac called a bursa. The bursa helps the muscles and tendons move smoothly over the bone. It also cushions and protects your elbow. Bursitis is when the bursa is inflamed or swollen. This is most often due to overuse of or injury to the elbow. Symptoms include swelling and pain. If the elbow is red and feels warm to the touch, the bursa itself may be infected.  In most cases, elbow bursitis resolves with medicine and self-care at home. It may take several weeks for the bursa to heal and the swelling to go away. In some cases, your healthcare provider may drain excess fluid from the bursa. Or, he or she may inject medicine directly into the bursa to help relieve symptoms. In severe cases, you may need surgery to remove the bursa may. If there is concern that the bursa is infected, your healthcare provider may prescribe antibiotics to treat the infection.    Home care  Your healthcare provider may prescribe medicine to help relieve pain and swelling. This may be an over-the-counter pain reliever or prescription pain medicine. Take all medicines as directed. To help treat or prevent infection, your provider may prescribe antibiotics. If these are prescribed, take them as directed until they are gone.  The following are general care guidelines:    Apply an ice pack or bag of frozen peas wrapped in a thin towel to your elbow for 15 to 20 minutes at a time. Do this 3 to 4 times a day until pain and swelling improve.    Keep your elbow raised above the level of your heart whenever possible. This helps reduce swelling. When sitting or lying down, place your arm on a pillow that rests on your chest or on a pillow at your side.    Use an elastic wrap around the elbow joint to compress the area while it is healing. Make the wrap snug but not tight to the point of causing pain.    Rest your elbow to give it time to heal. You may need to wear an  elbow pad to help protect and limit the movement of your elbow. During and after healing, avoid leaning on your elbows.  Follow-up care  Follow up with your healthcare provider, or as advised. If you have been referred to a specialist, make that appointment promptly.  When to seek medical advice  Call your healthcare provider right away if any of these occur:    Fever of 100.4 F (38 C) or higher, or as advised    Chills    Increased pain, swelling, warmth, redness, or drainage from the joint    Trouble moving the elbow joint    Numbness or tingling in the hand    Severe pain or swelling in forearm or hand    Loss of pink color and slow return of color after squeezing fingertip or hand  Date Last Reviewed: 6/1/2016 2000-2017 [employer]. [address] [place] [address]. All rights reserved. This information is not intended as a substitute for professional medical care. Always follow your healthcare professional's instructions.      [facility]    If you have any questions regarding to your visit please contact your care team:       Team Red:   Clinic Hours Telephone Number   Dr. Toya Rogers, NP   7am-7pm  Monday - Thursday   7am-5pm  Fridays  [phone]  (Appointment scheduling available 24/7)    Questions about your visit?   Team Line  [phone]   Urgent Care - [facility] - 11am-9pm Monday-Friday Saturday-Sunday- 9am-5pm   [facility] - 5pm-9pm Monday-Friday Saturday-Sunday- 9am-5pm  [phone] - [place]   [phone] - [facility]       What options do I have for visits at the clinic other than the traditional office visit?  To expand how we care for you, many of our providers are utilizing electronic visits (e-visits) and telephone visits, when medically appropriate, for interactions with their patients rather than a visit in the clinic.   We also offer nurse visits for many medical  concerns. Just like any other service, we will bill your insurance company for this type of visit based on time spent on the phone with your provider. Not all insurance companies cover these visits. Please check with your medical insurance if this type of visit is covered. You will be responsible for any charges that are not paid by your insurance.      E-visits via [email or web address]:  generally incur a $35.00 fee.  Telephone visits:  Time spent on the phone: *charged based on time that is spent on the phone in increments of 10 minutes. Estimated cost:   5-10 mins $30.00   11-20 mins. $59.00   21-30 mins. $85.00     Use [employer] (secure email communication and access to your chart) to send your primary care provider a message or make an appointment. Ask someone on your Team how to sign up for [employer].  For a Price Quote for your services, please call our Consumer Price Line at [phone].      As always, Thank you for trusting us with your health care needs!    Tia Rendon MA

## 2018-01-17 NOTE — MR AVS SNAPSHOT
After Visit Summary   2018    uDng Norton    MRN: [id number]           Patient Information     Date Of Birth          1945        Visit Information        Provider Department      2018 11:30 AM Rn, [facility]        Care Instructions    Preparing for Your Surgery      Name:  Dung Norton   MRN:  [id number]   :  1945   Today's Date:  2018     Arriving for surgery:  Surgery date:  18  Arrival time:  1:10 p.m.  Please come to:       [facility] [address]  [address] [place]  [address]    -   parking is available in front of the hospital from 5:15 am to 8:00 pm    -  Stop at the Information Desk in the lobby    -   Inform the information person that you are here for surgery. An escort to 3c will be provided. If you would not like an escort, please proceed to 3C on the 3rd floor. [phone]     What can I eat or drink?  -  You may have solid food or milk products until 8 hours prior to your surgery  06:30 a.m.  -  You may have water, apple juice or 7up/Sprite until 2 hours prior to your surgery  1:10 p.m.    Which medicines can I take?(Please hold multivitamin and fish oil 7 days prior to procedure.  Hold aspirin 5 days prior to procedure)  -  Do NOT take these medications in the morning, the day of surgery:          -  Please take these medications the day of surgery:     Calcium acetate, finasteride, amlodipine    How do I prepare myself?  -  Take two showers: one the night before surgery; and one the morning of surgery.         Use Scrubcare or Hibiclens to wash from neck down.  You may use your own shampoo and conditioner. No other hair products.   -  Do NOT use lotion, powder, deodorant, or antiperspirant the day of your surgery.  -  Do NOT wear any makeup, fingernail polish or jewelry.  -Do not bring your own medications to the hospital, except for inhalers and eye drops.  -   Bring your ID and insurance card.    Questions or Concerns:  If you have questions or concerns, please call the  Preoperative Assessment Center, Monday-Friday 7AM-7PM:  [phone]          AFTER YOUR SURGERY  Breathing exercises   Breathing exercises help you recover faster. Take deep breaths and let the air out slowly. This will:     Help you wake up after surgery.    Help prevent complications like pneumonia.  Preventing complications will help you go home sooner.   We may give you a breathing device (incentive spirometer) to encourage you to breathe deeply.   Nausea and vomiting   You may feel sick to your stomach after surgery; if so, let your nurse know.    Pain control:  After surgery, you may have pain. Our goal is to help you manage your pain. Pain medicine will help you feel comfortable enough to do activities that will help you heal.  These activities may include breathing exercises, walking and physical therapy.   To help your health care team treat your pain we will ask: 1) If you have pain  2) where it is located 3) describe your pain in your words  Methods of pain control include medications given by mouth, vein or by nerve block for some surgeries.  We may give you a pain control pump that will:  1) Deliver the medicine through a tube placed in your vein  2) Control the amount of medicine you receive  3) Allow you to push a button to deliver a dose of pain medicine  Sequential Compression Device (SCD) or Pneumo Boots:  You may need to wear SCD S on your legs or feet. These are wraps connected to a machine that pumps in air and releases it. The repeated pumping helps prevent blood clots from forming.           Follow-ups after your visit        Your next 10 appointments already scheduled     Jan 17, 2018 11:30 AM CST   (Arrive by 11:15 AM)   PAC RN ASSESSMENT with Manohar Pac Rn   [facility] ([facility])    [address] [facility]  [address] [facility]  [place] [address]   [phone]            Jan 17, 2018 12:00 PM CST   (Arrive by 11:45 AM)   PAC Anesthesia Consult with  Pac Anesthesiologist   [facility] ([facility])    [address] [place] [address]  [address] [facility] [address]   [phone]            Jan 17, 2018 12:15 PM CST   LAB with  LAB   [facility] ([facility])    [address] [place] [address]  1st [facility] [address]   [phone]           Please do not eat 10-12 hours before your appointment if you are coming in fasting for labs on lipids, cholesterol, or glucose (sugar). This does not apply to pregnant women. Water, hot tea and black coffee (with nothing added) are okay. Do not drink other fluids, diet soda or chew gum.            Jan 18, 2018  9:00 AM CST   (Arrive by 8:45 AM)   NM INJECTION with UUNMINJ2   [facility], Nuclear Medicine ([facility])    [address] [facility] [address] [phone]            Jan 18, 2018  9:45 AM CST   (Arrive by 9:30 AM)   NM SCAN3 with UUNM1   [facility], Nuclear Medicine ([facility])    [address] [facility] [address] [phone]            Jan 18, 2018 10:15 AM CST   Ekg Stress Nm Lexiscan with UUEKGNMS   [facility] ([facility])    [address] [place] [address]               Jan 18, 2018 11:15 AM CST   (Arrive by 11:00 AM)   NM MPI WITH LEXISCAN with UUNM1   [facility], Nuclear Medicine ([facility])    [address] [facility] [address] [phone]           For a ONE day exam: Allow 3-4 hours for test. For a TWO day exam: Allow 2 hours PER day for test.  You may need to stop some medicines before the test. Follow your doctor s orders. - If you take a beta blocker:  Follow your doctor s specific instructions on taking it prior to and on the day of your exam. - If you take Aggrenox or dipyridamole (Persantine, Permole), stop taking it 48 hours before your test. - If you take Viagra, Cialis or Levitra, stop taking it 48 hours before your test. - If you take theophylline or aminophylline, stop taking it 12 hours before your test.  For patients with diabetes: - If you take insulin, call your diabetes care team. Ask if you should take a 1/2 dose the morning of your test. - If you take diabetes medicine by mouth, don t take it on the morning of your test. Bring it with you to take after the test. (If you have questions, call your diabetes care team.)  Do not take nitrates on the day of your test. Do not wear your Nitro-Patch.  Stop all caffeine 12 hours before the test. This includes coffee, tea, soda pop, chocolate and certain medicines (such as Anacin, Excedrin and NoDoz). Also avoid decaf coffee and tea, as these contain small amounts of caffeine.  No alcohol, smoking or other tobacco for 12 hours before the test.  Stop eating 3 hours before the test. You may drink water.  Please wear a loose two-piece outfit. If you will have an exercise test, bring rubber-soled walking shoes.  When you arrive, please tell us if you: - Have diabetes - Are breastfeeding - May be pregnant - Have a pacemaker of ICD (implantable defibrillator).  Please call your Imaging Department at your exam site with any questions.            Jan 22, 2018 11:30 AM CST   LAB with FZ LAB   [facility] ([facility])    [address] [facility] [address] [phone]           Please do not eat 10-12 hours before your appointment if you are coming in fasting for labs on lipids, cholesterol, or glucose (sugar). This does not apply to pregnant women. Water, hot tea and black coffee (with nothing added) are okay. Do not drink other fluids, diet soda or chew gum.            Jan 29,  2018   Procedure with Tamiko Vanegas MD   [facility] [place], Same Day Surgery (--)    [address]  [facility] [address] [phone]            2018  9:15 AM CST   (Arrive by 9:00 AM)   Post-Op with Tamiko Vanegas MD   [facility]rologic [facility] ([facility])    [address] [facility] [address] [facility]  4th Floor  [facility] [address] [phone]              Future tests that were ordered for you today     Open Future Orders        Priority Expected Expires Ordered    ABO/Rh type and screen Routine 2018            Who to contact     Please call your clinic at [phone] to:    Ask questions about your health    Make or cancel appointments    Discuss your medicines    Learn about your test results    Speak to your doctor   If you have compliments or concerns about an experience at your clinic, or if you wish to file a complaint, please contact [facility] Patient Relations at [phone] or email us at [email or web address]         Additional Information About Your Visit        Savor Information     Monkimunt is an electronic gateway that provides easy, online access to your medical records. With Savor, you can request a clinic appointment, read your test results, renew a prescription or communicate with your care team.     To sign up for Savor visit the website at [email or web address]   You will be asked to enter the access code listed below, as well as some personal information. Please follow the directions to create your username and password.     Your access code is: [email or web address]  Expires: 4/15/2018 11:20 AM     Your access code will  in 90 days. If you need help or a new code, please contact your [facility] Clinic or call [phone] for assistance.        Care EveryWhere ID     This is your Care EveryWhere ID. This could be used by  other organizations to access your [place] medical records  [id number]         Blood Pressure from Last 3 Encounters:   01/17/18 137/72   01/15/18 140/80   01/09/18 163/79    Weight from Last 3 Encounters:   01/17/18 74.8 kg (164 lb 12.8 oz)   01/09/18 74.8 kg (165 lb)   01/05/18 74.3 kg (163 lb 12.8 oz)              Today, you had the following     No orders found for display         Today's Medication Changes          These changes are accurate as of: 1/17/18 11:14 AM.  If you have any questions, ask your nurse or doctor.               These medicines have changed or have updated prescriptions.        Dose/Directions    amLODIPine 2.5 MG tablet   Commonly known as:  NORVASC   This may have changed:  when to take this   Used for:  Renal hypertension        Dose:  2.5 mg   Take 1 tablet (2.5 mg) by mouth daily   Quantity:  90 tablet   Refills:  3       finasteride 5 MG tablet   Commonly known as:  PROSCAR   This may have changed:  when to take this   Used for:  Benign prostatic hyperplasia with urinary retention        Dose:  5 mg   Take 1 tablet (5 mg) by mouth daily   Quantity:  90 tablet   Refills:  3                Primary Care Provider Office Phone # Fax #    Haley Baker [phone] [address] [facility] [address]        Equal Access to Services     [facility]: Hadii cristine marin hadasho Sochalo, waaxda luqadaha, qaybta kaalmada adeegyada, gibson green . So [facility] [phone].    ATENCIÓN: Si habla español, tiene a montague disposición servicios gratuitos de asistencia lingüística. Llame al [phone].    We comply with applicable federal civil rights laws and [place] laws. We do not discriminate on the basis of race, color, national origin, age, disability, sex, sexual orientation, or gender identity.            Thank you!     Thank you for choosing [facility]  for your care. Our goal is always to provide you with excellent  care. Hearing back from our patients is one way we can continue to improve our services. Please take a few minutes to complete the written survey that you may receive in the mail after your visit with us. Thank you!             Your Updated Medication List - Protect others around you: Learn how to safely use, store and throw away your medicines at www.disposemymeds.org.          This list is accurate as of: 1/17/18 11:14 AM.  Always use your most recent med list.                   Brand Name Dispense Instructions for use Diagnosis    amLODIPine 2.5 MG tablet    NORVASC    90 tablet    Take 1 tablet (2.5 mg) by mouth daily    Renal hypertension       aspirin 81 MG tablet      Take 1 tablet by mouth every morning        calcium acetate 667 MG Caps capsule    PHOSLO    540 capsule    Take 2 capsules (1,334 mg) by mouth 3 times daily (with meals)    Chronic kidney disease, unspecified CKD stage       darbepoetin william 100 MCG/0.5ML injection    ARANESP (ALBUMIN FREE)    0.5 mL    Inject 0.5 mLs (100 mcg) Subcutaneous every 14 days As needed for hgb<10g/dL.  If Hgb increases >1 point in 2 weeks (if blood transfusion given, use hgb PRIOR to this), SYSTOLIC BP > 180 mmHg or hgb>=10g/dL, HOLD DOSE. Dose must be within 1 week of Hgb.  Per anemia protocol with Cecilia De La Torre CNP    Anemia of chronic renal failure, stage 5 (H), CKD (chronic kidney disease) stage 5, GFR less than 15 ml/min (H)       finasteride 5 MG tablet    PROSCAR    90 tablet    Take 1 tablet (5 mg) by mouth daily    Benign prostatic hyperplasia with urinary retention       fish oil-omega-3 fatty acids 1000 MG capsule      Take 1 g by mouth every morning        MULTIVITAMIN MEN PO      Take 1 tablet by mouth every morning

## 2018-01-18 ENCOUNTER — HOSPITAL ENCOUNTER (OUTPATIENT)
Dept: NUCLEAR MEDICINE | Facility: CLINIC | Age: 73
Setting detail: NUCLEAR MEDICINE
End: 2018-01-18
Attending: INTERNAL MEDICINE
Payer: MEDICARE

## 2018-01-18 ENCOUNTER — TELEPHONE (OUTPATIENT)
Dept: FAMILY MEDICINE | Facility: CLINIC | Age: 73
End: 2018-01-18

## 2018-01-18 ENCOUNTER — OFFICE VISIT (OUTPATIENT)
Dept: ORTHOPEDICS | Facility: CLINIC | Age: 73
End: 2018-01-18
Payer: COMMERCIAL

## 2018-01-18 ENCOUNTER — HOSPITAL ENCOUNTER (OUTPATIENT)
Dept: CARDIOLOGY | Facility: CLINIC | Age: 73
Discharge: HOME | End: 2018-01-18
Attending: INTERNAL MEDICINE | Admitting: INTERNAL MEDICINE
Payer: MEDICARE

## 2018-01-18 VITALS — RESPIRATION RATE: 18 BRPM | TEMPERATURE: 97 F | WEIGHT: 165 LBS | BODY MASS INDEX: 23.62 KG/M2 | HEIGHT: 70 IN

## 2018-01-18 DIAGNOSIS — M70.22 OLECRANON BURSITIS OF LEFT ELBOW: Primary | ICD-10-CM

## 2018-01-18 DIAGNOSIS — R94.31 ABNORMAL ELECTROCARDIOGRAM: ICD-10-CM

## 2018-01-18 DIAGNOSIS — I35.0 NON-RHEUMATIC AORTIC STENOSIS: ICD-10-CM

## 2018-01-18 PROCEDURE — 25000128 H RX IP 250 OP 636: Performed by: INTERNAL MEDICINE

## 2018-01-18 PROCEDURE — 34300033 ZZH RX 343: Performed by: INTERNAL MEDICINE

## 2018-01-18 PROCEDURE — 93018 CV STRESS TEST I&R ONLY: CPT | Performed by: INTERNAL MEDICINE

## 2018-01-18 PROCEDURE — 93016 CV STRESS TEST SUPVJ ONLY: CPT | Performed by: INTERNAL MEDICINE

## 2018-01-18 PROCEDURE — 93017 CV STRESS TEST TRACING ONLY: CPT

## 2018-01-18 PROCEDURE — 78452 HT MUSCLE IMAGE SPECT MULT: CPT

## 2018-01-18 PROCEDURE — 78452 HT MUSCLE IMAGE SPECT MULT: CPT | Mod: 26 | Performed by: INTERNAL MEDICINE

## 2018-01-18 PROCEDURE — 99203 OFFICE O/P NEW LOW 30 MIN: CPT | Mod: 25 | Performed by: ORTHOPAEDIC SURGERY

## 2018-01-18 PROCEDURE — 20605 DRAIN/INJ JOINT/BURSA W/O US: CPT | Mod: LT | Performed by: ORTHOPAEDIC SURGERY

## 2018-01-18 PROCEDURE — A9502 TC99M TETROFOSMIN: HCPCS | Performed by: INTERNAL MEDICINE

## 2018-01-18 RX ORDER — AMINOPHYLLINE 25 MG/ML
50-100 INJECTION, SOLUTION INTRAVENOUS
Status: DISCONTINUED | OUTPATIENT
Start: 2018-01-18 | End: 2018-01-19 | Stop reason: HOSPADM

## 2018-01-18 RX ORDER — ACYCLOVIR 200 MG/1
0-1 CAPSULE ORAL
Status: DISCONTINUED | OUTPATIENT
Start: 2018-01-18 | End: 2018-01-19 | Stop reason: HOSPADM

## 2018-01-18 RX ORDER — REGADENOSON 0.08 MG/ML
0.4 INJECTION, SOLUTION INTRAVENOUS ONCE
Status: COMPLETED | OUTPATIENT
Start: 2018-01-18 | End: 2018-01-18

## 2018-01-18 RX ORDER — ALBUTEROL SULFATE 90 UG/1
2 AEROSOL, METERED RESPIRATORY (INHALATION) EVERY 5 MIN PRN
Status: DISCONTINUED | OUTPATIENT
Start: 2018-01-18 | End: 2018-01-19 | Stop reason: HOSPADM

## 2018-01-18 RX ADMIN — TETROFOSMIN 39.8 MCI.: 1.38 INJECTION, POWDER, LYOPHILIZED, FOR SOLUTION INTRAVENOUS at 10:21

## 2018-01-18 RX ADMIN — REGADENOSON 0.4 MG: 0.08 INJECTION, SOLUTION INTRAVENOUS at 10:19

## 2018-01-18 RX ADMIN — TETROFOSMIN 11.4 MCI.: 1.38 INJECTION, POWDER, LYOPHILIZED, FOR SOLUTION INTRAVENOUS at 09:21

## 2018-01-18 NOTE — MR AVS SNAPSHOT
After Visit Summary   1/18/2018    Dung Norton    MRN: [id number]           Patient Information     Date Of Birth          1945        Visit Information        Provider Department      1/18/2018 2:45 PM Ming Angulo MD [facility]        Today's Diagnoses     Olecranon bursitis of left elbow    -  1      Care Instructions    Do not lean on the elbow.  Use tubigrip until swelling is gone.    You have had a steroid injection today.  For the first 2 hours there will likely be some numbing in the joint from the lidocaine.  This is a good sign, indicating that the injection is in the right place.  In 2 hours the lidocaine will wear off, and the joint will hurt like you had a shot.  Each day the cortisone makes it feel better.  It reaches peak effect in 2 weeks.  We expect it to last for 3 months.  You may resume regular activity when you feel ready.  If you are diabetic, your glucoses will be quite high for several days.            Follow-ups after your visit        Your next 10 appointments already scheduled     Jan 22, 2018 11:30 AM CST   LAB with  LAB   [facility] ([facility])    [address] [place] [address]   [phone]           Please do not eat 10-12 hours before your appointment if you are coming in fasting for labs on lipids, cholesterol, or glucose (sugar). This does not apply to pregnant women. Water, hot tea and black coffee (with nothing added) are okay. Do not drink other fluids, diet soda or chew gum.            Jan 29, 2018   Procedure with Tamiko Vanegas MD   [facility], Same Day Surgery (--)    [address] [facility] [place] [address]   [phone]            Feb 09, 2018  9:15 AM CST   (Arrive by 9:00 AM)   Post-Op with Tamiko Vanegas MD   [facility] and [facility]rolog[facility] ([facility])    [address]  "[address]   [phone]            Mar 02, 2018  1:00 PM CST   (Arrive by 12:30 PM)   Return Visit with Oralia De La Torre NP   [facility] Nephrology ([facility])    [address] [facility]  [address]  [facility] [address] [phone]              Who to contact     If you have questions or need follow up information about today's clinic visit or your schedule please contact [facility] directly at [phone].  Normal or non-critical lab and imaging results will be communicated to you by Aventeonhart, letter or phone within 4 business days after the clinic has received the results. If you do not hear from us within 7 days, please contact the clinic through Aventeonhart or phone. If you have a critical or abnormal lab result, we will notify you by phone as soon as possible.  Submit refill requests through Limtel or call your pharmacy and they will forward the refill request to us. Please allow 3 business days for your refill to be completed.          Additional Information About Your Visit        [employer] Information     Limtel lets you send messages to your doctor, view your test results, renew your prescriptions, schedule appointments and more. To sign up, go to [email or web address] . Click on \"Log in\" on the left side of the screen, which will take you to the Welcome page. Then click on \"Sign up Now\" on the right side of the page.     You will be asked to enter the access code listed below, as well as some personal information. Please follow the directions to create your username and password.     Your access code is: [email or web address]  Expires: 4/15/2018 11:20 AM     Your access code will  in 90 days. If you need help or a new code, please call your [facility] or [phone].        Care EveryWhere ID     This is your Care EveryWhere ID. This could be used by other organizations to access your [place] medical records  [id number]        Your Vitals Were     " "Temperature Respirations Height BMI (Body Mass Index)          97  F (36.1  C) 18 1.778 m (5' 10\") 23.68 kg/m2         Blood Pressure from Last 3 Encounters:   01/17/18 136/84   01/17/18 137/72   01/15/18 140/80    Weight from Last 3 Encounters:   01/18/18 74.8 kg (165 lb)   01/17/18 75.5 kg (166 lb 6.4 oz)   01/17/18 74.8 kg (164 lb 12.8 oz)              We Performed the Following     DRAIN/INJECT MEDIUM JOINT/BURSA     TRIAMCINOLONE ACET INJ NOS          Today's Medication Changes          These changes are accurate as of: 1/18/18 11:59 PM.  If you have any questions, ask your nurse or doctor.               Start taking these medicines.        Dose/Directions    triamcinolone acetonide 40 MG/ML injection   Commonly known as:  KENALOG   Used for:  Olecranon bursitis of left elbow   Started by:  Ming Angulo MD        Dose:  40 mg   1 mL (40 mg) by INTRA-ARTICULAR route once for 1 dose   Quantity:  1 mL   Refills:  0         These medicines have changed or have updated prescriptions.        Dose/Directions    amLODIPine 2.5 MG tablet   Commonly known as:  NORVASC   This may have changed:  when to take this   Used for:  Renal hypertension        Dose:  2.5 mg   Take 1 tablet (2.5 mg) by mouth daily   Quantity:  90 tablet   Refills:  3       finasteride 5 MG tablet   Commonly known as:  PROSCAR   This may have changed:  when to take this   Used for:  Benign prostatic hyperplasia with urinary retention        Dose:  5 mg   Take 1 tablet (5 mg) by mouth daily   Quantity:  90 tablet   Refills:  3            Where to get your medicines      Some of these will need a paper prescription and others can be bought over the counter.  Ask your nurse if you have questions.     You don't need a prescription for these medications     triamcinolone acetonide 40 MG/ML injection                Primary Care Provider Office Phone # Fax #    Haley Baker [phone] [address] [facility]  [place] " [phone]        Equal Access to Services     [facility]: Hadii cristine marni lambert Sagastume, wakathleenda luqerin, qaybta kanestorrahul jones, gibson cotajenniferkarsten solis. So [facility] [phone].    ATENCIÓN: Si habla español, tiene a montague disposición servicios gratuitos de asistencia lingüística. Noéame al [phone].    We comply with applicable federal civil rights laws and [place] laws. We do not discriminate on the basis of race, color, national origin, age, disability, sex, sexual orientation, or gender identity.            Thank you!     Thank you for choosing [facility]  for your care. Our goal is always to provide you with excellent care. Hearing back from our patients is one way we can continue to improve our services. Please take a few minutes to complete the written survey that you may receive in the mail after your visit with us. Thank you!             Your Updated Medication List - Protect others around you: Learn how to safely use, store and throw away your medicines at www.disposemymeds.org.          This list is accurate as of: 1/18/18 11:59 PM.  Always use your most recent med list.                   Brand Name Dispense Instructions for use Diagnosis    amLODIPine 2.5 MG tablet    NORVASC    90 tablet    Take 1 tablet (2.5 mg) by mouth daily    Renal hypertension       aspirin 81 MG tablet      Take 1 tablet by mouth every morning        calcium acetate 667 MG Caps capsule    PHOSLO    540 capsule    Take 2 capsules (1,334 mg) by mouth 3 times daily (with meals)    Chronic kidney disease, unspecified CKD stage       darbepoetin william 100 MCG/0.5ML injection    ARANESP (ALBUMIN FREE)    0.5 mL    Inject 0.5 mLs (100 mcg) Subcutaneous every 14 days As needed for hgb<10g/dL.  If Hgb increases >1 point in 2 weeks (if blood transfusion given, use hgb PRIOR to this), SYSTOLIC BP > 180 mmHg or hgb>=10g/dL, HOLD DOSE. Dose must be within 1 week of Hgb.  Per anemia protocol with Cecilia  Britt,CNP    Anemia of chronic renal failure, stage 5 (H), CKD (chronic kidney disease) stage 5, GFR less than 15 ml/min (H)       finasteride 5 MG tablet    PROSCAR    90 tablet    Take 1 tablet (5 mg) by mouth daily    Benign prostatic hyperplasia with urinary retention       fish oil-omega-3 fatty acids 1000 MG capsule      Take 1 g by mouth every morning        MULTIVITAMIN MEN PO      Take 1 tablet by mouth every morning        order for DME     1 each    Equipment being ordered: olecranon bursa brace    Olecranon bursitis of left elbow       triamcinolone acetonide 40 MG/ML injection    KENALOG    1 mL    1 mL (40 mg) by INTRA-ARTICULAR route once for 1 dose    Olecranon bursitis of left elbow

## 2018-01-18 NOTE — TELEPHONE ENCOUNTER
Called daughter. Advised that we do not have HIC PIC codes, we use ICD 10 codes. She found a supply company in pt's network. Advised to call them or bring script to them and see if they can advise on how much this will cost as pt is concerned about the cost of the brace. She will call back if any further information is needed.    Zainab Powell RN  [facility], [place]

## 2018-01-18 NOTE — PROGRESS NOTES
The patient's left elbow was prepped with betadine solution after verification of allergies. Area approximately 10 cm x 10 cm prepped in a sterile fashion. The skin was infiltrated with 1% lidocaine by Dr. Ming Angulo. Approximately 10 cc's of bloody fluid were removed. At this time the syringe was removed and 1mL kenalog was injected into the left olecranon bursa. After injection, betadine removed with soap and water and band-aids applied.    1ml kenalog with 1% lidocaine plain injected into patient's left olecranon bursa by Dr. Ming Angulo  LOT# [id number]  Exp. 02/2019    Alexis Bowers PA-C  Supervising physician: Ming Angulo MD  Dept. of Orthopedics  [facility]

## 2018-01-18 NOTE — LETTER
1/18/2018         RE: Dung Norton  [address]  [place] [address]        Dear Colleague,    Thank you for referring your patient, Dung Norton, to the [facility]. Please see a copy of my visit note below.    The patient's left elbow was prepped with betadine solution after verification of allergies. Area approximately 10 cm x 10 cm prepped in a sterile fashion. The skin was infiltrated with 1% lidocaine by Dr. Ming Angulo. Approximately 10 cc's of bloody fluid were removed. At this time the syringe was removed and 1mL kenalog was injected into the left olecranon bursa. After injection, betadine removed with soap and water and band-aids applied.    1ml kenalog with 1% lidocaine plain injected into patient's left olecranon bursa by Dr. Ming Angulo  LOT# [id number]  Exp. 02/2019    Alexis Bowers PA-C  Supervising physician: Ming Angulo MD  Dept. of Orthopedics  [facility]          Dung Norton is a 72 year old male who is seen in consultation at the request of Dr. Haley Baker  for left elbow swelling.    Past Medical History:   Diagnosis Date     BPH (benign prostatic hyperplasia)      Chronic kidney disease      HTN      Pulmonary nodules      Tobacco abuse        Past Surgical History:   Procedure Laterality Date     APPENDECTOMY  AGE 6     CREATE FISTULA ARTERIOVENOUS UPPER EXTREMITY Left 11/17/2017    Procedure: CREATE FISTULA ARTERIOVENOUS UPPER EXTREMITY;  Left Cephalic Vein To Radial Artery Arteriovenous Fistula Creation, Anesthesia Block;  Surgeon: Eduardo Pabon MD;  Location: [facility] OR     CYSTOSCOPY, FULGURATE BLEEDERS, EVACUATE CLOT(S), COMBINED N/A 7/16/2017    Procedure: COMBINED CYSTOSCOPY, FULGURATE BLEEDERS, EVACUATE CLOT(S);  Cystoscopy clot evacuation, bladder biopsy and fulguration (per surgeons note) NERVE BLOCK PERIPHERAL;  Surgeon: Tamiko Vanegas MD;  Location: [facility] OR     SINUS SURGERY  AGE 6     TONSILLECTOMY       CHILDHOOD       Family History   Problem Relation Age of Onset     C.A.D. Mother      d age 67     Vision Loss Father      Hearing Loss Sister      DIABETES Sister      CANCER No family hx of        Social History     Social History     Marital status:      Spouse name: N/A     Number of children: 2     Years of education: N/A     Occupational History      [employer]     Social History Main Topics     Smoking status: Former Smoker     Packs/day: 1.00     Years: 53.00     Types: Cigarettes     Quit date: 6/12/2017     Smokeless tobacco: Never Used     Alcohol use No     Drug use: No     Sexual activity: Not Currently     Other Topics Concern     Not on file     Social History Narrative       Current Outpatient Prescriptions   Medication Sig Dispense Refill     triamcinolone acetonide (KENALOG) 40 MG/ML injection 1 mL (40 mg) by INTRA-ARTICULAR route once for 1 dose 1 mL 0     order for DME Equipment being ordered: olecranon bursa brace 1 each 0     amLODIPine (NORVASC) 2.5 MG tablet Take 1 tablet (2.5 mg) by mouth daily (Patient taking differently: Take 2.5 mg by mouth every morning ) 90 tablet 3     darbepoetin william (ARANESP, ALBUMIN FREE,) 100 MCG/0.5ML injection Inject 0.5 mLs (100 mcg) Subcutaneous every 14 days As needed for hgb<10g/dL.  If Hgb increases >1 point in 2 weeks (if blood transfusion given, use hgb PRIOR to this), SYSTOLIC BP > 180 mmHg or hgb>=10g/dL, HOLD DOSE. Dose must be within 1 week of Hgb.  Per anemia protocol with Cecilia Britt,CNP 0.5 mL 99     calcium acetate (PHOSLO) 667 MG CAPS capsule Take 2 capsules (1,334 mg) by mouth 3 times daily (with meals) 540 capsule 3     finasteride (PROSCAR) 5 MG tablet Take 1 tablet (5 mg) by mouth daily (Patient taking differently: Take 5 mg by mouth every morning ) 90 tablet 3     Multiple Vitamins-Minerals (MULTIVITAMIN MEN PO) Take 1 tablet by mouth every morning        fish oil-omega-3 fatty acids (FISH OIL) 1000 MG capsule Take 1 g  "by mouth every morning        aspirin 81 MG tablet Take 1 tablet by mouth every morning          No Known Allergies    REVIEW OF SYSTEMS:  CONSTITUTIONAL:  NEGATIVE for fever, chills, change in weight, not feeling tired  SKIN:  NEGATIVE for worrisome rashes, no skin lumps, no skin ulcers and no non-healing wounds  EYES:  NEGATIVE for vision changes or irritation.  ENT/MOUTH:  NEGATIVE.  No hearing loss, no hoarseness, no difficulty swallowing.  RESP:  NEGATIVE. No cough or shortness of breath.  CV:  NEGATIVE for chest pain, palpitations or peripheral edema  GI:  NEGATIVE for nausea, abdominal pain, heartburn, or change in bowel habits  :  Negative. No dysuria, no hematuria  MUSCULOSKELETAL:  See HPI above  NEURO:  NEGATIVE . No headaches, no dizziness,  no numbness  ENDOCRINE:  NEGATIVE for temperature intolerance, skin/hair changes  HEME/ALLERGY/IMMUNE:  NEGATIVE for bleeding problems  PSYCHIATRIC:  NEGATIVE. no anxiety, no depression.     Exam:  Vitals: Temp 97  F (36.1  C)  Resp 18  Ht 1.778 m (5' 10\")  Wt 74.8 kg (165 lb)  BMI 23.68 kg/m2  BMI= Body mass index is 23.68 kg/(m^2).  Constitutional:  healthy, alert and no distress  Neuro: Alert and Oriented x 3, Gait normal. Sensation grossly WNL.  Psych: Affect normal   Respiratory: Breathing not labored.  Cardiovascular: normal peripheral pulses  Lymph: no adenopathy  Skin: No rashes,worrisome lesions or skin problems    Again, thank you for allowing me to participate in the care of your patient.        Sincerely,        Ming Angulo MD    "

## 2018-01-18 NOTE — TELEPHONE ENCOUNTER
Spoke to patients daughter, Renae, and informed her she should call patients insurance company and find out if there is a preferred medical supply store. No further action needed.  Zainab SERRATO CMA ([facility])

## 2018-01-18 NOTE — TELEPHONE ENCOUNTER
Reason for Call:  Other call back    Detailed comments:  Daughter calling. Where is she to go to get this elbow brace. Please call and let know.     Phone Number Patient can be reached at: Cell number on file:    Telephone Information:   Mobile [phone]       Best Time:  Any     Can we leave a detailed message on this number? YES    Call taken on 1/18/2018 at 10:43 AM by Reena Izquierdo

## 2018-01-18 NOTE — TELEPHONE ENCOUNTER
Reason for call:  Other   Patient called regarding (reason for call): call back  Additional comments: Daughter Tereza is calling back for brace information; [facility] # ([phone] Please return call asap. Patient has appointment today and they would like to know before then.    Phone number to reach patient:  Cell number on file:  [phone] mary kay Starks  Telephone Information:   Mobile [phone]       Best Time:  ASAP    Can we leave a detailed message on this number?  YES

## 2018-01-18 NOTE — NURSING NOTE
"Chief Complaint   Patient presents with     Consult     Left olecranon bursitis.       Initial Temp 97  F (36.1  C)  Resp 18  Ht 1.778 m (5' 10\")  Wt 74.8 kg (165 lb)  BMI 23.68 kg/m2 Estimated body mass index is 23.68 kg/(m^2) as calculated from the following:    Height as of this encounter: 1.778 m (5' 10\").    Weight as of this encounter: 74.8 kg (165 lb).  Medication Reconciliation: complete   Mirna Tate MA      "

## 2018-01-19 ENCOUNTER — CARE COORDINATION (OUTPATIENT)
Dept: NEPHROLOGY | Facility: CLINIC | Age: 73
End: 2018-01-19

## 2018-01-19 PROBLEM — M70.22 OLECRANON BURSITIS OF LEFT ELBOW: Status: ACTIVE | Noted: 2018-01-19

## 2018-01-19 PROBLEM — R29.898 BILATERAL LEG WEAKNESS: Status: RESOLVED | Noted: 2017-08-11 | Resolved: 2018-01-19

## 2018-01-19 PROBLEM — R29.898 WEAKNESS OF SHOULDER: Status: RESOLVED | Noted: 2017-08-11 | Resolved: 2018-01-19

## 2018-01-19 RX ORDER — TRIAMCINOLONE ACETONIDE 40 MG/ML
40 INJECTION, SUSPENSION INTRA-ARTICULAR; INTRAMUSCULAR ONCE
Qty: 1 ML | Refills: 0 | OUTPATIENT
Start: 2018-01-19 | End: 2018-01-19

## 2018-01-19 NOTE — PROGRESS NOTES
Subjective:  HPI                    Objective:  System    Physical Exam    General     ROS    Assessment/Plan:    DISCHARGE REPORT    Progress reporting period is from 8.11  to 1.19.18.     SUBJECTIVE          Initial Pain level: 0/10        ;   ,     Patient has failed to return to therapy so current objective findings are unknown.    OBJECTIVE  Objective: 3/5 shldr weakness       ASSESSMENT/PLAN  Updated problem list and treatment plan: Diagnosis 1:  Shoulder pain     STG/LTGs have been met or progress has been made towards goals:  {Goals met/progress made: None   Assessment of Progress: Patient has not returned to therapy.  Current status is unknown and discharge G code cannot be reported.  Self Management Plans:  Patient has been instructed in a home treatment program.  Patient  has been instructed in self management of symptoms.    Dung continues to require the following intervention to meet STG and LTG's:   The patient failed to complete scheduled/ordered appointments so current information is unknown.  We will discharge this patient from PT.    Recommendations:      Please refer to the daily flowsheet for treatment today, total treatment time and time spent performing 1:1 timed codes.

## 2018-01-19 NOTE — PROGRESS NOTES
Dung Norton is a 72 year old male who is seen in consultation at the request of Dr. Haley Baker  for left elbow swelling.    Past Medical History:   Diagnosis Date     BPH (benign prostatic hyperplasia)      Chronic kidney disease      HTN      Pulmonary nodules      Tobacco abuse        Past Surgical History:   Procedure Laterality Date     APPENDECTOMY  AGE 6     CREATE FISTULA ARTERIOVENOUS UPPER EXTREMITY Left 11/17/2017    Procedure: CREATE FISTULA ARTERIOVENOUS UPPER EXTREMITY;  Left Cephalic Vein To Radial Artery Arteriovenous Fistula Creation, Anesthesia Block;  Surgeon: Eduardo Pabon MD;  Location: [facility] OR     CYSTOSCOPY, FULGURATE BLEEDERS, EVACUATE CLOT(S), COMBINED N/A 7/16/2017    Procedure: COMBINED CYSTOSCOPY, FULGURATE BLEEDERS, EVACUATE CLOT(S);  Cystoscopy clot evacuation, bladder biopsy and fulguration (per surgeons note) NERVE BLOCK PERIPHERAL;  Surgeon: Tamiko Vanegas MD;  Location: [facility] OR     SINUS SURGERY  AGE 6     TONSILLECTOMY      CHILDHOOD       Family History   Problem Relation Age of Onset     C.A.D. Mother      d age 67     Vision Loss Father      Hearing Loss Sister      DIABETES Sister      CANCER No family hx of        Social History     Social History     Marital status:      Spouse name: N/A     Number of children: 2     Years of education: N/A     Occupational History      [employer]     Social History Main Topics     Smoking status: Former Smoker     Packs/day: 1.00     Years: 53.00     Types: Cigarettes     Quit date: 6/12/2017     Smokeless tobacco: Never Used     Alcohol use No     Drug use: No     Sexual activity: Not Currently     Other Topics Concern     Not on file     Social History Narrative       Current Outpatient Prescriptions   Medication Sig Dispense Refill     triamcinolone acetonide (KENALOG) 40 MG/ML injection 1 mL (40 mg) by INTRA-ARTICULAR route once for 1 dose 1 mL 0     order for DME Equipment being ordered: olecranon bursa  brace 1 each 0     amLODIPine (NORVASC) 2.5 MG tablet Take 1 tablet (2.5 mg) by mouth daily (Patient taking differently: Take 2.5 mg by mouth every morning ) 90 tablet 3     darbepoetin william (ARANESP, ALBUMIN FREE,) 100 MCG/0.5ML injection Inject 0.5 mLs (100 mcg) Subcutaneous every 14 days As needed for hgb<10g/dL.  If Hgb increases >1 point in 2 weeks (if blood transfusion given, use hgb PRIOR to this), SYSTOLIC BP > 180 mmHg or hgb>=10g/dL, HOLD DOSE. Dose must be within 1 week of Hgb.  Per anemia protocol with Cecilia Steeleoix,CNP 0.5 mL 99     calcium acetate (PHOSLO) 667 MG CAPS capsule Take 2 capsules (1,334 mg) by mouth 3 times daily (with meals) 540 capsule 3     finasteride (PROSCAR) 5 MG tablet Take 1 tablet (5 mg) by mouth daily (Patient taking differently: Take 5 mg by mouth every morning ) 90 tablet 3     Multiple Vitamins-Minerals (MULTIVITAMIN MEN PO) Take 1 tablet by mouth every morning        fish oil-omega-3 fatty acids (FISH OIL) 1000 MG capsule Take 1 g by mouth every morning        aspirin 81 MG tablet Take 1 tablet by mouth every morning          No Known Allergies    REVIEW OF SYSTEMS:  CONSTITUTIONAL:  NEGATIVE for fever, chills, change in weight, not feeling tired  SKIN:  NEGATIVE for worrisome rashes, no skin lumps, no skin ulcers and no non-healing wounds  EYES:  NEGATIVE for vision changes or irritation.  ENT/MOUTH:  NEGATIVE.  No hearing loss, no hoarseness, no difficulty swallowing.  RESP:  NEGATIVE. No cough or shortness of breath.  CV:  NEGATIVE for chest pain, palpitations or peripheral edema  GI:  NEGATIVE for nausea, abdominal pain, heartburn, or change in bowel habits  :  Negative. No dysuria, no hematuria  MUSCULOSKELETAL:  See HPI above  NEURO:  NEGATIVE . No headaches, no dizziness,  no numbness  ENDOCRINE:  NEGATIVE for temperature intolerance, skin/hair changes  HEME/ALLERGY/IMMUNE:  NEGATIVE for bleeding problems  PSYCHIATRIC:  NEGATIVE. no anxiety, no depression.  "    Exam:  Vitals: Temp 97  F (36.1  C)  Resp 18  Ht 1.778 m (5' 10\")  Wt 74.8 kg (165 lb)  BMI 23.68 kg/m2  BMI= Body mass index is 23.68 kg/(m^2).  Constitutional:  healthy, alert and no distress  Neuro: Alert and Oriented x 3, Gait normal. Sensation grossly WNL.  Psych: Affect normal   Respiratory: Breathing not labored.  Cardiovascular: normal peripheral pulses  Lymph: no adenopathy  Skin: No rashes,worrisome lesions or skin problems  "

## 2018-01-19 NOTE — PROGRESS NOTES
HISTORY OF PRESENT ILLNESS:  Mr. Norton is a 72-year-old male referred from Dr. Haley Baker for evaluation of left elbow swelling.  He has had this for the last 2-3 weeks. He does not recall a specific injury.  He does tend to lean on the elbow.  He has very little pain with this, just a persistent swelling. Has used an ice pack. Nothing really makes it worse.  He is going to have a different surgery coming up and was told to make sure that the elbow was not an issue and wanted it taken care of.       PHYSICAL EXAMINATION:  Shows swelling in the prepatellar bursa of the left elbow.  He does not have increased warmth or erythema.  He has some mild tenderness with firm pressure on it.  He does have some synovial bands within this.  Sensation and circulation are intact.      IMPRESSION:  Olecranon bursitis, left elbow, nonseptic. We discussed options of compression and observation, avoidance of leaning on it versus aspiration injection.  He would like to have it resolved so we have proceeded with aspiration injection.  I aspirated 20 cc of blood-tinged fluid and injected with 40 mg Kenalog. We then applied a Tubigrip and will have him avoid leaning on the elbow.  The swelling should resolve fairly soon.  We should see him back if there is any sign of this becoming infected.         JOANNE DE JESUS MD             D: 2018 10:19   T: 2018 17:08   MT: CLARK      Name:     FABRIZIO NORTON   MRN:      [id number]        Account:      [id number]   :      1945           Visit Date:   2018      Document: [id number]

## 2018-01-19 NOTE — PROGRESS NOTES
Attempted to contact patient (follow up symptoms). Call rang out, no voicemail. Will try again at a later time.    Giselle Benoit RN

## 2018-01-19 NOTE — PATIENT INSTRUCTIONS
Do not lean on the elbow.  Use tubigrip until swelling is gone.    You have had a steroid injection today.  For the first 2 hours there will likely be some numbing in the joint from the lidocaine.  This is a good sign, indicating that the injection is in the right place.  In 2 hours the lidocaine will wear off, and the joint will hurt like you had a shot.  Each day the cortisone makes it feel better.  It reaches peak effect in 2 weeks.  We expect it to last for 3 months.  You may resume regular activity when you feel ready.  If you are diabetic, your glucoses will be quite high for several days.

## 2018-01-22 ENCOUNTER — TELEPHONE (OUTPATIENT)
Dept: NEPHROLOGY | Facility: CLINIC | Age: 73
End: 2018-01-22

## 2018-01-22 ENCOUNTER — ALLIED HEALTH/NURSE VISIT (OUTPATIENT)
Dept: FAMILY MEDICINE | Facility: CLINIC | Age: 73
End: 2018-01-22

## 2018-01-22 VITALS — HEART RATE: 80 BPM | SYSTOLIC BLOOD PRESSURE: 140 MMHG | DIASTOLIC BLOOD PRESSURE: 79 MMHG

## 2018-01-22 DIAGNOSIS — I10 HYPERTENSION GOAL BP (BLOOD PRESSURE) < 140/90: Primary | ICD-10-CM

## 2018-01-22 DIAGNOSIS — R79.89 ELEVATED SERUM CREATININE: ICD-10-CM

## 2018-01-22 DIAGNOSIS — N18.5 ANEMIA OF CHRONIC RENAL FAILURE, STAGE 5 (H): ICD-10-CM

## 2018-01-22 DIAGNOSIS — D63.1 ANEMIA OF CHRONIC RENAL FAILURE, STAGE 5 (H): ICD-10-CM

## 2018-01-22 DIAGNOSIS — N18.5 CKD (CHRONIC KIDNEY DISEASE) STAGE 5, GFR LESS THAN 15 ML/MIN (H): ICD-10-CM

## 2018-01-22 LAB
ALBUMIN SERPL-MCNC: 3.6 G/DL (ref 3.4–5)
ANION GAP SERPL CALCULATED.3IONS-SCNC: 10 MMOL/L (ref 3–14)
BUN SERPL-MCNC: 114 MG/DL (ref 7–30)
CALCIUM SERPL-MCNC: 9.1 MG/DL (ref 8.5–10.1)
CHLORIDE SERPL-SCNC: 110 MMOL/L (ref 94–109)
CO2 SERPL-SCNC: 20 MMOL/L (ref 20–32)
CREAT SERPL-MCNC: 5.07 MG/DL (ref 0.66–1.25)
GFR SERPL CREATININE-BSD FRML MDRD: 11 ML/MIN/1.7M2
GLUCOSE SERPL-MCNC: 97 MG/DL (ref 70–99)
HCT VFR BLD AUTO: 34.7 % (ref 40–53)
HGB BLD-MCNC: 10.8 G/DL (ref 13.3–17.7)
PHOSPHATE SERPL-MCNC: 5 MG/DL (ref 2.5–4.5)
POTASSIUM SERPL-SCNC: 4.5 MMOL/L (ref 3.4–5.3)
SODIUM SERPL-SCNC: 140 MMOL/L (ref 133–144)

## 2018-01-22 PROCEDURE — 99207 ZZC NO CHARGE NURSE ONLY: CPT | Performed by: FAMILY MEDICINE

## 2018-01-22 PROCEDURE — 85014 HEMATOCRIT: CPT

## 2018-01-22 PROCEDURE — 80069 RENAL FUNCTION PANEL: CPT

## 2018-01-22 PROCEDURE — 85018 HEMOGLOBIN: CPT

## 2018-01-22 PROCEDURE — 36415 COLL VENOUS BLD VENIPUNCTURE: CPT

## 2018-01-22 NOTE — PROGRESS NOTES
Dung Norton is enrolled/participating in the retail pharmacy Blood Pressure Goals Achievement Program (BPGAP).  Dung Norton was evaluated at [facility] on January 22, 2018 at which time his blood pressure was:    BP Readings from Last 3 Encounters:   01/22/18 140/79   01/17/18 136/84   01/17/18 137/72     Reviewed lifestyle modifications for blood pressure control and reduction: including making healthy food choices, managing weight, getting regular exercise, smoking cessation, reducing alcohol consumption, monitoring blood pressure regularly.     Dung Norton is not experiencing symptoms.    Follow-Up: BP is not at goal of < 140/90mmHg (patient 18+ years of age with or without diabetes), Recommended follow-up in 1 month at the pharmacy. Routing to PCP as an FYI.    Recommendation to Provider: Patient comes in every week, continue to monitor.     Dung Norton was evaluated for enrollment into the PGEN study today.    Patient eligible for enrollment:  No  Patient interested in enrollment:  No    Completed by: Thank you,  Cheri Winslow, PharmD  [facility]  [phone]

## 2018-01-22 NOTE — TELEPHONE ENCOUNTER
DATE:  1/22/2018   TIME OF RECEIPT FROM LAB:  3:50pm   LAB TEST:  Creatinine  LAB VALUE:  5.07   RESULTS GIVEN WITH READ-BACK TO (PROVIDER):  NEETA JOSEPH  TIME LAB VALUE REPORTED TO PROVIDER:   3:51pm       Lab is stable, actually improved from last draw.    Giselle Benoit RN

## 2018-01-22 NOTE — PROGRESS NOTES
Reason for Call    Spoke with patient. States he's feeling well, no symptoms or concerns at this time. He is not interested in a transplant evaluation. Has questions about labs needed for his upcoming procedure, will send message to that team.    Patient Education    1. Uremic symptoms and when to call clinic    Plan    1. Continue to monitor symptoms  2. Call if any change or concerns    Patient was given an opportunity to ask questions and have those questions answered to his satisfaction.  Patient verbalized understanding of instructions provided and agreed to plan of care.    Giselle Benoit RN

## 2018-01-22 NOTE — MR AVS SNAPSHOT
After Visit Summary   1/22/2018    Dung Norton    MRN: [id number]           Patient Information     Date Of Birth          1945        Visit Information        Provider Department      1/22/2018 12:05 PM Haley Baker MD [facility]        Today's Diagnoses     Hypertension goal BP (blood pressure) < 140/90    -  1       Follow-ups after your visit        Your next 10 appointments already scheduled     Jan 29, 2018   Procedure with Tamiko Vanegas MD   [facility], [place], Same Day Surgery (--)    [address] [facility] [address] [phone]            Feb 09, 2018  9:15 AM CST   (Arrive by 9:00 AM)   Post-Op with Tamiko Vanegas MD   [facility] Urology and [facility] for Prostate and Urologic Cancers ([facility])    [address] [place] [address]  4th Floor  [facility] [id number] [phone]            Mar 02, 2018  1:00 PM CST   (Arrive by 12:30 PM)   Return Visit with Oralia De La Torre NP   [facility] Nephrology ([facility])    [address]  [address]  [facility] [id number] [phone]              Who to contact     If you have questions or need follow up information about today's clinic visit or your schedule please contact [facility] directly at [phone].  Normal or non-critical lab and imaging results will be communicated to you by MyChart, letter or phone within 4 business days after the clinic has received the results. If you do not hear from us within 7 days, please contact the clinic through Bark[facility] or phone. If you have a critical or abnormal lab result, we will notify you by phone as soon as possible.  Submit refill requests through [employer] or call your pharmacy and they will forward the refill request to us. Please allow 3 business days for your refill to be completed.          Additional Information About Your Visit        [facility] Information     [employer]elle lets you send  "messages to your doctor, view your test results, renew your prescriptions, schedule appointments and more. To sign up, go to [email or web address] . Click on \"Log in\" on the left side of the screen, which will take you to the Welcome page. Then click on \"Sign up Now\" on the right side of the page.     You will be asked to enter the access code listed below, as well as some personal information. Please follow the directions to create your username and password.     Your access code is: [email or web address]  Expires: 4/15/2018 11:20 AM     Your access code will  in 90 days. If you need help or a new code, please call your [facility] clinic or [phone].        Care EveryWhere ID     This is your Care EveryWhere ID. This could be used by other organizations to access your [facility] medical records  [id number]        Your Vitals Were     Pulse                   80            Blood Pressure from Last 3 Encounters:   18 140/79   18 136/84   18 137/72    Weight from Last 3 Encounters:   18 165 lb (74.8 kg)   18 166 lb 6.4 oz (75.5 kg)   18 164 lb 12.8 oz (74.8 kg)              Today, you had the following     No orders found for display         Today's Medication Changes          These changes are accurate as of: 18 11:59 PM.  If you have any questions, ask your nurse or doctor.               These medicines have changed or have updated prescriptions.        Dose/Directions    amLODIPine 2.5 MG tablet   Commonly known as:  NORVASC   This may have changed:  when to take this   Used for:  Renal hypertension        Dose:  2.5 mg   Take 1 tablet (2.5 mg) by mouth daily   Quantity:  90 tablet   Refills:  3       finasteride 5 MG tablet   Commonly known as:  PROSCAR   This may have changed:  when to take this   Used for:  Benign prostatic hyperplasia with urinary retention        Dose:  5 mg   Take 1 tablet (5 mg) by mouth daily   Quantity:  90 tablet   Refills:  3                " Primary Care Provider Office Phone # Fax #    Haley Baker [phone] [address] [facility] [address]        Equal Access to Services     ROSEJOSS SAQIB : Justin cristine marin lambert Sochalo, wakathleenda luqadaha, qaybta kaalmada robert, gibson fulton laJohnnysaadia irma. So [facility] [phone].    ATENCIÓN: Si habla español, tiene a montague disposición servicios gratuitos de asistencia lingüística. Llame al [phone].    We comply with applicable federal civil rights laws and [place] laws. We do not discriminate on the basis of race, color, national origin, age, disability, sex, sexual orientation, or gender identity.            Thank you!     Thank you for choosing [facility]  for your care. Our goal is always to provide you with excellent care. Hearing back from our patients is one way we can continue to improve our services. Please take a few minutes to complete the written survey that you may receive in the mail after your visit with us. Thank you!             Your Updated Medication List - Protect others around you: Learn how to safely use, store and throw away your medicines at www.disposemymeds.org.          This list is accurate as of: 1/22/18 11:59 PM.  Always use your most recent med list.                   Brand Name Dispense Instructions for use Diagnosis    amLODIPine 2.5 MG tablet    NORVASC    90 tablet    Take 1 tablet (2.5 mg) by mouth daily    Renal hypertension       aspirin 81 MG tablet      Take 1 tablet by mouth every morning        calcium acetate 667 MG Caps capsule    PHOSLO    540 capsule    Take 2 capsules (1,334 mg) by mouth 3 times daily (with meals)    Chronic kidney disease, unspecified CKD stage       darbepoetin william 100 MCG/0.5ML injection    ARANESP (ALBUMIN FREE)    0.5 mL    Inject 0.5 mLs (100 mcg) Subcutaneous every 14 days As needed for hgb<10g/dL.  If Hgb increases >1 point in 2 weeks (if blood transfusion given, use hgb PRIOR to  this), SYSTOLIC BP > 180 mmHg or hgb>=10g/dL, HOLD DOSE. Dose must be within 1 week of Hgb.  Per anemia protocol with Cecilia De La Torre,BRANDEN    Anemia of chronic renal failure, stage 5 (H), CKD (chronic kidney disease) stage 5, GFR less than 15 ml/min (H)       finasteride 5 MG tablet    PROSCAR    90 tablet    Take 1 tablet (5 mg) by mouth daily    Benign prostatic hyperplasia with urinary retention       fish oil-omega-3 fatty acids 1000 MG capsule      Take 1 g by mouth every morning        MULTIVITAMIN MEN PO      Take 1 tablet by mouth every morning        order for DME     1 each    Equipment being ordered: olecranon bursa brace    Olecranon bursitis of left elbow

## 2018-01-24 DIAGNOSIS — R30.0 DYSURIA: Primary | ICD-10-CM

## 2018-01-24 NOTE — TELEPHONE ENCOUNTER
This has been addressed and patient was in yesterday for injection.  Zainab SERRATO CMA ([facility])

## 2018-01-25 ENCOUNTER — CARE COORDINATION (OUTPATIENT)
Dept: UROLOGY | Facility: CLINIC | Age: 73
End: 2018-01-25

## 2018-01-25 ENCOUNTER — HOSPITAL ENCOUNTER (EMERGENCY)
Facility: CLINIC | Age: 73
Discharge: HOME | End: 2018-01-25
Attending: EMERGENCY MEDICINE | Admitting: EMERGENCY MEDICINE
Payer: MEDICARE

## 2018-01-25 VITALS
RESPIRATION RATE: 16 BRPM | BODY MASS INDEX: 23.24 KG/M2 | OXYGEN SATURATION: 100 % | WEIGHT: 162 LBS | HEART RATE: 89 BPM | DIASTOLIC BLOOD PRESSURE: 75 MMHG | SYSTOLIC BLOOD PRESSURE: 122 MMHG | TEMPERATURE: 96.6 F

## 2018-01-25 DIAGNOSIS — R31.9 HEMATURIA: Primary | ICD-10-CM

## 2018-01-25 DIAGNOSIS — R30.0 DYSURIA: Primary | ICD-10-CM

## 2018-01-25 DIAGNOSIS — N30.01 ACUTE CYSTITIS WITH HEMATURIA: ICD-10-CM

## 2018-01-25 LAB
ALBUMIN UR-MCNC: 300 MG/DL
AMORPH CRY #/AREA URNS HPF: ABNORMAL /HPF
ANION GAP SERPL CALCULATED.3IONS-SCNC: 10 MMOL/L (ref 3–14)
APPEARANCE UR: ABNORMAL
BACTERIA #/AREA URNS HPF: ABNORMAL /HPF
BASOPHILS # BLD AUTO: 0 10E9/L (ref 0–0.2)
BASOPHILS NFR BLD AUTO: 0.3 %
BILIRUB UR QL STRIP: NEGATIVE
BUN SERPL-MCNC: 129 MG/DL (ref 7–30)
CALCIUM SERPL-MCNC: 8.5 MG/DL (ref 8.5–10.1)
CHLORIDE SERPL-SCNC: 112 MMOL/L (ref 94–109)
CO2 SERPL-SCNC: 19 MMOL/L (ref 20–32)
COLOR UR AUTO: ABNORMAL
CREAT SERPL-MCNC: 5.6 MG/DL (ref 0.66–1.25)
DIFFERENTIAL METHOD BLD: ABNORMAL
EOSINOPHIL # BLD AUTO: 0.4 10E9/L (ref 0–0.7)
EOSINOPHIL NFR BLD AUTO: 3.6 %
ERYTHROCYTE [DISTWIDTH] IN BLOOD BY AUTOMATED COUNT: 17.2 % (ref 10–15)
GFR SERPL CREATININE-BSD FRML MDRD: 10 ML/MIN/1.7M2
GLUCOSE SERPL-MCNC: 81 MG/DL (ref 70–99)
GLUCOSE UR STRIP-MCNC: NEGATIVE MG/DL
HCT VFR BLD AUTO: 31.6 % (ref 40–53)
HGB BLD-MCNC: 9.8 G/DL (ref 13.3–17.7)
HGB UR QL STRIP: ABNORMAL
IMM GRANULOCYTES # BLD: 0 10E9/L (ref 0–0.4)
IMM GRANULOCYTES NFR BLD: 0.3 %
KETONES UR STRIP-MCNC: NEGATIVE MG/DL
LEUKOCYTE ESTERASE UR QL STRIP: ABNORMAL
LYMPHOCYTES # BLD AUTO: 1.5 10E9/L (ref 0.8–5.3)
LYMPHOCYTES NFR BLD AUTO: 13.2 %
MCH RBC QN AUTO: 26.9 PG (ref 26.5–33)
MCHC RBC AUTO-ENTMCNC: 31 G/DL (ref 31.5–36.5)
MCV RBC AUTO: 87 FL (ref 78–100)
MONOCYTES # BLD AUTO: 0.6 10E9/L (ref 0–1.3)
MONOCYTES NFR BLD AUTO: 5.2 %
NEUTROPHILS # BLD AUTO: 8.7 10E9/L (ref 1.6–8.3)
NEUTROPHILS NFR BLD AUTO: 77.4 %
NITRATE UR QL: NEGATIVE
NRBC # BLD AUTO: 0 10*3/UL
NRBC BLD AUTO-RTO: 0 /100
PH UR STRIP: 6.5 PH (ref 5–7)
PLATELET # BLD AUTO: 227 10E9/L (ref 150–450)
POTASSIUM SERPL-SCNC: 5.3 MMOL/L (ref 3.4–5.3)
RBC # BLD AUTO: 3.64 10E12/L (ref 4.4–5.9)
RBC #/AREA URNS AUTO: 14 /HPF (ref 0–2)
SODIUM SERPL-SCNC: 141 MMOL/L (ref 133–144)
SOURCE: ABNORMAL
SP GR UR STRIP: 1.01 (ref 1–1.03)
UROBILINOGEN UR STRIP-MCNC: NORMAL MG/DL (ref 0–2)
WBC # BLD AUTO: 11.2 10E9/L (ref 4–11)
WBC #/AREA URNS AUTO: >100 /HPF (ref 0–2)
WBC CLUMPS #/AREA URNS HPF: PRESENT /HPF

## 2018-01-25 PROCEDURE — 96374 THER/PROPH/DIAG INJ IV PUSH: CPT | Performed by: EMERGENCY MEDICINE

## 2018-01-25 PROCEDURE — 87086 URINE CULTURE/COLONY COUNT: CPT | Performed by: EMERGENCY MEDICINE

## 2018-01-25 PROCEDURE — 81001 URINALYSIS AUTO W/SCOPE: CPT | Performed by: EMERGENCY MEDICINE

## 2018-01-25 PROCEDURE — 87186 SC STD MICRODIL/AGAR DIL: CPT | Performed by: EMERGENCY MEDICINE

## 2018-01-25 PROCEDURE — 99284 EMERGENCY DEPT VISIT MOD MDM: CPT | Mod: Z6 | Performed by: EMERGENCY MEDICINE

## 2018-01-25 PROCEDURE — 87088 URINE BACTERIA CULTURE: CPT | Performed by: EMERGENCY MEDICINE

## 2018-01-25 PROCEDURE — 85025 COMPLETE CBC W/AUTO DIFF WBC: CPT | Performed by: EMERGENCY MEDICINE

## 2018-01-25 PROCEDURE — 99284 EMERGENCY DEPT VISIT MOD MDM: CPT | Mod: 25 | Performed by: EMERGENCY MEDICINE

## 2018-01-25 PROCEDURE — 27210995 ZZH RX 272: Performed by: EMERGENCY MEDICINE

## 2018-01-25 PROCEDURE — 25000128 H RX IP 250 OP 636: Performed by: EMERGENCY MEDICINE

## 2018-01-25 PROCEDURE — 80048 BASIC METABOLIC PNL TOTAL CA: CPT | Performed by: EMERGENCY MEDICINE

## 2018-01-25 RX ORDER — CIPROFLOXACIN 500 MG/1
500 TABLET, FILM COATED ORAL 2 TIMES DAILY
Qty: 10 TABLET | Refills: 0 | Status: SHIPPED | OUTPATIENT
Start: 2018-01-25 | End: 2018-01-30

## 2018-01-25 RX ADMIN — CEFTRIAXONE SODIUM 1 G: 10 INJECTION, POWDER, FOR SOLUTION INTRAVENOUS at 13:06

## 2018-01-25 ASSESSMENT — ENCOUNTER SYMPTOMS
CARDIOVASCULAR NEGATIVE: 1
NAUSEA: 0
VOMITING: 0
HEMATURIA: 1
DIFFICULTY URINATING: 1
BACK PAIN: 0
FEVER: 0
RESPIRATORY NEGATIVE: 1

## 2018-01-25 NOTE — ED AVS SNAPSHOT
[facility], [place], Emergency Department    [address] [place] [address]    Phone:  [phone]                                       Dung Norton   MRN: [id number]    Department:  [facility], Emergency Department   Date of Visit:  1/25/2018           After Visit Summary Signature Page     I have received my discharge instructions, and my questions have been answered. I have discussed any challenges I see with this plan with the nurse or doctor.    ..........................................................................................................................................  Patient/Patient Representative Signature      ..........................................................................................................................................  Patient Representative Print Name and Relationship to Patient    ..................................................               ................................................  Date                                            Time    ..........................................................................................................................................  Reviewed by Signature/Title    ...................................................              ..............................................  Date                                                            Time

## 2018-01-25 NOTE — DISCHARGE INSTRUCTIONS
Start antibiotics as directed   Return if you develop fevers  Contact the Urology Clinic in 2 days to check on the urine culture results

## 2018-01-25 NOTE — ED PROVIDER NOTES
[place] EMERGENCY DEPARTMENT ([facility])  1/25/18   History     Chief Complaint   Patient presents with     Hematuria     HPI  Dung Norton is a 72 year old male with a medical history significant for ESRD, hypertension, acquired hypothyroidism, obstructive uropathy and UTI who presents to the Emergency Department for evaluation of hematuria.  Patient reports that he self catheters at home and yesterday morning he began noticing blood in his urine.  He states that the urine and blood is mixed together, so he is unsure of the amount of blood that is in the urine.  Patient denies any recent difficulties with using the catheter and denies any pain with the catheter.  He also denies any recent fevers.  The patient reports that he has been using a self catheter for approximately 3-5 months.  Patient is scheduled to undergo a transurethral resection of prostate on 1/29/2018 and he was advised by a nurse to be evaluated for his hematuria prior to this.  Patient denies currently being on any anticoagulation medication.  He also denies currently being on any antibiotics.    I have reviewed the Medications, Allergies, Past Medical and Surgical History, and Social History in the [facility] system.    Past Medical History:   Diagnosis Date     BPH (benign prostatic hyperplasia)      Chronic kidney disease      HTN      Pulmonary nodules      Tobacco abuse        Past Surgical History:   Procedure Laterality Date     APPENDECTOMY  AGE 6     CREATE FISTULA ARTERIOVENOUS UPPER EXTREMITY Left 11/17/2017    Procedure: CREATE FISTULA ARTERIOVENOUS UPPER EXTREMITY;  Left Cephalic Vein To Radial Artery Arteriovenous Fistula Creation, Anesthesia Block;  Surgeon: Eduardo Pabon MD;  Location: [facility] OR     CYSTOSCOPY, FULGURATE BLEEDERS, EVACUATE CLOT(S), COMBINED N/A 7/16/2017    Procedure: COMBINED CYSTOSCOPY, FULGURATE BLEEDERS, EVACUATE CLOT(S);  Cystoscopy clot evacuation, bladder biopsy and fulguration (per surgeons note)  NERVE BLOCK PERIPHERAL;  Surgeon: Tamiko Vanegas MD;  Location: [facility] OR     SINUS SURGERY  AGE 6     TONSILLECTOMY      CHILDHOOD       Family History   Problem Relation Age of Onset     C.A.D. Mother      d age 67     Vision Loss Father      Hearing Loss Sister      DIABETES Sister      CANCER No family hx of        Social History   Substance Use Topics     Smoking status: Former Smoker     Packs/day: 1.00     Years: 53.00     Types: Cigarettes     Quit date: 6/12/2017     Smokeless tobacco: Never Used     Alcohol use No       No current facility-administered medications for this encounter.      Current Outpatient Prescriptions   Medication     ciprofloxacin (CIPRO) 500 MG tablet     order for DME     amLODIPine (NORVASC) 2.5 MG tablet     darbepoetin william (ARANESP, ALBUMIN FREE,) 100 MCG/0.5ML injection     calcium acetate (PHOSLO) 667 MG CAPS capsule     finasteride (PROSCAR) 5 MG tablet     Multiple Vitamins-Minerals (MULTIVITAMIN MEN PO)     fish oil-omega-3 fatty acids (FISH OIL) 1000 MG capsule     aspirin 81 MG tablet      No Known Allergies      Review of Systems   Constitutional: Negative for fever.   Respiratory: Negative.    Cardiovascular: Negative.    Gastrointestinal: Negative for nausea and vomiting.   Genitourinary: Positive for difficulty urinating and hematuria.   Musculoskeletal: Negative for back pain.   All other systems reviewed and are negative.      Physical Exam   BP: 147/72  Pulse: 99  Heart Rate: 103  Temp: 96.6  F (35.9  C)  Resp: 18  Weight: 73.5 kg (162 lb)  SpO2: 100 %      Physical Exam   Constitutional: He is oriented to person, place, and time. He appears well-developed and well-nourished. No distress.   HENT:   Mouth/Throat: Oropharynx is clear and moist.   Neck: Neck supple.   Cardiovascular: Normal rate, regular rhythm and normal heart sounds.    Pulmonary/Chest: Effort normal and breath sounds normal.   Abdominal: He exhibits no distension. There is no tenderness.    Neurological: He is alert and oriented to person, place, and time.   Skin: Skin is warm. He is not diaphoretic.   Psychiatric: He has a normal mood and affect. His behavior is normal.   Nursing note and vitals reviewed.      ED Course   10:33 AM  The patient was seen and examined by Pepe Nolan MD in Room ED20.     ED Course     Procedures        Seen by Urology who agree with the plan       Labs Ordered and Resulted from Time of ED Arrival Up to the Time of Departure from the ED   ROUTINE UA WITH MICROSCOPIC - Abnormal; Notable for the following:        Result Value    Blood Urine Large (*)     Protein Albumin Urine 300 (*)     Leukocyte Esterase Urine Moderate (*)     WBC Urine >100 (*)     RBC Urine 14 (*)     WBC Clumps Present (*)     Bacteria Urine Many (*)     Amorphous Crystals Few (*)     All other components within normal limits   CBC WITH PLATELETS DIFFERENTIAL - Abnormal; Notable for the following:     WBC 11.2 (*)     RBC Count 3.64 (*)     Hemoglobin 9.8 (*)     Hematocrit 31.6 (*)     MCHC 31.0 (*)     RDW 17.2 (*)     Absolute Neutrophil 8.7 (*)     All other components within normal limits   BASIC METABOLIC PANEL - Abnormal; Notable for the following:     Chloride 112 (*)     Carbon Dioxide 19 (*)     Urea Nitrogen 129 (*)     Creatinine 5.60 (*)     GFR Estimate 10 (*)     GFR Estimate If Black 12 (*)     All other components within normal limits   URINE CULTURE AEROBIC BACTERIAL            Assessments & Plan (with Medical Decision Making)   72-year-old male who self catheterizes and is scheduled to have a laser TURP by urology in about a week.  He presents here after developing nontraumatic hematuria about 3 days ago.  Here his urinalysis appears to be infected, I reviewed his previous cultures, he was given a dose of ceftriaxone.  I consulted urology who saw the patient.  He is not febrile, no vomiting, white count is 11, creatinine is only mildly elevated.  Based on cultures we will  send him home with ciprofloxacin and urology will follow up on culture results and decide whether to proceed with his TURP.  He is in stable condition, not septic.  Return if you have any problems particularly fevers.    This part of the medical record was transcribed by Rowdy Desir, Medical Scribe, from a dictation done by Pepe Nolan MD.       I have reviewed the nursing notes.    I have reviewed the findings, diagnosis, plan and need for follow up with the patient.    New Prescriptions    CIPROFLOXACIN (CIPRO) 500 MG TABLET    Take 1 tablet (500 mg) by mouth 2 times daily for 5 days       Final diagnoses:   Acute cystitis with hematuria     I, Rowdy Desir, am serving as a trained medical scribe to document services personally performed by Pepe Nolan MD, based on the provider's statements to me.   I, Pepe Nolan MD, was physically present and have reviewed and verified the accuracy of this note documented by Rowdy Desir.    1/25/2018   [facility], [place], EMERGENCY DEPARTMENT     Pepe Nolan MD  01/25/18 [id number]

## 2018-01-25 NOTE — ED AVS SNAPSHOT
[facility], Emergency Department    [address] [address]    Phone:  [phone]                                       Dung Norton   MRN: [id number]    Department:  [facility], Emergency Department   Date of Visit:  1/25/2018           Patient Information     Date Of Birth          1945        Your diagnoses for this visit were:     Acute cystitis with hematuria        You were seen by Pepe Nolan MD.        Discharge Instructions       Start antibiotics as directed   Return if you develop fevers  Contact the Urology Clinic in 2 days to check on the urine culture results      Future Appointments        Provider Department Dept Phone Center    2/9/2018 9:15 AM Tamiko Vanegas MD [facility] Urology and [facility] for Prostate and Urologic Cancers [phone] [facility]    3/2/2018 1:00 PM Oralia De La Torre NP [facility] Nephrology [phone] [facility]      24 Hour Appointment Hotline       To make an appointment at any [facility], call [phone] ([phone]). If you don't have a family doctor or clinic, we will help you find one. [facility] clinics are conveniently located to serve the needs of you and your family.             Review of your medicines      START taking        Dose / Directions Last dose taken    ciprofloxacin 500 MG tablet   Commonly known as:  CIPRO   Dose:  500 mg   Quantity:  10 tablet        Take 1 tablet (500 mg) by mouth 2 times daily for 5 days   Refills:  0          Our records show that you are taking the medicines listed below. If these are incorrect, please call your family doctor or clinic.        Dose / Directions Last dose taken    amLODIPine 2.5 MG tablet   Commonly known as:  NORVASC   Dose:  2.5 mg   Quantity:  90 tablet        Take 1 tablet (2.5 mg) by mouth daily   Refills:  3        aspirin 81 MG tablet   Dose:  1 tablet        Take 1 tablet by mouth every morning   Refills:  0        calcium acetate 667 MG Caps capsule    Commonly known as:  PHOSLO   Dose:  1334 mg   Quantity:  540 capsule        Take 2 capsules (1,334 mg) by mouth 3 times daily (with meals)   Refills:  3        darbepoetin william 100 MCG/0.5ML injection   Commonly known as:  ARANESP (ALBUMIN FREE)   Dose:  100 mcg   Quantity:  0.5 mL        Inject 0.5 mLs (100 mcg) Subcutaneous every 14 days As needed for hgb<10g/dL.  If Hgb increases >1 point in 2 weeks (if blood transfusion given, use hgb PRIOR to this), SYSTOLIC BP > 180 mmHg or hgb>=10g/dL, HOLD DOSE. Dose must be within 1 week of Hgb.  Per anemia protocol with Cecilia De La Torre CNP   Refills:  99        finasteride 5 MG tablet   Commonly known as:  PROSCAR   Dose:  5 mg   Quantity:  90 tablet        Take 1 tablet (5 mg) by mouth daily   Refills:  3        fish oil-omega-3 fatty acids 1000 MG capsule   Dose:  1 g        Take 1 g by mouth every morning   Refills:  0        MULTIVITAMIN MEN PO   Dose:  1 tablet        Take 1 tablet by mouth every morning   Refills:  0        order for DME   Quantity:  1 each        Equipment being ordered: olecranon bursa brace   Refills:  0                Prescriptions were sent or printed at these locations (1 Prescription)                   Other Prescriptions                Printed at Department/Unit printer (1 of 1)         ciprofloxacin (CIPRO) 500 MG tablet                Procedures and tests performed during your visit     Basic metabolic panel    CBC with platelets differential    UA with Microscopic    Urine Culture      Orders Needing Specimen Collection     None      Pending Results     Date and Time Order Name Status Description    1/25/2018 1038 Urine Culture In process             Pending Culture Results     Date and Time Order Name Status Description    1/25/2018 1038 Urine Culture In process             Pending Results Instructions     If you had any lab results that were not finalized at the time of your Discharge, you can call the ED Lab Result RN at [phone]. You  "will be contacted by this team for any positive Lab results or changes in treatment. The nurses are available 7 days a week from 10A to 6:30P.  You can leave a message 24 hours per day and they will return your call.        Thank you for choosing [facility]       Thank you for choosing [facility] for your care. Our goal is always to provide you with excellent care. Hearing back from our patients is one way we can continue to improve our services. Please take a few minutes to complete the written survey that you may receive in the mail after you visit with us. Thank you!        [facility] Information     [facility] lets you send messages to your doctor, view your test results, renew your prescriptions, schedule appointments and more. To sign up, go to [email or web address] . Click on \"Log in\" on the left side of the screen, which will take you to the Welcome page. Then click on \"Sign up Now\" on the right side of the page.     You will be asked to enter the access code listed below, as well as some personal information. Please follow the directions to create your username and password.     Your access code is: [email or web address]  Expires: 4/15/2018 11:20 AM     Your access code will  in 90 days. If you need help or a new code, please call your [facility] clinic or [phone].        Care EveryWhere ID     This is your Care EveryWhere ID. This could be used by other organizations to access your [facility] medical records  [id number]        Equal Access to Services     BRIAN CERRATO : Hadii cristine Sagastume, waaxda luadyadaha, qaybta kaalmada robert, gibson solis. So [facility] [phone].    ATENCIÓN: Si habla español, tiene a montague disposición servicios gratuitos de asistencia lingüística. Llame al [phone].    We comply with applicable federal civil rights laws and [place] laws. We do not discriminate on the basis of race, color, national origin, age, disability, sex, sexual orientation, or " gender identity.            After Visit Summary       This is your record. Keep this with you and show to your community pharmacist(s) and doctor(s) at your next visit.

## 2018-01-25 NOTE — ED NOTES
Presents for hematuria that started yesterday and worsening today. Pt self-caths at home. Scheduled for prostate surgery next week. Denies pain with hematuria.

## 2018-01-25 NOTE — PROGRESS NOTES
Called patient to him know that he needs to have a urine culture done today for upcoming surgery on Monday.  He self caths and has had blood in his urine. I advised that this can be normally following cathing. He is quite anxious about this and has asked his daughter to take him to the ED for evaluation. He is going to the [facility] ED. I asked that he call me tomorrow to let me know how he is doing. Patient verbalized understanding and agrees with plan. Brittany Mock RN

## 2018-01-28 LAB
BACTERIA SPEC CULT: ABNORMAL
SPECIMEN SOURCE: ABNORMAL

## 2018-01-29 ENCOUNTER — TELEPHONE (OUTPATIENT)
Dept: PHARMACY | Facility: CLINIC | Age: 73
End: 2018-01-29

## 2018-01-29 RX ORDER — NITROFURANTOIN 25; 75 MG/1; MG/1
100 CAPSULE ORAL 2 TIMES DAILY
Qty: 20 CAPSULE | Refills: 0 | Status: SHIPPED | OUTPATIENT
Start: 2018-01-29 | End: 2018-02-08

## 2018-01-29 NOTE — TELEPHONE ENCOUNTER
Anemia Management Note  SUBJECTIVE/OBJECTIVE:  Referred by Cecilia De La Torre on 9/11/2017  Primary Diagnosis: Anemia in Chronic Kidney Disease (N18.5, D63.1)     Secondary Diagnosis:  Chronic Kidney Disease, Stage 5 (N18.5)  Hgb goal range:  9-10  Epo/Darbo: Aranesp 100 mcg every 14 days As needed for hgb<10g/dL  RX expires on 12/5/2018  Iron regimen:  None  Labs exp: 9/12/2018  **Provide phone number for [facility] [phone] and instruction to schedule ancillary visit for aranesp injection. Send message to care team via pool - FZ RN TRIAGE POOL as a telephone encounter**      Contact: **AUTH TO DISCUSS**Tereza Norton(daughter) [phone], Rabia Kapooririneo (daughter)[phone]                                        Ok to leave message regarding labs and appts per consent to communicate dated 12/12/17                              OK to speak with daughters Tereza and Rabia regarding Dung's anemia care plan per consent to communicate dated 12/12/17        Anemia Latest Ref Rng & Units 12/19/2017 12/26/2017 1/3/2018 1/8/2018 1/15/2018 1/22/2018 1/25/2018   EARLENE Dose - 60 mcg - - - - - -   Hemoglobin 13.3 - 17.7 g/dL - 9.6(L) 10.2(L) 10.2(L) 10.2(L) 10.8(L) 9.8(L)   TSAT 15 - 46 % - 19 - - - - -   Ferritin 26 - 388 ng/mL - 518(H) - - - - -     BP Readings from Last 3 Encounters:   01/25/18 122/75   01/22/18 140/79   01/17/18 136/84     Wt Readings from Last 2 Encounters:   01/25/18 162 lb (73.5 kg)   01/18/18 165 lb (74.8 kg)           ASSESSMENT:  Hgb: At goal - recommend dose  TSat: not at goal (>30%) but ferritin >500ng/mL.  IV iron not indicated at this time per anemia protocol. Ferritin: At goal (>100ng/mL) - Due for labs    PLAN:  I spoke to Herb on 1/29, he said he will be going in for labs again on 1/30.  If his lab is still < 10.0, he will need an aranesp dose  Dose with aranesp and RTC for hgb, ferritin and iron labs then aranesp if needed in 2 week(s)    Spoke with Emily Starkey, she requests order be  routed through  RN TRIAGE pool so whole care team knows about need to order Aranesp.  Herb will RTC for Hgb 01/30/2018, pharmacy will not have Aranesp in until 02/02/2018, OK to give since Hgb within date range.  VERONICA    Orders needed to be renewed (for next follow-up date) in EPIC: None    Iron labs due:  1/23/18     Plan discussed with:  Dung  Plan provided by:  Judith  Reviewed 01/30/2018 VERONICA  NEXT FOLLOW-UP DATE:  1/30/18    Anemia Management Service  Daina Mock,PharmD and Malina Hussein CPhT  Phone: [phone]  Fax: [phone]

## 2018-01-30 ENCOUNTER — TELEPHONE (OUTPATIENT)
Dept: NEPHROLOGY | Facility: CLINIC | Age: 73
End: 2018-01-30

## 2018-01-30 ENCOUNTER — ALLIED HEALTH/NURSE VISIT (OUTPATIENT)
Dept: FAMILY MEDICINE | Facility: CLINIC | Age: 73
End: 2018-01-30

## 2018-01-30 VITALS — DIASTOLIC BLOOD PRESSURE: 76 MMHG | SYSTOLIC BLOOD PRESSURE: 136 MMHG

## 2018-01-30 DIAGNOSIS — N18.5 ANEMIA OF CHRONIC RENAL FAILURE, STAGE 5 (H): ICD-10-CM

## 2018-01-30 DIAGNOSIS — Z01.30 BP CHECK: Primary | ICD-10-CM

## 2018-01-30 DIAGNOSIS — R79.89 ELEVATED SERUM CREATININE: ICD-10-CM

## 2018-01-30 DIAGNOSIS — N18.5 CKD (CHRONIC KIDNEY DISEASE) STAGE 5, GFR LESS THAN 15 ML/MIN (H): ICD-10-CM

## 2018-01-30 DIAGNOSIS — D63.1 ANEMIA OF CHRONIC RENAL FAILURE, STAGE 5 (H): ICD-10-CM

## 2018-01-30 LAB
ALBUMIN SERPL-MCNC: 3.5 G/DL (ref 3.4–5)
ANION GAP SERPL CALCULATED.3IONS-SCNC: 11 MMOL/L (ref 3–14)
BUN SERPL-MCNC: 113 MG/DL (ref 7–30)
CALCIUM SERPL-MCNC: 8.7 MG/DL (ref 8.5–10.1)
CHLORIDE SERPL-SCNC: 110 MMOL/L (ref 94–109)
CO2 SERPL-SCNC: 21 MMOL/L (ref 20–32)
CREAT SERPL-MCNC: 5.37 MG/DL (ref 0.66–1.25)
FERRITIN SERPL-MCNC: 584 NG/ML (ref 26–388)
GFR SERPL CREATININE-BSD FRML MDRD: 10 ML/MIN/1.7M2
GLUCOSE SERPL-MCNC: 90 MG/DL (ref 70–99)
HCT VFR BLD AUTO: 32.9 % (ref 40–53)
HGB BLD-MCNC: 10.3 G/DL (ref 13.3–17.7)
IRON SATN MFR SERPL: 20 % (ref 15–46)
IRON SERPL-MCNC: 47 UG/DL (ref 35–180)
PHOSPHATE SERPL-MCNC: 5.4 MG/DL (ref 2.5–4.5)
POTASSIUM SERPL-SCNC: 4.9 MMOL/L (ref 3.4–5.3)
SODIUM SERPL-SCNC: 142 MMOL/L (ref 133–144)
TIBC SERPL-MCNC: 240 UG/DL (ref 240–430)

## 2018-01-30 PROCEDURE — 83550 IRON BINDING TEST: CPT

## 2018-01-30 PROCEDURE — 99207 ZZC NO CHARGE NURSE ONLY: CPT | Performed by: FAMILY MEDICINE

## 2018-01-30 PROCEDURE — 85018 HEMOGLOBIN: CPT

## 2018-01-30 PROCEDURE — 85014 HEMATOCRIT: CPT

## 2018-01-30 PROCEDURE — 36415 COLL VENOUS BLD VENIPUNCTURE: CPT

## 2018-01-30 PROCEDURE — 83540 ASSAY OF IRON: CPT

## 2018-01-30 PROCEDURE — 80069 RENAL FUNCTION PANEL: CPT

## 2018-01-30 PROCEDURE — 82728 ASSAY OF FERRITIN: CPT

## 2018-01-30 NOTE — MR AVS SNAPSHOT
After Visit Summary   1/30/2018    Dung Norton    MRN: [id number]           Patient Information     Date Of Birth          1945        Visit Information        Provider Department      1/30/2018 3:52 PM Haley Baker MD [facility]        Today's Diagnoses     BP check    -  1       Follow-ups after your visit        Your next 10 appointments already scheduled     Feb 01, 2018 10:00 AM CST   Nurse Only with [facility] ANCILLARY   [facility] ([facility]    [address] [address] [phone]            Feb 19, 2018   Procedure with Tamiko Vanegas MD   [facility] [employer], Same Day Surgery (--)    [address]  [facility] [address] [phone]            Mar 02, 2018 11:00 AM CST   (Arrive by 10:45 AM)   Post-Op with Tamiko Vanegas MD   [facility] and [facility] ([facility])    [address] [place] [address]  4th Floor  [facility] [id number] [phone]            Mar 02, 2018  1:00 PM CST   (Arrive by 12:30 PM)   Return Visit with Oralia De La Torre NP   [facility]ph[facility] ([facility])    [address]  [address]  [facility] [id number] [phone]              Who to contact     If you have questions or need follow up information about today's clinic visit or your schedule please contact [facility] directly at [phone].  Normal or non-critical lab and imaging results will be communicated to you by MyChart, letter or phone within 4 business days after the clinic has received the results. If you do not hear from us within 7 days, please contact the clinic through MyChart or phone. If you have a critical or abnormal lab result, we will notify you by phone as soon as possible.  Submit refill requests through [employer] or call your pharmacy and they will forward the refill request to us. Please allow 3 business  "days for your refill to be completed.          Additional Information About Your Visit        [place] [employer] Information     [employer] lets you send messages to your doctor, view your test results, renew your prescriptions, schedule appointments and more. To sign up, go to [email or web address] . Click on \"Log in\" on the left side of the screen, which will take you to the Welcome page. Then click on \"Sign up Now\" on the right side of the page.     You will be asked to enter the access code listed below, as well as some personal information. Please follow the directions to create your username and password.     Your access code is: [email or web address]  Expires: 4/15/2018 11:20 AM     Your access code will  in 90 days. If you need help or a new code, please call your [place] clinic or [phone].        Care EveryWhere ID     This is your Care EveryWhere ID. This could be used by other organizations to access your [facility] medical records  [id number]         Blood Pressure from Last 3 Encounters:   18 136/76   18 122/75   18 140/79    Weight from Last 3 Encounters:   18 162 lb (73.5 kg)   18 165 lb (74.8 kg)   18 166 lb 6.4 oz (75.5 kg)              Today, you had the following     No orders found for display         Today's Medication Changes          These changes are accurate as of 18 11:59 PM.  If you have any questions, ask your nurse or doctor.               These medicines have changed or have updated prescriptions.        Dose/Directions    amLODIPine 2.5 MG tablet   Commonly known as:  NORVASC   This may have changed:  when to take this   Used for:  Renal hypertension        Dose:  2.5 mg   Take 1 tablet (2.5 mg) by mouth daily   Quantity:  90 tablet   Refills:  3       finasteride 5 MG tablet   Commonly known as:  PROSCAR   This may have changed:  when to take this   Used for:  Benign prostatic hyperplasia with urinary retention        Dose:  5 mg   Take 1 tablet (5 mg) " by mouth daily   Quantity:  90 tablet   Refills:  3                Primary Care Provider Office Phone # Fax #    Haley Baker [phone] [address] [facility] [address]        Equal Access to Services     BRIAN CERRATO : Hadii cristine marin azamo Soomaali, waaxda luqadaha, qaybta kaalmada adesusieda, gibson biancain hayaahillary navarrokimberly fulton peter solis. So [facility] [phone].    ATENCIÓN: Si habla español, tiene a montague disposición servicios gratuitos de asistencia lingüística. Llame al [phone].    We comply with applicable federal civil rights laws and [place] laws. We do not discriminate on the basis of race, color, national origin, age, disability, sex, sexual orientation, or gender identity.            Thank you!     Thank you for choosing [facility]  for your care. Our goal is always to provide you with excellent care. Hearing back from our patients is one way we can continue to improve our services. Please take a few minutes to complete the written survey that you may receive in the mail after your visit with us. Thank you!             Your Updated Medication List - Protect others around you: Learn how to safely use, store and throw away your medicines at www.disposemymeds.org.          This list is accurate as of 1/30/18 11:59 PM.  Always use your most recent med list.                   Brand Name Dispense Instructions for use Diagnosis    amLODIPine 2.5 MG tablet    NORVASC    90 tablet    Take 1 tablet (2.5 mg) by mouth daily    Renal hypertension       aspirin 81 MG tablet      Take 1 tablet by mouth every morning        calcium acetate 667 MG Caps capsule    PHOSLO    540 capsule    Take 2 capsules (1,334 mg) by mouth 3 times daily (with meals)    Chronic kidney disease, unspecified CKD stage       ciprofloxacin 500 MG tablet    CIPRO    10 tablet    Take 1 tablet (500 mg) by mouth 2 times daily for 5 days        darbepoetin william 100 MCG/0.5ML injection    ARANESP (ALBUMIN  FREE)    0.5 mL    Inject 0.5 mLs (100 mcg) Subcutaneous every 14 days As needed for hgb<10g/dL.  If Hgb increases >1 point in 2 weeks (if blood transfusion given, use hgb PRIOR to this), SYSTOLIC BP > 180 mmHg or hgb>=10g/dL, HOLD DOSE. Dose must be within 1 week of Hgb.  Per anemia protocol with Cecilia Britt,CNP    Anemia of chronic renal failure, stage 5 (H), CKD (chronic kidney disease) stage 5, GFR less than 15 ml/min (H)       finasteride 5 MG tablet    PROSCAR    90 tablet    Take 1 tablet (5 mg) by mouth daily    Benign prostatic hyperplasia with urinary retention       fish oil-omega-3 fatty acids 1000 MG capsule      Take 1 g by mouth every morning        MULTIVITAMIN MEN PO      Take 1 tablet by mouth every morning        nitroFURantoin (macrocrystal-monohydrate) 100 MG capsule    MACROBID    20 capsule    Take 1 capsule (100 mg) by mouth 2 times daily for 10 days    Dysuria       order for DME     1 each    Equipment being ordered: olecranon bursa brace    Olecranon bursitis of left elbow

## 2018-01-30 NOTE — TELEPHONE ENCOUNTER
DATE:  1/30/2018   TIME OF RECEIPT FROM LAB:  2:51pm  LAB TEST:  Creatinine  LAB VALUE:  5.37  RESULTS GIVEN WITH READ-BACK TO (PROVIDER):  NEETA JOSEPH  TIME LAB VALUE REPORTED TO PROVIDER:   2:52pm

## 2018-02-01 ENCOUNTER — ALLIED HEALTH/NURSE VISIT (OUTPATIENT)
Dept: NURSING | Facility: CLINIC | Age: 73
End: 2018-02-01
Payer: COMMERCIAL

## 2018-02-01 DIAGNOSIS — N18.5 ANEMIA OF CHRONIC RENAL FAILURE, STAGE 5 (H): Primary | ICD-10-CM

## 2018-02-01 DIAGNOSIS — D63.1 ANEMIA OF CHRONIC RENAL FAILURE, STAGE 5 (H): Primary | ICD-10-CM

## 2018-02-01 PROCEDURE — 99207 ZZC NO CHARGE LOS: CPT

## 2018-02-01 PROCEDURE — 96372 THER/PROPH/DIAG INJ SC/IM: CPT

## 2018-02-01 NOTE — NURSING NOTE
Prior to injection verified patient identity using patient's name and date of birth.  The following medication was given:     MEDICATION: darbepoetin william (ARANESP, ALBUMIN FREE,) 100 MCG/0.5ML injection  ROUTE: SQ  SITE: Arm - Right  DOSE: 100mcg/0.5 mL  LOT #: [id number]  :  [employer]  EXPIRATION DATE:  11/2018  NDC#: 77487-272-92  Due to injection administration, patient instructed to remain in clinic for 15 minutes  afterwards, and to report any adverse reaction to me immediately.  Zainab SERRATO CMA ([facility])

## 2018-02-01 NOTE — MR AVS SNAPSHOT
After Visit Summary   2/1/2018    Dung Norton    MRN: [id number]           Patient Information     Date Of Birth          1945        Visit Information        Provider Department      2/1/2018 10:00 AM ZACK ANCILLARY [facility]        Today's Diagnoses     Anemia of chronic renal failure, stage 5 (H)    -  1       Follow-ups after your visit        Your next 10 appointments already scheduled     Feb 05, 2018 10:15 AM CST   LAB with FZ LAB   [facility] ([place] [facility])    [address] [place] [id number] [phone]           Please do not eat 10-12 hours before your appointment if you are coming in fasting for labs on lipids, cholesterol, or glucose (sugar). This does not apply to pregnant women. Water, hot tea and black coffee (with nothing added) are okay. Do not drink other fluids, diet soda or chew gum.            Feb 19, 2018   Procedure with Tamiko Vanegas MD   [facility], [place], Same Day Surgery (--)    [address] [facility] [id number] [phone]            Mar 02, 2018 11:00 AM CST   (Arrive by 10:45 AM)   Post-Op with Tamiko Vanegas MD   [facility] and [facility] ([facility])    [address] [place] [address]  4th Floor  [facility] [id number] [phone]            Mar 02, 2018  1:00 PM CST   (Arrive by 12:30 PM)   Return Visit with Oralia De La Torre NP   [facility] Neph[facility] ([facility])    [address] [place] [address]  [address]  [facility] [id number] [phone]              Who to contact     If you have questions or need follow up information about today's clinic visit or your schedule please contact [facility] directly at [phone].  Normal or non-critical lab and imaging results will be communicated to you by MyChart, letter or phone within 4 business days after the clinic has received the results. If you  "do not hear from us within 7 days, please contact the clinic through "[employer] or phone. If you have a critical or abnormal lab result, we will notify you by phone as soon as possible.  Submit refill requests through "[employer] or call your pharmacy and they will forward the refill request to us. Please allow 3 business days for your refill to be completed.          Additional Information About Your Visit        [employer] Information     "[employer] lets you send messages to your doctor, view your test results, renew your prescriptions, schedule appointments and more. To sign up, go to [email or web address] . Click on \"Log in\" on the left side of the screen, which will take you to the Welcome page. Then click on \"Sign up Now\" on the right side of the page.     You will be asked to enter the access code listed below, as well as some personal information. Please follow the directions to create your username and password.     Your access code is: [email or web address]  Expires: 4/15/2018 11:20 AM     Your access code will  in 90 days. If you need help or a new code, please call your [place] clinic or [phone].        Care EveryWhere ID     This is your Care EveryWhere ID. This could be used by other organizations to access your [place] medical records  [id number]         Blood Pressure from Last 3 Encounters:   18 136/76   18 122/75   18 140/79    Weight from Last 3 Encounters:   18 162 lb (73.5 kg)   18 165 lb (74.8 kg)   18 166 lb 6.4 oz (75.5 kg)              We Performed the Following     ADMIN 1st VACCINE     DARBEPOETIN RONNIE 1 MCG (NON-ESRD USE)          Today's Medication Changes          These changes are accurate as of 18 10:49 AM.  If you have any questions, ask your nurse or doctor.               These medicines have changed or have updated prescriptions.        Dose/Directions    amLODIPine 2.5 MG tablet   Commonly known as:  NORVASC   This may have changed:  when to take " this   Used for:  Renal hypertension        Dose:  2.5 mg   Take 1 tablet (2.5 mg) by mouth daily   Quantity:  90 tablet   Refills:  3       finasteride 5 MG tablet   Commonly known as:  PROSCAR   This may have changed:  when to take this   Used for:  Benign prostatic hyperplasia with urinary retention        Dose:  5 mg   Take 1 tablet (5 mg) by mouth daily   Quantity:  90 tablet   Refills:  3                Primary Care Provider Office Phone # Fax #    Haley Baker [phone]       [address] [place] [address]        Equal Access to Services     [facility]: Hadii cristine marin hadasho Soomaali, waaxda luqadaha, qaybta kaalmada adeegyarahul, gibson green . So [facility] [phone].    ATENCIÓN: Si habla español, tiene a montague disposición servicios gratuitos de asistencia lingüística. [facility] [phone].    We comply with applicable federal civil rights laws and [place] laws. We do not discriminate on the basis of race, color, national origin, age, disability, sex, sexual orientation, or gender identity.            Thank you!     Thank you for choosing [facility]  for your care. Our goal is always to provide you with excellent care. Hearing back from our patients is one way we can continue to improve our services. Please take a few minutes to complete the written survey that you may receive in the mail after your visit with us. Thank you!             Your Updated Medication List - Protect others around you: Learn how to safely use, store and throw away your medicines at www.disposemymeds.org.          This list is accurate as of 2/1/18 10:49 AM.  Always use your most recent med list.                   Brand Name Dispense Instructions for use Diagnosis    amLODIPine 2.5 MG tablet    NORVASC    90 tablet    Take 1 tablet (2.5 mg) by mouth daily    Renal hypertension       aspirin 81 MG tablet      Take 1 tablet by mouth every morning        calcium acetate  667 MG Caps capsule    PHOSLO    540 capsule    Take 2 capsules (1,334 mg) by mouth 3 times daily (with meals)    Chronic kidney disease, unspecified CKD stage       darbepoetin william 100 MCG/0.5ML injection    ARANESP (ALBUMIN FREE)    0.5 mL    Inject 0.5 mLs (100 mcg) Subcutaneous every 14 days As needed for hgb<10g/dL.  If Hgb increases >1 point in 2 weeks (if blood transfusion given, use hgb PRIOR to this), SYSTOLIC BP > 180 mmHg or hgb>=10g/dL, HOLD DOSE. Dose must be within 1 week of Hgb.  Per anemia protocol with Cecilia De La Torre CNP    Anemia of chronic renal failure, stage 5 (H), CKD (chronic kidney disease) stage 5, GFR less than 15 ml/min (H)       finasteride 5 MG tablet    PROSCAR    90 tablet    Take 1 tablet (5 mg) by mouth daily    Benign prostatic hyperplasia with urinary retention       fish oil-omega-3 fatty acids 1000 MG capsule      Take 1 g by mouth every morning        MULTIVITAMIN MEN PO      Take 1 tablet by mouth every morning        nitroFURantoin (macrocrystal-monohydrate) 100 MG capsule    MACROBID    20 capsule    Take 1 capsule (100 mg) by mouth 2 times daily for 10 days    Dysuria       order for DME     1 each    Equipment being ordered: olecranon bursa brace    Olecranon bursitis of left elbow

## 2018-02-02 ENCOUNTER — TELEPHONE (OUTPATIENT)
Dept: PHARMACY | Facility: CLINIC | Age: 73
End: 2018-02-02

## 2018-02-02 NOTE — TELEPHONE ENCOUNTER
Anemia Management Note  SUBJECTIVE/OBJECTIVE:  Referred by Cecilia De La Torre on 9/11/2017  Primary Diagnosis: Anemia in Chronic Kidney Disease (N18.5, D63.1)     Secondary Diagnosis:  Chronic Kidney Disease, Stage 5 (N18.5)  Hgb goal range:  9-10  Epo/Darbo: Aranesp 100 mcg every 14 days As needed for hgb<10g/dL  RX expires on 12/5/2018  Iron regimen:  None  Labs exp: 9/12/2018  **Provide phone number for [facility] [phone] and instruction to schedule ancillary visit for aranesp injection. Send message to care team via pool - FZ RN TRIAGE POOL as a telephone encounter**      Contact: **AUTH TO DISCUSS**Tereza Norton(daughter) [phone], Rabia Purcell (daughter)[phone]                                        Ok to leave message regarding labs and appts per consent to communicate dated 12/12/17                              OK to speak with daughters Tereza and Rabia regarding Dung's anemia care plan per consent to communicate dated 12/12/17    Anemia Latest Ref Rng & Units 1/3/2018 1/8/2018 1/15/2018 1/22/2018 1/25/2018 1/30/2018 2/1/2018   EARLENE Dose - - - - - - - 100 mcg   Hemoglobin 13.3 - 17.7 g/dL 10.2(L) 10.2(L) 10.2(L) 10.8(L) 9.8(L) 10.3(L) -   TSAT 15 - 46 % - - - - - 20 -   Ferritin 26 - 388 ng/mL - - - - - 584(H) -     BP Readings from Last 3 Encounters:   01/30/18 136/76   01/25/18 122/75   01/22/18 140/79     Wt Readings from Last 2 Encounters:   01/25/18 162 lb (73.5 kg)   01/18/18 165 lb (74.8 kg)           ASSESSMENT:  Hgb: above goal - received dose in clinic - recommend continue current regimen  TSat: not at goal (>30%) but ferritin >500ng/mL.  IV iron not indicated at this time per anemia protocol. Ferritin: At goal (>100ng/mL)    PLAN:  Dosed with aranesp and RTC for hgb then aranesp if needed in 2 week(s)    Orders needed to be renewed (for next follow-up date) in EPIC: None    Iron labs due:  2/27/18    Plan discussed with:  Herb  Plan provided by:  Judith  Reviewed 02/05/2018 VERONICA  NEXT  FOLLOW-UP DATE:  2/15/18    Anemia Management Service  Daina Mock PharmD and Malina Hussein CPhT  Phone: [phone]  Fax: [phone]

## 2018-02-05 ENCOUNTER — ALLIED HEALTH/NURSE VISIT (OUTPATIENT)
Dept: FAMILY MEDICINE | Facility: CLINIC | Age: 73
End: 2018-02-05
Payer: COMMERCIAL

## 2018-02-05 ENCOUNTER — TELEPHONE (OUTPATIENT)
Dept: NEPHROLOGY | Facility: CLINIC | Age: 73
End: 2018-02-05

## 2018-02-05 VITALS — DIASTOLIC BLOOD PRESSURE: 78 MMHG | SYSTOLIC BLOOD PRESSURE: 138 MMHG

## 2018-02-05 DIAGNOSIS — R79.89 ELEVATED SERUM CREATININE: ICD-10-CM

## 2018-02-05 DIAGNOSIS — I10 HYPERTENSION GOAL BP (BLOOD PRESSURE) < 140/90: Primary | ICD-10-CM

## 2018-02-05 DIAGNOSIS — N18.5 ANEMIA OF CHRONIC RENAL FAILURE, STAGE 5 (H): ICD-10-CM

## 2018-02-05 DIAGNOSIS — D63.1 ANEMIA OF CHRONIC RENAL FAILURE, STAGE 5 (H): ICD-10-CM

## 2018-02-05 DIAGNOSIS — N18.5 CKD (CHRONIC KIDNEY DISEASE) STAGE 5, GFR LESS THAN 15 ML/MIN (H): ICD-10-CM

## 2018-02-05 LAB
ALBUMIN SERPL-MCNC: 3.2 G/DL (ref 3.4–5)
ANION GAP SERPL CALCULATED.3IONS-SCNC: 9 MMOL/L (ref 3–14)
BUN SERPL-MCNC: 100 MG/DL (ref 7–30)
CALCIUM SERPL-MCNC: 8.9 MG/DL (ref 8.5–10.1)
CHLORIDE SERPL-SCNC: 111 MMOL/L (ref 94–109)
CO2 SERPL-SCNC: 23 MMOL/L (ref 20–32)
CREAT SERPL-MCNC: 5.01 MG/DL (ref 0.66–1.25)
GFR SERPL CREATININE-BSD FRML MDRD: 11 ML/MIN/1.7M2
GLUCOSE SERPL-MCNC: 87 MG/DL (ref 70–99)
HCT VFR BLD AUTO: 34 % (ref 40–53)
HGB BLD-MCNC: 10.6 G/DL (ref 13.3–17.7)
PHOSPHATE SERPL-MCNC: 4.7 MG/DL (ref 2.5–4.5)
POTASSIUM SERPL-SCNC: 4.8 MMOL/L (ref 3.4–5.3)
SODIUM SERPL-SCNC: 143 MMOL/L (ref 133–144)

## 2018-02-05 PROCEDURE — 85018 HEMOGLOBIN: CPT

## 2018-02-05 PROCEDURE — 36415 COLL VENOUS BLD VENIPUNCTURE: CPT

## 2018-02-05 PROCEDURE — 80069 RENAL FUNCTION PANEL: CPT

## 2018-02-05 PROCEDURE — 85014 HEMATOCRIT: CPT

## 2018-02-05 PROCEDURE — 99207 ZZC NO CHARGE NURSE ONLY: CPT | Performed by: FAMILY MEDICINE

## 2018-02-05 NOTE — TELEPHONE ENCOUNTER
DATE:  2/5/2018   TIME OF RECEIPT FROM LAB:  3:08pm  LAB TEST:  Creatinine  LAB VALUE:  5.01  RESULTS GIVEN WITH READ-BACK TO (PROVIDER):  NEETA JOSEPH  TIME LAB VALUE REPORTED TO PROVIDER:   3:09pm      Lab result is an improvement from past results. Will update provider.    Giselle Benoit RN

## 2018-02-05 NOTE — MR AVS SNAPSHOT
After Visit Summary   2/5/2018    Dung Notron    MRN: [id number]           Patient Information     Date Of Birth          1945        Visit Information        Provider Department      2/5/2018 11:51 AM Haley Baker MD [facility]        Today's Diagnoses     Hypertension goal BP (blood pressure) < 140/90    -  1       Follow-ups after your visit        Your next 10 appointments already scheduled     Feb 12, 2018 10:30 AM CST   LAB with [facility]   [facility] ([place] [facility])    [address] [place] [id number] [phone]           Please do not eat 10-12 hours before your appointment if you are coming in fasting for labs on lipids, cholesterol, or glucose (sugar). This does not apply to pregnant women. Water, hot tea and black coffee (with nothing added) are okay. Do not drink other fluids, diet soda or chew gum.            Mar 02, 2018  1:00 PM CST   (Arrive by 12:30 PM)   Return Visit with Oralia De La Torre NP   [facility]ph[facility] ([facility])    [address]  [address]  [facility] [id number] [phone]            Mar 05, 2018   Procedure with Tamiko Vanegas MD   [facility], [place], Same Day Surgery (--)    [address] [phone]            Mar 14, 2018 11:00 AM CDT   (Arrive by 10:45 AM)   Post-Op with Tamiko Vanegas MD   [facility] and [facility] ([facility])    [address]  4th Floor  [facility] [id number] [phone]              Who to contact     If you have questions or need follow up information about today's clinic visit or your schedule please contact [facility] directly at [phone].  Normal or non-critical lab and imaging results will be communicated to you by MyChart, letter or phone within 4 business days after the clinic has received the results. If  "you do not hear from us within 7 days, please contact the clinic through [employer] or phone. If you have a critical or abnormal lab result, we will notify you by phone as soon as possible.  Submit refill requests through [employer] or call your pharmacy and they will forward the refill request to us. Please allow 3 business days for your refill to be completed.          Additional Information About Your Visit        [employer] Information     [employer] lets you send messages to your doctor, view your test results, renew your prescriptions, schedule appointments and more. To sign up, go to [email or web address] . Click on \"Log in\" on the left side of the screen, which will take you to the Welcome page. Then click on \"Sign up Now\" on the right side of the page.     You will be asked to enter the access code listed below, as well as some personal information. Please follow the directions to create your username and password.     Your access code is: [email or web address]  Expires: 4/15/2018 11:20 AM     Your access code will  in 90 days. If you need help or a new code, please call your [place] clinic or [phone].        Care EveryWhere ID     This is your Care EveryWhere ID. This could be used by other organizations to access your [place] medical records  [id number]         Blood Pressure from Last 3 Encounters:   18 138/78   18 136/76   18 122/75    Weight from Last 3 Encounters:   18 162 lb (73.5 kg)   18 165 lb (74.8 kg)   18 166 lb 6.4 oz (75.5 kg)              Today, you had the following     No orders found for display         Today's Medication Changes          These changes are accurate as of 18 11:59 PM.  If you have any questions, ask your nurse or doctor.               These medicines have changed or have updated prescriptions.        Dose/Directions    amLODIPine 2.5 MG tablet   Commonly known as:  NORVASC   This may have changed:  when to take this   Used for:  Renal " hypertension        Dose:  2.5 mg   Take 1 tablet (2.5 mg) by mouth daily   Quantity:  90 tablet   Refills:  3       finasteride 5 MG tablet   Commonly known as:  PROSCAR   This may have changed:  when to take this   Used for:  Benign prostatic hyperplasia with urinary retention        Dose:  5 mg   Take 1 tablet (5 mg) by mouth daily   Quantity:  90 tablet   Refills:  3                Primary Care Provider Office Phone # Fax #    Haley Baker [phone] [address] [address]        Equal Access to Services     [facility]: Hadii cristine ku hadasho Soomaali, waaxda luqadaha, qaybta kaalmada adeegyada, gibson green . So [facility] [phone].    ATENCIÓN: Si habla español, tiene a montague disposición servicios gratuitos de asistencia lingüística. [facility] [phone].    We comply with applicable federal civil rights laws and [place] laws. We do not discriminate on the basis of race, color, national origin, age, disability, sex, sexual orientation, or gender identity.            Thank you!     Thank you for choosing [facility]  for your care. Our goal is always to provide you with excellent care. Hearing back from our patients is one way we can continue to improve our services. Please take a few minutes to complete the written survey that you may receive in the mail after your visit with us. Thank you!             Your Updated Medication List - Protect others around you: Learn how to safely use, store and throw away your medicines at www.disposemymeds.org.          This list is accurate as of 2/5/18 11:59 PM.  Always use your most recent med list.                   Brand Name Dispense Instructions for use Diagnosis    amLODIPine 2.5 MG tablet    NORVASC    90 tablet    Take 1 tablet (2.5 mg) by mouth daily    Renal hypertension       aspirin 81 MG tablet      Take 1 tablet by mouth every morning        calcium acetate 667 MG Caps capsule     PHOSLO    540 capsule    Take 2 capsules (1,334 mg) by mouth 3 times daily (with meals)    Chronic kidney disease, unspecified CKD stage       darbepoetin william 100 MCG/0.5ML injection    ARANESP (ALBUMIN FREE)    0.5 mL    Inject 0.5 mLs (100 mcg) Subcutaneous every 14 days As needed for hgb<10g/dL.  If Hgb increases >1 point in 2 weeks (if blood transfusion given, use hgb PRIOR to this), SYSTOLIC BP > 180 mmHg or hgb>=10g/dL, HOLD DOSE. Dose must be within 1 week of Hgb.  Per anemia protocol with Cecilia De La Torre CNP    Anemia of chronic renal failure, stage 5 (H), CKD (chronic kidney disease) stage 5, GFR less than 15 ml/min (H)       finasteride 5 MG tablet    PROSCAR    90 tablet    Take 1 tablet (5 mg) by mouth daily    Benign prostatic hyperplasia with urinary retention       fish oil-omega-3 fatty acids 1000 MG capsule      Take 1 g by mouth every morning        MULTIVITAMIN MEN PO      Take 1 tablet by mouth every morning        nitroFURantoin (macrocrystal-monohydrate) 100 MG capsule    MACROBID    20 capsule    Take 1 capsule (100 mg) by mouth 2 times daily for 10 days    Dysuria       order for DME     1 each    Equipment being ordered: olecranon bursa brace    Olecranon bursitis of left elbow

## 2018-02-05 NOTE — PROGRESS NOTES
Dung Norton is enrolled/participating in the retail pharmacy Blood Pressure Goals Achievement Program (BPGAP).  Dung Norton was evaluated at [facility] on February 5, 2018 at which time his blood pressure was:    BP Readings from Last 3 Encounters:   02/05/18 138/78   01/30/18 136/76   01/25/18 122/75     Reviewed lifestyle modifications for blood pressure control and reduction: including making healthy food choices, managing weight, getting regular exercise, smoking cessation, reducing alcohol consumption, monitoring blood pressure regularly.     Dung Norton is not experiencing symptoms.    Follow-Up: BP is at goal of < 140/90mmHg (patient 18+ years of age with or without diabetes).  Recommended follow-up at pharmacy in 6 months.     Recommendation to Provider: None at this time.     Dung Norton was evaluated for enrollment into the PGEN study today.    Patient eligible for enrollment:  No  Patient interested in enrollment:  No    Completed by: Thank you,  Cheri Winslow, PharmD  [facility]  [phone]

## 2018-02-12 ENCOUNTER — TELEPHONE (OUTPATIENT)
Dept: NEPHROLOGY | Facility: CLINIC | Age: 73
End: 2018-02-12

## 2018-02-12 ENCOUNTER — ALLIED HEALTH/NURSE VISIT (OUTPATIENT)
Dept: FAMILY MEDICINE | Facility: CLINIC | Age: 73
End: 2018-02-12
Payer: COMMERCIAL

## 2018-02-12 VITALS — DIASTOLIC BLOOD PRESSURE: 82 MMHG | SYSTOLIC BLOOD PRESSURE: 134 MMHG

## 2018-02-12 DIAGNOSIS — R79.89 ELEVATED SERUM CREATININE: ICD-10-CM

## 2018-02-12 DIAGNOSIS — I10 HYPERTENSION GOAL BP (BLOOD PRESSURE) < 140/90: Primary | ICD-10-CM

## 2018-02-12 DIAGNOSIS — D63.1 ANEMIA OF CHRONIC RENAL FAILURE, STAGE 5 (H): ICD-10-CM

## 2018-02-12 DIAGNOSIS — N18.5 ANEMIA OF CHRONIC RENAL FAILURE, STAGE 5 (H): ICD-10-CM

## 2018-02-12 DIAGNOSIS — N18.5 CKD (CHRONIC KIDNEY DISEASE) STAGE 5, GFR LESS THAN 15 ML/MIN (H): ICD-10-CM

## 2018-02-12 LAB
ALBUMIN SERPL-MCNC: 3.3 G/DL (ref 3.4–5)
ANION GAP SERPL CALCULATED.3IONS-SCNC: 5 MMOL/L (ref 3–14)
BUN SERPL-MCNC: 108 MG/DL (ref 7–30)
CALCIUM SERPL-MCNC: 8.9 MG/DL (ref 8.5–10.1)
CHLORIDE SERPL-SCNC: 108 MMOL/L (ref 94–109)
CO2 SERPL-SCNC: 27 MMOL/L (ref 20–32)
CREAT SERPL-MCNC: 5.45 MG/DL (ref 0.66–1.25)
GFR SERPL CREATININE-BSD FRML MDRD: 10 ML/MIN/1.7M2
GLUCOSE SERPL-MCNC: 94 MG/DL (ref 70–99)
HCT VFR BLD AUTO: 34.8 % (ref 40–53)
HGB BLD-MCNC: 10.8 G/DL (ref 13.3–17.7)
PHOSPHATE SERPL-MCNC: 7.4 MG/DL (ref 2.5–4.5)
POTASSIUM SERPL-SCNC: 5.1 MMOL/L (ref 3.4–5.3)
SODIUM SERPL-SCNC: 140 MMOL/L (ref 133–144)

## 2018-02-12 PROCEDURE — 99207 ZZC NO CHARGE NURSE ONLY: CPT | Performed by: FAMILY MEDICINE

## 2018-02-12 PROCEDURE — 80069 RENAL FUNCTION PANEL: CPT

## 2018-02-12 PROCEDURE — 36415 COLL VENOUS BLD VENIPUNCTURE: CPT

## 2018-02-12 PROCEDURE — 85018 HEMOGLOBIN: CPT

## 2018-02-12 PROCEDURE — 85014 HEMATOCRIT: CPT

## 2018-02-12 NOTE — TELEPHONE ENCOUNTER
DATE:  2/12/2018   TIME OF RECEIPT FROM LAB:  3:13pm  LAB TEST:  Creatinine  LAB VALUE:  5.45  RESULTS GIVEN WITH READ-BACK TO (PROVIDER):  NEETA JOSEPH  TIME LAB VALUE REPORTED TO PROVIDER:   3:20pm      Giselle Benoit RN

## 2018-02-12 NOTE — MR AVS SNAPSHOT
After Visit Summary   2/12/2018    Dung Norton    MRN: [id number]           Patient Information     Date Of Birth          1945        Visit Information        Provider Department      2/12/2018 10:49 AM Haley Baker MD [facility]        Today's Diagnoses     Hypertension goal BP (blood pressure) < 140/90    -  1       Follow-ups after your visit        Your next 10 appointments already scheduled     Feb 19, 2018 10:30 AM CST   LAB with [facility]   [facility] ([place] [facility])    [address] [place] [id number] [phone]           Please do not eat 10-12 hours before your appointment if you are coming in fasting for labs on lipids, cholesterol, or glucose (sugar). This does not apply to pregnant women. Water, hot tea and black coffee (with nothing added) are okay. Do not drink other fluids, diet soda or chew gum.            Mar 02, 2018  1:00 PM CST   (Arrive by 12:30 PM)   Return Visit with Oralia De La Torre NP   [facility]phrology ([facility])    [address]  [address]  [facility] [phone] [phone]            Mar 05, 2018   Procedure with Tamiko Vanegas MD   [facility], [place], Same Day Surgery (--)    [address] [id number] [phone]            Mar 14, 2018 11:00 AM CDT   (Arrive by 10:45 AM)   Post-Op with Tamiko Vanegas MD   [facility] and [facility] Prostate [facility] Urologic C[facility] ([facility])    [address]  4th Floor  [facility] [phone]              Who to contact     If you have questions or need follow up information about today's clinic visit or your schedule please contact [place] [facility] directly at [phone].  Normal or non-critical lab and imaging results will be communicated to you by MyChart, letter or phone within 4 business days after the clinic has received the results.  "If you do not hear from us within 7 days, please contact the clinic through [employer] or phone. If you have a critical or abnormal lab result, we will notify you by phone as soon as possible.  Submit refill requests through [employer] or call your pharmacy and they will forward the refill request to us. Please allow 3 business days for your refill to be completed.          Additional Information About Your Visit        [employer] Information     [employer] lets you send messages to your doctor, view your test results, renew your prescriptions, schedule appointments and more. To sign up, go to [email or web address] . Click on \"Log in\" on the left side of the screen, which will take you to the Welcome page. Then click on \"Sign up Now\" on the right side of the page.     You will be asked to enter the access code listed below, as well as some personal information. Please follow the directions to create your username and password.     Your access code is: [email or web address]  Expires: 4/15/2018 11:20 AM     Your access code will  in 90 days. If you need help or a new code, please call your [place] clinic or [phone].        Care EveryWhere ID     This is your Care EveryWhere ID. This could be used by other organizations to access your [place] medical records  [id number]         Blood Pressure from Last 3 Encounters:   18 134/82   18 138/78   18 136/76    Weight from Last 3 Encounters:   18 162 lb (73.5 kg)   18 165 lb (74.8 kg)   18 166 lb 6.4 oz (75.5 kg)              Today, you had the following     No orders found for display         Today's Medication Changes          These changes are accurate as of 18 10:50 AM.  If you have any questions, ask your nurse or doctor.               These medicines have changed or have updated prescriptions.        Dose/Directions    amLODIPine 2.5 MG tablet   Commonly known as:  NORVASC   This may have changed:  when to take this   Used for:  " Renal hypertension        Dose:  2.5 mg   Take 1 tablet (2.5 mg) by mouth daily   Quantity:  90 tablet   Refills:  3       finasteride 5 MG tablet   Commonly known as:  PROSCAR   This may have changed:  when to take this   Used for:  Benign prostatic hyperplasia with urinary retention        Dose:  5 mg   Take 1 tablet (5 mg) by mouth daily   Quantity:  90 tablet   Refills:  3                Primary Care Provider Office Phone # Fax #    Haley Baker [phone] [address] [address]        Equal Access to Services     [facility]: Hadii aad ku hadasho Soomaali, waaxda luqadaha, qaybta kaalmada adeegyada, waxniall green . So [facility] [phone].    ATENCIÓN: Si habla español, tiene a montague disposición servicios gratuitos de asistencia lingüística. Ll[facility]uther[facility] [phone].    We comply with applicable federal civil rights laws and [place] laws. We do not discriminate on the basis of race, color, national origin, age, disability, sex, sexual orientation, or gender identity.            Thank you!     Thank you for choosing [facility]  for your care. Our goal is always to provide you with excellent care. Hearing back from our patients is one way we can continue to improve our services. Please take a few minutes to complete the written survey that you may receive in the mail after your visit with us. Thank you!             Your Updated Medication List - Protect others around you: Learn how to safely use, store and throw away your medicines at www.disposemymeds.org.          This list is accurate as of 2/12/18 10:50 AM.  Always use your most recent med list.                   Brand Name Dispense Instructions for use Diagnosis    amLODIPine 2.5 MG tablet    NORVASC    90 tablet    Take 1 tablet (2.5 mg) by mouth daily    Renal hypertension       aspirin 81 MG tablet      Take 1 tablet by mouth every morning        calcium acetate 667 MG Caps  capsule    PHOSLO    540 capsule    Take 2 capsules (1,334 mg) by mouth 3 times daily (with meals)    Chronic kidney disease, unspecified CKD stage       darbepoetin william 100 MCG/0.5ML injection    ARANESP (ALBUMIN FREE)    0.5 mL    Inject 0.5 mLs (100 mcg) Subcutaneous every 14 days As needed for hgb<10g/dL.  If Hgb increases >1 point in 2 weeks (if blood transfusion given, use hgb PRIOR to this), SYSTOLIC BP > 180 mmHg or hgb>=10g/dL, HOLD DOSE. Dose must be within 1 week of Hgb.  Per anemia protocol with Cecilia eD La Torre CNP    Anemia of chronic renal failure, stage 5 (H), CKD (chronic kidney disease) stage 5, GFR less than 15 ml/min (H)       finasteride 5 MG tablet    PROSCAR    90 tablet    Take 1 tablet (5 mg) by mouth daily    Benign prostatic hyperplasia with urinary retention       fish oil-omega-3 fatty acids 1000 MG capsule      Take 1 g by mouth every morning        MULTIVITAMIN MEN PO      Take 1 tablet by mouth every morning        order for DME     1 each    Equipment being ordered: olecranon bursa brace    Olecranon bursitis of left elbow

## 2018-02-12 NOTE — PROGRESS NOTES
Dung Norton is enrolled/participating in the retail pharmacy Blood Pressure Goals Achievement Program (BPGAP).  Dung Norton was evaluated at [facility] on February 12, 2018 at which time his blood pressure was:    BP Readings from Last 3 Encounters:   02/12/18 134/82   02/05/18 138/78   01/30/18 136/76     Reviewed lifestyle modifications for blood pressure control and reduction: including making healthy food choices, managing weight, getting regular exercise, smoking cessation, reducing alcohol consumption, monitoring blood pressure regularly.     Dung Norton is not experiencing symptoms.    Follow-Up: BP is at goal of < 140/90mmHg (patient 18+ years of age with or without diabetes).  Recommended follow-up at pharmacy in 6 months.     Recommendation to Provider: None at this time.     Dung Norton was evaluated for enrollment into the PGEN study today.    Patient eligible for enrollment:  No  Patient interested in enrollment:  No    Completed by: Thank you,  Cheri Winslow, PharmD  [facility]  [phone]

## 2018-02-15 ENCOUNTER — TELEPHONE (OUTPATIENT)
Dept: PHARMACY | Facility: CLINIC | Age: 73
End: 2018-02-15

## 2018-02-16 NOTE — TELEPHONE ENCOUNTER
Anemia Management Note  SUBJECTIVE/OBJECTIVE:  Referred by Cecilia Negrete on 9/11/2017  Primary Diagnosis: Anemia in Chronic Kidney Disease (N18.5, D63.1)     Secondary Diagnosis:  Chronic Kidney Disease, Stage 5 (N18.5)  Hgb goal range:  9-10  Epo/Darbo: Aranesp 100 mcg every 14 days As needed for hgb<10g/dL  RX expires on 12/5/2018  Iron regimen:  None  Labs exp: 9/12/2018  **Provide phone number for [facility] [phone] and instruction to schedule ancillary visit for aranesp injection. Send message to care team via pool - FZ RN TRIAGE POOL as a telephone encounter**      Contact: **AUTH TO DISCUSS**Tereza Norton(daughter) [phone], Rabia Kapooririneo (daughter)[phone]                                        Ok to leave message regarding labs and appts per consent to communicate dated 12/12/17                              OK to speak with daughters Tereza and Rabia regarding Dugn's anemia care plan per consent to communicate dated 12/12/17    Anemia Latest Ref Rng & Units 1/15/2018 1/22/2018 1/25/2018 1/30/2018 2/1/2018 2/5/2018 2/12/2018   EARLENE Dose - - - - - 100 mcg - -   Hemoglobin 13.3 - 17.7 g/dL 10.2(L) 10.8(L) 9.8(L) 10.3(L) - 10.6(L) 10.8(L)   TSAT 15 - 46 % - - - 20 - - -   Ferritin 26 - 388 ng/mL - - - 584(H) - - -     BP Readings from Last 3 Encounters:   02/12/18 134/82   02/05/18 138/78   01/30/18 136/76     Wt Readings from Last 2 Encounters:   01/25/18 162 lb (73.5 kg)   01/18/18 165 lb (74.8 kg)     Labs are drawn every Monday  Appt w/Cecilia Negrete on 3/2/18    ASSESSMENT:  Hgb:Above goal - recommend hold dose  TSat: not at goal (>30%) but ferritin >500ng/mL.  IV iron not indicated at this time per anemia protocol. Ferritin: At goal (>100ng/mL)    PLAN:  Hold Aranesp and RTC for hgb, ferritin and iron labs then aranesp if needed in 2 week(s)    Orders needed to be renewed (for next follow-up date) in EPIC: None    Iron labs due:  2/27/18    Plan discussed with:  Herb  Plan provided by:   Sjw  Reviewed 02/16/2018 VERONICA  NEXT FOLLOW-UP DATE:  2/26/18    Anemia Management Service  Daina Mock PharmD and Malina Hussein CPhT  Phone: [phone]  Fax: [phone]

## 2018-02-19 ENCOUNTER — TELEPHONE (OUTPATIENT)
Dept: NEPHROLOGY | Facility: CLINIC | Age: 73
End: 2018-02-19

## 2018-02-19 ENCOUNTER — ALLIED HEALTH/NURSE VISIT (OUTPATIENT)
Dept: FAMILY MEDICINE | Facility: CLINIC | Age: 73
End: 2018-02-19

## 2018-02-19 VITALS — SYSTOLIC BLOOD PRESSURE: 144 MMHG | DIASTOLIC BLOOD PRESSURE: 82 MMHG

## 2018-02-19 DIAGNOSIS — I10 HYPERTENSION GOAL BP (BLOOD PRESSURE) < 140/90: Primary | ICD-10-CM

## 2018-02-19 DIAGNOSIS — R79.89 ELEVATED SERUM CREATININE: ICD-10-CM

## 2018-02-19 DIAGNOSIS — N18.5 ANEMIA OF CHRONIC RENAL FAILURE, STAGE 5 (H): ICD-10-CM

## 2018-02-19 DIAGNOSIS — D63.1 ANEMIA OF CHRONIC RENAL FAILURE, STAGE 5 (H): ICD-10-CM

## 2018-02-19 DIAGNOSIS — N18.5 CKD (CHRONIC KIDNEY DISEASE) STAGE 5, GFR LESS THAN 15 ML/MIN (H): ICD-10-CM

## 2018-02-19 LAB
ALBUMIN SERPL-MCNC: 3.3 G/DL (ref 3.4–5)
ANION GAP SERPL CALCULATED.3IONS-SCNC: 13 MMOL/L (ref 3–14)
BUN SERPL-MCNC: 113 MG/DL (ref 7–30)
CALCIUM SERPL-MCNC: 9 MG/DL (ref 8.5–10.1)
CHLORIDE SERPL-SCNC: 109 MMOL/L (ref 94–109)
CO2 SERPL-SCNC: 20 MMOL/L (ref 20–32)
CREAT SERPL-MCNC: 5.65 MG/DL (ref 0.66–1.25)
GFR SERPL CREATININE-BSD FRML MDRD: 10 ML/MIN/1.7M2
GLUCOSE SERPL-MCNC: 82 MG/DL (ref 70–99)
HCT VFR BLD AUTO: 34.3 % (ref 40–53)
HGB BLD-MCNC: 10.7 G/DL (ref 13.3–17.7)
PHOSPHATE SERPL-MCNC: 7.3 MG/DL (ref 2.5–4.5)
POTASSIUM SERPL-SCNC: 4.9 MMOL/L (ref 3.4–5.3)
SODIUM SERPL-SCNC: 142 MMOL/L (ref 133–144)

## 2018-02-19 PROCEDURE — 36415 COLL VENOUS BLD VENIPUNCTURE: CPT

## 2018-02-19 PROCEDURE — 80069 RENAL FUNCTION PANEL: CPT

## 2018-02-19 PROCEDURE — 99207 ZZC NO CHARGE NURSE ONLY: CPT | Performed by: FAMILY MEDICINE

## 2018-02-19 PROCEDURE — 85014 HEMATOCRIT: CPT

## 2018-02-19 PROCEDURE — 85018 HEMOGLOBIN: CPT

## 2018-02-19 NOTE — MR AVS SNAPSHOT
After Visit Summary   2/19/2018    Dung Norton    MRN: [id number]           Patient Information     Date Of Birth          1945        Visit Information        Provider Department      2/19/2018 11:11 AM Haley Baker MD [facility]        Today's Diagnoses     Hypertension goal BP (blood pressure) < 140/90    -  1       Follow-ups after your visit        Your next 10 appointments already scheduled     Feb 26, 2018 10:15 AM CST   LAB with [facility]   [facility] ([place] [facility])    [address] [place] [id number] [phone]           Please do not eat 10-12 hours before your appointment if you are coming in fasting for labs on lipids, cholesterol, or glucose (sugar). This does not apply to pregnant women. Water, hot tea and black coffee (with nothing added) are okay. Do not drink other fluids, diet soda or chew gum.            Mar 02, 2018  1:00 PM CST   (Arrive by 12:30 PM)   Return Visit with Oralia De La Torre NP   [facility] ([facility])    [address]  [address]  [facility] [id number] [phone]            Mar 05, 2018   Procedure with Tamiko Vanegas MD   [facility], [place], Same Day Surgery (--)    [address] [id number] [phone]            Mar 14, 2018 11:00 AM CDT   (Arrive by 10:45 AM)   Post-Op with Tamiko Vanegas MD   [facility] ([facility])    [address]  4th Floor  [facility] [id number] [phone]              Who to contact     If you have questions or need follow up information about today's clinic visit or your schedule please contact [facility] directly at [phone].  Normal or non-critical lab and imaging results will be communicated to you by MyChart, letter or phone within 4 business days after the clinic has received the results.  "If you do not hear from us within 7 days, please contact the clinic through [employer] or phone. If you have a critical or abnormal lab result, we will notify you by phone as soon as possible.  Submit refill requests through [employer] or call your pharmacy and they will forward the refill request to us. Please allow 3 business days for your refill to be completed.          Additional Information About Your Visit        [employer] Information     [employer]ivox lets you send messages to your doctor, view your test results, renew your prescriptions, schedule appointments and more. To sign up, go to [email or web address] . Click on \"Log in\" on the left side of the screen, which will take you to the Welcome page. Then click on \"Sign up Now\" on the right side of the page.     You will be asked to enter the access code listed below, as well as some personal information. Please follow the directions to create your username and password.     Your access code is: [email or web address]  Expires: 4/15/2018 11:20 AM     Your access code will  in 90 days. If you need help or a new code, please call your [facility] clinic or [phone].        Care EveryWhere ID     This is your Care EveryWhere ID. This could be used by other organizations to access your [facility] medical records  [id number]         Blood Pressure from Last 3 Encounters:   18 144/82   18 134/82   18 138/78    Weight from Last 3 Encounters:   18 162 lb (73.5 kg)   18 165 lb (74.8 kg)   18 166 lb 6.4 oz (75.5 kg)              Today, you had the following     No orders found for display         Today's Medication Changes          These changes are accurate as of 18 11:13 AM.  If you have any questions, ask your nurse or doctor.               These medicines have changed or have updated prescriptions.        Dose/Directions    amLODIPine 2.5 MG tablet   Commonly known as:  NORVASC   This may have changed:  when to take this   Used for:  " Renal hypertension        Dose:  2.5 mg   Take 1 tablet (2.5 mg) by mouth daily   Quantity:  90 tablet   Refills:  3       finasteride 5 MG tablet   Commonly known as:  PROSCAR   This may have changed:  when to take this   Used for:  Benign prostatic hyperplasia with urinary retention        Dose:  5 mg   Take 1 tablet (5 mg) by mouth daily   Quantity:  90 tablet   Refills:  3                Primary Care Provider Office Phone # Fax #    Haley Baker [phone] [address] [facility] [address]        Equal Access to Services     [facility]: Hadii aad ku hadasho Soomaali, waaxda luqadaha, qaybta kaalmada adeegyada, waxniall green . So [facility] [phone].    ATENCIÓN: Si habla español, tiene a montague disposición servicios gratuitos de asistencia lingüística. [facility] [phone].    We comply with applicable federal civil rights laws and [place] laws. We do not discriminate on the basis of race, color, national origin, age, disability, sex, sexual orientation, or gender identity.            Thank you!     Thank you for choosing [facility]  for your care. Our goal is always to provide you with excellent care. Hearing back from our patients is one way we can continue to improve our services. Please take a few minutes to complete the written survey that you may receive in the mail after your visit with us. Thank you!             Your Updated Medication List - Protect others around you: Learn how to safely use, store and throw away your medicines at www.disposemymeds.org.          This list is accurate as of 2/19/18 11:13 AM.  Always use your most recent med list.                   Brand Name Dispense Instructions for use Diagnosis    amLODIPine 2.5 MG tablet    NORVASC    90 tablet    Take 1 tablet (2.5 mg) by mouth daily    Renal hypertension       aspirin 81 MG tablet      Take 1 tablet by mouth every morning        calcium acetate 667 MG Caps  capsule    PHOSLO    540 capsule    Take 2 capsules (1,334 mg) by mouth 3 times daily (with meals)    Chronic kidney disease, unspecified CKD stage       darbepoetin william 100 MCG/0.5ML injection    ARANESP (ALBUMIN FREE)    0.5 mL    Inject 0.5 mLs (100 mcg) Subcutaneous every 14 days As needed for hgb<10g/dL.  If Hgb increases >1 point in 2 weeks (if blood transfusion given, use hgb PRIOR to this), SYSTOLIC BP > 180 mmHg or hgb>=10g/dL, HOLD DOSE. Dose must be within 1 week of Hgb.  Per anemia protocol with Cecilia De La Torre CNP    Anemia of chronic renal failure, stage 5 (H), CKD (chronic kidney disease) stage 5, GFR less than 15 ml/min (H)       finasteride 5 MG tablet    PROSCAR    90 tablet    Take 1 tablet (5 mg) by mouth daily    Benign prostatic hyperplasia with urinary retention       fish oil-omega-3 fatty acids 1000 MG capsule      Take 1 g by mouth every morning        MULTIVITAMIN MEN PO      Take 1 tablet by mouth every morning        order for DME     1 each    Equipment being ordered: olecranon bursa brace    Olecranon bursitis of left elbow

## 2018-02-19 NOTE — TELEPHONE ENCOUNTER
DATE:  2/19/2018   TIME OF RECEIPT FROM LAB:  4:21pm  LAB TEST:  Creatinine  LAB VALUE:  5.65  RESULTS GIVEN WITH READ-BACK TO (PROVIDER):  NEETA JOSEPH  TIME LAB VALUE REPORTED TO PROVIDER:   4:21pm

## 2018-02-19 NOTE — PROGRESS NOTES
Dung Norton is enrolled/participating in the retail pharmacy Blood Pressure Goals Achievement Program (BPGAP).  Dung Norton was evaluated at [facility] on February 19, 2018 at which time his blood pressure was:    BP Readings from Last 3 Encounters:   02/19/18 144/82   02/12/18 134/82   02/05/18 138/78     Reviewed lifestyle modifications for blood pressure control and reduction: including making healthy food choices, managing weight, getting regular exercise, smoking cessation, reducing alcohol consumption, monitoring blood pressure regularly.     Dung Norton is not experiencing symptoms.    Follow-Up: BP is not at goal of < 140/90mmHg (patient 18+ years of age with or without diabetes), Recommended follow-up in 1 month at the pharmacy. Routing to PCP as an FYI.    Recommendation to Provider: Come in a week to get BP checked again. It is starting to increase a little bit.     Dung Norton was evaluated for enrollment into the PGEN study today.    Patient eligible for enrollment:  No  Patient interested in enrollment:  No    Completed by: Thank you,  Cheri Winslow, PharmD  [facility]  [phone]

## 2018-02-21 DIAGNOSIS — R30.0 DYSURIA: Primary | ICD-10-CM

## 2018-02-26 ENCOUNTER — ALLIED HEALTH/NURSE VISIT (OUTPATIENT)
Dept: FAMILY MEDICINE | Facility: CLINIC | Age: 73
End: 2018-02-26
Payer: COMMERCIAL

## 2018-02-26 ENCOUNTER — TELEPHONE (OUTPATIENT)
Dept: NEPHROLOGY | Facility: CLINIC | Age: 73
End: 2018-02-26

## 2018-02-26 ENCOUNTER — TELEPHONE (OUTPATIENT)
Dept: PHARMACY | Facility: CLINIC | Age: 73
End: 2018-02-26

## 2018-02-26 VITALS — DIASTOLIC BLOOD PRESSURE: 81 MMHG | SYSTOLIC BLOOD PRESSURE: 141 MMHG

## 2018-02-26 DIAGNOSIS — D63.1 ANEMIA OF CHRONIC RENAL FAILURE, STAGE 5 (H): ICD-10-CM

## 2018-02-26 DIAGNOSIS — N18.5 ANEMIA OF CHRONIC RENAL FAILURE, STAGE 5 (H): ICD-10-CM

## 2018-02-26 DIAGNOSIS — R30.0 DYSURIA: ICD-10-CM

## 2018-02-26 DIAGNOSIS — R79.89 ELEVATED SERUM CREATININE: ICD-10-CM

## 2018-02-26 DIAGNOSIS — Z01.30 BP CHECK: Primary | ICD-10-CM

## 2018-02-26 DIAGNOSIS — N18.5 CKD (CHRONIC KIDNEY DISEASE) STAGE 5, GFR LESS THAN 15 ML/MIN (H): ICD-10-CM

## 2018-02-26 LAB
ALBUMIN SERPL-MCNC: 3.3 G/DL (ref 3.4–5)
ANION GAP SERPL CALCULATED.3IONS-SCNC: 12 MMOL/L (ref 3–14)
BUN SERPL-MCNC: 126 MG/DL (ref 7–30)
CALCIUM SERPL-MCNC: 8.8 MG/DL (ref 8.5–10.1)
CHLORIDE SERPL-SCNC: 110 MMOL/L (ref 94–109)
CO2 SERPL-SCNC: 22 MMOL/L (ref 20–32)
CREAT SERPL-MCNC: 5.38 MG/DL (ref 0.66–1.25)
CREAT UR-MCNC: 56 MG/DL
GFR SERPL CREATININE-BSD FRML MDRD: 10 ML/MIN/1.7M2
GLUCOSE SERPL-MCNC: 94 MG/DL (ref 70–99)
HCT VFR BLD AUTO: 34.4 % (ref 40–53)
HGB BLD-MCNC: 10.8 G/DL (ref 13.3–17.7)
PHOSPHATE SERPL-MCNC: 5 MG/DL (ref 2.5–4.5)
POTASSIUM SERPL-SCNC: 4.7 MMOL/L (ref 3.4–5.3)
PROT UR-MCNC: 0.42 G/L
PROT/CREAT 24H UR: 0.74 G/G CR (ref 0–0.2)
SODIUM SERPL-SCNC: 144 MMOL/L (ref 133–144)

## 2018-02-26 PROCEDURE — 85014 HEMATOCRIT: CPT

## 2018-02-26 PROCEDURE — 87086 URINE CULTURE/COLONY COUNT: CPT | Performed by: UROLOGY

## 2018-02-26 PROCEDURE — 84156 ASSAY OF PROTEIN URINE: CPT

## 2018-02-26 PROCEDURE — 80069 RENAL FUNCTION PANEL: CPT

## 2018-02-26 PROCEDURE — 87088 URINE BACTERIA CULTURE: CPT | Performed by: UROLOGY

## 2018-02-26 PROCEDURE — 87186 SC STD MICRODIL/AGAR DIL: CPT | Performed by: UROLOGY

## 2018-02-26 PROCEDURE — 36415 COLL VENOUS BLD VENIPUNCTURE: CPT

## 2018-02-26 PROCEDURE — 85018 HEMOGLOBIN: CPT

## 2018-02-26 PROCEDURE — 99207 ZZC NO CHARGE NURSE ONLY: CPT | Performed by: FAMILY MEDICINE

## 2018-02-26 NOTE — TELEPHONE ENCOUNTER
DATE:  2/26/2018   TIME OF RECEIPT FROM LAB:  4:43pm  LAB TEST:  Creatinine  LAB VALUE:  5.38  RESULTS GIVEN WITH READ-BACK TO (PROVIDER):  NEETA JOSEPH  TIME LAB VALUE REPORTED TO PROVIDER:   4:45pm      Giselle Benoit RN

## 2018-02-26 NOTE — PROGRESS NOTES
Dung Norton is enrolled/participating in the retail pharmacy Blood Pressure Goals Achievement Program (BPGAP).  Dung Norton was evaluated at [facility] on February 26, 2018 at which time his blood pressure was:    BP Readings from Last 3 Encounters:   02/26/18 141/81   02/19/18 144/82   02/12/18 134/82     Reviewed lifestyle modifications for blood pressure control and reduction: including making healthy food choices, managing weight, getting regular exercise, smoking cessation, reducing alcohol consumption, monitoring blood pressure regularly.     Dung Norton is not experiencing symptoms.    Follow-Up: BP is not at goal of < 140/90mmHg (patient 18+ years of age with or without diabetes), Recommended follow-up with PCP.  Routing to PCP for further review. (per epic BP goal)    Recommendation to Provider: none    Dung Norton was evaluated for enrollment into the PGEN study today.    Patient eligible for enrollment:  No  Patient interested in enrollment:  No    Completed by: Binta Martinez, PharmD  [facility] Pharmacist    [facility] pharmacist on behalf of: [facility].

## 2018-02-26 NOTE — MR AVS SNAPSHOT
After Visit Summary   2/26/2018    Dung Norton    MRN: [id number]           Patient Information     Date Of Birth          1945        Visit Information        Provider Department      2/26/2018 3:38 PM [facility]        Today's Diagnoses     BP check    -  1       Follow-ups after your visit        Your next 10 appointments already scheduled     Mar 05, 2018   Procedure with Tamiko Vanegas MD   [facility], [place], Same Day Surgery (--)    [address] [address]   [phone]            Mar 14, 2018 11:00 AM CDT   (Arrive by 10:45 AM)   Post-Op with Tamiko Vanegas MD   [facility] ([facility])    [address]  4th Floor  [facility] [id number] [phone]            Apr 06, 2018  1:00 PM CDT   (Arrive by 12:30 PM)   Return Visit with Oralia De La Torre NP   [facility] ([facility])    [address]  [address]  [facility] [id number] [phone]              Who to contact     If you have questions or need follow up information about today's clinic visit or your schedule please contact [facility] directly at [phone].  Normal or non-critical lab and imaging results will be communicated to you by getbetter!hart, letter or phone within 4 business days after the clinic has received the results. If you do not hear from us within 7 days, please contact the clinic through getbetter!hart or phone. If you have a critical or abnormal lab result, we will notify you by phone as soon as possible.  Submit refill requests through [employer] or call your pharmacy and they will forward the refill request to us. Please allow 3 business days for your refill to be completed.          Additional Information About Your Visit        get[employer]har[employer]real Information     [employer] lets you send messages to your doctor, view your test  "results, renew your prescriptions, schedule appointments and more. To sign up, go to [email or web address] . Click on \"Log in\" on the left side of the screen, which will take you to the Welcome page. Then click on \"Sign up Now\" on the right side of the page.     You will be asked to enter the access code listed below, as well as some personal information. Please follow the directions to create your username and password.     Your access code is: [email or web address]  Expires: 4/15/2018 11:20 AM     Your access code will  in 90 days. If you need help or a new code, please call your [place] clinic or [phone].        Care EveryWhere ID     This is your Care EveryWhere ID. This could be used by other organizations to access your [place] medical records  [id number]         Blood Pressure from Last 3 Encounters:   18 151/88   18 160/88   18 144/75    Weight from Last 3 Encounters:   18 169 lb 15.6 oz (77.1 kg)   18 173 lb 4.5 oz (78.6 kg)   18 172 lb 12.8 oz (78.4 kg)              Today, you had the following     No orders found for display         Today's Medication Changes          These changes are accurate as of 18 11:59 PM.  If you have any questions, ask your nurse or doctor.               These medicines have changed or have updated prescriptions.        Dose/Directions    amLODIPine 2.5 MG tablet   Commonly known as:  NORVASC   This may have changed:  when to take this   Used for:  Renal hypertension        Dose:  2.5 mg   Take 1 tablet (2.5 mg) by mouth daily   Quantity:  90 tablet   Refills:  3       finasteride 5 MG tablet   Commonly known as:  PROSCAR   This may have changed:  when to take this   Used for:  Benign prostatic hyperplasia with urinary retention        Dose:  5 mg   Take 1 tablet (5 mg) by mouth daily   Quantity:  90 tablet   Refills:  3                Primary Care Provider Office Phone # Fax #    Haley Baker [phone]       " [address] [facility] [address]        Equal Access to Services     BRIAN SAQIB : Hadii cristine ku hadkelleyo Sokatali, waaxda luqadaha, qaybta kaalmada ramonshant, waxniall rhoda brandihillary navarrokimberly thibodeauxkarsten solis. So [facility] [phone].    ATENCIÓN: Si habla español, tiene a montague disposición servicios gratuitos de asistencia lingüística. [facility] [phone].    We comply with applicable federal civil rights laws and [place] laws. We do not discriminate on the basis of race, color, national origin, age, disability, sex, sexual orientation, or gender identity.            Thank you!     Thank you for choosing [facility]  for your care. Our goal is always to provide you with excellent care. Hearing back from our patients is one way we can continue to improve our services. Please take a few minutes to complete the written survey that you may receive in the mail after your visit with us. Thank you!             Your Updated Medication List - Protect others around you: Learn how to safely use, store and throw away your medicines at www.disposemymeds.org.          This list is accurate as of 2/26/18 11:59 PM.  Always use your most recent med list.                   Brand Name Dispense Instructions for use Diagnosis    amLODIPine 2.5 MG tablet    NORVASC    90 tablet    Take 1 tablet (2.5 mg) by mouth daily    Renal hypertension       aspirin 81 MG tablet      Take 1 tablet by mouth every morning        calcium acetate 667 MG Caps capsule    PHOSLO    540 capsule    Take 2 capsules (1,334 mg) by mouth 3 times daily (with meals)    Chronic kidney disease, unspecified CKD stage       darbepoetin william 100 MCG/0.5ML injection    ARANESP (ALBUMIN FREE)    0.5 mL    Inject 0.5 mLs (100 mcg) Subcutaneous every 14 days As needed for hgb<10g/dL.  If Hgb increases >1 point in 2 weeks (if blood transfusion given, use hgb PRIOR to this), SYSTOLIC BP > 180 mmHg or hgb>=10g/dL, HOLD DOSE. Dose must be within 1 week of Hgb.  Per  anemia protocol with Cecilia De La Torre,CNP    Anemia of chronic renal failure, stage 5 (H), CKD (chronic kidney disease) stage 5, GFR less than 15 ml/min (H)       finasteride 5 MG tablet    PROSCAR    90 tablet    Take 1 tablet (5 mg) by mouth daily    Benign prostatic hyperplasia with urinary retention       order for DME     1 each    Equipment being ordered: olecranon bursa brace    Olecranon bursitis of left elbow

## 2018-02-27 DIAGNOSIS — N18.5 CKD (CHRONIC KIDNEY DISEASE) STAGE 5, GFR LESS THAN 15 ML/MIN (H): Primary | ICD-10-CM

## 2018-02-27 NOTE — TELEPHONE ENCOUNTER
Anemia Management Note  SUBJECTIVE/OBJECTIVE:  Referred by Cecilia Negrete on 9/11/2017  Primary Diagnosis: Anemia in Chronic Kidney Disease (N18.5, D63.1)     Secondary Diagnosis:  Chronic Kidney Disease, Stage 5 (N18.5)  Hgb goal range:  9-10  Epo/Darbo: Aranesp 100 mcg every 14 days As needed for hgb<10g/dL  RX expires on 12/5/2018  Iron regimen:  None  Labs exp: 9/12/2018  **Provide phone number for [facility] [phone] and instruction to schedule ancillary visit for aranesp injection. Send message to care team via pool - [facility] RN TRIAGE POOL as a telephone encounter**      Contact: **AUTH TO DISCUSS**Tereza Norton(daughter) [phone], Rabia Kapooririneo (daughter)[phone]                                        Ok to leave message regarding labs and appts per consent to communicate dated 12/12/17                              OK to speak with daughters Tereza and Rabia regarding Dung's anemia care plan per consent to communicate dated 12/12/17    Anemia Latest Ref Rng & Units 1/25/2018 1/30/2018 2/1/2018 2/5/2018 2/12/2018 2/19/2018 2/26/2018   EARLENE Dose - - - 100 mcg - - - -   Hemoglobin 13.3 - 17.7 g/dL 9.8(L) 10.3(L) - 10.6(L) 10.8(L) 10.7(L) 10.8(L)   TSAT 15 - 46 % - 20 - - - - -   Ferritin 26 - 388 ng/mL - 584(H) - - - - -     BP Readings from Last 3 Encounters:   02/26/18 141/81   02/19/18 144/82   02/12/18 134/82     Wt Readings from Last 2 Encounters:   01/25/18 162 lb (73.5 kg)   01/18/18 165 lb (74.8 kg)       Labs are drawn every Monday  Appt w/Cecilia Negrete on 3/2/18     ASSESSMENT:  Hgb:Above goal - recommend hold dose  TSat: not at goal (>30%) but ferritin >500ng/mL.  IV iron not indicated at this time per anemia protocol. Ferritin: At goal (>100ng/mL)     PLAN:  Hold Aranesp and RTC for hgb, ferritin and iron labs then aranesp if needed in 2 week(s)     Orders needed to be renewed (for next follow-up date) in EPIC: None    Plan discussed with:  No call made  Plan provided by:  Judith  Reviewed  02/28/2018 VERONICA  NEXT FOLLOW-UP DATE:  3/5/18    Anemia Management Service  Daina Mock PharmD and Malina Hussein CPhT  Phone: [phone]  Fax: [phone]

## 2018-03-01 ENCOUNTER — OFFICE VISIT (OUTPATIENT)
Dept: FAMILY MEDICINE | Facility: CLINIC | Age: 73
End: 2018-03-01
Payer: COMMERCIAL

## 2018-03-01 VITALS
HEART RATE: 97 BPM | WEIGHT: 172.8 LBS | DIASTOLIC BLOOD PRESSURE: 80 MMHG | SYSTOLIC BLOOD PRESSURE: 160 MMHG | OXYGEN SATURATION: 98 % | RESPIRATION RATE: 18 BRPM | TEMPERATURE: 97 F | BODY MASS INDEX: 24.79 KG/M2

## 2018-03-01 DIAGNOSIS — G47.33 OSA (OBSTRUCTIVE SLEEP APNEA): ICD-10-CM

## 2018-03-01 DIAGNOSIS — Z12.11 SCREEN FOR COLON CANCER: ICD-10-CM

## 2018-03-01 DIAGNOSIS — R30.0 DYSURIA: Primary | ICD-10-CM

## 2018-03-01 DIAGNOSIS — Z01.818 PREOP GENERAL PHYSICAL EXAM: Primary | ICD-10-CM

## 2018-03-01 PROBLEM — N39.0 URINARY TRACT INFECTION: Status: ACTIVE | Noted: 2018-03-01

## 2018-03-01 LAB
BACTERIA SPEC CULT: ABNORMAL
SPECIMEN SOURCE: ABNORMAL

## 2018-03-01 PROCEDURE — 99214 OFFICE O/P EST MOD 30 MIN: CPT | Performed by: INTERNAL MEDICINE

## 2018-03-01 ASSESSMENT — PAIN SCALES - GENERAL: PAINLEVEL: NO PAIN (0)

## 2018-03-01 NOTE — PATIENT INSTRUCTIONS
Before Your Surgery      Call your surgeon if there is any change in your health. This includes signs of a cold or flu (such as a sore throat, runny nose, cough, rash or fever).    Do not smoke, drink alcohol or take over the counter medicine (unless your surgeon or primary care doctor tells you to) for the 24 hours before and after surgery.    If you take prescribed drugs: Follow your doctor s orders about which medicines to take and which to stop until after surgery.    Eating and drinking prior to surgery: follow the instructions from your surgeon    Take a shower or bath the night before surgery. Use the soap your surgeon gave you to gently clean your skin. If you do not have soap from your surgeon, use your regular soap. Do not shave or scrub the surgery site.  Wear clean pajamas and have clean sheets on your bed.     [facility]    If you have any questions regarding to your visit please contact your care team:     Team Pink:   Clinic Hours Telephone Number   Internal Medicine:  Dr. Ivone Hoskins NP       7am-7pm  Monday - Thursday   7am-5pm  Fridays  [phone]  (Appointment scheduling available 24/7)    Questions about your visit?  Team Line  [phone]   Urgent Care - [address] [place] [address] - 11am-9pm Monday-Friday Saturday-Sunday- 9am-5pm   [place] - 5pm-9pm Monday-Friday Saturday-Sunday- 9am-5pm  [phone] - [place]   [phone] - [place]       What options do I have for visits at the clinic other than the traditional office visit?  To expand how we care for you, many of our providers are utilizing electronic visits (e-visits) and telephone visits, when medically appropriate, for interactions with their patients rather than a visit in the clinic.   We also offer nurse visits for many medical concerns. Just like any other service, we will bill your insurance company for this type of visit based on time spent on the phone  with your provider. Not all insurance companies cover these visits. Please check with your medical insurance if this type of visit is covered. You will be responsible for any charges that are not paid by your insurance.      E-visits via Vo[employer]hart:  generally incur a $35.00 fee.  Telephone visits:  Time spent on the phone: *charged based on time that is spent on the phone in increments of 10 minutes. Estimated cost:   5-10 mins $30.00   11-20 mins. $59.00   21-30 mins. $85.00   Use [employer] (secure email communication and access to your chart) to send your primary care provider a message or make an appointment. Ask someone on your Team how to sign up for [employer].    For a Price Quote for your services, please call our Consumer Price Line at [phone].    As always, Thank you for trusting us with your health care needs!    Discharged by Zainab SERRATO CMA ([facility])

## 2018-03-01 NOTE — NURSING NOTE
"Chief Complaint   Patient presents with     Pre-Op Exam     Other     Please add statin intentionally not prescribed on med list       Initial /80 (BP Location: Right arm, Cuff Size: Adult Regular)  Pulse 97  Temp 97  F (36.1  C) (Oral)  Resp 18  Wt 172 lb 12.8 oz (78.4 kg)  SpO2 98%  BMI 24.79 kg/m2 Estimated body mass index is 24.79 kg/(m^2) as calculated from the following:    Height as of 1/18/18: 5' 10\" (1.778 m).    Weight as of this encounter: 172 lb 12.8 oz (78.4 kg).  Medication Reconciliation: complete       Mirna Tinajero CMA    "

## 2018-03-01 NOTE — MR AVS SNAPSHOT
After Visit Summary   3/1/2018    Dung Norton    MRN: [id number]           Patient Information     Date Of Birth          1945        Visit Information        Provider Department      3/1/2018 3:30 PM Esteban Taylor MD [facility]        Today's Diagnoses     Preop general physical exam    -  1    Screen for colon cancer          Care Instructions      Before Your Surgery      Call your surgeon if there is any change in your health. This includes signs of a cold or flu (such as a sore throat, runny nose, cough, rash or fever).    Do not smoke, drink alcohol or take over the counter medicine (unless your surgeon or primary care doctor tells you to) for the 24 hours before and after surgery.    If you take prescribed drugs: Follow your doctor s orders about which medicines to take and which to stop until after surgery.    Eating and drinking prior to surgery: follow the instructions from your surgeon    Take a shower or bath the night before surgery. Use the soap your surgeon gave you to gently clean your skin. If you do not have soap from your surgeon, use your regular soap. Do not shave or scrub the surgery site.  Wear clean pajamas and have clean sheets on your bed.     [facility]    If you have any questions regarding to your visit please contact your care team:     Team Pink:   Clinic Hours Telephone Number   Internal Medicine:  Dr. Ivone Hoskins NP       7am-7pm  Monday - Thursday   7am-5pm  Fridays  [phone]  (Appointment scheduling available 24/7)    Questions about your visit?  Team Line  [phone]   Urgent Care - [place] - 11am-9pm Monday-Friday Saturday-Sunday- 9am-5pm   [place] - 5pm-9pm Monday-Friday Saturday-Sunday- 9am-5pm  [phone] - Amelie DENNY  [phone] - [place]       What options do I have for visits at the clinic other than the traditional office visit?  To  expand how we care for you, many of our providers are utilizing electronic visits (e-visits) and telephone visits, when medically appropriate, for interactions with their patients rather than a visit in the clinic.   We also offer nurse visits for many medical concerns. Just like any other service, we will bill your insurance company for this type of visit based on time spent on the phone with your provider. Not all insurance companies cover these visits. Please check with your medical insurance if this type of visit is covered. You will be responsible for any charges that are not paid by your insurance.      E-visits via Konozhart:  generally incur a $35.00 fee.  Telephone visits:  Time spent on the phone: *charged based on time that is spent on the phone in increments of 10 minutes. Estimated cost:   5-10 mins $30.00   11-20 mins. $59.00   21-30 mins. $85.00   Use [employer] (secure email communication and access to your chart) to send your primary care provider a message or make an appointment. Ask someone on your Team how to sign up for [employer].    For a Price Quote for your services, please call our Consumer Price Line at [phone].    As always, Thank you for trusting us with your health care needs!    Discharged by Zainab SERRATO CMA ([facility])            Follow-ups after your visit        Your next 10 appointments already scheduled     Mar 02, 2018  1:00 PM CST   (Arrive by 12:30 PM)   Return Visit with Oralia De La Torre NP   [facility] ([place])    [address]  [address]  [place] [address] [phone]            Mar 02, 2018  1:45 PM CST   LAB with  LAB    [facility] ([facility])    [address]  1st Floor  [place] [id number] [phone]           Please do not eat 10-12 hours before your appointment if you are coming in fasting for labs on lipids, cholesterol, or glucose (sugar). This does not apply to  "pregnant women. Water, hot tea and black coffee (with nothing added) are okay. Do not drink other fluids, diet soda or chew gum.            Mar 05, 2018   Procedure with Tamiko Vanegas MD   [facility] [place], Same Day Surgery (--)    [address]  [place] [address]   [phone]            Mar 14, 2018 11:00 AM CDT   (Arrive by 10:45 AM)   Post-Op with Tamiko Vanegas MD   [facility] ([facility])    [address]  4th Floor  [place] [address] [phone]              Future tests that were ordered for you today     Open Future Orders        Priority Expected Expires Ordered    IR PICC Placement > 5 Yrs of Age Routine  3/1/2019 3/1/2018            Who to contact     If you have questions or need follow up information about today's clinic visit or your schedule please contact [facility] directly at [phone].  Normal or non-critical lab and imaging results will be communicated to you by MedPlexushart, letter or phone within 4 business days after the clinic has received the results. If you do not hear from us within 7 days, please contact the clinic through MedPlexushart or phone. If you have a critical or abnormal lab result, we will notify you by phone as soon as possible.  Submit refill requests through Revl or call your pharmacy and they will forward the refill request to us. Please allow 3 business days for your refill to be completed.          Additional Information About Your Visit        Revl Information     Revl lets you send messages to your doctor, view your test results, renew your prescriptions, schedule appointments and more. To sign up, go to [email or web address] . Click on \"Log in\" on the left side of the screen, which will take you to the Welcome page. Then click on \"Sign up Now\" on the right side of the page.     You will be asked to enter the access code listed below, as " well as some personal information. Please follow the directions to create your username and password.     Your access code is: [email or web address]  Expires: 4/15/2018 11:20 AM     Your access code will  in 90 days. If you need help or a new code, please call your [facility] clinic or [phone].        Care EveryWhere ID     This is your Care EveryWhere ID. This could be used by other organizations to access your [facility] medical records  [id number]        Your Vitals Were     Pulse Temperature Respirations Pulse Oximetry BMI (Body Mass Index)       97 97  F (36.1  C) (Oral) 18 98% 24.79 kg/m2        Blood Pressure from Last 3 Encounters:   18 160/80   18 141/81   18 144/82    Weight from Last 3 Encounters:   18 172 lb 12.8 oz (78.4 kg)   18 162 lb (73.5 kg)   18 165 lb (74.8 kg)              Today, you had the following     No orders found for display         Today's Medication Changes          These changes are accurate as of 3/1/18  4:13 PM.  If you have any questions, ask your nurse or doctor.               These medicines have changed or have updated prescriptions.        Dose/Directions    amLODIPine 2.5 MG tablet   Commonly known as:  NORVASC   This may have changed:  when to take this   Used for:  Renal hypertension        Dose:  2.5 mg   Take 1 tablet (2.5 mg) by mouth daily   Quantity:  90 tablet   Refills:  3       finasteride 5 MG tablet   Commonly known as:  PROSCAR   This may have changed:  when to take this   Used for:  Benign prostatic hyperplasia with urinary retention        Dose:  5 mg   Take 1 tablet (5 mg) by mouth daily   Quantity:  90 tablet   Refills:  3                Primary Care Provider Office Phone # Fax #    Haley Baker [phone]       [address]  [place] [address]        Equal Access to Services     BRIAN CERRATO AH: Justin Sagastume, osvaldo calle, qaybgibson sorenson  latunde solis. So [facility] [phone].    ATENCIÓN: Si marlala mary, tiene a montague disposición servicios gratuitos de asistencia lingüística. Jose Manuel brink [phone].    We comply with applicable federal civil rights laws and [place] laws. We do not discriminate on the basis of race, color, national origin, age, disability, sex, sexual orientation, or gender identity.            Thank you!     Thank you for choosing [facility]  for your care. Our goal is always to provide you with excellent care. Hearing back from our patients is one way we can continue to improve our services. Please take a few minutes to complete the written survey that you may receive in the mail after your visit with us. Thank you!             Your Updated Medication List - Protect others around you: Learn how to safely use, store and throw away your medicines at www.disposemymeds.org.          This list is accurate as of 3/1/18  4:13 PM.  Always use your most recent med list.                   Brand Name Dispense Instructions for use Diagnosis    amLODIPine 2.5 MG tablet    NORVASC    90 tablet    Take 1 tablet (2.5 mg) by mouth daily    Renal hypertension       aspirin 81 MG tablet      Take 1 tablet by mouth every morning        calcium acetate 667 MG Caps capsule    PHOSLO    540 capsule    Take 2 capsules (1,334 mg) by mouth 3 times daily (with meals)    Chronic kidney disease, unspecified CKD stage       darbepoetin william 100 MCG/0.5ML injection    ARANESP (ALBUMIN FREE)    0.5 mL    Inject 0.5 mLs (100 mcg) Subcutaneous every 14 days As needed for hgb<10g/dL.  If Hgb increases >1 point in 2 weeks (if blood transfusion given, use hgb PRIOR to this), SYSTOLIC BP > 180 mmHg or hgb>=10g/dL, HOLD DOSE. Dose must be within 1 week of Hgb.  Per anemia protocol with Cecilia De La Torre CNP    Anemia of chronic renal failure, stage 5 (H), CKD (chronic kidney disease) stage 5, GFR less than 15 ml/min (H)       finasteride 5 MG tablet    PROSCAR    90  tablet    Take 1 tablet (5 mg) by mouth daily    Benign prostatic hyperplasia with urinary retention       order for DME     1 each    Equipment being ordered: olecranon bursa brace    Olecranon bursitis of left elbow

## 2018-03-01 NOTE — Clinical Note
We need to chat about this patient ; do you feel he's a candidate for the planned procedure ? Transurethral resection of the prostate and some kind of bladder procedure ?

## 2018-03-01 NOTE — PROGRESS NOTES
[facility]  [address] [place] [address]  [place] [address]  [phone]  Dept: [phone]    PRE-OP EVALUATION:  Today's date: 3/1/2018    Dung Norton (: 1945) presents for pre-operative evaluation assessment as requested by Dr. Vanegas.  He requires evaluation and anesthesia risk assessment prior to undergoing surgery/procedure for treatment of Prostate.    Proposed Surgery/ Procedure: Prostate Surgery/Urology   Date of Surgery/ Procedure: 3/5/18  Time of Surgery/ Procedure: 9:00 a.m.  Hospital/Surgical Facility: [facility]  Fax number for surgical facility:  Primary Physician: Haley Baker  Type of Anesthesia Anticipated: General    Patient has a Health Care Directive or Living Will:  NO    1. NO - Do you have a history of heart attack, stroke, stent, bypass or surgery on an artery in the head, neck, heart or legs?  2. NO - Do you ever have any pain or discomfort in your chest?  3. NO - Do you have a history of  Heart Failure?  4. NO - Are you troubled by shortness of breath when: walking on the level, up a slight hill or at night?  5. NO - Do you currently have a cold, bronchitis or other respiratory infection?  6. NO - Do you have a cough, shortness of breath or wheezing?  7. NO - Do you sometimes get pains in the calves of your legs when you walk?  8. NO - Do you or anyone in your family have previous history of blood clots?  9. NO - Do you or does anyone in your family have a serious bleeding problem such as prolonged bleeding following surgeries or cuts?  10. NO - Have you ever had problems with anemia or been told to take iron pills?  11. NO - Have you had any abnormal blood loss such as black, tarry or bloody stools, or abnormal vaginal bleeding?  12. YES - HAVE YOU EVER HAD A BLOOD TRANSFUSION? Currently has what is considered to be a renal disease mediated anemia and is seeing Nephrologist   13. NO - Have you or any of your relatives ever had problems with anesthesia?  14. NO - Do  you have sleep apnea, excessive snoring or daytime drowsiness?  15. NO - Do you have any prosthetic heart valves?  16. NO - Do you have prosthetic joints?  17. NO - Is there any chance that you may be pregnant?    HPI:     HPI related to upcoming procedure: He uses a catheter to urinate right now and is getting the cystoscopy and TURP so he can urinate on his own. He uses the catheter each am for about 1000 cubic centimeters , and roughly gets 500 cubic centimeters three other times during the day and evening before bed.    Kidney disease - He is not on dialysis. Gets Aranesp (darbepoetin william) once in a while but has not had it in a month. He works with a nephrology specialist and thinks it is Oralia De La Torre CNP. He gets his hemoglobin and blood pressure checked every week. On Monday, he recalls his blood pressure to be 140/78. He is seeing nephrology tomorrow.  When he catheterizes, he gets 900-1000cc overnight and 400-500cc each time he catheterizes during the day.        MEDICAL HISTORY:     Patient Active Problem List    Diagnosis Date Noted     Hypertension goal BP (blood pressure) < 140/90 12/02/2010     Priority: High     Olecranon bursitis of left elbow 01/19/2018     Priority: Medium     Thoracic aortic aneurysm without rupture (H) 01/04/2018     Priority: Medium     Nonrheumatic aortic valve stenosis 01/04/2018     Priority: Medium     Ex-smoker 12/12/2017     Priority: Medium     CKD (chronic kidney disease) stage 5, GFR less than 15 ml/min (H) 09/13/2017     Priority: Medium     Anemia of chronic renal failure, stage 5 (H) 09/12/2017     Priority: Medium     Orthostatic hypotension 09/12/2017     Priority: Medium     Acute renal failure, unspecified acute renal failure type (H) 09/12/2017     Priority: Medium     End stage renal disease (H) 09/12/2017     Priority: Medium     Acquired hypothyroidism 09/12/2017     Priority: Medium     Obstructive uropathy 09/12/2017     Priority: Medium     KRISTINA (acute  kidney injury) (H) 08/28/2017     Priority: Medium     Pulmonary nodules 07/28/2017     Priority: Medium     Symptomatic anemia 07/12/2017     Priority: Medium     Elevated fasting glucose 03/05/2013     Priority: Medium     Hypertriglyceridemia 03/05/2013     Priority: Medium     Advanced directives, counseling/discussion 06/10/2011     Priority: Medium     Advance Directive Problem List Overview:   Name Relationship Phone    Primary Health Care Agent            Alternative Health Care Agent          Discussed advance care planning with patient; information given to patient to review. 6/10/2011          CARDIOVASCULAR SCREENING; LDL GOAL LESS THAN 130 10/31/2010     Priority: Medium      Past Medical History:   Diagnosis Date     BPH (benign prostatic hyperplasia)      Chronic kidney disease      HTN      Pulmonary nodules      Tobacco abuse      Past Surgical History:   Procedure Laterality Date     APPENDECTOMY  AGE 6     CREATE FISTULA ARTERIOVENOUS UPPER EXTREMITY Left 11/17/2017    Procedure: CREATE FISTULA ARTERIOVENOUS UPPER EXTREMITY;  Left Cephalic Vein To Radial Artery Arteriovenous Fistula Creation, Anesthesia Block;  Surgeon: Eduardo Pabon MD;  Location: [facility] OR     CYSTOSCOPY, FULGURATE BLEEDERS, EVACUATE CLOT(S), COMBINED N/A 7/16/2017    Procedure: COMBINED CYSTOSCOPY, FULGURATE BLEEDERS, EVACUATE CLOT(S);  Cystoscopy clot evacuation, bladder biopsy and fulguration (per surgeons note) NERVE BLOCK PERIPHERAL;  Surgeon: Tamiko Vanegas MD;  Location: [facility] OR     SINUS SURGERY  AGE 6     TONSILLECTOMY      CHILDHOOD     Current Outpatient Prescriptions   Medication Sig Dispense Refill     order for DME Equipment being ordered: olecranon bursa brace 1 each 0     amLODIPine (NORVASC) 2.5 MG tablet Take 1 tablet (2.5 mg) by mouth daily (Patient taking differently: Take 2.5 mg by mouth every morning ) 90 tablet 3     darbepoetin william (ARANESP, ALBUMIN FREE,) 100 MCG/0.5ML injection Inject 0.5 mLs  (100 mcg) Subcutaneous every 14 days As needed for hgb<10g/dL.  If Hgb increases >1 point in 2 weeks (if blood transfusion given, use hgb PRIOR to this), SYSTOLIC BP > 180 mmHg or hgb>=10g/dL, HOLD DOSE. Dose must be within 1 week of Hgb.  Per anemia protocol with Cecilia Britt,CNP 0.5 mL 99     calcium acetate (PHOSLO) 667 MG CAPS capsule Take 2 capsules (1,334 mg) by mouth 3 times daily (with meals) 540 capsule 3     finasteride (PROSCAR) 5 MG tablet Take 1 tablet (5 mg) by mouth daily (Patient taking differently: Take 5 mg by mouth every morning ) 90 tablet 3     aspirin 81 MG tablet Take 1 tablet by mouth every morning        OTC products: None, except as noted above    No Known Allergies   Latex Allergy: NO    Social History   Substance Use Topics     Smoking status: Former Smoker     Packs/day: 1.00     Years: 53.00     Types: Cigarettes     Quit date: 6/12/2017     Smokeless tobacco: Never Used     Alcohol use No     History   Drug Use No       REVIEW OF SYSTEMS:   Constitutional, neuro, ENT, endocrine, pulmonary, cardiac, gastrointestinal, genitourinary, musculoskeletal, integument and psychiatric systems are negative, except as otherwise noted.     This document serves as a record of the services and decisions personally performed and made by Esteban Taylor MD. It was created on his/her behalf by Neisha Quiles trained medical scribe. The creation of this document is based the provider's statements to the medical scribes.    Tiffany Quiles 3:59 PM, March 1, 2018    EXAM:   /80 (BP Location: Right arm, Cuff Size: Adult Regular)  Pulse 97  Temp 97  F (36.1  C) (Oral)  Resp 18  Wt 78.4 kg (172 lb 12.8 oz)  SpO2 98%  BMI 24.79 kg/m2  GENERAL APPEARANCE: healthy, alert and no distress  RESP: lungs clear to auscultation - no rales, rhonchi or wheezes  CV: regular rate and rhythm, normal S1 S2, no S3 or S4 and grade 2 blowing systolic murmur, no click or rub   NEURO: Normal strength and tone, sensory exam  grossly normal, mentation intact and speech normal  PSYCH: mentation appears normal, affect normal/bright    DIAGNOSTICS:   No orders of the defined types were placed in this encounter.        Recent Labs   Lab Test  02/26/18   1011  02/19/18   1023   01/25/18   1245   12/11/17   1018   11/17/17   0618   08/31/17   1738   08/28/17   1909   02/24/16   1044   HGB  10.8*  10.7*   < >  9.8*   < >  8.5*   < >  7.8*   < >   --    < >  8.6*   < >  13.3   PLT   --    --    --   227   --   336   --   421   < >   --    < >  256   < >  239   INR   --    --    --    --    --    --    --   1.11   --    --    --   1.19*   --    --    NA  144  142   < >  141   < >  142   < >   --    < >   --    < >  135   < >  139   POTASSIUM  4.7  4.9   < >  5.3   < >  4.3   < >  4.4   < >   --    < >  4.7   < >  3.4   CR  5.38*  5.65*   < >  5.60*   < >  5.20*   < >  5.30*   < >   --    < >  7.80*   < >  2.56*   A1C   --    --    --    --    --    --    --    --    --   5.6   --    --    --   5.4    < > = values in this interval not displayed.        IMPRESSION:   Reason for surgery/procedure: symptomatic obstructive uropathy and prostate issues   Diagnosis/reason for consult: assess and minimize preoperative risk per consulting surgeon     The proposed surgical procedure is considered INTERMEDIATE risk.    REVISED CARDIAC RISK INDEX  The patient has the following serious cardiovascular risks for perioperative complications such as (MI, PE, VFib and 3  AV Block):  Serum Creatinine >2.0 mg/dl  INTERPRETATION: 1 risks: Class II (low risk - 0.9% complication rate)    The patient has the following additional risks for perioperative complications:  No identified additional risks        ICD-10-CM    1. Preop general physical exam Z01.818    2. Screen for colon cancer Z12.11        RECOMMENDATIONS:       --Patient is to take all scheduled medications on the day of surgery EXCEPT for modifications listed below.    APPROVAL GIVEN to proceed with proposed  procedure, without further diagnostic evaluation, dependent upon my discussion with his Nephrologist        The information in this document, created by a scribe for me, accurately reflects the services I personally performed and the decisions made by me. I have reviewed and approved this document for accuracy.     Signed Electronically by: Esteban Taylor MD    Copy of this evaluation report is provided to requesting physician.    [facility] Preop Guidelines

## 2018-03-02 ENCOUNTER — INFUSION THERAPY VISIT (OUTPATIENT)
Dept: INFUSION THERAPY | Facility: CLINIC | Age: 73
End: 2018-03-02
Attending: UROLOGY
Payer: MEDICARE

## 2018-03-02 ENCOUNTER — OFFICE VISIT (OUTPATIENT)
Dept: NEPHROLOGY | Facility: CLINIC | Age: 73
End: 2018-03-02
Payer: MEDICARE

## 2018-03-02 VITALS
HEIGHT: 70 IN | SYSTOLIC BLOOD PRESSURE: 160 MMHG | BODY MASS INDEX: 24.81 KG/M2 | WEIGHT: 173.28 LBS | HEART RATE: 80 BPM | OXYGEN SATURATION: 98 % | DIASTOLIC BLOOD PRESSURE: 88 MMHG

## 2018-03-02 VITALS
TEMPERATURE: 97.9 F | RESPIRATION RATE: 17 BRPM | OXYGEN SATURATION: 100 % | HEART RATE: 78 BPM | DIASTOLIC BLOOD PRESSURE: 75 MMHG | SYSTOLIC BLOOD PRESSURE: 144 MMHG

## 2018-03-02 DIAGNOSIS — N39.0 URINARY TRACT INFECTION: Primary | ICD-10-CM

## 2018-03-02 DIAGNOSIS — I10 BENIGN ESSENTIAL HYPERTENSION: ICD-10-CM

## 2018-03-02 DIAGNOSIS — N18.5 ANEMIA OF CHRONIC RENAL FAILURE, STAGE 5 (H): ICD-10-CM

## 2018-03-02 DIAGNOSIS — N18.5 CKD (CHRONIC KIDNEY DISEASE) STAGE 5, GFR LESS THAN 15 ML/MIN (H): ICD-10-CM

## 2018-03-02 DIAGNOSIS — D63.1 ANEMIA OF CHRONIC RENAL FAILURE, STAGE 5 (H): ICD-10-CM

## 2018-03-02 DIAGNOSIS — N18.5 CKD (CHRONIC KIDNEY DISEASE) STAGE 5, GFR LESS THAN 15 ML/MIN (H): Primary | ICD-10-CM

## 2018-03-02 LAB
ALBUMIN SERPL-MCNC: 3.4 G/DL (ref 3.4–5)
ANION GAP SERPL CALCULATED.3IONS-SCNC: 8 MMOL/L (ref 3–14)
BUN SERPL-MCNC: 96 MG/DL (ref 7–30)
CALCIUM SERPL-MCNC: 8.9 MG/DL (ref 8.5–10.1)
CHLORIDE SERPL-SCNC: 110 MMOL/L (ref 94–109)
CO2 SERPL-SCNC: 22 MMOL/L (ref 20–32)
CREAT SERPL-MCNC: 5.11 MG/DL (ref 0.66–1.25)
ERYTHROCYTE [DISTWIDTH] IN BLOOD BY AUTOMATED COUNT: 15.9 % (ref 10–15)
FERRITIN SERPL-MCNC: 427 NG/ML (ref 26–388)
GFR SERPL CREATININE-BSD FRML MDRD: 11 ML/MIN/1.7M2
GLUCOSE SERPL-MCNC: 100 MG/DL (ref 70–99)
HCT VFR BLD AUTO: 34.6 % (ref 40–53)
HGB BLD-MCNC: 10.7 G/DL (ref 13.3–17.7)
IRON SATN MFR SERPL: 23 % (ref 15–46)
IRON SERPL-MCNC: 57 UG/DL (ref 35–180)
MCH RBC QN AUTO: 27.6 PG (ref 26.5–33)
MCHC RBC AUTO-ENTMCNC: 30.9 G/DL (ref 31.5–36.5)
MCV RBC AUTO: 89 FL (ref 78–100)
PHOSPHATE SERPL-MCNC: 5.1 MG/DL (ref 2.5–4.5)
PLATELET # BLD AUTO: 254 10E9/L (ref 150–450)
POTASSIUM SERPL-SCNC: 4.7 MMOL/L (ref 3.4–5.3)
PTH-INTACT SERPL-MCNC: 86 PG/ML (ref 18–80)
RBC # BLD AUTO: 3.88 10E12/L (ref 4.4–5.9)
SODIUM SERPL-SCNC: 140 MMOL/L (ref 133–144)
TIBC SERPL-MCNC: 245 UG/DL (ref 240–430)
WBC # BLD AUTO: 8.5 10E9/L (ref 4–11)

## 2018-03-02 PROCEDURE — 96365 THER/PROPH/DIAG IV INF INIT: CPT

## 2018-03-02 PROCEDURE — 82728 ASSAY OF FERRITIN: CPT

## 2018-03-02 PROCEDURE — 82306 VITAMIN D 25 HYDROXY: CPT

## 2018-03-02 PROCEDURE — 80069 RENAL FUNCTION PANEL: CPT

## 2018-03-02 PROCEDURE — 83550 IRON BINDING TEST: CPT

## 2018-03-02 PROCEDURE — 25000128 H RX IP 250 OP 636: Mod: ZF | Performed by: UROLOGY

## 2018-03-02 PROCEDURE — G0463 HOSPITAL OUTPT CLINIC VISIT: HCPCS | Mod: ZF

## 2018-03-02 PROCEDURE — 83540 ASSAY OF IRON: CPT

## 2018-03-02 PROCEDURE — 83970 ASSAY OF PARATHORMONE: CPT

## 2018-03-02 PROCEDURE — 96366 THER/PROPH/DIAG IV INF ADDON: CPT

## 2018-03-02 PROCEDURE — 85027 COMPLETE CBC AUTOMATED: CPT

## 2018-03-02 RX ADMIN — VANCOMYCIN HYDROCHLORIDE 1250 MG: 1 INJECTION, POWDER, LYOPHILIZED, FOR SOLUTION INTRAVENOUS at 11:31

## 2018-03-02 ASSESSMENT — PAIN SCALES - GENERAL
PAINLEVEL: NO PAIN (0)
PAINLEVEL: NO PAIN (0)

## 2018-03-02 NOTE — PATIENT INSTRUCTIONS
Dear Dung Norton    Thank you for choosing [facility] ([facility]) for your infusion.  The following information is a summary of our appointment as well as important reminders.      Please refer to your hospital discharge instructions for details on home care services, future appointments, phone numbers, and diet/activity levels.    Additional information: Today you received Vancomycin infusion. You will follow up with your doctor after this treatment for further plans.    We look forward in seeing you on your next appointment here at [facility].  Please don t hesitate to call us at [phone] to reschedule any of your appointments or to speak with one of the [facility] registered nurses.  It was a pleasure taking care of you today.    Sincerely,    [facility]  [address] [place]  [address]  Phone:  [phone]    Patient Education    Vancomycin Hydrochloride Oral capsule    Vancomycin Hydrochloride Oral solution    Vancomycin Hydrochloride Solution for injection    Vancomycin Hydrochloride, Dextrose Solution for injection  Vancomycin Hydrochloride Solution for injection  What is this medicine?  VANCOMYCIN (van koe MYE sin) is a glycopeptide antibiotic. It is used to treat certain kinds of bacterial infections. It will not work for colds, flu, or other viral infections.  This medicine may be used for other purposes; ask your health care provider or pharmacist if you have questions.  What should I tell my health care provider before I take this medicine?  They need to know if you have any of these conditions:    dehydration    hearing loss    kidney disease    other chronic illness    an unusual or allergic reaction to vancomycin, other medicines, foods, dyes, or preservatives    pregnant or trying to get pregnant    breast-feeding  How should I use this medicine?  This medicine is  infused into a vein. It is usually given by a health care provider in a hospital or clinic.  If you receive this medicine at home, you will receive special instructions. Take your medicine at regular intervals. Do not take your medicine more often than directed. Take all of your medicine as directed even if you think you are better. Do not skip doses or stop your medicine early.  It is important that you put your used needles and syringes in a special sharps container. Do not put them in a trash can. If you do not have a sharps container, call your pharmacist or healthcare provider to get one.  Talk to your pediatrician regarding the use of this medicine in children. While this drug may be prescribed for even very young infants for selected conditions, precautions do apply.  Overdosage: If you think you have taken too much of this medicine contact a poison control center or emergency room at once.  NOTE: This medicine is only for you. Do not share this medicine with others.  What if I miss a dose?  If you miss a dose, take it as soon as you can. If it is almost time for your next dose, take only that dose. Do not take double or extra doses.  What may interact with this medicine?    amphotericin B    anesthetics    bacitracin    birth control pills    cisplatin    colistin    diuretics    other aminoglycoside antibiotics    polymyxin B  This list may not describe all possible interactions. Give your health care provider a list of all the medicines, herbs, non-prescription drugs, or dietary supplements you use. Also tell them if you smoke, drink alcohol, or use illegal drugs. Some items may interact with your medicine.  What should I watch for while using this medicine?  Tell your doctor or health care professional if your symptoms do not improve or if you get new symptoms. Your condition and lab work will be monitored while you are taking this medicine.  Do not treat diarrhea with over the counter products. Contact  your doctor if you have diarrhea that lasts more than 2 days or if it is severe and watery.  What side effects may I notice from receiving this medicine?  Side effects that you should report to your doctor or health care professional as soon as possible:    allergic reactions like skin rash, itching or hives, swelling of the face, lips, or tongue    breathing difficulty, wheezing    change in amount, color of urine    change in hearing    chest pain    dizziness    fever, chills    flushing of the face and neck (reddening)    low blood pressure    redness, blistering, peeling or loosening of the skin, including inside the mouth    unusual bleeding or bruising    unusually weak or tired  Side effects that usually do not require medical attention (report to your doctor or health care professional if they continue or are bothersome):    nausea, vomiting    pain, swelling where injected    stomach cramps  This list may not describe all possible side effects. Call your doctor for medical advice about side effects. You may report side effects to FDA at 1-760-FDA-2316.  Where should I keep my medicine?  Keep out of the reach of children.  You will be instructed on how to store this medicine, if needed. Throw away any unused medicine after the expiration date on the label.  NOTE:This sheet is a summary. It may not cover all possible information. If you have questions about this medicine, talk to your doctor, pharmacist, or health care provider. Copyright  2016 [employer] Standard

## 2018-03-02 NOTE — LETTER
3/2/2018      RE: Dung Norton  [address]  [place] [address]       Nephrology Clinic Visit 3/2/18    Assessment and Plan:       1. CKD5 - Patient has developed ESRD 2/2 long standing obstructive uropathy. His GFR has been < 15 ml/mn since July 2017. He is not uremic.       - Patient and daughter have decided upon HD as his RRT option.     - He has attended the [facility] Smart program    - He underwent AVF creation with Dr Eduardo Pabon on 11/22/17. It has matured nicely and ready to use when needed. Last seen by Dr Pabon on 1/2/18   - Given stability of renal function can hold off on initiating HD for now   - He does not use NSAIDs   - He is straight cathing QID w/o difficulty      2. Electrolytes - No acute concerns. K 4.7      3. Volume status - Mild hypervolemia with trace lower extremity edema. No dyspnea. Albumin 3.4. Not on diuretics. Making ~ 1 liter/urine/day. He is gaining body weight, up to 78.6 kg from previous weight of 74.3 kg .  Clinic blood pressures elevated in the 150/ range, but he notes that outside clinic blood pressures in the 130-140/ range.       - No need for diuretics at this time   - Recommend compression wraps if edema persists      4. HTN - Has been gradually increasing with increase in body weight. Currently on Amlodipine 2.5 mg daily. We have discussed him getting a home blood pressure monitor and he will try to do this. Blood pressure earlier at infusion clinic was 138/70.    - Will see how his blood pressure is while in hospital. If remains elevated will increase his Amlodipine        5. BPH - He is straight cathing QID w/o difficulty. Currently on Proscar. Flomax discontinued due to hypotension. Rescheduled for cysto/TURP 3/5/18.   - Urologist wants him to continue Proscar in order to keep prostate small and allow for more easy straight cathing   - Urology managing      6. Acid base - No acute concerns. Bicarb 22     7. BMD - Ca 8.9, Phos 5.1, albumin 3.4, Vit D 29, PTH  intact 86   - He is doing much better with taking the Phoslo correctly      8. Nutrition - Reports good appetite and albumin is stable in the 3.4 range. LFTs normal      9. Anemia - Hgb 10.7. Etiology is likely anemia of renal disease   - He should have routine colon cancer screening   - Iron studies 3/2/18: Fe 57, Ferritin 427, Tsat 23%   - Has been enrolled in anemia management program for EARLENE, but not currently requiring Aranesp      10. UTI- On Vanco. Afebrile, WBC 8.5. To be re dosed preop on 3/5      11. Disposition - RTC in 5 wks for f/u with ongoing weekly labs      Assessment and plan was discussed with patient and daughter, they both voice understanding and agreement.    Reason for Visit:  CKD5 follow up      HPI:  Dung Norton is a 71 year old year old male with a PMH of CKDIII , HTN, Obstructive uropathy 2/2 BPH/benign bladder mass with creat as high as 14.7 in July 2017. He did not required RRT at that time and renal function improved to a low of 4.6 in Sept . However despite correction of the obstructive uropathy his renal function did not normalize and he has developed End stage Kidney disease.       Since our last visit patient has had no hospital admissions, however presented to ED on 1/25 with persistent hematuria and found to have UTI. Presumptively treated with Cipro, but cx showing coag neg staph resistant to Cipro. His TURP was postponed and reschedule for 3/5. He was given a dose of Vanco this morning.       ROS:  Being treated for Coag neg staph UTI in preparation for upcoming prostate surgery on 3/5/18. Received initial dose of Vanco this morning. Patient had gone to ED on 1/25 for hematuria and found to have UTI. No further hematuria. No difficulty with straight caths. He is generally feeling well. Ripped up carpet at his house w/o problems. Denies CP/dyspnea/abdominal pain. He continues to straight cath 4 times daily. Energy is stable. He is living independently. Has good appetite. Only  "has trace  edema.        Chronic Medical Problems:      HTN  BPH  Obstructive uropathy   HTN  Tobacco abuse  HLD  Anemia  Pulmonary nodules  KRISTINA  CKD5     Personal Hx:   Single, has 2 daughters very involved in his care. Currently living in a house, but will be moving in the Spring into an apt. He is a former smoker    Allergies:  No Known Allergies    Medications:  Prior to Admission medications    Medication Sig Start Date End Date Taking? Authorizing Provider   order for DME Equipment being ordered: olecranon bursa brace 1/17/18  Yes Haley Baker MD   amLODIPine (NORVASC) 2.5 MG tablet Take 1 tablet (2.5 mg) by mouth daily  Patient taking differently: Take 2.5 mg by mouth every morning  12/26/17  Yes Haley Baker MD   darbepoetin william (ARANESP, ALBUMIN FREE,) 100 MCG/0.5ML injection Inject 0.5 mLs (100 mcg) Subcutaneous every 14 days As needed for hgb<10g/dL.  If Hgb increases >1 point in 2 weeks (if blood transfusion given, use hgb PRIOR to this), SYSTOLIC BP > 180 mmHg or hgb>=10g/dL, HOLD DOSE. Dose must be within 1 week of Hgb.  Per anemia protocol with Cecilia De La Torre CNP 12/6/17  Yes Oralia De La Torre NP   calcium acetate (PHOSLO) 667 MG CAPS capsule Take 2 capsules (1,334 mg) by mouth 3 times daily (with meals) 10/30/17  Yes Oralia De La Torre NP   finasteride (PROSCAR) 5 MG tablet Take 1 tablet (5 mg) by mouth daily  Patient taking differently: Take 5 mg by mouth every morning  8/16/17  Yes Oralia De La Torre NP   aspirin 81 MG tablet Take 1 tablet by mouth every morning    Yes Reported, Patient       Vitals:  /88  Pulse 80  Ht 1.778 m (5' 10\")  Wt 78.6 kg (173 lb 4.5 oz)  SpO2 98%  BMI 24.86 kg/m2    Exam:  Blood pressure in infusion center this AM: 138/70  GEN: Pleasant, Well groomed. Dtr present  CARDIAC RRR  LUNGS: CTA  ABDOMEN: Soft, NT  EXT: trace edema  Access: CLIFFORD AVF + bruit, well developed    Results:  Results for FABRIZIO MIRANDA (MRN [id number]) as of 3/4/2018 08:37   Ref. " Range 2/26/2018 12:30 3/2/2018 11:07   Sodium Latest Ref Range: 133 - 144 mmol/L  140   Potassium Latest Ref Range: 3.4 - 5.3 mmol/L  4.7   Chloride Latest Ref Range: 94 - 109 mmol/L  110 (H)   Carbon Dioxide Latest Ref Range: 20 - 32 mmol/L  22   Urea Nitrogen Latest Ref Range: 7 - 30 mg/dL  96 (H)   Creatinine Latest Ref Range: 0.66 - 1.25 mg/dL  5.11 (H)   GFR Estimate Latest Ref Range: >60 mL/min/1.7m2  11 (L)   GFR Estimate If Black Latest Ref Range: >60 mL/min/1.7m2  13 (L)   Calcium Latest Ref Range: 8.5 - 10.1 mg/dL  8.9   Anion Gap Latest Ref Range: 3 - 14 mmol/L  8   Phosphorus Latest Ref Range: 2.5 - 4.5 mg/dL  5.1 (H)   Albumin Latest Ref Range: 3.4 - 5.0 g/dL  3.4   Creatinine Urine Latest Units: mg/dL 56    Ferritin Latest Ref Range: 26 - 388 ng/mL  427 (H)   Iron Latest Ref Range: 35 - 180 ug/dL  57   Iron Binding Cap Latest Ref Range: 240 - 430 ug/dL  245   Iron Saturation Index Latest Ref Range: 15 - 46 %  23   Protein Random Urine Latest Units: g/L 0.42    Protein Total Urine g/gr Creatinine Latest Ref Range: 0 - 0.2 g/g Cr 0.74 (H)    Glucose Latest Ref Range: 70 - 99 mg/dL  100 (H)   WBC Latest Ref Range: 4.0 - 11.0 10e9/L  8.5   Hemoglobin Latest Ref Range: 13.3 - 17.7 g/dL  10.7 (L)   Hematocrit Latest Ref Range: 40.0 - 53.0 %  34.6 (L)   Platelet Count Latest Ref Range: 150 - 450 10e9/L  254   RBC Count Latest Ref Range: 4.4 - 5.9 10e12/L  3.88 (L)   MCV Latest Ref Range: 78 - 100 fl  89   MCH Latest Ref Range: 26.5 - 33.0 pg  27.6   MCHC Latest Ref Range: 31.5 - 36.5 g/dL  30.9 (L)   RDW Latest Ref Range: 10.0 - 15.0 %  15.9 (H)   Specimen Description Unknown Midstream Urine    Culture Micro Unknown >100,000 colonies... (A)    URINE CULTURE AEROBIC BACTERIAL Unknown Rpt (A)    Parathyroid Hormone Intact Latest Ref Range: 18 - 80 pg/mL  86 (H)       Oralia De La Torre, NP

## 2018-03-02 NOTE — MR AVS SNAPSHOT
After Visit Summary   3/2/2018    Dung Norton    MRN: [id number]           Patient Information     Date Of Birth          1945        Visit Information        Provider Department      3/2/2018 11:00 AM UC 44 ATC; UC SPEC INFUSION [facility] [place] [facility]        Today's Diagnoses     Urinary tract infection    -  1    Anemia of chronic renal failure, stage 5 (H)        CKD (chronic kidney disease) stage 5, GFR less than 15 ml/min (H)          Care Instructions    Dear Dung Norton    Thank you for choosing [facility] ([facility]) for your infusion.  The following information is a summary of our appointment as well as important reminders.      Please refer to your hospital discharge instructions for details on home care services, future appointments, phone numbers, and diet/activity levels.    Additional information: Today you received Vancomycin infusion. You will follow up with your doctor after this treatment for further plans.    We look forward in seeing you on your next appointment here at [facility].  Please don t hesitate to call us at [phone] to reschedule any of your appointments or to speak with one of the [facility] registered nurses.  It was a pleasure taking care of you today.    Sincerely,    [facility]  Specialty Infusion [facility] Procedure Center  [address] [place] [address]  [address]  Phone:  [phone]    Patient Education    Vancomycin Hydrochloride Oral capsule    Vancomycin Hydrochloride Oral solution    Vancomycin Hydrochloride Solution for injection    Vancomycin Hydrochloride, Dextrose Solution for injection  Vancomycin Hydrochloride Solution for injection  What is this medicine?  VANCOMYCIN (van koe MYE sin) is a glycopeptide antibiotic. It is used to treat certain kinds of bacterial infections. It will not work for colds, flu, or other viral  infections.  This medicine may be used for other purposes; ask your health care provider or pharmacist if you have questions.  What should I tell my health care provider before I take this medicine?  They need to know if you have any of these conditions:    dehydration    hearing loss    kidney disease    other chronic illness    an unusual or allergic reaction to vancomycin, other medicines, foods, dyes, or preservatives    pregnant or trying to get pregnant    breast-feeding  How should I use this medicine?  This medicine is infused into a vein. It is usually given by a health care provider in a hospital or clinic.  If you receive this medicine at home, you will receive special instructions. Take your medicine at regular intervals. Do not take your medicine more often than directed. Take all of your medicine as directed even if you think you are better. Do not skip doses or stop your medicine early.  It is important that you put your used needles and syringes in a special sharps container. Do not put them in a trash can. If you do not have a sharps container, call your pharmacist or healthcare provider to get one.  Talk to your pediatrician regarding the use of this medicine in children. While this drug may be prescribed for even very young infants for selected conditions, precautions do apply.  Overdosage: If you think you have taken too much of this medicine contact a poison control center or emergency room at once.  NOTE: This medicine is only for you. Do not share this medicine with others.  What if I miss a dose?  If you miss a dose, take it as soon as you can. If it is almost time for your next dose, take only that dose. Do not take double or extra doses.  What may interact with this medicine?    amphotericin B    anesthetics    bacitracin    birth control pills    cisplatin    colistin    diuretics    other aminoglycoside antibiotics    polymyxin B  This list may not describe all possible interactions. Give  your health care provider a list of all the medicines, herbs, non-prescription drugs, or dietary supplements you use. Also tell them if you smoke, drink alcohol, or use illegal drugs. Some items may interact with your medicine.  What should I watch for while using this medicine?  Tell your doctor or health care professional if your symptoms do not improve or if you get new symptoms. Your condition and lab work will be monitored while you are taking this medicine.  Do not treat diarrhea with over the counter products. Contact your doctor if you have diarrhea that lasts more than 2 days or if it is severe and watery.  What side effects may I notice from receiving this medicine?  Side effects that you should report to your doctor or health care professional as soon as possible:    allergic reactions like skin rash, itching or hives, swelling of the face, lips, or tongue    breathing difficulty, wheezing    change in amount, color of urine    change in hearing    chest pain    dizziness    fever, chills    flushing of the face and neck (reddening)    low blood pressure    redness, blistering, peeling or loosening of the skin, including inside the mouth    unusual bleeding or bruising    unusually weak or tired  Side effects that usually do not require medical attention (report to your doctor or health care professional if they continue or are bothersome):    nausea, vomiting    pain, swelling where injected    stomach cramps  This list may not describe all possible side effects. Call your doctor for medical advice about side effects. You may report side effects to FDA at 3-874-FDA-2427.  Where should I keep my medicine?  Keep out of the reach of children.  You will be instructed on how to store this medicine, if needed. Throw away any unused medicine after the expiration date on the label.  NOTE:This sheet is a summary. It may not cover all possible information. If you have questions about this medicine, talk to your  "doctor, pharmacist, or health care provider. Copyright  2016 Gold Standard                  Follow-ups after your visit        Your next 10 appointments already scheduled     Mar 05, 2018   Procedure with Tamiko Vanegas MD   [facility], [place], Same Day Surgery (--)    [address]  [facility] [address] [phone]            Mar 14, 2018 11:00 AM CDT   (Arrive by 10:45 AM)   Post-Op with Tamiko Vanegas MD   [facility] Urology and [facility] for Prostate and Urologic Cancers ([facility])    [address]  4th Floor  [facility] [id number] [phone]              Future tests that were ordered for you today     Open Future Orders        Priority Expected Expires Ordered    IR PICC Placement > 5 Yrs of Age Routine  3/1/2019 3/1/2018            Who to contact     If you have questions or need follow up information about today's clinic visit or your schedule please contact [facility] SPECIALTY AND PROCEDURE directly at [phone].  Normal or non-critical lab and imaging results will be communicated to you by eDabbahart, letter or phone within 4 business days after the clinic has received the results. If you do not hear from us within 7 days, please contact the clinic through eDabbahart or phone. If you have a critical or abnormal lab result, we will notify you by phone as soon as possible.  Submit refill requests through [employer] or call your pharmacy and they will forward the refill request to us. Please allow 3 business days for your refill to be completed.          Additional Information About Your Visit        [employer] Information     [employer] lets you send messages to your doctor, view your test results, renew your prescriptions, schedule appointments and more. To sign up, go to [email or web address] . Click on \"Log in\" on the left side of the screen, which will take you to the Welcome page. Then click on \"Sign up Now\" on the right side of the " page.     You will be asked to enter the access code listed below, as well as some personal information. Please follow the directions to create your username and password.     Your access code is: [email or web address]  Expires: 4/15/2018 11:20 AM     Your access code will  in 90 days. If you need help or a new code, please call your [place] clinic or [phone].        Care EveryWhere ID     This is your Care EveryWhere ID. This could be used by other organizations to access your [place] medical records  [id number]        Your Vitals Were     Pulse Temperature Respirations Pulse Oximetry          78 97.9  F (36.6  C) (Oral) 17 100%         Blood Pressure from Last 3 Encounters:   18 144/75   18 160/80   18 141/81    Weight from Last 3 Encounters:   18 78.4 kg (172 lb 12.8 oz)   18 73.5 kg (162 lb)   18 74.8 kg (165 lb)              We Performed the Following     CBC with platelets     Ferritin     Iron and iron binding capacity     Parathyroid Hormone Intact     Renal panel     Vitamin D Deficiency          Today's Medication Changes          These changes are accurate as of 3/2/18  1:26 PM.  If you have any questions, ask your nurse or doctor.               These medicines have changed or have updated prescriptions.        Dose/Directions    amLODIPine 2.5 MG tablet   Commonly known as:  NORVASC   This may have changed:  when to take this   Used for:  Renal hypertension        Dose:  2.5 mg   Take 1 tablet (2.5 mg) by mouth daily   Quantity:  90 tablet   Refills:  3       finasteride 5 MG tablet   Commonly known as:  PROSCAR   This may have changed:  when to take this   Used for:  Benign prostatic hyperplasia with urinary retention        Dose:  5 mg   Take 1 tablet (5 mg) by mouth daily   Quantity:  90 tablet   Refills:  3                Primary Care Provider Office Phone # Fax #    Haley Baker [phone] [address]  [place] [address]         Equal Access to Services     [facility] : Hadii cristine marin lambert Sagastume, wakathleenda luqerin, qalonnie teresanestorgibson ndiaye. So [facility] [phone].    ATENCIÓN: Si habla español, tiene a montague disposición servicios gratuitos de asistencia lingüística. Noéame al [phone].    We comply with applicable federal civil rights laws and [place] laws. We do not discriminate on the basis of race, color, national origin, age, disability, sex, sexual orientation, or gender identity.            Thank you!     Thank you for choosing [facility] SPECIALTY AND PROCEDURE  for your care. Our goal is always to provide you with excellent care. Hearing back from our patients is one way we can continue to improve our services. Please take a few minutes to complete the written survey that you may receive in the mail after your visit with us. Thank you!             Your Updated Medication List - Protect others around you: Learn how to safely use, store and throw away your medicines at www.disposemymeds.org.          This list is accurate as of 3/2/18  1:26 PM.  Always use your most recent med list.                   Brand Name Dispense Instructions for use Diagnosis    amLODIPine 2.5 MG tablet    NORVASC    90 tablet    Take 1 tablet (2.5 mg) by mouth daily    Renal hypertension       aspirin 81 MG tablet      Take 1 tablet by mouth every morning        calcium acetate 667 MG Caps capsule    PHOSLO    540 capsule    Take 2 capsules (1,334 mg) by mouth 3 times daily (with meals)    Chronic kidney disease, unspecified CKD stage       darbepoetin william 100 MCG/0.5ML injection    ARANESP (ALBUMIN FREE)    0.5 mL    Inject 0.5 mLs (100 mcg) Subcutaneous every 14 days As needed for hgb<10g/dL.  If Hgb increases >1 point in 2 weeks (if blood transfusion given, use hgb PRIOR to this), SYSTOLIC BP > 180 mmHg or hgb>=10g/dL, HOLD DOSE. Dose must be within 1 week of Hgb.  Per anemia  protocol with Cecilia De La Torre,CNP    Anemia of chronic renal failure, stage 5 (H), CKD (chronic kidney disease) stage 5, GFR less than 15 ml/min (H)       finasteride 5 MG tablet    PROSCAR    90 tablet    Take 1 tablet (5 mg) by mouth daily    Benign prostatic hyperplasia with urinary retention       order for DME     1 each    Equipment being ordered: olecranon bursa brace    Olecranon bursitis of left elbow

## 2018-03-02 NOTE — NURSING NOTE
"Chief Complaint   Patient presents with     RECHECK     Kidney follow up       Initial /88  Pulse 80  Ht 1.778 m (5' 10\")  Wt 78.6 kg (173 lb 4.5 oz)  SpO2 98%  BMI 24.86 kg/m2 Estimated body mass index is 24.86 kg/(m^2) as calculated from the following:    Height as of this encounter: 1.778 m (5' 10\").    Weight as of this encounter: 78.6 kg (173 lb 4.5 oz).  Medication Reconciliation: complete   RACHELE MUÑOZ CMA      "

## 2018-03-02 NOTE — PROGRESS NOTES
Nursing Note  Dung Norton [facility] for:   Chief Complaint   Patient presents with     Infusion     Vancomycin     During today's [facility] appointment, orders from Dr Vanegas were completed.  Frequency: once    Progress note:  Patient identification verified by name and date of birth.  Assessment completed.  Vitals recorded in Doc Flowsheets.  Patient was provided with education regarding infusion and possible side effects.  Patient verbalized understanding.      needed: No  Premedications: were not ordered.  Infusion Rates: 192 ml/hr.  Approximate Infusion length:90 minutes.   Labs: were drawn per orders.   Vascular access: peripheral IV placed today.  Treatment Conditions: patient s Creatinine Clearance is within paramaters to administer medication per orders.  Patient tolerated infusion: well.    Drug Waste Record? No     Discharge Plan:   Follow up plan of care with: primary medical doctor.  Discharge instructions were reviewed with patient.  Patient/representative verbalized understanding of discharge instructions and all questions answered.  Patient discharged from [facility] in stable condition.    Administrations This Visit     vancomycin (VANCOCIN) 1,250 mg in sodium chloride 0.9 % 250 mL intermittent infusion     Admin Date Action Dose Rate Route Administered By          03/02/2018 New Bag 1250 mg 192 mL/hr Intravenous Josy Espinosa, RN                         JOSY ESPINOSA, RN        /75 (BP Location: Right arm, Patient Position: Sitting, Cuff Size: Adult Regular)  Pulse 78  Temp 97.9  F (36.6  C) (Oral)  Resp 17  SpO2 100%

## 2018-03-02 NOTE — MR AVS SNAPSHOT
After Visit Summary   3/2/2018    Dung Norton    MRN: [id number]           Patient Information     Date Of Birth          1945        Visit Information        Provider Department      3/2/2018 1:00 PM Oralia De La Torre NP [facility] Nephrology         Follow-ups after your visit        Follow-up notes from your care team     Return in about 5 weeks (around 4/6/2018).      Your next 10 appointments already scheduled     Mar 05, 2018   Procedure with Tamiko Vanegas MD   [facility], [place], Same Day Surgery (--)    [address] [facility] [address]   [phone]            Mar 14, 2018 11:00 AM CDT   (Arrive by 10:45 AM)   Post-Op with Tamiko Vanegas MD   [facility] Urology and [facility] for Prostate and Urologic Cancers ([facility])    [address]  4th Floor  [facility] [id number] [phone]            Apr 06, 2018  1:00 PM CDT   (Arrive by 12:30 PM)   Return Visit with Oralia De La Torre NP   [facility] Nephrology ([facility])    [address]  [address]  [facility] [id number] [phone]              Future tests that were ordered for you today     Open Future Orders        Priority Expected Expires Ordered    IR PICC Placement > 5 Yrs of Age Routine  3/1/2019 3/1/2018            Who to contact     If you have questions or need follow up information about today's clinic visit or your schedule please contact [facility] NEPHROLOGY directly at [phone].  Normal or non-critical lab and imaging results will be communicated to you by MyChart, letter or phone within 4 business days after the clinic has received the results. If you do not hear from us within 7 days, please contact the clinic through MyChart or phone. If you have a critical or abnormal lab result, we will notify you by phone as soon as possible.  Submit refill requests through NetHooks or call your pharmacy and they will forward the refill  "request to us. Please allow 3 business days for your refill to be completed.          Additional Information About Your Visit        [employer] Ad[employer] Information     [employer] lets you send messages to your doctor, view your test results, renew your prescriptions, schedule appointments and more. To sign up, go to [email or web address] . Click on \"Log in\" on the left side of the screen, which will take you to the Welcome page. Then click on \"Sign up Now\" on the right side of the page.     You will be asked to enter the access code listed below, as well as some personal information. Please follow the directions to create your username and password.     Your access code is: [email or web address]  Expires: 4/15/2018 11:20 AM     Your access code will  in 90 days. If you need help or a new code, please call your [place] clinic or [phone].        Care EveryWhere ID     This is your Care EveryWhere ID. This could be used by other organizations to access your [place] medical records  [id number]        Your Vitals Were     Pulse Height Pulse Oximetry BMI (Body Mass Index)          80 1.778 m (5' 10\") 98% 24.86 kg/m2         Blood Pressure from Last 3 Encounters:   18 160/88   18 144/75   18 160/80    Weight from Last 3 Encounters:   18 78.6 kg (173 lb 4.5 oz)   18 78.4 kg (172 lb 12.8 oz)   18 73.5 kg (162 lb)              Today, you had the following     No orders found for display         Today's Medication Changes          These changes are accurate as of 3/2/18  2:17 PM.  If you have any questions, ask your nurse or doctor.               These medicines have changed or have updated prescriptions.        Dose/Directions    amLODIPine 2.5 MG tablet   Commonly known as:  NORVASC   This may have changed:  when to take this   Used for:  Renal hypertension        Dose:  2.5 mg   Take 1 tablet (2.5 mg) by mouth daily   Quantity:  90 tablet   Refills:  3       finasteride 5 MG tablet   Commonly " known as:  PROSCAR   This may have changed:  when to take this   Used for:  Benign prostatic hyperplasia with urinary retention        Dose:  5 mg   Take 1 tablet (5 mg) by mouth daily   Quantity:  90 tablet   Refills:  3                Primary Care Provider Office Phone # Fax #    Haley Baker [phone] [address] [place] [address]        Equal Access to Services     [facility]: Hadii aad ku hadasho Soomaali, waaxda luqadaha, qaybta kaalmada adeegyada, waxay idiin hayaan adekimberly fulton lamalickn . So [facility] [phone].    ATENCIÓN: Si habla español, tiene a montague disposición servicios gratuitos de asistencia lingüística. NoéSelect Medical [facility] [phone].    We comply with applicable federal civil rights laws and [place] laws. We do not discriminate on the basis of race, color, national origin, age, disability, sex, sexual orientation, or gender identity.            Thank you!     Thank you for choosing [facility] NEPHROLOGY  for your care. Our goal is always to provide you with excellent care. Hearing back from our patients is one way we can continue to improve our services. Please take a few minutes to complete the written survey that you may receive in the mail after your visit with us. Thank you!             Your Updated Medication List - Protect others around you: Learn how to safely use, store and throw away your medicines at www.disposemymeds.org.          This list is accurate as of 3/2/18  2:17 PM.  Always use your most recent med list.                   Brand Name Dispense Instructions for use Diagnosis    amLODIPine 2.5 MG tablet    NORVASC    90 tablet    Take 1 tablet (2.5 mg) by mouth daily    Renal hypertension       aspirin 81 MG tablet      Take 1 tablet by mouth every morning        calcium acetate 667 MG Caps capsule    PHOSLO    540 capsule    Take 2 capsules (1,334 mg) by mouth 3 times daily (with meals)    Chronic kidney disease, unspecified CKD stage       darbepoetin  william 100 MCG/0.5ML injection    ARANESP (ALBUMIN FREE)    0.5 mL    Inject 0.5 mLs (100 mcg) Subcutaneous every 14 days As needed for hgb<10g/dL.  If Hgb increases >1 point in 2 weeks (if blood transfusion given, use hgb PRIOR to this), SYSTOLIC BP > 180 mmHg or hgb>=10g/dL, HOLD DOSE. Dose must be within 1 week of Hgb.  Per anemia protocol with Cecilia Britt,CNP    Anemia of chronic renal failure, stage 5 (H), CKD (chronic kidney disease) stage 5, GFR less than 15 ml/min (H)       finasteride 5 MG tablet    PROSCAR    90 tablet    Take 1 tablet (5 mg) by mouth daily    Benign prostatic hyperplasia with urinary retention       order for DME     1 each    Equipment being ordered: olecranon bursa brace    Olecranon bursitis of left elbow

## 2018-03-04 NOTE — PROGRESS NOTES
Nephrology Clinic Visit 3/2/18    Assessment and Plan:       1. CKD5 - Patient has developed ESRD 2/2 long standing obstructive uropathy. His GFR has been < 15 ml/mn since July 2017. He is not uremic.       - Patient and daughter have decided upon HD as his RRT option.     - He has attended the [facility] program    - He underwent AVF creation with Dr Eduardo Pabon on 11/22/17. It has matured nicely and ready to use when needed. Last seen by Dr Pabon on 1/2/18   - Given stability of renal function can hold off on initiating HD for now   - He does not use NSAIDs   - He is straight cathing QID w/o difficulty      2. Electrolytes - No acute concerns. K 4.7      3. Volume status - Mild hypervolemia with trace lower extremity edema. No dyspnea. Albumin 3.4. Not on diuretics. Making ~ 1 liter/urine/day. He is gaining body weight, up to 78.6 kg from previous weight of 74.3 kg.  Clinic blood pressures elevated in the 150/ range, but he notes that outside clinic blood pressures in the 130-140/ range.       - No need for diuretics at this time   - Recommend compression wraps if edema persists      4. HTN - Has been gradually increasing with increase in body weight. Currently on Amlodipine 2.5 mg daily. We have discussed him getting a home blood pressure monitor and he will try to do this. Blood pressure earlier at infusion clinic was 138/70.    - Will see how his blood pressure is while in hospital. If remains elevated will increase his Amlodipine        5. BPH - He is straight cathing QID w/o difficulty. Currently on Proscar. Flomax discontinued due to hypotension. Rescheduled for cysto/TURP 3/5/18.   - Urologist wants him to continue Proscar in order to keep prostate small and allow for more easy straight cathing   - Urology managing      6. Acid base - No acute concerns. Bicarb 22     7. BMD - Ca 8.9, Phos 5.1, albumin 3.4, Vit D 29, PTH intact 86   - He is doing much better with taking the Phoslo correctly      8. Nutrition -  Reports good appetite and albumin is stable in the 3.4 range. LFTs normal      9. Anemia - Hgb 10.7. Etiology is likely anemia of renal disease   - He should have routine colon cancer screening   - Iron studies 3/2/18: Fe 57, Ferritin 427, Tsat 23%   - Has been enrolled in anemia management program for EARLENE, but not currently requiring Aranesp      10. UTI- On Vanco. Afebrile, WBC 8.5. To be re dosed preop on 3/5      11. Disposition - RTC in 5 wks for f/u with ongoing weekly labs      Assessment and plan was discussed with patient and daughter, they both voice understanding and agreement.    Reason for Visit:  CKD5 follow up      HPI:  Dung Norton is a 71 year old year old male with a PMH of CKDIII , HTN, Obstructive uropathy 2/2 BPH/benign bladder mass with creat as high as 14.7 in July 2017. He did not required RRT at that time and renal function improved to a low of 4.6 in Sept . However despite correction of the obstructive uropathy his renal function did not normalize and he has developed End stage Kidney disease.       Since our last visit patient has had no hospital admissions, however presented to ED on 1/25 with persistent hematuria and found to have UTI. Presumptively treated with Cipro, but cx showing coag neg staph resistant to Cipro. His TURP was postponed and reschedule for 3/5. He was given a dose of Vanco this morning.       ROS:  Being treated for Coag neg staph UTI in preparation for upcoming prostate surgery on 3/5/18. Received initial dose of Vanco this morning. Patient had gone to ED on 1/25 for hematuria and found to have UTI. No further hematuria. No difficulty with straight caths. He is generally feeling well. Ripped up carpet at his house w/o problems. Denies CP/dyspnea/abdominal pain. He continues to straight cath 4 times daily. Energy is stable. He is living independently. Has good appetite. Only has trace  edema.        Chronic Medical Problems:      HTN  BPH  Obstructive uropathy  "  HTN  Tobacco abuse  HLD  Anemia  Pulmonary nodules  KRISTINA  CKD5     Personal Hx:   Single, has 2 daughters very involved in his care. Currently living in a house, but will be moving in the Spring into an apt. He is a former smoker    Allergies:  No Known Allergies    Medications:  Prior to Admission medications    Medication Sig Start Date End Date Taking? Authorizing Provider   order for DME Equipment being ordered: olecranon bursa brace 1/17/18  Yes Haley Baker MD   amLODIPine (NORVASC) 2.5 MG tablet Take 1 tablet (2.5 mg) by mouth daily  Patient taking differently: Take 2.5 mg by mouth every morning  12/26/17  Yes Haley Baker MD   darbepoetin william (ARANESP, ALBUMIN FREE,) 100 MCG/0.5ML injection Inject 0.5 mLs (100 mcg) Subcutaneous every 14 days As needed for hgb<10g/dL.  If Hgb increases >1 point in 2 weeks (if blood transfusion given, use hgb PRIOR to this), SYSTOLIC BP > 180 mmHg or hgb>=10g/dL, HOLD DOSE. Dose must be within 1 week of Hgb.  Per anemia protocol with Cecilia De La Torre CNP 12/6/17  Yes Oralia De La Torre NP   calcium acetate (PHOSLO) 667 MG CAPS capsule Take 2 capsules (1,334 mg) by mouth 3 times daily (with meals) 10/30/17  Yes Oralia De La Torre NP   finasteride (PROSCAR) 5 MG tablet Take 1 tablet (5 mg) by mouth daily  Patient taking differently: Take 5 mg by mouth every morning  8/16/17  Yes Oralia De La Torre NP   aspirin 81 MG tablet Take 1 tablet by mouth every morning    Yes Reported, Patient       Vitals:  /88  Pulse 80  Ht 1.778 m (5' 10\")  Wt 78.6 kg (173 lb 4.5 oz)  SpO2 98%  BMI 24.86 kg/m2    Exam:  Blood pressure in infusion center this AM: 138/70  GEN: Pleasant, Well groomed. Dtr present  CARDIAC RRR  LUNGS: CTA  ABDOMEN: Soft, NT  EXT: trace edema  Access: CLIFFORD AVF + bruit, well developed    Results:  Results for RUTH FABRIZIO LAY (MRN [id number]) as of 3/4/2018 08:37   Ref. Range 2/26/2018 12:30 3/2/2018 11:07   Sodium Latest Ref Range: 133 - 144 mmol/L  140 "   Potassium Latest Ref Range: 3.4 - 5.3 mmol/L  4.7   Chloride Latest Ref Range: 94 - 109 mmol/L  110 (H)   Carbon Dioxide Latest Ref Range: 20 - 32 mmol/L  22   Urea Nitrogen Latest Ref Range: 7 - 30 mg/dL  96 (H)   Creatinine Latest Ref Range: 0.66 - 1.25 mg/dL  5.11 (H)   GFR Estimate Latest Ref Range: >60 mL/min/1.7m2  11 (L)   GFR Estimate If Black Latest Ref Range: >60 mL/min/1.7m2  13 (L)   Calcium Latest Ref Range: 8.5 - 10.1 mg/dL  8.9   Anion Gap Latest Ref Range: 3 - 14 mmol/L  8   Phosphorus Latest Ref Range: 2.5 - 4.5 mg/dL  5.1 (H)   Albumin Latest Ref Range: 3.4 - 5.0 g/dL  3.4   Creatinine Urine Latest Units: mg/dL 56    Ferritin Latest Ref Range: 26 - 388 ng/mL  427 (H)   Iron Latest Ref Range: 35 - 180 ug/dL  57   Iron Binding Cap Latest Ref Range: 240 - 430 ug/dL  245   Iron Saturation Index Latest Ref Range: 15 - 46 %  23   Protein Random Urine Latest Units: g/L 0.42    Protein Total Urine g/gr Creatinine Latest Ref Range: 0 - 0.2 g/g Cr 0.74 (H)    Glucose Latest Ref Range: 70 - 99 mg/dL  100 (H)   WBC Latest Ref Range: 4.0 - 11.0 10e9/L  8.5   Hemoglobin Latest Ref Range: 13.3 - 17.7 g/dL  10.7 (L)   Hematocrit Latest Ref Range: 40.0 - 53.0 %  34.6 (L)   Platelet Count Latest Ref Range: 150 - 450 10e9/L  254   RBC Count Latest Ref Range: 4.4 - 5.9 10e12/L  3.88 (L)   MCV Latest Ref Range: 78 - 100 fl  89   MCH Latest Ref Range: 26.5 - 33.0 pg  27.6   MCHC Latest Ref Range: 31.5 - 36.5 g/dL  30.9 (L)   RDW Latest Ref Range: 10.0 - 15.0 %  15.9 (H)   Specimen Description Unknown Midstream Urine    Culture Micro Unknown >100,000 colonies... (A)    URINE CULTURE AEROBIC BACTERIAL Unknown Rpt (A)    Parathyroid Hormone Intact Latest Ref Range: 18 - 80 pg/mL  86 (H)

## 2018-03-05 ENCOUNTER — HOSPITAL ENCOUNTER (OUTPATIENT)
Facility: CLINIC | Age: 73
Discharge: HOME | End: 2018-03-06
Attending: UROLOGY | Admitting: UROLOGY
Payer: MEDICARE

## 2018-03-05 ENCOUNTER — TELEPHONE (OUTPATIENT)
Dept: PHARMACY | Facility: CLINIC | Age: 73
End: 2018-03-05

## 2018-03-05 ENCOUNTER — ANESTHESIA EVENT (OUTPATIENT)
Dept: SURGERY | Facility: CLINIC | Age: 73
End: 2018-03-05
Payer: MEDICARE

## 2018-03-05 ENCOUNTER — ANESTHESIA (OUTPATIENT)
Dept: SURGERY | Facility: CLINIC | Age: 73
End: 2018-03-05
Payer: MEDICARE

## 2018-03-05 DIAGNOSIS — Z90.79 S/P TURP: Primary | ICD-10-CM

## 2018-03-05 LAB
ANION GAP SERPL CALCULATED.3IONS-SCNC: 11 MMOL/L (ref 3–14)
BUN SERPL-MCNC: 101 MG/DL (ref 7–30)
CALCIUM SERPL-MCNC: 8.5 MG/DL (ref 8.5–10.1)
CHLORIDE SERPL-SCNC: 112 MMOL/L (ref 94–109)
CO2 SERPL-SCNC: 20 MMOL/L (ref 20–32)
CREAT SERPL-MCNC: 4.98 MG/DL (ref 0.66–1.25)
CREAT SERPL-MCNC: 5.13 MG/DL (ref 0.66–1.25)
ERYTHROCYTE [DISTWIDTH] IN BLOOD BY AUTOMATED COUNT: 15.8 % (ref 10–15)
GFR SERPL CREATININE-BSD FRML MDRD: 11 ML/MIN/1.7M2
GFR SERPL CREATININE-BSD FRML MDRD: 11 ML/MIN/1.7M2
GLUCOSE BLDC GLUCOMTR-MCNC: 76 MG/DL (ref 70–99)
GLUCOSE SERPL-MCNC: 79 MG/DL (ref 70–99)
HCT VFR BLD AUTO: 34.2 % (ref 40–53)
HGB BLD-MCNC: 10.7 G/DL (ref 13.3–17.7)
HGB BLD-MCNC: 11 G/DL (ref 13.3–17.7)
MCH RBC QN AUTO: 27.2 PG (ref 26.5–33)
MCHC RBC AUTO-ENTMCNC: 31.3 G/DL (ref 31.5–36.5)
MCV RBC AUTO: 87 FL (ref 78–100)
PLATELET # BLD AUTO: 207 10E9/L (ref 150–450)
POTASSIUM SERPL-SCNC: 4.5 MMOL/L (ref 3.4–5.3)
POTASSIUM SERPL-SCNC: 4.6 MMOL/L (ref 3.4–5.3)
RBC # BLD AUTO: 3.94 10E12/L (ref 4.4–5.9)
SODIUM SERPL-SCNC: 143 MMOL/L (ref 133–144)
WBC # BLD AUTO: 7.2 10E9/L (ref 4–11)

## 2018-03-05 PROCEDURE — 36000059 ZZH SURGERY LEVEL 3 EA 15 ADDTL MIN UMMC: Performed by: UROLOGY

## 2018-03-05 PROCEDURE — 25000128 H RX IP 250 OP 636: Performed by: STUDENT IN AN ORGANIZED HEALTH CARE EDUCATION/TRAINING PROGRAM

## 2018-03-05 PROCEDURE — 71000014 ZZH RECOVERY PHASE 1 LEVEL 2 FIRST HR: Performed by: UROLOGY

## 2018-03-05 PROCEDURE — A9270 NON-COVERED ITEM OR SERVICE: HCPCS | Mod: GY | Performed by: STUDENT IN AN ORGANIZED HEALTH CARE EDUCATION/TRAINING PROGRAM

## 2018-03-05 PROCEDURE — 84132 ASSAY OF SERUM POTASSIUM: CPT | Mod: 91 | Performed by: ANESTHESIOLOGY

## 2018-03-05 PROCEDURE — C9399 UNCLASSIFIED DRUGS OR BIOLOG: HCPCS | Performed by: NURSE ANESTHETIST, CERTIFIED REGISTERED

## 2018-03-05 PROCEDURE — 40000170 ZZH STATISTIC PRE-PROCEDURE ASSESSMENT II: Performed by: UROLOGY

## 2018-03-05 PROCEDURE — 82962 GLUCOSE BLOOD TEST: CPT

## 2018-03-05 PROCEDURE — 25000125 ZZHC RX 250: Performed by: NURSE ANESTHETIST, CERTIFIED REGISTERED

## 2018-03-05 PROCEDURE — 85027 COMPLETE CBC AUTOMATED: CPT | Performed by: STUDENT IN AN ORGANIZED HEALTH CARE EDUCATION/TRAINING PROGRAM

## 2018-03-05 PROCEDURE — 36000057 ZZH SURGERY LEVEL 3 1ST 30 MIN - UMMC: Performed by: UROLOGY

## 2018-03-05 PROCEDURE — 85018 HEMOGLOBIN: CPT | Mod: 91 | Performed by: ANESTHESIOLOGY

## 2018-03-05 PROCEDURE — 37000008 ZZH ANESTHESIA TECHNICAL FEE, 1ST 30 MIN: Performed by: UROLOGY

## 2018-03-05 PROCEDURE — 36415 COLL VENOUS BLD VENIPUNCTURE: CPT | Performed by: ANESTHESIOLOGY

## 2018-03-05 PROCEDURE — 82565 ASSAY OF CREATININE: CPT | Performed by: ANESTHESIOLOGY

## 2018-03-05 PROCEDURE — 37000009 ZZH ANESTHESIA TECHNICAL FEE, EACH ADDTL 15 MIN: Performed by: UROLOGY

## 2018-03-05 PROCEDURE — 88305 TISSUE EXAM BY PATHOLOGIST: CPT | Performed by: UROLOGY

## 2018-03-05 PROCEDURE — 80048 BASIC METABOLIC PNL TOTAL CA: CPT | Performed by: STUDENT IN AN ORGANIZED HEALTH CARE EDUCATION/TRAINING PROGRAM

## 2018-03-05 PROCEDURE — 25800025 ZZH RX 258: Performed by: STUDENT IN AN ORGANIZED HEALTH CARE EDUCATION/TRAINING PROGRAM

## 2018-03-05 PROCEDURE — 25000566 ZZH SEVOFLURANE, EA 15 MIN: Performed by: UROLOGY

## 2018-03-05 PROCEDURE — 25000128 H RX IP 250 OP 636: Performed by: ANESTHESIOLOGY

## 2018-03-05 PROCEDURE — 27210794 ZZH OR GENERAL SUPPLY STERILE: Performed by: UROLOGY

## 2018-03-05 PROCEDURE — 25000128 H RX IP 250 OP 636: Performed by: NURSE ANESTHETIST, CERTIFIED REGISTERED

## 2018-03-05 PROCEDURE — 25000132 ZZH RX MED GY IP 250 OP 250 PS 637: Mod: GY | Performed by: STUDENT IN AN ORGANIZED HEALTH CARE EDUCATION/TRAINING PROGRAM

## 2018-03-05 RX ORDER — OXYCODONE HYDROCHLORIDE 5 MG/1
5 TABLET ORAL EVERY 4 HOURS PRN
Status: DISCONTINUED | OUTPATIENT
Start: 2018-03-05 | End: 2018-03-05

## 2018-03-05 RX ORDER — AMLODIPINE BESYLATE 2.5 MG/1
2.5 TABLET ORAL EVERY MORNING
Status: DISCONTINUED | OUTPATIENT
Start: 2018-03-06 | End: 2018-03-06 | Stop reason: HOSPADM

## 2018-03-05 RX ORDER — NALOXONE HYDROCHLORIDE 0.4 MG/ML
.1-.4 INJECTION, SOLUTION INTRAMUSCULAR; INTRAVENOUS; SUBCUTANEOUS
Status: DISCONTINUED | OUTPATIENT
Start: 2018-03-05 | End: 2018-03-06 | Stop reason: HOSPADM

## 2018-03-05 RX ORDER — CEFAZOLIN SODIUM 2 G/100ML
2 INJECTION, SOLUTION INTRAVENOUS
Status: DISCONTINUED | OUTPATIENT
Start: 2018-03-05 | End: 2018-03-05 | Stop reason: HOSPADM

## 2018-03-05 RX ORDER — CEFAZOLIN SODIUM 1 G/3ML
1 INJECTION, POWDER, FOR SOLUTION INTRAMUSCULAR; INTRAVENOUS SEE ADMIN INSTRUCTIONS
Status: DISCONTINUED | OUTPATIENT
Start: 2018-03-05 | End: 2018-03-05 | Stop reason: HOSPADM

## 2018-03-05 RX ORDER — HYDROMORPHONE HCL/0.9% NACL/PF 0.2MG/0.2
0.2 SYRINGE (ML) INTRAVENOUS
Status: DISCONTINUED | OUTPATIENT
Start: 2018-03-05 | End: 2018-03-06 | Stop reason: HOSPADM

## 2018-03-05 RX ORDER — HYDRALAZINE HYDROCHLORIDE 20 MG/ML
2.5-5 INJECTION INTRAMUSCULAR; INTRAVENOUS EVERY 10 MIN PRN
Status: DISCONTINUED | OUTPATIENT
Start: 2018-03-05 | End: 2018-03-05 | Stop reason: HOSPADM

## 2018-03-05 RX ORDER — FENTANYL CITRATE 50 UG/ML
25-50 INJECTION, SOLUTION INTRAMUSCULAR; INTRAVENOUS
Status: DISCONTINUED | OUTPATIENT
Start: 2018-03-05 | End: 2018-03-05 | Stop reason: HOSPADM

## 2018-03-05 RX ORDER — FENTANYL CITRATE 50 UG/ML
INJECTION, SOLUTION INTRAMUSCULAR; INTRAVENOUS PRN
Status: DISCONTINUED | OUTPATIENT
Start: 2018-03-05 | End: 2018-03-05

## 2018-03-05 RX ORDER — LIDOCAINE HYDROCHLORIDE 20 MG/ML
INJECTION, SOLUTION INFILTRATION; PERINEURAL PRN
Status: DISCONTINUED | OUTPATIENT
Start: 2018-03-05 | End: 2018-03-05

## 2018-03-05 RX ORDER — ONDANSETRON 2 MG/ML
4 INJECTION INTRAMUSCULAR; INTRAVENOUS EVERY 6 HOURS PRN
Status: DISCONTINUED | OUTPATIENT
Start: 2018-03-05 | End: 2018-03-06 | Stop reason: HOSPADM

## 2018-03-05 RX ORDER — ESMOLOL HYDROCHLORIDE 10 MG/ML
INJECTION INTRAVENOUS PRN
Status: DISCONTINUED | OUTPATIENT
Start: 2018-03-05 | End: 2018-03-05

## 2018-03-05 RX ORDER — ONDANSETRON 4 MG/1
4 TABLET, ORALLY DISINTEGRATING ORAL EVERY 30 MIN PRN
Status: DISCONTINUED | OUTPATIENT
Start: 2018-03-05 | End: 2018-03-05 | Stop reason: HOSPADM

## 2018-03-05 RX ORDER — SODIUM CHLORIDE, SODIUM LACTATE, POTASSIUM CHLORIDE, CALCIUM CHLORIDE 600; 310; 30; 20 MG/100ML; MG/100ML; MG/100ML; MG/100ML
INJECTION, SOLUTION INTRAVENOUS CONTINUOUS
Status: DISCONTINUED | OUTPATIENT
Start: 2018-03-05 | End: 2018-03-05 | Stop reason: HOSPADM

## 2018-03-05 RX ORDER — NALOXONE HYDROCHLORIDE 0.4 MG/ML
.1-.4 INJECTION, SOLUTION INTRAMUSCULAR; INTRAVENOUS; SUBCUTANEOUS
Status: DISCONTINUED | OUTPATIENT
Start: 2018-03-05 | End: 2018-03-05 | Stop reason: HOSPADM

## 2018-03-05 RX ORDER — MEPERIDINE HYDROCHLORIDE 50 MG/ML
12.5 INJECTION INTRAMUSCULAR; INTRAVENOUS; SUBCUTANEOUS
Status: DISCONTINUED | OUTPATIENT
Start: 2018-03-05 | End: 2018-03-05 | Stop reason: HOSPADM

## 2018-03-05 RX ORDER — LIDOCAINE 40 MG/G
CREAM TOPICAL
Status: DISCONTINUED | OUTPATIENT
Start: 2018-03-05 | End: 2018-03-06 | Stop reason: HOSPADM

## 2018-03-05 RX ORDER — ACETAMINOPHEN 325 MG/1
650 TABLET ORAL EVERY 4 HOURS PRN
Status: DISCONTINUED | OUTPATIENT
Start: 2018-03-05 | End: 2018-03-06 | Stop reason: HOSPADM

## 2018-03-05 RX ORDER — ONDANSETRON 2 MG/ML
4 INJECTION INTRAMUSCULAR; INTRAVENOUS EVERY 30 MIN PRN
Status: DISCONTINUED | OUTPATIENT
Start: 2018-03-05 | End: 2018-03-05 | Stop reason: HOSPADM

## 2018-03-05 RX ORDER — VANCOMYCIN HYDROCHLORIDE 1 G/200ML
1000 INJECTION, SOLUTION INTRAVENOUS
Status: COMPLETED | OUTPATIENT
Start: 2018-03-05 | End: 2018-03-05

## 2018-03-05 RX ORDER — PROPOFOL 10 MG/ML
INJECTION, EMULSION INTRAVENOUS PRN
Status: DISCONTINUED | OUTPATIENT
Start: 2018-03-05 | End: 2018-03-05

## 2018-03-05 RX ORDER — LABETALOL HYDROCHLORIDE 5 MG/ML
10 INJECTION, SOLUTION INTRAVENOUS
Status: DISCONTINUED | OUTPATIENT
Start: 2018-03-05 | End: 2018-03-05 | Stop reason: HOSPADM

## 2018-03-05 RX ORDER — ONDANSETRON 2 MG/ML
INJECTION INTRAMUSCULAR; INTRAVENOUS PRN
Status: DISCONTINUED | OUTPATIENT
Start: 2018-03-05 | End: 2018-03-05

## 2018-03-05 RX ORDER — EPHEDRINE SULFATE 50 MG/ML
INJECTION, SOLUTION INTRAMUSCULAR; INTRAVENOUS; SUBCUTANEOUS PRN
Status: DISCONTINUED | OUTPATIENT
Start: 2018-03-05 | End: 2018-03-05

## 2018-03-05 RX ORDER — OXYCODONE HYDROCHLORIDE 5 MG/1
5 TABLET ORAL
Status: DISCONTINUED | OUTPATIENT
Start: 2018-03-05 | End: 2018-03-06 | Stop reason: HOSPADM

## 2018-03-05 RX ORDER — ONDANSETRON 4 MG/1
4 TABLET, ORALLY DISINTEGRATING ORAL EVERY 6 HOURS PRN
Status: DISCONTINUED | OUTPATIENT
Start: 2018-03-05 | End: 2018-03-06 | Stop reason: HOSPADM

## 2018-03-05 RX ADMIN — ESMOLOL HYDROCHLORIDE 20 MG: 10 INJECTION, SOLUTION INTRAVENOUS at 14:02

## 2018-03-05 RX ADMIN — ONDANSETRON 4 MG: 2 INJECTION INTRAMUSCULAR; INTRAVENOUS at 12:42

## 2018-03-05 RX ADMIN — PROPOFOL 30 MG: 10 INJECTION, EMULSION INTRAVENOUS at 13:15

## 2018-03-05 RX ADMIN — Medication 5 MG: at 12:16

## 2018-03-05 RX ADMIN — ROCURONIUM BROMIDE 50 MG: 10 INJECTION INTRAVENOUS at 11:58

## 2018-03-05 RX ADMIN — PHENYLEPHRINE HYDROCHLORIDE 50 MCG: 10 INJECTION, SOLUTION INTRAMUSCULAR; INTRAVENOUS; SUBCUTANEOUS at 12:58

## 2018-03-05 RX ADMIN — PHENYLEPHRINE HYDROCHLORIDE 100 MCG: 10 INJECTION, SOLUTION INTRAMUSCULAR; INTRAVENOUS; SUBCUTANEOUS at 12:55

## 2018-03-05 RX ADMIN — VANCOMYCIN HYDROCHLORIDE 1000 MG: 1 INJECTION, SOLUTION INTRAVENOUS at 10:29

## 2018-03-05 RX ADMIN — PHENYLEPHRINE HYDROCHLORIDE 150 MCG: 10 INJECTION, SOLUTION INTRAMUSCULAR; INTRAVENOUS; SUBCUTANEOUS at 12:08

## 2018-03-05 RX ADMIN — PHENYLEPHRINE HYDROCHLORIDE 100 MCG: 10 INJECTION, SOLUTION INTRAMUSCULAR; INTRAVENOUS; SUBCUTANEOUS at 13:10

## 2018-03-05 RX ADMIN — PHENYLEPHRINE HYDROCHLORIDE 200 MCG: 10 INJECTION, SOLUTION INTRAMUSCULAR; INTRAVENOUS; SUBCUTANEOUS at 12:13

## 2018-03-05 RX ADMIN — PHENYLEPHRINE HYDROCHLORIDE 200 MCG: 10 INJECTION, SOLUTION INTRAMUSCULAR; INTRAVENOUS; SUBCUTANEOUS at 12:27

## 2018-03-05 RX ADMIN — CALCIUM ACETATE 1334 MG: 667 CAPSULE ORAL at 18:35

## 2018-03-05 RX ADMIN — SODIUM CHLORIDE 3000 ML: 900 IRRIGANT IRRIGATION at 22:31

## 2018-03-05 RX ADMIN — ROCURONIUM BROMIDE 10 MG: 10 INJECTION INTRAVENOUS at 13:15

## 2018-03-05 RX ADMIN — Medication 5 MG: at 13:10

## 2018-03-05 RX ADMIN — SUGAMMADEX 200 MG: 100 INJECTION, SOLUTION INTRAVENOUS at 13:54

## 2018-03-05 RX ADMIN — SODIUM CHLORIDE, POTASSIUM CHLORIDE, SODIUM LACTATE AND CALCIUM CHLORIDE: 600; 310; 30; 20 INJECTION, SOLUTION INTRAVENOUS at 10:29

## 2018-03-05 RX ADMIN — LIDOCAINE HYDROCHLORIDE 100 MG: 20 INJECTION, SOLUTION INFILTRATION; PERINEURAL at 11:58

## 2018-03-05 RX ADMIN — Medication 10 MG: at 12:21

## 2018-03-05 RX ADMIN — PHENYLEPHRINE HYDROCHLORIDE 200 MCG: 10 INJECTION, SOLUTION INTRAMUSCULAR; INTRAVENOUS; SUBCUTANEOUS at 12:34

## 2018-03-05 RX ADMIN — PHENYLEPHRINE HYDROCHLORIDE 100 MCG: 10 INJECTION, SOLUTION INTRAMUSCULAR; INTRAVENOUS; SUBCUTANEOUS at 12:03

## 2018-03-05 RX ADMIN — PROPOFOL 150 MG: 10 INJECTION, EMULSION INTRAVENOUS at 11:58

## 2018-03-05 RX ADMIN — FENTANYL CITRATE 50 MCG: 50 INJECTION, SOLUTION INTRAMUSCULAR; INTRAVENOUS at 11:58

## 2018-03-05 RX ADMIN — PHENYLEPHRINE HYDROCHLORIDE 100 MCG: 10 INJECTION, SOLUTION INTRAMUSCULAR; INTRAVENOUS; SUBCUTANEOUS at 13:38

## 2018-03-05 RX ADMIN — Medication 5 MG: at 13:38

## 2018-03-05 ASSESSMENT — LIFESTYLE VARIABLES: TOBACCO_USE: 1

## 2018-03-05 NOTE — IP AVS SNAPSHOT
Unit [address] Observation [address] [address]    Phone:  [phone]    Fax:  [phone]                                       After Visit Summary   3/5/2018    Dung Norton    MRN: [id number]           After Visit Summary Signature Page     I have received my discharge instructions, and my questions have been answered. I have discussed any challenges I see with this plan with the nurse or doctor.    ..........................................................................................................................................  Patient/Patient Representative Signature      ..........................................................................................................................................  Patient Representative Print Name and Relationship to Patient    ..................................................               ................................................  Date                                            Time    ..........................................................................................................................................  Reviewed by Signature/Title    ...................................................              ..............................................  Date                                                            Time

## 2018-03-05 NOTE — ANESTHESIA PREPROCEDURE EVALUATION
Anesthesia Evaluation     . Pt has had prior anesthetic. Type: General    No history of anesthetic complications          ROS/MED HX    ENT/Pulmonary: Comment: H/O sinus surgery    (+)SONNY risk factors snores loudly, hypertension, tobacco use (50+ years), Past use 1 PPD packs/day  , . .    Neurologic:  - neg neurologic ROS     Cardiovascular: Comment: Lexiscan stress test: 1/18/2018:  IMPRESSION:  1. Mildly abnormal myocardial SPECT study with a summed stress score  of  4 . A summed stress score of0-4s associated with an annual event  rate of 0.8% and 0.9% for myocardial infarction and cardiac death,  respectively (Baldemar. Circulation 1998;98:535-43).  2. Normal left ventricular systolic function with a left ventricular  ejection fraction of  68%. The left ventricle is mildly dilated.  3. Small, fixed perfusion abnormality in the base and mid  anterolateral segments consistent with previous non-transmural  infarction.  4. No prior study available for comparison..    (+) hypertension----. Taking blood thinners Pt has received instructions: Instructions Given to patient: ASA 81 mg po QD. . . :. valvular problems/murmurs type: AS . Previous cardiac testing Echodate:results:date: results:ECG reviewed date: results: date: results:          METS/Exercise Tolerance: Comment: Is able go up a flight of stairs and shovel his own driveway. >4 METS   Hematologic:     (+) Anemia, History of Transfusion no previous transfusion reaction -      Musculoskeletal:  - neg musculoskeletal ROS       GI/Hepatic:     (+) appendicitis (s/p appendectomy),       Renal/Genitourinary:     (+) chronic renal disease, type: ESRD, Pt does not require dialysis, Pt has no history of transplant, BPH,       Endo:  - neg endo ROS       Psychiatric:  - neg psychiatric ROS       Infectious Disease:  - neg infectious disease ROS       Malignancy:      - no malignancy   Other:    (+) No chance of pregnancy C-spine cleared: N/A, no H/O Chronic Pain,                    Physical Exam      Airway   Mallampati: III  TM distance: >3 FB  Neck ROM: full    Dental   (+) chipped and missing  Comment: Dentition in poor repair with chipped and missing teeth. Denies any loose teeth.     Cardiovascular   Rhythm and rate: regular and normal  (+) murmur (III/VI systolic murmur heard best at LSB)       Pulmonary    breath sounds clear to auscultation    Other findings: Last Basic Metabolic Panel:  Lab Results      Component                Value               Date                      NA                       142                 01/15/2018             Lab Results      Component                Value               Date                      POTASSIUM                4.6                 01/15/2018            Lab Results      Component                Value               Date                      CHLORIDE                 109                 01/15/2018            Lab Results      Component                Value               Date                      DERICK                      9.5                 01/15/2018            Lab Results      Component                Value               Date                      CO2                      22                  01/15/2018            Lab Results      Component                Value               Date                      BUN                      110                 01/15/2018            Lab Results      Component                Value               Date                      CR                       5.48                01/15/2018            Lab Results      Component                Value               Date                      GLC                      85                  01/15/2018              Lab Results      Component                Value               Date                      WBC                      9.1                 12/11/2017            Lab Results      Component                Value               Date                      RBC                       3.18                12/11/2017            Lab Results      Component                Value               Date                      HGB                      10.2                01/15/2018            Lab Results      Component                Value               Date                      HCT                      33.1                01/15/2018            Lab Results      Component                Value               Date                      MCV                      89                  12/11/2017            Lab Results      Component                Value               Date                      MCH                      26.7                12/11/2017            Lab Results      Component                Value               Date                      MCHC                     30.1                12/11/2017            Lab Results      Component                Value               Date                      RDW                      16.9                12/11/2017            Lab Results      Component                Value               Date                      PLT                      336                 12/11/2017                Procedures  Procedures  Echo limited with definity 1/10/2018  Interpretation Summary     Technically limited study. Contrast utilized to enhance endocardial  delineation.     Left ventricular function, chamber size, wall motion, and wall thickness are  normal.The EF is 55-60%.  On contrast imaging, there is base-mid inferior, inferolateral, mid  anterolateral wall and apical lateral wall hypokinesis.  Mild aortic stenosis. AV Vmax 2.6 cm/s, mean gradient 14 mmHg.  Ascending aorta mildly dilated 4.0 cm (indexed to BSA 2.1 cm/m2). Root  similarly dilated at 4.0 cm with no effacement of the sinotubular junction.     Compared to prior study (1/4/2018), contrast was used to enhance wall motion  assessment and there appears to also be inferior wall hypokinesis. Findings of  lateral wall hypokinesis are also  "present on this study.                   PAC Discussion and Assessment    ASA Classification: 3  Case is suitable for: Silver  Anesthetic techniques and relevant risks discussed: GA  Invasive monitoring and risk discussed:   Types:   Possibility and Risk of blood transfusion discussed: Yes  NPO instructions given:   Additional anesthetic preparation and risks discussed:   Needs early admission to pre-op area:   Other:     PAC Resident/NP Anesthesia Assessment:  Dung Norton os a 72 year old male scheduled for Cystoscopy, Transurethral Resection of Prostate on 1/29/2018 with Dr. Vanegas at [facility] under general anesthesia.  Mr. Norton was seen by Dr. Vanegas on 12/1/2017 a h/o urinary retention requiring clean intermittent catheterization.  Please see her note for further details. The above procedure was recommended.  PAC referral for risk assessment and optimization of anesthesia with comorbid conditions of:  HTN; hypokinesis of LV lateral wall; mild aortic stenosis with mild dilation of ascending aorta at 4 cm;  h/o pulmonary nodules; ESRD with AVF placed; BPH and 50+ pack year smoking history.        He has the following specific operative considerations:     1.  Cardiology - Denies cardiopulmonary symptoms.  METS>4.   STEVE given cardiac murmur, longstanding smoking hx, HTN and CKD on 1/4/2018 >>> EF 55-60%, mild aortic stenosis with mild dilation of ascending aorta at 4 cm and hypokinesis of LV lateral wall.  Mr. Norton was seen by cardiology, Dr. Ng, on 1/9/2018.  He ordered Lexiscan which is scheduled for tomorrow.  Please see cardiology's note for cardiac clearance.         - HTN, take CCB DOS        - ASA for primary prevention.  Hold according to protocol for surgery.  2.  Pulmonary - 50+ pack smoking history.  Quit smoking July 2017.        - SONNY # of risks 3/8 = imtermediate       - Pulmonary nodules:  CT C/A/P 7/13/17>>> \"A few small pulmonary nodules " "measuring up to 3 mm. These are nonspecific. In a patient with a smoking history, a 12 month follow-up CT is optional per Fleischner society criteria\".   3.  Hematology - VTE risk:  1.8%.  Increased VTE risk: Recommend use of mechanical/chemical VTE prophylaxis in the yoli- and postoperative periods.          - h/o multiple transfusions in last year>>>ordered T&S within three days of surgery  4.  GI - Risk of PONV score = 1.  If > 2, anti-emetic intervention recommended.  5.  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 Cr 5.29, GFR 11 (1/15/18).  LUE AVF placed 11/17/17 but has not started dialysis.  Patient reports swelling in left posterior aspect of elbow since fistula placed.  Instructed to see PCP for further evaluation prior to above surgery.         - BPH with urinary retention requiring CIC>>>planned procedure as above.     - Anesthesia considerations:  Refer to PAC assessment in anesthesia records  - Airway:  Appears feasible  - Post-op delirium risk: elevated given age    Arrival time, NPO, shower and medication instructions provided by nursing staff today.  Preparing For Your Surgery handout given.  Patient was discussed with Dr Hearn. I spent 20 minutes face to face with patient, assessing, examining, and educating.            Reviewed and Signed by PAC Mid-Level Provider/Resident  Mid-Level Provider/Resident: Jenny CESPEDES  Date: 1/17/2018  Time: 11:01    Attending Anesthesiologist Anesthesia Assessment:  72 year old for cysto and TURP in treatment of BPH. Patient/case discussed with LARRY. As noted, he has asc ao aneurysm at 4 cm; will have stress test tomorrow. Long term smoking history, has recently quit, no pulmonary symptoms.     No need to see patient. Patient is appropriate for the planned procedure without further work-up or medical management.      Reviewed and Signed by PAC Anesthesiologist  Anesthesiologist: ji  Date: 1/17/2018  Time:   Pass/Fail: Pass  Disposition:     PAC Pharmacist " Assessment:        Pharmacist:   Date:   Time:      Anesthesia Plan      History & Physical Review  History and physical reviewed and following examination; no interval change.    ASA Status:  3 .        Plan for General and ETT with Intravenous and Propofol induction. Maintenance will be Balanced.    PONV prophylaxis:  Ondansetron (or other 5HT-3) and Dexamethasone or Solumedrol       Postoperative Care  Postoperative pain management:  Multi-modal analgesia.      Consents  Anesthetic plan, risks, benefits and alternatives discussed with:  Patient.  Use of blood products discussed: Yes.   Consented to blood products.  .                          .

## 2018-03-05 NOTE — IP AVS SNAPSHOT
MRN:[id number]                      After Visit Summary   3/5/2018    Dung Norton    MRN: [id number]           Thank you!     Thank you for choosing [facility] for your care. Our goal is always to provide you with excellent care. Hearing back from our patients is one way we can continue to improve our services. Please take a few minutes to complete the written survey that you may receive in the mail after you visit with us. Thank you!        Patient Information     Date Of Birth          1945        About your hospital stay     You were admitted on:  March 5, 2018 You last received care in the:  Unit 6D Observation [facility]    You were discharged on:  March 6, 2018        Reason for your hospital stay       On 3/5/18 Mr. Norton underwent transurethral resection of the prostate with Dr. Tamiko Vanegas (Urology)                  Who to Call     For medical emergencies, please call 911.  For non-urgent questions about your medical care, please call your primary care provider or clinic, [phone]  For questions related to your surgery, please call your surgery clinic        Attending Provider     Provider Tamiko Arnold MD Urology       Primary Care Provider Office Phone # Fax #    Haley Baker [phone]      After Care Instructions     Activity       Your activity upon discharge: See discharge instructions            Diet       Follow this diet upon discharge: see discharge instructions            Diet Instructions       Resume pre-procedure diet            Discharge Instructions       Follow up appointment as instructed by Surgeon and or RN            Discharge Instructions       Diet:  - Regular/ home diet    Activity:   - No strenuous exercise for 4 weeks.   - No lifting, pushing, pulling more than 15 pounds for 4 weeks.   - Do not strain with bowel movements.   - Do not drive until you can press the brake pedal quickly and fully without  pain.   - Do not operate a motor vehicle while taking narcotic pain medications.     Medications:   - PAIN: Oxycodone is a narcotic medication that can be prescribed for pain.  Narcotics will cause sleepiness and constipation and can become addictive, therefore it is best to stop or reduce them as soon as you can.  Any left over narcotics should be disposed of with an Authorized  for unneeded medications.  Contact your [facility]'s or [facility]'s household trash and recycling service to learn about medication disposal options and guidelines for your area.  If you decide to store this medication at home it should be kept in a locked cabinet to prevent access to children or visitors. To reduce your narcotic use, take Tylenol (acetaminophen 625mg) every 4-6 hours.  Do not take more than 4,000mg of Tylenol (acetaminophen/ APAP) from all sources in any 24 hour period since this can cause liver damage.  Never drive, operate machinery or drink alcoholic beverages while you are taking narcotic pain medications.      - Narcotics can make you constipated. Take over the counter fiber (metamucil or benefiber) and stool softeners (miralax, docusate or senna) while using narcotic pain medications, but stop if you develop diarrhea.   - Continue your urinary medications (proscar/ finasteride) until advised to stop by your urologist.   - No driving or operating machinery while taking narcotic pain medications  - Your aspirin can be restarted in 3 days (3/9/18) if your urine remains clear.  If your urine is not clear, please call the Clinic for instructions regarding your aspirin.     Post-TURP Instructions:   - You may shower 24 hours after surgery  - Continue to take Proscar/ finasteride until your followup appointment with Dr. Vanegas.  At that time, she may ask you to discontinue the medication.    - Drink 2-3L of water a day to keep your urine clear to light pink in color. Some blood in your urine can be expected but  should clear with reducing your activity and increasing your water consumption  - Call or return sooner than your regularly scheduled visit if you develop any of the following:  Fever (greater than 101.3F), uncontrolled pain, uncontrolled nausea or vomiting, or if you are passing large clots, are unable to void, or if your urine will not lighten in color with increase water intake.    WALDRON CARES:  - You are going home with a Waldron catheter which will remain in place until your follow-up appointment. This catheter will not generally restrict your activities, but you should be careful if you are driving such that it does not become a distraction.    - Your nurse or  will provide written catheter care instructions for you to take home.  - Protect the catheter and treat it like an extension of your body - keep it secured to your leg and do not let it get caught, snagged, or tugged. The catheter tubing should remain tension-free and without kinks. In order to drain best, the tubing and bag should always be lower than your body.  - You may notice a little blood, small amount of white discharge, or caking at the catheter insertion site. This is normal but it can irritate the skin so it is best to wash this off in the daily shower. You are encouraged to wash the catheter tubing to keep it clean, and the urine drainage bag also can be gotten wet.   - You may apply bacitracin or other antibiotic ointment twice daily to the tip of the penis/catheter insertion point to keep the area lubricated and more comfortable.            No driving or operating machinery        until the day after procedure            No lifting        No lifting over 15 lbs and no strenuous physical activity for 2 weeks            Supplies       List the supplies the pt needs to go home:  Waldron cares, secures, bags, etc.            Weight bearing status - As tolerated                 Follow-up Appointments     [facility]ult [facility] Follow-up and  recommended labs and tests       Follow-Up:   - Call your primary care provider to touch base regarding your recent admission.     - Follow up with the Urology Clinic for a nurse visit on Friday 3/10/18 to have your Man catheter removed.  The Urology Clinic should be calling you with this appointment.  Please take your Ciprofloxacin tablet just prior to this catheter-removal appointment  - Follow up with Dr. Vanegas on 3/14/18 in Urology Clinic for a postop check, to review your pathology and to make sure you are emptying your bladder all right    Phone numbers:  - Monday through Friday 8am to 4:30pm: call [phone].    - Nights or weekends, call [phone] and ask the  to page the urology resident on call.   - For emergencies, always call 911    Appointments on [place] and/or [facility] (with [facility] or [facility] provider or service). Call [phone] if you haven't heard regarding these appointments within 7 days of discharge.                  Your next 10 appointments already scheduled     Mar 14, 2018 11:00 AM CDT   (Arrive by 10:45 AM)   Post-Op with Tamiko Vanegas MD   [facility] ([facility])    [address]  4th Floor  [facility] [address] [phone]            Apr 06, 2018  1:00 PM CDT   (Arrive by 12:30 PM)   Return Visit with Oralia De La Torre NP   [facility] ([facility])    [address]  [address]  [facility] [id number] [phone]              Pending Results     Date and Time Order Name Status Description    3/5/2018 1340 Surgical pathology exam In process             Statement of Approval     Ordered          03/06/18 1101  I have reviewed and agree with all the recommendations and orders detailed in this document.  EFFECTIVE NOW     Approved and electronically signed by:  Leda Baxter PA             Admission Information      "Date & Time Provider Department Dept. Phone    3/5/2018 Tamiko Vanegas MD Unit 6D Observation [facility] [place] [phone]      Your Vitals Were     Blood Pressure Temperature Respirations Height Weight Pulse Oximetry    126/75 98.2  F (36.8  C) (Oral) 16 1.778 m (5' 10\") 77.1 kg (169 lb 15.6 oz) 97%    BMI (Body Mass Index)                   24.39 kg/m2           [employer] Information     [employer] lets you send messages to your doctor, view your test results, renew your prescriptions, schedule appointments and more. To sign up, go to [email or web address] . Click on \"Log in\" on the left side of the screen, which will take you to the Welcome page. Then click on \"Sign up Now\" on the right side of the page.     You will be asked to enter the access code listed below, as well as some personal information. Please follow the directions to create your username and password.     Your access code is: [email or web address]  Expires: 4/15/2018 11:20 AM     Your access code will  in 90 days. If you need help or a new code, please call your [facility] clinic or [phone].        Care EveryWhere ID     This is your Care EveryWhere ID. This could be used by other organizations to access your [facility] medical records  [id number]        Equal Access to Services     JOSS [facility] Hospital[facility] AH: Hadii cristine verdin [place], waaxda luqadaha, qaybta kaalmada robert, gibson green . So [facility] [phone].    ATENCIÓN: Si habla español, tiene a montague disposición servicios gratuitos de asistencia lingüística. Llame al [phone].    We comply with applicable federal civil rights laws and [place] laws. We do not discriminate on the basis of race, color, national origin, age, disability, sex, sexual orientation, or gender identity.               Review of your medicines      START taking        Dose / Directions    ciprofloxacin 250 MG tablet   Commonly known as:  CIPRO   Used for:  S/P TURP        Dose:  250 mg "   Take 1 tablet (250 mg) by mouth once for 1 dose Taken just prior to your catheter-removal appointment on Friday   Quantity:  1 tablet   Refills:  0         CONTINUE these medicines which may have CHANGED, or have new prescriptions. If we are uncertain of the size of tablets/capsules you have at home, strength may be listed as something that might have changed.        Dose / Directions    amLODIPine 2.5 MG tablet   Commonly known as:  NORVASC   This may have changed:  when to take this   Used for:  Renal hypertension        Dose:  2.5 mg   Take 1 tablet (2.5 mg) by mouth daily   Quantity:  90 tablet   Refills:  3       aspirin 81 MG tablet   This may have changed:  additional instructions   Used for:  S/P TURP        Dose:  81 mg   Take 1 tablet (81 mg) by mouth every morning - RESUME on 3/9/18 if urine remains clear   Quantity:  30 tablet   Refills:  0       finasteride 5 MG tablet   Commonly known as:  PROSCAR   This may have changed:  when to take this   Used for:  Benign prostatic hyperplasia with urinary retention        Dose:  5 mg   Take 1 tablet (5 mg) by mouth daily   Quantity:  90 tablet   Refills:  3         CONTINUE these medicines which have NOT CHANGED        Dose / Directions    calcium acetate 667 MG Caps capsule   Commonly known as:  PHOSLO   Used for:  Chronic kidney disease, unspecified CKD stage        Dose:  1334 mg   Take 2 capsules (1,334 mg) by mouth 3 times daily (with meals)   Quantity:  540 capsule   Refills:  3       darbepoetin william 100 MCG/0.5ML injection   Commonly known as:  ARANESP (ALBUMIN FREE)   Used for:  Anemia of chronic renal failure, stage 5 (H), CKD (chronic kidney disease) stage 5, GFR less than 15 ml/min (H)        Dose:  100 mcg   Inject 0.5 mLs (100 mcg) Subcutaneous every 14 days As needed for hgb<10g/dL.  If Hgb increases >1 point in 2 weeks (if blood transfusion given, use hgb PRIOR to this), SYSTOLIC BP > 180 mmHg or hgb>=10g/dL, HOLD DOSE. Dose must be within 1  week of Hgb.  Per anemia protocol with Cecilia De La Torre CNP   Quantity:  0.5 mL   Refills:  99       order for DME   Used for:  Olecranon bursitis of left elbow        Equipment being ordered: olecranon bursa brace   Quantity:  1 each   Refills:  0            Where to get your medicines      These medications were sent to [facility] - [place], [place] - [address]  [address], [facility] [address]     Phone:  [phone]     ciprofloxacin 250 MG tablet                Protect others around you: Learn how to safely use, store and throw away your medicines at www.disposemymeds.org.        ANTIBIOTIC INSTRUCTION     You've Been Prescribed an Antibiotic - Now What?  Your healthcare team thinks that you or your loved one might have an infection. Some infections can be treated with antibiotics, which are powerful, life-saving drugs. Like all medications, antibiotics have side effects and should only be used when necessary. There are some important things you should know about your antibiotic treatment.      Your healthcare team may run tests before you start taking an antibiotic.    Your team may take samples (e.g., from your blood, urine or other areas) to run tests to look for bacteria. These test can be important to determine if you need an antibiotic at all and, if you do, which antibiotic will work best.      Within a few days, your healthcare team might change or even stop your antibiotic.    Your team may start you on an antibiotic while they are working to find out what is making you sick.    Your team might change your antibiotic because test results show that a different antibiotic would be better to treat your infection.    In some cases, once your team has more information, they learn that you do not need an antibiotic at all. They may find out that you don't have an infection, or that the antibiotic you're taking won't work against your infection. For example, an infection caused  by a virus can't be treated with antibiotics. Staying on an antibiotic when you don't need it is more likely to be harmful than helpful.      You may experience side effects from your antibiotic.    Like all medications, antibiotics have side effects. Some of these can be serious.    Let you healthcare team know if you have any known allergies when you are admitted to the hospital.    One significant side effect of nearly all antibiotics is the risk of severe and sometimes deadly diarrhea caused by Clostridium difficile (C. Difficile). This occurs when a person takes antibiotics because some good germs are destroyed. Antibiotic use allows C. diificile to take over, putting patients at high risk for this serious infection.    As a patient or caregiver, it is important to understand your or your loved one's antibiotic treatment. It is especially important for caregivers to speak up when patients can't speak for themselves. Here are some important questions to ask your healthcare team.    What infection is this antibiotic treating and how do you know I have that infection?    What side effects might occur from this antibiotic?    How long will I need to take this antibiotic?    Is it safe to take this antibiotic with other medications or supplements (e.g., vitamins) that I am taking?     Are there any special directions I need to know about taking this antibiotic? For example, should I take it with food?    How will I be monitored to know whether my infection is responding to the antibiotic?    What tests may help to make sure the right antibiotic is prescribed for me?      Information provided by:  www.cdc.gov/getsmart  U.S. Department of Health and Human Services  Centers for disease Control and Prevention  National Center for Emerging and Zoonotic Infectious Diseases  Division of Healthcare Quality Promotion             Medication List: This is a list of all your medications and when to take them. Check marks below  indicate your daily home schedule. Keep this list as a reference.      Medications           Morning Afternoon Evening Bedtime As Needed    amLODIPine 2.5 MG tablet   Commonly known as:  NORVASC   Take 1 tablet (2.5 mg) by mouth daily   Last time this was given:  2.5 mg on 3/6/2018  7:42 AM                                aspirin 81 MG tablet   Take 1 tablet (81 mg) by mouth every morning - RESUME on 3/9/18 if urine remains clear                                calcium acetate 667 MG Caps capsule   Commonly known as:  PHOSLO   Take 2 capsules (1,334 mg) by mouth 3 times daily (with meals)   Last time this was given:  1,334 mg on 3/6/2018  7:42 AM                                ciprofloxacin 250 MG tablet   Commonly known as:  CIPRO   Take 1 tablet (250 mg) by mouth once for 1 dose Taken just prior to your catheter-removal appointment on Friday                                darbepoetin william 100 MCG/0.5ML injection   Commonly known as:  ARANESP (ALBUMIN FREE)   Inject 0.5 mLs (100 mcg) Subcutaneous every 14 days As needed for hgb<10g/dL.  If Hgb increases >1 point in 2 weeks (if blood transfusion given, use hgb PRIOR to this), SYSTOLIC BP > 180 mmHg or hgb>=10g/dL, HOLD DOSE. Dose must be within 1 week of Hgb.  Per anemia protocol with Cecilia De La Torre CNP                                finasteride 5 MG tablet   Commonly known as:  PROSCAR   Take 1 tablet (5 mg) by mouth daily                                order for DME   Equipment being ordered: olecranon bursa brace

## 2018-03-05 NOTE — ANESTHESIA POSTPROCEDURE EVALUATION
Patient: Dung Norton    Procedure(s):  Cystoscopy, Transurethral Resection of Prostate  - Wound Class: II-Clean Contaminated    Diagnosis:Urninary Retention   Diagnosis Additional Information: No value filed.    Anesthesia Type:  General, ETT    Note:  Anesthesia Post Evaluation    Patient location during evaluation: PACU and Bedside  Patient participation: Able to fully participate in evaluation  Level of consciousness: awake and alert  Pain management: adequate  Airway patency: patent  Cardiovascular status: acceptable  Respiratory status: acceptable  Hydration status: acceptable  PONV: none     Anesthetic complications: None          Last vitals:  Vitals:    03/05/18 1410 03/05/18 1415 03/05/18 1430   BP: 139/84 146/88 148/90   Resp: 18 18 16   Temp: 36.6  C (97.8  F)     SpO2: 100% 100% 100%         Electronically Signed By: Nhi Hilario MD  March 5, 2018  2:38 PM

## 2018-03-05 NOTE — BRIEF OP NOTE
[facility], [place]    Brief Operative Note    Pre-operative diagnosis: Urninary Retention   Post-operative diagnosis Same  Procedure: Procedure(s):  Cystoscopy, Transurethral Resection of Prostate  - Wound Class: II-Clean Contaminated  Surgeon: Surgeon(s) and Role:     * Tamiko Vanegas MD - Primary     * Kurt Thompson MD - Resident - Assisting  Anesthesia: General   Estimated blood loss: Less than 50 ml  Drains:22F 3 way catheter - CBI started  Specimens:   ID Type Source Tests Collected by Time Destination   A : prostate chips TPF GIVEN SPECIMEN Tissue Prostate SURGICAL PATHOLOGY EXAM Tamiko Vanegas MD 3/5/2018  1:39 PM      Findings:   Notable trilobar hypertrophy; Unremarkable resection with excellent hemostasis at end of case  Complications: None.  Implants: None.

## 2018-03-05 NOTE — ANESTHESIA CARE TRANSFER NOTE
Patient: Dung Norton    Procedure(s):  Cystoscopy, Transurethral Resection of Prostate  - Wound Class: II-Clean Contaminated    Diagnosis: Urninary Retention   Diagnosis Additional Information: No value filed.    Anesthesia Type:   General, ETT     Note:  Airway :Face Mask  Patient transferred to:PACU  Comments: Oropharynx suctioned. spont resp.  Extubated. Exchanging well. To PACU.  Report to RN at bedside. Pt awake and conversing. Handoff Report: Identifed the Patient, Identified the Reponsible Provider, Reviewed the pertinent medical history, Discussed the surgical course, Reviewed Intra-OP anesthesia mangement and issues during anesthesia, Set expectations for post-procedure period and Allowed opportunity for questions and acknowledgement of understanding      Vitals: (Last set prior to Anesthesia Care Transfer)    CRNA VITALS  3/5/2018 1335 - 3/5/2018 1416      3/5/2018             Pulse: 98    SpO2: 100 %    Resp Rate (set): 10                Electronically Signed By: COY Rankin CRNA  March 5, 2018  2:16 PM

## 2018-03-06 VITALS
WEIGHT: 169.97 LBS | HEIGHT: 70 IN | OXYGEN SATURATION: 97 % | SYSTOLIC BLOOD PRESSURE: 126 MMHG | RESPIRATION RATE: 16 BRPM | BODY MASS INDEX: 24.33 KG/M2 | TEMPERATURE: 98.2 F | DIASTOLIC BLOOD PRESSURE: 75 MMHG

## 2018-03-06 LAB
ANION GAP SERPL CALCULATED.3IONS-SCNC: 10 MMOL/L (ref 3–14)
BUN SERPL-MCNC: 98 MG/DL (ref 7–30)
CALCIUM SERPL-MCNC: 8.3 MG/DL (ref 8.5–10.1)
CHLORIDE SERPL-SCNC: 109 MMOL/L (ref 94–109)
CO2 SERPL-SCNC: 21 MMOL/L (ref 20–32)
COPATH REPORT: NORMAL
CREAT SERPL-MCNC: 5.26 MG/DL (ref 0.66–1.25)
DEPRECATED CALCIDIOL+CALCIFEROL SERPL-MC: 36 UG/L (ref 20–75)
ERYTHROCYTE [DISTWIDTH] IN BLOOD BY AUTOMATED COUNT: 15.7 % (ref 10–15)
GFR SERPL CREATININE-BSD FRML MDRD: 11 ML/MIN/1.7M2
GLUCOSE SERPL-MCNC: 100 MG/DL (ref 70–99)
HCT VFR BLD AUTO: 32.1 % (ref 40–53)
HGB BLD-MCNC: 9.9 G/DL (ref 13.3–17.7)
MCH RBC QN AUTO: 27.1 PG (ref 26.5–33)
MCHC RBC AUTO-ENTMCNC: 30.8 G/DL (ref 31.5–36.5)
MCV RBC AUTO: 88 FL (ref 78–100)
PLATELET # BLD AUTO: 196 10E9/L (ref 150–450)
POTASSIUM SERPL-SCNC: 4.6 MMOL/L (ref 3.4–5.3)
RBC # BLD AUTO: 3.65 10E12/L (ref 4.4–5.9)
SODIUM SERPL-SCNC: 141 MMOL/L (ref 133–144)
WBC # BLD AUTO: 9.1 10E9/L (ref 4–11)

## 2018-03-06 PROCEDURE — 85027 COMPLETE CBC AUTOMATED: CPT | Performed by: PHYSICIAN ASSISTANT

## 2018-03-06 PROCEDURE — 80048 BASIC METABOLIC PNL TOTAL CA: CPT | Performed by: PHYSICIAN ASSISTANT

## 2018-03-06 PROCEDURE — 25000132 ZZH RX MED GY IP 250 OP 250 PS 637: Mod: GY | Performed by: STUDENT IN AN ORGANIZED HEALTH CARE EDUCATION/TRAINING PROGRAM

## 2018-03-06 PROCEDURE — 36415 COLL VENOUS BLD VENIPUNCTURE: CPT | Performed by: PHYSICIAN ASSISTANT

## 2018-03-06 PROCEDURE — A9270 NON-COVERED ITEM OR SERVICE: HCPCS | Mod: GY | Performed by: STUDENT IN AN ORGANIZED HEALTH CARE EDUCATION/TRAINING PROGRAM

## 2018-03-06 RX ORDER — CIPROFLOXACIN 250 MG/1
250 TABLET, FILM COATED ORAL ONCE
Qty: 1 TABLET | Refills: 0 | Status: ON HOLD | OUTPATIENT
Start: 2018-03-06 | End: 2019-02-22

## 2018-03-06 RX ADMIN — CALCIUM ACETATE 1334 MG: 667 CAPSULE ORAL at 07:42

## 2018-03-06 RX ADMIN — AMLODIPINE BESYLATE 2.5 MG: 2.5 TABLET ORAL at 07:42

## 2018-03-06 NOTE — OP NOTE
OPERATIVE REPORT    PREOPERATIVE DIAGNOSIS:  Prostatic hypertrophy with urinary retention  POSTOPERATIVE DIAGNOSIS: Same as above    PROCEDURE PERFORMED: Transurethral resection of prostate.   ATTENDING SURGEON: Tamiko Vanegas MD  RESIDENT SURGEON: SHANNAN Tamayo MD    FINDINGS: Approximately 4 cm prostate with trilobar hypertrophy.   ANESTHESIA: General.   INTRAVENOUS FLUIDS: See anesthesia records  ESTIMATED BLOOD LOSS: Less than 50 ml   SPECIMENS: Prostate chips.   DRAINS: 22-Emirati 3-way catheter.     INDICATIONS FOR PROCEDURE: Dung Norton is a(n) 72 year old male who was seen in consultation for urinary retention. He had failed optimal medical therapy. Prior cystoscopy revealed trilobar hypertrophy. His most recent PSA is   PSA   Date Value Ref Range Status   07/13/2017 1.72 0 - 4 ug/L Final     Comment:     Assay Method:  Chemiluminescence using Siemens Vista analyzer   . After discussion of the risks, benefits and alternatives of the procedure, the patient agreed to proceed with transurethral resection of his prostate.     DESCRIPTION OF PROCEDURE: After verifying informed consent, the patient was taken to the operating room. Adequate anesthesia was induced and he was placed in dorsal lithotomy position; he was prepped and draped in standard sterile fashion. A timeout was performed to verify correct patient and procedure. Pneumo boots and perioperative antibiotics were in place before the procedure commenced. We began the procedure by inserting a 26-Emirati bipolar resectoscope with the visual obturator. The anterior urethra was unremarkable. Prostate measured approximately 4 cm. There was  trilobar hypertrophy. Upon gaining entrance into the bladder, complete survey was performed. This was significant for moderate trabeculation with scattered cellules.  We were able to see both ureteral orifices. There was a very prominent median lobe.     We started by resecting the median lobe of the prostate at the  bladder neck. We encountered technical difficulties with the bipolar loop and were forced to switch to monopolar.  Fluids were appropriately changed.  We then resected tissue from the right and left lobes out to the capsule from the neck of the bladder down to the verumontanum. We then returned to the residual median lobe tissue and resected back to the verumontanum.  We also resected a moderate amount of anterior tissue. Once we had completed our dissection, we used the Rosterbot evacuator to remove the prostate chips. We then reintroduced the resectoscope to ensure meticulous hemostasis of the prostatic fossa.     A 22-Irish 3-way catheter was placed, and 50 mL was instilled into the balloon. Catheter was hooked up to continuous irrigation which was clear;  the patient was awoken from anesthesia. He was then transferred to the recovery room in stable condition.     POSTOPERATIVE PLAN:   - Observe him overnight on continuous bladder irrigation.   - Plan to discharge to home with catheter in place  - Follow up in clinic for path review and TOV

## 2018-03-06 NOTE — PLAN OF CARE
Problem: Patient Care Overview  Goal: Plan of Care/Patient Progress Review  VSS.  A/O.  Wood catheter patent.  Urine light pink color without clots.  CBI infusing at slow rate.  Adequate UO.  See CBI output.  Denies pain.  LS crackles, pt former smoker.  Denies sob.  IS encouraged.  Pt declined to ambulate overnight.  Pt has gone home w/ wood in past and says he has changed standard bags to leg bags vice versa.  Pt states md plan is for pt to go home with owod catheter.  Will clarify order to remove wood at 0600 before removing.  Pt oriented to room and unit routine.  Will continue POC.    Pt ambulating at baseline w/ sba.

## 2018-03-06 NOTE — PROGRESS NOTES
Discharge instruction reviewed.  Man teaching completed. Patient verbalized understanding. PIV removed, patient transported to the main lobby via w/c. Family here to offer ride home.  ambulated to the main lobby. Patient discharged

## 2018-03-06 NOTE — PROGRESS NOTES
"Urology  Progress Note    No acute events overnight. Urine clear on slow CBI after going for a walk this morning. Feeling well today.     Exam  /75  Temp 98.2  F (36.8  C) (Oral)  Resp 16  Ht 1.778 m (5' 10\")  Wt 77.1 kg (169 lb 15.6 oz)  SpO2 97%  BMI 24.39 kg/m2  No acute distress  Unlabored breathing  Abdomen soft, nt/nd  Lower extremities non-edematous bilaterally  Wood with light pink urine in tubing    UOP: on CBI      Labs  CBC and BMP pending    Assessment/Plan  72 year old y/o male POD#0 s/p TURP.    -Anticipate discharge home today with wood in place  -RTC on Thursday or Friday for trial of void    Seen and examined with the chief resident. Will discuss with Dr. Vanegas.    Kai Harper, PGY-4  Urology Resident     Contacting the Urology Team     Please use the following job codes to reach the Urology Team. Note that you must use an in house phone and that job codes cannot receive text pages.     On weekdays, dial [phone] (or star-star-star [phone] on the new [employer] telephones) then [phone] to reach the Adult Urology resident or PA on call    On weekdays, dial [phone] (or star-star-star [phone] on the new [employer] telephones) then [phone] to reach the Pediatric Urology resident    On weeknights and weekends, dial [phone] (or star-star-star [phone] on the new [employer] telephones) then [phone] to reach the Urology resident on call (for both Adult and Pediatrics)            "

## 2018-03-06 NOTE — DISCHARGE SUMMARY
[facility] Discharge Summary    Patient: Dung Norton    MRN: [id number]   : 1945         Date of Admission:  3/5/2018   Date of Discharge::  3/6/2018  Admitting Physician:  Tamiko Vanegas MD  Discharge Physician:  Leda Baxter PA-C             Admission Diagnoses:   Urinary Retention   S/P transurethral resection of prostate    Past Medical History:   Diagnosis Date     BPH (benign prostatic hyperplasia)      Chronic kidney disease      HTN      Pulmonary nodules      Tobacco abuse              Discharge Diagnosis:   Urinary Retention   S/P transurethral resection of prostate  Gross hematuria    Past Medical History:   Diagnosis Date     BPH (benign prostatic hyperplasia)      Chronic kidney disease      HTN      Pulmonary nodules      Tobacco abuse             Procedures:   Procedure(s): 3/5/18 - COMBINED CYSTOSCOPY, TRANSURETHRAL RESECTION (TUR) PROSTATE   Dr. Tamiko Vanegas (Urology)            Medications Prior to Admission:     Prescriptions Prior to Admission   Medication Sig Dispense Refill Last Dose     amLODIPine (NORVASC) 2.5 MG tablet Take 1 tablet (2.5 mg) by mouth daily (Patient taking differently: Take 2.5 mg by mouth every morning ) 90 tablet 3 3/5/2018 at Unknown time     darbepoetin william (ARANESP, ALBUMIN FREE,) 100 MCG/0.5ML injection Inject 0.5 mLs (100 mcg) Subcutaneous every 14 days As needed for hgb<10g/dL.  If Hgb increases >1 point in 2 weeks (if blood transfusion given, use hgb PRIOR to this), SYSTOLIC BP > 180 mmHg or hgb>=10g/dL, HOLD DOSE. Dose must be within 1 week of Hgb.  Per anemia protocol with Cecilia Britt,CNP 0.5 mL 99 Past Month at Unknown time     calcium acetate (PHOSLO) 667 MG CAPS capsule Take 2 capsules (1,334 mg) by mouth 3 times daily (with meals) 540 capsule 3 3/5/2018 at 0700     finasteride (PROSCAR) 5 MG tablet Take 1 tablet (5 mg) by mouth daily (Patient taking differently: Take 5 mg by mouth every morning ) 90 tablet 3 3/5/2018 at  Unknown time     order for DME Equipment being ordered: olecranon bursa brace 1 each 0 Taking             Discharge Medications:     Current Discharge Medication List      CONTINUE these medications which have CHANGED    Details   aspirin 81 MG tablet Take 1 tablet (81 mg) by mouth every morning - RESUME on 3/9/18 if urine remains clear  Qty: 30 tablet, Refills: 0    Associated Diagnoses: S/P TURP         CONTINUE these medications which have NOT CHANGED    Details   amLODIPine (NORVASC) 2.5 MG tablet Take 1 tablet (2.5 mg) by mouth daily  Qty: 90 tablet, Refills: 3    Associated Diagnoses: Renal hypertension      darbepoetin william (ARANESP, ALBUMIN FREE,) 100 MCG/0.5ML injection Inject 0.5 mLs (100 mcg) Subcutaneous every 14 days As needed for hgb<10g/dL.  If Hgb increases >1 point in 2 weeks (if blood transfusion given, use hgb PRIOR to this), SYSTOLIC BP > 180 mmHg or hgb>=10g/dL, HOLD DOSE. Dose must be within 1 week of Hgb.  Per anemia protocol with Cecilia De La Torre,CNP  Qty: 0.5 mL, Refills: 99    Associated Diagnoses: Anemia of chronic renal failure, stage 5 (H); CKD (chronic kidney disease) stage 5, GFR less than 15 ml/min (H)      calcium acetate (PHOSLO) 667 MG CAPS capsule Take 2 capsules (1,334 mg) by mouth 3 times daily (with meals)  Qty: 540 capsule, Refills: 3    Comments: Dose increased 10/30  Associated Diagnoses: Chronic kidney disease, unspecified CKD stage      finasteride (PROSCAR) 5 MG tablet Take 1 tablet (5 mg) by mouth daily  Qty: 90 tablet, Refills: 3    Associated Diagnoses: Benign prostatic hyperplasia with urinary retention      order for DME Equipment being ordered: olecranon bursa brace  Qty: 1 each, Refills: 0    Associated Diagnoses: Olecranon bursitis of left elbow                   Consultations:   Consultation during this admission received: None          Brief History of Illness:   Reason for admission requiring a surgical or invasive procedure:   Urninary Retention    The patient  underwent the following procedure(s):   See above   There were no immediate complications during this procedure.    Please refer to the full operative summary for details.           Hospital Course:   The patient's hospital course was remarkable for post-operative gross hematuria.  The patient was kept overnight on continuous bladder irrigation but this was held the morning of POD#1.  Thereafter, urine remained watermelon even after ambulating. Dung Norton otherwise recovered as anticipated and experienced no post-operative complications.      On POD #1 he was ambulating without assitance, tolerating the discharge diet, had pain controlled with PO medications to go home with, and was requiring no IV medications or fluids. His creatinine (5.26mg/dL) and potassium (4.6mmoL/L) were stable.  His hemoglobin was 9.9g/dL, down from 10.7g/dL postop.  He was discharged home with his Man catheter.  He was given appropriate contact information, follow-up and instructions as seen below in the discharge paperwork.         Discharge Instructions and Follow-Up:   Diet:  - Regular/ home diet    Activity:   - No strenuous exercise for 4 weeks.   - No lifting, pushing, pulling more than 15 pounds for 4 weeks.   - Do not strain with bowel movements.   - Do not drive until you can press the brake pedal quickly and fully without pain.   - Do not operate a motor vehicle while taking narcotic pain medications.     Medications:   - PAIN: Oxycodone is a narcotic medication that can be prescribed for pain.  Narcotics will cause sleepiness and constipation and can become addictive, therefore it is best to stop or reduce them as soon as you can.  Any left over narcotics should be disposed of with an Authorized  for unneeded medications.  Contact your [facility]'s or [facility] household trash and recycling service to learn about medication disposal options and guidelines for your area.  If you decide to store this medication  at home it should be kept in a locked cabinet to prevent access to children or visitors. To reduce your narcotic use, take Tylenol (acetaminophen 625mg) every 4-6 hours.  Do not take more than 4,000mg of Tylenol (acetaminophen/ APAP) from all sources in any 24 hour period since this can cause liver damage.  Never drive, operate machinery or drink alcoholic beverages while you are taking narcotic pain medications.      - Narcotics can make you constipated. Take over the counter fiber (metamucil or benefiber) and stool softeners (miralax, docusate or senna) while using narcotic pain medications, but stop if you develop diarrhea.   - Continue your urinary medications (proscar/ finasteride) until advised to stop by your urologist.   - No driving or operating machinery while taking narcotic pain medications  - Your aspirin can be restarted in 3 days (3/9/18) if your urine remains clear.  If your urine is not clear, please call the Clinic for instructions regarding your aspirin.     Post-TURP Instructions:   - You may shower 24 hours after surgery  - Continue to take Proscar/ finasteride until your followup appointment with Dr. Vanegas.  At that time, she may ask you to discontinue the medication.    - Drink 2-3L of water a day to keep your urine clear to light pink in color. Some blood in your urine can be expected but should clear with reducing your activity and increasing your water consumption  - Call or return sooner than your regularly scheduled visit if you develop any of the following:  Fever (greater than 101.3F), uncontrolled pain, uncontrolled nausea or vomiting, or if you are passing large clots, are unable to void, or if your urine will not lighten in color with increase water intake.       WALDRON CARES:  - You are going home with a Waldron catheter which will remain in place until your follow-up appointment. This catheter will not generally restrict your activities, but you should be careful if you are driving  such that it does not become a distraction.    - Your nurse or  will provide written catheter care instructions for you to take home.  - Protect the catheter and treat it like an extension of your body - keep it secured to your leg and do not let it get caught, snagged, or tugged. The catheter tubing should remain tension-free and without kinks. In order to drain best, the tubing and bag should always be lower than your body.  - You may notice a little blood, small amount of white discharge, or caking at the catheter insertion site. This is normal but it can irritate the skin so it is best to wash this off in the daily shower. You are encouraged to wash the catheter tubing to keep it clean, and the urine drainage bag also can be gotten wet.   - You may apply bacitracin or other antibiotic ointment twice daily to the tip of the penis/catheter insertion point to keep the area lubricated and more comfortable.      Follow-Up:   - Call your primary care provider to touch base regarding your recent admission.     - Follow up with the Urology Clinic for a nurse visit on Friday 3/10/18 to have your Man catheter removed.  The Urology Clinic should be calling you with this appointment.  Please take your Ciprofloxacin tablet just prior to this catheter-removal appointment  - Follow up with Dr. Vanegas on 3/14/18 in Urology Clinic for a postop check, to review your pathology and to make sure you are emptying your bladder all right    Phone numbers:  - Monday through Friday 8am to 4:30pm: call [phone].    - Nights or weekends, call [phone] and ask the  to page the urology resident on call.   - For emergencies, always call [phone]         Discharge Disposition:   Discharged to home      Attestation: I have reviewed today's vital signs, notes, medications, labs and imaging.    Anaid Baxter PA-C  Urology Physician Assistant  Personal Pager: [phone]    Please call Job Code:   [phone] to reach the  Urology resident or PA on call - Weekdays  [phone] to reach the Urology resident or PA on call - Weeknights and weekends    March 6, 2018

## 2018-03-06 NOTE — PROGRESS NOTES
Pt A&O x 3, denies any pain. Wood Cath patent,urine cherry colored. Pt walking, tolerating reg diet. Plan to d/c home today with wood. Will continue to monitor

## 2018-03-06 NOTE — PLAN OF CARE
Problem: Patient Care Overview  Goal: Plan of Care/Patient Progress Review  Pt A&O x4. VSS on RA. Pt on Capno. Pt on continuous bladder irrigation. Fluid running light pink with no clotting. Pt has fistula in left arm. New irrigation bags hung. Pt tolerating well. Denies pain. Will continue to monitor

## 2018-03-06 NOTE — TELEPHONE ENCOUNTER
Anemia Management Note  SUBJECTIVE/OBJECTIVE:  Referred by Cecilia De La Torre on 9/11/2017  Primary Diagnosis: Anemia in Chronic Kidney Disease (N18.5, D63.1)     Secondary Diagnosis:  Chronic Kidney Disease, Stage 5 (N18.5)  Hgb goal range:  9-10  Epo/Darbo: Aranesp 100 mcg every 14 days As needed for hgb<10g/dL  RX expires on 12/5/2018  Iron regimen:  None  Labs exp: 9/12/2018  **Provide phone number for [facility] [phone] and instruction to schedule ancillary visit for aranesp injection. Send message to care team via pool -  RN TRIAGE POOL as a telephone encounter**      Contact: **AUTH TO DISCUSS**Tereza Norton(daughter) [phone], Rabia Jazzy (daughter)[phone]                                        Ok to leave message regarding labs and appts per consent to communicate dated 12/12/17                              OK to speak with daughters Tereza and Rabia regarding Dung's anemia care plan per consent to communicate dated 12/12/17    Anemia Latest Ref Rng & Units 2/5/2018 2/12/2018 2/19/2018 2/26/2018 3/2/2018 3/5/2018 3/5/2018   EARLENE Dose - - - - - - - -   Hemoglobin 13.3 - 17.7 g/dL 10.6(L) 10.8(L) 10.7(L) 10.8(L) 10.7(L) 11.0(L) 10.7(L)   TSAT 15 - 46 % - - - - 23 - -   Ferritin 26 - 388 ng/mL - - - - 427(H) - -     BP Readings from Last 3 Encounters:   03/05/18 148/86   03/02/18 160/88   03/02/18 144/75     Wt Readings from Last 2 Encounters:   03/05/18 169 lb 15.6 oz (77.1 kg)   03/02/18 173 lb 4.5 oz (78.6 kg)     Patient is in the hospital - same day procedure? TURP done as same day 30/05/2018 VERONICA    Labs are drawn every Monday    ASSESSMENT:  Hgb:Above goal - recommend hold dose  TSat: not at goal of >30% Ferritin: At goal (>100ng/mL)    PLAN:  Hold Aranesp and RTC for hgb then aranesp if needed in 2 week(s)    Orders needed to be renewed (for next follow-up date) in EPIC: None    Iron labs due:  4/2/18    Plan discussed with:  No call made  Plan provided by:  Judith  Reviewed 03/06/2018  VERONICA  NEXT FOLLOW-UP DATE:  3/19/18    Anemia Management Service  Daina Mock PharmD and Malina Hussein CPhT  Phone: [phone]  Fax: [phone]

## 2018-03-08 ENCOUNTER — TELEPHONE (OUTPATIENT)
Dept: UROLOGY | Facility: CLINIC | Age: 73
End: 2018-03-08

## 2018-03-08 ENCOUNTER — PRE VISIT (OUTPATIENT)
Dept: UROLOGY | Facility: CLINIC | Age: 73
End: 2018-03-08

## 2018-03-08 ENCOUNTER — CARE COORDINATION (OUTPATIENT)
Dept: UROLOGY | Facility: CLINIC | Age: 73
End: 2018-03-08

## 2018-03-08 NOTE — PROGRESS NOTES
Dugn Norton,    How are you doing after your surgical procedure last monday?    Any fever, chills, nausea or vomiting? no  How is your appetite? good  Are you drinking enough fluids? yes  Have you had a bowel movement since you have been home? yes  Any problems with your catheter? No, he is having it removed tomorrow  Is your urine clear yellow? no If not, what color is it? pink  How does your incision look? na    How is your pain? Denies pain today.       Do you have any questions or concerns? no    We have your post-operative appointment scheduled tomorrow at 1:40 for a TOV    Please feel free to reply via [employer] or contact the clinic.  [phone], option #3 to speak to the nurse

## 2018-03-08 NOTE — TELEPHONE ENCOUNTER
----- Message from Mady Ortiz sent at 3/8/2018 12:04 PM CST -----  Regarding: Pt calling to be seen tomorrow for catheter removal  Contact: [phone]  Pt calling to be seen tomorrow for catheter removal. Per his AVS, he was supposed to have an appt already scheduled for Friday for a nurse visit and nothing was scheduled. First available isn't until 3/16.  Pt would appreciate a call back if he can be fit in tomorrow.  Pt can be reached at [phone].    Thank you!  ~Mady    Please DO NOT send this message and/or reply back to sender. Call Center Representatives DO NOT respond to messages.

## 2018-03-09 ENCOUNTER — PRE VISIT (OUTPATIENT)
Dept: UROLOGY | Facility: CLINIC | Age: 73
End: 2018-03-09

## 2018-03-09 ENCOUNTER — ALLIED HEALTH/NURSE VISIT (OUTPATIENT)
Dept: UROLOGY | Facility: CLINIC | Age: 73
End: 2018-03-09
Payer: COMMERCIAL

## 2018-03-09 VITALS
HEART RATE: 92 BPM | BODY MASS INDEX: 24.22 KG/M2 | WEIGHT: 169.2 LBS | HEIGHT: 70 IN | DIASTOLIC BLOOD PRESSURE: 83 MMHG | SYSTOLIC BLOOD PRESSURE: 148 MMHG

## 2018-03-09 DIAGNOSIS — R33.9 URINARY RETENTION: Primary | ICD-10-CM

## 2018-03-09 RX ORDER — FUROSEMIDE 20 MG
TABLET ORAL
COMMUNITY
Start: 2018-02-06 | End: 2018-04-13

## 2018-03-09 ASSESSMENT — PAIN SCALES - GENERAL: PAINLEVEL: NO PAIN (0)

## 2018-03-09 NOTE — TELEPHONE ENCOUNTER
Post op with flow/pvr.  S/P TURP on 3-5-18.  Records available in [facility].

## 2018-03-09 NOTE — PATIENT INSTRUCTIONS
It was a pleasure meeting with you today.  Thank you for allowing me and my team the privilege of caring for you today.  YOU are the reason we are here, and I truly hope we provided you with the excellent service you deserve.  Please let us know if there is anything else we can do for you so that we can be sure you are leaving completely satisfied with your care experience.       Jabier Garay LPN

## 2018-03-09 NOTE — MR AVS SNAPSHOT
After Visit Summary   3/9/2018    Dung Norton    MRN: [id number]           Patient Information     Date Of Birth          1945        Visit Information        Provider Department      3/9/2018 1:40 PM Nurse,  [facility] Urology and [facility]        Today's Diagnoses     Urinary retention    -  1      Care Instructions    It was a pleasure meeting with you today.  Thank you for allowing me and my team the privilege of caring for you today.  YOU are the reason we are here, and I truly hope we provided you with the excellent service you deserve.  Please let us know if there is anything else we can do for you so that we can be sure you are leaving completely satisfied with your care experience.       Jabier Garay LPN          Follow-ups after your visit        Your next 10 appointments already scheduled     Mar 14, 2018 11:00 AM CDT   (Arrive by 10:45 AM)   Post-Op with Tamiko Vanegas MD   [facility] Urology and [facility]rolog[facility] ([facility] [place])    [address]  4th [address]  [place] [address] [phone]            Apr 13, 2018  1:00 PM CDT   (Arrive by 12:30 PM)   Return Visit with Oralia De La Torre NP   [facility] Nephrology ([facility])    [address]  [address]  [place] [address] [phone]              Who to contact     Please call your clinic at [phone] to:    Ask questions about your health    Make or cancel appointments    Discuss your medicines    Learn about your test results    Speak to your doctor            Additional Information About Your Visit        MyChart Information     [employer] is an electronic gateway that provides easy, online access to your medical records. With [employer], you can request a clinic appointment, read your test results, renew a prescription or communicate with your care team.     To sign up  "for MyChart visit the website at [email or web address]   You will be asked to enter the access code listed below, as well as some personal information. Please follow the directions to create your username and password.     Your access code is: [email or web address]  Expires: 4/15/2018 11:20 AM     Your access code will  in 90 days. If you need help or a new code, please contact your [facility] or call [phone] for assistance.        Care EveryWhere ID     This is your Care EveryWhere ID. This could be used by other organizations to access your [facility] medical records  [id number]        Your Vitals Were     Pulse Height BMI (Body Mass Index)             92 1.778 m (5' 10\") 24.28 kg/m2          Blood Pressure from Last 3 Encounters:   18 148/83   18 126/75   18 160/88    Weight from Last 3 Encounters:   18 76.7 kg (169 lb 3.2 oz)   18 77.1 kg (169 lb 15.6 oz)   18 78.6 kg (173 lb 4.5 oz)              Today, you had the following     No orders found for display         Today's Medication Changes          These changes are accurate as of 3/9/18  2:41 PM.  If you have any questions, ask your nurse or doctor.               These medicines have changed or have updated prescriptions.        Dose/Directions    amLODIPine 2.5 MG tablet   Commonly known as:  NORVASC   This may have changed:  when to take this   Used for:  Renal hypertension        Dose:  2.5 mg   Take 1 tablet (2.5 mg) by mouth daily   Quantity:  90 tablet   Refills:  3       finasteride 5 MG tablet   Commonly known as:  PROSCAR   This may have changed:  when to take this   Used for:  Benign prostatic hyperplasia with urinary retention        Dose:  5 mg   Take 1 tablet (5 mg) by mouth daily   Quantity:  90 tablet   Refills:  3                Primary Care Provider Office Phone # Fax #    Haley Baker [phone] [address]  [place] [address]      "   Equal Access to Services     [facility] Center[facility]MPHREY : Hadii aad ku hadkelleycallie Kalliechalo, wakathleenda luqceliha, qachecota teresanestorgibson ndiaye. So [facility] [phone].    ATENCIÓN: Si habla katieañol, tiene a montague disposición servicios gratuitos de asistencia lingüística. Noéame al [phone].    We comply with applicable federal civil rights laws and [place] laws. We do not discriminate on the basis of race, color, national origin, age, disability, sex, sexual orientation, or gender identity.            Thank you!     Thank you for choosing [facility] UROLOGY AND [facility] FOR PROSTATE AND UROLOGIC CANCERS  for your care. Our goal is always to provide you with excellent care. Hearing back from our patients is one way we can continue to improve our services. Please take a few minutes to complete the written survey that you may receive in the mail after your visit with us. Thank you!             Your Updated Medication List - Protect others around you: Learn how to safely use, store and throw away your medicines at www.disposemymeds.org.          This list is accurate as of 3/9/18  2:41 PM.  Always use your most recent med list.                   Brand Name Dispense Instructions for use Diagnosis    amLODIPine 2.5 MG tablet    NORVASC    90 tablet    Take 1 tablet (2.5 mg) by mouth daily    Renal hypertension       aspirin 81 MG tablet     30 tablet    Take 1 tablet (81 mg) by mouth every morning - RESUME on 3/9/18 if urine remains clear    S/P TURP       calcium acetate 667 MG Caps capsule    PHOSLO    540 capsule    Take 2 capsules (1,334 mg) by mouth 3 times daily (with meals)    Chronic kidney disease, unspecified CKD stage       darbepoetin william 100 MCG/0.5ML injection    ARANESP (ALBUMIN FREE)    0.5 mL    Inject 0.5 mLs (100 mcg) Subcutaneous every 14 days As needed for hgb<10g/dL.  If Hgb increases >1 point in 2 weeks (if blood transfusion given, use hgb PRIOR to this), SYSTOLIC BP > 180 mmHg or  hgb>=10g/dL, HOLD DOSE. Dose must be within 1 week of Hgb.  Per anemia protocol with Cecilia De La Torre CNP    Anemia of chronic renal failure, stage 5 (H), CKD (chronic kidney disease) stage 5, GFR less than 15 ml/min (H)       finasteride 5 MG tablet    PROSCAR    90 tablet    Take 1 tablet (5 mg) by mouth daily    Benign prostatic hyperplasia with urinary retention       furosemide 20 MG tablet    LASIX          order for DME     1 each    Equipment being ordered: olecranon bursa brace    Olecranon bursitis of left elbow

## 2018-03-09 NOTE — NURSING NOTE
Dung Norton comes into clinic today at the request of Anaid Baxter Ordering Provider for TOV (trial of void).    Dung Norton presented today for a trial of void.   Approximately 500 mL of normal saline instilled into bladder via catheter.  Patient stated He had urge to urinate and catheter was removed without difficulty.  Patient was given a urinal to measure urine output.  Patient voided approximately 125 mL of pink urine.   Patient did tolerate procedure well.   Ciprofloxacin 500 mg given per protocol.  Teaching done with patient verbally as where to call or go if pain, fever, or unable to urinate post catheter removal.  Patient demonstrated successful CIC and per Anaid he is to resume CIC as before.  Will follow up in clinic with Dr. Vanegas as scheduled.    ndc (01 [id number] ; lot [id number] expiration 12/2018    Jabier Garay LPN  3/9/2018  2:38 PM        This service provided today was under the supervising provider of the day Dr. Vanegas, who was available if needed.    Jabier Garay

## 2018-03-12 ENCOUNTER — ALLIED HEALTH/NURSE VISIT (OUTPATIENT)
Dept: FAMILY MEDICINE | Facility: CLINIC | Age: 73
End: 2018-03-12

## 2018-03-12 ENCOUNTER — TELEPHONE (OUTPATIENT)
Dept: FAMILY MEDICINE | Facility: CLINIC | Age: 73
End: 2018-03-12

## 2018-03-12 VITALS — DIASTOLIC BLOOD PRESSURE: 84 MMHG | HEART RATE: 80 BPM | SYSTOLIC BLOOD PRESSURE: 144 MMHG

## 2018-03-12 DIAGNOSIS — I10 HYPERTENSION GOAL BP (BLOOD PRESSURE) < 140/90: Primary | ICD-10-CM

## 2018-03-12 DIAGNOSIS — R79.89 ELEVATED SERUM CREATININE: ICD-10-CM

## 2018-03-12 DIAGNOSIS — N18.5 CKD (CHRONIC KIDNEY DISEASE) STAGE 5, GFR LESS THAN 15 ML/MIN (H): ICD-10-CM

## 2018-03-12 DIAGNOSIS — D63.1 ANEMIA OF CHRONIC RENAL FAILURE, STAGE 5 (H): ICD-10-CM

## 2018-03-12 DIAGNOSIS — N18.5 ANEMIA OF CHRONIC RENAL FAILURE, STAGE 5 (H): ICD-10-CM

## 2018-03-12 LAB
ALBUMIN SERPL-MCNC: 3.2 G/DL (ref 3.4–5)
ANION GAP SERPL CALCULATED.3IONS-SCNC: 12 MMOL/L (ref 3–14)
BUN SERPL-MCNC: 108 MG/DL (ref 7–30)
CALCIUM SERPL-MCNC: 8.8 MG/DL (ref 8.5–10.1)
CHLORIDE SERPL-SCNC: 109 MMOL/L (ref 94–109)
CO2 SERPL-SCNC: 21 MMOL/L (ref 20–32)
CREAT SERPL-MCNC: 5.54 MG/DL (ref 0.66–1.25)
GFR SERPL CREATININE-BSD FRML MDRD: 10 ML/MIN/1.7M2
GLUCOSE SERPL-MCNC: 73 MG/DL (ref 70–99)
HCT VFR BLD AUTO: 32.8 % (ref 40–53)
HGB BLD-MCNC: 10.1 G/DL (ref 13.3–17.7)
PHOSPHATE SERPL-MCNC: 5.5 MG/DL (ref 2.5–4.5)
POTASSIUM SERPL-SCNC: 4.4 MMOL/L (ref 3.4–5.3)
SODIUM SERPL-SCNC: 142 MMOL/L (ref 133–144)

## 2018-03-12 PROCEDURE — 85018 HEMOGLOBIN: CPT

## 2018-03-12 PROCEDURE — 85014 HEMATOCRIT: CPT

## 2018-03-12 PROCEDURE — 36415 COLL VENOUS BLD VENIPUNCTURE: CPT

## 2018-03-12 PROCEDURE — 80069 RENAL FUNCTION PANEL: CPT

## 2018-03-12 PROCEDURE — 99207 ZZC NO CHARGE NURSE ONLY: CPT | Performed by: FAMILY MEDICINE

## 2018-03-12 NOTE — PROGRESS NOTES
Dung Norton is enrolled/participating in the retail pharmacy Blood Pressure Goals Achievement Program (BPGAP).  Dung Norton was evaluated at [facility] on March 12, 2018 at which time his blood pressure was:    BP Readings from Last 3 Encounters:   03/12/18 144/84   03/09/18 148/83   03/05/18 126/75     Reviewed lifestyle modifications for blood pressure control and reduction: including making healthy food choices, managing weight, getting regular exercise, smoking cessation, reducing alcohol consumption, monitoring blood pressure regularly.     Dung Norton is not experiencing symptoms.    Follow-Up: BP is not at goal of < 140/90mmHg (patient 18+ years of age with or without diabetes), Recommended follow-up with PCP.  Routing to PCP for further review.    Recommendation to Provider: Patient had lab appointment and couldn't wait for second reading.     Dung Norton was evaluated for enrollment into the PGEN study today.    Patient eligible for enrollment:  No  Patient interested in enrollment:  No    Completed by: Thank you,  Cheri Winslow, PharmD  [facility]  [phone]

## 2018-03-12 NOTE — MR AVS SNAPSHOT
After Visit Summary   3/12/2018    Dung Norton    MRN: [id number]           Patient Information     Date Of Birth          1945        Visit Information        Provider Department      3/12/2018 1:13 PM Haley Baker MD [facility]        Today's Diagnoses     Hypertension goal BP (blood pressure) < 140/90    -  1       Follow-ups after your visit        Your next 10 appointments already scheduled     Mar 14, 2018 11:15 AM CDT   (Arrive by 11:00 AM)   Post-Op with Tamiko Vanegas MD   [facility] ([facility])    [address]  4th [address]  [place] [facility] [id number] [phone]            Mar 19, 2018 10:15 AM CDT   LAB with  LAB   [facility] ([facility])    [address] [place] [id number] [phone]           Please do not eat 10-12 hours before your appointment if you are coming in fasting for labs on lipids, cholesterol, or glucose (sugar). This does not apply to pregnant women. Water, hot tea and black coffee (with nothing added) are okay. Do not drink other fluids, diet soda or chew gum.            Apr 13, 2018  1:00 PM CDT   (Arrive by 12:30 PM)   Return Visit with Oralia De La Torre NP   [facility] ([facility])    [address]  [address]  [place] [id number] [phone]              Who to contact     If you have questions or need follow up information about today's clinic visit or your schedule please contact [facility] [place] [facility] directly at [phone].  Normal or non-critical lab and imaging results will be communicated to you by MyChart, letter or phone within 4 business days after the clinic has received the results. If you do not hear from us within 7 days, please contact the clinic through MyChart or phone. If you have a critical or abnormal lab result, we will notify you by  "phone as soon as possible.  Submit refill requests through SLID or call your pharmacy and they will forward the refill request to us. Please allow 3 business days for your refill to be completed.          Additional Information About Your Visit        SLID Information     SLID lets you send messages to your doctor, view your test results, renew your prescriptions, schedule appointments and more. To sign up, go to [email or web address] . Click on \"Log in\" on the left side of the screen, which will take you to the Welcome page. Then click on \"Sign up Now\" on the right side of the page.     You will be asked to enter the access code listed below, as well as some personal information. Please follow the directions to create your username and password.     Your access code is: [email or web address]  Expires: 4/15/2018 12:20 PM     Your access code will  in 90 days. If you need help or a new code, please call your [facility] clinic or [phone].        Care EveryWhere ID     This is your Care EveryWhere ID. This could be used by other organizations to access your [facility] medical records  [id number]        Your Vitals Were     Pulse                   80            Blood Pressure from Last 3 Encounters:   18 144/84   18 148/83   18 126/75    Weight from Last 3 Encounters:   18 169 lb 3.2 oz (76.7 kg)   18 169 lb 15.6 oz (77.1 kg)   18 173 lb 4.5 oz (78.6 kg)              Today, you had the following     No orders found for display         Today's Medication Changes          These changes are accurate as of 3/12/18 11:59 PM.  If you have any questions, ask your nurse or doctor.               These medicines have changed or have updated prescriptions.        Dose/Directions    amLODIPine 2.5 MG tablet   Commonly known as:  NORVASC   This may have changed:  when to take this   Used for:  Renal hypertension        Dose:  2.5 mg   Take 1 tablet (2.5 mg) by mouth daily   Quantity:  " 90 tablet   Refills:  3       finasteride 5 MG tablet   Commonly known as:  PROSCAR   This may have changed:  when to take this   Used for:  Benign prostatic hyperplasia with urinary retention        Dose:  5 mg   Take 1 tablet (5 mg) by mouth daily   Quantity:  90 tablet   Refills:  3                Primary Care Provider Office Phone # Fax #    Haley Baker [phone] [address] [address]        Equal Access to Services     [facility] HospitalHUMPHREY : Hadii aad ku hadasho Soomaali, waaxda luqadaha, qaybta kaalmada adeegyada, waxay idiin hayaan adeeg beatajenniferkarsten green . So [facility] [phone].    ATENCIÓN: Si betty hernandez, tiene a montague disposición servicios gratuitos de asistencia lingüística. Ll[facility] [phone].    We comply with applicable federal civil rights laws and [place] laws. We do not discriminate on the basis of race, color, national origin, age, disability, sex, sexual orientation, or gender identity.            Thank you!     Thank you for choosing [facility]  for your care. Our goal is always to provide you with excellent care. Hearing back from our patients is one way we can continue to improve our services. Please take a few minutes to complete the written survey that you may receive in the mail after your visit with us. Thank you!             Your Updated Medication List - Protect others around you: Learn how to safely use, store and throw away your medicines at www.disposemymeds.org.          This list is accurate as of 3/12/18 11:59 PM.  Always use your most recent med list.                   Brand Name Dispense Instructions for use Diagnosis    amLODIPine 2.5 MG tablet    NORVASC    90 tablet    Take 1 tablet (2.5 mg) by mouth daily    Renal hypertension       aspirin 81 MG tablet     30 tablet    Take 1 tablet (81 mg) by mouth every morning - RESUME on 3/9/18 if urine remains clear    S/P TURP       calcium acetate 667 MG Caps capsule    PHOSLO    540  capsule    Take 2 capsules (1,334 mg) by mouth 3 times daily (with meals)    Chronic kidney disease, unspecified CKD stage       darbepoetin william 100 MCG/0.5ML injection    ARANESP (ALBUMIN FREE)    0.5 mL    Inject 0.5 mLs (100 mcg) Subcutaneous every 14 days As needed for hgb<10g/dL.  If Hgb increases >1 point in 2 weeks (if blood transfusion given, use hgb PRIOR to this), SYSTOLIC BP > 180 mmHg or hgb>=10g/dL, HOLD DOSE. Dose must be within 1 week of Hgb.  Per anemia protocol with Cecilia De La Torre CNP    Anemia of chronic renal failure, stage 5 (H), CKD (chronic kidney disease) stage 5, GFR less than 15 ml/min (H)       finasteride 5 MG tablet    PROSCAR    90 tablet    Take 1 tablet (5 mg) by mouth daily    Benign prostatic hyperplasia with urinary retention       furosemide 20 MG tablet    LASIX          order for DME     1 each    Equipment being ordered: olecranon bursa brace    Olecranon bursitis of left elbow

## 2018-03-13 NOTE — TELEPHONE ENCOUNTER
I was contacted for critically elevated BUN/Cr.  I contacted patient to assess condition.  He states he feels well overall.  He will follow-up with his urologist in 2 days.    Donis Perry MD

## 2018-03-13 NOTE — TELEPHONE ENCOUNTER
Patient scheduled for Urology 3/14/2018 11:15 am Post-op  Tamiko Vanegas MD   Urology 3/14/2018  [facility]

## 2018-03-14 ENCOUNTER — OFFICE VISIT (OUTPATIENT)
Dept: UROLOGY | Facility: CLINIC | Age: 73
End: 2018-03-14
Payer: COMMERCIAL

## 2018-03-14 VITALS
BODY MASS INDEX: 24.54 KG/M2 | HEART RATE: 79 BPM | SYSTOLIC BLOOD PRESSURE: 151 MMHG | DIASTOLIC BLOOD PRESSURE: 89 MMHG | WEIGHT: 171 LBS | RESPIRATION RATE: 16 BRPM

## 2018-03-14 DIAGNOSIS — R33.9 URINARY RETENTION: Primary | ICD-10-CM

## 2018-03-14 ASSESSMENT — PAIN SCALES - GENERAL: PAINLEVEL: NO PAIN (0)

## 2018-03-14 NOTE — PATIENT INSTRUCTIONS
Please follow up in 6 weeks with a flow/PVR  Please come to this appointment with a full bladder for a flow/PVR    It was a pleasure meeting with you today.  Thank you for allowing me and my team the privilege of caring for you today.  YOU are the reason we are here, and I truly hope we provided you with the excellent service you deserve.  Please let us know if there is anything else we can do for you so that we can be sure you are leaving completely satisfied with your care experience.

## 2018-03-14 NOTE — LETTER
3/14/2018       RE: Dung Norton  [address] [place] [address]     Dear Colleague,    Thank you for referring your patient, Dung Norton, to the [facility] AND [facility]ST FOR PROSTATE AND UROLOGIC CANCERS at [facility]. Please see a copy of my visit note below.    UROLOGIC DIAGNOSES:       CURRENT INTERVENTIONS:       HISTORY:     Patient with history of AUR and hematuria requiring cystoscopy and clot evacuation. Had failed TOV and has been performing CIC.   Patient states he is performing CIC regularly about 4x per trady.   He would like to discuss further management of urinary retention.     Patient is currently s/p TURP. He was able to void a small amount at TOV. He states he has been voiding several times per day but continues to perform CIC (though fewer times per day than previous).   We discussed his voiding pattern and discussed some continued CIC. Noted that he may have some continued improvement but will continue CIC and continue to monitor outputs.       PAST MEDICAL HISTORY:   Past Medical History:   Diagnosis Date     BPH (benign prostatic hyperplasia)      Chronic kidney disease      HTN      Pulmonary nodules      Tobacco abuse        PAST SURGICAL HISTORY:   Past Surgical History:   Procedure Laterality Date     APPENDECTOMY  AGE 6     CREATE FISTULA ARTERIOVENOUS UPPER EXTREMITY Left 11/17/2017    Procedure: CREATE FISTULA ARTERIOVENOUS UPPER EXTREMITY;  Left Cephalic Vein To Radial Artery Arteriovenous Fistula Creation, Anesthesia Block;  Surgeon: Eduardo Pabon MD;  Location: [facility]     CYSTOSCOPY, FULGURATE BLEEDERS, EVACUATE CLOT(S), COMBINED N/A 7/16/2017    Procedure: COMBINED CYSTOSCOPY, FULGURATE BLEEDERS, EVACUATE CLOT(S);  Cystoscopy clot evacuation, bladder biopsy and fulguration (per surgeons note) NERVE BLOCK PERIPHERAL;  Surgeon: Tamiko Vanegas MD;  Location: [facility] OR     CYSTOSCOPY, TRANSURETHRAL RESECTION (TUR)  PROSTATE, COMBINED N/A 3/5/2018    Procedure: COMBINED CYSTOSCOPY, TRANSURETHRAL RESECTION (TUR) PROSTATE;  Cystoscopy, Transurethral Resection of Prostate ;  Surgeon: Tamiko Vanegas MD;  Location: [facility] OR     SINUS SURGERY  AGE 6     TONSILLECTOMY      CHILDHOOD       FAMILY HISTORY:   Family History   Problem Relation Age of Onset     C.A.D. Mother      d age 67     Vision Loss Father      Hearing Loss Sister      DIABETES Sister      CANCER No family hx of        SOCIAL HISTORY:   Social History   Substance Use Topics     Smoking status: Former Smoker     Packs/day: 1.00     Years: 53.00     Types: Cigarettes     Quit date: 6/12/2017     Smokeless tobacco: Never Used     Alcohol use No       Current Outpatient Prescriptions   Medication     furosemide (LASIX) 20 MG tablet     aspirin 81 MG tablet     order for DME     amLODIPine (NORVASC) 2.5 MG tablet     darbepoetin william (ARANESP, ALBUMIN FREE,) 100 MCG/0.5ML injection     calcium acetate (PHOSLO) 667 MG CAPS capsule     finasteride (PROSCAR) 5 MG tablet     No current facility-administered medications for this visit.          PHYSICAL EXAM:    /89 (BP Location: Right arm, Patient Position: Sitting, Cuff Size: Adult Regular)  Pulse 79  Resp 16  Wt 77.6 kg (171 lb)  BMI 24.54 kg/m2    HEENT: Normocephalic and atraumatic   Cardiac: Not done  Back/Flank: Not done  CNS/PNS: Not done  Respiratory: Normal non-labored breathing  Abdomen: Soft nontender and nondistended  Peripheral Vascular: Not done  Mental Status: Not done    Penis: Not done  Scrotal Skin: Not done  Testicles: Not done  Epididymis: Not done  Digital Rectal Exam:     Cystoscopy: Not done    Imaging: None    Urinalysis: UA RESULTS:  Recent Labs   Lab Test  08/29/17   0235   01/15/14   0936   COLOR  Light Yellow   < >  Yellow   APPEARANCE  Cloudy   < >  Clear   URINEGLC  Negative   < >  Negative   URINEBILI  Negative   < >  Negative   URINEKETONE  Negative   < >  Negative   SG  1.014    < >  1.020   UBLD  Moderate*   < >  Small*   URINEPH  6.0   < >  6.0   PROTEIN  100*   < >  Negative   UROBILINOGEN   --    --   0.2   NITRITE  Negative   < >  Negative   LEUKEST  Large*   < >  Negative   RBCU  10*   < >  10-25*   WBCU  >182*   < >  O - 2    < > = values in this interval not displayed.       PSA:     Post Void Residual:     Other labs: None today      IMPRESSION:  73 y/o M with urinary retention requiring CIC, now voiding     PLAN:  Timed voiding   Continue CIC at least BID-TID   Follow up in six weeks for uroflow/PVR       Total Time: 15 minutes                                       Total in Consultation: greater than 50%         Again, thank you for allowing me to participate in the care of your patient.      Sincerely,    Tamiko Vanegas MD

## 2018-03-14 NOTE — PROGRESS NOTES
UROLOGIC DIAGNOSES:       CURRENT INTERVENTIONS:       HISTORY:     Patient with history of AUR and hematuria requiring cystoscopy and clot evacuation. Had failed TOV and has been performing CIC.   Patient states he is performing CIC regularly about 4x per trady.   He would like to discuss further management of urinary retention.     Patient is currently s/p TURP. He was able to void a small amount at TOV. He states he has been voiding several times per day but continues to perform CIC (though fewer times per day than previous).   We discussed his voiding pattern and discussed some continued CIC. Noted that he may have some continued improvement but will continue CIC and continue to monitor outputs.       PAST MEDICAL HISTORY:   Past Medical History:   Diagnosis Date     BPH (benign prostatic hyperplasia)      Chronic kidney disease      HTN      Pulmonary nodules      Tobacco abuse        PAST SURGICAL HISTORY:   Past Surgical History:   Procedure Laterality Date     APPENDECTOMY  AGE 6     CREATE FISTULA ARTERIOVENOUS UPPER EXTREMITY Left 11/17/2017    Procedure: CREATE FISTULA ARTERIOVENOUS UPPER EXTREMITY;  Left Cephalic Vein To Radial Artery Arteriovenous Fistula Creation, Anesthesia Block;  Surgeon: Eduardo Pabon MD;  [facility]     CYSTOSCOPY, FULGURATE BLEEDERS, EVACUATE CLOT(S), COMBINED N/A 7/16/2017    Procedure: COMBINED CYSTOSCOPY, FULGURATE BLEEDERS, EVACUATE CLOT(S);  Cystoscopy clot evacuation, bladder biopsy and fulguration (per surgeons note) NERVE BLOCK PERIPHERAL;  Surgeon: Tamiko Vanegas MD;  Location: [facility]     CYSTOSCOPY, TRANSURETHRAL RESECTION (TUR) PROSTATE, COMBINED N/A 3/5/2018    Procedure: COMBINED CYSTOSCOPY, TRANSURETHRAL RESECTION (TUR) PROSTATE;  Cystoscopy, Transurethral Resection of Prostate ;  Surgeon: Tamiko Vanegas MD;  Location: [facility] OR     SINUS SURGERY  AGE 6     TONSILLECTOMY      CHILDHOOD       FAMILY HISTORY:   Family History   Problem Relation  Age of Onset     C.A.D. Mother      d age 67     Vision Loss Father      Hearing Loss Sister      DIABETES Sister      CANCER No family hx of        SOCIAL HISTORY:   Social History   Substance Use Topics     Smoking status: Former Smoker     Packs/day: 1.00     Years: 53.00     Types: Cigarettes     Quit date: 6/12/2017     Smokeless tobacco: Never Used     Alcohol use No       Current Outpatient Prescriptions   Medication     furosemide (LASIX) 20 MG tablet     aspirin 81 MG tablet     order for DME     amLODIPine (NORVASC) 2.5 MG tablet     darbepoetin william (ARANESP, ALBUMIN FREE,) 100 MCG/0.5ML injection     calcium acetate (PHOSLO) 667 MG CAPS capsule     finasteride (PROSCAR) 5 MG tablet     No current facility-administered medications for this visit.          PHYSICAL EXAM:    /89 (BP Location: Right arm, Patient Position: Sitting, Cuff Size: Adult Regular)  Pulse 79  Resp 16  Wt 77.6 kg (171 lb)  BMI 24.54 kg/m2    HEENT: Normocephalic and atraumatic   Cardiac: Not done  Back/Flank: Not done  CNS/PNS: Not done  Respiratory: Normal non-labored breathing  Abdomen: Soft nontender and nondistended  Peripheral Vascular: Not done  Mental Status: Not done    Penis: Not done  Scrotal Skin: Not done  Testicles: Not done  Epididymis: Not done  Digital Rectal Exam:     Cystoscopy: Not done    Imaging: None    Urinalysis: UA RESULTS:  Recent Labs   Lab Test  08/29/17   0235   01/15/14   0936   COLOR  Light Yellow   < >  Yellow   APPEARANCE  Cloudy   < >  Clear   URINEGLC  Negative   < >  Negative   URINEBILI  Negative   < >  Negative   URINEKETONE  Negative   < >  Negative   SG  1.014   < >  1.020   UBLD  Moderate*   < >  Small*   URINEPH  6.0   < >  6.0   PROTEIN  100*   < >  Negative   UROBILINOGEN   --    --   0.2   NITRITE  Negative   < >  Negative   LEUKEST  Large*   < >  Negative   RBCU  10*   < >  10-25*   WBCU  >182*   < >  O - 2    < > = values in this interval not displayed.       PSA:     Post Void  Residual:     Other labs: None today      IMPRESSION:  71 y/o M with urinary retention requiring CIC, now voiding     PLAN:  Timed voiding   Continue CIC at least BID-TID   Follow up in six weeks for uroflow/PVR       Total Time: 15 minutes                                       Total in Consultation: greater than 50%

## 2018-03-14 NOTE — NURSING NOTE
Chief Complaint   Patient presents with     Pre-Op Exam     Patient is here to follow up with surgery     Rufina Gruber CMA 11:30 AM on 3/14/2018.

## 2018-03-19 ENCOUNTER — ALLIED HEALTH/NURSE VISIT (OUTPATIENT)
Dept: FAMILY MEDICINE | Facility: CLINIC | Age: 73
End: 2018-03-19
Payer: COMMERCIAL

## 2018-03-19 VITALS — HEART RATE: 60 BPM | SYSTOLIC BLOOD PRESSURE: 138 MMHG | DIASTOLIC BLOOD PRESSURE: 80 MMHG

## 2018-03-19 DIAGNOSIS — N18.5 CKD (CHRONIC KIDNEY DISEASE) STAGE 5, GFR LESS THAN 15 ML/MIN (H): ICD-10-CM

## 2018-03-19 DIAGNOSIS — N18.5 ANEMIA OF CHRONIC RENAL FAILURE, STAGE 5 (H): ICD-10-CM

## 2018-03-19 DIAGNOSIS — R79.89 ELEVATED SERUM CREATININE: ICD-10-CM

## 2018-03-19 DIAGNOSIS — I10 HYPERTENSION GOAL BP (BLOOD PRESSURE) < 140/90: Primary | ICD-10-CM

## 2018-03-19 DIAGNOSIS — D63.1 ANEMIA OF CHRONIC RENAL FAILURE, STAGE 5 (H): ICD-10-CM

## 2018-03-19 LAB
ALBUMIN SERPL-MCNC: 3.2 G/DL (ref 3.4–5)
ANION GAP SERPL CALCULATED.3IONS-SCNC: 11 MMOL/L (ref 3–14)
BUN SERPL-MCNC: 102 MG/DL (ref 7–30)
CALCIUM SERPL-MCNC: 9.1 MG/DL (ref 8.5–10.1)
CHLORIDE SERPL-SCNC: 108 MMOL/L (ref 94–109)
CO2 SERPL-SCNC: 22 MMOL/L (ref 20–32)
CREAT SERPL-MCNC: 5.59 MG/DL (ref 0.66–1.25)
GFR SERPL CREATININE-BSD FRML MDRD: 10 ML/MIN/1.7M2
GLUCOSE SERPL-MCNC: 84 MG/DL (ref 70–99)
HCT VFR BLD AUTO: 32.5 % (ref 40–53)
HGB BLD-MCNC: 10.1 G/DL (ref 13.3–17.7)
PHOSPHATE SERPL-MCNC: 6.1 MG/DL (ref 2.5–4.5)
POTASSIUM SERPL-SCNC: 4.9 MMOL/L (ref 3.4–5.3)
SODIUM SERPL-SCNC: 141 MMOL/L (ref 133–144)

## 2018-03-19 PROCEDURE — 85018 HEMOGLOBIN: CPT

## 2018-03-19 PROCEDURE — 80069 RENAL FUNCTION PANEL: CPT

## 2018-03-19 PROCEDURE — 85014 HEMATOCRIT: CPT

## 2018-03-19 PROCEDURE — 36415 COLL VENOUS BLD VENIPUNCTURE: CPT

## 2018-03-19 PROCEDURE — 99207 ZZC NO CHARGE NURSE ONLY: CPT | Performed by: FAMILY MEDICINE

## 2018-03-19 NOTE — MR AVS SNAPSHOT
After Visit Summary   3/19/2018    Dung Norton    MRN: [id number]           Patient Information     Date Of Birth          1945        Visit Information        Provider Department      3/19/2018 10:16 AM Haley Baker MD [facility]        Today's Diagnoses     Hypertension goal BP (blood pressure) < 140/90    -  1       Follow-ups after your visit        Your next 10 appointments already scheduled     Mar 26, 2018 11:45 AM CDT   LAB with  LAB   [facility] ([place] [facility])    [address] [place] [id number] [phone]           Please do not eat 10-12 hours before your appointment if you are coming in fasting for labs on lipids, cholesterol, or glucose (sugar). This does not apply to pregnant women. Water, hot tea and black coffee (with nothing added) are okay. Do not drink other fluids, diet soda or chew gum.            Apr 13, 2018  1:00 PM CDT   (Arrive by 12:30 PM)   Return Visit with Oralia De La Torre NP   [facility] ([facility])    [address]  [address]  [facility] [id number] [phone]            Apr 27, 2018 11:00 AM CDT   (Arrive by 10:45 AM)   Return Visit with Tamiko Vanegas MD   [facility] and [facility] ([facility])    [address] [place] [address]  4th Floor  [facility] [id number] [phone]              Who to contact     If you have questions or need follow up information about today's clinic visit or your schedule please contact [place] [facility] directly at [phone].  Normal or non-critical lab and imaging results will be communicated to you by MyChart, letter or phone within 4 business days after the clinic has received the results. If you do not hear from us within 7 days, please contact the clinic through MyChart or phone. If you have a critical or abnormal lab result, we will notify  "you by phone as soon as possible.  Submit refill requests through [employer] or call your pharmacy and they will forward the refill request to us. Please allow 3 business days for your refill to be completed.          Additional Information About Your Visit        [employer] Information     [employer] lets you send messages to your doctor, view your test results, renew your prescriptions, schedule appointments and more. To sign up, go to [email or web address] . Click on \"Log in\" on the left side of the screen, which will take you to the Welcome page. Then click on \"Sign up Now\" on the right side of the page.     You will be asked to enter the access code listed below, as well as some personal information. Please follow the directions to create your username and password.     Your access code is: [email or web address]  Expires: 4/15/2018 12:20 PM     Your access code will  in 90 days. If you need help or a new code, please call your [place] clinic or [phone].        Care EveryWhere ID     This is your Care EveryWhere ID. This could be used by other organizations to access your [place] medical records  [id number]        Your Vitals Were     Pulse                   60            Blood Pressure from Last 3 Encounters:   18 138/80   18 151/89   18 144/84    Weight from Last 3 Encounters:   18 171 lb (77.6 kg)   18 169 lb 3.2 oz (76.7 kg)   18 169 lb 15.6 oz (77.1 kg)              Today, you had the following     No orders found for display         Today's Medication Changes          These changes are accurate as of 3/19/18 10:17 AM.  If you have any questions, ask your nurse or doctor.               These medicines have changed or have updated prescriptions.        Dose/Directions    amLODIPine 2.5 MG tablet   Commonly known as:  NORVASC   This may have changed:  when to take this   Used for:  Renal hypertension        Dose:  2.5 mg   Take 1 tablet (2.5 mg) by mouth daily   Quantity:  " 90 tablet   Refills:  3       finasteride 5 MG tablet   Commonly known as:  PROSCAR   This may have changed:  when to take this   Used for:  Benign prostatic hyperplasia with urinary retention        Dose:  5 mg   Take 1 tablet (5 mg) by mouth daily   Quantity:  90 tablet   Refills:  3                Primary Care Provider Office Phone # Fax #    Haley Baker [phone] [address] [facility] [address]        Equal Access to Services     [facility] Center[facility]RE[facility] : Hadii aad ku hadasho Soomaali, waaxda luqadaha, qaybta kaalmada adeegyada, waxay idiin hayaan adeeg beatajenniferkarsten green . So [facility] [phone].    ATENCIÓN: Si betty hernandez, tiene a montague disposición servicios gratuitos de asistencia lingüística. LlTri[facility] [phone].    We comply with applicable federal civil rights laws and [place] laws. We do not discriminate on the basis of race, color, national origin, age, disability, sex, sexual orientation, or gender identity.            Thank you!     Thank you for choosing [facility]  for your care. Our goal is always to provide you with excellent care. Hearing back from our patients is one way we can continue to improve our services. Please take a few minutes to complete the written survey that you may receive in the mail after your visit with us. Thank you!             Your Updated Medication List - Protect others around you: Learn how to safely use, store and throw away your medicines at www.disposemymeds.org.          This list is accurate as of 3/19/18 10:17 AM.  Always use your most recent med list.                   Brand Name Dispense Instructions for use Diagnosis    amLODIPine 2.5 MG tablet    NORVASC    90 tablet    Take 1 tablet (2.5 mg) by mouth daily    Renal hypertension       aspirin 81 MG tablet     30 tablet    Take 1 tablet (81 mg) by mouth every morning - RESUME on 3/9/18 if urine remains clear    S/P TURP       calcium acetate 667 MG Caps capsule    PHOSLO    540  capsule    Take 2 capsules (1,334 mg) by mouth 3 times daily (with meals)    Chronic kidney disease, unspecified CKD stage       darbepoetin william 100 MCG/0.5ML injection    ARANESP (ALBUMIN FREE)    0.5 mL    Inject 0.5 mLs (100 mcg) Subcutaneous every 14 days As needed for hgb<10g/dL.  If Hgb increases >1 point in 2 weeks (if blood transfusion given, use hgb PRIOR to this), SYSTOLIC BP > 180 mmHg or hgb>=10g/dL, HOLD DOSE. Dose must be within 1 week of Hgb.  Per anemia protocol with Cecilia De La Torre CNP    Anemia of chronic renal failure, stage 5 (H), CKD (chronic kidney disease) stage 5, GFR less than 15 ml/min (H)       finasteride 5 MG tablet    PROSCAR    90 tablet    Take 1 tablet (5 mg) by mouth daily    Benign prostatic hyperplasia with urinary retention       furosemide 20 MG tablet    LASIX          order for DME     1 each    Equipment being ordered: olecranon bursa brace    Olecranon bursitis of left elbow

## 2018-03-19 NOTE — PROGRESS NOTES
Dung Norton is enrolled/participating in the retail pharmacy Blood Pressure Goals Achievement Program (BPGAP).  Dung Norton was evaluated at [facility] on March 19, 2018 at which time his blood pressure was:    BP Readings from Last 3 Encounters:   03/19/18 138/80   03/14/18 151/89   03/12/18 144/84     Reviewed lifestyle modifications for blood pressure control and reduction: including making healthy food choices, managing weight, getting regular exercise, smoking cessation, reducing alcohol consumption, monitoring blood pressure regularly.     Dung Norton is not experiencing symptoms.    Follow-Up: BP is at goal of < 140/90mmHg (patient 18+ years of age with or without diabetes).  Recommended follow-up at pharmacy in 6 months.     Recommendation to Provider: None at this time.     Dung Norton was evaluated for enrollment into the PGEN study today.    Patient eligible for enrollment:  No  Patient interested in enrollment:  No    Completed by: Thank you,  Cheri Winslow, PharmD  [facility]  [phone]

## 2018-03-20 ENCOUNTER — TELEPHONE (OUTPATIENT)
Dept: PHARMACY | Facility: CLINIC | Age: 73
End: 2018-03-20

## 2018-03-20 NOTE — TELEPHONE ENCOUNTER
Anemia Management Note  SUBJECTIVE/OBJECTIVE:  Referred by Cecilia De La Torre on 9/11/2017  Primary Diagnosis: Anemia in Chronic Kidney Disease (N18.5, D63.1)     Secondary Diagnosis:  Chronic Kidney Disease, Stage 5 (N18.5)  Hgb goal range:  9-10  Epo/Darbo: Aranesp 100 mcg every 14 days As needed for hgb<10g/dL  RX expires on 12/5/2018  Iron regimen:  None  Labs exp: 9/12/2018  **Provide phone number for [facility] [phone] and instruction to schedule ancillary visit for aranesp injection. Send message to care team via pool - FZ RN TRIAGE POOL as a telephone encounter**      Contact: **AUTH TO DISCUSS**Tereza Norton(daughter) [phone], Rabia Jazzy (daughter)[phone]                                        Ok to leave message regarding labs and appts per consent to communicate dated 12/12/17                              OK to speak with daughters Tereza and Rabia regarding Dung's anemia care plan per consent to communicate dated 12/12/17    Anemia Latest Ref Rng & Units 2/26/2018 3/2/2018 3/5/2018 3/5/2018 3/6/2018 3/12/2018 3/19/2018   EARLENE Dose - - - - - - - -   Hemoglobin 13.3 - 17.7 g/dL 10.8(L) 10.7(L) 11.0(L) 10.7(L) 9.9(L) 10.1(L) 10.1(L)   TSAT 15 - 46 % - 23 - - - - -   Ferritin 26 - 388 ng/mL - 427(H) - - - - -     BP Readings from Last 3 Encounters:   03/19/18 138/80   03/14/18 151/89   03/12/18 144/84     Wt Readings from Last 2 Encounters:   03/14/18 171 lb (77.6 kg)   03/09/18 169 lb 3.2 oz (76.7 kg)     Labs are drawn every Monday    ASSESSMENT:  Hgb:Above goal - recommend hold dose  TSat: not at goal of >30% Ferritin: At goal (>100ng/mL)    PLAN:  Hold Aranesp and Dung will RTC for Hgb,ferritin, and iron labs and an aranesp dose if needed in 2 weeks    Orders needed to be renewed (for next follow-up date) in EPIC: None    Iron labs due:  4/2/18    Plan discussed with:  Dung  Plan provided by:  Judith  Reviewed 03/22/2018 VERONICA  NEXT FOLLOW-UP DATE:  4/2/18    Anemia Management  Service  Daina Mock PharmD and Malina Hussein CPhT  Phone: [phone]  Fax: [phone]

## 2018-03-26 ENCOUNTER — ALLIED HEALTH/NURSE VISIT (OUTPATIENT)
Dept: FAMILY MEDICINE | Facility: CLINIC | Age: 73
End: 2018-03-26

## 2018-03-26 VITALS — DIASTOLIC BLOOD PRESSURE: 80 MMHG | SYSTOLIC BLOOD PRESSURE: 146 MMHG

## 2018-03-26 DIAGNOSIS — N18.5 CKD (CHRONIC KIDNEY DISEASE) STAGE 5, GFR LESS THAN 15 ML/MIN (H): ICD-10-CM

## 2018-03-26 DIAGNOSIS — D63.1 ANEMIA OF CHRONIC RENAL FAILURE, STAGE 5 (H): ICD-10-CM

## 2018-03-26 DIAGNOSIS — N18.5 ANEMIA OF CHRONIC RENAL FAILURE, STAGE 5 (H): ICD-10-CM

## 2018-03-26 DIAGNOSIS — R79.89 ELEVATED SERUM CREATININE: ICD-10-CM

## 2018-03-26 DIAGNOSIS — Z01.30 BP CHECK: Primary | ICD-10-CM

## 2018-03-26 LAB
ALBUMIN SERPL-MCNC: 3.4 G/DL (ref 3.4–5)
ANION GAP SERPL CALCULATED.3IONS-SCNC: 13 MMOL/L (ref 3–14)
BUN SERPL-MCNC: 106 MG/DL (ref 7–30)
CALCIUM SERPL-MCNC: 9.4 MG/DL (ref 8.5–10.1)
CHLORIDE SERPL-SCNC: 108 MMOL/L (ref 94–109)
CO2 SERPL-SCNC: 21 MMOL/L (ref 20–32)
CREAT SERPL-MCNC: 5.78 MG/DL (ref 0.66–1.25)
GFR SERPL CREATININE-BSD FRML MDRD: 10 ML/MIN/1.7M2
GLUCOSE SERPL-MCNC: 86 MG/DL (ref 70–99)
HCT VFR BLD AUTO: 33 % (ref 40–53)
HGB BLD-MCNC: 10.4 G/DL (ref 13.3–17.7)
PHOSPHATE SERPL-MCNC: 7.1 MG/DL (ref 2.5–4.5)
POTASSIUM SERPL-SCNC: 4.6 MMOL/L (ref 3.4–5.3)
SODIUM SERPL-SCNC: 142 MMOL/L (ref 133–144)

## 2018-03-26 PROCEDURE — 85014 HEMATOCRIT: CPT

## 2018-03-26 PROCEDURE — 36415 COLL VENOUS BLD VENIPUNCTURE: CPT

## 2018-03-26 PROCEDURE — 80069 RENAL FUNCTION PANEL: CPT

## 2018-03-26 PROCEDURE — 99207 ZZC NO CHARGE NURSE ONLY: CPT | Performed by: FAMILY MEDICINE

## 2018-03-26 PROCEDURE — 85018 HEMOGLOBIN: CPT

## 2018-03-26 NOTE — MR AVS SNAPSHOT
After Visit Summary   3/26/2018    Dung Norton    MRN: [id number]           Patient Information     Date Of Birth          1945        Visit Information        Provider Department      3/26/2018 1:54 PM Haley Baker MD [facility]        Today's Diagnoses     BP check    -  1       Follow-ups after your visit        Your next 10 appointments already scheduled     Apr 02, 2018 10:30 AM CDT   LAB with FZ LAB   [place] [facility] ([facility])    [address] [phone] [phone]           Please do not eat 10-12 hours before your appointment if you are coming in fasting for labs on lipids, cholesterol, or glucose (sugar). This does not apply to pregnant women. Water, hot tea and black coffee (with nothing added) are okay. Do not drink other fluids, diet soda or chew gum.            Apr 13, 2018  1:00 PM CDT   (Arrive by 12:30 PM)   Return Visit with Oralia De La Torre NP   [facility] ([facility])    [address]  [address]  [facility] [address] [phone]            Apr 27, 2018 11:00 AM CDT   (Arrive by 10:45 AM)   Return Visit with Tamiko Vanegas MD   [facility] and [facility] ([facility])    [address] [place] [address]  4th Floor  [facility] [id number] [phone]              Who to contact     If you have questions or need follow up information about today's clinic visit or your schedule please contact [place] [facility] directly at [phone].  Normal or non-critical lab and imaging results will be communicated to you by MyChart, letter or phone within 4 business days after the clinic has received the results. If you do not hear from us within 7 days, please contact the clinic through MyChart or phone. If you have a critical or abnormal lab result, we will notify you by phone as soon as  "possible.  Submit refill requests through [employer] or call your pharmacy and they will forward the refill request to us. Please allow 3 business days for your refill to be completed.          Additional Information About Your Visit        [employer] Information     [employer] lets you send messages to your doctor, view your test results, renew your prescriptions, schedule appointments and more. To sign up, go to [email or web address] . Click on \"Log in\" on the left side of the screen, which will take you to the Welcome page. Then click on \"Sign up Now\" on the right side of the page.     You will be asked to enter the access code listed below, as well as some personal information. Please follow the directions to create your username and password.     Your access code is: [email or web address]  Expires: 4/15/2018 12:20 PM     Your access code will  in 90 days. If you need help or a new code, please call your [place] clinic or [phone].        Care EveryWhere ID     This is your Care EveryWhere ID. This could be used by other organizations to access your [place] medical records  [id number]         Blood Pressure from Last 3 Encounters:   18 146/80   18 138/80   18 151/89    Weight from Last 3 Encounters:   18 171 lb (77.6 kg)   18 169 lb 3.2 oz (76.7 kg)   18 169 lb 15.6 oz (77.1 kg)              Today, you had the following     No orders found for display         Today's Medication Changes          These changes are accurate as of 3/26/18 11:59 PM.  If you have any questions, ask your nurse or doctor.               These medicines have changed or have updated prescriptions.        Dose/Directions    amLODIPine 2.5 MG tablet   Commonly known as:  NORVASC   This may have changed:  when to take this   Used for:  Renal hypertension        Dose:  2.5 mg   Take 1 tablet (2.5 mg) by mouth daily   Quantity:  90 tablet   Refills:  3       finasteride 5 MG tablet   Commonly known as:  PROSCAR "   This may have changed:  when to take this   Used for:  Benign prostatic hyperplasia with urinary retention        Dose:  5 mg   Take 1 tablet (5 mg) by mouth daily   Quantity:  90 tablet   Refills:  3                Primary Care Provider Office Phone # Fax #    Haley Baker [phone] [address]  [place] [address]        Equal Access to Services     [facility]: Hadii aad ku hadasho Soomaali, waaxda luqadaha, qaybta kaalmada adeegyada, waxay idiin hayaan adeeg khjennifersh latunde . So [facility] [phone].    ATENCIÓN: Si habla español, tiene a montague disposición servicios gratuitos de asistencia lingüística. Llame al [phone].    We comply with applicable federal civil rights laws and [place] laws. We do not discriminate on the basis of race, color, national origin, age, disability, sex, sexual orientation, or gender identity.            Thank you!     Thank you for choosing [facility]  for your care. Our goal is always to provide you with excellent care. Hearing back from our patients is one way we can continue to improve our services. Please take a few minutes to complete the written survey that you may receive in the mail after your visit with us. Thank you!             Your Updated Medication List - Protect others around you: Learn how to safely use, store and throw away your medicines at www.disposemymeds.org.          This list is accurate as of 3/26/18 11:59 PM.  Always use your most recent med list.                   Brand Name Dispense Instructions for use Diagnosis    amLODIPine 2.5 MG tablet    NORVASC    90 tablet    Take 1 tablet (2.5 mg) by mouth daily    Renal hypertension       aspirin 81 MG tablet     30 tablet    Take 1 tablet (81 mg) by mouth every morning - RESUME on 3/9/18 if urine remains clear    S/P TURP       calcium acetate 667 MG Caps capsule    PHOSLO    540 capsule    Take 2 capsules (1,334 mg) by mouth 3 times daily (with meals)     Chronic kidney disease, unspecified CKD stage       darbepoetin william 100 MCG/0.5ML injection    ARANESP (ALBUMIN FREE)    0.5 mL    Inject 0.5 mLs (100 mcg) Subcutaneous every 14 days As needed for hgb<10g/dL.  If Hgb increases >1 point in 2 weeks (if blood transfusion given, use hgb PRIOR to this), SYSTOLIC BP > 180 mmHg or hgb>=10g/dL, HOLD DOSE. Dose must be within 1 week of Hgb.  Per anemia protocol with Cecilia De La Torre CNP    Anemia of chronic renal failure, stage 5 (H), CKD (chronic kidney disease) stage 5, GFR less than 15 ml/min (H)       finasteride 5 MG tablet    PROSCAR    90 tablet    Take 1 tablet (5 mg) by mouth daily    Benign prostatic hyperplasia with urinary retention       furosemide 20 MG tablet    LASIX          order for DME     1 each    Equipment being ordered: olecranon bursa brace    Olecranon bursitis of left elbow

## 2018-03-26 NOTE — PROGRESS NOTES
Dung Norton is enrolled/participating in the retail pharmacy Blood Pressure Goals Achievement Program (BPGAP).  Dung Norton was evaluated at [facility] on March 26, 2018 at which time his blood pressure was:    BP Readings from Last 3 Encounters:   03/26/18 146/80   03/19/18 138/80   03/14/18 151/89     Reviewed lifestyle modifications for blood pressure control and reduction: including making healthy food choices, managing weight, getting regular exercise, smoking cessation, reducing alcohol consumption, monitoring blood pressure regularly.     Dung Norton is not experiencing symptoms.    Follow-Up: BP is not at goal of < 140/90mmHg (patient 18+ years of age with or without diabetes), Recommended follow-up with PCP.  Routing to PCP for further review.    Recommendation to Provider:     Dung Norton was evaluated for enrollment into the PGEN study today.    Patient eligible for enrollment:  Unknown  Patient interested in enrollment:  Unknown    Completed by: Ricarda Andino RPh  [facility]  Phone [phone]  Fax      [phone]

## 2018-03-26 NOTE — Clinical Note
Routing message to PCP for review -BP checked at pharmacy and noted to be above goal. Recommended patient follow-up with PCP. todays /80 he is not experiencing symptoms.Patient declined a repeat BP reading   Ricarda Andino kym [facility] Phone [phone] Fax      [phone]

## 2018-04-02 ENCOUNTER — ALLIED HEALTH/NURSE VISIT (OUTPATIENT)
Dept: FAMILY MEDICINE | Facility: CLINIC | Age: 73
End: 2018-04-02
Payer: COMMERCIAL

## 2018-04-02 ENCOUNTER — TELEPHONE (OUTPATIENT)
Dept: NEPHROLOGY | Facility: CLINIC | Age: 73
End: 2018-04-02

## 2018-04-02 VITALS — DIASTOLIC BLOOD PRESSURE: 84 MMHG | SYSTOLIC BLOOD PRESSURE: 138 MMHG

## 2018-04-02 DIAGNOSIS — D63.1 ANEMIA OF CHRONIC RENAL FAILURE, STAGE 5 (H): ICD-10-CM

## 2018-04-02 DIAGNOSIS — N18.5 ANEMIA OF CHRONIC RENAL FAILURE, STAGE 5 (H): ICD-10-CM

## 2018-04-02 DIAGNOSIS — R79.89 ELEVATED SERUM CREATININE: ICD-10-CM

## 2018-04-02 DIAGNOSIS — N18.5 CKD (CHRONIC KIDNEY DISEASE) STAGE 5, GFR LESS THAN 15 ML/MIN (H): ICD-10-CM

## 2018-04-02 DIAGNOSIS — I10 HYPERTENSION GOAL BP (BLOOD PRESSURE) < 140/90: Primary | ICD-10-CM

## 2018-04-02 LAB
ALBUMIN SERPL-MCNC: 3.8 G/DL (ref 3.4–5)
ANION GAP SERPL CALCULATED.3IONS-SCNC: 12 MMOL/L (ref 3–14)
BUN SERPL-MCNC: 123 MG/DL (ref 7–30)
CALCIUM SERPL-MCNC: 8 MG/DL (ref 8.5–10.1)
CHLORIDE SERPL-SCNC: 111 MMOL/L (ref 94–109)
CO2 SERPL-SCNC: 20 MMOL/L (ref 20–32)
CREAT SERPL-MCNC: 5.5 MG/DL (ref 0.66–1.25)
FERRITIN SERPL-MCNC: 407 NG/ML (ref 26–388)
GFR SERPL CREATININE-BSD FRML MDRD: 10 ML/MIN/1.7M2
GLUCOSE SERPL-MCNC: 99 MG/DL (ref 70–99)
HCT VFR BLD AUTO: 33.3 % (ref 40–53)
HGB BLD-MCNC: 10.5 G/DL (ref 13.3–17.7)
IRON SATN MFR SERPL: 17 % (ref 15–46)
IRON SERPL-MCNC: 47 UG/DL (ref 35–180)
PHOSPHATE SERPL-MCNC: 5.6 MG/DL (ref 2.5–4.5)
POTASSIUM SERPL-SCNC: 5 MMOL/L (ref 3.4–5.3)
SODIUM SERPL-SCNC: 143 MMOL/L (ref 133–144)
TIBC SERPL-MCNC: 269 UG/DL (ref 240–430)

## 2018-04-02 PROCEDURE — 85014 HEMATOCRIT: CPT

## 2018-04-02 PROCEDURE — 82728 ASSAY OF FERRITIN: CPT

## 2018-04-02 PROCEDURE — 99207 ZZC NO CHARGE NURSE ONLY: CPT | Performed by: FAMILY MEDICINE

## 2018-04-02 PROCEDURE — 36415 COLL VENOUS BLD VENIPUNCTURE: CPT

## 2018-04-02 PROCEDURE — 85018 HEMOGLOBIN: CPT

## 2018-04-02 PROCEDURE — 80069 RENAL FUNCTION PANEL: CPT

## 2018-04-02 PROCEDURE — 83540 ASSAY OF IRON: CPT

## 2018-04-02 PROCEDURE — 83550 IRON BINDING TEST: CPT

## 2018-04-02 NOTE — PROGRESS NOTES
Dung Norton is enrolled/participating in the retail pharmacy Blood Pressure Goals Achievement Program (BPGAP).  Dung Norton was evaluated at [facility] on April 2, 2018 at which time his blood pressure was:    BP Readings from Last 3 Encounters:   04/02/18 138/84   03/26/18 146/80   03/19/18 138/80     Reviewed lifestyle modifications for blood pressure control and reduction: including making healthy food choices, managing weight, getting regular exercise, smoking cessation, reducing alcohol consumption, monitoring blood pressure regularly.     Dung Norton is not experiencing symptoms.    Follow-Up: BP is at goal of < 140/90mmHg (patient 18+ years of age with or without diabetes).  Recommended follow-up at pharmacy in 6 months.     Recommendation to Provider: None at this time, patient will be back next Monday     Dung Norton was evaluated for enrollment into the PGEN study today.    Patient eligible for enrollment:  No  Patient interested in enrollment:  No    Completed by: Thank you,  Cheri Winslow, PharmD  [facility]  [phone]

## 2018-04-02 NOTE — TELEPHONE ENCOUNTER
DATE:  4/2/2018   TIME OF RECEIPT FROM LAB:  4:10pm  LAB TEST:  Creatinine  LAB VALUE:  5.5  RESULTS GIVEN WITH READ-BACK TO (PROVIDER):  NEETA JOSEPH  TIME LAB VALUE REPORTED TO PROVIDER:   4:10pm    Creatinine is consistent with previous results.    Giselle Benoit RN

## 2018-04-02 NOTE — MR AVS SNAPSHOT
After Visit Summary   4/2/2018    Dung Norton    MRN: [id number]           Patient Information     Date Of Birth          1945        Visit Information        Provider Department      4/2/2018 11:47 AM Haley Baker MD [facility]        Today's Diagnoses     Hypertension goal BP (blood pressure) < 140/90    -  1       Follow-ups after your visit        Your next 10 appointments already scheduled     Apr 09, 2018 11:45 AM CDT   LAB with  LAB   [facility] ([place] [facility])    [address] [place] [id number] [phone]           Please do not eat 10-12 hours before your appointment if you are coming in fasting for labs on lipids, cholesterol, or glucose (sugar). This does not apply to pregnant women. Water, hot tea and black coffee (with nothing added) are okay. Do not drink other fluids, diet soda or chew gum.            Apr 13, 2018  1:00 PM CDT   (Arrive by 12:30 PM)   Return Visit with Oralia De La Torre NP   [facility]ph[facility] ([facility])    [address]  [address]  [facility] [id number] [phone]            Apr 27, 2018 11:00 AM CDT   (Arrive by 10:45 AM)   Return Visit with Tamiko Vanegas MD   [facility] and [facility] for Prostate and Urologic Cancers ([facility])    [address] [place] [address]  4th Floor  [facility] [id number] [phone]              Who to contact     If you have questions or need follow up information about today's clinic visit or your schedule please contact [facility] directly at [phone].  Normal or non-critical lab and imaging results will be communicated to you by MyChart, letter or phone within 4 business days after the clinic has received the results. If you do not hear from us within 7 days, please contact the clinic through MyChart or phone. If you have a critical or abnormal lab result, we will notify you  "by phone as soon as possible.  Submit refill requests through [employer] or call your pharmacy and they will forward the refill request to us. Please allow 3 business days for your refill to be completed.          Additional Information About Your Visit        [employer] Information     [employer] lets you send messages to your doctor, view your test results, renew your prescriptions, schedule appointments and more. To sign up, go to [email or web address] . Click on \"Log in\" on the left side of the screen, which will take you to the Welcome page. Then click on \"Sign up Now\" on the right side of the page.     You will be asked to enter the access code listed below, as well as some personal information. Please follow the directions to create your username and password.     Your access code is: [email or web address]  Expires: 4/15/2018 12:20 PM     Your access code will  in 90 days. If you need help or a new code, please call your [place] clinic or [phone].        Care EveryWhere ID     This is your Care EveryWhere ID. This could be used by other organizations to access your [place] medical records  [id number]         Blood Pressure from Last 3 Encounters:   18 138/84   18 146/80   18 138/80    Weight from Last 3 Encounters:   18 171 lb (77.6 kg)   18 169 lb 3.2 oz (76.7 kg)   18 169 lb 15.6 oz (77.1 kg)              Today, you had the following     No orders found for display         Today's Medication Changes          These changes are accurate as of 18 11:49 AM.  If you have any questions, ask your nurse or doctor.               These medicines have changed or have updated prescriptions.        Dose/Directions    amLODIPine 2.5 MG tablet   Commonly known as:  NORVASC   This may have changed:  when to take this   Used for:  Renal hypertension        Dose:  2.5 mg   Take 1 tablet (2.5 mg) by mouth daily   Quantity:  90 tablet   Refills:  3       finasteride 5 MG tablet   Commonly " known as:  PROSCAR   This may have changed:  when to take this   Used for:  Benign prostatic hyperplasia with urinary retention        Dose:  5 mg   Take 1 tablet (5 mg) by mouth daily   Quantity:  90 tablet   Refills:  3                Primary Care Provider Office Phone # Fax #    Haley Baker [phone] [address] [facility]  [facility] [place] [address]        Equal Access to Services     [facility]: Hadii aad ku hadasho Soomaali, waaxda luqadaha, qaybta kaalmada adeegyada, waxay idiin hayaan adeeg donaldo lamalickn . So [facility] [phone].    ATENCIÓN: Si habla español, tiene a montague disposición servicios gratuitos de asistencia lingüística. [facility] [phone].    We comply with applicable federal civil rights laws and [place] laws. We do not discriminate on the basis of race, color, national origin, age, disability, sex, sexual orientation, or gender identity.            Thank you!     Thank you for choosing [facility]  for your care. Our goal is always to provide you with excellent care. Hearing back from our patients is one way we can continue to improve our services. Please take a few minutes to complete the written survey that you may receive in the mail after your visit with us. Thank you!             Your Updated Medication List - Protect others around you: Learn how to safely use, store and throw away your medicines at www.disposemymeds.org.          This list is accurate as of 4/2/18 11:49 AM.  Always use your most recent med list.                   Brand Name Dispense Instructions for use Diagnosis    amLODIPine 2.5 MG tablet    NORVASC    90 tablet    Take 1 tablet (2.5 mg) by mouth daily    Renal hypertension       aspirin 81 MG tablet     30 tablet    Take 1 tablet (81 mg) by mouth every morning - RESUME on 3/9/18 if urine remains clear    S/P TURP       calcium acetate 667 MG Caps capsule    PHOSLO    540 capsule    Take 2 capsules (1,334 mg) by mouth 3 times daily  (with meals)    Chronic kidney disease, unspecified CKD stage       darbepoetin willaim 100 MCG/0.5ML injection    ARANESP (ALBUMIN FREE)    0.5 mL    Inject 0.5 mLs (100 mcg) Subcutaneous every 14 days As needed for hgb<10g/dL.  If Hgb increases >1 point in 2 weeks (if blood transfusion given, use hgb PRIOR to this), SYSTOLIC BP > 180 mmHg or hgb>=10g/dL, HOLD DOSE. Dose must be within 1 week of Hgb.  Per anemia protocol with Cecilia De La Torre CNP    Anemia of chronic renal failure, stage 5 (H), CKD (chronic kidney disease) stage 5, GFR less than 15 ml/min (H)       finasteride 5 MG tablet    PROSCAR    90 tablet    Take 1 tablet (5 mg) by mouth daily    Benign prostatic hyperplasia with urinary retention       furosemide 20 MG tablet    LASIX          order for DME     1 each    Equipment being ordered: olecranon bursa brace    Olecranon bursitis of left elbow

## 2018-04-04 ENCOUNTER — TELEPHONE (OUTPATIENT)
Dept: PHARMACY | Facility: CLINIC | Age: 73
End: 2018-04-04

## 2018-04-04 DIAGNOSIS — D63.1 ANEMIA OF CHRONIC RENAL FAILURE, STAGE 5 (H): Primary | ICD-10-CM

## 2018-04-04 DIAGNOSIS — N18.5 CKD (CHRONIC KIDNEY DISEASE) STAGE 5, GFR LESS THAN 15 ML/MIN (H): ICD-10-CM

## 2018-04-04 DIAGNOSIS — N18.5 ANEMIA OF CHRONIC RENAL FAILURE, STAGE 5 (H): Primary | ICD-10-CM

## 2018-04-04 RX ORDER — FERROUS SULFATE 325(65) MG
325 TABLET ORAL
Qty: 30 TABLET | Refills: 11 | Status: SHIPPED | OUTPATIENT
Start: 2018-04-04 | End: 2019-01-17

## 2018-04-04 NOTE — TELEPHONE ENCOUNTER
Anemia Management Note  SUBJECTIVE/OBJECTIVE:  Referred by Cecilia De La Torre on 9/11/2017  Primary Diagnosis: Anemia in Chronic Kidney Disease (N18.5, D63.1)     Secondary Diagnosis:  Chronic Kidney Disease, Stage 5 (N18.5)  Hgb goal range:  9-10  Epo/Darbo: Aranesp 100 mcg every 14 days As needed for hgb<10g/dL  RX expires on 12/5/2018  Iron regimen:  Ferrous sulfate 325mg once daily  Labs exp: 9/12/2018  **Provide phone number for [facility] [phone] and instruction to schedule ancillary visit for aranesp injection. Send message to care team via pool -  RN TRIAGE POOL as a telephone encounter**      Contact: **AUTH TO DISCUSS**Tereza Norton(daughter) [phone], Rabia Purcell (daughter)[phone]                                        Ok to leave message regarding labs and appts per consent to communicate dated 12/12/17                              OK to speak with daughters Tereza and Rabia regarding Dung's anemia care plan per consent to communicate dated 12/12/17     Anemia Latest Ref Rng & Units 3/5/2018 3/5/2018 3/6/2018 3/12/2018 3/19/2018 3/26/2018 4/2/2018   EARLENE Dose - - - - - - - -   Hemoglobin 13.3 - 17.7 g/dL 11.0(L) 10.7(L) 9.9(L) 10.1(L) 10.1(L) 10.4(L) 10.5(L)   TSAT 15 - 46 % - - - - - - 17   Ferritin 26 - 388 ng/mL - - - - - - 407(H)     BP Readings from Last 3 Encounters:   04/02/18 138/84   03/26/18 146/80   03/19/18 138/80     Wt Readings from Last 2 Encounters:   03/14/18 171 lb (77.6 kg)   03/09/18 169 lb 3.2 oz (76.7 kg)       Spoke with Herb, informed did not need a dose of Aranesp.  Start PO iron Ferrous sulfate 325mg once daily, rder sent per his request to [facility] in [place].  RTC in 2 weeks for Hgb.    ASSESSMENT:  Hgb:Above goal - recommend hold dose  TSat: at goal >30% Ferritin: At goal (>100ng/mL)    PLAN:  Hold Aranesp, RTC for hgb then aranesp if needed in 2 week(s), start PO iron     Orders needed to be renewed (for next follow-up date) in EPIC: None    Iron labs due:   05/14/2018    Plan discussed with:  Herb  Plan provided by:      NEXT FOLLOW-UP DATE:  04/16/2018    Anemia Management Service  Daina Mock PharmD and Malina Hussein CPhT  Phone: [phone]  Fax: [phone]

## 2018-04-06 DIAGNOSIS — N17.9 AKI (ACUTE KIDNEY INJURY) (H): Primary | ICD-10-CM

## 2018-04-09 ENCOUNTER — TELEPHONE (OUTPATIENT)
Dept: NEPHROLOGY | Facility: CLINIC | Age: 73
End: 2018-04-09

## 2018-04-09 ENCOUNTER — PRE VISIT (OUTPATIENT)
Dept: UROLOGY | Facility: CLINIC | Age: 73
End: 2018-04-09

## 2018-04-09 ENCOUNTER — ALLIED HEALTH/NURSE VISIT (OUTPATIENT)
Dept: FAMILY MEDICINE | Facility: CLINIC | Age: 73
End: 2018-04-09

## 2018-04-09 VITALS — SYSTOLIC BLOOD PRESSURE: 136 MMHG | DIASTOLIC BLOOD PRESSURE: 84 MMHG

## 2018-04-09 DIAGNOSIS — N17.9 AKI (ACUTE KIDNEY INJURY) (H): ICD-10-CM

## 2018-04-09 DIAGNOSIS — N18.5 ANEMIA OF CHRONIC RENAL FAILURE, STAGE 5 (H): ICD-10-CM

## 2018-04-09 DIAGNOSIS — D63.1 ANEMIA OF CHRONIC RENAL FAILURE, STAGE 5 (H): ICD-10-CM

## 2018-04-09 DIAGNOSIS — N18.5 CKD (CHRONIC KIDNEY DISEASE) STAGE 5, GFR LESS THAN 15 ML/MIN (H): ICD-10-CM

## 2018-04-09 DIAGNOSIS — R82.90 NONSPECIFIC FINDING ON EXAMINATION OF URINE: Primary | ICD-10-CM

## 2018-04-09 DIAGNOSIS — R79.89 ELEVATED SERUM CREATININE: ICD-10-CM

## 2018-04-09 DIAGNOSIS — I10 HYPERTENSION GOAL BP (BLOOD PRESSURE) < 140/90: Primary | ICD-10-CM

## 2018-04-09 LAB
ALBUMIN SERPL-MCNC: 3.4 G/DL (ref 3.4–5)
ALBUMIN UR-MCNC: 30 MG/DL
ANION GAP SERPL CALCULATED.3IONS-SCNC: 10 MMOL/L (ref 3–14)
APPEARANCE UR: ABNORMAL
BACTERIA #/AREA URNS HPF: ABNORMAL /HPF
BILIRUB UR QL STRIP: NEGATIVE
BUN SERPL-MCNC: 110 MG/DL (ref 7–30)
CALCIUM SERPL-MCNC: 9.3 MG/DL (ref 8.5–10.1)
CHLORIDE SERPL-SCNC: 109 MMOL/L (ref 94–109)
CO2 SERPL-SCNC: 23 MMOL/L (ref 20–32)
COLOR UR AUTO: YELLOW
CREAT SERPL-MCNC: 5.44 MG/DL (ref 0.66–1.25)
ERYTHROCYTE [DISTWIDTH] IN BLOOD BY AUTOMATED COUNT: 15.9 % (ref 10–15)
GFR SERPL CREATININE-BSD FRML MDRD: 10 ML/MIN/1.7M2
GLUCOSE SERPL-MCNC: 87 MG/DL (ref 70–99)
GLUCOSE UR STRIP-MCNC: NEGATIVE MG/DL
HCT VFR BLD AUTO: 33.9 % (ref 40–53)
HGB BLD-MCNC: 10.7 G/DL (ref 13.3–17.7)
HGB UR QL STRIP: ABNORMAL
KETONES UR STRIP-MCNC: NEGATIVE MG/DL
LEUKOCYTE ESTERASE UR QL STRIP: ABNORMAL
MCH RBC QN AUTO: 27.6 PG (ref 26.5–33)
MCHC RBC AUTO-ENTMCNC: 31.6 G/DL (ref 31.5–36.5)
MCV RBC AUTO: 88 FL (ref 78–100)
NITRATE UR QL: NEGATIVE
NON-SQ EPI CELLS #/AREA URNS LPF: ABNORMAL /LPF
PH UR STRIP: 5.5 PH (ref 5–7)
PHOSPHATE SERPL-MCNC: 5.5 MG/DL (ref 2.5–4.5)
PLATELET # BLD AUTO: 220 10E9/L (ref 150–450)
POTASSIUM SERPL-SCNC: 4.3 MMOL/L (ref 3.4–5.3)
RBC # BLD AUTO: 3.87 10E12/L (ref 4.4–5.9)
RBC #/AREA URNS AUTO: ABNORMAL /HPF
SODIUM SERPL-SCNC: 142 MMOL/L (ref 133–144)
SOURCE: ABNORMAL
SP GR UR STRIP: 1.01 (ref 1–1.03)
UROBILINOGEN UR STRIP-ACNC: 0.2 EU/DL (ref 0.2–1)
WBC # BLD AUTO: 8.8 10E9/L (ref 4–11)
WBC #/AREA URNS AUTO: >100 /HPF

## 2018-04-09 PROCEDURE — 87086 URINE CULTURE/COLONY COUNT: CPT

## 2018-04-09 PROCEDURE — 84156 ASSAY OF PROTEIN URINE: CPT

## 2018-04-09 PROCEDURE — 36415 COLL VENOUS BLD VENIPUNCTURE: CPT

## 2018-04-09 PROCEDURE — 80069 RENAL FUNCTION PANEL: CPT

## 2018-04-09 PROCEDURE — 99207 ZZC NO CHARGE NURSE ONLY: CPT | Performed by: FAMILY MEDICINE

## 2018-04-09 PROCEDURE — 87088 URINE BACTERIA CULTURE: CPT

## 2018-04-09 PROCEDURE — 81001 URINALYSIS AUTO W/SCOPE: CPT

## 2018-04-09 PROCEDURE — 85027 COMPLETE CBC AUTOMATED: CPT

## 2018-04-09 PROCEDURE — 87186 SC STD MICRODIL/AGAR DIL: CPT

## 2018-04-09 NOTE — PROGRESS NOTES
Dung Norton is enrolled/participating in the retail pharmacy Blood Pressure Goals Achievement Program (BPGAP).  Dung Norton was evaluated at [facility] on April 9, 2018 at which time his blood pressure was:    BP Readings from Last 3 Encounters:   04/09/18 136/84   04/02/18 138/84   03/26/18 146/80     Reviewed lifestyle modifications for blood pressure control and reduction: including making healthy food choices, managing weight, getting regular exercise, smoking cessation, reducing alcohol consumption, monitoring blood pressure regularly.     Dung Norton is not experiencing symptoms.    Follow-Up: BP is at goal of < 140/90mmHg (patient 18+ years of age with or without diabetes).  Recommended follow-up at pharmacy in 6 months.     Recommendation to Provider: None at this time.     Dung Norton was evaluated for enrollment into the PGEN study today.    Patient eligible for enrollment:  No  Patient interested in enrollment:  No    Completed by: Thank you,  Cheri Winslow, PharmD  [facility]  [phone]

## 2018-04-09 NOTE — MR AVS SNAPSHOT
After Visit Summary   4/9/2018    Dung Norton    MRN: [id number]           Patient Information     Date Of Birth          1945        Visit Information        Provider Department      4/9/2018 11:25 AM Haley Baker MD [facility]        Today's Diagnoses     Hypertension goal BP (blood pressure) < 140/90    -  1       Follow-ups after your visit        Your next 10 appointments already scheduled     Apr 09, 2018 11:45 AM CDT   LAB with FZ LAB   [facility] ([facility])    [address] [address]   [phone]           Please do not eat 10-12 hours before your appointment if you are coming in fasting for labs on lipids, cholesterol, or glucose (sugar). This does not apply to pregnant women. Water, hot tea and black coffee (with nothing added) are okay. Do not drink other fluids, diet soda or chew gum.            Apr 13, 2018  1:00 PM CDT   (Arrive by 12:30 PM)   Return Visit with Oralia De La Torre NP   [facility] ([facility])    [address] [place] [address]  [address] [place] [address] [phone]            Apr 16, 2018 10:45 AM CDT   LAB with FZ LAB   [facility] ([facility])    [address] [address]   [phone]           Please do not eat 10-12 hours before your appointment if you are coming in fasting for labs on lipids, cholesterol, or glucose (sugar). This does not apply to pregnant women. Water, hot tea and black coffee (with nothing added) are okay. Do not drink other fluids, diet soda or chew gum.            Apr 27, 2018 11:00 AM CDT   (Arrive by 10:45 AM)   Return Visit with Tamiko Vanegas MD   [facility] and [facility] for Prostate and Urologic Cancers ([facility])    [address] [place] [address]  4th Floor  [facility] [address] [phone]              Who to contact     If you have questions  "or need follow up information about today's clinic visit or your schedule please contact [facility] directly at [phone].  Normal or non-critical lab and imaging results will be communicated to you by MyChart, letter or phone within 4 business days after the clinic has received the results. If you do not hear from us within 7 days, please contact the clinic through [employer] or phone. If you have a critical or abnormal lab result, we will notify you by phone as soon as possible.  Submit refill requests through [employer] or call your pharmacy and they will forward the refill request to us. Please allow 3 business days for your refill to be completed.          Additional Information About Your Visit        [employer] Information     [employer] lets you send messages to your doctor, view your test results, renew your prescriptions, schedule appointments and more. To sign up, go to [email or web address] . Click on \"Log in\" on the left side of the screen, which will take you to the Welcome page. Then click on \"Sign up Now\" on the right side of the page.     You will be asked to enter the access code listed below, as well as some personal information. Please follow the directions to create your username and password.     Your access code is: [email or web address]  Expires: 4/15/2018 12:20 PM     Your access code will  in 90 days. If you need help or a new code, please call your [place] clinic or [phone].        Care EveryWhere ID     This is your Care EveryWhere ID. This could be used by other organizations to access your [place] medical records  [id number]         Blood Pressure from Last 3 Encounters:   18 136/84   18 138/84   18 146/80    Weight from Last 3 Encounters:   18 171 lb (77.6 kg)   18 169 lb 3.2 oz (76.7 kg)   18 169 lb 15.6 oz (77.1 kg)              Today, you had the following     No orders found for display         Today's Medication Changes        "   These changes are accurate as of 4/9/18 11:26 AM.  If you have any questions, ask your nurse or doctor.               These medicines have changed or have updated prescriptions.        Dose/Directions    amLODIPine 2.5 MG tablet   Commonly known as:  NORVASC   This may have changed:  when to take this   Used for:  Renal hypertension        Dose:  2.5 mg   Take 1 tablet (2.5 mg) by mouth daily   Quantity:  90 tablet   Refills:  3       finasteride 5 MG tablet   Commonly known as:  PROSCAR   This may have changed:  when to take this   Used for:  Benign prostatic hyperplasia with urinary retention        Dose:  5 mg   Take 1 tablet (5 mg) by mouth daily   Quantity:  90 tablet   Refills:  3                Primary Care Provider Office Phone # Fax #    Haley Baker [phone]       [address] [facility] [address]        Equal Access to Services     [facility] : Justin verdin Sochalo, waaxda luqadaha, qaybta kaalmada robert, gibson green . So [facility] [phone].    ATENCIÓN: Si habla español, tiene a montague disposición servicios gratuitos de asistencia lingüística. Jose Manuel al [phone].    We comply with applicable federal civil rights laws and [place] laws. We do not discriminate on the basis of race, color, national origin, age, disability, sex, sexual orientation, or gender identity.            Thank you!     Thank you for choosing [facility]  for your care. Our goal is always to provide you with excellent care. Hearing back from our patients is one way we can continue to improve our services. Please take a few minutes to complete the written survey that you may receive in the mail after your visit with us. Thank you!             Your Updated Medication List - Protect others around you: Learn how to safely use, store and throw away your medicines at www.disposemymeds.org.          This list is accurate as of 4/9/18 11:26 AM.  Always use  your most recent med list.                   Brand Name Dispense Instructions for use Diagnosis    amLODIPine 2.5 MG tablet    NORVASC    90 tablet    Take 1 tablet (2.5 mg) by mouth daily    Renal hypertension       aspirin 81 MG tablet     30 tablet    Take 1 tablet (81 mg) by mouth every morning - RESUME on 3/9/18 if urine remains clear    S/P TURP       calcium acetate 667 MG Caps capsule    PHOSLO    540 capsule    Take 2 capsules (1,334 mg) by mouth 3 times daily (with meals)    Chronic kidney disease, unspecified CKD stage       darbepoetin william 100 MCG/0.5ML injection    ARANESP (ALBUMIN FREE)    0.5 mL    Inject 0.5 mLs (100 mcg) Subcutaneous every 14 days As needed for hgb<10g/dL.  If Hgb increases >1 point in 2 weeks (if blood transfusion given, use hgb PRIOR to this), SYSTOLIC BP > 180 mmHg or hgb>=10g/dL, HOLD DOSE. Dose must be within 1 week of Hgb.  Per anemia protocol with Cecilia De La Torre CNP    Anemia of chronic renal failure, stage 5 (H), CKD (chronic kidney disease) stage 5, GFR less than 15 ml/min (H)       ferrous sulfate 325 (65 FE) MG tablet    IRON    30 tablet    Take 1 tablet (325 mg) by mouth daily (with breakfast)    Anemia of chronic renal failure, stage 5 (H), CKD (chronic kidney disease) stage 5, GFR less than 15 ml/min (H)       finasteride 5 MG tablet    PROSCAR    90 tablet    Take 1 tablet (5 mg) by mouth daily    Benign prostatic hyperplasia with urinary retention       furosemide 20 MG tablet    LASIX          order for DME     1 each    Equipment being ordered: olecranon bursa brace    Olecranon bursitis of left elbow

## 2018-04-09 NOTE — TELEPHONE ENCOUNTER
DATE:  4/9/2018   TIME OF RECEIPT FROM LAB:  4:08pm  LAB TEST:   Creatinine  LAB VALUE:  5.44  RESULTS GIVEN WITH READ-BACK TO (PROVIDER):  NEETA JOSEPH  TIME LAB VALUE REPORTED TO PROVIDER:   4:08pm    Lab result is consistent with previous readings.    Giselle Benoit RN

## 2018-04-10 LAB
CREAT UR-MCNC: 57 MG/DL
PROT UR-MCNC: 0.66 G/L
PROT/CREAT 24H UR: 1.17 G/G CR (ref 0–0.2)

## 2018-04-11 ENCOUNTER — CARE COORDINATION (OUTPATIENT)
Dept: NEPHROLOGY | Facility: CLINIC | Age: 73
End: 2018-04-11

## 2018-04-11 LAB
BACTERIA SPEC CULT: ABNORMAL
SPECIMEN SOURCE: ABNORMAL

## 2018-04-11 NOTE — PROGRESS NOTES
Per Cecilia:    looks like Dung may still have UTI. He had been on Vanco ( not sure who prescribed) prior to his turp.   Could you call and ask how the sample was collected, if he still has a picc line, if he is still getting vanco, if he has fever or feels ill.     Called patient. The PICC line was removed and vanco stopped after his procedure. Sample was collected via a catheter (he self caths TID). Updated Cecilia.    Giselle Benoit RN

## 2018-04-13 ENCOUNTER — TELEPHONE (OUTPATIENT)
Dept: NEPHROLOGY | Facility: CLINIC | Age: 73
End: 2018-04-13

## 2018-04-13 ENCOUNTER — OFFICE VISIT (OUTPATIENT)
Dept: NEPHROLOGY | Facility: CLINIC | Age: 73
End: 2018-04-13
Payer: MEDICARE

## 2018-04-13 VITALS
WEIGHT: 173 LBS | BODY MASS INDEX: 24.77 KG/M2 | OXYGEN SATURATION: 99 % | SYSTOLIC BLOOD PRESSURE: 147 MMHG | TEMPERATURE: 97.7 F | HEIGHT: 70 IN | DIASTOLIC BLOOD PRESSURE: 80 MMHG | HEART RATE: 94 BPM

## 2018-04-13 DIAGNOSIS — I10 BENIGN ESSENTIAL HYPERTENSION: ICD-10-CM

## 2018-04-13 DIAGNOSIS — E83.39 HYPERPHOSPHATEMIA: ICD-10-CM

## 2018-04-13 DIAGNOSIS — N18.5 ANEMIA OF CHRONIC RENAL FAILURE, STAGE 5 (H): ICD-10-CM

## 2018-04-13 DIAGNOSIS — N18.5 CKD (CHRONIC KIDNEY DISEASE) STAGE 5, GFR LESS THAN 15 ML/MIN (H): Primary | ICD-10-CM

## 2018-04-13 DIAGNOSIS — D63.1 ANEMIA OF CHRONIC RENAL FAILURE, STAGE 5 (H): ICD-10-CM

## 2018-04-13 PROCEDURE — G0463 HOSPITAL OUTPT CLINIC VISIT: HCPCS | Mod: ZF

## 2018-04-13 ASSESSMENT — PAIN SCALES - GENERAL: PAINLEVEL: NO PAIN (0)

## 2018-04-13 NOTE — NURSING NOTE
"Chief Complaint   Patient presents with     RECHECK     CKD       Initial Pulse 94  Temp 97.7  F (36.5  C) (Oral)  Ht 1.778 m (5' 10\")  Wt 78.5 kg (173 lb)  SpO2 99%  BMI 24.82 kg/m2 Estimated body mass index is 24.82 kg/(m^2) as calculated from the following:    Height as of this encounter: 1.778 m (5' 10\").    Weight as of this encounter: 78.5 kg (173 lb).  Medication Reconciliation: incomplete     Jonathan Booker MA    "

## 2018-04-13 NOTE — LETTER
4/13/2018      RE: Dung LAY Norton  [address] [place] [address]       Nephrology clinic Visit 4/13/18    Assessment and Plan:       1. CKD5 - Very stable renal function. 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 His GFR has been < 15 ml/mn since July 2017. He is not uremic. Urine protein 1.17 g/gCr     - Patient and daughter have decided upon HD as his RRT option.     - He has attended the [facility] Smart program    - He underwent AVF creation with Dr Eduardo Pabon on 11/22/17. It has matured nicely and ready to use when needed. Last seen by Dr Pabon on 1/2/18   - Given stability of renal function can hold off on initiating HD until symptomatic   - He does not use NSAIDs   - He is straight cathing TID w/o difficulty      2. Electrolytes - No acute concerns. K 4.3      3. Volume status - Euvolemic. No edema, dyspnea. Albumin 3.4. Not on diuretics. Making ~ 1 liter/urine/day. He is gaining body weight, now stable in the 78 kg range. Clinic blood pressures in the 140/ range, but typically 130's/ with weekly lab draws.        - No need for diuretics at this time    4. HTN - Well controlled. Currently on Amlodipine 2.5 mg daily. He does not check home blood pressures, but b/p is checked at lab weekly and are generally in the 130/ range       5. BPH - S/P TURP 3/18. He is straight cathing TID w/o difficulty. Has not been taking Proscar. Messaged Dr Vanegas to clarify whether to restart    6. Acid base - No acute concerns. Bicarb 23      7. BMD - Ca 9.3, Phos 5.5, albumin 3.4, Vit D 29, PTH intact 86   - He is doing much better with taking the Phoslo correctly      8. Nutrition - Reports good appetite and albumin is stable in the 3.4 range. LFTs normal      9. Anemia - Hgb 10.7. Etiology is likely anemia of renal disease   - He should have routine colon cancer screening   - Iron studies 4/2/18: Fe 47, Ferritin 407, Tsat 17%   - Has been enrolled in anemia management program for EARLENE, but not  currently requiring Aranesp   - Has been started on Iron one tab qd       10. UTI- Likely colonized given lack of symptoms, signs. No treatment for now, but will follow.       11. Disposition - RTC in 8 wks for f/u with ongoing weekly labs      Assessment and plan was discussed with patient and daughter, they both voice understanding and agreement.     Reason for Visit:  CKD5 follow up      HPI:  Dung Norton is a 71 year old year old male with a PMH of CKDIII , HTN, Obstructive uropathy 2/2 BPH/benign bladder mass with creat as high as 14.7 in July 2017. He did not required RRT at that time and renal function improved to a low of 4.6 in Sept . However despite correction of the obstructive uropathy his renal function did not normalize and he has developed End stage Kidney disease.       Interval Hx: Underwent TURP on 3/5/18 w/ complication. Renal function has remained stable.     ROS:  Feeling good w/o complaint. Continues to straight cath 3 times/day following TURP. Voiding approx 1000 cc /day w/o cath. Was treated with 2 doses of Vanco pre op for UTI with primary symptom of hematuria. Repeat UC on 4/9/18 still showing coag neg staph. Patient is asymptomatic. No hematuria, fever, dysuria. WBC is normal. Denies CP/dyspnea/abdominal pain. Energy is stable. He is living independently. Has good appetite. No edema. Consulted with Urologist, Dr Vanegas.        Chronic Medical Problems:      HTN  BPH  Obstructive uropathy   HTN  Tobacco abuse  HLD  Anemia  Pulmonary nodules  KRISTINA  CKD5      Personal Hx:   Single, has 2 daughters very involved in his care. Currently living in a house, but will be moving in May into a [place] [address]. He is a former smoker     Allergies:  No Known Allergies      Medications:  Prior to Admission medications    Medication Sig Start Date End Date Taking? Authorizing Provider   ferrous sulfate (IRON) 325 (65 FE) MG tablet Take 1 tablet (325 mg) by mouth daily (with breakfast) 4/4/18  Yes Britt,  "Oralia Santiago NP   aspirin 81 MG tablet Take 1 tablet (81 mg) by mouth every morning - RESUME on 3/9/18 if urine remains clear 3/6/18  Yes Leda Baxter PA   order for DME Equipment being ordered: nichol bradshawyaa chun 1/17/18  Yes Haley Baker MD   amLODIPine (NORVASC) 2.5 MG tablet Take 1 tablet (2.5 mg) by mouth daily  Patient taking differently: Take 2.5 mg by mouth every morning  12/26/17  Yes Haley Baker MD   darbepoetin william (ARANESP, ALBUMIN FREE,) 100 MCG/0.5ML injection Inject 0.5 mLs (100 mcg) Subcutaneous every 14 days As needed for hgb<10g/dL.  If Hgb increases >1 point in 2 weeks (if blood transfusion given, use hgb PRIOR to this), SYSTOLIC BP > 180 mmHg or hgb>=10g/dL, HOLD DOSE. Dose must be within 1 week of Hgb.  Per anemia protocol with Cecilia De La Torre CNP 12/6/17  Yes Oralia De La Torre NP   calcium acetate (PHOSLO) 667 MG CAPS capsule Take 2 capsules (1,334 mg) by mouth 3 times daily (with meals) 10/30/17  Yes Oralai De La Torre NP   finasteride (PROSCAR) 5 MG tablet Take 1 tablet (5 mg) by mouth daily  Patient taking differently: Take 5 mg by mouth every morning  8/16/17  Yes Oralia De La Torre NP       Vitals:  /80  Pulse 94  Temp 97.7  F (36.5  C) (Oral)  Ht 1.778 m (5' 10\")  Wt 78.5 kg (173 lb)  SpO2 99%  BMI 24.82 kg/m2    Exam:    GEN: Pleasant, Well groomed. Dtr present  CARDIAC RRR  LUNGS: CTA  ABDOMEN: Soft, NT  EXT: no edema  Access: CLIFFORD AVF + bruit, well developed, hand warm    Results:  Results for FABRIZIO MIRANDA (MRN [id number]) as of 4/13/2018 13:27   Ref. Range 4/9/2018 11:25 4/9/2018 14:00   Sodium Latest Ref Range: 133 - 144 mmol/L 142    Potassium Latest Ref Range: 3.4 - 5.3 mmol/L 4.3    Chloride Latest Ref Range: 94 - 109 mmol/L 109    Carbon Dioxide Latest Ref Range: 20 - 32 mmol/L 23    Urea Nitrogen Latest Ref Range: 7 - 30 mg/dL 110 (H)    Creatinine Latest Ref Range: 0.66 - 1.25 mg/dL 5.44 (H)    GFR Estimate Latest Ref Range: >60 mL/min/1.7m2 " 10 (L)    GFR Estimate If Black Latest Ref Range: >60 mL/min/1.7m2 13 (L)    Calcium Latest Ref Range: 8.5 - 10.1 mg/dL 9.3    Anion Gap Latest Ref Range: 3 - 14 mmol/L 10    Phosphorus Latest Ref Range: 2.5 - 4.5 mg/dL 5.5 (H)    Albumin Latest Ref Range: 3.4 - 5.0 g/dL 3.4    Creatinine Urine Latest Units: mg/dL  57   Protein Random Urine Latest Units: g/L  0.66   Protein Total Urine g/gr Creatinine Latest Ref Range: 0 - 0.2 g/g Cr  1.17 (H)   Glucose Latest Ref Range: 70 - 99 mg/dL 87    WBC Latest Ref Range: 4.0 - 11.0 10e9/L 8.8    Hemoglobin Latest Ref Range: 13.3 - 17.7 g/dL 10.7 (L)    Hematocrit Latest Ref Range: 40.0 - 53.0 % 33.9 (L)    Platelet Count Latest Ref Range: 150 - 450 10e9/L 220    RBC Count Latest Ref Range: 4.4 - 5.9 10e12/L 3.87 (L)    MCV Latest Ref Range: 78 - 100 fl 88    MCH Latest Ref Range: 26.5 - 33.0 pg 27.6    MCHC Latest Ref Range: 31.5 - 36.5 g/dL 31.6    RDW Latest Ref Range: 10.0 - 15.0 % 15.9 (H)    Color Urine Unknown  Yellow   Appearance Urine Unknown  Cloudy   Glucose Urine Latest Ref Range: NEG^Negative mg/dL  Negative   Bilirubin Urine Latest Ref Range: NEG^Negative   Negative   Ketones Urine Latest Ref Range: NEG^Negative mg/dL  Negative   Specific Gravity Urine Latest Ref Range: 1.003 - 1.035   1.015   pH Urine Latest Ref Range: 5.0 - 7.0 pH  5.5   Protein Albumin Urine Latest Ref Range: NEG^Negative mg/dL  30 (A)   Urobilinogen Urine Latest Ref Range: 0.2 - 1.0 EU/dL  0.2   Nitrite Urine Latest Ref Range: NEG^Negative   Negative   Blood Urine Latest Ref Range: NEG^Negative   Large (A)   Leukocyte Esterase Urine Latest Ref Range: NEG^Negative   Large (A)   Source Unknown  Midstream Urine   WBC Urine Latest Ref Range: OTO5^0 - 5 /HPF  >100 (A)   RBC Urine Latest Ref Range: OTO2^O - 2 /HPF  10-25 (A)   Bacteria Urine Latest Ref Range: NEG^Negative /HPF  Few (A)   Squamous Epithelial /LPF Urine Latest Ref Range: FEW^Few /LPF  Few   Specimen Description Unknown  Midstream  Urine   Culture Micro Unknown  >100,000 colonies... (A)   URINE CULTURE AEROBIC BACTERIAL Unknown  Rpt (A)       Oralia De La Torre, NP

## 2018-04-13 NOTE — MR AVS SNAPSHOT
After Visit Summary   4/13/2018    Dung Norton    MRN: [id number]           Patient Information     Date Of Birth          1945        Visit Information        Provider Department      4/13/2018 1:00 PM Oralia De La Torre NP [facility]        Today's Diagnoses     CKD (chronic kidney disease) stage 5, GFR less than 15 ml/min (H)    -  1       Follow-ups after your visit        Follow-up notes from your care team     Return in about 2 months (around 6/13/2018).      Your next 10 appointments already scheduled     Apr 16, 2018 10:45 AM CDT   LAB with  LAB   [facility] ([facility])    [address] [place] [id number] [phone]           Please do not eat 10-12 hours before your appointment if you are coming in fasting for labs on lipids, cholesterol, or glucose (sugar). This does not apply to pregnant women. Water, hot tea and black coffee (with nothing added) are okay. Do not drink other fluids, diet soda or chew gum.            Apr 27, 2018 11:00 AM CDT   (Arrive by 10:45 AM)   Return Visit with Tamiko Vanegas MD   [facility] ([facility])    [address] [place] [address]  4th Floor  [facility] [address] [phone]            Jun 22, 2018  1:00 PM CDT   (Arrive by 12:30 PM)   Return Visit with Oralia De La Torre NP   [facility] ([facility])    [address] [place] [address]  [address] [place] [address] [phone]              Future tests that were ordered for you today     Open Future Orders        Priority Expected Expires Ordered    **UA reflex to Microscopic FUTURE 14d Routine 4/20/2018 4/27/2018 4/13/2018            Who to contact     If you have questions or need follow up information about today's clinic visit or your schedule please contact [facility] NE[facility] directly at [phone].  Normal or  "non-critical lab and imaging results will be communicated to you by MyChart, letter or phone within 4 business days after the clinic has received the results. If you do not hear from us within 7 days, please contact the clinic through [employer] or phone. If you have a critical or abnormal lab result, we will notify you by phone as soon as possible.  Submit refill requests through [employer] or call your pharmacy and they will forward the refill request to us. Please allow 3 business days for your refill to be completed.          Additional Information About Your Visit        [employer] Information     [employer] lets you send messages to your doctor, view your test results, renew your prescriptions, schedule appointments and more. To sign up, go to [email or web address] . Click on \"Log in\" on the left side of the screen, which will take you to the Welcome page. Then click on \"Sign up Now\" on the right side of the page.     You will be asked to enter the access code listed below, as well as some personal information. Please follow the directions to create your username and password.     Your access code is: [email or web address]  Expires: 4/15/2018 12:20 PM     Your access code will  in 90 days. If you need help or a new code, please call your [facility] clinic or [phone].        Care EveryWhere ID     This is your Care EveryWhere ID. This could be used by other organizations to access your [facility] medical records  [id number]        Your Vitals Were     Pulse Temperature Height Pulse Oximetry BMI (Body Mass Index)       94 97.7  F (36.5  C) (Oral) 1.778 m (5' 10\") 99% 24.82 kg/m2        Blood Pressure from Last 3 Encounters:   18 147/80   18 136/84   18 138/84    Weight from Last 3 Encounters:   18 78.5 kg (173 lb)   18 77.6 kg (171 lb)   18 76.7 kg (169 lb 3.2 oz)                 Today's Medication Changes          These changes are accurate as of 18  1:26 PM.  If you have any " questions, ask your nurse or doctor.               These medicines have changed or have updated prescriptions.        Dose/Directions    amLODIPine 2.5 MG tablet   Commonly known as:  NORVASC   This may have changed:  when to take this   Used for:  Renal hypertension        Dose:  2.5 mg   Take 1 tablet (2.5 mg) by mouth daily   Quantity:  90 tablet   Refills:  3       finasteride 5 MG tablet   Commonly known as:  PROSCAR   This may have changed:  when to take this   Used for:  Benign prostatic hyperplasia with urinary retention        Dose:  5 mg   Take 1 tablet (5 mg) by mouth daily   Quantity:  90 tablet   Refills:  3                Primary Care Provider Office Phone # Fax #    Haley Baker [phone] [address] [facility]  [place] [address]        Equal Access to Services     BRIAN CERRATO : Justin nascimentoo [place], waaxda luqadaha, qaybta kaalmada adeegyada, gibson solis. So [facility] [phone].    ATENCIÓN: Si habla español, tiene a montague disposición servicios gratuitos de asistencia lingüística. Ll[facility] [phone].    We comply with applicable federal civil rights laws and [place] laws. We do not discriminate on the basis of race, color, national origin, age, disability, sex, sexual orientation, or gender identity.            Thank you!     Thank you for choosing [facility]  for your care. Our goal is always to provide you with excellent care. Hearing back from our patients is one way we can continue to improve our services. Please take a few minutes to complete the written survey that you may receive in the mail after your visit with us. Thank you!             Your Updated Medication List - Protect others around you: Learn how to safely use, store and throw away your medicines at www.disposemymeds.org.          This list is accurate as of 4/13/18  1:26 PM.  Always use your most recent med list.                   Brand Name Dispense  Instructions for use Diagnosis    amLODIPine 2.5 MG tablet    NORVASC    90 tablet    Take 1 tablet (2.5 mg) by mouth daily    Renal hypertension       aspirin 81 MG tablet     30 tablet    Take 1 tablet (81 mg) by mouth every morning - RESUME on 3/9/18 if urine remains clear    S/P TURP       calcium acetate 667 MG Caps capsule    PHOSLO    540 capsule    Take 2 capsules (1,334 mg) by mouth 3 times daily (with meals)    Chronic kidney disease, unspecified CKD stage       darbepoetin william 100 MCG/0.5ML injection    ARANESP (ALBUMIN FREE)    0.5 mL    Inject 0.5 mLs (100 mcg) Subcutaneous every 14 days As needed for hgb<10g/dL.  If Hgb increases >1 point in 2 weeks (if blood transfusion given, use hgb PRIOR to this), SYSTOLIC BP > 180 mmHg or hgb>=10g/dL, HOLD DOSE. Dose must be within 1 week of Hgb.  Per anemia protocol with Cecilia De La Torre CNP    Anemia of chronic renal failure, stage 5 (H), CKD (chronic kidney disease) stage 5, GFR less than 15 ml/min (H)       ferrous sulfate 325 (65 Fe) MG tablet    IRON    30 tablet    Take 1 tablet (325 mg) by mouth daily (with breakfast)    Anemia of chronic renal failure, stage 5 (H), CKD (chronic kidney disease) stage 5, GFR less than 15 ml/min (H)       finasteride 5 MG tablet    PROSCAR    90 tablet    Take 1 tablet (5 mg) by mouth daily    Benign prostatic hyperplasia with urinary retention       order for DME     1 each    Equipment being ordered: olecranon bursa brace    Olecranon bursitis of left elbow

## 2018-04-13 NOTE — TELEPHONE ENCOUNTER
Per Cecilia:    Tell him Dr Vanegas said he does not have to resume the Finasteride(proscar)     Called patient, who verbalized understanding.    Giselle Benoit RN

## 2018-04-13 NOTE — PROGRESS NOTES
Nephrology clinic Visit 4/13/18    Assessment and Plan:       1. CKD5 - Very stable renal function. 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 His GFR has been < 15 ml/mn since July 2017. He is not uremic. Urine protein 1.17 g/gCr     - Patient and daughter have decided upon HD as his RRT option.     - He has attended the Kidney Smart program    - He underwent AVF creation with Dr Eduardo Pabon on 11/22/17. It has matured nicely and ready to use when needed. Last seen by Dr Pabon on 1/2/18   - Given stability of renal function can hold off on initiating HD until symptomatic   - He does not use NSAIDs   - He is straight cathing TID w/o difficulty      2. Electrolytes - No acute concerns. K 4.3      3. Volume status - Euvolemic. No edema, dyspnea. Albumin 3.4. Not on diuretics. Making ~ 1 liter/urine/day. He is gaining body weight, now stable in the 78 kg range. Clinic blood pressures in the 140/ range, but typically 130's/ with weekly lab draws.        - No need for diuretics at this time    4. HTN - Well controlled. Currently on Amlodipine 2.5 mg daily. He does not check home blood pressures, but b/p is checked at lab weekly and are generally in the 130/ range       5. BPH - S/P TURP 3/18. He is straight cathing TID w/o difficulty. Has not been taking Proscar. Messaged Dr Vanegas to clarify whether to restart    6. Acid base - No acute concerns. Bicarb 23      7. BMD - Ca 9.3, Phos 5.5, albumin 3.4, Vit D 29, PTH intact 86   - He is doing much better with taking the Phoslo correctly      8. Nutrition - Reports good appetite and albumin is stable in the 3.4 range. LFTs normal      9. Anemia - Hgb 10.7. Etiology is likely anemia of renal disease   - He should have routine colon cancer screening   - Iron studies 4/2/18: Fe 47, Ferritin 407, Tsat 17%   - Has been enrolled in anemia management program for EARLENE, but not currently requiring Aranesp   - Has been started on Iron one tab qd       10. UTI-  Likely colonized given lack of symptoms, signs. No treatment for now, but will follow.       11. Disposition - RTC in 8 wks for f/u with ongoing weekly labs      Assessment and plan was discussed with patient and daughter, they both voice understanding and agreement.     Reason for Visit:  CKD5 follow up      HPI:  Dung Norton is a 71 year old year old male with a PMH of CKDIII , HTN, Obstructive uropathy 2/2 BPH/benign bladder mass with creat as high as 14.7 in July 2017. He did not required RRT at that time and renal function improved to a low of 4.6 in Sept . However despite correction of the obstructive uropathy his renal function did not normalize and he has developed End stage Kidney disease.       Interval Hx: Underwent TURP on 3/5/18 w/ complication. Renal function has remained stable.     ROS:  Feeling good w/o complaint. Continues to straight cath 3 times/day following TURP. Voiding approx 1000 cc /day w/o cath. Was treated with 2 doses of Vanco pre op for UTI with primary symptom of hematuria. Repeat UC on 4/9/18 still showing coag neg staph. Patient is asymptomatic. No hematuria, fever, dysuria. WBC is normal. Denies CP/dyspnea/abdominal pain. Energy is stable. He is living independently. Has good appetite. No edema. Consulted with Urologist, Dr Vanegas.        Chronic Medical Problems:      HTN  BPH  Obstructive uropathy   HTN  Tobacco abuse  HLD  Anemia  Pulmonary nodules  KRISTINA  CKD5      Personal Hx:   Single, has 2 daughters very involved in his care. Currently living in a house, but will be moving in May into a  Apt. He is a former smoker     Allergies:  No Known Allergies      Medications:  Prior to Admission medications    Medication Sig Start Date End Date Taking? Authorizing Provider   ferrous sulfate (IRON) 325 (65 FE) MG tablet Take 1 tablet (325 mg) by mouth daily (with breakfast) 4/4/18  Yes Oralia De La Torre, NP   aspirin 81 MG tablet Take 1 tablet (81 mg) by mouth every morning -  "RESUME on 3/9/18 if urine remains clear 3/6/18  Yes Leda Baxter PA   order for DME Equipment being ordered: olecranon bursa brace 1/17/18  Yes Haley Baker MD   amLODIPine (NORVASC) 2.5 MG tablet Take 1 tablet (2.5 mg) by mouth daily  Patient taking differently: Take 2.5 mg by mouth every morning  12/26/17  Yes Haley Baker MD   darbepoetin william (ARANESP, ALBUMIN FREE,) 100 MCG/0.5ML injection Inject 0.5 mLs (100 mcg) Subcutaneous every 14 days As needed for hgb<10g/dL.  If Hgb increases >1 point in 2 weeks (if blood transfusion given, use hgb PRIOR to this), SYSTOLIC BP > 180 mmHg or hgb>=10g/dL, HOLD DOSE. Dose must be within 1 week of Hgb.  Per anemia protocol with Cecilia De La Torre CNP 12/6/17  Yes Oralia De La Torre NP   calcium acetate (PHOSLO) 667 MG CAPS capsule Take 2 capsules (1,334 mg) by mouth 3 times daily (with meals) 10/30/17  Yes Oralia De La Torre NP   finasteride (PROSCAR) 5 MG tablet Take 1 tablet (5 mg) by mouth daily  Patient taking differently: Take 5 mg by mouth every morning  8/16/17  Yes Oralia De La Torre NP       Vitals:  /80  Pulse 94  Temp 97.7  F (36.5  C) (Oral)  Ht 1.778 m (5' 10\")  Wt 78.5 kg (173 lb)  SpO2 99%  BMI 24.82 kg/m2    Exam:    GEN: Pleasant, Well groomed. Dtr present  CARDIAC RRR  LUNGS: CTA  ABDOMEN: Soft, NT  EXT: no edema  Access: CLIFFORD AVF + bruit, well developed, hand warm    Results:  Results for FABRIZIO MIRANDA (MRN [id number]) as of 4/13/2018 13:27   Ref. Range 4/9/2018 11:25 4/9/2018 14:00   Sodium Latest Ref Range: 133 - 144 mmol/L 142    Potassium Latest Ref Range: 3.4 - 5.3 mmol/L 4.3    Chloride Latest Ref Range: 94 - 109 mmol/L 109    Carbon Dioxide Latest Ref Range: 20 - 32 mmol/L 23    Urea Nitrogen Latest Ref Range: 7 - 30 mg/dL 110 (H)    Creatinine Latest Ref Range: 0.66 - 1.25 mg/dL 5.44 (H)    GFR Estimate Latest Ref Range: >60 mL/min/1.7m2 10 (L)    GFR Estimate If Black Latest Ref Range: >60 mL/min/1.7m2 13 (L)    Calcium " Latest Ref Range: 8.5 - 10.1 mg/dL 9.3    Anion Gap Latest Ref Range: 3 - 14 mmol/L 10    Phosphorus Latest Ref Range: 2.5 - 4.5 mg/dL 5.5 (H)    Albumin Latest Ref Range: 3.4 - 5.0 g/dL 3.4    Creatinine Urine Latest Units: mg/dL  57   Protein Random Urine Latest Units: g/L  0.66   Protein Total Urine g/gr Creatinine Latest Ref Range: 0 - 0.2 g/g Cr  1.17 (H)   Glucose Latest Ref Range: 70 - 99 mg/dL 87    WBC Latest Ref Range: 4.0 - 11.0 10e9/L 8.8    Hemoglobin Latest Ref Range: 13.3 - 17.7 g/dL 10.7 (L)    Hematocrit Latest Ref Range: 40.0 - 53.0 % 33.9 (L)    Platelet Count Latest Ref Range: 150 - 450 10e9/L 220    RBC Count Latest Ref Range: 4.4 - 5.9 10e12/L 3.87 (L)    MCV Latest Ref Range: 78 - 100 fl 88    MCH Latest Ref Range: 26.5 - 33.0 pg 27.6    MCHC Latest Ref Range: 31.5 - 36.5 g/dL 31.6    RDW Latest Ref Range: 10.0 - 15.0 % 15.9 (H)    Color Urine Unknown  Yellow   Appearance Urine Unknown  Cloudy   Glucose Urine Latest Ref Range: NEG^Negative mg/dL  Negative   Bilirubin Urine Latest Ref Range: NEG^Negative   Negative   Ketones Urine Latest Ref Range: NEG^Negative mg/dL  Negative   Specific Gravity Urine Latest Ref Range: 1.003 - 1.035   1.015   pH Urine Latest Ref Range: 5.0 - 7.0 pH  5.5   Protein Albumin Urine Latest Ref Range: NEG^Negative mg/dL  30 (A)   Urobilinogen Urine Latest Ref Range: 0.2 - 1.0 EU/dL  0.2   Nitrite Urine Latest Ref Range: NEG^Negative   Negative   Blood Urine Latest Ref Range: NEG^Negative   Large (A)   Leukocyte Esterase Urine Latest Ref Range: NEG^Negative   Large (A)   Source Unknown  Midstream Urine   WBC Urine Latest Ref Range: OTO5^0 - 5 /HPF  >100 (A)   RBC Urine Latest Ref Range: OTO2^O - 2 /HPF  10-25 (A)   Bacteria Urine Latest Ref Range: NEG^Negative /HPF  Few (A)   Squamous Epithelial /LPF Urine Latest Ref Range: FEW^Few /LPF  Few   Specimen Description Unknown  Midstream Urine   Culture Micro Unknown  >100,000 colonies... (A)   URINE CULTURE AEROBIC  BACTERIAL Unknown  Rpt (A)

## 2018-04-16 ENCOUNTER — ALLIED HEALTH/NURSE VISIT (OUTPATIENT)
Dept: FAMILY MEDICINE | Facility: CLINIC | Age: 73
End: 2018-04-16
Payer: COMMERCIAL

## 2018-04-16 ENCOUNTER — TELEPHONE (OUTPATIENT)
Dept: PHARMACY | Facility: CLINIC | Age: 73
End: 2018-04-16

## 2018-04-16 VITALS — SYSTOLIC BLOOD PRESSURE: 142 MMHG | DIASTOLIC BLOOD PRESSURE: 80 MMHG

## 2018-04-16 DIAGNOSIS — R79.89 ELEVATED SERUM CREATININE: ICD-10-CM

## 2018-04-16 DIAGNOSIS — I10 HYPERTENSION GOAL BP (BLOOD PRESSURE) < 140/90: Primary | ICD-10-CM

## 2018-04-16 DIAGNOSIS — N18.5 ANEMIA OF CHRONIC RENAL FAILURE, STAGE 5 (H): ICD-10-CM

## 2018-04-16 DIAGNOSIS — D63.1 ANEMIA OF CHRONIC RENAL FAILURE, STAGE 5 (H): ICD-10-CM

## 2018-04-16 DIAGNOSIS — N18.5 CKD (CHRONIC KIDNEY DISEASE) STAGE 5, GFR LESS THAN 15 ML/MIN (H): ICD-10-CM

## 2018-04-16 LAB
ALBUMIN SERPL-MCNC: 3.3 G/DL (ref 3.4–5)
ANION GAP SERPL CALCULATED.3IONS-SCNC: 11 MMOL/L (ref 3–14)
BUN SERPL-MCNC: 123 MG/DL (ref 7–30)
CALCIUM SERPL-MCNC: 9.4 MG/DL (ref 8.5–10.1)
CHLORIDE SERPL-SCNC: 107 MMOL/L (ref 94–109)
CO2 SERPL-SCNC: 23 MMOL/L (ref 20–32)
CREAT SERPL-MCNC: 5.5 MG/DL (ref 0.66–1.25)
GFR SERPL CREATININE-BSD FRML MDRD: 10 ML/MIN/1.7M2
GLUCOSE SERPL-MCNC: 99 MG/DL (ref 70–99)
HCT VFR BLD AUTO: 31.7 % (ref 40–53)
HGB BLD-MCNC: 10.1 G/DL (ref 13.3–17.7)
PHOSPHATE SERPL-MCNC: 6.2 MG/DL (ref 2.5–4.5)
POTASSIUM SERPL-SCNC: 4.7 MMOL/L (ref 3.4–5.3)
SODIUM SERPL-SCNC: 141 MMOL/L (ref 133–144)

## 2018-04-16 PROCEDURE — 80069 RENAL FUNCTION PANEL: CPT

## 2018-04-16 PROCEDURE — 99207 ZZC NO CHARGE NURSE ONLY: CPT | Performed by: FAMILY MEDICINE

## 2018-04-16 PROCEDURE — 36415 COLL VENOUS BLD VENIPUNCTURE: CPT

## 2018-04-16 PROCEDURE — 85018 HEMOGLOBIN: CPT

## 2018-04-16 PROCEDURE — 85014 HEMATOCRIT: CPT

## 2018-04-16 NOTE — PROGRESS NOTES
Dung Norton is enrolled/participating in the retail pharmacy Blood Pressure Goals Achievement Program (BPGAP).  Dung Norton was evaluated at [facility] on April 16, 2018 at which time his blood pressure was:    BP Readings from Last 3 Encounters:   04/16/18 142/80   04/13/18 147/80   04/09/18 136/84     Reviewed lifestyle modifications for blood pressure control and reduction: including making healthy food choices, managing weight, getting regular exercise, smoking cessation, reducing alcohol consumption, monitoring blood pressure regularly. Patient had lab appt and couldn't stay for second reading.     Dung Norton is not experiencing symptoms.    Follow-Up: BP is not at goal of < 140/90mmHg (patient 18+ years of age with or without diabetes), Recommended follow-up in 1 month at the pharmacy. Routing to PCP as an FYI.    Recommendation to Provider: Patient coming in next week to get it checked.     Dung Norton was evaluated for enrollment into the PGEN study today.    Patient eligible for enrollment:  No  Patient interested in enrollment:  No    Completed by: Thank you,  Cheri Winslow, PharmD  [facility]  [phone]

## 2018-04-16 NOTE — MR AVS SNAPSHOT
After Visit Summary   4/16/2018    Dung Norton    MRN: [id number]           Patient Information     Date Of Birth          1945        Visit Information        Provider Department      4/16/2018 11:09 AM Haley Baker MD [facility]        Today's Diagnoses     Hypertension goal BP (blood pressure) < 140/90    -  1       Follow-ups after your visit        Your next 10 appointments already scheduled     Apr 23, 2018 10:30 AM CDT   LAB with [facility]   [facility] ([facility])    [address] [place] [id number] [phone]           Please do not eat 10-12 hours before your appointment if you are coming in fasting for labs on lipids, cholesterol, or glucose (sugar). This does not apply to pregnant women. Water, hot tea and black coffee (with nothing added) are okay. Do not drink other fluids, diet soda or chew gum.            Apr 27, 2018 11:00 AM CDT   (Arrive by 10:45 AM)   Return Visit with Tamiko Vanegas MD   [facility] ([facility])    [address] [place] [address]  4th Floor  [facility] [address] [phone]            Jun 22, 2018  1:00 PM CDT   (Arrive by 12:30 PM)   Return Visit with Oralia De La Torre NP   [facility] ([facility])    [address] [place] [address]  [address]  [facility] [id number] [phone]              Who to contact     If you have questions or need follow up information about today's clinic visit or your schedule please contact [place] [facility] directly at [phone].  Normal or non-critical lab and imaging results will be communicated to you by MyChart, letter or phone within 4 business days after the clinic has received the results. If you do not hear from us within 7 days, please contact the clinic through MyChart or phone. If you have a critical or abnormal lab result, we will notify  "you by phone as soon as possible.  Submit refill requests through [employer] or call your pharmacy and they will forward the refill request to us. Please allow 3 business days for your refill to be completed.          Additional Information About Your Visit        [employer] Information     [employer] lets you send messages to your doctor, view your test results, renew your prescriptions, schedule appointments and more. To sign up, go to [email or web address] . Click on \"Log in\" on the left side of the screen, which will take you to the Welcome page. Then click on \"Sign up Now\" on the right side of the page.     You will be asked to enter the access code listed below, as well as some personal information. Please follow the directions to create your username and password.     Your access code is: [email or web address]  Expires: 2018 11:43 AM     Your access code will  in 90 days. If you need help or a new code, please call your [place] clinic or [phone].        Care EveryWhere ID     This is your Care EveryWhere ID. This could be used by other organizations to access your [place] medical records  [id number]         Blood Pressure from Last 3 Encounters:   18 142/80   18 147/80   18 136/84    Weight from Last 3 Encounters:   18 173 lb (78.5 kg)   18 171 lb (77.6 kg)   18 169 lb 3.2 oz (76.7 kg)              Today, you had the following     No orders found for display         Today's Medication Changes          These changes are accurate as of 18 11:59 PM.  If you have any questions, ask your nurse or doctor.               These medicines have changed or have updated prescriptions.        Dose/Directions    amLODIPine 2.5 MG tablet   Commonly known as:  NORVASC   This may have changed:  when to take this   Used for:  Renal hypertension        Dose:  2.5 mg   Take 1 tablet (2.5 mg) by mouth daily   Quantity:  90 tablet   Refills:  3       finasteride 5 MG tablet   Commonly " known as:  PROSCAR   This may have changed:  when to take this   Used for:  Benign prostatic hyperplasia with urinary retention        Dose:  5 mg   Take 1 tablet (5 mg) by mouth daily   Quantity:  90 tablet   Refills:  3                Primary Care Provider Office Phone # Fax #    Haley Baker [phone] [address]  [facility] [address]        Equal Access to Services     [facility]: Hadii aad ku hadasho Soomaali, waaxda luqadaha, qaybta kaalmada adeegyada, waxay idiin hayaan adeeg donaldo lamalickn . So [facility] [phone].    ATENCIÓN: Si habla español, tiene a montague disposición servicios gratuitos de asistencia lingüística. [facility] [phone].    We comply with applicable federal civil rights laws and [place] laws. We do not discriminate on the basis of race, color, national origin, age, disability, sex, sexual orientation, or gender identity.            Thank you!     Thank you for choosing [facility]  for your care. Our goal is always to provide you with excellent care. Hearing back from our patients is one way we can continue to improve our services. Please take a few minutes to complete the written survey that you may receive in the mail after your visit with us. Thank you!             Your Updated Medication List - Protect others around you: Learn how to safely use, store and throw away your medicines at www.disposemymeds.org.          This list is accurate as of 4/16/18 11:59 PM.  Always use your most recent med list.                   Brand Name Dispense Instructions for use Diagnosis    amLODIPine 2.5 MG tablet    NORVASC    90 tablet    Take 1 tablet (2.5 mg) by mouth daily    Renal hypertension       aspirin 81 MG tablet     30 tablet    Take 1 tablet (81 mg) by mouth every morning - RESUME on 3/9/18 if urine remains clear    S/P TURP       calcium acetate 667 MG Caps capsule    PHOSLO    540 capsule    Take 2 capsules (1,334 mg) by mouth 3 times daily  (with meals)    Chronic kidney disease, unspecified CKD stage       darbepoetin william 100 MCG/0.5ML injection    ARANESP (ALBUMIN FREE)    0.5 mL    Inject 0.5 mLs (100 mcg) Subcutaneous every 14 days As needed for hgb<10g/dL.  If Hgb increases >1 point in 2 weeks (if blood transfusion given, use hgb PRIOR to this), SYSTOLIC BP > 180 mmHg or hgb>=10g/dL, HOLD DOSE. Dose must be within 1 week of Hgb.  Per anemia protocol with Cecilia De La Torre CNP    Anemia of chronic renal failure, stage 5 (H), CKD (chronic kidney disease) stage 5, GFR less than 15 ml/min (H)       ferrous sulfate 325 (65 Fe) MG tablet    IRON    30 tablet    Take 1 tablet (325 mg) by mouth daily (with breakfast)    Anemia of chronic renal failure, stage 5 (H), CKD (chronic kidney disease) stage 5, GFR less than 15 ml/min (H)       finasteride 5 MG tablet    PROSCAR    90 tablet    Take 1 tablet (5 mg) by mouth daily    Benign prostatic hyperplasia with urinary retention       order for DME     1 each    Equipment being ordered: olecranon bursa brace    Olecranon bursitis of left elbow

## 2018-04-18 DIAGNOSIS — R82.90 NONSPECIFIC FINDING ON EXAMINATION OF URINE: Primary | ICD-10-CM

## 2018-04-18 DIAGNOSIS — N18.5 CKD (CHRONIC KIDNEY DISEASE) STAGE 5, GFR LESS THAN 15 ML/MIN (H): ICD-10-CM

## 2018-04-18 LAB
ALBUMIN UR-MCNC: 30 MG/DL
APPEARANCE UR: ABNORMAL
BILIRUB UR QL STRIP: NEGATIVE
COLOR UR AUTO: YELLOW
GLUCOSE UR STRIP-MCNC: NEGATIVE MG/DL
HGB UR QL STRIP: ABNORMAL
KETONES UR STRIP-MCNC: NEGATIVE MG/DL
LEUKOCYTE ESTERASE UR QL STRIP: ABNORMAL
NITRATE UR QL: NEGATIVE
PH UR STRIP: 5.5 PH (ref 5–7)
RBC #/AREA URNS AUTO: ABNORMAL /HPF
SOURCE: ABNORMAL
SP GR UR STRIP: 1.01 (ref 1–1.03)
UROBILINOGEN UR STRIP-ACNC: 0.2 EU/DL (ref 0.2–1)
WBC #/AREA URNS AUTO: >100 /HPF

## 2018-04-18 PROCEDURE — 87086 URINE CULTURE/COLONY COUNT: CPT

## 2018-04-18 PROCEDURE — 87186 SC STD MICRODIL/AGAR DIL: CPT

## 2018-04-18 PROCEDURE — 81001 URINALYSIS AUTO W/SCOPE: CPT

## 2018-04-18 PROCEDURE — 87088 URINE BACTERIA CULTURE: CPT

## 2018-04-20 LAB
BACTERIA SPEC CULT: ABNORMAL
SPECIMEN SOURCE: ABNORMAL

## 2018-04-23 ENCOUNTER — ALLIED HEALTH/NURSE VISIT (OUTPATIENT)
Dept: FAMILY MEDICINE | Facility: CLINIC | Age: 73
End: 2018-04-23

## 2018-04-23 VITALS — DIASTOLIC BLOOD PRESSURE: 78 MMHG | SYSTOLIC BLOOD PRESSURE: 144 MMHG

## 2018-04-23 DIAGNOSIS — N18.5 ANEMIA OF CHRONIC RENAL FAILURE, STAGE 5 (H): ICD-10-CM

## 2018-04-23 DIAGNOSIS — D63.1 ANEMIA OF CHRONIC RENAL FAILURE, STAGE 5 (H): ICD-10-CM

## 2018-04-23 DIAGNOSIS — N18.5 CKD (CHRONIC KIDNEY DISEASE) STAGE 5, GFR LESS THAN 15 ML/MIN (H): ICD-10-CM

## 2018-04-23 DIAGNOSIS — Z01.30 BP CHECK: Primary | ICD-10-CM

## 2018-04-23 LAB
HCT VFR BLD AUTO: 33.1 % (ref 40–53)
HGB BLD-MCNC: 10.4 G/DL (ref 13.3–17.7)

## 2018-04-23 PROCEDURE — 36415 COLL VENOUS BLD VENIPUNCTURE: CPT

## 2018-04-23 PROCEDURE — 99207 ZZC NO CHARGE NURSE ONLY: CPT | Performed by: FAMILY MEDICINE

## 2018-04-23 PROCEDURE — 85014 HEMATOCRIT: CPT

## 2018-04-23 PROCEDURE — 85018 HEMOGLOBIN: CPT

## 2018-04-23 NOTE — PROGRESS NOTES
Dung Norton is enrolled/participating in the retail pharmacy Blood Pressure Goals Achievement Program (BPGAP).  Dung Norton was evaluated at [facility] on April 23, 2018 at which time his blood pressure was:    BP Readings from Last 3 Encounters:   04/23/18 144/78   04/16/18 142/80   04/13/18 147/80     Reviewed lifestyle modifications for blood pressure control and reduction: including making healthy food choices, managing weight, getting regular exercise, smoking cessation, reducing alcohol consumption, monitoring blood pressure regularly.     Dung Norton is not experiencing symptoms.    Follow-Up: BP is not at goal of < 140/90mmHg (patient 18+ years of age with or without diabetes), Recommended follow-up in 1 month at the pharmacy. Routing to PCP as an FYI.    Recommendation to Provider: pt has bp checked weekly in pharmacy, he declined a 2nd reading    Dung Norton was evaluated for enrollment into the PGEN study today.    Patient eligible for enrollment:  Unknown  Patient interested in enrollment:  Unknown    Completed by: Ricarda Andino [facility]  Phone [phone]  Fax      [phone]

## 2018-04-23 NOTE — MR AVS SNAPSHOT
After Visit Summary   4/23/2018    Dung Norton    MRN: [id number]           Patient Information     Date Of Birth          1945        Visit Information        Provider Department      4/23/2018 1:43 PM Haley Baker MD [facility]        Today's Diagnoses     BP check    -  1       Follow-ups after your visit        Your next 10 appointments already scheduled     Apr 27, 2018 11:00 AM CDT   (Arrive by 10:45 AM)   Return Visit with Tamiko Vanegas MD   [facility] ([facility])    [address]  4th Floor  [facility] [id number] [phone]            Apr 30, 2018 10:00 AM CDT   LAB with  LAB   [place] [facility] ([facility])    [address] [place] [address] [phone]           Please do not eat 10-12 hours before your appointment if you are coming in fasting for labs on lipids, cholesterol, or glucose (sugar). This does not apply to pregnant women. Water, hot tea and black coffee (with nothing added) are okay. Do not drink other fluids, diet soda or chew gum.            Jun 22, 2018  1:00 PM CDT   (Arrive by 12:30 PM)   Return Visit with Oralia De La Torre NP   [facility] ([facility])    [address]  [address] [place] [id number] [phone]              Who to contact     If you have questions or need follow up information about today's clinic visit or your schedule please contact [place] [facility] directly at [phone].  Normal or non-critical lab and imaging results will be communicated to you by MyChart, letter or phone within 4 business days after the clinic has received the results. If you do not hear from us within 7 days, please contact the clinic through MyChart or phone. If you have a critical or abnormal lab result, we will notify you by phone as soon as  "possible.  Submit refill requests through [employer] or call your pharmacy and they will forward the refill request to us. Please allow 3 business days for your refill to be completed.          Additional Information About Your Visit        [employer] Information     [employer] lets you send messages to your doctor, view your test results, renew your prescriptions, schedule appointments and more. To sign up, go to [email or web address] . Click on \"Log in\" on the left side of the screen, which will take you to the Welcome page. Then click on \"Sign up Now\" on the right side of the page.     You will be asked to enter the access code listed below, as well as some personal information. Please follow the directions to create your username and password.     Your access code is: [email or web address]  Expires: 2018 11:43 AM     Your access code will  in 90 days. If you need help or a new code, please call your [place] clinic or [phone].        Care EveryWhere ID     This is your Care EveryWhere ID. This could be used by other organizations to access your [place] medical records  [id number]         Blood Pressure from Last 3 Encounters:   18 144/78   18 142/80   18 147/80    Weight from Last 3 Encounters:   18 173 lb (78.5 kg)   18 171 lb (77.6 kg)   18 169 lb 3.2 oz (76.7 kg)              Today, you had the following     No orders found for display         Today's Medication Changes          These changes are accurate as of 18 11:59 PM.  If you have any questions, ask your nurse or doctor.               These medicines have changed or have updated prescriptions.        Dose/Directions    amLODIPine 2.5 MG tablet   Commonly known as:  NORVASC   This may have changed:  when to take this   Used for:  Renal hypertension        Dose:  2.5 mg   Take 1 tablet (2.5 mg) by mouth daily   Quantity:  90 tablet   Refills:  3       finasteride 5 MG tablet   Commonly known as:  PROSCAR   This " may have changed:  when to take this   Used for:  Benign prostatic hyperplasia with urinary retention        Dose:  5 mg   Take 1 tablet (5 mg) by mouth daily   Quantity:  90 tablet   Refills:  3                Primary Care Provider Office Phone # Fax #    Haley Baker [phone] [address]  [place] [address]        Equal Access to Services     [facility]: Hadii aad ku hadasho Soomaali, waaxda luqadaha, qaybta kaalmada adeegyada, waxay idiin hayaan adeeg khjennifersh latunde . So [facility] [phone].    ATENCIÓN: Si habla español, tiene a montague disposición servicios gratuitos de asistencia lingüística. Llame al [phone].    We comply with applicable federal civil rights laws and [place] laws. We do not discriminate on the basis of race, color, national origin, age, disability, sex, sexual orientation, or gender identity.            Thank you!     Thank you for choosing [facility]  for your care. Our goal is always to provide you with excellent care. Hearing back from our patients is one way we can continue to improve our services. Please take a few minutes to complete the written survey that you may receive in the mail after your visit with us. Thank you!             Your Updated Medication List - Protect others around you: Learn how to safely use, store and throw away your medicines at www.disposemymeds.org.          This list is accurate as of 4/23/18 11:59 PM.  Always use your most recent med list.                   Brand Name Dispense Instructions for use Diagnosis    amLODIPine 2.5 MG tablet    NORVASC    90 tablet    Take 1 tablet (2.5 mg) by mouth daily    Renal hypertension       aspirin 81 MG tablet     30 tablet    Take 1 tablet (81 mg) by mouth every morning - RESUME on 3/9/18 if urine remains clear    S/P TURP       calcium acetate 667 MG Caps capsule    PHOSLO    540 capsule    Take 2 capsules (1,334 mg) by mouth 3 times daily (with meals)    Chronic  kidney disease, unspecified CKD stage       darbepoetin william 100 MCG/0.5ML injection    ARANESP (ALBUMIN FREE)    0.5 mL    Inject 0.5 mLs (100 mcg) Subcutaneous every 14 days As needed for hgb<10g/dL.  If Hgb increases >1 point in 2 weeks (if blood transfusion given, use hgb PRIOR to this), SYSTOLIC BP > 180 mmHg or hgb>=10g/dL, HOLD DOSE. Dose must be within 1 week of Hgb.  Per anemia protocol with Cecilia De La Torre CNP    Anemia of chronic renal failure, stage 5 (H), CKD (chronic kidney disease) stage 5, GFR less than 15 ml/min (H)       ferrous sulfate 325 (65 Fe) MG tablet    IRON    30 tablet    Take 1 tablet (325 mg) by mouth daily (with breakfast)    Anemia of chronic renal failure, stage 5 (H), CKD (chronic kidney disease) stage 5, GFR less than 15 ml/min (H)       finasteride 5 MG tablet    PROSCAR    90 tablet    Take 1 tablet (5 mg) by mouth daily    Benign prostatic hyperplasia with urinary retention       order for DME     1 each    Equipment being ordered: olecranon bursa brace    Olecranon bursitis of left elbow

## 2018-04-23 NOTE — Clinical Note
Sending as FYI only, BP today in pharmacy 144/78, he gets it checked weekly Thank you Ricarda Andino, [facility] Phone [phone] Fax      [phone]

## 2018-04-24 ENCOUNTER — TELEPHONE (OUTPATIENT)
Dept: PHARMACY | Facility: CLINIC | Age: 73
End: 2018-04-24

## 2018-04-24 NOTE — TELEPHONE ENCOUNTER
Anemia Management Note  SUBJECTIVE/OBJECTIVE:  Referred by Cecilia De La Torre on 9/11/2017  Primary Diagnosis: Anemia in Chronic Kidney Disease (N18.5, D63.1)     Secondary Diagnosis:  Chronic Kidney Disease, Stage 5 (N18.5)  Hgb goal range:  9-10  Epo/Darbo: Aranesp 100 mcg every 14 days As needed for hgb<10g/dL  RX expires on 12/5/2018  Iron regimen:  None  Labs exp: 9/12/2018  **Provide phone number for [facility] [phone] and instruction to schedule ancillary visit for aranesp injection. Send message to care team via pool - FZ RN TRIAGE POOL as a telephone encounter**      Contact: **AUTH TO DISCUSS**Tereza Norton(daughter) [phone], Rabia Kapooririneo (daughter)[phone]                                        Ok to leave message regarding labs and appts per consent to communicate dated 12/12/17                              OK to speak with daughters Tereza and Rabia regarding Dung's anemia care plan per consent to communicate dated 12/12/17    Anemia Latest Ref Rng & Units 3/12/2018 3/19/2018 3/26/2018 4/2/2018 4/9/2018 4/16/2018 4/23/2018   EARLENE Dose - - - - - - - -   Hemoglobin 13.3 - 17.7 g/dL 10.1(L) 10.1(L) 10.4(L) 10.5(L) 10.7(L) 10.1(L) 10.4(L)   TSAT 15 - 46 % - - - 17 - - -   Ferritin 26 - 388 ng/mL - - - 407(H) - - -     BP Readings from Last 3 Encounters:   04/23/18 144/78   04/16/18 142/80   04/13/18 147/80     Wt Readings from Last 2 Encounters:   04/13/18 173 lb (78.5 kg)   03/14/18 171 lb (77.6 kg)           ASSESSMENT:  Hgb:Above goal - recommend hold dose  TSat: not at goal of >30% Ferritin: At goal (>100ng/mL)    PLAN:  Hold Aranesp and RTC for hgb then aranesp if needed in 2 week(s)    Orders needed to be renewed (for next follow-up date) in [facility]: None    Iron labs due:  04/30/2018    Plan discussed with: Dung  Plan provided by:      NEXT FOLLOW-UP DATE:  05/08/2018    Anemia Management Service  Daina Mock PharmD and Malina Hussein CPhT  Phone: [phone]  Fax:  [phone]

## 2018-04-27 ENCOUNTER — OFFICE VISIT (OUTPATIENT)
Dept: UROLOGY | Facility: CLINIC | Age: 73
End: 2018-04-27
Payer: COMMERCIAL

## 2018-04-27 VITALS
BODY MASS INDEX: 24.77 KG/M2 | WEIGHT: 173 LBS | HEART RATE: 95 BPM | SYSTOLIC BLOOD PRESSURE: 131 MMHG | DIASTOLIC BLOOD PRESSURE: 87 MMHG | HEIGHT: 70 IN

## 2018-04-27 DIAGNOSIS — R33.9 URINARY RETENTION: Primary | ICD-10-CM

## 2018-04-27 RX ORDER — MULTIVITAMIN
1 TABLET ORAL DAILY
COMMUNITY
End: 2019-10-01

## 2018-04-27 ASSESSMENT — PAIN SCALES - GENERAL: PAINLEVEL: NO PAIN (0)

## 2018-04-27 NOTE — NURSING NOTE
"Chief Complaint   Patient presents with     RECHECK     flow/pvr       Blood pressure 131/87, pulse 95, height 1.778 m (5' 10\"), weight 78.5 kg (173 lb). Body mass index is 24.82 kg/(m^2).    Patient Active Problem List   Diagnosis     CARDIOVASCULAR SCREENING; LDL GOAL LESS THAN 130     Hypertension goal BP (blood pressure) < 140/90     Advanced directives, counseling/discussion     Elevated fasting glucose     Hypertriglyceridemia     Symptomatic anemia     Pulmonary nodules     KRISTINA (acute kidney injury) (H)     Anemia of chronic renal failure, stage 5 (H)     Orthostatic hypotension     Acute renal failure, unspecified acute renal failure type (H)     End stage renal disease (H)     Acquired hypothyroidism     Obstructive uropathy     CKD (chronic kidney disease) stage 5, GFR less than 15 ml/min (H)     Ex-smoker     Thoracic aortic aneurysm without rupture (H)     Nonrheumatic aortic valve stenosis     Olecranon bursitis of left elbow     Urinary tract infection     S/P transurethral resection of prostate       No Known Allergies    Current Outpatient Prescriptions   Medication Sig Dispense Refill     amLODIPine (NORVASC) 2.5 MG tablet Take 1 tablet (2.5 mg) by mouth daily (Patient taking differently: Take 2.5 mg by mouth every morning ) 90 tablet 3     aspirin 81 MG tablet Take 1 tablet (81 mg) by mouth every morning - RESUME on 3/9/18 if urine remains clear 30 tablet 0     calcium acetate (PHOSLO) 667 MG CAPS capsule Take 2 capsules (1,334 mg) by mouth 3 times daily (with meals) 540 capsule 3     darbepoetin william (ARANESP, ALBUMIN FREE,) 100 MCG/0.5ML injection Inject 0.5 mLs (100 mcg) Subcutaneous every 14 days As needed for hgb<10g/dL.  If Hgb increases >1 point in 2 weeks (if blood transfusion given, use hgb PRIOR to this), SYSTOLIC BP > 180 mmHg or hgb>=10g/dL, HOLD DOSE. Dose must be within 1 week of Hgb.  Per anemia protocol with Cecilia De La Torre CNP 0.5 mL 99     ferrous sulfate (IRON) 325 (65 FE) MG tablet " Take 1 tablet (325 mg) by mouth daily (with breakfast) 30 tablet 11     finasteride (PROSCAR) 5 MG tablet Take 1 tablet (5 mg) by mouth daily (Patient taking differently: Take 5 mg by mouth every morning ) 90 tablet 3     multivitamin, therapeutic with minerals (MULTI-VITAMIN) TABS tablet Take 1 tablet by mouth daily       Omega-3 Fatty Acids (FISH OIL PO)        order for DME Equipment being ordered: olecranon bursa brace (Patient not taking: Reported on 4/27/2018) 1 each 0       Social History   Substance Use Topics     Smoking status: Former Smoker     Packs/day: 1.00     Years: 53.00     Types: Cigarettes     Quit date: 6/12/2017     Smokeless tobacco: Never Used     Alcohol use No       JENY Monk  4/27/2018  11:58 AM

## 2018-04-27 NOTE — PATIENT INSTRUCTIONS
Please follow up in 6 months with a Flow/PVR  Please come to this appointment with a Full bladder.    It was a pleasure meeting with you today.  Thank you for allowing me and my team the privilege of caring for you today.  YOU are the reason we are here, and I truly hope we provided you with the excellent service you deserve.  Please let us know if there is anything else we can do for you so that we can be sure you are leaving completely satisfied with your care experience.

## 2018-04-27 NOTE — PROGRESS NOTES
UROLOGIC DIAGNOSES:       CURRENT INTERVENTIONS:       HISTORY:     Patient with history of AUR and hematuria requiring cystoscopy and clot evacuation. Had failed TOV and has been performing CIC.   Patient states he is performing CIC regularly about 4x per day.   He would like to discuss further management of urinary retention.     Patient is currently s/p TURP. He was able to void a small amount at TOV. He states he has been voiding several times per day but continues to perform CIC (though fewer times per day than previous).     Patient notes that he continues to have ~ 450ml residual when voiding. Notes that he is able to void.   We reviewed these residuals and voiding pattern.       PAST MEDICAL HISTORY:   Past Medical History:   Diagnosis Date     BPH (benign prostatic hyperplasia)      Chronic kidney disease      HTN      Pulmonary nodules      Tobacco abuse        PAST SURGICAL HISTORY:   Past Surgical History:   Procedure Laterality Date     APPENDECTOMY  AGE 6     CREATE FISTULA ARTERIOVENOUS UPPER EXTREMITY Left 11/17/2017    Procedure: CREATE FISTULA ARTERIOVENOUS UPPER EXTREMITY;  Left Cephalic Vein To Radial Artery Arteriovenous Fistula Creation, Anesthesia Block;  Surgeon: Eduardo Pabon MD;  [facility]     CYSTOSCOPY, FULGURATE BLEEDERS, EVACUATE CLOT(S), COMBINED N/A 7/16/2017    Procedure: COMBINED CYSTOSCOPY, FULGURATE BLEEDERS, EVACUATE CLOT(S);  Cystoscopy clot evacuation, bladder biopsy and fulguration (per surgeons note) NERVE BLOCK PERIPHERAL;  Surgeon: Tamiko Vanegas MD;  [facility]     CYSTOSCOPY, TRANSURETHRAL RESECTION (TUR) PROSTATE, COMBINED N/A 3/5/2018    Procedure: COMBINED CYSTOSCOPY, TRANSURETHRAL RESECTION (TUR) PROSTATE;  Cystoscopy, Transurethral Resection of Prostate ;  Surgeon: Tamiko Vanegas MD;  Location[facility]     SINUS SURGERY  AGE 6     TONSILLECTOMY      CHILDHOOD       FAMILY HISTORY:   Family History   Problem Relation Age of Onset      "EDUARDO Mother      d age 67     Vision Loss Father      Hearing Loss Sister      DIABETES Sister      CANCER No family hx of        SOCIAL HISTORY:   Social History   Substance Use Topics     Smoking status: Former Smoker     Packs/day: 1.00     Years: 53.00     Types: Cigarettes     Quit date: 6/12/2017     Smokeless tobacco: Never Used     Alcohol use No       Current Outpatient Prescriptions   Medication     amLODIPine (NORVASC) 2.5 MG tablet     aspirin 81 MG tablet     calcium acetate (PHOSLO) 667 MG CAPS capsule     darbepoetin william (ARANESP, ALBUMIN FREE,) 100 MCG/0.5ML injection     ferrous sulfate (IRON) 325 (65 FE) MG tablet     finasteride (PROSCAR) 5 MG tablet     multivitamin, therapeutic with minerals (MULTI-VITAMIN) TABS tablet     Omega-3 Fatty Acids (FISH OIL PO)     order for DME     No current facility-administered medications for this visit.          PHYSICAL EXAM:    /87  Pulse 95  Ht 1.778 m (5' 10\")  Wt 78.5 kg (173 lb)  BMI 24.82 kg/m2    HEENT: Normocephalic and atraumatic   Cardiac: Not done  Back/Flank: Not done  CNS/PNS: Not done  Respiratory: Normal non-labored breathing  Abdomen: Soft nontender and nondistended  Peripheral Vascular: Not done  Mental Status: Not done    Penis: Not done  Scrotal Skin: Not done  Testicles: Not done  Epididymis: Not done  Digital Rectal Exam:     Cystoscopy: Not done    Imaging: None    Urinalysis: [facility]A RESULTS:  Recent Labs   Lab Test  08/29/17   0235   01/15/14   0936   COLOR  Light Yellow   < >  Yellow   APPEARANCE  Cloudy   < >  Clear   URINEGLC  Negative   < >  Negative   URINEBILI  Negative   < >  Negative   URINEKETONE  Negative   < >  Negative   SG  1.014   < >  1.020   UBLD  Moderate*   < >  Small*   URINEPH  6.0   < >  6.0   PROTEIN  100*   < >  Negative   UROBILINOGEN   --    --   0.2   NITRITE  Negative   < >  Negative   LEUKEST  Large*   < >  Negative   RBCU  10*   < >  10-25*   WBCU  >182*   < >  O - 2    < > = values in this interval " not displayed.       PSA:     Post Void Residual: 438ml     Other labs: None today      IMPRESSION:  71 y/o M with urinary retention requiring CIC, now voiding     PLAN:  Timed voiding   Continue CIC BID-TID   Uroflow/PVR if possible in six months   Follow up in six months         Total Time: 15 minutes                                       Total in Consultation: greater than 50%

## 2018-04-27 NOTE — LETTER
4/27/2018       RE: Dung Norton  [address] [place] [address]     Dear Colleague,    Thank you for referring your patient, Dung Norton, to the [facility] UROLOGY AND INST FOR PROSTATE AND UROLOGIC CANCERS at [facility]. Please see a copy of my visit note below.    UROLOGIC DIAGNOSES:       CURRENT INTERVENTIONS:       HISTORY:     Patient with history of AUR and hematuria requiring cystoscopy and clot evacuation. Had failed TOV and has been performing CIC.   Patient states he is performing CIC regularly about 4x per day.   He would like to discuss further management of urinary retention.     Patient is currently s/p TURP. He was able to void a small amount at TOV. He states he has been voiding several times per day but continues to perform CIC (though fewer times per day than previous).     Patient notes that he continues to have ~ 450ml residual when voiding. Notes that he is able to void.   We reviewed these residuals and voiding pattern.       PAST MEDICAL HISTORY:   Past Medical History:   Diagnosis Date     BPH (benign prostatic hyperplasia)      Chronic kidney disease      HTN      Pulmonary nodules      Tobacco abuse        PAST SURGICAL HISTORY:   Past Surgical History:   Procedure Laterality Date     APPENDECTOMY  AGE 6     CREATE FISTULA ARTERIOVENOUS UPPER EXTREMITY Left 11/17/2017    Procedure: CREATE FISTULA ARTERIOVENOUS UPPER EXTREMITY;  Left Cephalic Vein To Radial Artery Arteriovenous Fistula Creation, Anesthesia Block;  Surgeon: Eduardo Pabon MD;  [facility]     CYSTOSCOPY, FULGURATE BLEEDERS, EVACUATE CLOT(S), COMBINED N/A 7/16/2017    Procedure: COMBINED CYSTOSCOPY, FULGURATE BLEEDERS, EVACUATE CLOT(S);  Cystoscopy clot evacuation, bladder biopsy and fulguration (per surgeons note) NERVE BLOCK PERIPHERAL;  Surgeon: Tamiko Vanegas MD;  [facility]     CYSTOSCOPY, TRANSURETHRAL RESECTION (TUR) PROSTATE, COMBINED N/A  "3/5/2018    Procedure: COMBINED CYSTOSCOPY, TRANSURETHRAL RESECTION (TUR) PROSTATE;  Cystoscopy, Transurethral Resection of Prostate ;  Surgeon: Tamiko Vanegas MD;  [facility]     SINUS SURGERY  AGE 6     TONSILLECTOMY      CHILDHOOD       FAMILY HISTORY:   Family History   Problem Relation Age of Onset     C.A.D. Mother      d age 67     Vision Loss Father      Hearing Loss Sister      DIABETES Sister      CANCER No family hx of        SOCIAL HISTORY:   Social History   Substance Use Topics     Smoking status: Former Smoker     Packs/day: 1.00     Years: 53.00     Types: Cigarettes     Quit date: 6/12/2017     Smokeless tobacco: Never Used     Alcohol use No       Current Outpatient Prescriptions   Medication     amLODIPine (NORVASC) 2.5 MG tablet     aspirin 81 MG tablet     calcium acetate (PHOSLO) 667 MG CAPS capsule     darbepoetin william (ARANESP, ALBUMIN FREE,) 100 MCG/0.5ML injection     ferrous sulfate (IRON) 325 (65 FE) MG tablet     finasteride (PROSCAR) 5 MG tablet     multivitamin, therapeutic with minerals (MULTI-VITAMIN) TABS tablet     Omega-3 Fatty Acids (FISH OIL PO)     order for DME     No current facility-administered medications for this visit.          PHYSICAL EXAM:    /87  Pulse 95  Ht 1.778 m (5' 10\")  Wt 78.5 kg (173 lb)  BMI 24.82 kg/m2    HEENT: Normocephalic and atraumatic   Cardiac: Not done  Back/Flank: Not done  CNS/PNS: Not done  Respiratory: Normal non-labored breathing  Abdomen: Soft nontender and nondistended  Peripheral Vascular: Not done  Mental Status: Not done    Penis: Not done  Scrotal Skin: Not done  Testicles: Not done  Epididymis: Not done  Digital Rectal Exam:     Cystoscopy: Not done    Imaging: None    Urinalysis: UA RESULTS:  Recent Labs   Lab Test  08/29/17   0235   01/15/14   0936   COLOR  Light Yellow   < >  Yellow   APPEARANCE  Cloudy   < >  Clear   URINEGLC  Negative   < >  Negative   URINEBILI  Negative   < >  Negative   URINEKETONE  " Negative   < >  Negative   SG  1.014   < >  1.020   UBLD  Moderate*   < >  Small*   URINEPH  6.0   < >  6.0   PROTEIN  100*   < >  Negative   UROBILINOGEN   --    --   0.2   NITRITE  Negative   < >  Negative   LEUKEST  Large*   < >  Negative   RBCU  10*   < >  10-25*   WBCU  >182*   < >  O - 2    < > = values in this interval not displayed.       PSA:     Post Void Residual: 438ml     Other labs: None today      IMPRESSION:  73 y/o M with urinary retention requiring CIC, now voiding     PLAN:  Timed voiding   Continue CIC BID-TID   Uroflow/PVR if possible in six months   Follow up in six months     Total Time: 15 minutes                                       Total in Consultation: greater than 50%       Again, thank you for allowing me to participate in the care of your patient.      Sincerely,    Tamiko Vanegas MD

## 2018-04-27 NOTE — MR AVS SNAPSHOT
After Visit Summary   4/27/2018    Dung Norton    MRN: [id number]           Patient Information     Date Of Birth          1945        Visit Information        Provider Department      4/27/2018 11:00 AM Tamiko Vanegas MD [facility] Urology and [facility] for Prostate and Urologic Cancers        Care Instructions    Please follow up in 6 months with a Flow/PVR  Please come to this appointment with a Full bladder.    It was a pleasure meeting with you today.  Thank you for allowing me and my team the privilege of caring for you today.  YOU are the reason we are here, and I truly hope we provided you with the excellent service you deserve.  Please let us know if there is anything else we can do for you so that we can be sure you are leaving completely satisfied with your care experience.                  Follow-ups after your visit        Your next 10 appointments already scheduled     Apr 30, 2018 10:00 AM CDT   LAB with  LAB   [facility]    [address] [place] [id number] [phone]           Please do not eat 10-12 hours before your appointment if you are coming in fasting for labs on lipids, cholesterol, or glucose (sugar). This does not apply to pregnant women. Water, hot tea and black coffee (with nothing added) are okay. Do not drink other fluids, diet soda or chew gum.            Jun 22, 2018  1:00 PM CDT   (Arrive by 12:30 PM)   Return Visit with Oralia De La Torre NP   [facility] Nephrology ([facility])    [address]  [address]  [place] [id number] [phone]            Oct 26, 2018  9:15 AM CDT   (Arrive by 9:00 AM)   Return Visit with Tamiko Vanegas MD   [facility] and [facility] for Prostate and Urologic Cancers ([facility])    [address]  4th Floor  [place] [id number] [phone]              Who to contact     Please  "call your clinic at [phone] to:    Ask questions about your health    Make or cancel appointments    Discuss your medicines    Learn about your test results    Speak to your doctor            Additional Information About Your Visit        MyChart Information     YouTern is an electronic gateway that provides easy, online access to your medical records. With YouTern, you can request a clinic appointment, read your test results, renew a prescription or communicate with your care team.     To sign up for YouTern visit the website at [email or web address]   You will be asked to enter the access code listed below, as well as some personal information. Please follow the directions to create your username and password.     Your access code is: [email or web address]  Expires: 2018 11:43 AM     Your access code will  in 90 days. If you need help or a new code, please contact your [facility] or call [phone] for assistance.        Care EveryWhere ID     This is your Care EveryWhere ID. This could be used by other organizations to access your [place] medical records  [id number]        Your Vitals Were     Pulse Height BMI (Body Mass Index)             95 1.778 m (5' 10\") 24.82 kg/m2          Blood Pressure from Last 3 Encounters:   18 131/87   18 144/78   18 142/80    Weight from Last 3 Encounters:   18 78.5 kg (173 lb)   18 78.5 kg (173 lb)   18 77.6 kg (171 lb)              Today, you had the following     No orders found for display         Today's Medication Changes          These changes are accurate as of 18 12:34 PM.  If you have any questions, ask your nurse or doctor.               These medicines have changed or have updated prescriptions.        Dose/Directions    amLODIPine 2.5 MG tablet   Commonly known as:  NORVASC   This may have changed:  when to take this   Used for:  Renal hypertension        Dose:  2.5 mg   Take 1 " tablet (2.5 mg) by mouth daily   Quantity:  90 tablet   Refills:  3       finasteride 5 MG tablet   Commonly known as:  PROSCAR   This may have changed:  when to take this   Used for:  Benign prostatic hyperplasia with urinary retention        Dose:  5 mg   Take 1 tablet (5 mg) by mouth daily   Quantity:  90 tablet   Refills:  3                Primary Care Provider Office Phone # Fax #    Haley Baker [phone] [address] [address]        Equal Access to Services     JOSS CERRATO : Hadii cristine ku hadasho Soomaali, waaxda luqadaha, qaybta kaalmada adeegyada, waxniall duong haysaadia green . So [facility] [phone].    ATENCIÓN: Si habla español, tiene a montague disposición servicios gratuitos de asistencia lingüística. Llame al [phone].    We comply with applicable federal civil rights laws and [place] laws. We do not discriminate on the basis of race, color, national origin, age, disability, sex, sexual orientation, or gender identity.            Thank you!     Thank you for choosing [facility] UROLOGY AND [facility] FOR PROSTATE AND UROLOGIC CANCERS  for your care. Our goal is always to provide you with excellent care. Hearing back from our patients is one way we can continue to improve our services. Please take a few minutes to complete the written survey that you may receive in the mail after your visit with us. Thank you!             Your Updated Medication List - Protect others around you: Learn how to safely use, store and throw away your medicines at www.disposemymeds.org.          This list is accurate as of 4/27/18 12:34 PM.  Always use your most recent med list.                   Brand Name Dispense Instructions for use Diagnosis    amLODIPine 2.5 MG tablet    NORVASC    90 tablet    Take 1 tablet (2.5 mg) by mouth daily    Renal hypertension       aspirin 81 MG tablet     30 tablet    Take 1 tablet (81 mg) by mouth every morning - RESUME on 3/9/18 if urine  remains clear    S/P TURP       calcium acetate 667 MG Caps capsule    PHOSLO    540 capsule    Take 2 capsules (1,334 mg) by mouth 3 times daily (with meals)    Chronic kidney disease, unspecified CKD stage       darbepoetin william 100 MCG/0.5ML injection    ARANESP (ALBUMIN FREE)    0.5 mL    Inject 0.5 mLs (100 mcg) Subcutaneous every 14 days As needed for hgb<10g/dL.  If Hgb increases >1 point in 2 weeks (if blood transfusion given, use hgb PRIOR to this), SYSTOLIC BP > 180 mmHg or hgb>=10g/dL, HOLD DOSE. Dose must be within 1 week of Hgb.  Per anemia protocol with Cecilia De La Torre CNP    Anemia of chronic renal failure, stage 5 (H), CKD (chronic kidney disease) stage 5, GFR less than 15 ml/min (H)       ferrous sulfate 325 (65 Fe) MG tablet    IRON    30 tablet    Take 1 tablet (325 mg) by mouth daily (with breakfast)    Anemia of chronic renal failure, stage 5 (H), CKD (chronic kidney disease) stage 5, GFR less than 15 ml/min (H)       finasteride 5 MG tablet    PROSCAR    90 tablet    Take 1 tablet (5 mg) by mouth daily    Benign prostatic hyperplasia with urinary retention       FISH OIL PO           Multi-vitamin Tabs tablet      Take 1 tablet by mouth daily        order for DME     1 each    Equipment being ordered: olecranon bursa brace    Olecranon bursitis of left elbow

## 2018-04-30 ENCOUNTER — ALLIED HEALTH/NURSE VISIT (OUTPATIENT)
Dept: FAMILY MEDICINE | Facility: CLINIC | Age: 73
End: 2018-04-30

## 2018-04-30 ENCOUNTER — TELEPHONE (OUTPATIENT)
Dept: PHARMACY | Facility: CLINIC | Age: 73
End: 2018-04-30

## 2018-04-30 VITALS — SYSTOLIC BLOOD PRESSURE: 134 MMHG | DIASTOLIC BLOOD PRESSURE: 72 MMHG

## 2018-04-30 DIAGNOSIS — Z01.30 BP CHECK: Primary | ICD-10-CM

## 2018-04-30 DIAGNOSIS — D63.1 ANEMIA OF CHRONIC RENAL FAILURE, STAGE 5 (H): ICD-10-CM

## 2018-04-30 DIAGNOSIS — N18.5 CKD (CHRONIC KIDNEY DISEASE) STAGE 5, GFR LESS THAN 15 ML/MIN (H): ICD-10-CM

## 2018-04-30 DIAGNOSIS — N18.5 ANEMIA OF CHRONIC RENAL FAILURE, STAGE 5 (H): ICD-10-CM

## 2018-04-30 LAB
FERRITIN SERPL-MCNC: 437 NG/ML (ref 26–388)
HCT VFR BLD AUTO: 31.7 % (ref 40–53)
HGB BLD-MCNC: 10 G/DL (ref 13.3–17.7)
IRON SATN MFR SERPL: 20 % (ref 15–46)
IRON SERPL-MCNC: 46 UG/DL (ref 35–180)
TIBC SERPL-MCNC: 231 UG/DL (ref 240–430)

## 2018-04-30 PROCEDURE — 82728 ASSAY OF FERRITIN: CPT

## 2018-04-30 PROCEDURE — 85018 HEMOGLOBIN: CPT

## 2018-04-30 PROCEDURE — 83540 ASSAY OF IRON: CPT

## 2018-04-30 PROCEDURE — 99207 ZZC NO CHARGE NURSE ONLY: CPT | Performed by: FAMILY MEDICINE

## 2018-04-30 PROCEDURE — 83550 IRON BINDING TEST: CPT

## 2018-04-30 PROCEDURE — 36415 COLL VENOUS BLD VENIPUNCTURE: CPT

## 2018-04-30 PROCEDURE — 85014 HEMATOCRIT: CPT

## 2018-04-30 NOTE — Clinical Note
Sending as FYI only, BP in pharmacy today 134/72 [facility] Phone [phone] Fax      [phone]

## 2018-04-30 NOTE — MR AVS SNAPSHOT
After Visit Summary   4/30/2018    Dung Norton    MRN: [id number]           Patient Information     Date Of Birth          1945        Visit Information        Provider Department      4/30/2018 11:14 AM Haley Baker MD [facility]        Today's Diagnoses     BP check    -  1       Follow-ups after your visit        Your next 10 appointments already scheduled     May 07, 2018 10:00 AM CDT   LAB with [facility] LAB   [facility] ([facility])    [address] [id number] [phone]           Please do not eat 10-12 hours before your appointment if you are coming in fasting for labs on lipids, cholesterol, or glucose (sugar). This does not apply to pregnant women. Water, hot tea and black coffee (with nothing added) are okay. Do not drink other fluids, diet soda or chew gum.            Jun 22, 2018  1:00 PM CDT   (Arrive by 12:30 PM)   Return Visit with Oralia De La Torre NP   [facility] Nephrology ([facility])    [address]  [address]  [place] [facility] [place] [id number] [phone]            Oct 26, 2018  9:15 AM CDT   (Arrive by 9:00 AM)   Return Visit with Tamiko Vanegas MD   [facility] Urology and [facility] for Prostate and Urologic Cancers ([facility])    [address] [place] [address]  4th Floor  [facility] [id number] [phone]              Who to contact     If you have questions or need follow up information about today's clinic visit or your schedule please contact [place] [facility] directly at [phone].  Normal or non-critical lab and imaging results will be communicated to you by MyChart, letter or phone within 4 business days after the clinic has received the results. If you do not hear from us within 7 days, please contact the clinic through MyChart or phone. If you have a critical or abnormal lab result, we will notify you by phone as soon as  "possible.  Submit refill requests through Social[employer]ender or call your pharmacy and they will forward the refill request to us. Please allow 3 business days for your refill to be completed.          Additional Information About Your Visit        SocialDefender Information     SocialDefender lets you send messages to your doctor, view your test results, renew your prescriptions, schedule appointments and more. To sign up, go to [email or web address] . Click on \"Log in\" on the left side of the screen, which will take you to the Welcome page. Then click on \"Sign up Now\" on the right side of the page.     You will be asked to enter the access code listed below, as well as some personal information. Please follow the directions to create your username and password.     Your access code is: [email or web address]  Expires: 2018 11:43 AM     Your access code will  in 90 days. If you need help or a new code, please call your [place] clinic or [phone].        Care EveryWhere ID     This is your Care EveryWhere ID. This could be used by other organizations to access your [place] medical records  [id number]         Blood Pressure from Last 3 Encounters:   18 134/72   18 131/87   18 144/78    Weight from Last 3 Encounters:   18 173 lb (78.5 kg)   18 173 lb (78.5 kg)   18 171 lb (77.6 kg)              Today, you had the following     No orders found for display         Today's Medication Changes          These changes are accurate as of 18 11:59 PM.  If you have any questions, ask your nurse or doctor.               These medicines have changed or have updated prescriptions.        Dose/Directions    amLODIPine 2.5 MG tablet   Commonly known as:  NORVASC   This may have changed:  when to take this   Used for:  Renal hypertension        Dose:  2.5 mg   Take 1 tablet (2.5 mg) by mouth daily   Quantity:  90 tablet   Refills:  3       finasteride 5 MG tablet   Commonly known as:  PROSCAR   This may " have changed:  when to take this   Used for:  Benign prostatic hyperplasia with urinary retention        Dose:  5 mg   Take 1 tablet (5 mg) by mouth daily   Quantity:  90 tablet   Refills:  3                Primary Care Provider Office Phone # Fax #    Haley Baker [phone] [address]  [place] [address]        Equal Access to Services     [facility]: Hadii aad ku hadasho Soomaali, waaxda luqadaha, qaybta kaalmada adeegyada, waxay rhoda hayjefen paul cotajenniferkarsten green . So [facility] [phone].    ATENCIÓN: Si habla español, tiene a montague disposición servicios gratuitos de asistencia lingüística. Llame al [phone].    We comply with applicable federal civil rights laws and [place] laws. We do not discriminate on the basis of race, color, national origin, age, disability, sex, sexual orientation, or gender identity.            Thank you!     Thank you for choosing [facility]  for your care. Our goal is always to provide you with excellent care. Hearing back from our patients is one way we can continue to improve our services. Please take a few minutes to complete the written survey that you may receive in the mail after your visit with us. Thank you!             Your Updated Medication List - Protect others around you: Learn how to safely use, store and throw away your medicines at www.disposemymeds.org.          This list is accurate as of 4/30/18 11:59 PM.  Always use your most recent med list.                   Brand Name Dispense Instructions for use Diagnosis    amLODIPine 2.5 MG tablet    NORVASC    90 tablet    Take 1 tablet (2.5 mg) by mouth daily    Renal hypertension       aspirin 81 MG tablet     30 tablet    Take 1 tablet (81 mg) by mouth every morning - RESUME on 3/9/18 if urine remains clear    S/P TURP       calcium acetate 667 MG Caps capsule    PHOSLO    540 capsule    Take 2 capsules (1,334 mg) by mouth 3 times daily (with meals)    Chronic kidney  disease, unspecified CKD stage       darbepoetin william 100 MCG/0.5ML injection    ARANESP (ALBUMIN FREE)    0.5 mL    Inject 0.5 mLs (100 mcg) Subcutaneous every 14 days As needed for hgb<10g/dL.  If Hgb increases >1 point in 2 weeks (if blood transfusion given, use hgb PRIOR to this), SYSTOLIC BP > 180 mmHg or hgb>=10g/dL, HOLD DOSE. Dose must be within 1 week of Hgb.  Per anemia protocol with Cecilia De La Torre CNP    Anemia of chronic renal failure, stage 5 (H), CKD (chronic kidney disease) stage 5, GFR less than 15 ml/min (H)       ferrous sulfate 325 (65 Fe) MG tablet    IRON    30 tablet    Take 1 tablet (325 mg) by mouth daily (with breakfast)    Anemia of chronic renal failure, stage 5 (H), CKD (chronic kidney disease) stage 5, GFR less than 15 ml/min (H)       finasteride 5 MG tablet    PROSCAR    90 tablet    Take 1 tablet (5 mg) by mouth daily    Benign prostatic hyperplasia with urinary retention       FISH OIL PO           Multi-vitamin Tabs tablet      Take 1 tablet by mouth daily        order for DME     1 each    Equipment being ordered: olecranon bursa brace    Olecranon bursitis of left elbow

## 2018-04-30 NOTE — PROGRESS NOTES
Dung Norton is enrolled/participating in the retail pharmacy Blood Pressure Goals Achievement Program (BPGAP).  Dung Norton was evaluated at [facility] on April 30, 2018 at which time his blood pressure was:    BP Readings from Last 3 Encounters:   04/30/18 134/72   04/27/18 131/87   04/23/18 144/78     Reviewed lifestyle modifications for blood pressure control and reduction: including making healthy food choices, managing weight, getting regular exercise, smoking cessation, reducing alcohol consumption, monitoring blood pressure regularly.     Dung Norton is not experiencing symptoms.    Follow-Up: BP is at goal of < 140/90mmHg (patient 18+ years of age with or without diabetes).  Recommended follow-up at pharmacy in 6 months.     Recommendation to Provider: none     Dung Norton was evaluated for enrollment into the PGEN study today.    Patient eligible for enrollment:  Unknown  Patient interested in enrollment:  Unknown    Completed by: Ricarda Andino RPh  [facility]  Phone [phone]  Fax      [phone]

## 2018-05-01 NOTE — TELEPHONE ENCOUNTER
Anemia Management Note  SUBJECTIVE/OBJECTIVE:  Referred by Cecilia De La Torre on 9/11/2017  Primary Diagnosis: Anemia in Chronic Kidney Disease (N18.5, D63.1)     Secondary Diagnosis:  Chronic Kidney Disease, Stage 5 (N18.5)  Hgb goal range:  9-10  Epo/Darbo: Aranesp 100 mcg every 14 days As needed for hgb<10g/dL  RX expires on 12/5/2018  Iron regimen:  None  Labs exp: 9/12/2018  **Provide phone number for [facility] [phone] and instruction to schedule ancillary visit for aranesp injection. Send message to care team via pool - FZ RN TRIAGE POOL as a telephone encounter**      Contact: **AUTH TO DISCUSS**Tereza Norton(daughter) [phone], Rabia Kapooririneo (daughter)[phone]                                        Ok to leave message regarding labs and appts per consent to communicate dated 12/12/17                              OK to speak with daughters Tereza and Rabia regarding Dung's anemia care plan per consent to communicate dated 12/12/17    Anemia Latest Ref Rng & Units 3/19/2018 3/26/2018 4/2/2018 4/9/2018 4/16/2018 4/23/2018 4/30/2018   EARLENE Dose - - - - - - - -   Hemoglobin 13.3 - 17.7 g/dL 10.1(L) 10.4(L) 10.5(L) 10.7(L) 10.1(L) 10.4(L) 10.0(L)   TSAT 15 - 46 % - - 17 - - - 20   Ferritin 26 - 388 ng/mL - - 407(H) - - - 437(H)     BP Readings from Last 3 Encounters:   04/30/18 134/72   04/27/18 131/87   04/23/18 144/78     Wt Readings from Last 2 Encounters:   04/27/18 173 lb (78.5 kg)   04/13/18 173 lb (78.5 kg)       Patient has another lab appt on 5/7/18    ASSESSMENT:  Hgb: At goal - recommend hold dose  TSat: not at goal of >30% Ferritin: At goal (>100ng/mL)    PLAN:  Hold Aranesp and RTC for hgb then aranesp if needed in 1 week(s)    Orders needed to be renewed (for next follow-up date) in EPIC: None    Iron labs due:  5/29/18    Plan discussed with:  No call made    Plan provided by:  Mireya Hussein [facility]  Anemia Clinic  [phone]  Reviewed 05/01/2018 VERONICA  NEXT FOLLOW-UP DATE:  5/7/18    Anemia  Management Service  Gabriella GaleD and Malina Hussein CPhT  Phone: [phone]  Fax: [phone]

## 2018-05-07 ENCOUNTER — TELEPHONE (OUTPATIENT)
Dept: PHARMACY | Facility: CLINIC | Age: 73
End: 2018-05-07

## 2018-05-07 ENCOUNTER — ALLIED HEALTH/NURSE VISIT (OUTPATIENT)
Dept: FAMILY MEDICINE | Facility: CLINIC | Age: 73
End: 2018-05-07
Payer: COMMERCIAL

## 2018-05-07 VITALS — SYSTOLIC BLOOD PRESSURE: 130 MMHG | DIASTOLIC BLOOD PRESSURE: 80 MMHG

## 2018-05-07 DIAGNOSIS — N18.5 CKD (CHRONIC KIDNEY DISEASE) STAGE 5, GFR LESS THAN 15 ML/MIN (H): ICD-10-CM

## 2018-05-07 DIAGNOSIS — N18.5 ANEMIA OF CHRONIC RENAL FAILURE, STAGE 5 (H): ICD-10-CM

## 2018-05-07 DIAGNOSIS — Z01.30 BP CHECK: Primary | ICD-10-CM

## 2018-05-07 DIAGNOSIS — D63.1 ANEMIA OF CHRONIC RENAL FAILURE, STAGE 5 (H): ICD-10-CM

## 2018-05-07 LAB
HCT VFR BLD AUTO: 32.7 % (ref 40–53)
HGB BLD-MCNC: 10.3 G/DL (ref 13.3–17.7)

## 2018-05-07 PROCEDURE — 85018 HEMOGLOBIN: CPT

## 2018-05-07 PROCEDURE — 36415 COLL VENOUS BLD VENIPUNCTURE: CPT

## 2018-05-07 PROCEDURE — 99207 ZZC NO CHARGE NURSE ONLY: CPT | Performed by: FAMILY MEDICINE

## 2018-05-07 PROCEDURE — 85014 HEMATOCRIT: CPT

## 2018-05-07 NOTE — MR AVS SNAPSHOT
After Visit Summary   5/7/2018    Dung Norton    MRN: [id number]           Patient Information     Date Of Birth          1945        Visit Information        Provider Department      5/7/2018 11:02 AM Haley Baker MD [facility]        Today's Diagnoses     BP check    -  1       Follow-ups after your visit        Your next 10 appointments already scheduled     May 14, 2018 10:15 AM CDT   LAB with FZ LAB   [place] [facility] ([facility])    [address] [id number] [phone]           Please do not eat 10-12 hours before your appointment if you are coming in fasting for labs on lipids, cholesterol, or glucose (sugar). This does not apply to pregnant women. Water, hot tea and black coffee (with nothing added) are okay. Do not drink other fluids, diet soda or chew gum.            Jun 22, 2018  1:00 PM CDT   (Arrive by 12:30 PM)   Return Visit with Oralia De La Torre NP   [facility] ([facility])    [address]  [address]  [facility] [address] [phone]            Oct 26, 2018  9:15 AM CDT   (Arrive by 9:00 AM)   Return Visit with Tamiko Vanegas MD   [facility] Urology and [facility] ([facility])    [address] [place] [address]  4th Floor  [facility] [id number] [phone]              Who to contact     If you have questions or need follow up information about today's clinic visit or your schedule please contact [place] [facility] directly at [phone].  Normal or non-critical lab and imaging results will be communicated to you by MyChart, letter or phone within 4 business days after the clinic has received the results. If you do not hear from us within 7 days, please contact the clinic through MyChart or phone. If you have a critical or abnormal lab result, we will notify you by phone as soon as possible.  Submit  "refill requests through Wom[employer] or call your pharmacy and they will forward the refill request to us. Please allow 3 business days for your refill to be completed.          Additional Information About Your Visit        [employer] [place] [employer] Information     Womensforum lets you send messages to your doctor, view your test results, renew your prescriptions, schedule appointments and more. To sign up, go to [email or web address] . Click on \"Log in\" on the left side of the screen, which will take you to the Welcome page. Then click on \"Sign up Now\" on the right side of the page.     You will be asked to enter the access code listed below, as well as some personal information. Please follow the directions to create your username and password.     Your access code is: [email or web address]  Expires: 2018 11:43 AM     Your access code will  in 90 days. If you need help or a new code, please call your [place] clinic or [phone].        Care EveryWhere ID     This is your Care EveryWhere ID. This could be used by other organizations to access your [place] medical records  [id number]         Blood Pressure from Last 3 Encounters:   18 130/80   18 134/72   18 131/87    Weight from Last 3 Encounters:   18 173 lb (78.5 kg)   18 173 lb (78.5 kg)   18 171 lb (77.6 kg)              Today, you had the following     No orders found for display         Today's Medication Changes          These changes are accurate as of 18 11:59 PM.  If you have any questions, ask your nurse or doctor.               These medicines have changed or have updated prescriptions.        Dose/Directions    amLODIPine 2.5 MG tablet   Commonly known as:  NORVASC   This may have changed:  when to take this   Used for:  Renal hypertension        Dose:  2.5 mg   Take 1 tablet (2.5 mg) by mouth daily   Quantity:  90 tablet   Refills:  3       finasteride 5 MG tablet   Commonly known as:  PROSCAR   This may have changed:  when to " take this   Used for:  Benign prostatic hyperplasia with urinary retention        Dose:  5 mg   Take 1 tablet (5 mg) by mouth daily   Quantity:  90 tablet   Refills:  3                Primary Care Provider Office Phone # Fax #    Haley Baker [phone] [address] [facility] [address]        Equal Access to Services     [facility] : Hadii aad ku hadasho Soomaali, waaxda luqadaha, qaybta kaalmada adeegyada, gibson duong hayjefen paul khjenniferkarsten green . So [facility] [phone].    ATENCIÓN: Si habla español, tiene a montague disposición servicios gratuitos de asistencia lingüística. Llame al [phone].    We comply with applicable federal civil rights laws and [place] laws. We do not discriminate on the basis of race, color, national origin, age, disability, sex, sexual orientation, or gender identity.            Thank you!     Thank you for choosing [facility]  for your care. Our goal is always to provide you with excellent care. Hearing back from our patients is one way we can continue to improve our services. Please take a few minutes to complete the written survey that you may receive in the mail after your visit with us. Thank you!             Your Updated Medication List - Protect others around you: Learn how to safely use, store and throw away your medicines at www.disposemymeds.org.          This list is accurate as of 5/7/18 11:59 PM.  Always use your most recent med list.                   Brand Name Dispense Instructions for use Diagnosis    amLODIPine 2.5 MG tablet    NORVASC    90 tablet    Take 1 tablet (2.5 mg) by mouth daily    Renal hypertension       aspirin 81 MG tablet     30 tablet    Take 1 tablet (81 mg) by mouth every morning - RESUME on 3/9/18 if urine remains clear    S/P TURP       calcium acetate 667 MG Caps capsule    PHOSLO    540 capsule    Take 2 capsules (1,334 mg) by mouth 3 times daily (with meals)    Chronic kidney disease, unspecified CKD  stage       darbepoetin william 100 MCG/0.5ML injection    ARANESP (ALBUMIN FREE)    0.5 mL    Inject 0.5 mLs (100 mcg) Subcutaneous every 14 days As needed for hgb<10g/dL.  If Hgb increases >1 point in 2 weeks (if blood transfusion given, use hgb PRIOR to this), SYSTOLIC BP > 180 mmHg or hgb>=10g/dL, HOLD DOSE. Dose must be within 1 week of Hgb.  Per anemia protocol with Cecilia De La Torre CNP    Anemia of chronic renal failure, stage 5 (H), CKD (chronic kidney disease) stage 5, GFR less than 15 ml/min (H)       ferrous sulfate 325 (65 Fe) MG tablet    IRON    30 tablet    Take 1 tablet (325 mg) by mouth daily (with breakfast)    Anemia of chronic renal failure, stage 5 (H), CKD (chronic kidney disease) stage 5, GFR less than 15 ml/min (H)       finasteride 5 MG tablet    PROSCAR    90 tablet    Take 1 tablet (5 mg) by mouth daily    Benign prostatic hyperplasia with urinary retention       FISH OIL PO           Multi-vitamin Tabs tablet      Take 1 tablet by mouth daily        order for DME     1 each    Equipment being ordered: olecranon bursa brace    Olecranon bursitis of left elbow

## 2018-05-07 NOTE — PROGRESS NOTES
Dung Norton is enrolled/participating in the retail pharmacy Blood Pressure Goals Achievement Program (BPGAP).  Dung Norton was evaluated at [facility] on May 7, 2018 at which time his blood pressure was:    BP Readings from Last 3 Encounters:   05/07/18 130/80   04/30/18 134/72   04/27/18 131/87     Reviewed lifestyle modifications for blood pressure control and reduction: including making healthy food choices, managing weight, getting regular exercise, smoking cessation, reducing alcohol consumption, monitoring blood pressure regularly.     Dung Norton is not experiencing symptoms.    Follow-Up: BP is at goal of < 140/90mmHg (patient 18+ years of age with or without diabetes).  Recommended follow-up at pharmacy in 6 months.     Recommendation to Provider: none     Dung Norton was evaluated for enrollment into the PGEN study today.    Patient eligible for enrollment:  No  Patient interested in enrollment:  No    Completed by: Ricarda Andino RPh  [facility]  Phone [phone]  Fax      [phone]

## 2018-05-07 NOTE — TELEPHONE ENCOUNTER
Anemia Management Note  SUBJECTIVE/OBJECTIVE:  Referred by Cecilia De La Torre on 9/11/2017  Primary Diagnosis: Anemia in Chronic Kidney Disease (N18.5, D63.1)     Secondary Diagnosis:  Chronic Kidney Disease, Stage 5 (N18.5)  Hgb goal range:  9-10  Epo/Darbo: Aranesp 100 mcg every 14 days As needed for hgb<10g/dL  RX expires on 12/5/2018  Iron regimen:  None  Labs exp: 9/12/2018  **Provide phone number for [facility] [phone] and instruction to schedule ancillary visit for aranesp injection. Send message to care team via pool - FZ RN TRIAGE POOL as a telephone encounter**      Contact: **AUTH TO DISCUSS**Tereza Norton(daughter) [phone], Rabia Kapoorbernardorico (daughter)[phone]                                        Ok to leave message regarding labs and appts per consent to communicate dated 12/12/17                              OK to speak with daughters Tereza and Rabia regarding Dung's anemia care plan per consent to communicate dated 12/12/17    Anemia Latest Ref Rng & Units 3/26/2018 4/2/2018 4/9/2018 4/16/2018 4/23/2018 4/30/2018 5/7/2018   EARLENE Dose - - - - - - - -   Hemoglobin 13.3 - 17.7 g/dL 10.4(L) 10.5(L) 10.7(L) 10.1(L) 10.4(L) 10.0(L) 10.3(L)   TSAT 15 - 46 % - 17 - - - 20 -   Ferritin 26 - 388 ng/mL - 407(H) - - - 437(H) -     BP Readings from Last 3 Encounters:   05/07/18 130/80   04/30/18 134/72   04/27/18 131/87     Wt Readings from Last 2 Encounters:   04/27/18 173 lb (78.5 kg)   04/13/18 173 lb (78.5 kg)       Patient has labs drawn every Monday. Next lab appt on 5/14/18     ASSESSMENT:  Hgb: At goal - recommend hold dose  TSat: not at goal of >30% Ferritin: At goal (>100ng/mL)     PLAN:  Hold Aranesp and RTC for hgb then aranesp if needed in 1 week(s)     Orders needed to be renewed (for next follow-up date) in EPIC: None     Iron labs due:  5/29/18       Plan discussed with:  Dung  Plan provided by:  Mireya Hussein [facility]  Anemia Clinic  [phone]    NEXT FOLLOW-UP DATE:  5/14/18  Reviewed  05/08/2018 [facility]  Anemia Management Service  Daina Mock PharmD and Malina Hussein CPhT  Phone: [phone]  Fax: [phone]

## 2018-05-14 ENCOUNTER — ALLIED HEALTH/NURSE VISIT (OUTPATIENT)
Dept: FAMILY MEDICINE | Facility: CLINIC | Age: 73
End: 2018-05-14

## 2018-05-14 ENCOUNTER — TELEPHONE (OUTPATIENT)
Dept: PHARMACY | Facility: CLINIC | Age: 73
End: 2018-05-14

## 2018-05-14 VITALS — DIASTOLIC BLOOD PRESSURE: 80 MMHG | SYSTOLIC BLOOD PRESSURE: 138 MMHG

## 2018-05-14 DIAGNOSIS — D63.1 ANEMIA OF CHRONIC RENAL FAILURE, STAGE 5 (H): ICD-10-CM

## 2018-05-14 DIAGNOSIS — N18.5 CKD (CHRONIC KIDNEY DISEASE) STAGE 5, GFR LESS THAN 15 ML/MIN (H): ICD-10-CM

## 2018-05-14 DIAGNOSIS — Z01.30 BP CHECK: Primary | ICD-10-CM

## 2018-05-14 DIAGNOSIS — N18.5 ANEMIA OF CHRONIC RENAL FAILURE, STAGE 5 (H): ICD-10-CM

## 2018-05-14 LAB
HCT VFR BLD AUTO: 32.8 % (ref 40–53)
HGB BLD-MCNC: 10.3 G/DL (ref 13.3–17.7)

## 2018-05-14 PROCEDURE — 85014 HEMATOCRIT: CPT

## 2018-05-14 PROCEDURE — 36415 COLL VENOUS BLD VENIPUNCTURE: CPT

## 2018-05-14 PROCEDURE — 85018 HEMOGLOBIN: CPT

## 2018-05-14 PROCEDURE — 99207 ZZC NO CHARGE NURSE ONLY: CPT | Performed by: FAMILY MEDICINE

## 2018-05-14 NOTE — PROGRESS NOTES
Dung Norton is enrolled/participating in the retail pharmacy Blood Pressure Goals Achievement Program (BPGAP).  Dung Norton was evaluated at [facility] on May 14, 2018 at which time his blood pressure was:    BP Readings from Last 3 Encounters:   05/14/18 138/80   05/07/18 130/80   04/30/18 134/72     Reviewed lifestyle modifications for blood pressure control and reduction: including making healthy food choices, managing weight, getting regular exercise, smoking cessation, reducing alcohol consumption, monitoring blood pressure regularly.     Dung Norton is not experiencing symptoms.    Follow-Up: BP is at goal of < 140/90mmHg (patient 18+ years of age with or without diabetes).  Recommended follow-up at pharmacy in 6 months.     Recommendation to Provider: none     Dung Norton was evaluated for enrollment into the PGEN study today.    Patient eligible for enrollment:  No  Patient interested in enrollment:  No    Completed by:Ricarda Andino RPh  [facility]  Phone [phone]  Fax      [phone]

## 2018-05-14 NOTE — MR AVS SNAPSHOT
After Visit Summary   5/14/2018    Dung Norton    MRN: [id number]           Patient Information     Date Of Birth          1945        Visit Information        Provider Department      5/14/2018 9:48 AM Haley Baker MD [facility]        Today's Diagnoses     BP check    -  1       Follow-ups after your visit        Your next 10 appointments already scheduled     May 21, 2018 10:45 AM CDT   LAB with FZ LAB   [place] [facility] ([facility])    [address] [facility] [id number] [phone]           Please do not eat 10-12 hours before your appointment if you are coming in fasting for labs on lipids, cholesterol, or glucose (sugar). This does not apply to pregnant women. Water, hot tea and black coffee (with nothing added) are okay. Do not drink other fluids, diet soda or chew gum.            Jun 22, 2018  1:00 PM CDT   (Arrive by 12:30 PM)   Return Visit with Oralia De La Torre NP   [facility]ph[facility] ([facility])    [address]  [address]  [facility] [address] [phone]            Oct 26, 2018  9:15 AM CDT   (Arrive by 9:00 AM)   Return Visit with Tamiko Vanegas MD   [facility] and [facility] ([facility])    [address] [place] [address]  4th Floor  [facility] [id number] [phone]              Who to contact     If you have questions or need follow up information about today's clinic visit or your schedule please contact [place] [facility] directly at [phone].  Normal or non-critical lab and imaging results will be communicated to you by MyChart, letter or phone within 4 business days after the clinic has received the results. If you do not hear from us within 7 days, please contact the clinic through MyChart or phone. If you have a critical or abnormal lab result, we will notify you by phone as soon as possible.  Submit  "refill requests through [employer] or call your pharmacy and they will forward the refill request to us. Please allow 3 business days for your refill to be completed.          Additional Information About Your Visit        [employer] Information     [employer] lets you send messages to your doctor, view your test results, renew your prescriptions, schedule appointments and more. To sign up, go to [email or web address] . Click on \"Log in\" on the left side of the screen, which will take you to the Welcome page. Then click on \"Sign up Now\" on the right side of the page.     You will be asked to enter the access code listed below, as well as some personal information. Please follow the directions to create your username and password.     Your access code is: [email or web address]  Expires: 2018 11:43 AM     Your access code will  in 90 days. If you need help or a new code, please call your [place] clinic or [phone].        Care EveryWhere ID     This is your Care EveryWhere ID. This could be used by other organizations to access your [place] medical records  [id number]         Blood Pressure from Last 3 Encounters:   18 138/80   18 130/80   18 134/72    Weight from Last 3 Encounters:   18 173 lb (78.5 kg)   18 173 lb (78.5 kg)   18 171 lb (77.6 kg)              Today, you had the following     No orders found for display         Today's Medication Changes          These changes are accurate as of 18 11:59 PM.  If you have any questions, ask your nurse or doctor.               These medicines have changed or have updated prescriptions.        Dose/Directions    amLODIPine 2.5 MG tablet   Commonly known as:  NORVASC   This may have changed:  when to take this   Used for:  Renal hypertension        Dose:  2.5 mg   Take 1 tablet (2.5 mg) by mouth daily   Quantity:  90 tablet   Refills:  3       finasteride 5 MG tablet   Commonly known as:  PROSCAR   This may have changed:  when " to take this   Used for:  Benign prostatic hyperplasia with urinary retention        Dose:  5 mg   Take 1 tablet (5 mg) by mouth daily   Quantity:  90 tablet   Refills:  3                Primary Care Provider Office Phone # Fax #    Haley Baker [phone] [address] [address]        Equal Access to Services     [facility]: Hadii aad ku hadasho Soomaali, waaxda luqadaha, qaybta kaalmada adeegyada, gibson duong hayjefehillary cotajenniferkarsten green . So [facility] [phone].    ATENCIÓN: Si habla español, tiene a montague disposición servicios gratuitos de asistencia lingüística. Llame al [phone].    We comply with applicable federal civil rights laws and [place] laws. We do not discriminate on the basis of race, color, national origin, age, disability, sex, sexual orientation, or gender identity.            Thank you!     Thank you for choosing [facility]  for your care. Our goal is always to provide you with excellent care. Hearing back from our patients is one way we can continue to improve our services. Please take a few minutes to complete the written survey that you may receive in the mail after your visit with us. Thank you!             Your Updated Medication List - Protect others around you: Learn how to safely use, store and throw away your medicines at www.disposemymeds.org.          This list is accurate as of 5/14/18 11:59 PM.  Always use your most recent med list.                   Brand Name Dispense Instructions for use Diagnosis    amLODIPine 2.5 MG tablet    NORVASC    90 tablet    Take 1 tablet (2.5 mg) by mouth daily    Renal hypertension       aspirin 81 MG tablet     30 tablet    Take 1 tablet (81 mg) by mouth every morning - RESUME on 3/9/18 if urine remains clear    S/P TURP       calcium acetate 667 MG Caps capsule    PHOSLO    540 capsule    Take 2 capsules (1,334 mg) by mouth 3 times daily (with meals)    Chronic kidney disease, unspecified  CKD stage       darbepoetin william 100 MCG/0.5ML injection    ARANESP (ALBUMIN FREE)    0.5 mL    Inject 0.5 mLs (100 mcg) Subcutaneous every 14 days As needed for hgb<10g/dL.  If Hgb increases >1 point in 2 weeks (if blood transfusion given, use hgb PRIOR to this), SYSTOLIC BP > 180 mmHg or hgb>=10g/dL, HOLD DOSE. Dose must be within 1 week of Hgb.  Per anemia protocol with Cecilia De La Torre CNP    Anemia of chronic renal failure, stage 5 (H), CKD (chronic kidney disease) stage 5, GFR less than 15 ml/min (H)       ferrous sulfate 325 (65 Fe) MG tablet    IRON    30 tablet    Take 1 tablet (325 mg) by mouth daily (with breakfast)    Anemia of chronic renal failure, stage 5 (H), CKD (chronic kidney disease) stage 5, GFR less than 15 ml/min (H)       finasteride 5 MG tablet    PROSCAR    90 tablet    Take 1 tablet (5 mg) by mouth daily    Benign prostatic hyperplasia with urinary retention       FISH OIL PO           Multi-vitamin Tabs tablet      Take 1 tablet by mouth daily        order for DME     1 each    Equipment being ordered: olecranon bursa brace    Olecranon bursitis of left elbow

## 2018-05-14 NOTE — TELEPHONE ENCOUNTER
Anemia Management Note  SUBJECTIVE/OBJECTIVE:  Referred by Cecilia De La Torre on 9/11/2017  Primary Diagnosis: Anemia in Chronic Kidney Disease (N18.5, D63.1)     Secondary Diagnosis:  Chronic Kidney Disease, Stage 5 (N18.5)  Hgb goal range:  9-10  Epo/Darbo: Aranesp 100 mcg every 14 days As needed for hgb<10g/dL  RX expires on 12/5/2018  Iron regimen:  None  Labs exp: 9/12/2018  **Provide phone number for [facility] [phone] and instruction to schedule ancillary visit for aranesp injection. Send message to care team via pool - FZ RN TRIAGE POOL as a telephone encounter**      Contact: **AUTH TO DISCUSS**Tereza Norton(daughter) [phone], Rabia Jazzy (daughter)[phone]                                        Ok to leave message regarding labs and appts per consent to communicate dated 12/12/17                              OK to speak with daughters Tereza and Rabia regarding Dung's anemia care plan per consent to communicate dated 12/12/17       Anemia Latest Ref Rng & Units 4/2/2018 4/9/2018 4/16/2018 4/23/2018 4/30/2018 5/7/2018 5/14/2018   EARLENE Dose - - - - - - - -   Hemoglobin 13.3 - 17.7 g/dL 10.5(L) 10.7(L) 10.1(L) 10.4(L) 10.0(L) 10.3(L) 10.3(L)   TSAT 15 - 46 % 17 - - - 20 - -   Ferritin 26 - 388 ng/mL 407(H) - - - 437(H) - -     BP Readings from Last 3 Encounters:   05/14/18 138/80   05/07/18 130/80   04/30/18 134/72     Wt Readings from Last 2 Encounters:   04/27/18 173 lb (78.5 kg)   04/13/18 173 lb (78.5 kg)       Patient has his labs drawn every Monday    ASSESSMENT:  Hgb:Above goal - recommend hold dose  TSat: not at goal of >30% Ferritin: At goal (>100ng/mL)    PLAN:  Hold Aranesp and RTC for hgb, ferritin and iron labs then aranesp if needed in 1 week(s)  Iron labs added to appt note for 05/21/2018 VERONICA    Orders needed to be renewed (for next follow-up date) in EPIC: None    Iron labs due:  5/22/18    Plan discussed with:  No call made  Plan provided by:  Mireya Hussein CPhT  Anemia  [facility]  [phone]    NEXT FOLLOW-UP DATE:  5/21/18  Reviewed 05/15/2018 [facility]  Anemia Management Service  Daina Mock PharmD and Malina Hussein CPhT  Phone: [phone]  Fax: [phone]

## 2018-05-21 ENCOUNTER — ALLIED HEALTH/NURSE VISIT (OUTPATIENT)
Dept: FAMILY MEDICINE | Facility: CLINIC | Age: 73
End: 2018-05-21

## 2018-05-21 VITALS — SYSTOLIC BLOOD PRESSURE: 130 MMHG | DIASTOLIC BLOOD PRESSURE: 78 MMHG

## 2018-05-21 DIAGNOSIS — N18.5 ANEMIA OF CHRONIC RENAL FAILURE, STAGE 5 (H): ICD-10-CM

## 2018-05-21 DIAGNOSIS — Z01.30 BP CHECK: Primary | ICD-10-CM

## 2018-05-21 DIAGNOSIS — N18.5 CKD (CHRONIC KIDNEY DISEASE) STAGE 5, GFR LESS THAN 15 ML/MIN (H): ICD-10-CM

## 2018-05-21 DIAGNOSIS — D63.1 ANEMIA OF CHRONIC RENAL FAILURE, STAGE 5 (H): ICD-10-CM

## 2018-05-21 LAB
HCT VFR BLD AUTO: 32.7 % (ref 40–53)
HGB BLD-MCNC: 10.3 G/DL (ref 13.3–17.7)

## 2018-05-21 PROCEDURE — 99207 ZZC NO CHARGE NURSE ONLY: CPT | Performed by: FAMILY MEDICINE

## 2018-05-21 PROCEDURE — 85014 HEMATOCRIT: CPT

## 2018-05-21 PROCEDURE — 85018 HEMOGLOBIN: CPT

## 2018-05-21 PROCEDURE — 36415 COLL VENOUS BLD VENIPUNCTURE: CPT

## 2018-05-21 NOTE — MR AVS SNAPSHOT
After Visit Summary   5/21/2018    Dung Norton    MRN: [id number]           Patient Information     Date Of Birth          1945        Visit Information        Provider Department      5/21/2018 11:03 AM Haley Baker MD [facility] [place]        Today's Diagnoses     BP check    -  1       Follow-ups after your visit        Your next 10 appointments already scheduled     May 29, 2018 10:45 AM CDT   LAB with FZ LAB   [facility]    [address] [place] [id number] [phone]           Please do not eat 10-12 hours before your appointment if you are coming in fasting for labs on lipids, cholesterol, or glucose (sugar). This does not apply to pregnant women. Water, hot tea and black coffee (with nothing added) are okay. Do not drink other fluids, diet soda or chew gum.            Jun 22, 2018  1:00 PM CDT   (Arrive by 12:30 PM)   Return Visit with Oralia De La Torre NP   [facility]ph[facility] ([facility])    [address]  [address]  [facility] [id number] [phone]            Oct 26, 2018  9:15 AM CDT   (Arrive by 9:00 AM)   Return Visit with Tamiko Vanegas MD   [facility] and Inst for Prostate and Urologic Cancers ([facility])    [address] [place] [address]  4th Floor  [facility] [phone] [phone]              Who to contact     If you have questions or need follow up information about today's clinic visit or your schedule please contact [facility] directly at [phone].  Normal or non-critical lab and imaging results will be communicated to you by MyChart, letter or phone within 4 business days after the clinic has received the results. If you do not hear from us within 7 days, please contact the clinic through MyChart or phone. If you have a critical or abnormal lab result, we will notify you by phone as soon as  "possible.  Submit refill requests through [employer] or call your pharmacy and they will forward the refill request to us. Please allow 3 business days for your refill to be completed.          Additional Information About Your Visit        [employer] Information     [employer] lets you send messages to your doctor, view your test results, renew your prescriptions, schedule appointments and more. To sign up, go to [email or web address] . Click on \"Log in\" on the left side of the screen, which will take you to the Welcome page. Then click on \"Sign up Now\" on the right side of the page.     You will be asked to enter the access code listed below, as well as some personal information. Please follow the directions to create your username and password.     Your access code is: [email or web address]  Expires: 2018 11:43 AM     Your access code will  in 90 days. If you need help or a new code, please call your [facility] clinic or [phone].        Care EveryWhere ID     This is your Care EveryWhere ID. This could be used by other organizations to access your [facility] medical records  [id number]         Blood Pressure from Last 3 Encounters:   18 130/78   18 138/80   18 130/80    Weight from Last 3 Encounters:   18 173 lb (78.5 kg)   18 173 lb (78.5 kg)   18 171 lb (77.6 kg)              Today, you had the following     No orders found for display         Today's Medication Changes          These changes are accurate as of 18 11:59 PM.  If you have any questions, ask your nurse or doctor.               These medicines have changed or have updated prescriptions.        Dose/Directions    amLODIPine 2.5 MG tablet   Commonly known as:  NORVASC   This may have changed:  when to take this   Used for:  Renal hypertension        Dose:  2.5 mg   Take 1 tablet (2.5 mg) by mouth daily   Quantity:  90 tablet   Refills:  3       finasteride 5 MG tablet   Commonly known as:  PROSCAR   This may " have changed:  when to take this   Used for:  Benign prostatic hyperplasia with urinary retention        Dose:  5 mg   Take 1 tablet (5 mg) by mouth daily   Quantity:  90 tablet   Refills:  3                Primary Care Provider Office Phone # Fax #    Haley Baker [phone] [address]  [place] [address]        Equal Access to Services     [facility]: Hadii aad ku hadasho Soomaali, waaxda luqadaha, qaybta kaalmada adeegyada, waxay rhoda hayjefen paul moreliakarsten green . So [facility] [phone].    ATENCIÓN: Si habla español, tiene a montague disposición servicios gratuitos de asistencia lingüística. Llame al [phone].    We comply with applicable federal civil rights laws and [place] laws. We do not discriminate on the basis of race, color, national origin, age, disability, sex, sexual orientation, or gender identity.            Thank you!     Thank you for choosing [facility]  for your care. Our goal is always to provide you with excellent care. Hearing back from our patients is one way we can continue to improve our services. Please take a few minutes to complete the written survey that you may receive in the mail after your visit with us. Thank you!             Your Updated Medication List - Protect others around you: Learn how to safely use, store and throw away your medicines at www.disposemymeds.org.          This list is accurate as of 5/21/18 11:59 PM.  Always use your most recent med list.                   Brand Name Dispense Instructions for use Diagnosis    amLODIPine 2.5 MG tablet    NORVASC    90 tablet    Take 1 tablet (2.5 mg) by mouth daily    Renal hypertension       aspirin 81 MG tablet     30 tablet    Take 1 tablet (81 mg) by mouth every morning - RESUME on 3/9/18 if urine remains clear    S/P TURP       calcium acetate 667 MG Caps capsule    PHOSLO    540 capsule    Take 2 capsules (1,334 mg) by mouth 3 times daily (with meals)    Chronic kidney  disease, unspecified CKD stage       darbepoetin william 100 MCG/0.5ML injection    ARANESP (ALBUMIN FREE)    0.5 mL    Inject 0.5 mLs (100 mcg) Subcutaneous every 14 days As needed for hgb<10g/dL.  If Hgb increases >1 point in 2 weeks (if blood transfusion given, use hgb PRIOR to this), SYSTOLIC BP > 180 mmHg or hgb>=10g/dL, HOLD DOSE. Dose must be within 1 week of Hgb.  Per anemia protocol with Cecilia De La Torre CNP    Anemia of chronic renal failure, stage 5 (H), CKD (chronic kidney disease) stage 5, GFR less than 15 ml/min (H)       ferrous sulfate 325 (65 Fe) MG tablet    IRON    30 tablet    Take 1 tablet (325 mg) by mouth daily (with breakfast)    Anemia of chronic renal failure, stage 5 (H), CKD (chronic kidney disease) stage 5, GFR less than 15 ml/min (H)       finasteride 5 MG tablet    PROSCAR    90 tablet    Take 1 tablet (5 mg) by mouth daily    Benign prostatic hyperplasia with urinary retention       FISH OIL PO           Multi-vitamin Tabs tablet      Take 1 tablet by mouth daily        order for DME     1 each    Equipment being ordered: olecranon bursa brace    Olecranon bursitis of left elbow

## 2018-05-21 NOTE — PROGRESS NOTES
Dung Norton is enrolled/participating in the retail pharmacy Blood Pressure Goals Achievement Program (BPGAP).  Dung Norton was evaluated at [facility] on May 21, 2018 at which time his blood pressure was:    BP Readings from Last 3 Encounters:   05/21/18 130/78   05/14/18 138/80   05/07/18 130/80     Reviewed lifestyle modifications for blood pressure control and reduction: including making healthy food choices, managing weight, getting regular exercise, smoking cessation, reducing alcohol consumption, monitoring blood pressure regularly.     Dung Norton is not experiencing symptoms.    Follow-Up: BP is at goal of < 140/90mmHg (patient 18+ years of age with or without diabetes).  Recommended follow-up at pharmacy in 6 months.     Recommendation to Provider: none     Dung Norton was evaluated for enrollment into the PGEN study today.    Patient eligible for enrollment:  Unknown  Patient interested in enrollment:  Unknown    Completed by: Ricarda Andino RPh  [facility]  Phone [phone]  Fax      [phone]

## 2018-05-22 ENCOUNTER — TELEPHONE (OUTPATIENT)
Dept: PHARMACY | Facility: CLINIC | Age: 73
End: 2018-05-22

## 2018-05-22 NOTE — TELEPHONE ENCOUNTER
Anemia Management Note  SUBJECTIVE/OBJECTIVE:  Referred by Cecilia De La Torre on 9/11/2017  Primary Diagnosis: Anemia in Chronic Kidney Disease (N18.5, D63.1)     Secondary Diagnosis:  Chronic Kidney Disease, Stage 5 (N18.5)  Hgb goal range:  9-10  Epo/Darbo: Aranesp 100 mcg every 14 days As needed for hgb<10g/dL  RX expires on 12/5/2018  Iron regimen:  None  Labs exp: 9/12/2018  **Provide phone number for [facility] [phone] and instruction to schedule ancillary visit for aranesp injection. Send message to care team via pool -  RN TRIAGE POOL as a telephone encounter**      Contact: **AUTH TO DISCUSS**Tereza Norton(daughter) [phone], Rabia Jazzy (daughter)[phone]                                        Ok to leave message regarding labs and appts per consent to communicate dated 12/12/17                              OK to speak with daughters Tereza and Rabia regarding Dung's anemia care plan per consent to communicate dated 12/12/17    Anemia Latest Ref Rng & Units 4/9/2018 4/16/2018 4/23/2018 4/30/2018 5/7/2018 5/14/2018 5/21/2018   EARLENE Dose - - - - - - - -   Hemoglobin 13.3 - 17.7 g/dL 10.7(L) 10.1(L) 10.4(L) 10.0(L) 10.3(L) 10.3(L) 10.3(L)   TSAT 15 - 46 % - - - 20 - - -   Ferritin 26 - 388 ng/mL - - - 437(H) - - -     BP Readings from Last 3 Encounters:   05/21/18 130/78   05/14/18 138/80   05/07/18 130/80     Wt Readings from Last 2 Encounters:   04/27/18 173 lb (78.5 kg)   04/13/18 173 lb (78.5 kg)        Patient has his labs drawn every Monday    ASSESSMENT:  Hgb:Above goal - recommend hold dose  TSat: not at goal of >30% Ferritin: At goal (>100ng/mL)    PLAN:  Hold Aranesp and RTC for hgb, ferritin and iron lab then aranesp if needed in 1 week(s)    Orders needed to be renewed (for next follow-up date) in EPIC: None    Iron labs due:  5/28/18    Plan discussed with:  No call made    Plan provided by:  Mireya Hussein [facility]  Anemia Clinic  [phone]  Reviewed 05/22/2018 VERONICA  NEXT FOLLOW-UP  DATE:  5/29/18    Anemia Management Service  Daina Mock PharmD and Malina Hussein CPhT  Phone: [phone]  Fax: [phone]

## 2018-05-29 ENCOUNTER — ALLIED HEALTH/NURSE VISIT (OUTPATIENT)
Dept: FAMILY MEDICINE | Facility: CLINIC | Age: 73
End: 2018-05-29
Payer: COMMERCIAL

## 2018-05-29 VITALS — SYSTOLIC BLOOD PRESSURE: 130 MMHG | DIASTOLIC BLOOD PRESSURE: 72 MMHG

## 2018-05-29 DIAGNOSIS — I10 HYPERTENSION GOAL BP (BLOOD PRESSURE) < 140/90: Primary | ICD-10-CM

## 2018-05-29 DIAGNOSIS — N18.5 ANEMIA OF CHRONIC RENAL FAILURE, STAGE 5 (H): ICD-10-CM

## 2018-05-29 DIAGNOSIS — N18.5 CKD (CHRONIC KIDNEY DISEASE) STAGE 5, GFR LESS THAN 15 ML/MIN (H): ICD-10-CM

## 2018-05-29 DIAGNOSIS — D63.1 ANEMIA OF CHRONIC RENAL FAILURE, STAGE 5 (H): ICD-10-CM

## 2018-05-29 LAB
FERRITIN SERPL-MCNC: 432 NG/ML (ref 26–388)
HCT VFR BLD AUTO: 32.7 % (ref 40–53)
HGB BLD-MCNC: 10.4 G/DL (ref 13.3–17.7)
IRON SATN MFR SERPL: 28 % (ref 15–46)
IRON SERPL-MCNC: 63 UG/DL (ref 35–180)
TIBC SERPL-MCNC: 222 UG/DL (ref 240–430)

## 2018-05-29 PROCEDURE — 36415 COLL VENOUS BLD VENIPUNCTURE: CPT

## 2018-05-29 PROCEDURE — 99207 ZZC NO CHARGE NURSE ONLY: CPT | Performed by: FAMILY MEDICINE

## 2018-05-29 PROCEDURE — 85018 HEMOGLOBIN: CPT

## 2018-05-29 PROCEDURE — 82728 ASSAY OF FERRITIN: CPT

## 2018-05-29 PROCEDURE — 85014 HEMATOCRIT: CPT

## 2018-05-29 PROCEDURE — 83550 IRON BINDING TEST: CPT

## 2018-05-29 PROCEDURE — 83540 ASSAY OF IRON: CPT

## 2018-05-29 NOTE — PROGRESS NOTES
Dung Norton is enrolled/participating in the retail pharmacy Blood Pressure Goals Achievement Program (BPGAP).  Dung Norton was evaluated at [facility] on May 29, 2018 at which time his blood pressure was:    BP Readings from Last 3 Encounters:   05/29/18 130/72   05/21/18 130/78   05/14/18 138/80     Reviewed lifestyle modifications for blood pressure control and reduction: including making healthy food choices, managing weight, getting regular exercise, smoking cessation, reducing alcohol consumption, monitoring blood pressure regularly.     Dung Norton is not experiencing symptoms.    Follow-Up: BP is at goal of < 140/90mmHg (patient 18+ years of age with or without diabetes).  Recommended follow-up at pharmacy in 6 months.     Recommendation to Provider: None at this time.     Dung Norton was evaluated for enrollment into the PGEN study today.    Patient eligible for enrollment:  No  Patient interested in enrollment:  No    Completed by: Thank you,  Cheri Winslow, PharmD  [facility]  [phone]

## 2018-05-30 ENCOUNTER — TELEPHONE (OUTPATIENT)
Dept: PHARMACY | Facility: CLINIC | Age: 73
End: 2018-05-30

## 2018-05-30 NOTE — TELEPHONE ENCOUNTER
Anemia Management Note  SUBJECTIVE/OBJECTIVE:  Referred by Cecilia De La Torre on 9/11/2017  Primary Diagnosis: Anemia in Chronic Kidney Disease (N18.5, D63.1)     Secondary Diagnosis:  Chronic Kidney Disease, Stage 5 (N18.5)  Hgb goal range:  9-10  Epo/Darbo: Aranesp 100 mcg every 14 days As needed for hgb<10g/dL  RX expires on 12/5/2018  Iron regimen:  None  Labs exp: 9/12/2018  **Provide phone number for [facility] [phone] and instruction to schedule ancillary visit for aranesp injection. Send message to care team via pool - FZ RN TRIAGE POOL as a telephone encounter**      Contact: **AUTH TO DISCUSS**Tereza Norton(daughter) [phone], Rabia Kapooririneo (daughter)[phone]                                        Ok to leave message regarding labs and appts per consent to communicate dated 12/12/17                              OK to speak with daughters Tereza and Rabia regarding Dung's anemia care plan per consent to communicate dated 12/12/17    Anemia Latest Ref Rng & Units 4/16/2018 4/23/2018 4/30/2018 5/7/2018 5/14/2018 5/21/2018 5/29/2018   EARLENE Dose - - - - - - - -   Hemoglobin 13.3 - 17.7 g/dL 10.1(L) 10.4(L) 10.0(L) 10.3(L) 10.3(L) 10.3(L) 10.4(L)   TSAT 15 - 46 % - - 20 - - - 28   Ferritin 26 - 388 ng/mL - - 437(H) - - - 432(H)     BP Readings from Last 3 Encounters:   05/29/18 130/72   05/21/18 130/78   05/14/18 138/80     Wt Readings from Last 2 Encounters:   04/27/18 173 lb (78.5 kg)   04/13/18 173 lb (78.5 kg)     Patient has appts scheduled for 6/4 and 6/22/18    ASSESSMENT:  Hgb: Above goal - recommend hold dose  TSat: not at goal of >30% but improving    Ferritin: At goal (>100ng/mL)    PLAN:  Hold Aranesp and RTC for hgb then aranesp if needed in 2 week(s)    Orders needed to be renewed (for next follow-up date) in EPIC: None    Iron labs due:  6/26/18    Plan discussed with:  No call made  Plan provided by:  Mireya Hussein [facility]  Anemia Clinic  [phone]    NEXT FOLLOW-UP DATE:   6/12/18  Reviewed 06/01/2018 [facility]  Anemia Management Service  Gabriella GaleD and Malina Hussein CPhT  Phone: [phone]  Fax: [phone]

## 2018-06-04 ENCOUNTER — ALLIED HEALTH/NURSE VISIT (OUTPATIENT)
Dept: FAMILY MEDICINE | Facility: CLINIC | Age: 73
End: 2018-06-04

## 2018-06-04 VITALS — SYSTOLIC BLOOD PRESSURE: 138 MMHG | DIASTOLIC BLOOD PRESSURE: 80 MMHG

## 2018-06-04 DIAGNOSIS — N18.5 CKD (CHRONIC KIDNEY DISEASE) STAGE 5, GFR LESS THAN 15 ML/MIN (H): ICD-10-CM

## 2018-06-04 DIAGNOSIS — Z01.30 BP CHECK: Primary | ICD-10-CM

## 2018-06-04 DIAGNOSIS — D63.1 ANEMIA OF CHRONIC RENAL FAILURE, STAGE 5 (H): ICD-10-CM

## 2018-06-04 DIAGNOSIS — N18.5 ANEMIA OF CHRONIC RENAL FAILURE, STAGE 5 (H): ICD-10-CM

## 2018-06-04 LAB
FERRITIN SERPL-MCNC: 468 NG/ML (ref 26–388)
HCT VFR BLD AUTO: 33.8 % (ref 40–53)
HGB BLD-MCNC: 10.7 G/DL (ref 13.3–17.7)
IRON SATN MFR SERPL: 22 % (ref 15–46)
IRON SERPL-MCNC: 51 UG/DL (ref 35–180)
TIBC SERPL-MCNC: 229 UG/DL (ref 240–430)

## 2018-06-04 PROCEDURE — 99207 ZZC NO CHARGE NURSE ONLY: CPT | Performed by: FAMILY MEDICINE

## 2018-06-04 PROCEDURE — 36415 COLL VENOUS BLD VENIPUNCTURE: CPT

## 2018-06-04 PROCEDURE — 82728 ASSAY OF FERRITIN: CPT

## 2018-06-04 PROCEDURE — 85018 HEMOGLOBIN: CPT

## 2018-06-04 PROCEDURE — 83540 ASSAY OF IRON: CPT

## 2018-06-04 PROCEDURE — 85014 HEMATOCRIT: CPT

## 2018-06-04 PROCEDURE — 83550 IRON BINDING TEST: CPT

## 2018-06-04 NOTE — Clinical Note
Sending as FYI: BP in pharmacy today 138/80 Ricarda Andino [facility] Phone [phone] Fax      [phone]

## 2018-06-04 NOTE — MR AVS SNAPSHOT
After Visit Summary   6/4/2018    Dung Norton    MRN: [id number]           Patient Information     Date Of Birth          1945        Visit Information        Provider Department      6/4/2018 12:17 PM Haley Baker MD [facility]        Today's Diagnoses     BP check    -  1       Follow-ups after your visit        Your next 10 appointments already scheduled     Jun 11, 2018 10:30 AM CDT   LAB with FZ LAB   [place] [facility] [facility] ([facility])    [address] [id number] [phone]           Please do not eat 10-12 hours before your appointment if you are coming in fasting for labs on lipids, cholesterol, or glucose (sugar). This does not apply to pregnant women. Water, hot tea and black coffee (with nothing added) are okay. Do not drink other fluids, diet soda or chew gum.            Jun 22, 2018  1:00 PM CDT   (Arrive by 12:30 PM)   Return Visit with Oralia De La Torre NP   [facility]ph[facility] ([facility])    [address]  [address]  [facility] [address] [phone]            Oct 26, 2018  9:15 AM CDT   (Arrive by 9:00 AM)   Return Visit with Tamiko Vanegas MD   [email or web address] for Prostate and Urologic Cancers ([facility])    [address] [place] [address]  4th Floor  [facility] [id number] [phone]              Who to contact     If you have questions or need follow up information about today's clinic visit or your schedule please contact [facility] directly at [phone].  Normal or non-critical lab and imaging results will be communicated to you by MyChart, letter or phone within 4 business days after the clinic has received the results. If you do not hear from us within 7 days, please contact the clinic through MyChart or phone. If you have a critical or abnormal lab result, we will notify you by phone as soon as possible.  Submit  refill requests through [employer] or call your pharmacy and they will forward the refill request to us. Please allow 3 business days for your refill to be completed.          Additional Information About Your Visit        Care EveryWhere ID     This is your Care EveryWhere ID. This could be used by other organizations to access your [facility] medical records  [id number]         Blood Pressure from Last 3 Encounters:   06/04/18 138/80   05/29/18 130/72   05/21/18 130/78    Weight from Last 3 Encounters:   04/27/18 173 lb (78.5 kg)   04/13/18 173 lb (78.5 kg)   03/14/18 171 lb (77.6 kg)              Today, you had the following     No orders found for display         Today's Medication Changes          These changes are accurate as of 6/4/18 11:59 PM.  If you have any questions, ask your nurse or doctor.               These medicines have changed or have updated prescriptions.        Dose/Directions    amLODIPine 2.5 MG tablet   Commonly known as:  NORVASC   This may have changed:  when to take this   Used for:  Renal hypertension        Dose:  2.5 mg   Take 1 tablet (2.5 mg) by mouth daily   Quantity:  90 tablet   Refills:  3       finasteride 5 MG tablet   Commonly known as:  PROSCAR   This may have changed:  when to take this   Used for:  Benign prostatic hyperplasia with urinary retention        Dose:  5 mg   Take 1 tablet (5 mg) by mouth daily   Quantity:  90 tablet   Refills:  3                Primary Care Provider Office Phone # Fax #    Haley Baker [phone] [address] [address]        Equal Access to Services     [facility]: Hadii cristine ku hadasho Sochalo, waaxda luqadaha, qaybta kaalmada paulyada, gibson solis. So [facility] [phone].    ATENCIÓN: Si habla español, tiene a montague disposición servicios gratuitos de asistencia lingüística. Llame al [phone].    We comply with applicable federal civil rights laws and [place] laws. We  do not discriminate on the basis of race, color, national origin, age, disability, sex, sexual orientation, or gender identity.            Thank you!     Thank you for choosing [facility]  for your care. Our goal is always to provide you with excellent care. Hearing back from our patients is one way we can continue to improve our services. Please take a few minutes to complete the written survey that you may receive in the mail after your visit with us. Thank you!             Your Updated Medication List - Protect others around you: Learn how to safely use, store and throw away your medicines at www.disposemymeds.org.          This list is accurate as of 6/4/18 11:59 PM.  Always use your most recent med list.                   Brand Name Dispense Instructions for use Diagnosis    amLODIPine 2.5 MG tablet    NORVASC    90 tablet    Take 1 tablet (2.5 mg) by mouth daily    Renal hypertension       aspirin 81 MG tablet     30 tablet    Take 1 tablet (81 mg) by mouth every morning - RESUME on 3/9/18 if urine remains clear    S/P TURP       calcium acetate 667 MG Caps capsule    PHOSLO    540 capsule    Take 2 capsules (1,334 mg) by mouth 3 times daily (with meals)    Chronic kidney disease, unspecified CKD stage       darbepoetin william 100 MCG/0.5ML injection    ARANESP (ALBUMIN FREE)    0.5 mL    Inject 0.5 mLs (100 mcg) Subcutaneous every 14 days As needed for hgb<10g/dL.  If Hgb increases >1 point in 2 weeks (if blood transfusion given, use hgb PRIOR to this), SYSTOLIC BP > 180 mmHg or hgb>=10g/dL, HOLD DOSE. Dose must be within 1 week of Hgb.  Per anemia protocol with Cecilia De La Torre CNP    Anemia of chronic renal failure, stage 5 (H), CKD (chronic kidney disease) stage 5, GFR less than 15 ml/min (H)       ferrous sulfate 325 (65 Fe) MG tablet    IRON    30 tablet    Take 1 tablet (325 mg) by mouth daily (with breakfast)    Anemia of chronic renal failure, stage 5 (H), CKD (chronic kidney disease) stage 5,  GFR less than 15 ml/min (H)       finasteride 5 MG tablet    PROSCAR    90 tablet    Take 1 tablet (5 mg) by mouth daily    Benign prostatic hyperplasia with urinary retention       FISH OIL PO           Multi-vitamin Tabs tablet      Take 1 tablet by mouth daily        order for DME     1 each    Equipment being ordered: olecranon bursa brace    Olecranon bursitis of left elbow

## 2018-06-04 NOTE — PROGRESS NOTES
Dung Norton is enrolled/participating in the retail pharmacy Blood Pressure Goals Achievement Program (BPGAP).  Dung Norton was evaluated at [facility] on June 4, 2018 at which time his blood pressure was:    BP Readings from Last 3 Encounters:   06/04/18 138/80   05/29/18 130/72   05/21/18 130/78     Reviewed lifestyle modifications for blood pressure control and reduction: including making healthy food choices, managing weight, getting regular exercise, smoking cessation, reducing alcohol consumption, monitoring blood pressure regularly.     Dung Norton is not experiencing symptoms.    Follow-Up: BP is at goal of < 140/90mmHg (patient 18+ years of age with or without diabetes).  Recommended follow-up at pharmacy in 6 months.     Recommendation to Provider: none     Dung Norton was evaluated for enrollment into the PGEN study today.    Patient eligible for enrollment:  No  Patient interested in enrollment:  No    Completed by: Ricarda Andino RPh  [facility]  Phone [phone]  Fax      [phone]

## 2018-06-11 ENCOUNTER — ALLIED HEALTH/NURSE VISIT (OUTPATIENT)
Dept: FAMILY MEDICINE | Facility: CLINIC | Age: 73
End: 2018-06-11

## 2018-06-11 VITALS — SYSTOLIC BLOOD PRESSURE: 130 MMHG | DIASTOLIC BLOOD PRESSURE: 82 MMHG

## 2018-06-11 DIAGNOSIS — N18.5 CKD (CHRONIC KIDNEY DISEASE) STAGE 5, GFR LESS THAN 15 ML/MIN (H): ICD-10-CM

## 2018-06-11 DIAGNOSIS — Z01.30 BP CHECK: Primary | ICD-10-CM

## 2018-06-11 DIAGNOSIS — N18.5 ANEMIA OF CHRONIC RENAL FAILURE, STAGE 5 (H): ICD-10-CM

## 2018-06-11 DIAGNOSIS — D63.1 ANEMIA OF CHRONIC RENAL FAILURE, STAGE 5 (H): ICD-10-CM

## 2018-06-11 LAB
HCT VFR BLD AUTO: 35.1 % (ref 40–53)
HGB BLD-MCNC: 11.3 G/DL (ref 13.3–17.7)

## 2018-06-11 PROCEDURE — 99207 ZZC NO CHARGE NURSE ONLY: CPT | Performed by: FAMILY MEDICINE

## 2018-06-11 PROCEDURE — 36415 COLL VENOUS BLD VENIPUNCTURE: CPT

## 2018-06-11 PROCEDURE — 85018 HEMOGLOBIN: CPT

## 2018-06-11 PROCEDURE — 85014 HEMATOCRIT: CPT

## 2018-06-11 NOTE — PROGRESS NOTES
Dung Norton is enrolled/participating in the retail pharmacy Blood Pressure Goals Achievement Program (BPGAP).  Dung Norton was evaluated at [facility] on June 11, 2018 at which time his blood pressure was:    BP Readings from Last 3 Encounters:   06/11/18 130/82   06/04/18 138/80   05/29/18 130/72     Reviewed lifestyle modifications for blood pressure control and reduction: including making healthy food choices, managing weight, getting regular exercise, smoking cessation, reducing alcohol consumption, monitoring blood pressure regularly.     Dung Norton is not experiencing symptoms.    Follow-Up: BP is at goal of < 140/90mmHg (patient 18+ years of age with or without diabetes).  Recommended follow-up at pharmacy in 6 months.     Recommendation to Provider: none    Dung Norton was evaluated for enrollment into the PGEN study today.    Patient eligible for enrollment:  No  Patient interested in enrollment:  No    Completed by: Ricarda Andino RPh  [facility]  Phone [phone]  Fax      [phone]

## 2018-06-11 NOTE — MR AVS SNAPSHOT
After Visit Summary   6/11/2018    Dung Norton    MRN: [id number]           Patient Information     Date Of Birth          1945        Visit Information        Provider Department      6/11/2018 1:29 PM Haley Baker MD [facility]        Today's Diagnoses     BP check    -  1       Follow-ups after your visit        Your next 10 appointments already scheduled     Jun 18, 2018 10:30 AM CDT   LAB with FZ LAB   [place] [facility] ([facility])    [address] [id number] [phone]           Please do not eat 10-12 hours before your appointment if you are coming in fasting for labs on lipids, cholesterol, or glucose (sugar). This does not apply to pregnant women. Water, hot tea and black coffee (with nothing added) are okay. Do not drink other fluids, diet soda or chew gum.            Jun 22, 2018  1:00 PM CDT   (Arrive by 12:30 PM)   Return Visit with Oralia De La Torre NP   [facility] Nephrology ([facility])    [address]  [address]  [facility] [address] [phone]            Oct 26, 2018  9:15 AM CDT   (Arrive by 9:00 AM)   Return Visit with Tamiko Vanegas MD   [facility] Urology and [facility] Urologic C[facility] ([facility])    [address] [place] [address]  4th Floor  [facility] [id number] [phone]              Who to contact     If you have questions or need follow up information about today's clinic visit or your schedule please contact [place] [facility] directly at [phone].  Normal or non-critical lab and imaging results will be communicated to you by MyChart, letter or phone within 4 business days after the clinic has received the results. If you do not hear from us within 7 days, please contact the clinic through MyChart or phone. If you have a critical or abnormal lab result, we will notify you by phone as soon as possible.  Submit  refill requests through Hypejar or call your pharmacy and they will forward the refill request to us. Please allow 3 business days for your refill to be completed.          Additional Information About Your Visit        Care EveryWhere ID     This is your Care EveryWhere ID. This could be used by other organizations to access your [place] medical records  [id number]         Blood Pressure from Last 3 Encounters:   06/11/18 130/82   06/04/18 138/80   05/29/18 130/72    Weight from Last 3 Encounters:   04/27/18 173 lb (78.5 kg)   04/13/18 173 lb (78.5 kg)   03/14/18 171 lb (77.6 kg)              Today, you had the following     No orders found for display         Today's Medication Changes          These changes are accurate as of 6/11/18 11:59 PM.  If you have any questions, ask your nurse or doctor.               These medicines have changed or have updated prescriptions.        Dose/Directions    amLODIPine 2.5 MG tablet   Commonly known as:  NORVASC   This may have changed:  when to take this   Used for:  Renal hypertension        Dose:  2.5 mg   Take 1 tablet (2.5 mg) by mouth daily   Quantity:  90 tablet   Refills:  3       finasteride 5 MG tablet   Commonly known as:  PROSCAR   This may have changed:  when to take this   Used for:  Benign prostatic hyperplasia with urinary retention        Dose:  5 mg   Take 1 tablet (5 mg) by mouth daily   Quantity:  90 tablet   Refills:  3                Primary Care Provider Office Phone # Fax #    Haley Baker [phone] [address] [address]        Equal Access to Services     [facility]: Hadii cristine ku hadasho Sochalo, waaxda luqadaha, qaybta kaalmada paulyada, gibson solis. So [facility] [phone].    ATENCIÓN: Si habla español, tiene a montague disposición servicios gratuitos de asistencia lingüística. Llame al [phone].    We comply with applicable federal civil rights laws and [place] laws. We  do not discriminate on the basis of race, color, national origin, age, disability, sex, sexual orientation, or gender identity.            Thank you!     Thank you for choosing [facility]  for your care. Our goal is always to provide you with excellent care. Hearing back from our patients is one way we can continue to improve our services. Please take a few minutes to complete the written survey that you may receive in the mail after your visit with us. Thank you!             Your Updated Medication List - Protect others around you: Learn how to safely use, store and throw away your medicines at www.disposemymeds.org.          This list is accurate as of 6/11/18 11:59 PM.  Always use your most recent med list.                   Brand Name Dispense Instructions for use Diagnosis    amLODIPine 2.5 MG tablet    NORVASC    90 tablet    Take 1 tablet (2.5 mg) by mouth daily    Renal hypertension       aspirin 81 MG tablet     30 tablet    Take 1 tablet (81 mg) by mouth every morning - RESUME on 3/9/18 if urine remains clear    S/P TURP       calcium acetate 667 MG Caps capsule    PHOSLO    540 capsule    Take 2 capsules (1,334 mg) by mouth 3 times daily (with meals)    Chronic kidney disease, unspecified CKD stage       darbepoetin william 100 MCG/0.5ML injection    ARANESP (ALBUMIN FREE)    0.5 mL    Inject 0.5 mLs (100 mcg) Subcutaneous every 14 days As needed for hgb<10g/dL.  If Hgb increases >1 point in 2 weeks (if blood transfusion given, use hgb PRIOR to this), SYSTOLIC BP > 180 mmHg or hgb>=10g/dL, HOLD DOSE. Dose must be within 1 week of Hgb.  Per anemia protocol with Cecilia De La Torre CNP    Anemia of chronic renal failure, stage 5 (H), CKD (chronic kidney disease) stage 5, GFR less than 15 ml/min (H)       ferrous sulfate 325 (65 Fe) MG tablet    IRON    30 tablet    Take 1 tablet (325 mg) by mouth daily (with breakfast)    Anemia of chronic renal failure, stage 5 (H), CKD (chronic kidney disease) stage 5,  GFR less than 15 ml/min (H)       finasteride 5 MG tablet    PROSCAR    90 tablet    Take 1 tablet (5 mg) by mouth daily    Benign prostatic hyperplasia with urinary retention       FISH OIL PO           Multi-vitamin Tabs tablet      Take 1 tablet by mouth daily        order for DME     1 each    Equipment being ordered: olecranon bursa brace    Olecranon bursitis of left elbow

## 2018-06-13 ENCOUNTER — TELEPHONE (OUTPATIENT)
Dept: PHARMACY | Facility: CLINIC | Age: 73
End: 2018-06-13

## 2018-06-13 NOTE — TELEPHONE ENCOUNTER
Anemia Management Note  SUBJECTIVE/OBJECTIVE:  Referred by Cecilia Negrete on 9/11/2017  Primary Diagnosis: Anemia in Chronic Kidney Disease (N18.5, D63.1)     Secondary Diagnosis:  Chronic Kidney Disease, Stage 5 (N18.5)  Hgb goal range:  9-10  Epo/Darbo: Aranesp 100 mcg every 14 days As needed for hgb<10g/dL  RX expires on 12/5/2018  Iron regimen:  None  Labs exp: 9/12/2018  **Provide phone number for [facility] [phone] and instruction to schedule ancillary visit for aranesp injection. Send message to care team via pool - FZ RN TRIAGE POOL as a telephone encounter**      Contact: **AUTH TO DISCUSS**Tereza Norton(daughter) [phone], Rabia Kapooririneo (daughter)[phone]                                        Ok to leave message regarding labs and appts per consent to communicate dated 12/12/17                              OK to speak with daughters Tereza and Rabia regarding Dung's anemia care plan per consent to communicate dated 12/12/17    Anemia Latest Ref Rng & Units 4/30/2018 5/7/2018 5/14/2018 5/21/2018 5/29/2018 6/4/2018 6/11/2018   EARLENE Dose - - - - - - - -   Hemoglobin 13.3 - 17.7 g/dL 10.0(L) 10.3(L) 10.3(L) 10.3(L) 10.4(L) 10.7(L) 11.3(L)   TSAT 15 - 46 % 20 - - - 28 22 -   Ferritin 26 - 388 ng/mL 437(H) - - - 432(H) 468(H) -     BP Readings from Last 3 Encounters:   06/11/18 130/82   06/04/18 138/80   05/29/18 130/72     Wt Readings from Last 2 Encounters:   04/27/18 173 lb (78.5 kg)   04/13/18 173 lb (78.5 kg)     Patient has a lab appt on 6/18 and an appt w/Cecilia eNgrete on 6/22      ASSESSMENT:  Hgb:Above goal - recommend hold dose  TSat: not at goal of >30% Ferritin: At goal (>100ng/mL)    PLAN:  Hold Aranesp and RTC for hgb then aranesp if needed in 2 week(s)    Orders needed to be renewed (for next follow-up date) in EPIC: None    Iron labs due:  7/2/18    Plan discussed with:  No call made  Plan provided by:  Mireya Hussein [facility]  Anemia Clinic  [phone]    NEXT FOLLOW-UP DATE:   6/22/18  Reviewed 06/15/2018 [facility]  Anemia Management Service  Gabriella GaleD and Malina Hussein CPhT  Phone: [phone]  Fax: [phone]

## 2018-06-18 ENCOUNTER — ALLIED HEALTH/NURSE VISIT (OUTPATIENT)
Dept: FAMILY MEDICINE | Facility: CLINIC | Age: 73
End: 2018-06-18
Payer: COMMERCIAL

## 2018-06-18 VITALS — SYSTOLIC BLOOD PRESSURE: 128 MMHG | DIASTOLIC BLOOD PRESSURE: 80 MMHG

## 2018-06-18 DIAGNOSIS — I10 BENIGN ESSENTIAL HYPERTENSION: Primary | ICD-10-CM

## 2018-06-18 DIAGNOSIS — D63.1 ANEMIA OF CHRONIC RENAL FAILURE, STAGE 5 (H): ICD-10-CM

## 2018-06-18 DIAGNOSIS — N18.5 ANEMIA OF CHRONIC RENAL FAILURE, STAGE 5 (H): ICD-10-CM

## 2018-06-18 DIAGNOSIS — N18.5 CKD (CHRONIC KIDNEY DISEASE) STAGE 5, GFR LESS THAN 15 ML/MIN (H): ICD-10-CM

## 2018-06-18 LAB
HCT VFR BLD AUTO: 32.3 % (ref 40–53)
HGB BLD-MCNC: 10.3 G/DL (ref 13.3–17.7)

## 2018-06-18 PROCEDURE — 85014 HEMATOCRIT: CPT

## 2018-06-18 PROCEDURE — 99207 ZZC NO CHARGE NURSE ONLY: CPT | Performed by: FAMILY MEDICINE

## 2018-06-18 PROCEDURE — 36415 COLL VENOUS BLD VENIPUNCTURE: CPT

## 2018-06-18 PROCEDURE — 85018 HEMOGLOBIN: CPT

## 2018-06-18 NOTE — PROGRESS NOTES
Dung Norton is enrolled/participating in the retail pharmacy Blood Pressure Goals Achievement Program (BPGAP).  Dung Norton was evaluated at [facility] on June 18, 2018 at which time his blood pressure was:    BP Readings from Last 3 Encounters:   06/18/18 128/80   06/11/18 130/82   06/04/18 138/80     Reviewed lifestyle modifications for blood pressure control and reduction: including making healthy food choices, managing weight, getting regular exercise, smoking cessation, reducing alcohol consumption, monitoring blood pressure regularly.     Dung Norton is not experiencing symptoms.    Follow-Up: BP is at goal of < 140/90mmHg (patient 18+ years of age with or without diabetes).  Recommended follow-up at pharmacy in 6 months.     Recommendation to Provider: none    Dung Norton was evaluated for enrollment into the PGEN study today.    Patient eligible for enrollment:  No  Patient interested in enrollment:  No    Completed by: Ricarda Andino RPh  [facility]  Phone [phone]  Fax      [phone]

## 2018-06-18 NOTE — MR AVS SNAPSHOT
After Visit Summary   6/18/2018    Dung Norton    MRN: [id number]           Patient Information     Date Of Birth          1945        Visit Information        Provider Department      6/18/2018 10:13 AM Haley Baker MD [facility]        Today's Diagnoses     Benign essential hypertension    -  1       Follow-ups after your visit        Your next 10 appointments already scheduled     Jun 22, 2018  1:00 PM CDT   (Arrive by 12:30 PM)   Return Visit with Oralia De La Torre NP   [facility] ([facility])    [address]  [address]  [facility] [address]   [phone]            Jun 25, 2018 10:45 AM CDT   LAB with FZ LAB   [facility] ([facility])    [address] [place] [address]   [phone]           Please do not eat 10-12 hours before your appointment if you are coming in fasting for labs on lipids, cholesterol, or glucose (sugar). This does not apply to pregnant women. Water, hot tea and black coffee (with nothing added) are okay. Do not drink other fluids, diet soda or chew gum.            Jul 09, 2018 10:30 AM CDT   LAB with FZ LAB   [facility] ([facility])    [address] [place] [address]   [phone]           Please do not eat 10-12 hours before your appointment if you are coming in fasting for labs on lipids, cholesterol, or glucose (sugar). This does not apply to pregnant women. Water, hot tea and black coffee (with nothing added) are okay. Do not drink other fluids, diet soda or chew gum.            Jul 09, 2018 10:40 AM CDT   Return Visit with Tiffanie Mock OD   [facility] ([facility])    [address] [place] [address]   [phone]            Oct 26, 2018  9:15 AM CDT   (Arrive by 9:00 AM)   Return Visit with Tamiko Vanegas MD   [facility] for Prostate  and Urologic Cancers ([facility])    [address]  4th Floor  [facility] [address] [phone]              Future tests that were ordered for you today     Open Future Orders        Priority Expected Expires Ordered    Renal panel STAT 6/22/2018 9/17/2018 6/19/2018    Protein  random urine with Creat Ratio Routine 6/22/2018 9/17/2018 6/19/2018            Who to contact     If you have questions or need follow up information about today's clinic visit or your schedule please contact [facility] directly at [phone].  Normal or non-critical lab and imaging results will be communicated to you by MyChart, letter or phone within 4 business days after the clinic has received the results. If you do not hear from us within 7 days, please contact the clinic through MyChart or phone. If you have a critical or abnormal lab result, we will notify you by phone as soon as possible.  Submit refill requests through [employer] or call your pharmacy and they will forward the refill request to us. Please allow 3 business days for your refill to be completed.          Additional Information About Your Visit        Care EveryWhere ID     This is your Care EveryWhere ID. This could be used by other organizations to access your [place] medical records  [id number]         Blood Pressure from Last 3 Encounters:   06/18/18 128/80   06/11/18 130/82   06/04/18 138/80    Weight from Last 3 Encounters:   04/27/18 173 lb (78.5 kg)   04/13/18 173 lb (78.5 kg)   03/14/18 171 lb (77.6 kg)              Today, you had the following     No orders found for display         Today's Medication Changes          These changes are accurate as of 6/18/18 11:59 PM.  If you have any questions, ask your nurse or doctor.               These medicines have changed or have updated prescriptions.        Dose/Directions    amLODIPine 2.5 MG tablet   Commonly known as:  NORVASC   This may have changed:  when  to take this   Used for:  Renal hypertension        Dose:  2.5 mg   Take 1 tablet (2.5 mg) by mouth daily   Quantity:  90 tablet   Refills:  3       finasteride 5 MG tablet   Commonly known as:  PROSCAR   This may have changed:  when to take this   Used for:  Benign prostatic hyperplasia with urinary retention        Dose:  5 mg   Take 1 tablet (5 mg) by mouth daily   Quantity:  90 tablet   Refills:  3                Primary Care Provider Office Phone # Fax #    Haley Baker [phone] [address] [facility] [address]        Equal Access to Services     [facility]: Hadii cristine marin hadasho Sochalo, waaxda luqadaha, qaybta kaalmada robert, gibson green . So [facility] [phone].    ATENCIÓN: Si habla español, tiene a montague disposición servicios gratuitos de asistencia lingüística. Ll[facility]itan [facility] [phone].    We comply with applicable federal civil rights laws and [place] laws. We do not discriminate on the basis of race, color, national origin, age, disability, sex, sexual orientation, or gender identity.            Thank you!     [facility]. Our goal is always to provide you with excellent care. Hearing back from our patients is one way we can continue to improve our services. Please take a few minutes to complete the written survey that you may receive in the mail after your visit with us. Thank you!             Your Updated Medication List - Protect others around you: Learn how to safely use, store and throw away your medicines at www.disposemymeds.org.          This list is accurate as of 6/18/18 11:59 PM.  Always use your most recent med list.                   Brand Name Dispense Instructions for use Diagnosis    amLODIPine 2.5 MG tablet    NORVASC    90 tablet    Take 1 tablet (2.5 mg) by mouth daily    Renal hypertension       aspirin 81 MG tablet     30 tablet    Take 1 tablet (81 mg) by mouth every  morning - RESUME on 3/9/18 if urine remains clear    S/P TURP       calcium acetate 667 MG Caps capsule    PHOSLO    540 capsule    Take 2 capsules (1,334 mg) by mouth 3 times daily (with meals)    Chronic kidney disease, unspecified CKD stage       darbepoetin william 100 MCG/0.5ML injection    ARANESP (ALBUMIN FREE)    0.5 mL    Inject 0.5 mLs (100 mcg) Subcutaneous every 14 days As needed for hgb<10g/dL.  If Hgb increases >1 point in 2 weeks (if blood transfusion given, use hgb PRIOR to this), SYSTOLIC BP > 180 mmHg or hgb>=10g/dL, HOLD DOSE. Dose must be within 1 week of Hgb.  Per anemia protocol with Cecilia De La Torre CNP    Anemia of chronic renal failure, stage 5 (H), CKD (chronic kidney disease) stage 5, GFR less than 15 ml/min (H)       ferrous sulfate 325 (65 Fe) MG tablet    IRON    30 tablet    Take 1 tablet (325 mg) by mouth daily (with breakfast)    Anemia of chronic renal failure, stage 5 (H), CKD (chronic kidney disease) stage 5, GFR less than 15 ml/min (H)       finasteride 5 MG tablet    PROSCAR    90 tablet    Take 1 tablet (5 mg) by mouth daily    Benign prostatic hyperplasia with urinary retention       FISH OIL PO           Multi-vitamin Tabs tablet      Take 1 tablet by mouth daily        order for DME     1 each    Equipment being ordered: olecranon bursa brace    Olecranon bursitis of left elbow

## 2018-06-19 DIAGNOSIS — N18.5 CKD (CHRONIC KIDNEY DISEASE) STAGE 5, GFR LESS THAN 15 ML/MIN (H): Primary | ICD-10-CM

## 2018-06-22 ENCOUNTER — TELEPHONE (OUTPATIENT)
Dept: PHARMACY | Facility: CLINIC | Age: 73
End: 2018-06-22

## 2018-06-22 NOTE — TELEPHONE ENCOUNTER
Anemia Management Note  SUBJECTIVE/OBJECTIVE:  Referred by Cecilia De La Torre on 9/11/2017  Primary Diagnosis: Anemia in Chronic Kidney Disease (N18.5, D63.1)     Secondary Diagnosis:  Chronic Kidney Disease, Stage 5 (N18.5)  Hgb goal range:  9-10  Epo/Darbo: Aranesp 100 mcg every 14 days As needed for hgb<10g/dL  RX expires on 12/5/2018  Iron regimen:  None  Labs exp: 9/12/2018  **Provide phone number for [facility] [phone] and instruction to schedule ancillary visit for aranesp injection. Send message to care team via pool - FZ RN TRIAGE POOL as a telephone encounter**      Contact: **AUTH TO DISCUSS**Tereza Norton(daughter) [phone], Rabia Kapoorbernardorico (daughter)[phone]                                        Ok to leave message regarding labs and appts per consent to communicate dated 12/12/17                              OK to speak with daughters Tereza and Rabia regarding Dung's anemia care plan per consent to communicate dated 12/12/17    Anemia Latest Ref Rng & Units 5/7/2018 5/14/2018 5/21/2018 5/29/2018 6/4/2018 6/11/2018 6/18/2018   EARLENE Dose - - - - - - - -   Hemoglobin 13.3 - 17.7 g/dL 10.3(L) 10.3(L) 10.3(L) 10.4(L) 10.7(L) 11.3(L) 10.3(L)   TSAT 15 - 46 % - - - 28 22 - -   Ferritin 26 - 388 ng/mL - - - 432(H) 468(H) - -     BP Readings from Last 3 Encounters:   06/18/18 128/80   06/11/18 130/82   06/04/18 138/80     Wt Readings from Last 2 Encounters:   04/27/18 173 lb (78.5 kg)   04/13/18 173 lb (78.5 kg)     Discussed that after check next set of iron labs (added to 07/09/2018 appt) may need IV iron if Hgb continues to trend down.    ASSESSMENT:  Hgb:Above goal - recommend hold dose  TSat: not at goal of >30% Ferritin: Not at goal of (>100ng/mL)    PLAN:  Hold Aranesp dose  RTC for hgb then aranesp if needed in 3 week(s) - scheduled    Orders needed to be renewed (for next follow-up date) in EPIC: None    Iron labs due: 07/09/2018    Plan discussed with:  Dung  Plan provided by:   VERONICA    NEXT FOLLOW-UP DATE:  07/09/2018    Anemia Management Service  Daina Mock PharmD and Malina Hussein CPhT  Phone: [phone]  Fax: [phone]

## 2018-06-25 ENCOUNTER — ALLIED HEALTH/NURSE VISIT (OUTPATIENT)
Dept: FAMILY MEDICINE | Facility: CLINIC | Age: 73
End: 2018-06-25

## 2018-06-25 VITALS — SYSTOLIC BLOOD PRESSURE: 130 MMHG | DIASTOLIC BLOOD PRESSURE: 70 MMHG

## 2018-06-25 DIAGNOSIS — Z01.30 BP CHECK: Primary | ICD-10-CM

## 2018-06-25 DIAGNOSIS — D63.1 ANEMIA OF CHRONIC RENAL FAILURE, STAGE 5 (H): ICD-10-CM

## 2018-06-25 DIAGNOSIS — N18.5 CKD (CHRONIC KIDNEY DISEASE) STAGE 5, GFR LESS THAN 15 ML/MIN (H): ICD-10-CM

## 2018-06-25 DIAGNOSIS — N18.5 ANEMIA OF CHRONIC RENAL FAILURE, STAGE 5 (H): ICD-10-CM

## 2018-06-25 LAB
ALBUMIN SERPL-MCNC: 3.4 G/DL (ref 3.4–5)
ANION GAP SERPL CALCULATED.3IONS-SCNC: 13 MMOL/L (ref 3–14)
BUN SERPL-MCNC: 103 MG/DL (ref 7–30)
CALCIUM SERPL-MCNC: 9.9 MG/DL (ref 8.5–10.1)
CHLORIDE SERPL-SCNC: 110 MMOL/L (ref 94–109)
CO2 SERPL-SCNC: 21 MMOL/L (ref 20–32)
CREAT SERPL-MCNC: 4.85 MG/DL (ref 0.66–1.25)
GFR SERPL CREATININE-BSD FRML MDRD: 12 ML/MIN/1.7M2
GLUCOSE SERPL-MCNC: 115 MG/DL (ref 70–99)
HCT VFR BLD AUTO: 31.6 % (ref 40–53)
HGB BLD-MCNC: 10.2 G/DL (ref 13.3–17.7)
PHOSPHATE SERPL-MCNC: 7.1 MG/DL (ref 2.5–4.5)
POTASSIUM SERPL-SCNC: 4.1 MMOL/L (ref 3.4–5.3)
SODIUM SERPL-SCNC: 144 MMOL/L (ref 133–144)

## 2018-06-25 PROCEDURE — 80069 RENAL FUNCTION PANEL: CPT

## 2018-06-25 PROCEDURE — 99207 ZZC NO CHARGE NURSE ONLY: CPT | Performed by: FAMILY MEDICINE

## 2018-06-25 PROCEDURE — 85018 HEMOGLOBIN: CPT

## 2018-06-25 PROCEDURE — 36415 COLL VENOUS BLD VENIPUNCTURE: CPT

## 2018-06-25 PROCEDURE — 85014 HEMATOCRIT: CPT

## 2018-06-25 NOTE — Clinical Note
For informational purposes only BP in pharmacy today 130/70 Ricarda Andino [facility] Phone [phone] Fax      [phone]

## 2018-06-25 NOTE — PROGRESS NOTES
Dung Norton is enrolled/participating in the retail pharmacy Blood Pressure Goals Achievement Program (BPGAP).  Dung Norton was evaluated at [facility] on June 25, 2018 at which time his blood pressure was:    BP Readings from Last 3 Encounters:   06/25/18 130/70   06/18/18 128/80   06/11/18 130/82     Reviewed lifestyle modifications for blood pressure control and reduction: including making healthy food choices, managing weight, getting regular exercise, smoking cessation, reducing alcohol consumption, monitoring blood pressure regularly.     Dung Norton is not experiencing symptoms.    Follow-Up: BP is at goal of < 140/90mmHg (patient 18+ years of age with or without diabetes).  Recommended follow-up at pharmacy in 6 months.     Recommendation to Provider: none    Dung Norton was evaluated for enrollment into the PGEN study today.    Patient eligible for enrollment:  No  Patient interested in enrollment:  No    Completed by: Ricarda Andino RPh  [facility]  Phone [phone]  Fax      [phone]

## 2018-06-25 NOTE — MR AVS SNAPSHOT
After Visit Summary   6/25/2018    Dung Norton    MRN: [id number]           Patient Information     Date Of Birth          1945        Visit Information        Provider Department      6/25/2018 11:51 AM Haley Baker MD [facility]        Today's Diagnoses     BP check    -  1       Follow-ups after your visit        Your next 10 appointments already scheduled     Jul 02, 2018 10:30 AM CDT   LAB with FZ LAB   [facility] ([facility])    [address] [address]   [phone]           Please do not eat 10-12 hours before your appointment if you are coming in fasting for labs on lipids, cholesterol, or glucose (sugar). This does not apply to pregnant women. Water, hot tea and black coffee (with nothing added) are okay. Do not drink other fluids, diet soda or chew gum.            Jul 09, 2018 10:30 AM CDT   LAB with FZ LAB   [facility] ([facility]    [address] [address]   [phone]           Please do not eat 10-12 hours before your appointment if you are coming in fasting for labs on lipids, cholesterol, or glucose (sugar). This does not apply to pregnant women. Water, hot tea and black coffee (with nothing added) are okay. Do not drink other fluids, diet soda or chew gum.            Jul 09, 2018 10:40 AM CDT   Return Visit with Tiffanie Mock OD   [facility] ([facility]    [address] [place] [address]   [phone]            Oct 26, 2018  9:15 AM CDT   (Arrive by 9:00 AM)   Return Visit with Tamiko Vanegas MD   [facility] ([facility])    [address] [address] [phone]              Who to contact     If you have questions or need follow up information about today's clinic visit or your  schedule please contact [facility] directly at [phone].  Normal or non-critical lab and imaging results will be communicated to you by MyChart, letter or phone within 4 business days after the clinic has received the results. If you do not hear from us within 7 days, please contact the clinic through MyChart or phone. If you have a critical or abnormal lab result, we will notify you by phone as soon as possible.  Submit refill requests through Modulus or call your pharmacy and they will forward the refill request to us. Please allow 3 business days for your refill to be completed.          Additional Information About Your Visit        Care EveryWhere ID     This is your Care EveryWhere ID. This could be used by other organizations to access your [facility] medical records  [id number]         Blood Pressure from Last 3 Encounters:   06/25/18 130/70   06/18/18 128/80   06/11/18 130/82    Weight from Last 3 Encounters:   04/27/18 173 lb (78.5 kg)   04/13/18 173 lb (78.5 kg)   03/14/18 171 lb (77.6 kg)              Today, you had the following     No orders found for display         Today's Medication Changes          These changes are accurate as of 6/25/18 11:59 PM.  If you have any questions, ask your nurse or doctor.               These medicines have changed or have updated prescriptions.        Dose/Directions    amLODIPine 2.5 MG tablet   Commonly known as:  NORVASC   This may have changed:  when to take this   Used for:  Renal hypertension        Dose:  2.5 mg   Take 1 tablet (2.5 mg) by mouth daily   Quantity:  90 tablet   Refills:  3       finasteride 5 MG tablet   Commonly known as:  PROSCAR   This may have changed:  when to take this   Used for:  Benign prostatic hyperplasia with urinary retention        Dose:  5 mg   Take 1 tablet (5 mg) by mouth daily   Quantity:  90 tablet   Refills:  3                Primary Care Provider Office Phone # Fax #    Haley Baker [phone]  [phone]       [address]  [place] [address]        Equal Access to Services     ROSEJOSS SAQIB : Hadii aad tomas hadkelleyo Sokatali, waaxda luqadaha, qaybta kaalmada adeshant, gibson biancain hayaahillary navarrokimberly fulton lamalickhillary irma. So [facility] [phone].    ATENCIÓN: Si habla español, tiene a montague disposición servicios gratuitos de asistencia lingüística. Llame al [phone].    We comply with applicable federal civil rights laws and [place] laws. We do not discriminate on the basis of race, color, national origin, age, disability, sex, sexual orientation, or gender identity.            Thank you!     Thank you for choosing [facility]  for your care. Our goal is always to provide you with excellent care. Hearing back from our patients is one way we can continue to improve our services. Please take a few minutes to complete the written survey that you may receive in the mail after your visit with us. Thank you!             Your Updated Medication List - Protect others around you: Learn how to safely use, store and throw away your medicines at www.disposemymeds.org.          This list is accurate as of 6/25/18 11:59 PM.  Always use your most recent med list.                   Brand Name Dispense Instructions for use Diagnosis    amLODIPine 2.5 MG tablet    NORVASC    90 tablet    Take 1 tablet (2.5 mg) by mouth daily    Renal hypertension       aspirin 81 MG tablet     30 tablet    Take 1 tablet (81 mg) by mouth every morning - RESUME on 3/9/18 if urine remains clear    S/P TURP       calcium acetate 667 MG Caps capsule    PHOSLO    540 capsule    Take 2 capsules (1,334 mg) by mouth 3 times daily (with meals)    Chronic kidney disease, unspecified CKD stage       darbepoetin william 100 MCG/0.5ML injection    ARANESP (ALBUMIN FREE)    0.5 mL    Inject 0.5 mLs (100 mcg) Subcutaneous every 14 days As needed for hgb<10g/dL.  If Hgb increases >1 point in 2 weeks (if blood transfusion given, use hgb PRIOR to  this), SYSTOLIC BP > 180 mmHg or hgb>=10g/dL, HOLD DOSE. Dose must be within 1 week of Hgb.  Per anemia protocol with Cecilia De La Torre CNP    Anemia of chronic renal failure, stage 5 (H), CKD (chronic kidney disease) stage 5, GFR less than 15 ml/min (H)       ferrous sulfate 325 (65 Fe) MG tablet    IRON    30 tablet    Take 1 tablet (325 mg) by mouth daily (with breakfast)    Anemia of chronic renal failure, stage 5 (H), CKD (chronic kidney disease) stage 5, GFR less than 15 ml/min (H)       finasteride 5 MG tablet    PROSCAR    90 tablet    Take 1 tablet (5 mg) by mouth daily    Benign prostatic hyperplasia with urinary retention       FISH OIL PO           Multi-vitamin Tabs tablet      Take 1 tablet by mouth daily        order for DME     1 each    Equipment being ordered: olecranon bursa brace    Olecranon bursitis of left elbow

## 2018-07-02 ENCOUNTER — ALLIED HEALTH/NURSE VISIT (OUTPATIENT)
Dept: FAMILY MEDICINE | Facility: CLINIC | Age: 73
End: 2018-07-02
Payer: COMMERCIAL

## 2018-07-02 VITALS — SYSTOLIC BLOOD PRESSURE: 124 MMHG | DIASTOLIC BLOOD PRESSURE: 72 MMHG

## 2018-07-02 DIAGNOSIS — N18.5 ANEMIA OF CHRONIC RENAL FAILURE, STAGE 5 (H): ICD-10-CM

## 2018-07-02 DIAGNOSIS — D63.1 ANEMIA OF CHRONIC RENAL FAILURE, STAGE 5 (H): ICD-10-CM

## 2018-07-02 DIAGNOSIS — N18.5 CKD (CHRONIC KIDNEY DISEASE) STAGE 5, GFR LESS THAN 15 ML/MIN (H): ICD-10-CM

## 2018-07-02 DIAGNOSIS — Z01.30 BP CHECK: Primary | ICD-10-CM

## 2018-07-02 LAB
CREAT UR-MCNC: 45 MG/DL
FERRITIN SERPL-MCNC: 454 NG/ML (ref 26–388)
HCT VFR BLD AUTO: 32.7 % (ref 40–53)
HGB BLD-MCNC: 10.5 G/DL (ref 13.3–17.7)
IRON SATN MFR SERPL: 19 % (ref 15–46)
IRON SERPL-MCNC: 46 UG/DL (ref 35–180)
PROT UR-MCNC: 0.68 G/L
PROT/CREAT 24H UR: 1.51 G/G CR (ref 0–0.2)
TIBC SERPL-MCNC: 247 UG/DL (ref 240–430)

## 2018-07-02 PROCEDURE — 83540 ASSAY OF IRON: CPT

## 2018-07-02 PROCEDURE — 36415 COLL VENOUS BLD VENIPUNCTURE: CPT

## 2018-07-02 PROCEDURE — 82728 ASSAY OF FERRITIN: CPT

## 2018-07-02 PROCEDURE — 84156 ASSAY OF PROTEIN URINE: CPT

## 2018-07-02 PROCEDURE — 85014 HEMATOCRIT: CPT

## 2018-07-02 PROCEDURE — 85018 HEMOGLOBIN: CPT

## 2018-07-02 PROCEDURE — 99207 ZZC NO CHARGE NURSE ONLY: CPT | Performed by: FAMILY MEDICINE

## 2018-07-02 PROCEDURE — 83550 IRON BINDING TEST: CPT

## 2018-07-02 NOTE — PROGRESS NOTES
Dung Norton is enrolled/participating in the retail pharmacy Blood Pressure Goals Achievement Program (BPGAP).  Dung Norton was evaluated at [facility] on July 2, 2018 at which time his blood pressure was:    BP Readings from Last 3 Encounters:   07/02/18 124/72   06/25/18 130/70   06/18/18 128/80     Reviewed lifestyle modifications for blood pressure control and reduction: including making healthy food choices, managing weight, getting regular exercise, smoking cessation, reducing alcohol consumption, monitoring blood pressure regularly.     Dung Norton is not experiencing symptoms.    Follow-Up: BP is at goal of < 140/90mmHg (patient 18+ years of age with or without diabetes).  Recommended follow-up at pharmacy in 6 months.     Recommendation to Provider: non    Dung Norton was evaluated for enrollment into the PGEN study today.    Patient eligible for enrollment:  No  Patient interested in enrollment:  No    Completed by: Ricarda Andino RPh  [facility]  Phone [phone]  Fax      [phone]

## 2018-07-02 NOTE — MR AVS SNAPSHOT
After Visit Summary   7/2/2018    Dung Norton    MRN: [id number]           Patient Information     Date Of Birth          1945        Visit Information        Provider Department      7/2/2018 1:24 PM Haley Baker MD [facility]        Today's Diagnoses     BP check    -  1       Follow-ups after your visit        Your next 10 appointments already scheduled     Jul 09, 2018 10:30 AM CDT   LAB with FZ LAB   [facility]    [address] [place] [address] [phone]           Please do not eat 10-12 hours before your appointment if you are coming in fasting for labs on lipids, cholesterol, or glucose (sugar). This does not apply to pregnant women. Water, hot tea and black coffee (with nothing added) are okay. Do not drink other fluids, diet soda or chew gum.            Jul 09, 2018 10:40 AM CDT   New Visit with Tiffanie Mock OD   [facility] ([facility])    [address] [place] [address] [phone]            Oct 26, 2018  9:15 AM CDT   (Arrive by 9:00 AM)   Return Visit with Tamiko Vanegas MD   [facility] and [facility] ([facility])    [address] [place] [address] [phone]              Who to contact     If you have questions or need follow up information about today's clinic visit or your schedule please contact [facility] directly at [phone].  Normal or non-critical lab and imaging results will be communicated to you by MyChart, letter or phone within 4 business days after the clinic has received the results. If you do not hear from us within 7 days, please contact the clinic through MyChart or phone. If you have a critical or abnormal lab result, we will notify you by phone as soon as possible.  Submit refill requests through  Ania or call your pharmacy and they will forward the refill request to us. Please allow 3 business days for your refill to be completed.          Additional Information About Your Visit        Care EveryWhere ID     This is your Care EveryWhere ID. This could be used by other organizations to access your [facility] medical records  [id number]         Blood Pressure from Last 3 Encounters:   07/02/18 124/72   06/25/18 130/70   06/18/18 128/80    Weight from Last 3 Encounters:   04/27/18 173 lb (78.5 kg)   04/13/18 173 lb (78.5 kg)   03/14/18 171 lb (77.6 kg)              Today, you had the following     No orders found for display         Today's Medication Changes          These changes are accurate as of 7/2/18 11:59 PM.  If you have any questions, ask your nurse or doctor.               These medicines have changed or have updated prescriptions.        Dose/Directions    amLODIPine 2.5 MG tablet   Commonly known as:  NORVASC   This may have changed:  when to take this   Used for:  Renal hypertension        Dose:  2.5 mg   Take 1 tablet (2.5 mg) by mouth daily   Quantity:  90 tablet   Refills:  3       finasteride 5 MG tablet   Commonly known as:  PROSCAR   This may have changed:  when to take this   Used for:  Benign prostatic hyperplasia with urinary retention        Dose:  5 mg   Take 1 tablet (5 mg) by mouth daily   Quantity:  90 tablet   Refills:  3                Primary Care Provider Office Phone # Fax #    Haley Baker [phone]       [address] [facility] [address]        Equal Access to Services     JOSS CERRATO AH: Hadii cristine marin hadasho Sokatali, waaxda luqadaha, qaybta kaalmada robert, gibson solis. So [facility] [phone].    ATENCIÓN: Si habla español, tiene a montague disposición servicios gratuitos de asistencia lingüística. Llame al [phone].    We comply with applicable federal civil rights laws and [place] laws. We do not discriminate on  the basis of race, color, national origin, age, disability, sex, sexual orientation, or gender identity.            Thank you!     Thank you for choosing [facility]  for your care. Our goal is always to provide you with excellent care. Hearing back from our patients is one way we can continue to improve our services. Please take a few minutes to complete the written survey that you may receive in the mail after your visit with us. Thank you!             Your Updated Medication List - Protect others around you: Learn how to safely use, store and throw away your medicines at www.disposemymeds.org.          This list is accurate as of 7/2/18 11:59 PM.  Always use your most recent med list.                   Brand Name Dispense Instructions for use Diagnosis    amLODIPine 2.5 MG tablet    NORVASC    90 tablet    Take 1 tablet (2.5 mg) by mouth daily    Renal hypertension       aspirin 81 MG tablet     30 tablet    Take 1 tablet (81 mg) by mouth every morning - RESUME on 3/9/18 if urine remains clear    S/P TURP       calcium acetate 667 MG Caps capsule    PHOSLO    540 capsule    Take 2 capsules (1,334 mg) by mouth 3 times daily (with meals)    Chronic kidney disease, unspecified CKD stage       darbepoetin william 100 MCG/0.5ML injection    ARANESP (ALBUMIN FREE)    0.5 mL    Inject 0.5 mLs (100 mcg) Subcutaneous every 14 days As needed for hgb<10g/dL.  If Hgb increases >1 point in 2 weeks (if blood transfusion given, use hgb PRIOR to this), SYSTOLIC BP > 180 mmHg or hgb>=10g/dL, HOLD DOSE. Dose must be within 1 week of Hgb.  Per anemia protocol with Cecilia De La Torre CNP    Anemia of chronic renal failure, stage 5 (H), CKD (chronic kidney disease) stage 5, GFR less than 15 ml/min (H)       ferrous sulfate 325 (65 Fe) MG tablet    IRON    30 tablet    Take 1 tablet (325 mg) by mouth daily (with breakfast)    Anemia of chronic renal failure, stage 5 (H), CKD (chronic kidney disease) stage 5, GFR less than 15  ml/min (H)       finasteride 5 MG tablet    PROSCAR    90 tablet    Take 1 tablet (5 mg) by mouth daily    Benign prostatic hyperplasia with urinary retention       FISH OIL PO           Multi-vitamin Tabs tablet      Take 1 tablet by mouth daily        order for DME     1 each    Equipment being ordered: olecranon bursa brace    Olecranon bursitis of left elbow

## 2018-07-02 NOTE — Clinical Note
BLood pressure in pharmacy today 124/72 sending for informational purposes only Thank you Ricarda Andino [facility] Phone [phone] Fax      [phone]

## 2018-07-09 ENCOUNTER — OFFICE VISIT (OUTPATIENT)
Dept: OPTOMETRY | Facility: CLINIC | Age: 73
End: 2018-07-09
Payer: COMMERCIAL

## 2018-07-09 ENCOUNTER — ALLIED HEALTH/NURSE VISIT (OUTPATIENT)
Dept: FAMILY MEDICINE | Facility: CLINIC | Age: 73
End: 2018-07-09
Payer: COMMERCIAL

## 2018-07-09 VITALS — SYSTOLIC BLOOD PRESSURE: 136 MMHG | DIASTOLIC BLOOD PRESSURE: 72 MMHG

## 2018-07-09 DIAGNOSIS — H17.9 SCAR OF CORNEA OF BOTH EYES: ICD-10-CM

## 2018-07-09 DIAGNOSIS — I10 ESSENTIAL HYPERTENSION: Primary | ICD-10-CM

## 2018-07-09 DIAGNOSIS — H52.4 PRESBYOPIA: ICD-10-CM

## 2018-07-09 DIAGNOSIS — D63.1 ANEMIA OF CHRONIC RENAL FAILURE, STAGE 5 (H): ICD-10-CM

## 2018-07-09 DIAGNOSIS — H25.13 NUCLEAR SCLEROSIS OF BOTH EYES: ICD-10-CM

## 2018-07-09 DIAGNOSIS — N18.5 CKD (CHRONIC KIDNEY DISEASE) STAGE 5, GFR LESS THAN 15 ML/MIN (H): ICD-10-CM

## 2018-07-09 DIAGNOSIS — H52.203 HYPEROPIA OF BOTH EYES WITH ASTIGMATISM: Primary | ICD-10-CM

## 2018-07-09 DIAGNOSIS — N18.5 ANEMIA OF CHRONIC RENAL FAILURE, STAGE 5 (H): ICD-10-CM

## 2018-07-09 DIAGNOSIS — H52.03 HYPEROPIA OF BOTH EYES WITH ASTIGMATISM: Primary | ICD-10-CM

## 2018-07-09 LAB
HCT VFR BLD AUTO: 33.1 % (ref 40–53)
HGB BLD-MCNC: 10.5 G/DL (ref 13.3–17.7)

## 2018-07-09 PROCEDURE — 92015 DETERMINE REFRACTIVE STATE: CPT | Performed by: OPTOMETRIST

## 2018-07-09 PROCEDURE — 36415 COLL VENOUS BLD VENIPUNCTURE: CPT

## 2018-07-09 PROCEDURE — 99207 ZZC NO CHARGE NURSE ONLY: CPT | Performed by: FAMILY MEDICINE

## 2018-07-09 PROCEDURE — 85018 HEMOGLOBIN: CPT

## 2018-07-09 PROCEDURE — 92004 COMPRE OPH EXAM NEW PT 1/>: CPT | Performed by: OPTOMETRIST

## 2018-07-09 PROCEDURE — 85014 HEMATOCRIT: CPT

## 2018-07-09 ASSESSMENT — VISUAL ACUITY
OS_CC+: -2
OS_SC: 20/600
CORRECTION_TYPE: GLASSES
OS_CC: 20/80
OD_CC: 20/80
OD_CC: 20/50
OS_CC: 20/50
OD_CC+: +2
METHOD: SNELLEN - LINEAR
OD_SC: 20/600

## 2018-07-09 ASSESSMENT — CONF VISUAL FIELD
OS_NORMAL: 1
OD_NORMAL: 1

## 2018-07-09 ASSESSMENT — REFRACTION_MANIFEST
OS_AXIS: 174
OD_CYLINDER: +0.50
OD_SPHERE: +3.50
OD_AXIS: 003
OD_CYLINDER: +1.00
OS_CYLINDER: +1.00
OS_SPHERE: +3.00
METHOD_AUTOREFRACTION: 1
OS_SPHERE: +3.25
OS_CYLINDER: +0.75
OD_AXIS: 002
OD_ADD: +2.50
OD_SPHERE: +3.00
OS_ADD: +2.50
OS_AXIS: 174

## 2018-07-09 ASSESSMENT — CUP TO DISC RATIO
OD_RATIO: 0.3
OS_RATIO: 0.3

## 2018-07-09 ASSESSMENT — TONOMETRY
OS_IOP_MMHG: 16
IOP_METHOD: APPLANATION
OD_IOP_MMHG: 16

## 2018-07-09 ASSESSMENT — EXTERNAL EXAM - RIGHT EYE: OD_EXAM: NORMAL

## 2018-07-09 ASSESSMENT — SLIT LAMP EXAM - LIDS
COMMENTS: MEIBOMIAN GLAND DYSFUNCTION
COMMENTS: MEIBOMIAN GLAND DYSFUNCTION

## 2018-07-09 ASSESSMENT — REFRACTION_WEARINGRX
OD_CYLINDER: +0.75
SPECS_TYPE: BIFOCAL
OS_SPHERE: +2.00
OD_ADD: +3.00
OS_CYLINDER: +0.75
OS_ADD: +3.00
OD_SPHERE: +2.00
OD_AXIS: 127
OS_AXIS: 171

## 2018-07-09 ASSESSMENT — EXTERNAL EXAM - LEFT EYE: OS_EXAM: NORMAL

## 2018-07-09 NOTE — LETTER
7/9/2018         RE: Dung Norton  [address] [place] [address]  [place] [place] [address]        Dear Colleague,    Thank you for referring your patient, Dung Norton, to the [facility]. Please see a copy of my visit note below.    Chief Complaint   Patient presents with     COMPREHENSIVE EYE EXAM     Routine    does not like to drive at night, and is concerned about passing drivers test this fall with glasses     Last Eye Exam: 5yrs  Dilated Previously: Yes    What are you currently using to see?  glasses       Distance Vision Acuity: Satisfied with vision    Near Vision Acuity: Satisfied with vision while reading  with glasses    Eye Comfort: good  Do you use eye drops? : No  Occupation or Hobbies: Retired          HPI    Symptoms:     Blurred vision                      Medical, surgical and family histories reviewed and updated 7/9/2018.       OBJECTIVE: See Ophthalmology exam    ASSESSMENT:    ICD-10-CM    1. Hyperopia of both eyes with astigmatism H52.03     H52.203    2. Presbyopia H52.4    3. Nuclear sclerosis of both eyes H25.13    4. 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        Sincerely,        Tiffanie Mock, OD

## 2018-07-09 NOTE — PROGRESS NOTES
Dung Norton is enrolled/participating in the retail pharmacy Blood Pressure Goals Achievement Program (BPGAP).  Dung Norton was evaluated at [facility] on July 9, 2018 at which time his blood pressure was:    BP Readings from Last 3 Encounters:   07/09/18 136/72   07/02/18 124/72   06/25/18 130/70     Reviewed lifestyle modifications for blood pressure control and reduction: including making healthy food choices, managing weight, getting regular exercise, smoking cessation, reducing alcohol consumption, monitoring blood pressure regularly.     Dung Norton is not experiencing symptoms.    Follow-Up: BP is at goal of < 140/90mmHg (patient 18+ years of age with or without diabetes).  Recommended follow-up at pharmacy in 6 months.     Recommendation to Provider: none    Dung Norton was evaluated for enrollment into the PGEN study today.    Patient eligible for enrollment:  No  Patient interested in enrollment:  No    Completed by: Ricarda Andino RPh  [facility]  Phone [phone]  Fax      [phone]

## 2018-07-09 NOTE — PROGRESS NOTES
Chief Complaint   Patient presents with     COMPREHENSIVE EYE EXAM     Routine    does not like to drive at night, and is concerned about passing drivers test this fall with glasses     Last Eye Exam: 5yrs  Dilated Previously: Yes    What are you currently using to see?  glasses       Distance Vision Acuity: Satisfied with vision    Near Vision Acuity: Satisfied with vision while reading  with glasses    Eye Comfort: good  Do you use eye drops? : No  Occupation or Hobbies: Retired          HPI    Symptoms:     Blurred vision                      Medical, surgical and family histories reviewed and updated 7/9/2018.       OBJECTIVE: See Ophthalmology exam    ASSESSMENT:    ICD-10-CM    1. Hyperopia of both eyes with astigmatism H52.03     H52.203    2. Presbyopia H52.4    3. Nuclear sclerosis of both eyes H25.13    4. 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

## 2018-07-09 NOTE — Clinical Note
KIMI Blood pressure at pharmacy today 136/72 Ricarda Andino [facility] Phone [phone] Fax      [phone]

## 2018-07-09 NOTE — MR AVS SNAPSHOT
After Visit Summary   7/9/2018    Dung Norton    MRN: [id number]           Patient Information     Date Of Birth          1945        Visit Information        Provider Department      7/9/2018 10:42 AM Nicho Del Cid MD [facility]        Today's Diagnoses     Essential hypertension    -  1       Follow-ups after your visit        Your next 10 appointments already scheduled     Jul 30, 2018 10:30 AM CDT   LAB with FZ LAB   [facility] ([facility])    [address] [place] [id number] [phone]           Please do not eat 10-12 hours before your appointment if you are coming in fasting for labs on lipids, cholesterol, or glucose (sugar). This does not apply to pregnant women. Water, hot tea and black coffee (with nothing added) are okay. Do not drink other fluids, diet soda or chew gum.            Oct 03, 2018 12:00 PM CDT   (Arrive by 11:30 AM)   Return Visit with Oralia De La Torre NP   [facility] ([facility])    [address]  [address]  [facility] [id number] [phone]            Oct 26, 2018  9:15 AM CDT   (Arrive by 9:00 AM)   Return Visit with Tamiko Vanegas MD   [facility] for Prostate and Urologic Cancers ([facility])    [address] [place] [address]  4th Floor  [facility] [id number] [phone]              Future tests that were ordered for you today     Open Future Orders        Priority Expected Expires Ordered    Calcium ionized whole blood Routine  7/26/2019 7/26/2018            Who to contact     If you have questions or need follow up information about today's clinic visit or your schedule please contact [place] [facility] directly at [phone].  Normal or non-critical lab and imaging results will be communicated to you by MyChart, letter or phone within 4 business days after the clinic has received the  results. If you do not hear from us within 7 days, please contact the clinic through [email or web address] or phone. If you have a critical or abnormal lab result, we will notify you by phone as soon as possible.  Submit refill requests through [employer] or call your pharmacy and they will forward the refill request to us. Please allow 3 business days for your refill to be completed.          Additional Information About Your Visit        Care EveryWhere ID     This is your Care EveryWhere ID. This could be used by other organizations to access your [facility] medical records  [id number]         Blood Pressure from Last 3 Encounters:   07/25/18 150/82   07/23/18 134/78   07/16/18 110/74    Weight from Last 3 Encounters:   07/25/18 171 lb 9.6 oz (77.8 kg)   04/27/18 173 lb (78.5 kg)   04/13/18 173 lb (78.5 kg)              Today, you had the following     No orders found for display         Today's Medication Changes          These changes are accurate as of 7/9/18 11:59 PM.  If you have any questions, ask your nurse or doctor.               These medicines have changed or have updated prescriptions.        Dose/Directions    amLODIPine 2.5 MG tablet   Commonly known as:  NORVASC   This may have changed:  when to take this   Used for:  Renal hypertension        Dose:  2.5 mg   Take 1 tablet (2.5 mg) by mouth daily   Quantity:  90 tablet   Refills:  3                Primary Care Provider Office Phone # Fax #    Haley Baker [phone] [address] [facility] [address]        Equal Access to Services     BRIAN CERRATO AH: Hadii cristine ku hadasho Soomaali, waaxda luqadaha, qaybta kaalmada adeegyada, gibson solis. So [facility] [phone].    ATENCIÓN: Si habla español, tiene a montague disposición servicios gratuitos de asistencia lingüística. Llame al [phone].    We comply with applicable federal civil rights laws and [place] laws. We do not discriminate on the basis of race, color,  national origin, age, disability, sex, sexual orientation, or gender identity.            Thank you!     Thank you for choosing [facility]  for your care. Our goal is always to provide you with excellent care. Hearing back from our patients is one way we can continue to improve our services. Please take a few minutes to complete the written survey that you may receive in the mail after your visit with us. Thank you!             Your Updated Medication List - Protect others around you: Learn how to safely use, store and throw away your medicines at www.disposemymeds.org.          This list is accurate as of 7/9/18 11:59 PM.  Always use your most recent med list.                   Brand Name Dispense Instructions for use Diagnosis    amLODIPine 2.5 MG tablet    NORVASC    90 tablet    Take 1 tablet (2.5 mg) by mouth daily    Renal hypertension       aspirin 81 MG tablet     30 tablet    Take 1 tablet (81 mg) by mouth every morning - RESUME on 3/9/18 if urine remains clear    S/P TURP       calcium acetate 667 MG Caps capsule    PHOSLO    540 capsule    Take 2 capsules (1,334 mg) by mouth 3 times daily (with meals)    Chronic kidney disease, unspecified CKD stage       darbepoetin william 100 MCG/0.5ML injection    ARANESP (ALBUMIN FREE)    0.5 mL    Inject 0.5 mLs (100 mcg) Subcutaneous every 14 days As needed for hgb<10g/dL.  If Hgb increases >1 point in 2 weeks (if blood transfusion given, use hgb PRIOR to this), SYSTOLIC BP > 180 mmHg or hgb>=10g/dL, HOLD DOSE. Dose must be within 1 week of Hgb.  Per anemia protocol with Cecilia De La Torre CNP    Anemia of chronic renal failure, stage 5 (H), CKD (chronic kidney disease) stage 5, GFR less than 15 ml/min (H)       ferrous sulfate 325 (65 Fe) MG tablet    IRON    30 tablet    Take 1 tablet (325 mg) by mouth daily (with breakfast)    Anemia of chronic renal failure, stage 5 (H), CKD (chronic kidney disease) stage 5, GFR less than 15 ml/min (H)       FISH OIL PO            Multi-vitamin Tabs tablet      Take 1 tablet by mouth daily        order for DME     1 each    Equipment being ordered: olecranon bursa brace    Olecranon bursitis of left elbow

## 2018-07-09 NOTE — MR AVS SNAPSHOT
After Visit Summary   7/9/2018    Dung Norton    MRN: [id number]           Patient Information     Date Of Birth          1945        Visit Information        Provider Department      7/9/2018 10:40 AM Tiffanie Mock OD [facility]        Today's Diagnoses     Hyperopia of both eyes with astigmatism    -  1    Presbyopia        Nuclear sclerosis of both eyes        Scar of cornea of both eyes           Follow-ups after your visit        Follow-up notes from your care team     Return in about 1 year (around 7/9/2019).      Your next 10 appointments already scheduled     Jul 16, 2018 10:15 AM CDT   LAB with FZ LAB   [facility] ([facility]    [address] [facility] [id number] [phone]           Please do not eat 10-12 hours before your appointment if you are coming in fasting for labs on lipids, cholesterol, or glucose (sugar). This does not apply to pregnant women. Water, hot tea and black coffee (with nothing added) are okay. Do not drink other fluids, diet soda or chew gum.            Oct 26, 2018  9:15 AM CDT   (Arrive by 9:00 AM)   Return Visit with Tamiko Vanegas MD   [facility] for Prostate and Urologic Cancers ([facility])    [address] [place] [address] [phone]              Who to contact     If you have questions or need follow up information about today's clinic visit or your schedule please contact [facility] directly at [phone].  Normal or non-critical lab and imaging results will be communicated to you by MyChart, letter or phone within 4 business days after the clinic has received the results. If you do not hear from us within 7 days, please contact the clinic through MyChart or phone. If you have a critical or abnormal lab result, we will notify you by phone as soon as possible.  Submit refill requests  through [employer] or call your pharmacy and they will forward the refill request to us. Please allow 3 business days for your refill to be completed.          Additional Information About Your Visit        Care EveryWhere ID     This is your Care EveryWhere ID. This could be used by other organizations to access your [place] medical records  [id number]         Blood Pressure from Last 3 Encounters:   07/09/18 136/72   07/02/18 124/72   06/25/18 130/70    Weight from Last 3 Encounters:   04/27/18 78.5 kg (173 lb)   04/13/18 78.5 kg (173 lb)   03/14/18 77.6 kg (171 lb)              We Performed the Following     EYE EXAM (SIMPLE-NONBILLABLE)     REFRACTION          Today's Medication Changes          These changes are accurate as of 7/9/18 11:22 AM.  If you have any questions, ask your nurse or doctor.               These medicines have changed or have updated prescriptions.        Dose/Directions    amLODIPine 2.5 MG tablet   Commonly known as:  NORVASC   This may have changed:  when to take this   Used for:  Renal hypertension        Dose:  2.5 mg   Take 1 tablet (2.5 mg) by mouth daily   Quantity:  90 tablet   Refills:  3       finasteride 5 MG tablet   Commonly known as:  PROSCAR   This may have changed:  when to take this   Used for:  Benign prostatic hyperplasia with urinary retention        Dose:  5 mg   Take 1 tablet (5 mg) by mouth daily   Quantity:  90 tablet   Refills:  3                Primary Care Provider Office Phone # Fax #    Haley Baker [phone] [address] [facility] [address]        Equal Access to Services     [facility] SAQIB AH: Hadii cristine marin hadasho Sochalo, waaxda luqadaha, qaybta kaalmada paulyada, gibson solis. So [facility] [phone].    ATENCIÓN: Si habla español, tiene a montague disposición servicios gratuitos de asistencia lingüística. Llame al [phone].    We comply with applicable federal civil rights laws and [place] laws. We  do not discriminate on the basis of race, color, national origin, age, disability, sex, sexual orientation, or gender identity.            Thank you!     Thank you for choosing [facility]  for your care. Our goal is always to provide you with excellent care. Hearing back from our patients is one way we can continue to improve our services. Please take a few minutes to complete the written survey that you may receive in the mail after your visit with us. Thank you!             Your Updated Medication List - Protect others around you: Learn how to safely use, store and throw away your medicines at www.disposemymeds.org.          This list is accurate as of 7/9/18 11:22 AM.  Always use your most recent med list.                   Brand Name Dispense Instructions for use Diagnosis    amLODIPine 2.5 MG tablet    NORVASC    90 tablet    Take 1 tablet (2.5 mg) by mouth daily    Renal hypertension       aspirin 81 MG tablet     30 tablet    Take 1 tablet (81 mg) by mouth every morning - RESUME on 3/9/18 if urine remains clear    S/P TURP       calcium acetate 667 MG Caps capsule    PHOSLO    540 capsule    Take 2 capsules (1,334 mg) by mouth 3 times daily (with meals)    Chronic kidney disease, unspecified CKD stage       darbepoetin william 100 MCG/0.5ML injection    ARANESP (ALBUMIN FREE)    0.5 mL    Inject 0.5 mLs (100 mcg) Subcutaneous every 14 days As needed for hgb<10g/dL.  If Hgb increases >1 point in 2 weeks (if blood transfusion given, use hgb PRIOR to this), SYSTOLIC BP > 180 mmHg or hgb>=10g/dL, HOLD DOSE. Dose must be within 1 week of Hgb.  Per anemia protocol with Cecilia De La Torre CNP    Anemia of chronic renal failure, stage 5 (H), CKD (chronic kidney disease) stage 5, GFR less than 15 ml/min (H)       ferrous sulfate 325 (65 Fe) MG tablet    IRON    30 tablet    Take 1 tablet (325 mg) by mouth daily (with breakfast)    Anemia of chronic renal failure, stage 5 (H), CKD (chronic kidney disease) stage 5,  GFR less than 15 ml/min (H)       finasteride 5 MG tablet    PROSCAR    90 tablet    Take 1 tablet (5 mg) by mouth daily    Benign prostatic hyperplasia with urinary retention       FISH OIL PO           Multi-vitamin Tabs tablet      Take 1 tablet by mouth daily        order for DME     1 each    Equipment being ordered: olecranon bursa brace    Olecranon bursitis of left elbow

## 2018-07-10 ENCOUNTER — TELEPHONE (OUTPATIENT)
Dept: PHARMACY | Facility: CLINIC | Age: 73
End: 2018-07-10

## 2018-07-10 NOTE — TELEPHONE ENCOUNTER
Anemia Management Note  SUBJECTIVE/OBJECTIVE:  Referred by Cecilia De La Torre on 9/11/2017  Primary Diagnosis: Anemia in Chronic Kidney Disease (N18.5, D63.1)     Secondary Diagnosis:  Chronic Kidney Disease, Stage 5 (N18.5)  Hgb goal range:  9-10  Epo/Darbo: Aranesp 100 mcg every 14 days As needed for hgb<10g/dL  RX expires on 12/5/2018  Iron regimen:  None  Labs exp: 9/12/2018  **Provide phone number for [facility] [phone] and instruction to schedule ancillary visit for aranesp injection. Send message to care team via pool - FZ RN TRIAGE POOL as a telephone encounter**      Contact: **AUTH TO DISCUSS**Tereza Norton(daughter) [phone], Rabia Purcell (daughter)[phone]                                        Ok to leave message regarding labs and appts per consent to communicate dated 12/12/17                              OK to speak with daughters Tereza and Rabia regarding Dung's anemia care plan per consent to communicate dated 12/12/17    Anemia Latest Ref Rng & Units 5/29/2018 6/4/2018 6/11/2018 6/18/2018 6/25/2018 7/2/2018 7/9/2018   EARLENE Dose - - - - - - - -   Hemoglobin 13.3 - 17.7 g/dL 10.4(L) 10.7(L) 11.3(L) 10.3(L) 10.2(L) 10.5(L) 10.5(L)   TSAT 15 - 46 % 28 22 - - - 19 -   Ferritin 26 - 388 ng/mL 432(H) 468(H) - - - 454(H) -     BP Readings from Last 3 Encounters:   07/09/18 136/72   07/02/18 124/72   06/25/18 130/70     Wt Readings from Last 2 Encounters:   04/27/18 173 lb (78.5 kg)   04/13/18 173 lb (78.5 kg)         ASSESSMENT:  Hgb:Above goal - recommend hold dose  TSat: not at goal of >30% Ferritin: At goal (>100ng/mL)    PLAN:  Hold Aranesp and RTC for hgb, ferritin and iron labs on 7/16/18   Referred to  Daina to determine if additional iron therapy is needed  IV iron not indicated since Hgb above 10.  Watch labs for drops in Hgb.  - VERONICA    Orders needed to be renewed (for next follow-up date) in [facility]: None    Iron labs due:  8/1/18    Plan discussed with:  No call made - next appt  is scheduled for 7/16 at 10:15 am  Plan provided by:  Mireya Hussein [facility]  Anemia Clinic  [phone]    NEXT FOLLOW-UP DATE:  7/16/18  Reviewed 07/12/2018 [facility]  Anemia Management Service  Daina Mock PharmD and [facility]  Phone: [phone]  Fax: [phone]

## 2018-07-16 ENCOUNTER — ALLIED HEALTH/NURSE VISIT (OUTPATIENT)
Dept: FAMILY MEDICINE | Facility: CLINIC | Age: 73
End: 2018-07-16
Payer: COMMERCIAL

## 2018-07-16 VITALS — SYSTOLIC BLOOD PRESSURE: 110 MMHG | DIASTOLIC BLOOD PRESSURE: 74 MMHG

## 2018-07-16 DIAGNOSIS — N18.5 CKD (CHRONIC KIDNEY DISEASE) STAGE 5, GFR LESS THAN 15 ML/MIN (H): ICD-10-CM

## 2018-07-16 DIAGNOSIS — Z01.30 BP CHECK: Primary | ICD-10-CM

## 2018-07-16 DIAGNOSIS — D63.1 ANEMIA OF CHRONIC RENAL FAILURE, STAGE 5 (H): ICD-10-CM

## 2018-07-16 DIAGNOSIS — N18.5 ANEMIA OF CHRONIC RENAL FAILURE, STAGE 5 (H): ICD-10-CM

## 2018-07-16 LAB
HCT VFR BLD AUTO: 33.4 % (ref 40–53)
HGB BLD-MCNC: 10.8 G/DL (ref 13.3–17.7)

## 2018-07-16 PROCEDURE — 85014 HEMATOCRIT: CPT

## 2018-07-16 PROCEDURE — 36415 COLL VENOUS BLD VENIPUNCTURE: CPT

## 2018-07-16 PROCEDURE — 85018 HEMOGLOBIN: CPT

## 2018-07-16 PROCEDURE — 99207 ZZC NO CHARGE NURSE ONLY: CPT | Performed by: FAMILY MEDICINE

## 2018-07-16 NOTE — PROGRESS NOTES
Dung Norton is enrolled/participating in the retail pharmacy Blood Pressure Goals Achievement Program (BPGAP).  Dung Norton was evaluated at [facility] on July 16, 2018 at which time his blood pressure was:    BP Readings from Last 3 Encounters:   07/16/18 110/74   07/09/18 136/72   07/02/18 124/72     Reviewed lifestyle modifications for blood pressure control and reduction: including making healthy food choices, managing weight, getting regular exercise, smoking cessation, reducing alcohol consumption, monitoring blood pressure regularly.     Dung Norton is not experiencing symptoms.    Follow-Up: BP is at goal of < 140/90mmHg (patient 18+ years of age with or without diabetes).  Recommended follow-up at pharmacy in 6 months.     Recommendation to Provider: none    Dung Norton was evaluated for enrollment into the PGEN study today.    Patient eligible for enrollment:  No  Patient interested in enrollment:  No    Completed by: Ricarda Andino RPh  [facility]  Phone [phone]  Fax      [phone]

## 2018-07-16 NOTE — MR AVS SNAPSHOT
After Visit Summary   7/16/2018    Dung Norton    MRN: [id number]           Patient Information     Date Of Birth          1945        Visit Information        Provider Department      7/16/2018 11:05 AM Nicho Del Cid MD [facility]        Today's Diagnoses     BP check    -  1       Follow-ups after your visit        Your next 10 appointments already scheduled     Jul 25, 2018  2:00 PM CDT   (Arrive by 1:30 PM)   Return Visit with Oralia De La Torre NP   [facility] ([facility])    [address]  [address]  [facility] [id number] [phone]            Jul 30, 2018 10:30 AM CDT   LAB with  LAB   [facility] ([facility])    [address] [place] [id number] [phone]           Please do not eat 10-12 hours before your appointment if you are coming in fasting for labs on lipids, cholesterol, or glucose (sugar). This does not apply to pregnant women. Water, hot tea and black coffee (with nothing added) are okay. Do not drink other fluids, diet soda or chew gum.            Oct 26, 2018  9:15 AM CDT   (Arrive by 9:00 AM)   Return Visit with Tamiko Vanegas MD   [facility] Inst for Prostate and Urologic Cancers ([facility])    [address]  4th Floor  [facility] [id number] [phone]              Future tests that were ordered for you today     Open Future Orders        Priority Expected Expires Ordered    Protein  random urine with Creat Ratio Routine  7/23/2019 7/23/2018            Who to contact     If you have questions or need follow up information about today's clinic visit or your schedule please contact [place] [facility] directly at [phone].  Normal or non-critical lab and imaging results will be communicated to you by MyChart, letter or phone within 4 business days after the clinic has received the  results. If you do not hear from us within 7 days, please contact the clinic through [employer] or phone. If you have a critical or abnormal lab result, we will notify you by phone as soon as possible.  Submit refill requests through [employer] or call your pharmacy and they will forward the refill request to us. Please allow 3 business days for your refill to be completed.          Additional Information About Your Visit        Care EveryWhere ID     This is your Care EveryWhere ID. This could be used by other organizations to access your [place] medical records  [id number]         Blood Pressure from Last 3 Encounters:   07/23/18 134/78   07/16/18 110/74   07/09/18 136/72    Weight from Last 3 Encounters:   04/27/18 173 lb (78.5 kg)   04/13/18 173 lb (78.5 kg)   03/14/18 171 lb (77.6 kg)              Today, you had the following     No orders found for display         Today's Medication Changes          These changes are accurate as of 7/16/18 11:59 PM.  If you have any questions, ask your nurse or doctor.               These medicines have changed or have updated prescriptions.        Dose/Directions    amLODIPine 2.5 MG tablet   Commonly known as:  NORVASC   This may have changed:  when to take this   Used for:  Renal hypertension        Dose:  2.5 mg   Take 1 tablet (2.5 mg) by mouth daily   Quantity:  90 tablet   Refills:  3       finasteride 5 MG tablet   Commonly known as:  PROSCAR   This may have changed:  when to take this   Used for:  Benign prostatic hyperplasia with urinary retention        Dose:  5 mg   Take 1 tablet (5 mg) by mouth daily   Quantity:  90 tablet   Refills:  3                Primary Care Provider Office Phone # Fax #    Haley Baker [phone] [address] [facility] [facility]  [place] [address]        Equal Access to Services     JOSS CERRATO AH: Justin Sagastume, osvaldo calle, gibson nesbitt. So [facility]  [phone].    ATENCIÓN: Si betty hernandez, tiene a montague disposición servicios gratuitos de asistencia lingüística. Jose Manuel brink [phone].    We comply with applicable federal civil rights laws and [place] laws. We do not discriminate on the basis of race, color, national origin, age, disability, sex, sexual orientation, or gender identity.            Thank you!     Thank you for choosing [facility]  for your care. Our goal is always to provide you with excellent care. Hearing back from our patients is one way we can continue to improve our services. Please take a few minutes to complete the written survey that you may receive in the mail after your visit with us. Thank you!             Your Updated Medication List - Protect others around you: Learn how to safely use, store and throw away your medicines at www.disposemymeds.org.          This list is accurate as of 7/16/18 11:59 PM.  Always use your most recent med list.                   Brand Name Dispense Instructions for use Diagnosis    amLODIPine 2.5 MG tablet    NORVASC    90 tablet    Take 1 tablet (2.5 mg) by mouth daily    Renal hypertension       aspirin 81 MG tablet     30 tablet    Take 1 tablet (81 mg) by mouth every morning - RESUME on 3/9/18 if urine remains clear    S/P TURP       calcium acetate 667 MG Caps capsule    PHOSLO    540 capsule    Take 2 capsules (1,334 mg) by mouth 3 times daily (with meals)    Chronic kidney disease, unspecified CKD stage       darbepoetin william 100 MCG/0.5ML injection    ARANESP (ALBUMIN FREE)    0.5 mL    Inject 0.5 mLs (100 mcg) Subcutaneous every 14 days As needed for hgb<10g/dL.  If Hgb increases >1 point in 2 weeks (if blood transfusion given, use hgb PRIOR to this), SYSTOLIC BP > 180 mmHg or hgb>=10g/dL, HOLD DOSE. Dose must be within 1 week of Hgb.  Per anemia protocol with Cecilia De La Torre CNP    Anemia of chronic renal failure, stage 5 (H), CKD (chronic kidney disease) stage 5, GFR less than 15  ml/min (H)       ferrous sulfate 325 (65 Fe) MG tablet    IRON    30 tablet    Take 1 tablet (325 mg) by mouth daily (with breakfast)    Anemia of chronic renal failure, stage 5 (H), CKD (chronic kidney disease) stage 5, GFR less than 15 ml/min (H)       finasteride 5 MG tablet    PROSCAR    90 tablet    Take 1 tablet (5 mg) by mouth daily    Benign prostatic hyperplasia with urinary retention       FISH OIL PO           Multi-vitamin Tabs tablet      Take 1 tablet by mouth daily        order for DME     1 each    Equipment being ordered: olecranon bursa brace    Olecranon bursitis of left elbow

## 2018-07-17 ENCOUNTER — TELEPHONE (OUTPATIENT)
Dept: PHARMACY | Facility: CLINIC | Age: 73
End: 2018-07-17

## 2018-07-17 NOTE — TELEPHONE ENCOUNTER
Anemia Management Note  SUBJECTIVE/OBJECTIVE:  Referred by Cecilia De La Torre on 9/11/2017  Primary Diagnosis: Anemia in Chronic Kidney Disease (N18.5, D63.1)     Secondary Diagnosis:  Chronic Kidney Disease, Stage 5 (N18.5)  Hgb goal range:  9-10  Epo/Darbo: Aranesp 100 mcg every 14 days As needed for hgb<10g/dL  RX expires on 12/5/2018  Iron regimen:  None  Labs exp: 9/12/2018  **Provide phone number for [facility] [phone] and instruction to schedule ancillary visit for aranesp injection. Send message to care team via pool - FZ RN TRIAGE POOL as a telephone encounter**      Contact: **AUTH TO DISCUSS**Tereza Norton(daughter) [phone], Rabia Kapooririneo (daughter)[phone]                                        Ok to leave message regarding labs and appts per consent to communicate dated 12/12/17                              OK to speak with daughters Tereza and Rabia regarding Dung's anemia care plan per consent to communicate dated 12/12/17    Anemia Latest Ref Rng & Units 6/4/2018 6/11/2018 6/18/2018 6/25/2018 7/2/2018 7/9/2018 7/16/2018   EARLENE Dose - - - - - - - -   Hemoglobin 13.3 - 17.7 g/dL 10.7(L) 11.3(L) 10.3(L) 10.2(L) 10.5(L) 10.5(L) 10.8(L)   TSAT 15 - 46 % 22 - - - 19 - -   Ferritin 26 - 388 ng/mL 468(H) - - - 454(H) - -     BP Readings from Last 3 Encounters:   07/16/18 110/74   07/09/18 136/72   07/02/18 124/72     Wt Readings from Last 2 Encounters:   04/27/18 173 lb (78.5 kg)   04/13/18 173 lb (78.5 kg)       Next lab appt is scheduled for 7/23/18 at 10:30 am    ASSESSMENT:  Hgb:Above goal - recommend hold dose  TSat: not at goal of >30% Ferritin: At goal (>100ng/mL)    PLAN:  Hold Aranesp and RTC for hgb then aranesp if needed on 7/23/18    Orders needed to be renewed (for next follow-up date) in EPIC: None    Iron labs due:  7/30/18    Plan discussed with:  No call made - next appt is scheduled for 7/23  Plan provided by:  Mireya Hussein [facility]  Anemia Clinic  [phone]    NEXT FOLLOW-UP  DATE:  7/23/18  Reviewed 07/19/2018 [facility]  Anemia Management Service  Daina Mock PharmD and Malina Hussein CPhT  Phone: [phone]  Fax: [phone]

## 2018-07-18 DIAGNOSIS — N18.5 CKD (CHRONIC KIDNEY DISEASE) STAGE 5, GFR LESS THAN 15 ML/MIN (H): Primary | ICD-10-CM

## 2018-07-23 ENCOUNTER — ALLIED HEALTH/NURSE VISIT (OUTPATIENT)
Dept: FAMILY MEDICINE | Facility: CLINIC | Age: 73
End: 2018-07-23
Payer: COMMERCIAL

## 2018-07-23 ENCOUNTER — TELEPHONE (OUTPATIENT)
Dept: PHARMACY | Facility: CLINIC | Age: 73
End: 2018-07-23

## 2018-07-23 ENCOUNTER — TELEPHONE (OUTPATIENT)
Dept: NEPHROLOGY | Facility: CLINIC | Age: 73
End: 2018-07-23

## 2018-07-23 VITALS — DIASTOLIC BLOOD PRESSURE: 78 MMHG | SYSTOLIC BLOOD PRESSURE: 134 MMHG

## 2018-07-23 DIAGNOSIS — N18.5 ANEMIA OF CHRONIC RENAL FAILURE, STAGE 5 (H): ICD-10-CM

## 2018-07-23 DIAGNOSIS — D63.1 ANEMIA OF CHRONIC RENAL FAILURE, STAGE 5 (H): ICD-10-CM

## 2018-07-23 DIAGNOSIS — N18.5 CKD (CHRONIC KIDNEY DISEASE) STAGE 5, GFR LESS THAN 15 ML/MIN (H): ICD-10-CM

## 2018-07-23 DIAGNOSIS — I10 HYPERTENSION GOAL BP (BLOOD PRESSURE) < 140/90: Primary | ICD-10-CM

## 2018-07-23 LAB
ALBUMIN SERPL-MCNC: 3.5 G/DL (ref 3.4–5)
ANION GAP SERPL CALCULATED.3IONS-SCNC: 9 MMOL/L (ref 3–14)
BUN SERPL-MCNC: 105 MG/DL (ref 7–30)
CALCIUM SERPL-MCNC: 10.6 MG/DL (ref 8.5–10.1)
CHLORIDE SERPL-SCNC: 110 MMOL/L (ref 94–109)
CO2 SERPL-SCNC: 25 MMOL/L (ref 20–32)
CREAT SERPL-MCNC: 5.31 MG/DL (ref 0.66–1.25)
DEPRECATED CALCIDIOL+CALCIFEROL SERPL-MC: 40 UG/L (ref 20–75)
ERYTHROCYTE [DISTWIDTH] IN BLOOD BY AUTOMATED COUNT: 15.2 % (ref 10–15)
GFR SERPL CREATININE-BSD FRML MDRD: 11 ML/MIN/1.7M2
GLUCOSE SERPL-MCNC: 105 MG/DL (ref 70–99)
HCT VFR BLD AUTO: 33.7 % (ref 40–53)
HGB BLD-MCNC: 10.5 G/DL (ref 13.3–17.7)
MCH RBC QN AUTO: 27.9 PG (ref 26.5–33)
MCHC RBC AUTO-ENTMCNC: 31.2 G/DL (ref 31.5–36.5)
MCV RBC AUTO: 89 FL (ref 78–100)
PHOSPHATE SERPL-MCNC: 6.1 MG/DL (ref 2.5–4.5)
PLATELET # BLD AUTO: 256 10E9/L (ref 150–450)
POTASSIUM SERPL-SCNC: 4.5 MMOL/L (ref 3.4–5.3)
PTH-INTACT SERPL-MCNC: 14 PG/ML (ref 18–80)
RBC # BLD AUTO: 3.77 10E12/L (ref 4.4–5.9)
SODIUM SERPL-SCNC: 144 MMOL/L (ref 133–144)
WBC # BLD AUTO: 9.4 10E9/L (ref 4–11)

## 2018-07-23 PROCEDURE — 83970 ASSAY OF PARATHORMONE: CPT

## 2018-07-23 PROCEDURE — 82306 VITAMIN D 25 HYDROXY: CPT

## 2018-07-23 PROCEDURE — 99207 ZZC NO CHARGE NURSE ONLY: CPT | Performed by: FAMILY MEDICINE

## 2018-07-23 PROCEDURE — 85027 COMPLETE CBC AUTOMATED: CPT

## 2018-07-23 PROCEDURE — 80069 RENAL FUNCTION PANEL: CPT

## 2018-07-23 PROCEDURE — 36415 COLL VENOUS BLD VENIPUNCTURE: CPT

## 2018-07-23 NOTE — PROGRESS NOTES
Dung Norton is enrolled/participating in the retail pharmacy Blood Pressure Goals Achievement Program (BPGAP).  Dung Norton was evaluated at [facility] on July 23, 2018 at which time his blood pressure was:    BP Readings from Last 3 Encounters:   07/23/18 134/78   07/16/18 110/74   07/09/18 136/72     Reviewed lifestyle modifications for blood pressure control and reduction: including making healthy food choices, managing weight, getting regular exercise, smoking cessation, reducing alcohol consumption, monitoring blood pressure regularly.     Dung Norton is not experiencing symptoms.    Follow-Up: BP is at goal of < 140/90mmHg (patient 18+ years of age with or without diabetes).  Recommended follow-up at pharmacy in 6 months.     Recommendation to Provider: none    Dung Norton was evaluated for enrollment into the PGEN study today.    Patient eligible for enrollment:  No  Patient interested in enrollment:  No    Completed by: Tor Robbins RPH  [facility]  [phone]    Tor Robbins RPH  [facility]  [phone]

## 2018-07-23 NOTE — TELEPHONE ENCOUNTER
DATE:  7/23/2018   TIME OF RECEIPT FROM LAB:  3:40pm  LAB TEST:  BUN / Creatinine  LAB VALUE:  105 / 5.31  RESULTS GIVEN WITH READ-BACK TO (PROVIDER):  NEETA JOSEPH  TIME LAB VALUE REPORTED TO PROVIDER:   3:41pm    Sent provider a message. Patient's labs are consistent with previous results.    Giselle Benoit RN

## 2018-07-23 NOTE — TELEPHONE ENCOUNTER
Anemia Management Note  SUBJECTIVE/OBJECTIVE:  Referred by Cecilia De La Torre on 9/11/2017  Primary Diagnosis: Anemia in Chronic Kidney Disease (N18.5, D63.1)     Secondary Diagnosis:  Chronic Kidney Disease, Stage 5 (N18.5)  Hgb goal range:  9-10  Epo/Darbo: Aranesp 100 mcg every 14 days As needed for hgb<10g/dL  RX expires on 12/5/2018  Iron regimen:  None  Labs exp: 9/12/2018  **Provide phone number for [facility] [phone] and instruction to schedule ancillary visit for aranesp injection. Send message to care team via pool - FZ RN TRIAGE POOL as a telephone encounter**      Contact: **AUTH TO DISCUSS**Tereza Norton(daughter) [phone], Rabia Jazzy (daughter)[phone]                                        Ok to leave message regarding labs and appts per consent to communicate dated 12/12/17    Anemia Latest Ref Rng & Units 6/11/2018 6/18/2018 6/25/2018 7/2/2018 7/9/2018 7/16/2018 7/23/2018   EARLENE Dose - - - - - - - -   Hemoglobin 13.3 - 17.7 g/dL 11.3(L) 10.3(L) 10.2(L) 10.5(L) 10.5(L) 10.8(L) 10.5(L)   TSAT 15 - 46 % - - - 19 - - -   Ferritin 26 - 388 ng/mL - - - 454(H) - - -     BP Readings from Last 3 Encounters:   07/23/18 134/78   07/16/18 110/74   07/09/18 136/72     Wt Readings from Last 2 Encounters:   04/27/18 173 lb (78.5 kg)   04/13/18 173 lb (78.5 kg)     Patient has a lab appt on 7/30    ASSESSMENT:  Hgb:Above goal - recommend hold dose - last aranesp dose given on 2/1/18  TSat: not at goal of >30% Ferritin: At goal (>100ng/mL)    PLAN:  Hold Aranesp and RTC for hgb, ferritin and iron labs then aranesp if needed in 1 week(s)    Orders needed to be renewed (for next follow-up date) in [facility]: None    Iron labs due:  7/30    Plan discussed with:   No call made  Plan provided by:  Mireya Hussein [facility]  Anemia Clinic  [phone]    NEXT FOLLOW-UP DATE:  7/30/18  Reviewed 07/24/2018 [facility]  Anemia Management Service  Daina Mock PharmD and Malina Hussein CPhT  Phone: [phone]  Fax:  [phone]

## 2018-07-23 NOTE — MR AVS SNAPSHOT
After Visit Summary   7/23/2018    Dung Norton    MRN: [id number]           Patient Information     Date Of Birth          1945        Visit Information        Provider Department      7/23/2018 1:05 PM Haley Baker MD [facility]        Today's Diagnoses     Hypertension goal BP (blood pressure) < 140/90    -  1       Follow-ups after your visit        Your next 10 appointments already scheduled     Jul 25, 2018  2:00 PM CDT   (Arrive by 1:30 PM)   Return Visit with Oralia De La Torre NP   [facility] ([facility])    [address]  [address]  [facility] [id number] [phone]            Jul 30, 2018 10:30 AM CDT   LAB with  LAB   [facility] ([facility])    [address] [place] [address]  [place] [address] [phone]           Please do not eat 10-12 hours before your appointment if you are coming in fasting for labs on lipids, cholesterol, or glucose (sugar). This does not apply to pregnant women. Water, hot tea and black coffee (with nothing added) are okay. Do not drink other fluids, diet soda or chew gum.            Oct 26, 2018  9:15 AM CDT   (Arrive by 9:00 AM)   Return Visit with Tamiko Vanegas MD   [facility] ([facility])    [address]  4th Floor  [facility] [id number] [phone]              Future tests that were ordered for you today     Open Future Orders        Priority Expected Expires Ordered    Protein  random urine with Creat Ratio Routine  7/23/2019 7/23/2018            Who to contact     If you have questions or need follow up information about today's clinic visit or your schedule please contact [facility] directly at [phone].  Normal or non-critical lab and imaging results will be communicated to you by MyChart, letter or phone within 4 business days after the  clinic has received the results. If you do not hear from us within 7 days, please contact the clinic through [employer] or phone. If you have a critical or abnormal lab result, we will notify you by phone as soon as possible.  Submit refill requests through [employer] or call your pharmacy and they will forward the refill request to us. Please allow 3 business days for your refill to be completed.          Additional Information About Your Visit        Care EveryWhere ID     This is your Care EveryWhere ID. This could be used by other organizations to access your [facility] medical records  [id number]         Blood Pressure from Last 3 Encounters:   07/23/18 134/78   07/16/18 110/74   07/09/18 136/72    Weight from Last 3 Encounters:   04/27/18 173 lb (78.5 kg)   04/13/18 173 lb (78.5 kg)   03/14/18 171 lb (77.6 kg)              Today, you had the following     No orders found for display         Today's Medication Changes          These changes are accurate as of 7/23/18  1:12 PM.  If you have any questions, ask your nurse or doctor.               These medicines have changed or have updated prescriptions.        Dose/Directions    amLODIPine 2.5 MG tablet   Commonly known as:  NORVASC   This may have changed:  when to take this   Used for:  Renal hypertension        Dose:  2.5 mg   Take 1 tablet (2.5 mg) by mouth daily   Quantity:  90 tablet   Refills:  3       finasteride 5 MG tablet   Commonly known as:  PROSCAR   This may have changed:  when to take this   Used for:  Benign prostatic hyperplasia with urinary retention        Dose:  5 mg   Take 1 tablet (5 mg) by mouth daily   Quantity:  90 tablet   Refills:  3                Primary Care Provider Office Phone # Fax #    Haley Baker [phone] [address] [facility] [address]        Equal Access to Services     BRIAN CERRATO AH: Justin Sagastume, osvaldo calle, gibson nesbitt  latunde solis. So [facility] [phone].    ATENCIÓN: Si betty hernandez, tiene a montague disposición servicios gratuitos de asistencia lingüística. Jose Manuel brink [phone].    We comply with applicable federal civil rights laws and [place] laws. We do not discriminate on the basis of race, color, national origin, age, disability, sex, sexual orientation, or gender identity.            Thank you!     Thank you for choosing [facility]  for your care. Our goal is always to provide you with excellent care. Hearing back from our patients is one way we can continue to improve our services. Please take a few minutes to complete the written survey that you may receive in the mail after your visit with us. Thank you!             Your Updated Medication List - Protect others around you: Learn how to safely use, store and throw away your medicines at www.disposemymeds.org.          This list is accurate as of 7/23/18  1:12 PM.  Always use your most recent med list.                   Brand Name Dispense Instructions for use Diagnosis    amLODIPine 2.5 MG tablet    NORVASC    90 tablet    Take 1 tablet (2.5 mg) by mouth daily    Renal hypertension       aspirin 81 MG tablet     30 tablet    Take 1 tablet (81 mg) by mouth every morning - RESUME on 3/9/18 if urine remains clear    S/P TURP       calcium acetate 667 MG Caps capsule    PHOSLO    540 capsule    Take 2 capsules (1,334 mg) by mouth 3 times daily (with meals)    Chronic kidney disease, unspecified CKD stage       darbepoetin william 100 MCG/0.5ML injection    ARANESP (ALBUMIN FREE)    0.5 mL    Inject 0.5 mLs (100 mcg) Subcutaneous every 14 days As needed for hgb<10g/dL.  If Hgb increases >1 point in 2 weeks (if blood transfusion given, use hgb PRIOR to this), SYSTOLIC BP > 180 mmHg or hgb>=10g/dL, HOLD DOSE. Dose must be within 1 week of Hgb.  Per anemia protocol with Cecilia De La Torre CNP    Anemia of chronic renal failure, stage 5 (H), CKD (chronic kidney disease) stage 5, GFR  less than 15 ml/min (H)       ferrous sulfate 325 (65 Fe) MG tablet    IRON    30 tablet    Take 1 tablet (325 mg) by mouth daily (with breakfast)    Anemia of chronic renal failure, stage 5 (H), CKD (chronic kidney disease) stage 5, GFR less than 15 ml/min (H)       finasteride 5 MG tablet    PROSCAR    90 tablet    Take 1 tablet (5 mg) by mouth daily    Benign prostatic hyperplasia with urinary retention       FISH OIL PO           Multi-vitamin Tabs tablet      Take 1 tablet by mouth daily        order for DME     1 each    Equipment being ordered: olecranon bursa brace    Olecranon bursitis of left elbow

## 2018-07-25 ENCOUNTER — OFFICE VISIT (OUTPATIENT)
Dept: NEPHROLOGY | Facility: CLINIC | Age: 73
End: 2018-07-25
Payer: MEDICARE

## 2018-07-25 VITALS
SYSTOLIC BLOOD PRESSURE: 150 MMHG | HEART RATE: 93 BPM | TEMPERATURE: 97.8 F | WEIGHT: 171.6 LBS | HEIGHT: 70 IN | DIASTOLIC BLOOD PRESSURE: 82 MMHG | BODY MASS INDEX: 24.57 KG/M2

## 2018-07-25 DIAGNOSIS — D63.1 ANEMIA OF CHRONIC RENAL FAILURE, STAGE 5 (H): ICD-10-CM

## 2018-07-25 DIAGNOSIS — N18.5 CKD (CHRONIC KIDNEY DISEASE) STAGE 5, GFR LESS THAN 15 ML/MIN (H): ICD-10-CM

## 2018-07-25 DIAGNOSIS — N18.5 CKD (CHRONIC KIDNEY DISEASE) STAGE 5, GFR LESS THAN 15 ML/MIN (H): Primary | ICD-10-CM

## 2018-07-25 DIAGNOSIS — E83.52 HYPERCALCEMIA: ICD-10-CM

## 2018-07-25 DIAGNOSIS — I10 BENIGN ESSENTIAL HYPERTENSION: ICD-10-CM

## 2018-07-25 DIAGNOSIS — N18.5 ANEMIA OF CHRONIC RENAL FAILURE, STAGE 5 (H): ICD-10-CM

## 2018-07-25 LAB
CREAT UR-MCNC: 52 MG/DL
PROT UR-MCNC: 0.65 G/L
PROT/CREAT 24H UR: 1.26 G/G CR (ref 0–0.2)

## 2018-07-25 PROCEDURE — 84156 ASSAY OF PROTEIN URINE: CPT

## 2018-07-25 PROCEDURE — G0463 HOSPITAL OUTPT CLINIC VISIT: HCPCS | Mod: ZF

## 2018-07-25 ASSESSMENT — PAIN SCALES - GENERAL: PAINLEVEL: NO PAIN (0)

## 2018-07-25 NOTE — LETTER
7/25/2018      RE: Dung Norton  [address] [address]       Nephrology Clinic Visit 7/25/18    Assessment and Plan:       1. CKD5 - Very stable renal function. 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 His GFR has been 10-12 ml/mn since July 2017. He is not uremic. Urine protein 1.5 g/gCr     - Patient and daughter have decided upon HD as his RRT option.     - He has attended the Kidney Smart program    - He underwent AVF creation with Dr Eduardo Pabon on 11/22/17. It has matured nicely and ready to use when needed. Last seen by Dr Pabon on 1/2/18   - Given stability of renal function can hold off on initiating HD until symptomatic   - He does not use NSAIDs   - He is straight cathing TID w/o difficulty      2. Electrolytes - No acute concerns. K 4.5      3. Volume status - Euvolemic. No edema, dyspnea. Albumin 3.5. Not on diuretics. Making ~ 1 liter/urine/day. He is gaining body weight, now stable in the 78 kg range. Clinic blood pressures in the 140-150/ range, but typically 120- 130's/ with weekly lab draws.        - No need for diuretics at this time     4. HTN - Well controlled. Currently on Amlodipine 2.5 mg daily. He does not check home blood pressures, but b/p is checked at lab weekly and generally in the 120-130/ range       5. BPH - S/P TURP 3/18. He is straight cathing TID w/o difficulty. Follows with Dr Vanegas in Urology     6. Acid base - No acute concerns. Bicarb 25      7. BMD - Ca 10.6, Phos 6.1, albumin 3.5   - Vit D 40, PTH 14 (7/23/18)    - He is not taking his Phoslo consistently with his meals, sometimes before, after, during. This may be the etiology of the elevated Ca.    - Recommended taking Phoslo with his meals   - Will check ionized ca next lab draw   - No need for Vit D      8. Nutrition - Reports good appetite and albumin is stable in the 3.5 range.       9. Anemia - Hgb 10.5. Etiology is likely anemia of renal disease   - He should  have routine colon cancer screening per protocol   - Iron studies 7/2/18: Fe 46, Ferritin 454, Tsat 19%   - Is enrolled in anemia management program for EARLENE, but not currently requiring Aranesp   -  Taking Iron one tab qd       10. Disposition - RTC in 8 wks for f/u with ongoing weekly labs      Reason for Visit:  CKD5 follow up      HPI:  Dung Norton is a 72 year old year old male with a PMH of CKDIII , HTN, Obstructive uropathy 2/2 BPH/benign bladder mass with creat as high as 14.7 in July 2017. He did not required RRT at that time and renal function improved to a low of 4.6 in Sept . However despite correction of the obstructive uropathy his renal function did not normalize and he has developed End stage Kidney disease. He has been very stable over the last year with creat in the 5 range and eGFR of 10-12 ml/mn.      Interval Hx: Underwent TURP on 3/5/18 w/ complication. Renal function has remained stable.  No hospital admissions      ROS:  Feeling good w/o complaint. Continues to straight cath 3 times/day following TURP. Voiding approx 700 cc /day w/o cath. Denies CP/dyspnea/abdominal pain. No dizziness. Energy is good. Walking 1-2 miles/day. He is living independently. Has good appetite. No edema.       Chronic Medical Problems:      HTN  BPH  Obstructive uropathy   HTN  Tobacco abuse  HLD  Anemia  Pulmonary nodules  KRISTINA  CKD5      Personal Hx:   Single, has 2 daughters very involved in his care. Moved to a [facility] in May and liking it. He is a former smoker      Allergies:  No Known Allergies    Medications:  Prior to Admission medications    Medication Sig Start Date End Date Taking? Authorizing Provider   amLODIPine (NORVASC) 2.5 MG tablet Take 1 tablet (2.5 mg) by mouth daily  Patient taking differently: Take 2.5 mg by mouth every morning  12/26/17  Yes Haley Baker MD   aspirin 81 MG tablet Take 1 tablet (81 mg) by mouth every morning - RESUME on 3/9/18 if urine remains clear 3/6/18  Yes  "Leda Baxter PA   calcium acetate (PHOSLO) 667 MG CAPS capsule Take 2 capsules (1,334 mg) by mouth 3 times daily (with meals) 10/30/17  Yes Oralia De La Torre NP   darbepoetin william (ARANESP, ALBUMIN FREE,) 100 MCG/0.5ML injection Inject 0.5 mLs (100 mcg) Subcutaneous every 14 days As needed for hgb<10g/dL.  If Hgb increases >1 point in 2 weeks (if blood transfusion given, use hgb PRIOR to this), SYSTOLIC BP > 180 mmHg or hgb>=10g/dL, HOLD DOSE. Dose must be within 1 week of Hgb.  Per anemia protocol with Cecilia De La Torre CNP 12/6/17  Yes Oralia De La Torre NP   ferrous sulfate (IRON) 325 (65 FE) MG tablet Take 1 tablet (325 mg) by mouth daily (with breakfast) 4/4/18  Yes Oralia De La Torre NP   multivitamin, therapeutic with minerals (MULTI-VITAMIN) TABS tablet Take 1 tablet by mouth daily   Yes Reported, Patient   Omega-3 Fatty Acids (FISH OIL PO)    Yes Reported, Patient   order for DME Equipment being ordered: olejaironanon bursa brakevin 1/17/18  Yes Haley Baker MD       Vitals:  /82  Pulse 93  Temp 97.8  F (36.6  C) (Oral)  Ht 1.778 m (5' 10\")  Wt 77.8 kg (171 lb 9.6 oz)  BMI 24.62 kg/m2    Exam:  GEN: Pleasant, Well groomed.   CARDIAC RRR  LUNGS: CTA  ABDOMEN: Soft, NT  EXT: no edema  Access: CLIFFORD AVF + bruit, well developed, hand warm  Neuro: No asterixis    Results:  Results for FABRIZIO MIRANDA (MRN [id number]) as of 7/26/2018 09:13   Ref. Range 7/23/2018 10:02 7/25/2018 11:28   Sodium Latest Ref Range: 133 - 144 mmol/L 144    Potassium Latest Ref Range: 3.4 - 5.3 mmol/L 4.5    Chloride Latest Ref Range: 94 - 109 mmol/L 110 (H)    Carbon Dioxide Latest Ref Range: 20 - 32 mmol/L 25    Urea Nitrogen Latest Ref Range: 7 - 30 mg/dL 105 (H)    Creatinine Latest Ref Range: 0.66 - 1.25 mg/dL 5.31 (H)    GFR Estimate Latest Ref Range: >60 mL/min/1.7m2 11 (L)    GFR Estimate If Black Latest Ref Range: >60 mL/min/1.7m2 13 (L)    Calcium Latest Ref Range: 8.5 - 10.1 mg/dL 10.6 (H)    Anion Gap Latest " Ref Range: 3 - 14 mmol/L 9    Phosphorus Latest Ref Range: 2.5 - 4.5 mg/dL 6.1 (H)    Albumin Latest Ref Range: 3.4 - 5.0 g/dL 3.5    Creatinine Urine Latest Units: mg/dL  52   Protein Random Urine Latest Units: g/L  0.65   Protein Total Urine g/gr Creatinine Latest Ref Range: 0 - 0.2 g/g Cr  1.26 (H)   Vitamin D Deficiency screening Latest Ref Range: 20 - 75 ug/L 40    Glucose Latest Ref Range: 70 - 99 mg/dL 105 (H)    WBC Latest Ref Range: 4.0 - 11.0 10e9/L 9.4    Hemoglobin Latest Ref Range: 13.3 - 17.7 g/dL 10.5 (L)    Hematocrit Latest Ref Range: 40.0 - 53.0 % 33.7 (L)    Platelet Count Latest Ref Range: 150 - 450 10e9/L 256    RBC Count Latest Ref Range: 4.4 - 5.9 10e12/L 3.77 (L)    MCV Latest Ref Range: 78 - 100 fl 89    MCH Latest Ref Range: 26.5 - 33.0 pg 27.9    MCHC Latest Ref Range: 31.5 - 36.5 g/dL 31.2 (L)    RDW Latest Ref Range: 10.0 - 15.0 % 15.2 (H)    Parathyroid Hormone Intact Latest Ref Range: 18 - 80 pg/mL 14 (L)                  Oralia De La Torre, NP

## 2018-07-25 NOTE — MR AVS SNAPSHOT
After Visit Summary   7/25/2018    Dung Norton    MRN: [id number]           Patient Information     Date Of Birth          1945        Visit Information        Provider Department      7/25/2018 2:00 PM Oralia De La Torre NP [facility]         Follow-ups after your visit        Follow-up notes from your care team     Return in about 2 months (around 9/25/2018).      Your next 10 appointments already scheduled     Jul 30, 2018 10:30 AM CDT   LAB with  LAB   [facility] ([address] [address]   [phone]           Please do not eat 10-12 hours before your appointment if you are coming in fasting for labs on lipids, cholesterol, or glucose (sugar). This does not apply to pregnant women. Water, hot tea and black coffee (with nothing added) are okay. Do not drink other fluids, diet soda or chew gum.            Oct 03, 2018 12:00 PM CDT   (Arrive by 11:30 AM)   Return Visit with Oralia De La Torre NP   [facility] ([facility])    [address]  [address]  [facility] [id number] [phone]            Oct 26, 2018  9:15 AM CDT   (Arrive by 9:00 AM)   Return Visit with Tamiko Vanegas MD   [facility] ([facility])    [address] [place] [address]  4th Floor  [facility] [id number] [phone]              Who to contact     If you have questions or need follow up information about today's clinic visit or your schedule please contact [facility] directly at [phone].  Normal or non-critical lab and imaging results will be communicated to you by MyChart, letter or phone within 4 business days after the clinic has received the results. If you do not hear from us within 7 days, please contact the clinic through MyChart or phone. If you have a critical or abnormal lab result, we will  "notify you by phone as soon as possible.  Submit refill requests through [employer] or call your pharmacy and they will forward the refill request to us. Please allow 3 business days for your refill to be completed.          Additional Information About Your Visit        Care EveryWhere ID     This is your Care EveryWhere ID. This could be used by other organizations to access your [place] medical records  [id number]        Your Vitals Were     Pulse Temperature Height BMI (Body Mass Index)          93 97.8  F (36.6  C) (Oral) 1.778 m (5' 10\") 24.62 kg/m2         Blood Pressure from Last 3 Encounters:   07/25/18 150/82   07/23/18 134/78   07/16/18 110/74    Weight from Last 3 Encounters:   07/25/18 77.8 kg (171 lb 9.6 oz)   04/27/18 78.5 kg (173 lb)   04/13/18 78.5 kg (173 lb)              Today, you had the following     No orders found for display         Today's Medication Changes          These changes are accurate as of 7/25/18  2:30 PM.  If you have any questions, ask your nurse or doctor.               These medicines have changed or have updated prescriptions.        Dose/Directions    amLODIPine 2.5 MG tablet   Commonly known as:  NORVASC   This may have changed:  when to take this   Used for:  Renal hypertension        Dose:  2.5 mg   Take 1 tablet (2.5 mg) by mouth daily   Quantity:  90 tablet   Refills:  3                Primary Care Provider Office Phone # Fax #    Haley Baker [phone] [address] [address]        Equal Access to Services     BRIAN CERRATO AH: Hadii cristine verdin Sochalo, waaxda luqadaha, qaybta kaalmada robert, gibson solis. So [facility] [phone].    ATENCIÓN: Si habla español, tiene a montague disposición servicios gratuitos de asistencia lingüística. Llame al [phone].    We comply with applicable federal civil rights laws and [place] laws. We do not discriminate on the basis of race, color, national origin, " age, disability, sex, sexual orientation, or gender identity.            Thank you!     Thank you for choosing [facility]  for your care. Our goal is always to provide you with excellent care. Hearing back from our patients is one way we can continue to improve our services. Please take a few minutes to complete the written survey that you may receive in the mail after your visit with us. Thank you!             Your Updated Medication List - Protect others around you: Learn how to safely use, store and throw away your medicines at www.disposemymeds.org.          This list is accurate as of 7/25/18  2:30 PM.  Always use your most recent med list.                   Brand Name Dispense Instructions for use Diagnosis    amLODIPine 2.5 MG tablet    NORVASC    90 tablet    Take 1 tablet (2.5 mg) by mouth daily    Renal hypertension       aspirin 81 MG tablet     30 tablet    Take 1 tablet (81 mg) by mouth every morning - RESUME on 3/9/18 if urine remains clear    S/P TURP       calcium acetate 667 MG Caps capsule    PHOSLO    540 capsule    Take 2 capsules (1,334 mg) by mouth 3 times daily (with meals)    Chronic kidney disease, unspecified CKD stage       darbepoetin william 100 MCG/0.5ML injection    ARANESP (ALBUMIN FREE)    0.5 mL    Inject 0.5 mLs (100 mcg) Subcutaneous every 14 days As needed for hgb<10g/dL.  If Hgb increases >1 point in 2 weeks (if blood transfusion given, use hgb PRIOR to this), SYSTOLIC BP > 180 mmHg or hgb>=10g/dL, HOLD DOSE. Dose must be within 1 week of Hgb.  Per anemia protocol with Cecilia De La Torre CNP    Anemia of chronic renal failure, stage 5 (H), CKD (chronic kidney disease) stage 5, GFR less than 15 ml/min (H)       ferrous sulfate 325 (65 Fe) MG tablet    IRON    30 tablet    Take 1 tablet (325 mg) by mouth daily (with breakfast)    Anemia of chronic renal failure, stage 5 (H), CKD (chronic kidney disease) stage 5, GFR less than 15 ml/min (H)       FISH OIL PO           Multi-vitamin  Tabs tablet      Take 1 tablet by mouth daily        order for DME     1 each    Equipment being ordered: olecranon bursa brace    Olecranon bursitis of left elbow

## 2018-07-25 NOTE — NURSING NOTE
"Chief Complaint   Patient presents with     RECHECK     Kidney follow up     /82  Pulse 93  Temp 97.8  F (36.6  C) (Oral)  Ht 1.778 m (5' 10\")  Wt 77.8 kg (171 lb 9.6 oz)  BMI 24.62 kg/m2  RACHELE MUÑOZ CMA    "

## 2018-07-26 NOTE — PROGRESS NOTES
Nephrology Clinic Visit 7/25/18    Assessment and Plan:       1. CKD5 - Very stable renal function. 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 His GFR has been 10-12 ml/mn since July 2017. He is not uremic. Urine protein 1.5 g/gCr     - Patient and daughter have decided upon HD as his RRT option.     - He has attended the Kidney Smart program    - He underwent AVF creation with Dr Eduardo Pabon on 11/22/17. It has matured nicely and ready to use when needed. Last seen by Dr Pabon on 1/2/18   - Given stability of renal function can hold off on initiating HD until symptomatic   - He does not use NSAIDs   - He is straight cathing TID w/o difficulty      2. Electrolytes - No acute concerns. K 4.5      3. Volume status - Euvolemic. No edema, dyspnea. Albumin 3.5. Not on diuretics. Making ~ 1 liter/urine/day. He is gaining body weight, now stable in the 78 kg range. Clinic blood pressures in the 140-150/ range, but typically 120- 130's/ with weekly lab draws.        - No need for diuretics at this time     4. HTN - Well controlled. Currently on Amlodipine 2.5 mg daily. He does not check home blood pressures, but b/p is checked at lab weekly and generally in the 120-130/ range       5. BPH - S/P TURP 3/18. He is straight cathing TID w/o difficulty. Follows with Dr Vanegas in Urology     6. Acid base - No acute concerns. Bicarb 25      7. BMD - Ca 10.6, Phos 6.1, albumin 3.5   - Vit D 40, PTH 14 (7/23/18)    - He is not taking his Phoslo consistently with his meals, sometimes before, after, during. This may be the etiology of the elevated Ca.    - Recommended taking Phoslo with his meals   - Will check ionized ca next lab draw   - No need for Vit D      8. Nutrition - Reports good appetite and albumin is stable in the 3.5 range.       9. Anemia - Hgb 10.5. Etiology is likely anemia of renal disease   - He should have routine colon cancer screening per protocol   - Iron studies 7/2/18: Fe 46, Ferritin 454,  Tsat 19%   - Is enrolled in anemia management program for EARLENE, but not currently requiring Aranesp   - Taking Iron one tab qd       10. Disposition - RTC in 8 wks for f/u with ongoing weekly labs      Reason for Visit:  CKD5 follow up      HPI:  Dung Norton is a 72 year old year old male with a PMH of CKDIII , HTN, Obstructive uropathy 2/2 BPH/benign bladder mass with creat as high as 14.7 in July 2017. He did not required RRT at that time and renal function improved to a low of 4.6 in Sept . However despite correction of the obstructive uropathy his renal function did not normalize and he has developed End stage Kidney disease. He has been very stable over the last year with creat in the 5 range and eGFR of 10-12 ml/mn.      Interval Hx: Underwent TURP on 3/5/18 w/ complication. Renal function has remained stable.  No hospital admissions      ROS:  Feeling good w/o complaint. Continues to straight cath 3 times/day following TURP. Voiding approx 700 cc /day w/o cath. Denies CP/dyspnea/abdominal pain. No dizziness. Energy is good. Walking 1-2 miles/day. He is living independently. Has good appetite. No edema.       Chronic Medical Problems:      HTN  BPH  Obstructive uropathy   HTN  Tobacco abuse  HLD  Anemia  Pulmonary nodules  KRISTINA  CKD5      Personal Hx:   Single, has 2 daughters very involved in his care. Moved to a [facility] in May and liking it. He is a former smoker      Allergies:  No Known Allergies    Medications:  Prior to Admission medications    Medication Sig Start Date End Date Taking? Authorizing Provider   amLODIPine (NORVASC) 2.5 MG tablet Take 1 tablet (2.5 mg) by mouth daily  Patient taking differently: Take 2.5 mg by mouth every morning  12/26/17  Yes Haley Baker MD   aspirin 81 MG tablet Take 1 tablet (81 mg) by mouth every morning - RESUME on 3/9/18 if urine remains clear 3/6/18  Yes Leda Baxter PA   calcium acetate (PHOSLO) 667 MG CAPS capsule Take 2 capsules (1,334 mg)  "by mouth 3 times daily (with meals) 10/30/17  Yes Oralia De La Torre NP   darbepoetin william (ARANESP, ALBUMIN FREE,) 100 MCG/0.5ML injection Inject 0.5 mLs (100 mcg) Subcutaneous every 14 days As needed for hgb<10g/dL.  If Hgb increases >1 point in 2 weeks (if blood transfusion given, use hgb PRIOR to this), SYSTOLIC BP > 180 mmHg or hgb>=10g/dL, HOLD DOSE. Dose must be within 1 week of Hgb.  Per anemia protocol with Cecilia De La Torre CNP 12/6/17  Yes Oralia De La Torre NP   ferrous sulfate (IRON) 325 (65 FE) MG tablet Take 1 tablet (325 mg) by mouth daily (with breakfast) 4/4/18  Yes Oralia De La Torre NP   multivitamin, therapeutic with minerals (MULTI-VITAMIN) TABS tablet Take 1 tablet by mouth daily   Yes Reported, Patient   Omega-3 Fatty Acids (FISH OIL PO)    Yes Reported, Patient   order for DME Equipment being ordered: olecranon bursa brace 1/17/18  Yes Haley Baker MD       Vitals:  /82  Pulse 93  Temp 97.8  F (36.6  C) (Oral)  Ht 1.778 m (5' 10\")  Wt 77.8 kg (171 lb 9.6 oz)  BMI 24.62 kg/m2    Exam:  GEN: Pleasant, Well groomed.   CARDIAC RRR  LUNGS: CTA  ABDOMEN: Soft, NT  EXT: no edema  Access: CLIFFORD AVF + bruit, well developed, hand warm  Neuro: No asterixis    Results:  Results for FABRIZIO MIRANDA (MRN [id number]) as of 7/26/2018 09:13   Ref. Range 7/23/2018 10:02 7/25/2018 11:28   Sodium Latest Ref Range: 133 - 144 mmol/L 144    Potassium Latest Ref Range: 3.4 - 5.3 mmol/L 4.5    Chloride Latest Ref Range: 94 - 109 mmol/L 110 (H)    Carbon Dioxide Latest Ref Range: 20 - 32 mmol/L 25    Urea Nitrogen Latest Ref Range: 7 - 30 mg/dL 105 (H)    Creatinine Latest Ref Range: 0.66 - 1.25 mg/dL 5.31 (H)    GFR Estimate Latest Ref Range: >60 mL/min/1.7m2 11 (L)    GFR Estimate If Black Latest Ref Range: >60 mL/min/1.7m2 13 (L)    Calcium Latest Ref Range: 8.5 - 10.1 mg/dL 10.6 (H)    Anion Gap Latest Ref Range: 3 - 14 mmol/L 9    Phosphorus Latest Ref Range: 2.5 - 4.5 mg/dL 6.1 (H)    Albumin " Latest Ref Range: 3.4 - 5.0 g/dL 3.5    Creatinine Urine Latest Units: mg/dL  52   Protein Random Urine Latest Units: g/L  0.65   Protein Total Urine g/gr Creatinine Latest Ref Range: 0 - 0.2 g/g Cr  1.26 (H)   Vitamin D Deficiency screening Latest Ref Range: 20 - 75 ug/L 40    Glucose Latest Ref Range: 70 - 99 mg/dL 105 (H)    WBC Latest Ref Range: 4.0 - 11.0 10e9/L 9.4    Hemoglobin Latest Ref Range: 13.3 - 17.7 g/dL 10.5 (L)    Hematocrit Latest Ref Range: 40.0 - 53.0 % 33.7 (L)    Platelet Count Latest Ref Range: 150 - 450 10e9/L 256    RBC Count Latest Ref Range: 4.4 - 5.9 10e12/L 3.77 (L)    MCV Latest Ref Range: 78 - 100 fl 89    MCH Latest Ref Range: 26.5 - 33.0 pg 27.9    MCHC Latest Ref Range: 31.5 - 36.5 g/dL 31.2 (L)    RDW Latest Ref Range: 10.0 - 15.0 % 15.2 (H)    Parathyroid Hormone Intact Latest Ref Range: 18 - 80 pg/mL 14 (L)

## 2018-07-30 ENCOUNTER — ALLIED HEALTH/NURSE VISIT (OUTPATIENT)
Dept: FAMILY MEDICINE | Facility: CLINIC | Age: 73
End: 2018-07-30

## 2018-07-30 VITALS — DIASTOLIC BLOOD PRESSURE: 70 MMHG | SYSTOLIC BLOOD PRESSURE: 132 MMHG

## 2018-07-30 DIAGNOSIS — N18.5 CKD (CHRONIC KIDNEY DISEASE) STAGE 5, GFR LESS THAN 15 ML/MIN (H): ICD-10-CM

## 2018-07-30 DIAGNOSIS — N18.5 ANEMIA OF CHRONIC RENAL FAILURE, STAGE 5 (H): ICD-10-CM

## 2018-07-30 DIAGNOSIS — I10 HYPERTENSION GOAL BP (BLOOD PRESSURE) < 140/90: Primary | ICD-10-CM

## 2018-07-30 DIAGNOSIS — E83.52 HYPERCALCEMIA: ICD-10-CM

## 2018-07-30 DIAGNOSIS — D63.1 ANEMIA OF CHRONIC RENAL FAILURE, STAGE 5 (H): ICD-10-CM

## 2018-07-30 LAB
ALBUMIN SERPL-MCNC: 3.3 G/DL (ref 3.4–5)
ANION GAP SERPL CALCULATED.3IONS-SCNC: 11 MMOL/L (ref 3–14)
BUN SERPL-MCNC: 96 MG/DL (ref 7–30)
CALCIUM SERPL-MCNC: 10.1 MG/DL (ref 8.5–10.1)
CHLORIDE SERPL-SCNC: 107 MMOL/L (ref 94–109)
CO2 SERPL-SCNC: 25 MMOL/L (ref 20–32)
CREAT SERPL-MCNC: 5.68 MG/DL (ref 0.66–1.25)
FERRITIN SERPL-MCNC: 509 NG/ML (ref 26–388)
GFR SERPL CREATININE-BSD FRML MDRD: 10 ML/MIN/1.7M2
GLUCOSE SERPL-MCNC: 102 MG/DL (ref 70–99)
HCT VFR BLD AUTO: 33.3 % (ref 40–53)
HGB BLD-MCNC: 10.5 G/DL (ref 13.3–17.7)
IRON SATN MFR SERPL: 20 % (ref 15–46)
IRON SERPL-MCNC: 49 UG/DL (ref 35–180)
PHOSPHATE SERPL-MCNC: 6.2 MG/DL (ref 2.5–4.5)
POTASSIUM SERPL-SCNC: 4.6 MMOL/L (ref 3.4–5.3)
SODIUM SERPL-SCNC: 143 MMOL/L (ref 133–144)
TIBC SERPL-MCNC: 240 UG/DL (ref 240–430)

## 2018-07-30 PROCEDURE — 83550 IRON BINDING TEST: CPT

## 2018-07-30 PROCEDURE — 36415 COLL VENOUS BLD VENIPUNCTURE: CPT

## 2018-07-30 PROCEDURE — 99207 ZZC NO CHARGE NURSE ONLY: CPT | Performed by: FAMILY MEDICINE

## 2018-07-30 PROCEDURE — 83540 ASSAY OF IRON: CPT

## 2018-07-30 PROCEDURE — 80069 RENAL FUNCTION PANEL: CPT

## 2018-07-30 PROCEDURE — 85014 HEMATOCRIT: CPT

## 2018-07-30 PROCEDURE — 82728 ASSAY OF FERRITIN: CPT

## 2018-07-30 PROCEDURE — 85018 HEMOGLOBIN: CPT

## 2018-07-30 NOTE — PROGRESS NOTES
Dung Norton is enrolled/participating in the retail pharmacy Blood Pressure Goals Achievement Program (BPGAP).  Dung Norton was evaluated at [facility] on July 30, 2018 at which time his blood pressure was:    BP Readings from Last 3 Encounters:   07/30/18 132/70   07/25/18 150/82   07/23/18 134/78     Reviewed lifestyle modifications for blood pressure control and reduction: including making healthy food choices, managing weight, getting regular exercise, smoking cessation, reducing alcohol consumption, monitoring blood pressure regularly.     Dung Norton is not experiencing symptoms.    Follow-Up: BP is at goal of < 150/90 mmHg (patient 60+ years of age without diabetes).  Recommended follow-up at pharmacy in 6 months.     Recommendation to Provider: none    Dung Norton was evaluated for enrollment into the PGEN study today.    Patient eligible for enrollment:  No  Patient interested in enrollment:  No    Completed by: Tor Robbins RPH  [facility]  [phone]

## 2018-07-30 NOTE — MR AVS SNAPSHOT
After Visit Summary   7/30/2018    Dung Norton    MRN: [id number]           Patient Information     Date Of Birth          1945        Visit Information        Provider Department      7/30/2018 1:17 PM Haley Baker MD [facility]        Today's Diagnoses     Hypertension goal BP (blood pressure) < 140/90    -  1       Follow-ups after your visit        Your next 10 appointments already scheduled     Aug 06, 2018 10:30 AM CDT   LAB with  LAB   [facility] ([facility])    [address] [place] [id number] [phone]           Please do not eat 10-12 hours before your appointment if you are coming in fasting for labs on lipids, cholesterol, or glucose (sugar). This does not apply to pregnant women. Water, hot tea and black coffee (with nothing added) are okay. Do not drink other fluids, diet soda or chew gum.            Oct 03, 2018 12:00 PM CDT   (Arrive by 11:30 AM)   Return Visit with Oralia De La Torre NP   [facility] ([facility])    [address]  [address]  [facility] [id number] [phone]            Oct 26, 2018  9:15 AM CDT   (Arrive by 9:00 AM)   Return Visit with Tamiko Vanegas MD   [facility] ([facility])    [address] [place] [address]  4th Floor  [facility] [id number] [phone]              Future tests that were ordered for you today     Open Future Orders        Priority Expected Expires Ordered    Calcium ionized whole blood Routine 7/30/2018 7/30/2019 7/30/2018            Who to contact     If you have questions or need follow up information about today's clinic visit or your schedule please contact [facility] directly at [phone].  Normal or non-critical lab and imaging results will be communicated to you by MyChart, letter or phone within 4 business days after the  clinic has received the results. If you do not hear from us within 7 days, please contact the clinic through [employer] or phone. If you have a critical or abnormal lab result, we will notify you by phone as soon as possible.  Submit refill requests through [employer] or call your pharmacy and they will forward the refill request to us. Please allow 3 business days for your refill to be completed.          Additional Information About Your Visit        Care EveryWhere ID     This is your Care EveryWhere ID. This could be used by other organizations to access your [place] medical records  [id number]         Blood Pressure from Last 3 Encounters:   07/30/18 132/70   07/25/18 150/82   07/23/18 134/78    Weight from Last 3 Encounters:   07/25/18 171 lb 9.6 oz (77.8 kg)   04/27/18 173 lb (78.5 kg)   04/13/18 173 lb (78.5 kg)              Today, you had the following     No orders found for display         Today's Medication Changes          These changes are accurate as of 7/30/18  1:21 PM.  If you have any questions, ask your nurse or doctor.               These medicines have changed or have updated prescriptions.        Dose/Directions    amLODIPine 2.5 MG tablet   Commonly known as:  NORVASC   This may have changed:  when to take this   Used for:  Renal hypertension        Dose:  2.5 mg   Take 1 tablet (2.5 mg) by mouth daily   Quantity:  90 tablet   Refills:  3                Primary Care Provider Office Phone # Fax #    Haley Baker [phone]       [address] [facility] [facility] [facility] [address]        Equal Access to Services     [facility] AH: Hadii cristine ku hadasho Soomaali, waaxda luqadaha, qaybta kaalmada adeegyada, gibson solis. So [facility] [phone].    ATENCIÓN: Si habla español, tiene a montague disposición servicios gratuitos de asistencia lingüística. Llame al [phone].    We comply with applicable federal civil rights laws and [place] laws. We do not discriminate on  the basis of race, color, national origin, age, disability, sex, sexual orientation, or gender identity.            Thank you!     Thank you for choosing [facility]  for your care. Our goal is always to provide you with excellent care. Hearing back from our patients is one way we can continue to improve our services. Please take a few minutes to complete the written survey that you may receive in the mail after your visit with us. Thank you!             Your Updated Medication List - Protect others around you: Learn how to safely use, store and throw away your medicines at www.disposemymeds.org.          This list is accurate as of 7/30/18  1:21 PM.  Always use your most recent med list.                   Brand Name Dispense Instructions for use Diagnosis    amLODIPine 2.5 MG tablet    NORVASC    90 tablet    Take 1 tablet (2.5 mg) by mouth daily    Renal hypertension       aspirin 81 MG tablet     30 tablet    Take 1 tablet (81 mg) by mouth every morning - RESUME on 3/9/18 if urine remains clear    S/P TURP       calcium acetate 667 MG Caps capsule    PHOSLO    540 capsule    Take 2 capsules (1,334 mg) by mouth 3 times daily (with meals)    Chronic kidney disease, unspecified CKD stage       darbepoetin william 100 MCG/0.5ML injection    ARANESP (ALBUMIN FREE)    0.5 mL    Inject 0.5 mLs (100 mcg) Subcutaneous every 14 days As needed for hgb<10g/dL.  If Hgb increases >1 point in 2 weeks (if blood transfusion given, use hgb PRIOR to this), SYSTOLIC BP > 180 mmHg or hgb>=10g/dL, HOLD DOSE. Dose must be within 1 week of Hgb.  Per anemia protocol with Cecilia De La Torre CNP    Anemia of chronic renal failure, stage 5 (H), CKD (chronic kidney disease) stage 5, GFR less than 15 ml/min (H)       ferrous sulfate 325 (65 Fe) MG tablet    IRON    30 tablet    Take 1 tablet (325 mg) by mouth daily (with breakfast)    Anemia of chronic renal failure, stage 5 (H), CKD (chronic kidney disease) stage 5, GFR less than 15  ml/min (H)       FISH OIL PO           Multi-vitamin Tabs tablet      Take 1 tablet by mouth daily        order for DME     1 each    Equipment being ordered: olecranon bursa brace    Olecranon bursitis of left elbow

## 2018-08-01 ENCOUNTER — TELEPHONE (OUTPATIENT)
Dept: PHARMACY | Facility: CLINIC | Age: 73
End: 2018-08-01

## 2018-08-01 NOTE — TELEPHONE ENCOUNTER
Anemia Management Note  SUBJECTIVE/OBJECTIVE:  Referred by Cecilia De La Torre on 9/11/2017  Primary Diagnosis: Anemia in Chronic Kidney Disease (N18.5, D63.1)     Secondary Diagnosis:  Chronic Kidney Disease, Stage 5 (N18.5)  Hgb goal range:  9-10  Epo/Darbo: Aranesp 100 mcg every 14 days As needed for hgb<10g/dL  RX expires on 12/5/2018  Iron regimen:  None  Labs exp: 9/12/2018  **Provide phone number for [facility] [phone] and instruction to schedule ancillary visit for aranesp injection. Send message to care team via pool - FZ RN TRIAGE POOL as a telephone encounter**      Contact: **AUTH TO DISCUSS**Tereza Norton(daughter) [phone], Rabia Jazzy (daughter)[phone]          Anemia Latest Ref Rng & Units 6/18/2018 6/25/2018 7/2/2018 7/9/2018 7/16/2018 7/23/2018 7/30/2018   EARLENE Dose - - - - - - - -   Hemoglobin 13.3 - 17.7 g/dL 10.3(L) 10.2(L) 10.5(L) 10.5(L) 10.8(L) 10.5(L) 10.5(L)   TSAT 15 - 46 % - - 19 - - - 20   Ferritin 26 - 388 ng/mL - - 454(H) - - - 509(H)     BP Readings from Last 3 Encounters:   07/30/18 132/70   07/25/18 150/82   07/23/18 134/78     Wt Readings from Last 2 Encounters:   07/25/18 171 lb 9.6 oz (77.8 kg)   04/27/18 173 lb (78.5 kg)     Patient has a lab appt scheduled for 8/6      ASSESSMENT:  Hgb:Above goal - recommend hold dose  TSat: not at goal (>30%) but ferritin >500ng/mL.  IV iron not indicated at this time per anemia protocol. Ferritin: At goal (>100ng/mL)    PLAN:  Hold Aranesp and RTC for hgb then aranesp if needed in 1 week(s)    Orders needed to be renewed (for next follow-up date) in EPIC: None    Iron labs due:  8/27/18    Plan discussed with:  No call made  Plan provided by:  Mireya Hussein CPhT  [facility]  [phone]    NEXT FOLLOW-UP DATE:  8/6/18  Reviewed 08/02/2018 [facility]  Anemia Management Service  Daina Mock PharmD and Malina Hussein CPhT  Phone: [phone]  Fax: [phone]

## 2018-08-06 ENCOUNTER — TELEPHONE (OUTPATIENT)
Dept: NEPHROLOGY | Facility: CLINIC | Age: 73
End: 2018-08-06

## 2018-08-06 ENCOUNTER — ALLIED HEALTH/NURSE VISIT (OUTPATIENT)
Dept: FAMILY MEDICINE | Facility: CLINIC | Age: 73
End: 2018-08-06
Payer: COMMERCIAL

## 2018-08-06 ENCOUNTER — TELEPHONE (OUTPATIENT)
Dept: PHARMACY | Facility: CLINIC | Age: 73
End: 2018-08-06

## 2018-08-06 VITALS — SYSTOLIC BLOOD PRESSURE: 132 MMHG | DIASTOLIC BLOOD PRESSURE: 80 MMHG

## 2018-08-06 DIAGNOSIS — E83.52 HYPERCALCEMIA: ICD-10-CM

## 2018-08-06 DIAGNOSIS — D63.1 ANEMIA OF CHRONIC RENAL FAILURE, STAGE 5 (H): ICD-10-CM

## 2018-08-06 DIAGNOSIS — N18.5 ANEMIA OF CHRONIC RENAL FAILURE, STAGE 5 (H): ICD-10-CM

## 2018-08-06 DIAGNOSIS — N18.5 CKD (CHRONIC KIDNEY DISEASE) STAGE 5, GFR LESS THAN 15 ML/MIN (H): ICD-10-CM

## 2018-08-06 DIAGNOSIS — I10 HYPERTENSION GOAL BP (BLOOD PRESSURE) < 140/90: Primary | ICD-10-CM

## 2018-08-06 LAB
ALBUMIN SERPL-MCNC: 3.4 G/DL (ref 3.4–5)
ANION GAP SERPL CALCULATED.3IONS-SCNC: 12 MMOL/L (ref 3–14)
BUN SERPL-MCNC: 96 MG/DL (ref 7–30)
CA-I SERPL ISE-MCNC: 5.3 MG/DL (ref 4.4–5.2)
CALCIUM SERPL-MCNC: 10.5 MG/DL (ref 8.5–10.1)
CHLORIDE SERPL-SCNC: 107 MMOL/L (ref 94–109)
CO2 SERPL-SCNC: 23 MMOL/L (ref 20–32)
CREAT SERPL-MCNC: 5.6 MG/DL (ref 0.66–1.25)
GFR SERPL CREATININE-BSD FRML MDRD: 10 ML/MIN/1.7M2
GLUCOSE SERPL-MCNC: 89 MG/DL (ref 70–99)
HCT VFR BLD AUTO: 33.8 % (ref 40–53)
HGB BLD-MCNC: 10.8 G/DL (ref 13.3–17.7)
PHOSPHATE SERPL-MCNC: 6.5 MG/DL (ref 2.5–4.5)
POTASSIUM SERPL-SCNC: 4.5 MMOL/L (ref 3.4–5.3)
SODIUM SERPL-SCNC: 142 MMOL/L (ref 133–144)

## 2018-08-06 PROCEDURE — 36415 COLL VENOUS BLD VENIPUNCTURE: CPT

## 2018-08-06 PROCEDURE — 85014 HEMATOCRIT: CPT

## 2018-08-06 PROCEDURE — 99207 ZZC NO CHARGE NURSE ONLY: CPT | Performed by: FAMILY MEDICINE

## 2018-08-06 PROCEDURE — 82330 ASSAY OF CALCIUM: CPT

## 2018-08-06 PROCEDURE — 80069 RENAL FUNCTION PANEL: CPT

## 2018-08-06 PROCEDURE — 85018 HEMOGLOBIN: CPT

## 2018-08-06 NOTE — TELEPHONE ENCOUNTER
DATE:  8/6/2018   TIME OF RECEIPT FROM LAB:  4:16pm  LAB TEST:  Creatinine  LAB VALUE:  5.60  RESULTS GIVEN WITH READ-BACK TO (PROVIDER):  NEETA JOSEPH  TIME LAB VALUE REPORTED TO PROVIDER:   4:17pm      Lab is an improvement from previous value. Kidney function stable.    Giselle Benoit RN

## 2018-08-06 NOTE — TELEPHONE ENCOUNTER
Anemia Management Note  SUBJECTIVE/OBJECTIVE:  Referred by Cecilia De La Torre on 9/11/2017  Primary Diagnosis: Anemia in Chronic Kidney Disease (N18.5, D63.1)     Secondary Diagnosis:  Chronic Kidney Disease, Stage 5 (N18.5)  Hgb goal range:  9-10  Epo/Darbo: Aranesp 100 mcg every 14 days As needed for hgb<10g/dL  RX expires on 12/5/2018  Iron regimen:  None  Labs exp: 9/12/2018  **Provide phone number for [facility] [phone] and instruction to schedule ancillary visit for aranesp injection. Send message to care team via pool - FZ RN TRIAGE POOL as a telephone encounter**      Contact: **AUTH TO DISCUSS**Tereza Norton(daughter) [phone], Rabia Jazzy (daughter)[phone]     Anemia Latest Ref Rng & Units 6/25/2018 7/2/2018 7/9/2018 7/16/2018 7/23/2018 7/30/2018 8/6/2018   EARLENE Dose - - - - - - - -   Hemoglobin 13.3 - 17.7 g/dL 10.2(L) 10.5(L) 10.5(L) 10.8(L) 10.5(L) 10.5(L) 10.8(L)   TSAT 15 - 46 % - 19 - - - 20 -   Ferritin 26 - 388 ng/mL - 454(H) - - - 509(H) -     BP Readings from Last 3 Encounters:   08/06/18 132/80   07/30/18 132/70   07/25/18 150/82     Wt Readings from Last 2 Encounters:   07/25/18 171 lb 9.6 oz (77.8 kg)   04/27/18 173 lb (78.5 kg)       Patient has a lab appt scheduled for 8/13/18    ASSESSMENT:  Hgb:Above goal - recommend hold dose  TSat: not at goal (>30%) but ferritin >500ng/mL.  IV iron not indicated at this time per anemia protocol. Ferritin: At goal (>100ng/mL)    PLAN:  RTC for hgb then aranesp if needed in 1 week(s)    Orders needed to be renewed (for next follow-up date) in EPIC: None    Iron labs due:  8/27/18    Plan discussed with:  No call made  Plan provided by:  Mireya Hussein CP  [facility]  [phone]    NEXT FOLLOW-UP DATE:  8/13/18  Reviewed 08/07/2018 [facility]  Anemia Management Service  Daina Mock,PharmD and [facility]  Phone: [phone]  Fax: [phone]

## 2018-08-06 NOTE — PROGRESS NOTES
Dung Norton is enrolled/participating in the retail pharmacy Blood Pressure Goals Achievement Program (BPGAP).  Dung Norton was evaluated at [facility] on August 6, 2018 at which time his blood pressure was:    BP Readings from Last 3 Encounters:   08/06/18 132/80   07/30/18 132/70   07/25/18 150/82     Reviewed lifestyle modifications for blood pressure control and reduction: including making healthy food choices, managing weight, getting regular exercise, smoking cessation, reducing alcohol consumption, monitoring blood pressure regularly.     Dung Norton is not experiencing symptoms.    Follow-Up: BP is at goal of < 140/90mmHg (patient 18+ years of age with or without diabetes).  Recommended follow-up at pharmacy in 6 months.     Recommendation to Provider: none    Dung Norton was evaluated for enrollment into the PGEN study today.    Patient eligible for enrollment:  Unknown  Patient interested in enrollment:  Unknown    Completed by:   Mary Mock, PharmD   [facility] pharmacist on behalf of [facility].

## 2018-08-06 NOTE — MR AVS SNAPSHOT
After Visit Summary   8/6/2018    Dung Norton    MRN: [id number]           Patient Information     Date Of Birth          1945        Visit Information        Provider Department      8/6/2018 10:08 AM Haley Baker MD [facility] [place]        Today's Diagnoses     Hypertension goal BP (blood pressure) < 140/90    -  1       Follow-ups after your visit        Your next 10 appointments already scheduled     Aug 06, 2018 10:30 AM CDT   [facility]   [facility]    [address] [address]   [phone]           Please do not eat 10-12 hours before your appointment if you are coming in fasting for labs on lipids, cholesterol, or glucose (sugar). This does not apply to pregnant women. Water, hot tea and black coffee (with nothing added) are okay. Do not drink other fluids, diet soda or chew gum.            Aug 13, 2018 10:30 AM CDT   LAB with [facility]Z LAB   [facility]    [address] [address]   [phone]           Please do not eat 10-12 hours before your appointment if you are coming in fasting for labs on lipids, cholesterol, or glucose (sugar). This does not apply to pregnant women. Water, hot tea and black coffee (with nothing added) are okay. Do not drink other fluids, diet soda or chew gum.            Oct 03, 2018 12:00 PM CDT   (Arrive by 11:30 AM)   Return Visit with Oralia De La Torre NP   [facility] ([facility])    [address]  [address]  [place] [id number] [phone]            Oct 26, 2018  9:15 AM CDT   (Arrive by 9:00 AM)   Return Visit with Tamiko Vanegas MD   [facility] ([facility])    [address] [place] [address]  4th Floor  [facility] [id number] [phone]              Who to contact     If you have questions  or need follow up information about today's clinic visit or your schedule please contact [facility] directly at [phone].  Normal or non-critical lab and imaging results will be communicated to you by MyChart, letter or phone within 4 business days after the clinic has received the results. If you do not hear from us within 7 days, please contact the clinic through MyChart or phone. If you have a critical or abnormal lab result, we will notify you by phone as soon as possible.  Submit refill requests through [employer] or call your pharmacy and they will forward the refill request to us. Please allow 3 business days for your refill to be completed.          Additional Information About Your Visit        Care EveryWhere ID     This is your Care EveryWhere ID. This could be used by other organizations to access your [place] medical records  [id number]         Blood Pressure from Last 3 Encounters:   08/06/18 132/80   07/30/18 132/70   07/25/18 150/82    Weight from Last 3 Encounters:   07/25/18 171 lb 9.6 oz (77.8 kg)   04/27/18 173 lb (78.5 kg)   04/13/18 173 lb (78.5 kg)              Today, you had the following     No orders found for display         Today's Medication Changes          These changes are accurate as of 8/6/18 10:11 AM.  If you have any questions, ask your nurse or doctor.               These medicines have changed or have updated prescriptions.        Dose/Directions    amLODIPine 2.5 MG tablet   Commonly known as:  NORVASC   This may have changed:  when to take this   Used for:  Renal hypertension        Dose:  2.5 mg   Take 1 tablet (2.5 mg) by mouth daily   Quantity:  90 tablet   Refills:  3                Primary Care Provider Office Phone # Fax #    Haley Baker [phone]       [address] [place] [address]        Equal Access to Services     BRIAN CERRATO AH: Justin Sagastume, osvaldo calle, qachecota gibson vaughan  curt ramonkimberly beataelva green ah. So [facility] [phone].    ATENCIÓN: Si betty hernandez, tiene a montague disposición servicios gratuitos de asistencia lingüística. Jose Manuel brink [phone].    We comply with applicable federal civil rights laws and [place] laws. We do not discriminate on the basis of race, color, national origin, age, disability, sex, sexual orientation, or gender identity.            Thank you!     Thank you for choosing [facility]  for your care. Our goal is always to provide you with excellent care. Hearing back from our patients is one way we can continue to improve our services. Please take a few minutes to complete the written survey that you may receive in the mail after your visit with us. Thank you!             Your Updated Medication List - Protect others around you: Learn how to safely use, store and throw away your medicines at www.disposemymeds.org.          This list is accurate as of 8/6/18 10:11 AM.  Always use your most recent med list.                   Brand Name Dispense Instructions for use Diagnosis    amLODIPine 2.5 MG tablet    NORVASC    90 tablet    Take 1 tablet (2.5 mg) by mouth daily    Renal hypertension       aspirin 81 MG tablet     30 tablet    Take 1 tablet (81 mg) by mouth every morning - RESUME on 3/9/18 if urine remains clear    S/P TURP       calcium acetate 667 MG Caps capsule    PHOSLO    540 capsule    Take 2 capsules (1,334 mg) by mouth 3 times daily (with meals)    Chronic kidney disease, unspecified CKD stage       darbepoetin william 100 MCG/0.5ML injection    ARANESP (ALBUMIN FREE)    0.5 mL    Inject 0.5 mLs (100 mcg) Subcutaneous every 14 days As needed for hgb<10g/dL.  If Hgb increases >1 point in 2 weeks (if blood transfusion given, use hgb PRIOR to this), SYSTOLIC BP > 180 mmHg or hgb>=10g/dL, HOLD DOSE. Dose must be within 1 week of Hgb.  Per anemia protocol with Cecilia De La Torre CNP    Anemia of chronic renal failure, stage 5 (H), CKD (chronic kidney  disease) stage 5, GFR less than 15 ml/min (H)       ferrous sulfate 325 (65 Fe) MG tablet    IRON    30 tablet    Take 1 tablet (325 mg) by mouth daily (with breakfast)    Anemia of chronic renal failure, stage 5 (H), CKD (chronic kidney disease) stage 5, GFR less than 15 ml/min (H)       FISH OIL PO           Multi-vitamin Tabs tablet      Take 1 tablet by mouth daily        order for DME     1 each    Equipment being ordered: olecranon bursa brace    Olecranon bursitis of left elbow

## 2018-08-13 ENCOUNTER — ALLIED HEALTH/NURSE VISIT (OUTPATIENT)
Dept: FAMILY MEDICINE | Facility: CLINIC | Age: 73
End: 2018-08-13

## 2018-08-13 ENCOUNTER — TELEPHONE (OUTPATIENT)
Dept: PHARMACY | Facility: CLINIC | Age: 73
End: 2018-08-13

## 2018-08-13 ENCOUNTER — TELEPHONE (OUTPATIENT)
Dept: NEPHROLOGY | Facility: CLINIC | Age: 73
End: 2018-08-13

## 2018-08-13 VITALS — DIASTOLIC BLOOD PRESSURE: 70 MMHG | SYSTOLIC BLOOD PRESSURE: 120 MMHG

## 2018-08-13 DIAGNOSIS — D63.1 ANEMIA OF CHRONIC RENAL FAILURE, STAGE 5 (H): ICD-10-CM

## 2018-08-13 DIAGNOSIS — I10 HYPERTENSION GOAL BP (BLOOD PRESSURE) < 140/90: Primary | ICD-10-CM

## 2018-08-13 DIAGNOSIS — N18.5 ANEMIA OF CHRONIC RENAL FAILURE, STAGE 5 (H): ICD-10-CM

## 2018-08-13 DIAGNOSIS — N18.5 CKD (CHRONIC KIDNEY DISEASE) STAGE 5, GFR LESS THAN 15 ML/MIN (H): ICD-10-CM

## 2018-08-13 LAB
ALBUMIN SERPL-MCNC: 3.4 G/DL (ref 3.4–5)
ANION GAP SERPL CALCULATED.3IONS-SCNC: 11 MMOL/L (ref 3–14)
BUN SERPL-MCNC: 109 MG/DL (ref 7–30)
CALCIUM SERPL-MCNC: 10.8 MG/DL (ref 8.5–10.1)
CHLORIDE SERPL-SCNC: 108 MMOL/L (ref 94–109)
CO2 SERPL-SCNC: 24 MMOL/L (ref 20–32)
CREAT SERPL-MCNC: 5.65 MG/DL (ref 0.66–1.25)
GFR SERPL CREATININE-BSD FRML MDRD: 10 ML/MIN/1.7M2
GLUCOSE SERPL-MCNC: 93 MG/DL (ref 70–99)
HCT VFR BLD AUTO: 32.5 % (ref 40–53)
HGB BLD-MCNC: 10.4 G/DL (ref 13.3–17.7)
PHOSPHATE SERPL-MCNC: 7.6 MG/DL (ref 2.5–4.5)
POTASSIUM SERPL-SCNC: 4.6 MMOL/L (ref 3.4–5.3)
SODIUM SERPL-SCNC: 143 MMOL/L (ref 133–144)

## 2018-08-13 PROCEDURE — 99207 ZZC NO CHARGE NURSE ONLY: CPT | Performed by: FAMILY MEDICINE

## 2018-08-13 PROCEDURE — 80069 RENAL FUNCTION PANEL: CPT

## 2018-08-13 PROCEDURE — 36415 COLL VENOUS BLD VENIPUNCTURE: CPT

## 2018-08-13 PROCEDURE — 85014 HEMATOCRIT: CPT

## 2018-08-13 PROCEDURE — 85018 HEMOGLOBIN: CPT

## 2018-08-13 NOTE — TELEPHONE ENCOUNTER
Anemia Management Note  SUBJECTIVE/OBJECTIVE:  Referred by Cecilia De La Torre on 9/11/2017  Primary Diagnosis: Anemia in Chronic Kidney Disease (N18.5, D63.1)     Secondary Diagnosis:  Chronic Kidney Disease, Stage 5 (N18.5)  Hgb goal range:  9-10  Epo/Darbo: Aranesp 100 mcg every 14 days As needed for hgb<10g/dL  RX expires on 12/5/2018  Iron regimen:  None  Labs exp: 9/12/2018  **Provide phone number for [facility] [phone] and instruction to schedule ancillary visit for aranesp injection. Send message to care team via pool - FZ RN TRIAGE POOL as a telephone encounter**      Contact: **AUTH TO DISCUSS**Tereza Norton(daughter) [phone], Rabia Jazzy (daughter)[phone]     Anemia Latest Ref Rng & Units 7/2/2018 7/9/2018 7/16/2018 7/23/2018 7/30/2018 8/6/2018 8/13/2018   EARLENE Dose - - - - - - - -   Hemoglobin 13.3 - 17.7 g/dL 10.5(L) 10.5(L) 10.8(L) 10.5(L) 10.5(L) 10.8(L) 10.4(L)   TSAT 15 - 46 % 19 - - - 20 - -   Ferritin 26 - 388 ng/mL 454(H) - - - 509(H) - -     BP Readings from Last 3 Encounters:   08/13/18 120/70   08/06/18 132/80   07/30/18 132/70     Wt Readings from Last 2 Encounters:   07/25/18 171 lb 9.6 oz (77.8 kg)   04/27/18 173 lb (78.5 kg)     Patient has an appt on 8/20    ASSESSMENT:  Hgb:Above goal - recommend hold dose  TSat: not at goal (>30%) but ferritin >500ng/mL.  IV iron not indicated at this time per anemia protocol. Ferritin: At goal (>100ng/mL)    PLAN:  Hold Aranesp and RTC for hgb, ferritin and iron labs then aranesp if needed in 2 week(s)    Orders needed to be renewed (for next follow-up date) in EPIC: None    Iron labs due:  8/27/18    Plan discussed with:  No call made  Plan provided by:  Mireya Hussein CPhT  An[facility] Clinic  [phone]    NEXT FOLLOW-UP DATE:  8/20/18  Reviewed 08/14/2018 [facility]  Anemia Management Service  Daina Mock PharmD and Malina Hussein CPhT  Phone: [phone]  Fax: [phone]

## 2018-08-13 NOTE — PROGRESS NOTES
Dung Norton is enrolled/participating in the retail pharmacy Blood Pressure Goals Achievement Program (BPGAP).  Dung Norton was evaluated at [facility] on August 13, 2018 at which time his blood pressure was:    BP Readings from Last 3 Encounters:   08/13/18 120/70   08/06/18 132/80   07/30/18 132/70     Reviewed lifestyle modifications for blood pressure control and reduction: including making healthy food choices, managing weight, getting regular exercise, smoking cessation, reducing alcohol consumption, monitoring blood pressure regularly.     Dung Norton is not experiencing symptoms.    Follow-Up: BP is at goal of < 150/90 mmHg (patient 60+ years of age without diabetes).  Recommended follow-up at pharmacy in 6 months.     Recommendation to Provider: none    Dung Notron was evaluated for enrollment into the PGEN study today.    Patient eligible for enrollment:  No  Patient interested in enrollment:  No    Completed by: Tor Robbins RPH  [facility]  [phone]

## 2018-08-13 NOTE — MR AVS SNAPSHOT
After Visit Summary   8/13/2018    Dung Norton    MRN: [id number]           Patient Information     Date Of Birth          1945        Visit Information        Provider Department      8/13/2018 1:22 PM Haley Baker MD [facility] [place]        Today's Diagnoses     Hypertension goal BP (blood pressure) < 140/90    -  1       Follow-ups after your visit        Your next 10 appointments already scheduled     Aug 20, 2018 10:15 AM CDT   LAB with FZ LAB   [facility] [facility] ([facility])    [address] [place] [address] [phone]           Please do not eat 10-12 hours before your appointment if you are coming in fasting for labs on lipids, cholesterol, or glucose (sugar). This does not apply to pregnant women. Water, hot tea and black coffee (with nothing added) are okay. Do not drink other fluids, diet soda or chew gum.            Oct 03, 2018 12:00 PM CDT   (Arrive by 11:30 AM)   Return Visit with Oralia De La Torre NP   [facility] ([facility])    [address]  [address]  [place] [id number] [phone]            Oct 26, 2018  9:15 AM CDT   (Arrive by 9:00 AM)   Return Visit with Tamiko Vanegas MD   [facility] Prostate and Urologic Cancers ([facility])    [address] [place] [address]  4th Floor  [facility] [id number] [phone]              Who to contact     If you have questions or need follow up information about today's clinic visit or your schedule please contact [facility] directly at [phone].  Normal or non-critical lab and imaging results will be communicated to you by MyChart, letter or phone within 4 business days after the clinic has received the results. If you do not hear from us within 7 days, please contact the clinic through MyChart or phone. If you have a critical or abnormal lab result, we will notify you  by phone as soon as possible.  Submit refill requests through [employer] or call your pharmacy and they will forward the refill request to us. Please allow 3 business days for your refill to be completed.          Additional Information About Your Visit        Care EveryWhere ID     This is your Care EveryWhere ID. This could be used by other organizations to access your [place] medical records  [id number]         Blood Pressure from Last 3 Encounters:   08/13/18 120/70   08/06/18 132/80   07/30/18 132/70    Weight from Last 3 Encounters:   07/25/18 171 lb 9.6 oz (77.8 kg)   04/27/18 173 lb (78.5 kg)   04/13/18 173 lb (78.5 kg)              Today, you had the following     No orders found for display         Today's Medication Changes          These changes are accurate as of 8/13/18  1:24 PM.  If you have any questions, ask your nurse or doctor.               These medicines have changed or have updated prescriptions.        Dose/Directions    amLODIPine 2.5 MG tablet   Commonly known as:  NORVASC   This may have changed:  when to take this   Used for:  Renal hypertension        Dose:  2.5 mg   Take 1 tablet (2.5 mg) by mouth daily   Quantity:  90 tablet   Refills:  3                Primary Care Provider Office Phone # Fax #    Haley Baker [phone] [address] [address]        Equal Access to Services     JOSS [facility] HospitalHUMPHREY AH: Hadii cristine nascimentoo Sochalo, waaxda luqadaha, qaybta kaalmada robert, gibson solis. So [facility] [phone].    ATENCIÓN: Si habla español, tiene a montague disposición servicios gratuitos de asistencia lingüística. Jose Manuel al [phone].    We comply with applicable federal civil rights laws and [place] laws. We do not discriminate on the basis of race, color, national origin, age, disability, sex, sexual orientation, or gender identity.            Thank you!     Thank you for choosing [facility]  for your care.  Our goal is always to provide you with excellent care. Hearing back from our patients is one way we can continue to improve our services. Please take a few minutes to complete the written survey that you may receive in the mail after your visit with us. Thank you!             Your Updated Medication List - Protect others around you: Learn how to safely use, store and throw away your medicines at www.disposemymeds.org.          This list is accurate as of 8/13/18  1:24 PM.  Always use your most recent med list.                   Brand Name Dispense Instructions for use Diagnosis    amLODIPine 2.5 MG tablet    NORVASC    90 tablet    Take 1 tablet (2.5 mg) by mouth daily    Renal hypertension       aspirin 81 MG tablet     30 tablet    Take 1 tablet (81 mg) by mouth every morning - RESUME on 3/9/18 if urine remains clear    S/P TURP       calcium acetate 667 MG Caps capsule    PHOSLO    540 capsule    Take 2 capsules (1,334 mg) by mouth 3 times daily (with meals)    Chronic kidney disease, unspecified CKD stage       darbepoetin william 100 MCG/0.5ML injection    ARANESP (ALBUMIN FREE)    0.5 mL    Inject 0.5 mLs (100 mcg) Subcutaneous every 14 days As needed for hgb<10g/dL.  If Hgb increases >1 point in 2 weeks (if blood transfusion given, use hgb PRIOR to this), SYSTOLIC BP > 180 mmHg or hgb>=10g/dL, HOLD DOSE. Dose must be within 1 week of Hgb.  Per anemia protocol with Cecilia De La Torre CNP    Anemia of chronic renal failure, stage 5 (H), CKD (chronic kidney disease) stage 5, GFR less than 15 ml/min (H)       ferrous sulfate 325 (65 Fe) MG tablet    IRON    30 tablet    Take 1 tablet (325 mg) by mouth daily (with breakfast)    Anemia of chronic renal failure, stage 5 (H), CKD (chronic kidney disease) stage 5, GFR less than 15 ml/min (H)       FISH OIL PO           Multi-vitamin Tabs tablet      Take 1 tablet by mouth daily        order for DME     1 each    Equipment being ordered: olecranon bursa brace    Olecranon  bursitis of left elbow

## 2018-08-13 NOTE — TELEPHONE ENCOUNTER
DATE:  8/13/2018   TIME OF RECEIPT FROM LAB:  4:00pm  LAB TEST:  Creatinine  LAB VALUE:  5.65  RESULTS GIVEN WITH READ-BACK TO (PROVIDER):  NEETA JOSEPH  TIME LAB VALUE REPORTED TO PROVIDER:   4:00pm    Lab is stable from previous results.    Giselle Benoit RN

## 2018-08-20 ENCOUNTER — ALLIED HEALTH/NURSE VISIT (OUTPATIENT)
Dept: FAMILY MEDICINE | Facility: CLINIC | Age: 73
End: 2018-08-20

## 2018-08-20 ENCOUNTER — TELEPHONE (OUTPATIENT)
Dept: PHARMACY | Facility: CLINIC | Age: 73
End: 2018-08-20

## 2018-08-20 VITALS — DIASTOLIC BLOOD PRESSURE: 76 MMHG | SYSTOLIC BLOOD PRESSURE: 130 MMHG

## 2018-08-20 DIAGNOSIS — N18.5 ANEMIA OF CHRONIC RENAL FAILURE, STAGE 5 (H): ICD-10-CM

## 2018-08-20 DIAGNOSIS — Z01.30 BP CHECK: Primary | ICD-10-CM

## 2018-08-20 DIAGNOSIS — N18.5 CKD (CHRONIC KIDNEY DISEASE) STAGE 5, GFR LESS THAN 15 ML/MIN (H): ICD-10-CM

## 2018-08-20 DIAGNOSIS — D63.1 ANEMIA OF CHRONIC RENAL FAILURE, STAGE 5 (H): ICD-10-CM

## 2018-08-20 LAB
ALBUMIN SERPL-MCNC: 3.4 G/DL (ref 3.4–5)
ANION GAP SERPL CALCULATED.3IONS-SCNC: 8 MMOL/L (ref 3–14)
BUN SERPL-MCNC: 100 MG/DL (ref 7–30)
CALCIUM SERPL-MCNC: 10.6 MG/DL (ref 8.5–10.1)
CHLORIDE SERPL-SCNC: 109 MMOL/L (ref 94–109)
CO2 SERPL-SCNC: 25 MMOL/L (ref 20–32)
CREAT SERPL-MCNC: 5.48 MG/DL (ref 0.66–1.25)
GFR SERPL CREATININE-BSD FRML MDRD: 10 ML/MIN/1.7M2
GLUCOSE SERPL-MCNC: 91 MG/DL (ref 70–99)
HCT VFR BLD AUTO: 32.3 % (ref 40–53)
HGB BLD-MCNC: 10.3 G/DL (ref 13.3–17.7)
PHOSPHATE SERPL-MCNC: 6.5 MG/DL (ref 2.5–4.5)
POTASSIUM SERPL-SCNC: 4.5 MMOL/L (ref 3.4–5.3)
SODIUM SERPL-SCNC: 142 MMOL/L (ref 133–144)

## 2018-08-20 PROCEDURE — 80069 RENAL FUNCTION PANEL: CPT

## 2018-08-20 PROCEDURE — 85018 HEMOGLOBIN: CPT

## 2018-08-20 PROCEDURE — 85014 HEMATOCRIT: CPT

## 2018-08-20 PROCEDURE — 36415 COLL VENOUS BLD VENIPUNCTURE: CPT

## 2018-08-20 NOTE — TELEPHONE ENCOUNTER
Anemia Management Note  SUBJECTIVE/OBJECTIVE:  Referred by Cecilia De La Torre on 9/11/2017  Primary Diagnosis: Anemia in Chronic Kidney Disease (N18.5, D63.1)     Secondary Diagnosis:  Chronic Kidney Disease, Stage 5 (N18.5)  Hgb goal range:  9-10  Epo/Darbo: Aranesp 100 mcg every 14 days As needed for hgb<10g/dL  RX expires on 12/5/2018  Iron regimen:  None  Labs exp: 9/12/2018  **Provide phone number for [facility] [phone] and instruction to schedule ancillary visit for aranesp injection. Send message to care team via pool - FZ RN TRIAGE POOL as a telephone encounter**      Contact: **AUTH TO DISCUSS**Tereza Norton(daughter) [phone], Rabia Jazzy (daughter)[phone]    Anemia Latest Ref Rng & Units 7/9/2018 7/16/2018 7/23/2018 7/30/2018 8/6/2018 8/13/2018 8/20/2018   EARLENE Dose - - - - - - - -   Hemoglobin 13.3 - 17.7 g/dL 10.5(L) 10.8(L) 10.5(L) 10.5(L) 10.8(L) 10.4(L) 10.3(L)   TSAT 15 - 46 % - - - 20 - - -   Ferritin 26 - 388 ng/mL - - - 509(H) - - -     BP Readings from Last 3 Encounters:   08/20/18 130/76   08/13/18 120/70   08/06/18 132/80     Wt Readings from Last 2 Encounters:   07/25/18 171 lb 9.6 oz (77.8 kg)   04/27/18 173 lb (78.5 kg)       Patient has an appt on 8/27     ASSESSMENT:  Hgb:Above goal - recommend hold dose  TSat: not at goal (>30%) but ferritin >500ng/mL.  IV iron not indicated at this time per anemia protocol. Ferritin: At goal (>100ng/mL)     PLAN:  Hold Aranesp and RTC for hgb, ferritin and iron labs then aranesp if needed in 2 week(s)     Orders needed to be renewed (for next follow-up date) in EPIC: None     Iron labs due:  8/27/18     Plan discussed with:  No call made  Plan provided by:  Mireya Hussein CPhT  Anemia Clinic  [phone]     NEXT FOLLOW-UP DATE: 8/27/18  Reviewed 08/21/2018 [facility]  Anemia Management Service  Daina Mock PharmD and Malina Hussein CPhT  Phone: [phone]  Fax: [phone]

## 2018-08-20 NOTE — MR AVS SNAPSHOT
After Visit Summary   8/20/2018    Dung Norton    MRN: [id number]           Patient Information     Date Of Birth          1945        Visit Information        Provider Department      8/20/2018 10:06 AM Haley Baker MD [facility]        Today's Diagnoses     BP check    -  1       Follow-ups after your visit        Your next 10 appointments already scheduled     Aug 27, 2018 10:45 AM CDT   LAB with FZ LAB   [place] [facility] ([facility])    [address] [facility] [id number] [phone]           Please do not eat 10-12 hours before your appointment if you are coming in fasting for labs on lipids, cholesterol, or glucose (sugar). This does not apply to pregnant women. Water, hot tea and black coffee (with nothing added) are okay. Do not drink other fluids, diet soda or chew gum.            Oct 03, 2018 12:00 PM CDT   (Arrive by 11:30 AM)   Return Visit with Oralia De La Torre NP   [facility] Nephrology ([facility])    [address]  [address]  [facility] [address] [phone]            Oct 26, 2018  9:15 AM CDT   (Arrive by 9:00 AM)   Return Visit with Tamiko Vanegas MD   [facility] Urology and [facility] for Prostate and Urologic Cancers ([facility])    [address] [place] [address]  4th Floor  [facility] [id number] [phone]              Who to contact     If you have questions or need follow up information about today's clinic visit or your schedule please contact [place] [facility] directly at [phone].  Normal or non-critical lab and imaging results will be communicated to you by MyChart, letter or phone within 4 business days after the clinic has received the results. If you do not hear from us within 7 days, please contact the clinic through MyChart or phone. If you have a critical or abnormal lab result, we will notify you by phone as soon as  possible.  Submit refill requests through [employer] or call your pharmacy and they will forward the refill request to us. Please allow 3 business days for your refill to be completed.          Additional Information About Your Visit        Care EveryWhere ID     This is your Care EveryWhere ID. This could be used by other organizations to access your [place] medical records  [id number]         Blood Pressure from Last 3 Encounters:   08/20/18 130/76   08/13/18 120/70   08/06/18 132/80    Weight from Last 3 Encounters:   07/25/18 171 lb 9.6 oz (77.8 kg)   04/27/18 173 lb (78.5 kg)   04/13/18 173 lb (78.5 kg)              Today, you had the following     No orders found for display         Today's Medication Changes          These changes are accurate as of 8/20/18 11:59 PM.  If you have any questions, ask your nurse or doctor.               These medicines have changed or have updated prescriptions.        Dose/Directions    amLODIPine 2.5 MG tablet   Commonly known as:  NORVASC   This may have changed:  when to take this   Used for:  Renal hypertension        Dose:  2.5 mg   Take 1 tablet (2.5 mg) by mouth daily   Quantity:  90 tablet   Refills:  3                Primary Care Provider Office Phone # Fax #    Haley Baker [phone] [address] [facility] [address]        Equal Access to Services     BRIAN CERRATO AH: Hadii cristine marin hadasho Soomaali, waaxda luqadaha, qaybta kaalmada adeshant, gibson solis. So [facility] [phone].    ATENCIÓN: Si habla español, tiene a montague disposición servicios gratuitos de asistencia lingüística. Noéame al [phone].    We comply with applicable federal civil rights laws and [place] laws. We do not discriminate on the basis of race, color, national origin, age, disability, sex, sexual orientation, or gender identity.            Thank you!     Thank you for choosing [facility]  for your care. Our goal is always  to provide you with excellent care. Hearing back from our patients is one way we can continue to improve our services. Please take a few minutes to complete the written survey that you may receive in the mail after your visit with us. Thank you!             Your Updated Medication List - Protect others around you: Learn how to safely use, store and throw away your medicines at www.disposemymeds.org.          This list is accurate as of 8/20/18 11:59 PM.  Always use your most recent med list.                   Brand Name Dispense Instructions for use Diagnosis    amLODIPine 2.5 MG tablet    NORVASC    90 tablet    Take 1 tablet (2.5 mg) by mouth daily    Renal hypertension       aspirin 81 MG tablet     30 tablet    Take 1 tablet (81 mg) by mouth every morning - RESUME on 3/9/18 if urine remains clear    S/P TURP       calcium acetate 667 MG Caps capsule    PHOSLO    540 capsule    Take 2 capsules (1,334 mg) by mouth 3 times daily (with meals)    Chronic kidney disease, unspecified CKD stage       darbepoetin william 100 MCG/0.5ML injection    ARANESP (ALBUMIN FREE)    0.5 mL    Inject 0.5 mLs (100 mcg) Subcutaneous every 14 days As needed for hgb<10g/dL.  If Hgb increases >1 point in 2 weeks (if blood transfusion given, use hgb PRIOR to this), SYSTOLIC BP > 180 mmHg or hgb>=10g/dL, HOLD DOSE. Dose must be within 1 week of Hgb.  Per anemia protocol with Cecilia De La Torre CNP    Anemia of chronic renal failure, stage 5 (H), CKD (chronic kidney disease) stage 5, GFR less than 15 ml/min (H)       ferrous sulfate 325 (65 Fe) MG tablet    IRON    30 tablet    Take 1 tablet (325 mg) by mouth daily (with breakfast)    Anemia of chronic renal failure, stage 5 (H), CKD (chronic kidney disease) stage 5, GFR less than 15 ml/min (H)       FISH OIL PO           Multi-vitamin Tabs tablet      Take 1 tablet by mouth daily        order for DME     1 each    Equipment being ordered: olecranon bursa brace    Olecranon bursitis of left elbow

## 2018-08-20 NOTE — PROGRESS NOTES
Dung Norton is enrolled/participating in the retail pharmacy Blood Pressure Goals Achievement Program (BPGAP).  Dung Norton was evaluated at [facility] on August 20, 2018 at which time his blood pressure was:    BP Readings from Last 3 Encounters:   08/20/18 130/76   08/13/18 120/70   08/06/18 132/80     Reviewed lifestyle modifications for blood pressure control and reduction: including making healthy food choices, managing weight, getting regular exercise, smoking cessation, reducing alcohol consumption, monitoring blood pressure regularly.     Dung Norton is not experiencing symptoms.    Follow-Up: BP is at goal of < 140/90mmHg (patient 18+ years of age with or without diabetes).  Recommended follow-up at pharmacy in 6 months.     Recommendation to Provider: none    Dung Norton was evaluated for enrollment into the PGEN study today.    Patient eligible for enrollment:  No  Patient interested in enrollment:  No    Completed by: Ricarda Andino RPh  [facility]  Phone [phone]  Fax      [phone]

## 2018-08-20 NOTE — Clinical Note
Blood pressure in pharmacy today 130/76 sending as informational only [facility] Phone [phone] Fax      [phone]

## 2018-08-27 ENCOUNTER — TELEPHONE (OUTPATIENT)
Dept: NEPHROLOGY | Facility: CLINIC | Age: 73
End: 2018-08-27

## 2018-08-27 ENCOUNTER — ALLIED HEALTH/NURSE VISIT (OUTPATIENT)
Dept: FAMILY MEDICINE | Facility: CLINIC | Age: 73
End: 2018-08-27
Payer: COMMERCIAL

## 2018-08-27 ENCOUNTER — TELEPHONE (OUTPATIENT)
Dept: PHARMACY | Facility: CLINIC | Age: 73
End: 2018-08-27

## 2018-08-27 VITALS — DIASTOLIC BLOOD PRESSURE: 80 MMHG | SYSTOLIC BLOOD PRESSURE: 130 MMHG

## 2018-08-27 DIAGNOSIS — N18.5 CKD (CHRONIC KIDNEY DISEASE) STAGE 5, GFR LESS THAN 15 ML/MIN (H): ICD-10-CM

## 2018-08-27 DIAGNOSIS — D63.1 ANEMIA OF CHRONIC RENAL FAILURE, STAGE 5 (H): ICD-10-CM

## 2018-08-27 DIAGNOSIS — Z01.30 BP CHECK: Primary | ICD-10-CM

## 2018-08-27 DIAGNOSIS — N18.5 ANEMIA OF CHRONIC RENAL FAILURE, STAGE 5 (H): ICD-10-CM

## 2018-08-27 LAB
ALBUMIN SERPL-MCNC: 3.4 G/DL (ref 3.4–5)
ANION GAP SERPL CALCULATED.3IONS-SCNC: 10 MMOL/L (ref 3–14)
BUN SERPL-MCNC: 97 MG/DL (ref 7–30)
CALCIUM SERPL-MCNC: 10.5 MG/DL (ref 8.5–10.1)
CHLORIDE SERPL-SCNC: 108 MMOL/L (ref 94–109)
CO2 SERPL-SCNC: 22 MMOL/L (ref 20–32)
CREAT SERPL-MCNC: 5.36 MG/DL (ref 0.66–1.25)
FERRITIN SERPL-MCNC: 524 NG/ML (ref 26–388)
GFR SERPL CREATININE-BSD FRML MDRD: 11 ML/MIN/1.7M2
GLUCOSE SERPL-MCNC: 104 MG/DL (ref 70–99)
HCT VFR BLD AUTO: NORMAL % (ref 40–53)
HGB BLD-MCNC: NORMAL G/DL (ref 13.3–17.7)
IRON SATN MFR SERPL: 24 % (ref 15–46)
IRON SERPL-MCNC: 63 UG/DL (ref 35–180)
PHOSPHATE SERPL-MCNC: 5.5 MG/DL (ref 2.5–4.5)
POTASSIUM SERPL-SCNC: 5.2 MMOL/L (ref 3.4–5.3)
SODIUM SERPL-SCNC: 140 MMOL/L (ref 133–144)
TIBC SERPL-MCNC: 260 UG/DL (ref 240–430)

## 2018-08-27 PROCEDURE — 80069 RENAL FUNCTION PANEL: CPT

## 2018-08-27 PROCEDURE — 99207 ZZC NO CHARGE NURSE ONLY: CPT | Performed by: FAMILY MEDICINE

## 2018-08-27 PROCEDURE — 82728 ASSAY OF FERRITIN: CPT

## 2018-08-27 PROCEDURE — 83550 IRON BINDING TEST: CPT

## 2018-08-27 PROCEDURE — 83540 ASSAY OF IRON: CPT

## 2018-08-27 PROCEDURE — 36415 COLL VENOUS BLD VENIPUNCTURE: CPT

## 2018-08-27 NOTE — PROGRESS NOTES
Dung Norton is enrolled/participating in the retail pharmacy Blood Pressure Goals Achievement Program (BPGAP).  Dung Norton was evaluated at [facility] on August 27, 2018 at which time his blood pressure was:    BP Readings from Last 3 Encounters:   08/27/18 130/80   08/20/18 130/76   08/13/18 120/70     Reviewed lifestyle modifications for blood pressure control and reduction: including making healthy food choices, managing weight, getting regular exercise, smoking cessation, reducing alcohol consumption, monitoring blood pressure regularly.     Dung Norton is not experiencing symptoms.    Follow-Up: BP is at goal of < 140/90mmHg (patient 18+ years of age with or without diabetes).  Recommended follow-up at pharmacy in 6 months.     Recommendation to Provider: none    Dung Norton was evaluated for enrollment into the PGEN study today.    Patient eligible for enrollment:  No  Patient interested in enrollment:  No    Completed by: Ricarda Andino RPh  [facility]  Phone [phone]  Fax      [phone]

## 2018-08-27 NOTE — Clinical Note
Blood pressure in pharmacy today 130/80 pt reports no symptoms [facility] [phone] Fax      [phone]

## 2018-08-27 NOTE — TELEPHONE ENCOUNTER
DATE:  8/27/2018   TIME OF RECEIPT FROM LAB:  3:41pm  LAB TEST:  Creatinine  LAB VALUE:  5.36  RESULTS GIVEN WITH READ-BACK TO (PROVIDER):  Giselle Benoit RN / Cecilia Negrete NP  TIME LAB VALUE REPORTED TO PROVIDER:   3:41pm     Lab is consistent with previous readings.    Giselle Benoit RN

## 2018-08-27 NOTE — MR AVS SNAPSHOT
After Visit Summary   8/27/2018    Dung Norton    MRN: [id number]           Patient Information     Date Of Birth          1945        Visit Information        Provider Department      8/27/2018 12:34 PM Haley Baker MD [facility]        Today's Diagnoses     BP check    -  1       Follow-ups after your visit        Your next 10 appointments already scheduled     Sep 04, 2018 10:30 AM CDT   LAB with FZ LAB   [place] [facility] ([facility])    [address] [place] [address] [phone]           Please do not eat 10-12 hours before your appointment if you are coming in fasting for labs on lipids, cholesterol, or glucose (sugar). This does not apply to pregnant women. Water, hot tea and black coffee (with nothing added) are okay. Do not drink other fluids, diet soda or chew gum.            Oct 03, 2018 12:00 PM CDT   (Arrive by 11:30 AM)   Return Visit with Oralia De La Torre NP   [facility] Nephrology ([facility])    [address] [place] [address]  [address]  [facility] [address] [phone]            Oct 26, 2018  9:15 AM CDT   (Arrive by 9:00 AM)   Return Visit with Tamiko Vanegas MD   [facility] Urology and [facility] for Prostate and Urologic Cancers ([facility])    [address] [place] [address] [phone]            Jan 29, 2019  8:30 AM CST   Return Visit with Lizandro Ng MD   [facility]YS[facility]ANS [facility] AT [facility] ([facility])    [address] [place] [address] [address] [facility] [address] [phone]              Who to contact     If you have questions or need follow up information about today's clinic visit or your schedule please contact [facility] MOSS directly at [phone].  Normal or non-critical lab and imaging results will be communicated to you by MyChart, letter or phone within 4 business days after the  clinic has received the results. If you do not hear from us within 7 days, please contact the clinic through [employer] or phone. If you have a critical or abnormal lab result, we will notify you by phone as soon as possible.  Submit refill requests through [employer] or call your pharmacy and they will forward the refill request to us. Please allow 3 business days for your refill to be completed.          Additional Information About Your Visit        Care EveryWhere ID     This is your Care EveryWhere ID. This could be used by other organizations to access your [place] medical records  [id number]         Blood Pressure from Last 3 Encounters:   08/27/18 130/80   08/20/18 130/76   08/13/18 120/70    Weight from Last 3 Encounters:   07/25/18 171 lb 9.6 oz (77.8 kg)   04/27/18 173 lb (78.5 kg)   04/13/18 173 lb (78.5 kg)              Today, you had the following     No orders found for display         Today's Medication Changes          These changes are accurate as of 8/27/18 11:59 PM.  If you have any questions, ask your nurse or doctor.               These medicines have changed or have updated prescriptions.        Dose/Directions    amLODIPine 2.5 MG tablet   Commonly known as:  NORVASC   This may have changed:  when to take this   Used for:  Renal hypertension        Dose:  2.5 mg   Take 1 tablet (2.5 mg) by mouth daily   Quantity:  90 tablet   Refills:  3                Primary Care Provider Office Phone # Fax #    Haley Baker [phone]       [address] [facility] [address]        Equal Access to Services     [facility] AH: Hadii cristine ku hadasho Soomaali, waaxda luqadaha, qaybta kaalmada adeegyada, gibson solis. So [facility] [phone].    ATENCIÓN: Si habla español, tiene a montague disposición servicios gratuitos de asistencia lingüística. Llame al [phone].    We comply with applicable federal civil rights laws and [place] laws. We do not discriminate on  the basis of race, color, national origin, age, disability, sex, sexual orientation, or gender identity.            Thank you!     Thank you for choosing [facility]  for your care. Our goal is always to provide you with excellent care. Hearing back from our patients is one way we can continue to improve our services. Please take a few minutes to complete the written survey that you may receive in the mail after your visit with us. Thank you!             Your Updated Medication List - Protect others around you: Learn how to safely use, store and throw away your medicines at www.disposemymeds.org.          This list is accurate as of 8/27/18 11:59 PM.  Always use your most recent med list.                   Brand Name Dispense Instructions for use Diagnosis    amLODIPine 2.5 MG tablet    NORVASC    90 tablet    Take 1 tablet (2.5 mg) by mouth daily    Renal hypertension       aspirin 81 MG tablet     30 tablet    Take 1 tablet (81 mg) by mouth every morning - RESUME on 3/9/18 if urine remains clear    S/P TURP       calcium acetate 667 MG Caps capsule    PHOSLO    540 capsule    Take 2 capsules (1,334 mg) by mouth 3 times daily (with meals)    Chronic kidney disease, unspecified CKD stage       darbepoetin william 100 MCG/0.5ML injection    ARANESP (ALBUMIN FREE)    0.5 mL    Inject 0.5 mLs (100 mcg) Subcutaneous every 14 days As needed for hgb<10g/dL.  If Hgb increases >1 point in 2 weeks (if blood transfusion given, use hgb PRIOR to this), SYSTOLIC BP > 180 mmHg or hgb>=10g/dL, HOLD DOSE. Dose must be within 1 week of Hgb.  Per anemia protocol with Cecilia De La Torre CNP    Anemia of chronic renal failure, stage 5 (H), CKD (chronic kidney disease) stage 5, GFR less than 15 ml/min (H)       ferrous sulfate 325 (65 Fe) MG tablet    IRON    30 tablet    Take 1 tablet (325 mg) by mouth daily (with breakfast)    Anemia of chronic renal failure, stage 5 (H), CKD (chronic kidney disease) stage 5, GFR less than 15  ml/min (H)       FISH OIL PO           Multi-vitamin Tabs tablet      Take 1 tablet by mouth daily        order for DME     1 each    Equipment being ordered: olecranon bursa brace    Olecranon bursitis of left elbow

## 2018-08-27 NOTE — TELEPHONE ENCOUNTER
Anemia Management Note  SUBJECTIVE/OBJECTIVE:  Referred by Cecilia De La Torre on 9/11/2017  Primary Diagnosis: Anemia in Chronic Kidney Disease (N18.5, D63.1)     Secondary Diagnosis:  Chronic Kidney Disease, Stage 5 (N18.5)  Hgb goal range:  9-10  Epo/Darbo: Aranesp 100 mcg every 14 days As needed for hgb<10g/dL  RX expires on 12/5/2018  Iron regimen:  None  Labs exp: 9/12/2018  **Provide phone number for [facility] [phone] and instruction to schedule ancillary visit for aranesp injection. Send message to care team via pool - FZ RN TRIAGE POOL as a telephone encounter**      Contact: **AUTH TO DISCUSS**Tereza Norton(daughter) [phone], Rabia Jazzy (daughter)[phone]    Anemia Latest Ref Rng & Units 7/23/2018 7/30/2018 8/6/2018 8/13/2018 8/20/2018 8/27/2018 8/28/2018   EARLENE Dose - - - - - - - -   Hemoglobin 13.3 - 17.7 g/dL 10.5(L) 10.5(L) 10.8(L) 10.4(L) 10.3(L) Test canceled - Lab  error 10.6(L)   TSAT 15 - 46 % - 20 - - - 24 -   Ferritin 26 - 388 ng/mL - 509(H) - - - 524(H) -       BP Readings from Last 3 Encounters:   08/27/18 130/80   08/20/18 130/76   08/13/18 120/70     Wt Readings from Last 2 Encounters:   07/25/18 171 lb 9.6 oz (77.8 kg)   04/27/18 173 lb (78.5 kg)       Next lab/aranesp appt is scheduled for 9/4    ASSESSMENT:  Hgb: at goal but due for lab - continue to monitor  Tsat:  not at goal (>30%) but ferritin >500ng/mL.  IV iron not indicated at this time per anemia protocol.   Ferritin: At goal (>100ng/mL)    PLAN:  RTC for hgb then aranesp if needed in 1 week(s)    Orders needed to be renewed (for next follow-up date) in EPIC: None    Iron labs due:  9/24/18    Plan discussed with:  No call made  Plan provided by:  Mireya Hussein [facility]  Anemia Clinic  [phone]    NEXT FOLLOW-UP DATE:  9/4/18  Reviewed 08/28/2018 [facility]  Anemia Management Service  Daina Mock PharmD and Malina Hussein CPhT  Phone: [phone]  Fax: [phone]

## 2018-08-28 DIAGNOSIS — N18.5 ANEMIA OF CHRONIC RENAL FAILURE, STAGE 5 (H): ICD-10-CM

## 2018-08-28 DIAGNOSIS — D63.1 ANEMIA OF CHRONIC RENAL FAILURE, STAGE 5 (H): ICD-10-CM

## 2018-08-28 DIAGNOSIS — N18.5 CKD (CHRONIC KIDNEY DISEASE) STAGE 5, GFR LESS THAN 15 ML/MIN (H): ICD-10-CM

## 2018-08-28 LAB
HCT VFR BLD AUTO: 33.6 % (ref 40–53)
HGB BLD-MCNC: 10.6 G/DL (ref 13.3–17.7)

## 2018-08-28 PROCEDURE — 85014 HEMATOCRIT: CPT

## 2018-08-28 PROCEDURE — 36415 COLL VENOUS BLD VENIPUNCTURE: CPT

## 2018-08-28 PROCEDURE — 85018 HEMOGLOBIN: CPT

## 2018-09-04 ENCOUNTER — TELEPHONE (OUTPATIENT)
Dept: PHARMACY | Facility: CLINIC | Age: 73
End: 2018-09-04

## 2018-09-04 ENCOUNTER — ALLIED HEALTH/NURSE VISIT (OUTPATIENT)
Dept: FAMILY MEDICINE | Facility: CLINIC | Age: 73
End: 2018-09-04

## 2018-09-04 ENCOUNTER — TELEPHONE (OUTPATIENT)
Dept: NEPHROLOGY | Facility: CLINIC | Age: 73
End: 2018-09-04

## 2018-09-04 VITALS — SYSTOLIC BLOOD PRESSURE: 126 MMHG | DIASTOLIC BLOOD PRESSURE: 74 MMHG

## 2018-09-04 DIAGNOSIS — N18.5 ANEMIA OF CHRONIC RENAL FAILURE, STAGE 5 (H): ICD-10-CM

## 2018-09-04 DIAGNOSIS — N18.5 CKD (CHRONIC KIDNEY DISEASE) STAGE 5, GFR LESS THAN 15 ML/MIN (H): ICD-10-CM

## 2018-09-04 DIAGNOSIS — D63.1 ANEMIA OF CHRONIC RENAL FAILURE, STAGE 5 (H): ICD-10-CM

## 2018-09-04 DIAGNOSIS — I10 HYPERTENSION GOAL BP (BLOOD PRESSURE) < 140/90: Primary | ICD-10-CM

## 2018-09-04 LAB
ALBUMIN SERPL-MCNC: 3.4 G/DL (ref 3.4–5)
ANION GAP SERPL CALCULATED.3IONS-SCNC: 11 MMOL/L (ref 3–14)
BUN SERPL-MCNC: 100 MG/DL (ref 7–30)
CALCIUM SERPL-MCNC: 10.3 MG/DL (ref 8.5–10.1)
CHLORIDE SERPL-SCNC: 107 MMOL/L (ref 94–109)
CO2 SERPL-SCNC: 23 MMOL/L (ref 20–32)
CREAT SERPL-MCNC: 5.43 MG/DL (ref 0.66–1.25)
GFR SERPL CREATININE-BSD FRML MDRD: 10 ML/MIN/1.7M2
GLUCOSE SERPL-MCNC: 109 MG/DL (ref 70–99)
HCT VFR BLD AUTO: 32.5 % (ref 40–53)
HGB BLD-MCNC: 10.4 G/DL (ref 13.3–17.7)
PHOSPHATE SERPL-MCNC: 5.7 MG/DL (ref 2.5–4.5)
POTASSIUM SERPL-SCNC: 4.9 MMOL/L (ref 3.4–5.3)
SODIUM SERPL-SCNC: 141 MMOL/L (ref 133–144)

## 2018-09-04 PROCEDURE — 99207 ZZC NO CHARGE NURSE ONLY: CPT | Performed by: FAMILY MEDICINE

## 2018-09-04 PROCEDURE — 36415 COLL VENOUS BLD VENIPUNCTURE: CPT

## 2018-09-04 PROCEDURE — 80069 RENAL FUNCTION PANEL: CPT

## 2018-09-04 PROCEDURE — 85014 HEMATOCRIT: CPT

## 2018-09-04 PROCEDURE — 85018 HEMOGLOBIN: CPT

## 2018-09-04 NOTE — TELEPHONE ENCOUNTER
DATE:  9/4/2018   TIME OF RECEIPT FROM LAB:  3:54pm  LAB TEST:  Creatinine   LAB VALUE:  5.43  RESULTS GIVEN WITH READ-BACK TO (PROVIDER):  NEETA JOSEPH  TIME LAB VALUE REPORTED TO PROVIDER:   3:55pm    Lab is consistent with previous results.    Giselle Benoit RN

## 2018-09-04 NOTE — MR AVS SNAPSHOT
After Visit Summary   9/4/2018    Dung Norton    MRN: [id number]           Patient Information     Date Of Birth          1945        Visit Information        Provider Department      9/4/2018 11:04 AM Haley Baker MD [facility]        Today's Diagnoses     Hypertension goal BP (blood pressure) < 140/90    -  1       Follow-ups after your visit        Your next 10 appointments already scheduled     Sep 10, 2018 10:30 AM CDT   LAB with FZ LAB   [place] [facility] ([facility])    [address] [place] [address] [phone]           Please do not eat 10-12 hours before your appointment if you are coming in fasting for labs on lipids, cholesterol, or glucose (sugar). This does not apply to pregnant women. Water, hot tea and black coffee (with nothing added) are okay. Do not drink other fluids, diet soda or chew gum.            Oct 03, 2018 12:00 PM CDT   (Arrive by 11:30 AM)   Return Visit with Oralia De La Torre NP   [facility] Nephrology ([facility])    [address] [place] [address]  [address]  [facility] [address] [phone]            Oct 26, 2018  9:15 AM CDT   (Arrive by 9:00 AM)   Return Visit with Tamiko Vanegas MD   [facility] Urology and Inst for Prostate and Urologic Cancers ([facility])    [address] [place] [address]  [address] [facility] [address]   [phone]            Jan 29, 2019  8:30 AM CST   Return Visit with Lizandro Ng MD   [facility] PHYSICIANS HEART AT [facility] ([facility])    [address] [place] [address] [address] [facility] [address] [phone]              Who to contact     If you have questions or need follow up information about today's clinic visit or your schedule please contact [place] M[facility] MOSS directly at [phone].  Normal or non-critical lab and imaging results will be communicated to you by MyChart, letter or  phone within 4 business days after the clinic has received the results. If you do not hear from us within 7 days, please contact the clinic through [employer] or phone. If you have a critical or abnormal lab result, we will notify you by phone as soon as possible.  Submit refill requests through [employer] or call your pharmacy and they will forward the refill request to us. Please allow 3 business days for your refill to be completed.          Additional Information About Your Visit        Care EveryWhere ID     This is your Care EveryWhere ID. This could be used by other organizations to access your [place] medical records  [id number]         Blood Pressure from Last 3 Encounters:   09/04/18 126/74   08/27/18 130/80   08/20/18 130/76    Weight from Last 3 Encounters:   07/25/18 171 lb 9.6 oz (77.8 kg)   04/27/18 173 lb (78.5 kg)   04/13/18 173 lb (78.5 kg)              Today, you had the following     No orders found for display         Today's Medication Changes          These changes are accurate as of 9/4/18 11:13 AM.  If you have any questions, ask your nurse or doctor.               These medicines have changed or have updated prescriptions.        Dose/Directions    amLODIPine 2.5 MG tablet   Commonly known as:  NORVASC   This may have changed:  when to take this   Used for:  Renal hypertension        Dose:  2.5 mg   Take 1 tablet (2.5 mg) by mouth daily   Quantity:  90 tablet   Refills:  3                Primary Care Provider Office Phone # Fax #    Haley Baker [phone] [address] [facility] [address]        Equal Access to Services     JOSS CERRATO AH: Hadii cristine verdin Sochalo, waaxda luqadaha, qaybta kaalmada gibson jones. So [facility] [phone].    ATENCIÓN: Si habla español, tiene a montague disposición servicios gratuitos de asistencia lingüística. Llame al [phone].    We comply with applicable federal civil rights laws and  [place] laws. We do not discriminate on the basis of race, color, national origin, age, disability, sex, sexual orientation, or gender identity.            Thank you!     Thank you for choosing [facility]  for your care. Our goal is always to provide you with excellent care. Hearing back from our patients is one way we can continue to improve our services. Please take a few minutes to complete the written survey that you may receive in the mail after your visit with us. Thank you!             Your Updated Medication List - Protect others around you: Learn how to safely use, store and throw away your medicines at www.disposemymeds.org.          This list is accurate as of 9/4/18 11:13 AM.  Always use your most recent med list.                   Brand Name Dispense Instructions for use Diagnosis    amLODIPine 2.5 MG tablet    NORVASC    90 tablet    Take 1 tablet (2.5 mg) by mouth daily    Renal hypertension       aspirin 81 MG tablet     30 tablet    Take 1 tablet (81 mg) by mouth every morning - RESUME on 3/9/18 if urine remains clear    S/P TURP       calcium acetate 667 MG Caps capsule    PHOSLO    540 capsule    Take 2 capsules (1,334 mg) by mouth 3 times daily (with meals)    Chronic kidney disease, unspecified CKD stage       darbepoetin william 100 MCG/0.5ML injection    ARANESP (ALBUMIN FREE)    0.5 mL    Inject 0.5 mLs (100 mcg) Subcutaneous every 14 days As needed for hgb<10g/dL.  If Hgb increases >1 point in 2 weeks (if blood transfusion given, use hgb PRIOR to this), SYSTOLIC BP > 180 mmHg or hgb>=10g/dL, HOLD DOSE. Dose must be within 1 week of Hgb.  Per anemia protocol with Cecilia De La Torre CNP    Anemia of chronic renal failure, stage 5 (H), CKD (chronic kidney disease) stage 5, GFR less than 15 ml/min (H)       ferrous sulfate 325 (65 Fe) MG tablet    IRON    30 tablet    Take 1 tablet (325 mg) by mouth daily (with breakfast)    Anemia of chronic renal failure, stage 5 (H), CKD (chronic kidney  disease) stage 5, GFR less than 15 ml/min (H)       FISH OIL PO           Multi-vitamin Tabs tablet      Take 1 tablet by mouth daily        order for DME     1 each    Equipment being ordered: olecranon bursa brace    Olecranon bursitis of left elbow

## 2018-09-04 NOTE — PROGRESS NOTES
Dung Norton is enrolled/participating in the retail pharmacy Blood Pressure Goals Achievement Program (BPGAP).  Dung Norton was evaluated at [facility] on September 4, 2018 at which time his blood pressure was:    BP Readings from Last 3 Encounters:   09/04/18 126/74   08/27/18 130/80   08/20/18 130/76     Reviewed lifestyle modifications for blood pressure control and reduction: including making healthy food choices, managing weight, getting regular exercise, smoking cessation, reducing alcohol consumption, monitoring blood pressure regularly.     Dung Norton is not experiencing symptoms.    Follow-Up: BP is at goal of < 140/90mmHg (patient 18+ years of age with or without diabetes).  Recommended follow-up at pharmacy in 6 months.     Recommendation to Provider: None at this time.     Dung Norton was evaluated for enrollment into the PGEN study today.    Patient eligible for enrollment:  No  Patient interested in enrollment:  No    Completed by: Thank you,  Cheri Winslow, PharmD  [facility]  [phone]

## 2018-09-04 NOTE — TELEPHONE ENCOUNTER
Anemia Management Note  SUBJECTIVE/OBJECTIVE:  Referred by Cecilia De La Torre on 9/11/2017  Primary Diagnosis: Anemia in Chronic Kidney Disease (N18.5, D63.1)     Secondary Diagnosis:  Chronic Kidney Disease, Stage 5 (N18.5)  Hgb goal range:  9-10  Epo/Darbo: Aranesp 100 mcg every 14 days As needed for hgb<10g/dL  RX expires on 12/5/2018  Iron regimen:  None  Labs exp: 9/12/2018  **Provide phone number for [facility] [phone] and instruction to schedule ancillary visit for aranesp injection. Send message to care team via pool - FZ RN TRIAGE POOL as a telephone encounter**      Contact: **AUTH TO DISCUSS**Tereza Norton(daughter) [phone], Rabia Jazzy (daughter)[phone]    Anemia Latest Ref Rng & Units 7/30/2018 8/6/2018 8/13/2018 8/20/2018 8/27/2018 8/28/2018 9/4/2018   EARLENE Dose - - - - - - - -   Hemoglobin 13.3 - 17.7 g/dL 10.5(L) 10.8(L) 10.4(L) 10.3(L) Test canceled - Lab  error 10.6(L) 10.4(L)   TSAT 15 - 46 % 20 - - - 24 - -   Ferritin 26 - 388 ng/mL 509(H) - - - 524(H) - -     BP Readings from Last 3 Encounters:   09/04/18 126/74   08/27/18 130/80   08/20/18 130/76     Wt Readings from Last 2 Encounters:   07/25/18 171 lb 9.6 oz (77.8 kg)   04/27/18 173 lb (78.5 kg)       Patient has a lab appt on 9/10    ASSESSMENT:   Hgb:Above goal - recommend hold dose  TSat: not at goal (>30%) but ferritin >500ng/mL.  IV iron not indicated at this time per anemia protocol. Ferritin: At goal (>100ng/mL)    PLAN:  Hold Aranesp and RTC for hgb then aranesp if needed in 1 week(s)    Orders needed to be renewed (for next follow-up date) in EPIC: None    Iron labs due:  9/24/18    Plan discussed with:  No call made - next appt is scheduled  Plan provided by:  Mireya Hussein [facility]  Anemia Clinic  [phone]    NEXT FOLLOW-UP DATE:  9/10/18  Reviewed 09/06/2018 [facility]  Anemia Management Service  Daina Mock PharmD and [facility]  Phone: [phone]  Fax: [phone]

## 2018-09-10 ENCOUNTER — TELEPHONE (OUTPATIENT)
Dept: PHARMACY | Facility: CLINIC | Age: 73
End: 2018-09-10

## 2018-09-10 ENCOUNTER — ALLIED HEALTH/NURSE VISIT (OUTPATIENT)
Dept: FAMILY MEDICINE | Facility: CLINIC | Age: 73
End: 2018-09-10
Payer: COMMERCIAL

## 2018-09-10 ENCOUNTER — TELEPHONE (OUTPATIENT)
Dept: NEPHROLOGY | Facility: CLINIC | Age: 73
End: 2018-09-10

## 2018-09-10 VITALS — DIASTOLIC BLOOD PRESSURE: 74 MMHG | SYSTOLIC BLOOD PRESSURE: 124 MMHG

## 2018-09-10 DIAGNOSIS — N18.5 CKD (CHRONIC KIDNEY DISEASE) STAGE 5, GFR LESS THAN 15 ML/MIN (H): ICD-10-CM

## 2018-09-10 DIAGNOSIS — Z01.30 BP CHECK: Primary | ICD-10-CM

## 2018-09-10 DIAGNOSIS — N18.5 ANEMIA OF CHRONIC RENAL FAILURE, STAGE 5 (H): ICD-10-CM

## 2018-09-10 DIAGNOSIS — D63.1 ANEMIA OF CHRONIC RENAL FAILURE, STAGE 5 (H): ICD-10-CM

## 2018-09-10 LAB
ALBUMIN SERPL-MCNC: 3.3 G/DL (ref 3.4–5)
ANION GAP SERPL CALCULATED.3IONS-SCNC: 10 MMOL/L (ref 3–14)
BUN SERPL-MCNC: 98 MG/DL (ref 7–30)
CALCIUM SERPL-MCNC: 10 MG/DL (ref 8.5–10.1)
CHLORIDE SERPL-SCNC: 110 MMOL/L (ref 94–109)
CO2 SERPL-SCNC: 23 MMOL/L (ref 20–32)
CREAT SERPL-MCNC: 5.71 MG/DL (ref 0.66–1.25)
GFR SERPL CREATININE-BSD FRML MDRD: 10 ML/MIN/1.7M2
GLUCOSE SERPL-MCNC: 106 MG/DL (ref 70–99)
HCT VFR BLD AUTO: 32 % (ref 40–53)
HGB BLD-MCNC: 10.3 G/DL (ref 13.3–17.7)
PHOSPHATE SERPL-MCNC: 6.3 MG/DL (ref 2.5–4.5)
POTASSIUM SERPL-SCNC: 4.5 MMOL/L (ref 3.4–5.3)
SODIUM SERPL-SCNC: 143 MMOL/L (ref 133–144)

## 2018-09-10 PROCEDURE — 80069 RENAL FUNCTION PANEL: CPT

## 2018-09-10 PROCEDURE — 85018 HEMOGLOBIN: CPT

## 2018-09-10 PROCEDURE — 99207 ZZC NO CHARGE NURSE ONLY: CPT | Performed by: FAMILY MEDICINE

## 2018-09-10 PROCEDURE — 36415 COLL VENOUS BLD VENIPUNCTURE: CPT

## 2018-09-10 PROCEDURE — 85014 HEMATOCRIT: CPT

## 2018-09-10 NOTE — TELEPHONE ENCOUNTER
Anemia Management Note  SUBJECTIVE/OBJECTIVE:  Referred by Cecilia De La Torre on 9/11/2017  Primary Diagnosis: Anemia in Chronic Kidney Disease (N18.5, D63.1)     Secondary Diagnosis:  Chronic Kidney Disease, Stage 5 (N18.5)  Hgb goal range:  9-10  Epo/Darbo: Aranesp 100 mcg every 14 days As needed for hgb<10g/dL  RX expires on 12/5/2018  Iron regimen:  None  Labs exp: 9/12/2018  **Provide phone number for [facility] [phone] and instruction to schedule ancillary visit for aranesp injection. Send message to care team via pool - FZ RN TRIAGE POOL as a telephone encounter**      Contact: **AUTH TO DISCUSS**Tereza Norton(daughter) [phone], Rabia Jazzy (daughter)[phone]    Anemia Latest Ref Rng & Units 8/6/2018 8/13/2018 8/20/2018 8/27/2018 8/28/2018 9/4/2018 9/10/2018   EARLENE Dose - - - - - - - -   Hemoglobin 13.3 - 17.7 g/dL 10.8(L) 10.4(L) 10.3(L) Test canceled - Lab  error 10.6(L) 10.4(L) 10.3(L)   TSAT 15 - 46 % - - - 24 - - -   Ferritin 26 - 388 ng/mL - - - 524(H) - - -     BP Readings from Last 3 Encounters:   09/10/18 124/74   09/04/18 126/74   08/27/18 130/80     Wt Readings from Last 2 Encounters:   07/25/18 171 lb 9.6 oz (77.8 kg)   04/27/18 173 lb (78.5 kg)         ASSESSMENT:  Hgb:Above goal - recommend hold dose  TSat: not at goal (>30%) but ferritin >500ng/mL.  IV iron not indicated at this time per anemia protocol. Ferritin: At goal (>100ng/mL)    PLAN:  Hold Aranesp and RTC for hgb then aranesp if needed in 1 week(s)    Orders needed to be renewed (for next follow-up date) in EPIC: None    Iron labs due:  9/24/18    Plan discussed with:  No call made - next appt is scheduled  Plan provided by:  Mireya Hussein CP  Anemia Clinic  [phone]    NEXT FOLLOW-UP DATE:  9/17  Reviewed 09/14/2018 [facility]  Anemia Management Service  Daina Mock PharmD and Malina Hussein CP  Phone: [phone]  Fax: [phone]

## 2018-09-10 NOTE — PROGRESS NOTES
Dung Norton is enrolled/participating in the retail pharmacy Blood Pressure Goals Achievement Program (BPGAP).  Dung Norton was evaluated at [facility] on September 10, 2018 at which time his blood pressure was:    BP Readings from Last 3 Encounters:   09/10/18 124/74   09/04/18 126/74   08/27/18 130/80     Reviewed lifestyle modifications for blood pressure control and reduction: including making healthy food choices, managing weight, getting regular exercise, smoking cessation, reducing alcohol consumption, monitoring blood pressure regularly.     Dung Norton is not experiencing symptoms.    Follow-Up: BP is at goal of < 140/90mmHg (patient 18+ years of age with or without diabetes).  Recommended follow-up at pharmacy in 6 months.     Recommendation to Provider: none    Dung Norton was evaluated for enrollment into the PGEN study today.    Patient eligible for enrollment:  No  Patient interested in enrollment:  No    Completed by: Ricarda Andino RPh  [facility]  Phone [phone]  Fax      [phone]

## 2018-09-10 NOTE — MR AVS SNAPSHOT
After Visit Summary   9/10/2018    Dnug Norton    MRN: [id number]           Patient Information     Date Of Birth          1945        Visit Information        Provider Department      9/10/2018 10:30 AM Haley Baker MD [facility]        Today's Diagnoses     BP check    -  1       Follow-ups after your visit        Your next 10 appointments already scheduled     Sep 17, 2018 10:45 AM CDT   LAB with FZ LAB   [place] [facility] ([facility])    [address] [place] [address] [phone]           Please do not eat 10-12 hours before your appointment if you are coming in fasting for labs on lipids, cholesterol, or glucose (sugar). This does not apply to pregnant women. Water, hot tea and black coffee (with nothing added) are okay. Do not drink other fluids, diet soda or chew gum.            Oct 03, 2018 12:00 PM CDT   (Arrive by 11:30 AM)   Return Visit with Oralia De La Torre NP   [facility] Nephrology ([facility])    [address] [place] [address]  [address]  [facility] [address] [phone]            Oct 26, 2018  9:15 AM CDT   (Arrive by 9:00 AM)   Return Visit with Tamiko Vanegas MD   [facility] Urology and [facility] for Prostate and Urologic Cancers ([facility])    [address] [place] [address] [phone]            Jan 29, 2019  8:30 AM CST   Return Visit with Lizandro Ng MD   [facility] PHYSICIANS HEART AT [facility] ([facility])    [address] [place] [address] 2nd [facility] [address] [phone]              Who to contact     If you have questions or need follow up information about today's clinic visit or your schedule please contact [facility] directly at [phone].  Normal or non-critical lab and imaging results will be communicated to you by MyChart, letter or phone within 4 business days after the  clinic has received the results. If you do not hear from us within 7 days, please contact the clinic through Mobile System 7t or phone. If you have a critical or abnormal lab result, we will notify you by phone as soon as possible.  Submit refill requests through [employer] or call your pharmacy and they will forward the refill request to us. Please allow 3 business days for your refill to be completed.          Additional Information About Your Visit        Care EveryWhere ID     This is your Care EveryWhere ID. This could be used by other organizations to access your [place] medical records  [id number]         Blood Pressure from Last 3 Encounters:   09/10/18 124/74   09/04/18 126/74   08/27/18 130/80    Weight from Last 3 Encounters:   07/25/18 171 lb 9.6 oz (77.8 kg)   04/27/18 173 lb (78.5 kg)   04/13/18 173 lb (78.5 kg)              Today, you had the following     No orders found for display         Today's Medication Changes          These changes are accurate as of 9/10/18 11:59 PM.  If you have any questions, ask your nurse or doctor.               These medicines have changed or have updated prescriptions.        Dose/Directions    amLODIPine 2.5 MG tablet   Commonly known as:  NORVASC   This may have changed:  when to take this   Used for:  Renal hypertension        Dose:  2.5 mg   Take 1 tablet (2.5 mg) by mouth daily   Quantity:  90 tablet   Refills:  3                Primary Care Provider Office Phone # Fax #    Haley Baker [phone]       [address] [facility] [address]        Equal Access to Services     [facility] AH: Hadii cristine ku hadasho Soomaali, waaxda luqadaha, qaybta kaalmada adeegyada, gibson solis. So [facility] [phone].    ATENCIÓN: Si habla español, tiene a montague disposición servicios gratuitos de asistencia lingüística. Llame al [phone].    We comply with applicable federal civil rights laws and [place] laws. We do not discriminate on  the basis of race, color, national origin, age, disability, sex, sexual orientation, or gender identity.            Thank you!     Thank you for choosing [facility] [place]  for your care. Our goal is always to provide you with excellent care. Hearing back from our patients is one way we can continue to improve our services. Please take a few minutes to complete the written survey that you may receive in the mail after your visit with us. Thank you!             Your Updated Medication List - Protect others around you: Learn how to safely use, store and throw away your medicines at www.disposemymeds.org.          This list is accurate as of 9/10/18 11:59 PM.  Always use your most recent med list.                   Brand Name Dispense Instructions for use Diagnosis    amLODIPine 2.5 MG tablet    NORVASC    90 tablet    Take 1 tablet (2.5 mg) by mouth daily    Renal hypertension       aspirin 81 MG tablet     30 tablet    Take 1 tablet (81 mg) by mouth every morning - RESUME on 3/9/18 if urine remains clear    S/P TURP       calcium acetate 667 MG Caps capsule    PHOSLO    540 capsule    Take 2 capsules (1,334 mg) by mouth 3 times daily (with meals)    Chronic kidney disease, unspecified CKD stage       darbepoetin william 100 MCG/0.5ML injection    ARANESP (ALBUMIN FREE)    0.5 mL    Inject 0.5 mLs (100 mcg) Subcutaneous every 14 days As needed for hgb<10g/dL.  If Hgb increases >1 point in 2 weeks (if blood transfusion given, use hgb PRIOR to this), SYSTOLIC BP > 180 mmHg or hgb>=10g/dL, HOLD DOSE. Dose must be within 1 week of Hgb.  Per anemia protocol with Cecilia De La Torre CNP    Anemia of chronic renal failure, stage 5 (H), CKD (chronic kidney disease) stage 5, GFR less than 15 ml/min (H)       ferrous sulfate 325 (65 Fe) MG tablet    IRON    30 tablet    Take 1 tablet (325 mg) by mouth daily (with breakfast)    Anemia of chronic renal failure, stage 5 (H), CKD (chronic kidney disease) stage 5, GFR less than 15  ml/min (H)       FISH OIL PO           Multi-vitamin Tabs tablet      Take 1 tablet by mouth daily        order for DME     1 each    Equipment being ordered: olecranon bursa brace    Olecranon bursitis of left elbow

## 2018-09-10 NOTE — TELEPHONE ENCOUNTER
DATE:  9/10/2018   TIME OF RECEIPT FROM LAB:  2:47pm  LAB TEST:  Creatinine  LAB VALUE:  5.71  RESULTS GIVEN WITH READ-BACK TO (PROVIDER):  NEETA JOSEPH  TIME LAB VALUE REPORTED TO PROVIDER:   2:49pm    Giselle Benoit RN

## 2018-09-10 NOTE — Clinical Note
Blood pressure in pharmacy 124/74 Ricarda Andino [facility] Phone [phone] Fax      [phone]

## 2018-09-17 ENCOUNTER — ALLIED HEALTH/NURSE VISIT (OUTPATIENT)
Dept: FAMILY MEDICINE | Facility: CLINIC | Age: 73
End: 2018-09-17

## 2018-09-17 ENCOUNTER — TELEPHONE (OUTPATIENT)
Dept: NEPHROLOGY | Facility: CLINIC | Age: 73
End: 2018-09-17

## 2018-09-17 ENCOUNTER — TELEPHONE (OUTPATIENT)
Dept: PHARMACY | Facility: CLINIC | Age: 73
End: 2018-09-17

## 2018-09-17 VITALS — DIASTOLIC BLOOD PRESSURE: 78 MMHG | SYSTOLIC BLOOD PRESSURE: 124 MMHG

## 2018-09-17 DIAGNOSIS — N18.5 CKD (CHRONIC KIDNEY DISEASE) STAGE 5, GFR LESS THAN 15 ML/MIN (H): ICD-10-CM

## 2018-09-17 DIAGNOSIS — Z01.30 BP CHECK: Primary | ICD-10-CM

## 2018-09-17 DIAGNOSIS — D63.1 ANEMIA OF CHRONIC RENAL FAILURE, STAGE 5 (H): ICD-10-CM

## 2018-09-17 DIAGNOSIS — N18.5 ANEMIA OF CHRONIC RENAL FAILURE, STAGE 5 (H): ICD-10-CM

## 2018-09-17 LAB
HCT VFR BLD AUTO: 31.8 % (ref 40–53)
HGB BLD-MCNC: 10.2 G/DL (ref 13.3–17.7)

## 2018-09-17 PROCEDURE — 99207 ZZC NO CHARGE NURSE ONLY: CPT | Performed by: FAMILY MEDICINE

## 2018-09-17 PROCEDURE — 80069 RENAL FUNCTION PANEL: CPT

## 2018-09-17 PROCEDURE — 36415 COLL VENOUS BLD VENIPUNCTURE: CPT

## 2018-09-17 PROCEDURE — 85014 HEMATOCRIT: CPT

## 2018-09-17 PROCEDURE — 85018 HEMOGLOBIN: CPT

## 2018-09-17 NOTE — MR AVS SNAPSHOT
After Visit Summary   9/17/2018    Dung Norton    MRN: [id number]           Patient Information     Date Of Birth          1945        Visit Information        Provider Department      9/17/2018 9:52 AM Haley Baker MD [facility]        Today's Diagnoses     BP check    -  1       Follow-ups after your visit        Your next 10 appointments already scheduled     Sep 24, 2018 10:15 AM CDT   LAB with FZ LAB   [place] [facility] ([facility])    [address] [place] [address] [phone]           Please do not eat 10-12 hours before your appointment if you are coming in fasting for labs on lipids, cholesterol, or glucose (sugar). This does not apply to pregnant women. Water, hot tea and black coffee (with nothing added) are okay. Do not drink other fluids, diet soda or chew gum.            Oct 03, 2018 12:00 PM CDT   (Arrive by 11:30 AM)   Return Visit with Oralia De La Torre NP   [facility] Nephrology ([facility])    [address] [place] [address]  [address]  [facility] [address] [phone]            Oct 26, 2018  9:15 AM CDT   (Arrive by 9:00 AM)   Return Visit with Tamiko Vanegas MD   [facility] and [facility] ([facility])    [address] [place] [address] [phone]            Jan 29, 2019  8:30 AM CST   Return Visit with Lizandro Ng MD   [facility] PHYSICIANS HEART AT [facility] ([facility])    [address] [place] [address] 2nd [facility] [address] [phone]              Who to contact     If you have questions or need follow up information about today's clinic visit or your schedule please contact ROSSANA MOSS directly at [phone].  Normal or non-critical lab and imaging results will be communicated to you by MyChart, letter or phone within 4 business days after the  clinic has received the results. If you do not hear from us within 7 days, please contact the clinic through [employer] Devicest or phone. If you have a critical or abnormal lab result, we will notify you by phone as soon as possible.  Submit refill requests through [employer]) or call your pharmacy and they will forward the refill request to us. Please allow 3 business days for your refill to be completed.          Additional Information About Your Visit        Care EveryWhere ID     This is your Care EveryWhere ID. This could be used by other organizations to access your [place] medical records  [id number]         Blood Pressure from Last 3 Encounters:   09/17/18 124/78   09/10/18 124/74   09/04/18 126/74    Weight from Last 3 Encounters:   07/25/18 171 lb 9.6 oz (77.8 kg)   04/27/18 173 lb (78.5 kg)   04/13/18 173 lb (78.5 kg)              Today, you had the following     No orders found for display         Today's Medication Changes          These changes are accurate as of 9/17/18 11:59 PM.  If you have any questions, ask your nurse or doctor.               These medicines have changed or have updated prescriptions.        Dose/Directions    amLODIPine 2.5 MG tablet   Commonly known as:  NORVASC   This may have changed:  when to take this   Used for:  Renal hypertension        Dose:  2.5 mg   Take 1 tablet (2.5 mg) by mouth daily   Quantity:  90 tablet   Refills:  3                Primary Care Provider Office Phone # Fax #    Haley Baker [phone]       [address] [facility] [address]        Equal Access to Services     [facility] AH: Hadii cristine ku hadasho Soomaali, waaxda luqadaha, qaybta kaalmada adeegyada, gibson solis. So [facility] [phone].    ATENCIÓN: Si habla español, tiene a montague disposición servicios gratuitos de asistencia lingüística. Llame al [phone].    We comply with applicable federal civil rights laws and [place] laws. We do not discriminate on  the basis of race, color, national origin, age, disability, sex, sexual orientation, or gender identity.            Thank you!     Thank you for choosing [facility] [place]  for your care. Our goal is always to provide you with excellent care. Hearing back from our patients is one way we can continue to improve our services. Please take a few minutes to complete the written survey that you may receive in the mail after your visit with us. Thank you!             Your Updated Medication List - Protect others around you: Learn how to safely use, store and throw away your medicines at www.disposemymeds.org.          This list is accurate as of 9/17/18 11:59 PM.  Always use your most recent med list.                   Brand Name Dispense Instructions for use Diagnosis    amLODIPine 2.5 MG tablet    NORVASC    90 tablet    Take 1 tablet (2.5 mg) by mouth daily    Renal hypertension       aspirin 81 MG tablet     30 tablet    Take 1 tablet (81 mg) by mouth every morning - RESUME on 3/9/18 if urine remains clear    S/P TURP       calcium acetate 667 MG Caps capsule    PHOSLO    540 capsule    Take 2 capsules (1,334 mg) by mouth 3 times daily (with meals)    Chronic kidney disease, unspecified CKD stage       darbepoetin william 100 MCG/0.5ML injection    ARANESP (ALBUMIN FREE)    0.5 mL    Inject 0.5 mLs (100 mcg) Subcutaneous every 14 days As needed for hgb<10g/dL.  If Hgb increases >1 point in 2 weeks (if blood transfusion given, use hgb PRIOR to this), SYSTOLIC BP > 180 mmHg or hgb>=10g/dL, HOLD DOSE. Dose must be within 1 week of Hgb.  Per anemia protocol with Cecilia De La Torre CNP    Anemia of chronic renal failure, stage 5 (H), CKD (chronic kidney disease) stage 5, GFR less than 15 ml/min (H)       ferrous sulfate 325 (65 Fe) MG tablet    IRON    30 tablet    Take 1 tablet (325 mg) by mouth daily (with breakfast)    Anemia of chronic renal failure, stage 5 (H), CKD (chronic kidney disease) stage 5, GFR less than 15  ml/min (H)       FISH OIL PO           Multi-vitamin Tabs tablet      Take 1 tablet by mouth daily        order for DME     1 each    Equipment being ordered: olecranon bursa brace    Olecranon bursitis of left elbow

## 2018-09-17 NOTE — Clinical Note
Blood pressure In pharmacy 124/78 Ricarda Andino [facility] Phone [phone] Fax      [phone]

## 2018-09-17 NOTE — PROGRESS NOTES
Dung Norton is enrolled/participating in the retail pharmacy Blood Pressure Goals Achievement Program (BPGAP).  Dung Norton was evaluated at [facility] on September 17, 2018 at which time his blood pressure was:    BP Readings from Last 3 Encounters:   09/10/18 124/74   09/04/18 126/74   08/27/18 130/80     Reviewed lifestyle modifications for blood pressure control and reduction: including making healthy food choices, managing weight, getting regular exercise, smoking cessation, reducing alcohol consumption, monitoring blood pressure regularly.     Dung Norton is not experiencing symptoms.    Follow-Up: BP is at goal of < 140/90mmHg (patient 18+ years of age with or without diabetes).  Recommended follow-up at pharmacy in 6 months.     Recommendation to Provider: none    Dung Norton was evaluated for enrollment into the PGEN study today.    Patient eligible for enrollment:  No  Patient interested in enrollment:  No    Completed by: Ricarda Andino RPh  [facility]  Phone [phone]  Fax      [phone]

## 2018-09-17 NOTE — TELEPHONE ENCOUNTER
DATE:  9/17/2018   TIME OF RECEIPT FROM LAB:  3:26pm  LAB TEST:  Creatinine   LAB VALUE:  5.61  RESULTS GIVEN WITH READ-BACK TO (PROVIDER):  NEETA JOSEPH  TIME LAB VALUE REPORTED TO PROVIDER:   3:26pm    Lab is stable.    Giselle Benoit RN

## 2018-09-18 LAB
ALBUMIN SERPL-MCNC: 3.3 G/DL (ref 3.4–5)
ANION GAP SERPL CALCULATED.3IONS-SCNC: 9 MMOL/L (ref 3–14)
BUN SERPL-MCNC: 103 MG/DL (ref 7–30)
CALCIUM SERPL-MCNC: 9.7 MG/DL (ref 8.5–10.1)
CHLORIDE SERPL-SCNC: 110 MMOL/L (ref 94–109)
CO2 SERPL-SCNC: 23 MMOL/L (ref 20–32)
CREAT SERPL-MCNC: 5.61 MG/DL (ref 0.66–1.25)
GFR SERPL CREATININE-BSD FRML MDRD: 10 ML/MIN/1.7M2
GLUCOSE SERPL-MCNC: 110 MG/DL (ref 70–99)
PHOSPHATE SERPL-MCNC: 6.3 MG/DL (ref 2.5–4.5)
POTASSIUM SERPL-SCNC: 4.8 MMOL/L (ref 3.4–5.3)
SODIUM SERPL-SCNC: 142 MMOL/L (ref 133–144)

## 2018-09-18 NOTE — TELEPHONE ENCOUNTER
Anemia Management Note  SUBJECTIVE/OBJECTIVE:  Referred by Cecilia De La Torre on 9/11/2017  Primary Diagnosis: Anemia in Chronic Kidney Disease (N18.5, D63.1)     Secondary Diagnosis:  Chronic Kidney Disease, Stage 5 (N18.5)  Hgb goal range:  9-10  Epo/Darbo: Aranesp 100 mcg every 14 days As needed for hgb<10g/dL  RX expires on 12/5/2018  Iron regimen:  None  Labs exp: 9/12/2018  **Provide phone number for [facility] [phone] and instruction to schedule ancillary visit for aranesp injection. Send message to care team via pool - FZ RN TRIAGE POOL as a telephone encounter**      Contact: **AUTH TO DISCUSS**Tereza Norton(daughter) [phone], Rabia Jazzy (daughter)[phone]    Anemia Latest Ref Rng & Units 8/13/2018 8/20/2018 8/27/2018 8/28/2018 9/4/2018 9/10/2018 9/17/2018   EARLENE Dose - - - - - - - -   Hemoglobin 13.3 - 17.7 g/dL 10.4(L) 10.3(L) Test canceled - Lab  error 10.6(L) 10.4(L) 10.3(L) 10.2(L)   TSAT 15 - 46 % - - 24 - - - -   Ferritin 26 - 388 ng/mL - - 524(H) - - - -     BP Readings from Last 3 Encounters:   09/17/18 124/78   09/10/18 124/74   09/04/18 126/74     Wt Readings from Last 2 Encounters:   07/25/18 171 lb 9.6 oz (77.8 kg)   04/27/18 173 lb (78.5 kg)           ASSESSMENT:  Hgb:Above goal - recommend hold dose  TSat: not at goal (>30%) but ferritin >500ng/mL.  IV iron not indicated at this time per anemia protocol. Ferritin: At goal (>100ng/mL)    PLAN:  Hold aranesp and RTC for hgb, ferritin and iron labs then aranesp if needed in 1 week(s)  Has not needed intervention/Aranesp in 7 months, discuss discharge with Dung after next lab results. - VERONICA    Orders needed to be renewed (for next follow-up date) in EPIC: None    Iron labs due:  9/24    Plan discussed with:  No call made - next lab appt is scheduled  Plan provided by:  Mireya Hussein [facility]  Anemia Clinic  [phone]    NEXT FOLLOW-UP DATE:  9/24/18  Reviewed 09/17/2018 VERONICA  Anemia Management Service  Daina  Anabell Mock and Malina Hussein CPhT  Phone: [phone]  Fax: [phone]

## 2018-09-24 ENCOUNTER — ALLIED HEALTH/NURSE VISIT (OUTPATIENT)
Dept: FAMILY MEDICINE | Facility: CLINIC | Age: 73
End: 2018-09-24
Payer: COMMERCIAL

## 2018-09-24 ENCOUNTER — TELEPHONE (OUTPATIENT)
Dept: NEPHROLOGY | Facility: CLINIC | Age: 73
End: 2018-09-24

## 2018-09-24 VITALS — DIASTOLIC BLOOD PRESSURE: 78 MMHG | SYSTOLIC BLOOD PRESSURE: 130 MMHG

## 2018-09-24 DIAGNOSIS — D63.1 ANEMIA OF CHRONIC RENAL FAILURE, STAGE 5 (H): ICD-10-CM

## 2018-09-24 DIAGNOSIS — N18.5 CKD (CHRONIC KIDNEY DISEASE) STAGE 5, GFR LESS THAN 15 ML/MIN (H): ICD-10-CM

## 2018-09-24 DIAGNOSIS — N18.5 ANEMIA OF CHRONIC RENAL FAILURE, STAGE 5 (H): ICD-10-CM

## 2018-09-24 DIAGNOSIS — Z01.30 BP CHECK: Primary | ICD-10-CM

## 2018-09-24 LAB
ALBUMIN SERPL-MCNC: 3.5 G/DL (ref 3.4–5)
ANION GAP SERPL CALCULATED.3IONS-SCNC: 11 MMOL/L (ref 3–14)
BUN SERPL-MCNC: 104 MG/DL (ref 7–30)
CALCIUM SERPL-MCNC: 10.4 MG/DL (ref 8.5–10.1)
CHLORIDE SERPL-SCNC: 109 MMOL/L (ref 94–109)
CO2 SERPL-SCNC: 22 MMOL/L (ref 20–32)
CREAT SERPL-MCNC: 5.46 MG/DL (ref 0.66–1.25)
FERRITIN SERPL-MCNC: 458 NG/ML (ref 26–388)
GFR SERPL CREATININE-BSD FRML MDRD: 10 ML/MIN/1.7M2
GLUCOSE SERPL-MCNC: 97 MG/DL (ref 70–99)
HCT VFR BLD AUTO: 33 % (ref 40–53)
HGB BLD-MCNC: 10.5 G/DL (ref 13.3–17.7)
IRON SATN MFR SERPL: 25 % (ref 15–46)
IRON SERPL-MCNC: 62 UG/DL (ref 35–180)
PHOSPHATE SERPL-MCNC: 6.1 MG/DL (ref 2.5–4.5)
POTASSIUM SERPL-SCNC: 4.8 MMOL/L (ref 3.4–5.3)
SODIUM SERPL-SCNC: 142 MMOL/L (ref 133–144)
TIBC SERPL-MCNC: 245 UG/DL (ref 240–430)

## 2018-09-24 PROCEDURE — 83550 IRON BINDING TEST: CPT

## 2018-09-24 PROCEDURE — 85018 HEMOGLOBIN: CPT

## 2018-09-24 PROCEDURE — 80069 RENAL FUNCTION PANEL: CPT

## 2018-09-24 PROCEDURE — 85014 HEMATOCRIT: CPT

## 2018-09-24 PROCEDURE — 36415 COLL VENOUS BLD VENIPUNCTURE: CPT

## 2018-09-24 PROCEDURE — 99207 ZZC NO CHARGE NURSE ONLY: CPT | Performed by: FAMILY MEDICINE

## 2018-09-24 PROCEDURE — 83540 ASSAY OF IRON: CPT

## 2018-09-24 PROCEDURE — 82728 ASSAY OF FERRITIN: CPT

## 2018-09-24 NOTE — TELEPHONE ENCOUNTER
DATE:  9/24/2018   TIME OF RECEIPT FROM LAB:  4:12pm   LAB TEST:  Creatinine  LAB VALUE:  5.46  RESULTS GIVEN WITH READ-BACK TO (PROVIDER):  NEETA JOSEPH  TIME LAB VALUE REPORTED TO PROVIDER:   4:12pm    Lab is stable.    Giselle Benoit RN

## 2018-09-24 NOTE — MR AVS SNAPSHOT
After Visit Summary   9/24/2018    Dung Norton    MRN: [id number]           Patient Information     Date Of Birth          1945        Visit Information        Provider Department      9/24/2018 10:34 AM Haley Baker MD [facility] [place]        Today's Diagnoses     BP check    -  1       Follow-ups after your visit        Your next 10 appointments already scheduled     Oct 01, 2018 10:45 AM CDT   LAB with FZ LAB   [facility] [place] ([facility])    [address] [place] [address] [phone]           Please do not eat 10-12 hours before your appointment if you are coming in fasting for labs on lipids, cholesterol, or glucose (sugar). This does not apply to pregnant women. Water, hot tea and black coffee (with nothing added) are okay. Do not drink other fluids, diet soda or chew gum.            Oct 03, 2018 12:00 PM CDT   (Arrive by 11:30 AM)   Return Visit with Oralia De La Torre NP   [facility] ([facility])    [address] [place] [address]  [address]  [facility] [address]   [phone]            Oct 26, 2018  9:15 AM CDT   (Arrive by 9:00 AM)   Return Visit with Tamiko Vanegas MD   [facility] for Prostate and Urologic Cancers ([facility])    [address] [place] [id number] [phone]            Jan 29, 2019  8:30 AM CST   Return Visit with Lizandro Ng MD   [facility] PHYSICIANS [facility] AT [facility] ([facility])    [address] [facility] [address] [facility] [address] [facility] [address]   [phone]              Future tests that were ordered for you today     Open Standing Orders        Priority Remaining Interval Expires Ordered    Hemoglobin and hematocrit Routine 99/99 Weekly 9/26/2019 9/26/2018    Renal panel Routine 99/99 Weekly 9/26/2019 9/26/2018            Who to contact     If you have questions or need follow up  information about today's clinic visit or your schedule please contact [facility] directly at [phone].  Normal or non-critical lab and imaging results will be communicated to you by MyChart, letter or phone within 4 business days after the clinic has received the results. If you do not hear from us within 7 days, please contact the clinic through MyChart or phone. If you have a critical or abnormal lab result, we will notify you by phone as soon as possible.  Submit refill requests through [employer] or call your pharmacy and they will forward the refill request to us. Please allow 3 business days for your refill to be completed.          Additional Information About Your Visit        Care EveryWhere ID     This is your Care EveryWhere ID. This could be used by other organizations to access your [place] medical records  [id number]         Blood Pressure from Last 3 Encounters:   09/24/18 130/78   09/17/18 124/78   09/10/18 124/74    Weight from Last 3 Encounters:   07/25/18 171 lb 9.6 oz (77.8 kg)   04/27/18 173 lb (78.5 kg)   04/13/18 173 lb (78.5 kg)              Today, you had the following     No orders found for display         Today's Medication Changes          These changes are accurate as of 9/24/18 11:59 PM.  If you have any questions, ask your nurse or doctor.               These medicines have changed or have updated prescriptions.        Dose/Directions    amLODIPine 2.5 MG tablet   Commonly known as:  NORVASC   This may have changed:  when to take this   Used for:  Renal hypertension        Dose:  2.5 mg   Take 1 tablet (2.5 mg) by mouth daily   Quantity:  90 tablet   Refills:  3                Primary Care Provider Office Phone # Fax #    Haley Baker [phone] [facility] [address]        Equal Access to Services     BRIAN CERRATO AH: Justin Sagastume, osvaldo calle, gibson nesbitt  latunde solis. So [facility] [phone].    ATENCIÓN: Si betty hernandez, tiene a montague disposición servicios gratuitos de asistencia lingüística. Jose Manuel brink [phone].    We comply with applicable federal civil rights laws and [place] laws. We do not discriminate on the basis of race, color, national origin, age, disability, sex, sexual orientation, or gender identity.            Thank you!     Thank you for choosing [facility] [place]  for your care. Our goal is always to provide you with excellent care. Hearing back from our patients is one way we can continue to improve our services. Please take a few minutes to complete the written survey that you may receive in the mail after your visit with us. Thank you!             Your Updated Medication List - Protect others around you: Learn how to safely use, store and throw away your medicines at www.disposemymeds.org.          This list is accurate as of 9/24/18 11:59 PM.  Always use your most recent med list.                   Brand Name Dispense Instructions for use Diagnosis    amLODIPine 2.5 MG tablet    NORVASC    90 tablet    Take 1 tablet (2.5 mg) by mouth daily    Renal hypertension       aspirin 81 MG tablet     30 tablet    Take 1 tablet (81 mg) by mouth every morning - RESUME on 3/9/18 if urine remains clear    S/P TURP       calcium acetate 667 MG Caps capsule    PHOSLO    540 capsule    Take 2 capsules (1,334 mg) by mouth 3 times daily (with meals)    Chronic kidney disease, unspecified CKD stage       darbepoetin william 100 MCG/0.5ML injection    ARANESP (ALBUMIN FREE)    0.5 mL    Inject 0.5 mLs (100 mcg) Subcutaneous every 14 days As needed for hgb<10g/dL.  If Hgb increases >1 point in 2 weeks (if blood transfusion given, use hgb PRIOR to this), SYSTOLIC BP > 180 mmHg or hgb>=10g/dL, HOLD DOSE. Dose must be within 1 week of Hgb.  Per anemia protocol with Cecilia De La Torre CNP    Anemia of chronic renal failure, stage 5 (H), CKD (chronic kidney disease) stage 5, GFR  less than 15 ml/min (H)       ferrous sulfate 325 (65 Fe) MG tablet    IRON    30 tablet    Take 1 tablet (325 mg) by mouth daily (with breakfast)    Anemia of chronic renal failure, stage 5 (H), CKD (chronic kidney disease) stage 5, GFR less than 15 ml/min (H)       FISH OIL PO           Multi-vitamin Tabs tablet      Take 1 tablet by mouth daily        order for DME     1 each    Equipment being ordered: olecranon bursa brace    Olecranon bursitis of left elbow

## 2018-09-24 NOTE — Clinical Note
Blood pressure in pharmacy 130/78 no symptoms reported [facility] Phone [phone] Fax      [phone]

## 2018-09-24 NOTE — PROGRESS NOTES
Dung Norton is enrolled/participating in the retail pharmacy Blood Pressure Goals Achievement Program (BPGAP).  Dung Norton was evaluated at [facility] on September 24, 2018 at which time his blood pressure was:    BP Readings from Last 3 Encounters:   09/24/18 130/78   09/17/18 124/78   09/10/18 124/74     Reviewed lifestyle modifications for blood pressure control and reduction: including making healthy food choices, managing weight, getting regular exercise, smoking cessation, reducing alcohol consumption, monitoring blood pressure regularly.     Dung Norton is not experiencing symptoms.    Follow-Up: BP is at goal of < 140/90mmHg (patient 18+ years of age with or without diabetes).  Recommended follow-up at pharmacy in 6 months.     Recommendation to Provider: none    Dung Norton was evaluated for enrollment into the PGEN study today.    PLEASE INITIATE ENROLLMENT DISCUSSION WITH HTN PTS  1) Between 30-80 years old                                                                                                               2) BMI between 19-50                                                                                                        3) BP ?140/90 AND ?170/110 patients aged 30-59         BP ?150/90 AND ?170/110 non-diabetic patients aged 60-80       BP ?140/90 AND ?170/110 diabetic patients aged 60-80  4) Additional requirements for uncontrolled HTN patients:        Pt on only 1 class of medication  5) EXCLUDE patient if confirmation of:                  ? Cardiac disease                  ? Chronic Kidney Disease                  ? Pregnancy/Breastfeeding                  ? Secondary Hypertension/Pre-eclampsia                                 ? Vascular disease    Patient eligible for enrollment:  No  Patient interested in enrollment:  No    This note completed by: Ricarda Andino kym  [facility]  Phone [phone]  Fax      [phone]

## 2018-09-25 ENCOUNTER — DOCUMENTATION ONLY (OUTPATIENT)
Dept: LAB | Facility: CLINIC | Age: 73
End: 2018-09-25

## 2018-09-25 ENCOUNTER — TELEPHONE (OUTPATIENT)
Dept: PHARMACY | Facility: CLINIC | Age: 73
End: 2018-09-25

## 2018-09-25 NOTE — PROGRESS NOTES
Hemoglobin and hematocrit order has . Please place new standing orders, patient is having weekly labs.  Thank you.    [facility]

## 2018-09-25 NOTE — TELEPHONE ENCOUNTER
Anemia Management Note  SUBJECTIVE/OBJECTIVE:  Referred by Cecilia De La Torre on 9/11/2017  Primary Diagnosis: Anemia in Chronic Kidney Disease (N18.5, D63.1)     Secondary Diagnosis:  Chronic Kidney Disease, Stage 5 (N18.5)  Hgb goal range:  9-10  Epo/Darbo: Aranesp 100 mcg every 14 days As needed for hgb<10g/dL  RX expires on 12/5/2018  Iron regimen:  None  Labs exp: 9/12/2018  **Provide phone number for [facility] [phone] and instruction to schedule ancillary visit for aranesp injection. Send message to care team via pool - FZ RN TRIAGE POOL as a telephone encounter**      Contact: **AUTH TO DISCUSS**Tereza Norton(daughter) [phone], Rabia Jazzy (daughter)[phone]    Anemia Latest Ref Rng & Units 8/20/2018 8/27/2018 8/28/2018 9/4/2018 9/10/2018 9/17/2018 9/24/2018   EARLENE Dose - - - - - - - -   Hemoglobin 13.3 - 17.7 g/dL 10.3(L) Test canceled - Lab  error 10.6(L) 10.4(L) 10.3(L) 10.2(L) 10.5(L)   TSAT 15 - 46 % - 24 - - - - 25   Ferritin 26 - 388 ng/mL - 524(H) - - - - 458(H)     BP Readings from Last 3 Encounters:   09/24/18 130/78   09/17/18 124/78   09/10/18 124/74     Wt Readings from Last 2 Encounters:   07/25/18 171 lb 9.6 oz (77.8 kg)   04/27/18 173 lb (78.5 kg)           ASSESSMENT:  Hgb:Above goal - recommend hold dose  TSat: not at goal of >30% Ferritin: At goal (>100ng/mL)    PLAN:  Hold Aranesp  Next labs 10/01/2018 for renal visit  Plan to discharge after renal visit 10/03/2018 to make sure no issues/concerns    Orders needed to be renewed (for next follow-up date) in EPIC: None    Iron labs due:      Plan discussed with:  No call made, next appt scheduled  Plan provided by:      NEXT FOLLOW-UP DATE:  10/03/2018    Anemia Management Service  Daina Mock PharmD and Malina Hussein CPhT  Phone: [phone]  Fax: [phone]

## 2018-09-26 DIAGNOSIS — N18.5 CKD (CHRONIC KIDNEY DISEASE) STAGE 5, GFR LESS THAN 15 ML/MIN (H): Primary | ICD-10-CM

## 2018-10-02 ENCOUNTER — TELEPHONE (OUTPATIENT)
Dept: NEPHROLOGY | Facility: CLINIC | Age: 73
End: 2018-10-02

## 2018-10-02 ENCOUNTER — ALLIED HEALTH/NURSE VISIT (OUTPATIENT)
Dept: FAMILY MEDICINE | Facility: CLINIC | Age: 73
End: 2018-10-02

## 2018-10-02 VITALS — DIASTOLIC BLOOD PRESSURE: 76 MMHG | SYSTOLIC BLOOD PRESSURE: 138 MMHG

## 2018-10-02 DIAGNOSIS — N18.5 CKD (CHRONIC KIDNEY DISEASE) STAGE 5, GFR LESS THAN 15 ML/MIN (H): ICD-10-CM

## 2018-10-02 DIAGNOSIS — I10 HYPERTENSION GOAL BP (BLOOD PRESSURE) < 140/90: Primary | ICD-10-CM

## 2018-10-02 LAB
ALBUMIN SERPL-MCNC: 3.3 G/DL (ref 3.4–5)
ANION GAP SERPL CALCULATED.3IONS-SCNC: 11 MMOL/L (ref 3–14)
BUN SERPL-MCNC: 109 MG/DL (ref 7–30)
CALCIUM SERPL-MCNC: 10.6 MG/DL (ref 8.5–10.1)
CHLORIDE SERPL-SCNC: 110 MMOL/L (ref 94–109)
CO2 SERPL-SCNC: 22 MMOL/L (ref 20–32)
CREAT SERPL-MCNC: 5.16 MG/DL (ref 0.66–1.25)
GFR SERPL CREATININE-BSD FRML MDRD: 11 ML/MIN/1.7M2
GLUCOSE SERPL-MCNC: 104 MG/DL (ref 70–99)
HCT VFR BLD AUTO: 33.2 % (ref 40–53)
HGB BLD-MCNC: 10.4 G/DL (ref 13.3–17.7)
PHOSPHATE SERPL-MCNC: 6.4 MG/DL (ref 2.5–4.5)
POTASSIUM SERPL-SCNC: 4.9 MMOL/L (ref 3.4–5.3)
SODIUM SERPL-SCNC: 143 MMOL/L (ref 133–144)

## 2018-10-02 PROCEDURE — 85014 HEMATOCRIT: CPT

## 2018-10-02 PROCEDURE — 99207 ZZC NO CHARGE NURSE ONLY: CPT | Performed by: FAMILY MEDICINE

## 2018-10-02 PROCEDURE — 85018 HEMOGLOBIN: CPT

## 2018-10-02 PROCEDURE — 80069 RENAL FUNCTION PANEL: CPT

## 2018-10-02 PROCEDURE — 36415 COLL VENOUS BLD VENIPUNCTURE: CPT

## 2018-10-02 NOTE — MR AVS SNAPSHOT
After Visit Summary   10/2/2018    Dung Norton    MRN: [id number]           Patient Information     Date Of Birth          1945        Visit Information        Provider Department      10/2/2018 9:45 AM Haley Baker MD [facility]        Today's Diagnoses     Hypertension goal BP (blood pressure) < 140/90    -  1       Follow-ups after your visit        Your next 10 appointments already scheduled     Oct 02, 2018 10:00 AM CDT   LAB with FZ LAB   [facility] ([facility])    [address] [facility] [address]   [phone]           Please do not eat 10-12 hours before your appointment if you are coming in fasting for labs on lipids, cholesterol, or glucose (sugar). This does not apply to pregnant women. Water, hot tea and black coffee (with nothing added) are okay. Do not drink other fluids, diet soda or chew gum.            Oct 03, 2018 12:00 PM CDT   (Arrive by 11:30 AM)   Return Visit with Oralia De La Torre NP   [facility] ([place])    [address] [place] [address]  [address]  [facility] [id number] [phone]            Oct 08, 2018 10:15 AM CDT   LAB with FZ LAB   [facility] [place] ([facility])    [address] [address]   [phone]           Please do not eat 10-12 hours before your appointment if you are coming in fasting for labs on lipids, cholesterol, or glucose (sugar). This does not apply to pregnant women. Water, hot tea and black coffee (with nothing added) are okay. Do not drink other fluids, diet soda or chew gum.            Oct 26, 2018  9:15 AM CDT   (Arrive by 9:00 AM)   Return Visit with Tamiko Vanegas MD   [facility]rologic Cancers ([place] [facility])    [address] [place] [address]  4th Floor  [facility] [id number] [phone]            Jan 29, 2019  8:30 AM CST   Return Visit  with Lizandro Ng MD   [facility] PHYSICIANS HEART AT [facility] ([facility])    [address] [place] [address] 2nd Floor  [place] [address] [phone]              Who to contact     If you have questions or need follow up information about today's clinic visit or your schedule please contact [facility] directly at [phone].  Normal or non-critical lab and imaging results will be communicated to you by MyChart, letter or phone within 4 business days after the clinic has received the results. If you do not hear from us within 7 days, please contact the clinic through MyChart or phone. If you have a critical or abnormal lab result, we will notify you by phone as soon as possible.  Submit refill requests through [employer] or call your pharmacy and they will forward the refill request to us. Please allow 3 business days for your refill to be completed.          Additional Information About Your Visit        Care EveryWhere ID     This is your Care EveryWhere ID. This could be used by other organizations to access your [place] medical records  [id number]         Blood Pressure from Last 3 Encounters:   10/02/18 138/76   09/24/18 130/78   09/17/18 124/78    Weight from Last 3 Encounters:   07/25/18 171 lb 9.6 oz (77.8 kg)   04/27/18 173 lb (78.5 kg)   04/13/18 173 lb (78.5 kg)              Today, you had the following     No orders found for display         Today's Medication Changes          These changes are accurate as of 10/2/18  9:46 AM.  If you have any questions, ask your nurse or doctor.               These medicines have changed or have updated prescriptions.        Dose/Directions    amLODIPine 2.5 MG tablet   Commonly known as:  NORVASC   This may have changed:  when to take this   Used for:  Renal hypertension        Dose:  2.5 mg   Take 1 tablet (2.5 mg) by mouth daily   Quantity:  90 tablet   Refills:  3                Primary Care Provider Office Phone # Fax #     Haley Baker [phone]       [address] [facility] [address]        Equal Access to Services     BRIAN CERRATO : Justin aad ku hadkelleycallie Sagastume, osvaldo tapiaceliha, lidatoño mooreyael jones, gibson biancain hayaan ramonkimberly fulton lamalickhillary solis. So [facility] [phone].    ATENCIÓN: Si habla español, tiene a montague disposición servicios gratuitos de asistencia lingüística. Llame al [phone].    We comply with applicable federal civil rights laws and [place] laws. We do not discriminate on the basis of race, color, national origin, age, disability, sex, sexual orientation, or gender identity.            Thank you!     Thank you for choosing [facility]  for your care. Our goal is always to provide you with excellent care. Hearing back from our patients is one way we can continue to improve our services. Please take a few minutes to complete the written survey that you may receive in the mail after your visit with us. Thank you!             Your Updated Medication List - Protect others around you: Learn how to safely use, store and throw away your medicines at www.disposemymeds.org.          This list is accurate as of 10/2/18  9:46 AM.  Always use your most recent med list.                   Brand Name Dispense Instructions for use Diagnosis    amLODIPine 2.5 MG tablet    NORVASC    90 tablet    Take 1 tablet (2.5 mg) by mouth daily    Renal hypertension       aspirin 81 MG tablet     30 tablet    Take 1 tablet (81 mg) by mouth every morning - RESUME on 3/9/18 if urine remains clear    S/P TURP       calcium acetate 667 MG Caps capsule    PHOSLO    540 capsule    Take 2 capsules (1,334 mg) by mouth 3 times daily (with meals)    Chronic kidney disease, unspecified CKD stage       darbepoetin william 100 MCG/0.5ML injection    ARANESP (ALBUMIN FREE)    0.5 mL    Inject 0.5 mLs (100 mcg) Subcutaneous every 14 days As needed for hgb<10g/dL.  If Hgb increases >1 point in 2 weeks (if blood transfusion  given, use hgb PRIOR to this), SYSTOLIC BP > 180 mmHg or hgb>=10g/dL, HOLD DOSE. Dose must be within 1 week of Hgb.  Per anemia protocol with Cecilia De La Torre CNP    Anemia of chronic renal failure, stage 5 (H), CKD (chronic kidney disease) stage 5, GFR less than 15 ml/min (H)       ferrous sulfate 325 (65 Fe) MG tablet    IRON    30 tablet    Take 1 tablet (325 mg) by mouth daily (with breakfast)    Anemia of chronic renal failure, stage 5 (H), CKD (chronic kidney disease) stage 5, GFR less than 15 ml/min (H)       FISH OIL PO           Multi-vitamin Tabs tablet      Take 1 tablet by mouth daily        order for DME     1 each    Equipment being ordered: olecranon bursa brace    Olecranon bursitis of left elbow

## 2018-10-02 NOTE — TELEPHONE ENCOUNTER
DATE:  10/2/2018   TIME OF RECEIPT FROM LAB:  3:46pm  LAB TEST:  Creatinine / BUN  LAB VALUE:  5.23 / 109  RESULTS GIVEN WITH READ-BACK TO (PROVIDER):  NEETA JOSEPH  TIME LAB VALUE REPORTED TO PROVIDER:   3:50pm      Labs stable.    Giselle Benoit RN

## 2018-10-02 NOTE — PROGRESS NOTES
Dung Norton is enrolled/participating in the retail pharmacy Blood Pressure Goals Achievement Program (BPGAP).  Dung Norton was evaluated at [facility] on October 2, 2018 at which time his blood pressure was:    BP Readings from Last 3 Encounters:   10/02/18 138/76   09/24/18 130/78   09/17/18 124/78     Reviewed lifestyle modifications for blood pressure control and reduction: including making healthy food choices, managing weight, getting regular exercise, smoking cessation, reducing alcohol consumption, monitoring blood pressure regularly.     Dung Norton is not experiencing symptoms.    Follow-Up: BP is at goal of < 140/90mmHg (patient 18+ years of age with or without diabetes).  Recommended follow-up at pharmacy in 6 months.     Recommendation to Provider: None at this time.     Dung Norton was evaluated for enrollment into the PGEN study today.    PLEASE INITIATE ENROLLMENT DISCUSSION WITH HTN PTS  1) Between 30-80 years old                                                                                                               2) BMI between 19-50                                                                                                        3) BP ?140/90 AND ?170/110 patients aged 30-59         BP ?150/90 AND ?170/110 non-diabetic patients aged 60-80       BP ?140/90 AND ?170/110 diabetic patients aged 60-80  4) Additional requirements for uncontrolled HTN patients:        Pt on only 1 class of medication  5) EXCLUDE patient if confirmation of:                  ? Cardiac disease                  ? Chronic Kidney Disease                  ? Pregnancy/Breastfeeding                  ? Secondary Hypertension/Pre-eclampsia                                 ? Vascular disease    Patient eligible for enrollment:  No  Patient interested in enrollment:  No    This note completed by: Thank you,  Cheri Winslow, PharmD  [facility]  [phone]

## 2018-10-03 ENCOUNTER — OFFICE VISIT (OUTPATIENT)
Dept: NEPHROLOGY | Facility: CLINIC | Age: 73
End: 2018-10-03
Payer: MEDICARE

## 2018-10-03 VITALS
BODY MASS INDEX: 24.88 KG/M2 | DIASTOLIC BLOOD PRESSURE: 78 MMHG | SYSTOLIC BLOOD PRESSURE: 133 MMHG | OXYGEN SATURATION: 100 % | TEMPERATURE: 98 F | WEIGHT: 173.4 LBS | HEART RATE: 100 BPM

## 2018-10-03 DIAGNOSIS — N25.81 SECONDARY RENAL HYPERPARATHYROIDISM (H): ICD-10-CM

## 2018-10-03 DIAGNOSIS — E83.52 HYPERCALCEMIA: ICD-10-CM

## 2018-10-03 DIAGNOSIS — I10 BENIGN ESSENTIAL HYPERTENSION: ICD-10-CM

## 2018-10-03 DIAGNOSIS — N18.5 CKD (CHRONIC KIDNEY DISEASE) STAGE 5, GFR LESS THAN 15 ML/MIN (H): ICD-10-CM

## 2018-10-03 DIAGNOSIS — E83.39 HYPERPHOSPHATEMIA: ICD-10-CM

## 2018-10-03 DIAGNOSIS — N18.5 CKD (CHRONIC KIDNEY DISEASE) STAGE 5, GFR LESS THAN 15 ML/MIN (H): Primary | ICD-10-CM

## 2018-10-03 LAB
ALBUMIN SERPL-MCNC: 3.4 G/DL (ref 3.4–5)
ANION GAP SERPL CALCULATED.3IONS-SCNC: 10 MMOL/L (ref 3–14)
BUN SERPL-MCNC: 105 MG/DL (ref 7–30)
CA-I BLD-MCNC: 5.5 MG/DL (ref 4.4–5.2)
CALCIUM SERPL-MCNC: 10.2 MG/DL (ref 8.5–10.1)
CHLORIDE SERPL-SCNC: 109 MMOL/L (ref 94–109)
CO2 SERPL-SCNC: 21 MMOL/L (ref 20–32)
CREAT SERPL-MCNC: 5.22 MG/DL (ref 0.66–1.25)
GFR SERPL CREATININE-BSD FRML MDRD: 11 ML/MIN/1.7M2
GLUCOSE SERPL-MCNC: 94 MG/DL (ref 70–99)
HCT VFR BLD AUTO: 36.1 % (ref 40–53)
HGB BLD-MCNC: 11.3 G/DL (ref 13.3–17.7)
PHOSPHATE SERPL-MCNC: 6.5 MG/DL (ref 2.5–4.5)
POTASSIUM SERPL-SCNC: 5.1 MMOL/L (ref 3.4–5.3)
PTH-INTACT SERPL-MCNC: 17 PG/ML (ref 18–80)
SODIUM SERPL-SCNC: 140 MMOL/L (ref 133–144)
TSH SERPL DL<=0.005 MIU/L-ACNC: 6.47 MU/L (ref 0.4–4)

## 2018-10-03 PROCEDURE — 36415 COLL VENOUS BLD VENIPUNCTURE: CPT

## 2018-10-03 PROCEDURE — G0463 HOSPITAL OUTPT CLINIC VISIT: HCPCS | Mod: ZF

## 2018-10-03 PROCEDURE — 82330 ASSAY OF CALCIUM: CPT

## 2018-10-03 PROCEDURE — 85018 HEMOGLOBIN: CPT

## 2018-10-03 PROCEDURE — 84443 ASSAY THYROID STIM HORMONE: CPT

## 2018-10-03 PROCEDURE — 83970 ASSAY OF PARATHORMONE: CPT

## 2018-10-03 PROCEDURE — 82306 VITAMIN D 25 HYDROXY: CPT

## 2018-10-03 PROCEDURE — 85014 HEMATOCRIT: CPT

## 2018-10-03 PROCEDURE — 80069 RENAL FUNCTION PANEL: CPT

## 2018-10-03 RX ORDER — SEVELAMER HYDROCHLORIDE 400 MG/1
400 TABLET, FILM COATED ORAL
Qty: 90 TABLET | Refills: 3 | Status: SHIPPED | OUTPATIENT
Start: 2018-10-03 | End: 2019-01-16

## 2018-10-03 ASSESSMENT — PAIN SCALES - GENERAL: PAINLEVEL: NO PAIN (0)

## 2018-10-03 NOTE — PROGRESS NOTES
Nephrology Clinic Visit 10/3/18    Assessment and Plan:       1. CKD5 - Very stable renal function. 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 His GFR has been 10-11 ml/mn since 9/17 He is not uremic. Urine protein 1.5 g/gCr     - Patient and daughter have decided upon HD as his RRT option.     - He has attended the Kidney Smart program    - He underwent AVF creation with Dr Eduardo Pabon on 11/22/17. It has matured nicely and ready to use when needed. Last seen by Dr Pabon on 1/2/18   - Given stability of renal function can hold off on initiating HD until symptomatic   - He does not use NSAIDs   - He is straight cathing TID w/o difficulty      2. Electrolytes - No acute concerns. K 5.1      3. Volume status - Euvolemic. No edema, dyspnea. Albumin 3.4. Not on diuretics. Making ~ 1 liter/urine/day. He is gaining body weight, now stable in the 78 kg range. Clinic blood pressures in the 130-140/ range, but typically 120- 130's/ with weekly lab draws.        - No need for diuretics at this time      4. HTN - Well controlled. Currently on Amlodipine 2.5 mg daily. He does not check home blood pressures, but b/p is checked at lab weekly and generally in the 120-130/ range       5. BPH - S/P TURP 3/18. He is straight cathing TID w/o difficulty. Follows with Dr Vanegas in Urology. Next appt 10/26/18      6. Acid base - No acute concerns. Bicarb 22   - Goal bicarb is > 20      7. BMD - Ca 10.6, Phos 6.4, albumin 3.3   - Vit D 40, PTH 14 (7/23/18)    - He has been better about taking his Phoslo consistently with his meals, but does occ take before or after.     - Will discontinue Phoslo   - Will begin Renagel 400 mg TID w/meals   - Will check Ica, TSH, Vit D, PTH today   - Patient will decrease milk intake by 50%      8. Nutrition - Reports good appetite and albumin is stable in the 3.3 range.       9. Anemia - Hgb 10.4. Etiology is likely anemia of renal disease   - He should have routine colon cancer  screening per protocol   - Iron studies 10/18: Fe 62, Ferritin 458, Tsat 25%   - Is enrolled in anemia management program for EARLENE, but not currently requiring Aranesp   - Taking Iron one tab qd       10. Disposition - RTC in 12 wks for f/u with ongoing weekly labs until Ca has normalized, then could switch to labs qowk      Reason for Visit:  CKD5 follow up      HPI:  Dung Norton is a 72 year old year old male with a PMH of CKDIII , HTN, Obstructive uropathy 2/2 BPH/benign bladder mass with creat as high as 14.7 in July 2017. He did not required RRT at that time and renal function improved to a low of 4.6 in Sept . However despite correction of the obstructive uropathy his renal function did not normalize and he has developed ESRD. He has been very stable over the last year with creat in the 5 range and eGFR of 10-11 ml/mn.      Interval Hx:  No hospital admissions      ROS:  Feeling good w/o complaint. Continues to straight cath 3 times/day following TURP. Feels his urine volume is stable. Denies CP/dyspnea/abdominal pain. Energy is good. Quite active. He is living independently. Has good appetite. No edema.       Chronic Medical Problems:      HTN  BPH  Obstructive uropathy   HTN  Tobacco abuse  HLD  Anemia  Pulmonary nodules  KRISTINA  CKD5      Personal Hx:   Single, has 2 daughters very involved in his care. Moved to a [facility] in May and adjusting well. He is socializing more, eating better, doing some community gardening. He is a former smoker    Allergies:  No Known Allergies    Medications:  Prior to Admission medications    Medication Sig Start Date End Date Taking? Authorizing Provider   amLODIPine (NORVASC) 2.5 MG tablet Take 1 tablet (2.5 mg) by mouth daily  Patient taking differently: Take 2.5 mg by mouth every morning  12/26/17  Yes Haley Baker MD   aspirin 81 MG tablet Take 1 tablet (81 mg) by mouth every morning - RESUME on 3/9/18 if urine remains clear 3/6/18  Yes Leda Baxter PA    ferrous sulfate (IRON) 325 (65 FE) MG tablet Take 1 tablet (325 mg) by mouth daily (with breakfast) 4/4/18  Yes Oralia De La Torre NP   multivitamin, therapeutic with minerals (MULTI-VITAMIN) TABS tablet Take 1 tablet by mouth daily   Yes Reported, Patient   Omega-3 Fatty Acids (FISH OIL PO)    Yes Reported, Patient   order for DME Equipment being ordered: olejaironanohillary bursa brace 1/17/18  Yes Haley Baker MD   sevelamer (RENAGEL) 400 MG tablet Take 1 tablet (400 mg) by mouth 3 times daily (with meals) 10/3/18  Yes Oralia De La Torre NP   darbepoetin william (ARANESP, ALBUMIN FREE,) 100 MCG/0.5ML injection Inject 0.5 mLs (100 mcg) Subcutaneous every 14 days As needed for hgb<10g/dL.  If Hgb increases >1 point in 2 weeks (if blood transfusion given, use hgb PRIOR to this), SYSTOLIC BP > 180 mmHg or hgb>=10g/dL, HOLD DOSE. Dose must be within 1 week of Hgb.  Per anemia protocol with Cecilia De La Torre CNP  Patient not taking: Reported on 10/3/2018 12/6/17   Oralia De La Torre NP       Vitals:  /78  Pulse 100  Temp 98  F (36.7  C) (Oral)  Wt 78.7 kg (173 lb 6.4 oz)  SpO2 100%  BMI 24.88 kg/m2    Exam:    GEN: Pleasant, Well groomed. Daughter present  CARDIAC RRR  LUNGS: CTA  ABDOMEN: Soft, NT  EXT: no edema  Access: CLIFFORD AVF + bruit, well developed, hand warm  Neuro: No asterixis    Results:    Results for FABRIZIO MIRANDA (MRN [id number]) as of 10/3/2018 13:35   Ref. Range 10/3/2018 13:01   Sodium Latest Ref Range: 133 - 144 mmol/L 140   Potassium Latest Ref Range: 3.4 - 5.3 mmol/L 5.1   Chloride Latest Ref Range: 94 - 109 mmol/L 109   Carbon Dioxide Latest Ref Range: 20 - 32 mmol/L 21   Urea Nitrogen Latest Ref Range: 7 - 30 mg/dL 105 (H)   Creatinine Latest Ref Range: 0.66 - 1.25 mg/dL 5.22 (H)   GFR Estimate Latest Ref Range: >60 mL/min/1.7m2 11 (L)   GFR Estimate If Black Latest Ref Range: >60 mL/min/1.7m2 13 (L)   Calcium Latest Ref Range: 8.5 - 10.1 mg/dL 10.2 (H)   Anion Gap Latest Ref Range: 3 - 14  mmol/L 10   Phosphorus Latest Ref Range: 2.5 - 4.5 mg/dL 6.5 (H)   Albumin Latest Ref Range: 3.4 - 5.0 g/dL 3.4   Glucose Latest Ref Range: 70 - 99 mg/dL 94   Hemoglobin Latest Ref Range: 13.3 - 17.7 g/dL 11.3 (L)   Hematocrit Latest Ref Range: 40.0 - 53.0 % 36.1 (L)

## 2018-10-03 NOTE — NURSING NOTE
Chief Complaint   Patient presents with     RECHECK     2 month Follow Up CKD     /78  Pulse 100  Temp 98  F (36.7  C) (Oral)  Wt 78.7 kg (173 lb 6.4 oz)  SpO2 100%  BMI 24.88 kg/m2   Sandra Lainez

## 2018-10-03 NOTE — MR AVS SNAPSHOT
After Visit Summary   10/3/2018    Dung Norton    MRN: [id number]           Patient Information     Date Of Birth          1945        Visit Information        Provider Department      10/3/2018 12:00 PM Oralia De La Torre NP [facility] Nephrology        Today's Diagnoses     Secondary renal hyperparathyroidism (H)    -  1    Hypercalcemia          Care Instructions    1. Discontinue Phoslo  2. Start Renagel 400 mg one with meals  3. Weekly labs until we tell you to go to every other week labs          Follow-ups after your visit        Follow-up notes from your care team     Return in about 3 months (around 1/3/2019).      Your next 10 appointments already scheduled     Oct 03, 2018 12:45 PM CDT   LAB with  LAB   [facility] Lab ([facility])    [address] [place] [id number] [phone]           Please do not eat 10-12 hours before your appointment if you are coming in fasting for labs on lipids, cholesterol, or glucose (sugar). This does not apply to pregnant women. Water, hot tea and black coffee (with nothing added) are okay. Do not drink other fluids, diet soda or chew gum.            Oct 08, 2018 10:15 AM CDT   LAB with  LAB   [facility] ([facility])    [address] [place] [id number] [phone]           Please do not eat 10-12 hours before your appointment if you are coming in fasting for labs on lipids, cholesterol, or glucose (sugar). This does not apply to pregnant women. Water, hot tea and black coffee (with nothing added) are okay. Do not drink other fluids, diet soda or chew gum.            Oct 26, 2018  9:15 AM CDT   (Arrive by 9:00 AM)   Return Visit with Tamiko Vanegas MD   [facility] and [facility] ([facility])    [address] [place] [address]  4th [facility] [id number] [phone]            Jan 16,  2019 12:00 PM CST   (Arrive by 11:30 AM)   Return Visit with Oralia De La Torre NP   [facility] Nephrology ([facility])    [address]  [address]  [facility] [phone] [phone]            Jan 29, 2019  8:30 AM CST   Return Visit with Lizandro Ng MD   [facility] ([facility])    [address]  [facility] [address] [phone]              Who to contact     If you have questions or need follow up information about today's clinic visit or your schedule please contact [facility] NEPHROLOGY directly at [phone].  Normal or non-critical lab and imaging results will be communicated to you by MyChart, letter or phone within 4 business days after the clinic has received the results. If you do not hear from us within 7 days, please contact the clinic through MyChart or phone. If you have a critical or abnormal lab result, we will notify you by phone as soon as possible.  Submit refill requests through [employer] or call your pharmacy and they will forward the refill request to us. Please allow 3 business days for your refill to be completed.          Additional Information About Your Visit        Care EveryWhere ID     This is your Care EveryWhere ID. This could be used by other organizations to access your [facility] medical records  [id number]        Your Vitals Were     Pulse Temperature Pulse Oximetry BMI (Body Mass Index)          100 98  F (36.7  C) (Oral) 100% 24.88 kg/m2         Blood Pressure from Last 3 Encounters:   10/03/18 133/78   10/02/18 138/76   09/24/18 130/78    Weight from Last 3 Encounters:   10/03/18 78.7 kg (173 lb 6.4 oz)   07/25/18 77.8 kg (171 lb 9.6 oz)   04/27/18 78.5 kg (173 lb)              We Performed the Following     Calcium ionized whole blood     Parathyroid Hormone Intact     TSH     Vitamin D Deficiency          Today's Medication Changes          These changes are  accurate as of 10/3/18 12:37 PM.  If you have any questions, ask your nurse or doctor.               Start taking these medicines.        Dose/Directions    sevelamer 400 MG tablet   Commonly known as:  RENAGEL   Used for:  Secondary renal hyperparathyroidism (H)   Started by:  Oralia De La Torre NP        Dose:  400 mg   Take 1 tablet (400 mg) by mouth 3 times daily (with meals)   Quantity:  90 tablet   Refills:  3         These medicines have changed or have updated prescriptions.        Dose/Directions    amLODIPine 2.5 MG tablet   Commonly known as:  NORVASC   This may have changed:  when to take this   Used for:  Renal hypertension        Dose:  2.5 mg   Take 1 tablet (2.5 mg) by mouth daily   Quantity:  90 tablet   Refills:  3            Where to get your medicines      These medications were sent to [facility] [address] [place] [address] [id number] [place] [facility] [address]  [address] [place] [address]     Phone:  [phone]     sevelamer 400 MG tablet                Primary Care Provider Office Phone # Fax #    Haley Baker [phone] [address] [facility] [address]        Equal Access to Services     JOSS CERRATO AH: Hadii cristine marin hadasho Soomaali, waaxda luqadaha, qaybta kaalmada adeegyada, gibson solis. So [facility] [phone].    ATENCIÓN: Si habla español, tiene a montague disposición servicios gratuitos de asistencia lingüística. Llame al [phone].    We comply with applicable federal civil rights laws and [place] laws. We do not discriminate on the basis of race, color, national origin, age, disability, sex, sexual orientation, or gender identity.            Thank you!     Thank you for choosing [facility] NEPHROLOGY  for your care. Our goal is always to provide you with excellent care. Hearing back from our patients is one way we can continue to improve our services. Please take a few minutes to complete the  written survey that you may receive in the mail after your visit with us. Thank you!             Your Updated Medication List - Protect others around you: Learn how to safely use, store and throw away your medicines at www.disposemymeds.org.          This list is accurate as of 10/3/18 12:37 PM.  Always use your most recent med list.                   Brand Name Dispense Instructions for use Diagnosis    amLODIPine 2.5 MG tablet    NORVASC    90 tablet    Take 1 tablet (2.5 mg) by mouth daily    Renal hypertension       aspirin 81 MG tablet     30 tablet    Take 1 tablet (81 mg) by mouth every morning - RESUME on 3/9/18 if urine remains clear    S/P TURP       darbepoetin william 100 MCG/0.5ML injection    ARANESP (ALBUMIN FREE)    0.5 mL    Inject 0.5 mLs (100 mcg) Subcutaneous every 14 days As needed for hgb<10g/dL.  If Hgb increases >1 point in 2 weeks (if blood transfusion given, use hgb PRIOR to this), SYSTOLIC BP > 180 mmHg or hgb>=10g/dL, HOLD DOSE. Dose must be within 1 week of Hgb.  Per anemia protocol with Cecilia De La Torre CNP    Anemia of chronic renal failure, stage 5 (H), CKD (chronic kidney disease) stage 5, GFR less than 15 ml/min (H)       ferrous sulfate 325 (65 Fe) MG tablet    IRON    30 tablet    Take 1 tablet (325 mg) by mouth daily (with breakfast)    Anemia of chronic renal failure, stage 5 (H), CKD (chronic kidney disease) stage 5, GFR less than 15 ml/min (H)       FISH OIL PO           Multi-vitamin Tabs tablet      Take 1 tablet by mouth daily        order for DME     1 each    Equipment being ordered: olecranon bursa brace    Olecranon bursitis of left elbow       sevelamer 400 MG tablet    RENAGEL    90 tablet    Take 1 tablet (400 mg) by mouth 3 times daily (with meals)    Secondary renal hyperparathyroidism (H)

## 2018-10-03 NOTE — PATIENT INSTRUCTIONS
1. Discontinue Phoslo  2. Start Renagel 400 mg one with meals  3. Weekly labs until we tell you to go to every other week labs

## 2018-10-03 NOTE — LETTER
10/3/2018      RE: Dung Norton  [address] [address]       Nephrology Clinic Visit 10/3/18    Assessment and Plan:       1. CKD5 - Very stable renal function. 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 His GFR has been 10-11 ml/mn since 9/17 He is not uremic. Urine protein 1.5 g/gCr     - Patient and daughter have decided upon HD as his RRT option.     - He has attended the Kidney Smart program    - He underwent AVF creation with Dr Eduardo Pabon on 11/22/17. It has matured nicely and ready to use when needed. Last seen by Dr Pabon on 1/2/18   - Given stability of renal function can hold off on initiating HD until symptomatic   - He does not use NSAIDs   - He is straight cathing TID w/o difficulty      2. Electrolytes - No acute concerns. K 5.1      3. Volume status - Euvolemic. No edema, dyspnea. Albumin 3.4. Not on diuretics. Making ~ 1 liter/urine/day. He is gaining body weight, now stable in the 78 kg range. Clinic blood pressures in the 130-140/ range, but typically 120- 130's/ with weekly lab draws.        - No need for diuretics at this time      4. HTN - Well controlled. Currently on Amlodipine 2.5 mg daily. He does not check home blood pressures, but b/p is checked at lab weekly and generally in the 120-130/ range       5. BPH - S/P TURP 3/18. He is straight cathing TID w/o difficulty. Follows with Dr Vanegas in Urology. Next appt 10/26/18      6. Acid base - No acute concerns. Bicarb 22   - Goal bicarb is > 20      7. BMD - Ca 10.6, Phos 6.4, albumin 3.3   - Vit D 40, PTH 14 (7/23/18)    - He has been better about taking his Phoslo consistently with his meals, but does occ take before or after.     -  Will discontinue Phoslo   - Will begin Renagel 400 mg TID w/meals   - Will check Ica, TSH, Vit D, PTH today   - Patient will decrease milk intake by 50%      8. Nutrition - Reports good appetite and albumin is stable in the 3.3 range.       9. Anemia - Hgb  10.4. Etiology is likely anemia of renal disease   - He should have routine colon cancer screening per protocol   - Iron studies 10/18: Fe 62, Ferritin 458, Tsat 25%   - Is enrolled in anemia management program for EARLENE, but not currently requiring Aranesp   - Taking Iron one tab qd       10. Disposition - RTC in 12 wks for f/u with ongoing weekly labs until Ca has normalized, then could switch to labs qowk      Reason for Visit:  CKD5 follow up      HPI:  Dung Norton is a 72 year old year old male with a PMH of CKDIII , HTN, Obstructive uropathy 2/2 BPH/benign bladder mass with creat as high as 14.7 in July 2017. He did not required RRT at that time and renal function improved to a low of 4.6 in Sept . However despite correction of the obstructive uropathy his renal function did not normalize and he has developed ESRD. He has been very stable over the last year with creat in the 5 range and eGFR of 10-11 ml/mn.      Interval Hx:  No hospital admissions      ROS:  Feeling good w/o complaint. Continues to straight cath 3 times/day following TURP. Feels his urine volume is stable. Denies CP/dyspnea/abdominal pain. Energy is good. Quite active. He is living independently. Has good appetite. No edema.       Chronic Medical Problems:      HTN  BPH  Obstructive uropathy   HTN  Tobacco abuse  HLD  Anemia  Pulmonary nodules  KRISTINA  CKD5      Personal Hx:   Single, has 2 daughters very involved in his care. Moved to a [facility] in May and adjusting well. He is socializing more, eating better, doing some community gardening. He is a former smoker    Allergies:  No Known Allergies    Medications:  Prior to Admission medications    Medication Sig Start Date End Date Taking? Authorizing Provider   amLODIPine (NORVASC) 2.5 MG tablet Take 1 tablet (2.5 mg) by mouth daily  Patient taking differently: Take 2.5 mg by mouth every morning  12/26/17  Yes Haley Baker MD   aspirin 81 MG tablet Take 1 tablet (81 mg) by mouth  every morning - RESUME on 3/9/18 if urine remains clear 3/6/18  Yes Leda Baxter PA   ferrous sulfate (IRON) 325 (65 FE) MG tablet Take 1 tablet (325 mg) by mouth daily (with breakfast) 4/4/18  Yes Oralia De La Torre NP   multivitamin, therapeutic with minerals (MULTI-VITAMIN) TABS tablet Take 1 tablet by mouth daily   Yes Reported, Patient   Omega-3 Fatty Acids (FISH OIL PO)    Yes Reported, Patient   order for DME Equipment being ordered: olecranohillary bursa brace 1/17/18  Yes Haley Baker MD   sevelamer (RENAGEL) 400 MG tablet Take 1 tablet (400 mg) by mouth 3 times daily (with meals) 10/3/18  Yes Oralia De La Torre NP   darbepoetin william (ARANESP, ALBUMIN FREE,) 100 MCG/0.5ML injection Inject 0.5 mLs (100 mcg) Subcutaneous every 14 days As needed for hgb<10g/dL.  If Hgb increases >1 point in 2 weeks (if blood transfusion given, use hgb PRIOR to this), SYSTOLIC BP > 180 mmHg or hgb>=10g/dL, HOLD DOSE. Dose must be within 1 week of Hgb.  Per anemia protocol with Cecilia De La Torre CNP  Patient not taking: Reported on 10/3/2018 12/6/17   Oralia De La Torre NP       Vitals:  /78  Pulse 100  Temp 98  F (36.7  C) (Oral)  Wt 78.7 kg (173 lb 6.4 oz)  SpO2 100%  BMI 24.88 kg/m2    Exam:    GEN: Pleasant, Well groomed. Daughter present  CARDIAC RRR  LUNGS: CTA  ABDOMEN: Soft, NT  EXT: no edema  Access: CLIFFORD AVF + bruit, well developed, hand warm  Neuro: No asterixis    Results:    Results for FABRIZIO MIRANDA (MRN [id number]) as of 10/3/2018 13:35   Ref. Range 10/3/2018 13:01   Sodium Latest Ref Range: 133 - 144 mmol/L 140   Potassium Latest Ref Range: 3.4 - 5.3 mmol/L 5.1   Chloride Latest Ref Range: 94 - 109 mmol/L 109   Carbon Dioxide Latest Ref Range: 20 - 32 mmol/L 21   Urea Nitrogen Latest Ref Range: 7 - 30 mg/dL 105 (H)   Creatinine Latest Ref Range: 0.66 - 1.25 mg/dL 5.22 (H)   GFR Estimate Latest Ref Range: >60 mL/min/1.7m2 11 (L)   GFR Estimate If Black Latest Ref Range: >60 mL/min/1.7m2 13 (L)    Calcium Latest Ref Range: 8.5 - 10.1 mg/dL 10.2 (H)   Anion Gap Latest Ref Range: 3 - 14 mmol/L 10   Phosphorus Latest Ref Range: 2.5 - 4.5 mg/dL 6.5 (H)   Albumin Latest Ref Range: 3.4 - 5.0 g/dL 3.4   Glucose Latest Ref Range: 70 - 99 mg/dL 94   Hemoglobin Latest Ref Range: 13.3 - 17.7 g/dL 11.3 (L)   Hematocrit Latest Ref Range: 40.0 - 53.0 % 36.1 (L)       Oralia De La Torre, NP

## 2018-10-04 ENCOUNTER — TELEPHONE (OUTPATIENT)
Dept: PHARMACY | Facility: CLINIC | Age: 73
End: 2018-10-04

## 2018-10-04 LAB — DEPRECATED CALCIDIOL+CALCIFEROL SERPL-MC: 41 UG/L (ref 20–75)

## 2018-10-04 NOTE — TELEPHONE ENCOUNTER
Anemia Management Note  SUBJECTIVE/OBJECTIVE:  Referred by Cecilia De La Torre on 9/11/2017  Primary Diagnosis: Anemia in Chronic Kidney Disease (N18.5, D63.1)     Secondary Diagnosis:  Chronic Kidney Disease, Stage 5 (N18.5)  Hgb goal range:  9-10  Epo/Darbo: Aranesp 100 mcg every 14 days As needed for hgb<10g/dL  RX expires on 12/5/2018  Iron regimen:  None  Labs exp: 9/12/2018  **Provide phone number for [facility] [phone] and instruction to schedule ancillary visit for aranesp injection. Send message to care team via pool - FZ RN TRIAGE POOL as a telephone encounter**      Contact: **AUTH TO DISCUSS**Tereza Norton(daughter) [phone], Rabia Jazzy (daughter)[phone]    Anemia Latest Ref Rng & Units 9/4/2018 9/10/2018 9/17/2018 9/24/2018 10/2/2018 10/3/2018 10/8/2018   EARLENE Dose - - - - - - - -   Hemoglobin 13.3 - 17.7 g/dL 10.4(L) 10.3(L) 10.2(L) 10.5(L) 10.4(L) 11.3(L) 10.5(L)   TSAT 15 - 46 % - - - 25 - - -   Ferritin 26 - 388 ng/mL - - - 458(H) - - -       BP Readings from Last 3 Encounters:   10/03/18 133/78   10/02/18 138/76   09/24/18 130/78     Wt Readings from Last 2 Encounters:   10/03/18 173 lb 6.4 oz (78.7 kg)   07/25/18 171 lb 9.6 oz (77.8 kg)     Patient has a lab appt on 10/15      ASSESSMENT:  Hgb: Above goal - recommend hold dose  TSat: not at goal of >30% Ferritin: At goal (>100ng/mL)    PLAN:  Hold Aranesp and RTC for hgb then aranesp if needed 10/8    Orders needed to be renewed (for next follow-up date) in EPIC: Ferritin and iron standing orders    Iron labs due:  10/22/18    Plan discussed with:  No call made  Plan provided by:  Mireya Hussein CPhT  [facility]  [phone]    NEXT FOLLOW-UP DATE:  10/15/18  Reviewed 10/08/2018 [facility]  Anemia Management Service  Daina Mock PharmD and [facility]  Phone: [phone]  Fax: [phone]

## 2018-10-08 ENCOUNTER — ALLIED HEALTH/NURSE VISIT (OUTPATIENT)
Dept: FAMILY MEDICINE | Facility: CLINIC | Age: 73
End: 2018-10-08

## 2018-10-08 ENCOUNTER — TELEPHONE (OUTPATIENT)
Dept: NEPHROLOGY | Facility: CLINIC | Age: 73
End: 2018-10-08

## 2018-10-08 VITALS — DIASTOLIC BLOOD PRESSURE: 78 MMHG | SYSTOLIC BLOOD PRESSURE: 130 MMHG

## 2018-10-08 DIAGNOSIS — Z01.30 BP CHECK: Primary | ICD-10-CM

## 2018-10-08 DIAGNOSIS — N18.5 CKD (CHRONIC KIDNEY DISEASE) STAGE 5, GFR LESS THAN 15 ML/MIN (H): ICD-10-CM

## 2018-10-08 LAB
ALBUMIN SERPL-MCNC: 3.5 G/DL (ref 3.4–5)
ANION GAP SERPL CALCULATED.3IONS-SCNC: 9 MMOL/L (ref 3–14)
BUN SERPL-MCNC: 90 MG/DL (ref 7–30)
CALCIUM SERPL-MCNC: 8.9 MG/DL (ref 8.5–10.1)
CHLORIDE SERPL-SCNC: 113 MMOL/L (ref 94–109)
CO2 SERPL-SCNC: 23 MMOL/L (ref 20–32)
CREAT SERPL-MCNC: 5.1 MG/DL (ref 0.66–1.25)
GFR SERPL CREATININE-BSD FRML MDRD: 11 ML/MIN/1.7M2
GLUCOSE SERPL-MCNC: 94 MG/DL (ref 70–99)
HCT VFR BLD AUTO: 33.3 % (ref 40–53)
HGB BLD-MCNC: 10.5 G/DL (ref 13.3–17.7)
PHOSPHATE SERPL-MCNC: 6.4 MG/DL (ref 2.5–4.5)
POTASSIUM SERPL-SCNC: 4.4 MMOL/L (ref 3.4–5.3)
SODIUM SERPL-SCNC: 145 MMOL/L (ref 133–144)

## 2018-10-08 PROCEDURE — 80069 RENAL FUNCTION PANEL: CPT

## 2018-10-08 PROCEDURE — 85014 HEMATOCRIT: CPT

## 2018-10-08 PROCEDURE — 85018 HEMOGLOBIN: CPT

## 2018-10-08 PROCEDURE — 36415 COLL VENOUS BLD VENIPUNCTURE: CPT

## 2018-10-08 NOTE — TELEPHONE ENCOUNTER
DATE:  10/8/2018   TIME OF RECEIPT FROM LAB:  3:26pm  LAB TEST:  Creatinine  LAB VALUE:  5.1  RESULTS GIVEN WITH READ-BACK TO (PROVIDER):  NEETA JOSEPH  TIME LAB VALUE REPORTED TO PROVIDER:   3:26pm    Lab is stable from previous values.    Giselle Benoit RN

## 2018-10-08 NOTE — MR AVS SNAPSHOT
After Visit Summary   10/8/2018    Dung Norton    MRN: [id number]           Patient Information     Date Of Birth          1945        Visit Information        Provider Department      10/8/2018 10:14 AM Haley Baker MD [facility]        Today's Diagnoses     BP check    -  1       Follow-ups after your visit        Your next 10 appointments already scheduled     Oct 15, 2018 10:45 AM CDT   LAB with FZ LAB   [facility] ([facility])    [address] [place] [address] [phone]           Please do not eat 10-12 hours before your appointment if you are coming in fasting for labs on lipids, cholesterol, or glucose (sugar). This does not apply to pregnant women. Water, hot tea and black coffee (with nothing added) are okay. Do not drink other fluids, diet soda or chew gum.            Oct 26, 2018  9:15 AM CDT   (Arrive by 9:00 AM)   Return Visit with Tamiko Vanegas MD   [facility] ([facility])    [address] [place] [address]  4th Floor  [facility] [address] [phone]            Jan 16, 2019 12:00 PM CST   (Arrive by 11:30 AM)   Return Visit with Oralia De La Torre NP   [facility] ([facility])    [address] [place] [address]  [address]  [facility] [id number] [phone]            Jan 29, 2019  8:30 AM CST   Return Visit with Lizandro Ng MD   [facility] AT [facility] ([facility])    [address] [facility] [address] [facility] [address] [phone]              Who to contact     If you have questions or need follow up information about today's clinic visit or your schedule please contact [facility] directly at [phone].  Normal or non-critical lab and imaging results will be communicated to you by MyChart, letter or phone within 4 business days after the  clinic has received the results. If you do not hear from us within 7 days, please contact the clinic through [employer] or phone. If you have a critical or abnormal lab result, we will notify you by phone as soon as possible.  Submit refill requests through [employer] or call your pharmacy and they will forward the refill request to us. Please allow 3 business days for your refill to be completed.          Additional Information About Your Visit        Care EveryWhere ID     This is your Care EveryWhere ID. This could be used by other organizations to access your [facility] medical records  [id number]         Blood Pressure from Last 3 Encounters:   10/08/18 130/78   10/03/18 133/78   10/02/18 138/76    Weight from Last 3 Encounters:   10/03/18 173 lb 6.4 oz (78.7 kg)   07/25/18 171 lb 9.6 oz (77.8 kg)   04/27/18 173 lb (78.5 kg)              Today, you had the following     No orders found for display         Today's Medication Changes          These changes are accurate as of 10/8/18 11:59 PM.  If you have any questions, ask your nurse or doctor.               These medicines have changed or have updated prescriptions.        Dose/Directions    amLODIPine 2.5 MG tablet   Commonly known as:  NORVASC   This may have changed:  when to take this   Used for:  Renal hypertension        Dose:  2.5 mg   Take 1 tablet (2.5 mg) by mouth daily   Quantity:  90 tablet   Refills:  3                Primary Care Provider Office Phone # Fax #    Haley Baker [phone] [address] [facility] [address]        Equal Access to Services     JOSS CERRATO AH: Hadii cristine marin hadkelleyo Sochalo, waaxda luqadaha, qaybta kaalmada robert, gibson solis. So [facility] [phone].    ATENCIÓN: Si habla español, tiene a montague disposición servicios gratuitos de asistencia lingüística. Llame al [phone].    We comply with applicable federal civil rights laws and [place] laws. We do not  discriminate on the basis of race, color, national origin, age, disability, sex, sexual orientation, or gender identity.            Thank you!     Thank you for choosing [facility]  for your care. Our goal is always to provide you with excellent care. Hearing back from our patients is one way we can continue to improve our services. Please take a few minutes to complete the written survey that you may receive in the mail after your visit with us. Thank you!             Your Updated Medication List - Protect others around you: Learn how to safely use, store and throw away your medicines at www.disposemymeds.org.          This list is accurate as of 10/8/18 11:59 PM.  Always use your most recent med list.                   Brand Name Dispense Instructions for use Diagnosis    amLODIPine 2.5 MG tablet    NORVASC    90 tablet    Take 1 tablet (2.5 mg) by mouth daily    Renal hypertension       aspirin 81 MG tablet     30 tablet    Take 1 tablet (81 mg) by mouth every morning - RESUME on 3/9/18 if urine remains clear    S/P TURP       darbepoetin william 100 MCG/0.5ML injection    ARANESP (ALBUMIN FREE)    0.5 mL    Inject 0.5 mLs (100 mcg) Subcutaneous every 14 days As needed for hgb<10g/dL.  If Hgb increases >1 point in 2 weeks (if blood transfusion given, use hgb PRIOR to this), SYSTOLIC BP > 180 mmHg or hgb>=10g/dL, HOLD DOSE. Dose must be within 1 week of Hgb.  Per anemia protocol with Cecilia De La Torre CNP    Anemia of chronic renal failure, stage 5 (H), CKD (chronic kidney disease) stage 5, GFR less than 15 ml/min (H)       ferrous sulfate 325 (65 Fe) MG tablet    IRON    30 tablet    Take 1 tablet (325 mg) by mouth daily (with breakfast)    Anemia of chronic renal failure, stage 5 (H), CKD (chronic kidney disease) stage 5, GFR less than 15 ml/min (H)       FISH OIL PO           Multi-vitamin Tabs tablet      Take 1 tablet by mouth daily        order for DME     1 each    Equipment being ordered: olecranon  bursa brace    Olecranon bursitis of left elbow       sevelamer 400 MG tablet    RENAGEL    90 tablet    Take 1 tablet (400 mg) by mouth 3 times daily (with meals)    Secondary renal hyperparathyroidism (H)

## 2018-10-08 NOTE — PROGRESS NOTES
Dung Norton is enrolled/participating in the retail pharmacy Blood Pressure Goals Achievement Program (BPGAP).  Dung Norton was evaluated at [facility] on October 8, 2018 at which time his blood pressure was:    BP Readings from Last 3 Encounters:   10/08/18 130/78   10/03/18 133/78   10/02/18 138/76     Reviewed lifestyle modifications for blood pressure control and reduction: including making healthy food choices, managing weight, getting regular exercise, smoking cessation, reducing alcohol consumption, monitoring blood pressure regularly.     Dung Norton is not experiencing symptoms.    Follow-Up: BP is at goal of < 140/90mmHg (patient 18+ years of age with or without diabetes).  Recommended follow-up at pharmacy in 6 months.     Recommendation to Provider: none    Dung Norton was evaluated for enrollment into the PGEN study today.    PLEASE INITIATE ENROLLMENT DISCUSSION WITH HTN PTS  1) Between 30-80 years old                                                                                                               2) BMI between 19-50                                                                                                        3) BP ?140/90 AND ?170/110 patients aged 30-59         BP ?150/90 AND ?170/110 non-diabetic patients aged 60-80       BP ?140/90 AND ?170/110 diabetic patients aged 60-80  4) Additional requirements for uncontrolled HTN patients:        Pt on only 1 class of medication  5) EXCLUDE patient if confirmation of:                  ? Cardiac disease                  ? Chronic Kidney Disease                  ? Pregnancy/Breastfeeding                  ? Secondary Hypertension/Pre-eclampsia                                 ? Vascular disease    Patient eligible for enrollment:  No  Patient interested in enrollment:  No    This note completed by: Ricarda Andino RPh  [facility]  Phone [phone]  Fax      [phone]

## 2018-10-08 NOTE — Clinical Note
Blood pressure today 130/78 Ricarda Andino [facility] Phone [phone] Fax      [phone]

## 2018-10-15 ENCOUNTER — ALLIED HEALTH/NURSE VISIT (OUTPATIENT)
Dept: FAMILY MEDICINE | Facility: CLINIC | Age: 73
End: 2018-10-15
Payer: COMMERCIAL

## 2018-10-15 ENCOUNTER — TELEPHONE (OUTPATIENT)
Dept: PHARMACY | Facility: CLINIC | Age: 73
End: 2018-10-15

## 2018-10-15 ENCOUNTER — PRE VISIT (OUTPATIENT)
Dept: UROLOGY | Facility: CLINIC | Age: 73
End: 2018-10-15

## 2018-10-15 VITALS — SYSTOLIC BLOOD PRESSURE: 136 MMHG | DIASTOLIC BLOOD PRESSURE: 74 MMHG

## 2018-10-15 DIAGNOSIS — N18.5 CKD (CHRONIC KIDNEY DISEASE) STAGE 5, GFR LESS THAN 15 ML/MIN (H): ICD-10-CM

## 2018-10-15 DIAGNOSIS — Z01.30 BP CHECK: Primary | ICD-10-CM

## 2018-10-15 LAB
ALBUMIN SERPL-MCNC: 3.3 G/DL (ref 3.4–5)
ANION GAP SERPL CALCULATED.3IONS-SCNC: 11 MMOL/L (ref 3–14)
BUN SERPL-MCNC: 104 MG/DL (ref 7–30)
CALCIUM SERPL-MCNC: 8.6 MG/DL (ref 8.5–10.1)
CHLORIDE SERPL-SCNC: 116 MMOL/L (ref 94–109)
CO2 SERPL-SCNC: 18 MMOL/L (ref 20–32)
CREAT SERPL-MCNC: 4.56 MG/DL (ref 0.66–1.25)
GFR SERPL CREATININE-BSD FRML MDRD: 13 ML/MIN/1.7M2
GLUCOSE SERPL-MCNC: 103 MG/DL (ref 70–99)
HCT VFR BLD AUTO: 30.7 % (ref 40–53)
HGB BLD-MCNC: 9.8 G/DL (ref 13.3–17.7)
PHOSPHATE SERPL-MCNC: 6.4 MG/DL (ref 2.5–4.5)
POTASSIUM SERPL-SCNC: 4.5 MMOL/L (ref 3.4–5.3)
SODIUM SERPL-SCNC: 145 MMOL/L (ref 133–144)

## 2018-10-15 PROCEDURE — 99207 ZZC NO CHARGE NURSE ONLY: CPT | Performed by: FAMILY MEDICINE

## 2018-10-15 PROCEDURE — 85018 HEMOGLOBIN: CPT

## 2018-10-15 PROCEDURE — 85014 HEMATOCRIT: CPT

## 2018-10-15 PROCEDURE — 36415 COLL VENOUS BLD VENIPUNCTURE: CPT

## 2018-10-15 PROCEDURE — 80069 RENAL FUNCTION PANEL: CPT

## 2018-10-15 NOTE — PROGRESS NOTES
Dung Norton is enrolled/participating in the retail pharmacy Blood Pressure Goals Achievement Program (BPGAP).  Dung Norton was evaluated at [facility] on October 15, 2018 at which time his blood pressure was:    BP Readings from Last 3 Encounters:   10/08/18 130/78   10/03/18 133/78   10/02/18 138/76     Reviewed lifestyle modifications for blood pressure control and reduction: including making healthy food choices, managing weight, getting regular exercise, smoking cessation, reducing alcohol consumption, monitoring blood pressure regularly.     Dung Norton is not experiencing symptoms.    Follow-Up: BP is at goal of < 140/90mmHg (patient 18+ years of age with or without diabetes).  Recommended follow-up at pharmacy in 6 months.     Recommendation to Provider: none    Dung Norton was evaluated for enrollment into the PGEN study today.    PLEASE INITIATE ENROLLMENT DISCUSSION WITH HTN PTS  1) Between 30-80 years old                                                                                                               2) BMI between 19-50                                                                                                        3) BP ?140/90 AND ?170/110 patients aged 30-59         BP ?150/90 AND ?170/110 non-diabetic patients aged 60-80       BP ?140/90 AND ?170/110 diabetic patients aged 60-80  4) Additional requirements for uncontrolled HTN patients:        Pt on only 1 class of medication  5) EXCLUDE patient if confirmation of:                  ? Cardiac disease                  ? Chronic Kidney Disease                  ? Pregnancy/Breastfeeding                  ? Secondary Hypertension/Pre-eclampsia                                 ? Vascular disease    Patient eligible for enrollment:  No  Patient interested in enrollment:  No    This note completed by: Ricarda Andino RPh  [facility]  Phone [phone]  Fax      [phone]

## 2018-10-15 NOTE — TELEPHONE ENCOUNTER
Anemia Management Note  SUBJECTIVE/OBJECTIVE:  Referred by Cecilia De La Torre on 9/11/2017  Primary Diagnosis: Anemia in Chronic Kidney Disease (N18.5, D63.1)     Secondary Diagnosis:  Chronic Kidney Disease, Stage 5 (N18.5)  Hgb goal range:  9-10  Epo/Darbo: Aranesp 100 mcg every 14 days As needed for hgb<10g/dL - last aranesp dose 2/1/18  RX expires on 12/5/2018  Iron regimen:  None  Labs exp: 9/12/2018  **Provide phone number for [facility] [phone] and instruction to schedule ancillary visit for aranesp injection. Send message to care team via pool -  RN TRIAGE POOL as a telephone encounter**      Contact: **AUTH TO DISCUSS**Tereza Norton(daughter) [phone], Rabia Purcell (daughter)[phone]    Anemia Latest Ref Rng & Units 9/10/2018 9/17/2018 9/24/2018 10/2/2018 10/3/2018 10/8/2018 10/15/2018   EARLENE Dose - - - - - - - -   Hemoglobin 13.3 - 17.7 g/dL 10.3(L) 10.2(L) 10.5(L) 10.4(L) 11.3(L) 10.5(L) 9.8(L)   TSAT 15 - 46 % - - 25 - - - -   Ferritin 26 - 388 ng/mL - - 458(H) - - - -     BP Readings from Last 3 Encounters:   10/15/18 136/74   10/08/18 130/78   10/03/18 133/78     Wt Readings from Last 2 Encounters:   10/03/18 173 lb 6.4 oz (78.7 kg)   07/25/18 171 lb 9.6 oz (77.8 kg)     Patient has a lab appt on 10/22  Spoke with Dung, states he feels well, has not been ill, and from chart, kidney function is stable.  He prefers to monitor and not initiate Aranesp unless labs continue to decline.  OK to monitor without Aranesp unless Dung starts to have S/S or Hgb drops below 9. VERONICA    ASSESSMENT:  Hgb: At goal - recommend dose - last dose was on 2/1/18  TSat: not at goal of >30% Ferritin: At goal (>100ng/mL)    PLAN:  Dose with aranesp and RTC for hgb, ferritin, and iron labs then aranesp if needed in 1 week(s)  Referred to Daina to see if patient should re-start aranesp doses - see note above, restart if S/S anemia with Hgb <10, or Hgb drops and stays <9.    Orders needed to be renewed (for  next follow-up date) in EPIC: None    Iron labs due:  10/22/18    Plan discussed with:  Dung  Plan provided by:  Mireya [facility]  Anemia Clinic  [phone]    NEXT FOLLOW-UP DATE:  10/22/18  Reviewed 10/17/2018 [facility]  Anemia Management Service  Daina Mock PharmD and Malina HusseinMurali  Phone: [phone]  Fax: [phone]

## 2018-10-15 NOTE — MR AVS SNAPSHOT
After Visit Summary   10/15/2018    Dung Norton    MRN: [id number]           Patient Information     Date Of Birth          1945        Visit Information        Provider Department      10/15/2018 10:28 AM Halye Baker MD [facility]        Today's Diagnoses     BP check    -  1       Follow-ups after your visit        Your next 10 appointments already scheduled     Oct 22, 2018 10:30 AM CDT   LAB with FZ LAB   [facility] ([facility])    [address] [place] [address] [phone]           Please do not eat 10-12 hours before your appointment if you are coming in fasting for labs on lipids, cholesterol, or glucose (sugar). This does not apply to pregnant women. Water, hot tea and black coffee (with nothing added) are okay. Do not drink other fluids, diet soda or chew gum.            Oct 26, 2018  9:15 AM CDT   (Arrive by 9:00 AM)   Return Visit with Tamiko Vanegas MD   [facility] ([facility])    [address] [place] [address]  4th Floor  [facility] [address] [phone]            Jan 16, 2019 12:00 PM CST   (Arrive by 11:30 AM)   Return Visit with Oralia De La Torre NP   [facility] ([facility])    [address] [place] [address]  [address]  [facility] [id number] [phone]            Jan 29, 2019  8:30 AM CST   Return Visit with Lizandro Ng MD   [facility] ([facility])    [address] [facility] [address] [facility] [address] [phone]              Who to contact     If you have questions or need follow up information about today's clinic visit or your schedule please contact [facility] directly at [phone].  Normal or non-critical lab and imaging results will be communicated to you by MyChart, letter or phone within 4 business days after the  clinic has received the results. If you do not hear from us within 7 days, please contact the clinic through [employer] or phone. If you have a critical or abnormal lab result, we will notify you by phone as soon as possible.  Submit refill requests through [employer] or call your pharmacy and they will forward the refill request to us. Please allow 3 business days for your refill to be completed.          Additional Information About Your Visit        Care EveryWhere ID     This is your Care EveryWhere ID. This could be used by other organizations to access your [facility] medical records  [id number]         Blood Pressure from Last 3 Encounters:   10/15/18 136/74   10/08/18 130/78   10/03/18 133/78    Weight from Last 3 Encounters:   10/03/18 173 lb 6.4 oz (78.7 kg)   07/25/18 171 lb 9.6 oz (77.8 kg)   04/27/18 173 lb (78.5 kg)              Today, you had the following     No orders found for display         Today's Medication Changes          These changes are accurate as of 10/15/18 11:59 PM.  If you have any questions, ask your nurse or doctor.               These medicines have changed or have updated prescriptions.        Dose/Directions    amLODIPine 2.5 MG tablet   Commonly known as:  NORVASC   This may have changed:  when to take this   Used for:  Renal hypertension        Dose:  2.5 mg   Take 1 tablet (2.5 mg) by mouth daily   Quantity:  90 tablet   Refills:  3                Primary Care Provider Office Phone # Fax #    Haley Baker [phone] [address] [facility] [address]        Equal Access to Services     JOSS CERRATO AH: Hadii cristine marin hadkelleyo Sochalo, waaxda luqadaha, qaybta kaalmada robert, gibson solis. So [facility] [phone].    ATENCIÓN: Si habla español, tiene a montague disposición servicios gratuitos de asistencia lingüística. Llame al [phone].    We comply with applicable federal civil rights laws and [place] laws. We do not  discriminate on the basis of race, color, national origin, age, disability, sex, sexual orientation, or gender identity.            Thank you!     Thank you for choosing [facility]  for your care. Our goal is always to provide you with excellent care. Hearing back from our patients is one way we can continue to improve our services. Please take a few minutes to complete the written survey that you may receive in the mail after your visit with us. Thank you!             Your Updated Medication List - Protect others around you: Learn how to safely use, store and throw away your medicines at www.disposemymeds.org.          This list is accurate as of 10/15/18 11:59 PM.  Always use your most recent med list.                   Brand Name Dispense Instructions for use Diagnosis    amLODIPine 2.5 MG tablet    NORVASC    90 tablet    Take 1 tablet (2.5 mg) by mouth daily    Renal hypertension       aspirin 81 MG tablet     30 tablet    Take 1 tablet (81 mg) by mouth every morning - RESUME on 3/9/18 if urine remains clear    S/P TURP       darbepoetin william 100 MCG/0.5ML injection    ARANESP (ALBUMIN FREE)    0.5 mL    Inject 0.5 mLs (100 mcg) Subcutaneous every 14 days As needed for hgb<10g/dL.  If Hgb increases >1 point in 2 weeks (if blood transfusion given, use hgb PRIOR to this), SYSTOLIC BP > 180 mmHg or hgb>=10g/dL, HOLD DOSE. Dose must be within 1 week of Hgb.  Per anemia protocol with Cecilia De La Torre CNP    Anemia of chronic renal failure, stage 5 (H), CKD (chronic kidney disease) stage 5, GFR less than 15 ml/min (H)       ferrous sulfate 325 (65 Fe) MG tablet    IRON    30 tablet    Take 1 tablet (325 mg) by mouth daily (with breakfast)    Anemia of chronic renal failure, stage 5 (H), CKD (chronic kidney disease) stage 5, GFR less than 15 ml/min (H)       FISH OIL PO           Multi-vitamin Tabs tablet      Take 1 tablet by mouth daily        order for DME     1 each    Equipment being ordered: olecranon  bursa brace    Olecranon bursitis of left elbow       sevelamer 400 MG tablet    RENAGEL    90 tablet    Take 1 tablet (400 mg) by mouth 3 times daily (with meals)    Secondary renal hyperparathyroidism (H)

## 2018-10-22 ENCOUNTER — ALLIED HEALTH/NURSE VISIT (OUTPATIENT)
Dept: FAMILY MEDICINE | Facility: CLINIC | Age: 73
End: 2018-10-22

## 2018-10-22 ENCOUNTER — TELEPHONE (OUTPATIENT)
Dept: PHARMACY | Facility: CLINIC | Age: 73
End: 2018-10-22

## 2018-10-22 VITALS — SYSTOLIC BLOOD PRESSURE: 126 MMHG | DIASTOLIC BLOOD PRESSURE: 78 MMHG

## 2018-10-22 DIAGNOSIS — N18.5 CKD (CHRONIC KIDNEY DISEASE) STAGE 5, GFR LESS THAN 15 ML/MIN (H): ICD-10-CM

## 2018-10-22 DIAGNOSIS — Z01.30 BP CHECK: Primary | ICD-10-CM

## 2018-10-22 LAB
ALBUMIN SERPL-MCNC: 3.3 G/DL (ref 3.4–5)
ANION GAP SERPL CALCULATED.3IONS-SCNC: 8 MMOL/L (ref 3–14)
BUN SERPL-MCNC: 93 MG/DL (ref 7–30)
CALCIUM SERPL-MCNC: 8.5 MG/DL (ref 8.5–10.1)
CHLORIDE SERPL-SCNC: 115 MMOL/L (ref 94–109)
CO2 SERPL-SCNC: 20 MMOL/L (ref 20–32)
CREAT SERPL-MCNC: 4.51 MG/DL (ref 0.66–1.25)
GFR SERPL CREATININE-BSD FRML MDRD: 13 ML/MIN/1.7M2
GLUCOSE SERPL-MCNC: 101 MG/DL (ref 70–99)
HCT VFR BLD AUTO: 31.2 % (ref 40–53)
HGB BLD-MCNC: 10.1 G/DL (ref 13.3–17.7)
PHOSPHATE SERPL-MCNC: 5.9 MG/DL (ref 2.5–4.5)
POTASSIUM SERPL-SCNC: 4.7 MMOL/L (ref 3.4–5.3)
SODIUM SERPL-SCNC: 143 MMOL/L (ref 133–144)

## 2018-10-22 PROCEDURE — 85014 HEMATOCRIT: CPT

## 2018-10-22 PROCEDURE — 85018 HEMOGLOBIN: CPT

## 2018-10-22 PROCEDURE — 80069 RENAL FUNCTION PANEL: CPT

## 2018-10-22 PROCEDURE — 36415 COLL VENOUS BLD VENIPUNCTURE: CPT

## 2018-10-22 PROCEDURE — 99207 ZZC NO CHARGE NURSE ONLY: CPT | Performed by: FAMILY MEDICINE

## 2018-10-22 NOTE — PROGRESS NOTES
Dung Norton is enrolled/participating in the retail pharmacy Blood Pressure Goals Achievement Program (BPGAP).  Dung Norton was evaluated at [facility] on October 22, 2018 at which time his blood pressure was:    BP Readings from Last 3 Encounters:   10/22/18 126/78   10/15/18 136/74   10/08/18 130/78     Reviewed lifestyle modifications for blood pressure control and reduction: including making healthy food choices, managing weight, getting regular exercise, smoking cessation, reducing alcohol consumption, monitoring blood pressure regularly.     Dung Norton is not experiencing symptoms.    Follow-Up: BP is at goal of < 140/90mmHg (patient 18+ years of age with or without diabetes).  Recommended follow-up at pharmacy in 6 months.     Recommendation to Provider: None at this time.     Dung Norton was evaluated for enrollment into the PGEN study today.    PLEASE INITIATE ENROLLMENT DISCUSSION WITH HTN PTS  1) Between 30-80 years old                                                                                                               2) BMI between 19-50                                                                                                        3) BP ?140/90 AND ?170/110 patients aged 30-59         BP ?150/90 AND ?170/110 non-diabetic patients aged 60-80       BP ?140/90 AND ?170/110 diabetic patients aged 60-80  4) Additional requirements for uncontrolled HTN patients:        Pt on only 1 class of medication  5) EXCLUDE patient if confirmation of:                  ? Cardiac disease                  ? Chronic Kidney Disease                  ? Pregnancy/Breastfeeding                  ? Secondary Hypertension/Pre-eclampsia                                 ? Vascular disease    Patient eligible for enrollment:  No  Patient interested in enrollment:  No    This note completed by: Thank you,  Cheri Winslow, PharmD  [facility]  [phone]

## 2018-10-22 NOTE — MR AVS SNAPSHOT
After Visit Summary   10/22/2018    Dung Norton    MRN: [id number]           Patient Information     Date Of Birth          1945        Visit Information        Provider Department      10/22/2018 11:13 AM Haley Baker MD [facility]        Today's Diagnoses     BP check    -  1       Follow-ups after your visit        Your next 10 appointments already scheduled     Oct 26, 2018  9:15 AM CDT   (Arrive by 9:00 AM)   Return Visit with Tamiko Vanegas MD   [facility] and [facility] for Prostate and Urologic Cancers ([facility])    [address]  4th Floor  [facility] [address] [phone]            Oct 29, 2018 10:00 AM CDT   LAB with FZ LAB   [facility] ([facility])    [address] [place] [id number] [phone]           Please do not eat 10-12 hours before your appointment if you are coming in fasting for labs on lipids, cholesterol, or glucose (sugar). This does not apply to pregnant women. Water, hot tea and black coffee (with nothing added) are okay. Do not drink other fluids, diet soda or chew gum.            Jan 16, 2019 12:00 PM CST   (Arrive by 11:30 AM)   Return Visit with Oralia De La Torre NP   [facility] Nephrology ([facility])    [address]  [address]  [facility] [address] [phone]            Jan 29, 2019  8:30 AM CST   Return Visit with Lizandro Ng MD   [facility] PHYSICIANS HEART AT [facility] ([facility])    [address]nd [facility] [id number] [phone]              Who to contact     If you have questions or need follow up information about today's clinic visit or your schedule please contact [facility] directly at [phone].  Normal or non-critical lab and imaging results will be communicated to you by MyChart, letter or phone within 4 business days after the  clinic has received the results. If you do not hear from us within 7 days, please contact the clinic through Zerve or phone. If you have a critical or abnormal lab result, we will notify you by phone as soon as possible.  Submit refill requests through Zerve or call your pharmacy and they will forward the refill request to us. Please allow 3 business days for your refill to be completed.          Additional Information About Your Visit        Care EveryWhere ID     This is your Care EveryWhere ID. This could be used by other organizations to access your [place] medical records  [id number]         Blood Pressure from Last 3 Encounters:   10/22/18 126/78   10/15/18 136/74   10/08/18 130/78    Weight from Last 3 Encounters:   10/03/18 173 lb 6.4 oz (78.7 kg)   07/25/18 171 lb 9.6 oz (77.8 kg)   04/27/18 173 lb (78.5 kg)              Today, you had the following     No orders found for display         Today's Medication Changes          These changes are accurate as of 10/22/18 11:14 AM.  If you have any questions, ask your nurse or doctor.               These medicines have changed or have updated prescriptions.        Dose/Directions    amLODIPine 2.5 MG tablet   Commonly known as:  NORVASC   This may have changed:  when to take this   Used for:  Renal hypertension        Dose:  2.5 mg   Take 1 tablet (2.5 mg) by mouth daily   Quantity:  90 tablet   Refills:  3                Primary Care Provider Office Phone # Fax #    Haley Baker [phone] [address] [facility] [facility] [address]        Equal Access to Services     JOSS CERRATO AH: Hadii cristine marin hadkelleyo Sochalo, waaxda luqadaha, qaybta kaalmada robert, gibson solis. So [facility] [phone].    ATENCIÓN: Si habla español, tiene a montague disposición servicios gratuitos de asistencia lingüística. Llame al [phone].    We comply with applicable federal civil rights laws and [place] laws. We do not  discriminate on the basis of race, color, national origin, age, disability, sex, sexual orientation, or gender identity.            Thank you!     Thank you for choosing [facility]  for your care. Our goal is always to provide you with excellent care. Hearing back from our patients is one way we can continue to improve our services. Please take a few minutes to complete the written survey that you may receive in the mail after your visit with us. Thank you!             Your Updated Medication List - Protect others around you: Learn how to safely use, store and throw away your medicines at www.disposemymeds.org.          This list is accurate as of 10/22/18 11:14 AM.  Always use your most recent med list.                   Brand Name Dispense Instructions for use Diagnosis    amLODIPine 2.5 MG tablet    NORVASC    90 tablet    Take 1 tablet (2.5 mg) by mouth daily    Renal hypertension       aspirin 81 MG tablet     30 tablet    Take 1 tablet (81 mg) by mouth every morning - RESUME on 3/9/18 if urine remains clear    S/P TURP       darbepoetin william 100 MCG/0.5ML injection    ARANESP (ALBUMIN FREE)    0.5 mL    Inject 0.5 mLs (100 mcg) Subcutaneous every 14 days As needed for hgb<10g/dL.  If Hgb increases >1 point in 2 weeks (if blood transfusion given, use hgb PRIOR to this), SYSTOLIC BP > 180 mmHg or hgb>=10g/dL, HOLD DOSE. Dose must be within 1 week of Hgb.  Per anemia protocol with Cecilia De La Torre CNP    Anemia of chronic renal failure, stage 5 (H), CKD (chronic kidney disease) stage 5, GFR less than 15 ml/min (H)       ferrous sulfate 325 (65 Fe) MG tablet    IRON    30 tablet    Take 1 tablet (325 mg) by mouth daily (with breakfast)    Anemia of chronic renal failure, stage 5 (H), CKD (chronic kidney disease) stage 5, GFR less than 15 ml/min (H)       FISH OIL PO           Multi-vitamin Tabs tablet      Take 1 tablet by mouth daily        order for DME     1 each    Equipment being ordered: olecranon  bursa brace    Olecranon bursitis of left elbow       sevelamer 400 MG tablet    RENAGEL    90 tablet    Take 1 tablet (400 mg) by mouth 3 times daily (with meals)    Secondary renal hyperparathyroidism (H)

## 2018-10-22 NOTE — TELEPHONE ENCOUNTER
Anemia Management Note  SUBJECTIVE/OBJECTIVE:  Referred by Cecilia De La Torre on 9/11/2017  Primary Diagnosis: Anemia in Chronic Kidney Disease (N18.5, D63.1)     Secondary Diagnosis:  Chronic Kidney Disease, Stage 5 (N18.5)  Hgb goal range:  9-10  Epo/Darbo: Aranesp 100 mcg every 14 days As needed for hgb<10g/dL - last aranesp dose 2/1/18  RX expires on 12/5/2018  Iron regimen:  None  Labs exp: 9/12/2018  **Provide phone number for [facility] [phone] and instruction to schedule ancillary visit for aranesp injection. Send message to care team via pool -  RN TRIAGE POOL as a telephone encounter**      Contact: **AUTH TO DISCUSS**Tereza Norton(daughter) [phone], Rabia Purcell (daughter)[phone]    Anemia Latest Ref Rng & Units 9/17/2018 9/24/2018 10/2/2018 10/3/2018 10/8/2018 10/15/2018 10/22/2018   EARLENE Dose - - - - - - - -   Hemoglobin 13.3 - 17.7 g/dL 10.2(L) 10.5(L) 10.4(L) 11.3(L) 10.5(L) 9.8(L) 10.1(L)   TSAT 15 - 46 % - 25 - - - - -   Ferritin 26 - 388 ng/mL - 458(H) - - - - -     BP Readings from Last 3 Encounters:   10/22/18 126/78   10/15/18 136/74   10/08/18 130/78     Wt Readings from Last 2 Encounters:   10/03/18 173 lb 6.4 oz (78.7 kg)   07/25/18 171 lb 9.6 oz (77.8 kg)      He prefers to monitor and not initiate Aranesp unless labs continue to decline.  OK to monitor without Aranesp unless Dung starts to have S/S or Hgb drops below 9. VERONICA       ASSESSMENT:  Hgb: at goal - continue to monitor  TSat: not at goal of >30% Ferritin: At goal (>100ng/mL)    PLAN:  RTC for Hgb, ferritin and iron labs in 2 week(s)    Orders needed to be renewed (for next follow-up date) in EPIC: None    Iron labs due:  10/22/18    Plan discussed with:  No call made - next lab appt is scheduled for 10/29  Plan provided by:  Mireya Hussein Murali  [facility]  [phone]    NEXT FOLLOW-UP DATE:  10/29/18  Reviewed 10/23/2018 [facility]  Anemia Management Service  Daina Mock,PharmD and Malina Hussein CPhT  Phone:  [phone]  Fax: [phone]

## 2018-10-26 ENCOUNTER — OFFICE VISIT (OUTPATIENT)
Dept: UROLOGY | Facility: CLINIC | Age: 73
End: 2018-10-26
Payer: COMMERCIAL

## 2018-10-26 VITALS
WEIGHT: 176.3 LBS | BODY MASS INDEX: 25.24 KG/M2 | HEIGHT: 70 IN | HEART RATE: 97 BPM | DIASTOLIC BLOOD PRESSURE: 76 MMHG | SYSTOLIC BLOOD PRESSURE: 134 MMHG

## 2018-10-26 DIAGNOSIS — R33.9 URINARY RETENTION: Primary | ICD-10-CM

## 2018-10-26 ASSESSMENT — PAIN SCALES - GENERAL: PAINLEVEL: NO PAIN (0)

## 2018-10-26 NOTE — PATIENT INSTRUCTIONS
Return in 6 months with a full bladder for a urine flow test.    It was a pleasure meeting with you today.  Thank you for allowing me and my team the privilege of caring for you today.  YOU are the reason we are here, and I truly hope we provided you with the excellent service you deserve.  Please let us know if there is anything else we can do for you so that we can be sure you are leaving completely satisfied with your care experience.

## 2018-10-26 NOTE — MR AVS SNAPSHOT
After Visit Summary   10/26/2018    Dung Norton    MRN: [id number]           Patient Information     Date Of Birth          1945        Visit Information        Provider Department      10/26/2018 9:15 AM Tamiko Vanegas MD [facility] Urology and [facility] for Prostate and Urologic Cancers        Care Instructions    Return in 6 months with a full bladder for a urine flow test.    It was a pleasure meeting with you today.  Thank you for allowing me and my team the privilege of caring for you today.  YOU are the reason we are here, and I truly hope we provided you with the excellent service you deserve.  Please let us know if there is anything else we can do for you so that we can be sure you are leaving completely satisfied with your care experience.                  Follow-ups after your visit        Your next 10 appointments already scheduled     Oct 29, 2018 10:00 AM CDT   LAB with [facility]ACK LAB   [facility] ([facility])    [address] [place] [address] [phone]           Please do not eat 10-12 hours before your appointment if you are coming in fasting for labs on lipids, cholesterol, or glucose (sugar). This does not apply to pregnant women. Water, hot tea and black coffee (with nothing added) are okay. Do not drink other fluids, diet soda or chew gum.            Jan 16, 2019 12:00 PM CST   (Arrive by 11:30 AM)   Return Visit with Oralia De La Torre NP   [facility]rology ([facility])    [address] [place] [address]  [address] [place] [id number] [phone]            Jan 29, 2019  8:30 AM CST   Return Visit with Lizandro Ng MD   [facility] PHYSICIANS HEART AT [facility] ([facility])    [address] [place] [address] [address] [facility] [address] [phone]              Who to contact     Please call your clinic at [phone] to:    Ask questions about your health    Make or cancel  "appointments    Discuss your medicines    Learn about your test results    Speak to your doctor            Additional Information About Your Visit        Care EveryWhere ID     This is your Care EveryWhere ID. This could be used by other organizations to access your [place] medical records  [id number]        Your Vitals Were     Pulse Height BMI (Body Mass Index)             97 1.778 m (5' 10\") 25.3 kg/m2          Blood Pressure from Last 3 Encounters:   10/26/18 134/76   10/22/18 126/78   10/15/18 136/74    Weight from Last 3 Encounters:   10/26/18 80 kg (176 lb 4.8 oz)   10/03/18 78.7 kg (173 lb 6.4 oz)   07/25/18 77.8 kg (171 lb 9.6 oz)              Today, you had the following     No orders found for display         Today's Medication Changes          These changes are accurate as of 10/26/18 10:55 AM.  If you have any questions, ask your nurse or doctor.               These medicines have changed or have updated prescriptions.        Dose/Directions    amLODIPine 2.5 MG tablet   Commonly known as:  NORVASC   This may have changed:  when to take this   Used for:  Renal hypertension        Dose:  2.5 mg   Take 1 tablet (2.5 mg) by mouth daily   Quantity:  90 tablet   Refills:  3                Primary Care Provider Office Phone # Fax #    Haley Baker [phone] [address] [facility] [address]        Equal Access to Services     JOSS CERRATO AH: Hadii cristine Sagastume, waaxda lumelissa, qaybta kaalmagibson ndiaye . So [facility] [phone].    ATENCIÓN: Si habla español, tiene a montague disposición servicios gratuitos de asistencia lingüística. Llame al [phone].    We comply with applicable federal civil rights laws and [place] laws. We do not discriminate on the basis of race, color, national origin, age, disability, sex, sexual orientation, or gender identity.            Thank you!     Thank you for choosing [facility] UROLOGY AND [facility] " FOR PROSTATE AND UROLOGIC CANCERS  for your care. Our goal is always to provide you with excellent care. Hearing back from our patients is one way we can continue to improve our services. Please take a few minutes to complete the written survey that you may receive in the mail after your visit with us. Thank you!             Your Updated Medication List - Protect others around you: Learn how to safely use, store and throw away your medicines at www.disposemymeds.org.          This list is accurate as of 10/26/18 10:55 AM.  Always use your most recent med list.                   Brand Name Dispense Instructions for use Diagnosis    amLODIPine 2.5 MG tablet    NORVASC    90 tablet    Take 1 tablet (2.5 mg) by mouth daily    Renal hypertension       aspirin 81 MG tablet     30 tablet    Take 1 tablet (81 mg) by mouth every morning - RESUME on 3/9/18 if urine remains clear    S/P TURP       darbepoetin william 100 MCG/0.5ML injection    ARANESP (ALBUMIN FREE)    0.5 mL    Inject 0.5 mLs (100 mcg) Subcutaneous every 14 days As needed for hgb<10g/dL.  If Hgb increases >1 point in 2 weeks (if blood transfusion given, use hgb PRIOR to this), SYSTOLIC BP > 180 mmHg or hgb>=10g/dL, HOLD DOSE. Dose must be within 1 week of Hgb.  Per anemia protocol with Cecilia De La Torre CNP    Anemia of chronic renal failure, stage 5 (H), CKD (chronic kidney disease) stage 5, GFR less than 15 ml/min (H)       ferrous sulfate 325 (65 Fe) MG tablet    IRON    30 tablet    Take 1 tablet (325 mg) by mouth daily (with breakfast)    Anemia of chronic renal failure, stage 5 (H), CKD (chronic kidney disease) stage 5, GFR less than 15 ml/min (H)       FISH OIL PO           Multi-vitamin Tabs tablet      Take 1 tablet by mouth daily        order for DME     1 each    Equipment being ordered: olecranon bursa brace    Olecranon bursitis of left elbow       sevelamer 400 MG tablet    RENAGEL    90 tablet    Take 1 tablet (400 mg) by mouth 3 times daily (with  meals)    Secondary renal hyperparathyroidism (H)

## 2018-10-26 NOTE — LETTER
10/26/2018       RE: Dung Norton  [address] [place] [address]     Dear Colleague,    Thank you for referring your patient, Dung Norton, to the [facility] UROLOGY AND INST FOR PROSTATE AND UROLOGIC CANCERS at [facility]. Please see a copy of my visit note below.    UROLOGIC DIAGNOSES:       CURRENT INTERVENTIONS:       HISTORY:     Patient with history of AUR and hematuria requiring cystoscopy and clot evacuation. Had failed TOV and has been performing CIC.   Patient states he is performing CIC regularly about 4x per day.   He would like to discuss further management of urinary retention.     Patient is currently s/p TURP. He was able to void a small amount at TOV. He states he has been voiding several times per day but continues to perform CIC (though fewer times per day than previous).   Notes that he is able to void now with ~ 250-300ml upon catheterizing.   We reviewed these residuals and voiding pattern. We discussed continued CIC with an additional time during the day. We discussed urinary retention long term.       PAST MEDICAL HISTORY:   Past Medical History:   Diagnosis Date     BPH (benign prostatic hyperplasia)      Chronic kidney disease      HTN      Pulmonary nodules      Tobacco abuse        PAST SURGICAL HISTORY:   Past Surgical History:   Procedure Laterality Date     APPENDECTOMY  AGE 6     CREATE FISTULA ARTERIOVENOUS UPPER EXTREMITY Left 11/17/2017    Procedure: CREATE FISTULA ARTERIOVENOUS UPPER EXTREMITY;  Left Cephalic Vein To Radial Artery Arteriovenous Fistula Creation, Anesthesia Block;  Surgeon: Eduardo Pabon MD;  Location: [facility] OR     CYSTOSCOPY, FULGURATE BLEEDERS, EVACUATE CLOT(S), COMBINED N/A 7/16/2017    Procedure: COMBINED CYSTOSCOPY, FULGURATE BLEEDERS, EVACUATE CLOT(S);  Cystoscopy clot evacuation, bladder biopsy and fulguration (per surgeons note) NERVE BLOCK PERIPHERAL;  Surgeon: Tamiko Vanegas MD;   "Location: [facility] OR     CYSTOSCOPY, TRANSURETHRAL RESECTION (TUR) PROSTATE, COMBINED N/A 3/5/2018    Procedure: COMBINED CYSTOSCOPY, TRANSURETHRAL RESECTION (TUR) PROSTATE;  Cystoscopy, Transurethral Resection of Prostate ;  Surgeon: Tamiko Vanegas MD;  Location: [facility] OR     SINUS SURGERY  AGE 6     TONSILLECTOMY      CHILDHOOD       FAMILY HISTORY:   Family History   Problem Relation Age of Onset     C.A.D. Mother      d age 67     Vision Loss Father      Hearing Loss Sister      Diabetes Sister      Cancer No family hx of      Glaucoma No family hx of      Macular Degeneration No family hx of        SOCIAL HISTORY:   Social History   Substance Use Topics     Smoking status: Former Smoker     Packs/day: 1.00     Years: 53.00     Types: Cigarettes     Quit date: 6/12/2017     Smokeless tobacco: Never Used     Alcohol use No       Current Outpatient Prescriptions   Medication     amLODIPine (NORVASC) 2.5 MG tablet     aspirin 81 MG tablet     darbepoetin william (ARANESP, ALBUMIN FREE,) 100 MCG/0.5ML injection     ferrous sulfate (IRON) 325 (65 FE) MG tablet     multivitamin, therapeutic with minerals (MULTI-VITAMIN) TABS tablet     Omega-3 Fatty Acids (FISH OIL PO)     order for DME     sevelamer (RENAGEL) 400 MG tablet     No current facility-administered medications for this visit.          PHYSICAL EXAM:    /76  Pulse 97  Ht 1.778 m (5' 10\")  Wt 80 kg (176 lb 4.8 oz)  BMI 25.3 kg/m2    HEENT: Normocephalic and atraumatic   Cardiac: Not done  Back/Flank: Not done  CNS/PNS: Not done  Respiratory: Normal non-labored breathing  Abdomen: Soft nontender and nondistended  Peripheral Vascular: Not done  Mental Status: Not done    Penis: Not done  Scrotal Skin: Not done  Testicles: Not done  Epididymis: Not done  Digital Rectal Exam:     Cystoscopy: Not done    Imaging: None    Urinalysis: [facility] RESULTS:  Recent Labs   Lab Test  08/29/17   0235   01/15/14   0936   COLOR  Light Yellow   < >  Yellow   APPEARANCE  " Cloudy   < >  Clear   URINEGLC  Negative   < >  Negative   URINEBILI  Negative   < >  Negative   URINEKETONE  Negative   < >  Negative   SG  1.014   < >  1.020   UBLD  Moderate*   < >  Small*   URINEPH  6.0   < >  6.0   PROTEIN  100*   < >  Negative   UROBILINOGEN   --    --   0.2   NITRITE  Negative   < >  Negative   LEUKEST  Large*   < >  Negative   RBCU  10*   < >  10-25*   WBCU  >182*   < >  O - 2    < > = values in this interval not displayed.       PSA:     Post Void Residual: 285ml     Other labs: None today      IMPRESSION:  73 y/o M with urinary retention requiring CIC, now voiding     PLAN:  Timed voiding   Continue CIC TID   Uroflow/PVR if possible in six months   Follow up in six months         Total Time: 15 minutes                                       Total in Consultation: greater than 50%       Sincerely,    Tamiko Vanegas MD

## 2018-10-26 NOTE — NURSING NOTE
"Chief Complaint   Patient presents with     RECHECK     follow up of urinary symptoms       Blood pressure 134/76, pulse 97, height 1.778 m (5' 10\"), weight 80 kg (176 lb 4.8 oz). Body mass index is 25.3 kg/(m^2).    Patient Active Problem List   Diagnosis     CARDIOVASCULAR SCREENING; LDL GOAL LESS THAN 130     Hypertension goal BP (blood pressure) < 140/90     Advanced directives, counseling/discussion     Elevated fasting glucose     Hypertriglyceridemia     Symptomatic anemia     Pulmonary nodules     KRISTINA (acute kidney injury) (H)     Anemia of chronic renal failure, stage 5 (H)     Orthostatic hypotension     Acute renal failure, unspecified acute renal failure type (H)     End stage renal disease (H)     Acquired hypothyroidism     Obstructive uropathy     CKD (chronic kidney disease) stage 5, GFR less than 15 ml/min (H)     Ex-smoker     Thoracic aortic aneurysm without rupture (H)     Nonrheumatic aortic valve stenosis     Olecranon bursitis of left elbow     Urinary tract infection     S/P transurethral resection of prostate     Nuclear sclerosis of both eyes       No Known Allergies    Current Outpatient Prescriptions   Medication Sig Dispense Refill     amLODIPine (NORVASC) 2.5 MG tablet Take 1 tablet (2.5 mg) by mouth daily (Patient taking differently: Take 2.5 mg by mouth every morning ) 90 tablet 3     aspirin 81 MG tablet Take 1 tablet (81 mg) by mouth every morning - RESUME on 3/9/18 if urine remains clear 30 tablet 0     darbepoetin william (ARANESP, ALBUMIN FREE,) 100 MCG/0.5ML injection Inject 0.5 mLs (100 mcg) Subcutaneous every 14 days As needed for hgb<10g/dL.  If Hgb increases >1 point in 2 weeks (if blood transfusion given, use hgb PRIOR to this), SYSTOLIC BP > 180 mmHg or hgb>=10g/dL, HOLD DOSE. Dose must be within 1 week of Hgb.  Per anemia protocol with Cecilia De La Torre CNP (Patient not taking: Reported on 10/3/2018) 0.5 mL 99     ferrous sulfate (IRON) 325 (65 FE) MG tablet Take 1 tablet (325 mg) " by mouth daily (with breakfast) 30 tablet 11     multivitamin, therapeutic with minerals (MULTI-VITAMIN) TABS tablet Take 1 tablet by mouth daily       Omega-3 Fatty Acids (FISH OIL PO)        order for DME Equipment being ordered: olecranon bursa brace 1 each 0     sevelamer (RENAGEL) 400 MG tablet Take 1 tablet (400 mg) by mouth 3 times daily (with meals) 90 tablet 3       Social History   Substance Use Topics     Smoking status: Former Smoker     Packs/day: 1.00     Years: 53.00     Types: Cigarettes     Quit date: 6/12/2017     Smokeless tobacco: Never Used     Alcohol use No       JENY Monk  10/26/2018  10:16 AM

## 2018-10-29 ENCOUNTER — TELEPHONE (OUTPATIENT)
Dept: PHARMACY | Facility: CLINIC | Age: 73
End: 2018-10-29

## 2018-10-29 ENCOUNTER — ALLIED HEALTH/NURSE VISIT (OUTPATIENT)
Dept: FAMILY MEDICINE | Facility: CLINIC | Age: 73
End: 2018-10-29
Payer: COMMERCIAL

## 2018-10-29 VITALS — DIASTOLIC BLOOD PRESSURE: 72 MMHG | SYSTOLIC BLOOD PRESSURE: 130 MMHG

## 2018-10-29 DIAGNOSIS — N18.5 CKD (CHRONIC KIDNEY DISEASE) STAGE 5, GFR LESS THAN 15 ML/MIN (H): ICD-10-CM

## 2018-10-29 DIAGNOSIS — Z01.30 BP CHECK: Primary | ICD-10-CM

## 2018-10-29 LAB
ALBUMIN SERPL-MCNC: 3.5 G/DL (ref 3.4–5)
ANION GAP SERPL CALCULATED.3IONS-SCNC: 9 MMOL/L (ref 3–14)
BUN SERPL-MCNC: 90 MG/DL (ref 7–30)
CALCIUM SERPL-MCNC: 8.8 MG/DL (ref 8.5–10.1)
CHLORIDE SERPL-SCNC: 114 MMOL/L (ref 94–109)
CO2 SERPL-SCNC: 19 MMOL/L (ref 20–32)
CREAT SERPL-MCNC: 4.52 MG/DL (ref 0.66–1.25)
GFR SERPL CREATININE-BSD FRML MDRD: 13 ML/MIN/1.7M2
GLUCOSE SERPL-MCNC: 86 MG/DL (ref 70–99)
HCT VFR BLD AUTO: 32.6 % (ref 40–53)
HGB BLD-MCNC: 10.6 G/DL (ref 13.3–17.7)
PHOSPHATE SERPL-MCNC: 6.7 MG/DL (ref 2.5–4.5)
POTASSIUM SERPL-SCNC: 5 MMOL/L (ref 3.4–5.3)
SODIUM SERPL-SCNC: 142 MMOL/L (ref 133–144)

## 2018-10-29 PROCEDURE — 80069 RENAL FUNCTION PANEL: CPT

## 2018-10-29 PROCEDURE — 36415 COLL VENOUS BLD VENIPUNCTURE: CPT

## 2018-10-29 PROCEDURE — 85018 HEMOGLOBIN: CPT

## 2018-10-29 PROCEDURE — 85014 HEMATOCRIT: CPT

## 2018-10-29 PROCEDURE — 99207 ZZC NO CHARGE NURSE ONLY: CPT | Performed by: FAMILY MEDICINE

## 2018-10-29 NOTE — TELEPHONE ENCOUNTER
Anemia Management Note  SUBJECTIVE/OBJECTIVE:  Referred by Cecilia De La Torre on 9/11/2017  Primary Diagnosis: Anemia in Chronic Kidney Disease (N18.5, D63.1)     Secondary Diagnosis:  Chronic Kidney Disease, Stage 5 (N18.5)  Hgb goal range:  9-10  Epo/Darbo: Aranesp 100 mcg every 14 days As needed for hgb<10g/dL - last aranesp dose 2/1/18  RX expires on 12/5/2018  Iron regimen:  None  Labs exp: 9/12/2019  **Provide phone number for [facility] [phone] and instruction to schedule ancillary visit for aranesp injection. Send message to care team via pool -  RN TRIAGE POOL as a telephone encounter**      Contact: **AUTH TO DISCUSS**Tereza Norton(daughter) [phone], Rabia Purcell (daughter)[phone]    Anemia Latest Ref Rng & Units 9/24/2018 10/2/2018 10/3/2018 10/8/2018 10/15/2018 10/22/2018 10/29/2018   EARLENE Dose - - - - - - - -   Hemoglobin 13.3 - 17.7 g/dL 10.5(L) 10.4(L) 11.3(L) 10.5(L) 9.8(L) 10.1(L) 10.6(L)   TSAT 15 - 46 % 25 - - - - - -   Ferritin 26 - 388 ng/mL 458(H) - - - - - -     BP Readings from Last 3 Encounters:   10/29/18 130/72   10/26/18 134/76   10/22/18 126/78     Wt Readings from Last 2 Encounters:   10/26/18 176 lb 4.8 oz (80 kg)   10/03/18 173 lb 6.4 oz (78.7 kg)       He prefers to monitor and not initiate Aranesp unless labs continue to decline.  OK to monitor without Aranesp unless Dung starts to have S/S or Hgb drops below 9. VERONICA    ASSESSMENT:  Hgb:at goal - continue to monitor  TSat: at goal >30% Ferritin: At goal (>100ng/mL)    PLAN:  RTC for Hgb, ferritin and iron labs in 2 week(s)    Orders needed to be renewed (for next follow-up date) in EPIC: None    Iron labs due:  now    Plan discussed with:  No call made - next appt is scheduled  Plan provided by:  Mireya Hussein [facility]  Anemia Clinic  [phone]    NEXT FOLLOW-UP DATE:  11/15/18  Reviewed 10/30/2018 [facility]  Anemia Management Service  Daina Mock PharmD and [facility]  Phone: [phone]  Fax:  [phone]

## 2018-10-29 NOTE — Clinical Note
Blood pressure in pharmacy today 130/72 Ricarda Andino [facility] Phone [phone] Fax      [phone]

## 2018-10-29 NOTE — PROGRESS NOTES
Dung Norton is enrolled/participating in the retail pharmacy Blood Pressure Goals Achievement Program (BPGAP).  Dung Norton was evaluated at [facility] on October 29, 2018 at which time his blood pressure was:    BP Readings from Last 3 Encounters:   10/29/18 130/72   10/26/18 134/76   10/22/18 126/78     Reviewed lifestyle modifications for blood pressure control and reduction: including making healthy food choices, managing weight, getting regular exercise, smoking cessation, reducing alcohol consumption, monitoring blood pressure regularly.     Dung Norton is experiencing symtoms: no    Follow-Up: BP is at goal of < 140/90mmHg (patient 18+ years of age with or without diabetes).  Recommended follow-up at pharmacy in 6 months.     Recommendation to Provider: none    Dung Norton was evaluated for enrollment into the PGEN study today.    PLEASE INITIATE ENROLLMENT DISCUSSION WITH HTN PTS  1) Between 30-80 years old                                                                                                               2) BMI between 19-50                                                                                                        3) BP ?140/90 AND ?170/110 patients aged 30-59         BP ?150/90 AND ?170/110 non-diabetic patients aged 60-80       BP ?140/90 AND ?170/110 diabetic patients aged 60-80  4) Additional requirements for uncontrolled HTN patients:        Pt on only 1 class of medication  5) EXCLUDE patient if confirmation of:                  ? Cardiac disease                  ? Chronic Kidney Disease                  ? Pregnancy/Breastfeeding                  ? Secondary Hypertension/Pre-eclampsia                                 ? Vascular disease    Patient eligible for enrollment:  No  Patient interested in enrollment:  No    This note completed by: Ricarda Andino RPh  [facility]  Phone [phone]  Fax      [phone]

## 2018-10-29 NOTE — MR AVS SNAPSHOT
After Visit Summary   10/29/2018    Dung Norton    MRN: [id number]           Patient Information     Date Of Birth          1945        Visit Information        Provider Department      10/29/2018 11:16 AM Haley Baker MD [facility]        Today's Diagnoses     BP check    -  1       Follow-ups after your visit        Your next 10 appointments already scheduled     Nov 05, 2018 10:30 AM CST   LAB with FZ LAB   [place] [facility] ([facility])    [address] [place] [id number] [phone]           Please do not eat 10-12 hours before your appointment if you are coming in fasting for labs on lipids, cholesterol, or glucose (sugar). This does not apply to pregnant women. Water, hot tea and black coffee (with nothing added) are okay. Do not drink other fluids, diet soda or chew gum.            Jan 16, 2019 12:00 PM CST   (Arrive by 11:30 AM)   Return Visit with Oralia De La Torre NP   [facility] ([facility])    [address] [place] [id number] [phone]            Jan 29, 2019  8:30 AM CST   Return Visit with Lizandro Ng MD   [facility] ([facility])    [address] [facility] [address] [facility] [id number] [phone]            Apr 26, 2019  2:30 PM CDT   (Arrive by 2:15 PM)   Return Visit with Tamiko Vanegas MD   [facility] for Prostate and Urologic Cancers ([facility])    [address] [place] [address]  4th Floor  [facility] [id number] [phone]              Who to contact     If you have questions or need follow up information about today's clinic visit or your schedule please contact [place] [facility] directly at [phone].  Normal or non-critical lab and imaging results will be communicated to you by MyChart, letter or phone within 4 business days after the  clinic has received the results. If you do not hear from us within 7 days, please contact the clinic through [employer] or phone. If you have a critical or abnormal lab result, we will notify you by phone as soon as possible.  Submit refill requests through [employer] or call your pharmacy and they will forward the refill request to us. Please allow 3 business days for your refill to be completed.          Additional Information About Your Visit        Care EveryWhere ID     This is your Care EveryWhere ID. This could be used by other organizations to access your [place] medical records  [id number]         Blood Pressure from Last 3 Encounters:   10/29/18 130/72   10/26/18 134/76   10/22/18 126/78    Weight from Last 3 Encounters:   10/26/18 176 lb 4.8 oz (80 kg)   10/03/18 173 lb 6.4 oz (78.7 kg)   07/25/18 171 lb 9.6 oz (77.8 kg)              Today, you had the following     No orders found for display         Today's Medication Changes          These changes are accurate as of 10/29/18 11:59 PM.  If you have any questions, ask your nurse or doctor.               These medicines have changed or have updated prescriptions.        Dose/Directions    amLODIPine 2.5 MG tablet   Commonly known as:  NORVASC   This may have changed:  when to take this   Used for:  Renal hypertension        Dose:  2.5 mg   Take 1 tablet (2.5 mg) by mouth daily   Quantity:  90 tablet   Refills:  3                Primary Care Provider Office Phone # Fax #    Haley Baker [phone] [address] [facility] [address]        Equal Access to Services     JOSS CERRATO AH: Hadii cristine verdin Sochalo, waaxda luqadaha, qaybta kaalmada robert, gibson solis. So [facility] [phone].    ATENCIÓN: Si habla español, tiene a montague disposición servicios gratuitos de asistencia lingüística. Llame al [phone].    We comply with applicable federal civil rights laws and [place] laws. We do not  discriminate on the basis of race, color, national origin, age, disability, sex, sexual orientation, or gender identity.            Thank you!     Thank you for choosing [facility]  for your care. Our goal is always to provide you with excellent care. Hearing back from our patients is one way we can continue to improve our services. Please take a few minutes to complete the written survey that you may receive in the mail after your visit with us. Thank you!             Your Updated Medication List - Protect others around you: Learn how to safely use, store and throw away your medicines at www.disposemymeds.org.          This list is accurate as of 10/29/18 11:59 PM.  Always use your most recent med list.                   Brand Name Dispense Instructions for use Diagnosis    amLODIPine 2.5 MG tablet    NORVASC    90 tablet    Take 1 tablet (2.5 mg) by mouth daily    Renal hypertension       aspirin 81 MG tablet     30 tablet    Take 1 tablet (81 mg) by mouth every morning - RESUME on 3/9/18 if urine remains clear    S/P TURP       darbepoetin william 100 MCG/0.5ML injection    ARANESP (ALBUMIN FREE)    0.5 mL    Inject 0.5 mLs (100 mcg) Subcutaneous every 14 days As needed for hgb<10g/dL.  If Hgb increases >1 point in 2 weeks (if blood transfusion given, use hgb PRIOR to this), SYSTOLIC BP > 180 mmHg or hgb>=10g/dL, HOLD DOSE. Dose must be within 1 week of Hgb.  Per anemia protocol with Cecilia De La Torre CNP    Anemia of chronic renal failure, stage 5 (H), CKD (chronic kidney disease) stage 5, GFR less than 15 ml/min (H)       ferrous sulfate 325 (65 Fe) MG tablet    IRON    30 tablet    Take 1 tablet (325 mg) by mouth daily (with breakfast)    Anemia of chronic renal failure, stage 5 (H), CKD (chronic kidney disease) stage 5, GFR less than 15 ml/min (H)       FISH OIL PO           Multi-vitamin Tabs tablet      Take 1 tablet by mouth daily        order for DME     1 each    Equipment being ordered: olecranon  bursa brace    Olecranon bursitis of left elbow       sevelamer 400 MG tablet    RENAGEL    90 tablet    Take 1 tablet (400 mg) by mouth 3 times daily (with meals)    Secondary renal hyperparathyroidism (H)

## 2018-11-01 NOTE — PROGRESS NOTES
UROLOGIC DIAGNOSES:       CURRENT INTERVENTIONS:       HISTORY:     Patient with history of AUR and hematuria requiring cystoscopy and clot evacuation. Had failed TOV and has been performing CIC.   Patient states he is performing CIC regularly about 4x per day.   He would like to discuss further management of urinary retention.     Patient is currently s/p TURP. He was able to void a small amount at TOV. He states he has been voiding several times per day but continues to perform CIC (though fewer times per day than previous).   Notes that he is able to void now with ~ 250-300ml upon catheterizing.   We reviewed these residuals and voiding pattern. We discussed continued CIC with an additional time during the day. We discussed urinary retention long term.       PAST MEDICAL HISTORY:   Past Medical History:   Diagnosis Date     BPH (benign prostatic hyperplasia)      Chronic kidney disease      HTN      Pulmonary nodules      Tobacco abuse        PAST SURGICAL HISTORY:   Past Surgical History:   Procedure Laterality Date     APPENDECTOMY  AGE 6     CREATE FISTULA ARTERIOVENOUS UPPER EXTREMITY Left 11/17/2017    Procedure: CREATE FISTULA ARTERIOVENOUS UPPER EXTREMITY;  Left Cephalic Vein To Radial Artery Arteriovenous Fistula Creation, Anesthesia Block;  Surgeon: Eduardo Pabon MD;  Location: [facility] OR     CYSTOSCOPY, FULGURATE BLEEDERS, EVACUATE CLOT(S), COMBINED N/A 7/16/2017    Procedure: COMBINED CYSTOSCOPY, FULGURATE BLEEDERS, EVACUATE CLOT(S);  Cystoscopy clot evacuation, bladder biopsy and fulguration (per surgeons note) NERVE BLOCK PERIPHERAL;  Surgeon: Tamiko Vanegas MD;  Location: [facility] OR     CYSTOSCOPY, TRANSURETHRAL RESECTION (TUR) PROSTATE, COMBINED N/A 3/5/2018    Procedure: COMBINED CYSTOSCOPY, TRANSURETHRAL RESECTION (TUR) PROSTATE;  Cystoscopy, Transurethral Resection of Prostate ;  Surgeon: Tamiko Vanegas MD;  Location: [facility] OR     SINUS SURGERY  AGE 6     TONSILLECTOMY      CHILDHOOD  "      FAMILY HISTORY:   Family History   Problem Relation Age of Onset     [email or web address]. Mother      d age 67     Vision Loss Father      Hearing Loss Sister      Diabetes Sister      Cancer No family hx of      Glaucoma No family hx of      Macular Degeneration No family hx of        SOCIAL HISTORY:   Social History   Substance Use Topics     Smoking status: Former Smoker     Packs/day: 1.00     Years: 53.00     Types: Cigarettes     Quit date: 6/12/2017     Smokeless tobacco: Never Used     Alcohol use No       Current Outpatient Prescriptions   Medication     amLODIPine (NORVASC) 2.5 MG tablet     aspirin 81 MG tablet     darbepoetin william (ARANESP, ALBUMIN FREE,) 100 MCG/0.5ML injection     ferrous sulfate (IRON) 325 (65 FE) MG tablet     multivitamin, therapeutic with minerals (MULTI-VITAMIN) TABS tablet     Omega-3 Fatty Acids (FISH OIL PO)     order for DME     sevelamer (RENAGEL) 400 MG tablet     No current facility-administered medications for this visit.          PHYSICAL EXAM:    /76  Pulse 97  Ht 1.778 m (5' 10\")  Wt 80 kg (176 lb 4.8 oz)  BMI 25.3 kg/m2    HEENT: Normocephalic and atraumatic   Cardiac: Not done  Back/Flank: Not done  CNS/PNS: Not done  Respiratory: Normal non-labored breathing  Abdomen: Soft nontender and nondistended  Peripheral Vascular: Not done  Mental Status: Not done    Penis: Not done  Scrotal Skin: Not done  Testicles: Not done  Epididymis: Not done  Digital Rectal Exam:     Cystoscopy: Not done    Imaging: None    Urinalysis: [facility] RESULTS:  Recent Labs   Lab Test  08/29/17   0235   01/15/14   0936   COLOR  Light Yellow   < >  Yellow   APPEARANCE  Cloudy   < >  Clear   URINEGLC  Negative   < >  Negative   URINEBILI  Negative   < >  Negative   URINEKETONE  Negative   < >  Negative   SG  1.014   < >  1.020   UBLD  Moderate*   < >  Small*   URINEPH  6.0   < >  6.0   PROTEIN  100*   < >  Negative   UROBILINOGEN   --    --   0.2   NITRITE  Negative   < >  Negative   LEUKEST  Large*  "  < >  Negative   RBCU  10*   < >  10-25*   WBCU  >182*   < >  O - 2    < > = values in this interval not displayed.       PSA:     Post Void Residual: 285ml     Other labs: None today      IMPRESSION:  71 y/o M with urinary retention requiring CIC, now voiding     PLAN:  Timed voiding   Continue CIC TID   Uroflow/PVR if possible in six months   Follow up in six months         Total Time: 15 minutes                                       Total in Consultation: greater than 50%       Answers for HPI/ROS submitted by the patient on 10/26/2018   PHQ-2 Score: 0

## 2018-11-05 ENCOUNTER — ALLIED HEALTH/NURSE VISIT (OUTPATIENT)
Dept: FAMILY MEDICINE | Facility: CLINIC | Age: 73
End: 2018-11-05

## 2018-11-05 VITALS — DIASTOLIC BLOOD PRESSURE: 78 MMHG | SYSTOLIC BLOOD PRESSURE: 126 MMHG

## 2018-11-05 DIAGNOSIS — N18.5 CKD (CHRONIC KIDNEY DISEASE) STAGE 5, GFR LESS THAN 15 ML/MIN (H): ICD-10-CM

## 2018-11-05 DIAGNOSIS — Z01.30 BP CHECK: Primary | ICD-10-CM

## 2018-11-05 LAB
ALBUMIN SERPL-MCNC: 3.3 G/DL (ref 3.4–5)
ANION GAP SERPL CALCULATED.3IONS-SCNC: 7 MMOL/L (ref 3–14)
BUN SERPL-MCNC: 92 MG/DL (ref 7–30)
CALCIUM SERPL-MCNC: 8.6 MG/DL (ref 8.5–10.1)
CHLORIDE SERPL-SCNC: 113 MMOL/L (ref 94–109)
CO2 SERPL-SCNC: 24 MMOL/L (ref 20–32)
CREAT SERPL-MCNC: 4.35 MG/DL (ref 0.66–1.25)
GFR SERPL CREATININE-BSD FRML MDRD: 13 ML/MIN/1.7M2
GLUCOSE SERPL-MCNC: 97 MG/DL (ref 70–99)
HCT VFR BLD AUTO: 32.1 % (ref 40–53)
HGB BLD-MCNC: 10.2 G/DL (ref 13.3–17.7)
PHOSPHATE SERPL-MCNC: 5.3 MG/DL (ref 2.5–4.5)
POTASSIUM SERPL-SCNC: 4.6 MMOL/L (ref 3.4–5.3)
SODIUM SERPL-SCNC: 144 MMOL/L (ref 133–144)

## 2018-11-05 PROCEDURE — 85018 HEMOGLOBIN: CPT

## 2018-11-05 PROCEDURE — 36415 COLL VENOUS BLD VENIPUNCTURE: CPT

## 2018-11-05 PROCEDURE — 85014 HEMATOCRIT: CPT

## 2018-11-05 PROCEDURE — 80069 RENAL FUNCTION PANEL: CPT

## 2018-11-05 NOTE — MR AVS SNAPSHOT
After Visit Summary   11/5/2018    Dung Norton    MRN: [id number]           Patient Information     Date Of Birth          1945        Visit Information        Provider Department      11/5/2018 1:25 PM Haley Baker MD [facility]        Today's Diagnoses     BP check    -  1       Follow-ups after your visit        Your next 10 appointments already scheduled     Nov 12, 2018  9:45 AM CST   LAB with FZ LAB   [facility] ([facility])    [address] [place] [address]   [phone]           Please do not eat 10-12 hours before your appointment if you are coming in fasting for labs on lipids, cholesterol, or glucose (sugar). This does not apply to pregnant women. Water, hot tea and black coffee (with nothing added) are okay. Do not drink other fluids, diet soda or chew gum.            Gutierrez 10, 2019 11:00 AM CST   Ech Complete with [id number]   [facility] [place] ([facility])    [address] [place] [phone]   [phone]           1.  Please bring or wear a comfortable two-piece outfit. 2.  You may eat, drink and take your normal medicines. 3.  For any questions that cannot be answered, please contact the ordering physician 4.  Please do not wear perfumes or scented lotions on the day of your exam.            Jan 16, 2019 12:00 PM CST   (Arrive by 11:30 AM)   Return Visit with Oralia De La Torre NP   [facility] ([facility])    [address] [place] [address]  [address] [place] [id number] [phone]            Jan 29, 2019  8:30 AM CST   Return Visit with Lizandro Ng MD   [facility] ([facility])    [address] [place] [phone]   [phone]            Apr 26, 2019  2:30 PM CDT   (Arrive by 2:15 PM)   Return Visit with Tamiko Vanegas MD   [facility] for Prostate and Urologic Cancers ([facility])    [address] [facility]  4th Floor  [facility] [address] [phone]              Who to contact     If you have questions or need follow up information about today's clinic visit or your schedule please contact [facility] directly at [phone].  Normal or non-critical lab and imaging results will be communicated to you by MyChart, letter or phone within 4 business days after the clinic has received the results. If you do not hear from us within 7 days, please contact the clinic through MyChart or phone. If you have a critical or abnormal lab result, we will notify you by phone as soon as possible.  Submit refill requests through [employer] or call your pharmacy and they will forward the refill request to us. Please allow 3 business days for your refill to be completed.          Additional Information About Your Visit        Care EveryWhere ID     This is your Care EveryWhere ID. This could be used by other organizations to access your [place] medical records  [id number]         Blood Pressure from Last 3 Encounters:   11/05/18 126/78   10/29/18 130/72   10/26/18 134/76    Weight from Last 3 Encounters:   10/26/18 176 lb 4.8 oz (80 kg)   10/03/18 173 lb 6.4 oz (78.7 kg)   07/25/18 171 lb 9.6 oz (77.8 kg)              Today, you had the following     No orders found for display         Today's Medication Changes          These changes are accurate as of 11/5/18 11:59 PM.  If you have any questions, ask your nurse or doctor.               These medicines have changed or have updated prescriptions.        Dose/Directions    amLODIPine 2.5 MG tablet   Commonly known as:  NORVASC   This may have changed:  when to take this   Used for:  Renal hypertension        Dose:  2.5 mg   Take 1 tablet (2.5 mg) by mouth daily   Quantity:  90 tablet   Refills:  3                Primary Care Provider Office Phone # Fax #     Haley Baker [phone]       [address] [facility]  [facility] [address]        Equal Access to Services     BRIAN CERRATO : Justin cristine marin lambert Sagastume, sylvesterrahul tapiaceliha, siddharth teresayael jones, gibson fulton lamalickhillary irma. So [facility] [phone].    ATENCIÓN: Si habla español, tiene a montague disposición servicios gratuitos de asistencia lingüística. Llame al [phone].    We comply with applicable federal civil rights laws and [place] laws. We do not discriminate on the basis of race, color, national origin, age, disability, sex, sexual orientation, or gender identity.            Thank you!     Thank you for choosing [facility]  for your care. Our goal is always to provide you with excellent care. Hearing back from our patients is one way we can continue to improve our services. Please take a few minutes to complete the written survey that you may receive in the mail after your visit with us. Thank you!             Your Updated Medication List - Protect others around you: Learn how to safely use, store and throw away your medicines at www.disposemymeds.org.          This list is accurate as of 11/5/18 11:59 PM.  Always use your most recent med list.                   Brand Name Dispense Instructions for use Diagnosis    amLODIPine 2.5 MG tablet    NORVASC    90 tablet    Take 1 tablet (2.5 mg) by mouth daily    Renal hypertension       aspirin 81 MG tablet     30 tablet    Take 1 tablet (81 mg) by mouth every morning - RESUME on 3/9/18 if urine remains clear    S/P TURP       darbepoetin william 100 MCG/0.5ML injection    ARANESP (ALBUMIN FREE)    0.5 mL    Inject 0.5 mLs (100 mcg) Subcutaneous every 14 days As needed for hgb<10g/dL.  If Hgb increases >1 point in 2 weeks (if blood transfusion given, use hgb PRIOR to this), SYSTOLIC BP > 180 mmHg or hgb>=10g/dL, HOLD DOSE. Dose must be within 1 week of Hgb.  Per anemia protocol with Cecilia De La Torre CNP    Anemia of chronic  renal failure, stage 5 (H), CKD (chronic kidney disease) stage 5, GFR less than 15 ml/min (H)       ferrous sulfate 325 (65 Fe) MG tablet    IRON    30 tablet    Take 1 tablet (325 mg) by mouth daily (with breakfast)    Anemia of chronic renal failure, stage 5 (H), CKD (chronic kidney disease) stage 5, GFR less than 15 ml/min (H)       FISH OIL PO           Multi-vitamin Tabs tablet      Take 1 tablet by mouth daily        order for DME     1 each    Equipment being ordered: olecranon bursa brace    Olecranon bursitis of left elbow       sevelamer 400 MG tablet    RENAGEL    90 tablet    Take 1 tablet (400 mg) by mouth 3 times daily (with meals)    Secondary renal hyperparathyroidism (H)

## 2018-11-05 NOTE — Clinical Note
Blood pressure check at [facility] pharmacy 126/78 Ricarda Andino [facility] Phone [phone] Fax      [phone]

## 2018-11-12 ENCOUNTER — ALLIED HEALTH/NURSE VISIT (OUTPATIENT)
Dept: FAMILY MEDICINE | Facility: CLINIC | Age: 73
End: 2018-11-12
Payer: COMMERCIAL

## 2018-11-12 ENCOUNTER — TELEPHONE (OUTPATIENT)
Dept: FAMILY MEDICINE | Facility: CLINIC | Age: 73
End: 2018-11-12

## 2018-11-12 VITALS — SYSTOLIC BLOOD PRESSURE: 130 MMHG | DIASTOLIC BLOOD PRESSURE: 74 MMHG

## 2018-11-12 DIAGNOSIS — Z01.30 BP CHECK: Primary | ICD-10-CM

## 2018-11-12 DIAGNOSIS — N18.5 CKD (CHRONIC KIDNEY DISEASE) STAGE 5, GFR LESS THAN 15 ML/MIN (H): ICD-10-CM

## 2018-11-12 DIAGNOSIS — Z12.11 SPECIAL SCREENING FOR MALIGNANT NEOPLASMS, COLON: Primary | ICD-10-CM

## 2018-11-12 LAB
ALBUMIN SERPL-MCNC: 3.5 G/DL (ref 3.4–5)
ANION GAP SERPL CALCULATED.3IONS-SCNC: 6 MMOL/L (ref 3–14)
BUN SERPL-MCNC: 91 MG/DL (ref 7–30)
CALCIUM SERPL-MCNC: 9.2 MG/DL (ref 8.5–10.1)
CHLORIDE SERPL-SCNC: 111 MMOL/L (ref 94–109)
CO2 SERPL-SCNC: 25 MMOL/L (ref 20–32)
CREAT SERPL-MCNC: 4.79 MG/DL (ref 0.66–1.25)
GFR SERPL CREATININE-BSD FRML MDRD: 12 ML/MIN/1.7M2
GLUCOSE SERPL-MCNC: 86 MG/DL (ref 70–99)
HCT VFR BLD AUTO: 34.3 % (ref 40–53)
HGB BLD-MCNC: 10.8 G/DL (ref 13.3–17.7)
PHOSPHATE SERPL-MCNC: 5.7 MG/DL (ref 2.5–4.5)
POTASSIUM SERPL-SCNC: 5 MMOL/L (ref 3.4–5.3)
SODIUM SERPL-SCNC: 142 MMOL/L (ref 133–144)

## 2018-11-12 PROCEDURE — 36415 COLL VENOUS BLD VENIPUNCTURE: CPT

## 2018-11-12 PROCEDURE — 99207 ZZC NO CHARGE NURSE ONLY: CPT | Performed by: FAMILY MEDICINE

## 2018-11-12 PROCEDURE — 85014 HEMATOCRIT: CPT

## 2018-11-12 PROCEDURE — 85018 HEMOGLOBIN: CPT

## 2018-11-12 PROCEDURE — 80069 RENAL FUNCTION PANEL: CPT

## 2018-11-12 NOTE — PROGRESS NOTES
Dung Norton is enrolled/participating in the retail pharmacy Blood Pressure Goals Achievement Program (BPGAP).  Dung Norton was evaluated at [facility] on November 12, 2018 at which time his blood pressure was:    BP Readings from Last 3 Encounters:   11/12/18 130/74   11/05/18 126/78   10/29/18 130/72     Reviewed lifestyle modifications for blood pressure control and reduction: including making healthy food choices, managing weight, getting regular exercise, smoking cessation, reducing alcohol consumption, monitoring blood pressure regularly.     Dung Norton is not experiencing symptoms.    Follow-Up: BP is at goal of < 140/90mmHg (patient 18+ years of age with or without diabetes).  Recommended follow-up at pharmacy in 6 months.     Recommendation to Provider: Ricarda Andino kym  [facility]  Phone [phone]  Fax      [phone]      Dung Norton was evaluated for enrollment into the PGEN study today.    PLEASE INITIATE ENROLLMENT DISCUSSION WITH HTN PTS  1) Between 30-80 years old                                                                                                               2) BMI between 19-50                                                                                                        3) BP ?140/90 AND ?170/110 patients aged 30-59         BP ?150/90 AND ?170/110 non-diabetic patients aged 60-80       BP ?140/90 AND ?170/110 diabetic patients aged 60-80  4) Additional requirements for uncontrolled HTN patients:        Pt on only 1 class of medication  5) EXCLUDE patient if confirmation of:                  ? Cardiac disease                  ? Chronic Kidney Disease                  ? Pregnancy/Breastfeeding                  ? Secondary Hypertension/Pre-eclampsia                                 ? Vascular disease    Patient eligible for enrollment:  No  Patient interested in enrollment:  No    This note completed by: Ricarda Andino  [facility]  Phone [phone]  Fax      [phone]

## 2018-11-12 NOTE — MR AVS SNAPSHOT
After Visit Summary   11/12/2018    Dung Norton    MRN: [id number]           Patient Information     Date Of Birth          1945        Visit Information        Provider Department      11/12/2018 8:52 AM Haley Baker MD [facility]        Today's Diagnoses     BP check    -  1       Follow-ups after your visit        Your next 10 appointments already scheduled     Nov 19, 2018 10:00 AM CST   LAB with FZ LAB   [facility] ([facility])    [address] [place] [address]   [phone]           Please do not eat 10-12 hours before your appointment if you are coming in fasting for labs on lipids, cholesterol, or glucose (sugar). This does not apply to pregnant women. Water, hot tea and black coffee (with nothing added) are okay. Do not drink other fluids, diet soda or chew gum.            Gutierrez 10, 2019 11:00 AM CST   Ech Complete with FKECHR1   [facility] ([facility])    [address] [place] [address]   [phone]           1.  Please bring or wear a comfortable two-piece outfit. 2.  You may eat, drink and take your normal medicines. 3.  For any questions that cannot be answered, please contact the ordering physician 4.  Please do not wear perfumes or scented lotions on the day of your exam.            Jan 16, 2019 12:00 PM CST   (Arrive by 11:30 AM)   Return Visit with Oralia De La Torre NP   [facility] ([facility] [place])    [address] [place] [address]  [address] [place] [id number] [phone]            Jan 29, 2019  8:30 AM CST   Return Visit with Lizandro Ng MD   [facility]YS[facility] AT [facility] ([facility])    [address] [place] [address]   [phone]            Apr 26, 2019  2:30 PM CDT   (Arrive by 2:15 PM)   Return Visit with Tamiko Vanegas MD   [facility]  Urology and Inst for Prostate and Urologic Cancers ([facility] [place])    [address] [facility]  4th Floor  [facility] [address] [phone]              Who to contact     If you have questions or need follow up information about today's clinic visit or your schedule please contact [facility] directly at [phone].  Normal or non-critical lab and imaging results will be communicated to you by MyChart, letter or phone within 4 business days after the clinic has received the results. If you do not hear from us within 7 days, please contact the clinic through MyChart or phone. If you have a critical or abnormal lab result, we will notify you by phone as soon as possible.  Submit refill requests through [employer] or call your pharmacy and they will forward the refill request to us. Please allow 3 business days for your refill to be completed.          Additional Information About Your Visit        Care EveryWhere ID     This is your Care EveryWhere ID. This could be used by other organizations to access your [facility] medical records  [id number]         Blood Pressure from Last 3 Encounters:   11/12/18 130/74   11/05/18 126/78   10/29/18 130/72    Weight from Last 3 Encounters:   10/26/18 176 lb 4.8 oz (80 kg)   10/03/18 173 lb 6.4 oz (78.7 kg)   07/25/18 171 lb 9.6 oz (77.8 kg)              Today, you had the following     No orders found for display         Today's Medication Changes          These changes are accurate as of 11/12/18 11:59 PM.  If you have any questions, ask your nurse or doctor.               These medicines have changed or have updated prescriptions.        Dose/Directions    amLODIPine 2.5 MG tablet   Commonly known as:  NORVASC   This may have changed:  when to take this   Used for:  Renal hypertension        Dose:  2.5 mg   Take 1 tablet (2.5 mg) by mouth daily   Quantity:  90 tablet   Refills:  3                Primary Care Provider Office Phone # Fax #     Haley Baker [phone]       [address] [facility]  [facility] [address]        Equal Access to Services     BRIAN CERRATO : Justin cristine marin lambert Sagastume, sylvesterrahul tapiaceliha, siddharth teresayael jones, [place] fulton lamalickhillary irma. So [facility] [phone].    ATENCIÓN: Si habla español, tiene a montague disposición servicios gratuitos de asistencia lingüística. Llame al [phone].    We comply with applicable federal civil rights laws and [place] laws. We do not discriminate on the basis of race, color, national origin, age, disability, sex, sexual orientation, or gender identity.            Thank you!     Thank you for choosing [facility]  for your care. Our goal is always to provide you with excellent care. Hearing back from our patients is one way we can continue to improve our services. Please take a few minutes to complete the written survey that you may receive in the mail after your visit with us. Thank you!             Your Updated Medication List - Protect others around you: Learn how to safely use, store and throw away your medicines at www.disposemymeds.org.          This list is accurate as of 11/12/18 11:59 PM.  Always use your most recent med list.                   Brand Name Dispense Instructions for use Diagnosis    amLODIPine 2.5 MG tablet    NORVASC    90 tablet    Take 1 tablet (2.5 mg) by mouth daily    Renal hypertension       aspirin 81 MG tablet     30 tablet    Take 1 tablet (81 mg) by mouth every morning - RESUME on 3/9/18 if urine remains clear    S/P TURP       darbepoetin william 100 MCG/0.5ML injection    ARANESP (ALBUMIN FREE)    0.5 mL    Inject 0.5 mLs (100 mcg) Subcutaneous every 14 days As needed for hgb<10g/dL.  If Hgb increases >1 point in 2 weeks (if blood transfusion given, use hgb PRIOR to this), SYSTOLIC BP > 180 mmHg or hgb>=10g/dL, HOLD DOSE. Dose must be within 1 week of Hgb.  Per anemia protocol with Cecilia De La Torre CNP    Anemia of chronic  renal failure, stage 5 (H), CKD (chronic kidney disease) stage 5, GFR less than 15 ml/min (H)       ferrous sulfate 325 (65 Fe) MG tablet    IRON    30 tablet    Take 1 tablet (325 mg) by mouth daily (with breakfast)    Anemia of chronic renal failure, stage 5 (H), CKD (chronic kidney disease) stage 5, GFR less than 15 ml/min (H)       FISH OIL PO           Multi-vitamin Tabs tablet      Take 1 tablet by mouth daily        order for DME     1 each    Equipment being ordered: olecranon bursa brace    Olecranon bursitis of left elbow       sevelamer 400 MG tablet    RENAGEL    90 tablet    Take 1 tablet (400 mg) by mouth 3 times daily (with meals)    Secondary renal hyperparathyroidism (H)

## 2018-11-12 NOTE — TELEPHONE ENCOUNTER
Panel Management Review      Patient has the following on his problem list:     Hypertension   Last three blood pressure readings:  BP Readings from Last 3 Encounters:   11/12/18 130/74   11/05/18 126/78   10/29/18 130/72     Blood pressure: Passed    HTN Guidelines:  Age 18-59 BP range:  Less than 140/90  Age 60-85 with Diabetes:  Less than 140/90  Age 60-85 without Diabetes:  less than 150/90      Composite cancer screening  Chart review shows that this patient is due/due soon for the following Fecal Colorectal (FIT)  Summary:    Patient is due/failing the following:   FIT    Action needed:   Routed to provider for review.    Type of outreach:    None, routed to provider for review. and Sent letter.    Questions for provider review:    Please do FIT if appropriate                                                                                                                                    Gem Liriano CMA on 11/12/2018 at 12:17 PM       Chart routed to Provider .

## 2018-11-12 NOTE — LETTER
November 12, 2018        Dung Norton,  [address] [address] [address]          Dear Dung Norton      Monitoring and managing your preventative and chronic health conditions are very important to us. Our records indicate that you have not scheduled for FIT test  which was recommended by Dr. Baker      If you have received your health care elsewhere, please call the clinic so the information can be documented in your chart.    Please call [phone] or message us through your [employer] account to schedule an appointment or provide information for your chart.     Feel free to contact us if you have any questions or concerns!    I look forward to seeing you and working with you on your health care needs.     Sincerely,       Your [facility] Team/HV

## 2018-11-15 ENCOUNTER — TELEPHONE (OUTPATIENT)
Dept: PHARMACY | Facility: CLINIC | Age: 73
End: 2018-11-15

## 2018-11-15 NOTE — TELEPHONE ENCOUNTER
September 7, 2017      [facility]  [address] [place] [address]        Dear Sha,    We have received the results from your EEG. The report shows that you have no seizure activity .     If you have any further questions, please call us at [phone].    Sincerely,     Dr. Yvon Rendon  [facility]  [address] [address] [place] [address], [address]  [place] [address]  [phone]     Anemia Management Note  SUBJECTIVE/OBJECTIVE:  Referred by Cecilia De La Torre on 9/11/2017  Primary Diagnosis: Anemia in Chronic Kidney Disease (N18.5, D63.1)     Secondary Diagnosis:  Chronic Kidney Disease, Stage 5 (N18.5)  Hgb goal range:  9-10  Epo/Darbo: Aranesp 100 mcg every 14 days As needed for hgb<10g/dL - last aranesp dose 2/1/18  RX expires on 12/5/2018  Iron regimen:  None  Labs exp: 9/12/2019  **Provide phone number for [facility] [phone] and instruction to schedule ancillary visit for aranesp injection. Send message to care team via pool -  RN TRIAGE POOL as a telephone encounter**      Contact: **AUTH TO DISCUSS**Tereza Norton(daughter) [phone], Rabia Purcell (daughter)[phone]    Anemia Latest Ref Rng & Units 10/8/2018 10/15/2018 10/22/2018 10/29/2018 11/5/2018 11/12/2018 11/19/2018   EARLENE Dose - - - - - - - -   Hemoglobin 13.3 - 17.7 g/dL 10.5(L) 9.8(L) 10.1(L) 10.6(L) 10.2(L) 10.8(L) 10.1(L)   TSAT 15 - 46 % - - - - - - -   Ferritin 26 - 388 ng/mL - - - - - - -       BP Readings from Last 3 Encounters:   11/12/18 130/74   11/05/18 126/78   10/29/18 130/72     Wt Readings from Last 2 Encounters:   10/26/18 176 lb 4.8 oz (80 kg)   10/03/18 173 lb 6.4 oz (78.7 kg)       Next lab appt is scheduled for 11/19/18  Appt w/Cecilia Espinoza is scheduled for 1/16/19 at noon  Plan to discharge December if stable - VERONICA 11/15/2018    Lab info reviewed & updated 11/20/2018 VERONICA  ASSESSMENT:  Hgb:Above goal - recommend hold dose - last dose given on 2/1/18  TSat: not at goal of >30% Ferritin: At goal (>100ng/mL)    PLAN:  Hold Aranesp and RTC for hgb then aranesp if needed in 1 week(s)    Orders needed to be renewed (for next follow-up date) in EPIC: ferritin and iron standing orders    Iron labs due:  now    Plan discussed with:  No call made    Plan provided by:  Mireya Hussein [facility]  Anemia Clinic  [phone]  Reviewed 11/15/2018 VERONICA  NEXT FOLLOW-UP DATE:  11/27/18    Anemia Management Service  Daina  Anabell Mock and Malina Hussein CPhT  Phone: [phone]  Fax: [phone]

## 2018-11-19 ENCOUNTER — ALLIED HEALTH/NURSE VISIT (OUTPATIENT)
Dept: FAMILY MEDICINE | Facility: CLINIC | Age: 73
End: 2018-11-19

## 2018-11-19 VITALS — SYSTOLIC BLOOD PRESSURE: 140 MMHG | DIASTOLIC BLOOD PRESSURE: 74 MMHG

## 2018-11-19 DIAGNOSIS — Z01.30 BP CHECK: Primary | ICD-10-CM

## 2018-11-19 DIAGNOSIS — N18.5 CKD (CHRONIC KIDNEY DISEASE) STAGE 5, GFR LESS THAN 15 ML/MIN (H): ICD-10-CM

## 2018-11-19 LAB
ALBUMIN SERPL-MCNC: 3.4 G/DL (ref 3.4–5)
ANION GAP SERPL CALCULATED.3IONS-SCNC: 8 MMOL/L (ref 3–14)
BUN SERPL-MCNC: 88 MG/DL (ref 7–30)
CALCIUM SERPL-MCNC: 8.7 MG/DL (ref 8.5–10.1)
CHLORIDE SERPL-SCNC: 114 MMOL/L (ref 94–109)
CO2 SERPL-SCNC: 22 MMOL/L (ref 20–32)
CREAT SERPL-MCNC: 4.48 MG/DL (ref 0.66–1.25)
GFR SERPL CREATININE-BSD FRML MDRD: 13 ML/MIN/1.7M2
GLUCOSE SERPL-MCNC: 91 MG/DL (ref 70–99)
HCT VFR BLD AUTO: 32.4 % (ref 40–53)
HGB BLD-MCNC: 10.1 G/DL (ref 13.3–17.7)
PHOSPHATE SERPL-MCNC: 5.7 MG/DL (ref 2.5–4.5)
POTASSIUM SERPL-SCNC: 4.9 MMOL/L (ref 3.4–5.3)
SODIUM SERPL-SCNC: 144 MMOL/L (ref 133–144)

## 2018-11-19 PROCEDURE — 80069 RENAL FUNCTION PANEL: CPT

## 2018-11-19 PROCEDURE — 99207 ZZC NO CHARGE NURSE ONLY: CPT | Performed by: FAMILY MEDICINE

## 2018-11-19 PROCEDURE — 36415 COLL VENOUS BLD VENIPUNCTURE: CPT

## 2018-11-19 PROCEDURE — 85014 HEMATOCRIT: CPT

## 2018-11-19 PROCEDURE — 85018 HEMOGLOBIN: CPT

## 2018-11-19 NOTE — MR AVS SNAPSHOT
After Visit Summary   11/19/2018    Dung Norton    MRN: [id number]           Patient Information     Date Of Birth          1945        Visit Information        Provider Department      11/19/2018 10:39 AM Haley Baker MD [facility]        Today's Diagnoses     BP check    -  1       Follow-ups after your visit        Your next 10 appointments already scheduled     Dec 03, 2018 10:15 AM CST   LAB with FZ LAB   [facility] ([facility])    [address] [place] [address]   [phone]           Please do not eat 10-12 hours before your appointment if you are coming in fasting for labs on lipids, cholesterol, or glucose (sugar). This does not apply to pregnant women. Water, hot tea and black coffee (with nothing added) are okay. Do not drink other fluids, diet soda or chew gum.            Gutierrez 10, 2019 11:00 AM CST   Ech Complete with FKECHR1   [facility] [place] ([facility])    [address] [place] [address]   [phone]           1.  Please bring or wear a comfortable two-piece outfit. 2.  You may eat, drink and take your normal medicines. 3.  For any questions that cannot be answered, please contact the ordering physician 4.  Please do not wear perfumes or scented lotions on the day of your exam.            Jan 16, 2019 12:00 PM CST   (Arrive by 11:30 AM)   Return Visit with Oralia De La Torre NP   [facility] ([facility] [place])    [address] [place] [address]  [address] [place] [address] [phone]            Jan 29, 2019  8:30 AM CST   Return Visit with Lizandro Ng MD   [facility] PHYSICIANS HEART AT [facility] ([facility])    [address] [place] [address]   [phone]            Apr 26, 2019  2:30 PM CDT   (Arrive by 2:15 PM)   Return Visit with Tamiko Vanegas MD   [facility]  "Urology and Inst for Prostate and Urologic Cancers ([facility] [place])    [address] [facility]  4th [facility] [address] [phone]              Who to contact     If you have questions or need follow up information about today's clinic visit or your schedule please contact [facility] directly at [phone].  Normal or non-critical lab and imaging results will be communicated to you by MyChart, letter or phone within 4 business days after the clinic has received the results. If you do not hear from us within 7 days, please contact the clinic through MyChart or phone. If you have a critical or abnormal lab result, we will notify you by phone as soon as possible.  Submit refill requests through [employer] or call your pharmacy and they will forward the refill request to us. Please allow 3 business days for your refill to be completed.          Additional Information About Your Visit        [employer] Information     [employer] lets you send messages to your doctor, view your test results, renew your prescriptions, schedule appointments and more. To sign up, go to [email or web address] . Click on \"Log in\" on the left side of the screen, which will take you to the Welcome page. Then click on \"Sign up Now\" on the right side of the page.     You will be asked to enter the access code listed below, as well as some personal information. Please follow the directions to create your username and password.     Your access code is: [email or web address]  Expires: 2019  2:20 PM     Your access code will  in 90 days. If you need help or a new code, please call your [facility] clinic or [phone].        Care EveryWhere ID     This is your Care EveryWhere ID. This could be used by other organizations to access your [facility] medical records  [id number]         Blood Pressure from Last 3 Encounters:   18 140/78   18 140/74   18 130/74    Weight from Last 3 Encounters: "   10/26/18 176 lb 4.8 oz (80 kg)   10/03/18 173 lb 6.4 oz (78.7 kg)   07/25/18 171 lb 9.6 oz (77.8 kg)              Today, you had the following     No orders found for display         Today's Medication Changes          These changes are accurate as of 11/19/18 11:59 PM.  If you have any questions, ask your nurse or doctor.               These medicines have changed or have updated prescriptions.        Dose/Directions    amLODIPine 2.5 MG tablet   Commonly known as:  NORVASC   This may have changed:  when to take this   Used for:  Renal hypertension        Dose:  2.5 mg   Take 1 tablet (2.5 mg) by mouth daily   Quantity:  90 tablet   Refills:  3                Primary Care Provider Office Phone # Fax #    Haley Baker [phone] [address] [facility] [address]        Equal Access to Services     [facility]: Hadii cristine verdin Sochalo, waaxda luqerin, qaybta kaalmada adekimberlyyarahul, [place] green . So [facility] [phone].    ATENCIÓN: Si habla español, tiene a montague disposición servicios gratuitos de asistencia lingüística. Llame al [phone].    We comply with applicable federal civil rights laws and [place] laws. We do not discriminate on the basis of race, color, national origin, age, disability, sex, sexual orientation, or gender identity.            Thank you!     Thank you for choosing [facility]  for your care. Our goal is always to provide you with excellent care. Hearing back from our patients is one way we can continue to improve our services. Please take a few minutes to complete the written survey that you may receive in the mail after your visit with us. Thank you!             Your Updated Medication List - Protect others around you: Learn how to safely use, store and throw away your medicines at www.disposemymeds.org.          This list is accurate as of 11/19/18 11:59 PM.  Always use your most recent med list.                    Brand Name Dispense Instructions for use Diagnosis    amLODIPine 2.5 MG tablet    NORVASC    90 tablet    Take 1 tablet (2.5 mg) by mouth daily    Renal hypertension       aspirin 81 MG tablet    ASA    30 tablet    Take 1 tablet (81 mg) by mouth every morning - RESUME on 3/9/18 if urine remains clear    S/P TURP       darbepoetin william 100 MCG/0.5ML injection    ARANESP (ALBUMIN FREE)    0.5 mL    Inject 0.5 mLs (100 mcg) Subcutaneous every 14 days As needed for hgb<10g/dL.  If Hgb increases >1 point in 2 weeks (if blood transfusion given, use hgb PRIOR to this), SYSTOLIC BP > 180 mmHg or hgb>=10g/dL, HOLD DOSE. Dose must be within 1 week of Hgb.  Per anemia protocol with Cecilia De La Torre CNP    Anemia of chronic renal failure, stage 5 (H), CKD (chronic kidney disease) stage 5, GFR less than 15 ml/min (H)       ferrous sulfate 325 (65 Fe) MG tablet    FEROSUL    30 tablet    Take 1 tablet (325 mg) by mouth daily (with breakfast)    Anemia of chronic renal failure, stage 5 (H), CKD (chronic kidney disease) stage 5, GFR less than 15 ml/min (H)       FISH OIL PO           Multi-vitamin tablet      Take 1 tablet by mouth daily        order for DME     1 each    Equipment being ordered: olecranon bursa brace    Olecranon bursitis of left elbow       sevelamer 400 MG tablet    RENAGEL    90 tablet    Take 1 tablet (400 mg) by mouth 3 times daily (with meals)    Secondary renal hyperparathyroidism (H)

## 2018-11-19 NOTE — Clinical Note
Blood pressure in pharmacy today 140/70 pt declined repeat bp Ricarda Andino h [facility] Phone [phone] Fax      [phone]

## 2018-11-19 NOTE — PROGRESS NOTES
Dung Norton is enrolled/participating in the retail pharmacy Blood Pressure Goals Achievement Program (BPGAP).  Dung Norton was evaluated at [facility] on November 19, 2018 at which time his blood pressure was:    BP Readings from Last 3 Encounters:   11/19/18 140/74   11/12/18 130/74   11/05/18 126/78     Reviewed lifestyle modifications for blood pressure control and reduction: including making healthy food choices, managing weight, getting regular exercise, smoking cessation, reducing alcohol consumption, monitoring blood pressure regularly.     Dung Norton is not experiencing symptoms.    Follow-Up: BP is not at goal of <140/90    Recommendation to Provider: Ricarda Andino [facility]  Phone [phone]  Fax      [phone]      Dung Norton was evaluated for enrollment into the PGEN study today.    PLEASE INITIATE ENROLLMENT DISCUSSION WITH HTN PTS  1) Between 30-80 years old                                                                                                               2) BMI between 19-50                                                                                                        3) BP ?140/90 AND ?170/110 patients aged 30-59         BP ?150/90 AND ?170/110 non-diabetic patients aged 60-80       BP ?140/90 AND ?170/110 diabetic patients aged 60-80  4) Additional requirements for uncontrolled HTN patients:        Pt on only 1 class of medication  5) EXCLUDE patient if confirmation of:                  ? Cardiac disease                  ? Chronic Kidney Disease                  ? Pregnancy/Breastfeeding                  ? Secondary Hypertension/Pre-eclampsia                                 ? Vascular disease    Patient eligible for enrollment:  No  Patient interested in enrollment:  No    This note completed by: Ricarda Andino [facility]  Phone [phone]  Fax      [phone]

## 2018-11-26 ENCOUNTER — ALLIED HEALTH/NURSE VISIT (OUTPATIENT)
Dept: FAMILY MEDICINE | Facility: CLINIC | Age: 73
End: 2018-11-26
Payer: COMMERCIAL

## 2018-11-26 VITALS — DIASTOLIC BLOOD PRESSURE: 78 MMHG | SYSTOLIC BLOOD PRESSURE: 140 MMHG

## 2018-11-26 DIAGNOSIS — N18.5 CKD (CHRONIC KIDNEY DISEASE) STAGE 5, GFR LESS THAN 15 ML/MIN (H): ICD-10-CM

## 2018-11-26 DIAGNOSIS — Z01.30 BP CHECK: Primary | ICD-10-CM

## 2018-11-26 LAB
ALBUMIN SERPL-MCNC: 3.3 G/DL (ref 3.4–5)
ANION GAP SERPL CALCULATED.3IONS-SCNC: 10 MMOL/L (ref 3–14)
BUN SERPL-MCNC: 93 MG/DL (ref 7–30)
CALCIUM SERPL-MCNC: 8.8 MG/DL (ref 8.5–10.1)
CHLORIDE SERPL-SCNC: 113 MMOL/L (ref 94–109)
CO2 SERPL-SCNC: 21 MMOL/L (ref 20–32)
CREAT SERPL-MCNC: 4.75 MG/DL (ref 0.66–1.25)
GFR SERPL CREATININE-BSD FRML MDRD: 12 ML/MIN/1.7M2
GLUCOSE SERPL-MCNC: 98 MG/DL (ref 70–99)
HCT VFR BLD AUTO: 32.1 % (ref 40–53)
HGB BLD-MCNC: 10.1 G/DL (ref 13.3–17.7)
PHOSPHATE SERPL-MCNC: 6 MG/DL (ref 2.5–4.5)
POTASSIUM SERPL-SCNC: 4.8 MMOL/L (ref 3.4–5.3)
SODIUM SERPL-SCNC: 144 MMOL/L (ref 133–144)

## 2018-11-26 PROCEDURE — 80069 RENAL FUNCTION PANEL: CPT

## 2018-11-26 PROCEDURE — 85014 HEMATOCRIT: CPT

## 2018-11-26 PROCEDURE — 36415 COLL VENOUS BLD VENIPUNCTURE: CPT

## 2018-11-26 PROCEDURE — 85018 HEMOGLOBIN: CPT

## 2018-11-26 NOTE — MR AVS SNAPSHOT
After Visit Summary   11/26/2018    Dung Norton    MRN: [id number]           Patient Information     Date Of Birth          1945        Visit Information        Provider Department      11/26/2018 9:37 AM Haley Baker MD [facility]        Today's Diagnoses     BP check    -  1       Follow-ups after your visit        Your next 10 appointments already scheduled     Dec 03, 2018 10:15 AM CST   LAB with FZ LAB   [facility] ([facility])    [address] [place] [address]   [phone]           Please do not eat 10-12 hours before your appointment if you are coming in fasting for labs on lipids, cholesterol, or glucose (sugar). This does not apply to pregnant women. Water, hot tea and black coffee (with nothing added) are okay. Do not drink other fluids, diet soda or chew gum.            Gutierrez 10, 2019 11:00 AM CST   Ech Complete with FKECHR1   [facility] [place] ([facility])    [address] [place] [address]   [phone]           1.  Please bring or wear a comfortable two-piece outfit. 2.  You may eat, drink and take your normal medicines. 3.  For any questions that cannot be answered, please contact the ordering physician 4.  Please do not wear perfumes or scented lotions on the day of your exam.            Jan 16, 2019 12:00 PM CST   (Arrive by 11:30 AM)   Return Visit with Oralia De La Torre NP   [facility] ([facility])    [address] [place] [address]  [address] [place] [id number] [phone]            Jan 29, 2019  8:30 AM CST   Return Visit with Lizandro Ng MD   [facility] ([facility])    [address] [place] [address]   [phone]            Apr 26, 2019  2:30 PM CDT   (Arrive by 2:15 PM)   Return Visit with Tamiko Vanegas MD   [facility]  "Urology and Inst for Prostate and Urologic Cancers ([facility])    [address] [facility]  [address]th [facility] [address] [phone]              Who to contact     If you have questions or need follow up information about today's clinic visit or your schedule please contact [facility] directly at [phone].  Normal or non-critical lab and imaging results will be communicated to you by MyChart, letter or phone within 4 business days after the clinic has received the results. If you do not hear from us within 7 days, please contact the clinic through MyChart or phone. If you have a critical or abnormal lab result, we will notify you by phone as soon as possible.  Submit refill requests through Kabongo or call your pharmacy and they will forward the refill request to us. Please allow 3 business days for your refill to be completed.          Additional Information About Your Visit        OneUp Sportshart Information     Kabongo lets you send messages to your doctor, view your test results, renew your prescriptions, schedule appointments and more. To sign up, go to [email or web address] . Click on \"Log in\" on the left side of the screen, which will take you to the Welcome page. Then click on \"Sign up Now\" on the right side of the page.     You will be asked to enter the access code listed below, as well as some personal information. Please follow the directions to create your username and password.     Your access code is: [email or web address]  Expires: 2019  2:20 PM     Your access code will  in 90 days. If you need help or a new code, please call your [place] clinic or [phone].        Care EveryWhere ID     This is your Care EveryWhere ID. This could be used by other organizations to access your [place] medical records  [id number]         Blood Pressure from Last 3 Encounters:   18 140/78   18 140/74   18 130/74    Weight from Last 3 Encounters: "   10/26/18 176 lb 4.8 oz (80 kg)   10/03/18 173 lb 6.4 oz (78.7 kg)   07/25/18 171 lb 9.6 oz (77.8 kg)              Today, you had the following     No orders found for display         Today's Medication Changes          These changes are accurate as of 11/26/18 11:59 PM.  If you have any questions, ask your nurse or doctor.               These medicines have changed or have updated prescriptions.        Dose/Directions    amLODIPine 2.5 MG tablet   Commonly known as:  NORVASC   This may have changed:  when to take this   Used for:  Renal hypertension        Dose:  2.5 mg   Take 1 tablet (2.5 mg) by mouth daily   Quantity:  90 tablet   Refills:  3                Primary Care Provider Office Phone # Fax #    Haley Baker [phone] [address] [facility] [address]        Equal Access to Services     [facility]: Hadii cristine verdin Sochalo, waaxda luqerin, qaybta kaalmada adekimberlyyarahul, gibson green . So [facility] [phone].    ATENCIÓN: Si habla español, tiene a montague disposición servicios gratuitos de asistencia lingüística. Llame al [phone].    We comply with applicable federal civil rights laws and [place] laws. We do not discriminate on the basis of race, color, national origin, age, disability, sex, sexual orientation, or gender identity.            Thank you!     Thank you for choosing [facility]  for your care. Our goal is always to provide you with excellent care. Hearing back from our patients is one way we can continue to improve our services. Please take a few minutes to complete the written survey that you may receive in the mail after your visit with us. Thank you!             Your Updated Medication List - Protect others around you: Learn how to safely use, store and throw away your medicines at www.disposemymeds.org.          This list is accurate as of 11/26/18 11:59 PM.  Always use your most recent med list.                    Brand Name Dispense Instructions for use Diagnosis    amLODIPine 2.5 MG tablet    NORVASC    90 tablet    Take 1 tablet (2.5 mg) by mouth daily    Renal hypertension       aspirin 81 MG tablet    ASA    30 tablet    Take 1 tablet (81 mg) by mouth every morning - RESUME on 3/9/18 if urine remains clear    S/P TURP       darbepoetin william 100 MCG/0.5ML injection    ARANESP (ALBUMIN FREE)    0.5 mL    Inject 0.5 mLs (100 mcg) Subcutaneous every 14 days As needed for hgb<10g/dL.  If Hgb increases >1 point in 2 weeks (if blood transfusion given, use hgb PRIOR to this), SYSTOLIC BP > 180 mmHg or hgb>=10g/dL, HOLD DOSE. Dose must be within 1 week of Hgb.  Per anemia protocol with Cecilia De La Torre CNP    Anemia of chronic renal failure, stage 5 (H), CKD (chronic kidney disease) stage 5, GFR less than 15 ml/min (H)       ferrous sulfate 325 (65 Fe) MG tablet    FEROSUL    30 tablet    Take 1 tablet (325 mg) by mouth daily (with breakfast)    Anemia of chronic renal failure, stage 5 (H), CKD (chronic kidney disease) stage 5, GFR less than 15 ml/min (H)       FISH OIL PO           Multi-vitamin tablet      Take 1 tablet by mouth daily        order for DME     1 each    Equipment being ordered: olecranon bursa brace    Olecranon bursitis of left elbow       sevelamer 400 MG tablet    RENAGEL    90 tablet    Take 1 tablet (400 mg) by mouth 3 times daily (with meals)    Secondary renal hyperparathyroidism (H)          none

## 2018-11-26 NOTE — PROGRESS NOTES
Dung Norton is enrolled/participating in the retail pharmacy Blood Pressure Goals Achievement Program (BPGAP).  Dung Norton was evaluated at [facility] on November 26, 2018 at which time his blood pressure was:    BP Readings from Last 3 Encounters:   11/26/18 140/78   11/19/18 140/74   11/12/18 130/74     Reviewed lifestyle modifications for blood pressure control and reduction: including making healthy food choices, managing weight, getting regular exercise, smoking cessation, reducing alcohol consumption, monitoring blood pressure regularly.     Dung Norton is not experiencing symptoms.    Follow-Up: BP is not at goal of < 140/90mmHg (patient 18+ years of age with or without diabetes), Recommended follow-up with PCP.  Routing to PCP for further review.    Recommendation to Provider: none     Dung Norton was evaluated for enrollment into the PGEN study today.    PLEASE INITIATE ENROLLMENT DISCUSSION WITH HTN PTS  1) Between 30-80 years old                                                                                                               2) BMI between 19-50                                                                                                        3) BP ?140/90 AND ?170/110 patients aged 30-59         BP ?150/90 AND ?170/110 non-diabetic patients aged 60-80       BP ?140/90 AND ?170/110 diabetic patients aged 60-80  4) Additional requirements for uncontrolled HTN patients:        Pt on only 1 class of medication  5) EXCLUDE patient if confirmation of:                  ? Cardiac disease                  ? Chronic Kidney Disease                  ? Pregnancy/Breastfeeding                  ? Secondary Hypertension/Pre-eclampsia                                 ? Vascular disease    Patient eligible for enrollment:  No  Patient interested in enrollment:  No    This note completed by: Ricarda Andino RPh  [facility]  Phone [phone]  Fax       [phone]

## 2018-11-28 ENCOUNTER — TELEPHONE (OUTPATIENT)
Dept: PHARMACY | Facility: CLINIC | Age: 73
End: 2018-11-28

## 2018-11-28 NOTE — TELEPHONE ENCOUNTER
Anemia Management Note  SUBJECTIVE/OBJECTIVE:  Referred by Cecilia De La Torre on 9/11/2017  Primary Diagnosis: Anemia in Chronic Kidney Disease (N18.5, D63.1)     Secondary Diagnosis:  Chronic Kidney Disease, Stage 5 (N18.5)  Hgb goal range:  9-10  Epo/Darbo: Aranesp 100 mcg every 14 days As needed for hgb<10g/dL - last aranesp dose 2/1/18  RX expires on 12/5/2018  Iron regimen:  None  Labs exp: 9/12/2019  **Provide phone number for [facility] [phone] and instruction to schedule ancillary visit for aranesp injection. Send message to care team via pool -  RN TRIAGE POOL as a telephone encounter**      Contact: **AUTH TO DISCUSS**Tereza Norton(daughter) [phone], Rabia Purcell (daughter)[phone]    Anemia Latest Ref Rng & Units 10/15/2018 10/22/2018 10/29/2018 11/5/2018 11/12/2018 11/19/2018 11/26/2018   EARLENE Dose - - - - - - - -   Hemoglobin 13.3 - 17.7 g/dL 9.8(L) 10.1(L) 10.6(L) 10.2(L) 10.8(L) 10.1(L) 10.1(L)   TSAT 15 - 46 % - - - - - - -   Ferritin 26 - 388 ng/mL - - - - - - -     BP Readings from Last 3 Encounters:   11/26/18 140/78   11/19/18 140/74   11/12/18 130/74     Wt Readings from Last 2 Encounters:   10/26/18 176 lb 4.8 oz (80 kg)   10/03/18 173 lb 6.4 oz (78.7 kg)     Next lab appt is scheduled for 12/03/18  Appt w/Cecilia Espinoza is scheduled for 1/16/19 at noon  Plan to discharge December if stable - VERONICA 11/15/2018    ASSESSMENT:  Hgb:Above goal - recommend hold dose - last dose given on 2/1/18  TSat: not at goal of >30% Ferritin: At goal (>100ng/mL) - last drawn on 9/24/18     PLAN:  Hold Aranesp and RTC for hgb then aranesp if needed in 1 week(s)     Orders needed to be renewed (for next follow-up date) in EPIC: ferritin and iron standing orders    Iron labs due:  now    Plan discussed with:  No call made  Plan provided by:  Mireya Hussein [facility]  Anemia Clinic  [phone]    NEXT FOLLOW-UP DATE:  12/3/18  Reviewed 11/29/2018 [facility]  Anemia Management Service  Daina Mock,GabriellaD and  [facility]  Phone: [phone]  Fax: [phone]

## 2018-12-03 ENCOUNTER — ALLIED HEALTH/NURSE VISIT (OUTPATIENT)
Dept: FAMILY MEDICINE | Facility: CLINIC | Age: 73
End: 2018-12-03

## 2018-12-03 VITALS — SYSTOLIC BLOOD PRESSURE: 140 MMHG | DIASTOLIC BLOOD PRESSURE: 78 MMHG

## 2018-12-03 DIAGNOSIS — Z01.30 BP CHECK: Primary | ICD-10-CM

## 2018-12-03 DIAGNOSIS — N18.5 CKD (CHRONIC KIDNEY DISEASE) STAGE 5, GFR LESS THAN 15 ML/MIN (H): ICD-10-CM

## 2018-12-03 LAB
ALBUMIN SERPL-MCNC: 3.4 G/DL (ref 3.4–5)
ANION GAP SERPL CALCULATED.3IONS-SCNC: 6 MMOL/L (ref 3–14)
BUN SERPL-MCNC: 86 MG/DL (ref 7–30)
CALCIUM SERPL-MCNC: 8.7 MG/DL (ref 8.5–10.1)
CHLORIDE SERPL-SCNC: 114 MMOL/L (ref 94–109)
CO2 SERPL-SCNC: 23 MMOL/L (ref 20–32)
CREAT SERPL-MCNC: 4.79 MG/DL (ref 0.66–1.25)
GFR SERPL CREATININE-BSD FRML MDRD: 12 ML/MIN/1.7M2
GLUCOSE SERPL-MCNC: 102 MG/DL (ref 70–99)
HCT VFR BLD AUTO: 32 % (ref 40–53)
HGB BLD-MCNC: 10.1 G/DL (ref 13.3–17.7)
PHOSPHATE SERPL-MCNC: 4.9 MG/DL (ref 2.5–4.5)
POTASSIUM SERPL-SCNC: 4.8 MMOL/L (ref 3.4–5.3)
SODIUM SERPL-SCNC: 143 MMOL/L (ref 133–144)

## 2018-12-03 PROCEDURE — 80069 RENAL FUNCTION PANEL: CPT

## 2018-12-03 PROCEDURE — 36415 COLL VENOUS BLD VENIPUNCTURE: CPT

## 2018-12-03 PROCEDURE — 99207 ZZC NO CHARGE NURSE ONLY: CPT | Performed by: FAMILY MEDICINE

## 2018-12-03 PROCEDURE — 85018 HEMOGLOBIN: CPT

## 2018-12-03 PROCEDURE — 85014 HEMATOCRIT: CPT

## 2018-12-03 NOTE — MR AVS SNAPSHOT
After Visit Summary   12/3/2018    Dung Norton    MRN: [id number]           Patient Information     Date Of Birth          1945        Visit Information        Provider Department      12/3/2018 1:57 PM Haley Baker MD [facility]        Today's Diagnoses     BP check    -  1       Follow-ups after your visit        Your next 10 appointments already scheduled     Dec 10, 2018 10:15 AM CST   LAB with FZ LAB   [facility] ([facility])    [address] [place] [address]   [phone]           Please do not eat 10-12 hours before your appointment if you are coming in fasting for labs on lipids, cholesterol, or glucose (sugar). This does not apply to pregnant women. Water, hot tea and black coffee (with nothing added) are okay. Do not drink other fluids, diet soda or chew gum.            Gutierrez 10, 2019 11:00 AM CST   Echo Complete with FKECHR1   [facility] ([facility])    [address] [place] [address]   [phone]           1. Please bring or wear a comfortable two-piece outfit. 2. You may eat, drink and take your normal medicines. 3. For any questions that cannot be answered, please contact the ordering physician            Jan 16, 2019 12:00 PM CST   (Arrive by 11:30 AM)   Return Visit with Oralia De La Torre NP   [facility] ([facility])    [address] [place] [id number] [phone]            Jan 29, 2019  8:30 AM CST   Return Visit with Lizandro Ng MD   [facility] ([facility])    [address] [place] [address]   [phone]            Apr 26, 2019  2:30 PM CDT   (Arrive by 2:15 PM)   Return Visit with Tamiko Vanegas MD   [facility]rology and Inst for Prostate and Urologic Cancers ([facility]  "[place])    [address] [facility]  4th [facility] [address] [phone]              Who to contact     If you have questions or need follow up information about today's clinic visit or your schedule please contact [facility] directly at [phone].  Normal or non-critical lab and imaging results will be communicated to you by MyChart, letter or phone within 4 business days after the clinic has received the results. If you do not hear from us within 7 days, please contact the clinic through MyChart or phone. If you have a critical or abnormal lab result, we will notify you by phone as soon as possible.  Submit refill requests through Vendscreen or call your pharmacy and they will forward the refill request to us. Please allow 3 business days for your refill to be completed.          Additional Information About Your Visit        MyChart Information     Vendscreen lets you send messages to your doctor, view your test results, renew your prescriptions, schedule appointments and more. To sign up, go to [email or web address] . Click on \"Log in\" on the left side of the screen, which will take you to the Welcome page. Then click on \"Sign up Now\" on the right side of the page.     You will be asked to enter the access code listed below, as well as some personal information. Please follow the directions to create your username and password.     Your access code is: [email or web address]  Expires: 2019  2:20 PM     Your access code will  in 90 days. If you need help or a new code, please call your [place] clinic or [phone].        Care EveryWhere ID     This is your Care EveryWhere ID. This could be used by other organizations to access your [place] medical records  [id number]         Blood Pressure from Last 3 Encounters:   18 140/78   18 140/78   18 140/74    Weight from Last 3 Encounters:   10/26/18 176 lb 4.8 oz (80 kg)   10/03/18 173 lb 6.4 oz (78.7 kg)   18 " 171 lb 9.6 oz (77.8 kg)              Today, you had the following     No orders found for display         Today's Medication Changes          These changes are accurate as of 12/3/18 11:59 PM.  If you have any questions, ask your nurse or doctor.               These medicines have changed or have updated prescriptions.        Dose/Directions    amLODIPine 2.5 MG tablet   Commonly known as:  NORVASC   This may have changed:  when to take this   Used for:  Renal hypertension        Dose:  2.5 mg   Take 1 tablet (2.5 mg) by mouth daily   Quantity:  90 tablet   Refills:  3                Primary Care Provider Office Phone # Fax #    Haley Baker [phone]       [address] [facility] [address]        Equal Access to Services     BRIAN CERRATO : Justin nascimentoo Sochalo, waaxda luqadaha, qaybta kaalmada adeegyada, gibson green . So [facility] [phone].    ATENCIÓN: Si habla español, tiene a montague disposición servicios gratuitos de asistencia lingüística. [facility] [phone].    We comply with applicable federal civil rights laws and [place] laws. We do not discriminate on the basis of race, color, national origin, age, disability, sex, sexual orientation, or gender identity.            Thank you!     Thank you for choosing [facility]  for your care. Our goal is always to provide you with excellent care. Hearing back from our patients is one way we can continue to improve our services. Please take a few minutes to complete the written survey that you may receive in the mail after your visit with us. Thank you!             Your Updated Medication List - Protect others around you: Learn how to safely use, store and throw away your medicines at www.disposemymeds.org.          This list is accurate as of 12/3/18 11:59 PM.  Always use your most recent med list.                   Brand Name Dispense Instructions for use Diagnosis    amLODIPine 2.5 MG tablet     NORVASC    90 tablet    Take 1 tablet (2.5 mg) by mouth daily    Renal hypertension       aspirin 81 MG tablet    ASA    30 tablet    Take 1 tablet (81 mg) by mouth every morning - RESUME on 3/9/18 if urine remains clear    S/P TURP       darbepoetin william 100 MCG/0.5ML injection    ARANESP (ALBUMIN FREE)    0.5 mL    Inject 0.5 mLs (100 mcg) Subcutaneous every 14 days As needed for hgb<10g/dL.  If Hgb increases >1 point in 2 weeks (if blood transfusion given, use hgb PRIOR to this), SYSTOLIC BP > 180 mmHg or hgb>=10g/dL, HOLD DOSE. Dose must be within 1 week of Hgb.  Per anemia protocol with Cecilia De La Torre CNP    Anemia of chronic renal failure, stage 5 (H), CKD (chronic kidney disease) stage 5, GFR less than 15 ml/min (H)       ferrous sulfate 325 (65 Fe) MG tablet    FEROSUL    30 tablet    Take 1 tablet (325 mg) by mouth daily (with breakfast)    Anemia of chronic renal failure, stage 5 (H), CKD (chronic kidney disease) stage 5, GFR less than 15 ml/min (H)       FISH OIL PO           Multi-vitamin tablet      Take 1 tablet by mouth daily        order for DME     1 each    Equipment being ordered: olecranon bursa brace    Olecranon bursitis of left elbow       sevelamer 400 MG tablet    RENAGEL    90 tablet    Take 1 tablet (400 mg) by mouth 3 times daily (with meals)    Secondary renal hyperparathyroidism (H)

## 2018-12-03 NOTE — PROGRESS NOTES
Dung Norton is enrolled/participating in the retail pharmacy Blood Pressure Goals Achievement Program (BPGAP).  Dung Norton was evaluated at [facility] on December 3, 2018 at which time his blood pressure was:    BP Readings from Last 3 Encounters:   12/03/18 140/78   11/26/18 140/78   11/19/18 140/74     Reviewed lifestyle modifications for blood pressure control and reduction: including making healthy food choices, managing weight, getting regular exercise, smoking cessation, reducing alcohol consumption, monitoring blood pressure regularly.     Dung Norton is not experiencing symptoms.    Follow-Up: BP is not at goal of < 140/90mmHg (patient 18+ years of age with or without diabetes), Recommended follow-up with PCP.  Routing to PCP for further review.    Recommendation to Provider: Ricarda Andino [facility]  Phone [phone]  Fax      [phone]      Dung Norton was evaluated for enrollment into the PGEN study today.    PLEASE INITIATE ENROLLMENT DISCUSSION WITH HTN PTS  1) Between 30-80 years old                                                                                                               2) BMI between 19-50                                                                                                        3) BP ?140/90 AND ?170/110 patients aged 30-59         BP ?150/90 AND ?170/110 non-diabetic patients aged 60-80       BP ?140/90 AND ?170/110 diabetic patients aged 60-80  4) Additional requirements for uncontrolled HTN patients:        Pt on only 1 class of medication  5) EXCLUDE patient if confirmation of:                  ? Cardiac disease                  ? Chronic Kidney Disease                  ? Pregnancy/Breastfeeding                  ? Secondary Hypertension/Pre-eclampsia                                 ? Vascular disease    Patient eligible for enrollment:  No  Patient interested in enrollment:  No    This note completed by:  Ricarda Andino [facility]  Phone [phone]  Fax      [phone]

## 2018-12-03 NOTE — Clinical Note
Routing message to PCP for review -BP checked at pharmacy and noted to be above goal. Recommended patient follow-up with PCP.140/78

## 2018-12-04 ENCOUNTER — TELEPHONE (OUTPATIENT)
Dept: PHARMACY | Facility: CLINIC | Age: 73
End: 2018-12-04

## 2018-12-04 NOTE — TELEPHONE ENCOUNTER
Anemia Management Note  SUBJECTIVE/OBJECTIVE:  Referred by Cecilia De La Torre on 2017  Primary Diagnosis: Anemia in Chronic Kidney Disease (N18.5, D63.1)     Secondary Diagnosis:  Chronic Kidney Disease, Stage 5 (N18.5)  Hgb goal range:  9-10  Epo/Darbo: Aranesp 100 mcg every 14 days As needed for hgb<10g/dL - last aranesp dose 18  RX expires on 2018  Iron regimen:  None  Labs exp: 2019  **Provide phone number for [facility] [phone] and instruction to schedule ancillary visit for aranesp injection. Send message to care team via pool -  RN TRIAGE POOL as a telephone encounter**      Contact: **AUTH TO DISCUSS**Tereza Norton(daughter) [phone], Raiba Kapooririneo (daughter)[phone]    Anemia Latest Ref Rng & Units 10/22/2018 10/29/2018 2018 2018 2018 2018 12/3/2018   EARLENE Dose - - - - - - - -   Hemoglobin 13.3 - 17.7 g/dL 10.1(L) 10.6(L) 10.2(L) 10.8(L) 10.1(L) 10.1(L) 10.1(L)   TSAT 15 - 46 % - - - - - - -   Ferritin 26 - 388 ng/mL - - - - - - -     BP Readings from Last 3 Encounters:   18 140/78   18 140/78   18 140/74     Wt Readings from Last 2 Encounters:   10/26/18 176 lb 4.8 oz (80 kg)   10/03/18 173 lb 6.4 oz (78.7 kg)     Next lab appt is scheduled for 12/10 at 10:15      ASSESSMENT:  Hgb: Above goal - recommend hold dose - last dose given on 18  TSat: not at goal of >30% Ferritin: At goal (>100ng/mL) - last drawn on 18      PLAN:  Hold Aranesp and RTC for hgb, ferritin and iron labs then aranesp if needed in 1 week(s)      Orders needed to be renewed (for next follow-up date) in EPIC: aranesp orders  18     Iron labs due:  now    Plan discussed with:  No call made    Plan provided by:  Mireya Hussein [facility]  Anemia Clinic  [phone]    NEXT FOLLOW-UP DATE:  12/10/18  Reviewed 2018 [facility]  Anemia Management Service  Daina Mock,GabriellaD, [facility]  Zainab Barragan RN  Phone: [phone]  Fax:  [phone]

## 2018-12-10 ENCOUNTER — ALLIED HEALTH/NURSE VISIT (OUTPATIENT)
Dept: FAMILY MEDICINE | Facility: CLINIC | Age: 73
End: 2018-12-10
Payer: COMMERCIAL

## 2018-12-10 VITALS — SYSTOLIC BLOOD PRESSURE: 120 MMHG | DIASTOLIC BLOOD PRESSURE: 80 MMHG

## 2018-12-10 DIAGNOSIS — Z01.30 BP CHECK: Primary | ICD-10-CM

## 2018-12-10 DIAGNOSIS — N18.5 CKD (CHRONIC KIDNEY DISEASE) STAGE 5, GFR LESS THAN 15 ML/MIN (H): ICD-10-CM

## 2018-12-10 LAB
ALBUMIN SERPL-MCNC: 3.3 G/DL (ref 3.4–5)
ANION GAP SERPL CALCULATED.3IONS-SCNC: 12 MMOL/L (ref 3–14)
BUN SERPL-MCNC: 96 MG/DL (ref 7–30)
CALCIUM SERPL-MCNC: 8.8 MG/DL (ref 8.5–10.1)
CHLORIDE SERPL-SCNC: 112 MMOL/L (ref 94–109)
CO2 SERPL-SCNC: 19 MMOL/L (ref 20–32)
CREAT SERPL-MCNC: 4.94 MG/DL (ref 0.66–1.25)
GFR SERPL CREATININE-BSD FRML MDRD: 12 ML/MIN/1.7M2
GLUCOSE SERPL-MCNC: 96 MG/DL (ref 70–99)
HCT VFR BLD AUTO: 32.2 % (ref 40–53)
HGB BLD-MCNC: 10.1 G/DL (ref 13.3–17.7)
PHOSPHATE SERPL-MCNC: 5.8 MG/DL (ref 2.5–4.5)
POTASSIUM SERPL-SCNC: 4.8 MMOL/L (ref 3.4–5.3)
SODIUM SERPL-SCNC: 143 MMOL/L (ref 133–144)

## 2018-12-10 PROCEDURE — 80069 RENAL FUNCTION PANEL: CPT

## 2018-12-10 PROCEDURE — 36415 COLL VENOUS BLD VENIPUNCTURE: CPT

## 2018-12-10 PROCEDURE — 85014 HEMATOCRIT: CPT

## 2018-12-10 PROCEDURE — 99207 ZZC NO CHARGE NURSE ONLY: CPT | Performed by: FAMILY MEDICINE

## 2018-12-10 PROCEDURE — 85018 HEMOGLOBIN: CPT

## 2018-12-10 NOTE — PROGRESS NOTES
Dung Norton is enrolled/participating in the retail pharmacy Blood Pressure Goals Achievement Program (BPGAP).  Dung Norton was evaluated at [facility] on December 10, 2018 at which time his blood pressure was:    BP Readings from Last 3 Encounters:   12/03/18 140/78   11/26/18 140/78   11/19/18 140/74     Reviewed lifestyle modifications for blood pressure control and reduction: including making healthy food choices, managing weight, getting regular exercise, smoking cessation, reducing alcohol consumption, monitoring blood pressure regularly.     Dung Norton is not experiencing symptoms.    Follow-Up: BP is at goal of < 140/90mmHg (patient 18+ years of age with or without diabetes).  Recommended follow-up at pharmacy in 6 months.     Recommendation to Provider: none     Dung Norton was evaluated for enrollment into the PGEN study today.    PLEASE INITIATE ENROLLMENT DISCUSSION WITH HTN PTS  1) Between 30-80 years old                                                                                                               2) BMI between 19-50                                                                                                        3) BP ?140/90 AND ?170/110 patients aged 30-59         BP ?150/90 AND ?170/110 non-diabetic patients aged 60-80       BP ?140/90 AND ?170/110 diabetic patients aged 60-80  4) Additional requirements for uncontrolled HTN patients:        Pt on only 1 class of medication  5) EXCLUDE patient if confirmation of:                  ? Cardiac disease                  ? Chronic Kidney Disease                  ? Pregnancy/Breastfeeding                  ? Secondary Hypertension/Pre-eclampsia                                 ? Vascular disease    Patient eligible for enrollment:  Unknown  Patient interested in enrollment:  Unknown    This note completed by:Ricarda Andino rph

## 2018-12-12 ENCOUNTER — TELEPHONE (OUTPATIENT)
Dept: PHARMACY | Facility: CLINIC | Age: 73
End: 2018-12-12

## 2018-12-12 NOTE — TELEPHONE ENCOUNTER
Anemia Management Note  SUBJECTIVE/OBJECTIVE:  Referred by Cecilia De La Torre on 9/11/2017  Primary Diagnosis: Anemia in Chronic Kidney Disease (N18.5, D63.1)     Secondary Diagnosis:  Chronic Kidney Disease, Stage 5 (N18.5)  Hgb goal range:  9-10  Epo/Darbo: Aranesp 100 mcg every 14 days As needed for hgb<10g/dL - last aranesp dose 2/1/18  RX expires on 12/5/2018  Iron regimen:  None  Labs exp: 9/12/2019  **Provide phone number for [facility] [phone] and instruction to schedule ancillary visit for aranesp injection. Send message to care team via pool -  RN TRIAGE POOL as a telephone encounter**      Contact: **AUTH TO DISCUSS**Tereza Norton(daughter) [phone], Rabia Purcell (daughter)[phone]    Anemia Latest Ref Rng & Units 10/29/2018 11/5/2018 11/12/2018 11/19/2018 11/26/2018 12/3/2018 12/10/2018   EARLENE Dose - - - - - - - -   Hemoglobin 13.3 - 17.7 g/dL 10.6(L) 10.2(L) 10.8(L) 10.1(L) 10.1(L) 10.1(L) 10.1(L)   TSAT 15 - 46 % - - - - - - -   Ferritin 26 - 388 ng/mL - - - - - - -     BP Readings from Last 3 Encounters:   12/10/18 120/80   12/03/18 140/78   11/26/18 140/78     Wt Readings from Last 2 Encounters:   10/26/18 176 lb 4.8 oz (80 kg)   10/03/18 173 lb 6.4 oz (78.7 kg)     Spoke with Patient, Hgb has been stable and no longer requires monitoring by the Anemia Clinic.  Patient agrees to this plan and has expressed that he does  Not want to receive Aranesp until Hgb drops below 9.         PLAN:  Discharge patient from Anemia Services.    Orders needed to be renewed (for next follow-up date) in EPIC: None    Iron labs due:  NA    Plan discussed with:  Dung   Plan provided by:  HEATHER    NEXT FOLLOW-UP DATE:  NA    Anemia Management Service  Daina Mock,PharmD, Malina Hussein,Ginger Barragan, BRANDY  Phone: [phone]  Fax: [phone]

## 2018-12-17 ENCOUNTER — TELEPHONE (OUTPATIENT)
Dept: NEPHROLOGY | Facility: CLINIC | Age: 73
End: 2018-12-17

## 2018-12-17 ENCOUNTER — ALLIED HEALTH/NURSE VISIT (OUTPATIENT)
Dept: FAMILY MEDICINE | Facility: CLINIC | Age: 73
End: 2018-12-17

## 2018-12-17 VITALS — DIASTOLIC BLOOD PRESSURE: 76 MMHG | SYSTOLIC BLOOD PRESSURE: 130 MMHG

## 2018-12-17 DIAGNOSIS — I15.9 SECONDARY HYPERTENSION: Primary | ICD-10-CM

## 2018-12-17 DIAGNOSIS — N18.5 CKD (CHRONIC KIDNEY DISEASE) STAGE 5, GFR LESS THAN 15 ML/MIN (H): ICD-10-CM

## 2018-12-17 LAB
ALBUMIN SERPL-MCNC: 3.3 G/DL (ref 3.4–5)
ANION GAP SERPL CALCULATED.3IONS-SCNC: 12 MMOL/L (ref 3–14)
BUN SERPL-MCNC: 94 MG/DL (ref 7–30)
CALCIUM SERPL-MCNC: 9 MG/DL (ref 8.5–10.1)
CHLORIDE SERPL-SCNC: 112 MMOL/L (ref 94–109)
CO2 SERPL-SCNC: 19 MMOL/L (ref 20–32)
CREAT SERPL-MCNC: 5.02 MG/DL (ref 0.66–1.25)
GFR SERPL CREATININE-BSD FRML MDRD: 11 ML/MIN/1.7M2
GLUCOSE SERPL-MCNC: 94 MG/DL (ref 70–99)
HCT VFR BLD AUTO: 32.9 % (ref 40–53)
HGB BLD-MCNC: 10.3 G/DL (ref 13.3–17.7)
PHOSPHATE SERPL-MCNC: 6.2 MG/DL (ref 2.5–4.5)
POTASSIUM SERPL-SCNC: 4.9 MMOL/L (ref 3.4–5.3)
SODIUM SERPL-SCNC: 143 MMOL/L (ref 133–144)

## 2018-12-17 PROCEDURE — 80069 RENAL FUNCTION PANEL: CPT

## 2018-12-17 PROCEDURE — 85018 HEMOGLOBIN: CPT

## 2018-12-17 PROCEDURE — 99207 ZZC NO CHARGE NURSE ONLY: CPT | Performed by: FAMILY MEDICINE

## 2018-12-17 PROCEDURE — 85014 HEMATOCRIT: CPT

## 2018-12-17 PROCEDURE — 36415 COLL VENOUS BLD VENIPUNCTURE: CPT

## 2018-12-17 NOTE — PROGRESS NOTES
Dung Norton is enrolled/participating in the retail pharmacy Blood Pressure Goals Achievement Program (BPGAP).  Dung Norton was evaluated at [facility] on December 17, 2018 at which time his blood pressure was:    BP Readings from Last 3 Encounters:   12/17/18 130/76   12/10/18 120/80   12/03/18 140/78     Reviewed lifestyle modifications for blood pressure control and reduction: including making healthy food choices, managing weight, getting regular exercise, smoking cessation, reducing alcohol consumption, monitoring blood pressure regularly.     Dung Norton is not experiencing symptoms.    Follow-Up: BP is at goal of < 140/90mmHg (patient 18+ years of age with or without diabetes).  Recommended follow-up at pharmacy in 6 months.     Recommendation to Provider: none 2    Dung Norton was evaluated for enrollment into the PGEN study today.    PLEASE INITIATE ENROLLMENT DISCUSSION WITH HTN PTS  1) Between 30-80 years old                                                                                                               2) BMI between 19-50                                                                                                        3) BP ?140/90 AND ?170/110 patients aged 30-59         BP ?150/90 AND ?170/110 non-diabetic patients aged 60-80       BP ?140/90 AND ?170/110 diabetic patients aged 60-80  4) Additional requirements for uncontrolled HTN patients:        Pt on only 1 class of medication  5) EXCLUDE patient if confirmation of:                  ? Cardiac disease                  ? Chronic Kidney Disease                  ? Pregnancy/Breastfeeding                  ? Secondary Hypertension/Pre-eclampsia                                 ? Vascular disease    Patient eligible for enrollment:  Unknown  Patient interested in enrollment:  Unknown    This note completed by: Ricarda Andino [facility]

## 2018-12-17 NOTE — TELEPHONE ENCOUNTER
DATE:  12/17/2018   TIME OF RECEIPT FROM LAB:  3:52pm  LAB TEST:  Creatinine  LAB VALUE:  5.02  RESULTS GIVEN WITH READ-BACK TO (PROVIDER):  GISELLE BENOIT  TIME LAB VALUE REPORTED TO PROVIDER:   3:52pm    Lab value is consistent with previous values.    Giselle Benoit RN

## 2018-12-29 DIAGNOSIS — I12.9 RENAL HYPERTENSION: ICD-10-CM

## 2018-12-31 RX ORDER — AMLODIPINE BESYLATE 2.5 MG/1
TABLET ORAL
Qty: 90 TABLET | Refills: 0 | Status: SHIPPED | OUTPATIENT
Start: 2018-12-31 | End: 2019-03-27

## 2018-12-31 NOTE — TELEPHONE ENCOUNTER
"Routing refill request to provider for review/approval because:  Labs out of range:  Cr    Requested Prescriptions   Pending Prescriptions Disp Refills     amLODIPine (NORVASC) 2.5 MG tablet [Pharmacy Med Name: AMLODIPINE BESYLATE 2.5MG TABLETS] 90 tablet 0     Sig: TAKE 1 TABLET(2.5 MG) BY MOUTH DAILY    Calcium Channel Blockers Protocol  Failed - 12/31/2018 12:45 PM       Failed - Normal serum creatinine on file in past 12 months    Recent Labs   Lab Test 12/17/18  0933   CR 5.02*            Passed - Blood pressure under 140/90 in past 12 months    BP Readings from Last 3 Encounters:   12/17/18 130/76   12/10/18 120/80   12/03/18 140/78                Passed - Recent (12 mo) or future (30 days) visit within the authorizing provider's specialty    Patient had office visit in the last 12 months or has a visit in the next 30 days with authorizing provider or within the authorizing provider's specialty.  See \"Patient Info\" tab in inbasket, or \"Choose Columns\" in Meds & Orders section of the refill encounter.             Passed - Patient is age 18 or older        Anjali Ann RN  "

## 2019-01-02 ENCOUNTER — ALLIED HEALTH/NURSE VISIT (OUTPATIENT)
Dept: FAMILY MEDICINE | Facility: CLINIC | Age: 74
End: 2019-01-02
Payer: COMMERCIAL

## 2019-01-02 VITALS — SYSTOLIC BLOOD PRESSURE: 126 MMHG | DIASTOLIC BLOOD PRESSURE: 80 MMHG

## 2019-01-02 DIAGNOSIS — N18.5 CKD (CHRONIC KIDNEY DISEASE) STAGE 5, GFR LESS THAN 15 ML/MIN (H): ICD-10-CM

## 2019-01-02 DIAGNOSIS — Z01.30 BP CHECK: Primary | ICD-10-CM

## 2019-01-02 LAB
ALBUMIN SERPL-MCNC: 3.4 G/DL (ref 3.4–5)
ANION GAP SERPL CALCULATED.3IONS-SCNC: 9 MMOL/L (ref 3–14)
BUN SERPL-MCNC: 101 MG/DL (ref 7–30)
CALCIUM SERPL-MCNC: 8.9 MG/DL (ref 8.5–10.1)
CHLORIDE SERPL-SCNC: 113 MMOL/L (ref 94–109)
CO2 SERPL-SCNC: 20 MMOL/L (ref 20–32)
CREAT SERPL-MCNC: 5.2 MG/DL (ref 0.66–1.25)
GFR SERPL CREATININE-BSD FRML MDRD: 10 ML/MIN/{1.73_M2}
GLUCOSE SERPL-MCNC: 90 MG/DL (ref 70–99)
HCT VFR BLD AUTO: 34.7 % (ref 40–53)
HGB BLD-MCNC: 10.9 G/DL (ref 13.3–17.7)
PHOSPHATE SERPL-MCNC: 6.2 MG/DL (ref 2.5–4.5)
POTASSIUM SERPL-SCNC: 4.7 MMOL/L (ref 3.4–5.3)
SODIUM SERPL-SCNC: 142 MMOL/L (ref 133–144)

## 2019-01-02 PROCEDURE — 85018 HEMOGLOBIN: CPT

## 2019-01-02 PROCEDURE — 85014 HEMATOCRIT: CPT

## 2019-01-02 PROCEDURE — 80069 RENAL FUNCTION PANEL: CPT

## 2019-01-02 PROCEDURE — 99207 ZZC NO CHARGE NURSE ONLY: CPT | Performed by: FAMILY MEDICINE

## 2019-01-02 PROCEDURE — 36415 COLL VENOUS BLD VENIPUNCTURE: CPT

## 2019-01-02 NOTE — PROGRESS NOTES
Dung Norton is enrolled/participating in the retail pharmacy Blood Pressure Goals Achievement Program (BPGAP).  Dung Norton was evaluated at [facility] on January 2, 2019 at which time his blood pressure was:    BP Readings from Last 3 Encounters:   01/02/19 126/80   12/17/18 130/76   12/10/18 120/80     Reviewed lifestyle modifications for blood pressure control and reduction: including making healthy food choices, managing weight, getting regular exercise, smoking cessation, reducing alcohol consumption, monitoring blood pressure regularly.     Dung Norton is not experiencing symptoms.    Follow-Up: BP is at goal of < 140/90mmHg (patient 18+ years of age with or without diabetes).  Recommended follow-up at pharmacy in 6 months.     Recommendation to Provider: none    Dung Norton was evaluated for enrollment into the PGEN study today.    PLEASE INITIATE ENROLLMENT DISCUSSION WITH HTN PTS  1) Between 30-80 years old                                                                                                               2) BMI between 19-50                                                                                                        3) BP ?140/90 AND ?170/110 patients aged 30-59         BP ?150/90 AND ?170/110 non-diabetic patients aged 60-80       BP ?140/90 AND ?170/110 diabetic patients aged 60-80  4) Additional requirements for uncontrolled HTN patients:        Pt on only 1 class of medication  5) EXCLUDE patient if confirmation of:                  ? Cardiac disease                  ? Chronic Kidney Disease                  ? Pregnancy/Breastfeeding                  ? Secondary Hypertension/Pre-eclampsia                                 ? Vascular disease    Patient eligible for enrollment:  No  Patient interested in enrollment:  No    This note completed by: Ricarda Andino Rph

## 2019-01-07 ENCOUNTER — TELEPHONE (OUTPATIENT)
Dept: NEPHROLOGY | Facility: CLINIC | Age: 74
End: 2019-01-07

## 2019-01-07 ENCOUNTER — ALLIED HEALTH/NURSE VISIT (OUTPATIENT)
Dept: FAMILY MEDICINE | Facility: CLINIC | Age: 74
End: 2019-01-07

## 2019-01-07 VITALS — DIASTOLIC BLOOD PRESSURE: 72 MMHG | SYSTOLIC BLOOD PRESSURE: 118 MMHG

## 2019-01-07 DIAGNOSIS — I10 HYPERTENSION GOAL BP (BLOOD PRESSURE) < 140/90: Primary | ICD-10-CM

## 2019-01-07 DIAGNOSIS — N18.5 CKD (CHRONIC KIDNEY DISEASE) STAGE 5, GFR LESS THAN 15 ML/MIN (H): ICD-10-CM

## 2019-01-07 LAB
ALBUMIN SERPL-MCNC: 3.3 G/DL (ref 3.4–5)
ANION GAP SERPL CALCULATED.3IONS-SCNC: 12 MMOL/L (ref 3–14)
BUN SERPL-MCNC: 96 MG/DL (ref 7–30)
CALCIUM SERPL-MCNC: 9.1 MG/DL (ref 8.5–10.1)
CHLORIDE SERPL-SCNC: 112 MMOL/L (ref 94–109)
CO2 SERPL-SCNC: 16 MMOL/L (ref 20–32)
CREAT SERPL-MCNC: 5.47 MG/DL (ref 0.66–1.25)
GFR SERPL CREATININE-BSD FRML MDRD: 9 ML/MIN/{1.73_M2}
GLUCOSE SERPL-MCNC: 95 MG/DL (ref 70–99)
HCT VFR BLD AUTO: 36.1 % (ref 40–53)
HGB BLD-MCNC: 11.2 G/DL (ref 13.3–17.7)
PHOSPHATE SERPL-MCNC: 6.7 MG/DL (ref 2.5–4.5)
POTASSIUM SERPL-SCNC: 5.4 MMOL/L (ref 3.4–5.3)
SODIUM SERPL-SCNC: 140 MMOL/L (ref 133–144)

## 2019-01-07 PROCEDURE — 85018 HEMOGLOBIN: CPT

## 2019-01-07 PROCEDURE — 85014 HEMATOCRIT: CPT

## 2019-01-07 PROCEDURE — 99207 ZZC NO CHARGE NURSE ONLY: CPT | Performed by: FAMILY MEDICINE

## 2019-01-07 PROCEDURE — 36415 COLL VENOUS BLD VENIPUNCTURE: CPT

## 2019-01-07 PROCEDURE — 80069 RENAL FUNCTION PANEL: CPT

## 2019-01-07 NOTE — TELEPHONE ENCOUNTER
DATE:  1/7/2019   TIME OF RECEIPT FROM LAB:  4:00pm  LAB TEST:  Creatinine  LAB VALUE:  5.47   RESULTS GIVEN WITH READ-BACK TO (PROVIDER):  Ordering provider  TIME LAB VALUE REPORTED TO PROVIDER:   4:01pm    Sent message to Cecilia with update.    Giselle Benoit RN

## 2019-01-07 NOTE — PROGRESS NOTES
Dung Norton is enrolled/participating in the retail pharmacy Blood Pressure Goals Achievement Program (BPGAP).  Dung Norton was evaluated at [facility] on January 7, 2019 at which time his blood pressure was:    BP Readings from Last 3 Encounters:   01/07/19 118/72   01/02/19 126/80   12/17/18 130/76     Reviewed lifestyle modifications for blood pressure control and reduction: including making healthy food choices, managing weight, getting regular exercise, smoking cessation, reducing alcohol consumption, monitoring blood pressure regularly.     Dung Norton is not experiencing symptoms.    Follow-Up: BP is at goal of < 140/90mmHg (patient 18+ years of age with or without diabetes).  Recommended follow-up at pharmacy in 6 months.     Recommendation to Provider:     Dung Norton was evaluated for enrollment into the PGEN study today.    PLEASE INITIATE ENROLLMENT DISCUSSION WITH HTN PTS  1) Between 30-80 years old                                                                                                               2) BMI between 19-50                                                                                                        3) BP ?140/90 AND ?170/110 patients aged 30-59         BP ?150/90 AND ?170/110 non-diabetic patients aged 60-80       BP ?140/90 AND ?170/110 diabetic patients aged 60-80  4) Additional requirements for uncontrolled HTN patients:        Pt on only 1 class of medication  5) EXCLUDE patient if confirmation of:                  ? Cardiac disease                  ? Chronic Kidney Disease                  ? Pregnancy/Breastfeeding                  ? Secondary Hypertension/Pre-eclampsia                                 ? Vascular disease    Patient eligible for enrollment:  No  Patient interested in enrollment:  No    This note completed by:   Misty Mendiola, Anabell  [facility]  On behalf of [facility]

## 2019-01-10 ENCOUNTER — ANCILLARY PROCEDURE (OUTPATIENT)
Dept: CARDIOLOGY | Facility: CLINIC | Age: 74
End: 2019-01-10
Attending: INTERNAL MEDICINE
Payer: COMMERCIAL

## 2019-01-10 DIAGNOSIS — I35.0 NONRHEUMATIC AORTIC (VALVE) STENOSIS: ICD-10-CM

## 2019-01-10 PROCEDURE — 93306 TTE W/DOPPLER COMPLETE: CPT | Mod: GC | Performed by: INTERNAL MEDICINE

## 2019-01-10 PROCEDURE — 40000264 ZZHC STATISTIC IV PUSH SINGLE INITIAL SUBSTANCE: Performed by: INTERNAL MEDICINE

## 2019-01-10 RX ADMIN — Medication 3 ML: at 11:30

## 2019-01-11 DIAGNOSIS — I48.19 PERSISTENT ATRIAL FIBRILLATION (H): ICD-10-CM

## 2019-01-11 DIAGNOSIS — I50.20 HEART FAILURE WITH REDUCED EJECTION FRACTION, NYHA CLASS I (H): Primary | ICD-10-CM

## 2019-01-11 RX ORDER — METOPROLOL SUCCINATE 25 MG/1
25 TABLET, EXTENDED RELEASE ORAL DAILY
Qty: 90 TABLET | Refills: 3 | Status: SHIPPED | OUTPATIENT
Start: 2019-01-11 | End: 2019-04-16

## 2019-01-14 ENCOUNTER — TELEPHONE (OUTPATIENT)
Dept: CARDIOLOGY | Facility: CLINIC | Age: 74
End: 2019-01-14

## 2019-01-14 NOTE — TELEPHONE ENCOUNTER
Patient offered appointments this week and next week.1/29/19 at 830 am soonest patient can attend per patient. Titus Tolliver L.P.N., RN. Nurse addressed new medicine. Titus Tolliver L.P.N.

## 2019-01-14 NOTE — TELEPHONE ENCOUNTER
----- Message from Lizandro Ng MD sent at 1/11/2019  9:00 AM CST -----  I need to see this patient next week. His EF dropped and he is in afib now.

## 2019-01-16 ENCOUNTER — OFFICE VISIT (OUTPATIENT)
Dept: NEPHROLOGY | Facility: CLINIC | Age: 74
End: 2019-01-16
Payer: COMMERCIAL

## 2019-01-16 ENCOUNTER — TELEPHONE (OUTPATIENT)
Dept: CARDIOLOGY | Facility: CLINIC | Age: 74
End: 2019-01-16

## 2019-01-16 VITALS
TEMPERATURE: 97.5 F | OXYGEN SATURATION: 92 % | DIASTOLIC BLOOD PRESSURE: 76 MMHG | SYSTOLIC BLOOD PRESSURE: 126 MMHG | BODY MASS INDEX: 24.77 KG/M2 | HEART RATE: 100 BPM | WEIGHT: 172.6 LBS

## 2019-01-16 DIAGNOSIS — D50.9 IRON DEFICIENCY ANEMIA, UNSPECIFIED IRON DEFICIENCY ANEMIA TYPE: ICD-10-CM

## 2019-01-16 DIAGNOSIS — N18.5 CKD (CHRONIC KIDNEY DISEASE) STAGE 5, GFR LESS THAN 15 ML/MIN (H): ICD-10-CM

## 2019-01-16 DIAGNOSIS — E03.9 HYPOTHYROIDISM, UNSPECIFIED TYPE: ICD-10-CM

## 2019-01-16 DIAGNOSIS — N18.5 CKD (CHRONIC KIDNEY DISEASE) STAGE 5, GFR LESS THAN 15 ML/MIN (H): Primary | ICD-10-CM

## 2019-01-16 DIAGNOSIS — N25.81 SECONDARY RENAL HYPERPARATHYROIDISM (H): ICD-10-CM

## 2019-01-16 DIAGNOSIS — E87.20 METABOLIC ACIDOSIS: ICD-10-CM

## 2019-01-16 LAB
ALBUMIN SERPL-MCNC: 3.2 G/DL (ref 3.4–5)
ANION GAP SERPL CALCULATED.3IONS-SCNC: 11 MMOL/L (ref 3–14)
BUN SERPL-MCNC: 114 MG/DL (ref 7–30)
CALCIUM SERPL-MCNC: 8.8 MG/DL (ref 8.5–10.1)
CHLORIDE SERPL-SCNC: 109 MMOL/L (ref 94–109)
CO2 SERPL-SCNC: 18 MMOL/L (ref 20–32)
CREAT SERPL-MCNC: 5.44 MG/DL (ref 0.66–1.25)
FERRITIN SERPL-MCNC: 499 NG/ML (ref 26–388)
GFR SERPL CREATININE-BSD FRML MDRD: 9 ML/MIN/{1.73_M2}
GLUCOSE SERPL-MCNC: 91 MG/DL (ref 70–99)
HCT VFR BLD AUTO: 35.7 % (ref 40–53)
HGB BLD-MCNC: 10.7 G/DL (ref 13.3–17.7)
IRON SATN MFR SERPL: 15 % (ref 15–46)
IRON SERPL-MCNC: 33 UG/DL (ref 35–180)
PHOSPHATE SERPL-MCNC: 6.2 MG/DL (ref 2.5–4.5)
POTASSIUM SERPL-SCNC: 5 MMOL/L (ref 3.4–5.3)
SODIUM SERPL-SCNC: 138 MMOL/L (ref 133–144)
TIBC SERPL-MCNC: 226 UG/DL (ref 240–430)
TSH SERPL DL<=0.005 MIU/L-ACNC: 5.36 MU/L (ref 0.4–4)

## 2019-01-16 PROCEDURE — 85018 HEMOGLOBIN: CPT

## 2019-01-16 PROCEDURE — 82728 ASSAY OF FERRITIN: CPT

## 2019-01-16 PROCEDURE — 80069 RENAL FUNCTION PANEL: CPT

## 2019-01-16 PROCEDURE — 85014 HEMATOCRIT: CPT

## 2019-01-16 PROCEDURE — 36415 COLL VENOUS BLD VENIPUNCTURE: CPT

## 2019-01-16 PROCEDURE — 84443 ASSAY THYROID STIM HORMONE: CPT

## 2019-01-16 PROCEDURE — 83540 ASSAY OF IRON: CPT

## 2019-01-16 PROCEDURE — 83550 IRON BINDING TEST: CPT

## 2019-01-16 PROCEDURE — G0463 HOSPITAL OUTPT CLINIC VISIT: HCPCS | Mod: ZF

## 2019-01-16 RX ORDER — SEVELAMER HYDROCHLORIDE 400 MG/1
400 TABLET, FILM COATED ORAL
Qty: 252 TABLET | Refills: 3 | Status: SHIPPED | OUTPATIENT
Start: 2019-01-16 | End: 2019-02-13

## 2019-01-16 RX ORDER — SODIUM BICARBONATE 650 MG/1
650 TABLET ORAL 2 TIMES DAILY
Qty: 180 TABLET | Refills: 0 | Status: SHIPPED | OUTPATIENT
Start: 2019-01-16 | End: 2019-02-13

## 2019-01-16 ASSESSMENT — PAIN SCALES - GENERAL: PAINLEVEL: NO PAIN (0)

## 2019-01-16 NOTE — LETTER
1/16/2019      RE: Dung Norton  [address] [address]       Nephrology Clinic Visit 1/16/19    Assessment and Plan:       1. CKD5 - Very stable renal function. Creat 5.4, eGFR 9 ml/mn. 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 His GFR has been 10-11 ml/mn since 9/17 He is not uremic. Urine protein 1.5 g/gCr     - Patient and daughter decided upon HD as his RRT option.     - He has attended the Kidney Smart program    - He underwent AVF creation with Dr Eduardo Pabon on 11/22/17. It has matured nicely and ready to use when needed. Last seen by Dr Pabon on 1/2/18   - Given stability of renal function have been holding off on HD, but given severe decline in EF and severe aortic stenosis he may require surgery. If that is the case will likely either begin HD prior to surgery to tune up or during hospital admission. Will reassess next visit following Cards visit at the end of this month    - He does not use NSAIDs   - He is straight cathing BID and voiding spontaneously w/o difficulty      2. Electrolytes - No acute concerns. K 5. 0      3. Volume status - Euvolemic. No edema, dyspnea. Albumin 3.2. Not on diuretics. Making ~ 1.5 liter/urine/day. He has gained body weight and weight has stabilized in the 78 kg range. Clinic blood pressures in the 124-127/ range.        - No need for diuretics at this time      4. HTN - Well controlled. Currently on Amlodipine 2.5 mg daily. He does not check home blood pressures, but b/p is checked at lab weekly and generally in the 120-130/ range       5. BPH - S/P TURP 3/18. He is straight cathing BID w/o difficulty. He is also voiding approx 1 liter/day as well. Follows with Dr Vanegas in Urology.       6. Acid base - Bicarb down to 18   - Resume bicarb 650 mg bid   - Goal bicarb is > 20      7. BMD - Corrected Ca 9.4, Phos 6.2, albumin 3.2   - Vit D 41, PTH 17 (10/18)    - Continue Renagel 400 mg TID w/meals   - TSH 6.4      8.  Nutrition - Reports good appetite and albumin is stable in the 3.2 range.       9. Anemia - Hgb 10.7. Etiology is likely anemia of renal disease   - He should have routine colon cancer screening per protocol   - Iron studies 1/19: Fe  33, Ferritin 499, Tsat  15%   - Was enrolled in anemia management program for EARLENE, but has not required, so has been graduated.    - Increase iron to bid    10. Hypothyroidism - TSH today 5.3. Was 6.4 October.   - Will message PCP    11. Severe HF w/severe aortic stenosis - Per recent Echo.   - If his aortic stenosis is deemed not to be the etiology of the severe decline in his EF then would have to consider ligating his AVF.    - I had patient begin Toprol XL 25 mg every day as ordered. Could always discontinue the Amlodipine to have more room to increase his BB.       12. Disposition - RTC one month for f/u      Reason for Visit:  CKD5 follow up      HPI:  Dung Norton is a 73 year old male who developed CKD5 d/t long standing obstructive uropathy 2/2 BPH and benign bladder mass, HTN, Mild aortic stenosis, in today for routine CKD f/u. Last seen in clinic by me on 10/3/18. Baseline in the 5 range and eGFR of 10-11 ml/mn  over the past year.      Interval Hx:  No hospital admissions  Echo this month showing significant decline in EF to 19% from normal one year ago and worsening aortic stenosis. His IVC was dilated. Metoprolol XL was added to med list but had not started. Has Cards f/u end of Jan      ROS:  Feeling good w/o complaint. We reviewed the recent findings on his echocardiogram today and patient denies any new symptoms of fatigue, dyspnea, CP, edema. Continues to straight cath 2 times/day and passing urine from his urethra as well. Denies abdominal pain. Energy is good. Quite active. He is living independently. Has good appetite.       Chronic Medical Problems:      HTN  BPH  Obstructive uropathy   HTN  Tobacco abuse  HLD  Anemia  Pulmonary nodules  KRISTINA  CKD5      Personal  Hx:   Single, has 2 daughters very involved in his care. Moved to a [facility] in May 2018 and has adjusted well. He is socializing more, eating better, doing some community gardening. He is a former smoker     Allergies:  No Known Allergies    Family Hx:   Family History   Problem Relation Age of Onset     C.A.D. Mother         d age 67     Vision Loss Father      Hearing Loss Sister      Diabetes Sister      Cancer No family hx of      Glaucoma No family hx of      Macular Degeneration No family hx of      Allergies:  No Known Allergies    Medications:  Current Outpatient Medications   Medication Sig     amLODIPine (NORVASC) 2.5 MG tablet TAKE 1 TABLET(2.5 MG) BY MOUTH DAILY     aspirin 81 MG tablet Take 1 tablet (81 mg) by mouth every morning - RESUME on 3/9/18 if urine remains clear     darbepoetin william (ARANESP, ALBUMIN FREE,) 100 MCG/0.5ML injection Inject 0.5 mLs (100 mcg) Subcutaneous every 14 days As needed for hgb<10g/dL.  If Hgb increases >1 point in 2 weeks (if blood transfusion given, use hgb PRIOR to this), SYSTOLIC BP > 180 mmHg or hgb>=10g/dL, HOLD DOSE. Dose must be within 1 week of Hgb.  Per anemia protocol with Cecilia De La Torre CNP     ferrous sulfate (IRON) 325 (65 FE) MG tablet Take 1 tablet (325 mg) by mouth daily (with breakfast)     metoprolol succinate ER (TOPROL-XL) 25 MG 24 hr tablet Take 1 tablet (25 mg) by mouth daily     multivitamin, therapeutic with minerals (MULTI-VITAMIN) TABS tablet Take 1 tablet by mouth daily     Omega-3 Fatty Acids (FISH OIL PO)      order for DME Equipment being ordered: olecranon bursa brace     sevelamer (RENAGEL) 400 MG tablet Take 1 tablet (400 mg) by mouth 3 times daily (with meals)     sodium bicarbonate 650 MG tablet Take 1 tablet (650 mg) by mouth 2 times daily for 10 days     No current facility-administered medications for this visit.       Vitals:  /76   Pulse 100   Temp 97.5  F (36.4  C) (Oral)   Wt 78.3 kg (172 lb 9.6 oz)   SpO2 92%    BMI 24.77 kg/m       Exam:  GEN: Pleasant, Well groomed. Daughter present  CARDIAC RRR + murmur into right chest  LUNGS: CTA  ABDOMEN: Soft, NT  EXT: no edema  Access: CLIFFORD AVF + bruit, well developed, hand warm  Neuro: No asterixis    LABS:   CMP  Recent Labs   Lab Test 01/16/19  1255 01/07/19  0903 01/02/19  0918 12/17/18  0933    140 142 143   POTASSIUM 5.0 5.4* 4.7 4.9   CHLORIDE 109 112* 113* 112*   CO2 18* 16* 20 19*   ANIONGAP 11 12 9 12   GLC 91 95 90 94   * 96* 101* 94*   CR 5.44* 5.47* 5.20* 5.02*   GFRESTIMATED 9* 9* 10* 11*   GFRESTBLACK 11* 11* 11* 14*   DERICK 8.8 9.1 8.9 9.0     Recent Labs   Lab Test 12/11/17  1018 08/28/17  1909 07/19/17  0537 07/12/17  1740   BILITOTAL 0.2 0.3 0.2 0.6   ALKPHOS 63 95 51 67   ALT 17 17 17 19   AST 13 9 32 12     CBC  Recent Labs   Lab Test 01/16/19  1255 01/07/19  0903 01/02/19  0918 12/17/18  0933  07/23/18  1002  04/09/18  1125  03/06/18  1012 03/05/18  1457   HGB 10.7* 11.2* 10.9* 10.3*   < > 10.5*   < > 10.7*   < > 9.9* 10.7*   WBC  --   --   --   --   --  9.4  --  8.8  --  9.1 7.2   RBC  --   --   --   --   --  3.77*  --  3.87*  --  3.65* 3.94*   HCT 35.7* 36.1* 34.7* 32.9*   < > 33.7*   < > 33.9*   < > 32.1* 34.2*   MCV  --   --   --   --   --  89  --  88  --  88 87   MCH  --   --   --   --   --  27.9  --  27.6  --  27.1 27.2   MCHC  --   --   --   --   --  31.2*  --  31.6  --  30.8* 31.3*   RDW  --   --   --   --   --  15.2*  --  15.9*  --  15.7* 15.8*   PLT  --   --   --   --   --  256  --  220  --  196 207    < > = values in this interval not displayed.     URINE STUDIES  Recent Labs   Lab Test 04/18/18  1325 04/09/18  1400 01/25/18  1102 11/22/17 08/29/17  0235  01/15/14  0936   COLOR Yellow Yellow Red Yellow Light Yellow   < > Yellow   APPEARANCE Cloudy Cloudy Slightly Cloudy Clear Cloudy   < > Clear   URINEGLC Negative Negative Negative Neg Negative   < > Negative   URINEBILI Negative Negative Negative Neg Negative   < > Negative   URINEKETONE  Negative Negative Negative Neg Negative   < > Negative   SG 1.015 1.015 1.013 1.010 1.014   < > 1.020   UBLD Large* Large* Large* Trace Moderate*   < > Small*   URINEPH 5.5 5.5 6.5 5.0 6.0   < > 6.0   PROTEIN 30* 30* 300* 30  100*   < > Negative   UROBILINOGEN 0.2 0.2  --  1   --   --  0.2   NITRITE Negative Negative Negative Neg Negative   < > Negative   LEUKEST Large* Large* Moderate* Small Large*   < > Negative   RBCU * 10-25* 14*  --  10*   < > 10-25*   WBCU >100* >100* >100*  --  >182*   < > O - 2    < > = values in this interval not displayed.     Recent Labs   Lab Test 07/25/18  1128 07/02/18  0845 04/09/18  1400 02/26/18  1230   UTPG 1.26* 1.51* 1.17* 0.74*     PTH  Recent Labs   Lab Test 10/03/18  1301 07/23/18  1002 03/02/18  1107   PTHI 17* 14* 86*     IRON STUDIES  Recent Labs   Lab Test 01/16/19  1255 09/24/18  0956 08/27/18  0934   IRON 33* 62 63   * 245 260   IRONSAT 15 25 24   * 458* 524*       Oralia De La Torre, INGE

## 2019-01-16 NOTE — PATIENT INSTRUCTIONS
1. Start Sodium bicarbonate 650 mg one tab twice per day  2. Start Toprol XL 25 mg in the morning ( for your heart)

## 2019-01-16 NOTE — LETTER
1/16/2019       RE: Dung Nroton  [address] [address]     Dear Colleague,    Thank you for referring your patient, Dung Norton, to the [facility] NEPHROLOGY at [facility]. Please see a copy of my visit note below.    Nephrology Clinic Visit 1/16/19    Assessment and Plan:       1. CKD5 - Very stable renal function. Creat 5.4, eGFR 9 ml/mn. 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 His GFR has been 10-11 ml/mn since 9/17 He is not uremic. Urine protein 1.5 g/gCr     - Patient and daughter decided upon HD as his RRT option.     - He has attended the Kidney Smart program    - He underwent AVF creation with Dr Eduardo Pabon on 11/22/17. It has matured nicely and ready to use when needed. Last seen by Dr Pabon on 1/2/18   - Given stability of renal function have been holding off on HD, but given severe decline in EF and severe aortic stenosis he may require surgery. If that is the case will likely either begin HD prior to surgery to tune up or during hospital admission. Will reassess next visit following Cards visit at the end of this month    - He does not use NSAIDs   - He is straight cathing BID and voiding spontaneously w/o difficulty      2. Electrolytes - No acute concerns. K 5. 0      3. Volume status - Euvolemic. No edema, dyspnea. Albumin 3.2. Not on diuretics. Making ~ 1.5 liter/urine/day. He has gained body weight and weight has stabilized in the 78 kg range. Clinic blood pressures in the 124-127/ range.        - No need for diuretics at this time      4. HTN - Well controlled. Currently on Amlodipine 2.5 mg daily. He does not check home blood pressures, but b/p is checked at lab weekly and generally in the 120-130/ range       5. BPH - S/P TURP 3/18. He is straight cathing BID w/o difficulty. He is also voiding approx 1 liter/day as well. Follows with Dr Vanegas in Urology.       6. Acid base - Bicarb down to 18   -  Resume bicarb 650 mg bid   - Goal bicarb is > 20      7. BMD - Corrected Ca 9.4, Phos 6.2, albumin 3.2   - Vit D 41, PTH 17 (10/18)    - Continue Renagel 400 mg TID w/meals   - TSH 6.4      8. Nutrition - Reports good appetite and albumin is stable in the 3.2 range.       9. Anemia - Hgb 10.7. Etiology is likely anemia of renal disease   - He should have routine colon cancer screening per protocol   - Iron studies 1/19: Fe  33, Ferritin 499, Tsat  15%   - Was enrolled in anemia management program for EARLENE, but has not required, so has been graduated.    - Increase iron to bid    10. Hypothyroidism - TSH today 5.3. Was 6.4 October.   - Will message PCP    11. Severe HF w/severe aortic stenosis - Per recent Echo.   - If his aortic stenosis is deemed not to be the etiology of the severe decline in his EF then would have to consider ligating his AVF.    - I had patient begin Toprol XL 25 mg every day as ordered. Could always discontinue the Amlodipine to have more room to increase his BB.       12. Disposition - RTC one month for f/u      Reason for Visit:  CKD5 follow up      HPI:  Dung Norton is a 73 year old male who developed CKD5 d/t long standing obstructive uropathy 2/2 BPH and benign bladder mass, HTN, Mild aortic stenosis, in today for routine CKD f/u. Last seen in clinic by me on 10/3/18. Baseline in the 5 range and eGFR of 10-11 ml/mn  over the past year.      Interval Hx:  No hospital admissions  Echo this month showing significant decline in EF to 19% from normal one year ago and worsening aortic stenosis. His IVC was dilated. Metoprolol XL was added to med list but had not started. Has Cards f/u end of Jan      ROS:  Feeling good w/o complaint. We reviewed the recent findings on his echocardiogram today and patient denies any new symptoms of fatigue, dyspnea, CP, edema. Continues to straight cath 2 times/day and passing urine from his urethra as well. Denies abdominal pain. Energy is good. Quite active.  He is living independently. Has good appetite.       Chronic Medical Problems:      HTN  BPH  Obstructive uropathy   HTN  Tobacco abuse  HLD  Anemia  Pulmonary nodules  KRISTINA  CKD5      Personal Hx:   Single, has 2 daughters very involved in his care. [facility] and has [facility] well. He is socializing more, eating better, doing some community gardening. He is a former smoker     Allergies:  No Known Allergies    Family Hx:   Family History   Problem Relation Age of Onset     C.A.D. Mother         d age 67     Vision Loss Father      Hearing Loss Sister      Diabetes Sister      Cancer No family hx of      Glaucoma No family hx of      Macular Degeneration No family hx of      Allergies:  No Known Allergies    Medications:  Current Outpatient Medications   Medication Sig     amLODIPine (NORVASC) 2.5 MG tablet TAKE 1 TABLET(2.5 MG) BY MOUTH DAILY     aspirin 81 MG tablet Take 1 tablet (81 mg) by mouth every morning - RESUME on 3/9/18 if urine remains clear     darbepoetin william (ARANESP, ALBUMIN FREE,) 100 MCG/0.5ML injection Inject 0.5 mLs (100 mcg) Subcutaneous every 14 days As needed for hgb<10g/dL.  If Hgb increases >1 point in 2 weeks (if blood transfusion given, use hgb PRIOR to this), SYSTOLIC BP > 180 mmHg or hgb>=10g/dL, HOLD DOSE. Dose must be within 1 week of Hgb.  Per anemia protocol with Cecilia De La Torre CNP     ferrous sulfate (IRON) 325 (65 FE) MG tablet Take 1 tablet (325 mg) by mouth daily (with breakfast)     metoprolol succinate ER (TOPROL-XL) 25 MG 24 hr tablet Take 1 tablet (25 mg) by mouth daily     multivitamin, therapeutic with minerals (MULTI-VITAMIN) TABS tablet Take 1 tablet by mouth daily     Omega-3 Fatty Acids (FISH OIL PO)      order for DME Equipment being ordered: olecranon bursa brace     sevelamer (RENAGEL) 400 MG tablet Take 1 tablet (400 mg) by mouth 3 times daily (with meals)     sodium bicarbonate 650 MG tablet Take 1 tablet (650 mg) by mouth 2 times daily  for 10 days     No current facility-administered medications for this visit.       Vitals:  /76   Pulse 100   Temp 97.5  F (36.4  C) (Oral)   Wt 78.3 kg (172 lb 9.6 oz)   SpO2 92%   BMI 24.77 kg/m       Exam:  GEN: Pleasant, Well groomed. Daughter present  CARDIAC RRR + murmur into right chest  LUNGS: CTA  ABDOMEN: Soft, NT  EXT: no edema  Access: CLIFFORD AVF + bruit, well developed, hand warm  Neuro: No asterixis    LABS:   CMP  Recent Labs   Lab Test 01/16/19  1255 01/07/19  0903 01/02/19  0918 12/17/18  0933    140 142 143   POTASSIUM 5.0 5.4* 4.7 4.9   CHLORIDE 109 112* 113* 112*   CO2 18* 16* 20 19*   ANIONGAP 11 12 9 12   GLC 91 95 90 94   * 96* 101* 94*   CR 5.44* 5.47* 5.20* 5.02*   GFRESTIMATED 9* 9* 10* 11*   GFRESTBLACK 11* 11* 11* 14*   DERICK 8.8 9.1 8.9 9.0     Recent Labs   Lab Test 12/11/17  1018 08/28/17  1909 07/19/17  0537 07/12/17  1740   BILITOTAL 0.2 0.3 0.2 0.6   ALKPHOS 63 95 51 67   ALT 17 17 17 19   AST 13 9 32 12     CBC  Recent Labs   Lab Test 01/16/19  1255 01/07/19  0903 01/02/19  0918 12/17/18  0933  07/23/18  1002  04/09/18  1125  03/06/18  1012 03/05/18  1457   HGB 10.7* 11.2* 10.9* 10.3*   < > 10.5*   < > 10.7*   < > 9.9* 10.7*   WBC  --   --   --   --   --  9.4  --  8.8  --  9.1 7.2   RBC  --   --   --   --   --  3.77*  --  3.87*  --  3.65* 3.94*   HCT 35.7* 36.1* 34.7* 32.9*   < > 33.7*   < > 33.9*   < > 32.1* 34.2*   MCV  --   --   --   --   --  89  --  88  --  88 87   MCH  --   --   --   --   --  27.9  --  27.6  --  27.1 27.2   MCHC  --   --   --   --   --  31.2*  --  31.6  --  30.8* 31.3*   RDW  --   --   --   --   --  15.2*  --  15.9*  --  15.7* 15.8*   PLT  --   --   --   --   --  256  --  220  --  196 207    < > = values in this interval not displayed.     URINE STUDIES  Recent Labs   Lab Test 04/18/18  1325 04/09/18  1400 01/25/18  1102 11/22/17 08/29/17  0235  01/15/14  0936   COLOR Yellow Yellow Red Yellow Light Yellow   < > Yellow   APPEARANCE Cloudy  Cloudy Slightly Cloudy Clear Cloudy   < > Clear   URINEGLC Negative Negative Negative Neg Negative   < > Negative   URINEBILI Negative Negative Negative Neg Negative   < > Negative   URINEKETONE Negative Negative Negative Neg Negative   < > Negative   SG 1.015 1.015 1.013 1.010 1.014   < > 1.020   UBLD Large* Large* Large* Trace Moderate*   < > Small*   URINEPH 5.5 5.5 6.5 5.0 6.0   < > 6.0   PROTEIN 30* 30* 300* 30  100*   < > Negative   UROBILINOGEN 0.2 0.2  --  1   --   --  0.2   NITRITE Negative Negative Negative Neg Negative   < > Negative   LEUKEST Large* Large* Moderate* Small Large*   < > Negative   RBCU * 10-25* 14*  --  10*   < > 10-25*   WBCU >100* >100* >100*  --  >182*   < > O - 2    < > = values in this interval not displayed.     Recent Labs   Lab Test 07/25/18  1128 07/02/18  0845 04/09/18  1400 02/26/18  1230   UTPG 1.26* 1.51* 1.17* 0.74*     PTH  Recent Labs   Lab Test 10/03/18  1301 07/23/18  1002 03/02/18  1107   PTHI 17* 14* 86*     IRON STUDIES  Recent Labs   Lab Test 01/16/19  1255 09/24/18  0956 08/27/18  0934   IRON 33* 62 63   * 245 260   IRONSAT 15 25 24   * 458* 524*       Oralia De La Torre NP      Again, thank you for allowing me to participate in the care of your patient.      Sincerely,    Oralia De La Torre NP

## 2019-01-16 NOTE — TELEPHONE ENCOUNTER
Date: 1/16/2019    Time of Call: 11:50 AM     Diagnosis:  Afib, heart failure     [ VORB ] Ordering provider: Dr. Ng  Order: start metoprolol 25 mg daily     Order received by: Stephanie Menendez RN     Follow-up/additional notes: Left message for patient to call clinic.  Contacted pharmacy and verified that patient did not  the prescription.  Stephanie Menendez RN

## 2019-01-17 ENCOUNTER — CARE COORDINATION (OUTPATIENT)
Dept: NEPHROLOGY | Facility: CLINIC | Age: 74
End: 2019-01-17

## 2019-01-17 DIAGNOSIS — N18.5 ANEMIA OF CHRONIC RENAL FAILURE, STAGE 5 (H): ICD-10-CM

## 2019-01-17 DIAGNOSIS — D63.1 ANEMIA OF CHRONIC RENAL FAILURE, STAGE 5 (H): ICD-10-CM

## 2019-01-17 DIAGNOSIS — N18.5 CKD (CHRONIC KIDNEY DISEASE) STAGE 5, GFR LESS THAN 15 ML/MIN (H): ICD-10-CM

## 2019-01-17 RX ORDER — FERROUS SULFATE 325(65) MG
325 TABLET ORAL 2 TIMES DAILY
Qty: 30 TABLET | Refills: 11 | Status: ON HOLD | COMMUNITY
Start: 2019-01-17 | End: 2019-04-26

## 2019-01-17 NOTE — PROGRESS NOTES
Nephrology Clinic Visit 1/16/19    Assessment and Plan:       1. CKD5 - Very stable renal function. Creat 5.4, eGFR 9 ml/mn. 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 His GFR has been 10-11 ml/mn since 9/17 He is not uremic. Urine protein 1.5 g/gCr     - Patient and daughter decided upon HD as his RRT option.     - He has attended the Kidney Smart program    - He underwent AVF creation with Dr Eduardo Pabon on 11/22/17. It has matured nicely and ready to use when needed. Last seen by Dr Pabon on 1/2/18   - Given stability of renal function have been holding off on HD, but given severe decline in EF and severe aortic stenosis he may require surgery. If that is the case will likely either begin HD prior to surgery to tune up or during hospital admission. Will reassess next visit following Cards visit at the end of this month    - He does not use NSAIDs   - He is straight cathing BID and voiding spontaneously w/o difficulty      2. Electrolytes - No acute concerns. K 5.0      3. Volume status - Euvolemic. No edema, dyspnea. Albumin 3.2. Not on diuretics. Making ~ 1.5 liter/urine/day. He has gained body weight and weight has stabilized in the 78 kg range. Clinic blood pressures in the 124-127/ range.        - No need for diuretics at this time      4. HTN - Well controlled. Currently on Amlodipine 2.5 mg daily. He does not check home blood pressures, but b/p is checked at lab weekly and generally in the 120-130/ range       5. BPH - S/P TURP 3/18. He is straight cathing BID w/o difficulty. He is also voiding approx 1 liter/day as well. Follows with Dr Vanegas in Urology.       6. Acid base - Bicarb down to 18   - Resume bicarb 650 mg bid   - Goal bicarb is > 20      7. BMD - Corrected Ca 9.4, Phos 6.2, albumin 3.2   - Vit D 41, PTH 17 (10/18)    - Continue Renagel 400 mg TID w/meals   - TSH 6.4      8. Nutrition - Reports good appetite and albumin is stable in the 3.2 range.       9. Anemia - Hgb 10.7.  Etiology is likely anemia of renal disease   - He should have routine colon cancer screening per protocol   - Iron studies 1/19: Fe 33, Ferritin 499, Tsat 15%   - Was enrolled in anemia management program for EARLENE, but has not required, so has been graduated.    - Increase iron to bid    10. Hypothyroidism - TSH today 5.3. Was 6.4 October.   - Will message PCP    11. Severe HF w/severe aortic stenosis - Per recent Echo.   - If his aortic stenosis is deemed not to be the etiology of the severe decline in his EF then would have to consider ligating his AVF.    - I had patient begin Toprol XL 25 mg every day as ordered. Could always discontinue the Amlodipine to have more room to increase his BB.       12. Disposition - RTC one month for f/u      Reason for Visit:  CKD5 follow up      HPI:  Dung Norton is a 73 year old male who developed CKD5 d/t long standing obstructive uropathy 2/2 BPH and benign bladder mass, HTN, Mild aortic stenosis, in today for routine CKD f/u. Last seen in clinic by me on 10/3/18. Baseline in the 5 range and eGFR of 10-11 ml/mn over the past year.      Interval Hx:  No hospital admissions  Echo this month showing significant decline in EF to 19% from normal one year ago and worsening aortic stenosis. His IVC was dilated. Metoprolol XL was added to med list but had not started. Has Cards f/u end of Jan      ROS:  Feeling good w/o complaint. We reviewed the recent findings on his echocardiogram today and patient denies any new symptoms of fatigue, dyspnea, CP, edema. Continues to straight cath 2 times/day and passing urine from his urethra as well. Denies abdominal pain. Energy is good. Quite active. He is living independently. Has good appetite.       Chronic Medical Problems:      HTN  BPH  Obstructive uropathy   HTN  Tobacco abuse  HLD  Anemia  Pulmonary nodules  KRISTINA  CKD5      Personal Hx:   Single, has 2 daughters very involved in his care. Moved to a senior [facility] in May 2018 and  has adjusted well. He is socializing more, eating better, doing some community gardening. He is a former smoker     Allergies:  No Known Allergies    Family Hx:   Family History   Problem Relation Age of Onset     C.A.D. Mother         d age 67     Vision Loss Father      Hearing Loss Sister      Diabetes Sister      Cancer No family hx of      Glaucoma No family hx of      Macular Degeneration No family hx of      Allergies:  No Known Allergies    Medications:  Current Outpatient Medications   Medication Sig     amLODIPine (NORVASC) 2.5 MG tablet TAKE 1 TABLET(2.5 MG) BY MOUTH DAILY     aspirin 81 MG tablet Take 1 tablet (81 mg) by mouth every morning - RESUME on 3/9/18 if urine remains clear     darbepoetin william (ARANESP, ALBUMIN FREE,) 100 MCG/0.5ML injection Inject 0.5 mLs (100 mcg) Subcutaneous every 14 days As needed for hgb<10g/dL.  If Hgb increases >1 point in 2 weeks (if blood transfusion given, use hgb PRIOR to this), SYSTOLIC BP > 180 mmHg or hgb>=10g/dL, HOLD DOSE. Dose must be within 1 week of Hgb.  Per anemia protocol with Cecilia De La Torre CNP     ferrous sulfate (IRON) 325 (65 FE) MG tablet Take 1 tablet (325 mg) by mouth daily (with breakfast)     metoprolol succinate ER (TOPROL-XL) 25 MG 24 hr tablet Take 1 tablet (25 mg) by mouth daily     multivitamin, therapeutic with minerals (MULTI-VITAMIN) TABS tablet Take 1 tablet by mouth daily     Omega-3 Fatty Acids (FISH OIL PO)      order for DME Equipment being ordered: olecranon bursa brace     sevelamer (RENAGEL) 400 MG tablet Take 1 tablet (400 mg) by mouth 3 times daily (with meals)     sodium bicarbonate 650 MG tablet Take 1 tablet (650 mg) by mouth 2 times daily for 10 days     No current facility-administered medications for this visit.       Vitals:  /76   Pulse 100   Temp 97.5  F (36.4  C) (Oral)   Wt 78.3 kg (172 lb 9.6 oz)   SpO2 92%   BMI 24.77 kg/m      Exam:  GEN: Pleasant, Well groomed. Daughter present  CARDIAC RRR + murmur into  right chest  LUNGS: CTA  ABDOMEN: Soft, NT  EXT: no edema  Access: CLIFFORD AVF + bruit, well developed, hand warm  Neuro: No asterixis    LABS:   CMP  Recent Labs   Lab Test 01/16/19  1255 01/07/19  0903 01/02/19  0918 12/17/18  0933    140 142 143   POTASSIUM 5.0 5.4* 4.7 4.9   CHLORIDE 109 112* 113* 112*   CO2 18* 16* 20 19*   ANIONGAP 11 12 9 12   GLC 91 95 90 94   * 96* 101* 94*   CR 5.44* 5.47* 5.20* 5.02*   GFRESTIMATED 9* 9* 10* 11*   GFRESTBLACK 11* 11* 11* 14*   DERICK 8.8 9.1 8.9 9.0     Recent Labs   Lab Test 12/11/17  1018 08/28/17  1909 07/19/17  0537 07/12/17  1740   BILITOTAL 0.2 0.3 0.2 0.6   ALKPHOS 63 95 51 67   ALT 17 17 17 19   AST 13 9 32 12     CBC  Recent Labs   Lab Test 01/16/19  1255 01/07/19  0903 01/02/19  0918 12/17/18  0933  07/23/18  1002  04/09/18  1125  03/06/18  1012 03/05/18  1457   HGB 10.7* 11.2* 10.9* 10.3*   < > 10.5*   < > 10.7*   < > 9.9* 10.7*   WBC  --   --   --   --   --  9.4  --  8.8  --  9.1 7.2   RBC  --   --   --   --   --  3.77*  --  3.87*  --  3.65* 3.94*   HCT 35.7* 36.1* 34.7* 32.9*   < > 33.7*   < > 33.9*   < > 32.1* 34.2*   MCV  --   --   --   --   --  89  --  88  --  88 87   MCH  --   --   --   --   --  27.9  --  27.6  --  27.1 27.2   MCHC  --   --   --   --   --  31.2*  --  31.6  --  30.8* 31.3*   RDW  --   --   --   --   --  15.2*  --  15.9*  --  15.7* 15.8*   PLT  --   --   --   --   --  256  --  220  --  196 207    < > = values in this interval not displayed.     URINE STUDIES  Recent Labs   Lab Test 04/18/18  1325 04/09/18  1400 01/25/18  1102 11/22/17 08/29/17  0235  01/15/14  0936   COLOR Yellow Yellow Red Yellow Light Yellow   < > Yellow   APPEARANCE Cloudy Cloudy Slightly Cloudy Clear Cloudy   < > Clear   URINEGLC Negative Negative Negative Neg Negative   < > Negative   URINEBILI Negative Negative Negative Neg Negative   < > Negative   URINEKETONE Negative Negative Negative Neg Negative   < > Negative   SG 1.015 1.015 1.013 1.010 1.014   < > 1.020    UBLD Large* Large* Large* Trace Moderate*   < > Small*   URINEPH 5.5 5.5 6.5 5.0 6.0   < > 6.0   PROTEIN 30* 30* 300* 30  100*   < > Negative   UROBILINOGEN 0.2 0.2  --  1   --   --  0.2   NITRITE Negative Negative Negative Neg Negative   < > Negative   LEUKEST Large* Large* Moderate* Small Large*   < > Negative   RBCU * 10-25* 14*  --  10*   < > 10-25*   WBCU >100* >100* >100*  --  >182*   < > O - 2    < > = values in this interval not displayed.     Recent Labs   Lab Test 07/25/18  1128 07/02/18  0845 04/09/18  1400 02/26/18  1230   UTPG 1.26* 1.51* 1.17* 0.74*     PTH  Recent Labs   Lab Test 10/03/18  1301 07/23/18  1002 03/02/18  1107   PTHI 17* 14* 86*     IRON STUDIES  Recent Labs   Lab Test 01/16/19  1255 09/24/18  0956 08/27/18  0934   IRON 33* 62 63   * 245 260   IRONSAT 15 25 24   * 458* 524*       Oralia De La Torre, NP

## 2019-01-17 NOTE — PROGRESS NOTES
Per Cecilia:     Jose Juan can you call patient and ask him to increase his iron tab to 2 per day?     Called patient and reviewed recommendation. He verbalized understanding. He buys iron OTC, so I will not send a prescription to the pharmacy.    Jose Juan Shea RN

## 2019-01-17 NOTE — TELEPHONE ENCOUNTER
Spoke to patient who confirmed that he is taking metoprolol 25 mg daily starting yesterday.  New Medication: Patient was educated regarding newly prescribed medication, including discussion of  the indication, administration, side effects, and when to report to MD or RN. Patient demonstrated understanding of this information and agreed to call with further questions or concerns.    Stephanie Menendez RN  Care Coordinator  [facility]  [phone]

## 2019-01-18 ENCOUNTER — TELEPHONE (OUTPATIENT)
Dept: NEPHROLOGY | Facility: CLINIC | Age: 74
End: 2019-01-18

## 2019-01-18 NOTE — TELEPHONE ENCOUNTER
[facility] Call Center    Phone Message    May a detailed message be left on voicemail: yes    Reason for Call: Other: [phone] Marlo,  pharmacy in [place], sevelamer (RENAGEL) 400 MG tablet, original plan is to split the pill in half, but it turns out the pill cannot be cut, need a different plan, please call and follow up.     Action Taken: Message routed to:  [facility] Neph

## 2019-01-21 ENCOUNTER — ALLIED HEALTH/NURSE VISIT (OUTPATIENT)
Dept: FAMILY MEDICINE | Facility: CLINIC | Age: 74
End: 2019-01-21
Payer: COMMERCIAL

## 2019-01-21 VITALS — SYSTOLIC BLOOD PRESSURE: 120 MMHG | DIASTOLIC BLOOD PRESSURE: 70 MMHG

## 2019-01-21 DIAGNOSIS — N18.5 CKD (CHRONIC KIDNEY DISEASE) STAGE 5, GFR LESS THAN 15 ML/MIN (H): ICD-10-CM

## 2019-01-21 DIAGNOSIS — Z01.30 BP CHECK: Primary | ICD-10-CM

## 2019-01-21 LAB
ALBUMIN SERPL-MCNC: 2.9 G/DL (ref 3.4–5)
ANION GAP SERPL CALCULATED.3IONS-SCNC: 11 MMOL/L (ref 3–14)
BUN SERPL-MCNC: 130 MG/DL (ref 7–30)
CALCIUM SERPL-MCNC: 8.6 MG/DL (ref 8.5–10.1)
CHLORIDE SERPL-SCNC: 110 MMOL/L (ref 94–109)
CO2 SERPL-SCNC: 19 MMOL/L (ref 20–32)
CREAT SERPL-MCNC: 5.33 MG/DL (ref 0.66–1.25)
GFR SERPL CREATININE-BSD FRML MDRD: 10 ML/MIN/{1.73_M2}
GLUCOSE SERPL-MCNC: 105 MG/DL (ref 70–99)
HCT VFR BLD AUTO: 30.9 % (ref 40–53)
HGB BLD-MCNC: 9.7 G/DL (ref 13.3–17.7)
PHOSPHATE SERPL-MCNC: 6.6 MG/DL (ref 2.5–4.5)
POTASSIUM SERPL-SCNC: 5 MMOL/L (ref 3.4–5.3)
SODIUM SERPL-SCNC: 140 MMOL/L (ref 133–144)

## 2019-01-21 PROCEDURE — 99207 ZZC NO CHARGE NURSE ONLY: CPT | Performed by: FAMILY MEDICINE

## 2019-01-21 PROCEDURE — 85014 HEMATOCRIT: CPT

## 2019-01-21 PROCEDURE — 80069 RENAL FUNCTION PANEL: CPT

## 2019-01-21 PROCEDURE — 85018 HEMOGLOBIN: CPT

## 2019-01-21 PROCEDURE — 36415 COLL VENOUS BLD VENIPUNCTURE: CPT

## 2019-01-21 NOTE — PROGRESS NOTES
Dung Norton is enrolled/participating in the retail pharmacy Blood Pressure Goals Achievement Program (BPGAP).  Dung Norton was evaluated at [facility] on January 21, 2019 at which time his blood pressure was:    BP Readings from Last 3 Encounters:   01/21/19 120/70   01/16/19 126/76   01/07/19 118/72     Reviewed lifestyle modifications for blood pressure control and reduction: including making healthy food choices, managing weight, getting regular exercise, smoking cessation, reducing alcohol consumption, monitoring blood pressure regularly.     Dung Norton is not experiencing symptoms.    Follow-Up: BP is at goal of < 140/90mmHg (patient 18+ years of age with or without diabetes).  Recommended follow-up at pharmacy in 6 months.     Recommendation to Provider: none    Dung Norton was evaluated for enrollment into the PGEN study today.    PLEASE INITIATE ENROLLMENT DISCUSSION WITH HTN PTS  1) Between 30-80 years old                                                                                                               2) BMI between 19-50                                                                                                        3) BP ?140/90 AND ?170/110 patients aged 30-59         BP ?150/90 AND ?170/110 non-diabetic patients aged 60-80       BP ?140/90 AND ?170/110 diabetic patients aged 60-80  4) Additional requirements for uncontrolled HTN patients:        Pt on only 1 class of medication  5) EXCLUDE patient if confirmation of:                  ? Cardiac disease                  ? Chronic Kidney Disease                  ? Pregnancy/Breastfeeding                  ? Secondary Hypertension/Pre-eclampsia                                 ? Vascular disease    Patient eligible for enrollment:  No  Patient interested in enrollment:  No    This note completed by: Ricarda Andino [facility]  [address] [place] [address]  [place] [address]  [phone]

## 2019-01-21 NOTE — Clinical Note
Blood pressure at pharmacy today 120/70[facility]arda [facility] [address] [place] [phone]

## 2019-02-01 NOTE — TELEPHONE ENCOUNTER
[facility] Call Center    Phone Message    May a detailed message be left on voicemail: yes    Reason for Call: Other: the pharmacist at [facility] in [place] NEEDS a call back asap. The pt is splitting the 800 tab of sevelamer (Renagel) to a 400 tab, and that tab is not supposed to be split. Please call the pharmacy TODAY at [phone]. Thanks.    Action Taken: Message routed to:  [facility] daisha med renal

## 2019-02-01 NOTE — TELEPHONE ENCOUNTER
[facility] Call Center    Phone Message    May a detailed message be left on voicemail: yes    Reason for Call: Other: Bryn from the pharmacy called the drug  and got the info he needed.  Please disregard earlier call     Action Taken: Message routed to:  [facility] Nephrology

## 2019-02-04 ENCOUNTER — TELEPHONE (OUTPATIENT)
Dept: NEPHROLOGY | Facility: CLINIC | Age: 74
End: 2019-02-04

## 2019-02-04 ENCOUNTER — ALLIED HEALTH/NURSE VISIT (OUTPATIENT)
Dept: FAMILY MEDICINE | Facility: CLINIC | Age: 74
End: 2019-02-04

## 2019-02-04 VITALS — DIASTOLIC BLOOD PRESSURE: 68 MMHG | SYSTOLIC BLOOD PRESSURE: 128 MMHG

## 2019-02-04 DIAGNOSIS — E03.9 HYPOTHYROIDISM, UNSPECIFIED TYPE: ICD-10-CM

## 2019-02-04 DIAGNOSIS — Z01.30 BP CHECK: Primary | ICD-10-CM

## 2019-02-04 DIAGNOSIS — N18.5 CKD (CHRONIC KIDNEY DISEASE) STAGE 5, GFR LESS THAN 15 ML/MIN (H): ICD-10-CM

## 2019-02-04 LAB
ALBUMIN SERPL-MCNC: 2.9 G/DL (ref 3.4–5)
ANION GAP SERPL CALCULATED.3IONS-SCNC: 10 MMOL/L (ref 3–14)
BUN SERPL-MCNC: 118 MG/DL (ref 7–30)
CALCIUM SERPL-MCNC: 8.3 MG/DL (ref 8.5–10.1)
CHLORIDE SERPL-SCNC: 112 MMOL/L (ref 94–109)
CO2 SERPL-SCNC: 19 MMOL/L (ref 20–32)
CREAT SERPL-MCNC: 5.41 MG/DL (ref 0.66–1.25)
GFR SERPL CREATININE-BSD FRML MDRD: 10 ML/MIN/{1.73_M2}
GLUCOSE SERPL-MCNC: 141 MG/DL (ref 70–99)
HCT VFR BLD AUTO: 30.7 % (ref 40–53)
HGB BLD-MCNC: 9.5 G/DL (ref 13.3–17.7)
PHOSPHATE SERPL-MCNC: 6.6 MG/DL (ref 2.5–4.5)
POTASSIUM SERPL-SCNC: 4.9 MMOL/L (ref 3.4–5.3)
SODIUM SERPL-SCNC: 141 MMOL/L (ref 133–144)
T4 FREE SERPL-MCNC: 0.76 NG/DL (ref 0.76–1.46)

## 2019-02-04 PROCEDURE — 80069 RENAL FUNCTION PANEL: CPT

## 2019-02-04 PROCEDURE — 85014 HEMATOCRIT: CPT

## 2019-02-04 PROCEDURE — 85018 HEMOGLOBIN: CPT

## 2019-02-04 PROCEDURE — 84439 ASSAY OF FREE THYROXINE: CPT

## 2019-02-04 PROCEDURE — 99207 ZZC NO CHARGE NURSE ONLY: CPT | Performed by: FAMILY MEDICINE

## 2019-02-04 PROCEDURE — 36415 COLL VENOUS BLD VENIPUNCTURE: CPT

## 2019-02-04 NOTE — PROGRESS NOTES
Dung Norton is enrolled/participating in the retail pharmacy Blood Pressure Goals Achievement Program (BPGAP).  Dung Norton was evaluated at [facility] on February 4, 2019 at which time his blood pressure was:    BP Readings from Last 3 Encounters:   02/04/19 128/68   01/21/19 120/70   01/16/19 126/76     Reviewed lifestyle modifications for blood pressure control and reduction: including making healthy food choices, managing weight, getting regular exercise, smoking cessation, reducing alcohol consumption, monitoring blood pressure regularly.     Dung Norton is not experiencing symptoms.    Follow-Up: BP is at goal of < 140/90mmHg (patient 18+ years of age with or without diabetes).  Recommended follow-up at pharmacy in 6 months.     Recommendation to Provider: none    Dung Norton was evaluated for enrollment into the PGEN study today.    PLEASE INITIATE ENROLLMENT DISCUSSION WITH HTN PTS  1) Between 30-80 years old                                                                                                               2) BMI between 19-50                                                                                                        3) BP ?140/90 AND ?170/110 patients aged 30-59         BP ?150/90 AND ?170/110 non-diabetic patients aged 60-80       BP ?140/90 AND ?170/110 diabetic patients aged 60-80  4) Additional requirements for uncontrolled HTN patients:        Pt on only 1 class of medication  5) EXCLUDE patient if confirmation of:                  ? Cardiac disease                  ? Chronic Kidney Disease                  ? Pregnancy/Breastfeeding                  ? Secondary Hypertension/Pre-eclampsia                                 ? Vascular disease    Patient eligible for enrollment:  No  Patient interested in enrollment:  No    This note completed by:Ricarda Andino [facility]  [address] [place] [address]  [place] [address]  [phone]

## 2019-02-04 NOTE — PROGRESS NOTES
Referring provider: Haley Baker MD    Chief complaint: Discuss TTE results.    HPI: Mr. Dung Norton is a 72 year old  male with PMH significant for CKD, HTN and hx of heavy tobacco abuse.    Mr. Norton has recently been diagnosed with ESRD, has had his AVF and almost ready ready for HD though he doesnot need that right now. He has HT and is on amlodipine 2.5 mg. His BP in the office is high but he reports lower BP outside the hospital.     He has been diagnosed with heart murmur and subsequently underwent a TTE planned by  which showed mild aortic stenosis with mild dilatation of ascending aorta at 4 cm and hypokinesis of LV lateral wall. He is currently asymptomatic. He denies a history of chest discomfort, dyspnea, PND, orthopnea, pedal edema, palpitations, lightheadedness, and syncope. He has HTN and hx of 50 pack year tobacco abuse. He quit smoking last summer. He has family hx of CAD as well. His mother had MI and  at the age of 66.     I have reviewed his echocardiogram from 2018 which shows mild AS, normal LV function with LV lateral wall hypokinesis (contrast was not used to enhance endocardial borders). His ECG dated 2017 is normal.    Medications, personal, family, and social history reviewed with patient and revised.    Interval history 2019:  The patient returns for on year follow-up. He is overall doing well with no symptoms of CP, WELLS, LE edema, palpitations, dizziness or syncope. Recent echocardiogram showed progression of aortic stenosis to severe range with significant decrease in his EF from 60% to 10-20% in one year.    The patient was in atrial fibrillation during the echocardiogram, he is in sinus rhythm today with TWI in V4-V6.    PAST MEDICAL HISTORY:  Past Medical History:   Diagnosis Date     BPH (benign prostatic hyperplasia)      Chronic kidney disease      HTN      Pulmonary nodules      Tobacco abuse        CURRENT MEDICATIONS:  Current Outpatient  Medications   Medication Sig Dispense Refill     amLODIPine (NORVASC) 2.5 MG tablet TAKE 1 TABLET(2.5 MG) BY MOUTH DAILY 90 tablet 0     aspirin 81 MG tablet Take 1 tablet (81 mg) by mouth every morning - RESUME on 3/9/18 if urine remains clear 30 tablet 0     darbepoetin william (ARANESP, ALBUMIN FREE,) 100 MCG/0.5ML injection Inject 0.5 mLs (100 mcg) Subcutaneous every 14 days As needed for hgb<10g/dL.  If Hgb increases >1 point in 2 weeks (if blood transfusion given, use hgb PRIOR to this), SYSTOLIC BP > 180 mmHg or hgb>=10g/dL, HOLD DOSE. Dose must be within 1 week of Hgb.  Per anemia protocol with Cecilia Britt,CNP 0.5 mL 99     ferrous sulfate (FEROSUL) 325 (65 Fe) MG tablet Take 1 tablet (325 mg) by mouth 2 times daily 30 tablet 11     metoprolol succinate ER (TOPROL-XL) 25 MG 24 hr tablet Take 1 tablet (25 mg) by mouth daily 90 tablet 3     multivitamin, therapeutic with minerals (MULTI-VITAMIN) TABS tablet Take 1 tablet by mouth daily       Omega-3 Fatty Acids (FISH OIL PO)        order for DME Equipment being ordered: olecranon bursa brace 1 each 0     sevelamer (RENAGEL) 400 MG tablet Take 1 tablet (400 mg) by mouth 3 times daily (with meals) 252 tablet 3       PAST SURGICAL HISTORY:  Past Surgical History:   Procedure Laterality Date     APPENDECTOMY  AGE 6     CREATE FISTULA ARTERIOVENOUS UPPER EXTREMITY Left 11/17/2017    Procedure: CREATE FISTULA ARTERIOVENOUS UPPER EXTREMITY;  Left Cephalic Vein To Radial Artery Arteriovenous Fistula Creation, Anesthesia Block;  Surgeon: Eduardo Pabon MD;  Location: [facility] OR     CYSTOSCOPY, FULGURATE BLEEDERS, EVACUATE CLOT(S), COMBINED N/A 7/16/2017    Procedure: COMBINED CYSTOSCOPY, FULGURATE BLEEDERS, EVACUATE CLOT(S);  Cystoscopy clot evacuation, bladder biopsy and fulguration (per surgeons note) NERVE BLOCK PERIPHERAL;  Surgeon: Tamiko Vanegas MD;  Location: [facility] OR     CYSTOSCOPY, TRANSURETHRAL RESECTION (TUR) PROSTATE, COMBINED N/A 3/5/2018    Procedure:  COMBINED CYSTOSCOPY, TRANSURETHRAL RESECTION (TUR) PROSTATE;  Cystoscopy, Transurethral Resection of Prostate ;  Surgeon: Tamiko Vanegas MD;  Location: [facility] OR     SINUS SURGERY  AGE 6     TONSILLECTOMY      CHILDHOOD       ALLERGIES:   No Known Allergies    FAMILY HISTORY:  Family History   Problem Relation Age of Onset     C.A.D. Mother         d age 67     Vision Loss Father      Hearing Loss Sister      Diabetes Sister      Cancer No family hx of      Glaucoma No family hx of      Macular Degeneration No family hx of          SOCIAL HISTORY:  Social History     Tobacco Use     Smoking status: Former Smoker     Packs/day: 1.00     Years: 53.00     Pack years: 53.00     Types: Cigarettes     Last attempt to quit: 2017     Years since quittin.6     Smokeless tobacco: Never Used   Substance Use Topics     Alcohol use: No     Drug use: No       ROS:   Constitutional: No fever, chills, or sweats. Weight stable.   ENT: No visual disturbance, ear ache, epistaxis, sore throat.   Cardiovascular: As per HPI.   Respiratory: No cough, hemoptysis.    GI: No nausea, vomiting, hematemesis, melena, or hematochezia.   : No hematuria.   Integument: Negative.   Psychiatric: Negative.   Hematologic:  No easy bruising, no easy bleeding.  Neuro: Negative.   Endocrinology: No significant heat or cold intolerance   Musculoskeletal: No myalgia.    Exam:  /80 (BP Location: Right arm, Patient Position: Sitting, Cuff Size: Adult Large)   Pulse 103   Wt 78.5 kg (173 lb)   SpO2 94%   BMI 24.82 kg/m    GENERAL APPEARANCE: healthy, alert and no distress  HEENT: no icterus, no central cyanosis  LYMPH/NECK: no adenopathy, no asymmetry, JVP not elevated, no carotid bruits.  RESPIRATORY: lungs clear to auscultation - no rales, rhonchi or wheezes, no use of accessory muscles, no retractions, respirations are unlabored, normal respiratory rate  CARDIOVASCULAR: regular rhythm, normal S1, S2, no S3 or S4, 3/6 LYLE on the  LSB and aortic area.  GI: soft, non tender  EXTREMITIES: trace ankle edema.  NEURO: alert , normal speech,and affect   SKIN: no ecchymoses, no rashes     I have reviewed the labs and personally reviewed the imaging below and made my comment in the assessment and plan.    Labs:  CBC RESULTS:   Lab Results   Component Value Date    WBC 9.4 07/23/2018    RBC 3.77 (L) 07/23/2018    HGB 9.5 (L) 02/04/2019    HCT 30.7 (L) 02/04/2019    MCV 89 07/23/2018    MCH 27.9 07/23/2018    MCHC 31.2 (L) 07/23/2018    RDW 15.2 (H) 07/23/2018     07/23/2018       BMP RESULTS:  Lab Results   Component Value Date     01/21/2019    POTASSIUM 5.0 01/21/2019    CHLORIDE 110 (H) 01/21/2019    CO2 19 (L) 01/21/2019    ANIONGAP 11 01/21/2019     (H) 01/21/2019     (H) 01/21/2019    CR 5.33 (H) 01/21/2019    GFRESTIMATED 10 (L) 01/21/2019    GFRESTBLACK 11 (L) 01/21/2019    DERICK 8.6 01/21/2019        INR RESULTS:  Lab Results   Component Value Date    INR 1.11 11/17/2017    INR 1.19 (H) 08/28/2017     TT Echocardiogram 1/10/2019  Compared to study done 1/10/18 the LVEF has dropped and the aortic stenosis is  worse.     Severely (EF 10-20%) reduced left ventricular function is present (LVEF is  traced at 19%).     Concern for severe aortic stenosis and gradient underestimates the severity  due to low flow (The peak aortic velocity is 3.9 m/sec, The mean gradient  across the aortic valve is 36 mmHg, aortic valve area is 0.76 cm^2, by the  continuity equation).     Proximal ascending aorta is dilated measuring 4.3 cm, aortic root measures 4  cm     IVC is dilated and estimated mean right atrial pressure is 8 mmHg.      TT Echocardiogram 1/4/2018  The Ejection Fraction is estimated at 55-60%. Lateral wall hypokinesis is  present.  The right ventricle is normal size. Global right ventricular function is normal.  Pulmonary artery systolic pressure is normal.  Mild aortic stenosis (Vmax 2.7 m/s, mean pressure gradient 14  mmHg, JESSIKA 2.1 cm2).  Dilated proximal ascending aorta measuring 4.0 cm (indexed at 2.1 cm/m2, Z-score +2.5)  No pericardial effusion is present.  Previous study not available for comparison.    EKG dated 11/8/2017 is normal    Assessment and Plan:   Mr. Dung Norton is a 72 year old  male with PMH significant for ESRD not on HD, HTN, hx of heavy tobacco abuse and severe aortic stenosis with new HFrEF    1.  Severe aortic stenosis with newly diagnosed systolic heart failure: His EF worsened within one year from 60% to 10-20% now with rapid progression of mild aortic stenosis to severe aortic stenosis. The patient does not have any cardiac complaints.  He is NYHA functional class I.  He is euvolemic on physical exam.  Given reduced EF with severe AS I recommend a coronary angiogram with right heart cath. He will be evaluated for TAVR by the interventional team and [facility]. I discussed his case with  from Interventional cardiology.         He needs afterload reduction for heart failure however given his severe aortic stenosis this might worsen his gradient therefore I did not change his medications for now given he appears very stable. After valve replacement I will either switch to isordil/hyralazine or ACE inh for heart failure tx if his EF remains the same. Likely this is afterload mismatch and I anticipate he will recover EF after valve replacement.     2. Hypertension: Well controlled. Currently on amlodipine 2.5 mg qday and ASA 81 mg. I have recently started him on metoprolol 25 mg since he was in afib with RVR during the last echo study. He is in SR today.     3. Paroxysmal atrial fibrillation: The patient was in afib with RVR during the last echocardiogram. He is in SR today. No hx of stroke. Given his hx of ESRD, it is class IIb indication to start OAC. I will defer this since he will be undergoing angiogram soon which will need interruption of OAC. I will readress OAC during follow-up. Recommended  one week zio patch to see afib burden.    4. ESRD: He is currently not on hemodialysis.The patient follows with nephrology and already has a AVF placed in case he needs HD.     5. Anemia: Likely due to chronic kidney disease. No bleeding.    Plan:  -Coronary angiogram, RHC plan for TAVR  -Zio patch for one week  -No medication changes today; continue ASA 81, amlodipin 2.5 mg and metoprolol 25 mg daily.    RTC 3 months.    It was my absolute pleasure to meet  in the office today. Please donot hesitate to contact me if you have any questions or concerns.    Lizandro DEVINE MD  [facility] Division of Cardiology  Pager [phone]    Haley Vazquez MD

## 2019-02-04 NOTE — TELEPHONE ENCOUNTER
DATE:  2/4/2019   TIME OF RECEIPT FROM LAB:  3:19pm  LAB TEST:  Creatinine  LAB VALUE:  5.41  RESULTS GIVEN WITH READ-BACK TO (PROVIDER):  GISELLE BENOIT  TIME LAB VALUE REPORTED TO PROVIDER:   3:19pm    Lab is consistent with previous results.    Giselle Benoit, RN

## 2019-02-05 ENCOUNTER — OFFICE VISIT (OUTPATIENT)
Dept: CARDIOLOGY | Facility: CLINIC | Age: 74
End: 2019-02-05
Payer: COMMERCIAL

## 2019-02-05 ENCOUNTER — TRANSFERRED RECORDS (OUTPATIENT)
Dept: HEALTH INFORMATION MANAGEMENT | Facility: CLINIC | Age: 74
End: 2019-02-05

## 2019-02-05 VITALS
DIASTOLIC BLOOD PRESSURE: 80 MMHG | SYSTOLIC BLOOD PRESSURE: 124 MMHG | HEART RATE: 103 BPM | OXYGEN SATURATION: 94 % | BODY MASS INDEX: 24.82 KG/M2 | WEIGHT: 173 LBS

## 2019-02-05 DIAGNOSIS — I48.0 PAROXYSMAL ATRIAL FIBRILLATION (H): ICD-10-CM

## 2019-02-05 DIAGNOSIS — I50.22 CHRONIC SYSTOLIC HEART FAILURE (H): ICD-10-CM

## 2019-02-05 DIAGNOSIS — D63.1 ANEMIA DUE TO STAGE 5 CHRONIC KIDNEY DISEASE, NOT ON CHRONIC DIALYSIS (H): ICD-10-CM

## 2019-02-05 DIAGNOSIS — I35.0 SEVERE AORTIC STENOSIS: Primary | ICD-10-CM

## 2019-02-05 DIAGNOSIS — N18.5 ANEMIA DUE TO STAGE 5 CHRONIC KIDNEY DISEASE, NOT ON CHRONIC DIALYSIS (H): ICD-10-CM

## 2019-02-05 DIAGNOSIS — I51.89 OTHER ILL-DEFINED HEART DISEASES: ICD-10-CM

## 2019-02-05 DIAGNOSIS — N18.5 CKD (CHRONIC KIDNEY DISEASE) STAGE 5, GFR LESS THAN 15 ML/MIN (H): ICD-10-CM

## 2019-02-05 DIAGNOSIS — I10 ESSENTIAL HYPERTENSION: ICD-10-CM

## 2019-02-05 PROCEDURE — 99215 OFFICE O/P EST HI 40 MIN: CPT | Performed by: INTERNAL MEDICINE

## 2019-02-05 NOTE — PATIENT INSTRUCTIONS
Thank you for coming to the [facility]; please note the following instructions:    1. Dr. Lizandro Ng has ordered an echocardiogram BEFORE Angiogram.  We have scheduled your echocardiogram appointment at the  [facility] Department ([address] [place] [address]); please see following pages for appointment detail information.    Please arrive 15 minutes early to allow time for registration.  The Cardiology Nurse will contact you regarding the results (please see result notification details at bottom of summary).    Echocardiogram Instructions:  -Wear comfortable clothing  -Refrain from wearing perfumes or scented lotions    2. Right heart . Cath.procedure appointment  With DR. Collins,Interventional Cardiologist.see next page for appointment detail.    3.Dr. Lizandro Ng has requested you to follow up in 3 months; please see following pages for appointment detail information.              If you have any questions regarding your visit please contact your care team:     Cardiology  Telephone Number   Stephanie TESFAYE, BRANDY DOWNEY,RN  Pati LANDON, JENY DOWNEY, MA  Titus DUQUE, N   [phone]    *After hours: [phone]   For scheduling appts:     [phone] or    [phone] (select option 1)    *After hours: [phone]     For the Device Clinic (Pacemakers and ICD's)  RN's :  Beti Simons   During business hours: [phone]    *After business hours:  [phone] (select option 4)      Normal test result notifications will be released via [employer] or mailed within 7 business days.  All other test results, will be communicated via telephone once reviewed by your cardiologist.    If you need a medication refill please contact your pharmacy.  Please allow 3 business days for your refill to be completed.    As always, thank you for trusting us with your health care needs!

## 2019-02-05 NOTE — NURSING NOTE
"Chief Complaint   Patient presents with     Follow Up      Nonrheumatic aortic (valve) stenosis, 1 yr. Rtc. Echo 01/10/19-Per patient no bothersome symptoms.       Initial /80 (BP Location: Right arm, Patient Position: Sitting, Cuff Size: Adult Large)   Pulse 103   Wt 78.5 kg (173 lb)   SpO2 94%   BMI 24.82 kg/m   Estimated body mass index is 24.82 kg/m  as calculated from the following:    Height as of 10/26/18: 1.778 m (5' 10\").    Weight as of this encounter: 78.5 kg (173 lb)..  BP completed using cuff size: large    Titus LANDONP.N.  Ekg.test performed today per Provider order after patient Ekg. Education relayed to patient,then Ekg. Result handed to provider for review.  Titus Tolliver L.P.N.          "

## 2019-02-05 NOTE — LETTER
2019      RE: Dung Norton  [address] [facility] [address] [place], [place] [id number]       Dear Colleague,    Thank you for the opportunity to participate in the care of your patient, Dung Norton, at the [facility] HEART AT [facility] at [facility]. Please see a copy of my visit note below.    Referring provider: Haley Baker MD    Chief complaint: Discuss TTE results.    HPI: Mr. Dung Norton is a 72 year old  male with PMH significant for CKD, HTN and hx of heavy tobacco abuse.    Mr. Norton has recently been diagnosed with ESRD, has had his AVF and almost ready ready for HD though he doesnot need that right now. He has HT and is on amlodipine 2.5 mg. His BP in the office is high but he reports lower BP outside the hospital.     He has been diagnosed with heart murmur and subsequently underwent a TTE planned by  which showed mild aortic stenosis with mild dilatation of ascending aorta at 4 cm and hypokinesis of LV lateral wall. He is currently asymptomatic. He denies a history of chest discomfort, dyspnea, PND, orthopnea, pedal edema, palpitations, lightheadedness, and syncope. He has HTN and hx of 50 pack year tobacco abuse. He quit smoking last summer. He has family hx of CAD as well. His mother had MI and  at the age of 66.     I have reviewed his echocardiogram from 2018 which shows mild AS, normal LV function with LV lateral wall hypokinesis (contrast was not used to enhance endocardial borders). His ECG dated 2017 is normal.    Medications, personal, family, and social history reviewed with patient and revised.    Interval history 2019:  The patient returns for on year follow-up. He is overall doing well with no symptoms of CP, WELLS, LE edema, palpitations, dizziness or syncope. Recent echocardiogram showed progression of aortic stenosis to severe range with significant decrease in his EF from 60% to 10-20% in  one year.    The patient was in atrial fibrillation during the echocardiogram, he is in sinus rhythm today with TWI in V4-V6.    PAST MEDICAL HISTORY:  Past Medical History:   Diagnosis Date     BPH (benign prostatic hyperplasia)      Chronic kidney disease      HTN      Pulmonary nodules      Tobacco abuse        CURRENT MEDICATIONS:  Current Outpatient Medications   Medication Sig Dispense Refill     amLODIPine (NORVASC) 2.5 MG tablet TAKE 1 TABLET(2.5 MG) BY MOUTH DAILY 90 tablet 0     aspirin 81 MG tablet Take 1 tablet (81 mg) by mouth every morning - RESUME on 3/9/18 if urine remains clear 30 tablet 0     darbepoetin william (ARANESP, ALBUMIN FREE,) 100 MCG/0.5ML injection Inject 0.5 mLs (100 mcg) Subcutaneous every 14 days As needed for hgb<10g/dL.  If Hgb increases >1 point in 2 weeks (if blood transfusion given, use hgb PRIOR to this), SYSTOLIC BP > 180 mmHg or hgb>=10g/dL, HOLD DOSE. Dose must be within 1 week of Hgb.  Per anemia protocol with Cecilia Britt,CNP 0.5 mL 99     ferrous sulfate (FEROSUL) 325 (65 Fe) MG tablet Take 1 tablet (325 mg) by mouth 2 times daily 30 tablet 11     metoprolol succinate ER (TOPROL-XL) 25 MG 24 hr tablet Take 1 tablet (25 mg) by mouth daily 90 tablet 3     multivitamin, therapeutic with minerals (MULTI-VITAMIN) TABS tablet Take 1 tablet by mouth daily       Omega-3 Fatty Acids (FISH OIL PO)        order for DME Equipment being ordered: olecranohillary bursa brace 1 each 0     sevelamer (RENAGEL) 400 MG tablet Take 1 tablet (400 mg) by mouth 3 times daily (with meals) 252 tablet 3       PAST SURGICAL HISTORY:  Past Surgical History:   Procedure Laterality Date     APPENDECTOMY  AGE 6     CREATE FISTULA ARTERIOVENOUS UPPER EXTREMITY Left 11/17/2017    Procedure: CREATE FISTULA ARTERIOVENOUS UPPER EXTREMITY;  Left Cephalic Vein To Radial Artery Arteriovenous Fistula Creation, Anesthesia Block;  Surgeon: Eduardo Pabon MD;  Location: [facility] OR     CYSTOSCOPY, FULGURATE BLEEDERS, EVACUATE  CLOT(S), COMBINED N/A 2017    Procedure: COMBINED CYSTOSCOPY, FULGURATE BLEEDERS, EVACUATE CLOT(S);  Cystoscopy clot evacuation, bladder biopsy and fulguration (per surgeons note) NERVE BLOCK PERIPHERAL;  Surgeon: Tamiko Vanegas MD;  Location: [facility] OR     CYSTOSCOPY, TRANSURETHRAL RESECTION (TUR) PROSTATE, COMBINED N/A 3/5/2018    Procedure: COMBINED CYSTOSCOPY, TRANSURETHRAL RESECTION (TUR) PROSTATE;  Cystoscopy, Transurethral Resection of Prostate ;  Surgeon: Tamiko Vanegas MD;  Location: [facility] OR     SINUS SURGERY  AGE 6     TONSILLECTOMY      CHILDHOOD       ALLERGIES:   No Known Allergies    FAMILY HISTORY:  Family History   Problem Relation Age of Onset     C.A.D. Mother         d age 67     Vision Loss Father      Hearing Loss Sister      Diabetes Sister      Cancer No family hx of      Glaucoma No family hx of      Macular Degeneration No family hx of          SOCIAL HISTORY:  Social History     Tobacco Use     Smoking status: Former Smoker     Packs/day: 1.00     Years: 53.00     Pack years: 53.00     Types: Cigarettes     Last attempt to quit: 2017     Years since quittin.6     Smokeless tobacco: Never Used   Substance Use Topics     Alcohol use: No     Drug use: No       ROS:   Constitutional: No fever, chills, or sweats. Weight stable.   ENT: No visual disturbance, ear ache, epistaxis, sore throat.   Cardiovascular: As per HPI.   Respiratory: No cough, hemoptysis.    GI: No nausea, vomiting, hematemesis, melena, or hematochezia.   : No hematuria.   Integument: Negative.   Psychiatric: Negative.   Hematologic:  No easy bruising, no easy bleeding.  Neuro: Negative.   Endocrinology: No significant heat or cold intolerance   Musculoskeletal: No myalgia.    Exam:  /80 (BP Location: Right arm, Patient Position: Sitting, Cuff Size: Adult Large)   Pulse 103   Wt 78.5 kg (173 lb)   SpO2 94%   BMI 24.82 kg/m     GENERAL APPEARANCE: healthy, alert and no distress  HEENT:  no icterus, no central cyanosis  LYMPH/NECK: no adenopathy, no asymmetry, JVP not elevated, no carotid bruits.  RESPIRATORY: lungs clear to auscultation - no rales, rhonchi or wheezes, no use of accessory muscles, no retractions, respirations are unlabored, normal respiratory rate  CARDIOVASCULAR: regular rhythm, normal S1, S2, no S3 or S4, 3/6 LYLE on the LSB and aortic area.  GI: soft, non tender  EXTREMITIES: trace ankle edema.  NEURO: alert , normal speech,and affect   SKIN: no ecchymoses, no rashes     I have reviewed the labs and personally reviewed the imaging below and made my comment in the assessment and plan.    Labs:  CBC RESULTS:   Lab Results   Component Value Date    WBC 9.4 07/23/2018    RBC 3.77 (L) 07/23/2018    HGB 9.5 (L) 02/04/2019    HCT 30.7 (L) 02/04/2019    MCV 89 07/23/2018    MCH 27.9 07/23/2018    MCHC 31.2 (L) 07/23/2018    RDW 15.2 (H) 07/23/2018     07/23/2018       BMP RESULTS:  Lab Results   Component Value Date     01/21/2019    POTASSIUM 5.0 01/21/2019    CHLORIDE 110 (H) 01/21/2019    CO2 19 (L) 01/21/2019    ANIONGAP 11 01/21/2019     (H) 01/21/2019     (H) 01/21/2019    CR 5.33 (H) 01/21/2019    GFRESTIMATED 10 (L) 01/21/2019    GFRESTBLACK 11 (L) 01/21/2019    DERICK 8.6 01/21/2019        INR RESULTS:  Lab Results   Component Value Date    INR 1.11 11/17/2017    INR 1.19 (H) 08/28/2017     TT Echocardiogram 1/10/2019  Compared to study done 1/10/18 the LVEF has dropped and the aortic stenosis is  worse.     Severely (EF 10-20%) reduced left ventricular function is present (LVEF is  traced at 19%).     Concern for severe aortic stenosis and gradient underestimates the severity  due to low flow (The peak aortic velocity is 3.9 m/sec, The mean gradient  across the aortic valve is 36 mmHg, aortic valve area is 0.76 cm^2, by the  continuity equation).     Proximal ascending aorta is dilated measuring 4.3 cm, aortic root measures 4  cm     IVC is dilated and  estimated mean right atrial pressure is 8 mmHg.      TT Echocardiogram 1/4/2018  The Ejection Fraction is estimated at 55-60%. Lateral wall hypokinesis is  present.  The right ventricle is normal size. Global right ventricular function is normal.  Pulmonary artery systolic pressure is normal.  Mild aortic stenosis (Vmax 2.7 m/s, mean pressure gradient 14 mmHg, JESSIKA 2.1 cm2).  Dilated proximal ascending aorta measuring 4.0 cm (indexed at 2.1 cm/m2, Z-score +2.5)  No pericardial effusion is present.  Previous study not available for comparison.    EKG dated 11/8/2017 is normal    Assessment and Plan:   Mr. Dung Norton is a 72 year old  male with PMH significant for ESRD not on HD, HTN, hx of heavy tobacco abuse and severe aortic stenosis with new HFrEF    1.  Severe aortic stenosis with newly diagnosed systolic heart failure: His EF worsened within one year from 60% to 10-20% now with rapid progression of mild aortic stenosis to severe aortic stenosis. The patient does not have any cardiac complaints.  He is NYHA functional class I.  He is euvolemic on physical exam.  Given reduced EF with severe AS I recommend a coronary angiogram with right heart cath. He will be evaluated for TAVR by the interventional team and [facility]. I discussed his case with  from Interventional cardiology.         He needs afterload reduction for heart failure however given his severe aortic stenosis this might worsen his gradient therefore I did not change his medications for now given he appears very stable. After valve replacement I will either switch to isordil/hyralazine or ACE inh for heart failure tx if his EF remains the same. Likely this is afterload mismatch and I anticipate he will recover EF after valve replacement.     2. Hypertension: Well controlled. Currently on amlodipine 2.5 mg qday and ASA 81 mg. I have recently started him on metoprolol 25 mg since he was in afib with RVR during the last echo study. He is in SR  today.     3. Paroxysmal atrial fibrillation: The patient was in afib with RVR during the last echocardiogram. He is in SR today. No hx of stroke. Given his hx of ESRD, it is class IIb indication to start OAC. I will defer this since he will be undergoing angiogram soon which will need interruption of OAC. I will readress OAC during follow-up. Recommended one week zio patch to see afib burden.    4. ESRD: He is currently not on hemodialysis.The patient follows with nephrology and already has a AVF placed in case he needs HD.     5. Anemia: Likely due to chronic kidney disease. No bleeding.    Plan:  -Coronary angiogram, RHC plan for TAVR  -Zio patch for one week  -No medication changes today; continue ASA 81, amlodipin 2.5 mg and metoprolol 25 mg daily.    RTC 3 months.    It was my absolute pleasure to meet  in the office today. Please donot hesitate to contact me if you have any questions or concerns.    Lizandro DEVINE MD  [facility] Division of Cardiology  Pager [phone]    Haley Vazquez MD

## 2019-02-05 NOTE — NURSING NOTE
Cardiac Testing: Per Dr Ng  Patient given instructions regarding  echocardiogram  prior to coronary angiogram. Discussed purpose, preparation, procedure and when to expect results reported back to the patient.    Med Reconcile: Reviewed and verified all current medications with the patient. The updated medication list was printed and given to the patient.    Patient is schedule for Coronary Angiogram on 02.22 with Dr Collins [facility]      Return Appointment: Patient given instructions regarding scheduling next clinic visit. Patient demonstrated understanding of this information and agreed to call with further questions or concerns.      Patient/daughter expressed understanding and asked to call patient with any questions or concerns.      Kathleen Greer RN

## 2019-02-06 RX ORDER — LIDOCAINE 40 MG/G
CREAM TOPICAL
Status: CANCELLED | OUTPATIENT
Start: 2019-02-06

## 2019-02-06 RX ORDER — SODIUM CHLORIDE 9 MG/ML
INJECTION, SOLUTION INTRAVENOUS CONTINUOUS
Status: CANCELLED | OUTPATIENT
Start: 2019-02-06

## 2019-02-06 RX ORDER — ASPIRIN 81 MG/1
81 TABLET ORAL DAILY
Status: CANCELLED | OUTPATIENT
Start: 2019-02-06

## 2019-02-07 ENCOUNTER — TELEPHONE (OUTPATIENT)
Dept: CARDIOLOGY | Facility: CLINIC | Age: 74
End: 2019-02-07

## 2019-02-07 NOTE — LETTER
February 7, 2019      TO: Dung Norton  [address] [place] [id number]         Dear Dung,    You are scheduled for a Coronary Angiogram on Friday February 22  at the [facility], [address] [place] [address], [place] [address]. You are to check in at the [facility] Waiting Area at 8:45 AM check-in time.    When you arrive you will be escorted back to the pre procedure area called 2A. Here they will insert an IV, draw labs, and obtain a short medical history. Please bring an updated list of your current medications.    A physician will come and talk with you about the procedure and obtain consent.    A nurse from the Cardiac Catheterization Lab will then escort you to the procedure area. You will be receiving sedation during the procedure so you will need someone to drive you to and from the hospital.    After the procedure you will recover for a short period (2 - 6 hours) on 6B. You will be discharged with instructions for post procedure care. However, depending on what the angiogram shows you may have to have stents placed and this might require an overnight stay. We ask that you bring a small bag of necessities for your comfort if you would need to stay overnight. DO NOT BRING ANY VALUABLES!      Pre procedure instructions:  1. Make arrangements to have a  as you will not be allowed to drive following the procedure. Someone should stay with you the night after your procedure.  2.  Do not eat any solid food or milk products for 8 hours prior to the exam. You may drink clear liquids until 2 hours prior to the exam. Clear liquids include the following: water, Jell-O, clear broth, apple juice or any non-carbonated drink that you can see through (no pop!).   3. Do not drink alcohol or smoke 24 hours prior to test.     4. You may take your other morning medications as prescribed with a sip of water. If you currently take an aspirin, continue taking 1 tablet 81  mg daily even the day of the procedure.    If you have further questions, please utilize [employer] to contact us.   If your question concerns the above instructions, contact:  Tamiko Cartagena RN   Electrophysiology Nurse Coordinator.  [phone]    If your question concerns the schedule/appointment times, contact:  [facility]   [phone]      It was a pleasure to see you at your last clinic visit.     Please do not hesitate to call me if you have any questions or concerns.    Sincerely,      Lizandro Ng MD

## 2019-02-07 NOTE — TELEPHONE ENCOUNTER
Spoke with patient via telephone call to discuss coronary angiogram appointment. Writer also explain to patient that he is going to have a biplane coronary angiography due to his kidney condition per Dr Ng. A letter is sent to patient home address in [place] with procedure preparations.    Patient verbalized understanding and asked to call clinic with any questions or concerns.      Kathleen Greer RN

## 2019-02-11 ENCOUNTER — ANCILLARY PROCEDURE (OUTPATIENT)
Dept: CARDIOLOGY | Facility: CLINIC | Age: 74
End: 2019-02-11
Attending: INTERNAL MEDICINE
Payer: COMMERCIAL

## 2019-02-11 DIAGNOSIS — I50.22 CHRONIC SYSTOLIC HEART FAILURE (H): ICD-10-CM

## 2019-02-11 DIAGNOSIS — I48.0 PAROXYSMAL ATRIAL FIBRILLATION (H): ICD-10-CM

## 2019-02-11 PROCEDURE — 0298T ZZC EXT ECG > 48HR TO 21 DAY REVIEW AND INTERPRETATN: CPT | Performed by: INTERNAL MEDICINE

## 2019-02-11 PROCEDURE — 0296T ZIO PATCH HOLTER ADULT PEDIATRIC GREATER THAN 48 HRS: CPT | Performed by: INTERNAL MEDICINE

## 2019-02-11 PROCEDURE — 93306 TTE W/DOPPLER COMPLETE: CPT | Mod: GC | Performed by: INTERNAL MEDICINE

## 2019-02-11 NOTE — PROGRESS NOTES
1 week ZioXT applied to patient today. Instructions on use and removal given and mail back instructions discussed. All questions answered. Consent form signed. Device registered. Form faxed to [employer].  Device number: [id number].    Nuria Guzmán MA

## 2019-02-12 DIAGNOSIS — N18.5 CKD (CHRONIC KIDNEY DISEASE) STAGE 5, GFR LESS THAN 15 ML/MIN (H): Primary | ICD-10-CM

## 2019-02-13 ENCOUNTER — OFFICE VISIT (OUTPATIENT)
Dept: NEPHROLOGY | Facility: CLINIC | Age: 74
End: 2019-02-13
Payer: COMMERCIAL

## 2019-02-13 VITALS
OXYGEN SATURATION: 98 % | TEMPERATURE: 97.4 F | HEART RATE: 80 BPM | BODY MASS INDEX: 24.82 KG/M2 | DIASTOLIC BLOOD PRESSURE: 67 MMHG | SYSTOLIC BLOOD PRESSURE: 117 MMHG | WEIGHT: 173 LBS

## 2019-02-13 DIAGNOSIS — N18.5 CKD (CHRONIC KIDNEY DISEASE) STAGE 5, GFR LESS THAN 15 ML/MIN (H): Primary | ICD-10-CM

## 2019-02-13 DIAGNOSIS — N25.81 SECONDARY RENAL HYPERPARATHYROIDISM (H): ICD-10-CM

## 2019-02-13 DIAGNOSIS — I10 BENIGN ESSENTIAL HYPERTENSION: ICD-10-CM

## 2019-02-13 DIAGNOSIS — E87.20 METABOLIC ACIDOSIS: ICD-10-CM

## 2019-02-13 DIAGNOSIS — D63.1 ANEMIA OF CHRONIC RENAL FAILURE, STAGE 5 (H): ICD-10-CM

## 2019-02-13 DIAGNOSIS — E55.9 VITAMIN D DEFICIENCY: ICD-10-CM

## 2019-02-13 DIAGNOSIS — N18.5 ANEMIA OF CHRONIC RENAL FAILURE, STAGE 5 (H): ICD-10-CM

## 2019-02-13 PROCEDURE — G0463 HOSPITAL OUTPT CLINIC VISIT: HCPCS | Mod: ZF

## 2019-02-13 RX ORDER — SODIUM BICARBONATE 650 MG/1
650 TABLET ORAL 2 TIMES DAILY
Qty: 180 TABLET | Refills: 1 | Status: ON HOLD | OUTPATIENT
Start: 2019-02-13 | End: 2019-04-26

## 2019-02-13 RX ORDER — SEVELAMER HYDROCHLORIDE 800 MG/1
800 TABLET, FILM COATED ORAL
Qty: 180 TABLET | Refills: 3 | Status: SHIPPED | OUTPATIENT
Start: 2019-02-13 | End: 2019-03-20

## 2019-02-13 ASSESSMENT — PAIN SCALES - GENERAL: PAINLEVEL: NO PAIN (0)

## 2019-02-13 NOTE — PATIENT INSTRUCTIONS
1. Increase Renagel to 800 mg with meals  2. Resume bicarb 650 mg twice per day  3. Have labs checked Monday 2/25/19 following heart cath/angio to check for increase in creat

## 2019-02-13 NOTE — NURSING NOTE
"Chief Complaint   Patient presents with     RECHECK     4 week follow up CKD     Vital signs:  Temp: 97.4  F (36.3  C)   BP: 117/67 Pulse: 80     SpO2: 98 %       Weight: 78.5 kg (173 lb)  Estimated body mass index is 24.82 kg/m  as calculated from the following:    Height as of 10/26/18: 1.778 m (5' 10\").    Weight as of this encounter: 78.5 kg (173 lb).        Sandra Lainez    "

## 2019-02-13 NOTE — LETTER
2/13/2019    RE: Dung Norton  [id number] [address]     Nephrology Clinic Visit 2/13/19      Assessment and Plan:       1. CKD5 - Very stable renal function. Creat 5.4, eGFR 9 ml/mn. 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 His GFR has been 10-11 ml/mn since 9/17 He is not uremic. Urine protein 1.5 g/gCr     - Patient and daughter decided upon HD as his RRT option.     - He has attended the Kidney Smart program    - He underwent AVF creation with Dr Eduardo Pabon on 11/22/17. It has matured nicely and ready to use when needed. Last seen by Dr Pabon on 1/2/18   - Given stability of renal function have been holding off on HD, but given severe decline in EF and severe aortic stenosis he may require TAVR. He will be undergoing Cor Angio/RHC on 2/22/19. Would prefer not to initiate as OP given very poor cardiac function. Would anticipate initiation during hospital admission following TAVR, but  surgery has not yet been scheduled. Will continue to assess closely.    - Patient lives in [facility], when OP HD required would begin search in that vicinity   - He does not use NSAIDs   - He is straight cathing BID and voiding spontaneously w/o difficulty      2. Electrolytes - No acute concerns. K 4.9, Na 141      3. Volume status - Euvolemic. No edema, dyspnea. Albumin 2.9. Not on diuretics. Making ~ 1.5 liter/urine/day. He has gained body weight and weight has stabilized in the 78 kg range. Clinic blood pressures in the 115-120/ range.        - No need for diuretics at this time      4. HTN - Well controlled. Currently on Amlodipine 2.5 mg and Toprol XL 25 mg every day.    - If higher dose of BB required would discontinue Amlodipine       5. BPH - S/P TURP 3/18. He is straight cathing BID w/o difficulty. He is also voiding approx 1 liter/day as well. Follows with Dr Vanegas in Urology.       6. Acid base - Bicarb down to 19   - Resume bicarb 650 mg bid   - Goal bicarb  is > 20      7. BMD - Corrected Ca 9. 1, Phos 6.6, albumin 2.9   - Vit D 41, PTH 17 (10/18)    - Increase Renagel to 800 mg TID w/meals      8. Nutrition - Reports good appetite, but albumin declining, down to 2.9 from 3.2 range.       9. Anemia - Hgb 9.5. Etiology is likely anemia of renal disease   - He should have routine colon cancer screening per protocol   - Iron studies 1/19: Fe 33, Ferritin 499, Tsat 15%   - Was enrolled in anemia management program for EARLENE, but has not required, so has been graduated.  If Hgb < 10 next visit will re enroll   - Continue iron bid     10. Hypothyroidism - TSH  5.3. Was 6.4 October.   -  Sent message to PCP     11. Severe HF w/severe aortic stenosis - Per recent Echo.   - Patient ready to begin HD at any time, so any Cardiology studies needed for evaluation of Aortic stenosis can proceed.    -  If Cardiology would like to increase BB could discontinue Amlodipine.        12. Disposition - RTC one month for f/u      Reason for Visit:  CKD5 follow up      HPI:  Dung Norton is a 73 year old male who developed CKD5 d/t long standing obstructive uropathy 2/2 BPH and benign bladder mass, HTN, now with severe aortic stenosis and cardiomyopathy, in today for routine CKD f/u. Last seen in clinic by me on 1/16/19. Baseline in the 5 range and eGFR of 10-11 ml/mn over the past year.      Interval Hx:  No hospital admissions  Reviewed Cardiology note from 2/5/19. Plan is for Cor Angio/RHC on 2/22 with possible plan for TAVR.       ROS:  Continues to deny any symptoms related to his heart failure or Aortic stenosis. Level of activity is unchanged. Denies dyspnea, CP or edema. Continues to straight cath 2 times/day and passing urine from his urethra as well. Denies abdominal pain. Energy is good. Quite active. He is living independently. Has good appetite.       Chronic Medical Problems:      HTN  Severe aortic Stenosis  CM w/EF of 15%  P Afib  BPH  Obstructive uropathy   HTN  Tobacco  abuse  HLD  Anemia  Pulmonary nodules  KRISTINA  CKD5      Personal Hx:   Single, has 2 daughters very involved in his care. Moved to a [facility] in May 2018 and has adjusted well. He is socializing more, eating better, doing some community gardening. He is a former smoker    Family Hx:   Family History   Problem Relation Age of Onset     C.A.D. Mother         d age 67     Vision Loss Father      Hearing Loss Sister      Diabetes Sister      Cancer No family hx of      Glaucoma No family hx of      Macular Degeneration No family hx of      Allergies:  No Known Allergies    Medications:  Current Outpatient Medications   Medication Sig     amLODIPine (NORVASC) 2.5 MG tablet TAKE 1 TABLET(2.5 MG) BY MOUTH DAILY     aspirin 81 MG tablet Take 1 tablet (81 mg) by mouth every morning - RESUME on 3/9/18 if urine remains clear     ferrous sulfate (FEROSUL) 325 (65 Fe) MG tablet Take 1 tablet (325 mg) by mouth 2 times daily     metoprolol succinate ER (TOPROL-XL) 25 MG 24 hr tablet Take 1 tablet (25 mg) by mouth daily     multivitamin w/minerals (MULTI-VITAMIN) tablet Take 1 tablet by mouth daily     Omega-3 Fatty Acids (FISH OIL PO)      order for DME Equipment being ordered: olecranon bursa brace     sevelamer (RENAGEL) 800 MG tablet Take 1 tablet (800 mg) by mouth 3 times daily (with meals)     sodium bicarbonate 650 MG tablet Take 1 tablet (650 mg) by mouth 2 times daily     darbepoetin william (ARANESP, ALBUMIN FREE,) 100 MCG/0.5ML injection Inject 0.5 mLs (100 mcg) Subcutaneous every 14 days As needed for hgb<10g/dL.  If Hgb increases >1 point in 2 weeks (if blood transfusion given, use hgb PRIOR to this), SYSTOLIC BP > 180 mmHg or hgb>=10g/dL, HOLD DOSE. Dose must be within 1 week of Hgb.  Per anemia protocol with Cecilia De La Torre CNP (Patient not taking: Reported on 2/13/2019)     No current facility-administered medications for this visit.       Vitals:  /67   Pulse 80   Temp 97.4  F (36.3  C)   Wt 78.5 kg (173  lb)   SpO2 98%   BMI 24.82 kg/m       Exam:  GEN: Pleasant, Well groomed. Daughter present  CARDIAC RRR + murmur into right chest  LUNGS: CTA  ABDOMEN: Soft, NT  EXT: no edema  Access: CLIFFORD AVF + bruit, well developed, hand warm  Neuro: No asterixis. Alert/oriented    LABS:   CMP  Recent Labs   Lab Test 02/04/19  0928 01/21/19  0853 01/16/19  1255 01/07/19  0903    140 138 140   POTASSIUM 4.9 5.0 5.0 5.4*   CHLORIDE 112* 110* 109 112*   CO2 19* 19* 18* 16*   ANIONGAP 10 11 11 12   * 105* 91 95   * 130* 114* 96*   CR 5.41* 5.33* 5.44* 5.47*   GFRESTIMATED 10* 10* 9* 9*   GFRESTBLACK 11* 11* 11* 11*   DERICK 8.3* 8.6 8.8 9.1     Recent Labs   Lab Test 12/11/17  1018 08/28/17  1909 07/19/17  0537 07/12/17  1740   BILITOTAL 0.2 0.3 0.2 0.6   ALKPHOS 63 95 51 67   ALT 17 17 17 19   AST 13 9 32 12     CBC  Recent Labs   Lab Test 02/04/19  0928 01/21/19  0853 01/16/19  1255 01/07/19  0903  07/23/18  1002  04/09/18  1125  03/06/18  1012 03/05/18  1457   HGB 9.5* 9.7* 10.7* 11.2*   < > 10.5*   < > 10.7*   < > 9.9* 10.7*   WBC  --   --   --   --   --  9.4  --  8.8  --  9.1 7.2   RBC  --   --   --   --   --  3.77*  --  3.87*  --  3.65* 3.94*   HCT 30.7* 30.9* 35.7* 36.1*   < > 33.7*   < > 33.9*   < > 32.1* 34.2*   MCV  --   --   --   --   --  89  --  88  --  88 87   MCH  --   --   --   --   --  27.9  --  27.6  --  27.1 27.2   MCHC  --   --   --   --   --  31.2*  --  31.6  --  30.8* 31.3*   RDW  --   --   --   --   --  15.2*  --  15.9*  --  15.7* 15.8*   PLT  --   --   --   --   --  256  --  220  --  196 207    < > = values in this interval not displayed.     URINE STUDIES  Recent Labs   Lab Test 04/18/18  1325 04/09/18  1400 01/25/18  1102 11/22/17 08/29/17  0235  01/15/14  0936   COLOR Yellow Yellow Red Yellow Light Yellow   < > Yellow   APPEARANCE Cloudy Cloudy Slightly Cloudy Clear Cloudy   < > Clear   URINEGLC Negative Negative Negative Neg Negative   < > Negative   URINEBILI Negative Negative Negative  Neg Negative   < > Negative   URINEKETONE Negative Negative Negative Neg Negative   < > Negative   SG 1.015 1.015 1.013 1.010 1.014   < > 1.020   UBLD Large* Large* Large* Trace Moderate*   < > Small*   URINEPH 5.5 5.5 6.5 5.0 6.0   < > 6.0   PROTEIN 30* 30* 300* 30  100*   < > Negative   UROBILINOGEN 0.2 0.2  --  1   --   --  0.2   NITRITE Negative Negative Negative Neg Negative   < > Negative   LEUKEST Large* Large* Moderate* Small Large*   < > Negative   RBCU * 10-25* 14*  --  10*   < > 10-25*   WBCU >100* >100* >100*  --  >182*   < > O - 2    < > = values in this interval not displayed.     Recent Labs   Lab Test 07/25/18  1128 07/02/18  0845 04/09/18  1400 02/26/18  1230   UTPG 1.26* 1.51* 1.17* 0.74*     PTH  Recent Labs   Lab Test 10/03/18  1301 07/23/18  1002 03/02/18  1107   PTHI 17* 14* 86*     IRON STUDIES  Recent Labs   Lab Test 01/16/19  1255 09/24/18  0956 08/27/18  0934   IRON 33* 62 63   * 245 260   IRONSAT 15 25 24   * 458* 524*       Oralia De La Torre, INGE

## 2019-02-14 NOTE — PROGRESS NOTES
Nephrology Clinic Visit 2/13/19      Assessment and Plan:       1. CKD5 - Very stable renal function. Creat 5.4, eGFR 9 ml/mn. 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 His GFR has been 10-11 ml/mn since 9/17 He is not uremic. Urine protein 1.5 g/gCr     - Patient and daughter decided upon HD as his RRT option.     - He has attended the Kidney Smart program    - He underwent AVF creation with Dr Eduardo Pabon on 11/22/17. It has matured nicely and ready to use when needed. Last seen by Dr Pabon on 1/2/18   - Given stability of renal function have been holding off on HD, but given severe decline in EF and severe aortic stenosis he may require TAVR. He will be undergoing Cor Angio/RHC on 2/22/19. Would prefer not to initiate as OP given very poor cardiac function. Would anticipate initiation during hospital admission following TAVR, but  surgery has not yet been scheduled. Will continue to assess closely.    - Patient lives in [facility], when OP HD required would begin search in that vicinity   - He does not use NSAIDs   - He is straight cathing BID and voiding spontaneously w/o difficulty      2. Electrolytes - No acute concerns. K 4.9, Na 141      3. Volume status - Euvolemic. No edema, dyspnea. Albumin 2.9. Not on diuretics. Making ~ 1.5 liter/urine/day. He has gained body weight and weight has stabilized in the 78 kg range. Clinic blood pressures in the 115-120/ range.        - No need for diuretics at this time      4. HTN - Well controlled. Currently on Amlodipine 2.5 mg and Toprol XL 25 mg every day.    - If higher dose of BB required would discontinue Amlodipine       5. BPH - S/P TURP 3/18. He is straight cathing BID w/o difficulty. He is also voiding approx 1 liter/day as well. Follows with Dr Vanegas in Urology.       6. Acid base - Bicarb down to 19   - Resume bicarb 650 mg bid   - Goal bicarb is > 20      7. BMD - Corrected Ca 9.1, Phos 6.6, albumin 2.9   - Vit D 41, PTH 17 (10/18)     - Increase Renagel to 800 mg TID w/meals      8. Nutrition - Reports good appetite, but albumin declining, down to 2.9 from 3.2 range.       9. Anemia - Hgb 9.5. Etiology is likely anemia of renal disease   - He should have routine colon cancer screening per protocol   - Iron studies 1/19: Fe 33, Ferritin 499, Tsat 15%   - Was enrolled in anemia management program for EARLENE, but has not required, so has been graduated. If Hgb < 10 next visit will re enroll   - Continue iron bid     10. Hypothyroidism - TSH  5.3. Was 6.4 October.   - Sent message to PCP     11. Severe HF w/severe aortic stenosis - Per recent Echo.   - Patient ready to begin HD at any time, so any Cardiology studies needed for evaluation of Aortic stenosis can proceed.    - If Cardiology would like to increase BB could discontinue Amlodipine.        12. Disposition - RTC one month for f/u      Reason for Visit:  CKD5 follow up      HPI:  Dung Norton is a 73 year old male who developed CKD5 d/t long standing obstructive uropathy 2/2 BPH and benign bladder mass, HTN, now with severe aortic stenosis and cardiomyopathy, in today for routine CKD f/u. Last seen in clinic by me on 1/16/19. Baseline in the 5 range and eGFR of 10-11 ml/mn over the past year.      Interval Hx:  No hospital admissions  Reviewed Cardiology note from 2/5/19. Plan is for Cor Angio/RHC on 2/22 with possible plan for TAVR.       ROS:  Continues to deny any symptoms related to his heart failure or Aortic stenosis. Level of activity is unchanged. Denies dyspnea, CP or edema. Continues to straight cath 2 times/day and passing urine from his urethra as well. Denies abdominal pain. Energy is good. Quite active. He is living independently. Has good appetite.       Chronic Medical Problems:      HTN  Severe aortic Stenosis  CM w/EF of 15%  P Afib  BPH  Obstructive uropathy   HTN  Tobacco abuse  HLD  Anemia  Pulmonary nodules  KRISTINA  CKD5      Personal Hx:   Single, has 2 daughters very  involved in his care. Moved to a [facility] in May 2018 and has adjusted well. He is socializing more, eating better, doing some community gardening. He is a former smoker    Family Hx:   Family History   Problem Relation Age of Onset     C.A.D. Mother         d age 67     Vision Loss Father      Hearing Loss Sister      Diabetes Sister      Cancer No family hx of      Glaucoma No family hx of      Macular Degeneration No family hx of      Allergies:  No Known Allergies    Medications:  Current Outpatient Medications   Medication Sig     amLODIPine (NORVASC) 2.5 MG tablet TAKE 1 TABLET(2.5 MG) BY MOUTH DAILY     aspirin 81 MG tablet Take 1 tablet (81 mg) by mouth every morning - RESUME on 3/9/18 if urine remains clear     ferrous sulfate (FEROSUL) 325 (65 Fe) MG tablet Take 1 tablet (325 mg) by mouth 2 times daily     metoprolol succinate ER (TOPROL-XL) 25 MG 24 hr tablet Take 1 tablet (25 mg) by mouth daily     multivitamin w/minerals (MULTI-VITAMIN) tablet Take 1 tablet by mouth daily     Omega-3 Fatty Acids (FISH OIL PO)      order for DME Equipment being ordered: olecranon bursa brace     sevelamer (RENAGEL) 800 MG tablet Take 1 tablet (800 mg) by mouth 3 times daily (with meals)     sodium bicarbonate 650 MG tablet Take 1 tablet (650 mg) by mouth 2 times daily     darbepoetin william (ARANESP, ALBUMIN FREE,) 100 MCG/0.5ML injection Inject 0.5 mLs (100 mcg) Subcutaneous every 14 days As needed for hgb<10g/dL.  If Hgb increases >1 point in 2 weeks (if blood transfusion given, use hgb PRIOR to this), SYSTOLIC BP > 180 mmHg or hgb>=10g/dL, HOLD DOSE. Dose must be within 1 week of Hgb.  Per anemia protocol with Cecilia De La Torre CNP (Patient not taking: Reported on 2/13/2019)     No current facility-administered medications for this visit.       Vitals:  /67   Pulse 80   Temp 97.4  F (36.3  C)   Wt 78.5 kg (173 lb)   SpO2 98%   BMI 24.82 kg/m      Exam:  GEN: Pleasant, Well groomed. Daughter  present  CARDIAC RRR + murmur into right chest  LUNGS: CTA  ABDOMEN: Soft, NT  EXT: no edema  Access: CLIFFORD AVF + bruit, well developed, hand warm  Neuro: No asterixis. Alert/oriented    LABS:   CMP  Recent Labs   Lab Test 02/04/19  0928 01/21/19  0853 01/16/19  1255 01/07/19  0903    140 138 140   POTASSIUM 4.9 5.0 5.0 5.4*   CHLORIDE 112* 110* 109 112*   CO2 19* 19* 18* 16*   ANIONGAP 10 11 11 12   * 105* 91 95   * 130* 114* 96*   CR 5.41* 5.33* 5.44* 5.47*   GFRESTIMATED 10* 10* 9* 9*   GFRESTBLACK 11* 11* 11* 11*   DERICK 8.3* 8.6 8.8 9.1     Recent Labs   Lab Test 12/11/17  1018 08/28/17  1909 07/19/17  0537 07/12/17  1740   BILITOTAL 0.2 0.3 0.2 0.6   ALKPHOS 63 95 51 67   ALT 17 17 17 19   AST 13 9 32 12     CBC  Recent Labs   Lab Test 02/04/19  0928 01/21/19  0853 01/16/19  1255 01/07/19  0903  07/23/18  1002  04/09/18  1125  03/06/18  1012 03/05/18  1457   HGB 9.5* 9.7* 10.7* 11.2*   < > 10.5*   < > 10.7*   < > 9.9* 10.7*   WBC  --   --   --   --   --  9.4  --  8.8  --  9.1 7.2   RBC  --   --   --   --   --  3.77*  --  3.87*  --  3.65* 3.94*   HCT 30.7* 30.9* 35.7* 36.1*   < > 33.7*   < > 33.9*   < > 32.1* 34.2*   MCV  --   --   --   --   --  89  --  88  --  88 87   MCH  --   --   --   --   --  27.9  --  27.6  --  27.1 27.2   MCHC  --   --   --   --   --  31.2*  --  31.6  --  30.8* 31.3*   RDW  --   --   --   --   --  15.2*  --  15.9*  --  15.7* 15.8*   PLT  --   --   --   --   --  256  --  220  --  196 207    < > = values in this interval not displayed.     URINE STUDIES  Recent Labs   Lab Test 04/18/18  1325 04/09/18  1400 01/25/18  1102 11/22/17 08/29/17  0235  01/15/14  0936   COLOR Yellow Yellow Red Yellow Light Yellow   < > Yellow   APPEARANCE Cloudy Cloudy Slightly Cloudy Clear Cloudy   < > Clear   URINEGLC Negative Negative Negative Neg Negative   < > Negative   URINEBILI Negative Negative Negative Neg Negative   < > Negative   URINEKETONE Negative Negative Negative Neg Negative   < >  Negative   SG 1.015 1.015 1.013 1.010 1.014   < > 1.020   UBLD Large* Large* Large* Trace Moderate*   < > Small*   URINEPH 5.5 5.5 6.5 5.0 6.0   < > 6.0   PROTEIN 30* 30* 300* 30  100*   < > Negative   UROBILINOGEN 0.2 0.2  --  1   --   --  0.2   NITRITE Negative Negative Negative Neg Negative   < > Negative   LEUKEST Large* Large* Moderate* Small Large*   < > Negative   RBCU * 10-25* 14*  --  10*   < > 10-25*   WBCU >100* >100* >100*  --  >182*   < > O - 2    < > = values in this interval not displayed.     Recent Labs   Lab Test 07/25/18  1128 07/02/18  0845 04/09/18  1400 02/26/18  1230   UTPG 1.26* 1.51* 1.17* 0.74*     PTH  Recent Labs   Lab Test 10/03/18  1301 07/23/18  1002 03/02/18  1107   PTHI 17* 14* 86*     IRON STUDIES  Recent Labs   Lab Test 01/16/19  1255 09/24/18  0956 08/27/18  0934   IRON 33* 62 63   * 245 260   IRONSAT 15 25 24   * 458* 524*       Oralia De La Torre, NP

## 2019-02-15 ENCOUNTER — TELEPHONE (OUTPATIENT)
Dept: CARDIOLOGY | Facility: CLINIC | Age: 74
End: 2019-02-15

## 2019-02-15 NOTE — TELEPHONE ENCOUNTER
Message sent to Dr Ng to review patient ECHO  Result. Patient was informed. Awaiting MD response.    Kathleen Greer RN

## 2019-02-15 NOTE — TELEPHONE ENCOUNTER
Reason for Call:  Request for results:    Name of test or procedure:  Echo     Date of test of procedure:  2-11-19    Location of the test or procedure:   [place]     OK to leave the result message on voice mail or with a family member? YES    Phone number Patient can be reached at:  Home number on file [phone] (home)    Additional comments:  Na     Call taken on 2/15/2019 at 9:10 AM by Reena Izquierdo

## 2019-02-18 ENCOUNTER — TELEPHONE (OUTPATIENT)
Dept: NEPHROLOGY | Facility: CLINIC | Age: 74
End: 2019-02-18

## 2019-02-18 ENCOUNTER — ALLIED HEALTH/NURSE VISIT (OUTPATIENT)
Dept: FAMILY MEDICINE | Facility: CLINIC | Age: 74
End: 2019-02-18
Payer: COMMERCIAL

## 2019-02-18 VITALS — DIASTOLIC BLOOD PRESSURE: 72 MMHG | SYSTOLIC BLOOD PRESSURE: 121 MMHG

## 2019-02-18 DIAGNOSIS — N18.5 CKD (CHRONIC KIDNEY DISEASE) STAGE 5, GFR LESS THAN 15 ML/MIN (H): ICD-10-CM

## 2019-02-18 DIAGNOSIS — Z01.30 BP CHECK: Primary | ICD-10-CM

## 2019-02-18 LAB
ALBUMIN SERPL-MCNC: 3.2 G/DL (ref 3.4–5)
ANION GAP SERPL CALCULATED.3IONS-SCNC: 10 MMOL/L (ref 3–14)
BASOPHILS # BLD AUTO: 0 10E9/L (ref 0–0.2)
BASOPHILS NFR BLD AUTO: 0.5 %
BUN SERPL-MCNC: 108 MG/DL (ref 7–30)
CALCIUM SERPL-MCNC: 8.6 MG/DL (ref 8.5–10.1)
CHLORIDE SERPL-SCNC: 108 MMOL/L (ref 94–109)
CO2 SERPL-SCNC: 24 MMOL/L (ref 20–32)
CREAT SERPL-MCNC: 5.19 MG/DL (ref 0.66–1.25)
DEPRECATED CALCIDIOL+CALCIFEROL SERPL-MC: 41 UG/L (ref 20–75)
DIFFERENTIAL METHOD BLD: ABNORMAL
EOSINOPHIL # BLD AUTO: 0.3 10E9/L (ref 0–0.7)
EOSINOPHIL NFR BLD AUTO: 3.6 %
ERYTHROCYTE [DISTWIDTH] IN BLOOD BY AUTOMATED COUNT: 15.3 % (ref 10–15)
GFR SERPL CREATININE-BSD FRML MDRD: 10 ML/MIN/{1.73_M2}
GLUCOSE SERPL-MCNC: 96 MG/DL (ref 70–99)
HCT VFR BLD AUTO: 32.6 % (ref 40–53)
HGB BLD-MCNC: 10.1 G/DL (ref 13.3–17.7)
LYMPHOCYTES # BLD AUTO: 1.1 10E9/L (ref 0.8–5.3)
LYMPHOCYTES NFR BLD AUTO: 13.7 %
MCH RBC QN AUTO: 26.7 PG (ref 26.5–33)
MCHC RBC AUTO-ENTMCNC: 31 G/DL (ref 31.5–36.5)
MCV RBC AUTO: 86 FL (ref 78–100)
MONOCYTES # BLD AUTO: 0.5 10E9/L (ref 0–1.3)
MONOCYTES NFR BLD AUTO: 6.2 %
NEUTROPHILS # BLD AUTO: 6.2 10E9/L (ref 1.6–8.3)
NEUTROPHILS NFR BLD AUTO: 76 %
PHOSPHATE SERPL-MCNC: 6.4 MG/DL (ref 2.5–4.5)
PLATELET # BLD AUTO: 266 10E9/L (ref 150–450)
POTASSIUM SERPL-SCNC: 4.7 MMOL/L (ref 3.4–5.3)
PTH-INTACT SERPL-MCNC: 134 PG/ML (ref 18–80)
RBC # BLD AUTO: 3.78 10E12/L (ref 4.4–5.9)
SODIUM SERPL-SCNC: 142 MMOL/L (ref 133–144)
WBC # BLD AUTO: 8.1 10E9/L (ref 4–11)

## 2019-02-18 PROCEDURE — 99207 ZZC NO CHARGE NURSE ONLY: CPT | Performed by: FAMILY MEDICINE

## 2019-02-18 PROCEDURE — 80069 RENAL FUNCTION PANEL: CPT

## 2019-02-18 PROCEDURE — 36415 COLL VENOUS BLD VENIPUNCTURE: CPT

## 2019-02-18 PROCEDURE — 83970 ASSAY OF PARATHORMONE: CPT

## 2019-02-18 PROCEDURE — 85025 COMPLETE CBC W/AUTO DIFF WBC: CPT

## 2019-02-18 PROCEDURE — 82306 VITAMIN D 25 HYDROXY: CPT

## 2019-02-18 NOTE — TELEPHONE ENCOUNTER
DATE:  2/18/2019   TIME OF RECEIPT FROM LAB:  2:37pm  LAB TEST:  Creatinine  LAB VALUE:  5.19  RESULTS GIVEN WITH READ-BACK TO (PROVIDER):  GISELLE BENOIT  TIME LAB VALUE REPORTED TO PROVIDER:   2:37pm    Lab is an improvement from previous results, so I did not page the provider. Will continue to monitor.    Giselle Benoit, RN

## 2019-02-18 NOTE — PROGRESS NOTES
Dung Norton is enrolled/participating in the retail pharmacy Blood Pressure Goals Achievement Program (BPGAP).  Dung Norton was evaluated at [facility] on February 18, 2019 at which time his blood pressure was:    BP Readings from Last 3 Encounters:   02/18/19 121/72   02/13/19 117/67   02/05/19 124/80     Reviewed lifestyle modifications for blood pressure control and reduction: including making healthy food choices, managing weight, getting regular exercise, smoking cessation, reducing alcohol consumption, monitoring blood pressure regularly.     Dung Norton is not experiencing symptoms.    Follow-Up: BP is at goal of < 150/90 mmHg (patient 60+ years of age without diabetes).  Recommended follow-up at pharmacy in 6 months.     Recommendation to Provider: none    Dung Norton was evaluated for enrollment into the PGEN study today.    PLEASE INITIATE ENROLLMENT DISCUSSION WITH HTN PTS  1) Between 30-80 years old                                                                                                               2) BMI between 19-50                                                                                                        3) BP ?140/90 AND ?170/110 patients aged 30-59         BP ?150/90 AND ?170/110 non-diabetic patients aged 60-80       BP ?140/90 AND ?170/110 diabetic patients aged 60-80  4) Additional requirements for uncontrolled HTN patients:        Pt on only 1 class of medication  5) EXCLUDE patient if confirmation of:                  ? Cardiac disease                  ? Chronic Kidney Disease                  ? Pregnancy/Breastfeeding                  ? Secondary Hypertension/Pre-eclampsia                                 ? Vascular disease    Patient eligible for enrollment:  No  Patient interested in enrollment:  No    This note completed by: Binta Martinez, PharmD  [place] [facility] Pharmacist    Float pharmacist on behalf of: [facility].

## 2019-02-19 ENCOUNTER — TELEPHONE (OUTPATIENT)
Dept: CARDIOLOGY | Facility: CLINIC | Age: 74
End: 2019-02-19

## 2019-02-19 DIAGNOSIS — I50.22 CHRONIC SYSTOLIC HEART FAILURE (H): Primary | ICD-10-CM

## 2019-02-19 NOTE — TELEPHONE ENCOUNTER
Notes recorded by Lizandro Ng MD on 2/15/2019 at 8:49 PM CST  Dung,    Your heart function has mildly improved compared to prior but still it is lower than normal. The aortic valve is still tight.  There is one more valve that is leaky at least in the moderate range.  We will proceed with the coronary angiogram and will decide after that what to do next depending on the results.  Let me know if you have questions.

## 2019-02-19 NOTE — TELEPHONE ENCOUNTER
INR ordered.      BRANDY Barrow Ilknur, MD Heikkila, Kari S RN             I think we should. thanks    Previous Messages      ----- Message -----   From: Stephanie Menendez, RN   Sent: 2/18/2019   1:54 PM   To: Lizandro Ng MD, Kathleen Greer, RN, *     I am not sure if this was taken care of but he did come in for labs.  They were ordered by nephrology.  Pt will be having his angio labs day of.  I do not see an INR ordered.  Do you want me to order the INR?  Stephanie   ----- Message -----   From: Lizandro Ng MD   Sent: 2/15/2019   8:47 PM   To: Kathleen Greer, BRANDY,  Cardiology     HI,     I called and talked to Tereza his daughter. I gave information about the echocardiogram. The angiogram is scheduled on 2/22. I saw labs on 2/18. Is this at [place] ? Can you make sure and give him/his daughter a call please. She is not aware of the lab appointment ?     Thanks

## 2019-02-19 NOTE — TELEPHONE ENCOUNTER
Dr. Ng reviewed echo results with patient's daughter via phone. BRANDY Barrow, MD Lizandro  P  Cardiology; Kathleen Greer RN             HI,     I called and talked to Tereza his daughter. I gave information about the echocardiogram. The angiogram is scheduled on 2/22. I saw labs on 2/18. Is this at [place] ? Can you make sure and give him/his daughter a call please. She is not aware of the lab appointment ?     Thanks

## 2019-02-22 ENCOUNTER — HOSPITAL ENCOUNTER (OUTPATIENT)
Facility: CLINIC | Age: 74
Discharge: HOME | End: 2019-02-22
Attending: INTERNAL MEDICINE | Admitting: INTERNAL MEDICINE
Payer: COMMERCIAL

## 2019-02-22 ENCOUNTER — APPOINTMENT (OUTPATIENT)
Dept: LAB | Facility: CLINIC | Age: 74
End: 2019-02-22
Attending: INTERNAL MEDICINE
Payer: COMMERCIAL

## 2019-02-22 ENCOUNTER — APPOINTMENT (OUTPATIENT)
Dept: MEDSURG UNIT | Facility: CLINIC | Age: 74
End: 2019-02-22
Attending: INTERNAL MEDICINE
Payer: COMMERCIAL

## 2019-02-22 VITALS
HEIGHT: 70 IN | BODY MASS INDEX: 24.62 KG/M2 | HEART RATE: 83 BPM | OXYGEN SATURATION: 100 % | RESPIRATION RATE: 10 BRPM | WEIGHT: 172 LBS | SYSTOLIC BLOOD PRESSURE: 131 MMHG | TEMPERATURE: 97.6 F | DIASTOLIC BLOOD PRESSURE: 78 MMHG

## 2019-02-22 DIAGNOSIS — Z98.890 STATUS POST CORONARY ANGIOGRAM: ICD-10-CM

## 2019-02-22 DIAGNOSIS — I50.22 CHRONIC SYSTOLIC HEART FAILURE (H): ICD-10-CM

## 2019-02-22 DIAGNOSIS — I35.0 SEVERE AORTIC STENOSIS: Primary | ICD-10-CM

## 2019-02-22 PROCEDURE — 27210794 ZZH OR GENERAL SUPPLY STERILE: Performed by: INTERNAL MEDICINE

## 2019-02-22 PROCEDURE — 25000132 ZZH RX MED GY IP 250 OP 250 PS 637: Performed by: NURSE PRACTITIONER

## 2019-02-22 PROCEDURE — 25000128 H RX IP 250 OP 636: Performed by: INTERNAL MEDICINE

## 2019-02-22 PROCEDURE — 93010 ELECTROCARDIOGRAM REPORT: CPT | Performed by: INTERNAL MEDICINE

## 2019-02-22 PROCEDURE — 25000125 ZZHC RX 250: Performed by: INTERNAL MEDICINE

## 2019-02-22 PROCEDURE — C1769 GUIDE WIRE: HCPCS | Performed by: INTERNAL MEDICINE

## 2019-02-22 PROCEDURE — 99152 MOD SED SAME PHYS/QHP 5/>YRS: CPT | Performed by: INTERNAL MEDICINE

## 2019-02-22 PROCEDURE — 40000172 ZZH STATISTIC PROCEDURE PREP ONLY

## 2019-02-22 PROCEDURE — 99153 MOD SED SAME PHYS/QHP EA: CPT | Performed by: INTERNAL MEDICINE

## 2019-02-22 PROCEDURE — 93460 R&L HRT ART/VENTRICLE ANGIO: CPT | Performed by: INTERNAL MEDICINE

## 2019-02-22 PROCEDURE — C1887 CATHETER, GUIDING: HCPCS | Performed by: INTERNAL MEDICINE

## 2019-02-22 PROCEDURE — C1894 INTRO/SHEATH, NON-LASER: HCPCS | Performed by: INTERNAL MEDICINE

## 2019-02-22 PROCEDURE — 40000065 ZZH STATISTIC EKG NON-CHARGEABLE

## 2019-02-22 RX ORDER — EPTIFIBATIDE 2 MG/ML
180 INJECTION, SOLUTION INTRAVENOUS EVERY 10 MIN PRN
Status: DISCONTINUED | OUTPATIENT
Start: 2019-02-22 | End: 2019-02-22 | Stop reason: HOSPADM

## 2019-02-22 RX ORDER — TIROFIBAN HYDROCHLORIDE 50 UG/ML
0.07 INJECTION INTRAVENOUS CONTINUOUS PRN
Status: DISCONTINUED | OUTPATIENT
Start: 2019-02-22 | End: 2019-02-22 | Stop reason: HOSPADM

## 2019-02-22 RX ORDER — FENTANYL CITRATE 50 UG/ML
INJECTION, SOLUTION INTRAMUSCULAR; INTRAVENOUS
Status: DISCONTINUED | OUTPATIENT
Start: 2019-02-22 | End: 2019-02-22 | Stop reason: HOSPADM

## 2019-02-22 RX ORDER — DOBUTAMINE HYDROCHLORIDE 200 MG/100ML
2-20 INJECTION INTRAVENOUS CONTINUOUS PRN
Status: DISCONTINUED | OUTPATIENT
Start: 2019-02-22 | End: 2019-02-22 | Stop reason: HOSPADM

## 2019-02-22 RX ORDER — SEVELAMER CARBONATE 800 MG/1
800 TABLET, FILM COATED ORAL
Status: DISCONTINUED | OUTPATIENT
Start: 2019-02-22 | End: 2019-02-22 | Stop reason: HOSPADM

## 2019-02-22 RX ORDER — EPTIFIBATIDE 2 MG/ML
1 INJECTION, SOLUTION INTRAVENOUS CONTINUOUS PRN
Status: DISCONTINUED | OUTPATIENT
Start: 2019-02-22 | End: 2019-02-22 | Stop reason: HOSPADM

## 2019-02-22 RX ORDER — NALOXONE HYDROCHLORIDE 0.4 MG/ML
.1-.4 INJECTION, SOLUTION INTRAMUSCULAR; INTRAVENOUS; SUBCUTANEOUS
Status: DISCONTINUED | OUTPATIENT
Start: 2019-02-22 | End: 2019-02-22 | Stop reason: HOSPADM

## 2019-02-22 RX ORDER — NALOXONE HYDROCHLORIDE 0.4 MG/ML
.2-.4 INJECTION, SOLUTION INTRAMUSCULAR; INTRAVENOUS; SUBCUTANEOUS
Status: DISCONTINUED | OUTPATIENT
Start: 2019-02-22 | End: 2019-02-22 | Stop reason: HOSPADM

## 2019-02-22 RX ORDER — DOPAMINE HYDROCHLORIDE 160 MG/100ML
2-20 INJECTION, SOLUTION INTRAVENOUS CONTINUOUS PRN
Status: DISCONTINUED | OUTPATIENT
Start: 2019-02-22 | End: 2019-02-22 | Stop reason: HOSPADM

## 2019-02-22 RX ORDER — NITROGLYCERIN 20 MG/100ML
.07-2 INJECTION INTRAVENOUS CONTINUOUS PRN
Status: DISCONTINUED | OUTPATIENT
Start: 2019-02-22 | End: 2019-02-22 | Stop reason: HOSPADM

## 2019-02-22 RX ORDER — DOBUTAMINE HYDROCHLORIDE 200 MG/100ML
5-40 INJECTION INTRAVENOUS CONTINUOUS PRN
Status: DISCONTINUED | OUTPATIENT
Start: 2019-02-22 | End: 2019-02-22 | Stop reason: HOSPADM

## 2019-02-22 RX ORDER — ONDANSETRON 2 MG/ML
4 INJECTION INTRAMUSCULAR; INTRAVENOUS EVERY 6 HOURS PRN
Status: DISCONTINUED | OUTPATIENT
Start: 2019-02-22 | End: 2019-02-22 | Stop reason: HOSPADM

## 2019-02-22 RX ORDER — FLUMAZENIL 0.1 MG/ML
0.2 INJECTION, SOLUTION INTRAVENOUS
Status: DISCONTINUED | OUTPATIENT
Start: 2019-02-22 | End: 2019-02-22 | Stop reason: HOSPADM

## 2019-02-22 RX ORDER — NOREPINEPHRINE BITARTRATE/D5W 16MG/250ML
.03-.4 PLASTIC BAG, INJECTION (ML) INTRAVENOUS CONTINUOUS PRN
Status: DISCONTINUED | OUTPATIENT
Start: 2019-02-22 | End: 2019-02-22 | Stop reason: HOSPADM

## 2019-02-22 RX ORDER — SODIUM CHLORIDE 9 MG/ML
INJECTION, SOLUTION INTRAVENOUS CONTINUOUS
Status: DISCONTINUED | OUTPATIENT
Start: 2019-02-22 | End: 2019-02-22 | Stop reason: HOSPADM

## 2019-02-22 RX ORDER — ARGATROBAN 1 MG/ML
150 INJECTION, SOLUTION INTRAVENOUS
Status: DISCONTINUED | OUTPATIENT
Start: 2019-02-22 | End: 2019-02-22 | Stop reason: HOSPADM

## 2019-02-22 RX ORDER — ATROPINE SULFATE 0.1 MG/ML
0.5 INJECTION INTRAVENOUS EVERY 5 MIN PRN
Status: DISCONTINUED | OUTPATIENT
Start: 2019-02-22 | End: 2019-02-22 | Stop reason: HOSPADM

## 2019-02-22 RX ORDER — LIDOCAINE 40 MG/G
CREAM TOPICAL
Status: DISCONTINUED | OUTPATIENT
Start: 2019-02-22 | End: 2019-02-22 | Stop reason: HOSPADM

## 2019-02-22 RX ORDER — ACETAMINOPHEN 325 MG/1
650 TABLET ORAL EVERY 4 HOURS PRN
Status: DISCONTINUED | OUTPATIENT
Start: 2019-02-22 | End: 2019-02-22 | Stop reason: HOSPADM

## 2019-02-22 RX ORDER — IOPAMIDOL 755 MG/ML
INJECTION, SOLUTION INTRAVASCULAR
Status: DISCONTINUED | OUTPATIENT
Start: 2019-02-22 | End: 2019-02-22 | Stop reason: HOSPADM

## 2019-02-22 RX ORDER — ASPIRIN 81 MG/1
81 TABLET ORAL DAILY
Status: DISCONTINUED | OUTPATIENT
Start: 2019-02-22 | End: 2019-02-22 | Stop reason: HOSPADM

## 2019-02-22 RX ORDER — FENTANYL CITRATE 50 UG/ML
25-50 INJECTION, SOLUTION INTRAMUSCULAR; INTRAVENOUS
Status: DISCONTINUED | OUTPATIENT
Start: 2019-02-22 | End: 2019-02-22 | Stop reason: HOSPADM

## 2019-02-22 RX ORDER — ARGATROBAN 1 MG/ML
350 INJECTION, SOLUTION INTRAVENOUS
Status: DISCONTINUED | OUTPATIENT
Start: 2019-02-22 | End: 2019-02-22 | Stop reason: HOSPADM

## 2019-02-22 RX ADMIN — SEVELAMER CARBONATE 800 MG: 800 TABLET, FILM COATED ORAL at 17:02

## 2019-02-22 ASSESSMENT — MIFFLIN-ST. JEOR: SCORE: 1521.44

## 2019-02-22 NOTE — Clinical Note
dry, intact, no bleeding and no hematoma. 7 Fr sheath removed from the RFV. Manual pressure held. Hemostasis obtained.

## 2019-02-22 NOTE — PROGRESS NOTES
Pt is A&Ox4. VSS. Right groin site is CDI, no hematoma present. No pain or N/V. Patient is still to ambulate, void, and eat. Pt is off of bedrest at 1730. Nurse will continue to monitor.

## 2019-02-22 NOTE — PROCEDURES
PRELIMINARY CARDIAC CATH REPORT:     PROCEDURES PERFORMED:   Right Heart Catheterization  Coronary Angiography  Left Heart Catheterization    PHYSICIANS:  Attending Physician: Gabino Collins MD  Interventional Cardiology Fellow: Viet   Cardiology Fellow: Xander Cam MD    INDICATION:  Dung Norton is a 73 year old male with risk factor profile (+) HTN, (-) DM, (+) hypercholesterolemia, (+)  prior ? pack-year tobacco use, (+) fam Hx premature CAD, who presents on an elective basis. The clinical presentation was Valvular Heart Disease.    The indication for the procedure was CAD and aortic stenosis severity evaluation.     DESCRIPTION:  1. Consent obtained with discussion of risks.  All questions were answered.  2. Sterile prep and procedure.  3. Location with Sheaths:   Rt Femoral Arterial  6 Fr  45 cm [Britetip]  Rt Femoral Venous  7 Fr 25 cm [long]  4. Access: Local anesthetic with lidocaine.  A standard 18 guage needle with ultrasound guidance was used to establish vascular access using a modified Seldinger technique.  5. Diagnostic Catheters:   LMCA: 6 Fr JL 5   RCA: Could not be engage despite multiple attemps. The following catheters were used JR4, 3DRC, AL 1, AL 2, AR 1, MP1 and ELENI   7Fr SwanGanz catheter    6. Guiding Catheters:  None  6. Estimated blood loss: < 25 ml    MEDICATIONS:  The procedure was performed under conscious sedation for 101 minutes from 1124 to 1305.  The patient was assessed immediately before the first sedation medication was administered.  Midazolam 2 mg and Fentanyl 75 mcg were administered.  Heart rate, BP, respiration, oxygen saturation and patient responses were monitored throughout the procedure with the assistance of the RN under my supervision.    >> Antiplatelet Therapy:  mg    Procedures:    HEMODYNAMICS:  BSA 1.96  1. HR 77 bpm  2. /65/81 mmHg  3. RA 15/12/10   4. RV 42//10  5. PA 42/23/32   6. PCW 23/26/20   7. PA sat 77%   8. PCW sat 99.6%  9. Hgb  9 g/dL   10. Amaris CO 8.0   11. Amaris CI 4.1   12. TD CO 7.8   13. TD CI 4.0   14. PVR 1.7        CORONARY ANGIOGRAM:   1. Both coronary arteries arise from their respective cusps.  2. Dominance: Right  3. The LM is a large vessel with 20% distal stenosis.     4. LAD: Type 3 [LAD supplies the entire apex].. The LAD gives rise to septal perforators, a large D1 and medium size D2. In addition the distal LAD feeds the PDA via collaterals.  The pLAD has a 30-40% calcified stenosis, mLAD has a 90% stenosis, dLAD very minimal disease D1 has a 40% stenosis at the ostium and D2 has a mild <20% stenosis.    5. LCX gives rise to large bifurcating OM1 and smaller OM2 and LPL vessels.  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 LPL has 60-70% stenosis.    6. RCA ostia could not be seen. In a non selective cine the RCA appears diffusely disease but difficult to interpret degree of stenosis based on the images.    LEFT HEART CATHETERIZATION:  1. LVEDP 22 mmHg.  2. The AoV mean gradient by simultaneous (LV/Ao) pressure measurement was 31.7mmHg.   3. The aortic valve area by Gorlin 1.27cm2.       Sheath Removal:  The RFA & RFV sheath was manually removed in the cardiac catheterization laboratory.    Contrast: Isovue, 200 ml     Fluoroscopy Time: 35.6 min    COMPLICATIONS:  1. None    SUMMARY:   >> High right sided filling pressures.  >> High left sided filling pressures.  >> Mild pulmonary artery hypertension  >> High left ventricular filling pressures.  >> Hyperdynamic cardiac output, 8.0 L/min with index 4.1 L/min/m2  >> At least Two vessel CAD, LAD and LCX  >> RCA could not be selectively engage  >> Moderate Aortic stenosis with a meanPG of 31.7mmHg and JESSIKA of 1.27cm2.     PLAN:   >> ASA 81 mg qd   >> Recommend high intensity statin therapy   >> Bedrest per protocol.  >>  Follow up with cardiology on Monday as scheduled.   >> CTA coronary to evaluate RCA disease and origin  >> Plan stage PCI pending CTA results vs CABG   >>. Discharge today per protocol    The attending interventional cardiologist was present and supervised all critical aspects the procedure.    Findings discussed with Dr. Ng and patient's daughter (Tereza) .    See CVIS report for final draft.    Xander Cam MD   Cardiology Fellow    Gabino Collins MD   Cardiology Staff      This is a preliminary report. Please see CVIS for the final report.   I was present for the entire procedure.     Gabino Collins MD.  Interventional Cardiology

## 2019-02-22 NOTE — DISCHARGE INSTRUCTIONS
Going Home after Coronary Angiogram     FOR 24 HOURS:        Relax and take it easy.         Drink plenty of fluids.         Do NOT make any important or legal decisions.         Do NOT drive or operate machines at home or at work.         Do NOT drink alcohol.     PROCEDURE SITE:  Care of groin site:         Remove the Band-Aid after 24 hours. If there is minor oozing, apply another Band-aid and remove it after 12 hours.          Do NOT take a bath, or use a hot tub or pool for at least 3 days. You may shower.          It is normal to have a small bruise or lump at the site.         Do not scrub the site.         Do not use lotion or powder near the puncture site for 3 days.         For the first 2 days: Do not stoop or squat. When you cough, sneeze or move your bowels, hold your hand over the puncture site and press gently.         Do not lift more than 10 pounds for at least 3 to 5 days.         For 2 days, do NOT have sex or do any heavy exercise.     If you start bleeding from the site in your groin:  Lie down flat and press firmly on the site.  Call your physician immediately, or, come to the emergency room.    Call 911 right away if you have bleeding that is heavy or does not stop.        DIET:  We recommend a diet low in saturated fat, trans fat and cholesterol. In addition it will be helpful to be cautious of sodium intake, sugar and carbohydrates. Try to increase the amount of lean meats you eat like fish and chicken, but avoid frying; and reduce the amount of red meat you eat. Eat more fresh fruits and vegetables and try to avoid canned and processed food. Please reference the handouts you received for more specific information.      CALL YOUR DOCTOR IF:  -You have a large or growing lump/bump around the procedure site  -The site is red, swollen, hot, tender or has drainage  -You have hives, a rash or unusual itching  -You have increasing or worsening shortness of breath or chest pain    FOLLOW UP:  We  have ordered a CT Angiogram for you as a part of your TAVR evaluation. Our schedulers will call you to set up this visit.     Should you need to contact us:  Cardiology clinic for scheduling or triage nurse questions/concerns:  [phone]

## 2019-02-22 NOTE — PROGRESS NOTES
"/72   Pulse 79   Temp 97.6  F (36.4  C) (Oral)   Resp 14   Ht 1.778 m (5' 10\")   Wt 78 kg (172 lb)   SpO2 100%   BMI 24.68 kg/m    Condition is stable. Vital Signs are stable/WNL. Right groin site clean, dry and intact, cms intact.  Discharge instructions reviewed with patient and questions answered. Patient verbalizes understanding. IV removed. Pain under control. Patient is ambulating and voiding spontaneously. Patient is tolerating regular diet and denies any N/V. Patient to be discharged to home via daughter. Patient has all belongings.    "

## 2019-02-22 NOTE — PROGRESS NOTES
Prep and teaching complete for angiogram/cORS and right heart cath. Daughter Tereza at bedside, will drive pt home, phone:  [phone]. Consent, lab results and H and P are current. Pt has fistula left arm with thrill, no dialysis yet. Voids but self cath's twice daily. Groin prep done; contrast teaching done.

## 2019-02-22 NOTE — Clinical Note
dry, intact, no bleeding and no hematoma. 6 Fr sheath removed from the RFA. Manual pressure held. Hemostasis obtained.

## 2019-02-23 NOTE — PROGRESS NOTES
"Pt ate, voided and walked. AVSS, groin site CDI, no hematoma noted. Pt denies pain or discomfort. Pt ready to be discharge family at the bedside. /78   Pulse 83   Temp 97.6  F (36.4  C) (Oral)   Resp 10   Ht 1.778 m (5' 10\")   Wt 78 kg (172 lb)   SpO2 100%   BMI 24.68 kg/m      "

## 2019-02-25 ENCOUNTER — TELEPHONE (OUTPATIENT)
Dept: CARDIOLOGY | Facility: CLINIC | Age: 74
End: 2019-02-25

## 2019-02-25 ENCOUNTER — ALLIED HEALTH/NURSE VISIT (OUTPATIENT)
Dept: FAMILY MEDICINE | Facility: CLINIC | Age: 74
End: 2019-02-25

## 2019-02-25 ENCOUNTER — TELEPHONE (OUTPATIENT)
Dept: NEPHROLOGY | Facility: CLINIC | Age: 74
End: 2019-02-25

## 2019-02-25 VITALS — SYSTOLIC BLOOD PRESSURE: 110 MMHG | DIASTOLIC BLOOD PRESSURE: 64 MMHG

## 2019-02-25 DIAGNOSIS — Z01.30 BP CHECK: Primary | ICD-10-CM

## 2019-02-25 DIAGNOSIS — I25.10 CORONARY ARTERY DISEASE INVOLVING NATIVE CORONARY ARTERY OF NATIVE HEART WITHOUT ANGINA PECTORIS: Primary | ICD-10-CM

## 2019-02-25 DIAGNOSIS — N18.5 CKD (CHRONIC KIDNEY DISEASE) STAGE 5, GFR LESS THAN 15 ML/MIN (H): ICD-10-CM

## 2019-02-25 DIAGNOSIS — I50.22 CHRONIC SYSTOLIC HEART FAILURE (H): ICD-10-CM

## 2019-02-25 LAB
ALBUMIN SERPL-MCNC: 3.2 G/DL (ref 3.4–5)
ANION GAP SERPL CALCULATED.3IONS-SCNC: 11 MMOL/L (ref 3–14)
BUN SERPL-MCNC: 116 MG/DL (ref 7–30)
CALCIUM SERPL-MCNC: 8.2 MG/DL (ref 8.5–10.1)
CHLORIDE SERPL-SCNC: 106 MMOL/L (ref 94–109)
CO2 SERPL-SCNC: 23 MMOL/L (ref 20–32)
CREAT SERPL-MCNC: 5.88 MG/DL (ref 0.66–1.25)
CREAT UR-MCNC: 50 MG/DL
GFR SERPL CREATININE-BSD FRML MDRD: 9 ML/MIN/{1.73_M2}
GLUCOSE SERPL-MCNC: 103 MG/DL (ref 70–99)
HCT VFR BLD AUTO: 31.4 % (ref 40–53)
HGB BLD-MCNC: 9.7 G/DL (ref 13.3–17.7)
INR PPP: 1.06 (ref 0.86–1.14)
INTERPRETATION ECG - MUSE: NORMAL
PHOSPHATE SERPL-MCNC: 6.7 MG/DL (ref 2.5–4.5)
POTASSIUM SERPL-SCNC: 4.5 MMOL/L (ref 3.4–5.3)
PROT UR-MCNC: 1.56 G/L
PROT/CREAT 24H UR: 3.1 G/G CR (ref 0–0.2)
SODIUM SERPL-SCNC: 140 MMOL/L (ref 133–144)

## 2019-02-25 PROCEDURE — 85610 PROTHROMBIN TIME: CPT

## 2019-02-25 PROCEDURE — 85018 HEMOGLOBIN: CPT

## 2019-02-25 PROCEDURE — 36415 COLL VENOUS BLD VENIPUNCTURE: CPT

## 2019-02-25 PROCEDURE — 85014 HEMATOCRIT: CPT

## 2019-02-25 PROCEDURE — 99207 ZZC NO CHARGE NURSE ONLY: CPT | Performed by: FAMILY MEDICINE

## 2019-02-25 PROCEDURE — 80069 RENAL FUNCTION PANEL: CPT

## 2019-02-25 PROCEDURE — 84156 ASSAY OF PROTEIN URINE: CPT

## 2019-02-25 NOTE — PROGRESS NOTES
Dung Norton is enrolled/participating in the retail pharmacy Blood Pressure Goals Achievement Program (BPGAP).  Dung Norton was evaluated at [facility] on February 25, 2019 at which time his blood pressure was:    BP Readings from Last 3 Encounters:   02/25/19 110/64   02/22/19 131/78   02/18/19 121/72     Reviewed lifestyle modifications for blood pressure control and reduction: including making healthy food choices, managing weight, getting regular exercise, smoking cessation, reducing alcohol consumption, monitoring blood pressure regularly.     Dung Norton is not experiencing symptoms.    Follow-Up: BP is at goal of < 140/90mmHg (patient 18+ years of age with or without diabetes).  Recommended follow-up at pharmacy in 6 months.     Recommendation to Provider: none    Dung Norton was evaluated for enrollment into the PGEN study today.    PLEASE INITIATE ENROLLMENT DISCUSSION WITH HTN PTS  1) Between 30-80 years old                                                                                                               2) BMI between 19-50                                                                                                        3) BP ?140/90 AND ?170/110 patients aged 30-59         BP ?150/90 AND ?170/110 non-diabetic patients aged 60-80       BP ?140/90 AND ?170/110 diabetic patients aged 60-80  4) Additional requirements for uncontrolled HTN patients:        Pt on only 1 class of medication  5) EXCLUDE patient if confirmation of:                  ? Cardiac disease                  ? Chronic Kidney Disease                  ? Pregnancy/Breastfeeding                  ? Secondary Hypertension/Pre-eclampsia                                 ? Vascular disease    Patient eligible for enrollment:  No  Patient interested in enrollment:  No    This note completed by: Ricarda Andino [facility]  [address] [place] [address]  [place] [address]  [phone]

## 2019-02-25 NOTE — TELEPHONE ENCOUNTER
DATE:  2/25/2019   TIME OF RECEIPT FROM LAB:  4:15pm  LAB TEST:  Creatinine  LAB VALUE:  5.88  RESULTS GIVEN WITH READ-BACK TO (PROVIDER):  Cecilia Moex  TIME LAB VALUE REPORTED TO PROVIDER:   4:16pm     Update sent to Cecilia with critical result. His kidney function continues to fluctuate.    Giselle Benoit RN

## 2019-02-25 NOTE — TELEPHONE ENCOUNTER
Reason for Call:  Other call back    Detailed comments: Tereza had a couple questions about last Friday, wondering why the patient didn't have stints put in. Wondering when he would follow up with Dr. Ng after Friday. Also questions about CT that was orders. Please call Tereza to discuss.     Phone Number Patient can be reached at: Other phone number: [phone]     Best Time: any     Can we leave a detailed message on this number? YES    Call taken on 2/25/2019 at 9:18 AM by Fabio Michelle

## 2019-02-25 NOTE — TELEPHONE ENCOUNTER
I called and talked to Tereza his daughter. I gave information about the CTA we are planning to do. I ordered the test. [facility] will schedule the procedure.

## 2019-02-28 ENCOUNTER — TELEPHONE (OUTPATIENT)
Dept: CARDIOLOGY | Facility: CLINIC | Age: 74
End: 2019-02-28

## 2019-02-28 DIAGNOSIS — I25.10 CORONARY ARTERY DISEASE INVOLVING NATIVE CORONARY ARTERY OF NATIVE HEART WITHOUT ANGINA PECTORIS: Primary | ICD-10-CM

## 2019-02-28 NOTE — TELEPHONE ENCOUNTER
Health Call Center    Phone Message    May a detailed message be left on voicemail: yes    Reason for Call: Order(s): Other:   Reason for requested: CTA order  Date needed: ASAP  Provider name: Dr. Ng    Pt has CTA scheduled for Monday 3/4 but the order for it was cancelled. Please renew order ASAP or have appt cancelled. Thank you.      Action Taken: Message routed to:  Other: [facility]

## 2019-02-28 NOTE — TELEPHONE ENCOUNTER
Reviewed Epic and the order was cancelled by Dr. Ng.  Uncertain for reason.  Will need to review this with Dr. Ng.    Stephanie Menendez, RN  Care Coordinator  [facility]  [phone]

## 2019-03-04 ENCOUNTER — HOSPITAL ENCOUNTER (OUTPATIENT)
Dept: CT IMAGING | Facility: CLINIC | Age: 74
Discharge: HOME | End: 2019-03-04
Attending: INTERNAL MEDICINE | Admitting: INTERNAL MEDICINE
Payer: COMMERCIAL

## 2019-03-04 VITALS
DIASTOLIC BLOOD PRESSURE: 78 MMHG | SYSTOLIC BLOOD PRESSURE: 122 MMHG | RESPIRATION RATE: 12 BRPM | OXYGEN SATURATION: 100 %

## 2019-03-04 DIAGNOSIS — I35.0 SEVERE AORTIC STENOSIS: ICD-10-CM

## 2019-03-04 DIAGNOSIS — I25.10 CORONARY ARTERY DISEASE INVOLVING NATIVE CORONARY ARTERY OF NATIVE HEART WITHOUT ANGINA PECTORIS: ICD-10-CM

## 2019-03-04 LAB
CREAT BLD-MCNC: 6 MG/DL (ref 0.66–1.25)
GFR SERPL CREATININE-BSD FRML MDRD: 9 ML/MIN/{1.73_M2}
RADIOLOGIST FLAGS: NORMAL

## 2019-03-04 PROCEDURE — 25000128 H RX IP 250 OP 636: Performed by: INTERNAL MEDICINE

## 2019-03-04 PROCEDURE — 82565 ASSAY OF CREATININE: CPT

## 2019-03-04 PROCEDURE — 71275 CT ANGIOGRAPHY CHEST: CPT | Mod: 26 | Performed by: INTERNAL MEDICINE

## 2019-03-04 PROCEDURE — 74174 CTA ABD&PLVS W/CONTRAST: CPT

## 2019-03-04 PROCEDURE — 75574 CT ANGIO HRT W/3D IMAGE: CPT | Mod: 26 | Performed by: INTERNAL MEDICINE

## 2019-03-04 PROCEDURE — 40000141 ZZH STATISTIC PERIPHERAL IV START W/O US GUIDANCE

## 2019-03-04 PROCEDURE — 75574 CT ANGIO HRT W/3D IMAGE: CPT

## 2019-03-04 PROCEDURE — 25000132 ZZH RX MED GY IP 250 OP 250 PS 637: Performed by: INTERNAL MEDICINE

## 2019-03-04 PROCEDURE — 74174 CTA ABD&PLVS W/CONTRAST: CPT | Mod: 26 | Performed by: INTERNAL MEDICINE

## 2019-03-04 PROCEDURE — 25800030 ZZH RX IP 258 OP 636: Performed by: INTERNAL MEDICINE

## 2019-03-04 RX ORDER — IVABRADINE 5 MG/1
10 TABLET, FILM COATED ORAL ONCE
Status: COMPLETED | OUTPATIENT
Start: 2019-03-04 | End: 2019-03-04

## 2019-03-04 RX ORDER — IOPAMIDOL 755 MG/ML
120 INJECTION, SOLUTION INTRAVASCULAR ONCE
Status: COMPLETED | OUTPATIENT
Start: 2019-03-04 | End: 2019-03-04

## 2019-03-04 RX ORDER — ACYCLOVIR 200 MG/1
0-1 CAPSULE ORAL
Status: DISCONTINUED | OUTPATIENT
Start: 2019-03-04 | End: 2019-03-05 | Stop reason: HOSPADM

## 2019-03-04 RX ORDER — METOPROLOL TARTRATE 1 MG/ML
5-15 INJECTION, SOLUTION INTRAVENOUS
Status: DISCONTINUED | OUTPATIENT
Start: 2019-03-04 | End: 2019-03-05 | Stop reason: HOSPADM

## 2019-03-04 RX ORDER — METOPROLOL TARTRATE 50 MG
50-100 TABLET ORAL
Status: DISCONTINUED | OUTPATIENT
Start: 2019-03-04 | End: 2019-03-05 | Stop reason: HOSPADM

## 2019-03-04 RX ORDER — NITROGLYCERIN 0.4 MG/1
.4-.8 TABLET SUBLINGUAL
Status: DISCONTINUED | OUTPATIENT
Start: 2019-03-04 | End: 2019-03-05 | Stop reason: HOSPADM

## 2019-03-04 RX ADMIN — SODIUM CHLORIDE, POTASSIUM CHLORIDE, SODIUM LACTATE AND CALCIUM CHLORIDE 500 ML: 600; 310; 30; 20 INJECTION, SOLUTION INTRAVENOUS at 11:04

## 2019-03-04 RX ADMIN — IVABRADINE 10 MG: 5 TABLET, FILM COATED ORAL at 11:24

## 2019-03-04 RX ADMIN — IOPAMIDOL 80 ML: 755 INJECTION, SOLUTION INTRAVENOUS at 14:05

## 2019-03-05 ENCOUNTER — TELEPHONE (OUTPATIENT)
Dept: NEPHROLOGY | Facility: CLINIC | Age: 74
End: 2019-03-05

## 2019-03-05 NOTE — TELEPHONE ENCOUNTER
Per Cecilia, she would like patient to have renal panel checked this week due to recent contrast. He is having labs done on Monday, would like to wait until then if possible. He denied any uremic symptoms today. He would like to think about dialysis units a bit more, isn't sure where he'd go if needing dialysis. He had previously elected the [facility] unit in [place], will continue to follow. An update was sent to Cecilia.    Giselle Benoit RN

## 2019-03-06 DIAGNOSIS — I34.0 SEVERE MITRAL REGURGITATION: Primary | ICD-10-CM

## 2019-03-07 ENCOUNTER — TELEPHONE (OUTPATIENT)
Dept: CARDIOLOGY | Facility: CLINIC | Age: 74
End: 2019-03-07

## 2019-03-07 ENCOUNTER — HOSPITAL ENCOUNTER (OUTPATIENT)
Facility: CLINIC | Age: 74
End: 2019-03-07

## 2019-03-07 DIAGNOSIS — N18.5 CKD (CHRONIC KIDNEY DISEASE) STAGE 5, GFR LESS THAN 15 ML/MIN (H): ICD-10-CM

## 2019-03-07 DIAGNOSIS — I71.20 ANEURYSM, AORTA, THORACIC (H): ICD-10-CM

## 2019-03-07 DIAGNOSIS — I71.20 THORACIC AORTIC ANEURYSM WITHOUT RUPTURE (H): Primary | ICD-10-CM

## 2019-03-07 NOTE — PROGRESS NOTES
Per Dr. Collins, pt's JESSIKA is 1.2 and thus not significant enough to warrant intervention at this time. Pt does have significant CAD that needs to be addressed so he will need to return for planned PCI (LAD and LCx PCI and cannulation of RCA to determine CAD burden). Pt's most recent GFR is 9 and he does have matured AVF so will likely defer PCI until after dialysis has been initiated per Dr. Collins. This plan was communicated with outpatient Cardiology team via [employer] message.

## 2019-03-07 NOTE — TELEPHONE ENCOUNTER
Lab. Appointment noted 03/11/19, Then BMP. Order entered by a RN.03/05/19 Encounter notes Nephrology addressing there specialty  follow up instruction. Titus Tolliver L.P.N.

## 2019-03-07 NOTE — TELEPHONE ENCOUNTER
----- Message from Lizandro Ng MD sent at 3/4/2019  2:37 PM CST -----  I talked to medicine triage and they recommended BMP in few weeks and f/u with nephrology since he had the contrast today.    Can you order BMP for Wednesday this week please.

## 2019-03-08 NOTE — TELEPHONE ENCOUNTER
I called and talked to Dung and gave information about the aortic valve and planned PCI procedures. After discussion with  and reviewing cath, echo and CT TAVR results we have concluded that aortic valve is in the moderate range and he has high gradients across the aortic valve due to high output (he has AVF for dialysis). Therefore he doesnot need aortic valve replacement for now, we will keep an eye on the valve. He will be scheduled for PCI of LAD and Lcx by the interventional cardiology.  The patient understood and agreed with the plan.    Lizandro Ng MD  Cardiology.

## 2019-03-11 ENCOUNTER — TELEPHONE (OUTPATIENT)
Dept: NEPHROLOGY | Facility: CLINIC | Age: 74
End: 2019-03-11

## 2019-03-11 ENCOUNTER — ALLIED HEALTH/NURSE VISIT (OUTPATIENT)
Dept: FAMILY MEDICINE | Facility: CLINIC | Age: 74
End: 2019-03-11
Payer: COMMERCIAL

## 2019-03-11 VITALS — DIASTOLIC BLOOD PRESSURE: 64 MMHG | SYSTOLIC BLOOD PRESSURE: 110 MMHG

## 2019-03-11 DIAGNOSIS — N18.5 CKD (CHRONIC KIDNEY DISEASE) STAGE 5, GFR LESS THAN 15 ML/MIN (H): ICD-10-CM

## 2019-03-11 DIAGNOSIS — Z01.30 BP CHECK: Primary | ICD-10-CM

## 2019-03-11 DIAGNOSIS — I71.20 ANEURYSM, AORTA, THORACIC (H): ICD-10-CM

## 2019-03-11 LAB
ALBUMIN SERPL-MCNC: 2.9 G/DL (ref 3.4–5)
ANION GAP SERPL CALCULATED.3IONS-SCNC: 13 MMOL/L (ref 3–14)
ANION GAP SERPL CALCULATED.3IONS-SCNC: 14 MMOL/L (ref 3–14)
BUN SERPL-MCNC: 116 MG/DL (ref 7–30)
BUN SERPL-MCNC: 118 MG/DL (ref 7–30)
CALCIUM SERPL-MCNC: 8.4 MG/DL (ref 8.5–10.1)
CALCIUM SERPL-MCNC: 8.4 MG/DL (ref 8.5–10.1)
CHLORIDE SERPL-SCNC: 110 MMOL/L (ref 94–109)
CHLORIDE SERPL-SCNC: 110 MMOL/L (ref 94–109)
CO2 SERPL-SCNC: 19 MMOL/L (ref 20–32)
CO2 SERPL-SCNC: 19 MMOL/L (ref 20–32)
CREAT SERPL-MCNC: 5.46 MG/DL (ref 0.66–1.25)
CREAT SERPL-MCNC: 5.78 MG/DL (ref 0.66–1.25)
GFR SERPL CREATININE-BSD FRML MDRD: 9 ML/MIN/{1.73_M2}
GFR SERPL CREATININE-BSD FRML MDRD: 9 ML/MIN/{1.73_M2}
GLUCOSE SERPL-MCNC: 142 MG/DL (ref 70–99)
GLUCOSE SERPL-MCNC: 143 MG/DL (ref 70–99)
HCT VFR BLD AUTO: 29.3 % (ref 40–53)
HGB BLD-MCNC: 8.9 G/DL (ref 13.3–17.7)
PHOSPHATE SERPL-MCNC: 5.2 MG/DL (ref 2.5–4.5)
POTASSIUM SERPL-SCNC: 4.4 MMOL/L (ref 3.4–5.3)
POTASSIUM SERPL-SCNC: 4.4 MMOL/L (ref 3.4–5.3)
SODIUM SERPL-SCNC: 142 MMOL/L (ref 133–144)
SODIUM SERPL-SCNC: 143 MMOL/L (ref 133–144)

## 2019-03-11 PROCEDURE — 85014 HEMATOCRIT: CPT

## 2019-03-11 PROCEDURE — 80069 RENAL FUNCTION PANEL: CPT

## 2019-03-11 PROCEDURE — 80048 BASIC METABOLIC PNL TOTAL CA: CPT | Performed by: INTERNAL MEDICINE

## 2019-03-11 PROCEDURE — 85018 HEMOGLOBIN: CPT

## 2019-03-11 PROCEDURE — 36415 COLL VENOUS BLD VENIPUNCTURE: CPT | Performed by: INTERNAL MEDICINE

## 2019-03-11 PROCEDURE — 99207 ZZC NO CHARGE NURSE ONLY: CPT | Performed by: FAMILY MEDICINE

## 2019-03-11 NOTE — Clinical Note
Blood pressure in pharmacy today 110/64 Ricarda Andino, [address] [place] [phone]

## 2019-03-11 NOTE — PROGRESS NOTES
Dung Norton is enrolled/participating in the retail pharmacy Blood Pressure Goals Achievement Program (BPGAP).  Dung Norton was evaluated at [facility] on March 11, 2019 at which time his blood pressure was:    BP Readings from Last 3 Encounters:   03/11/19 110/64   03/04/19 122/78   02/25/19 110/64     Reviewed lifestyle modifications for blood pressure control and reduction: including making healthy food choices, managing weight, getting regular exercise, smoking cessation, reducing alcohol consumption, monitoring blood pressure regularly.     Dung Norton is not experiencing symptoms.    Follow-Up: BP is at goal of < 140/90mmHg (patient 18+ years of age with or without diabetes).  Recommended follow-up at pharmacy in 6 months.     Recommendation to Provider: none    Dung Norton was evaluated for enrollment into the PGEN study today.    PLEASE INITIATE ENROLLMENT DISCUSSION WITH HTN PTS  1) Between 30-80 years old                                                                                                               2) BMI between 19-50                                                                                                        3) BP ?140/90 AND ?170/110 patients aged 30-59         BP ?150/90 AND ?170/110 non-diabetic patients aged 60-80       BP ?140/90 AND ?170/110 diabetic patients aged 60-80  4) Additional requirements for uncontrolled HTN patients:        Pt on only 1 class of medication  5) EXCLUDE patient if confirmation of:                  ? Cardiac disease                  ? Chronic Kidney Disease                  ? Pregnancy/Breastfeeding                  ? Secondary Hypertension/Pre-eclampsia                                 ? Vascular disease    Patient eligible for enrollment:  No  Patient interested in enrollment:  No    This note completed by: Ricarda Andino [facility]  [address] [place] [address]  [place] [address]  [phone]

## 2019-03-11 NOTE — TELEPHONE ENCOUNTER
DATE:  3/11/2019   TIME OF RECEIPT FROM LAB:  3:30pm  LAB TEST:  Creatinine, BUN  LAB VALUE:  5.78, 118  RESULTS GIVEN WITH READ-BACK TO (PROVIDER):  Cecilia Moralesoix  TIME LAB VALUE REPORTED TO PROVIDER:   3:30pm    Results are consistent with previous values.    Giselle Benoit RN

## 2019-03-14 ENCOUNTER — TELEPHONE (OUTPATIENT)
Dept: NEPHROLOGY | Facility: CLINIC | Age: 74
End: 2019-03-14

## 2019-03-14 NOTE — TELEPHONE ENCOUNTER
Prior Authorization Retail Medication Request    Medication/Dose: Renagel  ICD code (if different than what is on RX):  Secondary renal hyperparathyroidism (H) [N25.81]   Previously Tried and Failed:  See chart  Rationale:  See chart    Insurance Name:  [employer]  Insurance ID:  [id number]      Pharmacy Information (if different than what is on RX)  Name:  [facility]  Phone:  [phone]

## 2019-03-15 NOTE — TELEPHONE ENCOUNTER
Central Prior Authorization Team   Phone: [phone]      PA Initiation    Medication: sevelamer (RENAGEL) 800 MG tablet-PA Pending  Insurance Company: Medicare Blue - Phone [phone] Fax [phone]  Pharmacy Filling the Rx: [facility] [address] [place] [address] [address] [facility] [address]  Filling Pharmacy Phone: [phone]  Filling Pharmacy Fax: [phone]  Start Date: 3/15/2019

## 2019-03-18 NOTE — TELEPHONE ENCOUNTER
Prior Authorization Approval    Authorization Effective Date: 1/1/2019  Authorization Expiration Date: 3/14/2020  Medication: sevelamer (RENAGEL) 800 MG tablet-APPROVED  Approved Dose/Quantity:  Reference #: [id number]   Insurance Company: Medicare Blue - Phone [phone] Fax [phone]  Expected CoPay:       CoPay Card Available:      Foundation Assistance Needed:    Which Pharmacy is filling the prescription (Not needed for infusion/clinic administered): [facility] [address] [place] [address] [address] [facility] [address]  Pharmacy Notified: Yes  Patient Notified: No-Pharmacy will notify patient when ready.

## 2019-03-20 ENCOUNTER — OFFICE VISIT (OUTPATIENT)
Dept: NEPHROLOGY | Facility: CLINIC | Age: 74
End: 2019-03-20
Payer: COMMERCIAL

## 2019-03-20 VITALS
BODY MASS INDEX: 24.82 KG/M2 | OXYGEN SATURATION: 99 % | SYSTOLIC BLOOD PRESSURE: 104 MMHG | WEIGHT: 173 LBS | HEART RATE: 88 BPM | DIASTOLIC BLOOD PRESSURE: 68 MMHG

## 2019-03-20 DIAGNOSIS — D63.1 ANEMIA OF CHRONIC RENAL FAILURE, STAGE 5 (H): ICD-10-CM

## 2019-03-20 DIAGNOSIS — N18.5 CKD (CHRONIC KIDNEY DISEASE) STAGE 5, GFR LESS THAN 15 ML/MIN (H): ICD-10-CM

## 2019-03-20 DIAGNOSIS — E83.39 HYPERPHOSPHATEMIA: Primary | ICD-10-CM

## 2019-03-20 DIAGNOSIS — N18.5 ANEMIA OF CHRONIC RENAL FAILURE, STAGE 5 (H): ICD-10-CM

## 2019-03-20 DIAGNOSIS — I25.10 CORONARY ARTERY DISEASE INVOLVING NATIVE CORONARY ARTERY, ANGINA PRESENCE UNSPECIFIED, UNSPECIFIED WHETHER NATIVE OR TRANSPLANTED HEART: Primary | ICD-10-CM

## 2019-03-20 RX ORDER — SEVELAMER HYDROCHLORIDE 800 MG/1
800 TABLET, FILM COATED ORAL
Qty: 180 TABLET | Refills: 3 | Status: ON HOLD | OUTPATIENT
Start: 2019-03-20 | End: 2019-04-26

## 2019-03-20 NOTE — PATIENT INSTRUCTIONS
1. We will call you regarding restarting Aranesp  2. Have labs drawn every 2 wks  3. Have your labs drawn 2 days after the stent procedure

## 2019-03-20 NOTE — PROGRESS NOTES
Nephrology Clinic Visit 3/20/19    Assessment and Plan:       1. CKD5 - Very stable renal function. Creat 5.4, eGFR 9 ml/mn. 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 His GFR has been 10-11 ml/mn since 9/17 He is not uremic. Urine protein 1.5 g/gCr     - Patient and daughter decided upon HD as his RRT option.     - He has attended the Kidney Smart program    - He underwent AVF creation with Dr Eduardo Pabon on 11/22/17. It has matured nicely and ready to use when needed. Last seen by Dr Pabon on 1/2/18   - Given stability of renal function have been holding off on HD. We discussed initiating soon, but again he wants to wait as long as possible. He is leaving for a trip to [place] with his brother at the end of April/beginning of May. He is agreeable to discussing initiation following his trip. We did discuss his option of no dialysis w/natural death from renal failure, but he is not interested in that option at this time.    - Patient lives in [facility], when OP HD required would begin search in that vicinity   - He does not use NSAIDs   - He is straight cathing BID and voiding spontaneously w/o difficulty      2. Electrolytes - No acute concerns. K 4.4, Na 142      3. Volume status - Euvolemic. No edema, dyspnea. Albumin 2.9. Not on diuretics. Making ~ 2.0 liter/urine/day. He has gained body weight and weight has stabilized in the 78 kg range. Clinic blood pressures in the 104-111/67-74/ range.        - No need for diuretics at this time      4. HTN - Well controlled. Currently on Amlodipine 2.5 mg and Toprol XL 25 mg every day.    - If higher dose of BB required would discontinue Amlodipine       5. BPH - S/P TURP 3/18. He is straight cathing BID w/o difficulty. He is also voiding approx 1 liter/day as well. Follows with Dr Vanegas in Urology.       6. Acid base - Bicarb 19   - Continue bicarb 650 mg bid. May have to increase next visit   - Goal bicarb is > 20      7. BMD  "- Corrected Ca 9.2, Phos 5.2, albumin 2.9   - Vit D 41 (2/19)   -  (2/19)    - Continue Renagel 800 mg TID w/meals      8. Nutrition - Reports good appetite, but albumin declining, down to 2.9 from 3.2 range.       9. Anemia - Hgb 8.9. Etiology is likely anemia of renal disease   - He should have routine colon cancer screening per protocol   - Iron studies 1/19: Fe 33, Ferritin 499, Tsat 15%   - Was enrolled in anemia management program for EARLENE, but has not required, so has been graduated. Will re enroll today   - Continue iron bid     10. Hypothyroidism - TSH  5.3. Was 6.4 October.   - Sent message to PCP     11. Severe HF w/moderate aortic stenosis - Per recent Echo.   - Patient ready to begin HD at any time, so any Cardiology studies needed for evaluation of Aortic stenosis can proceed.    - If Cardiology would like to increase BB could discontinue Amlodipine.   - Per Dr Ng 3/7/19:          \"  After discussion with  and reviewing cath, echo and CT TAVR results we have concluded that aortic valve is in the moderate range and he has high gradients across the aortic valve due to high output (he has AVF for dialysis). Therefore he doesnot need aortic valve replacement for now, we will keep an eye on the valve. He will be scheduled for PCI of LAD and Lcx by the interventional cardiology. \"      12. Disposition - RTC 2 months for f/u      Reason for Visit:  CKD5 follow up      HPI:  Dung Norton is a 73 year old male who developed CKD5 d/t long standing obstructive uropathy 2/2 BPH and benign bladder mass, HTN, now with moderate aortic stenosis and cardiomyopathy, in today for routine CKD f/u. Last seen in clinic by me on 2/13/19. Baseline in the 5 range and eGFR of 10-11 ml/mn over the past year.      Interval Hx:  No hospital admissions      ROS:  No complaints. Denies dyspnea, CP, abdominal concerns. UO unchanged, he continues to straight cath bid and voids spontaneously as well for total of " ~ 2000 ml/day. Energy is good. Quite active. He is living independently. Has good appetite.       Chronic Medical Problems:      HTN  Moderate aortic Stenosis  CM w/EF of 15%  P Afib  BPH  Obstructive uropathy   HTN  Tobacco abuse  HLD  Anemia  Pulmonary nodules  KRISTINA  CKD5      Personal Hx:   Single, has 2 daughters very involved in his care. Moved to a [place] [facility] in May 2018 and has adjusted well. He is socializing more, eating better, doing some community gardening. He is a former smoker    Family Hx:   Family History   Problem Relation Age of Onset     C.A.D. Mother         d age 67     Vision Loss Father      Hearing Loss Sister      Diabetes Sister      Cancer No family hx of      Glaucoma No family hx of      Macular Degeneration No family hx of        Allergies:  No Known Allergies    Medications:  Current Outpatient Medications   Medication Sig     sevelamer (RENAGEL) 800 MG tablet Take 1 tablet (800 mg) by mouth 3 times daily (with meals)     amLODIPine (NORVASC) 2.5 MG tablet TAKE 1 TABLET(2.5 MG) BY MOUTH DAILY     aspirin 81 MG tablet Take 1 tablet (81 mg) by mouth every morning - RESUME on 3/9/18 if urine remains clear     darbepoetin william (ARANESP, ALBUMIN FREE,) 100 MCG/0.5ML injection Inject 0.5 mLs (100 mcg) Subcutaneous every 14 days As needed for hgb<10g/dL.  If Hgb increases >1 point in 2 weeks (if blood transfusion given, use hgb PRIOR to this), SYSTOLIC BP > 180 mmHg or hgb>=10g/dL, HOLD DOSE. Dose must be within 1 week of Hgb.  Per anemia protocol with Cecilia De La Torre CNP (Patient not taking: Reported on 2/13/2019)     ferrous sulfate (FEROSUL) 325 (65 Fe) MG tablet Take 1 tablet (325 mg) by mouth 2 times daily     metoprolol succinate ER (TOPROL-XL) 25 MG 24 hr tablet Take 1 tablet (25 mg) by mouth daily     multivitamin w/minerals (MULTI-VITAMIN) tablet Take 1 tablet by mouth daily     Omega-3 Fatty Acids (FISH OIL PO) Take 1 capsule by mouth daily      order for DME Equipment being  ordered: olecranon bursa brace     sodium bicarbonate 650 MG tablet Take 1 tablet (650 mg) by mouth 2 times daily     No current facility-administered medications for this visit.       Vitals:  /68   Pulse 88   Wt 78.5 kg (173 lb)   SpO2 99%   BMI 24.82 kg/m      Exam:  GEN: Pleasant, Well groomed. Daughter present  CARDIAC RRR + murmur into right chest  LUNGS: CTA  ABDOMEN: Soft, NT  EXT: no edema  Access: CLIFFORD AVF + bruit, well developed, hand warm  Neuro: No asterixis. Alert/oriented    LABS:   CMP  Recent Labs   Lab Test 03/11/19  0954 03/04/19  1047 02/25/19  1012 02/18/19  0943 02/04/19  0928     143  --  140 142 141   POTASSIUM 4.4  4.4  --  4.5 4.7 4.9   CHLORIDE 110*  110*  --  106 108 112*   CO2 19*  19*  --  23 24 19*   ANIONGAP 13  14  --  11 10 10   *  143*  --  103* 96 141*   *  118*  --  116* 108* 118*   CR 5.46*  5.78*  --  5.88* 5.19* 5.41*   GFRESTIMATED 9*  9* 9* 9* 10* 10*   GFRESTBLACK 11*  10* 11* 10* 12* 11*   DERICK 8.4*  8.4*  --  8.2* 8.6 8.3*     Recent Labs   Lab Test 12/11/17  1018 08/28/17  1909 07/19/17  0537 07/12/17  1740   BILITOTAL 0.2 0.3 0.2 0.6   ALKPHOS 63 95 51 67   ALT 17 17 17 19   AST 13 9 32 12     CBC  Recent Labs   Lab Test 03/11/19  0954 02/25/19  1012 02/18/19  0943 02/04/19  0928  07/23/18  1002  04/09/18  1125  03/06/18  1012   HGB 8.9* 9.7* 10.1* 9.5*   < > 10.5*   < > 10.7*   < > 9.9*   WBC  --   --  8.1  --   --  9.4  --  8.8  --  9.1   RBC  --   --  3.78*  --   --  3.77*  --  3.87*  --  3.65*   HCT 29.3* 31.4* 32.6* 30.7*   < > 33.7*   < > 33.9*   < > 32.1*   MCV  --   --  86  --   --  89  --  88  --  88   MCH  --   --  26.7  --   --  27.9  --  27.6  --  27.1   MCHC  --   --  31.0*  --   --  31.2*  --  31.6  --  30.8*   RDW  --   --  15.3*  --   --  15.2*  --  15.9*  --  15.7*   PLT  --   --  266  --   --  256  --  220  --  196    < > = values in this interval not displayed.     URINE STUDIES  Recent Labs   Lab Test  04/18/18  1325 04/09/18  1400 01/25/18  1102 11/22/17 08/29/17  0235  01/15/14  0936   COLOR Yellow Yellow Red Yellow Light Yellow   < > Yellow   APPEARANCE Cloudy Cloudy Slightly Cloudy Clear Cloudy   < > Clear   URINEGLC Negative Negative Negative Neg Negative   < > Negative   URINEBILI Negative Negative Negative Neg Negative   < > Negative   URINEKETONE Negative Negative Negative Neg Negative   < > Negative   SG 1.015 1.015 1.013 1.010 1.014   < > 1.020   UBLD Large* Large* Large* Trace Moderate*   < > Small*   URINEPH 5.5 5.5 6.5 5.0 6.0   < > 6.0   PROTEIN 30* 30* 300* 30  100*   < > Negative   UROBILINOGEN 0.2 0.2  --  1   --   --  0.2   NITRITE Negative Negative Negative Neg Negative   < > Negative   LEUKEST Large* Large* Moderate* Small Large*   < > Negative   RBCU * 10-25* 14*  --  10*   < > 10-25*   WBCU >100* >100* >100*  --  >182*   < > O - 2    < > = values in this interval not displayed.     Recent Labs   Lab Test 02/25/19  0820 07/25/18  1128 07/02/18  0845 04/09/18  1400   UTPG 3.10* 1.26* 1.51* 1.17*     PTH  Recent Labs   Lab Test 02/18/19  0943 10/03/18  1301 07/23/18  1002   PTHI 134* 17* 14*     IRON STUDIES  Recent Labs   Lab Test 01/16/19  1255 09/24/18  0956 08/27/18  0934   IRON 33* 62 63   * 245 260   IRONSAT 15 25 24   * 458* 524*       Oralia De La Torre, INGE

## 2019-03-20 NOTE — NURSING NOTE
Chief Complaint   Patient presents with     RECHECK     4 weeks f/u     Blood pressure 104/68, pulse 88, weight 78.5 kg (173 lb), SpO2 99 %.    Odette Jensen, CMA

## 2019-03-20 NOTE — LETTER
3/20/2019       RE: Dung Norton  [id number]  [facility] [address]     Dear Colleague,    Thank you for referring your patient, Dung Norton, to the [facility] NEPHROLOGY at [facility]. Please see a copy of my visit note below.    Nephrology Clinic Visit 3/20/19    Assessment and Plan:       1. CKD5 - Very stable renal function. Creat 5.4, eGFR 9 ml/mn. 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 His GFR has been 10-11 ml/mn since 9/17 He is not uremic. Urine protein 1.5 g/gCr     - Patient and daughter decided upon HD as his RRT option.     - He has attended the Kidney Smart program    - He underwent AVF creation with Dr Eduardo Pabon on 11/22/17. It has matured nicely and ready to use when needed. Last seen by Dr Pabon on 1/2/18   - Given stability of renal function have been holding off on HD. We discussed initiating soon, but again he wants to wait as long as possible. He is leaving for a trip to [place] [employer] with his brother at the end of April/beginning of May. He is agreeable to discussing initiation following his trip. We did discuss his option of no dialysis w/natural death from renal failure, but he is not interested in that option at this time.    - Patient lives in [facility], when OP HD required would begin search in that vicinity   - He does not use NSAIDs   - He is straight cathing BID and voiding spontaneously w/o difficulty      2. Electrolytes - No acute concerns. K 4.4, Na 142      3. Volume status - Euvolemic. No edema, dyspnea. Albumin 2.9. Not on diuretics. Making ~ 2.0 liter/urine/day. He has gained body weight and weight has stabilized in the 78 kg range. Clinic blood pressures in the 104-111/67-74/ range.        - No need for diuretics at this time      4. HTN - Well controlled. Currently on Amlodipine 2.5 mg and Toprol XL 25 mg every day.    - If higher dose of BB required would discontinue Amlodipine       5. BPH  "- S/P TURP 3/18. He is straight cathing BID w/o difficulty. He is also voiding approx 1 liter/day as well. Follows with Dr Vanegas in Urology.       6. Acid base - Bicarb 19   - Continue bicarb 650 mg bid. May have to increase next visit   - Goal bicarb is > 20      7. BMD - Corrected Ca 9.2, Phos 5.2, albumin 2.9   - Vit D 41 (2/19)   -  (2/19)    - Continue Renagel 800 mg TID w/meals      8. Nutrition - Reports good appetite, but albumin declining, down to 2.9 from 3.2 range.       9. Anemia - Hgb  8.9. Etiology is likely anemia of renal disease   - He should have routine colon cancer screening per protocol   - Iron studies 1/19: Fe 33, Ferritin 499, Tsat 15%   - Was enrolled in anemia management program for EARLENE, but has not required, so has been graduated. Will re enroll today   - Continue iron bid     10. Hypothyroidism - TSH  5.3. Was 6.4 October.   - Sent message to PCP     11. Severe HF w/moderate aortic stenosis - Per recent Echo.   - Patient ready to begin HD at any time, so any Cardiology studies needed for evaluation of Aortic stenosis can proceed.    - If Cardiology would like to increase BB could discontinue Amlodipine.   - Per Dr Ng 3/7/19:          \"  After discussion with  and reviewing cath, echo and CT TAVR results we have concluded that aortic valve is in the moderate range and he has high gradients across the aortic valve due to high output (he has AVF for dialysis). Therefore he doesnot need aortic valve replacement for now, we will keep an eye on the valve. He will be scheduled for PCI of LAD and Lcx by the interventional cardiology. \"      12. Disposition - RTC 2 months for f/u      Reason for Visit:  CKD5 follow up      HPI:  Dung Norton is a 73 year old male who developed CKD5 d/t long standing obstructive uropathy 2/2 BPH and benign bladder mass, HTN, now with moderate aortic stenosis and cardiomyopathy, in today for routine CKD f/u. Last seen in clinic by me on  " 2/13/19. Baseline in the 5 range and eGFR of 10-11 ml/mn over the past year.      Interval Hx:  No hospital admissions      ROS:  No complaints. Denies dyspnea, CP, abdominal concerns. UO unchanged, he continues to straight cath bid and voids spontaneously as well for total of ~ 2000 ml/day. Energy is good. Quite active. He is living independently. Has good appetite.       Chronic Medical Problems:      HTN  Moderate aortic Stenosis  CM w/EF of 15%  P Afib  BPH  Obstructive uropathy   HTN  Tobacco abuse  HLD  Anemia  Pulmonary nodules  KRISTINA  CKD5      Personal Hx:   Single, has 2 daughters very involved in his care. Moved to a [place] in May 2018 and has adjusted well. He is socializing more, eating better, doing some community gardening. He is a former smoker    Family Hx:   Family History   Problem Relation Age of Onset     C.A.D. Mother         d age 67     Vision Loss Father      Hearing Loss Sister      Diabetes Sister      Cancer No family hx of      Glaucoma No family hx of      Macular Degeneration No family hx of        Allergies:  No Known Allergies    Medications:  Current Outpatient Medications   Medication Sig     sevelamer (RENAGEL) 800 MG tablet Take 1 tablet (800 mg) by mouth 3 times daily (with meals)     amLODIPine (NORVASC) 2.5 MG tablet TAKE 1 TABLET(2.5 MG) BY MOUTH DAILY     aspirin 81 MG tablet Take 1 tablet (81 mg) by mouth every morning - RESUME on 3/9/18 if urine remains clear     darbepoetin william (ARANESP, ALBUMIN FREE,) 100 MCG/0.5ML injection Inject 0.5 mLs (100 mcg) Subcutaneous every 14 days As needed for hgb<10g/dL.  If Hgb increases >1 point in 2 weeks (if blood transfusion given, use hgb PRIOR to this), SYSTOLIC BP > 180 mmHg or hgb>=10g/dL, HOLD DOSE. Dose must be within 1 week of Hgb.  Per anemia protocol with Cecilia De La Torre CNP (Patient not taking: Reported on 2/13/2019)     ferrous sulfate (FEROSUL) 325 (65 Fe) MG tablet Take 1 tablet (325 mg) by mouth 2 times daily      metoprolol succinate ER (TOPROL-XL) 25 MG 24 hr tablet Take 1 tablet (25 mg) by mouth daily     multivitamin w/minerals (MULTI-VITAMIN) tablet Take 1 tablet by mouth daily     Omega-3 Fatty Acids (FISH OIL PO) Take 1 capsule by mouth daily      order for DME Equipment being ordered: olecranon bursa brace     sodium bicarbonate 650 MG tablet Take 1 tablet (650 mg) by mouth 2 times daily     No current facility-administered medications for this visit.       Vitals:  /68   Pulse 88   Wt 78.5 kg (173 lb)   SpO2 99%   BMI 24.82 kg/m       Exam:  GEN: Pleasant, Well groomed. Daughter present  CARDIAC RRR + murmur into right chest  LUNGS: CTA  ABDOMEN: Soft, NT  EXT: no edema  Access: CLIFFORD AVF + bruit, well developed, hand warm  Neuro: No asterixis. Alert/oriented    LABS:   CMP  Recent Labs   Lab Test 03/11/19  0954 03/04/19  1047 02/25/19  1012 02/18/19  0943 02/04/19  0928     143  --  140 142 141   POTASSIUM 4.4  4.4  --  4.5 4.7 4.9   CHLORIDE 110*  110*  --  106 108 112*   CO2 19*  19*  --  23 24 19*   ANIONGAP 13  14  --  11 10 10   *  143*  --  103* 96 141*   *  118*  --  116* 108* 118*   CR 5.46*  5.78*  --  5.88* 5.19* 5.41*   GFRESTIMATED 9*  9* 9* 9* 10* 10*   GFRESTBLACK 11*  10* 11* 10* 12* 11*   DERICK 8.4*  8.4*  --  8.2* 8.6 8.3*     Recent Labs   Lab Test 12/11/17  1018 08/28/17  1909 07/19/17  0537 07/12/17  1740   BILITOTAL 0.2 0.3 0.2 0.6   ALKPHOS 63 95 51 67   ALT 17 17 17 19   AST 13 9 32 12     CBC  Recent Labs   Lab Test 03/11/19  0954 02/25/19  1012 02/18/19  0943 02/04/19  0928  07/23/18  1002  04/09/18  1125  03/06/18  1012   HGB 8.9* 9.7* 10.1* 9.5*   < > 10.5*   < > 10.7*   < > 9.9*   WBC  --   --  8.1  --   --  9.4  --  8.8  --  9.1   RBC  --   --  3.78*  --   --  3.77*  --  3.87*  --  3.65*   HCT 29.3* 31.4* 32.6* 30.7*   < > 33.7*   < > 33.9*   < > 32.1*   MCV  --   --  86  --   --  89  --  88  --  88   MCH  --   --  26.7  --   --  27.9  --  27.6  --   27.1   MCHC  --   --  31.0*  --   --  31.2*  --  31.6  --  30.8*   RDW  --   --  15.3*  --   --  15.2*  --  15.9*  --  15.7*   PLT  --   --  266  --   --  256  --  220  --  196    < > = values in this interval not displayed.     URINE STUDIES  Recent Labs   Lab Test 04/18/18  1325 04/09/18  1400 01/25/18  1102 11/22/17 08/29/17  0235  01/15/14  0936   COLOR Yellow Yellow Red Yellow Light Yellow   < > Yellow   APPEARANCE Cloudy Cloudy Slightly Cloudy Clear Cloudy   < > Clear   URINEGLC Negative Negative Negative Neg Negative   < > Negative   URINEBILI Negative Negative Negative Neg Negative   < > Negative   URINEKETONE Negative Negative Negative Neg Negative   < > Negative   SG 1.015 1.015 1.013 1.010 1.014   < > 1.020   UBLD Large* Large* Large* Trace Moderate*   < > Small*   URINEPH 5.5 5.5 6.5 5.0 6.0   < > 6.0   PROTEIN 30* 30* 300* 30  100*   < > Negative   UROBILINOGEN 0.2 0.2  --  1   --   --  0.2   NITRITE Negative Negative Negative Neg Negative   < > Negative   LEUKEST Large* Large* Moderate* Small Large*   < > Negative   RBCU * 10-25* 14*  --  10*   < > 10-25*   WBCU >100* >100* >100*  --  >182*   < > O - 2    < > = values in this interval not displayed.     Recent Labs   Lab Test 02/25/19  0820 07/25/18  1128 07/02/18  0845 04/09/18  1400   UTPG 3.10* 1.26* 1.51* 1.17*     PTH  Recent Labs   Lab Test 02/18/19  0943 10/03/18  1301 07/23/18  1002   PTHI 134* 17* 14*     IRON STUDIES  Recent Labs   Lab Test 01/16/19  1255 09/24/18  0956 08/27/18  0934   IRON 33* 62 63   * 245 260   IRONSAT 15 25 24   * 458* 524*       Oralia De La Torre NP      Again, thank you for allowing me to participate in the care of your patient.      Sincerely,    Oralia De La Torre NP

## 2019-03-21 ENCOUNTER — TELEPHONE (OUTPATIENT)
Dept: PHARMACY | Facility: CLINIC | Age: 74
End: 2019-03-21

## 2019-03-21 DIAGNOSIS — D63.1 ANEMIA OF CHRONIC RENAL FAILURE, STAGE 5 (H): Primary | ICD-10-CM

## 2019-03-21 DIAGNOSIS — N18.5 CKD (CHRONIC KIDNEY DISEASE) STAGE 5, GFR LESS THAN 15 ML/MIN (H): ICD-10-CM

## 2019-03-21 DIAGNOSIS — N18.5 ANEMIA OF CHRONIC RENAL FAILURE, STAGE 5 (H): Primary | ICD-10-CM

## 2019-03-21 PROBLEM — I25.10 CORONARY ARTERY DISEASE INVOLVING NATIVE CORONARY ARTERY, ANGINA PRESENCE UNSPECIFIED, UNSPECIFIED WHETHER NATIVE OR TRANSPLANTED HEART: Status: ACTIVE | Noted: 2019-03-21

## 2019-03-21 NOTE — TELEPHONE ENCOUNTER
Anemia Management Note - Enrollment  SUBJECTIVE/OBJECTIVE:    Referred by Cecilia De La Torre on 03/20/2019  Primary Diagnosis: Anemia in Chronic Kidney Disease (N18.5, D63.1)     Secondary Diagnosis:  Chronic Kidney Disease, Stage 5 (N18.5)  Hgb goal range:  9-10  Epo/Darbo: Aranesp 60mcg every 14 days As needed for hgb<10g/dL  *has expressed that he does  Not want to receive Aranesp until Hgb drops below 9. *  RX expires on 03/20/2020  Iron regimen:  None  Labs exp: 03/20/2020  Drawn every 2 weeks on Mondays at [facility].     **Provide phone number for [facility] [phone] and instruction to schedule ancillary visit for aranesp injection. Send message to care team via pool -  RN TRIAGE POOL as a telephone encounter**    Possibly starting HD in May 2019      Contact: **AUTH TO DISCUSS**Tereza Norton(daughter) [phone], Rabia Jazzy (daughter)[phone]  Anemia Latest Ref Rng & Units 1/7/2019 1/16/2019 1/21/2019 2/4/2019 2/18/2019 2/25/2019 3/11/2019   EARLENE Dose - - - - - - - -   Hemoglobin 13.3 - 17.7 g/dL 11.2(L) 10.7(L) 9.7(L) 9.5(L) 10.1(L) 9.7(L) 8.9(L)   TSAT 15 - 46 % - 15 - - - - -   Ferritin 26 - 388 ng/mL - 499(H) - - - - -       BP Readings from Last 3 Encounters:   03/20/19 104/68   03/11/19 110/64   03/04/19 122/78     Wt Readings from Last 2 Encounters:   03/20/19 173 lb (78.5 kg)   02/22/19 172 lb (78 kg)     Current Outpatient Medications   Medication Sig Dispense Refill     amLODIPine (NORVASC) 2.5 MG tablet TAKE 1 TABLET(2.5 MG) BY MOUTH DAILY 90 tablet 0     aspirin 81 MG tablet Take 1 tablet (81 mg) by mouth every morning - RESUME on 3/9/18 if urine remains clear 30 tablet 0     darbepoetin william (ARANESP, ALBUMIN FREE,) 100 MCG/0.5ML injection Inject 0.5 mLs (100 mcg) Subcutaneous every 14 days As needed for hgb<10g/dL.  If Hgb increases >1 point in 2 weeks (if blood transfusion given, use hgb PRIOR to this), SYSTOLIC BP > 180 mmHg or hgb>=10g/dL, HOLD DOSE. Dose must be within 1 week  of Hgb.  Per anemia protocol with Cecilia De La Torre CNP (Patient not taking: Reported on 2/13/2019) 0.5 mL 99     ferrous sulfate (FEROSUL) 325 (65 Fe) MG tablet Take 1 tablet (325 mg) by mouth 2 times daily 30 tablet 11     metoprolol succinate ER (TOPROL-XL) 25 MG 24 hr tablet Take 1 tablet (25 mg) by mouth daily 90 tablet 3     multivitamin w/minerals (MULTI-VITAMIN) tablet Take 1 tablet by mouth daily       Omega-3 Fatty Acids (FISH OIL PO) Take 1 capsule by mouth daily        order for DME Equipment being ordered: olecranon bursa brace 1 each 0     sevelamer (RENAGEL) 800 MG tablet Take 1 tablet (800 mg) by mouth 3 times daily (with meals) 180 tablet 3     sodium bicarbonate 650 MG tablet Take 1 tablet (650 mg) by mouth 2 times daily 180 tablet 1     ASSESSMENT:  Hgb Not at goal/Initiation of therapy   Ferritin: Due for labs  TSat: Due for labs  Iron regimen recommended: NA  Recommended EARLENE regimen to initiate when Hgb < 9  Blood Pressure: Stable    PLAN:  1. Patient called today for enrollment in Anemia Management Service.  2. Discussed:  anemia overview, monitoring service and goal hemoglobin range and rationale and risks of EARLENE blood clots, stroke and increase in blood pressure  3. Dose location: in clinic TBD  4. Labs: [place]  5. Pharmacy: NA    Pt was not able to discuss labs and plan going forward, requested a call back tomorrow.     3/22/19: Pt will have his labs drawn again on Monday 3/25. Pt would like to review labs prior to starting Aranesp again. He will be home after 1pm on Monday to discuss plan.       Patient verbalized understanding of the plan.     Next call date:  03/22/2019  3/25/19  After 1pm.     Anemia Management Service  Daina Mock,GabriellaD, BCACP and Malina Hussein CPhT  Phone: [phone]  Fax: [phone]

## 2019-03-25 ENCOUNTER — ALLIED HEALTH/NURSE VISIT (OUTPATIENT)
Dept: FAMILY MEDICINE | Facility: CLINIC | Age: 74
End: 2019-03-25

## 2019-03-25 VITALS — SYSTOLIC BLOOD PRESSURE: 130 MMHG | DIASTOLIC BLOOD PRESSURE: 76 MMHG

## 2019-03-25 DIAGNOSIS — N18.5 CKD (CHRONIC KIDNEY DISEASE) STAGE 5, GFR LESS THAN 15 ML/MIN (H): ICD-10-CM

## 2019-03-25 DIAGNOSIS — N18.5 ANEMIA OF CHRONIC RENAL FAILURE, STAGE 5 (H): ICD-10-CM

## 2019-03-25 DIAGNOSIS — Z01.30 BP CHECK: Primary | ICD-10-CM

## 2019-03-25 DIAGNOSIS — D63.1 ANEMIA OF CHRONIC RENAL FAILURE, STAGE 5 (H): ICD-10-CM

## 2019-03-25 LAB
FERRITIN SERPL-MCNC: 351 NG/ML (ref 26–388)
HCT VFR BLD AUTO: 32.9 % (ref 40–53)
HGB BLD-MCNC: 10 G/DL (ref 13.3–17.7)
IRON SATN MFR SERPL: 13 % (ref 15–46)
IRON SERPL-MCNC: 31 UG/DL (ref 35–180)
TIBC SERPL-MCNC: 247 UG/DL (ref 240–430)

## 2019-03-25 PROCEDURE — 82728 ASSAY OF FERRITIN: CPT

## 2019-03-25 PROCEDURE — 83550 IRON BINDING TEST: CPT

## 2019-03-25 PROCEDURE — 99207 ZZC NO CHARGE NURSE ONLY: CPT | Performed by: FAMILY MEDICINE

## 2019-03-25 PROCEDURE — 83540 ASSAY OF IRON: CPT

## 2019-03-25 PROCEDURE — 36415 COLL VENOUS BLD VENIPUNCTURE: CPT

## 2019-03-25 PROCEDURE — 85018 HEMOGLOBIN: CPT

## 2019-03-25 PROCEDURE — 85014 HEMATOCRIT: CPT

## 2019-03-25 NOTE — PROGRESS NOTES
Dung Norton is enrolled/participating in the retail pharmacy Blood Pressure Goals Achievement Program (BPGAP).  Dung Norton was evaluated at [facility] on March 25, 2019 at which time his blood pressure was:    BP Readings from Last 3 Encounters:   03/25/19 130/76   03/20/19 104/68   03/11/19 110/64     Reviewed lifestyle modifications for blood pressure control and reduction: including making healthy food choices, managing weight, getting regular exercise, smoking cessation, reducing alcohol consumption, monitoring blood pressure regularly.     Dung Norton is not experiencing symptoms.    Follow-Up: BP is at goal of < 140/90mmHg (patient 18+ years of age with or without diabetes).  Recommended follow-up at pharmacy in 6 months.     Recommendation to Provider    This note completed by:Ricarda Andino Rph  [facility]  [address] [place] [address] [place] [id number]  [phone]

## 2019-03-25 NOTE — Clinical Note
Blood pressure in pharmacy 130/76Ricarda Andino [facility] [place] [address] [place], [place] [address] [phone]

## 2019-03-26 ENCOUNTER — TELEPHONE (OUTPATIENT)
Dept: PHARMACY | Facility: CLINIC | Age: 74
End: 2019-03-26

## 2019-03-26 NOTE — TELEPHONE ENCOUNTER
Anemia Management Note  SUBJECTIVE/OBJECTIVE:  Referred by Cecilia De La Torre on 03/20/2019  Primary Diagnosis: Anemia in Chronic Kidney Disease (N18.5, D63.1)     Secondary Diagnosis:  Chronic Kidney Disease, Stage 5 (N18.5)  Hgb goal range:  9-10  Epo/Darbo: Aranesp 60mcg every 14 days As needed for hgb<10g/dL  *has expressed that he does  Not want to receive Aranesp until Hgb drops below 9. *  RX expires on 03/20/2020  Iron regimen:  None  Labs exp: 03/20/2020  Drawn every 2 weeks on Mondays at [facility].      **Provide phone number for [facility] [phone] and instruction to schedule ancillary visit for aranesp injection. Send message to care team via pool -  RN TRIAGE POOL as a telephone encounter**     Possibly starting HD in May 2019      Contact: **AUTH TO DISCUSS**Tereza Norton(daughter) [phone], Rabia Jazzy (daughter)[phone]    Anemia Latest Ref Rng & Units 1/16/2019 1/21/2019 2/4/2019 2/18/2019 2/25/2019 3/11/2019 3/25/2019   EARLENE Dose - - - - - - - -   Hemoglobin 13.3 - 17.7 g/dL 10.7(L) 9.7(L) 9.5(L) 10.1(L) 9.7(L) 8.9(L) 10.0(L)   TSAT 15 - 46 % 15 - - - - - 13(L)   Ferritin 26 - 388 ng/mL 499(H) - - - - - 351     BP Readings from Last 3 Encounters:   03/25/19 130/76   03/20/19 104/68   03/11/19 110/64     Wt Readings from Last 2 Encounters:   03/20/19 173 lb (78.5 kg)   02/22/19 172 lb (78 kg)       Spoke with Dung, he will increase his Iron Pills to twice daily. One with breakfast and one with dinner and recheck his Iron labs in 4 weeks.  He will have his Hgb drawn in 2 weeks.    ASSESSMENT:  Hgb:at goal - continue to monitor  TSat: not at goal of >30% Ferritin: At goal (>100ng/mL)    PLAN:  RTC for Hgb in 2 week(s) and Check iron studies in 4 week(s)    Orders needed to be renewed (for next follow-up date) in EPIC: None    Iron labs due:  4/22/2019    Plan discussed with:  Dung  Plan provided by:  byron    NEXT FOLLOW-UP DATE:  4/9/2019 4/9/19; Lab appt sched for 4/19/19  7:30a  4/19/19; Appt was for today was canceled and rescheduled for 4/22 at 11am.     Anemia Management Service  Zainab Barragan RN  Phone: [phone]  Fax: [phone]

## 2019-03-27 DIAGNOSIS — I12.9 RENAL HYPERTENSION: ICD-10-CM

## 2019-03-27 NOTE — TELEPHONE ENCOUNTER
Requested Prescriptions   Pending Prescriptions Disp Refills     amLODIPine (NORVASC) 2.5 MG tablet 90 tablet 0     Sig: Take 1 tablet (2.5 mg) by mouth daily    There is no refill protocol information for this order

## 2019-03-28 RX ORDER — AMLODIPINE BESYLATE 2.5 MG/1
2.5 TABLET ORAL DAILY
Qty: 90 TABLET | Refills: 3 | Status: ON HOLD | OUTPATIENT
Start: 2019-03-28 | End: 2019-04-22

## 2019-03-28 NOTE — TELEPHONE ENCOUNTER
"Routing refill request to provider for review/approval because:  Labs out of range:        Requested Prescriptions   Pending Prescriptions Disp Refills     amLODIPine (NORVASC) 2.5 MG tablet  Last Written Prescription Date:  12/31/18  Last Fill Quantity: 90,  # refills: 0   Last office visit: 3/1/2018 with prescribing provider:     Future Office Visit:   Next 5 appointments (look out 90 days)    May 07, 2019  1:30 PM CDT  Return Visit with Lizandro Ng MD  [facility] AT [facility] ([facility] [address] [place] [id number] [phone]          90 tablet 0     Sig: Take 1 tablet (2.5 mg) by mouth daily    Calcium Channel Blockers Protocol  Failed - 3/27/2019  2:31 PM       Failed - Normal serum creatinine on file in past 12 months    Recent Labs   Lab Test 03/11/19  0954 03/04/19  1047   CR 5.46*  5.78*  --    CREAT  --  6.0*            Passed - Blood pressure under 140/90 in past 12 months    BP Readings from Last 3 Encounters:   03/25/19 130/76   03/20/19 104/68   03/11/19 110/64                Passed - Recent (12 mo) or future (30 days) visit within the authorizing provider's specialty    Patient had office visit in the last 12 months or has a visit in the next 30 days with authorizing provider or within the authorizing provider's specialty.  See \"Patient Info\" tab in inbasket, or \"Choose Columns\" in Meds & Orders section of the refill encounter.             Passed - Medication is active on med list       Passed - Patient is age 18 or older        Sarahi Carroll RN - BC      "

## 2019-04-16 ENCOUNTER — TELEPHONE (OUTPATIENT)
Dept: NEPHROLOGY | Facility: CLINIC | Age: 74
End: 2019-04-16

## 2019-04-16 ENCOUNTER — ALLIED HEALTH/NURSE VISIT (OUTPATIENT)
Dept: FAMILY MEDICINE | Facility: CLINIC | Age: 74
End: 2019-04-16

## 2019-04-16 ENCOUNTER — OFFICE VISIT (OUTPATIENT)
Dept: FAMILY MEDICINE | Facility: CLINIC | Age: 74
End: 2019-04-16
Payer: COMMERCIAL

## 2019-04-16 VITALS
OXYGEN SATURATION: 98 % | SYSTOLIC BLOOD PRESSURE: 122 MMHG | WEIGHT: 163 LBS | DIASTOLIC BLOOD PRESSURE: 62 MMHG | HEIGHT: 70 IN | TEMPERATURE: 97.8 F | HEART RATE: 85 BPM | BODY MASS INDEX: 23.34 KG/M2 | RESPIRATION RATE: 16 BRPM

## 2019-04-16 VITALS — SYSTOLIC BLOOD PRESSURE: 136 MMHG | DIASTOLIC BLOOD PRESSURE: 72 MMHG

## 2019-04-16 DIAGNOSIS — Z12.11 SCREEN FOR COLON CANCER: ICD-10-CM

## 2019-04-16 DIAGNOSIS — N18.6 ESRD (END STAGE RENAL DISEASE) ON DIALYSIS (H): Primary | ICD-10-CM

## 2019-04-16 DIAGNOSIS — I10 HYPERTENSION GOAL BP (BLOOD PRESSURE) < 140/90: Primary | ICD-10-CM

## 2019-04-16 DIAGNOSIS — D63.1 ANEMIA OF CHRONIC RENAL FAILURE, STAGE 5 (H): ICD-10-CM

## 2019-04-16 DIAGNOSIS — I35.0 NONRHEUMATIC AORTIC VALVE STENOSIS: ICD-10-CM

## 2019-04-16 DIAGNOSIS — I12.9 RENAL HYPERTENSION: ICD-10-CM

## 2019-04-16 DIAGNOSIS — E78.5 HYPERLIPIDEMIA LDL GOAL <70: ICD-10-CM

## 2019-04-16 DIAGNOSIS — Z99.2 ESRD (END STAGE RENAL DISEASE) ON DIALYSIS (H): Primary | ICD-10-CM

## 2019-04-16 DIAGNOSIS — I25.10 CORONARY ARTERY DISEASE INVOLVING NATIVE CORONARY ARTERY, ANGINA PRESENCE UNSPECIFIED, UNSPECIFIED WHETHER NATIVE OR TRANSPLANTED HEART: ICD-10-CM

## 2019-04-16 DIAGNOSIS — I50.22 CHRONIC SYSTOLIC HEART FAILURE (H): ICD-10-CM

## 2019-04-16 DIAGNOSIS — N18.5 CKD (CHRONIC KIDNEY DISEASE) STAGE 5, GFR LESS THAN 15 ML/MIN (H): ICD-10-CM

## 2019-04-16 DIAGNOSIS — N18.5 ANEMIA OF CHRONIC RENAL FAILURE, STAGE 5 (H): ICD-10-CM

## 2019-04-16 LAB
ANION GAP SERPL CALCULATED.3IONS-SCNC: 10 MMOL/L (ref 3–14)
BUN SERPL-MCNC: 94 MG/DL (ref 7–30)
CALCIUM SERPL-MCNC: 8.5 MG/DL (ref 8.5–10.1)
CHLORIDE SERPL-SCNC: 109 MMOL/L (ref 94–109)
CHOLEST SERPL-MCNC: 71 MG/DL
CO2 SERPL-SCNC: 24 MMOL/L (ref 20–32)
CREAT SERPL-MCNC: 5.16 MG/DL (ref 0.66–1.25)
ERYTHROCYTE [DISTWIDTH] IN BLOOD BY AUTOMATED COUNT: 17 % (ref 10–15)
GFR SERPL CREATININE-BSD FRML MDRD: 10 ML/MIN/{1.73_M2}
GLUCOSE SERPL-MCNC: 94 MG/DL (ref 70–99)
HCT VFR BLD AUTO: 32.5 % (ref 40–53)
HDLC SERPL-MCNC: 42 MG/DL
HGB BLD-MCNC: 9.9 G/DL (ref 13.3–17.7)
LDLC SERPL CALC-MCNC: 12 MG/DL
MCH RBC QN AUTO: 27 PG (ref 26.5–33)
MCHC RBC AUTO-ENTMCNC: 30.5 G/DL (ref 31.5–36.5)
MCV RBC AUTO: 89 FL (ref 78–100)
NONHDLC SERPL-MCNC: 29 MG/DL
PLATELET # BLD AUTO: 184 10E9/L (ref 150–450)
POTASSIUM SERPL-SCNC: 3.9 MMOL/L (ref 3.4–5.3)
RBC # BLD AUTO: 3.67 10E12/L (ref 4.4–5.9)
SODIUM SERPL-SCNC: 143 MMOL/L (ref 133–144)
TRIGL SERPL-MCNC: 85 MG/DL
WBC # BLD AUTO: 9.1 10E9/L (ref 4–11)

## 2019-04-16 PROCEDURE — 80061 LIPID PANEL: CPT | Performed by: FAMILY MEDICINE

## 2019-04-16 PROCEDURE — 85027 COMPLETE CBC AUTOMATED: CPT | Performed by: INTERNAL MEDICINE

## 2019-04-16 PROCEDURE — 36415 COLL VENOUS BLD VENIPUNCTURE: CPT | Performed by: FAMILY MEDICINE

## 2019-04-16 PROCEDURE — 99207 ZZC NO CHARGE NURSE ONLY: CPT | Performed by: FAMILY MEDICINE

## 2019-04-16 PROCEDURE — 80048 BASIC METABOLIC PNL TOTAL CA: CPT | Performed by: INTERNAL MEDICINE

## 2019-04-16 PROCEDURE — 99214 OFFICE O/P EST MOD 30 MIN: CPT | Performed by: FAMILY MEDICINE

## 2019-04-16 RX ORDER — ROSUVASTATIN CALCIUM 40 MG/1
40 TABLET, COATED ORAL AT BEDTIME
COMMUNITY
Start: 2019-04-16 | End: 2019-05-06

## 2019-04-16 RX ORDER — METOPROLOL SUCCINATE 50 MG/1
50 TABLET, EXTENDED RELEASE ORAL DAILY
Qty: 90 TABLET | Refills: 3 | Status: ON HOLD | COMMUNITY
Start: 2019-04-16 | End: 2019-04-26

## 2019-04-16 RX ORDER — BUMETANIDE 2 MG/1
2 TABLET ORAL
COMMUNITY
Start: 2019-04-08 | End: 2019-05-06

## 2019-04-16 ASSESSMENT — MIFFLIN-ST. JEOR: SCORE: 1480.61

## 2019-04-16 NOTE — PROGRESS NOTES
Dung Norton was evaluated at [facility] on April 16, 2019 at which time his blood pressure was:    BP Readings from Last 3 Encounters:   04/16/19 136/72   03/25/19 130/76   03/20/19 104/68     Pulse Readings from Last 3 Encounters:   03/20/19 88   02/22/19 83   02/13/19 80       Reviewed lifestyle modifications for blood pressure control and reduction: including making healthy food choices, managing weight, getting regular exercise, smoking cessation, reducing alcohol consumption, monitoring blood pressure regularly.     Symptoms: None    BP Goal:< 140/90 mmHg    BP Assessment:  BP at goal    Potential Reasons for BP too high: NA - Not applicable    BP Follow-Up Plan: Recheck BP in 6 months at pharmacy    Recommendation to Provider: None at this time.     Note completed by: Thank you,  Cheri Winslow, PharmD  [facility]  [phone]

## 2019-04-16 NOTE — PROGRESS NOTES
SUBJECTIVE:   Dung Norton is a 73 year old male who presents to clinic today for the following   health issues:        Hospital Follow-up Visit:    Hospital/Nursing Home/IP Rehab Facility: [facility]  Date of Admission: 04/06/19  Date of Discharge: 04/08/19  Reason(s) for Admission: Acute systolic CHF (congestive heart failure) (HC)     ER notes reviewed        Problems taking medications regularly:  None       Medication changes since discharge: Medication Rec complete       Problems adhering to non-medication therapy:  None  Summary of hospitalization:  CareEverywhere information obtained and reviewed  Diagnostic Tests/Treatments reviewed.  Follow up needed: cardiology  Other Healthcare Providers Involved in Patient s Care:         Specialist appointment - renal and cardiology  Update since discharge: improved.     Post Discharge Medication Reconciliation: discharge medications reconciled, continue medications without change.  Plan of care communicated with patient     Coding guidelines for this visit:  Type of Medical   Decision Making Face-to-Face Visit       within 7 Days of discharge Face-to-Face Visit        within 14 days of discharge   Moderate Complexity [id number]   High Complexity [id number]              Pt sees Renal -will be starting Dialysis in future  Pt has aortic stenosis and scheduled for AVR    Additional history: as documented    Reviewed  and updated as needed this visit by clinical staff         Reviewed and updated as needed this visit by Provider         Patient Active Problem List   Diagnosis     CARDIOVASCULAR SCREENING; LDL GOAL LESS THAN 130     Hypertension goal BP (blood pressure) < 140/90     Advanced directives, counseling/discussion     Elevated fasting glucose     Hypertriglyceridemia     Symptomatic anemia     Pulmonary nodules     KRISTINA (acute kidney injury) (H)     Anemia of chronic renal failure, stage 5 (H)     Orthostatic hypotension     Acute renal failure,  unspecified acute renal failure type (H)     End stage renal disease (H)     Acquired hypothyroidism     Obstructive uropathy     CKD (chronic kidney disease) stage 5, GFR less than 15 ml/min (H)     Ex-smoker     Thoracic aortic aneurysm without rupture (H)     Nonrheumatic aortic valve stenosis     Olecranon bursitis of left elbow     Urinary tract infection     S/P transurethral resection of prostate     Nuclear sclerosis of both eyes     Chronic systolic heart failure (H)     Status post coronary angiogram     Coronary artery disease involving native coronary artery, angina presence unspecified, unspecified whether native or transplanted heart     Past Surgical History:   Procedure Laterality Date     APPENDECTOMY  AGE 6     CREATE FISTULA ARTERIOVENOUS UPPER EXTREMITY Left 11/17/2017    Procedure: CREATE FISTULA ARTERIOVENOUS UPPER EXTREMITY;  Left Cephalic Vein To Radial Artery Arteriovenous Fistula Creation, Anesthesia Block;  Surgeon: Eduardo Pabon MD;  Location: [facility] OR     CV CORONARY ANGIOGRAM N/A 2/22/2019    Procedure: 1100 CORS, RHC; BI-PLANE;  Surgeon: Gabino Collins MD;  Location: [facility] HEART CARDIAC CATH LAB     CYSTOSCOPY, FULGURATE BLEEDERS, EVACUATE CLOT(S), COMBINED N/A 7/16/2017    Procedure: COMBINED CYSTOSCOPY, FULGURATE BLEEDERS, EVACUATE CLOT(S);  Cystoscopy clot evacuation, bladder biopsy and fulguration (per surgeons note) NERVE BLOCK PERIPHERAL;  Surgeon: Tamiko Vanegas MD;  Location: [facility] OR     CYSTOSCOPY, TRANSURETHRAL RESECTION (TUR) PROSTATE, COMBINED N/A 3/5/2018    Procedure: COMBINED CYSTOSCOPY, TRANSURETHRAL RESECTION (TUR) PROSTATE;  Cystoscopy, Transurethral Resection of Prostate ;  Surgeon: Tamiko Vanegas MD;  Location: [facility] OR     SINUS SURGERY  AGE 6     TONSILLECTOMY      CHILDHOOD       Social History     Tobacco Use     Smoking status: Former Smoker     Packs/day: 1.00     Years: 53.00     Pack years: 53.00     Types: Cigarettes     Last attempt to  quit: 2017     Years since quittin.8     Smokeless tobacco: Never Used   Substance Use Topics     Alcohol use: Yes     Comment: rare, drink every few months     Family History   Problem Relation Age of Onset     C.A.D. Mother         d age 67     Vision Loss Father      Hearing Loss Sister      Diabetes Sister      Cancer No family hx of      Glaucoma No family hx of      Macular Degeneration No family hx of          Current Outpatient Medications   Medication Sig Dispense Refill     aspirin 81 MG tablet Take 1 tablet (81 mg) by mouth every morning - RESUME on 3/9/18 if urine remains clear 30 tablet 0     bumetanide (BUMEX) 2 MG tablet Take 2 mg by mouth Take 1 tablet by mouth 2 times daily       ferrous sulfate (FEROSUL) 325 (65 Fe) MG tablet Take 1 tablet (325 mg) by mouth 2 times daily 30 tablet 11     metoprolol succinate ER (TOPROL-XL) 50 MG 24 hr tablet Take 1 tablet (50 mg) by mouth daily 90 tablet 3     multivitamin w/minerals (MULTI-VITAMIN) tablet Take 1 tablet by mouth daily       Omega-3 Fatty Acids (FISH OIL PO) Take 1 capsule by mouth daily        order for DME Equipment being ordered: olecranon bursa brace 1 each 0     rosuvastatin (CRESTOR) 40 MG tablet Take 1 tablet (40 mg) by mouth daily       sevelamer (RENAGEL) 800 MG tablet Take 1 tablet (800 mg) by mouth 3 times daily (with meals) 180 tablet 3     sodium bicarbonate 650 MG tablet Take 1 tablet (650 mg) by mouth 2 times daily 180 tablet 1     amLODIPine (NORVASC) 2.5 MG tablet Take 1 tablet (2.5 mg) by mouth daily (Patient not taking: Reported on 2019) 90 tablet 3     darbepoetin william (ARANESP, ALBUMIN FREE,) 100 MCG/0.5ML injection Inject 0.5 mLs (100 mcg) Subcutaneous every 14 days As needed for hgb<10g/dL.  If Hgb increases >1 point in 2 weeks (if blood transfusion given, use hgb PRIOR to this), SYSTOLIC BP > 180 mmHg or hgb>=10g/dL, HOLD DOSE. Dose must be within 1 week of Hgb.  Per anemia protocol with Cecilia De La Torre CNP (Patient  not taking: Reported on 2/13/2019) 0.5 mL 99     No Known Allergies  Recent Labs   Lab Test 03/11/19  0954 03/04/19  1047 02/25/19  1012  01/16/19  1255  10/03/18  1301  12/11/17  1018  09/25/17  1028  08/31/17  1738  08/28/17  1909  07/19/17  0537  02/24/16  1044 02/02/15  1046 01/15/14  0950   A1C  --   --   --   --   --   --   --   --   --   --   --   --  5.6  --   --   --   --   --  5.4  --  5.3   LDL  --   --   --   --   --   --   --   --   --   --  100*  --   --   --   --   --   --   --  114* 128 119   HDL  --   --   --   --   --   --   --   --   --   --  38*  --   --   --   --   --   --   --  29* 32* 33*   TRIG  --   --   --   --   --   --   --   --   --   --  114  --   --   --   --   --   --   --  199* 229* 230*   ALT  --   --   --   --   --   --   --   --  17  --   --   --   --   --  17  --  17   < >  --   --  29   CR 5.46*  5.78*  --  5.88*   < > 5.44*   < > 5.22*   < > 5.20*   < > 5.32*   < >  --    < > 7.80*   < > 7.19*   < > 2.56* 1.10 0.91   GFRESTIMATED 9*  9* 9* 9*   < > 9*   < > 11*   < > 11*   < > 11*   < >  --    < > 7*   < > 8*   < > 25* 66 82   GFRESTBLACK 11*  10* 11* 10*   < > 11*   < > 13*   < > 13*   < > 13*   < >  --    < > 8*   < > 9*   < > 30* 80 >90   POTASSIUM 4.4  4.4  --  4.5   < > 5.0   < > 5.1   < > 4.3   < > 3.9   < >  --    < > 4.7   < > 5.2   < > 3.4 3.5 4.0   TSH  --   --   --   --  5.36*  --  6.47*  --   --   --   --    < >  --   --   --   --   --    < >  --   --   --     < > = values in this interval not displayed.      BP Readings from Last 3 Encounters:   04/16/19 122/62   04/16/19 136/72   03/25/19 130/76    Wt Readings from Last 3 Encounters:   04/16/19 73.9 kg (163 lb)   03/20/19 78.5 kg (173 lb)   02/22/19 78 kg (172 lb)                  Labs reviewed in EPIC    ROS:  CONSTITUTIONAL: NEGATIVE for fever, chills, change in weight  ENT/MOUTH: NEGATIVE for ear, mouth and throat problems  RESP: NEGATIVE for significant cough or SOB  CV: NEGATIVE for chest pain,  "palpitations or peripheral edema  GI: NEGATIVE for nausea, abdominal pain, heartburn, or change in bowel habits  NEURO: NEGATIVE for weakness, dizziness or paresthesias  HEME/ALLERGY/IMMUNE: NEGATIVE for bleeding problems  PSYCHIATRIC: NEGATIVE for changes in mood or affect  Rest of the ROS is Negative except see above and Problem list [stable]      OBJECTIVE:     /62   Pulse 85   Temp 97.8  F (36.6  C) (Oral)   Resp 16   Ht 1.778 m (5' 10\")   Wt 73.9 kg (163 lb)   SpO2 98%   BMI 23.39 kg/m    Body mass index is 23.39 kg/m .  GENERAL: healthy, alert and no distress  EYES: Eyes grossly normal to inspection, PERRL and conjunctivae and sclerae normal  NECK: no adenopathy, no asymmetry, masses, or scars and thyroid normal to palpation  RESP: lungs clear to auscultation - no rales, rhonchi or wheezes  CV: regular rates and rhythm, normal S1 S2, no S3 or S4, grade 3-4 /6 SEB murmur heard best over the LSB, peripheral pulses strong and no peripheral edema  ABDOMEN: soft, nontender, no hepatosplenomegaly, no masses and bowel sounds normal  MS: no gross musculoskeletal defects noted, no edema  PSYCH: mentation appears normal, affect normal/bright    Diagnostic Test Results:  Pending     ASSESSMENT/PLAN:     1. Chronic systolic heart failure (H)  Doing better on Bumex  BMP pending     2. CKD (chronic kidney disease) stage 5, GFR less than 15 ml/min (H)  ESRD  Sees Nephrology    3. Coronary artery disease involving native coronary artery, angina presence unspecified, unspecified whether native or transplanted heart  Is going to get stents next week    4. Nonrheumatic aortic valve stenosis  Cardiology notes reviewed     5. Renal hypertension  Stable     6. Hyperlipidemia LDL goal <70  Pending   - Lipid panel reflex to direct LDL Fasting    7. Screen for colon cancer  Advised   - Fecal colorectal cancer screen (FIT); Future    "

## 2019-04-16 NOTE — TELEPHONE ENCOUNTER
DATE:  4/16/2019   TIME OF RECEIPT FROM LAB:  4:03pm  LAB TEST:  Creatinine  LAB VALUE:  5.16  RESULTS GIVEN WITH READ-BACK TO (PROVIDER):  Cecilia Moralesoix  TIME LAB VALUE REPORTED TO PROVIDER:   4:05pm    Lab result consistent with previous readings.    Giselle Benoit RN

## 2019-04-22 ENCOUNTER — HOSPITAL ENCOUNTER (INPATIENT)
Facility: CLINIC | Age: 74
LOS: 3 days | Discharge: HOME | DRG: 246 | End: 2019-04-26
Attending: INTERNAL MEDICINE | Admitting: INTERNAL MEDICINE
Payer: COMMERCIAL

## 2019-04-22 ENCOUNTER — TELEPHONE (OUTPATIENT)
Dept: NEPHROLOGY | Facility: CLINIC | Age: 74
End: 2019-04-22

## 2019-04-22 ENCOUNTER — APPOINTMENT (OUTPATIENT)
Dept: MEDSURG UNIT | Facility: CLINIC | Age: 74
DRG: 246 | End: 2019-04-22
Attending: INTERNAL MEDICINE
Payer: COMMERCIAL

## 2019-04-22 ENCOUNTER — APPOINTMENT (OUTPATIENT)
Dept: LAB | Facility: CLINIC | Age: 74
DRG: 246 | End: 2019-04-22
Attending: INTERNAL MEDICINE
Payer: COMMERCIAL

## 2019-04-22 ENCOUNTER — TELEPHONE (OUTPATIENT)
Dept: PHARMACY | Facility: CLINIC | Age: 74
End: 2019-04-22

## 2019-04-22 DIAGNOSIS — Z98.890 STATUS POST CORONARY ANGIOGRAM: Primary | ICD-10-CM

## 2019-04-22 DIAGNOSIS — I25.10 CORONARY ARTERY DISEASE INVOLVING NATIVE CORONARY ARTERY, ANGINA PRESENCE UNSPECIFIED, UNSPECIFIED WHETHER NATIVE OR TRANSPLANTED HEART: ICD-10-CM

## 2019-04-22 DIAGNOSIS — N13.9 OBSTRUCTIVE UROPATHY: ICD-10-CM

## 2019-04-22 DIAGNOSIS — N18.6 ESRD (END STAGE RENAL DISEASE) (H): ICD-10-CM

## 2019-04-22 LAB
ANION GAP SERPL CALCULATED.3IONS-SCNC: 11 MMOL/L (ref 3–14)
BUN SERPL-MCNC: 113 MG/DL (ref 7–30)
CALCIUM SERPL-MCNC: 8.6 MG/DL (ref 8.5–10.1)
CHLORIDE SERPL-SCNC: 108 MMOL/L (ref 94–109)
CO2 SERPL-SCNC: 22 MMOL/L (ref 20–32)
CREAT SERPL-MCNC: 5.44 MG/DL (ref 0.66–1.25)
ERYTHROCYTE [DISTWIDTH] IN BLOOD BY AUTOMATED COUNT: 15.3 % (ref 10–15)
GFR SERPL CREATININE-BSD FRML MDRD: 9 ML/MIN/{1.73_M2}
GLUCOSE SERPL-MCNC: 82 MG/DL (ref 70–99)
HCT VFR BLD AUTO: 35.9 % (ref 40–53)
HGB BLD-MCNC: 10.1 G/DL (ref 13.3–17.7)
KCT BLD-ACNC: 213 SEC (ref 75–150)
KCT BLD-ACNC: 269 SEC (ref 75–150)
KCT BLD-ACNC: 298 SEC (ref 75–150)
MCH RBC QN AUTO: 25.5 PG (ref 26.5–33)
MCHC RBC AUTO-ENTMCNC: 28.1 G/DL (ref 31.5–36.5)
MCV RBC AUTO: 91 FL (ref 78–100)
PLATELET # BLD AUTO: 189 10E9/L (ref 150–450)
POTASSIUM SERPL-SCNC: 3.9 MMOL/L (ref 3.4–5.3)
RBC # BLD AUTO: 3.96 10E12/L (ref 4.4–5.9)
SODIUM SERPL-SCNC: 141 MMOL/L (ref 133–144)
WBC # BLD AUTO: 7.1 10E9/L (ref 4–11)

## 2019-04-22 PROCEDURE — C1769 GUIDE WIRE: HCPCS | Performed by: INTERNAL MEDICINE

## 2019-04-22 PROCEDURE — 85347 COAGULATION TIME ACTIVATED: CPT

## 2019-04-22 PROCEDURE — 85027 COMPLETE CBC AUTOMATED: CPT | Performed by: INTERNAL MEDICINE

## 2019-04-22 PROCEDURE — 25000128 H RX IP 250 OP 636: Performed by: NURSE PRACTITIONER

## 2019-04-22 PROCEDURE — C1757 CATH, THROMBECTOMY/EMBOLECT: HCPCS | Performed by: INTERNAL MEDICINE

## 2019-04-22 PROCEDURE — 93005 ELECTROCARDIOGRAM TRACING: CPT

## 2019-04-22 PROCEDURE — 36415 COLL VENOUS BLD VENIPUNCTURE: CPT | Performed by: INTERNAL MEDICINE

## 2019-04-22 PROCEDURE — C1887 CATHETER, GUIDING: HCPCS | Performed by: INTERNAL MEDICINE

## 2019-04-22 PROCEDURE — 99152 MOD SED SAME PHYS/QHP 5/>YRS: CPT | Performed by: INTERNAL MEDICINE

## 2019-04-22 PROCEDURE — C1725 CATH, TRANSLUMIN NON-LASER: HCPCS | Performed by: INTERNAL MEDICINE

## 2019-04-22 PROCEDURE — C9602 PERC D-E COR STENT ATHER S: HCPCS | Performed by: INTERNAL MEDICINE

## 2019-04-22 PROCEDURE — 93454 CORONARY ARTERY ANGIO S&I: CPT | Performed by: INTERNAL MEDICINE

## 2019-04-22 PROCEDURE — 93010 ELECTROCARDIOGRAM REPORT: CPT | Mod: 59 | Performed by: INTERNAL MEDICINE

## 2019-04-22 PROCEDURE — 27210794 ZZH OR GENERAL SUPPLY STERILE: Performed by: INTERNAL MEDICINE

## 2019-04-22 PROCEDURE — 02C03ZZ EXTIRPATION OF MATTER FROM CORONARY ARTERY, ONE ARTERY, PERCUTANEOUS APPROACH: ICD-10-PCS | Performed by: INTERNAL MEDICINE

## 2019-04-22 PROCEDURE — 92928 PRQ TCAT PLMT NTRAC ST 1 LES: CPT | Mod: 59 | Performed by: INTERNAL MEDICINE

## 2019-04-22 PROCEDURE — 25800030 ZZH RX IP 258 OP 636: Performed by: NURSE PRACTITIONER

## 2019-04-22 PROCEDURE — 25000132 ZZH RX MED GY IP 250 OP 250 PS 637: Performed by: INTERNAL MEDICINE

## 2019-04-22 PROCEDURE — C9600 PERC DRUG-EL COR STENT SING: HCPCS | Performed by: INTERNAL MEDICINE

## 2019-04-22 PROCEDURE — 92933 PRQ TRLML C ATHRC ST ANGIOP1: CPT | Mod: LD | Performed by: INTERNAL MEDICINE

## 2019-04-22 PROCEDURE — 027135Z DILATION OF CORONARY ARTERY, TWO ARTERIES WITH TWO DRUG-ELUTING INTRALUMINAL DEVICES, PERCUTANEOUS APPROACH: ICD-10-PCS | Performed by: INTERNAL MEDICINE

## 2019-04-22 PROCEDURE — G0378 HOSPITAL OBSERVATION PER HR: HCPCS

## 2019-04-22 PROCEDURE — 40000172 ZZH STATISTIC PROCEDURE PREP ONLY

## 2019-04-22 PROCEDURE — 25000128 H RX IP 250 OP 636: Performed by: INTERNAL MEDICINE

## 2019-04-22 PROCEDURE — 25000125 ZZHC RX 250: Performed by: INTERNAL MEDICINE

## 2019-04-22 PROCEDURE — C1874 STENT, COATED/COV W/DEL SYS: HCPCS | Performed by: INTERNAL MEDICINE

## 2019-04-22 PROCEDURE — 99153 MOD SED SAME PHYS/QHP EA: CPT | Performed by: INTERNAL MEDICINE

## 2019-04-22 PROCEDURE — 40000141 ZZH STATISTIC PERIPHERAL IV START W/O US GUIDANCE

## 2019-04-22 PROCEDURE — 80048 BASIC METABOLIC PNL TOTAL CA: CPT | Performed by: INTERNAL MEDICINE

## 2019-04-22 PROCEDURE — 99221 1ST HOSP IP/OBS SF/LOW 40: CPT | Mod: 25 | Performed by: NURSE PRACTITIONER

## 2019-04-22 PROCEDURE — C1894 INTRO/SHEATH, NON-LASER: HCPCS | Performed by: INTERNAL MEDICINE

## 2019-04-22 DEVICE — STENT SYNERGY DRUG ELUTING 3.50X16MM  H7493926016350: Type: IMPLANTABLE DEVICE | Status: FUNCTIONAL

## 2019-04-22 DEVICE — STENT SYNERGY DRUG ELUTING 3.50X24MM  H7493926024350: Type: IMPLANTABLE DEVICE | Status: FUNCTIONAL

## 2019-04-22 RX ORDER — SODIUM CHLORIDE 9 MG/ML
INJECTION, SOLUTION INTRAVENOUS CONTINUOUS
Status: ACTIVE | OUTPATIENT
Start: 2019-04-22 | End: 2019-04-22

## 2019-04-22 RX ORDER — NITROGLYCERIN 20 MG/100ML
.07-2 INJECTION INTRAVENOUS CONTINUOUS PRN
Status: DISCONTINUED | OUTPATIENT
Start: 2019-04-22 | End: 2019-04-22 | Stop reason: HOSPADM

## 2019-04-22 RX ORDER — TIROFIBAN HYDROCHLORIDE 50 UG/ML
0.07 INJECTION INTRAVENOUS CONTINUOUS PRN
Status: DISCONTINUED | OUTPATIENT
Start: 2019-04-22 | End: 2019-04-22 | Stop reason: HOSPADM

## 2019-04-22 RX ORDER — SODIUM CHLORIDE 9 MG/ML
INJECTION, SOLUTION INTRAVENOUS CONTINUOUS
Status: DISCONTINUED | OUTPATIENT
Start: 2019-04-22 | End: 2019-04-22 | Stop reason: HOSPADM

## 2019-04-22 RX ORDER — NITROGLYCERIN 5 MG/ML
VIAL (ML) INTRAVENOUS
Status: DISCONTINUED | OUTPATIENT
Start: 2019-04-22 | End: 2019-04-22 | Stop reason: HOSPADM

## 2019-04-22 RX ORDER — NITROGLYCERIN 0.4 MG/1
0.4 TABLET SUBLINGUAL EVERY 5 MIN PRN
Status: DISCONTINUED | OUTPATIENT
Start: 2019-04-22 | End: 2019-04-26 | Stop reason: HOSPADM

## 2019-04-22 RX ORDER — LIDOCAINE 40 MG/G
CREAM TOPICAL
Status: DISCONTINUED | OUTPATIENT
Start: 2019-04-22 | End: 2019-04-22 | Stop reason: HOSPADM

## 2019-04-22 RX ORDER — HEPARIN SODIUM 1000 [USP'U]/ML
INJECTION, SOLUTION INTRAVENOUS; SUBCUTANEOUS
Status: DISCONTINUED | OUTPATIENT
Start: 2019-04-22 | End: 2019-04-22 | Stop reason: HOSPADM

## 2019-04-22 RX ORDER — DOPAMINE HYDROCHLORIDE 160 MG/100ML
2-20 INJECTION, SOLUTION INTRAVENOUS CONTINUOUS PRN
Status: DISCONTINUED | OUTPATIENT
Start: 2019-04-22 | End: 2019-04-22 | Stop reason: HOSPADM

## 2019-04-22 RX ORDER — ARGATROBAN 1 MG/ML
150 INJECTION, SOLUTION INTRAVENOUS
Status: DISCONTINUED | OUTPATIENT
Start: 2019-04-22 | End: 2019-04-22 | Stop reason: HOSPADM

## 2019-04-22 RX ORDER — NALOXONE HYDROCHLORIDE 0.4 MG/ML
.1-.4 INJECTION, SOLUTION INTRAMUSCULAR; INTRAVENOUS; SUBCUTANEOUS
Status: DISCONTINUED | OUTPATIENT
Start: 2019-04-22 | End: 2019-04-26 | Stop reason: HOSPADM

## 2019-04-22 RX ORDER — FENTANYL CITRATE 50 UG/ML
25-50 INJECTION, SOLUTION INTRAMUSCULAR; INTRAVENOUS
Status: DISCONTINUED | OUTPATIENT
Start: 2019-04-22 | End: 2019-04-23

## 2019-04-22 RX ORDER — DOBUTAMINE HYDROCHLORIDE 200 MG/100ML
2-20 INJECTION INTRAVENOUS CONTINUOUS PRN
Status: DISCONTINUED | OUTPATIENT
Start: 2019-04-22 | End: 2019-04-22 | Stop reason: HOSPADM

## 2019-04-22 RX ORDER — NALOXONE HYDROCHLORIDE 0.4 MG/ML
.2-.4 INJECTION, SOLUTION INTRAMUSCULAR; INTRAVENOUS; SUBCUTANEOUS
Status: ACTIVE | OUTPATIENT
Start: 2019-04-22 | End: 2019-04-23

## 2019-04-22 RX ORDER — FENTANYL CITRATE 50 UG/ML
INJECTION, SOLUTION INTRAMUSCULAR; INTRAVENOUS
Status: DISCONTINUED | OUTPATIENT
Start: 2019-04-22 | End: 2019-04-22 | Stop reason: HOSPADM

## 2019-04-22 RX ORDER — ASPIRIN 81 MG/1
81 TABLET ORAL DAILY
Status: DISCONTINUED | OUTPATIENT
Start: 2019-04-23 | End: 2019-04-26 | Stop reason: HOSPADM

## 2019-04-22 RX ORDER — POTASSIUM CHLORIDE 750 MG/1
40 TABLET, EXTENDED RELEASE ORAL
Status: DISCONTINUED | OUTPATIENT
Start: 2019-04-22 | End: 2019-04-22 | Stop reason: HOSPADM

## 2019-04-22 RX ORDER — LIDOCAINE 40 MG/G
CREAM TOPICAL
Status: DISCONTINUED | OUTPATIENT
Start: 2019-04-22 | End: 2019-04-26 | Stop reason: HOSPADM

## 2019-04-22 RX ORDER — ACETAMINOPHEN 325 MG/1
650 TABLET ORAL EVERY 4 HOURS PRN
Status: DISCONTINUED | OUTPATIENT
Start: 2019-04-22 | End: 2019-04-26 | Stop reason: HOSPADM

## 2019-04-22 RX ORDER — ARGATROBAN 1 MG/ML
350 INJECTION, SOLUTION INTRAVENOUS
Status: DISCONTINUED | OUTPATIENT
Start: 2019-04-22 | End: 2019-04-22 | Stop reason: HOSPADM

## 2019-04-22 RX ORDER — ATROPINE SULFATE 0.1 MG/ML
0.5 INJECTION INTRAVENOUS EVERY 5 MIN PRN
Status: DISCONTINUED | OUTPATIENT
Start: 2019-04-22 | End: 2019-04-23

## 2019-04-22 RX ORDER — NOREPINEPHRINE BITARTRATE/D5W 16MG/250ML
.03-.4 PLASTIC BAG, INJECTION (ML) INTRAVENOUS CONTINUOUS PRN
Status: DISCONTINUED | OUTPATIENT
Start: 2019-04-22 | End: 2019-04-22 | Stop reason: HOSPADM

## 2019-04-22 RX ORDER — FLUMAZENIL 0.1 MG/ML
0.2 INJECTION, SOLUTION INTRAVENOUS
Status: DISCONTINUED | OUTPATIENT
Start: 2019-04-22 | End: 2019-04-23

## 2019-04-22 RX ORDER — POTASSIUM CHLORIDE 750 MG/1
20 TABLET, EXTENDED RELEASE ORAL
Status: DISCONTINUED | OUTPATIENT
Start: 2019-04-22 | End: 2019-04-22 | Stop reason: HOSPADM

## 2019-04-22 RX ADMIN — SODIUM CHLORIDE: 9 INJECTION, SOLUTION INTRAVENOUS at 20:51

## 2019-04-22 RX ADMIN — MIDAZOLAM 1 MG: 1 INJECTION INTRAMUSCULAR; INTRAVENOUS at 16:11

## 2019-04-22 RX ADMIN — ASPIRIN 325 MG ORAL TABLET 325 MG: 325 PILL ORAL at 13:04

## 2019-04-22 RX ADMIN — FENTANYL CITRATE 25 MCG: 50 INJECTION INTRAMUSCULAR; INTRAVENOUS at 19:32

## 2019-04-22 NOTE — Clinical Note
Atherectomy performed in the middle left anterior descending. Rotational atherectomy was performed. Pass rate = 157 RPM. Pass duration = 14 seconds.

## 2019-04-22 NOTE — H&P
Electrophysiology Pre-Procedure History and Physical    Dung Norton MRN# [id number]   Age: 73 year old YOB: 1945      Date of Procedure: 4/22/2019 Location [facility]      Date of Exam 4/22/2019 Facility (In hospital)     Primary care provider: Haley Baker    HPI: Dung Norton is a 73 year old male who has a PMH significant for CKD5 d/t long standing obstructive uropathy 2/2 BPH and benign bladder mass, HTN, moderate aortic stenosis, cardiomyopathy, and coronary artery disease. He presents today for a planned PCI (LAD and LCx PCI and cannulation of RCA to determine CAD burden). His GFR today is 9, he does have matured AVF but has not started outpatient dialysis yet.          Active problem list:   Patient Active Problem List    Diagnosis Date Noted     Hypertension goal BP (blood pressure) < 140/90 12/02/2010     Priority: High     Coronary artery disease involving native coronary artery, angina presence unspecified, unspecified whether native or transplanted heart 03/21/2019     Priority: Medium     Added automatically from request for surgery [id number]       Status post coronary angiogram 02/22/2019     Priority: Medium     Chronic systolic heart failure (H) 02/05/2019     Priority: Medium     Added automatically from request for surgery [id number]       Nuclear sclerosis of both eyes 07/09/2018     Priority: Medium     S/P transurethral resection of prostate 03/05/2018     Priority: Medium     Urinary tract infection 03/01/2018     Priority: Medium     Olecranon bursitis of left elbow 01/19/2018     Priority: Medium     Thoracic aortic aneurysm without rupture (H) 01/04/2018     Priority: Medium     Nonrheumatic aortic valve stenosis 01/04/2018     Priority: Medium     Ex-smoker 12/12/2017     Priority: Medium     CKD (chronic kidney disease) stage 5, GFR less than 15 ml/min (H) 09/13/2017     Priority: Medium     Anemia of chronic renal failure, stage 5 (H) 09/12/2017      Priority: Medium     Orthostatic hypotension 09/12/2017     Priority: Medium     Acute renal failure, unspecified acute renal failure type (H) 09/12/2017     Priority: Medium     End stage renal disease (H) 09/12/2017     Priority: Medium     Acquired hypothyroidism 09/12/2017     Priority: Medium     Obstructive uropathy 09/12/2017     Priority: Medium     KRISTINA (acute kidney injury) (H) 08/28/2017     Priority: Medium     Pulmonary nodules 07/28/2017     Priority: Medium     Symptomatic anemia 07/12/2017     Priority: Medium     Elevated fasting glucose 03/05/2013     Priority: Medium     Hypertriglyceridemia 03/05/2013     Priority: Medium     Advanced directives, counseling/discussion 06/10/2011     Priority: Medium     Advance Directive Problem List Overview:   Name Relationship Phone    Primary Health Care Agent            Alternative Health Care Agent          Discussed advance care planning with patient; information given to patient to review. 6/10/2011          CARDIOVASCULAR SCREENING; LDL GOAL LESS THAN 130 10/31/2010     Priority: Medium            Medications (include herbals and vitamins):      Current Facility-Administered Medications   Medication     argatroban (ACOVA) bolus from infusion pump 11,090 mcg     argatroban (ACOVA) bolus from infusion pump 25,870 mcg     argatroban (ACOVA) infusion 250 mg/250 mL     bivalirudin (ANGIOMAX) 250 mg in sodium chloride 0.9 % 50 mL infusion     bivalirudin (ANGIOMAX) bolus from infusion pump 55.4 mg     cangrelor (KENGREAL) 200 mcg/mL bolus dose from infusion pump 2.22 mg    Followed by     cangrelor (KENGREAL) 50 mg in sodium chloride 0.9 % 250 mL infusion     DOBUTamine 500 mg in dextrose 5% 250 mL (adult std conc) premix     DOPamine 400 mg in dextrose 5% 250 mL (adult std) - premix     EPINEPHrine (ADRENALIN) 5 mg in sodium chloride 0.9 % 250 mL infusion     fentaNYL (PF) (SUBLIMAZE) injection     heparin (porcine) injection     heparin infusion 25,000 units  in 0.45% NaCl 250 mL     lidocaine (LMX4) cream     lidocaine 1 % 0.1-1 mL     lidocaine 1 %     Medication Considerations - To maintain platelet inhibition after discontinuation of cangrelor (KENGREAL) [see admin instructions]     midazolam (VERSED) injection     nitroGLYcerin 50 mg in D5W 250 mL (adult std) infusion     nitroGLYcerin injection     nitroPRUsside (NIPRIDE) 50 mg, sodium thiosulfate 500 mg in D5W 125 mL IV infusion (conc: 0.4mg/mL)     norepinephrine (LEVOPHED) 16 mg in D5W 250 mL infusion     Patient is NOT allergic to contrast dye OR was given pre-procedure oral steroids for treatment     phenylephrine (JOSSE-SYNEPHRINE) 50 mg in sodium chloride 0.9 % 250 mL infusion     potassium chloride ER (K-DUR/KLOR-CON M) CR tablet 20 mEq     potassium chloride ER (K-DUR/KLOR-CON M) CR tablet 40 mEq     sodium chloride (PF) 0.9% PF flush 3 mL     sodium chloride (PF) 0.9% PF flush 3 mL     sodium chloride 0.9% infusion     tirofiban (AGGRASTAT) 12.5 mg/250 mL infusion     tirofiban (AGGRASTAT) injection 1,847.5 mcg     vasopressin (VASOSTRICT) 40 Units in D5W 40 mL infusion         Dung Norton   Home Medication Instructions SARA:[id number]    Printed on:04/22/19 0140   Medication Information                      aspirin 81 MG tablet  Take 1 tablet (81 mg) by mouth every morning - RESUME on 3/9/18 if urine remains clear             bumetanide (BUMEX) 2 MG tablet  Take 2 mg by mouth Take 1 tablet by mouth 2 times daily             darbepoetin william (ARANESP, ALBUMIN FREE,) 100 MCG/0.5ML injection  Inject 0.5 mLs (100 mcg) Subcutaneous every 14 days As needed for hgb<10g/dL.  If Hgb increases >1 point in 2 weeks (if blood transfusion given, use hgb PRIOR to this), SYSTOLIC BP > 180 mmHg or hgb>=10g/dL, HOLD DOSE. Dose must be within 1 week of Hgb.  Per anemia protocol with Cecilia Britt,CNP             ferrous sulfate (FEROSUL) 325 (65 Fe) MG tablet  Take 1 tablet (325 mg) by mouth 2 times daily              metoprolol succinate ER (TOPROL-XL) 50 MG 24 hr tablet  Take 1 tablet (50 mg) by mouth daily             multivitamin w/minerals (MULTI-VITAMIN) tablet  Take 1 tablet by mouth daily             Omega-3 Fatty Acids (FISH OIL PO)  Take 1 capsule by mouth daily              order for DME  Equipment being ordered: olecranon bursa brace             rosuvastatin (CRESTOR) 40 MG tablet  Take 1 tablet (40 mg) by mouth daily             sevelamer (RENAGEL) 800 MG tablet  Take 1 tablet (800 mg) by mouth 3 times daily (with meals)             sodium bicarbonate 650 MG tablet  Take 1 tablet (650 mg) by mouth 2 times daily                      Allergies:    No Known Allergies          Social History:     Social History     Tobacco Use     Smoking status: Former Smoker     Packs/day: 1.00     Years: 53.00     Pack years: 53.00     Types: Cigarettes     Last attempt to quit: 2017     Years since quittin.8     Smokeless tobacco: Never Used   Substance Use Topics     Alcohol use: Yes     Comment: rare, drink every few months            Physical Exam:   All vitals have been reviewed  Patient Vitals for the past 8 hrs:   BP Temp Pulse Resp SpO2   19 1131 114/63 97.6  F (36.4  C) 88 16 98 %     No intake/output data recorded.  Airway assessment:   Patient is able to open mouth wide  Patient is able to stick out tongue}      ENT:   Normocephalic, without obvious abnormality, atraumatic, sinuses nontender on palpation, external ears without lesions, oral pharynx with moist mucous membranes, tonsils without erythema or exudates, gums normal and good dentition.     Neck:   Supple, symmetrical, trachea midline, no adenopathy, thyroid symmetric, not enlarged and no tenderness, skin normal     Lungs:   No increased work of breathing, good air exchange, clear to auscultation bilaterally, no crackles or wheezing     Cardiovascular:   murmurs include systolic murmur III/VI              Lab / Radiology Results:     Lab Results    Component Value Date    WBC 7.1 04/22/2019    RBC 3.96 04/22/2019    HGB 10.1 04/22/2019    HCT 35.9 04/22/2019    MCV 91 04/22/2019    RDW 15.3 04/22/2019     04/22/2019      Lab Results   Component Value Date     04/22/2019    CO2 22 04/22/2019     04/22/2019    CREAT 6.0 03/04/2019             Plan:   Patient's active problems diagnostically and therapeutically optimized for the planned procedure. Patient here for coronary angiogram with planned PCI. Procedure explained in detail to patient including indications, risks, and benefits. Patient will need to stay overnight with a nephrology consult in the morning to determine if he must start dialysis (mature left AVF is already in place) in hospital or if it can be arranged as an outpatient. He states understanding and wishes to procced.     Melissa Coronado, COY CNP

## 2019-04-22 NOTE — Clinical Note
The first balloon was inserted into the left anterior descending and ostium left anterior descending diagonal.Max pressure = 10 melina. Total duration = 10 seconds.     Max pressure = 10 melina. Total duration = 13 seconds.    Balloon reinflated a second time: Max pressure = 10 melina. Total duration = 13 seconds.

## 2019-04-22 NOTE — Clinical Note
Atherectomy performed in the middle left anterior descending. Rotational atherectomy was performed. Pass rate = 160 RPM. Pass duration = 10 seconds.

## 2019-04-22 NOTE — TELEPHONE ENCOUNTER
Spoke with Tereza. Patient is currently at the hospital for a cardiac procedure, was told he'd need to initiate dialysis afterwards. They were told that the clinic would be setting this up. Informed her the nursing staff here has not been notified of this, and that the inpatient care coordinator would make arrangements for this. She is very confused, stressed lack of communication during this process. I will follow up on chart review to contact inpatient staff once he's admitted.    She wanted it noted that they would like to use the [facility] dialysis unit in [place].    Giselle Benoit RN

## 2019-04-22 NOTE — TELEPHONE ENCOUNTER
[facility] Call Center    Phone Message    May a detailed message be left on voicemail: yes    Reason for Call: Other: Tereza is calling in for the patient to make sure that his dialysis is set up at the right location they want him to go to. They want him to go to [facility] at [address] in [facility] [address] their number is [phone]. Tereza stated that the hospital will not discharge him untill this is coordinated. Please follow up with Tereza kilpatrick.Thank you.     Action Taken: Message routed to:  [facility] ([facility] neph

## 2019-04-22 NOTE — TELEPHONE ENCOUNTER
Anemia Management Note  SUBJECTIVE/OBJECTIVE:  Referred by Cecilia De La Torre on 03/20/2019  Primary Diagnosis: Anemia in Chronic Kidney Disease (N18.5, D63.1)     Secondary Diagnosis:  Chronic Kidney Disease, Stage 5 (N18.5)  Hgb goal range:  9-10  Epo/Darbo: Aranesp 60mcg every 14 days As needed for hgb<10g/dL  *has expressed that he does  Not want to receive Aranesp until Hgb drops below 9. *  RX expires on 03/20/2020  Iron regimen:  None  Labs exp: 03/20/2020  Drawn every 2 weeks on Mondays at [facility].      **Provide phone number for [facility] [phone] and instruction to schedule ancillary visit for aranesp injection. Send message to care team via pool -  RN TRIAGE POOL as a telephone encounter**     Possibly starting HD in May 2019      Contact: **AUTH TO DISCUSS**Tereza Norton(daughter) [phone], Rabia Jazzy (daughter)[phone]    Anemia Latest Ref Rng & Units 2/4/2019 2/18/2019 2/25/2019 3/11/2019 3/25/2019 4/16/2019 4/22/2019   EARLENE Dose - - - - - - - -   Hemoglobin 13.3 - 17.7 g/dL 9.5(L) 10.1(L) 9.7(L) 8.9(L) 10.0(L) 9.9(L) 10.1(L)   TSAT 15 - 46 % - - - - 13(L) - -   Ferritin 26 - 388 ng/mL - - - - 351 - -     BP Readings from Last 3 Encounters:   04/22/19 114/63   04/16/19 122/62   04/16/19 136/72     Wt Readings from Last 2 Encounters:   04/16/19 163 lb (73.9 kg)   03/20/19 173 lb (78.5 kg)         ASSESSMENT:  Hgb:at goal - continue to monitor  TSat: not at goal of >30% Ferritin: At goal (>100ng/mL)    PLAN:  Hold Aranesp and RTC for hgb then aranesp if needed in 2 week(s)    Orders needed to be renewed (for next follow-up date) in EPIC: None    Iron labs due:  Due    Plan discussed with:  No call made, pt is at clinic for procedure  Plan provided by:  byron    NEXT FOLLOW-UP DATE:  05/06/2019    Anemia Management Service  Zainab Barragan RN  Phone: [phone]  Fax: [phone]

## 2019-04-22 NOTE — Clinical Note
Stent deployed in the middle left anterior descending. Max pressure = 14 melina. Total duration = 14 seconds.

## 2019-04-22 NOTE — Clinical Note
The first balloon was inserted into the left anterior descending and middle left anterior descending.Max pressure = 14 melina. Total duration = 8 seconds.     Max pressure = 16 melina. Total duration = 9 seconds.    Balloon reinflated a second time: Max pressure = 16 melina. Total duration = 9 seconds.

## 2019-04-22 NOTE — LETTER
Transition Communication Hand-off for Care Transitions to Next Level of Care Provider    Name: Dung Norton  : 1945  MRN #: [id number]  Primary Care Provider: Haley Baker     Primary Clinic: [address] [place] [address]  [place] [address]     Reason for Hospitalization:  Coronary artery disease involving native coronary artery, angina presence unspecified, unspecified whether native or transplanted heart [I25.10]  Admit Date/Time: 2019 10:42 AM  Discharge Date: 19  Payor Source: Payor: MEDICA / Plan: MEDICA [employer] / Product Type: HMO /            Reason for Communication Hand-off Referral: Other Continuity of care    Discharge Plan:  Home with outpatient dialysis       Concern for non-adherence with plan of care:   N  Discharge Needs Assessment:  Needs      Most Recent Value   Equipment Currently Used at Home  none        Follow-up specialty is recommended: Yes    Follow-up plan:    Future Appointments   Date Time Provider Department Center   2019  2:30 PM Tamiko Vanegsa MD [facility]   2019 10:15 AM F[facility]   2019  4:30 PM [facility]   [facility]30 [facility]   2019 12:30 PM Oralia De La Torre NP [facility]       Any outstanding tests or procedures:        Referrals     Future Labs/Procedures    Follow-Up with Cardiac Advanced Practice Provider     Comments:    Follow up appointment should be made in 2 weeks after discharge    CARDIAC REHAB REFERRAL     Process Instructions:    Advance to Wellness and Exercise for Life (WEL) Program or to another maintenance exercise program upon completion of phase 2 cardiac rehab.    Weight mgt program is only offered at [facility].    *This therapy referral will be filtered to a centralized scheduling office at [facility] and the patient will receive a call to schedule an appointment at a [place] location most convenient for  them. *        Comments:    Please be aware that coverage of these services is subject to the terms and limitations of your health insurance plan.  Call member services at your health plan with any benefit or coverage questions.     Order is sent electronically to central rehab scheduling. Call [phone] if you haven't been contacted regarding these appointments within 2 business days of discharge.      OCCUPATIONAL THERAPY REFERRAL     Process Instructions:    Work Related Injury: Functional Capacity and Work Conditioning are only offered at [facility] and [facility] (service can be provided by PT or OT).    *This therapy referral will be filtered to a centralized scheduling office at [facility] and the patient will receive a call to schedule an appointment at a [place] location most convenient for them. *    Comments:    If you have not heard from the scheduling office within 2 business days, please call [phone] for all locations, with the exception of [place], please call [phone] and [place], please call [phone].    Please be aware that coverage of these services is subject to the terms and limitations of your health insurance plan.  Call member services at your health plan with any benefit or coverage questions.      Pulmonary Medicine Referral     Comments:    For follow up on pulmonary nodules            Key Recommendations:  Patient discharging home with new outpatient dialysis.  Please refer to attached AVS.  Patient may benefit from on going care coordination.  Patient family is very involved.  Patient normally very independent and manages his own care needs.       Zainab Sexton    AVS/Discharge Summary is the source of truth; this is a helpful guide for improved communication of patient story

## 2019-04-23 ENCOUNTER — PRE VISIT (OUTPATIENT)
Dept: UROLOGY | Facility: CLINIC | Age: 74
End: 2019-04-23

## 2019-04-23 PROBLEM — N18.6 ESRD (END STAGE RENAL DISEASE) (H): Status: ACTIVE | Noted: 2019-04-23

## 2019-04-23 LAB
ANION GAP SERPL CALCULATED.3IONS-SCNC: 11 MMOL/L (ref 3–14)
BUN SERPL-MCNC: 103 MG/DL (ref 7–30)
CALCIUM SERPL-MCNC: 8.3 MG/DL (ref 8.5–10.1)
CHLORIDE SERPL-SCNC: 107 MMOL/L (ref 94–109)
CO2 SERPL-SCNC: 21 MMOL/L (ref 20–32)
CREAT SERPL-MCNC: 5.31 MG/DL (ref 0.66–1.25)
ERYTHROCYTE [DISTWIDTH] IN BLOOD BY AUTOMATED COUNT: 15.6 % (ref 10–15)
GFR SERPL CREATININE-BSD FRML MDRD: 10 ML/MIN/{1.73_M2}
GLUCOSE SERPL-MCNC: 98 MG/DL (ref 70–99)
HBV CORE AB SERPL QL IA: NONREACTIVE
HBV SURFACE AB SERPL IA-ACNC: 1.84 M[IU]/ML
HBV SURFACE AG SERPL QL IA: NONREACTIVE
HCT VFR BLD AUTO: 33.8 % (ref 40–53)
HGB BLD-MCNC: 9.8 G/DL (ref 13.3–17.7)
INTERPRETATION ECG - MUSE: NORMAL
INTERPRETATION ECG - MUSE: NORMAL
MCH RBC QN AUTO: 26.1 PG (ref 26.5–33)
MCHC RBC AUTO-ENTMCNC: 29 G/DL (ref 31.5–36.5)
MCV RBC AUTO: 90 FL (ref 78–100)
PHOSPHATE SERPL-MCNC: 7.2 MG/DL (ref 2.5–4.5)
PLATELET # BLD AUTO: 203 10E9/L (ref 150–450)
POTASSIUM SERPL-SCNC: 4.2 MMOL/L (ref 3.4–5.3)
RBC # BLD AUTO: 3.75 10E12/L (ref 4.4–5.9)
SODIUM SERPL-SCNC: 139 MMOL/L (ref 133–144)
WBC # BLD AUTO: 11.2 10E9/L (ref 4–11)

## 2019-04-23 PROCEDURE — G0378 HOSPITAL OBSERVATION PER HR: HCPCS

## 2019-04-23 PROCEDURE — 25000128 H RX IP 250 OP 636: Performed by: INTERNAL MEDICINE

## 2019-04-23 PROCEDURE — 25000132 ZZH RX MED GY IP 250 OP 250 PS 637: Performed by: NURSE PRACTITIONER

## 2019-04-23 PROCEDURE — 86704 HEP B CORE ANTIBODY TOTAL: CPT | Performed by: PHYSICIAN ASSISTANT

## 2019-04-23 PROCEDURE — 84100 ASSAY OF PHOSPHORUS: CPT | Performed by: PHYSICIAN ASSISTANT

## 2019-04-23 PROCEDURE — 25000132 ZZH RX MED GY IP 250 OP 250 PS 637: Performed by: PHYSICIAN ASSISTANT

## 2019-04-23 PROCEDURE — 86706 HEP B SURFACE ANTIBODY: CPT | Performed by: PHYSICIAN ASSISTANT

## 2019-04-23 PROCEDURE — 84100 ASSAY OF PHOSPHORUS: CPT | Performed by: NURSE PRACTITIONER

## 2019-04-23 PROCEDURE — 12000001 ZZH R&B MED SURG/OB UMMC

## 2019-04-23 PROCEDURE — 36415 COLL VENOUS BLD VENIPUNCTURE: CPT | Performed by: NURSE PRACTITIONER

## 2019-04-23 PROCEDURE — 85027 COMPLETE CBC AUTOMATED: CPT | Performed by: NURSE PRACTITIONER

## 2019-04-23 PROCEDURE — 99232 SBSQ HOSP IP/OBS MODERATE 35: CPT | Performed by: NURSE PRACTITIONER

## 2019-04-23 PROCEDURE — 99222 1ST HOSP IP/OBS MODERATE 55: CPT | Performed by: INTERNAL MEDICINE

## 2019-04-23 PROCEDURE — 87340 HEPATITIS B SURFACE AG IA: CPT | Performed by: PHYSICIAN ASSISTANT

## 2019-04-23 PROCEDURE — 80048 BASIC METABOLIC PNL TOTAL CA: CPT | Performed by: NURSE PRACTITIONER

## 2019-04-23 PROCEDURE — 5A1D70Z PERFORMANCE OF URINARY FILTRATION, INTERMITTENT, LESS THAN 6 HOURS PER DAY: ICD-10-PCS | Performed by: PHYSICIAN ASSISTANT

## 2019-04-23 PROCEDURE — 90937 HEMODIALYSIS REPEATED EVAL: CPT

## 2019-04-23 PROCEDURE — 25000132 ZZH RX MED GY IP 250 OP 250 PS 637: Performed by: INTERNAL MEDICINE

## 2019-04-23 PROCEDURE — 86481 TB AG RESPONSE T-CELL SUSP: CPT | Performed by: INTERNAL MEDICINE

## 2019-04-23 RX ORDER — ONDANSETRON 4 MG/1
4 TABLET, ORALLY DISINTEGRATING ORAL EVERY 6 HOURS PRN
Status: DISCONTINUED | OUTPATIENT
Start: 2019-04-23 | End: 2019-04-26 | Stop reason: HOSPADM

## 2019-04-23 RX ORDER — SEVELAMER HYDROCHLORIDE 800 MG/1
1600 TABLET, FILM COATED ORAL
Status: DISCONTINUED | OUTPATIENT
Start: 2019-04-23 | End: 2019-04-23 | Stop reason: RX

## 2019-04-23 RX ORDER — AMOXICILLIN 250 MG
2 CAPSULE ORAL 2 TIMES DAILY PRN
Status: DISCONTINUED | OUTPATIENT
Start: 2019-04-23 | End: 2019-04-26 | Stop reason: HOSPADM

## 2019-04-23 RX ORDER — CHLORAL HYDRATE 500 MG
1 CAPSULE ORAL DAILY
Status: DISCONTINUED | OUTPATIENT
Start: 2019-04-23 | End: 2019-04-26 | Stop reason: HOSPADM

## 2019-04-23 RX ORDER — BUMETANIDE 2 MG/1
2 TABLET ORAL
Status: DISCONTINUED | OUTPATIENT
Start: 2019-04-23 | End: 2019-04-26 | Stop reason: HOSPADM

## 2019-04-23 RX ORDER — SEVELAMER CARBONATE 800 MG/1
1600 TABLET, FILM COATED ORAL
Status: DISCONTINUED | OUTPATIENT
Start: 2019-04-23 | End: 2019-04-26 | Stop reason: HOSPADM

## 2019-04-23 RX ORDER — MULTIVITAMIN,THERAPEUTIC
1 TABLET ORAL DAILY
Status: DISCONTINUED | OUTPATIENT
Start: 2019-04-23 | End: 2019-04-26 | Stop reason: HOSPADM

## 2019-04-23 RX ORDER — ROSUVASTATIN CALCIUM 40 MG/1
40 TABLET, COATED ORAL DAILY
Status: DISCONTINUED | OUTPATIENT
Start: 2019-04-23 | End: 2019-04-26 | Stop reason: HOSPADM

## 2019-04-23 RX ORDER — AMOXICILLIN 250 MG
1 CAPSULE ORAL 2 TIMES DAILY PRN
Status: DISCONTINUED | OUTPATIENT
Start: 2019-04-23 | End: 2019-04-26 | Stop reason: HOSPADM

## 2019-04-23 RX ORDER — ONDANSETRON 2 MG/ML
4 INJECTION INTRAMUSCULAR; INTRAVENOUS EVERY 6 HOURS PRN
Status: DISCONTINUED | OUTPATIENT
Start: 2019-04-23 | End: 2019-04-26 | Stop reason: HOSPADM

## 2019-04-23 RX ADMIN — ROSUVASTATIN CALCIUM 40 MG: 40 TABLET, FILM COATED ORAL at 08:08

## 2019-04-23 RX ADMIN — THERA TABS 1 TABLET: TAB at 18:19

## 2019-04-23 RX ADMIN — TICAGRELOR 90 MG: 90 TABLET ORAL at 05:58

## 2019-04-23 RX ADMIN — ASPIRIN 81 MG: 81 TABLET, COATED ORAL at 08:08

## 2019-04-23 RX ADMIN — SODIUM CHLORIDE 250 ML: 9 INJECTION, SOLUTION INTRAVENOUS at 15:05

## 2019-04-23 RX ADMIN — SODIUM CHLORIDE 300 ML: 9 INJECTION, SOLUTION INTRAVENOUS at 15:05

## 2019-04-23 RX ADMIN — TICAGRELOR 90 MG: 90 TABLET ORAL at 18:20

## 2019-04-23 RX ADMIN — Medication 1 G: at 18:20

## 2019-04-23 RX ADMIN — BUMETANIDE 2 MG: 2 TABLET ORAL at 18:19

## 2019-04-23 RX ADMIN — Medication: at 15:05

## 2019-04-23 RX ADMIN — BUMETANIDE 2 MG: 2 TABLET ORAL at 08:08

## 2019-04-23 RX ADMIN — SEVELAMER CARBONATE 1600 MG: 800 TABLET, FILM COATED ORAL at 12:28

## 2019-04-23 RX ADMIN — SEVELAMER CARBONATE 1600 MG: 800 TABLET, FILM COATED ORAL at 08:32

## 2019-04-23 ASSESSMENT — ACTIVITIES OF DAILY LIVING (ADL)
ADLS_ACUITY_SCORE: 13

## 2019-04-23 ASSESSMENT — MIFFLIN-ST. JEOR: SCORE: 1460.2

## 2019-04-23 NOTE — PROGRESS NOTES
Care Coordinator Progress Note    Admission Date/Time:  4/22/2019  Attending MD:  Gabino Collins MD  Data  Chart reviewed, discussed with interdisciplinary team.   Patient was admitted for:    Coronary artery disease involving native coronary artery, angina presence unspecified, unspecified whether native or transplanted heart  Status post coronary angiogram.    Concerns with insurance coverage for discharge needs: none stated by pt.   Current Living Situation: Patient lives alone. Pt said that he is independent with his own care. Pt's daughterTereza said that she is available to assist pt as needed and provide rides to dialysis. Tereza added that pt's ex-wife lives in the same [facility] building and she is available also to assist if needed.   Support System: Supportive and Involved family.   Services Involved: none currently   Transportation at Discharge: pt's daughterTereza will provide pt with a ride home upon discharge.   Transportation to Medical Appointments: self or daughter.   Barriers to Discharge: setting up of outpt dialysis unit.     Coordination of Care and Referrals: Provided patient/family with options for outpt dilaysis. Unable to complete discharge planning at this time.   Assessment  Per Renal PA, pt will need outpt dialysis arranged; pt is in agreement with this plan. Pt said that he prefers M-W-F at [facility] in [place] but they do not have any openings there nor at [facility]. [facility] has an opening on M-W-F at 3:35 PM starting on 4/26/19; pt and pt's daughter are in agreement with this plan.   Intervention:   Referral started with [facility] (Ph: [phone] Fax: [phone]) for initiation of outpt dialysis. Need to send in labs and dialysis flow sheets when available.     Plan  Anticipated Discharge Date:  4/26/19 if outpt dialysis referral is completed.   Anticipated Discharge Plan:  Discharge to home with outpt dialysis.     BONITA KHALIL RN BSN  Care  Coordinator Unit   [phone]

## 2019-04-23 NOTE — TELEPHONE ENCOUNTER
Chief Complaint : Return- 6 Mo     New Hx/Sx: Urinary Retention/TURP/CIC    Records/Orders/Proced: Available    Pt Contacted: N/A    At Rooming: Flow/PVR-if possible, pt CIC

## 2019-04-23 NOTE — PROGRESS NOTES
Interventional Cardiology Progress Note  Interval History:  - No acute events overnight  - Patient denies chest pain, SOB, lightheadedness, dizziness    Most recent vital signs:  /70 (BP Location: Right arm)   Pulse 81   Temp 98.1  F (36.7  C) (Oral)   Resp 16   SpO2 100%   Temp:  [97.5  F (36.4  C)-98.5  F (36.9  C)] 98.1  F (36.7  C)  Pulse:  [79-82] 81  Heart Rate:  [77-86] 86  Resp:  [12-18] 16  BP: (101-124)/(60-73) 101/70  SpO2:  [93 %-100 %] 100 %  Wt Readings from Last 2 Encounters:   04/16/19 73.9 kg (163 lb)   03/20/19 78.5 kg (173 lb)       Intake/Output Summary (Last 24 hours) at 4/23/2019 1218  Last data filed at 4/23/2019 0900  Gross per 24 hour   Intake 710 ml   Output 975 ml   Net -265 ml       Physical exam:  General: In bed, in NAD  HEENT: EOMI, PERRLA, no scleral icterus or injection  CARDIAC: RRR, no m/r/g appreciated. Peripheral pulses 2+  RESP: CTAB, no wheezes, rhonchi or crackles appreciated.  GI: NABS, NT/ND, no guarding or rebound  EXTREMITIES: NO LE edema, pulses 2+. Femoral access site w/o bleeding, dressing c/d/i. No bruising, no signs of hematoma. No bruits appreciated.   SKIN: No acute lesions appreciated; LUE AV Fistula with + thrill and bruit  NEURO: Alert and oriented X3, CN II-XII grossly intact, no focal neurological deficits noted      Labs (Past three days):  CBC  Recent Labs   Lab 04/23/19  0824 04/22/19  1114   WBC 11.2* 7.1   RBC 3.75* 3.96*   HGB 9.8* 10.1*   HCT 33.8* 35.9*   MCV 90 91   MCH 26.1* 25.5*   MCHC 29.0* 28.1*   RDW 15.6* 15.3*    189     BMP  Recent Labs   Lab 04/23/19  0824 04/22/19  1114    141   POTASSIUM 4.2 3.9   CHLORIDE 107 108   CO2 21 22   ANIONGAP 11 11   GLC 98 82   * 113*   CR 5.31* 5.44*   GFRESTIMATED 10* 9*   GFRESTBLACK 11* 11*   DERICK 8.3* 8.6   PHOS 7.2*  --      Troponins: No results found for: TROPI, TROPONIN, TROPR, TROPN     INRNo lab results found in last 7 days.  Liver panelNo lab results found in last 7  days.    Imaging/procedure results:  EKG 12 Lead 4/22/19: SR with occ PVC HR 75      ECHO 4/23/19  Interpretation Summary     Moderately (EF 35-40%) reduced left ventricular function is present. The EF is  36% based upon biplane imaging.  There is concern for low-flow aortic stenosis in the setting of a low stroke  volume index of 33 ml/m^2 (mean pressure gradient of 23.0, peak velocity of  3.2 m/sec, valve area of 0.88 cm^2, dimensionless index of 0.25)  Severe mitral valve insufficiency.  Dilation of the inferior vena cava with estimated mean right atrial pressure 8  mmHg (mildly elevated).     This study was compared with the study from 1/10/2019. Ejection fraction has  improved, aortic stenosis is probably similar, but mitral regurgitation  appears to be worse.    Coronary Angiogram 4/23/19:  Pre-procedure Diagnosis   Known CAD    Post-procedure Diagnosis     CAD   PCI      Conclusion   1. Known severe coronary artery disease with planned PCI.  2. Three vessel obstructive coronary artery disease of the mid LAD, mid circumflex, and mid RCA (seen on coronary CTA, not injected today) without left main involvement.   3. Successful rotational atherectomy and PCI of the mid LAD with a 3.5x24mm Synergy drug eluting stent.  4. Successful PCI of the mid circumflex with a 3.5x16mm Synergy drug eluting stent.  5. Sheath was secured in place and will be removed when the ACT allows.         Coronary Findings     Diagnostic   Dominance: Right   Left Main   Mid LM lesion is 20% stenosed.   Left Anterior Descending   Mid LAD lesion is 80% stenosed. The lesion is severely calcified.   First Diagonal Branch   The vessel is large.   Second Diagonal Branch   The vessel is moderate in size.   Left Circumflex   Prox Cx lesion is 20% stenosed.   Mid Cx lesion is 80% stenosed. Large aneurysm prior to a focal 80% stenosis   First Obtuse Marginal Branch   Collaterals   1st Mrg filled by collaterals from 2nd Mrg.      Ost 1st Mrg to 1st  "Mrg lesion is 100% stenosed. The lesion is chronic total occlusion.   Second Obtuse Marginal Branch   The vessel is moderate in size.   Third Obtuse Marginal Branch   The vessel is moderate in size.   3rd Mrg lesion is 60% stenosed.   Right Coronary Artery   The vessel was not injected. There are left-to-right collaterals supplying the PDA and PL and backfilling to the mid RCA where there appears to be a blunt occlusion of the RCA.   Acute Marginal Branch   Collaterals   Acute Mrg filled by collaterals from Mid LAD.      Right Posterior Descending Artery   Collaterals   RPDA filled by collaterals from 3rd Mrg.      Collaterals   RPDA filled by collaterals from Mid LAD.      Intervention   Mid LAD lesion   Atherectomy   Rotational atherectomy was performed. 1.5 and 1.75mm Rotablator burrs used at 160,000 rpms. The intervention was successful.   Stent   The guidewire guidewire crosses lesionGUIDEWIRE ASAHI LORENZO BLUE 0.014\"X180CM J-TIP [id number] crossed the lesion. The pre-interventional distal flow is normal (BROOKE 3). Pre-stent angioplasty was performed using a CATH BALLOON EMERGE 3.0X15MM [id number] supply. A STENT SYNERGY DRUG ELUTING 3.20S60OD [id number] drug eluting stent was successfully placed. Post-stent angioplasty was performed using a CATH BALLOON NC EMERGE 4.15X83QD [id number] supply. The post-interventional distal flow is normal (BROOKE 3). A second Lorenzo Blue wire was placed into D2 as a safety wire. Following PCI of the LAD the ostium of D2 had a 70% stenosis. This was successfully ballooned with a 2.5x12mm balloon. Final angiogrpahy revealed 30% residual stenosis of the D2 ostium with BROOKE 3 flow.   There is a 5% residual stenosis post intervention.   Mid Cx lesion   Stent   The guidewire guidewire crosses lesionGUIDEWIRE ASAHI LORENZO BLUE 0.014\"X180CM J-TIP [id number] crossed the lesion. The pre-interventional distal flow is normal (BROOKE 3). No pre-stent angioplasty was performed. A STENT " SYNERGY DRUG ELUTING 3.36Y42BS [id number] drug eluting stent was successfully placed. The post-interventional distal flow is normal (BROOKE 3). The intervention was successful.   There is a 5% residual stenosis post intervention.     Assessment & Plan:  Dung Norton is a 73 year old male who has a PMHx significant for CKD5 d/t long standing obstructive uropathy 2/2 BPH and benign bladder mass, HTN, moderate aortic stenosis, and ischemic cardiomyopathy. Patient admitted for a planned PCI (LAD and LCx PCI and cannulation of RCA to determine CAD burden) with initiation of dialysis after angiogram.     #ESRD  Patient with history of CKD V 2/2 chronic obstructive uropathy and BPH, also with HTN.  LUE AV Fistula present and mature.  Patient admitted for planned PCI (see below), with plans to initiate dialysis afterwards.  Patient still making urine, does straight cath himself up to BID. Cr today 5.31, GFR 10.     - Renal consulted; greatly appreciate their assistance.  Patient to have dialysis run today, likely tomorrow and Thursday vs Friday as well  - Will admit patient to inpatient   - Daily CBC, BMP  - Patient okay to transfer to med/surg floor, tele not needed  - Will transfer to medicine service later today  - RN Care Coordinator helping set up OP dialysis management    #ICM (EF 30-35%) s/p PCI to mLAD, pLCx  ##Moderate aortic stenosis  Patient found to have new murmur as OP.  TTE with new WMA and decreased EF (range from 20%-60%, most recent 35-40%), and worsening aortic stenosis.  Patient originally scheduled for a RHC/Cors as OP for TAVR eval, however aortic stenosis in moderate range, high output and high gradient felt to be more related to need for dialysis than TAVR.  Patient does not need TAVR at this point in time.  With new WMA on Echo, decision made to continue with coronary angiogram.  This occurred yesterday with with successful atherectomy to mLAD and pLCX.  Patient reports he was never feeling  chest pain, SOB, lightheaded, or dizzy prior to cath.  Patient continues to report feeling well after cath. Appears euvolemic on exam.     - Continue 81 mg ASA (lifelong)  - Continue Brilinta 90 mg BID (x 1 year uninterrupted)  - Continue PTA Bumex 2 mg BID  - PTA Metoprolol currently on hold due to soft BP ('s) with dialysis run scheduled today  - Daily weights  - Okay to come off telemetry (as listed above)  - Follow up with Cardiology LARRY 2 weeks after discharge (this has been ordered)    #HTN  BP currently on soft side, 100-115.   - Per renal recs will discontinue PTA Norvasc  - Continue PTA Bumex as listed above      Diet: Regular  Activity: up as tolerated  Code Status: Full  Disposition: Admit to inpatient, stay likely 5-7 days    Patient seen and discussed w/ Dr. Strong.      COY Fowler, CNP  [facility]  Interventional Cardiology  [phone]      I have seen and examined the patient with the [facility] team. I agree with the assessment and plan of the note above.I have reviewed pertinent labs.     Sixto Strong MD  Interventional Cardiology  Pager: [phone]

## 2019-04-23 NOTE — PROGRESS NOTES
HEMODIALYSIS TREATMENT NOTE    Date: 4/23/2019  Time: 5:45 PM    Data:  Pre Wt: 71.9 kg     Desired Wt: 71.9 kg   Ultrafiltration - Post Run Net Total Removed (mL): 0 mL  Ultrafiltration - Post Run Net Total Gain (mL): 0 mL  Vascular Access Status: Yes, secured and visible  Dialyzer Rinse: Streaked, Light  Total Blood Volume Processed: 24.5 Liters  Total Dialysis (Treatment) Time: 2 hrs     Lab:   Hepatitis B labs and Quantiferon labs drawn.     Interventions/Assessment:  Patient's first HD tx via LUE fistula cannulated with 17 gauge needles. Thrill and bruit present.  and . Keep SBP > 100 per order. Pt had no issues or complaints during tx. No fluid removed per order and sites held for 5 minutes to achieve hemostasis. Post /62. Report given to primary 6D RN.      Plan:    Next HD scheduled for tomorrow.

## 2019-04-23 NOTE — PROGRESS NOTES
Neuro: A&Ox4.   Cardiac: Afebrile, VSS.            Respiratory: RA    GI/: Self caths 3 x a day. Supplies given to patient. BM formed and soft this morning.  Diet/appetite: Low fat low salt diet. Tolerating well. Denies n/v.  Activity: Up ad harper, independent.   Pain: . Denies  Skin: Intact. Right groin site CDI.   Lines: PIV SL. Fistula left upper arm.     Flipped from Observation CSI to Inpatient Gold. Dialysis run today. Tolerated well. Plan for dialysis tomorrow late morning.

## 2019-04-23 NOTE — CONSULTS
Nephrology Initial Consult  April 23, 2019      Dung Norton MRN:[id number] YOB: 1945  Date of Admission:4/22/2019  Primary care provider: Haley Baker  Requesting physician: Gabino Collins MD    ASSESSMENT AND RECOMMENDATIONS:   Dung Norton is a 73 yr old male with PMH of CKD5 due to chronic obstructive uropathy with BPH and benign bladder mass, HTN, aortic stenosis, cardiomyopathy (EF 30-35%), CAD, admitted s/p PCI w/ DESx2 for initiation of dialysis.    ESKD: due to chronic obstructive uropathy with BPH (uses straight cath); has been following Cecilia Britt NP and has been preparing to initiate HD with GFR 10-11 ml/mn since 9/2017. Recent Urine protein 1.5 g/gCr. Has AVF created 11/22/17 by Dr. Pabon, last seen by Dr. Pabon 1/2/18 and fistula has been maturing nicely.  - Plan on 2 hr initiation run today 4/23, 2.5 hrs tomorrow, 3.0 hrs Thursday  - Appreciate care coordinator's assistance with securing outpatient HD unit (pt prefers [facility], MWF 2nd shift)    Acid/base:  - Discontinue PTA PO bicarb; will now get bicarb via dialysis    Volume: pt appears euvolemic and still makes significant urine (straight caths bid as well as spontaneous voiding). On bumex 2 mg bid (recently started at OSH due to pulm edema); O2 100% RA  - Continue Bumex  - Daily weights    HFrEF: EF 30-35%  BP: normotensive, 100-115's. Had been on amlodipine 2.5 mg qday and metoprolol XL 25 mg qday  - BP meds held for now (recommend stopping amlodipine)    BMD: Ca 8.3; Recent , Vit D 41on sevelamer 800 mg tid WM  - Continue sevelamer tid WM  - Ordered phos    Anemia: hgb 9.8 g/dL; labs 3/25: iron 31, ferr, 351, IS 13, hgb 9-10's.  - Will initiate iron loading with IV venofer 100 mg per HD x 10  - Will hold off on EARLENE until iron loading is completed  - Discontinue PTA subcutaneous aranesp and PO iron (both will be given via HD)        Recommendations were communicated to primary team via this note.        Nuria Stephenson PA-C   [phone]      REASON FOR CONSULT: ESKD and initiation of dialysis    HISTORY OF PRESENT ILLNESS:  Dung Norton is a 73 yr old male with PMH of CKD5 due to chronic obstructive uropathy with BPH and benign bladder mass, HTN, aortic stenosis, cardiomyopathy (EF 30-35%), CAD, admitted s/p PCI w/ DESx2 for initiation of dialysis. Patient has been following closely with Cecilia De La Torre NP and has been preparing to initiate HD, has [facility] and has had AVF in place since 2017. Several weeks ago, he was admitted to OSH and started on diuretics due to findings of pulmonary edema on imaging. He reports that although he has always made a fair amount of urine, he is now making even more on bumex. He uses a straight cath bid at home and follows with urology as well. In anticipation of worsening kidney function from the contrast required for cardiac stenting yesterday, will initiate dialysis today. Discussed with cardiology and care coordinator and will hopefully have an outpt HD unit secured by the end of the week. The patient is seen bedside today along with his daughter, Tereza. The risks and benefits of dialysis were discussed and consent was obtained. He currently is feeling well and denies CP, SOB, chills, n/v.    PAST MEDICAL HISTORY:  Reviewed with patient on 04/23/2019     Past Medical History:   Diagnosis Date     BPH (benign prostatic hyperplasia)      Chronic kidney disease      HTN      Obstructive uropathy      Pulmonary nodules      Tobacco abuse        Past Surgical History:   Procedure Laterality Date     APPENDECTOMY  AGE 6     CREATE FISTULA ARTERIOVENOUS UPPER EXTREMITY Left 11/17/2017    Procedure: CREATE FISTULA ARTERIOVENOUS UPPER EXTREMITY;  Left Cephalic Vein To Radial Artery Arteriovenous Fistula Creation, Anesthesia Block;  Surgeon: Eduardo Pabon MD;  Location: [facility] OR     CV CORONARY ANGIOGRAM N/A 2/22/2019    Procedure: 1100 CORS, RHC; BI-PLANE;  Surgeon:  Gabino Collins MD;  Location: [facility]     CYSTOSCOPY, FULGURATE BLEEDERS, EVACUATE CLOT(S), COMBINED N/A 7/16/2017    Procedure: COMBINED CYSTOSCOPY, FULGURATE BLEEDERS, EVACUATE CLOT(S);  Cystoscopy clot evacuation, bladder biopsy and fulguration (per surgeons note) NERVE BLOCK PERIPHERAL;  Surgeon: Tamiko Vanegas MD;  Location: [facility] OR     CYSTOSCOPY, TRANSURETHRAL RESECTION (TUR) PROSTATE, COMBINED N/A 3/5/2018    Procedure: COMBINED CYSTOSCOPY, TRANSURETHRAL RESECTION (TUR) PROSTATE;  Cystoscopy, Transurethral Resection of Prostate ;  Surgeon: Tamiko Vanegas MD;  Location: [facility] OR     SINUS SURGERY  AGE 6     TONSILLECTOMY      CHILDHOOD        MEDICATIONS:  PTA Meds  Prior to Admission medications    Medication Sig Last Dose Taking? Auth Provider   aspirin (ASA) 81 MG EC tablet Take 1 tablet (81 mg) by mouth daily Start tomorrow morning.  Yes Melissa Coronado APRN CNP   aspirin 81 MG tablet Take 1 tablet (81 mg) by mouth every morning - RESUME on 3/9/18 if urine remains clear 4/22/2019 at 0600 Yes Leda Baxter PA   bumetanide (BUMEX) 2 MG tablet Take 2 mg by mouth Take 1 tablet by mouth 2 times daily 4/22/2019 at 0600 Yes Reported, Patient   ferrous sulfate (FEROSUL) 325 (65 Fe) MG tablet Take 1 tablet (325 mg) by mouth 2 times daily 4/22/2019 at 0600 Yes Oralia De La Torre NP   metoprolol succinate ER (TOPROL-XL) 50 MG 24 hr tablet Take 1 tablet (50 mg) by mouth daily 4/22/2019 at 0600 Yes Haley Baker MD   multivitamin w/minerals (MULTI-VITAMIN) tablet Take 1 tablet by mouth daily 4/22/2019 at 0600 Yes Reported, Patient   Omega-3 Fatty Acids (FISH OIL PO) Take 1 capsule by mouth daily  4/22/2019 at 0600 Yes Reported, Patient   rosuvastatin (CRESTOR) 40 MG tablet Take 1 tablet (40 mg) by mouth daily 4/21/2019 at 2200 Yes Haley Baker MD   sevelamer (RENAGEL) 800 MG tablet Take 1 tablet (800 mg) by mouth 3 times daily (with meals) 4/22/2019 at 0600 Yes  Oralia De La Torre NP   sodium bicarbonate 650 MG tablet Take 1 tablet (650 mg) by mouth 2 times daily 4/22/2019 at 0600 Yes Oralia De La Torre NP   ticagrelor (BRILINTA) 90 MG tablet Take 1 tablet (90 mg) by mouth 2 times daily  Yes Melissa Coronado APRN CNP   darbepoetin william (ARANESP, ALBUMIN FREE,) 100 MCG/0.5ML injection Inject 0.5 mLs (100 mcg) Subcutaneous every 14 days As needed for hgb<10g/dL.  If Hgb increases >1 point in 2 weeks (if blood transfusion given, use hgb PRIOR to this), SYSTOLIC BP > 180 mmHg or hgb>=10g/dL, HOLD DOSE. Dose must be within 1 week of Hgb.  Per anemia protocol with Cecilia De La Torre CNP  Patient not taking: Reported on 2/13/2019 More than a month at Unknown time  Oralia De La Torre NP   order for DME Equipment being ordered: Haley Cervantes MD      Current Meds    aspirin  81 mg Oral Daily     bumetanide  2 mg Oral BID     rosuvastatin  40 mg Oral Daily     sevelamer  1,600 mg Oral TID w/meals     sodium chloride (PF)  3 mL Intracatheter Q8H     ticagrelor  90 mg Oral Q12H     Infusion Meds    - MEDICATION INSTRUCTIONS -       - MEDICATION INSTRUCTIONS -       - MEDICATION INSTRUCTIONS -       Percutaneous Coronary Intervention orders placed (this is information for BPA alerting)         ALLERGIES:    No Known Allergies    REVIEW OF SYSTEMS:  A comprehensive of systems was negative except as noted above.    SOCIAL HISTORY:   Social History     Socioeconomic History     Marital status:      Spouse name: Not on file     Number of children: 2     Years of education: Not on file     Highest education level: Not on file   Occupational History     Occupation:      Employer: [employer]   Social Needs     Financial resource strain: Not on file     Food insecurity:     Worry: Not on file     Inability: Not on file     Transportation needs:     Medical: Not on file     Non-medical: Not on file   Tobacco Use     Smoking status: Former Smoker      Packs/day: 1.00     Years: 53.00     Pack years: 53.00     Types: Cigarettes     Last attempt to quit: 2017     Years since quittin.8     Smokeless tobacco: Never Used   Substance and Sexual Activity     Alcohol use: Yes     Comment: rare, drink every few months     Drug use: No     Sexual activity: Not Currently   Lifestyle     Physical activity:     Days per week: Not on file     Minutes per session: Not on file     Stress: Not on file   Relationships     Social connections:     Talks on phone: Not on file     Gets together: Not on file     Attends Mandaeism service: Not on file     Active member of club or organization: Not on file     Attends meetings of clubs or organizations: Not on file     Relationship status: Not on file     Intimate partner violence:     Fear of current or ex partner: Not on file     Emotionally abused: Not on file     Physically abused: Not on file     Forced sexual activity: Not on file   Other Topics Concern     Parent/sibling w/ CABG, MI or angioplasty before 65F 55M? Yes   Social History Narrative     Not on file     Reviewed with patient    FAMILY MEDICAL HISTORY:   Family History   Problem Relation Age of Onset     C.A.D. Mother         d age 67     Vision Loss Father      Hearing Loss Sister      Diabetes Sister      Cancer No family hx of      Glaucoma No family hx of      Macular Degeneration No family hx of      Reviewed with patient     PHYSICAL EXAM:   Temp  Av.8  F (36.6  C)  Min: 97.5  F (36.4  C)  Max: 98.5  F (36.9  C)      Pulse  Av  Min: 79  Max: 88 Resp  Avg: 15.5  Min: 12  Max: 18  SpO2  Av.6 %  Min: 93 %  Max: 100 %       /60 (BP Location: Right arm)   Pulse 81   Temp 98.5  F (36.9  C) (Oral)   Resp 16   SpO2 100%       Admit       GENERAL APPEARANCE: alert, NAD  EYES: no scleral icterus, pupils equal  Pulmonary: lungs clear to auscultation with equal breath sounds bilaterally   CV: regular rhythm, normal rate    - JVD no   -  Edema trace  GI: soft, nontender, normal bowel sounds  MS: no evidence of inflammation in joints, no muscle tenderness  : no wood  SKIN: no rash, warm, dry, no cyanosis  NEURO: face symmetric, no asterixis   Access: LUE AVF    LABS:   CMP  Recent Labs   Lab 04/22/19  1114 04/16/19  1053    143   POTASSIUM 3.9 3.9   CHLORIDE 108 109   CO2 22 24   ANIONGAP 11 10   GLC 82 94   * 94*   CR 5.44* 5.16*   GFRESTIMATED 9* 10*   GFRESTBLACK 11* 12*   DERICK 8.6 8.5     CBC  Recent Labs   Lab 04/22/19  1114 04/16/19  1053   HGB 10.1* 9.9*   WBC 7.1 9.1   RBC 3.96* 3.67*   HCT 35.9* 32.5*   MCV 91 89   MCH 25.5* 27.0   MCHC 28.1* 30.5*   RDW 15.3* 17.0*    184     INRNo lab results found in last 7 days.  ABGNo lab results found in last 7 days.   URINE STUDIES  Recent Labs   Lab Test 04/18/18  1325 04/09/18  1400 01/25/18  1102 11/22/17 08/29/17  0235  01/15/14  0936   COLOR Yellow Yellow Red Yellow Light Yellow   < > Yellow   APPEARANCE Cloudy Cloudy Slightly Cloudy Clear Cloudy   < > Clear   URINEGLC Negative Negative Negative Neg Negative   < > Negative   URINEBILI Negative Negative Negative Neg Negative   < > Negative   URINEKETONE Negative Negative Negative Neg Negative   < > Negative   SG 1.015 1.015 1.013 1.010 1.014   < > 1.020   UBLD Large* Large* Large* Trace Moderate*   < > Small*   URINEPH 5.5 5.5 6.5 5.0 6.0   < > 6.0   PROTEIN 30* 30* 300* 30  100*   < > Negative   UROBILINOGEN 0.2 0.2  --  1   --   --  0.2   NITRITE Negative Negative Negative Neg Negative   < > Negative   LEUKEST Large* Large* Moderate* Small Large*   < > Negative   RBCU * 10-25* 14*  --  10*   < > 10-25*   WBCU >100* >100* >100*  --  >182*   < > O - 2    < > = values in this interval not displayed.     Recent Labs   Lab Test 02/25/19  0820 07/25/18  1128 07/02/18  0845 04/09/18  1400 02/26/18  1230 12/12/17  1100 08/04/17  1359   UTPG 3.10* 1.26* 1.51* 1.17* 0.74* 0.96* 2.85*     PTH  Recent Labs   Lab Test  02/18/19  0943 10/03/18  1301 07/23/18  1002 03/02/18  1107 12/11/17  1018 08/08/17  1051   PTHI 134* 17* 14* 86* 40 120*     IRON STUDIES  Recent Labs   Lab Test 03/25/19  0932 01/16/19  1255 09/24/18  0956 08/27/18  0934 07/30/18  1014 07/02/18  0955 06/04/18  0953 05/29/18  1032 04/30/18  0954 04/02/18  1028 03/02/18  1107 01/30/18  1020 12/26/17  1001 11/27/17  1039 11/13/17  1024 10/16/17  1051 09/15/17  1007 08/08/17  1051 07/21/17  0652   IRON 31* 33* 62 63 49 46 51 63 46 47 57 47 46 23* 21* 36 33* 54 24*    226* 245 260 240 247 229* 222* 231* 269 245 240 237* 169* 175* 192* 184* 167* 125*   IRONSAT 13* 15 25 24 20 19 22 28 20 17 23 20 19 14* 12* 19 18 32 19    499* 458* 524* 509* 454* 468* 432* 437* 407* 427* 584* 518* 818* 1,206* 1,110* 923* 679* 1,209*       IMAGING:  None    Nuria Stephenson PA-C

## 2019-04-23 NOTE — H&P
[facility], [place]    Internal Medicine History and Physical - Gold Service       Date of Admission: 4/23/2019    Assessment & Plan  Dung Norton is a 73 year old man with a history of CKD stage 5 due to long standing obstructive uropathy 2/2 BPH and benign bladder mass, HTN, moderate aortic stenosis, ischemic cardiomyopathy (EF 30-35%), and coronary artery disease. He was admitted to Cardiology Service on 4/22/19 for a planned PCI (LAD and LCx PCI with cannulation of RCA to determine CAD burden) with Nephrology consulted for initiation of dialysis after angiogram.  Patient was transferred form observation to inpatient Medicine service on 4/23/19 for continued management given scheduled dialysis.    1. ESRD: Hx of CKD stage 5 2/2 chronic obstructive uropathy and BPH, also with HTN. Follows with Cecilia De La Torre NP, and has been preparing to initiate HD with GFR 10-11ml/mn since 9/2017. Has LUE AV Fistula placed in 11/22/17 by Dr. Pabon, last seen 1/2/18 and fistula has been maturing nicely per Nephrology. Nephrology consulted on 4/23 with plans to initiate dialysis due to concern for future worsening renal function due to the contrast given with coronary angiogram. Nephrology initiated dialysis with plan for a 2 hour run on 4/23, 2.5hrs on 4/24 and 3hrs on 4/25. Current weight 71.9kg.   -Nephrology following  -Plan for dialysis run on today 4/23, 2.5hrs on 4/24 and 3hrs on 4/25  -Daily CBC, CMP, Mg and Phos  -Continue PTA Sevelamer 800mg TID  -Continue Bumex 2mg BID   -Per Neph, hold sodium bicarbonate   -RN Care Coordinator to assist with OP dialysis set up and management     2. ICM (EF 30-35%) s/p PCI to mLAD and pLCx   Moderate aortic stenosis:  Follows with Dr. Lizandro Ng, Cardiology. Last visit on 2/5/19. Found to have new murmur as outpatient. TTE (1/2019) with new WMA and decreased EF (from 60% to 10-20% in 1 year) and worsening aortic stenosis. He was in atrial fibrillation during  the echocardiogram in 1/2019, however was in NSR during visit in 2/5/19. Anticoagulation was held at that time. Currently in NSR. Patient was originally scheduled for RHC/Cors as outpatient for TAVR evaluation, however aortic stenosis was found to be in moderate range, and high output and high gradient felt to be more related for need of dialysis than TAVR, therefore, no plans for TAVR at this time. Repeat ECHO in 2/2019 with improved EF 35-40%. However, given new WMA; planned for coronary angiogram which was performed 4/22. Patient is s/p successful rotational atherectomy and PCI of the mid LAD with a drug eluting stent, and successful PCI of the mid left circumflex with a ALICIA on 4/22 with Dr. Collins, Cardiology. Right access site without evidence of bleeding; dressing in place. No chest pain, shortness of breath or WELLS. Appears euvolemic on exam.   -Continue ASA 81mg daily (lifelong)  -Continue Brilinta 90mg BID (for 1 year uninterrupted)  -Continue Rosuvastatin 40mg daily   -Continue PTA Bumex 2mg BID  -ECG STAT prn if any chest pain   -PTA Metoprolol on hold due to soft BPs; consider resuming or smaller dose of BP allows while inpatient    -Daily weights  -Follow-up with Cardiology LARRY 2 weeks after discharge (this has been ordered by Cardiology Service)  -Follow-up with Cardiac Rehab as outpatient   -PT/OT consulted    3. Hx of chronic anemia: On PTA hold PTA ferrous sulfate and aranesp. BL Hgb ~10.0.  Currently, Hgb 9.8 (from 10.1) on admission. No acute signs or symptoms of bleeding at this time, right access site without bleeding. Labs on 3/25 with iron 31, ferr, 351, IS 13. Likely in setting of renal disease.   -Per Nephrology; discontinue PTA Aranesp and PO iron (both will be given via HD)  -Initiating iron loading with IV Venofer 100mg per HD x10  -Neph holding off on EARLENE until iron loading is completed  -CBC daily  -Obtain Folate/B12     4. Hx of paroxysmal A fib: Patient was noted to be in Afib with  RVR during Echocardiogram in 1/2019; however at last cardiology visit on 2/5/19 was in NSR. Per Cardiology, oral anticoagulation was defered at that time, given his hx of ESRD which is a class IIb indication to start OAC and given angiogram in which he would beed interruption of OAC. Discussed plans for readdressing during follow-up. Wore a Zio patch for 1 week and was without afib. Currently in NSR.   -Continue to monitor  -Will need to follow-up with Cardiology as OP as scheduled and to discuss OAC plans     5. HTN: Previously was on Amlodipine 2.5mg daily; which was discontinued during recent admission at OSH on 4/6/19. On Metoprolol XL 50mg every day and Bumex 2mg BID. BP have been soft during this admission and Metoprolol 50mg has been held along with Amlodipine.  -Continue Bumex 2mg BID   -Hold PTA Metoprolol XL 50mg every day for now; consider resuming or smaller dose of BP allows while inpatient   -Discontinued amlodipine     6. Hx of urinary retention requiring clean intermittent catheterizations  Hx of BPH: Follows with Dr. Susan Vanegas, Urology and is s/p TURP. Continues to make urine. Last seen 10/26/18 with plans to continue CIC TID, Uroflow/PVR and follow-up in 6months. Has appointment scheduled this Friday 4/26/19 for follow-up. Pt reports CIC at 10am, 4pm, and 10pm daily; as well as spontaneous voiding. No flank pain, dysuria, frequency, or suprapubic tenderness.   -Continue PTA straight cath TID  -Bladder scans prn  -Follow-up with Urology as outpatient as scheduled on 4/26/19; if still inpatient, Care Coordinator to assist with rescheduling     7. Hx of mild dilatation of ascending aorta  Hx of infrarenal abdominal aorta aneurysm: Pt with mild dilatation of ascending aorta measuring 40 mm x 34.5 mm. Also with tortuous abdominal aorta with infrarenal aneurysmal dilatation measuring up to 3/8cm and right common iliac artery aneurysm dilatation measuring up to 2.6cm. No chest pain, elevated BP,  or abdominal pain at this time. -Continue to monitor closely     8. Chronic cough  Hx of Ground glass opacities: Bilateral lower lobe predominant peribronchovascular groundglass opacities and nodular consolidations seen on CT angiogram on 3/4/19; favored to be aspiration versus infection, less likely malignancy. Recommended short term follow-up to ensure resolution is recommended.   -Patient to have outpatient follow-up with PCP : Pt reports chronic cough; which is unchanged. No hemoptysis, fever or hypoxia. Lungs CTAB. Patient's daughter does note that he will occasionally cough when he has formed foods while eating. Denies any hx of aspiration.   -SLP consulted   -Continue to monitor  -Follow-up as above for CT findings     9. Leukocytosis: Pt with elevated WBC of 11.2, up from 7.1 on 4/22. No acute infectious signs or symptoms at this time. Afebrile. Likely 2/2 acute stress response.   -Continue to monitor  -CBC in AM     10. Abnormal TSH: Per chart review, patient with abnormal TSH of 5.36 in 1/16/19. Not on thyroid hormone. Asymptomatic.  -Repeat TSH with reflex Free T4    Diet: Cardiac diet   DVT Prophylaxis: Pneumatic compression device   Code Status: Full Code    Disposition Plan   Expected discharge: 2 - 3 days; recommended to prior living arrangement once patient has had his runs of dialysis and is tolerating them well, has outpatient dialysis plans in place, and BP stable.     Discussed case with attending physician Dr. Tammi Gupta, Internal Medicine who agrees with plan of care.   Manda Araiza PA-C  Hospitalist Service  [facility]  Pager [phone]    Chief Complaint   Planned PCI with initiation of dialysis     History is obtained from the patient and patient's daughter    History of Present Illness   Dung Norton is a 73 year old man with a history of CKD stage 5 due to long standing obstructive uropathy 2/2 BPH and benign bladder mass, HTN, moderate aortic stenosis,  ischemic cardiomyopathy (EF 30-35%), and coronary artery disease. He was admitted to Cardiology Service on 4/22/19 for a planned PCI (LAD and LCx PCI with cannulation of RCA to determine CAD burden) with Nephrology consulted for initiation of dialysis after angiogram.     Patient was found to have a new murmur as an outpatient. He had a TTE performed in 1/9/2019 with severely reduced LV function with EF of 10-20%, compared to 55-60% in 1 year prior. ECHO also had concern for worsening severe aortic stenosis as well as moderate to severe diffuse hypokinesis. Follows with Dr. Ng, Cardiology. Underwent a right heart catheterization (2/22/19) and CTA (3/4/19) for outpatient TAVR evaluation, however aortic stenosis was found to be in moderate range, and high output and high gradient felt to be more related for need of dialysis than TAVR, therefore, no plans for TAVR at this time. Repeat ECHO in 2/2019 with improved EF 35-40%. However, given new WMA; planned for coronary angiogram which was performed 4/22. Of note, Dr. Ng noted patient was in atrial fibrillation during the echocardiogram in 1/2019, however was in NSR during visit in 2/5/19. Per Cardiology, oral anticoagulation was defered at that time, given his hx of ESRD which is a class IIb indication to start OAC and given angiogram in which he would beed interruption of OAC. Discussed plans for readdressing during follow-up. Wore a Zio patch for 1 week and was without afib. Patient is now s/p successful rotational atherectomy and PCI of the mid LAD with a drug eluting stent, and successful PCI of the mid left circumflex with a ALICIA on 4/22 with Dr. Collins, Cardiology.     Per chart review, patient was also recently admitted to OSH from 4/6-4/8 for acute systolic CHF with increased WELLS found to have elevated BNP, X-ray suggesting possible edema, and bilateral lower extremity edema which was new. Had elevated troponin which was likely related to heart failure in  setting of advanced renal failure. Was given IV Lasix in the ED, with some improvement. Thought CHF was likely related to fluid overload, vs declining renal function vs aortic stenosis vs declining cardiac function. At that time, patient's amlodipine 2.5mg daily was discontinued and bumex 2mg BID was scheduled and was started on Rosuvastatin 40mg daily.     Currently, patient denies any chest pain, shortness of breath, fevers/chills, abdominal pain, nausea or vomiting. No bleeding from his right femoral access site. The patient and his daughter report a chronic, dry cough which is unchanged. No hemoptysis or sputum production. No peripheral edema. He denies any aspiration events or hx of aspiration pneumonia, however daughter reports that he will occasionally cough when he eats formed foods.      Patient is currently tolerating the first dialysis run well. Patient has a hx of ESRD 2/2 chronic obstructive uropathy and BPH, also with HTN. Follows with Cecilia De La Torre NP, and has been preparing to initiate HD with GFR 10-11ml/mn since 9/2017. Has LUE AV Fistula placed in 11/22/17 by Dr. Pabon, last seen 1/2/18.  Patient denies any nausea, pruritis. Continues to make urine. Follows with Dr. Susan Vanegas, Urology and is s/p TURP for a history of BPH. Last seen 10/26/18 with plans to continue CIC TID, Uroflow/PVR and follow-up in 6months. Has appointment scheduled this Friday 4/26/19 for follow-up. Pt reports intermittent catheterizations at 10am, 4pm, and 10pm daily; as well as spontaneous voiding. No flank pain, dysuria, frequency, or suprapubic tenderness.     No other acute concerns at this time.     Review of Systems   10 point ROS performed and negative unless otherwise noted in HPI    Past Medical History    I have reviewed this patient's medical history and updated it with pertinent information if needed.   Past Medical History:   Diagnosis Date     BPH (benign prostatic hyperplasia)      Chronic kidney disease       HTN      Obstructive uropathy      Pulmonary nodules      Tobacco abuse         Past Surgical History   I have reviewed this patient's surgical history and updated it with pertinent information if needed.  Past Surgical History:   Procedure Laterality Date     APPENDECTOMY  AGE 6     CREATE FISTULA ARTERIOVENOUS UPPER EXTREMITY Left 2017    Procedure: CREATE FISTULA ARTERIOVENOUS UPPER EXTREMITY;  Left Cephalic Vein To Radial Artery Arteriovenous Fistula Creation, Anesthesia Block;  Surgeon: Eduardo Pabon MD;  Location: [facility] OR     CV CORONARY ANGIOGRAM N/A 2019    Procedure: 1100 CORS, RHC; BI-PLANE;  Surgeon: Gabino Collins MD;  Location: [facility]ARD[facility] CATH [facility]     CYSTOSCOPY, FULGURATE BLEEDERS, EVACUATE CLOT(S), COMBINED N/A 2017    Procedure: COMBINED CYSTOSCOPY, FULGURATE BLEEDERS, EVACUATE CLOT(S);  Cystoscopy clot evacuation, bladder biopsy and fulguration (per surgeons note) NERVE BLOCK PERIPHERAL;  Surgeon: Tamiko Vanegas MD;  Location: [facility] OR     CYSTOSCOPY, TRANSURETHRAL RESECTION (TUR) PROSTATE, COMBINED N/A 3/5/2018    Procedure: COMBINED CYSTOSCOPY, TRANSURETHRAL RESECTION (TUR) PROSTATE;  Cystoscopy, Transurethral Resection of Prostate ;  Surgeon: Tamiko Vanegas MD;  Location: [facility] OR     SINUS SURGERY  AGE 6     TONSILLECTOMY      CHILDHOOD        Social History   Lives at an senior living apartment. No home health care. Can drive and do ADLs indepently per patient.   Social History     Tobacco Use     Smoking status: Former Smoker     Packs/day: 1.00     Years: 53.00     Pack years: 53.00     Types: Cigarettes     Last attempt to quit: 2017     Years since quittin.8     Smokeless tobacco: Never Used   Substance Use Topics     Alcohol use: Yes     Comment: rare, drink every few months     Drug use: No       Family History   I have reviewed this patient's family history and updated it with pertinent information if needed.   Family History    Problem Relation Age of Onset     C.A.D. Mother         d age 67     Vision Loss Father      Hearing Loss Sister      Diabetes Sister      Cancer No family hx of      Glaucoma No family hx of      Macular Degeneration No family hx of        Prior to Admission Medications   Prior to Admission Medications   Prescriptions Last Dose Informant Patient Reported? Taking?   Omega-3 Fatty Acids (FISH OIL PO) 4/22/2019 at 0600 Self Yes Yes   Sig: Take 1 capsule by mouth daily    aspirin 81 MG tablet 4/22/2019 at 0600 Self Yes Yes   Sig: Take 1 tablet (81 mg) by mouth every morning - RESUME on 3/9/18 if urine remains clear   bumetanide (BUMEX) 2 MG tablet 4/22/2019 at 0600 Self Yes Yes   Sig: Take 2 mg by mouth Take 1 tablet by mouth 2 times daily   darbepoetin william (ARANESP, ALBUMIN FREE,) 100 MCG/0.5ML injection More than a month at Unknown time Self No No   Sig: Inject 0.5 mLs (100 mcg) Subcutaneous every 14 days As needed for hgb<10g/dL.  If Hgb increases >1 point in 2 weeks (if blood transfusion given, use hgb PRIOR to this), SYSTOLIC BP > 180 mmHg or hgb>=10g/dL, HOLD DOSE. Dose must be within 1 week of Hgb.  Per anemia protocol with Cecilia De La Torre CNP   Patient not taking: Reported on 2/13/2019   ferrous sulfate (FEROSUL) 325 (65 Fe) MG tablet 4/22/2019 at 0600 Self Yes Yes   Sig: Take 1 tablet (325 mg) by mouth 2 times daily   metoprolol succinate ER (TOPROL-XL) 50 MG 24 hr tablet 4/22/2019 at 0600 Self Yes Yes   Sig: Take 1 tablet (50 mg) by mouth daily   multivitamin w/minerals (MULTI-VITAMIN) tablet 4/22/2019 at 0600 Self Yes Yes   Sig: Take 1 tablet by mouth daily   order for DME  Self No No   Sig: Equipment being ordered: olecranon bursa brace   rosuvastatin (CRESTOR) 40 MG tablet 4/21/2019 at 2200 Self Yes Yes   Sig: Take 1 tablet (40 mg) by mouth daily   sevelamer (RENAGEL) 800 MG tablet 4/22/2019 at 0600 Self No Yes   Sig: Take 1 tablet (800 mg) by mouth 3 times daily (with meals)   sodium bicarbonate 650 MG  "tablet 4/22/2019 at 0600 Self No Yes   Sig: Take 1 tablet (650 mg) by mouth 2 times daily      Facility-Administered Medications: None     Allergies   No Known Allergies    Physical Exam   /62   Pulse 89   Temp 98.2  F (36.8  C) (Oral)   Resp 18   Ht 1.778 m (5' 10\")   Wt 71.9 kg (158 lb 8 oz)   SpO2 94%   BMI 22.74 kg/m    GENERAL: Alert and oriented x 3. Lying in bed comfortably. In no acute distress.   HEENT: Anicteric sclera. PERRL. Mucous membranes moist and without lesions.   CV: RRR. S1, S2. Grade III/VI systolic murmur. No gallops or friction rubs.   RESPIRATORY: Effort normal on room air. Lungs CTAB with no wheezing, rales, rhonchi.   GI: Abdomen soft and non distended, bowel sounds present. No tenderness, rebound, guarding.   MUSCULOSKELETAL: No joint swelling or tenderness.   NEUROLOGICAL: No focal deficits. Moves all extremities.   EXTREMITIES: No peripheral edema. Intact bilateral pedal pulses. Left AVF site with audible and palpable bruit. No surrounding erythema or warmth. No tenderness. Right groin access site with dressing in place. Dressing is not saturated and is clean and dry. No surrounding erythema or warmth. No bleeding.   SKIN: No jaundice. No rashes.     Data   Data reviewed today: I reviewed all medications, new labs and imaging results over the last 24 hours including coronary angiogram from 4/22:     Coronary Findings   Diagnostic   Dominance: Right   Left Main   Mid LM lesion is 20% stenosed.   Left Anterior Descending   Mid LAD lesion is 80% stenosed. The lesion is severely calcified.   First Diagonal Branch   The vessel is large.   Second Diagonal Branch   The vessel is moderate in size.   Left Circumflex   Prox Cx lesion is 20% stenosed.   Mid Cx lesion is 80% stenosed. Large aneurysm prior to a focal 80% stenosis   First Obtuse Marginal Branch   Collaterals   1st Mrg filled by collaterals from 2nd Mrg.      Ost 1st Mrg to 1st Mrg lesion is 100% stenosed. The lesion is " "chronic total occlusion.   Second Obtuse Marginal Branch   The vessel is moderate in size.   Third Obtuse Marginal Branch   The vessel is moderate in size.   3rd Mrg lesion is 60% stenosed.   Right Coronary Artery   The vessel was not injected. There are left-to-right collaterals supplying the PDA and PL and backfilling to the mid RCA where there appears to be a blunt occlusion of the RCA.   Acute Marginal Branch   Collaterals   Acute Mrg filled by collaterals from Mid LAD.      Right Posterior Descending Artery   Collaterals   RPDA filled by collaterals from 3rd Mrg.      Collaterals   RPDA filled by collaterals from Mid LAD.      Intervention     Mid LAD lesion   Atherectomy   Rotational atherectomy was performed. 1.5 and 1.75mm Rotablator burrs used at 160,000 rpms. The intervention was successful.   Stent   The guidewire guidewire crosses lesionGUIDEWIRE ASAHI LORENZO BLUE 0.014\"X180CM J-TIP [id number] crossed the lesion. The pre-interventional distal flow is normal (BROOKE 3). Pre-stent angioplasty was performed using a CATH BALLOON EMERGE [id number] supply. A STENT SYNERGY DRUG ELUTING 3.12I16MB [id number] drug eluting stent was successfully placed. Post-stent angioplasty was performed using a CATH BALLOON NC EMERGE 4.17X17GF [id number] supply. The post-interventional distal flow is normal (BROOKE 3). A second Lorenzo Blue wire was placed into D2 as a safety wire. Following PCI of the LAD the ostium of D2 had a 70% stenosis. This was successfully ballooned with a 2.5x12mm balloon. Final angiogrpahy revealed 30% residual stenosis of the D2 ostium with BROOKE 3 flow.   There is a 5% residual stenosis post intervention.   Mid Cx lesion   Stent   The guidewire guidewire crosses lesionGUIDEWIRE ASAHI LORENZO BLUE 0.014\"X180CM J-TIP [id number] crossed the lesion. The pre-interventional distal flow is normal (BROOKE 3). No pre-stent angioplasty was performed. A STENT SYNERGY DRUG ELUTING 3.83C49ZF [id number] " drug eluting stent was successfully placed. The post-interventional distal flow is normal (BROOKE 3). The intervention was successful.   There is a 5% residual stenosis post intervention.

## 2019-04-23 NOTE — PROGRESS NOTES
Patient alert and oriented X4. Right groin site CDI, no hematoma no bleeding. Patient off bedrest at 2350. Ambulated in the hallway. Patient tolerated regular diet. Patient usually self caths at home. Writer straight cathed patient for 975. Writer called Cards CSI X4 for observation goals. The first three times provider stated to call him in 5 and ten minutes but the fourth time writer spoke to provider and no observation goals placed yet.Blood pressure 117/66, pulse 81, temperature 97.5  F (36.4  C), temperature source Oral, resp. rate 12, SpO2 94 %.

## 2019-04-24 ENCOUNTER — APPOINTMENT (OUTPATIENT)
Dept: SPEECH THERAPY | Facility: CLINIC | Age: 74
DRG: 246 | End: 2019-04-24
Attending: PHYSICIAN ASSISTANT
Payer: COMMERCIAL

## 2019-04-24 ENCOUNTER — APPOINTMENT (OUTPATIENT)
Dept: GENERAL RADIOLOGY | Facility: CLINIC | Age: 74
DRG: 246 | End: 2019-04-24
Attending: PHYSICIAN ASSISTANT
Payer: COMMERCIAL

## 2019-04-24 ENCOUNTER — APPOINTMENT (OUTPATIENT)
Dept: OCCUPATIONAL THERAPY | Facility: CLINIC | Age: 74
DRG: 246 | End: 2019-04-24
Attending: PHYSICIAN ASSISTANT
Payer: COMMERCIAL

## 2019-04-24 ENCOUNTER — CARE COORDINATION (OUTPATIENT)
Dept: NEPHROLOGY | Facility: CLINIC | Age: 74
End: 2019-04-24

## 2019-04-24 LAB
ANION GAP SERPL CALCULATED.3IONS-SCNC: 11 MMOL/L (ref 3–14)
BUN SERPL-MCNC: 74 MG/DL (ref 7–30)
CALCIUM SERPL-MCNC: 8.1 MG/DL (ref 8.5–10.1)
CHLORIDE SERPL-SCNC: 106 MMOL/L (ref 94–109)
CO2 SERPL-SCNC: 22 MMOL/L (ref 20–32)
CREAT SERPL-MCNC: 4.64 MG/DL (ref 0.66–1.25)
ERYTHROCYTE [DISTWIDTH] IN BLOOD BY AUTOMATED COUNT: 15.5 % (ref 10–15)
GFR SERPL CREATININE-BSD FRML MDRD: 11 ML/MIN/{1.73_M2}
GLUCOSE SERPL-MCNC: 88 MG/DL (ref 70–99)
HCT VFR BLD AUTO: 29.1 % (ref 40–53)
HGB BLD-MCNC: 8.5 G/DL (ref 13.3–17.7)
MAGNESIUM SERPL-MCNC: 2.1 MG/DL (ref 1.6–2.3)
MCH RBC QN AUTO: 25.7 PG (ref 26.5–33)
MCHC RBC AUTO-ENTMCNC: 29.2 G/DL (ref 31.5–36.5)
MCV RBC AUTO: 88 FL (ref 78–100)
PHOSPHATE SERPL-MCNC: 5.4 MG/DL (ref 2.5–4.5)
PLATELET # BLD AUTO: 184 10E9/L (ref 150–450)
POTASSIUM SERPL-SCNC: 3.9 MMOL/L (ref 3.4–5.3)
RADIOLOGIST FLAGS: ABNORMAL
RBC # BLD AUTO: 3.31 10E12/L (ref 4.4–5.9)
SODIUM SERPL-SCNC: 139 MMOL/L (ref 133–144)
T4 FREE SERPL-MCNC: 0.89 NG/DL (ref 0.76–1.46)
TSH SERPL DL<=0.005 MIU/L-ACNC: 6.42 MU/L (ref 0.4–4)
WBC # BLD AUTO: 8.5 10E9/L (ref 4–11)

## 2019-04-24 PROCEDURE — 36415 COLL VENOUS BLD VENIPUNCTURE: CPT | Performed by: NURSE PRACTITIONER

## 2019-04-24 PROCEDURE — 25000132 ZZH RX MED GY IP 250 OP 250 PS 637: Performed by: NURSE PRACTITIONER

## 2019-04-24 PROCEDURE — 83735 ASSAY OF MAGNESIUM: CPT | Performed by: NURSE PRACTITIONER

## 2019-04-24 PROCEDURE — 85027 COMPLETE CBC AUTOMATED: CPT | Performed by: NURSE PRACTITIONER

## 2019-04-24 PROCEDURE — 99233 SBSQ HOSP IP/OBS HIGH 50: CPT | Performed by: HOSPITALIST

## 2019-04-24 PROCEDURE — 87206 SMEAR FLUORESCENT/ACID STAI: CPT | Performed by: HOSPITALIST

## 2019-04-24 PROCEDURE — 87116 MYCOBACTERIA CULTURE: CPT | Performed by: HOSPITALIST

## 2019-04-24 PROCEDURE — 84100 ASSAY OF PHOSPHORUS: CPT | Performed by: NURSE PRACTITIONER

## 2019-04-24 PROCEDURE — 90937 HEMODIALYSIS REPEATED EVAL: CPT

## 2019-04-24 PROCEDURE — 97110 THERAPEUTIC EXERCISES: CPT | Mod: GO

## 2019-04-24 PROCEDURE — 25000132 ZZH RX MED GY IP 250 OP 250 PS 637: Performed by: PHYSICIAN ASSISTANT

## 2019-04-24 PROCEDURE — 25000132 ZZH RX MED GY IP 250 OP 250 PS 637: Performed by: INTERNAL MEDICINE

## 2019-04-24 PROCEDURE — 80048 BASIC METABOLIC PNL TOTAL CA: CPT | Performed by: NURSE PRACTITIONER

## 2019-04-24 PROCEDURE — 84439 ASSAY OF FREE THYROXINE: CPT | Performed by: NURSE PRACTITIONER

## 2019-04-24 PROCEDURE — 97535 SELF CARE MNGMENT TRAINING: CPT | Mod: GO

## 2019-04-24 PROCEDURE — 97165 OT EVAL LOW COMPLEX 30 MIN: CPT | Mod: GO

## 2019-04-24 PROCEDURE — 84443 ASSAY THYROID STIM HORMONE: CPT | Performed by: NURSE PRACTITIONER

## 2019-04-24 PROCEDURE — 12000001 ZZH R&B MED SURG/OB UMMC

## 2019-04-24 PROCEDURE — 71045 X-RAY EXAM CHEST 1 VIEW: CPT

## 2019-04-24 PROCEDURE — 92610 EVALUATE SWALLOWING FUNCTION: CPT | Mod: GN

## 2019-04-24 PROCEDURE — 25000128 H RX IP 250 OP 636: Performed by: INTERNAL MEDICINE

## 2019-04-24 RX ADMIN — TICAGRELOR 90 MG: 90 TABLET ORAL at 19:00

## 2019-04-24 RX ADMIN — BUMETANIDE 2 MG: 2 TABLET ORAL at 09:07

## 2019-04-24 RX ADMIN — ASPIRIN 81 MG: 81 TABLET, COATED ORAL at 09:07

## 2019-04-24 RX ADMIN — SODIUM CHLORIDE 300 ML: 9 INJECTION, SOLUTION INTRAVENOUS at 10:41

## 2019-04-24 RX ADMIN — SEVELAMER CARBONATE 1600 MG: 800 TABLET, FILM COATED ORAL at 14:23

## 2019-04-24 RX ADMIN — ROSUVASTATIN CALCIUM 40 MG: 40 TABLET, FILM COATED ORAL at 09:07

## 2019-04-24 RX ADMIN — Medication: at 10:42

## 2019-04-24 RX ADMIN — Medication 1 G: at 09:07

## 2019-04-24 RX ADMIN — BUMETANIDE 2 MG: 2 TABLET ORAL at 16:01

## 2019-04-24 RX ADMIN — SODIUM CHLORIDE 250 ML: 9 INJECTION, SOLUTION INTRAVENOUS at 10:41

## 2019-04-24 RX ADMIN — IRON SUCROSE 100 MG: 20 INJECTION, SOLUTION INTRAVENOUS at 11:24

## 2019-04-24 RX ADMIN — TICAGRELOR 90 MG: 90 TABLET ORAL at 09:07

## 2019-04-24 RX ADMIN — SEVELAMER CARBONATE 1600 MG: 800 TABLET, FILM COATED ORAL at 09:07

## 2019-04-24 RX ADMIN — THERA TABS 1 TABLET: TAB at 09:07

## 2019-04-24 RX ADMIN — SEVELAMER CARBONATE 1600 MG: 800 TABLET, FILM COATED ORAL at 18:55

## 2019-04-24 ASSESSMENT — ACTIVITIES OF DAILY LIVING (ADL)
ADLS_ACUITY_SCORE: 13
ADLS_ACUITY_SCORE: 13
PREVIOUS_RESPONSIBILITIES: HOUSEKEEPING;MEAL PREP;LAUNDRY;SHOPPING;MEDICATION MANAGEMENT;FINANCES;DRIVING
ADLS_ACUITY_SCORE: 13
IADL_COMMENTS: PT WAS PREVIOUSLY INDEPENDENT WITH IADL COMPLETION
ADLS_ACUITY_SCORE: 13

## 2019-04-24 ASSESSMENT — MIFFLIN-ST. JEOR
SCORE: 1451.58
SCORE: 1448.25

## 2019-04-24 NOTE — PROGRESS NOTES
[facility], [place]    Medicine Progress Note - Hospitalist Service, Gold 2       Date of Admission:  4/22/2019    Assessment & Plan     Dung Norton is a 73 year old man with a history of CKD stage 5 due to long standing obstructive uropathy 2/2 BPH and benign bladder mass, HTN, moderate aortic stenosis, ischemic cardiomyopathy (EF 30-35%), and coronary artery disease. He was admitted to Cardiology Service on 4/22/19 for a planned PCI (LAD and LCx PCI with cannulation of RCA to determine CAD burden) with Nephrology consulted for initiation of dialysis after angiogram.  Patient was transferred form observation to inpatient Medicine service on 4/23/19 for continued management given scheduled dialysis.     # End stage renal disease now on dialysis   # LUE mature fistula   # Chronic obstructive uropathy   # Hypertension     - patient was initiated on dialysis 4/23 with plans for HD 4/24 and 4/25   - patient still makes some urine   - appreciate nephrology assistance   - continue Bumex 2 mg bid   - continue sevelamer 1600 mg tid   - hold sodium bicarbonate   - Appreciate CM assistance with outpatient HD dialysis      # Coronary artery disease with ICM (EF 30-35%) s/p PCI to mLAD and pLCx   # Moderate aortic stenosis    - Follows with Dr. Lizandro Ng, Cardiology.   - Continue ASA 81mg daily (lifelong)  - Continue Brilinta 90mg BID (for 1 year uninterrupted)  - Continue Rosuvastatin 40mg daily   - Continue PTA Bumex 2mg BID  - PTA Metoprolol on hold due to soft BPs; consider resuming or smaller dose of BP allows while inpatient    - Daily weights  - Follow-up with Cardiology LARRY 2 weeks after discharge (this has been ordered by Cardiology Service)  - Follow-up with Cardiac Rehab as outpatient   - PT/OT consulted     # Chronic normocytic anemia    - getting iron sucrose with dialysis   - on home aranesp and PO iron    # Paroxysmal A fib    - currently NSR  - per cardiology - readdress  anticoagulation in follow up     # Hypertension    - hold metoprolol and amlodipine due to hypotension  - continue Bumex as above     Addendum:     Quantiferon gold was obtained but has not returned in time for outpatient dialysis placement.  Thus CXR was ordered by nephrology team.  CXR shows concern for mycobacterial disease.  The patient is a long time smoker and has had pulmonary nodules in the past.  However he has no risk factors for active TB.  Discussed care with ID fellow who recommended AFB x 3 to rule out mycobacterial disease.  I have ordered these and placed the patient in airborne isolation.     Diet: Low Saturated Fat Na <2400 mg    DVT Prophylaxis: Pneumatic Compression Devices  Man Catheter: in place, indication:    Code Status: Full Code      Disposition Plan   Expected discharge: 2 - 3 days, recommended to prior living arrangement once once outpatient dialysis is arranged and dialysis initiation is complete .  Entered: Padilla Elizabeth MD 04/24/2019, 12:50 PM     The patient's care was discussed with the Bedside Nurse, Care Coordinator/ and Patient.    Padilla Elizabeth MD  Hospitalist Service, [address]  Pager: [phone]  Please see sticky note for cross cover information  ______________________________________________________________________    Interval History     Mr. Norton is doing well today.  He denies fevers, chills, no dyspnea, no chest pain, no abdominal pain, no nausea.  He is anticipating dialysis today.    Data reviewed today: I reviewed all medications, new labs and imaging results over the last 24 hours.     Physical Exam   Vital Signs: Temp: 97.9  F (36.6  C) Temp src: Oral BP: 112/70 Pulse: 93 Heart Rate: 93 Resp: 16 SpO2: 98 % O2 Device: None (Room air)    Weight: 156 lbs 9.6 oz    General Appearance: Patient is in no acute distress  Respiratory: Lungs are clear to auscultation, no wheezing, rales, or rhonchi  auscultated  Cardiovascular: Regular rate and rhythm, systolic ejection murmur present, no rubs, and no gallops   GI:  soft, not tender, not distended, bowel sounds present and normal, no masses appreciated   Skin: no rashes and no discolorations   Neurologic: Patient is alert and oriented to person, place, time, and situation  Psychiatric: normal mood and affect   Extremities: no cyanosis, no edema, and no clubbing, LUE AV fistula with good thrill and intact distal pulses.  Right groin access site without hematoma

## 2019-04-24 NOTE — PROGRESS NOTES
04/24/19 0944   General Information   Onset Date 04/22/19   Start of Care Date 04/24/19   Referring Physician Manda Araiza PA-C   Patient Profile Review/OT: Additional Occupational Profile Info See Profile for full history and prior level of function   Patient/Family Goals Statement Pt reports no difficulty swallowing   Swallowing Evaluation Bedside swallow evaluation   Behaviorial Observations WFL (within functional limits)  (Pt pleasant and cooperative)   Mode of current nutrition Oral diet   Type of oral diet Regular;Thin liquid   Respiratory Status Room air   Comments Pt  with a history of CKD stage 5 due to long standing obstructive uropathy 2/2 BPH and benign bladder mass, HTN, moderate aortic stenosis, ischemic cardiomyopathy (EF 30-35%), and coronary artery disease. He was admitted to Cardiology Service on 4/22/19 for a planned PCI (LAD and LCx PCI with cannulation of RCA to determine CAD burden) with Nephrology consulted for initiation of dialysis after angiogram.  Patient was transferred form observation to inpatient Medicine service on 4/23/19 for continued management given scheduled dialysis. Orders received for swallow evaluation due to bilateral ground glass opacities observed on CT exam concerning for aspiration   Clinical Swallow Evaluation   Oral Musculature generally intact   Structural Abnormalities none present   Dentition present and adequate   Mucosal Quality good   Mandibular Strength and Mobility intact   Oral Labial Strength and Mobility WFL   Lingual Strength and Mobility WFL   Velar Elevation intact   Buccal Strength and Mobility intact   Laryngeal Function Cough;Throat clear;Swallow;Voicing initiated;Dry swallow palpated   Additional Documentation Yes   Swallow Eval   Feeding Assistance no assistance needed   Clinical Swallow Eval: Thin Liquid Texture Trial   Mode of Presentation, Thin Liquids self-fed;straw   Volume of Liquid or Food Presented 5oz   Oral Phase of Swallow WFL    Pharyngeal Phase of Swallow intact  (no s/sx of aspiration observed)   Diagnostic Statement swallow appears functional for thin liquids   Clinical Swallow Eval: Solid Food Texture Trial   Mode of Presentation, Solid self-fed   Volume of Solid Food Presented 2 master crackers   Oral Phase, Solid WFL  (mastication/bolus manipulation adequate)   Pharyngeal Phase, Solid intact  (no s/sx of aspiration observed)   Diagnostic Statement swallow functional for solid textures on exam   Swallow Compensations   Swallow Compensations No compensations were used   Esophageal Phase of Swallow   Patient reports or presents with symptoms of esophageal dysphagia No   Swallow Eval: Clinical Impressions   Skilled Criteria for Therapy Intervention No problems identified which require skilled intervention   Functional Assessment Scale (FAS) 7   Treatment Diagnosis functional oral-pharyngeal swallow on exam   Diet texture recommendations Regular diet;Thin liquids   Recommended Feeding/Eating Techniques alternate between small bites and sips of food/liquid;maintain upright posture during/after eating for 30 mins;small sips/bites   Predicted Duration of Therapy Intervention (days/wks) evaluation only   Anticipated Discharge Disposition   (will defer to medical team)   Risks and Benefits of Treatment have been explained. Yes   Patient, family and/or staff in agreement with Plan of Care Yes   Clinical Impression Comments Clinical swallow evaluation completed per MD order. Pt presents with functional oral-pharyngeal swallow on exam. MAstication/bolus manipulation adequate and no s/sx of aspiration observed. Recommend regular diet with thin liquids as tolerated. Pt to sit upright for all PO intake, take small bites/sips and alternate consistencies. Low suspicion for silent aspiration based on evaluation, but if ongoing concern for airway compromise, recommend completion of VFSS to more objectively assess. Of note, Pt reports recently  "eating/drinking while lying down due to being on bedrest. Reportedly \"almost choked\" on a hamburger and reported some difficulties with swallowing liquids, possible aspiration contributing to current lung status during that time. Pt educated re: safe swallow strategies to increase ease and safety of PO intake, and comprehension verbalized   Total Evaluation Time   Total Evaluation Time (Minutes) 16     "

## 2019-04-24 NOTE — PROGRESS NOTES
Care Coordinator - Discharge Planning    Admission Date/Time:  4/22/2019  Attending MD:  Padilla Elizabeth MD     Data  Date of initial CC assessment:    Chart reviewed, discussed with interdisciplinary team.   Patient was admitted for:   1. Status post coronary angiogram    2. Coronary artery disease involving native coronary artery, angina presence unspecified, unspecified whether native or transplanted heart    3. Coronary artery disease involving native coronary artery, angina presence unspecified, unspecified whether native or transplanted heart         Assessment   Full assessment completed in previous note  Patient transferred to Gold 2 service.  Reviewed pending HD referral with ASHE[facility] Thompson.    HD placement pending Hep B, quant gold results and HD run notes.    Awaiting quant gold results.    Anticipated plan for outpatient HD will be [facility] with initial outpatient start date of 4/26.  Will fax above information once available.       Received VM from [facility] regarding patient outpatient dialysis plan.  Pt has urology appointment on Friday 4/26 which conflicts with patient currently scheduled initial outpatient dialysis run.   Per VM Nephrology is ok with patient starting outpatient dialysis on Monday April 29th.       Spoke with [facility] [phone], Fax [phone] regarding pending discharge plan.  Plan for patient to start outpatient dialysis on Monday April 29th.  Dialysis unit requests pt arrive at 2 p.m. To complete all intial paperwork.   Faxed Hep B and intial HD note. Finalization of outpatient dialysis plan is pending quant gold results.       Text paged Nuria regarding above information.   Anticipate possible discharge tomorrow pending quant gold results.    Coordination of Care and Referrals: Provided patient/family with options for Dialysis Services.      Plan  Anticipated Discharge Date:  4/25  Anticipated Discharge Plan:  Home with  outpatient HD    Belkis Sexton, RN BSN, PHN RN Care Coordinator  Medicine Teams: Gold 1; Gold 2; ED/Observation   [phone]  Pager: [phone]  Weekend RN Care Coordinator job code * * * [id number]  4/24/2019 10:57 AM

## 2019-04-24 NOTE — PLAN OF CARE
OT 6D  Discharge Planner OT   Patient plan for discharge: Home  Current status: Eval complete, treatment indicated. Independent sit<>stand and for g/h while standing at the sink. Pt ambulated for ~8 minutes independently with no AD  Barriers to return to prior living situation: acute medical needs  Recommendations for discharge: home  Rationale for recommendations: Pt is independent with ADL completion       Entered by: Merna Arora 04/24/2019 9:07 AM     Occupational Therapy Discharge Summary    Reason for therapy discharge:    All goals and outcomes met, no further needs identified.    Progress towards therapy goal(s). See goals on Care Plan in [facility] electronic health record for goal details.  Goals met    Therapy recommendation(s):    No further therapy is recommended.

## 2019-04-24 NOTE — PLAN OF CARE
PT 6D:  Deferring PT consult as no acute needs have been identified requiring skilled PT intervention to facilitate safe discharge.    Decisions made based upon chart review and discussion with OT.   Baseline level of assist for mobility and ADL:  (I)  Current level of of assist for mobility and ADLs:  Remains (I) with mobility.  Ambulating x8mins safely and (I).  Barriers to discharge: None from  mobility standpoint  Disciplines to follow:  OT evaluated and discharged pt in same day.  No further skilled needs.

## 2019-04-24 NOTE — PROGRESS NOTES
04/24/19 0800   Quick Adds   Type of Visit Initial Occupational Therapy Evaluation   Living Environment   Lives With alone   Living Arrangements apartment   Home Accessibility no concerns   Transportation Anticipated car, drives self   Living Environment Comment pt lives in an apartment by himself.    Self-Care   Usual Activity Tolerance good   Current Activity Tolerance good   Regular Exercise No   Equipment Currently Used at Home none   Activity/Exercise/Self-Care Comment pt was previously independent with ADL completion   Functional Level   Ambulation 0-->independent   Transferring 0-->independent   Toileting 0-->independent   Bathing 0-->independent   Dressing 0-->independent   Eating 0-->independent   Communication 0-->understands/communicates without difficulty   Cognition 0 - no cognition issues reported   Fall history within last six months no   Which of the above functional risks had a recent onset or change? none       Present no   Language english   General Information   Onset of Illness/Injury or Date of Surgery - Date 04/22/19   Referring Physician Manda Araiza PA-C   Patient/Family Goals Statement to return home   Additional Occupational Profile Info/Pertinent History of Current Problem Dung Norton is a 73 year old man with a history of CKD stage 5 due to long standing obstructive uropathy 2/2 BPH and benign bladder mass, HTN, moderate aortic stenosis, ischemic cardiomyopathy (EF 30-35%), and coronary artery disease. He was admitted to Cardiology Service on 4/22/19 for a planned PCI (LAD and LCx PCI with cannulation of RCA to determine CAD burden) with Nephrology consulted for initiation of dialysis after angiogram.  Patient was transferred form observation to inpatient Medicine service on 4/23/19 for continued management given scheduled dialysis.   Precautions/Limitations no known precautions/limitations   Weight-Bearing Status - LUE full weight-bearing    Weight-Bearing Status - RUE full weight-bearing   Weight-Bearing Status - LLE full weight-bearing   Weight-Bearing Status - RLE full weight-bearing   General Observations Pt is pleasant and agreeable to therapy   General Info Comments Activity: up ad harper   Cognitive Status Examination   Orientation orientation to person, place and time   Level of Consciousness alert   Follows Commands (Cognition) WNL   Memory intact   Attention No deficits were identified   Organization/Problem Solving No deficits were identified   Executive Function No deficits were identified   Cognitive Comment Pt is alert, oriented and appropriate in conversation   Visual Perception   Visual Perception No deficits were identified;Wears glasses   Sensory Examination   Sensory Quick Adds No deficits were identified   Integumentary/Edema   Integumentary/Edema no deficits were identifed   Range of Motion (ROM)   ROM Comment BUE ROM WFL   Strength   Strength Comments BUE grossly 5/5   Hand Strength   Hand Strength Comments Bilateral  strength WNL   Transfer Skill: Sit to Stand   Level of [place]: Sit/Stand independent   Grooming   Level of [place]: Grooming independent   Instrumental Activities of Daily Living (IADL)   Previous Responsibilities housekeeping;meal prep;laundry;shopping;medication management;finances;driving   IADL Comments Pt was previously independent with IADL completion   Activities of Daily Living Analysis   Impairments Contributing to Impaired Activities of Daily Living strength decreased   General Therapy Interventions   Planned Therapy Interventions ADL retraining;strengthening;home program guidelines;progressive activity/exercise   Clinical Impression   Criteria for Skilled Therapeutic Interventions Met yes, treatment indicated   OT Diagnosis decreased activity tolerance   Influenced by the following impairments fatigue, deconditioning   Assessment of Occupational Performance 1-3 Performance Deficits  "  Identified Performance Deficits g/h, functional mobility, dressing   Clinical Decision Making (Complexity) Low complexity   Therapy Frequency other (see comments)  (1x eval and treatment)   Predicted Duration of Therapy Intervention (days/wks) 4/24/19   Anticipated Equipment Needs at Discharge other (see comments)  (none)   Anticipated Discharge Disposition Home   Risks and Benefits of Treatment have been explained. Yes   Patient, Family & other staff in agreement with plan of care Yes   [facility] TM \"6 Clicks\"   2016, Trustees of [facility], under license to [employer].  All rights reserved.   6 Clicks Short Forms Daily Activity Inpatient Short Form   [facility] AM-PAC  \"6 Clicks\" Daily Activity Inpatient Short Form   1. Putting on and taking off regular lower body clothing? 4 - None   2. Bathing (including washing, rinsing, drying)? 4 - None   3. Toileting, which includes using toilet, bedpan or urinal? 4 - None   4. Putting on and taking off regular upper body clothing? 4 - None   5. Taking care of personal grooming such as brushing teeth? 4 - None   6. Eating meals? 4 - None   Daily Activity Raw Score (Score out of 24.Lower scores equate to lower levels of function) 24   Total Evaluation Time   Total Evaluation Time (Minutes) 4     "

## 2019-04-24 NOTE — PLAN OF CARE
"Discharge Planner SLP   Patient plan for discharge: home  Current status: Clinical swallow evaluation completed per MD order. Pt presents with functional oral-pharyngeal swallow on exam. MAstication/bolus manipulation adequate and no s/sx of aspiration observed. Recommend regular diet with thin liquids as tolerated. Pt to sit upright for all PO intake, take small bites/sips and alternate consistencies. Low suspicion for silent aspiration based on evaluation, but if ongoing concern for airway compromise, recommend completion of VFSS to more objectively assess. Of note, Pt reports recently eating/drinking while lying down due to being on bedrest. Reportedly \"almost choked\" on a hamburger and reported some difficulties with swallowing liquids, possible aspiration contributing to current lung status during that time. Pt educated re: safe swallow strategies to increase ease and safety of PO intake, and comprehension verbalized  Barriers to return to prior living situation: none from ST standpoint  Recommendations for discharge: will defer to medical team  Rationale for recommendations: no ongoing ST needs at this time       Entered by: Blanquita Mc 04/24/2019 9:57 AM       "

## 2019-04-24 NOTE — DISCHARGE SUMMARY
[facility], [place]  Hospitalist Discharge Summary       Date of Admission:  4/22/2019  Date of Discharge:  4/26/2019  Discharging Provider: Padilla Elizabeth MD  Discharge Team: Hospitalist Service, Gold 2    Discharge Diagnoses     Coronary artery disease   End Stage Renal disease with initiation of dialysis     Follow-ups Needed After Discharge     Follow up with pulmonary clinic for pulmonary nodules    Unresulted Labs Ordered in the Past 30 Days of this Admission     Date and Time Order Name Status Description    4/24/2019 0524 T4 free In process       These results will be followed up by primary care provider    Discharge Disposition   Discharged to home  Condition at discharge: Stable    Hospital Course     Dung Norton is a 73 year old man with a history of CKD stage 5 due to long standing obstructive uropathy due to BPH and benign bladder mass, HTN, moderate aortic stenosis, ischemic cardiomyopathy (EF 30-35%), and coronary artery disease. He was admitted to Cardiology Service on 4/22/19 for a planned PCI (LAD and LCx PCI with cannulation of RCA to determine CAD burden) with Nephrology consulted for initiation of dialysis after angiogram.  Patient was transferred form observation to inpatient Medicine service on 4/23/19 for continued management given scheduled dialysis.    Mr. Norton was dialysis April 23, 24, and 26 and he tolerated the dialysis runs well.  He is continued on Bumex 2 mg bid as he still makes urine and performs self cath's three times daily for obstructive uropathy.  His sevelamer was continued at discharge but sodium bicarbonate held.  For outpatient dialysis placement, a Quantiferon gold and CXR were obtained.  The CXR showed concern for possible mycobacterial disease.  We initially attempted obtaining AFB sputum smears however the patient had a difficult time producing enough sample.  In the meantime, his Quantiferon returned negative.  Given the patient  had no history and no risk factors for TB, we did not complete the AFB sputum collections.  More than likely the CXR findings are consistent with pulmonary nodules seen on prior imaging.  He has an extensive smoking history.  Thus he will follow up in pulmonary clinic for these nodules.  His dialysis access is through a left upper extremity fistula.     For his coronary artery disease and ICM with PCI during this hospital visit, we unfortunately could not start metoprolol or ACE inhibitor due to hypotension.  He is on Asprin for lifelong duration and Brilinta for one year.  He is also on a statin.  He will follow up with cardiology in two weeks post discharge and also follow up with cardiac rehab services.  He is not on anticoagulation for his A fib, this can also be readdress at outpatient cardiology follow up.     Consultations This Hospital Stay   VASCULAR ACCESS CARE ADULT IP CONSULT  NEPHROLOGY ESRD ADULT IP CONSULT  PHYSICAL THERAPY ADULT IP CONSULT  OCCUPATIONAL THERAPY ADULT IP CONSULT  SPEECH LANGUAGE PATH ADULT IP CONSULT  MEDICATION HISTORY IP PHARMACY CONSULT    Code Status   Full Code    Time Spent on this Encounter   I, Padilla Elizabeth, personally saw the patient today and spent greater than 30 minutes discharging this patient.     Padilla Elizabeth MD  [facility], [place]  ______________________________________________________________________    Physical Exam   Vital Signs: Temp: 98.1  F (36.7  C) Temp src: Oral BP: 135/87 Pulse: 93 Heart Rate: 102 Resp: 18 SpO2: 100 % O2 Device: None (Room air)    Weight: 155 lbs 13.84 oz    General Appearance: Patient is in no acute distress  Respiratory: Lungs are clear to auscultation, no wheezing, rales, or rhonchi auscultated  Cardiovascular: Regular rate and rhythm, systolic ejection murmur present, no rubs, and no gallops   GI:  soft, not tender, not distended, bowel sounds present and normal, no masses appreciated   Skin: no rashes  and no discolorations   Neurologic: Patient is alert and oriented to person, place, time, and situation  Psychiatric: normal mood and affect   Extremities: no cyanosis, no edema, and no clubbing, LUE AV fistula with good thrill and intact distal pulses.  Right groin access site without hematoma       Primary Care Physician   Haley Baker    Discharge Orders      CARDIAC REHAB REFERRAL      Follow-Up with Cardiac Advanced Practice Provider      Pulmonary Medicine Referral      OCCUPATIONAL THERAPY REFERRAL      Discharge Instructions - IF on Metformin (Glucophage or Glucovance) or Metformin containing medications    IF on Metformin (Glucophage or Glucovance) or Metformin containing medications , schedule a Basic Metabolic Panel at [facility] or Primary Clinic in 48 - 72 hours post procedure and PRIOR TO resuming the Metformin or Metformin containing medications.  Hold Metformin (Glucophage or Glucovance) or Metformin containing medications until after the Basic Metabolic Panel on the 2nd or 3rd day following the procedure.  May resume after blood draw is complete.     Reason for your hospital stay    Initiation of dialysis     Adult [facility] Follow-up and recommended labs and tests    Follow up with primary care provider, Haley Baker, within 7 days for hospital follow- up.  No follow up labs or test are needed.      Appointments on [place] and/or [facility] (with [facility] or [facility] provider or service). Call [phone] if you haven't heard regarding these appointments within 7 days of discharge.     Activity    Your activity upon discharge: activity as tolerated     Diet    Follow this diet upon discharge: Orders Placed This Encounter      Low Saturated Fat Na <2400 mg       Significant Results and Procedures   Results for orders placed or performed during the hospital encounter of 04/22/19   XR Chest Port 1 View     Value    Radiologist flags Multifocal nodules compatible with infection. (Urgent)    Narrative     XR CHEST PORT 1 VW  4/24/2019 3:26 PM      HISTORY: for outpt dialysis unit placement, need to confirm no TB on  CXR. Thx.    COMPARISON: 7/13/2017    FINDINGS: The upper abdomen is unremarkable. The cardiac silhouette is  not enlarged. No pneumothorax or pleural effusion. No acute osseous  abnormalities. No cavitary lesions. Multifocal nodular opacities in  the right lung.      Impression    IMPRESSION: Multinodular nodular foci in the right lung concerning for  infection including mycobacteria species, please see CT dated  3/4/2019.    [Access Center: Multifocal nodules compatible with infection.]    This report will be copied to the [facility] to ensure a  provider acknowledges the finding. Access Center is available Monday  through Friday 8am-3:30 pm.     I have personally reviewed the examination and initial interpretation  and I agree with the findings.    JOSÉ MIGUEL MARIANO MD       Discharge Medications   Discharge Medication List as of 4/26/2019  4:23 PM      START taking these medications    Details   aspirin (ASA) 81 MG EC tablet Take 1 tablet (81 mg) by mouth daily Start tomorrow morning., Disp-90 tablet, R-3, No Print Out      sevelamer (RENVELA) 800 MG tablet Take 2 tablets (1,600 mg) by mouth 3 times daily (with meals), Disp-90 tablet, R-3, E-Prescribe      ticagrelor (BRILINTA) 90 MG tablet Take 1 tablet (90 mg) by mouth 2 times daily, Disp-90 tablet, R-3, E-Prescribe         CONTINUE these medications which have NOT CHANGED    Details   bumetanide (BUMEX) 2 MG tablet Take 2 mg by mouth Take 1 tablet by mouth 2 times daily, Historical      multivitamin w/minerals (MULTI-VITAMIN) tablet Take 1 tablet by mouth daily, Historical      Omega-3 Fatty Acids (FISH OIL PO) Take 1 capsule by mouth daily , Historical      rosuvastatin (CRESTOR) 40 MG tablet Take 40 mg by mouth At Bedtime , Historical      darbepoetin william (ARANESP, ALBUMIN FREE,) 100 MCG/0.5ML injection Inject 0.5 mLs (100 mcg)  Subcutaneous every 14 days As needed for hgb<10g/dL.  If Hgb increases >1 point in 2 weeks (if blood transfusion given, use hgb PRIOR to this), SYSTOLIC BP > 180 mmHg or hgb>=10g/dL, HOLD DOSE. Dose must be within 1 week of Hgb.   Per anemia protocol with Cecilia De La Torre,CNP, Disp-0.5 mL, R-99, Injection      order for DME Equipment being ordered: olecranon bursa braceDisp-1 each, R-0, Local Print         STOP taking these medications       aspirin 81 MG tablet Comments:   Reason for Stopping:         ferrous sulfate (FEROSUL) 325 (65 Fe) MG tablet Comments:   Reason for Stopping:         metoprolol succinate ER (TOPROL-XL) 50 MG 24 hr tablet Comments:   Reason for Stopping:         sevelamer (RENAGEL) 800 MG tablet Comments:   Reason for Stopping:         sodium bicarbonate 650 MG tablet Comments:   Reason for Stopping:             Allergies   No Known Allergies

## 2019-04-24 NOTE — PHARMACY-ADMISSION MEDICATION HISTORY
Admission medication history interview status for the 4/22/2019 admission is complete. See Epic admission navigator for allergy information, pharmacy, prior to admission medications and immunization status.     Medication history interview sources:  Patient    Changes made to PTA medication list (reason)  Added: none  Deleted: none  Changed: rosuvastatin from QD to QHS    Additional medication history information (including reliability of information, actions taken by pharmacist): Patient reports his metoprolol was stopped 2 weeks ago when he was at [facility] and hasn't needed anything thus far in the hospitalization.      Prior to Admission medications    Medication Sig Last Dose Taking? Auth Provider   aspirin (ASA) 81 MG EC tablet Take 1 tablet (81 mg) by mouth daily Start tomorrow morning.  Yes Mleissa Coronado APRN CNP   aspirin 81 MG tablet Take 81 mg by mouth every morning  4/22/2019 at 0600 Yes Leda Baxter PA   bumetanide (BUMEX) 2 MG tablet Take 2 mg by mouth Take 1 tablet by mouth 2 times daily 4/22/2019 at 0600 Yes Reported, Patient   ferrous sulfate (FEROSUL) 325 (65 Fe) MG tablet Take 1 tablet (325 mg) by mouth 2 times daily 4/22/2019 at 0600 Yes Oralia De La Torre NP   multivitamin w/minerals (MULTI-VITAMIN) tablet Take 1 tablet by mouth daily 4/22/2019 at 0600 Yes Reported, Patient   Omega-3 Fatty Acids (FISH OIL PO) Take 1 capsule by mouth daily  4/22/2019 at 0600 Yes Reported, Patient   rosuvastatin (CRESTOR) 40 MG tablet Take 40 mg by mouth At Bedtime  4/21/2019 at 2200 Yes Haley Baker MD   sevelamer (RENAGEL) 800 MG tablet Take 1 tablet (800 mg) by mouth 3 times daily (with meals)  Patient taking differently: Take 800 mg by mouth 2 times daily (with meals)  4/22/2019 at 0600 Yes Oralia De La Torre NP   sodium bicarbonate 650 MG tablet Take 1 tablet (650 mg) by mouth 2 times daily 4/22/2019 at 0600 Yes Oralia De La Torre NP   ticagrelor (BRILINTA) 90 MG tablet Take 1  tablet (90 mg) by mouth 2 times daily  Yes Melissa Coronado APRN CNP   darbepoetin william (ARANESP, ALBUMIN FREE,) 100 MCG/0.5ML injection Inject 0.5 mLs (100 mcg) Subcutaneous every 14 days As needed for hgb<10g/dL.  If Hgb increases >1 point in 2 weeks (if blood transfusion given, use hgb PRIOR to this), SYSTOLIC BP > 180 mmHg or hgb>=10g/dL, HOLD DOSE. Dose must be within 1 week of Hgb.  Per anemia protocol with Cecilia De La Torre CNP  Patient not taking: Reported on 2/13/2019 More than a month at Unknown time  Oralia De La Torre NP   metoprolol succinate ER (TOPROL-XL) 50 MG 24 hr tablet Take 1 tablet (50 mg) by mouth daily Unknown at Unknown time  Haley Baker MD   order for DME Equipment being ordered: Haley Cervantes MD         Medication history completed by:   Adry Santiago, GabriellaD, BCPS

## 2019-04-24 NOTE — PLAN OF CARE
"/74   Pulse 93   Temp 98.4  F (36.9  C) (Oral)   Resp 18   Ht 1.778 m (5' 10\")   Wt 70.7 kg (155 lb 13.8 oz)   SpO2 100%   BMI 22.36 kg/m       Neuro: A&Ox4.   Cardiac: SR. VSS.   Respiratory: Sating 100% on RA.  GI/: Adequate urine output (pt straight cath for 500 ml and went to dialysis, but no fluid was removed).  Diet/appetite: Tolerating current diet. Eating well.  Activity:  Independent up to chair and in halls.  Pain: At acceptable level on current regimen.   Skin: No new deficits noted.  LDA's:PIV saline locked.  Plan: Continue with POC. Notify primary team with changes.  "

## 2019-04-24 NOTE — PROGRESS NOTES
"Patient is alert and oriented X4. Denies pain.Slept through most of the shift. Patient stated he was tired after the dialysis run. Up independently. Continues to self cath, supplies available to patient. Fistula to left upper arm intact. Blood pressure 97/59, pulse 100, temperature 98.1  F (36.7  C), temperature source Oral, resp. rate 16, height 1.778 m (5' 10\"), weight 71.9 kg (158 lb 8 oz), SpO2 97 %.      "

## 2019-04-24 NOTE — PROGRESS NOTES
HEMODIALYSIS TREATMENT NOTE    Date: 4/24/2019  Time: 1:31 PM    Data:  Pre Wt:  71 kg  Desired Wt: 71 kg   Post Wt: 70.7 kg   Weight gain: -0.3 kg   Weight change: 0.3  kg  Ultrafiltration - Post Run Net Total Removed (mL): 0 mL  Ultrafiltration - Post Run Net Total Gain (mL): 0 mL  Vascular Access Status: Yes, secured and visible  Dialyzer Rinse: Streaked, Light  Total Blood Volume Processed: 35.6 L  Total Dialysis (Treatment) Time:  2 hrs 30 mins    Lab:   No    Assessment:  Positive bruit and thrill on CLIFFORD AVF    Interventions:  Initiated HD via LUE AVF with 17 G fistula needles. Kept K 4 /Ca 3 bath on. Reached  ml/mins/  ml/mins. No fluid off today. Kept SBP above 100 mmHg during HD per order. Stable V/S and tolerable for 2.5 hrs run. Gave Iron sucrose 100 mg IV. Cannulated site held for 5 mins. Educated for low potassium diet .      Plan:    Next run per renal team.

## 2019-04-25 LAB
ANION GAP SERPL CALCULATED.3IONS-SCNC: 9 MMOL/L (ref 3–14)
BUN SERPL-MCNC: 45 MG/DL (ref 7–30)
CALCIUM SERPL-MCNC: 8.1 MG/DL (ref 8.5–10.1)
CHLORIDE SERPL-SCNC: 106 MMOL/L (ref 94–109)
CO2 SERPL-SCNC: 24 MMOL/L (ref 20–32)
CREAT SERPL-MCNC: 3.98 MG/DL (ref 0.66–1.25)
ERYTHROCYTE [DISTWIDTH] IN BLOOD BY AUTOMATED COUNT: 15.7 % (ref 10–15)
GFR SERPL CREATININE-BSD FRML MDRD: 14 ML/MIN/{1.73_M2}
GLUCOSE SERPL-MCNC: 92 MG/DL (ref 70–99)
HCT VFR BLD AUTO: 30.7 % (ref 40–53)
HGB BLD-MCNC: 9.1 G/DL (ref 13.3–17.7)
MAGNESIUM SERPL-MCNC: 1.9 MG/DL (ref 1.6–2.3)
MCH RBC QN AUTO: 25.9 PG (ref 26.5–33)
MCHC RBC AUTO-ENTMCNC: 29.6 G/DL (ref 31.5–36.5)
MCV RBC AUTO: 87 FL (ref 78–100)
PHOSPHATE SERPL-MCNC: 4.5 MG/DL (ref 2.5–4.5)
PLATELET # BLD AUTO: 179 10E9/L (ref 150–450)
POTASSIUM SERPL-SCNC: 3.9 MMOL/L (ref 3.4–5.3)
RBC # BLD AUTO: 3.52 10E12/L (ref 4.4–5.9)
SODIUM SERPL-SCNC: 139 MMOL/L (ref 133–144)
WBC # BLD AUTO: 8.1 10E9/L (ref 4–11)

## 2019-04-25 PROCEDURE — 87206 SMEAR FLUORESCENT/ACID STAI: CPT | Performed by: HOSPITALIST

## 2019-04-25 PROCEDURE — 94640 AIRWAY INHALATION TREATMENT: CPT

## 2019-04-25 PROCEDURE — 36415 COLL VENOUS BLD VENIPUNCTURE: CPT | Performed by: NURSE PRACTITIONER

## 2019-04-25 PROCEDURE — 12000001 ZZH R&B MED SURG/OB UMMC

## 2019-04-25 PROCEDURE — 25000132 ZZH RX MED GY IP 250 OP 250 PS 637: Performed by: NURSE PRACTITIONER

## 2019-04-25 PROCEDURE — 25000132 ZZH RX MED GY IP 250 OP 250 PS 637: Performed by: PHYSICIAN ASSISTANT

## 2019-04-25 PROCEDURE — 87116 MYCOBACTERIA CULTURE: CPT | Performed by: HOSPITALIST

## 2019-04-25 PROCEDURE — 25000132 ZZH RX MED GY IP 250 OP 250 PS 637: Performed by: INTERNAL MEDICINE

## 2019-04-25 PROCEDURE — 83735 ASSAY OF MAGNESIUM: CPT | Performed by: NURSE PRACTITIONER

## 2019-04-25 PROCEDURE — 84100 ASSAY OF PHOSPHORUS: CPT | Performed by: NURSE PRACTITIONER

## 2019-04-25 PROCEDURE — 99233 SBSQ HOSP IP/OBS HIGH 50: CPT | Performed by: HOSPITALIST

## 2019-04-25 PROCEDURE — 85027 COMPLETE CBC AUTOMATED: CPT | Performed by: NURSE PRACTITIONER

## 2019-04-25 PROCEDURE — 80048 BASIC METABOLIC PNL TOTAL CA: CPT | Performed by: NURSE PRACTITIONER

## 2019-04-25 RX ADMIN — ASPIRIN 81 MG: 81 TABLET, COATED ORAL at 08:31

## 2019-04-25 RX ADMIN — THERA TABS 1 TABLET: TAB at 13:30

## 2019-04-25 RX ADMIN — Medication 1 G: at 13:30

## 2019-04-25 RX ADMIN — TICAGRELOR 90 MG: 90 TABLET ORAL at 08:31

## 2019-04-25 RX ADMIN — SEVELAMER CARBONATE 1600 MG: 800 TABLET, FILM COATED ORAL at 08:31

## 2019-04-25 RX ADMIN — SEVELAMER CARBONATE 1600 MG: 800 TABLET, FILM COATED ORAL at 18:42

## 2019-04-25 RX ADMIN — SEVELAMER CARBONATE 1600 MG: 800 TABLET, FILM COATED ORAL at 13:30

## 2019-04-25 RX ADMIN — BUMETANIDE 2 MG: 2 TABLET ORAL at 11:07

## 2019-04-25 RX ADMIN — ROSUVASTATIN CALCIUM 40 MG: 40 TABLET, FILM COATED ORAL at 08:31

## 2019-04-25 RX ADMIN — BUMETANIDE 2 MG: 2 TABLET ORAL at 17:07

## 2019-04-25 RX ADMIN — TICAGRELOR 90 MG: 90 TABLET ORAL at 20:19

## 2019-04-25 ASSESSMENT — ACTIVITIES OF DAILY LIVING (ADL)
ADLS_ACUITY_SCORE: 13
ADLS_ACUITY_SCORE: 13
ADLS_ACUITY_SCORE: 11
ADLS_ACUITY_SCORE: 13

## 2019-04-25 NOTE — PROGRESS NOTES
Nephrology Progress Note  04/25/2019         Assessment & Recommendations:   Dung Norton is a 73 yr old male with PMH of CKD5 due to chronic obstructive uropathy with BPH and benign bladder mass, HTN, aortic stenosis, cardiomyopathy (EF 30-35%), CAD, admitted s/p PCI w/ DESx2 for initiation of dialysis.     ESKD: due to chronic obstructive uropathy with BPH (uses straight cath); has been following Cecilia Britt NP and has been preparing to initiate HD with GFR 10-11 ml/mn since 9/2017. Recent Urine protein 1.5 g/gCr. Has AVF created 11/22/17 by Dr. Pabon, last seen by Dr. Pabon 1/2/18 and fistula has been maturing nicely.  - Dialyzed Tues, Wed, will plan to dialyze Friday AM  - Will dialyze MWF at [facility], will start Monday 4/29   - CXR with possible infection including possible mycobacterial, AFB cultures pending.     Acid/base:  - Discontinue PTA PO bicarb; will now get bicarb via dialysis     Volume: pt appears euvolemic and still makes significant urine (straight caths bid as well as spontaneous voiding). On bumex 2 mg bid (recently started at OSH due to pulm edema); O2 100% RA  - Continue Bumex  - Daily weights     HFrEF: EF 30-35%  BP: normotensive, 100-115's. Had been on amlodipine 2.5 mg qday and metoprolol XL 25 mg qday  - BP meds held for now (recommend stopping amlodipine)     BMD: Ca 8.1, phos 4.5; Recent , Vit D 41, on sevelamer 800 mg tid WM  - Continue sevelamer tid WM        Anemia: hgb 9.1 g/dL; labs 3/25: iron 31, ferr, 351, IS 13, hgb 9-10's.  - Will initiate iron loading with IV venofer 100 mg per HD x 10  - Will hold off on EARLENE until iron loading is completed  - Discontinue PTA subcutaneous aranesp and PO iron (both will be given via HD)            Recommendations were communicated to primary team via this note and phone conversation.       Nuria Stephenson, PA-C  [phone]    Interval History :   CXR yesterday to r/o TB with finding concerning for infection. AFB cx pending  "and pt now on airborn precautions. Will plan to dialyze tomorrow as he will discharge on MWF schedule. Seen bedside, denies SOB, CP, n/v, chills    Review of Systems:   4 point ROS neg other than as noted above.    Physical Exam:   I/O last 3 completed shifts:  In: 350 [P.O.:350]  Out: 1450 [Urine:1450]   /72 (BP Location: Right arm)   Pulse 103   Temp 98  F (36.7  C) (Oral)   Resp 16   Ht 1.778 m (5' 10\")   Wt 70.7 kg (155 lb 13.8 oz)   SpO2 98%   BMI 22.36 kg/m       GENERAL APPEARANCE: alert  EYES:  no scleral icterus, pupils equal  HENT: mouth without ulcers or lesions  PULM: CTA  CV: regular rhythm, normal rate      -edema none   GI: soft  INTEGUMENT: no cyanosis, no rash  NEURO:  no asterixis   Access LUE AVF    Labs:   All labs reviewed by me  Electrolytes/Renal -   Recent Labs   Lab Test 04/25/19  0545 04/24/19  0524 04/23/19  0824  09/05/17  0645    139 139   < > 141   POTASSIUM 3.9 3.9 4.2   < > 3.7   CHLORIDE 106 106 107   < > 105   CO2 24 22 21   < > 29   BUN 45* 74* 103*   < > 74*   CR 3.98* 4.64* 5.31*   < > 4.64*   GLC 92 88 98   < > 83   DERICK 8.1* 8.1* 8.3*   < > 7.4*   MAG 1.9 2.1  --   --  1.7   PHOS 4.5 5.4* 7.2*   < >  --     < > = values in this interval not displayed.       CBC -   Recent Labs   Lab Test 04/25/19  0545 04/24/19  0524 04/23/19  0824   WBC 8.1 8.5 11.2*   HGB 9.1* 8.5* 9.8*    184 203       LFTs -   Recent Labs   Lab Test 03/11/19  0954 02/25/19  1012 02/18/19  0943  12/11/17  1018  08/28/17  1909  07/19/17  0537   ALKPHOS  --   --   --   --  63  --  95  --  51   BILITOTAL  --   --   --   --  0.2  --  0.3  --  0.2   ALT  --   --   --   --  17  --  17  --  17   AST  --   --   --   --  13  --  9  --  32   PROTTOTAL  --   --   --   --  7.3  --  7.2  --  4.4*   ALBUMIN 2.9* 3.2* 3.2*   < > 3.0*   < > 3.0*   < > 1.9*    < > = values in this interval not displayed.       Iron Panel -   Recent Labs   Lab Test 03/25/19  0932 01/16/19  1255 09/24/18  0956   IRON " 31* 33* 62   IRONSAT 13* 15 25    499* 458*         Imaging:  Reviewed    Current Medications:    sodium chloride 0.9%  250 mL Intravenous Once in dialysis     sodium chloride 0.9%  300 mL Hemodialysis Machine Once     aspirin  81 mg Oral Daily     bumetanide  2 mg Oral BID     fish oil-omega-3 fatty acids  1 g Oral Daily     gelatin absorbable  1 each Topical During Hemodialysis (from stock)     iron sucrose  100 mg Intravenous Once in dialysis     multivitamin, therapeutic  1 tablet Oral Daily     - MEDICATION INSTRUCTIONS -   Does not apply Once     rosuvastatin  40 mg Oral Daily     sevelamer  1,600 mg Oral TID w/meals     sodium chloride (PF)  3 mL Intracatheter Q8H     ticagrelor  90 mg Oral BID       - MEDICATION INSTRUCTIONS -       - MEDICATION INSTRUCTIONS -       - MEDICATION INSTRUCTIONS -       Percutaneous Coronary Intervention orders placed (this is information for BPA alerting)       - MEDICATION INSTRUCTIONS -       Nuria Stephenson PA-C

## 2019-04-25 NOTE — PROGRESS NOTES
[facility], [place]    Medicine Progress Note - Hospitalist Service, Gold 2       Date of Admission:  4/22/2019    Assessment & Plan     Dung Norton is a 73 year old man with a history of CKD stage 5 due to long standing obstructive uropathy 2/2 BPH and benign bladder mass, HTN, moderate aortic stenosis, ischemic cardiomyopathy (EF 30-35%), and coronary artery disease. He was admitted to Cardiology Service on 4/22/19 for a planned PCI (LAD and LCx PCI with cannulation of RCA to determine CAD burden) with Nephrology consulted for initiation of dialysis after angiogram.  Patient was transferred form observation to inpatient Medicine service on 4/23/19 for continued management given scheduled dialysis.     # End stage renal disease now on dialysis   # LUE mature fistula   # Chronic obstructive uropathy   # Hypertension      - patient was initiated on dialysis 4/23 with plans for HD 4/24 and 4/26   - patient still makes some urine   - appreciate nephrology assistance   - continue Bumex 2 mg bid   - continue sevelamer 1600 mg tid   - hold sodium bicarbonate   - Appreciate CM assistance with outpatient HD dialysis placement  - continue straight intermittent cath (patient self cath's) tid for obstructive uropathy     # Potential Mycobacterial pulmonary disease    - found on CXR - Multinodular nodular foci in the right lung concerning for infection including mycobacteria species  - discussed with ID fellow 4/25 - 3 AFB sputum smears and cultures are in process  - Quantiferon gold is also pending   - will discuss with ID again once AFB's are collected  - will need outpatient repeat CT of the chest and also follow up for pulmonary nodules     # Coronary artery disease with ICM (EF 30-35%) s/p PCI to mLAD and pLCx   # Moderate aortic stenosis     - Follows with Dr. Lizandro Ng, Cardiology.   - Continue ASA 81mg daily (lifelong)  - Continue Brilinta 90mg BID (for 1 year uninterrupted)  - Continue  Rosuvastatin 40mg daily   - Continue PTA Bumex 2mg BID  - PTA Metoprolol on hold due to soft BPs; consider resuming or smaller dose of BP allows while inpatient    - Daily weights  - Follow-up with Cardiology LARRY 2 weeks after discharge (this has been ordered by Cardiology Service)  - Follow-up with Cardiac Rehab as outpatient   - PT/OT consulted     # Chronic normocytic anemia     - getting iron sucrose with dialysis      # Paroxysmal A fib     - currently NSR  - per cardiology - readdress anticoagulation in follow up     # Hypertension     - hold metoprolol and amlodipine due to hypotension  - continue Bumex as above     Diet: Low Saturated Fat Na <2400 mg    DVT Prophylaxis: Pneumatic Compression Devices  Man Catheter: in place, indication:    Code Status: Full Code      Disposition Plan   Expected discharge: 2 - 3 days, recommended to prior living arrangement once once mycobacterial workup is completed .  Entered: Padilla Elizabeth MD 04/25/2019, 12:22 PM     The patient's care was discussed with the Bedside Nurse, Care Coordinator/ and Patient.    Padilla Elizabeth MD  Hospitalist Service, [address]  Pager: [phone]  Please see sticky note for cross cover information  ______________________________________________________________________    Interval History     Mr. Norton is doing well today.  He had an uneventful night.  No fevers, no chills, no dyspnea, no chest pain, no abdominal pain.     Data reviewed today: I reviewed all medications, new labs and imaging results over the last 24 hours.     Physical Exam   Vital Signs: Temp: 98  F (36.7  C) Temp src: Oral BP: 109/72 Pulse: 103 Heart Rate: 102 Resp: 16 SpO2: 98 % O2 Device: None (Room air)    Weight: 155 lbs 13.84 oz    General Appearance: Patient is in no acute distress  Respiratory: Lungs are clear to auscultation, no wheezing, rales, or rhonchi auscultated  Cardiovascular: Regular rate and rhythm,  systolic ejection murmur present, no rubs, and no gallops   GI:  soft, not tender, not distended, bowel sounds present and normal, no masses appreciated   Skin: no rashes and no discolorations   Neurologic: Patient is alert and oriented to person, place, time, and situation  Psychiatric: normal mood and affect   Extremities: no cyanosis, no edema, and no clubbing, LUE AV fistula with good thrill and intact distal pulses.  Right groin access site without hematoma

## 2019-04-25 NOTE — PLAN OF CARE
VSS. Pt gets up independently. Denies pain. Now on airborne isolation. Sputum sample sent to lab. Voided small amount x1 and self cath'd for large amount x1. Tolerating diet. PIV saline locked. Dialysis fistula left arm. Plan for dialysis at 0930 tomorrow. Continue with POC.

## 2019-04-25 NOTE — PROGRESS NOTES
Pt a&o x4, VSS, denies pain. Independent in room, self caths per home routine. Fistula in L arm, dialysis in morning at 0730. Right groin site WDL. Airborne precautions maintained. Sputum culture by RT to be collected at midnight.

## 2019-04-25 NOTE — PLAN OF CARE
Pt is a/o x 4. VSS. Denies pain. Appeared to sleep soundly overnight. Gets up independently to BR. Self caths prn. Has L arm fistula and is scheduled to go to dialysis this am at 0930. Old R groin site stable. Continues on airborne precautions. Will continue to monitor pt.

## 2019-04-26 ENCOUNTER — OFFICE VISIT (OUTPATIENT)
Dept: UROLOGY | Facility: CLINIC | Age: 74
End: 2019-04-26
Payer: COMMERCIAL

## 2019-04-26 VITALS
DIASTOLIC BLOOD PRESSURE: 69 MMHG | HEART RATE: 115 BPM | BODY MASS INDEX: 22.48 KG/M2 | HEIGHT: 70 IN | SYSTOLIC BLOOD PRESSURE: 109 MMHG | WEIGHT: 157 LBS

## 2019-04-26 VITALS
WEIGHT: 155.87 LBS | HEART RATE: 100 BPM | TEMPERATURE: 98.1 F | RESPIRATION RATE: 18 BRPM | HEIGHT: 70 IN | DIASTOLIC BLOOD PRESSURE: 87 MMHG | BODY MASS INDEX: 22.31 KG/M2 | SYSTOLIC BLOOD PRESSURE: 135 MMHG | OXYGEN SATURATION: 100 %

## 2019-04-26 DIAGNOSIS — R33.9 URINARY RETENTION: Primary | ICD-10-CM

## 2019-04-26 LAB
ACID FAST STN SPEC QL: NORMAL
ANION GAP SERPL CALCULATED.3IONS-SCNC: 5 MMOL/L (ref 3–14)
ANION GAP SERPL CALCULATED.3IONS-SCNC: 9 MMOL/L (ref 3–14)
BUN SERPL-MCNC: 20 MG/DL (ref 7–30)
BUN SERPL-MCNC: 51 MG/DL (ref 7–30)
CALCIUM SERPL-MCNC: 8.2 MG/DL (ref 8.5–10.1)
CALCIUM SERPL-MCNC: 9.2 MG/DL (ref 8.5–10.1)
CHLORIDE SERPL-SCNC: 100 MMOL/L (ref 94–109)
CHLORIDE SERPL-SCNC: 109 MMOL/L (ref 94–109)
CO2 SERPL-SCNC: 22 MMOL/L (ref 20–32)
CO2 SERPL-SCNC: 29 MMOL/L (ref 20–32)
CREAT SERPL-MCNC: 2.53 MG/DL (ref 0.66–1.25)
CREAT SERPL-MCNC: 4.81 MG/DL (ref 0.66–1.25)
ERYTHROCYTE [DISTWIDTH] IN BLOOD BY AUTOMATED COUNT: 15.5 % (ref 10–15)
GAMMA INTERFERON BACKGROUND BLD IA-ACNC: 0.02 IU/ML
GFR SERPL CREATININE-BSD FRML MDRD: 11 ML/MIN/{1.73_M2}
GFR SERPL CREATININE-BSD FRML MDRD: 24 ML/MIN/{1.73_M2}
GLUCOSE SERPL-MCNC: 153 MG/DL (ref 70–99)
GLUCOSE SERPL-MCNC: 98 MG/DL (ref 70–99)
HCT VFR BLD AUTO: 30.6 % (ref 40–53)
HGB BLD-MCNC: 8.9 G/DL (ref 13.3–17.7)
KCT BLD-ACNC: 196 SEC (ref 75–150)
M TB IFN-G BLD-IMP: NEGATIVE
M TB IFN-G CD4+ BCKGRND COR BLD-ACNC: 3.79 IU/ML
MAGNESIUM SERPL-MCNC: 2.4 MG/DL (ref 1.6–2.3)
MCH RBC QN AUTO: 25.6 PG (ref 26.5–33)
MCHC RBC AUTO-ENTMCNC: 29.1 G/DL (ref 31.5–36.5)
MCV RBC AUTO: 88 FL (ref 78–100)
MITOGEN IGNF BCKGRD COR BLD-ACNC: 0 IU/ML
MITOGEN IGNF BCKGRD COR BLD-ACNC: 0.02 IU/ML
MYCOBACTERIUM SPEC CULT: NORMAL
PHOSPHATE SERPL-MCNC: 4.3 MG/DL (ref 2.5–4.5)
PLATELET # BLD AUTO: 187 10E9/L (ref 150–450)
POTASSIUM SERPL-SCNC: 3.6 MMOL/L (ref 3.4–5.3)
POTASSIUM SERPL-SCNC: 4 MMOL/L (ref 3.4–5.3)
RBC # BLD AUTO: 3.47 10E12/L (ref 4.4–5.9)
SODIUM SERPL-SCNC: 134 MMOL/L (ref 133–144)
SODIUM SERPL-SCNC: 140 MMOL/L (ref 133–144)
SPECIMEN SOURCE: NORMAL
SPECIMEN SOURCE: NORMAL
WBC # BLD AUTO: 9.9 10E9/L (ref 4–11)

## 2019-04-26 PROCEDURE — 80048 BASIC METABOLIC PNL TOTAL CA: CPT | Performed by: NURSE PRACTITIONER

## 2019-04-26 PROCEDURE — 36415 COLL VENOUS BLD VENIPUNCTURE: CPT | Performed by: NURSE PRACTITIONER

## 2019-04-26 PROCEDURE — 25000132 ZZH RX MED GY IP 250 OP 250 PS 637: Performed by: INTERNAL MEDICINE

## 2019-04-26 PROCEDURE — 90937 HEMODIALYSIS REPEATED EVAL: CPT

## 2019-04-26 PROCEDURE — 85027 COMPLETE CBC AUTOMATED: CPT | Performed by: NURSE PRACTITIONER

## 2019-04-26 PROCEDURE — 25000132 ZZH RX MED GY IP 250 OP 250 PS 637: Performed by: NURSE PRACTITIONER

## 2019-04-26 PROCEDURE — 40000275 ZZH STATISTIC RCP TIME EA 10 MIN

## 2019-04-26 PROCEDURE — 25000132 ZZH RX MED GY IP 250 OP 250 PS 637: Performed by: PHYSICIAN ASSISTANT

## 2019-04-26 PROCEDURE — 94640 AIRWAY INHALATION TREATMENT: CPT

## 2019-04-26 PROCEDURE — 25000128 H RX IP 250 OP 636: Performed by: HOSPITALIST

## 2019-04-26 PROCEDURE — 99239 HOSP IP/OBS DSCHRG MGMT >30: CPT | Performed by: HOSPITALIST

## 2019-04-26 PROCEDURE — 83735 ASSAY OF MAGNESIUM: CPT | Performed by: NURSE PRACTITIONER

## 2019-04-26 PROCEDURE — 84100 ASSAY OF PHOSPHORUS: CPT | Performed by: NURSE PRACTITIONER

## 2019-04-26 RX ORDER — FERROUS SULFATE 325(65) MG
TABLET ORAL
Refills: 11 | COMMUNITY
Start: 2018-04-29 | End: 2019-10-01

## 2019-04-26 RX ORDER — SEVELAMER CARBONATE 800 MG/1
1600 TABLET, FILM COATED ORAL
Qty: 90 TABLET | Refills: 3 | Status: SHIPPED | OUTPATIENT
Start: 2019-04-26

## 2019-04-26 RX ADMIN — Medication 1 G: at 12:01

## 2019-04-26 RX ADMIN — BUMETANIDE 2 MG: 2 TABLET ORAL at 12:01

## 2019-04-26 RX ADMIN — THERA TABS 1 TABLET: TAB at 12:01

## 2019-04-26 RX ADMIN — ASPIRIN 81 MG: 81 TABLET, COATED ORAL at 12:01

## 2019-04-26 RX ADMIN — IRON SUCROSE 100 MG: 20 INJECTION, SOLUTION INTRAVENOUS at 08:58

## 2019-04-26 RX ADMIN — SEVELAMER CARBONATE 1600 MG: 800 TABLET, FILM COATED ORAL at 12:00

## 2019-04-26 RX ADMIN — TICAGRELOR 90 MG: 90 TABLET ORAL at 12:00

## 2019-04-26 RX ADMIN — ROSUVASTATIN CALCIUM 40 MG: 40 TABLET, FILM COATED ORAL at 12:05

## 2019-04-26 RX ADMIN — SODIUM CHLORIDE 300 ML: 9 INJECTION, SOLUTION INTRAVENOUS at 07:40

## 2019-04-26 RX ADMIN — SODIUM CHLORIDE 250 ML: 9 INJECTION, SOLUTION INTRAVENOUS at 07:40

## 2019-04-26 RX ADMIN — Medication: at 07:40

## 2019-04-26 ASSESSMENT — ACTIVITIES OF DAILY LIVING (ADL)
ADLS_ACUITY_SCORE: 11
RETIRED_EATING: 0-->INDEPENDENT
FALL_HISTORY_WITHIN_LAST_SIX_MONTHS: NO
TOILETING: 0-->INDEPENDENT
RETIRED_COMMUNICATION: 0-->UNDERSTANDS/COMMUNICATES WITHOUT DIFFICULTY
ADLS_ACUITY_SCORE: 11
TRANSFERRING: 0-->INDEPENDENT
DRESS: 0-->INDEPENDENT
SWALLOWING: 0-->SWALLOWS FOODS/LIQUIDS WITHOUT DIFFICULTY
COGNITION: 0 - NO COGNITION ISSUES REPORTED
BATHING: 0-->INDEPENDENT
ADLS_ACUITY_SCORE: 11
AMBULATION: 0-->INDEPENDENT
ADLS_ACUITY_SCORE: 11

## 2019-04-26 ASSESSMENT — PAIN SCALES - GENERAL: PAINLEVEL: MODERATE PAIN (4)

## 2019-04-26 ASSESSMENT — MIFFLIN-ST. JEOR: SCORE: 1453.4

## 2019-04-26 NOTE — PLAN OF CARE
A/ox4, denies pain. VSS on RA. Denies SOB, nausea. Denies n/t. Independent in ambulation, pt on HD, self-caths. Fistula in L arm, bruit/thrill present. PIV SL'd. Sputum sample to be collected by RT around midnight. Scheduled HD for 0730 tomorrow. Airborne precautions in place. Calls appropriately and makes needs known. Will continue to monitor and follow POC.

## 2019-04-26 NOTE — PROGRESS NOTES
"Patient is alert and oriented X4. Denies pain.Up independently. On HD and also self-caths three times a day. Fistula in L arm, bruit/thrill present. Scheduled HD for 0730 today. Airborne precautions maintained. Per Rt, sputum induction performed but patient did not cough so no sample was obtained. Calls appropriately and makes needs known. Will continue to monitor.Blood pressure 110/87, pulse 100, temperature 98.9  F (37.2  C), temperature source Oral, resp. rate 16, height 1.778 m (5' 10\"), weight 70.7 kg (155 lb 13.8 oz), SpO2 98 %.        "

## 2019-04-26 NOTE — DISCHARGE INSTRUCTIONS
Outpatient Dialysis  Nidia Davila  [address] [place]  [place] [address]   Phone: [phone]  First outpatient run scheduled for Monday 4/29.   Please arrive at 2 p.m. On Monday to complete all necessary paperwork/initial orientation.  Final schedule will be Monday, Wednesday, Friday at 3:45 p.m..

## 2019-04-26 NOTE — PROGRESS NOTES
HEMODIALYSIS TREATMENT NOTE    Date: 4/26/2019  Time: 11:24 AM    Data:  Pre Wt: 70.7 kg    Desired Wt: 70.7 kg   Ultrafiltration - Post Run Net Total Removed (mL): 0 mL  Ultrafiltration - Post Run Net Total Gain (mL): 0 mL  Vascular Access Status: Yes, secured and visible  Dialyzer Rinse: Streaked, Light  Total Blood Volume Processed: 58.3 Liters  Total Dialysis (Treatment) Time: 3 hrs      Interventions/Assessment:  Patient's 3rd HD treatment. 3 hr via LUE fistula cannulated with 16 gauge needles. Thrill and bruit present.  on K4 Ca3 bath. Keep SBP > 90 per order. Venofer given IV. Tachycardic with -112. Pt had no issues or complaints during tx. No fluid removed per order and sites held for 5 minutes to achieve hemostasis. Post /87.  Report given to primary RN.       Plan:    Next HD tx per renal team.

## 2019-04-26 NOTE — NURSING NOTE
"Chief Complaint   Patient presents with     Follow Up     retention       Blood pressure 109/69, pulse 115, height 1.778 m (5' 10\"), weight 71.2 kg (157 lb). Body mass index is 22.53 kg/m .    Patient Active Problem List   Diagnosis     CARDIOVASCULAR SCREENING; LDL GOAL LESS THAN 130     Hypertension goal BP (blood pressure) < 140/90     Advanced directives, counseling/discussion     Elevated fasting glucose     Hypertriglyceridemia     Symptomatic anemia     Pulmonary nodules     KRISTINA (acute kidney injury) (H)     Anemia of chronic renal failure, stage 5 (H)     Orthostatic hypotension     Acute renal failure, unspecified acute renal failure type (H)     End stage renal disease (H)     Acquired hypothyroidism     Obstructive uropathy     CKD (chronic kidney disease) stage 5, GFR less than 15 ml/min (H)     Ex-smoker     Thoracic aortic aneurysm without rupture (H)     Nonrheumatic aortic valve stenosis     Olecranon bursitis of left elbow     Urinary tract infection     S/P transurethral resection of prostate     Nuclear sclerosis of both eyes     Chronic systolic heart failure (H)     Status post coronary angiogram     Coronary artery disease involving native coronary artery, angina presence unspecified, unspecified whether native or transplanted heart     S/P coronary angiogram     ESRD (end stage renal disease) (H)       No Known Allergies    Current Outpatient Medications   Medication Sig Dispense Refill     aspirin (ASA) 81 MG EC tablet Take 1 tablet (81 mg) by mouth daily Start tomorrow morning. 90 tablet 3     bumetanide (BUMEX) 2 MG tablet Take 2 mg by mouth Take 1 tablet by mouth 2 times daily       darbepoetin william (ARANESP, ALBUMIN FREE,) 100 MCG/0.5ML injection Inject 0.5 mLs (100 mcg) Subcutaneous every 14 days As needed for hgb<10g/dL.  If Hgb increases >1 point in 2 weeks (if blood transfusion given, use hgb PRIOR to this), SYSTOLIC BP > 180 mmHg or hgb>=10g/dL, HOLD DOSE. Dose must be within 1 week " of Hgb.  Per anemia protocol with Cecilia De La Torre,CNP 0.5 mL 99     ferrous sulfate (FEROSUL) 325 (65 Fe) MG tablet TK 1 T PO QD  11     multivitamin w/minerals (MULTI-VITAMIN) tablet Take 1 tablet by mouth daily       Omega-3 Fatty Acids (FISH OIL PO) Take 1 capsule by mouth daily        rosuvastatin (CRESTOR) 40 MG tablet Take 40 mg by mouth At Bedtime        sevelamer (RENVELA) 800 MG tablet Take 2 tablets (1,600 mg) by mouth 3 times daily (with meals) 90 tablet 3     ticagrelor (BRILINTA) 90 MG tablet Take 1 tablet (90 mg) by mouth 2 times daily 90 tablet 3     order for DME Equipment being ordered: olecranon bursa brace (Patient not taking: Reported on 2019) 1 each 0       Social History     Tobacco Use     Smoking status: Former Smoker     Packs/day: 1.00     Years: 53.00     Pack years: 53.00     Types: Cigarettes     Last attempt to quit: 2017     Years since quittin.8     Smokeless tobacco: Never Used   Substance Use Topics     Alcohol use: Yes     Comment: rare, drink every few months     Drug use: No       Patricia Brice LPN  2019  2:32 PM

## 2019-04-26 NOTE — LETTER
RE: Dung Norton  [id number] [id number]     Dear Colleague,    Thank you for referring your patient, Dung Norton, to the [facility] UROLOGY AND INST FOR PROSTATE AND UROLOGIC CANCERS at [facility]. Please see a copy of my visit note below.    UROLOGIC DIAGNOSES:       CURRENT INTERVENTIONS:       HISTORY:     Patient with history of AUR and hematuria requiring cystoscopy and clot evacuation. Had failed TOV and has been performing CIC.   Patient states he is performing CIC regularly about 4x per day.   He would like to discuss further management of urinary retention.     Patient is currently s/p TURP. He was able to void a small amount at TOV. He states he has been voiding several times per day but continues to perform CIC (though fewer times per day than previous).   Notes that he is able to void now with ~ 250-300ml upon catheterizing.     Patient has recently started dialysis.   We discussed continued catheterization regimen, though this may change with further dialysis.       PAST MEDICAL HISTORY:   Past Medical History:   Diagnosis Date     BPH (benign prostatic hyperplasia)      Chronic kidney disease      HTN      Obstructive uropathy      Pulmonary nodules      Tobacco abuse        PAST SURGICAL HISTORY:   Past Surgical History:   Procedure Laterality Date     APPENDECTOMY  AGE 6     CREATE FISTULA ARTERIOVENOUS UPPER EXTREMITY Left 11/17/2017    Procedure: CREATE FISTULA ARTERIOVENOUS UPPER EXTREMITY;  Left Cephalic Vein To Radial Artery Arteriovenous Fistula Creation, Anesthesia Block;  Surgeon: Eduardo Pabon MD;  Location: [facility] OR     CV CORONARY ANGIOGRAM N/A 2/22/2019    Procedure: 1100 CORS, RHC; BI-PLANE;  Surgeon: Gabino Collins MD;  Location: [facility] HEART CARDIAC CATH LAB     CYSTOSCOPY, FULGURATE BLEEDERS, EVACUATE CLOT(S), COMBINED N/A 7/16/2017    Procedure: COMBINED CYSTOSCOPY, FULGURATE BLEEDERS, EVACUATE CLOT(S);   "Cystoscopy clot evacuation, bladder biopsy and fulguration (per surgeons note) NERVE BLOCK PERIPHERAL;  Surgeon: Tamiko Vanegas MD;  Location: [facility] OR     CYSTOSCOPY, TRANSURETHRAL RESECTION (TUR) PROSTATE, COMBINED N/A 3/5/2018    Procedure: COMBINED CYSTOSCOPY, TRANSURETHRAL RESECTION (TUR) PROSTATE;  Cystoscopy, Transurethral Resection of Prostate ;  Surgeon: Tamiko Vanegas MD;  Location: [facility] OR     SINUS SURGERY  AGE 6     TONSILLECTOMY      CHILDHOOD       FAMILY HISTORY:   Family History   Problem Relation Age of Onset     C.A.D. Mother         d age 67     Vision Loss Father      Hearing Loss Sister      Diabetes Sister      Cancer No family hx of      Glaucoma No family hx of      Macular Degeneration No family hx of        SOCIAL HISTORY:   Social History     Tobacco Use     Smoking status: Former Smoker     Packs/day: 1.00     Years: 53.00     Pack years: 53.00     Types: Cigarettes     Last attempt to quit: 2017     Years since quittin.8     Smokeless tobacco: Never Used   Substance Use Topics     Alcohol use: Yes     Comment: rare, drink every few months       Current Outpatient Medications   Medication     aspirin (ASA) 81 MG EC tablet     bumetanide (BUMEX) 2 MG tablet     darbepoetin william (ARANESP, ALBUMIN FREE,) 100 MCG/0.5ML injection     ferrous sulfate (FEROSUL) 325 (65 Fe) MG tablet     multivitamin w/minerals (MULTI-VITAMIN) tablet     Omega-3 Fatty Acids (FISH OIL PO)     rosuvastatin (CRESTOR) 40 MG tablet     sevelamer (RENVELA) 800 MG tablet     ticagrelor (BRILINTA) 90 MG tablet     order for DME     No current facility-administered medications for this visit.          PHYSICAL EXAM:    /69   Pulse 115   Ht 1.778 m (5' 10\")   Wt 71.2 kg (157 lb)   BMI 22.53 kg/m       HEENT: Normocephalic and atraumatic   Cardiac: Not done  Back/Flank: Not done  CNS/PNS: Not done  Respiratory: Normal non-labored breathing  Abdomen: Soft nontender and " nondistended  Peripheral Vascular: Not done  Mental Status: Not done    Penis: Not done  Scrotal Skin: Not done  Testicles: Not done  Epididymis: Not done  Digital Rectal Exam:     Cystoscopy: Not done    Imaging: None    Urinalysis: UA RESULTS:  Recent Labs   Lab Test  08/29/17   0235   01/15/14   0936   COLOR  Light Yellow   < >  Yellow   APPEARANCE  Cloudy   < >  Clear   URINEGLC  Negative   < >  Negative   URINEBILI  Negative   < >  Negative   URINEKETONE  Negative   < >  Negative   SG  1.014   < >  1.020   UBLD  Moderate*   < >  Small*   URINEPH  6.0   < >  6.0   PROTEIN  100*   < >  Negative   UROBILINOGEN   --    --   0.2   NITRITE  Negative   < >  Negative   LEUKEST  Large*   < >  Negative   RBCU  10*   < >  10-25*   WBCU  >182*   < >  O - 2    < > = values in this interval not displayed.       PSA:     Post Void Residual: 100ml     Other labs: None today      IMPRESSION:  71 y/o M with urinary retention requiring CIC, now voiding     PLAN:  Timed voiding   Continue CIC BID  Uroflow/PVR if possible in twelve months   Follow up in twelve months         Total Time: 15 minutes                                       Total in Consultation: greater than 50%     Again, thank you for allowing me to participate in the care of your patient.      Sincerely,    Tamiko Vanegas MD

## 2019-04-26 NOTE — PROGRESS NOTES
Nephrology Progress Note  04/26/2019         Assessment & Recommendations:   Dung Norton is a 73 yr old male with PMH of CKD5 due to chronic obstructive uropathy with BPH and benign bladder mass, HTN, aortic stenosis, cardiomyopathy (EF 30-35%), CAD, admitted s/p PCI w/ DESx2 for initiation of dialysis.     ESKD: due to chronic obstructive uropathy with BPH (uses straight cath); has been following Cecilia Britt NP and has been preparing to initiate HD with GFR 10-11 ml/mn since 9/2017. Recent Urine protein 1.5 g/gCr. Has AVF created 11/22/17 by Dr. Pabon, last seen by Dr. Pabon 1/2/18 and fistula has been maturing nicely.  - Dialyzed Tues, Wed, and today Friday 4/26   - advanced to 16 g needles at 350 BFR without issue  - Will dialyze MWF at [facility], will start Monday 4/29     Pulmonary nodules: found on CXR ordered to r/o TB for [facility] admission, however pulm nodules found and could not r/o mycobacterial infection; quant gold neg; primary team is working with ID to rule out TB in setting of pt unable to give adequate sputum samples. Will need f/u for nodules regardless.     Acid/base:  - Discontinue PTA PO bicarb; will now get bicarb via dialysis     Volume: pt appears euvolemic and still makes significant urine (straight caths bid as well as spontaneous voiding). On bumex 2 mg bid (recently started at OSH due to pulm edema); O2 100% RA  - Continue Bumex  - Daily weights     pAfib: has f/u with cardiology  HFrEF: EF 30-35%  BP: normotensive, 100-115's. Had been on amlodipine 2.5 mg qday and metoprolol XL 25 mg qday  - BP meds held for now due to soft pressures (has cardiology f/u in 2 weeks)     BMD: Ca 8.2, phos 4.3; Recent , Vit D 41, on sevelamer 800 mg tid WM  - Continue sevelamer tid WM        Anemia: hgb 8.9 g/dL; labs 3/25: iron 31, ferr, 351, IS 13, hgb 9-10's.  - Will initiate iron loading with IV venofer 100 mg per HD x 10  - Will hold off on EARLENE until iron loading is completed  -  "Discontinue PTA subcutaneous aranesp and PO iron (both will be given via HD)            Recommendations were communicated to primary team via this note and phone conversation.       Nuria Stephenson PA-C  [phone]    Interval History :   Seen on dialysis, stable run. CXR to r/o TB with finding concerning for infection. AFB cx pending, pt unable to produce more sputum. Primary team is working with ID to clear of TB so he can discharge and start at [facility] on Monday. Will need f/u of pulm nodules. Seen bedside, denies SOB, CP, n/v, chills    Review of Systems:   4 point ROS neg other than as noted above.    Physical Exam:   I/O last 3 completed shifts:  In: -   Out: 900 [Urine:900]   /77   Pulse 100   Temp 98.1  F (36.7  C) (Oral)   Resp 16   Ht 1.778 m (5' 10\")   Wt 70.7 kg (155 lb 13.8 oz)   SpO2 100%   BMI 22.36 kg/m       GENERAL APPEARANCE: alert  EYES:  no scleral icterus, pupils equal  HENT: mouth without ulcers or lesions  PULM: CTA  CV: regular rhythm, normal rate      -edema none   GI: soft  INTEGUMENT: no cyanosis, no rash  NEURO:  no asterixis   Access LUE AVF    Labs:   All labs reviewed by me  Electrolytes/Renal -   Recent Labs   Lab Test 04/26/19  0535 04/25/19  0545 04/24/19  0524    139 139   POTASSIUM 3.6 3.9 3.9   CHLORIDE 109 106 106   CO2 22 24 22   BUN 51* 45* 74*   CR 4.81* 3.98* 4.64*   GLC 98 92 88   DERICK 8.2* 8.1* 8.1*   MAG 2.4* 1.9 2.1   PHOS 4.3 4.5 5.4*       CBC -   Recent Labs   Lab Test 04/26/19  0535 04/25/19  0545 04/24/19  0524   WBC 9.9 8.1 8.5   HGB 8.9* 9.1* 8.5*    179 184       LFTs -   Recent Labs   Lab Test 03/11/19  0954 02/25/19  1012 02/18/19  0943  12/11/17  1018  08/28/17  1909  07/19/17  0537   ALKPHOS  --   --   --   --  63  --  95  --  51   BILITOTAL  --   --   --   --  0.2  --  0.3  --  0.2   ALT  --   --   --   --  17  --  17  --  17   AST  --   --   --   --  13  --  9  --  32   PROTTOTAL  --   --   --   --  7.3  --  7.2  --  4.4*   ALBUMIN " 2.9* 3.2* 3.2*   < > 3.0*   < > 3.0*   < > 1.9*    < > = values in this interval not displayed.       Iron Panel -   Recent Labs   Lab Test 03/25/19  0932 01/16/19  1255 09/24/18  0956   IRON 31* 33* 62   IRONSAT 13* 15 25    499* 458*         Imaging:  Reviewed    Current Medications:    sodium chloride 0.9%  250 mL Intravenous Once in dialysis     sodium chloride 0.9%  300 mL Hemodialysis Machine Once     aspirin  81 mg Oral Daily     bumetanide  2 mg Oral BID     fish oil-omega-3 fatty acids  1 g Oral Daily     gelatin absorbable  1 each Topical During Hemodialysis (from stock)     gelatin absorbable  1 each Topical During Hemodialysis (from stock)     iron sucrose  100 mg Intravenous Once in dialysis     multivitamin, therapeutic  1 tablet Oral Daily     - MEDICATION INSTRUCTIONS -   Does not apply Once     rosuvastatin  40 mg Oral Daily     sevelamer  1,600 mg Oral TID w/meals     sodium chloride (PF)  3 mL Intracatheter Q8H     ticagrelor  90 mg Oral BID       - MEDICATION INSTRUCTIONS -       - MEDICATION INSTRUCTIONS -       - MEDICATION INSTRUCTIONS -       Percutaneous Coronary Intervention orders placed (this is information for BPA alerting)       - MEDICATION INSTRUCTIONS -       - MEDICATION INSTRUCTIONS -       Nuria Stephenson PA-C

## 2019-04-26 NOTE — DISCHARGE SUMMARY
Dialysis Discharge Summary Brief    [facility]  Division of Nephrology  Nephrology Discharge Dialysis Orders  Ph: [phone]  Fax: [phone]    Dung Norton  MRN: [id number]  YOB: 1945    [facility] Dialysis Unit: [place]  Primary Nephrologist: Dr. Tinoco/Paige De Luna NP    Date of Admission: 4/22/2019  Date of Discharge: 4/26/2019 post dialysis    Last dialyzed Friday 4/26. Please page me at  with any questions. Thanks much. Nuria Stephenson PA-C    Dnug Norton is a 73 yr old male with PMH of CKD5 due to chronic obstructive uropathy with BPH and benign bladder mass, HTN, aortic stenosis, cardiomyopathy (EF 30-35%), CAD, admitted s/p PCI w/ DESx2 for initiation of dialysis.     ESKD: due to chronic obstructive uropathy with BPH (uses straight cath); had been following with Cecilia De La Torre NP and has been preparing to initiate HD, GFR 10-11 ml/mn since 9/2017. Recent Urine protein 1.5 g/gCr. Has AVF created 11/22/17 by Dr. Pabon, last seen by Dr. Pabon 1/2/18 and fistula has been maturing nicely.  - Initiated HD 4/23/2019  - Last dialyzed Friday 4/26   - advanced to 16 g needles at 350 BFR without issue  - Will dialyze MWF at [facility], starting Monday 4/29      Pulmonary nodules: found on CXR ordered to r/o TB for [facility] admission, however pulm nodules found. Quant gold is negative; Infectious Disease cleared pt for discharge, no active TB. Will need f/u for nodules.     Volume: pt appears euvolemic and still makes significant urine (straight caths bid as well as spontaneous voiding). On bumex 2 mg bid (recently started at OSH due to pulm edema); O2 100% RA  - Continue Bumex  - No UF, pt maintains weight with UOP     pAfib: has f/u with cardiology  HFrEF: EF 30-35%  BP: normotensive, 100-115's. Had been on amlodipine 2.5 mg qday and metoprolol XL 25 mg qday  - BP meds held for now due to soft pressures (has cardiology f/u in 2 weeks)  -  SBP > 90 while on dialysis     BMD: Ca 8.2, phos 4.3; Recent , Vit D 41, on sevelamer 800 mg tid WM        Anemia: hgb 8.9 g/dL; labs 3/25: iron 31, ferr, 351, IS 13, hgb 9-10's.  - Continue iron loading with IV venofer 100 mg per HD x 10 (through 5/15)    [x] New initiation, new dialysis orders will be faxed.      New Orders (if not applicable put NA):  Estimated Dry Weight 70-71 kg (no UF)   Dialysis Duration 3.5 hrs   Dialysis Access LUE AVF (have advanced to 16 g needles and 350 BFR; recommend another week of this and then advance)   Antibiotics (dose per dialysis, end date)            Labs to be drawn at dialysis Per protocol   Other major changes to dialysis prescription (e.g. Dialysate bath, heparin, blood flow rate, etc)   K3, Ca 2.5, Na 138, bicarb 33  Have not used heparin in the hospital but not contraindicated   with 16 g needles/ (advance to 15 g as able after one week)   Medication changes (also fax the unit a copy of the discharge summary)         venofer 100 mg per HD through 5/15  Would start epogen per protocol once iron loading is complete  Continue sevelamer 800 mg tid WM     Name of physician completing this form: Nuria Stephenson PA-C

## 2019-04-26 NOTE — PROGRESS NOTES
"/87   Pulse 100   Temp 98.1  F (36.7  C) (Oral)   Resp 18   Ht 1.778 m (5' 10\")   Wt 70.7 kg (155 lb 13.8 oz)   SpO2 100%   BMI 22.36 kg/m        Patient's condition and vital signs are stable/WNL. Discharge instructions reviewed with patient and questions answered. Patient verbalizes understanding. IV removed. Pain under control.  Patient is tolerating regular diet and denies any N/V. Patient to be discharged to home via transportation form family. Patient has all belongings.    "

## 2019-04-26 NOTE — PATIENT INSTRUCTIONS
Please follow up in a year       It was a pleasure meeting with you today.  Thank you for allowing me and my team the privilege of caring for you today.  YOU are the reason we are here, and I truly hope we provided you with the excellent service you deserve.  Please let us know if there is anything else we can do for you so that we can be sure you are leaving completely satisfied with your care experience.

## 2019-04-26 NOTE — CONSULTS
[facility] SERVICE CONSULTATION     Patient:  Dung Norton   Date of birth 1945, Medical record number [id number]  Date of Visit:  04/26/2019  Date of Admission: 4/22/2019  Consult Requester:Padilla Elizabeth MD            Assessment and Recommendations:   PROBLEM LIST:  1. CXR (4/24) - multinodular foci in the right lung  2. Negative TB Quantiferon (4/23)  3. 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 ICM (EF 30-35%)  5. H/o of tobacco abuse (>30 pack year history) quit 2 years ago     RECOMMENDATION:  1. Okay to discharge airborne precautions   2. Recommend outpatient follow up in pulmonary clinic     ASSESSMENT:  Mr Dung Norton is a 73 year old man with a history of CKD stage 5 due to long standing obstructive uropathy 2/2 BPH, ischemic cardiomyopathy, initiated on dialysis on this admission. He had a CXR done that demonstrated multinodular disease concerning for  mycobacteria species. He had a TB quant done during this admission that was negative. His risk factors for TB exposure is low. He has no s/s of active TB. He does have a long standing history of tobacco abuse and had previously noted to have pulmonary nodules on a CT scan in 2017. My suspicion for TB in this case in very low. I would recommend follow up with pulmonary for evaluation of the pulmonary nodules given his history of tobacco abuse.      Dr Estephania Maravilla,   Infectious Diseases   [phone]  ________________________________________________________________    Consult Question:.  Admission Diagnosis: Coronary artery disease involving native coronary artery, angina presence unspecified, unspecified whether native or transplanted heart [I25.10]         History of Present Illness:     Mr Dung Norton is a 73 year old man with a history of CKD stage 5 due to long standing obstructive uropathy 2/2 BPH and benign bladder mass, HTN, moderate aortic stenosis, ischemic cardiomyopathy (EF 30-35%), and  "coronary artery disease. He was admitted to Cardiology Service on 4/22/19 for a planned PCI (LAD and LCx PCI with cannulation of RCA to determine CAD burden) with Nephrology consulted for initiation of dialysis after angiogram.    We were consulted given xray findings pertinent for \"multinodular nodular foci in the right lung concerning for infection including mycobacteria species. On evaluation, Mr. Norton denies any cough, hemoptysis, shortness of breath, chest pain, fevers, chills, night sweats, or weight loss. He has no known TB contacts. Was born and raised in [place]. Has never left the state or the country. He retired at the age of 64. Currently lives in [facility] in a house with his Ex wife. He has 2 daughters who live in [place] and all are healthy. He used to work as a  and . He used to hunt but hasn't in 10 years. He used to fish, but hasn't in 5 years and has sold his boat. No pets. No farming.          Review of Systems:   CONSTITUTIONAL:  No fevers or chills  EYES: negative for icterus  ENT:  negative for hearing loss, tinnitus and sore throat  RESPIRATORY:  negative for cough with sputum and dyspnea  CARDIOVASCULAR:  negative for chest pain, dyspnea  GASTROINTESTINAL:  negative for nausea, vomiting, diarrhea and constipation  GENITOURINARY:  negative for dysuria  HEME:  No easy bruising  INTEGUMENT:  negative for rash and pruritus  NEURO:  Negative for headache         Past Medical History:     Past Medical History:   Diagnosis Date     BPH (benign prostatic hyperplasia)      Chronic kidney disease      HTN      Obstructive uropathy      Pulmonary nodules      Tobacco abuse           Past Surgical History:     Past Surgical History:   Procedure Laterality Date     APPENDECTOMY  AGE 6     CREATE FISTULA ARTERIOVENOUS UPPER EXTREMITY Left 11/17/2017    Procedure: CREATE FISTULA ARTERIOVENOUS UPPER EXTREMITY;  Left Cephalic Vein To Radial Artery Arteriovenous Fistula Creation, Anesthesia " Block;  Surgeon: Eduardo Pabon MD;  [facility]     CV CORONARY ANGIOGRAM N/A 2019    Procedure: 1100 CORS, RHC; BI-PLANE;  Surgeon: Gabino Collins MD;  [facility] HEART [facility]     CYSTOSCOPY, FULGURATE BLEEDERS, EVACUATE CLOT(S), COMBINED N/A 2017    Procedure: COMBINED CYSTOSCOPY, FULGURATE BLEEDERS, EVACUATE CLOT(S);  Cystoscopy clot evacuation, bladder biopsy and fulguration (per surgeons note) NERVE BLOCK PERIPHERAL;  Surgeon: Tamiko Vanegas MD;  [facility]     CYSTOSCOPY, TRANSURETHRAL RESECTION (TUR) PROSTATE, COMBINED N/A 3/5/2018    Procedure: COMBINED CYSTOSCOPY, TRANSURETHRAL RESECTION (TUR) PROSTATE;  Cystoscopy, Transurethral Resection of Prostate ;  Surgeon: Tamiko Vanegas MD;  [facility] OR     SINUS SURGERY  AGE 6     TONSILLECTOMY      CHILDHOOD          Family History:     Family History   Problem Relation Age of Onset     C.A.D. Mother         d age 67     Vision Loss Father      Hearing Loss Sister      Diabetes Sister      Cancer No family hx of      Glaucoma No family hx of      Macular Degeneration No family hx of           Social History:     Social History     Tobacco Use     Smoking status: Former Smoker     Packs/day: 1.00     Years: 53.00     Pack years: 53.00     Types: Cigarettes     Last attempt to quit: 2017     Years since quittin.8     Smokeless tobacco: Never Used   Substance Use Topics     Alcohol use: Yes     Comment: rare, drink every few months     History   Sexual Activity     Sexual activity: Not Currently          Current Medications (antimicrobials listed in bold):       sodium chloride 0.9%  250 mL Intravenous Once in dialysis     sodium chloride 0.9%  300 mL Hemodialysis Machine Once     aspirin  81 mg Oral Daily     bumetanide  2 mg Oral BID     fish oil-omega-3 fatty acids  1 g Oral Daily     gelatin absorbable  1 each Topical During Hemodialysis (from stock)     gelatin absorbable  1 each Topical  During Hemodialysis (from stock)     iron sucrose  100 mg Intravenous Once in dialysis     multivitamin, therapeutic  1 tablet Oral Daily     - MEDICATION INSTRUCTIONS -   Does not apply Once     rosuvastatin  40 mg Oral Daily     sevelamer  1,600 mg Oral TID w/meals     sodium chloride (PF)  3 mL Intracatheter Q8H     ticagrelor  90 mg Oral BID          Allergies:   No Known Allergies         Physical Exam:   Vitals were reviewed  Ranges for his vital signs:  Temp:  [98.1  F (36.7  C)-99.3  F (37.4  C)] 98.1  F (36.7  C)  Pulse:  [100] 100  Heart Rate:  [] 97  Resp:  [16-18] 16  BP: ()/(70-87) 116/76  SpO2:  [97 %-100 %] 100 %    Physical Examination:  GENERAL:  well-developed, well-nourished, in bed in no acute distress.   HEENT:  Head is normocephalic, atraumatic   EYES:  Eyes have anicteric sclerae  LUNGS:  Clear to auscultation bilateral.   CARDIOVASCULAR:  Regular rate and rhythm with no murmurs, gallops or rubs.  ABDOMEN:  Normal bowel sounds, soft, nontender. No appreciable hepatosplenomegaly  SKIN:  No acute rashes.   NEUROLOGIC:  Grossly nonfocal. Active x4 extremities         Laboratory Data:     Inflammatory Markers    Recent Labs   Lab Test 07/12/17  1407   *     Hematology Studies    Recent Labs   Lab Test 04/26/19  0535 04/25/19  0545 04/24/19  0524 04/23/19  0824 04/22/19  1114 04/16/19  1053  02/18/19  0943  01/25/18  1245  12/11/17  1018  11/17/17  0618  11/06/17  1029  09/15/17  1007   WBC 9.9 8.1 8.5 11.2* 7.1 9.1  --  8.1   < > 11.2*  --  9.1  --  12.8*  --  11.3*  --  5.1   ANEU  --   --   --   --   --   --   --  6.2  --  8.7*  --  6.4  --  9.7*  --  8.8*  --  3.6   AEOS  --   --   --   --   --   --   --  0.3  --  0.4  --  0.6  --  0.5  --  0.4  --  0.3   HGB 8.9* 9.1* 8.5* 9.8* 10.1* 9.9*   < > 10.1*   < > 9.8*   < > 8.5*   < > 7.8*   < > 8.4*   < > 7.1*   MCV 88 87 88 90 91 89  --  86   < > 87  --  89  --  88  --  89  --  95    179 184 203 189 184  --  266   < > 227   --  336  --  421  --  374  --  308    < > = values in this interval not displayed.     Metabolic Studies     Recent Labs   Lab Test 04/26/19  0535 04/25/19  0545 04/24/19  0524 04/23/19  0824 04/22/19  1114    139 139 139 141   POTASSIUM 3.6 3.9 3.9 4.2 3.9   CHLORIDE 109 106 106 107 108   CO2 22 24 22 21 22   BUN 51* 45* 74* 103* 113*   CR 4.81* 3.98* 4.64* 5.31* 5.44*   GFRESTIMATED 11* 14* 11* 10* 9*     Hepatic Studies    Recent Labs   Lab Test 03/11/19  0954 02/25/19  1012 02/18/19  0943 02/04/19  0928 01/21/19  0853 01/16/19  1255  12/11/17  1018  08/28/17  1909  07/19/17  0537 07/12/17  1740 07/12/17  1407 01/15/14  0950   BILITOTAL  --   --   --   --   --   --   --  0.2  --  0.3  --  0.2 0.6 0.3 0.4   ALKPHOS  --   --   --   --   --   --   --  63  --  95  --  51 67 65 75   ALBUMIN 2.9* 3.2* 3.2* 2.9* 2.9* 3.2*   < > 3.0*   < > 3.0*   < > 1.9* 3.1* 3.1* 3.9   AST  --   --   --   --   --   --   --  13  --  9  --  32 12 8 27   ALT  --   --   --   --   --   --   --  17  --  17  --  17 19 20 29    < > = values in this interval not displayed.     Thyroid Studies    Recent Labs   Lab Test 04/24/19  0524 02/04/19  0928 01/16/19  1255   TSH 6.42*  --  5.36*   T4 0.89 0.76  --      Microbiology:  Culture Micro   Date Value Ref Range Status   04/18/2018 (A)  Final    50,000 to 100,000 colonies/mL  Coagulase negative Staphylococcus     04/09/2018 (A)  Final    >100,000 colonies/mL  Coagulase negative Staphylococcus     02/26/2018 (A)  Final    >100,000 colonies/mL  Coagulase negative Staphylococcus     01/25/2018 (A)  Final    50,000 to 100,000 colonies/mL  Coagulase negative Staphylococcus     08/29/2017 >100,000 colonies/mL  Serratia marcescens   (A)  Final   08/28/2017 >100,000 colonies/mL  Serratia marcescens   (A)  Final   05/26/2009 No growth  Final     Urine Studies    Recent Labs   Lab Test 04/18/18  1325 04/09/18  1400 01/25/18  1102 11/22/17 08/29/17  0235 08/28/17  2100   LEUKEST Large* Large* Moderate*  Small Large* Large*   WBCU >100* >100* >100*  --  >182* 2,598*     Imagin/24: CXR   FINDINGS: The upper abdomen is unremarkable. The cardiac silhouette is  not enlarged. No pneumothorax or pleural effusion. No acute osseous  abnormalities. No cavitary lesions. Multifocal nodular opacities in  the right lung.                   IMPRESSION: Multinodular nodular foci in the right lung concerning for  infection including mycobacteria species, please see CT dated  3/4/2019.    3/4/2019: CT Chest:   2.  Bilateral lower lobe predominant peribronchovascular groundglass  opacities and nodular consolidations, favors to be aspiration versus  infection, less likely malignancy. Short term follow-up to ensure  resolution is recommended.  3.  Ectatic ascending and descending aorta measuring up to 4.3 and 3.2  cm respectively.  4.  Tortuous abdominal aorta with infrarenal aneurysmal dilatation  measures up to 3.9 cm.  5.  Right common iliac artery aneurysm dilatation measures up to 2.6  cm.  6.  Advanced stenosis of bilateral renal arteries.  7.  Prostatomegaly with nonspecific urinary bladder wall thickening  and posterior diverticulum.  8.  Grade 1 anterolisthesis of L5 on S1 with associated pars defect.    17: CT chest, abdomen, and pelvis   IMPRESSION:   1. Large amount of hyperdense clot within the urinary bladder. The  prostate is enlarged with a nodular projection into the base of the  bladder. Additionally, the bladder is moderately thickened with some  irregular asymmetrically thickened areas along its anterior wall.  These findings could be explained by benign prostate enlargement with  a chronically obstructed trabeculated bladder, however the presence of  asymmetric thickening and hemorrhage are worrisome for malignancy of  the bladder.  2. Moderate-severe bilateral hydronephrosis and hydroureter of both  entire ureters to the bladder.  3. No evidence to suggest metastatic disease within the abdomen,  pelvis  or skeleton on this noncontrast CT.   4. A few small pulmonary nodules measuring up to 3 mm. These are  nonspecific. In a patient with a smoking history, a 12 month follow-up  CT is optional per Fleischner society criteria. If malignancy is  discovered, Fleischner society criteria no longer applies.  5. 4.0 cm infrarenal abdominal aortic aneurysm, with moderate  aortobiiliac atherosclerotic disease.

## 2019-04-26 NOTE — PROGRESS NOTES
UROLOGIC DIAGNOSES:       CURRENT INTERVENTIONS:       HISTORY:     Patient with history of AUR and hematuria requiring cystoscopy and clot evacuation. Had failed TOV and has been performing CIC.   Patient states he is performing CIC regularly about 4x per day.   He would like to discuss further management of urinary retention.     Patient is currently s/p TURP. He was able to void a small amount at TOV. He states he has been voiding several times per day but continues to perform CIC (though fewer times per day than previous).   Notes that he is able to void now with ~ 250-300ml upon catheterizing.     Patient has recently started dialysis.   We discussed continued catheterization regimen, though this may change with further dialysis.       PAST MEDICAL HISTORY:   Past Medical History:   Diagnosis Date     BPH (benign prostatic hyperplasia)      Chronic kidney disease      HTN      Obstructive uropathy      Pulmonary nodules      Tobacco abuse        PAST SURGICAL HISTORY:   Past Surgical History:   Procedure Laterality Date     APPENDECTOMY  AGE 6     CREATE FISTULA ARTERIOVENOUS UPPER EXTREMITY Left 11/17/2017    Procedure: CREATE FISTULA ARTERIOVENOUS UPPER EXTREMITY;  Left Cephalic Vein To Radial Artery Arteriovenous Fistula Creation, Anesthesia Block;  Surgeon: Eduardo Pabon MD;  [facility]     CV CORONARY ANGIOGRAM N/A 2/22/2019    Procedure: 1100 CORS, RHC; BI-PLANE;  Surgeon: Gabino Collins MD;  [facility]     CYSTOSCOPY, FULGURATE BLEEDERS, EVACUATE CLOT(S), COMBINED N/A 7/16/2017    Procedure: COMBINED CYSTOSCOPY, FULGURATE BLEEDERS, EVACUATE CLOT(S);  Cystoscopy clot evacuation, bladder biopsy and fulguration (per surgeons note) NERVE BLOCK PERIPHERAL;  Surgeon: Tamiko Vanegas MD;  [facility]     CYSTOSCOPY, TRANSURETHRAL RESECTION (TUR) PROSTATE, COMBINED N/A 3/5/2018    Procedure: COMBINED CYSTOSCOPY, TRANSURETHRAL RESECTION (TUR) PROSTATE;   "Cystoscopy, Transurethral Resection of Prostate ;  Surgeon: Tamiko Vanegas MD;  [facility]     SINUS SURGERY  AGE 6     TONSILLECTOMY      CHILDHOOD       FAMILY HISTORY:   Family History   Problem Relation Age of Onset     C.A.D. Mother         d age 67     Vision Loss Father      Hearing Loss Sister      Diabetes Sister      Cancer No family hx of      Glaucoma No family hx of      Macular Degeneration No family hx of        SOCIAL HISTORY:   Social History     Tobacco Use     Smoking status: Former Smoker     Packs/day: 1.00     Years: 53.00     Pack years: 53.00     Types: Cigarettes     Last attempt to quit: 2017     Years since quittin.8     Smokeless tobacco: Never Used   Substance Use Topics     Alcohol use: Yes     Comment: rare, drink every few months       Current Outpatient Medications   Medication     aspirin (ASA) 81 MG EC tablet     bumetanide (BUMEX) 2 MG tablet     darbepoetin william (ARANESP, ALBUMIN FREE,) 100 MCG/0.5ML injection     ferrous sulfate (FEROSUL) 325 (65 Fe) MG tablet     multivitamin w/minerals (MULTI-VITAMIN) tablet     Omega-3 Fatty Acids (FISH OIL PO)     rosuvastatin (CRESTOR) 40 MG tablet     sevelamer (RENVELA) 800 MG tablet     ticagrelor (BRILINTA) 90 MG tablet     order for DME     No current facility-administered medications for this visit.          PHYSICAL EXAM:    /69   Pulse 115   Ht 1.778 m (5' 10\")   Wt 71.2 kg (157 lb)   BMI 22.53 kg/m      HEENT: Normocephalic and atraumatic   Cardiac: Not done  Back/Flank: Not done  CNS/PNS: Not done  Respiratory: Normal non-labored breathing  Abdomen: Soft nontender and nondistended  Peripheral Vascular: Not done  Mental Status: Not done    Penis: Not done  Scrotal Skin: Not done  Testicles: Not done  Epididymis: Not done  Digital Rectal Exam:     Cystoscopy: Not done    Imaging: None    Urinalysis: UA RESULTS:  Recent Labs   Lab Test  17   0235   01/15/14   0936   COLOR  Light Yellow   < >  " Yellow   APPEARANCE  Cloudy   < >  Clear   URINEGLC  Negative   < >  Negative   URINEBILI  Negative   < >  Negative   URINEKETONE  Negative   < >  Negative   SG  1.014   < >  1.020   UBLD  Moderate*   < >  Small*   URINEPH  6.0   < >  6.0   PROTEIN  100*   < >  Negative   UROBILINOGEN   --    --   0.2   NITRITE  Negative   < >  Negative   LEUKEST  Large*   < >  Negative   RBCU  10*   < >  10-25*   WBCU  >182*   < >  O - 2    < > = values in this interval not displayed.       PSA:     Post Void Residual: 100ml     Other labs: None today      IMPRESSION:  71 y/o M with urinary retention requiring CIC, now voiding     PLAN:  Timed voiding   Continue CIC BID  Uroflow/PVR if possible in twelve months   Follow up in twelve months         Total Time: 15 minutes                                       Total in Consultation: greater than 50%

## 2019-04-27 ENCOUNTER — PATIENT OUTREACH (OUTPATIENT)
Dept: CARE COORDINATION | Facility: CLINIC | Age: 74
End: 2019-04-27

## 2019-04-29 ENCOUNTER — TELEPHONE (OUTPATIENT)
Dept: FAMILY MEDICINE | Facility: CLINIC | Age: 74
End: 2019-04-29

## 2019-04-29 ENCOUNTER — PATIENT OUTREACH (OUTPATIENT)
Dept: CARE COORDINATION | Facility: CLINIC | Age: 74
End: 2019-04-29

## 2019-04-29 ENCOUNTER — TELEPHONE (OUTPATIENT)
Dept: CARDIOLOGY | Facility: CLINIC | Age: 74
End: 2019-04-29

## 2019-04-29 DIAGNOSIS — N18.6 END STAGE RENAL DISEASE (H): Primary | ICD-10-CM

## 2019-04-29 ASSESSMENT — ACTIVITIES OF DAILY LIVING (ADL): DEPENDENT_IADLS:: INDEPENDENT

## 2019-04-29 NOTE — TELEPHONE ENCOUNTER
Called and left a message for Tereza to return our call to theTC line at [phone]. Bess Salomon,

## 2019-04-29 NOTE — PROGRESS NOTES
Clinic Care Coordination Contact    Clinic Care Coordination Contact  OUTREACH    Referral Information:  Referral Source: IP Handoff    Primary Diagnosis: CKD  Chief Complaint   Patient presents with     Clinic Care Coordination - Post Hospital     RN   Universal Utilization: Patient was inpatient at [facility] from 4/22/19 to 4/26/19  Clinic Utilization  Difficulty keeping appointments:: No  Compliance Concerns: No  No-Show Concerns: No  No PCP office visit in Past Year: No  Utilization    Last refreshed: 4/27/2019 11:21 PM:  Hospital Admissions 1           Last refreshed: 4/27/2019 11:21 PM:  ED Visits 0           Last refreshed: 4/27/2019 11:21 PM:  No Show Count (past year) 1              Current as of: 4/27/2019 11:21 PM          Clinical Concerns:    Current Medical Concerns:  He was admitted to Cardiology Service on 4/22/19 for a planned PCI (LAD and LCx PCI with cannulation of RCA to determine CAD burden) with Nephrology consulted for initiation of dialysis after angiogram.     Patient states he is feeling good. He denies any discomfort.  He states he will start his o/p dialysis today in [place].  He would like to have it closer to home in [place], but that center is currently full.     Patient continues to produce urine. He voids plus he self caths twice a day for retention.     Patient states he continues to drive, and is very independent with ADL's and IADL's.       Current Behavioral Concerns: Denies concerns    Education Provided to patient: Upcoming appointments. Role of care coordination     Pain  Pain (GOAL):: No    Health Maintenance Reviewed:    Health Maintenance Due   Topic Date Due     HF ACTION PLAN Q3 YR  1945     FIT Q1 YR  10/24/1953     ZOSTER IMMUNIZATION (2 of 3) 05/15/2012     MEDICARE ANNUAL WELLNESS VISIT  02/24/2017     ALT Q1 YR  12/11/2018     Clinical Pathway: None    Medication Management:  Patient is independent in medication management. Medications reviewed and he is  knowledgeable of doses.        Functional Status:  Dependent ADLs:: Independent, Ambulation-no assistive device  Dependent IADLs:: Independent  Bed or wheelchair confined:: No  Mobility Status: Independent  Fallen 2 or more times in the past year?: No  Any fall with injury in the past year?: No    Living Situation:  Current living arrangement:: I live alone  Type of residence:: Apartment    Diet/Exercise/Sleep:  Diet:: Low saturated fat  Inadequate nutrition (GOAL):: No  Food Insecurity: No  Tube Feeding: No  Exercise:: Currently not exercising  Inadequate activity/exercise (GOAL):: No  Significant changes in sleep pattern (GOAL): No    Transportation:  Transportation concerns (GOAL):: No- drives  Transportation means:: Accessible car, Family, Regular car     Psychosocial:  Cheondoism or spiritual beliefs that impact treatment:: No  Mental health DX:: No  Mental health management concern (GOAL):: No  Informal Support system:: Children - two daughters     Financial/Insurance:   Financial/Insurance concerns (GOAL):: No     Resources and Interventions:  Current Resources: None     Community Resources: Dialysis Services  Supplies used at home:: Compression Stockings, Gloves, Wipes, Other(Straight cath supplies)  Equipment Currently Used at Home: none    Advance Care Plan/Directive  Advanced Care Plans/Directives on file:: No    Referrals Placed: None    Patient/Caregiver understanding: Patient has good understanding.  He is not sure that he will be able to attend cardiac rehab eval on 4/30/19. He states he will have his daughter rescedule the appointment.    Future Appointments              Tomorrow  LAB [facility]    Tomorrow 2, Ur Cardiac Rehab [facility], [place], Cardiac Rehabilitation, [place]    In 1 week Lizandro Ng MD [facility] PHYSICIANS HEART AT [facility], [facility] CLIN    In 3 weeks Oralia De La Torre NP [facility] Nephrology, [facility]    In 11 months Tamiko Vanegas  MD [facility] Urology and [facility], [facility]      Plan: Patient will start dialysis as planned. Patient will attend scheduled appointments.     Patient declines the need for further follow up from clinic care coordinator at this time. He did take contact information for future questions if they arise.     Gladis Mcintyre RN, [facility] - Primary Care Clinic RN Coordinator  [facility]   4/29/2019    9:47 AM  [phone]

## 2019-04-29 NOTE — TELEPHONE ENCOUNTER
Patient recent PCI 4/22/19 follow up:  Spoke to patient whom states that he is doing well since his coronary angiogram.  He is taking his Brilinta correctly and it is affordable.  He started dialysis last Wednesday.  Denies any chest pain.  Reviewed the importance of taking Brilinta.  Patient verbalized understanding.  He is scheduled to see Dr. Ng 5/7.      Stephanie Menendez, RN  Care Coordinator  [facility] [place] [facility]  [phone]

## 2019-04-29 NOTE — TELEPHONE ENCOUNTER
Reason for call:  Other   Patient called regarding (reason for call): call back  Additional comments: Jaye phelan [facility] is calling wanting the patients vaccine records sent over. Fax number is [phone]. Please call back    Phone number to reach patient:  Other phone number:  [phone]    Best Time:  any    Can we leave a detailed message on this number?  YES

## 2019-04-29 NOTE — TELEPHONE ENCOUNTER
Reason for Call:  Other call back    Detailed comments: Tereza is calling stating that she needs FMLA papers sorted out. The employer needs to have more specific details about why she is missing work. Wants to know if she can come in and meet with Dr. Baker sometime this week to get this arranged. Please advise.     Phone Number Patient can be reached at: Other phone number:  [phone]    Best Time: After 1pm today.     Can we leave a detailed message on this number? YES    Call taken on 4/29/2019 at 10:51 AM by Fabio Michelle

## 2019-04-30 ENCOUNTER — TELEPHONE (OUTPATIENT)
Dept: PHARMACY | Facility: CLINIC | Age: 74
End: 2019-04-30

## 2019-04-30 ENCOUNTER — TELEPHONE (OUTPATIENT)
Dept: FAMILY MEDICINE | Facility: CLINIC | Age: 74
End: 2019-04-30

## 2019-04-30 DIAGNOSIS — D63.1 ANEMIA OF CHRONIC RENAL FAILURE, STAGE 5 (H): ICD-10-CM

## 2019-04-30 DIAGNOSIS — N18.5 ANEMIA OF CHRONIC RENAL FAILURE, STAGE 5 (H): ICD-10-CM

## 2019-04-30 DIAGNOSIS — N18.5 CKD (CHRONIC KIDNEY DISEASE) STAGE 5, GFR LESS THAN 15 ML/MIN (H): ICD-10-CM

## 2019-04-30 LAB
ALBUMIN SERPL-MCNC: 3.5 G/DL (ref 3.4–5)
ANION GAP SERPL CALCULATED.3IONS-SCNC: 9 MMOL/L (ref 3–14)
BUN SERPL-MCNC: 38 MG/DL (ref 7–30)
CALCIUM SERPL-MCNC: 9 MG/DL (ref 8.5–10.1)
CHLORIDE SERPL-SCNC: 103 MMOL/L (ref 94–109)
CO2 SERPL-SCNC: 28 MMOL/L (ref 20–32)
CREAT SERPL-MCNC: 3.96 MG/DL (ref 0.66–1.25)
ERYTHROCYTE [DISTWIDTH] IN BLOOD BY AUTOMATED COUNT: 15.4 % (ref 10–15)
FERRITIN SERPL-MCNC: 668 NG/ML (ref 26–388)
GFR SERPL CREATININE-BSD FRML MDRD: 14 ML/MIN/{1.73_M2}
GLUCOSE SERPL-MCNC: 97 MG/DL (ref 70–99)
HCT VFR BLD AUTO: 33 % (ref 40–53)
HGB BLD-MCNC: 9.7 G/DL (ref 13.3–17.7)
IRON SATN MFR SERPL: 18 % (ref 15–46)
IRON SERPL-MCNC: 50 UG/DL (ref 35–180)
MCH RBC QN AUTO: 26.4 PG (ref 26.5–33)
MCHC RBC AUTO-ENTMCNC: 29.4 G/DL (ref 31.5–36.5)
MCV RBC AUTO: 90 FL (ref 78–100)
PHOSPHATE SERPL-MCNC: 3.6 MG/DL (ref 2.5–4.5)
PLATELET # BLD AUTO: 269 10E9/L (ref 150–450)
POTASSIUM SERPL-SCNC: 4.5 MMOL/L (ref 3.4–5.3)
RBC # BLD AUTO: 3.67 10E12/L (ref 4.4–5.9)
SODIUM SERPL-SCNC: 140 MMOL/L (ref 133–144)
TIBC SERPL-MCNC: 277 UG/DL (ref 240–430)
WBC # BLD AUTO: 9 10E9/L (ref 4–11)

## 2019-04-30 PROCEDURE — 85027 COMPLETE CBC AUTOMATED: CPT | Performed by: FAMILY MEDICINE

## 2019-04-30 PROCEDURE — 36415 COLL VENOUS BLD VENIPUNCTURE: CPT | Performed by: FAMILY MEDICINE

## 2019-04-30 PROCEDURE — 83550 IRON BINDING TEST: CPT | Performed by: FAMILY MEDICINE

## 2019-04-30 PROCEDURE — 82728 ASSAY OF FERRITIN: CPT | Performed by: FAMILY MEDICINE

## 2019-04-30 PROCEDURE — 83540 ASSAY OF IRON: CPT | Performed by: FAMILY MEDICINE

## 2019-04-30 PROCEDURE — 80069 RENAL FUNCTION PANEL: CPT | Performed by: FAMILY MEDICINE

## 2019-04-30 NOTE — TELEPHONE ENCOUNTER
Referred by Cecilia De La Torre on 03/20/2019      Started Hemodialysis 3x/week, Anemia Services will be closed.      Left message for Dung that Anemia Services will no longer be following. Anemia will be followed by dialysis.         Anemia Latest Ref Rng & Units 4/16/2019 4/22/2019 4/23/2019 4/24/2019 4/25/2019 4/26/2019 4/30/2019   EARLENE Dose - - - - - - - -   Hemoglobin 13.3 - 17.7 g/dL 9.9(L) 10.1(L) 9.8(L) 8.5(L) 9.1(L) 8.9(L) 9.7(L)   TSAT 15 - 46 % - - - - - - -   Ferritin 26 - 388 ng/mL - - - - - - -       Anemia labs discontinued, therapy plans cancelled, removed from active patient list, and referring provider notified.      Zainab Barragan RN   Anemia Clinic  [address] [address]   [email or web address]   Office : [phone]  Fax: [phone]

## 2019-04-30 NOTE — TELEPHONE ENCOUNTER
Reason for Call:  Form, our goal is to have forms completed with 72 hours, however, some forms may require a visit or additional information.    Type of letter, form or note:  FMLA    Who is the form from?: Patient    Where did the form come from: Patient or family brought in       What clinic location was the form placed at?: [facility]    Where the form was placed: Dr. Baker's box Box/Folder    What number is listed as a contact on the form?: [phone]       Additional comments:     Call taken on 4/30/2019 at 5:41 PM by Tasha Sandoval

## 2019-05-01 NOTE — TELEPHONE ENCOUNTER
Confirmed fax of FMLA forms completed by Dr. Baker. Copy of the forms are in the [employer] FMLA form file. Bess Salomon,

## 2019-05-01 NOTE — TELEPHONE ENCOUNTER
Confirmed fax of FMLA forms completed by Dr. Baker. Copy of the forms are in the [employer] FMLA form file.    Called and let Tereza Know these have been completed and confirmed faxed to Alfredo. She asked for a copy to be mailed to her:    Tereza Norton  [address] [place] [address]    Bess Salomon,

## 2019-05-02 ENCOUNTER — CARE COORDINATION (OUTPATIENT)
Dept: NEPHROLOGY | Facility: CLINIC | Age: 74
End: 2019-05-02

## 2019-05-06 DIAGNOSIS — I50.22 CHRONIC SYSTOLIC HEART FAILURE (H): ICD-10-CM

## 2019-05-06 DIAGNOSIS — Z13.6 CARDIOVASCULAR SCREENING; LDL GOAL LESS THAN 130: Primary | ICD-10-CM

## 2019-05-06 RX ORDER — ROSUVASTATIN CALCIUM 40 MG/1
TABLET, COATED ORAL
Qty: 90 TABLET | Refills: 2 | Status: SHIPPED | OUTPATIENT
Start: 2019-05-06 | End: 2019-05-07

## 2019-05-06 RX ORDER — BUMETANIDE 2 MG/1
TABLET ORAL
Qty: 180 TABLET | Refills: 0
Start: 2019-05-06

## 2019-05-06 RX ORDER — BUMETANIDE 2 MG/1
TABLET ORAL
Qty: 60 TABLET | Refills: 0 | Status: SHIPPED | OUTPATIENT
Start: 2019-05-06 | End: 2019-05-09

## 2019-05-06 NOTE — TELEPHONE ENCOUNTER
Called pharmacy and spoke with Vito regarding Nohemy Rogers CNP's message as written below  Vito verbalized understanding    Deanna Jackson RN

## 2019-05-06 NOTE — TELEPHONE ENCOUNTER
Bumex    See other 5/6/19 refill encounter  Cr out of range and med was listed as historical/reported  Noheym Rogers CNP approved 1 month while covering for Dr. Baker    Pharmacy requesting 90 days supply    Deanna Jackson RN

## 2019-05-06 NOTE — TELEPHONE ENCOUNTER
"Routing refill request to provider for review/approval because:  Labs out of range:  Cr  Medication is reported/historical    Requested Prescriptions   Pending Prescriptions Disp Refills     bumetanide (BUMEX) 2 MG tablet [Pharmacy Med Name: BUMETANIDE 2MG TABLETS] 60 tablet 0     Sig: TAKE 1 TABLET BY MOUTH TWICE DAILY       Diuretics (Including Combos) Protocol Failed - 5/6/2019 10:01 AM        Failed - Normal serum creatinine on file in past 12 months     Recent Labs   Lab Test 04/30/19  0952   CR 3.96*              Passed - Blood pressure under 140/90 in past 12 months     BP Readings from Last 3 Encounters:   04/26/19 109/69   04/26/19 135/87   04/16/19 122/62                 Passed - Recent (12 mo) or future (30 days) visit within the authorizing provider's specialty     Patient had office visit in the last 12 months or has a visit in the next 30 days with authorizing provider or within the authorizing provider's specialty.  See \"Patient Info\" tab in inbasket, or \"Choose Columns\" in Meds & Orders section of the refill encounter.              Passed - Medication is active on med list        Passed - Patient is age 18 or older        Passed - Normal serum potassium on file in past 12 months     Recent Labs   Lab Test 04/30/19  0952   POTASSIUM 4.5                    Passed - Normal serum sodium on file in past 12 months     Recent Labs   Lab Test 04/30/19  0952               Signed Prescriptions Disp Refills    rosuvastatin (CRESTOR) 40 MG tablet 90 tablet 2     Sig: TAKE 1 TABLET BY MOUTH AT BEDTIME       Statins Protocol Passed - 5/6/2019 10:01 AM        Passed - LDL on file in past 12 months     Recent Labs   Lab Test 04/16/19  1053   LDL 12             Passed - No abnormal creatine kinase in past 12 months     No lab results found.             Passed - Recent (12 mo) or future (30 days) visit within the authorizing provider's specialty     Patient had office visit in the last 12 months or has a visit " "in the next 30 days with authorizing provider or within the authorizing provider's specialty.  See \"Patient Info\" tab in inbasket, or \"Choose Columns\" in Meds & Orders section of the refill encounter.              Passed - Medication is active on med list        Passed - Patient is age 18 or older        Anjali Ann RN  "

## 2019-05-07 ENCOUNTER — OFFICE VISIT (OUTPATIENT)
Dept: CARDIOLOGY | Facility: CLINIC | Age: 74
End: 2019-05-07
Payer: COMMERCIAL

## 2019-05-07 VITALS
WEIGHT: 160 LBS | BODY MASS INDEX: 22.96 KG/M2 | OXYGEN SATURATION: 97 % | SYSTOLIC BLOOD PRESSURE: 118 MMHG | HEART RATE: 78 BPM | DIASTOLIC BLOOD PRESSURE: 68 MMHG

## 2019-05-07 DIAGNOSIS — N18.5 ANEMIA DUE TO STAGE 5 CHRONIC KIDNEY DISEASE, NOT ON CHRONIC DIALYSIS (H): ICD-10-CM

## 2019-05-07 DIAGNOSIS — I25.10 CORONARY ARTERY DISEASE INVOLVING NATIVE CORONARY ARTERY OF NATIVE HEART WITHOUT ANGINA PECTORIS: ICD-10-CM

## 2019-05-07 DIAGNOSIS — N18.6 ESRD (END STAGE RENAL DISEASE) ON DIALYSIS (H): ICD-10-CM

## 2019-05-07 DIAGNOSIS — E78.5 HYPERLIPIDEMIA LDL GOAL <70: ICD-10-CM

## 2019-05-07 DIAGNOSIS — I48.0 PAROXYSMAL ATRIAL FIBRILLATION (H): ICD-10-CM

## 2019-05-07 DIAGNOSIS — Z99.2 ESRD (END STAGE RENAL DISEASE) ON DIALYSIS (H): ICD-10-CM

## 2019-05-07 DIAGNOSIS — I10 ESSENTIAL HYPERTENSION: ICD-10-CM

## 2019-05-07 DIAGNOSIS — I50.20 HEART FAILURE WITH REDUCED EJECTION FRACTION, NYHA CLASS I (H): Primary | ICD-10-CM

## 2019-05-07 DIAGNOSIS — D63.1 ANEMIA DUE TO STAGE 5 CHRONIC KIDNEY DISEASE, NOT ON CHRONIC DIALYSIS (H): ICD-10-CM

## 2019-05-07 DIAGNOSIS — I35.0 MODERATE AORTIC STENOSIS: ICD-10-CM

## 2019-05-07 PROCEDURE — 93000 ELECTROCARDIOGRAM COMPLETE: CPT | Performed by: INTERNAL MEDICINE

## 2019-05-07 PROCEDURE — 99214 OFFICE O/P EST MOD 30 MIN: CPT | Performed by: INTERNAL MEDICINE

## 2019-05-07 RX ORDER — ATORVASTATIN CALCIUM 10 MG/1
10 TABLET, FILM COATED ORAL DAILY
Qty: 90 TABLET | Refills: 3 | Status: SHIPPED | OUTPATIENT
Start: 2019-05-07 | End: 2020-02-10

## 2019-05-07 RX ORDER — METOPROLOL TARTRATE 25 MG/1
12.5 TABLET, FILM COATED ORAL 2 TIMES DAILY
Qty: 90 TABLET | Refills: 3 | Status: SHIPPED | OUTPATIENT
Start: 2019-05-07 | End: 2020-02-10

## 2019-05-07 NOTE — PATIENT INSTRUCTIONS
Thank you for coming to the [facility]; please note the following instructions:    1. MEDICATION CHANGES TODAY:    * START Metoprolol Tartrate 12.5 mg. Twice a day  Start Atovastatin 10 mg. Every day    Your new prescription will be at the Pharmacy you prefer.    Stop Crestor    Stop Fish oil      2. Dr. Lizandro Ng has requested you to follow up in 3 month; please see following pages for appointment detail information.    3.Dr. Lizandro Ng has ordered an echocardiogram in 3 month .  We have scheduled your echocardiogram appointment at the  [facility] ([address] [place] [address]); please see following pages for appointment detail information.    Please arrive 15 minutes early to allow time for registration.  The Cardiology Nurse will contact you regarding the results (please see result notification details at bottom of summary).    Echocardiogram Instructions:  -Wear comfortable clothing  -Refrain from wearing perfumes or scented lotions                    If you have any questions regarding your visit please contact your care team:     Cardiology  Telephone Number   Stephanie VALVERDE., RN  Reena REDD,RN  Pati LANDON, JENY DOWNEY, MA  Titus DUQUE, N   [phone]    *After hours: [phone]   For scheduling appts:     [phone] or    [phone] (select option 1)    *After hours: [phone]     For the Device Clinic (Pacemakers and ICD's)  RN's :  Beti Simons   During business hours: [phone]    *After business hours:  [phone] (select option 4)      Normal test result notifications will be released via [employer] or mailed within 7 business days.  All other test results, will be communicated via telephone once reviewed by your cardiologist.    If you need a medication refill please contact your pharmacy.  Please allow 3 business days for your refill to be completed.    As always, thank you for trusting us with your health care  needs!        Patient Education     Atorvastatin Calcium Oral tablet  What is this medicine?  ATORVASTATIN (a TORE va sta tin) is known as a HMG-CoA reductase inhibitor or 'statin'. It lowers the level of cholesterol and triglycerides in the blood. This drug may also reduce the risk of heart attack, stroke, or other health problems in patients with risk factors for heart disease. Diet and lifestyle changes are often used with this drug.  This medicine may be used for other purposes; ask your health care provider or pharmacist if you have questions.  What should I tell my health care provider before I take this medicine?  They need to know if you have any of these conditions:    frequently drink alcoholic beverages    history of stroke, TIA    kidney disease    liver disease    muscle aches or weakness    other medical condition    an unusual or allergic reaction to atorvastatin, other medicines, foods, dyes, or preservatives    pregnant or trying to get pregnant    breast-feeding  How should I use this medicine?  Take this medicine by mouth with a glass of water. Follow the directions on the prescription label. You can take this medicine with or without food. Take your doses at regular intervals. Do not take your medicine more often than directed.  Talk to your pediatrician regarding the use of this medicine in children. While this drug may be prescribed for children as young as 10 years old for selected conditions, precautions do apply.  Overdosage: If you think you have taken too much of this medicine contact a poison control center or emergency room at once.  NOTE: This medicine is only for you. Do not share this medicine with others.  What if I miss a dose?  If you miss a dose, take it as soon as you can. If it is almost time for your next dose, take only that dose. Do not take double or extra doses.  What may interact with this medicine?  Do not take this medicine with any of the following medications:    red  yeast rice    telaprevir    telithromycin    voriconazole  This medicine may also interact with the following medications:    alcohol    antiviral medicines for HIV or AIDS    boceprevir    certain antibiotics like clarithromycin, erythromycin, troleandomycin    certain medicines for cholesterol like fenofibrate or gemfibrozil    cimetidine    clarithromycin    colchicine    cyclosporine    digoxin    female hormones, like estrogens or progestins and birth control pills    grapefruit juice    medicines for fungal infections like fluconazole, itraconazole, ketoconazole    niacin    rifampin    spironolactone  This list may not describe all possible interactions. Give your health care provider a list of all the medicines, herbs, non-prescription drugs, or dietary supplements you use. Also tell them if you smoke, drink alcohol, or use illegal drugs. Some items may interact with your medicine.  What should I watch for while using this medicine?  Visit your doctor or health care professional for regular check-ups. You may need regular tests to make sure your liver is working properly.  Tell your doctor or health care professional right away if you get any unexplained muscle pain, tenderness, or weakness, especially if you also have a fever and tiredness. Your doctor or health care professional may tell you to stop taking this medicine if you develop muscle problems. If your muscle problems do not go away after stopping this medicine, contact your health care professional.  This drug is only part of a total heart-health program. Your doctor or a dietician can suggest a low-cholesterol and low-fat diet to help. Avoid alcohol and smoking, and keep a proper exercise schedule.  Do not use this drug if you are pregnant or breast-feeding. Serious side effects to an unborn child or to an infant are possible. Talk to your doctor or pharmacist for more information.  This medicine may affect blood sugar levels. If you have  diabetes, check with your doctor or health care professional before you change your diet or the dose of your diabetic medicine.  If you are going to have surgery tell your health care professional that you are taking this drug.  What side effects may I notice from receiving this medicine?  Side effects that you should report to your doctor or health care professional as soon as possible:    allergic reactions like skin rash, itching or hives, swelling of the face, lips, or tongue    dark urine    fever    joint pain    muscle cramps, pain    redness, blistering, peeling or loosening of the skin, including inside the mouth    trouble passing urine or change in the amount of urine    unusually weak or tired    yellowing of eyes or skin  Side effects that usually do not require medical attention (report to your doctor or health care professional if they continue or are bothersome):    constipation    heartburn    stomach gas, pain, upset  This list may not describe all possible side effects. Call your doctor for medical advice about side effects. You may report side effects to FDA at 8-193-FDA-7147.  Where should I keep my medicine?  Keep out of the reach of children.  Store at room temperature between 20 to 25 degrees C (68 to 77 degrees F). Throw away any unused medicine after the expiration date.  NOTE:This sheet is a summary. It may not cover all possible information. If you have questions about this medicine, talk to your doctor, pharmacist, or health care provider. Copyright  2016 [employer]

## 2019-05-07 NOTE — PROGRESS NOTES
Referring provider: Haley Baker MD    Chief complaint: Discuss TTE results.    HPI: Mr. Dung Norton is a 72 year old  male with PMH significant for CKD, HTN and hx of heavy tobacco abuse.    Mr. Norton has recently been diagnosed with ESRD, has had his AVF and almost ready ready for HD though he doesnot need that right now. He has HT and is on amlodipine 2.5 mg. His BP in the office is high but he reports lower BP outside the hospital.     He has been diagnosed with heart murmur and subsequently underwent a TTE planned by  which showed mild aortic stenosis with mild dilatation of ascending aorta at 4 cm and hypokinesis of LV lateral wall. He is currently asymptomatic. He denies a history of chest discomfort, dyspnea, PND, orthopnea, pedal edema, palpitations, lightheadedness, and syncope. He has HTN and hx of 50 pack year tobacco abuse. He quit smoking last summer. He has family hx of CAD as well. His mother had MI and  at the age of 66.     I have reviewed his echocardiogram from 2018 which shows mild AS, normal LV function with LV lateral wall hypokinesis (contrast was not used to enhance endocardial borders). His ECG dated 2017 is normal.    Medications, personal, family, and social history reviewed with patient and revised.    Interval history 2019:  The patient returns for on year follow-up. He is overall doing well with no symptoms of CP, WELLS, LE edema, palpitations, dizziness or syncope. Recent echocardiogram showed progression of aortic stenosis to severe range with significant decrease in his EF from 60% to 10-20% in one year.    The patient was in atrial fibrillation during the echocardiogram, he is in sinus rhythm today with TWI in V4-V6.    Interval history 2019:  Since I saw the patient 3 months ago he underwent coronary angiogram with PCI/ALICIA to mid LAD and mid circumflex on 2019.  He was admitted to hospital for dialysis initiation after that.  He is on dialysis  since 4/23.  He reports feeling tired since the dialysis which is improving.  Otherwise he is doing well from cardiac standpoint.  Denies chest pain, dyspnea on exertion, palpitations, frequent dizziness, syncope, lower extremity edema.  He has aortic stenosis. After thorough investigation including CT TAVR and cardiac cath aortic stenosis was deemed moderate.  As you know the patient`s EF dropped to 35% (last echo on February 2019) from 55 to 60% in 2018. Likely ischemic given 3 vessel disease on coronary angiogram. Since he is s/p PCI now, we will continue to follow him up to see if his EF will improve after revascularization. He is euvolemic on exam today. The patient was on metoprolol which was discontinued when he was inpatient for dilaysis due to hypotension. He is currently on aspirin, rosuvastatin, Bumex 2 mg and ticagrelor 90 mg bid.  I reviewed patient's EKG in clinic today which shows sinus rhythm with bigeminal PVCs.  Normal MS/QRS interval.  QTC is mildly elevated at 484 ms.    PAST MEDICAL HISTORY:  Past Medical History:   Diagnosis Date     BPH (benign prostatic hyperplasia)      Chronic kidney disease      HTN      Obstructive uropathy      Pulmonary nodules      Tobacco abuse        CURRENT MEDICATIONS:  Current Outpatient Medications   Medication Sig Dispense Refill     aspirin (ASA) 81 MG EC tablet Take 1 tablet (81 mg) by mouth daily Start tomorrow morning. 90 tablet 3     bumetanide (BUMEX) 2 MG tablet TAKE 1 TABLET BY MOUTH TWICE DAILY 60 tablet 0     darbepoetin william (ARANESP, ALBUMIN FREE,) 100 MCG/0.5ML injection Inject 0.5 mLs (100 mcg) Subcutaneous every 14 days As needed for hgb<10g/dL.  If Hgb increases >1 point in 2 weeks (if blood transfusion given, use hgb PRIOR to this), SYSTOLIC BP > 180 mmHg or hgb>=10g/dL, HOLD DOSE. Dose must be within 1 week of Hgb.  Per anemia protocol with Cecilia Britt,CNP 0.5 mL 99     ferrous sulfate (FEROSUL) 325 (65 Fe) MG tablet TK 1 T PO QD  11      multivitamin w/minerals (MULTI-VITAMIN) tablet Take 1 tablet by mouth daily       Omega-3 Fatty Acids (FISH OIL PO) Take 1 capsule by mouth daily        rosuvastatin (CRESTOR) 40 MG tablet TAKE 1 TABLET BY MOUTH AT BEDTIME 90 tablet 2     sevelamer (RENVELA) 800 MG tablet Take 2 tablets (1,600 mg) by mouth 3 times daily (with meals) 90 tablet 3     ticagrelor (BRILINTA) 90 MG tablet Take 1 tablet (90 mg) by mouth 2 times daily 90 tablet 3     order for DME Equipment being ordered: nichol bradshawa brace (Patient not taking: Reported on 4/26/2019) 1 each 0       PAST SURGICAL HISTORY:  Past Surgical History:   Procedure Laterality Date     APPENDECTOMY  AGE 6     CREATE FISTULA ARTERIOVENOUS UPPER EXTREMITY Left 11/17/2017    Procedure: CREATE FISTULA ARTERIOVENOUS UPPER EXTREMITY;  Left Cephalic Vein To Radial Artery Arteriovenous Fistula Creation, Anesthesia Block;  Surgeon: Eduardo Pabon MD;  Location: [facility] OR     CV CORONARY ANGIOGRAM N/A 2/22/2019    Procedure: 1100 CORS, RHC; BI-PLANE;  Surgeon: Gabino Collins MD;  Location: [facility]     CYSTOSCOPY, FULGURATE BLEEDERS, EVACUATE CLOT(S), COMBINED N/A 7/16/2017    Procedure: COMBINED CYSTOSCOPY, FULGURATE BLEEDERS, EVACUATE CLOT(S);  Cystoscopy clot evacuation, bladder biopsy and fulguration (per surgeons note) NERVE BLOCK PERIPHERAL;  Surgeon: Tamiko Vanegas MD;  Location: [facility] OR     CYSTOSCOPY, TRANSURETHRAL RESECTION (TUR) PROSTATE, COMBINED N/A 3/5/2018    Procedure: COMBINED CYSTOSCOPY, TRANSURETHRAL RESECTION (TUR) PROSTATE;  Cystoscopy, Transurethral Resection of Prostate ;  Surgeon: Tamiko Vanegas MD;  Location: [facility] OR     SINUS SURGERY  AGE 6     TONSILLECTOMY      CHILDHOOD       ALLERGIES:   No Known Allergies    FAMILY HISTORY:  Family History   Problem Relation Age of Onset     C.A.D. Mother         d age 67     Vision Loss Father      Hearing Loss Sister      Diabetes Sister      Cancer No family hx of       Glaucoma No family hx of      Macular Degeneration No family hx of          SOCIAL HISTORY:  Social History     Tobacco Use     Smoking status: Former Smoker     Packs/day: 1.00     Years: 53.00     Pack years: 53.00     Types: Cigarettes     Last attempt to quit: 2017     Years since quittin.9     Smokeless tobacco: Never Used   Substance Use Topics     Alcohol use: Yes     Comment: rare, drink every few months     Drug use: No       ROS:   Constitutional: No fever, chills, or sweats. Weight stable.   ENT: No visual disturbance, ear ache, epistaxis, sore throat.   Cardiovascular: As per HPI.   Respiratory: No cough, hemoptysis.    GI: No nausea, vomiting, hematemesis, melena, or hematochezia.   : No hematuria.   Integument: Negative.   Hematologic:  No easy bruising, no easy bleeding.  Musculoskeletal: No myalgia.    Exam:  /68 (BP Location: Right arm, Patient Position: Sitting, Cuff Size: Adult Large)   Pulse 78   Wt 72.6 kg (160 lb)   SpO2 97%   BMI 22.96 kg/m    GENERAL APPEARANCE: alert and no distress  HEENT: no icterus, no central cyanosis  LYMPH/NECK: no adenopathy, no asymmetry, JVP not elevated, no carotid bruits.  RESPIRATORY: lungs clear to auscultation - no rales, rhonchi or wheezes, no use of accessory muscles, no retractions, respirations are unlabored, normal respiratory rate  CARDIOVASCULAR: regular rhythm, normal S1, S2, no S3 or S4, 3/6 LYLE on the LSB and aortic area.  GI: soft, non tender  EXTREMITIES: trace ankle edema.  NEURO: alert , normal speech,and affect   SKIN: no ecchymoses, no rashes     I have reviewed the labs and personally reviewed the imaging below and made my comment in the assessment and plan.    Labs:  CBC RESULTS:   Lab Results   Component Value Date    WBC 9.0 2019    RBC 3.67 (L) 2019    HGB 9.7 (L) 2019    HCT 33.0 (L) 2019    MCV 90 2019    MCH 26.4 (L) 2019    MCHC 29.4 (L) 2019    RDW 15.4 (H) 2019      04/30/2019       BMP RESULTS:  Lab Results   Component Value Date     04/30/2019    POTASSIUM 4.5 04/30/2019    CHLORIDE 103 04/30/2019    CO2 28 04/30/2019    ANIONGAP 9 04/30/2019    GLC 97 04/30/2019    BUN 38 (H) 04/30/2019    CR 3.96 (H) 04/30/2019    GFRESTIMATED 14 (L) 04/30/2019    GFRESTBLACK 16 (L) 04/30/2019    DERICK 9.0 04/30/2019        INR RESULTS:  Lab Results   Component Value Date    INR 1.06 02/25/2019    INR 1.11 11/17/2017    INR 1.19 (H) 08/28/2017     Coronary angiogram and PCI 4/22/2019   1. Known severe coronary artery disease with planned PCI.  2. Three vessel obstructive coronary artery disease of the mid LAD, mid circumflex, and mid RCA (seen on coronary CTA, not injected today) without left main involvement.   3. Successful rotational atherectomy and PCI of the mid LAD with a 3.5x24mm Synergy drug eluting stent.  4. Successful PCI of the mid circumflex with a 3.5x16mm Synergy drug eluting stent    TTE 2/11/2019  Moderately (EF 35-40%) reduced left ventricular function is present. The EF is  36% based upon biplane imaging.  There is concern for low-flow aortic stenosis in the setting of a low stroke  volume index of 33 ml/m^2 (mean pressure gradient of 23.0, peak velocity of  3.2 m/sec, valve area of 0.88 cm^2, dimensionless index of 0.25)  Severe mitral valve insufficiency.  Dilation of the inferior vena cava with estimated mean right atrial pressure 8  mmHg (mildly elevated).     This study was compared with the study from 1/10/2019. Ejection fraction has  improved, aortic stenosis is probably similar, but mitral regurgitation  appears to be worse.    TT Echocardiogram 1/10/2019  Compared to study done 1/10/18 the LVEF has dropped and the aortic stenosis is  worse.   Severely (EF 10-20%) reduced left ventricular function is present (LVEF is  traced at 19%).   Concern for severe aortic stenosis and gradient underestimates the severity  due to low flow (The peak aortic velocity  is 3.9 m/sec, The mean gradient  across the aortic valve is 36 mmHg, aortic valve area is 0.76 cm^2, by the  continuity equation).     Proximal ascending aorta is dilated measuring 4.3 cm, aortic root measures 4  cm     IVC is dilated and estimated mean right atrial pressure is 8 mmHg.    TT Echocardiogram 1/4/2018  The Ejection Fraction is estimated at 55-60%. Lateral wall hypokinesis is present.  The right ventricle is normal size. Global right ventricular function is normal.  Pulmonary artery systolic pressure is normal.  Mild aortic stenosis (Vmax 2.7 m/s, mean pressure gradient 14 mmHg, JESSIKA 2.1 cm2).  Dilated proximal ascending aorta measuring 4.0 cm (indexed at 2.1 cm/m2, Z-score +2.5)  No pericardial effusion is present.  Previous study not available for comparison.    EKG dated 11/8/2017 is normal    Assessment and Plan:   Mr. Dung Norton is a 72 year old  male with PMH significant for CAD s/p PCI to LAD and Cx 4/22/2019, ESRD on HD, HTN, hx of heavy tobacco abuse, moderate aortic stenosis and HFrEF    1.  HFrEF with EF of 35-40% (echo 2/2019): His EF worsened within one year from 60% to 35-40%.  In January 2019 echocardiogram showed EF of 10 to 20%. Patient was in atrial fibrillation during the study. Patient converted spontaneously to sinus rhythm.  Repeat echocardiogram in February 2019 when he was in SR showed EF improved to 35 to 40%.  Subsequent coronary angiogram showed three-vessel disease and he underwent PCI of LAD and circumflex on 4/22/2019.  Initial echocardiograms raised the question of severe aortic stenosis however further testing with cardiac cath and CT TAVR,  aortic stenosis was deemed moderate.  I will continue monitoring aortic stenosis by at least annual echocardiograms.  He is currently doing well from cardiac standpoint.  He is in sinus rhythm in clinic today.  He has started dialysis after undergoing PCI last month.  Due to hypotension he was recommended to discontinue  metoprolol.  He is tolerating dialysis better now without any hypotension therefore I recommended to restart metoprolol 12.5 mg twice daily.  Repeat echocardiogram planned in 3 months to recheck LVEF.    2. Hypertension: Well controlled.     3. Paroxysmal atrial fibrillation: The patient was in afib with RVR during echocardiogram in January 2019. He is in SR today. No hx of stroke. Given his hx of ESRD, it is class IIb indication to start OAC.  1 week zio patch in February 2019 did not show evidence of atrial fibrillation.  He is currently on dual antiplatelet therapy with aspirin and ticagrelor.  Do not recommend anticoagulation for now.    4. ESRD: He is currently on hemodialysis.    5. Anemia: Likely due to chronic kidney disease. No bleeding.    Plan:  Aspirin 81 mg  Stop rosuvastatin due to ESRD and start atorvastatin 10 mg  Stop fish oil  Bumex 2 mg daily  Start metoprolol 12.5 mg twice daily  Ticagrelor 90 mg twice daily  Echocardiogram planned in 3 months    RTC 3 months.    It was my absolute pleasure to meet  in the office today. Please donot hesitate to contact me if you have any questions or concerns.    Lizandro DEVINE MD  [facility] Division of Cardiology  Pager [phone]    Haley Vazquez MD

## 2019-05-07 NOTE — NURSING NOTE
"Chief Complaint   Patient presents with     Follow Up     3 month follow up for Severe aortic stenosis . -  Per patient 2 to 3 weeks a go [facility]. fluid removed, Heart stents April 2019       Initial /68 (BP Location: Right arm, Patient Position: Sitting, Cuff Size: Adult Large)   Pulse 78   Wt 72.6 kg (160 lb)   SpO2 97%   BMI 22.96 kg/m   Estimated body mass index is 22.96 kg/m  as calculated from the following:    Height as of 4/26/19: 1.778 m (5' 10\").    Weight as of this encounter: 72.6 kg (160 lb)..  BP completed using cuff size: large    Titus Tolliver L.P.N.    "

## 2019-05-07 NOTE — LETTER
2019      RE: Dung Norton  [address] [facility] [address] [place], [place] [id number]       Dear Colleague,    Thank you for the opportunity to participate in the care of your patient, Dung Norton, at the [facility] HEART AT [facility]. Please see a copy of my visit note below.    Referring provider: Haley Baker MD    Chief complaint: Discuss TTE results.    HPI: Mr. Dung Norton is a 72 year old  male with PMH significant for CKD, HTN and hx of heavy tobacco abuse.    Mr. Norton has recently been diagnosed with ESRD, has had his AVF and almost ready ready for HD though he doesnot need that right now. He has HT and is on amlodipine 2.5 mg. His BP in the office is high but he reports lower BP outside the hospital.     He has been diagnosed with heart murmur and subsequently underwent a TTE planned by  which showed mild aortic stenosis with mild dilatation of ascending aorta at 4 cm and hypokinesis of LV lateral wall. He is currently asymptomatic. He denies a history of chest discomfort, dyspnea, PND, orthopnea, pedal edema, palpitations, lightheadedness, and syncope. He has HTN and hx of 50 pack year tobacco abuse. He quit smoking last summer. He has family hx of CAD as well. His mother had MI and  at the age of 66.     I have reviewed his echocardiogram from 2018 which shows mild AS, normal LV function with LV lateral wall hypokinesis (contrast was not used to enhance endocardial borders). His ECG dated 2017 is normal.    Medications, personal, family, and social history reviewed with patient and revised.    Interval history 2019:  The patient returns for on year follow-up. He is overall doing well with no symptoms of CP, WELLS, LE edema, palpitations, dizziness or syncope. Recent echocardiogram showed progression of aortic stenosis to severe range with significant decrease in his EF from 60% to 10-20% in  one year.    The patient was in atrial fibrillation during the echocardiogram, he is in sinus rhythm today with TWI in V4-V6.    Interval history 5/7/2019:  Since I saw the patient 3 months ago he underwent coronary angiogram with PCI/ALICIA to mid LAD and mid circumflex on 4/22/2019.  He was admitted to hospital for dialysis initiation after that.  He is on dialysis since 4/23.  He reports feeling tired since the dialysis which is improving.  Otherwise he is doing well from cardiac standpoint.  Denies chest pain, dyspnea on exertion, palpitations, frequent dizziness, syncope, lower extremity edema.  He has aortic stenosis. After thorough investigation including CT TAVR and cardiac cath aortic stenosis was deemed moderate.  As you know the patient`s EF dropped to 35% (last echo on February 2019) from 55 to 60% in 2018. Likely ischemic given 3 vessel disease on coronary angiogram. Since he is s/p PCI now, we will continue to follow him up to see if his EF will improve after revascularization. He is euvolemic on exam today. The patient was on metoprolol which was discontinued when he was inpatient for dilaysis due to hypotension. He is currently on aspirin, rosuvastatin, Bumex 2 mg and ticagrelor 90 mg bid.  I reviewed patient's EKG in clinic today which shows sinus rhythm with bigeminal PVCs.  Normal SD/QRS interval.  QTC is mildly elevated at 484 ms.    PAST MEDICAL HISTORY:  Past Medical History:   Diagnosis Date     BPH (benign prostatic hyperplasia)      Chronic kidney disease      HTN      Obstructive uropathy      Pulmonary nodules      Tobacco abuse        CURRENT MEDICATIONS:  Current Outpatient Medications   Medication Sig Dispense Refill     aspirin (ASA) 81 MG EC tablet Take 1 tablet (81 mg) by mouth daily Start tomorrow morning. 90 tablet 3     bumetanide (BUMEX) 2 MG tablet TAKE 1 TABLET BY MOUTH TWICE DAILY 60 tablet 0     darbepoetin william (ARANESP, ALBUMIN FREE,) 100 MCG/0.5ML injection Inject 0.5 mLs  (100 mcg) Subcutaneous every 14 days As needed for hgb<10g/dL.  If Hgb increases >1 point in 2 weeks (if blood transfusion given, use hgb PRIOR to this), SYSTOLIC BP > 180 mmHg or hgb>=10g/dL, HOLD DOSE. Dose must be within 1 week of Hgb.  Per anemia protocol with Cecilia Britt,CNP 0.5 mL 99     ferrous sulfate (FEROSUL) 325 (65 Fe) MG tablet TK 1 T PO QD  11     multivitamin w/minerals (MULTI-VITAMIN) tablet Take 1 tablet by mouth daily       Omega-3 Fatty Acids (FISH OIL PO) Take 1 capsule by mouth daily        rosuvastatin (CRESTOR) 40 MG tablet TAKE 1 TABLET BY MOUTH AT BEDTIME 90 tablet 2     sevelamer (RENVELA) 800 MG tablet Take 2 tablets (1,600 mg) by mouth 3 times daily (with meals) 90 tablet 3     ticagrelor (BRILINTA) 90 MG tablet Take 1 tablet (90 mg) by mouth 2 times daily 90 tablet 3     order for DME Equipment being ordered: olecranon bursa brace (Patient not taking: Reported on 4/26/2019) 1 each 0       PAST SURGICAL HISTORY:  Past Surgical History:   Procedure Laterality Date     APPENDECTOMY  AGE 6     CREATE FISTULA ARTERIOVENOUS UPPER EXTREMITY Left 11/17/2017    Procedure: CREATE FISTULA ARTERIOVENOUS UPPER EXTREMITY;  Left Cephalic Vein To Radial Artery Arteriovenous Fistula Creation, Anesthesia Block;  Surgeon: Eduardo Pabon MD;  Location: [facility] OR     CV CORONARY ANGIOGRAM N/A 2/22/2019    Procedure: 1100 CORS, RHC; BI-PLANE;  Surgeon: Gabino Collins MD;  Location: [facility] HEART CARDIAC CATH LAB     CYSTOSCOPY, FULGURATE BLEEDERS, EVACUATE CLOT(S), COMBINED N/A 7/16/2017    Procedure: COMBINED CYSTOSCOPY, FULGURATE BLEEDERS, EVACUATE CLOT(S);  Cystoscopy clot evacuation, bladder biopsy and fulguration (per surgeons note) NERVE BLOCK PERIPHERAL;  Surgeon: Tamiko Vanegas MD;  Location: [facility] OR     CYSTOSCOPY, TRANSURETHRAL RESECTION (TUR) PROSTATE, COMBINED N/A 3/5/2018    Procedure: COMBINED CYSTOSCOPY, TRANSURETHRAL RESECTION (TUR) PROSTATE;  Cystoscopy, Transurethral Resection of  Prostate ;  Surgeon: Tamiko Vanegas MD;  Location: [facility] OR     SINUS SURGERY  AGE 6     TONSILLECTOMY      CHILDHOOD       ALLERGIES:   No Known Allergies    FAMILY HISTORY:  Family History   Problem Relation Age of Onset     C.A.D. Mother         d age 67     Vision Loss Father      Hearing Loss Sister      Diabetes Sister      Cancer No family hx of      Glaucoma No family hx of      Macular Degeneration No family hx of          SOCIAL HISTORY:  Social History     Tobacco Use     Smoking status: Former Smoker     Packs/day: 1.00     Years: 53.00     Pack years: 53.00     Types: Cigarettes     Last attempt to quit: 2017     Years since quittin.9     Smokeless tobacco: Never Used   Substance Use Topics     Alcohol use: Yes     Comment: rare, drink every few months     Drug use: No       ROS:   Constitutional: No fever, chills, or sweats. Weight stable.   ENT: No visual disturbance, ear ache, epistaxis, sore throat.   Cardiovascular: As per HPI.   Respiratory: No cough, hemoptysis.    GI: No nausea, vomiting, hematemesis, melena, or hematochezia.   : No hematuria.   Integument: Negative.   Hematologic:  No easy bruising, no easy bleeding.  Musculoskeletal: No myalgia.    Exam:  /68 (BP Location: Right arm, Patient Position: Sitting, Cuff Size: Adult Large)   Pulse 78   Wt 72.6 kg (160 lb)   SpO2 97%   BMI 22.96 kg/m     GENERAL APPEARANCE: alert and no distress  HEENT: no icterus, no central cyanosis  LYMPH/NECK: no adenopathy, no asymmetry, JVP not elevated, no carotid bruits.  RESPIRATORY: lungs clear to auscultation - no rales, rhonchi or wheezes, no use of accessory muscles, no retractions, respirations are unlabored, normal respiratory rate  CARDIOVASCULAR: regular rhythm, normal S1, S2, no S3 or S4, 3/6 LYLE on the LSB and aortic area.  GI: soft, non tender  EXTREMITIES: trace ankle edema.  NEURO: alert , normal speech,and affect   SKIN: no ecchymoses, no rashes     I have reviewed  the labs and personally reviewed the imaging below and made my comment in the assessment and plan.    Labs:  CBC RESULTS:   Lab Results   Component Value Date    WBC 9.0 04/30/2019    RBC 3.67 (L) 04/30/2019    HGB 9.7 (L) 04/30/2019    HCT 33.0 (L) 04/30/2019    MCV 90 04/30/2019    MCH 26.4 (L) 04/30/2019    MCHC 29.4 (L) 04/30/2019    RDW 15.4 (H) 04/30/2019     04/30/2019       BMP RESULTS:  Lab Results   Component Value Date     04/30/2019    POTASSIUM 4.5 04/30/2019    CHLORIDE 103 04/30/2019    CO2 28 04/30/2019    ANIONGAP 9 04/30/2019    GLC 97 04/30/2019    BUN 38 (H) 04/30/2019    CR 3.96 (H) 04/30/2019    GFRESTIMATED 14 (L) 04/30/2019    GFRESTBLACK 16 (L) 04/30/2019    DERICK 9.0 04/30/2019        INR RESULTS:  Lab Results   Component Value Date    INR 1.06 02/25/2019    INR 1.11 11/17/2017    INR 1.19 (H) 08/28/2017     Coronary angiogram and PCI 4/22/2019   1. Known severe coronary artery disease with planned PCI.  2. Three vessel obstructive coronary artery disease of the mid LAD, mid circumflex, and mid RCA (seen on coronary CTA, not injected today) without left main involvement.   3. Successful rotational atherectomy and PCI of the mid LAD with a 3.5x24mm Synergy drug eluting stent.  4. Successful PCI of the mid circumflex with a 3.5x16mm Synergy drug eluting stent    TTE 2/11/2019  Moderately (EF 35-40%) reduced left ventricular function is present. The EF is  36% based upon biplane imaging.  There is concern for low-flow aortic stenosis in the setting of a low stroke  volume index of 33 ml/m^2 (mean pressure gradient of 23.0, peak velocity of  3.2 m/sec, valve area of 0.88 cm^2, dimensionless index of 0.25)  Severe mitral valve insufficiency.  Dilation of the inferior vena cava with estimated mean right atrial pressure 8  mmHg (mildly elevated).     This study was compared with the study from 1/10/2019. Ejection fraction has  improved, aortic stenosis is probably similar, but mitral  regurgitation  appears to be worse.    TT Echocardiogram 1/10/2019  Compared to study done 1/10/18 the LVEF has dropped and the aortic stenosis is  worse.   Severely (EF 10-20%) reduced left ventricular function is present (LVEF is  traced at 19%).   Concern for severe aortic stenosis and gradient underestimates the severity  due to low flow (The peak aortic velocity is 3.9 m/sec, The mean gradient  across the aortic valve is 36 mmHg, aortic valve area is 0.76 cm^2, by the  continuity equation).     Proximal ascending aorta is dilated measuring 4.3 cm, aortic root measures 4  cm     IVC is dilated and estimated mean right atrial pressure is 8 mmHg.    TT Echocardiogram 1/4/2018  The Ejection Fraction is estimated at 55-60%. Lateral wall hypokinesis is present.  The right ventricle is normal size. Global right ventricular function is normal.  Pulmonary artery systolic pressure is normal.  Mild aortic stenosis (Vmax 2.7 m/s, mean pressure gradient 14 mmHg, JESSIKA 2.1 cm2).  Dilated proximal ascending aorta measuring 4.0 cm (indexed at 2.1 cm/m2, Z-score +2.5)  No pericardial effusion is present.  Previous study not available for comparison.    EKG dated 11/8/2017 is normal    Assessment and Plan:   Mr. Dung Norton is a 72 year old  male with PMH significant for CAD s/p PCI to LAD and Cx 4/22/2019, ESRD on HD, HTN, hx of heavy tobacco abuse, moderate aortic stenosis and HFrEF    1.  HFrEF with EF of 35-40% (echo 2/2019): His EF worsened within one year from 60% to 35-40%.  In January 2019 echocardiogram showed EF of 10 to 20%. Patient was in atrial fibrillation during the study. Patient converted spontaneously to sinus rhythm.  Repeat echocardiogram in February 2019 when he was in SR showed EF improved to 35 to 40%.  Subsequent coronary angiogram showed three-vessel disease and he underwent PCI of LAD and circumflex on 4/22/2019.  Initial echocardiograms raised the question of severe aortic stenosis however further  testing with cardiac cath and CT TAVR,  aortic stenosis was deemed moderate.  I will continue monitoring aortic stenosis by at least annual echocardiograms.  He is currently doing well from cardiac standpoint.  He is in sinus rhythm in clinic today.  He has started dialysis after undergoing PCI last month.  Due to hypotension he was recommended to discontinue metoprolol.  He is tolerating dialysis better now without any hypotension therefore I recommended to restart metoprolol 12.5 mg twice daily.  Repeat echocardiogram planned in 3 months to recheck LVEF.    2. Hypertension: Well controlled.     3. Paroxysmal atrial fibrillation: The patient was in afib with RVR during echocardiogram in January 2019. He is in SR today. No hx of stroke. Given his hx of ESRD, it is class IIb indication to start OAC.  1 week zio patch in February 2019 did not show evidence of atrial fibrillation.  He is currently on dual antiplatelet therapy with aspirin and ticagrelor.  Do not recommend anticoagulation for now.    4. ESRD: He is currently on hemodialysis.    5. Anemia: Likely due to chronic kidney disease. No bleeding.    Plan:  Aspirin 81 mg  Stop rosuvastatin due to ESRD and start atorvastatin 10 mg  Stop fish oil  Bumex 2 mg daily  Start metoprolol 12.5 mg twice daily  Ticagrelor 90 mg twice daily  Echocardiogram planned in 3 months    RTC 3 months.    It was my absolute pleasure to meet  in the office today. Please donot hesitate to contact me if you have any questions or concerns.    Lizandro DEVINE MD  [facility] Division of Cardiology  Pager [phone]    Haley Vazquez MD

## 2019-05-09 RX ORDER — BUMETANIDE 2 MG/1
2 TABLET ORAL DAILY
Qty: 60 TABLET | Refills: 0 | COMMUNITY
Start: 2019-05-09 | End: 2019-10-01

## 2019-05-28 ENCOUNTER — TELEPHONE (OUTPATIENT)
Dept: NEPHROLOGY | Facility: CLINIC | Age: 74
End: 2019-05-28

## 2019-05-28 NOTE — TELEPHONE ENCOUNTER
[facility] Call Center    Phone Message    May a detailed message be left on voicemail: yes    Reason for Call: Other: Patient has a new company for his catheters, he is using [employer].  He was told to have his provider call [phone] for a new prescription for his catheter, please contact.      Action Taken: Message routed to:  [facility] ([facility] Nephrology

## 2019-05-29 ENCOUNTER — TELEPHONE (OUTPATIENT)
Dept: UROLOGY | Facility: CLINIC | Age: 74
End: 2019-05-29

## 2019-05-29 NOTE — PROGRESS NOTES
180 MEDICAL    Once completed please fax to [phone]  E-script to [email or web address]    A. Please include patient demographics and chart notes    Name: Dung Norton   : 1945   Phone: [phone]  Address: [address] [place] [address]     B. Diagnosis    x Retention of urine (788.20/R33.9)   Urinary Incontinence (788.30/R30)    Incomplete Bladder Emptying (788.21/R39-14)   Urge Incontinence (788.31/N39.41)    Other Specified Retention of Urine (788.29/R33.8)   Other:     C. Order Information    Number of Refills: 11  Length of Need: lifetime months    X Patient may receive up to a 90-day supply at one time; therefore, quantity will be three (3) times amount below.    Type (check one): X Hydrophilic    Silicone    Red Rubber    PVC Clear                                  X Anti-Bacterial / Infection Control    CHECK PRODUCT Divehi FREQ OF USE QUANTITY PER STRAIGHT COUDE   ONE  SIZE PER DAY MONTH TO DISPESE     X Intermittent Catheter 16 4 120  X    Intermittent Catheter with         Insertion Supplies         Condom or External Catheters          ADDITIONAL PRODUCTS/ITEMS ORDERED (check all that apply)     PRODUCT FREQ OF USE QUANTITY PER  _X_ Patient Choice     PER MONTH MONTH TO DISPENSE  ___ Bard    Tube of Lubricant    ___ Coloplast    Leg Bags    ___ Cure Medical    Night Bags    ___ GentleCath    Penile Clamp (Cunningham)    ___ Hi-Slip    Disinfection/BZK-PDI Wipes    ___ Medina    Man Insertion Tray    ___Lo Fric    Vacuum Erection Device    ___Magic3    Man Catheters:    ___ MTG    Straight    ___ Jarrett-Teleflex    Coude    ___ SpediCath    Salvadorean size        Balloon size         D. Physician's Information:    Physician's Name: Dr. Tamiko Vanegas  Phone: [phone]  Date: 19     [facility]  [facility]  [address] [place] [address] [id number]  [place], [place], [address]    Gideon  Chelsie   Office: [phone]  Mobile: [phone]  Fax: [phone]  [email or web address] [address].[facility]

## 2019-05-29 NOTE — TELEPHONE ENCOUNTER
----- Message from Eli Harding RN sent at 5/29/2019  2:31 PM CDT -----      ----- Message -----  From: Leena Tinoco MD  Sent: 5/29/2019   2:22 PM  To: Eli Harding RN, #    Hi Dr. Vanegas or her care team,    Mr. Norton is someone I just picked up care for at [facility]. He is asking for a refill for his self caths - I will defer this to your team . Can you please reach out to him to assure this is taken care of?    Appreciate the help,  Leena

## 2019-05-29 NOTE — TELEPHONE ENCOUNTER
Message left for to call to discuss what catheter supplies he needs ordered to [facility] medical supply.    Reilly Angel MA

## 2019-05-31 ENCOUNTER — TELEPHONE (OUTPATIENT)
Dept: UROLOGY | Facility: CLINIC | Age: 74
End: 2019-05-31

## 2019-05-31 NOTE — TELEPHONE ENCOUNTER
180 MEDICAL    Once completed please fax to [phone]  E-script to [email or web address]    A. Please include patient demographics and chart notes    Name: basilio bear  : 1945  Phone: [phone]  Address: [address] [address]  City: [place]  [place]: [place]  Zip: [address]    B. Diagnosis    x Retention of urine (788.20/R33.9)   Urinary Incontinence (788.30/R30)    Incomplete Bladder Emptying (788.21/R39-14)   Urge Incontinence (788.31/N39.41)    Other Specified Retention of Urine (788.29/R33.8)   Other:     C. Order Information    Number of Refills: 11  Length of Need: 9     Patient may receive up to a 90-day supply at one time; therefore, quantity will be three (3) times amount below.    Type (check one):  Hydrophilic    Silicone    Red Rubber    PVC Clear                                   Anti-Bacterial / Infection Control    CHECK PRODUCT Setswana FREQ OF USE QUANTITY PER STRAIGHT COUDE   ONE  SIZE PER DAY MONTH TO DISPESE  X   X Intermittent Catheter 16F twice 60      Intermittent Catheter with         Insertion Supplies         Condom or External Catheters          ADDITIONAL PRODUCTS/ITEMS ORDERED (check all that apply)     PRODUCT FREQ OF USE QUANTITY PER  ___ Patient Choice     PER MONTH MONTH TO DISPENSE  ___ Bard   X Tube of Lubricant Twice per day One tube  ___ Coloplast    Leg Bags    ___ Cure Medical    Night Bags    ___ GentleCath    Penile Clamp (Cunningham)    ___ Hi-Slip    Disinfection/BZK-PDI Wipes    ___ Alexandrea    Man Insertion Tray    ___Lo Fric    Vacuum Erection Device    ___Magic3    Man Catheters:    ___ MTG    Straight    ___ Jarrett-Teleflex    Coude    ___ SpediCath    Faroese size        Balloon size         D. Physician's Information:    Physician's Name: DR nile marshall  Phone: [phone]  Date: 2019    [facility]icians [facility]  [facility]  [address] [place] [address]  [id number]  [place], [address]    Gideon Holguin,   Office: [phone]  Mobile: [phone]  Fax: [phone]  [email or web address] [address].[facility]

## 2019-05-31 NOTE — TELEPHONE ENCOUNTER
----- Message from Brittany Mock RN sent at 5/31/2019  3:30 PM CDT -----  Kesha, can you please help me on this one?  ----- Message -----  From: Tamiko Vanegas MD  Sent: 5/29/2019   6:01 PM  To: Brittany Mock RN    Can you refill his catheters please?   ----- Message -----  From: Leena Tinoco MD  Sent: 5/29/2019   2:22 PM  To: Eli Harding RN, #    Hi Dr. Vanegas or her care team,    Mr. Norton is someone I just picked up care for at [facility]. He is asking for a refill for his self caths - I will defer this to your team . Can you please reach out to him to assure this is taken care of?    Appreciate the help,  Leena

## 2019-06-01 DIAGNOSIS — I50.22 CHRONIC SYSTOLIC HEART FAILURE (H): ICD-10-CM

## 2019-06-03 RX ORDER — BUMETANIDE 2 MG/1
2 TABLET ORAL DAILY
Qty: 90 TABLET | Refills: 0 | Status: SHIPPED | OUTPATIENT
Start: 2019-06-03 | End: 2019-10-01

## 2019-06-03 NOTE — TELEPHONE ENCOUNTER
"Routing refill request to provider for review/approval because:  Labs out of range:  Cr  Medication is reported/historical      Requested Prescriptions   Pending Prescriptions Disp Refills     bumetanide (BUMEX) 2 MG tablet [Pharmacy Med Name: BUMETANIDE 2MG TABLETS] 60 tablet 0     Sig: TAKE 1 TABLET BY MOUTH TWICE DAILY       Diuretics (Including Combos) Protocol Failed - 6/3/2019  7:43 AM        Failed - Normal serum creatinine on file in past 12 months     Recent Labs   Lab Test 04/30/19  0952   CR 3.96*              Passed - Blood pressure under 140/90 in past 12 months     BP Readings from Last 3 Encounters:   05/07/19 118/68   04/26/19 109/69   04/26/19 135/87                 Passed - Recent (12 mo) or future (30 days) visit within the authorizing provider's specialty     Patient had office visit in the last 12 months or has a visit in the next 30 days with authorizing provider or within the authorizing provider's specialty.  See \"Patient Info\" tab in inbasket, or \"Choose Columns\" in Meds & Orders section of the refill encounter.              Passed - Medication is active on med list        Passed - Patient is age 18 or older        Passed - Normal serum potassium on file in past 12 months     Recent Labs   Lab Test 04/30/19  0952   POTASSIUM 4.5                    Passed - Normal serum sodium on file in past 12 months     Recent Labs   Lab Test 04/30/19  0952                 Zainab Powell, RN  [facility]    "

## 2019-06-10 NOTE — ANESTHESIA PREPROCEDURE EVALUATION
Anesthesia Evaluation     . Pt has had prior anesthetic. Type: General    No history of anesthetic complications          ROS/MED HX    ENT/Pulmonary: Comment: H/O sinus surgery    (+)SONNY risk factors snores loudly, hypertension, tobacco use (50+ years), Past use 1 PPD packs/day  , . .    Neurologic:  - neg neurologic ROS     Cardiovascular:     (+) hypertension----. Taking blood thinners Pt has received instructions: Instructions Given to patient: ASA 81 mg po QD. . . :. valvular problems/murmurs type: AS . Previous cardiac testing Echodate:results:date: results:ECG reviewed date: results: date: results:          METS/Exercise Tolerance: Comment: Is able go up a flight of stairs and shovel his own driveway. >4 METS   Hematologic:     (+) Anemia, History of Transfusion no previous transfusion reaction -      Musculoskeletal:  - neg musculoskeletal ROS       GI/Hepatic:     (+) appendicitis (s/p appendectomy),       Renal/Genitourinary:     (+) chronic renal disease, type: ESRD, Pt does not require dialysis, Pt has no history of transplant, BPH,       Endo:  - neg endo ROS       Psychiatric:  - neg psychiatric ROS       Infectious Disease:  - neg infectious disease ROS       Malignancy:      - no malignancy   Other:    (+) No chance of pregnancy C-spine cleared: N/A, no H/O Chronic Pain,                   Physical Exam      Airway   Mallampati: II  TM distance: >3 FB  Neck ROM: full    Dental   (+) chipped and missing  Comment: Dentition in poor repair with chipped and missing teeth. Denies any loose teeth.     Cardiovascular   Rhythm and rate: regular and normal  (+) murmur (III/VI systolic murmur heard best at LSB)       Pulmonary    breath sounds clear to auscultation    Other findings: Last Basic Metabolic Panel:  Lab Results      Component                Value               Date                      NA                       142                 01/15/2018             Lab Results      Component                 Patient received discharge instructions with follow-up instructions/medications and verbalized understanding. Patient ambulated out of ER in stable condition in no apparent distress. Value               Date                      POTASSIUM                4.6                 01/15/2018            Lab Results      Component                Value               Date                      CHLORIDE                 109                 01/15/2018            Lab Results      Component                Value               Date                      DERICK                      9.5                 01/15/2018            Lab Results      Component                Value               Date                      CO2                      22                  01/15/2018            Lab Results      Component                Value               Date                      BUN                      110                 01/15/2018            Lab Results      Component                Value               Date                      CR                       5.48                01/15/2018            Lab Results      Component                Value               Date                      GLC                      85                  01/15/2018              Lab Results      Component                Value               Date                      WBC                      9.1                 12/11/2017            Lab Results      Component                Value               Date                      RBC                      3.18                12/11/2017            Lab Results      Component                Value               Date                      HGB                      10.2                01/15/2018            Lab Results      Component                Value               Date                      HCT                      33.1                01/15/2018            Lab Results      Component                Value               Date                      MCV                      89                  12/11/2017            Lab Results      Component                Value               Date                      MCH                      26.7                 12/11/2017            Lab Results      Component                Value               Date                      MCHC                     30.1                12/11/2017            Lab Results      Component                Value               Date                      RDW                      16.9                12/11/2017            Lab Results      Component                Value               Date                      PLT                      336                 12/11/2017                Procedures  Procedures  Echo limited with definity 1/10/2018  Interpretation Summary     Technically limited study. Contrast utilized to enhance endocardial  delineation.     Left ventricular function, chamber size, wall motion, and wall thickness are  normal.The EF is 55-60%.  On contrast imaging, there is base-mid inferior, inferolateral, mid  anterolateral wall and apical lateral wall hypokinesis.  Mild aortic stenosis. AV Vmax 2.6 cm/s, mean gradient 14 mmHg.  Ascending aorta mildly dilated 4.0 cm (indexed to BSA 2.1 cm/m2). Root  similarly dilated at 4.0 cm with no effacement of the sinotubular junction.     Compared to prior study (1/4/2018), contrast was used to enhance wall motion  assessment and there appears to also be inferior wall hypokinesis. Findings of  lateral wall hypokinesis are also present on this study.                 PAC Discussion and Assessment    ASA Classification: 3  Case is suitable for: [place]  Anesthetic techniques and relevant risks discussed: GA  Invasive monitoring and risk discussed:   Types:   Possibility and Risk of blood transfusion discussed: Yes  NPO instructions given:   Additional anesthetic preparation and risks discussed:   Needs early admission to pre-op area:   Other:     PAC Resident/NP Anesthesia Assessment:  Dung Norton os a 72 year old male scheduled for Cystoscopy, Transurethral Resection of Prostate on 1/29/2018 with Dr. Vanegas at [facility]  "[facility] under general anesthesia.  Mr. Norton was seen by Dr. Vanegas on 12/1/2017 a h/o urinary retention requiring clean intermittent catheterization.  Please see her note for further details. The above procedure was recommended.  PAC referral for risk assessment and optimization of anesthesia with comorbid conditions of:  HTN; hypokinesis of LV lateral wall; mild aortic stenosis with mild dilation of ascending aorta at 4 cm;  h/o pulmonary nodules; ESRD with AVF placed; BPH and 50+ pack year smoking history.        He has the following specific operative considerations:     1.  Cardiology - Denies cardiopulmonary symptoms.  METS>4.   STEVE given cardiac murmur, longstanding smoking hx, HTN and CKD on 1/4/2018 >>> EF 55-60%, mild aortic stenosis with mild dilation of ascending aorta at 4 cm and hypokinesis of LV lateral wall.  Mr. Norton was seen by cardiology, Dr. Ng, on 1/9/2018.  He ordered Lexiscan which is scheduled for tomorrow.  Please see cardiology's note for cardiac clearance.         - HTN, take CCB DOS        - ASA for primary prevention.  Hold according to protocol for surgery.  2.  Pulmonary - 50+ pack smoking history.  Quit smoking July 2017.        - SONNY # of risks 3/8 = imtermediate       - Pulmonary nodules:  CT C/A/P 7/13/17>>> \"A few small pulmonary nodules measuring up to 3 mm. These are nonspecific. In a patient with a smoking history, a 12 month follow-up CT is optional per Fleischner society criteria\".   3.  Hematology - VTE risk:  1.8%.  Increased VTE risk: Recommend use of mechanical/chemical VTE prophylaxis in the yoli- and postoperative periods.          - h/o multiple transfusions in last year>>>ordered T&S within three days of surgery  4.  GI - Risk of PONV score = 1.  If > 2, anti-emetic intervention recommended.  5.  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 Cr 5.29, GFR 11 (1/15/18).  LUE AVF placed 11/17/17 but has not started dialysis.  Patient reports " swelling in left posterior aspect of elbow since fistula placed.  Instructed to see PCP for further evaluation prior to above surgery.         - BPH with urinary retention requiring CIC>>>planned procedure as above.     - Anesthesia considerations:  Refer to PAC assessment in anesthesia records  - Airway:  Appears feasible  - Post-op delirium risk: elevated given age    Arrival time, NPO, shower and medication instructions provided by nursing staff today.  Preparing For Your Surgery handout given.  Patient was discussed with Dr Hearn. I spent 20 minutes face to face with patient, assessing, examining, and educating.            Reviewed and Signed by PAC Mid-Level Provider/Resident  Mid-Level Provider/Resident: Jenny John APRN  Date: 1/17/2018  Time: 11:01    Attending Anesthesiologist Anesthesia Assessment:  72 year old for cysto and TURP in treatment of BPH. Patient/case discussed with LARRY. As noted, he has asc ao aneurysm at 4 cm; will have stress test tomorrow. Long term smoking history, has recently quit, no pulmonary symptoms.     No need to see patient. Patient is appropriate for the planned procedure without further work-up or medical management.      Reviewed and Signed by PAC Anesthesiologist  Anesthesiologist: ji  Date: 1/17/2018  Time:   Pass/Fail: Pass  Disposition:     PAC Pharmacist Assessment:        Pharmacist:   Date:   Time:                           .

## 2019-06-24 ENCOUNTER — TELEPHONE (OUTPATIENT)
Dept: TRANSPLANT | Facility: CLINIC | Age: 74
End: 2019-06-24

## 2019-06-24 DIAGNOSIS — T82.898A OTHER SPECIFIED COMPLICATION OF VASCULAR PROSTHETIC DEVICES, IMPLANTS AND GRAFTS, INITIAL ENCOUNTER (H): ICD-10-CM

## 2019-06-24 DIAGNOSIS — N18.6 ESRD ON DIALYSIS (H): Primary | ICD-10-CM

## 2019-06-24 DIAGNOSIS — T82.9XXA COMPLICATION OF VASCULAR ACCESS FOR DIALYSIS, INITIAL ENCOUNTER: ICD-10-CM

## 2019-06-24 DIAGNOSIS — Z99.2 ESRD ON DIALYSIS (H): Primary | ICD-10-CM

## 2019-06-24 NOTE — TELEPHONE ENCOUNTER
Received a call from [facility] [place] charge BRANDY Cee, who reports that patient's LUE AC fistula had no issues since starting dialysis in April, 2019. After last dialysis run, LUE AV fistula developed edema and painful in arm. Patient was not able to complete dialysis run today D/T high arterial pressure and swelling in left upper AV fistula and travelling down to left hand. Patient left with incomplete run.  Writer recommended to apply ice to LUE swelling area and instruct patient to apply warm pack next day. And also LUE US of AV fistula id recommended.   [facility]  will call patient for appt.  Dialysis BRANDY Cee verbalized acceptance and understanding of plan.

## 2019-06-26 ENCOUNTER — NURSE TRIAGE (OUTPATIENT)
Dept: FAMILY MEDICINE | Facility: CLINIC | Age: 74
End: 2019-06-26

## 2019-06-26 NOTE — TELEPHONE ENCOUNTER
Called and spoke with patient's daughter Tereza. Reports that about a week ago she noticed patient's L arm was purple from the elbow extending to his hand. Reports patient is never one to complain so he didn't mention this to her, she only mentioned it when she saw it.   Reports that patient receives Dialysis from [facility] and she called them to ask what was going on. She was getting conflicting answers from the nurse there and states at one point they mentioned a possible blood clot but then later stated nothing was said about a blood clot. Also mentioned about possible infiltration Reports the nurse said that the doctor at [facility] looked at his arm and wasn't concerned.   Daughter states that L arm is bright purple, extending from elbow and now his hand is also swollen. Reports patient said it didn't hurt at all but when daughter probed him more, he stated it hurt a little bit. Reports that if patient says he is a tough chau and says if it hurts even a little bit, it is likely severe pain.   Reports he didn't even go to the hospital when he couldn't breathe and once they brought him in, he was admitted.   Advised daughter patient should be brought to UC or ED if arm is that purple,  Swollen and painful. She agreed and will bring patient in to be seen today.    Anjali Ann RN

## 2019-06-26 NOTE — TELEPHONE ENCOUNTER
Reason for call:  Patient reporting a symptom    Symptom or request: Patient left arm swollen and purple from elbow down. Possible blood clout.    Duration (how long have symptoms been present): 1 week    Have you been treated for this before? No    Additional comments: He's currently on dialysis and patients daughter doesn't know if its due to that or something else.    Phone Number patient can be reached at:  Other phone number:  [phone]    Best Time:  ASAP    Can we leave a detailed message on this number:  YES    Call taken on 6/26/2019 at 1:51 PM by Fabi Angel

## 2019-07-02 ENCOUNTER — MEDICAL CORRESPONDENCE (OUTPATIENT)
Dept: HEALTH INFORMATION MANAGEMENT | Facility: CLINIC | Age: 74
End: 2019-07-02

## 2019-07-03 ENCOUNTER — TELEPHONE (OUTPATIENT)
Dept: UROLOGY | Facility: CLINIC | Age: 74
End: 2019-07-03

## 2019-07-03 NOTE — TELEPHONE ENCOUNTER
Patient needs to use catheters indefinitely due to urinary retention, and will need a Coude tip, he has failed using a straight tip.    -PJ

## 2019-07-09 ENCOUNTER — OFFICE VISIT (OUTPATIENT)
Dept: OPHTHALMOLOGY | Facility: CLINIC | Age: 74
End: 2019-07-09
Payer: COMMERCIAL

## 2019-07-09 DIAGNOSIS — H25.13 NUCLEAR SCLEROTIC CATARACT OF BOTH EYES: Primary | ICD-10-CM

## 2019-07-09 DIAGNOSIS — H52.4 HYPEROPIA OF BOTH EYES WITH REGULAR ASTIGMATISM AND PRESBYOPIA: ICD-10-CM

## 2019-07-09 DIAGNOSIS — H18.529 ABMD (ANTERIOR BASEMENT MEMBRANE DYSTROPHY): ICD-10-CM

## 2019-07-09 DIAGNOSIS — H52.223 HYPEROPIA OF BOTH EYES WITH REGULAR ASTIGMATISM AND PRESBYOPIA: ICD-10-CM

## 2019-07-09 DIAGNOSIS — H52.03 HYPEROPIA OF BOTH EYES WITH REGULAR ASTIGMATISM AND PRESBYOPIA: ICD-10-CM

## 2019-07-09 PROCEDURE — 92004 COMPRE OPH EXAM NEW PT 1/>: CPT | Performed by: STUDENT IN AN ORGANIZED HEALTH CARE EDUCATION/TRAINING PROGRAM

## 2019-07-09 PROCEDURE — 92015 DETERMINE REFRACTIVE STATE: CPT | Performed by: STUDENT IN AN ORGANIZED HEALTH CARE EDUCATION/TRAINING PROGRAM

## 2019-07-09 ASSESSMENT — VISUAL ACUITY
OD_PH_CC+: -2
OD_PH_CC: 20/50
METHOD: SNELLEN - LINEAR
OD_CC: 12-
OS_CC: 10-
CORRECTION_TYPE: GLASSES
OS_CC+: -1
OS_PH_CC: 20/50
OS_CC: 20/70
OD_CC: 20/70

## 2019-07-09 ASSESSMENT — REFRACTION_WEARINGRX
OD_ADD: +2.75
OS_ADD: +2.75
OD_SPHERE: +2.75
OS_AXIS: 176
OS_SPHERE: +3.00
OD_AXIS: 0041
SPECS_TYPE: BIFOCAL
OS_CYLINDER: +1.00
OD_CYLINDER: +1.25

## 2019-07-09 ASSESSMENT — EXTERNAL EXAM - LEFT EYE: OS_EXAM: BROW PTOSIS

## 2019-07-09 ASSESSMENT — CUP TO DISC RATIO
OS_RATIO: 0.25
OD_RATIO: 0.3

## 2019-07-09 ASSESSMENT — REFRACTION_MANIFEST
OS_SPHERE: +4.00
OS_CYLINDER: +0.50
OS_AXIS: 177
OS_ADD: +3.00
OD_CYLINDER: +1.50
OD_SPHERE: +3.50
OD_AXIS: 008
OD_ADD: +3.00

## 2019-07-09 ASSESSMENT — CONF VISUAL FIELD
OS_NORMAL: 1
OD_NORMAL: 1

## 2019-07-09 ASSESSMENT — SLIT LAMP EXAM - LIDS
COMMENTS: MEIBOMIAN GLAND DYSFUNCTION
COMMENTS: MEIBOMIAN GLAND DYSFUNCTION

## 2019-07-09 ASSESSMENT — EXTERNAL EXAM - RIGHT EYE: OD_EXAM: BROW PTOSIS

## 2019-07-09 ASSESSMENT — TONOMETRY
OS_IOP_MMHG: 18
OD_IOP_MMHG: 17
IOP_METHOD: APPLANATION

## 2019-07-09 NOTE — PATIENT INSTRUCTIONS
Offered cataract surgery left eye first, then the right eye at anytime. Jose [facility] SENIOR will call you to schedule your surgery.    Milan Patel MD  [phone]

## 2019-07-09 NOTE — PROGRESS NOTES
Current Eye Medications:  No eye drops or eye vitamins.       Subjective:  Comprehensive Eye Exam.  Patient complains of foggy vision in each eye - he has discontinued driving for the past couple months because of poor vision.  He in unable to read the newspaper, only the headlines.      History of steel corneal foreign body in one of his eye (not sure of which one) many years ago. No previous eye surgeries.     Last eye exam:  July of 2018 with Dr. Mock. Best corrected visual acuity was 20/25 right, 20/30 left at that visit.     Objective:  See Ophthalmology Exam.       Assessment:  Dung Norton is a 73 year old male who presents with:   Encounter Diagnoses   Name Primary?     Nuclear sclerotic cataract of both eyes Visually significant in both eyes. Recommend cataract surgery for left eye, then right eye. Dil 7. On dialysis 3d per week, would be able to move it to the evening on day of surgery. Will check ASC guidelines for MG.       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 Plan for cataract extraction and intraocular lens implant left eye, then right eye.  Risks, benefits, complications, and alternatives discussed with patient including possibility of limitations from coexistent eye disease and loss of vision. Target refraction and lens options discussed.  Patient understands and wishes to proceed with surgery.     Plan:  Offered cataract surgery left eye first, then the right eye at anytime. Jose Juan SENIOR will call you to schedule your surgery (patient can't remember if his cell number is [phone] or [phone]).     Milan Patel MD  [phone]

## 2019-07-09 NOTE — LETTER
7/9/2019         RE: Dung Norton  [address] [place] [address]  [place] [address]        Dear Colleague,    Thank you for referring your patient, Dung Norton, to the [facility]. Please see a copy of my visit note below.     Current Eye Medications:  No eye drops or eye vitamins.       Subjective:  Comprehensive Eye Exam.  Patient complains of foggy vision in each eye - he has discontinued driving for the past couple months because of poor vision.  He in unable to read the newspaper, only the headlines.      History of steel corneal foreign body in one of his eye (not sure of which one) many years ago. No previous eye surgeries.     Last eye exam:  July of 2018 with Dr. Mock. Best corrected visual acuity was 20/25 right, 20/30 left at that visit.     Objective:  See Ophthalmology Exam.       Assessment:  Dung Norton is a 73 year old male who presents with:   Encounter Diagnoses   Name Primary?     Nuclear sclerotic cataract of both eyes Visually significant in both eyes. Recommend cataract surgery for left eye, then right eye. Dil 7. On dialysis 3d per week, would be able to move it to the evening on day of surgery. Will check ASC guidelines for MG.       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 Plan for cataract extraction and intraocular lens implant left eye, then right eye.  Risks, benefits, complications, and alternatives discussed with patient including possibility of limitations from coexistent eye disease and loss of vision. Target refraction and lens options discussed.  Patient understands and wishes to proceed with surgery.     Plan:  Offered cataract surgery left eye first, then the right eye at anytime. Jose Juan SENIOR will call you to schedule your surgery (patient can't remember if his cell number is [phone] or [phone]).     Milan Patel MD  [phone]  [phone]        Again, thank you for allowing me to participate in the care of your patient.        Sincerely,        Milan Patel MD

## 2019-08-20 ENCOUNTER — TELEPHONE (OUTPATIENT)
Dept: UROLOGY | Facility: CLINIC | Age: 74
End: 2019-08-20

## 2019-08-20 NOTE — TELEPHONE ENCOUNTER
[facility] Call Center    Phone Message    May a detailed message be left on voicemail: yes    Reason for Call:Other: Dianne called from [facility] medical she said they received some notes from us for April 26. and there is not enough of information for the insurance  They need a note of permancey and coude justification. Please send an encounter note or phone note. Also that information can be faxed to [phone]      Action Taken: Other: p urology

## 2019-08-22 ENCOUNTER — ANCILLARY PROCEDURE (OUTPATIENT)
Dept: CARDIOLOGY | Facility: CLINIC | Age: 74
End: 2019-08-22
Attending: INTERNAL MEDICINE
Payer: COMMERCIAL

## 2019-08-22 DIAGNOSIS — I35.0 NONRHEUMATIC AORTIC VALVE STENOSIS: Primary | ICD-10-CM

## 2019-08-22 PROCEDURE — 93306 TTE W/DOPPLER COMPLETE: CPT | Performed by: INTERNAL MEDICINE

## 2019-08-23 ENCOUNTER — TELEPHONE (OUTPATIENT)
Dept: CARDIOLOGY | Facility: CLINIC | Age: 74
End: 2019-08-23

## 2019-08-23 NOTE — TELEPHONE ENCOUNTER
Will contact pt to schedule Dobutamine stress echo:   Low dose dobutamine stress test for low flow, low ef, severe aortic stenosis to assess valve gradients and valve area.   - hold betablocker 24 hours prior to test (metoprolol)    See result note:   Heart function is still low and aortic valve is tight.   I will order a test for your valve. [facility] will call you to schedule.   This is to assess how severe the valve is.

## 2019-08-26 NOTE — PROGRESS NOTES
Referring provider: Haley Baker MD    Chief complaint: Discuss TTE results.    HPI: Mr. Dung Norton is a 72 year old  male with PMH significant for CKD, HTN and hx of heavy tobacco abuse.    Mr. Norton has recently been diagnosed with ESRD, has had his AVF and almost ready ready for HD though he doesnot need that right now. He has HT and is on amlodipine 2.5 mg. His BP in the office is high but he reports lower BP outside the hospital.     He has been diagnosed with heart murmur and subsequently underwent a TTE planned by  which showed mild aortic stenosis with mild dilatation of ascending aorta at 4 cm and hypokinesis of LV lateral wall. He is currently asymptomatic. He denies a history of chest discomfort, dyspnea, PND, orthopnea, pedal edema, palpitations, lightheadedness, and syncope. He has HTN and hx of 50 pack year tobacco abuse. He quit smoking last summer. He has family hx of CAD as well. His mother had MI and  at the age of 66.     I have reviewed his echocardiogram from 2018 which shows mild AS, normal LV function with LV lateral wall hypokinesis (contrast was not used to enhance endocardial borders). His ECG dated 2017 is normal.    Medications, personal, family, and social history reviewed with patient and revised.    Interval history 2019:  The patient returns for on year follow-up. He is overall doing well with no symptoms of CP, WELLS, LE edema, palpitations, dizziness or syncope. Recent echocardiogram showed progression of aortic stenosis to severe range with significant decrease in his EF from 60% to 10-20% in one year.    The patient was in atrial fibrillation during the echocardiogram, he is in sinus rhythm today with TWI in V4-V6.    Interval history 2019:  Since I saw the patient 3 months ago he underwent coronary angiogram with PCI/ALICIA to mid LAD and mid circumflex on 2019.  He was admitted to hospital for dialysis initiation after that.  He is on dialysis  since 4/23.  He reports feeling tired since the dialysis which is improving.  Otherwise he is doing well from cardiac standpoint.  Denies chest pain, dyspnea on exertion, palpitations, frequent dizziness, syncope, lower extremity edema.  He has aortic stenosis. After thorough investigation including CT TAVR and cardiac cath aortic stenosis was deemed moderate.  As you know the patient`s EF dropped to 35% (last echo on February 2019) from 55 to 60% in 2018. Likely ischemic given 3 vessel disease on coronary angiogram. Since he is s/p PCI now, we will continue to follow him up to see if his EF will improve after revascularization. He is euvolemic on exam today. The patient was on metoprolol which was discontinued when he was inpatient for dilaysis due to hypotension. He is currently on aspirin, rosuvastatin, Bumex 2 mg and ticagrelor 90 mg bid.  I reviewed patient's EKG in clinic today which shows sinus rhythm with bigeminal PVCs.  Normal NJ/QRS interval.  QTC is mildly elevated at 484 ms.    Interval history 8/27/2019:  The patient returns for follow-up.  He is on dialysis for 2 days a week for end-stage kidney disease.  The patient reports overall doing well since the PCI in April of this year.  Patient denies chest pain, shortness of breath, palpitations, dizziness, lower extremity edema, PND or orthopnea.    Last echo on 8/22/2019 which I have personally reviewed shows overall unchanged LVEF at 36% with worsening aortic valve area to 0.7 cm  which is severe aortic stenosis.  Patient's mean gradient through the aortic valve is 28 mmHg.    PAST MEDICAL HISTORY:  Past Medical History:   Diagnosis Date     BPH (benign prostatic hyperplasia)      Chronic kidney disease      HTN      Nonsenile cataract      Obstructive uropathy      Pulmonary nodules      Tobacco abuse        CURRENT MEDICATIONS:  Current Outpatient Medications   Medication Sig Dispense Refill     aspirin (ASA) 81 MG EC tablet Take 1 tablet (81 mg) by  mouth daily Start tomorrow morning. 90 tablet 3     atorvastatin (LIPITOR) 10 MG tablet Take 1 tablet (10 mg) by mouth daily 90 tablet 3     bumetanide (BUMEX) 2 MG tablet Take 1 tablet (2 mg) by mouth daily 90 tablet 0     bumetanide (BUMEX) 2 MG tablet Take 1 tablet (2 mg) by mouth daily 60 tablet 0     darbepoetin william (ARANESP, ALBUMIN FREE,) 100 MCG/0.5ML injection Inject 0.5 mLs (100 mcg) Subcutaneous every 14 days As needed for hgb<10g/dL.  If Hgb increases >1 point in 2 weeks (if blood transfusion given, use hgb PRIOR to this), SYSTOLIC BP > 180 mmHg or hgb>=10g/dL, HOLD DOSE. Dose must be within 1 week of Hgb.  Per anemia protocol with Cecilia Britt,CNP 0.5 mL 99     ferrous sulfate (FEROSUL) 325 (65 Fe) MG tablet TK 1 T PO QD  11     metoprolol tartrate (LOPRESSOR) 25 MG tablet Take 0.5 tablets (12.5 mg) by mouth 2 times daily 90 tablet 3     multivitamin w/minerals (MULTI-VITAMIN) tablet Take 1 tablet by mouth daily       order for DME Equipment being ordered: olecranon bursa brace 1 each 0     sevelamer (RENVELA) 800 MG tablet Take 2 tablets (1,600 mg) by mouth 3 times daily (with meals) 90 tablet 3     ticagrelor (BRILINTA) 90 MG tablet Take 1 tablet (90 mg) by mouth 2 times daily 90 tablet 3       PAST SURGICAL HISTORY:  Past Surgical History:   Procedure Laterality Date     APPENDECTOMY  AGE 6     CREATE FISTULA ARTERIOVENOUS UPPER EXTREMITY Left 11/17/2017    Procedure: CREATE FISTULA ARTERIOVENOUS UPPER EXTREMITY;  Left Cephalic Vein To Radial Artery Arteriovenous Fistula Creation, Anesthesia Block;  Surgeon: Eduardo Pabon MD;  Location: [facility] OR     CV CORONARY ANGIOGRAM N/A 2/22/2019    Procedure: 1100 CORS, RHC; BI-PLANE;  Surgeon: Gabino Collins MD;  Location:  [facility]     CV CORONARY ANGIOGRAM N/A 4/22/2019    Procedure: CV CORONARY ANGIOGRAM;  Surgeon: Gabino Collins MD;  Location:  [facility]     CV PCI ANGIOPLASTY N/A 4/22/2019    Procedure: CV PCI  ANGIOPLASTY;  Surgeon: Gabino Collins MD;  Location: [facility]     CYSTOSCOPY, FULGURATE BLEEDERS, EVACUATE CLOT(S), COMBINED N/A 2017    Procedure: COMBINED CYSTOSCOPY, FULGURATE BLEEDERS, EVACUATE CLOT(S);  Cystoscopy clot evacuation, bladder biopsy and fulguration (per surgeons note) NERVE BLOCK PERIPHERAL;  Surgeon: Tamiko Vanegas MD;  Location: [facility] OR     CYSTOSCOPY, TRANSURETHRAL RESECTION (TUR) PROSTATE, COMBINED N/A 3/5/2018    Procedure: COMBINED CYSTOSCOPY, TRANSURETHRAL RESECTION (TUR) PROSTATE;  Cystoscopy, Transurethral Resection of Prostate ;  Surgeon: Tamiko Vanegas MD;  Location: [facility] OR     SINUS SURGERY  AGE 6     TONSILLECTOMY      CHILDHOOD       ALLERGIES:   No Known Allergies    FAMILY HISTORY:  Family History   Problem Relation Age of Onset     C.A.D. Mother         d age 67     Vision Loss Father      Hearing Loss Sister      Diabetes Sister      Cancer No family hx of      Glaucoma No family hx of      Macular Degeneration No family hx of          SOCIAL HISTORY:  Social History     Tobacco Use     Smoking status: Former Smoker     Packs/day: 1.00     Years: 53.00     Pack years: 53.00     Types: Cigarettes     Last attempt to quit: 2017     Years since quittin.2     Smokeless tobacco: Never Used   Substance Use Topics     Alcohol use: Yes     Comment: rare, drink every few months     Drug use: No       ROS:   Constitutional: No fever, chills, or sweats. Weight stable.   ENT: No visual disturbance, ear ache, epistaxis, sore throat.   Cardiovascular: As per HPI.   Respiratory: No cough, hemoptysis.    GI: No nausea, vomiting, hematemesis, melena, or hematochezia.   : No hematuria.   Integument: Negative.   Hematologic:  No easy bruising, no easy bleeding.  Musculoskeletal: No myalgia.    Exam:  /66 (BP Location: Right arm, Patient Position: Sitting, Cuff Size: Adult Large)   Pulse 82   Wt 72.1 kg (159 lb)   SpO2 98%   BMI 22.81  kg/m    GENERAL APPEARANCE: alert and no distress  HEENT: no icterus, no central cyanosis  LYMPH/NECK: Bruit of the aortic stenosis radiates to carotids.  RESPIRATORY: lungs clear to auscultation - no rales, rhonchi or wheezes, no use of accessory muscles, no retractions, respirations are unlabored, normal respiratory rate  CARDIOVASCULAR: regular rhythm, normal S1, S2, no S3 or S4, 3/6 LYLE on the LSB and aortic area.  GI: soft, non tender  EXTREMITIES: No edema.  NEURO: alert , normal speech,and affect   SKIN: no ecchymoses, no rashes     I have reviewed the labs and personally reviewed the imaging below and made my comment in the assessment and plan.    Labs:  CBC RESULTS:   Lab Results   Component Value Date    WBC 9.0 04/30/2019    RBC 3.67 (L) 04/30/2019    HGB 9.7 (L) 04/30/2019    HCT 33.0 (L) 04/30/2019    MCV 90 04/30/2019    MCH 26.4 (L) 04/30/2019    MCHC 29.4 (L) 04/30/2019    RDW 15.4 (H) 04/30/2019     04/30/2019       BMP RESULTS:  Lab Results   Component Value Date     04/30/2019    POTASSIUM 4.5 04/30/2019    CHLORIDE 103 04/30/2019    CO2 28 04/30/2019    ANIONGAP 9 04/30/2019    GLC 97 04/30/2019    BUN 38 (H) 04/30/2019    CR 3.96 (H) 04/30/2019    GFRESTIMATED 14 (L) 04/30/2019    GFRESTBLACK 16 (L) 04/30/2019    DERICK 9.0 04/30/2019        INR RESULTS:  Lab Results   Component Value Date    INR 1.06 02/25/2019    INR 1.11 11/17/2017    INR 1.19 (H) 08/28/2017     Transthoracic echocardiogram 8/22/2019  Moderately (EF 35-40%) reduced left ventricular function is present. Biplane LVEF of 36%.  Right ventricular function, chamber size, wall motion, and thickness are normal.  Heavily calcified aortic valve with probable low flow, low gradient severe  aortic stenosis. Vmax 3.4m/s, mean gradient 28mmHg. JESSIKA is 0.7cm2 and DI of 0.21.  Ascending aorta 4.2 cm.  IVC diameter <2.1 cm collapsing >50% with sniff suggests a normal RA pressure  of 3 mmHg.     Compared with the study from 2/11/19,  LV function is similar; AV DI is lower,  JESSIKA is smaller; and MR is not as well appreciated on the current study.    Coronary angiogram and PCI 4/22/2019   1. Known severe coronary artery disease with planned PCI.  2. Three vessel obstructive coronary artery disease of the mid LAD, mid circumflex, and mid RCA (seen on coronary CTA, not injected today) without left main involvement.   3. Successful rotational atherectomy and PCI of the mid LAD with a 3.5x24mm Synergy drug eluting stent.  4. Successful PCI of the mid circumflex with a 3.5x16mm Synergy drug eluting stent    TTE 2/11/2019  Moderately (EF 35-40%) reduced left ventricular function is present. The EF is  36% based upon biplane imaging.  There is concern for low-flow aortic stenosis in the setting of a low stroke  volume index of 33 ml/m^2 (mean pressure gradient of 23.0, peak velocity of  3.2 m/sec, valve area of 0.88 cm^2, dimensionless index of 0.25)  Severe mitral valve insufficiency.  Dilation of the inferior vena cava with estimated mean right atrial pressure 8  mmHg (mildly elevated).     This study was compared with the study from 1/10/2019. Ejection fraction has  improved, aortic stenosis is probably similar, but mitral regurgitation  appears to be worse.    TT Echocardiogram 1/10/2019  Compared to study done 1/10/18 the LVEF has dropped and the aortic stenosis is  worse.   Severely (EF 10-20%) reduced left ventricular function is present (LVEF is  traced at 19%).   Concern for severe aortic stenosis and gradient underestimates the severity  due to low flow (The peak aortic velocity is 3.9 m/sec, The mean gradient  across the aortic valve is 36 mmHg, aortic valve area is 0.76 cm^2, by the  continuity equation).     Proximal ascending aorta is dilated measuring 4.3 cm, aortic root measures 4  cm     IVC is dilated and estimated mean right atrial pressure is 8 mmHg.    TT Echocardiogram 1/4/2018  The Ejection Fraction is estimated at 55-60%. Lateral  wall hypokinesis is present.  The right ventricle is normal size. Global right ventricular function is normal.  Pulmonary artery systolic pressure is normal.  Mild aortic stenosis (Vmax 2.7 m/s, mean pressure gradient 14 mmHg, JESSIKA 2.1 cm2).  Dilated proximal ascending aorta measuring 4.0 cm (indexed at 2.1 cm/m2, Z-score +2.5)  No pericardial effusion is present.  Previous study not available for comparison.    EKG dated 11/8/2017 is normal    Assessment and Plan:   Mr. Dung Norton is a 72 year old  male with PMH significant for CAD s/p PCI to LAD and Cx 4/22/2019, ESRD on HD, HTN, hx of heavy tobacco abuse, aortic stenosis and HFrEF    1.  HFrEF with EF of 35-40% (echo 2/2019) and severe aortic stenosis: Patient's last echocardiogram on 8/22/2019 which I have personally reviewed showed LVEF unchanged at 36%.  Aortic stenosis appeared to be moderate until the last echo on 8/22 which showed severely reduced aortic valve area at 0.7 cm .  The mean gradient is not high (28 mmHg) likely due to reduced EF.  I am suspecting this is low flow low gradient severe aortic stenosis.  The patient is euvolemic and asymptomatic from cardiac standpoint.  Since PCI in 4/22/2019 there has not been any change in LVEF. Patient had normal EF in 2018.  I am suspecting heart failure might be due to severe aortic stenosis therefore recommended low-dose dobutamine stress echocardiogram to assess stroke-volume reserve and aortic valve.  If the aortic valve shows true aortic stenosis rather than pseudo-aortic stenosis I will refer the patient to valve clinic for intervention. From HFrEF standpoint, the patient is on metoprolol 12.5 mg bid. Recommended to start lisinopril 5 mg daily today since he is on dialysis.    2. CAD s/p PCI to LAD and Cx 4/22/2019: Patient asymptomatic. On asa and ticagrelor. No bleeding issues despite his potentially higher risk with ESRD      3. Hypertension: Well controlled.     4. Paroxysmal atrial fibrillation:  The patient was in afib with RVR during echocardiogram in January 2019. He is in SR today. No hx of stroke. 1 week zio patch in February 2019 did not show evidence of atrial fibrillation.  He is currently on dual antiplatelet therapy with aspirin and ticagrelor.  Do not recommend anticoagulation for now.    5. ESRD: He is currently on hemodialysis.    6. Anemia: Likely due to chronic kidney disease. No bleeding.    Plan:  Start lisinopril 5 mg once daily.  Continue aspirin  Continue metoprolol 12.5 mg twice daily  Continue ticagrelor 90 mg twice daily  Continue atorvastatin 10 mg daily  Low-dose dobutamine stress echocardiogram     Return to clinic in 6 months    It was my absolute pleasure to meet  in the office today. Please donot hesitate to contact me if you have any questions or concerns.    Lizandro DEVINE MD  [facility] Division of Cardiology  Pager [phone]    Haley Vazquez MD

## 2019-08-27 ENCOUNTER — OFFICE VISIT (OUTPATIENT)
Dept: CARDIOLOGY | Facility: CLINIC | Age: 74
End: 2019-08-27
Payer: COMMERCIAL

## 2019-08-27 VITALS
BODY MASS INDEX: 22.81 KG/M2 | OXYGEN SATURATION: 98 % | SYSTOLIC BLOOD PRESSURE: 119 MMHG | HEART RATE: 82 BPM | WEIGHT: 159 LBS | DIASTOLIC BLOOD PRESSURE: 66 MMHG

## 2019-08-27 DIAGNOSIS — I35.0 SEVERE AORTIC STENOSIS: Primary | ICD-10-CM

## 2019-08-27 DIAGNOSIS — I25.10 CORONARY ARTERY DISEASE INVOLVING NATIVE CORONARY ARTERY OF NATIVE HEART WITHOUT ANGINA PECTORIS: ICD-10-CM

## 2019-08-27 DIAGNOSIS — N18.4 ANEMIA DUE TO STAGE 4 CHRONIC KIDNEY DISEASE (H): ICD-10-CM

## 2019-08-27 DIAGNOSIS — I50.20 HEART FAILURE WITH REDUCED EJECTION FRACTION, NYHA CLASS I (H): ICD-10-CM

## 2019-08-27 DIAGNOSIS — Z99.2 ESRD (END STAGE RENAL DISEASE) ON DIALYSIS (H): ICD-10-CM

## 2019-08-27 DIAGNOSIS — I10 ESSENTIAL HYPERTENSION: ICD-10-CM

## 2019-08-27 DIAGNOSIS — D63.1 ANEMIA DUE TO STAGE 4 CHRONIC KIDNEY DISEASE (H): ICD-10-CM

## 2019-08-27 DIAGNOSIS — N18.6 ESRD (END STAGE RENAL DISEASE) ON DIALYSIS (H): ICD-10-CM

## 2019-08-27 PROCEDURE — 99214 OFFICE O/P EST MOD 30 MIN: CPT | Performed by: INTERNAL MEDICINE

## 2019-08-27 RX ORDER — LISINOPRIL 5 MG/1
5 TABLET ORAL DAILY
Qty: 90 TABLET | Refills: 3 | Status: SHIPPED | OUTPATIENT
Start: 2019-08-27 | End: 2020-08-05

## 2019-08-27 NOTE — NURSING NOTE
"Chief Complaint   Patient presents with     Heart Failure      3 month follow up for HF., Echo. 08/22/19. -  Per patient no bothersome symptoms       Initial /66 (BP Location: Right arm, Patient Position: Sitting, Cuff Size: Adult Large)   Pulse 82   Wt 72.1 kg (159 lb)   SpO2 98%   BMI 22.81 kg/m   Estimated body mass index is 22.81 kg/m  as calculated from the following:    Height as of 4/26/19: 1.778 m (5' 10\").    Weight as of this encounter: 72.1 kg (159 lb)..  BP completed using cuff size: large    Titus Tolliver L.P.N.    "

## 2019-08-27 NOTE — LETTER
2019      RE: Dung Norton  [id number] [id number]       Dear Colleague,    Thank you for the opportunity to participate in the care of your patient, Dung Norton, at the [facility] HEART AT [facility] at [facility]. Please see a copy of my visit note below.    Referring provider: Haley Baker MD    Chief complaint: Discuss TTE results.    HPI: Mr. Dung Norton is a 72 year old  male with PMH significant for CKD, HTN and hx of heavy tobacco abuse.    Mr. Norton has recently been diagnosed with ESRD, has had his AVF and almost ready ready for HD though he doesnot need that right now. He has HT and is on amlodipine 2.5 mg. His BP in the office is high but he reports lower BP outside the hospital.     He has been diagnosed with heart murmur and subsequently underwent a TTE planned by  which showed mild aortic stenosis with mild dilatation of ascending aorta at 4 cm and hypokinesis of LV lateral wall. He is currently asymptomatic. He denies a history of chest discomfort, dyspnea, PND, orthopnea, pedal edema, palpitations, lightheadedness, and syncope. He has HTN and hx of 50 pack year tobacco abuse. He quit smoking last summer. He has family hx of CAD as well. His mother had MI and  at the age of 66.     I have reviewed his echocardiogram from 2018 which shows mild AS, normal LV function with LV lateral wall hypokinesis (contrast was not used to enhance endocardial borders). His ECG dated 2017 is normal.    Medications, personal, family, and social history reviewed with patient and revised.    Interval history 2019:  The patient returns for on year follow-up. He is overall doing well with no symptoms of CP, WELLS, LE edema, palpitations, dizziness or syncope. Recent echocardiogram showed progression of aortic stenosis to severe range with significant decrease in his EF from 60% to 10-20% in  one year.    The patient was in atrial fibrillation during the echocardiogram, he is in sinus rhythm today with TWI in V4-V6.    Interval history 5/7/2019:  Since I saw the patient 3 months ago he underwent coronary angiogram with PCI/ALICIA to mid LAD and mid circumflex on 4/22/2019.  He was admitted to hospital for dialysis initiation after that.  He is on dialysis since 4/23.  He reports feeling tired since the dialysis which is improving.  Otherwise he is doing well from cardiac standpoint.  Denies chest pain, dyspnea on exertion, palpitations, frequent dizziness, syncope, lower extremity edema.  He has aortic stenosis. After thorough investigation including CT TAVR and cardiac cath aortic stenosis was deemed moderate.  As you know the patient`s EF dropped to 35% (last echo on February 2019) from 55 to 60% in 2018. Likely ischemic given 3 vessel disease on coronary angiogram. Since he is s/p PCI now, we will continue to follow him up to see if his EF will improve after revascularization. He is euvolemic on exam today. The patient was on metoprolol which was discontinued when he was inpatient for dilaysis due to hypotension. He is currently on aspirin, rosuvastatin, Bumex 2 mg and ticagrelor 90 mg bid.  I reviewed patient's EKG in clinic today which shows sinus rhythm with bigeminal PVCs.  Normal AR/QRS interval.  QTC is mildly elevated at 484 ms.    Interval history 8/27/2019:  The patient returns for follow-up.  He is on dialysis for 2 days a week for end-stage kidney disease.  The patient reports overall doing well since the PCI in April of this year.  Patient denies chest pain, shortness of breath, palpitations, dizziness, lower extremity edema, PND or orthopnea.    Last echo on 8/22/2019 which I have personally reviewed shows overall unchanged LVEF at 36% with worsening aortic valve area to 0.7 cm  which is severe aortic stenosis.  Patient's mean gradient through the aortic valve is 28 mmHg.    PAST MEDICAL  HISTORY:  Past Medical History:   Diagnosis Date     BPH (benign prostatic hyperplasia)      Chronic kidney disease      HTN      Nonsenile cataract      Obstructive uropathy      Pulmonary nodules      Tobacco abuse        CURRENT MEDICATIONS:  Current Outpatient Medications   Medication Sig Dispense Refill     aspirin (ASA) 81 MG EC tablet Take 1 tablet (81 mg) by mouth daily Start tomorrow morning. 90 tablet 3     atorvastatin (LIPITOR) 10 MG tablet Take 1 tablet (10 mg) by mouth daily 90 tablet 3     bumetanide (BUMEX) 2 MG tablet Take 1 tablet (2 mg) by mouth daily 90 tablet 0     bumetanide (BUMEX) 2 MG tablet Take 1 tablet (2 mg) by mouth daily 60 tablet 0     darbepoetin william (ARANESP, ALBUMIN FREE,) 100 MCG/0.5ML injection Inject 0.5 mLs (100 mcg) Subcutaneous every 14 days As needed for hgb<10g/dL.  If Hgb increases >1 point in 2 weeks (if blood transfusion given, use hgb PRIOR to this), SYSTOLIC BP > 180 mmHg or hgb>=10g/dL, HOLD DOSE. Dose must be within 1 week of Hgb.  Per anemia protocol with Cecilia Britt,CNP 0.5 mL 99     ferrous sulfate (FEROSUL) 325 (65 Fe) MG tablet TK 1 T PO QD  11     metoprolol tartrate (LOPRESSOR) 25 MG tablet Take 0.5 tablets (12.5 mg) by mouth 2 times daily 90 tablet 3     multivitamin w/minerals (MULTI-VITAMIN) tablet Take 1 tablet by mouth daily       order for DME Equipment being ordered: olecranon bursa brace 1 each 0     sevelamer (RENVELA) 800 MG tablet Take 2 tablets (1,600 mg) by mouth 3 times daily (with meals) 90 tablet 3     ticagrelor (BRILINTA) 90 MG tablet Take 1 tablet (90 mg) by mouth 2 times daily 90 tablet 3       PAST SURGICAL HISTORY:  Past Surgical History:   Procedure Laterality Date     APPENDECTOMY  AGE 6     CREATE FISTULA ARTERIOVENOUS UPPER EXTREMITY Left 11/17/2017    Procedure: CREATE FISTULA ARTERIOVENOUS UPPER EXTREMITY;  Left Cephalic Vein To Radial Artery Arteriovenous Fistula Creation, Anesthesia Block;  Surgeon: Eduardo Pabon MD;  Location:  [facility] OR     CV CORONARY ANGIOGRAM N/A 2019    Procedure: 1100 CORS, RHC; BI-PLANE;  Surgeon: Gabino Collins MD;  [facility]     CV CORONARY ANGIOGRAM N/A 2019    Procedure: CV CORONARY ANGIOGRAM;  Surgeon: Gabino Collins MD;  [facility]     CV PCI ANGIOPLASTY N/A 2019    Procedure: CV PCI ANGIOPLASTY;  Surgeon: Gabino Collins MD;  Location:  HEART CARDIAC CATH LAB     CYSTOSCOPY, FULGURATE BLEEDERS, EVACUATE CLOT(S), COMBINED N/A 2017    Procedure: COMBINED CYSTOSCOPY, FULGURATE BLEEDERS, EVACUATE CLOT(S);  Cystoscopy clot evacuation, bladder biopsy and fulguration (per surgeons note) NERVE BLOCK PERIPHERAL;  Surgeon: Tamiko Vanegas MD;  Location: [facility] OR     CYSTOSCOPY, TRANSURETHRAL RESECTION (TUR) PROSTATE, COMBINED N/A 3/5/2018    Procedure: COMBINED CYSTOSCOPY, TRANSURETHRAL RESECTION (TUR) PROSTATE;  Cystoscopy, Transurethral Resection of Prostate ;  Surgeon: Tamiko Vanegas MD;  Location: [facility] OR     SINUS SURGERY  AGE 6     TONSILLECTOMY      CHILDHOOD       ALLERGIES:   No Known Allergies    FAMILY HISTORY:  Family History   Problem Relation Age of Onset     C.A.D. Mother         d age 67     Vision Loss Father      Hearing Loss Sister      Diabetes Sister      Cancer No family hx of      Glaucoma No family hx of      Macular Degeneration No family hx of          SOCIAL HISTORY:  Social History     Tobacco Use     Smoking status: Former Smoker     Packs/day: 1.00     Years: 53.00     Pack years: 53.00     Types: Cigarettes     Last attempt to quit: 2017     Years since quittin.2     Smokeless tobacco: Never Used   Substance Use Topics     Alcohol use: Yes     Comment: rare, drink every few months     Drug use: No       ROS:   Constitutional: No fever, chills, or sweats. Weight stable.   ENT: No visual disturbance, ear ache, epistaxis, sore throat.   Cardiovascular: As per HPI.   Respiratory: No  cough, hemoptysis.    GI: No nausea, vomiting, hematemesis, melena, or hematochezia.   : No hematuria.   Integument: Negative.   Hematologic:  No easy bruising, no easy bleeding.  Musculoskeletal: No myalgia.    Exam:  /66 (BP Location: Right arm, Patient Position: Sitting, Cuff Size: Adult Large)   Pulse 82   Wt 72.1 kg (159 lb)   SpO2 98%   BMI 22.81 kg/m     GENERAL APPEARANCE: alert and no distress  HEENT: no icterus, no central cyanosis  LYMPH/NECK: Bruit of the aortic stenosis radiates to carotids.  RESPIRATORY: lungs clear to auscultation - no rales, rhonchi or wheezes, no use of accessory muscles, no retractions, respirations are unlabored, normal respiratory rate  CARDIOVASCULAR: regular rhythm, normal S1, S2, no S3 or S4, 3/6 LYLE on the LSB and aortic area.  GI: soft, non tender  EXTREMITIES: No edema.  NEURO: alert , normal speech,and affect   SKIN: no ecchymoses, no rashes     I have reviewed the labs and personally reviewed the imaging below and made my comment in the assessment and plan.    Labs:  CBC RESULTS:   Lab Results   Component Value Date    WBC 9.0 04/30/2019    RBC 3.67 (L) 04/30/2019    HGB 9.7 (L) 04/30/2019    HCT 33.0 (L) 04/30/2019    MCV 90 04/30/2019    MCH 26.4 (L) 04/30/2019    MCHC 29.4 (L) 04/30/2019    RDW 15.4 (H) 04/30/2019     04/30/2019       BMP RESULTS:  Lab Results   Component Value Date     04/30/2019    POTASSIUM 4.5 04/30/2019    CHLORIDE 103 04/30/2019    CO2 28 04/30/2019    ANIONGAP 9 04/30/2019    GLC 97 04/30/2019    BUN 38 (H) 04/30/2019    CR 3.96 (H) 04/30/2019    GFRESTIMATED 14 (L) 04/30/2019    GFRESTBLACK 16 (L) 04/30/2019    DERICK 9.0 04/30/2019        INR RESULTS:  Lab Results   Component Value Date    INR 1.06 02/25/2019    INR 1.11 11/17/2017    INR 1.19 (H) 08/28/2017     Transthoracic echocardiogram 8/22/2019  Moderately (EF 35-40%) reduced left ventricular function is present. Biplane LVEF of 36%.  Right ventricular function,  chamber size, wall motion, and thickness are normal.  Heavily calcified aortic valve with probable low flow, low gradient severe  aortic stenosis. Vmax 3.4m/s, mean gradient 28mmHg. JESSIKA is 0.7cm2 and DI of 0.21.  Ascending aorta 4.2 cm.  IVC diameter <2.1 cm collapsing >50% with sniff suggests a normal RA pressure  of 3 mmHg.     Compared with the study from 2/11/19, LV function is similar; AV DI is lower,  JESSIKA is smaller; and MR is not as well appreciated on the current study.    Coronary angiogram and PCI 4/22/2019   1. Known severe coronary artery disease with planned PCI.  2. Three vessel obstructive coronary artery disease of the mid LAD, mid circumflex, and mid RCA (seen on coronary CTA, not injected today) without left main involvement.   3. Successful rotational atherectomy and PCI of the mid LAD with a 3.5x24mm Synergy drug eluting stent.  4. Successful PCI of the mid circumflex with a 3.5x16mm Synergy drug eluting stent    TTE 2/11/2019  Moderately (EF 35-40%) reduced left ventricular function is present. The EF is  36% based upon biplane imaging.  There is concern for low-flow aortic stenosis in the setting of a low stroke  volume index of 33 ml/m^2 (mean pressure gradient of 23.0, peak velocity of  3.2 m/sec, valve area of 0.88 cm^2, dimensionless index of 0.25)  Severe mitral valve insufficiency.  Dilation of the inferior vena cava with estimated mean right atrial pressure 8  mmHg (mildly elevated).     This study was compared with the study from 1/10/2019. Ejection fraction has  improved, aortic stenosis is probably similar, but mitral regurgitation  appears to be worse.    TT Echocardiogram 1/10/2019  Compared to study done 1/10/18 the LVEF has dropped and the aortic stenosis is  worse.   Severely (EF 10-20%) reduced left ventricular function is present (LVEF is  traced at 19%).   Concern for severe aortic stenosis and gradient underestimates the severity  due to low flow (The peak aortic velocity is  3.9 m/sec, The mean gradient  across the aortic valve is 36 mmHg, aortic valve area is 0.76 cm^2, by the  continuity equation).     Proximal ascending aorta is dilated measuring 4.3 cm, aortic root measures 4  cm     IVC is dilated and estimated mean right atrial pressure is 8 mmHg.    TT Echocardiogram 1/4/2018  The Ejection Fraction is estimated at 55-60%. Lateral wall hypokinesis is present.  The right ventricle is normal size. Global right ventricular function is normal.  Pulmonary artery systolic pressure is normal.  Mild aortic stenosis (Vmax 2.7 m/s, mean pressure gradient 14 mmHg, JESSIKA 2.1 cm2).  Dilated proximal ascending aorta measuring 4.0 cm (indexed at 2.1 cm/m2, Z-score +2.5)  No pericardial effusion is present.  Previous study not available for comparison.    EKG dated 11/8/2017 is normal    Assessment and Plan:   Mr. Dung Norton is a 72 year old  male with PMH significant for CAD s/p PCI to LAD and Cx 4/22/2019, ESRD on HD, HTN, hx of heavy tobacco abuse, aortic stenosis and HFrEF    1.  HFrEF with EF of 35-40% (echo 2/2019) and severe aortic stenosis: Patient's last echocardiogram on 8/22/2019 which I have personally reviewed showed LVEF unchanged at 36%.  Aortic stenosis appeared to be moderate until the last echo on 8/22 which showed severely reduced aortic valve area at 0.7 cm .  The mean gradient is not high (28 mmHg) likely due to reduced EF.  I am suspecting this is low flow low gradient severe aortic stenosis.  The patient is euvolemic and asymptomatic from cardiac standpoint.  Since PCI in 4/22/2019 there has not been any change in LVEF. Patient had normal EF in 2018.  I am suspecting heart failure might be due to severe aortic stenosis therefore recommended low-dose dobutamine stress echocardiogram to assess stroke-volume reserve and aortic valve.  If the aortic valve shows true aortic stenosis rather than pseudo-aortic stenosis I will refer the patient to valve clinic for intervention.  From HFrEF standpoint, the patient is on metoprolol 12.5 mg bid. Recommended to start lisinopril 5 mg daily today since he is on dialysis.    2. CAD s/p PCI to LAD and Cx 4/22/2019: Patient asymptomatic. On asa and ticagrelor. No bleeding issues despite his potentially higher risk with ESRD      3. Hypertension: Well controlled.     4. Paroxysmal atrial fibrillation: The patient was in afib with RVR during echocardiogram in January 2019. He is in SR today. No hx of stroke. 1 week zio patch in February 2019 did not show evidence of atrial fibrillation.  He is currently on dual antiplatelet therapy with aspirin and ticagrelor.  Do not recommend anticoagulation for now.    5. ESRD: He is currently on hemodialysis.    6. Anemia: Likely due to chronic kidney disease. No bleeding.    Plan:  Start lisinopril 5 mg once daily.  Continue aspirin  Continue metoprolol 12.5 mg twice daily  Continue ticagrelor 90 mg twice daily  Continue atorvastatin 10 mg daily  Low-dose dobutamine stress echocardiogram     Return to clinic in 6 months    It was my absolute pleasure to meet  in the office today. Please donot hesitate to contact me if you have any questions or concerns.    Lizandro DEVINE MD  [facility] Division of Cardiology  Pager [phone]    Haley Vazquez MD

## 2019-08-27 NOTE — PATIENT INSTRUCTIONS
Thank you for coming to the [facility] Heart @ [place]; please note the following instructions:    1. No medication changes today; please continue all heart medications.    2. Dr. Lizandro Ng has ordered a stress echocardiogram to to be performed on you.  This test has been scheduled at the [facility] Imaging Department on Tuesday, 9/03/19 at 11:00 AM  Please arrive 15 minutes early (10:45 AM) to the Radiology Department to allow enough time for registration.  The Cardiology Nurse will contact you regarding the results of your stress echocardiogram (please see result notification details at bottom of summary).   Please see attached Stress Test informational sheet regarding instructions for your nuclear stress test.    3.  Please follow up with Dr. Lizandro Ng in 6 months ( February 2020); please see following pages for appointment detail information.            If you have any questions regarding your visit please contact your care team:     Cardiology  Telephone Number   Stephanie TESFAYE, RN  Reena REDD,RN  Pati LANDON, JENY DOWNEY, MA  Titus DUQUE, LPN   [phone]    *After hours: [phone]   For scheduling appts:     [phone] or    [phone] (select option 1)    *After hours: [phone]     For the Device Clinic (Pacemakers and ICD's)  RN's :  Beti Simons   During business hours: [phone]    *After business hours:  [phone] (select option 4)      Normal test result notifications will be released via Ch[employer] or mailed within 7 business days.  All other test results, will be communicated via telephone once reviewed by your cardiologist.    If you need a medication refill please contact your pharmacy.  Please allow 3 business days for your refill to be completed.    As always, thank you for trusting us with your health care needs!

## 2019-09-03 ENCOUNTER — HOSPITAL ENCOUNTER (OUTPATIENT)
Dept: CARDIOLOGY | Facility: CLINIC | Age: 74
Discharge: HOME | End: 2019-09-03
Attending: INTERNAL MEDICINE | Admitting: INTERNAL MEDICINE
Payer: COMMERCIAL

## 2019-09-03 DIAGNOSIS — I35.0 NONRHEUMATIC AORTIC VALVE STENOSIS: ICD-10-CM

## 2019-09-03 PROCEDURE — 93325 DOPPLER ECHO COLOR FLOW MAPG: CPT | Mod: 26 | Performed by: INTERNAL MEDICINE

## 2019-09-03 PROCEDURE — 93350 STRESS TTE ONLY: CPT | Mod: TC

## 2019-09-03 PROCEDURE — 93018 CV STRESS TEST I&R ONLY: CPT | Performed by: INTERNAL MEDICINE

## 2019-09-03 PROCEDURE — 25000125 ZZHC RX 250: Performed by: INTERNAL MEDICINE

## 2019-09-03 PROCEDURE — 93016 CV STRESS TEST SUPVJ ONLY: CPT | Performed by: INTERNAL MEDICINE

## 2019-09-03 PROCEDURE — 93321 DOPPLER ECHO F-UP/LMTD STD: CPT | Mod: 26 | Performed by: INTERNAL MEDICINE

## 2019-09-03 PROCEDURE — 25000128 H RX IP 250 OP 636: Performed by: INTERNAL MEDICINE

## 2019-09-03 PROCEDURE — 93350 STRESS TTE ONLY: CPT | Mod: 26 | Performed by: INTERNAL MEDICINE

## 2019-09-03 RX ORDER — METOPROLOL TARTRATE 1 MG/ML
1-20 INJECTION, SOLUTION INTRAVENOUS
Status: ACTIVE | OUTPATIENT
Start: 2019-09-03 | End: 2019-09-03

## 2019-09-03 RX ORDER — DOBUTAMINE HYDROCHLORIDE 200 MG/100ML
5-20 INJECTION INTRAVENOUS CONTINUOUS
Status: ACTIVE | OUTPATIENT
Start: 2019-09-03 | End: 2019-09-03

## 2019-09-03 RX ORDER — SODIUM CHLORIDE 9 MG/ML
INJECTION, SOLUTION INTRAVENOUS CONTINUOUS
Status: ACTIVE | OUTPATIENT
Start: 2019-09-03 | End: 2019-09-03

## 2019-09-03 RX ORDER — ATROPINE SULFATE 0.4 MG/ML
.2-2 AMPUL (ML) INJECTION
Status: DISCONTINUED | OUTPATIENT
Start: 2019-09-03 | End: 2019-09-04 | Stop reason: HOSPADM

## 2019-09-03 RX ADMIN — METOPROLOL TARTRATE 2 MG: 1 INJECTION, SOLUTION INTRAVENOUS at 11:46

## 2019-09-03 RX ADMIN — DOBUTAMINE HYDROCHLORIDE 5 MCG/KG/MIN: 200 INJECTION INTRAVENOUS at 11:31

## 2019-09-03 NOTE — PROGRESS NOTES
Pt here for low dose dobutamine stress test for aortic stenosis. Test, meds and side effects reviewed with patient. Dobutamine infusion stopped at 10mcg/kg/min and 2mg IV Metoprolol given to bring HR back to baseline. Tolerated well; stable throughout. Post monitoring complete and VSS. Pt escorted out to the gold waiting room.

## 2019-09-09 ENCOUNTER — TELEPHONE (OUTPATIENT)
Dept: CARDIOLOGY | Facility: CLINIC | Age: 74
End: 2019-09-09

## 2019-09-09 NOTE — TELEPHONE ENCOUNTER
----- Message from Lizandro Ng MD sent at 9/3/2019  3:27 PM CDT -----  Please refer this patient to valve clinic with Dr. Collins for TAVR.    I already talked to the patient. He is OK with that. He is waiting for the schedule.    Thank you.

## 2019-09-10 DIAGNOSIS — R09.89 CAROTID BRUIT: ICD-10-CM

## 2019-09-10 DIAGNOSIS — I35.0 SEVERE AORTIC STENOSIS: Primary | ICD-10-CM

## 2019-09-10 DIAGNOSIS — I10 HYPERTENSION GOAL BP (BLOOD PRESSURE) < 140/90: Primary | ICD-10-CM

## 2019-09-10 NOTE — TELEPHONE ENCOUNTER
Voice mail left for patient to call RN hotline at [phone].    Are you still taking Amlodipine?  This was discontinued on 4/22/19    Safia Elder RN

## 2019-09-10 NOTE — TELEPHONE ENCOUNTER
AMlodipine Besylate 2.5mg tab       Last Written Prescription Date:  ?  Last Fill Quantity: ?,   # refills: ?  Last Office Visit: 08/15/2019  Future Office visit:    Next 5 appointments (look out 90 days)    Oct 01, 2019 10:40 AM CDT  Pre-Op physical with Haley Baker MD  [facility] ([facility] [address]  [place] [address]  [phone]           Routing refill request to provider for review/approval because:  Drug not active on patient's medication list

## 2019-09-10 NOTE — PROGRESS NOTES
Date: 9/10/2019    Time of Call: 11:52 AM     Diagnosis:  Severe aortic stenosis     [ TORB ] Ordering provider: Gabino Collins MD  Order: 1. bilateral carotid US  2. Labs- CBC and CMP     Order received by: Rufina Padilla RN     Follow-up/additional notes:

## 2019-09-11 ENCOUNTER — TELEPHONE (OUTPATIENT)
Dept: CARDIOLOGY | Facility: CLINIC | Age: 74
End: 2019-09-11

## 2019-09-11 NOTE — TELEPHONE ENCOUNTER
Left VM to that we are calling him to schedule his appointments with the valve team. Contact informations was provided [phone]

## 2019-09-12 ENCOUNTER — TELEPHONE (OUTPATIENT)
Dept: CARDIOLOGY | Facility: CLINIC | Age: 74
End: 2019-09-12

## 2019-09-12 NOTE — TELEPHONE ENCOUNTER
Called and spoke with patient to let him know we have received the referral from Dr. Ng and that as soon as we have dates we will call him. He wanted us to notate that he is suppose to have eye surgery for 10/17/19 and does not want this to stop him from having it.

## 2019-09-13 ENCOUNTER — TELEPHONE (OUTPATIENT)
Dept: UROLOGY | Facility: CLINIC | Age: 74
End: 2019-09-13

## 2019-09-13 RX ORDER — AMLODIPINE BESYLATE 2.5 MG/1
2.5 TABLET ORAL DAILY
Qty: 90 TABLET | Refills: 3 | Status: SHIPPED | OUTPATIENT
Start: 2019-09-13 | End: 2019-10-24

## 2019-09-13 NOTE — TELEPHONE ENCOUNTER
Message left on Dianne from [facility] voicemail to give us a call to discuss the questions about the patient's progress note.      Reilly Angel MA

## 2019-09-13 NOTE — TELEPHONE ENCOUNTER
Health Call Center    Phone Message    May a detailed message be left on voicemail: yes    Reason for Call: Other: Dianne from [facility] called said she need to du=iscuss the progress notes they recvd for this patient, they have some questions please call to discuss     Action Taken: Other: ump urology

## 2019-09-13 NOTE — TELEPHONE ENCOUNTER
Female called back stating that we have the wrong number and there is no one named Dung at her number ([phone])  Noted that there are 2 numbers listed for patient and the number dialed is 1 number off  Will remove the wrong number  Please try the other number in the chart    Deanna Jackson RN

## 2019-09-13 NOTE — TELEPHONE ENCOUNTER
Dianne Logan from [address] [place] [address] states that she needs a letter stating that the patient has permanent Urinary retention that's why he needs to use a coude catheter instead of a straight catheter. So his insurance can pay for his coude catheter supplies. She states that she has faxed this request since July. She will fax another form with this request as well. She states that the letter should be faxed to [phone] attn: Gaston Angel MA

## 2019-09-13 NOTE — TELEPHONE ENCOUNTER
Called and spoke with patient. Patient reports he has been taking amlodipine 2.5 mg daily.   Patient states this Rx was never discontinued and doesn't know why someone discontinued it from his list. Reports he has been taking it as previously prescribed.  Unable to find any OV notes that states to discontinue amlodipine.     Routing refill request to provider for review/approval because:  Drug not active on patient's medication list    Requested Prescriptions   Pending Prescriptions Disp Refills     amLODIPine (NORVASC) 2.5 MG tablet       Sig: Take 1 tablet (2.5 mg) by mouth daily       There is no refill protocol information for this order        Anjali Laboy RN

## 2019-09-18 ENCOUNTER — TELEPHONE (OUTPATIENT)
Dept: UROLOGY | Facility: CLINIC | Age: 74
End: 2019-09-18

## 2019-09-18 NOTE — TELEPHONE ENCOUNTER
Dung requires a coude catheter as he is difficult to catheterize due to his Enlarged prostate. The catheter is expected to be needed for his lifetime. Diagnosis:Enlarged prostate/BPH/ urinary retention.     Tamiko Vanegas MD  [phone]

## 2019-09-18 NOTE — TELEPHONE ENCOUNTER
Documentation was faxed to Dianne for coude cath to [address] [place] [address] [phone].

## 2019-09-23 ENCOUNTER — TELEPHONE (OUTPATIENT)
Dept: CARDIOLOGY | Facility: CLINIC | Age: 74
End: 2019-09-23

## 2019-09-23 NOTE — TELEPHONE ENCOUNTER
Patient's daughter called back and will like to have this done at [facility] with Dr. Ti Guzman..

## 2019-09-23 NOTE — TELEPHONE ENCOUNTER
Called and spoke with Tereza his daughter. She will speak with Dung and see if he is willing to come in tomorrow. She will call me back.

## 2019-09-30 NOTE — PROGRESS NOTES
[facility]  [address] [place] [id number]  [phone]  Dept: [phone]    PRE-OP EVALUATION:  Today's date: 10/1/2019    Dung Norton (: 1945) presents for pre-operative evaluation assessment as requested by Dr. Ming Long.  He requires evaluation and anesthesia risk assessment prior to undergoing surgery/procedure for treatment of Cataract Surgery .    Proposed Surgery/ Procedure: Cataract Surgery  Date of Surgery/ Procedure: 10/17/19  Time of Surgery/ Procedure: [facility]  Hospital/Surgical Facility: [facility]  Fax number for surgical facility: [phone]  Primary Physician: Haley Baker  Type of Anesthesia Anticipated: to be determined    Patient has a Health Care Directive or Living Will:  YES       1. Yes - Do you have a history of heart attack, stroke, stent, bypass or surgery on an artery in the head, neck, heart or legs? Doing well  2. NO - Do you ever have any pain or discomfort in your chest?  3. NO - Do you have a history of  Heart Failure?  4. NO - Are you troubled by shortness of breath when: walking on the level, up a slight hill or at night?  5. NO - Do you currently have a cold, bronchitis or other respiratory infection?  6. NO - Do you have a cough, shortness of breath or wheezing?  7. NO - Do you sometimes get pains in the calves of your legs when you walk?  8. NO - Do you or anyone in your family have previous history of blood clots?  9. Yes - Do you or does anyone in your family have a serious bleeding problem such as prolonged bleeding following surgeries or cuts?  10. NO - Have you ever had problems with anemia or been told to take iron pills?  11. NO - Have you had any abnormal blood loss such as black, tarry or bloody stools, or abnormal vaginal bleeding?  12. Yes - Have you ever had a blood transfusion?  13. NO - Have you or any of your relatives ever had problems with anesthesia?  14. NO - Do you have sleep apnea, excessive  snoring or daytime drowsiness?  15. NO - Do you have any prosthetic heart valves?  16. NO - Do you have prosthetic joints?  17. NO - Is there any chance that you may be pregnant?      HPI:     HPI related to upcoming procedure: pt scheduled for Bilateral cataract surgery      CAD - Patient has a longstanding history of moderate-severe CAD. Patient denies recent chest pain or NTG use, denies exercise induced dyspnea or PND.   CHF - Patient has a longstanding history of moderate-severe CHF. . Currently the patient's condition is same. Current treatment regimen includes ACE inhibitor. The patient denies chest pain, edema, orthopnea, SOB or recent weight gain.   Pt has ESRD and on Dialysis 2 days a week    MEDICAL HISTORY:     Patient Active Problem List    Diagnosis Date Noted     Hypertension goal BP (blood pressure) < 140/90 12/02/2010     Priority: High     ESRD (end stage renal disease) (H) 04/23/2019     Priority: Medium     S/P coronary angiogram 04/22/2019     Priority: Medium     Coronary artery disease involving native coronary artery, angina presence unspecified, unspecified whether native or transplanted heart 03/21/2019     Priority: Medium     Added automatically from request for surgery [id number]       Status post coronary angiogram 02/22/2019     Priority: Medium     Chronic systolic heart failure (H) 02/05/2019     Priority: Medium     Added automatically from request for surgery [id number]       Nuclear sclerosis of both eyes 07/09/2018     Priority: Medium     S/P transurethral resection of prostate 03/05/2018     Priority: Medium     Urinary tract infection 03/01/2018     Priority: Medium     Olecranon bursitis of left elbow 01/19/2018     Priority: Medium     Thoracic aortic aneurysm without rupture (H) 01/04/2018     Priority: Medium     Nonrheumatic aortic valve stenosis 01/04/2018     Priority: Medium     Ex-smoker 12/12/2017     Priority: Medium     CKD (chronic kidney disease) stage 5, GFR less than  15 ml/min (H) 09/13/2017     Priority: Medium     Anemia of chronic renal failure, stage 5 (H) 09/12/2017     Priority: Medium     Orthostatic hypotension 09/12/2017     Priority: Medium     Acute renal failure, unspecified acute renal failure type (H) 09/12/2017     Priority: Medium     End stage renal disease (H) 09/12/2017     Priority: Medium     Acquired hypothyroidism 09/12/2017     Priority: Medium     Obstructive uropathy 09/12/2017     Priority: Medium     KRISTINA (acute kidney injury) (H) 08/28/2017     Priority: Medium     Pulmonary nodules 07/28/2017     Priority: Medium     Symptomatic anemia 07/12/2017     Priority: Medium     Elevated fasting glucose 03/05/2013     Priority: Medium     Hypertriglyceridemia 03/05/2013     Priority: Medium     Advanced directives, counseling/discussion 06/10/2011     Priority: Medium     Advance Directive Problem List Overview:   Name Relationship Phone    Primary Health Care Agent            Alternative Health Care Agent          Discussed advance care planning with patient; information given to patient to review. 6/10/2011          CARDIOVASCULAR SCREENING; LDL GOAL LESS THAN 130 10/31/2010     Priority: Medium      Past Medical History:   Diagnosis Date     BPH (benign prostatic hyperplasia)      Chronic kidney disease      HTN      Nonsenile cataract      Obstructive uropathy      Pulmonary nodules      Tobacco abuse      Past Surgical History:   Procedure Laterality Date     APPENDECTOMY  AGE 6     CREATE FISTULA ARTERIOVENOUS UPPER EXTREMITY Left 11/17/2017    Procedure: CREATE FISTULA ARTERIOVENOUS UPPER EXTREMITY;  Left Cephalic Vein To Radial Artery Arteriovenous Fistula Creation, Anesthesia Block;  Surgeon: Eduardo Pabon MD;  [facility]     CV CORONARY ANGIOGRAM N/A 2/22/2019    Procedure: 1100 CORS, RHC; BI-PLANE;  Surgeon: Gabino Collins MD;  [facility]     CV CORONARY ANGIOGRAM N/A 4/22/2019    Procedure: CV CORONARY  ANGIOGRAM;  Surgeon: Gabino Collins MD;  [facility]     CV PCI ANGIOPLASTY N/A 4/22/2019    Procedure: CV PCI ANGIOPLASTY;  Surgeon: Gabino Collins MD;  [facility]     CYSTOSCOPY, FULGURATE BLEEDERS, EVACUATE CLOT(S), COMBINED N/A 7/16/2017    Procedure: COMBINED CYSTOSCOPY, FULGURATE BLEEDERS, EVACUATE CLOT(S);  Cystoscopy clot evacuation, bladder biopsy and fulguration (per surgeons note) NERVE BLOCK PERIPHERAL;  Surgeon: Tamiko Vanegas MD;  Location: [facility]     CYSTOSCOPY, TRANSURETHRAL RESECTION (TUR) PROSTATE, COMBINED N/A 3/5/2018    Procedure: COMBINED CYSTOSCOPY, TRANSURETHRAL RESECTION (TUR) PROSTATE;  Cystoscopy, Transurethral Resection of Prostate ;  Surgeon: Tamiko Vanegas MD;  Location: [facility] OR     SINUS SURGERY  AGE 6     TONSILLECTOMY      CHILDHOOD     Current Outpatient Medications   Medication Sig Dispense Refill     amLODIPine (NORVASC) 2.5 MG tablet Take 1 tablet (2.5 mg) by mouth daily 90 tablet 3     aspirin (ASA) 81 MG EC tablet Take 1 tablet (81 mg) by mouth daily Start tomorrow morning. 90 tablet 3     atorvastatin (LIPITOR) 10 MG tablet Take 1 tablet (10 mg) by mouth daily 90 tablet 3     bumetanide (BUMEX) 2 MG tablet Take 1 tablet (2 mg) by mouth daily 90 tablet 0     bumetanide (BUMEX) 2 MG tablet Take 1 tablet (2 mg) by mouth daily 60 tablet 0     darbepoetin william (ARANESP, ALBUMIN FREE,) 100 MCG/0.5ML injection Inject 0.5 mLs (100 mcg) Subcutaneous every 14 days As needed for hgb<10g/dL.  If Hgb increases >1 point in 2 weeks (if blood transfusion given, use hgb PRIOR to this), SYSTOLIC BP > 180 mmHg or hgb>=10g/dL, HOLD DOSE. Dose must be within 1 week of Hgb.  Per anemia protocol with Cecilia Britt,CNP 0.5 mL 99     ferrous sulfate (FEROSUL) 325 (65 Fe) MG tablet TK 1 T PO QD  11     lisinopril (PRINIVIL/ZESTRIL) 5 MG tablet Take 1 tablet (5 mg) by mouth daily 90 tablet 3     metoprolol tartrate (LOPRESSOR)  25 MG tablet Take 0.5 tablets (12.5 mg) by mouth 2 times daily 90 tablet 3     multivitamin w/minerals (MULTI-VITAMIN) tablet Take 1 tablet by mouth daily       order for DME Equipment being ordered: olecranon bursa brace 1 each 0     sevelamer (RENVELA) 800 MG tablet Take 2 tablets (1,600 mg) by mouth 3 times daily (with meals) 90 tablet 3     ticagrelor (BRILINTA) 90 MG tablet Take 1 tablet (90 mg) by mouth 2 times daily 90 tablet 3     OTC products: None, except as noted above    No Known Allergies   Latex Allergy: NO    Social History     Tobacco Use     Smoking status: Former Smoker     Packs/day: 1.00     Years: 53.00     Pack years: 53.00     Types: Cigarettes     Last attempt to quit: 2017     Years since quittin.3     Smokeless tobacco: Never Used   Substance Use Topics     Alcohol use: Yes     Comment: rare, drink every few months     History   Drug Use No     Social History     Socioeconomic History     Marital status:      Spouse name: Not on file     Number of children: 2     Years of education: Not on file     Highest education level: Not on file   Occupational History     Occupation: [employer]     Employer: [employer]   Social Needs     Financial resource strain: Not on file     Food insecurity:     Worry: Not on file     Inability: Not on file     Transportation needs:     Medical: Not on file     Non-medical: Not on file   Tobacco Use     Smoking status: Former Smoker     Packs/day: 1.00     Years: 53.00     Pack years: 53.00     Types: Cigarettes     Last attempt to quit: 2017     Years since quittin.3     Smokeless tobacco: Never Used   Substance and Sexual Activity     Alcohol use: Yes     Comment: rare, drink every few months     Drug use: No     Sexual activity: Not Currently   Lifestyle     Physical activity:     Days per week: Not on file     Minutes per session: Not on file     Stress: Not on file   Relationships     Social connections:     Talks on phone: Not on  file     Gets together: Not on file     Attends Voodoo service: Not on file     Active member of club or organization: Not on file     Attends meetings of clubs or organizations: Not on file     Relationship status: Not on file     Intimate partner violence:     Fear of current or ex partner: Not on file     Emotionally abused: Not on file     Physically abused: Not on file     Forced sexual activity: Not on file   Other Topics Concern     Parent/sibling w/ CABG, MI or angioplasty before 65F 55M? Yes   Social History Narrative     Not on file     Family History   Problem Relation Age of Onset     C.A.D. Mother         d age 67     Vision Loss Father      Hearing Loss Sister      Diabetes Sister      Cancer No family hx of      Glaucoma No family hx of      Macular Degeneration No family hx of        REVIEW OF SYSTEMS:   CONSTITUTIONAL: NEGATIVE for fever, chills, change in weight  INTEGUMENTARY/SKIN: NEGATIVE for worrisome rashes, moles or lesions  EYES: has cataract  ENT/MOUTH: NEGATIVE for ear, mouth and throat problems  RESP: NEGATIVE for significant cough or SOB  CV: NEGATIVE for chest pain, palpitations or peripheral edema  GI: NEGATIVE for nausea, abdominal pain, heartburn, or change in bowel habits  : NEGATIVE for frequency, dysuria, or hematuria  NEURO: NEGATIVE for weakness, dizziness or paresthesias  ENDOCRINE: NEGATIVE for temperature intolerance, skin/hair changes  HEME: NEGATIVE for bleeding problems  PSYCHIATRIC: NEGATIVE for changes in mood or affect    EXAM:   There were no vitals taken for this visit.    GENERAL APPEARANCE: alert     EYES: EOMI,  PERRL     HENT: ear canals and TM's normal and nose and mouth without ulcers or lesions     NECK: no adenopathy, no asymmetry, masses, or scars and thyroid normal to palpation     RESP: lungs clear to auscultation - no rales, rhonchi or wheezes     CV: regular rates and rhythm, normal S1 S2, no S3 or S4 and  Grade 2-3/6 LYLE LSB     ABDOMEN:  soft,  nontender, no HSM or masses and bowel sounds normal     MS: extremities normal- no gross deformities noted, no evidence of inflammation in joints, FROM in all extremities.     SKIN: no suspicious lesions or rashes     NEURO: Normal strength and tone, sensory exam grossly normal, mentation intact and speech normal     PSYCH: mentation appears normal. and affect normal/bright     LYMPHATICS: No cervical adenopathy    DIAGNOSTICS:   No labs or EKG required for low risk surgery (cataract, skin procedure, breast biopsy, etc)    Recent Labs   Lab Test 04/30/19  0952 04/26/19  1316 04/26/19  0535  02/25/19  1012  11/17/17  0618  08/31/17  1738  02/24/16  1044   HGB 9.7*  --  8.9*   < > 9.7*   < > 7.8*   < >  --    < > 13.3     --  187   < >  --    < > 421   < >  --    < > 239   INR  --   --   --   --  1.06  --  1.11  --   --    < >  --     134 140   < > 140   < >  --    < >  --    < > 139   POTASSIUM 4.5 4.0 3.6   < > 4.5   < > 4.4   < >  --    < > 3.4   CR 3.96* 2.53* 4.81*   < > 5.88*   < > 5.30*   < >  --    < > 2.56*   A1C  --   --   --   --   --   --   --   --  5.6  --  5.4    < > = values in this interval not displayed.        IMPRESSION:   Reason for surgery/procedure: as above  Diagnosis/reason for consult: preop    The proposed surgical procedure is considered LOW risk.    REVISED CARDIAC RISK INDEX  The patient has the following serious cardiovascular risks for perioperative complications such as (MI, PE, VFib and 3  AV Block):  Serum Creatinine >2.0 mg/dl  INTERPRETATION: 1 risks: Class II (low risk - 0.9% complication rate)    The patient has the following additional risks for perioperative complications:  The ASCVD Risk score (Jh RED Jr., et al., 2013) failed to calculate for the following reasons:    The valid total cholesterol range is 130 to 320 mg/dL      ICD-10-CM    1. Preop general physical exam Z01.818        RECOMMENDATIONS:   APPROVAL GIVEN to proceed with proposed procedure, without  further diagnostic evaluation  Pt to check with his surgeon if they want him to stop Blood Thinners as It is not required for cataract surgery       Signed Electronically by: Haley Baker MD    Copy of this evaluation report is provided to requesting physician.    [facility] Preop Guidelines    Revised Cardiac Risk Index

## 2019-10-01 ENCOUNTER — OFFICE VISIT (OUTPATIENT)
Dept: FAMILY MEDICINE | Facility: CLINIC | Age: 74
End: 2019-10-01
Payer: COMMERCIAL

## 2019-10-01 VITALS
SYSTOLIC BLOOD PRESSURE: 123 MMHG | WEIGHT: 159 LBS | HEIGHT: 70 IN | RESPIRATION RATE: 16 BRPM | HEART RATE: 84 BPM | BODY MASS INDEX: 22.76 KG/M2 | TEMPERATURE: 97 F | DIASTOLIC BLOOD PRESSURE: 73 MMHG | OXYGEN SATURATION: 99 %

## 2019-10-01 DIAGNOSIS — I25.10 CORONARY ARTERY DISEASE INVOLVING NATIVE CORONARY ARTERY, ANGINA PRESENCE UNSPECIFIED, UNSPECIFIED WHETHER NATIVE OR TRANSPLANTED HEART: ICD-10-CM

## 2019-10-01 DIAGNOSIS — N18.5 CKD (CHRONIC KIDNEY DISEASE) STAGE 5, GFR LESS THAN 15 ML/MIN (H): ICD-10-CM

## 2019-10-01 DIAGNOSIS — I50.22 CHRONIC SYSTOLIC HEART FAILURE (H): ICD-10-CM

## 2019-10-01 DIAGNOSIS — I10 HYPERTENSION GOAL BP (BLOOD PRESSURE) < 140/90: ICD-10-CM

## 2019-10-01 DIAGNOSIS — D63.1 ANEMIA OF CHRONIC RENAL FAILURE, STAGE 5 (H): ICD-10-CM

## 2019-10-01 DIAGNOSIS — Z01.818 PREOP GENERAL PHYSICAL EXAM: Primary | ICD-10-CM

## 2019-10-01 DIAGNOSIS — N18.6 END STAGE RENAL DISEASE (H): ICD-10-CM

## 2019-10-01 DIAGNOSIS — N18.5 ANEMIA OF CHRONIC RENAL FAILURE, STAGE 5 (H): ICD-10-CM

## 2019-10-01 DIAGNOSIS — R73.01 ELEVATED FASTING GLUCOSE: ICD-10-CM

## 2019-10-01 PROCEDURE — 99214 OFFICE O/P EST MOD 30 MIN: CPT | Performed by: FAMILY MEDICINE

## 2019-10-01 PROCEDURE — 99207 C PAF COMPLETED  NO CHARGE: CPT | Performed by: FAMILY MEDICINE

## 2019-10-01 ASSESSMENT — MIFFLIN-ST. JEOR: SCORE: 1462.47

## 2019-10-22 ENCOUNTER — TELEPHONE (OUTPATIENT)
Dept: FAMILY MEDICINE | Facility: CLINIC | Age: 74
End: 2019-10-22

## 2019-10-22 DIAGNOSIS — I25.10 CORONARY ARTERY DISEASE INVOLVING NATIVE CORONARY ARTERY, ANGINA PRESENCE UNSPECIFIED, UNSPECIFIED WHETHER NATIVE OR TRANSPLANTED HEART: ICD-10-CM

## 2019-10-22 DIAGNOSIS — N18.6 ESRD (END STAGE RENAL DISEASE) (H): ICD-10-CM

## 2019-10-22 NOTE — TELEPHONE ENCOUNTER
Reason for Call:  Other prescription  URGENT!    Detailed comments: patient called and needs his prescription refilled ASAP today    ticagrelor (BRILINTA) 90 MG tablet    [facility]    Patient only has 1 tablet left today; please fill ASAP!    Phone Number Patient can be reached at: Home number on file [phone] (home)    Best Time: asap    Can we leave a detailed message on this number? YES    Call taken on 10/22/2019 at 3:56 PM by Abbi Flower

## 2019-10-22 NOTE — TELEPHONE ENCOUNTER
"Routing refill request to provider for review/approval because:  Labs out of range:  creatinine  Labs not current: ALT, AST      Requested Prescriptions   Pending Prescriptions Disp Refills     ticagrelor (BRILINTA) 90 MG tablet 90 tablet 3     Sig: Take 1 tablet (90 mg) by mouth 2 times daily       Platelet Inhibitors Failed - 10/22/2019  4:01 PM        Failed - Normal ALT on file in past 12 months     Recent Labs   Lab Test 12/11/17  1018   ALT 17             Failed - Normal HGB on file in past 12 months     Recent Labs   Lab Test 04/30/19  0952   HGB 9.7*               Failed - Normal AST on file in past 12 months     Recent Labs   Lab Test 12/11/17  1018   AST 13             Failed - Normal serum creatinine on file in past 12 months     Recent Labs   Lab Test 04/30/19  0952  03/04/19  1047   CR 3.96*   < >  --    CREAT  --   --  6.0*    < > = values in this interval not displayed.             Passed - Normal Platelets on file in past 12 months     Recent Labs   Lab Test 04/30/19  0952                  Passed - Recent (12 mo) or future (30 days) visit within the authorizing provider's specialty     Patient has had an office visit with the authorizing provider or a provider within the authorizing providers department within the previous 12 mos or has a future within next 30 days. See \"Patient Info\" tab in inbasket, or \"Choose Columns\" in Meds & Orders section of the refill encounter.              Passed - Medication is active on med list        Passed - Patient is age 18 or older        Zainab Powell RN  [facility]    "

## 2019-10-24 DIAGNOSIS — I10 HYPERTENSION GOAL BP (BLOOD PRESSURE) < 140/90: ICD-10-CM

## 2019-10-24 RX ORDER — AMLODIPINE BESYLATE 2.5 MG/1
2.5 TABLET ORAL DAILY
Qty: 90 TABLET | Refills: 3 | Status: SHIPPED | OUTPATIENT
Start: 2019-10-24 | End: 2020-02-12

## 2019-10-24 NOTE — TELEPHONE ENCOUNTER
Per pharmacy, patient states he is taking 1 tablet twice daily now. Please send new rx if appropriate.  Zainab SERRATO CMA ([facility])

## 2019-10-25 ENCOUNTER — TELEPHONE (OUTPATIENT)
Dept: FAMILY MEDICINE | Facility: CLINIC | Age: 74
End: 2019-10-25

## 2019-10-25 DIAGNOSIS — I50.22 CHRONIC SYSTOLIC HEART FAILURE (H): Primary | ICD-10-CM

## 2019-10-25 NOTE — TELEPHONE ENCOUNTER
Called and spoke with patient. He states he has been taking amlodipine 2.5 mg BID however the prescription is only written for once daily.   Patient states he has been taking this for a long time but cannot remember who changed this dose.  Unable to find any OV notes with PCP or cardiology notes where dose was increased to 2.5 mg BID.    Anjali Laboy RN

## 2019-10-25 NOTE — TELEPHONE ENCOUNTER
Reason for call:  Medication   If this is a refill request, has the caller requested the refill from the pharmacy already? Yes  Will the patient be using a [facility]? No  Name of the pharmacy and phone number for the current request: [id number] [place], [place] - [address] AT [facility]  [address]  [phone]    Name of the medication requested: Amlodipine    Other request: Rx was written for 1 daily but patient takes 2 a day. Please call.     Phone number to reach patient:  Other phone number:  [phone]*    Best Time:  any    Can we leave a detailed message on this number?  YES

## 2019-10-28 ENCOUNTER — TRANSFERRED RECORDS (OUTPATIENT)
Dept: HEALTH INFORMATION MANAGEMENT | Facility: CLINIC | Age: 74
End: 2019-10-28

## 2019-10-28 RX ORDER — BUMETANIDE 2 MG/1
2 TABLET ORAL 2 TIMES DAILY
COMMUNITY
Start: 2019-10-28 | End: 2020-02-12

## 2019-10-28 RX ORDER — BUMETANIDE 2 MG/1
2 TABLET ORAL 2 TIMES DAILY
COMMUNITY
Start: 2019-04-24 | End: 2019-10-28

## 2019-10-28 NOTE — TELEPHONE ENCOUNTER
Jose Juan from [facility] returned call to RN Hotline and left VM. She states they did not adjust medications but they have listed that he is taking amlodipine 2.5 mg daily and bumex 2 mg BID.     Left message for Jose Juan at [facility] if Dr. Guzman wants patient on these medications or if it's up to PCP if he should be taking or not.    Anjali Laboy, BRANDY

## 2019-10-28 NOTE — TELEPHONE ENCOUNTER
Huddled with Dr. Baker.   Check nephrology notes.  Per 10/21/19 nephrology notes, bumetanide 2 mg tablet, take 1 tablet BID is listed but was not active on MAR.    Called and spoke with pharmacy. States they have only been dispensing amlodipine 2.5 mg tablet, 1 tablet daily. No other prescriptions for this.   Last dispensed Bumex 2 mg tablet, 1 tablet daily on 6/3/19 for 90 day supply but previous Rx was for Bumex 2 mg tablet, 1 tablet BID.    Called and spoke with patient. He thinks that the bumetanide 2 mg was discontinued but doesn't know who discontinued it and reports he has not been taking. Patient also again confirmed that he has been taking amlodipine 2.5 mg BID.    Called [facility] and left message to confirm if patient should be taking Bumex or amlodipine 2.5 mg BID. Patient was seen by [facility] on 10/10/19 and per Care Everywhere, has amlodipine 2.5 mg once daily and Bumex 2 mg, 1 tablet BID listed.     Need to contact nephrology as well to see if they adjusted Bumex or amlodipine once cardiology returns call.    Anjali Laboy RN

## 2019-10-28 NOTE — TELEPHONE ENCOUNTER
Spoke with Jose Juan from [facility], they would like patient to continue the current prescribed dosing for both medications: Amlodipine 2.5mg once daily and Bumex 2mg BID.  Called patient but he was not home- wife advised that he would be back around 2pm.   See note below, also need to call nephrology.  Called nephrology and was told that patients medications are managed/ordered by [facility]. Called [facility] and was told Amlodipine is not on patients meds list (not managed there), Bumex is listed as 2mg BID.   No other provider has changed the Amlodipine so patient should follow Dr. Guzman (cardiology) orders.   Called patient and advised he should be taking the Bumex and the Amlodipine 2.5mg once daily.  Patient thinks he may run out of Amlodipine due to taking more than prescribed- advised patient to talk with pharmacy as they may have to do an insurance override since dose has not changed. Patient verbalized understanding, no further questions or concerns.    Safia Elder RN

## 2019-11-04 NOTE — TELEPHONE ENCOUNTER
Left detailed message for patient with information and to call [facility] at [phone].   Call RN Hotline [phone] if questions.     Anjali Laboy RN

## 2019-11-04 NOTE — TELEPHONE ENCOUNTER
Unable to pend medication because it is ordered in a future encounter. Please advise on refill of Bumex. Medication is reported as historical.    Zainab Powell RN  [facility]

## 2019-11-04 NOTE — TELEPHONE ENCOUNTER
Reason for Call:  Medication or medication refill:    Do you use a [facility]?  Name of the pharmacy and phone number for the current request:  [facility], [phone]    Name of the medication requested: Bumex 2MG     Other request: n/a     Can we leave a detailed message on this number? YES    Phone number patient can be reached at: Home number on file [phone] (home)    Best Time: Any     Call taken on 11/4/2019 at 8:22 AM by Joan Llanos

## 2019-11-12 DIAGNOSIS — I50.22 CHRONIC SYSTOLIC HEART FAILURE (H): ICD-10-CM

## 2019-11-14 RX ORDER — BUMETANIDE 2 MG/1
TABLET ORAL
Start: 2019-11-14

## 2019-11-14 NOTE — TELEPHONE ENCOUNTER
Labs overdue - dose has changed per last cardiology note - last Rx given by cardiology     Refused Prescriptions:                       Disp   Refills    bumetanide (BUMEX) 2 MG tablet [Pharmacy M*                Sig: TAKE 1 TABLET(2 MG) BY MOUTH DAILY  Refused By: SABINE MCCARTY  Reason for Refusal: Originating/Specialty Provider to approve

## 2019-11-14 NOTE — TELEPHONE ENCOUNTER
"Routing refill request to provider for review/approval because:  Labs out of range:  Cr  Medication is reported as historical      Requested Prescriptions   Pending Prescriptions Disp Refills     bumetanide (BUMEX) 2 MG tablet [Pharmacy Med Name: BUMETANIDE 2MG TABLETS] 90 tablet 0     Sig: TAKE 1 TABLET(2 MG) BY MOUTH DAILY       Diuretics (Including Combos) Protocol Failed - 11/12/2019  8:00 AM        Failed - Normal serum creatinine on file in past 12 months     Recent Labs   Lab Test 04/30/19  0952   CR 3.96*              Passed - Blood pressure under 140/90 in past 12 months     BP Readings from Last 3 Encounters:   10/01/19 123/73   08/27/19 119/66   05/07/19 118/68                 Passed - Recent (12 mo) or future (30 days) visit within the authorizing provider's specialty     Patient has had an office visit with the authorizing provider or a provider within the authorizing providers department within the previous 12 mos or has a future within next 30 days. See \"Patient Info\" tab in inbasket, or \"Choose Columns\" in Meds & Orders section of the refill encounter.              Passed - Medication is active on med list        Passed - Patient is age 18 or older        Passed - Normal serum potassium on file in past 12 months     Recent Labs   Lab Test 04/30/19  0952   POTASSIUM 4.5                    Passed - Normal serum sodium on file in past 12 months     Recent Labs   Lab Test 04/30/19  0952                 Zainab Powell RN  [facility]    "

## 2020-01-27 ENCOUNTER — OFFICE VISIT (OUTPATIENT)
Dept: FAMILY MEDICINE | Facility: CLINIC | Age: 75
End: 2020-01-27
Payer: COMMERCIAL

## 2020-01-27 VITALS
HEIGHT: 69 IN | OXYGEN SATURATION: 99 % | HEART RATE: 73 BPM | BODY MASS INDEX: 23.11 KG/M2 | SYSTOLIC BLOOD PRESSURE: 121 MMHG | WEIGHT: 156 LBS | TEMPERATURE: 97.9 F | DIASTOLIC BLOOD PRESSURE: 54 MMHG

## 2020-01-27 DIAGNOSIS — Z01.818 PREOP GENERAL PHYSICAL EXAM: Primary | ICD-10-CM

## 2020-01-27 DIAGNOSIS — I25.10 CORONARY ARTERY DISEASE DUE TO CALCIFIED CORONARY LESION: ICD-10-CM

## 2020-01-27 DIAGNOSIS — N18.5 ANEMIA OF CHRONIC RENAL FAILURE, STAGE 5 (H): ICD-10-CM

## 2020-01-27 DIAGNOSIS — I25.84 CORONARY ARTERY DISEASE DUE TO CALCIFIED CORONARY LESION: ICD-10-CM

## 2020-01-27 DIAGNOSIS — I35.0 AORTIC VALVE STENOSIS, ETIOLOGY OF CARDIAC VALVE DISEASE UNSPECIFIED: ICD-10-CM

## 2020-01-27 DIAGNOSIS — D63.1 ANEMIA OF CHRONIC RENAL FAILURE, STAGE 5 (H): ICD-10-CM

## 2020-01-27 DIAGNOSIS — I50.22 CHRONIC SYSTOLIC HEART FAILURE (H): ICD-10-CM

## 2020-01-27 DIAGNOSIS — N18.5 CKD (CHRONIC KIDNEY DISEASE) STAGE 5, GFR LESS THAN 15 ML/MIN (H): ICD-10-CM

## 2020-01-27 DIAGNOSIS — Z98.890 S/P CORONARY ANGIOGRAM: ICD-10-CM

## 2020-01-27 PROCEDURE — 99214 OFFICE O/P EST MOD 30 MIN: CPT | Performed by: FAMILY MEDICINE

## 2020-01-27 ASSESSMENT — PAIN SCALES - GENERAL: PAINLEVEL: NO PAIN (0)

## 2020-01-27 ASSESSMENT — MIFFLIN-ST. JEOR: SCORE: 1427.99

## 2020-01-27 NOTE — PROGRESS NOTES
[address] [id number] [phone]  Dept: [phone]    PRE-OP EVALUATION:  Today's date: 2020    Dung Norton (: 1945) presents for pre-operative evaluation assessment as requested by Dr. Guzman.  He requires evaluation and anesthesia risk assessment prior to undergoing surgery/procedure for treatment of heart valve replacement .    Fax number for surgical facility: [phone]  Primary Physician: Haley Baker  Type of Anesthesia Anticipated: to be determined    Patient has a Health Care Directive or Living Will:  YES scanned    Preop Questions 2020   Who is doing your surgery? dr guzman   What are you having done? physical   Date of Surgery/Procedure: 2020   Facility or Hospital where procedure/surgery will be performed: [facility]   1.  Do you have a history of Heart attack, stroke, stent, coronary bypass surgery, or other heart surgery? YES  - , UNKNOWN - been stable   2.  Do you ever have any pain or discomfort in your chest? No   3.  Do you have a history of  Heart Failure? No   4.   Are you troubled by shortness of breath when:  walking on a level surface, or up a slight hill, or at night? No   5.  Do you currently have a cold, bronchitis or other respiratory infection? No   6.  Do you have a cough, shortness of breath, or wheezing? No   7.  Do you sometimes get pains in the calves of your legs when you walk? No   8. Do you or anyone in your family have previous history of blood clots? No   9.  Do you or does anyone in your family have a serious bleeding problem such as prolonged bleeding following surgeries or cuts? No   10. Have you ever had problems with anemia or been told to take iron pills? No   11. Have you had any abnormal blood loss such as black, tarry or bloody stools? No   12. Have you ever had a blood transfusion? YES -    13. Have you or any of your relatives ever had  problems with anesthesia? No   14. Do you have sleep apnea, excessive snoring or daytime drowsiness? No   15. Do you have any prosthetic heart valves? No   16. Do you have prosthetic joints? No         HPI:     HPI related to upcoming procedure: Patient with history of coronary artery disease status post stent, x2.  History of aortic valve stenosis.  Is scheduled to have AVR.  Patient does have history of chronic, end-stage renal disease on hemodialysis twice per week.  He maintained to make urine output.    Patient denies chest pain, denies shortness of breath, denies fluid overload.  Denies weakness.  Has no chronic cough.      See problem list for active medical problems.  Problems all longstanding and stable, except as noted/documented.  See ROS for pertinent symptoms related to these conditions.      MEDICAL HISTORY:        Past Medical History:   Diagnosis Date     BPH (benign prostatic hyperplasia)      Chronic kidney disease      HTN      Nonsenile cataract      Obstructive uropathy      Pulmonary nodules      Tobacco abuse      Past Surgical History:   Procedure Laterality Date     APPENDECTOMY  AGE 6     CREATE FISTULA ARTERIOVENOUS UPPER EXTREMITY Left 11/17/2017    Procedure: CREATE FISTULA ARTERIOVENOUS UPPER EXTREMITY;  Left Cephalic Vein To Radial Artery Arteriovenous Fistula Creation, Anesthesia Block;  Surgeon: Eduardo Pabon MD;  [facility]     CV CORONARY ANGIOGRAM N/A 2/22/2019    Procedure: 1100 CORS, RHC; BI-PLANE;  Surgeon: Gabino Collins MD;  Location:  HEART CARDIAC CATH LAB     CV CORONARY ANGIOGRAM N/A 4/22/2019    Procedure: CV CORONARY ANGIOGRAM;  Surgeon: Gabino Collins MD;  Location:  HEART CARDIAC CATH LAB     CV PCI ANGIOPLASTY N/A 4/22/2019    Procedure: CV PCI ANGIOPLASTY;  Surgeon: Gaibno Collins MD;  Location: [facility] CARDIAC CATH LAB     CYSTOSCOPY, FULGURATE BLEEDERS, EVACUATE CLOT(S), COMBINED N/A 7/16/2017    Procedure: COMBINED CYSTOSCOPY, FULGURATE  BLEEDERS, EVACUATE CLOT(S);  Cystoscopy clot evacuation, bladder biopsy and fulguration (per surgeons note) NERVE BLOCK PERIPHERAL;  Surgeon: Tamiko Vanegas MD;  Location: [facility] OR     CYSTOSCOPY, TRANSURETHRAL RESECTION (TUR) PROSTATE, COMBINED N/A 3/5/2018    Procedure: COMBINED CYSTOSCOPY, TRANSURETHRAL RESECTION (TUR) PROSTATE;  Cystoscopy, Transurethral Resection of Prostate ;  Surgeon: Tamiko Vanegas MD;  Location: [facility] OR     SINUS SURGERY  AGE 6     TONSILLECTOMY      CHILDHOOD     Current Outpatient Medications   Medication Sig Dispense Refill     amLODIPine (NORVASC) 2.5 MG tablet Take 1 tablet (2.5 mg) by mouth daily 90 tablet 3     aspirin (ASA) 81 MG EC tablet Take 1 tablet (81 mg) by mouth daily Start tomorrow morning. 90 tablet 3     atorvastatin (LIPITOR) 10 MG tablet Take 1 tablet (10 mg) by mouth daily 90 tablet 3     bumetanide (BUMEX) 2 MG tablet Take 1 tablet (2 mg) by mouth 2 times daily       lisinopril (PRINIVIL/ZESTRIL) 5 MG tablet Take 1 tablet (5 mg) by mouth daily 90 tablet 3     metoprolol tartrate (LOPRESSOR) 25 MG tablet Take 0.5 tablets (12.5 mg) by mouth 2 times daily 90 tablet 3     order for DME Equipment being ordered: olecranon bursa brace 1 each 0     sevelamer (RENVELA) 800 MG tablet Take 2 tablets (1,600 mg) by mouth 3 times daily (with meals) 90 tablet 3     ticagrelor (BRILINTA) 90 MG tablet Take 1 tablet (90 mg) by mouth 2 times daily 90 tablet 3     darbepoetin william (ARANESP, ALBUMIN FREE,) 100 MCG/0.5ML injection Inject 0.5 mLs (100 mcg) Subcutaneous every 14 days As needed for hgb<10g/dL.  If Hgb increases >1 point in 2 weeks (if blood transfusion given, use hgb PRIOR to this), SYSTOLIC BP > 180 mmHg or hgb>=10g/dL, HOLD DOSE. Dose must be within 1 week of Hgb.  Per anemia protocol with Cecilia De La Torre CNP (Patient not taking: Reported on 10/1/2019) 0.5 mL 99     OTC products: None, except as noted above    No Known Allergies   Latex Allergy: NO    Social  "History     Tobacco Use     Smoking status: Former Smoker     Packs/day: 1.00     Years: 53.00     Pack years: 53.00     Types: Cigarettes     Last attempt to quit: 2017     Years since quittin.6     Smokeless tobacco: Never Used   Substance Use Topics     Alcohol use: Yes     Comment: rare, drink every few months     History   Drug Use No       REVIEW OF SYSTEMS:   Constitutional, neuro, ENT, endocrine, pulmonary, cardiac, gastrointestinal, genitourinary, musculoskeletal, integument and psychiatric systems are negative, except as otherwise noted.    EXAM:   /54 (BP Location: Right arm, Patient Position: Chair, Cuff Size: Adult Regular)   Pulse 73   Temp 97.9  F (36.6  C) (Oral)   Ht 1.753 m (5' 9\")   Wt 70.8 kg (156 lb)   SpO2 99%   BMI 23.04 kg/m      GENERAL APPEARANCE: healthy, alert and no distress     HENT: ear canals and TM's normal and nose and mouth without ulcers or lesions     NECK: no adenopathy, no asymmetry, masses, or scars and thyroid normal to palpation     RESP: lungs clear to auscultation - no rales, rhonchi or wheezes     CV: regular rates and rhythm and grade 3/6 systolic murmur.     ABDOMEN:  soft, nontender, no HSM or masses and bowel sounds normal     MS: extremities normal- no gross deformities noted, no evidence of inflammation in joints, FROM in all extremities.     SKIN: no suspicious lesions or rashes     NEURO: Normal strength and tone, sensory exam grossly normal, mentation intact and speech normal     PSYCH: mentation appears normal. and affect normal/bright     LYMPHATICS: No cervical adenopathy    DIAGNOSTICS:   Previous EKG from 2019 showed normal sinus rhythm with nonspecific T, T wave abnormality.    Recently had chest CTA    Recent Labs   Lab Test 19  0952 19  1316 19  0535  19  1012  17  0618  17  1738  16  1044   HGB 9.7*  --  8.9*   < > 9.7*   < > 7.8*   < >  --    < > 13.3     --  187   < >  " --    < > 421   < >  --    < > 239   INR  --   --   --   --  1.06  --  1.11  --   --    < >  --     134 140   < > 140   < >  --    < >  --    < > 139   POTASSIUM 4.5 4.0 3.6   < > 4.5   < > 4.4   < >  --    < > 3.4   CR 3.96* 2.53* 4.81*   < > 5.88*   < > 5.30*   < >  --    < > 2.56*   A1C  --   --   --   --   --   --   --   --  5.6  --  5.4    < > = values in this interval not displayed.        IMPRESSION:   Reason for surgery/procedure: Aortic valve stenosis  Diagnosis/reason for consult: AVR    The proposed surgical procedure is considered HIGH risk.    REVISED CARDIAC RISK INDEX  The patient has the following serious cardiovascular risks for perioperative complications such as (MI, PE, VFib and 3  AV Block):  High risk surgery (>5% cardiac complication risk)  Coronary Artery Disease (MI, positive stress test, angina, Qs on EKG)  Serum Creatinine >2.0 mg/dl  INTERPRETATION: 1 risks: Class II (low risk - 0.9% complication rate)    The patient has the following additional risks for perioperative complications:  No identified additional risks      ICD-10-CM    1. Preop general physical exam Z01.818        Diagnosis Comments   1. Preop general physical exam     2. Aortic valve stenosis, etiology of cardiac valve disease unspecified     3. CKD (chronic kidney disease) stage 5, GFR less than 15 ml/min (H)     4. Chronic systolic heart failure (H)     5. Anemia of chronic renal failure, stage 5 (H)     6. S/P coronary angiogram     7. Coronary artery disease due to calcified coronary lesion           RECOMMENDATIONS:     --Consult hospital rounder / IM to assist post-op medical management    --Patient is to take all scheduled medications on the day of surgery EXCEPT for modifications listed below.    APPROVAL GIVEN to proceed with proposed procedure, without further diagnostic evaluation       Signed Electronically by: Wilfredo Govea MD    Copy of this evaluation report is provided to requesting  physician.    [place] Preop Guidelines    Revised Cardiac Risk Index

## 2020-02-10 DIAGNOSIS — I50.20 HEART FAILURE WITH REDUCED EJECTION FRACTION, NYHA CLASS I (H): ICD-10-CM

## 2020-02-10 DIAGNOSIS — I25.10 CORONARY ARTERY DISEASE INVOLVING NATIVE CORONARY ARTERY OF NATIVE HEART WITHOUT ANGINA PECTORIS: Primary | ICD-10-CM

## 2020-02-10 RX ORDER — METOPROLOL TARTRATE 25 MG/1
TABLET, FILM COATED ORAL
Qty: 90 TABLET | Refills: 0 | Status: SHIPPED | OUTPATIENT
Start: 2020-02-10 | End: 2020-05-13

## 2020-02-10 RX ORDER — ATORVASTATIN CALCIUM 10 MG/1
TABLET, FILM COATED ORAL
Qty: 90 TABLET | Refills: 0 | Status: SHIPPED | OUTPATIENT
Start: 2020-02-10 | End: 2020-05-13

## 2020-02-10 NOTE — TELEPHONE ENCOUNTER
Signed Prescriptions:                        Disp   Refills    atorvastatin (LIPITOR) 10 MG tablet        90 tab*0        Sig: TAKE 1 TABLET(10 MG) BY MOUTH DAILY  Authorizing Provider: JULIA DEVINE  Ordering User: STEPHANIE MENENDEZ    metoprolol tartrate (LOPRESSOR) 25 MG tabl*90 tab*0        Sig: TAKE 1/2 TABLET(12.5 MG) BY MOUTH TWICE DAILY  Authorizing Provider: JULIA DEVINE  Ordering User: STEPHANIE MENENDEZ    Rx filled per refill protocol.    Stephanie Menendez RN

## 2020-02-11 ENCOUNTER — TELEPHONE (OUTPATIENT)
Dept: UROLOGY | Facility: CLINIC | Age: 75
End: 2020-02-11

## 2020-02-11 NOTE — TELEPHONE ENCOUNTER
[facility] Call Center    Phone Message    May a detailed message be left on voicemail: yes     Reason for Call: Order(s): Other:   Reason for requested: Needs update on Catheter Supplies (said faxed over on 01/10 and 01/30)  Date needed: ASAP  Provider name: Dr Guilherme Bolton from [facility], Ph # [phone], Fax # [phone]    Been waiting on Fax back to update the old Rx - needs provider initials and date updated on Dx code to keep ordering these supplies - Thanks    Please call and let her know you rec'd this fax and please update and fax back - Thanks      Action Taken: Message routed to:  [facility] Urology Clinic    Travel Screening: Not Applicable

## 2020-02-12 ENCOUNTER — OFFICE VISIT (OUTPATIENT)
Dept: FAMILY MEDICINE | Facility: CLINIC | Age: 75
End: 2020-02-12
Payer: COMMERCIAL

## 2020-02-12 VITALS
HEIGHT: 69 IN | BODY MASS INDEX: 22.87 KG/M2 | HEART RATE: 70 BPM | WEIGHT: 154.4 LBS | TEMPERATURE: 97.2 F | SYSTOLIC BLOOD PRESSURE: 130 MMHG | DIASTOLIC BLOOD PRESSURE: 60 MMHG | RESPIRATION RATE: 16 BRPM | OXYGEN SATURATION: 100 %

## 2020-02-12 DIAGNOSIS — Z99.2 DEPENDENCE ON RENAL DIALYSIS (H): ICD-10-CM

## 2020-02-12 DIAGNOSIS — Z95.3 S/P TAVR (TRANSCATHETER AORTIC VALVE REPLACEMENT), BIOPROSTHETIC: Primary | ICD-10-CM

## 2020-02-12 DIAGNOSIS — N18.5 ANEMIA OF CHRONIC RENAL FAILURE, STAGE 5 (H): ICD-10-CM

## 2020-02-12 DIAGNOSIS — N18.5 CKD (CHRONIC KIDNEY DISEASE) STAGE 5, GFR LESS THAN 15 ML/MIN (H): ICD-10-CM

## 2020-02-12 DIAGNOSIS — D63.1 ANEMIA OF CHRONIC RENAL FAILURE, STAGE 5 (H): ICD-10-CM

## 2020-02-12 DIAGNOSIS — I25.10 CORONARY ARTERY DISEASE INVOLVING NATIVE CORONARY ARTERY, ANGINA PRESENCE UNSPECIFIED, UNSPECIFIED WHETHER NATIVE OR TRANSPLANTED HEART: ICD-10-CM

## 2020-02-12 PROBLEM — N17.9 ACUTE RENAL FAILURE, UNSPECIFIED ACUTE RENAL FAILURE TYPE (H): Status: RESOLVED | Noted: 2017-09-12 | Resolved: 2020-02-12

## 2020-02-12 PROBLEM — I73.9 PVD (PERIPHERAL VASCULAR DISEASE) (H): Status: ACTIVE | Noted: 2019-12-04

## 2020-02-12 PROBLEM — I95.1 ORTHOSTATIC HYPOTENSION: Status: RESOLVED | Noted: 2017-09-12 | Resolved: 2020-02-12

## 2020-02-12 PROBLEM — N17.9 AKI (ACUTE KIDNEY INJURY) (H): Status: RESOLVED | Noted: 2017-08-28 | Resolved: 2020-02-12

## 2020-02-12 PROCEDURE — 99214 OFFICE O/P EST MOD 30 MIN: CPT | Performed by: FAMILY MEDICINE

## 2020-02-12 RX ORDER — RENAGEL 800 MG/1
TABLET ORAL
COMMUNITY
Start: 2019-03-18 | End: 2020-02-12

## 2020-02-12 ASSESSMENT — MIFFLIN-ST. JEOR: SCORE: 1420.73

## 2020-02-12 NOTE — TELEPHONE ENCOUNTER
I did not receive the fax. I asked that they re-fax it to my attention and I will have Dr. Vanegas sign on Friday when she is back in clinic. Brittany Mock RN

## 2020-02-12 NOTE — PROGRESS NOTES
Subjective     Dung Norton is a 74 year old male who presents to clinic today for the following health issues:    HPI    Patient here to follow up after recent heart surgery.      Hospital Follow-up Visit:    Hospital/Nursing Home/ Rehab Facility: [facility]  Date of Admission: 1-29-2  Date of Discharge: 1-30-20  Reason(s) for Admission: 1. Severe low flow low gradient aortic stenosis:  - Successful transfemoral transcatheter aortic valve replacement with a 34mm Medtronic Evolut R valve on 1/29/2020  - Anticoagulation plan: Aspirin and Brilinta  - Rhythm:  ? presenting rhythm: Normal sinus rhythm   ? current rhythm: Sinus rhythm with occasional Premature ventricular complexes   2.  Coronary artery disease:   -Coronary angiogram in May 2019 showed severe three vessel disease in the left dominant system that was treated with rotational atherectomy and drug eluting stent implantation of the mid LAD with a 3.5 x 24 mm Synergy drug eluting stent and to the mid circumflex with 3.5 x 16 mm Synergy drug eluting stent. The right coronary artery was not evaluated, as is nondominant. A CTA coronary from March 2019 shows significant disease in the RCA, however, again this is nondominant, so was not targeted for revascularization.   3.  Cardiomyopathy: related to his valve   4.  Abdominal aorta and bilateral common iliac aneurysms:   5.  End stage kidney disease, on dialysis              Problems taking medications regularly:  None       Medication changes since discharge: None       Problems adhering to non-medication therapy:  None    Summary of hospitalization:  CareEverywhere information obtained and reviewed  Diagnostic Tests/Treatments reviewed.  Follow up needed: none  Other Healthcare Providers Involved in Patient s Care:         cardiologist appointment March  Update since discharge: improved.     Post Discharge Medication Reconciliation: discharge medications reconciled, continue medications without  change.  Plan of care communicated with patient     Coding guidelines for this visit:  Type of Medical   Decision Making Face-to-Face Visit       within 7 Days of discharge Face-to-Face Visit        within 14 days of discharge   Moderate Complexity [id number]   High Complexity [id number]                Patient Active Problem List   Diagnosis     CARDIOVASCULAR SCREENING; LDL GOAL LESS THAN 130     Hypertension goal BP (blood pressure) < 140/90     Advanced directives, counseling/discussion     Elevated fasting glucose     Hypertriglyceridemia     Symptomatic anemia     Pulmonary nodules     Anemia of chronic renal failure, stage 5 (H)     End stage renal disease (H)     Acquired hypothyroidism     Obstructive uropathy     CKD (chronic kidney disease) stage 5, GFR less than 15 ml/min (H)     Ex-smoker     Thoracic aortic aneurysm without rupture (H)     Nonrheumatic aortic valve stenosis     Olecranon bursitis of left elbow     Urinary tract infection     S/P transurethral resection of prostate     Nuclear sclerosis of both eyes     Chronic systolic heart failure (H)     Status post coronary angiogram     Coronary artery disease involving native coronary artery, angina presence unspecified, unspecified whether native or transplanted heart     S/P coronary angiogram     ESRD (end stage renal disease) (H)     S/p TAVR (transcatheter aortic valve replacement), bioprosthetic     PVD (peripheral vascular disease) (H)     Dependence on renal dialysis (H)     Past Surgical History:   Procedure Laterality Date     APPENDECTOMY  AGE 6     CREATE FISTULA ARTERIOVENOUS UPPER EXTREMITY Left 11/17/2017    Procedure: CREATE FISTULA ARTERIOVENOUS UPPER EXTREMITY;  Left Cephalic Vein To Radial Artery Arteriovenous Fistula Creation, Anesthesia Block;  Surgeon: Eduardo Pabon MD;  Location: [facility] OR     CV CORONARY ANGIOGRAM N/A 2/22/2019    Procedure: 1100 CORS, RHC; BI-PLANE;  Surgeon: Gabino Collins MD;  Location: [facility]  CARDIAC CATH LAB     CV CORONARY ANGIOGRAM N/A 2019    Procedure: CV CORONARY ANGIOGRAM;  Surgeon: Gabino Collins MD;  Location:  HEART CARDIAC CATH LAB     CV PCI ANGIOPLASTY N/A 2019    Procedure: CV PCI ANGIOPLASTY;  Surgeon: Gabino Collins MD;  Location:  HE[facility] CARDIAC CATH LAB     CYSTOSCOPY, FULGURATE BLEEDERS, EVACUATE CLOT(S), COMBINED N/A 2017    Procedure: COMBINED CYSTOSCOPY, FULGURATE BLEEDERS, EVACUATE CLOT(S);  Cystoscopy clot evacuation, bladder biopsy and fulguration (per surgeons note) NERVE BLOCK PERIPHERAL;  Surgeon: Tamiko Vanegas MD;  Location: [facility] OR     CYSTOSCOPY, TRANSURETHRAL RESECTION (TUR) PROSTATE, COMBINED N/A 3/5/2018    Procedure: COMBINED CYSTOSCOPY, TRANSURETHRAL RESECTION (TUR) PROSTATE;  Cystoscopy, Transurethral Resection of Prostate ;  Surgeon: Tamiko Vanegas MD;  Location: [facility] OR     SINUS SURGERY  AGE 6     TONSILLECTOMY      CHILDHOOD       Social History     Tobacco Use     Smoking status: Former Smoker     Packs/day: 1.00     Years: 53.00     Pack years: 53.00     Types: Cigarettes     Last attempt to quit: 2017     Years since quittin.6     Smokeless tobacco: Never Used   Substance Use Topics     Alcohol use: Yes     Comment: rare, drink every few months     Family History   Problem Relation Age of Onset     C.A.D. Mother         d age 67     Vision Loss Father      Hearing Loss Sister      Diabetes Sister      Cancer No family hx of      Glaucoma No family hx of      Macular Degeneration No family hx of          Current Outpatient Medications   Medication Sig Dispense Refill     aspirin (ASA) 81 MG EC tablet Take 1 tablet (81 mg) by mouth daily Start tomorrow morning. 90 tablet 3     atorvastatin (LIPITOR) 10 MG tablet TAKE 1 TABLET(10 MG) BY MOUTH DAILY 90 tablet 0     lisinopril (PRINIVIL/ZESTRIL) 5 MG tablet Take 1 tablet (5 mg) by mouth daily 90 tablet 3     metoprolol tartrate (LOPRESSOR) 25 MG tablet TAKE 1/2  TABLET(12.5 MG) BY MOUTH TWICE DAILY 90 tablet 0     order for DME Equipment being ordered: olecranon bursa brace 1 each 0     sevelamer (RENVELA) 800 MG tablet Take 2 tablets (1,600 mg) by mouth 3 times daily (with meals) 90 tablet 3     ticagrelor (BRILINTA) 90 MG tablet Take 1 tablet (90 mg) by mouth 2 times daily 90 tablet 3     darbepoetin william (ARANESP, ALBUMIN FREE,) 100 MCG/0.5ML injection Inject 0.5 mLs (100 mcg) Subcutaneous every 14 days As needed for hgb<10g/dL.  If Hgb increases >1 point in 2 weeks (if blood transfusion given, use hgb PRIOR to this), SYSTOLIC BP > 180 mmHg or hgb>=10g/dL, HOLD DOSE. Dose must be within 1 week of Hgb.  Per anemia protocol with Cecilia De La Torre CNP (Patient not taking: Reported on 10/1/2019) 0.5 mL 99     No Known Allergies  Recent Labs   Lab Test 04/30/19  0952 04/26/19  1316  04/24/19  0524  04/16/19  1053  01/16/19  1255  12/11/17  1018  09/25/17  1028  08/31/17  1738  08/28/17  1909  07/19/17  0537  02/24/16  1044  01/15/14  0950   A1C  --   --   --   --   --   --   --   --   --   --   --   --   --  5.6  --   --   --   --   --  5.4  --  5.3   LDL  --   --   --   --   --  12  --   --   --   --   --  100*  --   --   --   --   --   --   --  114*   < > 119   HDL  --   --   --   --   --  42  --   --   --   --   --  38*  --   --   --   --   --   --   --  29*   < > 33*   TRIG  --   --   --   --   --  85  --   --   --   --   --  114  --   --   --   --   --   --   --  199*   < > 230*   ALT  --   --   --   --   --   --   --   --   --  17  --   --   --   --   --  17  --  17   < >  --   --  29   CR 3.96* 2.53*   < > 4.64*   < > 5.16*   < > 5.44*   < > 5.20*   < > 5.32*   < >  --    < > 7.80*   < > 7.19*   < > 2.56*   < > 0.91   GFRESTIMATED 14* 24*   < > 11*   < > 10*   < > 9*   < > 11*   < > 11*   < >  --    < > 7*   < > 8*   < > 25*   < > 82   GFRESTBLACK 16* 28*   < > 13*   < > 12*   < > 11*   < > 13*   < > 13*   < >  --    < > 8*   < > 9*   < > 30*   < > >90   POTASSIUM 4.5 4.0    "< > 3.9   < > 3.9   < > 5.0   < > 4.3   < > 3.9   < >  --    < > 4.7   < > 5.2   < > 3.4   < > 4.0   TSH  --   --   --  6.42*  --   --   --  5.36*   < >  --   --   --    < >  --   --   --   --   --    < >  --   --   --     < > = values in this interval not displayed.      BP Readings from Last 3 Encounters:   02/12/20 130/60   01/27/20 121/54   10/01/19 123/73    Wt Readings from Last 3 Encounters:   02/12/20 70 kg (154 lb 6.4 oz)   01/27/20 70.8 kg (156 lb)   10/01/19 72.1 kg (159 lb)                      Reviewed and updated as needed this visit by Provider         Review of Systems   ROS COMP: CONSTITUTIONAL: NEGATIVE for fever, chills, change in weight  INTEGUMENTARY/SKIN: NEGATIVE for worrisome rashes, moles or lesions  ENT/MOUTH: NEGATIVE for ear, mouth and throat problems  RESP: NEGATIVE for significant cough or SOB  CV: NEGATIVE for chest pain, palpitations or peripheral edema  GI: NEGATIVE for nausea, abdominal pain, heartburn, or change in bowel habits  MUSCULOSKELETAL: NEGATIVE for significant arthralgias or myalgia  PSYCHIATRIC: NEGATIVE for changes in mood or affect      Objective    /60   Pulse 70   Temp 97.2  F (36.2  C) (Oral)   Resp 16   Ht 1.753 m (5' 9\")   Wt 70 kg (154 lb 6.4 oz)   SpO2 100%   BMI 22.80 kg/m    Body mass index is 22.8 kg/m .  Physical Exam   GENERAL: healthy, alert and no distress  EYES: Eyes grossly normal to inspection, PERRL and conjunctivae and sclerae normal  NECK: no adenopathy, no asymmetry, masses, or scars and thyroid normal to palpation  RESP: lungs clear to auscultation - no rales, rhonchi or wheezes  CV: regular rate and rhythm, normal S1 S2, no S3 or S4, no murmur, click or rub, no peripheral edema and peripheral pulses strong  ABDOMEN: soft, nontender, no hepatosplenomegaly, no masses and bowel sounds normal  MS: no gross musculoskeletal defects noted, no edema  SKIN: no suspicious lesions or rashes  PSYCH: mentation appears normal, affect " normal/bright    Diagnostic Test Results:  Labs reviewed         Assessment & Plan       ICD-10-CM    1. S/p TAVR (transcatheter aortic valve replacement), bioprosthetic Z95.3    2. Coronary artery disease involving native coronary artery, angina presence unspecified, unspecified whether native or transplanted heart I25.10    3. CKD (chronic kidney disease) stage 5, GFR less than 15 ml/min (H) N18.5    4. Anemia of chronic renal failure, stage 5 (H) N18.5     D63.1    5. Dependence on renal dialysis (H) Z99.2           Pt is doing well  Goes for Dialysis twice a week  Feels good  Continue same medicines      Return in about 3 months (around 5/12/2020) for Medicare wellness Exam.    Haley Baker MD  [facility]

## 2020-02-17 ENCOUNTER — DOCUMENTATION ONLY (OUTPATIENT)
Dept: CARE COORDINATION | Facility: CLINIC | Age: 75
End: 2020-02-17

## 2020-02-25 NOTE — PLAN OF CARE
In August, I recommended pt start Rx vit D weekly and have f/u labs in November  I never received any f/u tests    Please check w/ pt  - ? Did she go on Rx vit D  - ? Is she still taking  - ? Did she have f/u labs - if so where and pls get results  - would need to get vit D, PTH, BMP now if not already done    Thx. Labs need to be reordered if pt agreeable to pursuing. Problem: Goal Outcome Summary  Goal: Goal Outcome Summary  Outcome: Adequate for Discharge Date Met:  09/05/17  VSS on RA. No c/o pain or nausea. Discharge orders placed and reviewed with patient. Wood education given and wood bag switched to leg bag. All questions/concerns answered. Pt verbalized understanding. PIV removed. Discharge meds sent to home pharmacy. Pt discharged from unit via foot with belongings and daughter at approx 1815. Will follow up in clinic this week.

## 2020-02-26 ENCOUNTER — OFFICE VISIT (OUTPATIENT)
Dept: CARDIOLOGY | Facility: CLINIC | Age: 75
End: 2020-02-26
Payer: COMMERCIAL

## 2020-02-26 VITALS
HEART RATE: 69 BPM | OXYGEN SATURATION: 96 % | BODY MASS INDEX: 22.89 KG/M2 | DIASTOLIC BLOOD PRESSURE: 72 MMHG | WEIGHT: 155 LBS | SYSTOLIC BLOOD PRESSURE: 138 MMHG

## 2020-02-26 DIAGNOSIS — Z99.2 ESRD (END STAGE RENAL DISEASE) ON DIALYSIS (H): ICD-10-CM

## 2020-02-26 DIAGNOSIS — I25.10 CORONARY ARTERY DISEASE INVOLVING NATIVE CORONARY ARTERY OF NATIVE HEART WITHOUT ANGINA PECTORIS: ICD-10-CM

## 2020-02-26 DIAGNOSIS — Z95.3 S/P TAVR (TRANSCATHETER AORTIC VALVE REPLACEMENT), BIOPROSTHETIC: Primary | ICD-10-CM

## 2020-02-26 DIAGNOSIS — I10 ESSENTIAL HYPERTENSION: ICD-10-CM

## 2020-02-26 DIAGNOSIS — N18.6 ESRD (END STAGE RENAL DISEASE) ON DIALYSIS (H): ICD-10-CM

## 2020-02-26 PROCEDURE — 99214 OFFICE O/P EST MOD 30 MIN: CPT | Performed by: INTERNAL MEDICINE

## 2020-02-26 NOTE — LETTER
2020      RE: Dung Norton  [address]  [facility] [address]       Dear Colleague,    Thank you for the opportunity to participate in the care of your patient, Dung Norton, at the [facility]. Please see a copy of my visit note below.    Referring provider: Haley Baker MD    Chief complaint: Discuss TTE results.    HPI: Mr. Dung Norton is a 72 year old  male with PMH significant for CKD, HTN and hx of heavy tobacco abuse.    Mr. Norton has recently been diagnosed with ESRD, has had his AVF and almost ready ready for HD though he doesnot need that right now. He has HT and is on amlodipine 2.5 mg. His BP in the office is high but he reports lower BP outside the hospital.     He has been diagnosed with heart murmur and subsequently underwent a TTE planned by  which showed mild aortic stenosis with mild dilatation of ascending aorta at 4 cm and hypokinesis of LV lateral wall. He is currently asymptomatic. He denies a history of chest discomfort, dyspnea, PND, orthopnea, pedal edema, palpitations, lightheadedness, and syncope. He has HTN and hx of 50 pack year tobacco abuse. He quit smoking last summer. He has family hx of CAD as well. His mother had MI and  at the age of 66.     I have reviewed his echocardiogram from 2018 which shows mild AS, normal LV function with LV lateral wall hypokinesis (contrast was not used to enhance endocardial borders). His ECG dated 2017 is normal.    Medications, personal, family, and social history reviewed with patient and revised.    Interval history 2019:  The patient returns for on year follow-up. He is overall doing well with no symptoms of CP, WELLS, LE edema, palpitations, dizziness or syncope. Recent echocardiogram showed progression of aortic stenosis to severe range with significant decrease in his EF from 60% to 10-20% in one  year.    The patient was in atrial fibrillation during the echocardiogram, he is in sinus rhythm today with TWI in V4-V6.    Interval history 5/7/2019:  Since I saw the patient 3 months ago he underwent coronary angiogram with PCI/ALICIA to mid LAD and mid circumflex on 4/22/2019.  He was admitted to hospital for dialysis initiation after that.  He is on dialysis since 4/23.  He reports feeling tired since the dialysis which is improving.  Otherwise he is doing well from cardiac standpoint.  Denies chest pain, dyspnea on exertion, palpitations, frequent dizziness, syncope, lower extremity edema.  He has aortic stenosis. After thorough investigation including CT TAVR and cardiac cath aortic stenosis was deemed moderate.  As you know the patient`s EF dropped to 35% (last echo on February 2019) from 55 to 60% in 2018. Likely ischemic given 3 vessel disease on coronary angiogram. Since he is s/p PCI now, we will continue to follow him up to see if his EF will improve after revascularization. He is euvolemic on exam today. The patient was on metoprolol which was discontinued when he was inpatient for dilaysis due to hypotension. He is currently on aspirin, rosuvastatin, Bumex 2 mg and ticagrelor 90 mg bid.  I reviewed patient's EKG in clinic today which shows sinus rhythm with bigeminal PVCs.  Normal IN/QRS interval.  QTC is mildly elevated at 484 ms.    Interval history 8/27/2019:  The patient returns for follow-up.  He is on dialysis for 2 days a week for end-stage kidney disease.  The patient reports overall doing well since the PCI in April of this year.  Patient denies chest pain, shortness of breath, palpitations, dizziness, lower extremity edema, PND or orthopnea.    Last echo on 8/22/2019 which I have personally reviewed shows overall unchanged LVEF at 36% with worsening aortic valve area to 0.7 cm  which is severe aortic stenosis.  Patient's mean gradient through the aortic valve is 28 mmHg.    Interval history  2/26/2020:  Patient underwent TAVR for low flow- low gradient severe aortic stenosis at [facility] on 1/29/20(34mm Medtronic Evolut R valve).  Post procedure was unremarkable. Postprocedure echocardiogram (1/30/2020) showed improved LV function to 55 to 60% from baseline 35 to 40% prior to valve implantation. TAVR valve function was normal with mean gradient of 10 mmHg.  No AI noted.  Patient is overall doing well from cardiac standpoint and he reports no symptoms.  Denies chest pain, shortness of breath, palpitations, dizziness or syncope.  He continues with hemodialysis twice a week.  He was recently recommended to discontinue Bumex.  He is currently on dual antiplatelet therapy with aspirin and ticagrelor with no bleeding issues.  He continues to follow-up at [facility] and he is scheduled for an echocardiogram on March 3rd.    PAST MEDICAL HISTORY:  Past Medical History:   Diagnosis Date     BPH (benign prostatic hyperplasia)      Chronic kidney disease      HTN      Nonsenile cataract      Obstructive uropathy      Pulmonary nodules      Tobacco abuse        CURRENT MEDICATIONS:  Current Outpatient Medications   Medication Sig Dispense Refill     aspirin (ASA) 81 MG EC tablet Take 1 tablet (81 mg) by mouth daily Start tomorrow morning. 90 tablet 3     atorvastatin (LIPITOR) 10 MG tablet TAKE 1 TABLET(10 MG) BY MOUTH DAILY 90 tablet 0     darbepoetin william (ARANESP, ALBUMIN FREE,) 100 MCG/0.5ML injection Inject 0.5 mLs (100 mcg) Subcutaneous every 14 days As needed for hgb<10g/dL.  If Hgb increases >1 point in 2 weeks (if blood transfusion given, use hgb PRIOR to this), SYSTOLIC BP > 180 mmHg or hgb>=10g/dL, HOLD DOSE. Dose must be within 1 week of Hgb.  Per anemia protocol with Cecilia De La Torre CNP (Patient not taking: Reported on 10/1/2019) 0.5 mL 99     lisinopril (PRINIVIL/ZESTRIL) 5 MG tablet Take 1 tablet (5 mg) by mouth daily 90 tablet 3     metoprolol tartrate (LOPRESSOR) 25 MG tablet TAKE 1/2  TABLET(12.5 MG) BY MOUTH TWICE DAILY 90 tablet 0     order for DME Equipment being ordered: olecranon bursa brace 1 each 0     sevelamer (RENVELA) 800 MG tablet Take 2 tablets (1,600 mg) by mouth 3 times daily (with meals) 90 tablet 3     ticagrelor (BRILINTA) 90 MG tablet Take 1 tablet (90 mg) by mouth 2 times daily 90 tablet 3       PAST SURGICAL HISTORY:  Past Surgical History:   Procedure Laterality Date     APPENDECTOMY  AGE 6     CREATE FISTULA ARTERIOVENOUS UPPER EXTREMITY Left 11/17/2017    Procedure: CREATE FISTULA ARTERIOVENOUS UPPER EXTREMITY;  Left Cephalic Vein To Radial Artery Arteriovenous Fistula Creation, Anesthesia Block;  Surgeon: Eduardo Pabon MD;  Location: [facility] OR     CV CORONARY ANGIOGRAM N/A 2/22/2019    Procedure: 1100 CORS, RHC; BI-PLANE;  Surgeon: Gabino Collins MD;  Location:  HEART CARDIAC CATH LAB     CV CORONARY ANGIOGRAM N/A 4/22/2019    Procedure: CV CORONARY ANGIOGRAM;  Surgeon: Gabino Collins MD;  Location:  HEART CARDIAC CATH LAB     CV PCI ANGIOPLASTY N/A 4/22/2019    Procedure: CV PCI ANGIOPLASTY;  Surgeon: Gabino Collins MD;  Location:  HEART CARDIAC CATH LAB     CYSTOSCOPY, FULGURATE BLEEDERS, EVACUATE CLOT(S), COMBINED N/A 7/16/2017    Procedure: COMBINED CYSTOSCOPY, FULGURATE BLEEDERS, EVACUATE CLOT(S);  Cystoscopy clot evacuation, bladder biopsy and fulguration (per surgeons note) NERVE BLOCK PERIPHERAL;  Surgeon: Tamiko Vanegas MD;  Location: [facility] OR     CYSTOSCOPY, TRANSURETHRAL RESECTION (TUR) PROSTATE, COMBINED N/A 3/5/2018    Procedure: COMBINED CYSTOSCOPY, TRANSURETHRAL RESECTION (TUR) PROSTATE;  Cystoscopy, Transurethral Resection of Prostate ;  Surgeon: Tamiko Vanegas MD;  Location: [facility] OR     SINUS SURGERY  AGE 6     TONSILLECTOMY      CHILDHOOD       ALLERGIES:   No Known Allergies    FAMILY HISTORY:  Family History   Problem Relation Age of Onset     C.A.D. Mother         d age 67     Vision Loss Father      Hearing Loss  Sister      Diabetes Sister      Cancer No family hx of      Glaucoma No family hx of      Macular Degeneration No family hx of          SOCIAL HISTORY:  Social History     Tobacco Use     Smoking status: Former Smoker     Packs/day: 1.00     Years: 53.00     Pack years: 53.00     Types: Cigarettes     Last attempt to quit: 2017     Years since quittin.7     Smokeless tobacco: Never Used   Substance Use Topics     Alcohol use: Yes     Comment: rare, drink every few months     Drug use: No       ROS:   Constitutional: No fever, chills, or sweats. Weight stable.   ENT: No visual disturbance, ear ache, epistaxis, sore throat.   Cardiovascular: As per HPI.   Respiratory: No cough, hemoptysis.    GI: No nausea, vomiting, hematemesis, melena, or hematochezia.   : No hematuria.   Integument: Negative.   Hematologic:  No easy bruising, no easy bleeding.  Musculoskeletal: No myalgia.    Exam:  /72   Pulse 69   Wt 70.3 kg (155 lb)   SpO2 96%   BMI 22.89 kg/m     GENERAL APPEARANCE: alert and no distress  HEENT: no icterus, no central cyanosis  LYMPH/NECK: No JVD  RESPIRATORY: lungs clear to auscultation - no rales, rhonchi or wheezes, no use of accessory muscles, no retractions, respirations are unlabored, normal respiratory rate  CARDIOVASCULAR: regular rhythm, normal S1, S2, no S3 or S4, 2/6 LYLE on the LSB and aortic area.  GI: soft, non tender  EXTREMITIES: No edema.  NEURO: alert , normal speech,and affect   SKIN: no ecchymoses, no rashes     I have reviewed the labs, outside hospital medical records and made my comment in the assessment and plan.    Labs:  CBC RESULTS:   Lab Results   Component Value Date    WBC 9.0 2019    RBC 3.67 (L) 2019    HGB 9.7 (L) 2019    HCT 33.0 (L) 2019    MCV 90 2019    MCH 26.4 (L) 2019    MCHC 29.4 (L) 2019    RDW 15.4 (H) 2019     2019       BMP RESULTS:  Lab Results   Component Value Date      04/30/2019    POTASSIUM 4.5 04/30/2019    CHLORIDE 103 04/30/2019    CO2 28 04/30/2019    ANIONGAP 9 04/30/2019    GLC 97 04/30/2019    BUN 38 (H) 04/30/2019    CR 3.96 (H) 04/30/2019    GFRESTIMATED 14 (L) 04/30/2019    GFRESTBLACK 16 (L) 04/30/2019    DERICK 9.0 04/30/2019        INR RESULTS:  Lab Results   Component Value Date    INR 1.06 02/25/2019    INR 1.11 11/17/2017    INR 1.19 (H) 08/28/2017     Echocardiogram 2/26/2020 [facility]   Visually Estimated EF: 55-60%   Normal left ventricular size. Normal left ventricular size. Mild left ventricular hypertrophy.   No obvious regional wall motion/thickening   abnormalities. Normal left ventricular ejection fraction estimated at 55-60%.   Stage I diastolic dysfunction.   Normal right ventricular systolic function.   Severe left atrial enlargement.   S/P TAVR 34 mm Medtronic Evolut R valve. (01/29/2020)  Normally functioning prosthetic aortic   valve. Mean aortic transvalvular   gradient is 10 mmHg.  No significant aortic regurgitation.      Transthoracic echocardiogram 8/22/2019  Moderately (EF 35-40%) reduced left ventricular function is present. Biplane LVEF of 36%.  Right ventricular function, chamber size, wall motion, and thickness are normal.  Heavily calcified aortic valve with probable low flow, low gradient severe  aortic stenosis. Vmax 3.4m/s, mean gradient 28mmHg. JESSIKA is 0.7cm2 and DI of 0.21.  Ascending aorta 4.2 cm.  IVC diameter <2.1 cm collapsing >50% with sniff suggests a normal RA pressure  of 3 mmHg.     Compared with the study from 2/11/19, LV function is similar; AV DI is lower,  JESSIKA is smaller; and MR is not as well appreciated on the current study.    Coronary angiogram and PCI 4/22/2019   1. Known severe coronary artery disease with planned PCI.  2. Three vessel obstructive coronary artery disease of the mid LAD, mid circumflex, and mid RCA (seen on coronary CTA, not injected today) without left main involvement.   3. Successful rotational  atherectomy and PCI of the mid LAD with a 3.5x24mm Synergy drug eluting stent.  4. Successful PCI of the mid circumflex with a 3.5x16mm Synergy drug eluting stent    TTE 2/11/2019  Moderately (EF 35-40%) reduced left ventricular function is present. The EF is  36% based upon biplane imaging.  There is concern for low-flow aortic stenosis in the setting of a low stroke  volume index of 33 ml/m^2 (mean pressure gradient of 23.0, peak velocity of  3.2 m/sec, valve area of 0.88 cm^2, dimensionless index of 0.25)  Severe mitral valve insufficiency.  Dilation of the inferior vena cava with estimated mean right atrial pressure 8  mmHg (mildly elevated).     This study was compared with the study from 1/10/2019. Ejection fraction has  improved, aortic stenosis is probably similar, but mitral regurgitation  appears to be worse.    TT Echocardiogram 1/10/2019  Compared to study done 1/10/18 the LVEF has dropped and the aortic stenosis is  worse.   Severely (EF 10-20%) reduced left ventricular function is present (LVEF is  traced at 19%).   Concern for severe aortic stenosis and gradient underestimates the severity  due to low flow (The peak aortic velocity is 3.9 m/sec, The mean gradient  across the aortic valve is 36 mmHg, aortic valve area is 0.76 cm^2, by the  continuity equation).     Proximal ascending aorta is dilated measuring 4.3 cm, aortic root measures 4  cm     IVC is dilated and estimated mean right atrial pressure is 8 mmHg.    TT Echocardiogram 1/4/2018  The Ejection Fraction is estimated at 55-60%. Lateral wall hypokinesis is present.  The right ventricle is normal size. Global right ventricular function is normal.  Pulmonary artery systolic pressure is normal.  Mild aortic stenosis (Vmax 2.7 m/s, mean pressure gradient 14 mmHg, JESSIKA 2.1 cm2).  Dilated proximal ascending aorta measuring 4.0 cm (indexed at 2.1 cm/m2, Z-score +2.5)  No pericardial effusion is present.  Previous study not available for  comparison.    EKG dated 11/8/2017 is normal    Assessment and Plan:   Mr. Dung Norton is a 72 year old  male with PMH significant for CAD s/p PCI to LAD and Cx 4/22/2019, ESRD on HD, HTN, hx of heavy tobacco abuse, severe aortic stenosis S/P TAVR on 1/29/2020 and HFrEF now recovered to normal EF post-TAVR    1.  Hx of HFrEF with EF of 35-40% (echo 2/2019) and severe aortic stenosis: Patient is status post TAVR now for low flow low gradient severe aortic stenosis.  EF has improved to 55 to 60% postprocedure.  TAVR valve function is normal (echocardiogram from [facility] on 1/30/2020 showed normal gradient with no AI). Patient is currently asymptomatic and euvolemic.  Recommended to continue dual antiplatelet therapy, metoprolol, lisinopril, atorvastatin and infective endocarditis prophylaxis.    2. CAD s/p PCI to LAD and Cx 4/22/2019: Patient asymptomatic. On asa and ticagrelor. No bleeding issues.    3. Hypertension: Well controlled.     4. Paroxysmal atrial fibrillation: The patient was in afib with RVR during echocardiogram in January 2019. He is in SR today. No hx of stroke. 1 week zio patch in February 2019 did not show evidence of atrial fibrillation.  He is currently on dual antiplatelet therapy with aspirin and ticagrelor.  Do not recommend anticoagulation for now.    5. ESRD: He is currently on hemodialysis.    6. Anemia: Stable. Likely due to chronic kidney disease. No bleeding.    No medication changes today. Return to clinic in 6 months with prior TTE to recheck LV and TAVR.    It was my absolute pleasure to meet  in the office today. Please donot hesitate to contact me if you have any questions or concerns.    Lizandro DEVINE MD  [facility] Division of Cardiology  Pager [phone]    Haley Vazquez MD

## 2020-02-26 NOTE — NURSING NOTE
"Chief Complaint   Patient presents with     RECHECK     6 month follow up. S/p TAVR. Pt reports no cardiac symptoms.       Initial There were no vitals taken for this visit. Estimated body mass index is 22.8 kg/m  as calculated from the following:    Height as of 2/12/20: 1.753 m (5' 9\").    Weight as of 2/12/20: 70 kg (154 lb 6.4 oz)..  BP completed using cuff size: regular    Nuria Guzmán MA  "

## 2020-02-26 NOTE — PROGRESS NOTES
Referring provider: Haley Baker MD    Chief complaint: Discuss TTE results.    HPI: Mr. Dung Norton is a 72 year old  male with PMH significant for CKD, HTN and hx of heavy tobacco abuse.    Mr. Norton has recently been diagnosed with ESRD, has had his AVF and almost ready ready for HD though he doesnot need that right now. He has HT and is on amlodipine 2.5 mg. His BP in the office is high but he reports lower BP outside the hospital.     He has been diagnosed with heart murmur and subsequently underwent a TTE planned by  which showed mild aortic stenosis with mild dilatation of ascending aorta at 4 cm and hypokinesis of LV lateral wall. He is currently asymptomatic. He denies a history of chest discomfort, dyspnea, PND, orthopnea, pedal edema, palpitations, lightheadedness, and syncope. He has HTN and hx of 50 pack year tobacco abuse. He quit smoking last summer. He has family hx of CAD as well. His mother had MI and  at the age of 66.     I have reviewed his echocardiogram from 2018 which shows mild AS, normal LV function with LV lateral wall hypokinesis (contrast was not used to enhance endocardial borders). His ECG dated 2017 is normal.    Medications, personal, family, and social history reviewed with patient and revised.    Interval history 2019:  The patient returns for on year follow-up. He is overall doing well with no symptoms of CP, WELLS, LE edema, palpitations, dizziness or syncope. Recent echocardiogram showed progression of aortic stenosis to severe range with significant decrease in his EF from 60% to 10-20% in one year.    The patient was in atrial fibrillation during the echocardiogram, he is in sinus rhythm today with TWI in V4-V6.    Interval history 2019:  Since I saw the patient 3 months ago he underwent coronary angiogram with PCI/ALICIA to mid LAD and mid circumflex on 2019.  He was admitted to hospital for dialysis initiation after that.  He is on dialysis  since 4/23.  He reports feeling tired since the dialysis which is improving.  Otherwise he is doing well from cardiac standpoint.  Denies chest pain, dyspnea on exertion, palpitations, frequent dizziness, syncope, lower extremity edema.  He has aortic stenosis. After thorough investigation including CT TAVR and cardiac cath aortic stenosis was deemed moderate.  As you know the patient`s EF dropped to 35% (last echo on February 2019) from 55 to 60% in 2018. Likely ischemic given 3 vessel disease on coronary angiogram. Since he is s/p PCI now, we will continue to follow him up to see if his EF will improve after revascularization. He is euvolemic on exam today. The patient was on metoprolol which was discontinued when he was inpatient for dilaysis due to hypotension. He is currently on aspirin, rosuvastatin, Bumex 2 mg and ticagrelor 90 mg bid.  I reviewed patient's EKG in clinic today which shows sinus rhythm with bigeminal PVCs.  Normal MN/QRS interval.  QTC is mildly elevated at 484 ms.    Interval history 8/27/2019:  The patient returns for follow-up.  He is on dialysis for 2 days a week for end-stage kidney disease.  The patient reports overall doing well since the PCI in April of this year.  Patient denies chest pain, shortness of breath, palpitations, dizziness, lower extremity edema, PND or orthopnea.    Last echo on 8/22/2019 which I have personally reviewed shows overall unchanged LVEF at 36% with worsening aortic valve area to 0.7 cm  which is severe aortic stenosis.  Patient's mean gradient through the aortic valve is 28 mmHg.    Interval history 2/26/2020:  Patient underwent TAVR for low flow- low gradient severe aortic stenosis at [facility] on 1/29/20(34mm Medtronic Evolut R valve).  Post procedure was unremarkable. Postprocedure echocardiogram (1/30/2020) showed improved LV function to 55 to 60% from baseline 35 to 40% prior to valve implantation. TAVR valve function was normal with mean gradient  of 10 mmHg.  No AI noted.  Patient is overall doing well from cardiac standpoint and he reports no symptoms.  Denies chest pain, shortness of breath, palpitations, dizziness or syncope.  He continues with hemodialysis twice a week.  He was recently recommended to discontinue Bumex.  He is currently on dual antiplatelet therapy with aspirin and ticagrelor with no bleeding issues.  He continues to follow-up at [facility] and he is scheduled for an echocardiogram on March 3rd.    PAST MEDICAL HISTORY:  Past Medical History:   Diagnosis Date     BPH (benign prostatic hyperplasia)      Chronic kidney disease      HTN      Nonsenile cataract      Obstructive uropathy      Pulmonary nodules      Tobacco abuse        CURRENT MEDICATIONS:  Current Outpatient Medications   Medication Sig Dispense Refill     aspirin (ASA) 81 MG EC tablet Take 1 tablet (81 mg) by mouth daily Start tomorrow morning. 90 tablet 3     atorvastatin (LIPITOR) 10 MG tablet TAKE 1 TABLET(10 MG) BY MOUTH DAILY 90 tablet 0     darbepoetin william (ARANESP, ALBUMIN FREE,) 100 MCG/0.5ML injection Inject 0.5 mLs (100 mcg) Subcutaneous every 14 days As needed for hgb<10g/dL.  If Hgb increases >1 point in 2 weeks (if blood transfusion given, use hgb PRIOR to this), SYSTOLIC BP > 180 mmHg or hgb>=10g/dL, HOLD DOSE. Dose must be within 1 week of Hgb.  Per anemia protocol with Cecilia De La Torre CNP (Patient not taking: Reported on 10/1/2019) 0.5 mL 99     lisinopril (PRINIVIL/ZESTRIL) 5 MG tablet Take 1 tablet (5 mg) by mouth daily 90 tablet 3     metoprolol tartrate (LOPRESSOR) 25 MG tablet TAKE 1/2 TABLET(12.5 MG) BY MOUTH TWICE DAILY 90 tablet 0     order for DME Equipment being ordered: olecranon bursa brace 1 each 0     sevelamer (RENVELA) 800 MG tablet Take 2 tablets (1,600 mg) by mouth 3 times daily (with meals) 90 tablet 3     ticagrelor (BRILINTA) 90 MG tablet Take 1 tablet (90 mg) by mouth 2 times daily 90 tablet 3       PAST SURGICAL HISTORY:  Past  Surgical History:   Procedure Laterality Date     APPENDECTOMY  AGE 6     CREATE FISTULA ARTERIOVENOUS UPPER EXTREMITY Left 2017    Procedure: CREATE FISTULA ARTERIOVENOUS UPPER EXTREMITY;  Left Cephalic Vein To Radial Artery Arteriovenous Fistula Creation, Anesthesia Block;  Surgeon: Eduardo Pabon MD;  Location: [facility]     CV CORONARY ANGIOGRAM N/A 2019    Procedure: 1100 CORS, RHC; BI-PLANE;  Surgeon: Gabino Collins MD;  [facility]     CV CORONARY ANGIOGRAM N/A 2019    Procedure: CV CORONARY ANGIOGRAM;  Surgeon: Gabino Collins MD;  Location:  [facility]     CV PCI ANGIOPLASTY N/A 2019    Procedure: CV PCI ANGIOPLASTY;  Surgeon: Gabino Collins MD;  Location:  [facility]     CYSTOSCOPY, FULGURATE BLEEDERS, EVACUATE CLOT(S), COMBINED N/A 2017    Procedure: COMBINED CYSTOSCOPY, FULGURATE BLEEDERS, EVACUATE CLOT(S);  Cystoscopy clot evacuation, bladder biopsy and fulguration (per surgeons note) NERVE BLOCK PERIPHERAL;  Surgeon: Tamiko Vanegas MD;  Location: [facility] OR     CYSTOSCOPY, TRANSURETHRAL RESECTION (TUR) PROSTATE, COMBINED N/A 3/5/2018    Procedure: COMBINED CYSTOSCOPY, TRANSURETHRAL RESECTION (TUR) PROSTATE;  Cystoscopy, Transurethral Resection of Prostate ;  Surgeon: Tamiko Vanegas MD;  Location: [facility] OR     SINUS SURGERY  AGE 6     TONSILLECTOMY      CHILDHOOD       ALLERGIES:   No Known Allergies    FAMILY HISTORY:  Family History   Problem Relation Age of Onset     C.A.D. Mother         d age 67     Vision Loss Father      Hearing Loss Sister      Diabetes Sister      Cancer No family hx of      Glaucoma No family hx of      Macular Degeneration No family hx of          SOCIAL HISTORY:  Social History     Tobacco Use     Smoking status: Former Smoker     Packs/day: 1.00     Years: 53.00     Pack years: 53.00     Types: Cigarettes     Last attempt to quit: 2017     Years since quittin.7      Smokeless tobacco: Never Used   Substance Use Topics     Alcohol use: Yes     Comment: rare, drink every few months     Drug use: No       ROS:   Constitutional: No fever, chills, or sweats. Weight stable.   ENT: No visual disturbance, ear ache, epistaxis, sore throat.   Cardiovascular: As per HPI.   Respiratory: No cough, hemoptysis.    GI: No nausea, vomiting, hematemesis, melena, or hematochezia.   : No hematuria.   Integument: Negative.   Hematologic:  No easy bruising, no easy bleeding.  Musculoskeletal: No myalgia.    Exam:  /72   Pulse 69   Wt 70.3 kg (155 lb)   SpO2 96%   BMI 22.89 kg/m    GENERAL APPEARANCE: alert and no distress  HEENT: no icterus, no central cyanosis  LYMPH/NECK: No JVD  RESPIRATORY: lungs clear to auscultation - no rales, rhonchi or wheezes, no use of accessory muscles, no retractions, respirations are unlabored, normal respiratory rate  CARDIOVASCULAR: regular rhythm, normal S1, S2, no S3 or S4, 2/6 LYLE on the LSB and aortic area.  GI: soft, non tender  EXTREMITIES: No edema.  NEURO: alert , normal speech,and affect   SKIN: no ecchymoses, no rashes     I have reviewed the labs, outside hospital medical records and made my comment in the assessment and plan.    Labs:  CBC RESULTS:   Lab Results   Component Value Date    WBC 9.0 04/30/2019    RBC 3.67 (L) 04/30/2019    HGB 9.7 (L) 04/30/2019    HCT 33.0 (L) 04/30/2019    MCV 90 04/30/2019    MCH 26.4 (L) 04/30/2019    MCHC 29.4 (L) 04/30/2019    RDW 15.4 (H) 04/30/2019     04/30/2019       BMP RESULTS:  Lab Results   Component Value Date     04/30/2019    POTASSIUM 4.5 04/30/2019    CHLORIDE 103 04/30/2019    CO2 28 04/30/2019    ANIONGAP 9 04/30/2019    GLC 97 04/30/2019    BUN 38 (H) 04/30/2019    CR 3.96 (H) 04/30/2019    GFRESTIMATED 14 (L) 04/30/2019    GFRESTBLACK 16 (L) 04/30/2019    DERICK 9.0 04/30/2019        INR RESULTS:  Lab Results   Component Value Date    INR 1.06 02/25/2019    INR 1.11 11/17/2017     INR 1.19 (H) 08/28/2017     Echocardiogram 2/26/2020 [facility]   Visually Estimated EF: 55-60%   Normal left ventricular size. Normal left ventricular size. Mild left ventricular hypertrophy.   No obvious regional wall motion/thickening   abnormalities. Normal left ventricular ejection fraction estimated at 55-60%.   Stage I diastolic dysfunction.   Normal right ventricular systolic function.   Severe left atrial enlargement.   S/P TAVR 34 mm Medtronic Evolut R valve. (01/29/2020)  Normally functioning prosthetic aortic   valve. Mean aortic transvalvular   gradient is 10 mmHg.  No significant aortic regurgitation.      Transthoracic echocardiogram 8/22/2019  Moderately (EF 35-40%) reduced left ventricular function is present. Biplane LVEF of 36%.  Right ventricular function, chamber size, wall motion, and thickness are normal.  Heavily calcified aortic valve with probable low flow, low gradient severe  aortic stenosis. Vmax 3.4m/s, mean gradient 28mmHg. JESSIKA is 0.7cm2 and DI of 0.21.  Ascending aorta 4.2 cm.  IVC diameter <2.1 cm collapsing >50% with sniff suggests a normal RA pressure  of 3 mmHg.     Compared with the study from 2/11/19, LV function is similar; AV DI is lower,  JESSIKA is smaller; and MR is not as well appreciated on the current study.    Coronary angiogram and PCI 4/22/2019   1. Known severe coronary artery disease with planned PCI.  2. Three vessel obstructive coronary artery disease of the mid LAD, mid circumflex, and mid RCA (seen on coronary CTA, not injected today) without left main involvement.   3. Successful rotational atherectomy and PCI of the mid LAD with a 3.5x24mm Synergy drug eluting stent.  4. Successful PCI of the mid circumflex with a 3.5x16mm Synergy drug eluting stent    TTE 2/11/2019  Moderately (EF 35-40%) reduced left ventricular function is present. The EF is  36% based upon biplane imaging.  There is concern for low-flow aortic stenosis in the setting of a low  stroke  volume index of 33 ml/m^2 (mean pressure gradient of 23.0, peak velocity of  3.2 m/sec, valve area of 0.88 cm^2, dimensionless index of 0.25)  Severe mitral valve insufficiency.  Dilation of the inferior vena cava with estimated mean right atrial pressure 8  mmHg (mildly elevated).     This study was compared with the study from 1/10/2019. Ejection fraction has  improved, aortic stenosis is probably similar, but mitral regurgitation  appears to be worse.    TT Echocardiogram 1/10/2019  Compared to study done 1/10/18 the LVEF has dropped and the aortic stenosis is  worse.   Severely (EF 10-20%) reduced left ventricular function is present (LVEF is  traced at 19%).   Concern for severe aortic stenosis and gradient underestimates the severity  due to low flow (The peak aortic velocity is 3.9 m/sec, The mean gradient  across the aortic valve is 36 mmHg, aortic valve area is 0.76 cm^2, by the  continuity equation).     Proximal ascending aorta is dilated measuring 4.3 cm, aortic root measures 4  cm     IVC is dilated and estimated mean right atrial pressure is 8 mmHg.    TT Echocardiogram 1/4/2018  The Ejection Fraction is estimated at 55-60%. Lateral wall hypokinesis is present.  The right ventricle is normal size. Global right ventricular function is normal.  Pulmonary artery systolic pressure is normal.  Mild aortic stenosis (Vmax 2.7 m/s, mean pressure gradient 14 mmHg, JESSIKA 2.1 cm2).  Dilated proximal ascending aorta measuring 4.0 cm (indexed at 2.1 cm/m2, Z-score +2.5)  No pericardial effusion is present.  Previous study not available for comparison.    EKG dated 11/8/2017 is normal    Assessment and Plan:   Mr. Dung Norton is a 72 year old  male with PMH significant for CAD s/p PCI to LAD and Cx 4/22/2019, ESRD on HD, HTN, hx of heavy tobacco abuse, severe aortic stenosis S/P TAVR on 1/29/2020 and HFrEF now recovered to normal EF post-TAVR    1.  Hx of HFrEF with EF of 35-40% (echo 2/2019) and severe  aortic stenosis: Patient is status post TAVR now for low flow low gradient severe aortic stenosis.  EF has improved to 55 to 60% postprocedure.  TAVR valve function is normal (echocardiogram from [facility] on 1/30/2020 showed normal gradient with no AI). Patient is currently asymptomatic and euvolemic.  Recommended to continue dual antiplatelet therapy, metoprolol, lisinopril, atorvastatin and infective endocarditis prophylaxis.    2. CAD s/p PCI to LAD and Cx 4/22/2019: Patient asymptomatic. On asa and ticagrelor. No bleeding issues.    3. Hypertension: Well controlled.     4. Paroxysmal atrial fibrillation: The patient was in afib with RVR during echocardiogram in January 2019. He is in SR today. No hx of stroke. 1 week zio patch in February 2019 did not show evidence of atrial fibrillation.  He is currently on dual antiplatelet therapy with aspirin and ticagrelor.  Do not recommend anticoagulation for now.    5. ESRD: He is currently on hemodialysis.    6. Anemia: Stable. Likely due to chronic kidney disease. No bleeding.    No medication changes today. Return to clinic in 6 months with prior TTE to recheck LV and TAVR.    It was my absolute pleasure to meet  in the office today. Please donot hesitate to contact me if you have any questions or concerns.    Lizandro DEVINE MD  [facility] Division of Cardiology  Pager [phone]    Haley Vazquez MD

## 2020-04-07 ENCOUNTER — TELEPHONE (OUTPATIENT)
Dept: UROLOGY | Facility: CLINIC | Age: 75
End: 2020-04-07

## 2020-04-17 ENCOUNTER — VIRTUAL VISIT (OUTPATIENT)
Dept: UROLOGY | Facility: CLINIC | Age: 75
End: 2020-04-17
Payer: COMMERCIAL

## 2020-04-17 DIAGNOSIS — R33.9 URINARY RETENTION: Primary | ICD-10-CM

## 2020-04-17 PROBLEM — I50.21 ACUTE SYSTOLIC CHF (CONGESTIVE HEART FAILURE) (H): Status: ACTIVE | Noted: 2019-04-06

## 2020-04-17 PROBLEM — N18.5 CKD (CHRONIC KIDNEY DISEASE) STAGE 5, GFR LESS THAN 15 ML/MIN (H): Status: ACTIVE | Noted: 2017-09-13

## 2020-04-17 PROBLEM — I35.0 AORTIC VALVE STENOSIS: Status: ACTIVE | Noted: 2019-04-06

## 2020-04-17 PROBLEM — N17.9 ACUTE RENAL FAILURE (H): Status: ACTIVE | Noted: 2017-09-12

## 2020-04-17 PROBLEM — I10 ESSENTIAL HYPERTENSION: Status: ACTIVE | Noted: 2019-04-06

## 2020-04-17 PROBLEM — I71.40 ABDOMINAL AORTIC ANEURYSM (AAA) (H): Status: ACTIVE | Noted: 2019-12-20

## 2020-04-17 PROBLEM — R79.89 ELEVATED TROPONIN: Status: ACTIVE | Noted: 2019-04-06

## 2020-04-17 PROBLEM — I25.10 CORONARY ARTERY DISEASE INVOLVING NATIVE CORONARY ARTERY: Status: ACTIVE | Noted: 2019-03-21

## 2020-04-17 RX ORDER — FOLIC ACID/VIT B COMPLEX AND C 0.8 MG
TABLET ORAL
COMMUNITY
Start: 2020-03-27 | End: 2020-06-15

## 2020-04-17 RX ORDER — SEVELAMER HYDROCHLORIDE 800 MG/1
TABLET, FILM COATED ORAL
COMMUNITY
Start: 2020-04-09

## 2020-04-17 ASSESSMENT — PAIN SCALES - GENERAL: PAINLEVEL: NO PAIN (0)

## 2020-04-17 NOTE — NURSING NOTE
Called the pt 04/17/20, and 1:27 PM and reviewed their medications, history, and allergies. I then asked that they be in a quiet location with limited distractions so they can hear the provider well.    Chief Complaint   Patient presents with     RECHECK     Urinary retention follow up       There were no vitals taken for this visit. There is no height or weight on file to calculate BMI.    Patient Active Problem List   Diagnosis     CARDIOVASCULAR SCREENING; LDL GOAL LESS THAN 130     Hypertension goal BP (blood pressure) < 140/90     Advanced directives, counseling/discussion     Elevated fasting glucose     Hypertriglyceridemia     Symptomatic anemia     Pulmonary nodules     Anemia of chronic renal failure, stage 5 (H)     End stage renal disease (H)     Acquired hypothyroidism     Obstructive uropathy     CKD (chronic kidney disease) stage 5, GFR less than 15 ml/min (H)     Ex-smoker     Thoracic aortic aneurysm without rupture (H)     Nonrheumatic aortic valve stenosis     Olecranon bursitis of left elbow     Urinary tract infection     S/P transurethral resection of prostate     Nuclear sclerosis of both eyes     Chronic systolic heart failure (H)     Status post coronary angiogram     Coronary artery disease involving native coronary artery, angina presence unspecified, unspecified whether native or transplanted heart     S/P coronary angiogram     ESRD (end stage renal disease) (H)     S/p TAVR (transcatheter aortic valve replacement), bioprosthetic     PVD (peripheral vascular disease) (H)     Dependence on renal dialysis (H)     Abdominal aortic aneurysm (AAA) (H)     Elevated troponin     Acute renal failure (H)     Acute systolic CHF (congestive heart failure) (H)     Coronary artery disease involving native coronary artery     Essential hypertension     Aortic valve stenosis       Allergies   Allergen Reactions     Blood-Group Specific Substance Other (See Comments)     Patient has Probable Cold  antibody. Blood products may be delayed. Draw patient 24 hours prior to transfusion. Draw one red top and two purple top tubes for all type and screen orders.       Current Outpatient Medications   Medication Sig Dispense Refill     aspirin (ASA) 81 MG EC tablet Take 1 tablet (81 mg) by mouth daily Start tomorrow morning. 90 tablet 3     atorvastatin (LIPITOR) 10 MG tablet TAKE 1 TABLET(10 MG) BY MOUTH DAILY 90 tablet 0     B Complex-C-Folic Acid (DIALYVITE 800) 0.8 MG TABS TK 1 T PO QD       darbepoetin william (ARANESP, ALBUMIN FREE,) 100 MCG/0.5ML injection Inject 0.5 mLs (100 mcg) Subcutaneous every 14 days As needed for hgb<10g/dL.  If Hgb increases >1 point in 2 weeks (if blood transfusion given, use hgb PRIOR to this), SYSTOLIC BP > 180 mmHg or hgb>=10g/dL, HOLD DOSE. Dose must be within 1 week of Hgb.  Per anemia protocol with Cecilia De La Torre CNP (Patient not taking: Reported on 10/1/2019) 0.5 mL 99     lisinopril (PRINIVIL/ZESTRIL) 5 MG tablet Take 1 tablet (5 mg) by mouth daily 90 tablet 3     metoprolol tartrate (LOPRESSOR) 25 MG tablet TAKE 1/2 TABLET(12.5 MG) BY MOUTH TWICE DAILY 90 tablet 0     order for DME Equipment being ordered: olecranon bursa brace 1 each 0     sevelamer (RENAGEL) 800 MG tablet        sevelamer (RENVELA) 800 MG tablet Take 2 tablets (1,600 mg) by mouth 3 times daily (with meals) 90 tablet 3     ticagrelor (BRILINTA) 90 MG tablet Take 1 tablet (90 mg) by mouth 2 times daily 90 tablet 3       Social History     Tobacco Use     Smoking status: Former Smoker     Packs/day: 1.00     Years: 53.00     Pack years: 53.00     Types: Cigarettes     Last attempt to quit: 2017     Years since quittin.8     Smokeless tobacco: Never Used   Substance Use Topics     Alcohol use: Yes     Comment: rare, drink every few months     Drug use: No       Malina Canchola CMA,  2020  1:27 PM

## 2020-04-17 NOTE — PROGRESS NOTES
"Dung Norton is a 74 year old male who is being evaluated via a billable telephone visit.      The patient has been notified of following:     \"This telephone visit will be conducted via a call between you and your physician/provider. We have found that certain health care needs can be provided without the need for a physical exam.  This service lets us provide the care you need with a short phone conversation.  If a prescription is necessary we can send it directly to your pharmacy.  If lab work is needed we can place an order for that and you can then stop by our lab to have the test done at a later time.    Telephone visits are billed at different rates depending on your insurance coverage. During this emergency period, for some insurers they may be billed the same as an in-person visit.  Please reach out to your insurance provider with any questions.    If during the course of the call the physician/provider feels a telephone visit is not appropriate, you will not be charged for this service.\"    Patient has given verbal consent for Telephone visit?  Yes    How would you like to obtain your AVS? [employer]    Subjective     Dung Norton is a 74 year old male who presents to clinic today for the following health issues:    Urinary retention   CIC BID, about 500ml drained at each time   About 1000ml total per day   No nocturia   Urinates a small amount if he drinks a lot of water in day.   Dialysis 2x week (down from previous)   Had PCI, has started Brilinta             Urinary retention         Reviewed and updated as needed this visit by Provider         Review of Systems   ROS COMP: Constitutional, HEENT, cardiovascular, pulmonary, gi and gu systems are negative, except as otherwise noted.       Objective   Reported vitals:  There were no vitals taken for this visit.   healthy, alert and no distress  PSYCH: Alert and oriented times 3; coherent speech, normal   rate and volume, able to articulate logical " thoughts, able   to abstract reason, no tangential thoughts, no hallucinations   or delusions  His affect is normal  RESP: No cough, no audible wheezing, able to talk in full sentences  Remainder of exam unable to be completed due to telephone visits    Diagnostic Test Results:  Labs reviewed in Epic        Assessment/Plan:  1. Urinary retention    Continue CIC as discussed above   Follow up in one year       No follow-ups on file.      Phone call duration:  8  minutes    Tamiko Vanegas MD

## 2020-04-27 DIAGNOSIS — I25.10 CORONARY ARTERY DISEASE INVOLVING NATIVE CORONARY ARTERY, ANGINA PRESENCE UNSPECIFIED, UNSPECIFIED WHETHER NATIVE OR TRANSPLANTED HEART: ICD-10-CM

## 2020-04-27 DIAGNOSIS — N18.6 ESRD (END STAGE RENAL DISEASE) (H): ICD-10-CM

## 2020-04-27 RX ORDER — TICAGRELOR 90 MG/1
TABLET ORAL
Qty: 90 TABLET | Refills: 3
Start: 2020-04-27

## 2020-04-27 NOTE — TELEPHONE ENCOUNTER
Duplicate 1st request.    ticagrelor (BRILINTA) 90 MG tablet  90 tablet  3  10/23/2019   No    Sig - Route: Take 1 tablet (90 mg) by mouth 2 times daily - Oral    Sent to pharmacy as: ticagrelor (BRILINTA) 90 MG tablet    Class: E-Prescribe    Order: [id number]    E-Prescribing Status: Receipt confirmed by pharmacy (10/23/2019  8:23 AM CDT)      Zainab SERRATO CMA ([facility])

## 2020-05-05 DIAGNOSIS — I25.10 CORONARY ARTERY DISEASE INVOLVING NATIVE CORONARY ARTERY, ANGINA PRESENCE UNSPECIFIED, UNSPECIFIED WHETHER NATIVE OR TRANSPLANTED HEART: ICD-10-CM

## 2020-05-05 DIAGNOSIS — N18.6 ESRD (END STAGE RENAL DISEASE) (H): ICD-10-CM

## 2020-05-07 NOTE — TELEPHONE ENCOUNTER
"Routing refill request to provider for review/approval because:  Labs not current:  ALT, Hgb, AST, platelets, Cr      Requested Prescriptions   Pending Prescriptions Disp Refills     BRILINTA 90 MG tablet [Pharmacy Med Name: BRILINTA 90MG TABLETS] 60 tablet 0     Sig: TAKE 1 TABLET(90 MG) BY MOUTH TWICE DAILY       Platelet Inhibitors Failed - 5/5/2020  2:26 PM        Failed - Normal ALT on file in past 12 months     Recent Labs   Lab Test 12/11/17  1018   ALT 17             Failed - Normal HGB on file in past 12 months     Recent Labs   Lab Test 04/30/19  0952   HGB 9.7*               Failed - Normal AST on file in past 12 months     Recent Labs   Lab Test 12/11/17  1018   AST 13             Failed - Normal Platelets on file in past 12 months     Recent Labs   Lab Test 04/30/19  0952                  Failed - Normal serum creatinine on file in past 12 months     Recent Labs   Lab Test 04/30/19  0952  03/04/19  1047   CR 3.96*   < >  --    CREAT  --   --  6.0*    < > = values in this interval not displayed.       Ok to refill medication if creatinine is low          Passed - Recent (12 mo) or future (30 days) visit within the authorizing provider's specialty     Patient has had an office visit with the authorizing provider or a provider within the authorizing providers department within the previous 12 mos or has a future within next 30 days. See \"Patient Info\" tab in inbasket, or \"Choose Columns\" in Meds & Orders section of the refill encounter.              Passed - Medication is active on med list        Passed - Patient is age 18 or older           Zainab Powell RN  [facility]    "

## 2020-05-08 RX ORDER — TICAGRELOR 90 MG/1
TABLET ORAL
Qty: 60 TABLET | Refills: 0 | Status: SHIPPED | OUTPATIENT
Start: 2020-05-08

## 2020-05-13 DIAGNOSIS — I50.20 HEART FAILURE WITH REDUCED EJECTION FRACTION, NYHA CLASS I (H): ICD-10-CM

## 2020-05-13 DIAGNOSIS — I25.10 CORONARY ARTERY DISEASE INVOLVING NATIVE CORONARY ARTERY OF NATIVE HEART WITHOUT ANGINA PECTORIS: ICD-10-CM

## 2020-05-13 RX ORDER — METOPROLOL TARTRATE 25 MG/1
12.5 TABLET, FILM COATED ORAL 2 TIMES DAILY
Qty: 90 TABLET | Refills: 1 | Status: SHIPPED | OUTPATIENT
Start: 2020-05-13 | End: 2020-11-06

## 2020-05-13 RX ORDER — ATORVASTATIN CALCIUM 10 MG/1
10 TABLET, FILM COATED ORAL DAILY
Qty: 90 TABLET | Refills: 1 | Status: SHIPPED | OUTPATIENT
Start: 2020-05-13

## 2020-05-13 NOTE — TELEPHONE ENCOUNTER
Signed Prescriptions:                        Disp   Refills    atorvastatin (LIPITOR) 10 MG tablet        90 tab*1        Sig: Take 1 tablet (10 mg) by mouth daily  Authorizing Provider: JULIA DEVINE  Ordering User: STEPHANIE MENENDEZ    rx filled per refill prtocol.  Stephanie Menendez RN

## 2020-05-13 NOTE — TELEPHONE ENCOUNTER
Signed Prescriptions:                        Disp   Refills    metoprolol tartrate (LOPRESSOR) 25 MG tabl*90 tab*1        Sig: Take 0.5 tablets (12.5 mg) by mouth 2 times daily  Authorizing Provider: JULIA DEVINE  Ordering User: STEPHANIE MENENDEZ    Rx filled per refill protocol.  Stephanie Menendez RN

## 2020-06-08 RX ORDER — FOLIC ACID/VIT B COMPLEX AND C 0.8 MG
TABLET ORAL
Status: CANCELLED | OUTPATIENT
Start: 2020-06-08

## 2020-06-08 NOTE — PROGRESS NOTES
"Dung Norton is a 74 year old male who is being evaluated via a billable telephone visit.      The patient has been notified of following:     \"This telephone visit will be conducted via a call between you and your physician/provider. We have found that certain health care needs can be provided without the need for a physical exam.  This service lets us provide the care you need with a short phone conversation.  If a prescription is necessary we can send it directly to your pharmacy.  If lab work is needed we can place an order for that and you can then stop by our lab to have the test done at a later time.    Telephone visits are billed at different rates depending on your insurance coverage. During this emergency period, for some insurers they may be billed the same as an in-person visit.  Please reach out to your insurance provider with any questions.    If during the course of the call the physician/provider feels a telephone visit is not appropriate, you will not be charged for this service.\"    Patient has given verbal consent for Telephone visit?  Yes    What phone number would you like to be contacted at? [phone]     How would you like to obtain your AVS? Mail a copy  4:01 PM  start visit  Subjective     Dung Norton is a 74 year old male who presents via phone visit today for the following health issues:    HPI  Chief Complaint   Patient presents with     Choking     pt states that food is getting stuck in throat, especially breads x 2 months.   When he eats Foods he has problem swallowing  No heartburn  No history of This   Dysphagia with Bread or pasta  Hypertension Follow-up      Do you check your blood pressure regularly outside of the clinic? Yes     Are you following a low salt diet? Yes    Are your blood pressures ever more than 140 on the top number (systolic) OR more   than 90 on the bottom number (diastolic), for example 140/90? No    Chronic Kidney Disease Follow-up      Do you take " any over the counter pain medicine?: No     Pt sees nephrologist      Patient Active Problem List   Diagnosis     CARDIOVASCULAR SCREENING; LDL GOAL LESS THAN 130     Hypertension goal BP (blood pressure) < 140/90     Advanced directives, counseling/discussion     Elevated fasting glucose     Hypertriglyceridemia     Symptomatic anemia     Pulmonary nodules     Anemia of chronic renal failure, stage 5 (H)     End stage renal disease (H)     Acquired hypothyroidism     Obstructive uropathy     CKD (chronic kidney disease) stage 5, GFR less than 15 ml/min (H)     Ex-smoker     Thoracic aortic aneurysm without rupture (H)     Nonrheumatic aortic valve stenosis     Olecranon bursitis of left elbow     Urinary tract infection     S/P transurethral resection of prostate     Nuclear sclerosis of both eyes     Chronic systolic heart failure (H)     Status post coronary angiogram     Coronary artery disease involving native coronary artery, angina presence unspecified, unspecified whether native or transplanted heart     S/P coronary angiogram     ESRD (end stage renal disease) (H)     S/p TAVR (transcatheter aortic valve replacement), bioprosthetic     PVD (peripheral vascular disease) (H)     Dependence on renal dialysis (H)     Abdominal aortic aneurysm (AAA) (H)     Elevated troponin     Acute renal failure (H)     Acute systolic CHF (congestive heart failure) (H)     Coronary artery disease involving native coronary artery     Essential hypertension     Aortic valve stenosis     Past Surgical History:   Procedure Laterality Date     APPENDECTOMY  AGE 6     CREATE FISTULA ARTERIOVENOUS UPPER EXTREMITY Left 11/17/2017    Procedure: CREATE FISTULA ARTERIOVENOUS UPPER EXTREMITY;  Left Cephalic Vein To Radial Artery Arteriovenous Fistula Creation, Anesthesia Block;  Surgeon: Eduardo Pabon MD;  Location: [facility]     CV CORONARY ANGIOGRAM N/A 2/22/2019    Procedure: 1100 CORS, RHC; BI-PLANE;  Surgeon: Gabino Collins MD;   Location:  [facility]     CV CORONARY ANGIOGRAM N/A 2019    Procedure: CV CORONARY ANGIOGRAM;  Surgeon: Gabino Collins MD;  Location:  [facility]     CV PCI ANGIOPLASTY N/A 2019    Procedure: CV PCI ANGIOPLASTY;  Surgeon: Gabino Collins MD;  Location:  [facility]     CYSTOSCOPY, FULGURATE BLEEDERS, EVACUATE CLOT(S), COMBINED N/A 2017    Procedure: COMBINED CYSTOSCOPY, FULGURATE BLEEDERS, EVACUATE CLOT(S);  Cystoscopy clot evacuation, bladder biopsy and fulguration (per surgeons note) NERVE BLOCK PERIPHERAL;  Surgeon: Tamiko Vanegas MD;  Location: [facility] OR     CYSTOSCOPY, TRANSURETHRAL RESECTION (TUR) PROSTATE, COMBINED N/A 3/5/2018    Procedure: COMBINED CYSTOSCOPY, TRANSURETHRAL RESECTION (TUR) PROSTATE;  Cystoscopy, Transurethral Resection of Prostate ;  Surgeon: Tamkio Vanegas MD;  Location: [facility] OR     SINUS SURGERY  AGE 6     TONSILLECTOMY      CHILDHOOD       Social History     Tobacco Use     Smoking status: Former Smoker     Packs/day: 1.00     Years: 53.00     Pack years: 53.00     Types: Cigarettes     Last attempt to quit: 2017     Years since quittin.9     Smokeless tobacco: Never Used   Substance Use Topics     Alcohol use: Yes     Comment: rare, drink every few months     Family History   Problem Relation Age of Onset     C.A.D. Mother         d age 67     Vision Loss Father      Hearing Loss Sister      Diabetes Sister      Cancer No family hx of      Glaucoma No family hx of      Macular Degeneration No family hx of          Current Outpatient Medications   Medication Sig Dispense Refill     aspirin (ASA) 81 MG EC tablet Take 1 tablet (81 mg) by mouth daily Start tomorrow morning. 90 tablet 3     atorvastatin (LIPITOR) 10 MG tablet Take 1 tablet (10 mg) by mouth daily 90 tablet 1     B Complex-C-Folic Acid (DIALYVITE 800) 0.8 MG TABS TK 1 T PO QD       BRILINTA 90 MG tablet TAKE 1 TABLET(90 MG) BY MOUTH TWICE  DAILY 60 tablet 0     darbepoetin william (ARANESP, ALBUMIN FREE,) 100 MCG/0.5ML injection Inject 0.5 mLs (100 mcg) Subcutaneous every 14 days As needed for hgb<10g/dL.  If Hgb increases >1 point in 2 weeks (if blood transfusion given, use hgb PRIOR to this), SYSTOLIC BP > 180 mmHg or hgb>=10g/dL, HOLD DOSE. Dose must be within 1 week of Hgb.  Per anemia protocol with Cecilia Britt,CNP 0.5 mL 99     lisinopril (PRINIVIL/ZESTRIL) 5 MG tablet Take 1 tablet (5 mg) by mouth daily 90 tablet 3     metoprolol tartrate (LOPRESSOR) 25 MG tablet Take 0.5 tablets (12.5 mg) by mouth 2 times daily 90 tablet 1     omeprazole (PRILOSEC) 20 MG DR capsule Take 1 capsule (20 mg) by mouth daily 30 capsule 0     order for DME Equipment being ordered: olecranon bursa brace 1 each 0     sevelamer (RENAGEL) 800 MG tablet        sevelamer (RENVELA) 800 MG tablet Take 2 tablets (1,600 mg) by mouth 3 times daily (with meals) 90 tablet 3     Allergies   Allergen Reactions     Blood-Group Specific Substance Other (See Comments)     Patient has Probable Cold antibody. Blood products may be delayed. Draw patient 24 hours prior to transfusion. Draw one red top and two purple top tubes for all type and screen orders.     Recent Labs   Lab Test 04/30/19  0952 04/26/19  1316  04/24/19  0524  04/16/19  1053  01/16/19  1255  12/11/17  1018  09/25/17  1028  08/31/17  1738  08/28/17  1909  07/19/17  0537  02/24/16  1044  01/15/14  0950   A1C  --   --   --   --   --   --   --   --   --   --   --   --   --  5.6  --   --   --   --   --  5.4  --  5.3   LDL  --   --   --   --   --  12  --   --   --   --   --  100*  --   --   --   --   --   --   --  114*   < > 119   HDL  --   --   --   --   --  42  --   --   --   --   --  38*  --   --   --   --   --   --   --  29*   < > 33*   TRIG  --   --   --   --   --  85  --   --   --   --   --  114  --   --   --   --   --   --   --  199*   < > 230*   ALT  --   --   --   --   --   --   --   --   --  17  --   --   --   --   --   17  --  17   < >  --   --  29   CR 3.96* 2.53*   < > 4.64*   < > 5.16*   < > 5.44*   < > 5.20*   < > 5.32*   < >  --    < > 7.80*   < > 7.19*   < > 2.56*   < > 0.91   GFRESTIMATED 14* 24*   < > 11*   < > 10*   < > 9*   < > 11*   < > 11*   < >  --    < > 7*   < > 8*   < > 25*   < > 82   GFRESTBLACK 16* 28*   < > 13*   < > 12*   < > 11*   < > 13*   < > 13*   < >  --    < > 8*   < > 9*   < > 30*   < > >90   POTASSIUM 4.5 4.0   < > 3.9   < > 3.9   < > 5.0   < > 4.3   < > 3.9   < >  --    < > 4.7   < > 5.2   < > 3.4   < > 4.0   TSH  --   --   --  6.42*  --   --   --  5.36*   < >  --   --   --    < >  --   --   --   --   --    < >  --   --   --     < > = values in this interval not displayed.      BP Readings from Last 3 Encounters:   02/26/20 138/72   02/12/20 130/60   01/27/20 121/54    Wt Readings from Last 3 Encounters:   02/26/20 70.3 kg (155 lb)   02/12/20 70 kg (154 lb 6.4 oz)   01/27/20 70.8 kg (156 lb)                    Reviewed and updated as needed this visit by Provider  Tobacco  Allergies  Meds  Problems  Med Hx  Surg Hx  Fam Hx         Review of Systems   CONSTITUTIONAL: NEGATIVE for fever, chills, change in weight  ENT/MOUTH: NEGATIVE for ear, mouth and throat problems  RESP: NEGATIVE for significant cough or SOB  CV: NEGATIVE for chest pain, palpitations or peripheral edema  GI: NEGATIVE for nausea, abdominal pain, heartburn, or change in bowel habits  MUSCULOSKELETAL: NEGATIVE for significant arthralgias or myalgia  ROS otherwise negative       Objective   Reported vitals:  There were no vitals taken for this visit.   healthy, alert and no distress  PSYCH: Alert and oriented times 3; coherent speech, normal   rate and volume, able to articulate logical thoughts, able   to abstract reason, no tangential thoughts, no hallucinations   or delusions  His affect is normal  RESP: No cough, no audible wheezing, able to talk in full sentences  Remainder of exam unable to be completed due to telephone  visits    Diagnostic Test Results:  Labs reviewed in Epic        Assessment/Plan:  1. Dysphagia, unspecified type  Advised endoscopy  - GASTROENTEROLOGY ADULT REF CONSULT ONLY; Future  - omeprazole (PRILOSEC) 20 MG DR capsule; Take 1 capsule (20 mg) by mouth daily  Dispense: 30 capsule; Refill: 0    2. CKD (chronic kidney disease) stage 5, GFR less than 15 ml/min (H)      3. Hypertension goal BP (blood pressure) < 140/90  Stable advised lab appointment   - **Basic metabolic panel FUTURE anytime; Future  - Lipid panel reflex to direct LDL Fasting; Future    Return in about 2 months (around 8/9/2020) for recheck.    4:09 PM-end Visit    Phone call duration:  As above minutes    Haley Baker MD

## 2020-06-09 ENCOUNTER — TELEPHONE (OUTPATIENT)
Dept: FAMILY MEDICINE | Facility: CLINIC | Age: 75
End: 2020-06-09

## 2020-06-09 ENCOUNTER — VIRTUAL VISIT (OUTPATIENT)
Dept: FAMILY MEDICINE | Facility: CLINIC | Age: 75
End: 2020-06-09
Payer: COMMERCIAL

## 2020-06-09 DIAGNOSIS — R13.10 DYSPHAGIA, UNSPECIFIED TYPE: Primary | ICD-10-CM

## 2020-06-09 DIAGNOSIS — N18.5 CKD (CHRONIC KIDNEY DISEASE) STAGE 5, GFR LESS THAN 15 ML/MIN (H): ICD-10-CM

## 2020-06-09 DIAGNOSIS — R13.10 DYSPHAGIA, UNSPECIFIED TYPE: ICD-10-CM

## 2020-06-09 DIAGNOSIS — I10 HYPERTENSION GOAL BP (BLOOD PRESSURE) < 140/90: ICD-10-CM

## 2020-06-09 PROCEDURE — 99214 OFFICE O/P EST MOD 30 MIN: CPT | Mod: TEL | Performed by: FAMILY MEDICINE

## 2020-06-09 NOTE — TELEPHONE ENCOUNTER
Reason for call:  Other   Patient called regarding (reason for call): call back  Additional comments: Patient is calling because he said he never got a call for his phone appt today. Please call back     Phone number to reach patient:  Home number on file [phone] (home)    Best Time:  any    Can we leave a detailed message on this number?  YES    Travel screening: Not Applicable

## 2020-06-15 DIAGNOSIS — N18.5 CKD (CHRONIC KIDNEY DISEASE) STAGE 5, GFR LESS THAN 15 ML/MIN (H): Primary | ICD-10-CM

## 2020-06-15 RX ORDER — FOLIC ACID/VIT B COMPLEX AND C 0.8 MG
1 TABLET ORAL DAILY
Qty: 90 TABLET | Refills: 3 | Status: SHIPPED | OUTPATIENT
Start: 2020-06-15

## 2020-08-05 DIAGNOSIS — I50.20 HEART FAILURE WITH REDUCED EJECTION FRACTION, NYHA CLASS I (H): ICD-10-CM

## 2020-08-05 RX ORDER — LISINOPRIL 5 MG/1
5 TABLET ORAL DAILY
Qty: 90 TABLET | Refills: 3 | Status: SHIPPED | OUTPATIENT
Start: 2020-08-05

## 2020-09-28 NOTE — CONSULTS
Patient called stating he was seen on Friday and his symptoms are worse.  He is very tired and weak.  He states he can hardly walk and has back and stomach pain.  He is suppose to have labs done 10/8/20 but feels he needs this lab right now.  Patient has Vitamin B12 ordered.  Message printed for provide review.    Please call patient and let him know.   Urology Consult    Name: Dung Norton    MRN: [id number]   YOB: 1945       We were asked to see Dung Norton at the request of Dr. Saavedra for evaluation and treatment of the following chief complaint.        Chief Complaint:   Obstructive nephropathy    History is obtained from the patient          History of Present Illness:   Dung Norton is a 71 year old male with  PMH of hypertension and tobacco and no urologic history who was in good health until he developed back pain in May. He denies any preceeding trauma and states the pain is located to his lower back in the midline. He states the pain is exacerbated by walking, particularly up hills or stairs, and is alleviated with lying down. He denies any radiation of the pain or any associated numbness. 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    Of note, patient has lost approximately 40 pounds since February. He attributes this to a reduced appetite but denies any nausea or vomiting.    During this time he has noticed reduced urinary frequency. He urinates upon waking in the morning and often will not urinate until the subsequent morning. Other days he will urinate 1-2 times during the day. He endorses mild urgency with associated dribbling and mild weak stream. He denies any hesitancy, nocturia, hematuria, dysuria, flank pain, suprapubic pain, fever, or chills. Patient denies any family history of urological cancers.    Upon admission, 3 attempts were made to place a catheter and a 14F wood was ultimately placed. Initial urine return was clear, however subsequent hematuria was noted. 5L of urine was drained over initial 6 hours following Wood placement.    Notable labs include admission Cr of 14.40, , bicarbonate of 16 and Hgb of 6.4. Initial UA has 2 RBC but follow up had RBC  >182 with proteinuria and glucosuria with negative ketones and nitrites. Renal US was obtained which showed a vascular mass of likely either bladder or prostate origin that was obstructing the bladder neck. Associated moderate bilateral hydroureteronephrosis was also seen.          Past Medical History:     Past Medical History:   Diagnosis Date     HTN      Tobacco abuse             Past Surgical History:     Past Surgical History:   Procedure Laterality Date     APPENDECTOMY  AGE 6     SINUS SURGERY  AGE 6     TONSILLECTOMY      CHILDHOOD            Social History:     Social History   Substance Use Topics     Smoking status: Current Every Day Smoker     Packs/day: 0.25     Types: Cigarettes     Smokeless tobacco: Never Used      Comment: 3 cig a day     Alcohol use Yes      Comment: 2 per year   Patient smoked 2-3 ppd for 60 years prior to his recent reduction to 4-5 cigarettes daily.         Family History:     Family History   Problem Relation Age of Onset     C.A.D. Mother      d age 67     Vision Loss Father      Hearing Loss Sister      DIABETES Sister      CANCER No family hx of           Allergies:   No Known Allergies         Medications:     Current Facility-Administered Medications   Medication     lactated ringers 1,000 mL infusion     naloxone (NARCAN) injection 0.1-0.4 mg     Contraindications to both pharmacological and mechanical prophylaxis (must document contraindications for both in this order)     acetaminophen (TYLENOL) tablet 650 mg     metoprolol (TOPROL-XL) 24 hr tablet 100 mg     nicotine (NICODERM CQ) 21 MG/24HR 24 hr patch 1 patch     nicotine patch REMOVAL     nicotine Patch in Place          Review of Systems:    ROS: 10 point ROS neg other than the symptoms noted above in the HPI           Physical Exam:   VS:  T: 96.1    HR: 70    BP: 121/72    RR: 16   GEN:  AOx3.  NAD.  Cachectic.  CV:  RRR  LUNGS: Non-labored breathing.   BACK:  No midline or CVA tenderness.  ABD:  Soft.  NT.   ND.  No rebound or guarding.  No masses.  :  circumcised.  Normal penile shaft.  Testicles descended bilaterally, no nodules or tenderness.  No inguinal hernias. Man catheter secured in place   LEONELA:  Prostate smoth,  no nodules. Firm without induration. Moderate to large size.  EXT:  Warm, well perfused.  No edema.  SKIN:  Warm.  Dry.  No rashes.  NEURO:  CN grossly intact.            Data:   All laboratory data reviewed personally:      Recent Labs  Lab 07/13/17  0812 07/12/17  1740 07/12/17  1407   WBC 8.9 7.8 8.1   HGB 7.8* 6.5* 6.4*    278 259       Recent Labs  Lab 07/13/17  0812 07/12/17  1740 07/12/17  1407    139 140   POTASSIUM 4.4 4.5 4.2   CHLORIDE 114* 108 109   CO2 16* 16* 16*   * 173* 177*   CR 14.00* 14.70* 14.40*   GLC 91 105* 117*   DERICK 8.0* 8.5 8.3*       Recent Labs  Lab 07/13/17  0650   COLOR Light Yellow   APPEARANCE Slightly Cloudy   URINEGLC 70*   URINEBILI Negative   URINEKETONE Negative   SG 1.010   URINEPH 7.5*   PROTEIN 300*   NITRITE Negative   LEUKEST Negative   RBCU >182*   WBCU None     All pertinent imaging reviewed:    Results for orders placed or performed during the hospital encounter of 07/12/17   XR Chest 2 Views    Narrative    Examination: XR CHEST 2 VW, 7/12/2017 5:22 PM    Comparison: No comparison    History: Weight loss    Findings: The cardiomediastinal silhouette is within normal limits.  Calcification in the aortic arch. Presumed nipple shadows bilaterally.  Lungs are otherwise clear. No pleural effusion. No pneumothorax.      Impression    Impression: Presumed nipple shadows bilaterally. Lungs are otherwise  clear. If there is a high degree of clinical concern for a pulmonary  malignancy a CT would be more sensitive.    I have personally reviewed the examination and initial interpretation  and I agree with the findings.    GEOVANNA JUNIOR MD   US Renal Complete    Narrative    EXAMINATION: Renal ultrasound, 7/13/2017 10:36 AM     COMPARISON:  None.    HISTORY: Elevated creatinine    FINDINGS:    Right kidney: Measures 10.4 cm in length. Thinning of the renal  cortical parenchyma. No focal mass. Moderate hydroureteronephrosis.    Left kidney: Measures 10.2 cm in length. Thinning of the renal  cortical parenchyma. No focal mass. Moderate hydroureteronephrosis.    Bladder: Wood catheter and balloon present. Vascular mass protruding  into the bladder lumen originating at the bladder neck/prostate.  Avascular iso/hyperechoic complex material surrounding the Wood  balloon. Debris is floating within the bladder.        Impression    IMPRESSION:  1.  Vascular mass protruding into the bladder lumen originating at the  bladder neck, with bladder wall thickening, concerning for bladder or  prostate neoplasm.  Additional evaluation with CT is recommended.  Alternatively may be consider MRI or cystoscopy.    2.  Avascular complex material surrounding the Wood balloon, most  likely thrombus. Additional debris floating within the bladder may  represent blood products, infection/inflammatory cells, or possibly  tumor cells.  3.  Moderate hydroureteronephrosis bilaterally. No focal kidney mass  identified.          Impression and Plan:   Impression:   Dung Norton is a 71 year old male with PMH of HTN and urologic hx of obstructive uropathy.  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      Plan:     - Continue wood catheter to drainage   - Continue to monitor UOP and electrolytes     - Per AUA guidelines would recommend a CTUrogram but  due to elevated creatine patient unable to receive contrast. Please obtain CT Abd/pelvis w/o contrast to better characterize the mass seen on US   - Please obtain PSA     - Will schedule patient for cystoscopy, possible bladder biopsy, bilateral retrograde pyelgoram as outpatient    - Discussed possibility of upsizing current 14F catheter in the setting of hematuria,  however catheter is draining appropriately at  this time without difficulty. Patient elects to forgo catheter exchange at this time.   If catheter does appear to be clotted off, will likely upsize over a wire due to previous difficulty with placement.    - Urology will continue to follow. Please contact resident/PA on call with any questions or concerns.     This patient's exam findings, labs, and imaging discussed with urology staff surgeon Dr. Francis, who developed the treatment plan.    Tamiko Brower MD  Urology Resident PGY-3

## 2020-09-29 ENCOUNTER — TELEPHONE (OUTPATIENT)
Dept: CARDIOLOGY | Facility: CLINIC | Age: 75
End: 2020-09-29

## 2020-09-29 NOTE — TELEPHONE ENCOUNTER
Lizandro Ng MD.,Cardiology  Visit with echo. Test prior due noted in work que. .Left message to return clinic call to .  Titus Tolliver L.P.N.

## 2020-11-06 DIAGNOSIS — I25.10 CORONARY ARTERY DISEASE INVOLVING NATIVE CORONARY ARTERY OF NATIVE HEART WITHOUT ANGINA PECTORIS: ICD-10-CM

## 2020-11-06 DIAGNOSIS — I50.20 HEART FAILURE WITH REDUCED EJECTION FRACTION, NYHA CLASS I (H): ICD-10-CM

## 2020-11-06 RX ORDER — METOPROLOL TARTRATE 25 MG/1
TABLET, FILM COATED ORAL
Qty: 90 TABLET | Refills: 1 | Status: SHIPPED | OUTPATIENT
Start: 2020-11-06

## 2020-11-10 ENCOUNTER — TRANSFERRED RECORDS (OUTPATIENT)
Dept: HEALTH INFORMATION MANAGEMENT | Facility: CLINIC | Age: 75
End: 2020-11-10

## 2020-11-12 ENCOUNTER — TRANSFERRED RECORDS (OUTPATIENT)
Dept: HEALTH INFORMATION MANAGEMENT | Facility: CLINIC | Age: 75
End: 2020-11-12

## 2020-11-13 ENCOUNTER — TELEPHONE (OUTPATIENT)
Dept: FAMILY MEDICINE | Facility: CLINIC | Age: 75
End: 2020-11-13

## 2020-11-13 DIAGNOSIS — U07.1 2019 NOVEL CORONAVIRUS DISEASE (COVID-19): ICD-10-CM

## 2020-11-13 NOTE — TELEPHONE ENCOUNTER
Reason for Call:  Other FYI    Detailed comments: Patient has been admitted to the hospital with UTI and COVID, they wanted to let Dr. Baker know.    Phone Number Patient can be reached at: Other phone number:      Best Time:     Can we leave a detailed message on this number?     Call taken on 11/13/2020 at 2:42 PM by Renea Rojas

## 2020-11-16 PROBLEM — U07.1 2019 NOVEL CORONAVIRUS DISEASE (COVID-19): Status: ACTIVE | Noted: 2020-11-16

## 2020-11-16 LAB
CREAT SERPL-MCNC: 3.11 MG/DL (ref 0.72–1.25)
GFR SERPL CREATININE-BSD FRML MDRD: 20 ML/MIN/1.73M2
GLUCOSE SERPL-MCNC: 134 MG/DL (ref 65–100)
POTASSIUM SERPL-SCNC: 4.1 MMOL/L (ref 3.5–5)

## 2020-11-17 ENCOUNTER — PATIENT OUTREACH (OUTPATIENT)
Dept: CARE COORDINATION | Facility: CLINIC | Age: 75
End: 2020-11-17

## 2020-11-17 DIAGNOSIS — U07.1 LAB TEST POSITIVE FOR DETECTION OF COVID-19 VIRUS: Primary | ICD-10-CM

## 2020-11-17 NOTE — PROGRESS NOTES
Clinic Care Coordination Contact    Situation: Patient chart reviewed by care coordinator.    Background: 75 year old male that has had three ED or inpatient encounters since testing positive with covid-19 on 11/10/20.    Assessment: Patient is discharging from [facility] ED today to go home with his daughter in [place].   There are orders for [facility] home care to start on 11/18/20.    Patient has dialysis twice a week.     Plan/Recommendations: RN clinic care coordinator will follow up with patient and daughter after discharge.     Gladis Mcintyre RN, [facility] RN Coordinator  [facility]   11/17/2020    11:09 AM  [phone]

## 2020-11-17 NOTE — TELEPHONE ENCOUNTER
Daughter calling to let Dr Baker know that patient was seen at [facility] and is now home with her with Home health coming to evaluate for OT and PT. Hospital suggested an Assisted living but daughter declined. Will be taking care of patient at her home with the help of Home health  . Daughter will drop off paper work that she would like Dr Baker to complete for her for Intermittent leave. States  has completed this for her in past.     Also wants Dr Baker to be aware that he will be going to a different Dialysis center due to being positive for covid. Please call to discuss or with questions.

## 2020-11-18 ENCOUNTER — TELEPHONE (OUTPATIENT)
Dept: FAMILY MEDICINE | Facility: CLINIC | Age: 75
End: 2020-11-18

## 2020-11-18 ENCOUNTER — PATIENT OUTREACH (OUTPATIENT)
Dept: NURSING | Facility: CLINIC | Age: 75
End: 2020-11-18
Payer: COMMERCIAL

## 2020-11-18 DIAGNOSIS — U07.1 LAB TEST POSITIVE FOR DETECTION OF COVID-19 VIRUS: Primary | ICD-10-CM

## 2020-11-18 SDOH — HEALTH STABILITY: PHYSICAL HEALTH: ON AVERAGE, HOW MANY DAYS PER WEEK DO YOU ENGAGE IN MODERATE TO STRENUOUS EXERCISE (LIKE A BRISK WALK)?: 0 DAYS

## 2020-11-18 SDOH — ECONOMIC STABILITY: FOOD INSECURITY: WITHIN THE PAST 12 MONTHS, YOU WORRIED THAT YOUR FOOD WOULD RUN OUT BEFORE YOU GOT MONEY TO BUY MORE.: NEVER TRUE

## 2020-11-18 SDOH — ECONOMIC STABILITY: FOOD INSECURITY: WITHIN THE PAST 12 MONTHS, THE FOOD YOU BOUGHT JUST DIDN'T LAST AND YOU DIDN'T HAVE MONEY TO GET MORE.: NEVER TRUE

## 2020-11-18 SDOH — SOCIAL STABILITY: SOCIAL NETWORK
IN A TYPICAL WEEK, HOW MANY TIMES DO YOU TALK ON THE PHONE WITH FAMILY, FRIENDS, OR NEIGHBORS?: MORE THAN THREE TIMES A WEEK

## 2020-11-18 SDOH — ECONOMIC STABILITY: INCOME INSECURITY: HOW HARD IS IT FOR YOU TO PAY FOR THE VERY BASICS LIKE FOOD, HOUSING, MEDICAL CARE, AND HEATING?: NOT VERY HARD

## 2020-11-18 SDOH — HEALTH STABILITY: MENTAL HEALTH
STRESS IS WHEN SOMEONE FEELS TENSE, NERVOUS, ANXIOUS, OR CAN'T SLEEP AT NIGHT BECAUSE THEIR MIND IS TROUBLED. HOW STRESSED ARE YOU?: PATIENT DECLINED

## 2020-11-18 SDOH — SOCIAL STABILITY: SOCIAL NETWORK: HOW OFTEN DO YOU GET TOGETHER WITH FRIENDS OR RELATIVES?: MORE THAN THREE TIMES A WEEK

## 2020-11-18 SDOH — ECONOMIC STABILITY: TRANSPORTATION INSECURITY
IN THE PAST 12 MONTHS, HAS LACK OF TRANSPORTATION KEPT YOU FROM MEETINGS, WORK, OR FROM GETTING THINGS NEEDED FOR DAILY LIVING?: NO

## 2020-11-18 SDOH — HEALTH STABILITY: PHYSICAL HEALTH: ON AVERAGE, HOW MANY MINUTES DO YOU ENGAGE IN EXERCISE AT THIS LEVEL?: 0 MIN

## 2020-11-18 SDOH — ECONOMIC STABILITY: TRANSPORTATION INSECURITY
IN THE PAST 12 MONTHS, HAS THE LACK OF TRANSPORTATION KEPT YOU FROM MEDICAL APPOINTMENTS OR FROM GETTING MEDICATIONS?: NO

## 2020-11-18 ASSESSMENT — ACTIVITIES OF DAILY LIVING (ADL): DEPENDENT_IADLS:: INDEPENDENT

## 2020-11-18 NOTE — TELEPHONE ENCOUNTER
Called Tereza. She states that she spoke with the care coordinator, she will call to schedule a virtual telephone of video visit with Dr. Baker.

## 2020-11-18 NOTE — TELEPHONE ENCOUNTER
Haley Baker MD  Family Medicine  Forms  Reason for call   Conversation: Forms  (Newest Message First)       11/17/20 1:08 PM  Daryl Ansari routed this conversation to [employer]    Daryl Ansari         11/17/20 1:07 PM  Note     Reason for Call:  Form, our goal is to have forms completed with 72 hours, however, some forms may require a visit or additional information.     Type of letter, form or note:  FMLA     Who is the form from?: Patient     Where did the form come from: Patient or family brought in        What clinic location was the form placed at?: [facility]     Where the form was placed: Samuel's mailbox     What number is listed as a contact on the form?: [phone]- Tereza       Additional comments: **Please call patient before completing form** Once completed, please fax to [phone]. Patients name is different from paperwork,  is unable to update the patients chart without a valid ID. Patient is requesting for paperwork to be completed this week. Paperwork says this needs to be completed by 12/01/2020- told patient I cannot guarantee a date on when this will be completed.      Call taken on 11/17/2020 at 1:04 PM by Daryl Ansari                        11/17/20 1:04 PM    Tereza Norton contacted Daryl Ansari    This encounter is not signed. The conversation may still be ongoing.

## 2020-11-18 NOTE — LETTER
[facility]  Complex Care Plan  About Me:    Patient Name:  Dung Norton    YOB: 1945  Age:         75 year old   [place] MRN:    [id number] Telephone Information:  Home Phone [phone]   Mobile Not on file.       Address:  [address]  [place] [address] Email address:  No e-mail address on record      Emergency Contact(s)    Name Relationship Lgl Grd Work Phone Home Phone Mobile Phone   1. ORLANDO NORTON Daughter    [phone]   2. NEHAL ELDRIDGE Daughter   [phone]    3. JOANNE NORTON Brother   [phone]            Primary language:  English     needed? No   [employer]:  [phone] op. 1  Other communication barriers: Glasses  Preferred Method of Communication:  Mail  Current living arrangement: I live alone  Mobility Status/ Medical Equipment: Independent    Health Maintenance  Health Maintenance Reviewed:    Health Maintenance Due   Topic Date Due     HF ACTION PLAN  1945     HEPATITIS C SCREENING  10/24/1961     HEPATITIS B IMMUNIZATION (1 of 3 - Risk Recombivax 3-dose series) 10/24/1962     ZOSTER IMMUNIZATION (2 of 3) 05/15/2012     ALT  12/11/2018     BMP  10/30/2019     MEDICARE ANNUAL WELLNESS VISIT  04/16/2020     LIPID  04/16/2020     FALL RISK ASSESSMENT  04/29/2020     CBC  04/30/2020     INFLUENZA VACCINE (1) 09/01/2020       My Access Plan  Medical Emergency 911   Primary Clinic Line [facility] [phone]   24 Hour Appointment Line [phone] or  [phone]) (toll-free)   24 Hour Nurse Line [phone] (toll-free)   Preferred Urgent Care     Preferred Hospital [facility]  [phone]   Preferred Pharmacy [id number] - [place] - [address] AT [facility] [address]     Behavioral Health Crisis Line The National Suicide Prevention Lifeline at 1-132.753.8949 or 911             My Care Team Members  Patient Care  Team       Relationship Specialty Notifications Start End    Haley Baker MD PCP - General   3/8/09     Phone: [phone] Fax: [phone] [address] [facility] [address]    Leda Baxter PA Physician Assistant Physician Assistant  8/7/17     Phone: [phone] Fax: [phone]         [address] [facility] [address] [facility] [address]    Melissa Gaytan, RN Registered Nurse Oncology  8/8/17     Phone: [phone] Pager: [phone]        Tamiko Vanegas MD MD Urology  12/4/17     Phone: [phone] Fax: [phone]         [address] [facility] [address] [facility] [address]    Brittany Mock RN Registered Nurse Urology  12/4/17     Phone: [phone]         Haley Baker MD Referring Physician Family Practice  12/4/17     Phone: [phone] Fax: [phone] [address] [facility] [address]    Ciera Daley MD MD Nephrology  1/2/18     Phone: [phone] Pager: [phone] Fax: [phone]        [address] [facility] [address] [facility] [address]    Haley Baker MD Assigned PCP   3/3/19     Phone: [phone] Fax: [phone]         [address] [facility] [address]    Lizandro Ng MD Assigned Heart and Vascular Provider   10/23/20     Phone: [phone] Fax: [phone]         [address] [facility] [address] [facility] [address]    Tamiko Vanegas MD Assigned Surgical Provider   10/23/20     Phone: [phone] Fax: [phone]         [address] [facility] [address] [facility] [address]    Gladis Mcintyre, BRANDY Lead Care Coordinator Primary Care - CC Admissions 11/18/20     Phone: [phone]                 My Care Plans  Self Management and Treatment Plan  Goals and (Comments)  Goals        General    Functional (pt-stated)     Notes - Note created  11/18/2020 11:56 AM by Gladis Mcintyre RN    Goal Statement: I want to get stronger    Date Goal set: 11/18/20    Barriers: Covid-19 virus    Strengths: Family support    Date to Achieve By: January  2021     Patient expressed understanding of goal: Yes, caregiver    Action steps to achieve this goal:  1. I will eat better  2. I will participate in home therapies                Action Plans on File:                       Advance Care Plans/Directives Type:        My Medical and Care Information  Problem List   Patient Active Problem List   Diagnosis     CARDIOVASCULAR SCREENING; LDL GOAL LESS THAN 130     Hypertension goal BP (blood pressure) < 140/90     Advanced directives, counseling/discussion     Elevated fasting glucose     Hypertriglyceridemia     Symptomatic anemia     Pulmonary nodules     Anemia of chronic renal failure, stage 5 (H)     End stage renal disease (H)     Acquired hypothyroidism     Obstructive uropathy     CKD (chronic kidney disease) stage 5, GFR less than 15 ml/min (H)     Ex-smoker     Thoracic aortic aneurysm without rupture (H)     Nonrheumatic aortic valve stenosis     Olecranon bursitis of left elbow     Urinary tract infection     S/P transurethral resection of prostate     Nuclear sclerosis of both eyes     Chronic systolic heart failure (H)     Status post coronary angiogram     Coronary artery disease involving native coronary artery, angina presence unspecified, unspecified whether native or transplanted heart     S/P coronary angiogram     ESRD (end stage renal disease) (H)     S/p TAVR (transcatheter aortic valve replacement), bioprosthetic     PVD (peripheral vascular disease) (H)     Dependence on renal dialysis (H)     Abdominal aortic aneurysm (AAA) (H)     Elevated troponin     Acute renal failure (H)     Acute systolic CHF (congestive heart failure) (H)     Coronary artery disease involving native coronary artery     Essential hypertension     Aortic valve stenosis     2019 novel coronavirus disease (COVID-19)      Current Medications and Allergies:  See printed Medication Report.    Care Coordination Start Date: 11/18/2020   Frequency of Care Coordination: monthly    Form Last Updated: 11/18/2020

## 2020-11-18 NOTE — TELEPHONE ENCOUNTER
Huddled with the provider.       Started forms emailed to her. She will discuss them at the appointment with the family at the Video Visit 11/19/2020.     Bess Salomon,

## 2020-11-18 NOTE — TELEPHONE ENCOUNTER
Tereza is calling back. States patient has been in and out of the ER 3 times this past week. Do you want him to come in with having Covid she would like to know?  Also there will be a home health nurse coming into the home at 11:00 am today to see patient. He is currently living with daughter Tereza. Please call to discuss. Thank you.

## 2020-11-18 NOTE — PROGRESS NOTES
Clinic Care Coordination Contact    Clinic Care Coordination Contact  OUTREACH    Referral Information:  Referral Source: [facility]    Primary Diagnosis: CKD(Covid-19 positive)    Chief Complaint   Patient presents with     Clinic Care Coordination - [facility]     RN      [place] Utilization: Nephrology, dialysis, GI, cardiology  Clinic Utilization  Difficulty keeping appointments:: No  Compliance Concerns: No  No-Show Concerns: No  No PCP office visit in Past Year: No  Utilization    Last refreshed: 11/17/2020 11:28 PM: Hospital Admissions 3           Last refreshed: 11/17/2020 11:28 PM: ED Visits 2           Last refreshed: 11/17/2020 11:28 PM: No Show Count (past year) 2              Current as of: 11/17/2020 11:28 PM          Clinical Concerns:    Current Medical Concerns:  Patient was seen in ED x 2 and admitted in one week due to covid-19 positive.     Patient is currently staying with daughter, Tereza.  Spoke with Tereza.    Tereza detailed events this past week for patient. She is frustrated with the hospital and EMS. She states she is understanding that everyone is very busy currently, but she did not feel patient was cared for properly nor was she communicated with adequately.    Tereza states patient is very thin. She was surprised how frail her appears. She states he is not eating well at all. We discussed frequent small meals versus three meals a day. Also discussed easy swallowing foods.     Tereza states her parents are , but live in the same apartment complex only a few doors apart. They spend every day together.  We discussed at length the importance of spouse getting tested for covid-19. Tereza states her mother is able to drive and she has encouraged her to be tested.   Tereza states she was tested and was found to be negative.     Tereza states patient is staying in her oldest daughter's room and has a separate bathroom. Her oldest daughter is staying with a friend. Her other three  children are home, but have not had any contact with Patient. Tereza states she is wearing a mask when with patient.     Patient has dialysis twice a week,  Mondays and Fridays, normally at [facility]. Because he is covid-19 positive he now needs to go to a designated dialysis unit in [place]. H needs to go there until the first of December.  Tereza states she and her sister will drive him. He normally drives independently but she does not feel he is capable currently.    Patient self caths twice a day. He was treated for UTI while inpatient. Tereza states patient never complains of anything so it is very difficult to get information from him.     Current Behavioral Concerns: Denies concersn    Education Provided to patient: Role of care coordination.     Pain  Pain (GOAL):: No    Health Maintenance Reviewed:   Health Maintenance Due   Topic Date Due     HF ACTION PLAN  1945     HEPATITIS C SCREENING  10/24/1961     HEPATITIS B IMMUNIZATION (1 of 3 - Risk Recombivax 3-dose series) 10/24/1962     ZOSTER IMMUNIZATION (2 of 3) 05/15/2012     ALT  12/11/2018     BMP  10/30/2019     MEDICARE ANNUAL WELLNESS VISIT  04/16/2020     LIPID  04/16/2020     FALL RISK ASSESSMENT  04/29/2020     CBC  04/30/2020     INFLUENZA VACCINE (1) 09/01/2020      Clinical Pathway: Clinic Care Coordination CKD Assessment    Discharge:    Day of hospital discharge:11/17/20  What recommendations were made for follow up after your recent hospitalization? Follow up with PCP    Have the follow up appointments been scheduled? No, Tereza will Schedule  If not, can I help you set up these appointments? N/A     Do you have after-hour contact numbers for your providers? yes                    Symptom Review :  CKD Symptoms:   Appetite Changes: : No  Chest pain: : No  Diarrhea:: No  Fluid retention:: No  Concern with urination:: No(Self cath 2x/day)  Home BP monitoring: : No  Joint pain: : No  Change in mental status: : Yes  Mental Status:  "lethargic  Muscle cramping: : No  Nausea:: No  Vomiting: : No  NSAID use: : No  Rash: : No  Shortness of breath: No  Vascular access: : No  At Goal Weight:: Yes  Overall your CKD symptoms are (GOAL):: Stable    CKD  Dialysis:: Yes  Dialysis Center and Phone:: [facility]  ACE or ARB prescribed:: Yes  How confident are you with the plan we have identified?: (Caregiver is confident)    Medications:   \"How many new medications are you on since your hospitalization?\"           0 - 1  \"How many of your current medicines changed (dose, timing, name, etc.) while you were in the hospital?\" 0   \"Do you have questions about your medications?\" yes, will review with nephrology    Medication reconciliation completed? Yes  Was MTM referral placed (*Make sure to put transitions as reason for referral)?  No     Activity:  Patient currently is engaged in exercise: No  Dependent ADLs:: Ambulation-no assistive device  Dependent IADLs:: Independent  Patient referred to NONE    Diet:  Reviewed well balanced diet with consideration to medical conditions/limitations.   Reviewed dietary guidelines specific to patient ckd  stage.     Referred to NONE.    Emotions:  Patient does have adequate support.    Medication Management:  Medication reconciliation status: Medications reviewed and reconciled.  Continue medications without change. Each medication reviewed with Tereza. Different instructions for Renvela, from bottle to med list.     Functional Status:  Dependent ADLs:: Ambulation-no assistive device  Dependent IADLs:: Independent  Bed or wheelchair confined:: No  Mobility Status: Independent  Fallen 2 or more times in the past year?: No  Any fall with injury in the past year?: No    Living Situation:  Current living arrangement:: I live alone  Type of residence:: Apartment    Lifestyle & Psychosocial Needs:  Lifestyle     Physical activity     Days per week: 0 days     Minutes per session: 0 min     Stress: Patient refused     Social " Needs     Financial resource strain: Not very hard     Food insecurity     Worry: Never true     Inability: Never true     Transportation needs     Medical: No     Non-medical: No     Diet:: Other(Dialysisi diet)  Inadequate nutrition (GOAL):: No  Tube Feeding: No  Inadequate activity/exercise (GOAL):: No  Significant changes in sleep pattern (GOAL): No  Transportation means:: Accessible car, Family     Bahai or spiritual beliefs that impact treatment:: No  Mental health DX:: No  Mental health management concern (GOAL):: No  Chemical Dependency Status: No Current Concerns  Informal Support system:: Children   Socioeconomic History     Marital status:      Spouse name: Not on file     Number of children: 2     Years of education: Not on file     Highest education level: Not on file   Occupational History     Occupation:      Employer: [email or web address]     Social connections     Talks on phone: More than three times a week     Gets together: More than three times a week     Attends Mandaeism service: Not on file     Active member of club or organization: Not on file     Attends meetings of clubs or organizations: Not on file     Relationship status: Not on file     Intimate partner violence     Fear of current or ex partner: Not on file     Emotionally abused: Not on file     Physically abused: Not on file     Forced sexual activity: Not on file     Tobacco Use     Smoking status: Former Smoker     Packs/day: 1.00     Years: 53.00     Pack years: 53.00     Types: Cigarettes     Quit date: 6/12/2017     Years since quitting: 3.4     Smokeless tobacco: Never Used   Substance and Sexual Activity     Alcohol use: Yes     Comment: rare, drink every few months     Drug use: No     Sexual activity: Not Currently      Tereza understands the need for patient isolation. Patient instructions were part of discharge paper work she has.      Resources and Interventions:  Current Resources: [facility]  [facility]    Nurse is coming at 11 am today. We discussed the need for patient to have a HHA, as Tereza does not feel she can help him shower and he needs help.   List of home care services:: Skilled Nursing, Home Health Aid, Physicial Therapy, Occupational Therapy  Community Resources: Home Care, Dialysis Services  Supplies used at home:: Incontinence Supplies  Equipment Currently Used at Home: none  Employment Status: retired    Advance Care Plan/Directive  Advanced Care Plans/Directives on file:: No    Referrals Placed: None     Goals:    Goal Statement: I want to get stronger    Date Goal set: 11/18/20    Barriers: Covid-19 virus    Strengths: Family support    Date to Achieve By: January 2021     Patient expressed understanding of goal: Yes, caregiver    Action steps to achieve this goal:  1. I will eat better  2. I will participate in home therapies     Patient/Caregiver understanding: Caregiver has good understanding    Outreach Frequency: monthly    Plan: Patient will follow up with PCP.     Clinic care coordinator will follow up in 2-4 weeks.    Gladis Mcintyre RN, [facility] - Primary Care Clinic RN Coordinator  [facility]   11/18/2020    11:56 AM  [phone]

## 2020-11-18 NOTE — LETTER
[place] CARE COORDINATION  [address]  [place] [address]      November 18, 2020    Dung Norton  [address]  [place] [address]      Dear Dung,    I am a clinic care coordinator who works with Haley Baker MD at [facility]. I wanted to thank Tereza for spending the time to talk with me.  Below is a description of clinic care coordination and how I can further assist you.      The clinic care coordination team is made up of a registered nurse,  and community health worker who understand the health care system. The goal of clinic care coordination is to help you manage your health and improve access to the health care system in the most efficient manner. The team can assist you in meeting your health care goals by providing education, coordinating services, strengthening the communication among your providers and supporting you with any resource needs.    Please feel free to contact me at [phone] with any questions or concerns. We are focused on providing you with the highest-quality healthcare experience possible and that all starts with you.     Sincerely,     Gladis Mcintyre RN, [facility] - Primary Care Clinic RN Coordinator  [facility]     [phone]    Instructions for Patients  Your symptoms show that you may have coronavirus (COVID-19). This illness can cause fever, cough and trouble breathing. Many people get a mild case and get better on their own. Some people can get very sick.     Not all patients are tested for COVID-19. If you need to be tested, your care team will let you know.    How can I protect others?    Without a test, we can t know for sure that you have COVID-19. For safety, it s very important to follow these rules.    Stay home and away from others (self-isolate) until:    You ve had no fever--and no medicine that reduces fever--for 1 full day (24 hours), And     Your other symptoms have resolved  (gotten better). For example, your cough or breathing has improved, And     At least 10 days have passed since your symptoms started.    During this time:    Stay in your own room (and use your own bathroom), if you can.    Stay away from others in your home. No hugging, kissing or shaking hands.    No visitors.    Don t go to work, school or anywhere else.     Clean  high touch  surfaces often (doorknobs, counters, handles, etc.). Use a household cleaning spray or wipes.    Cover your mouth and nose with a mask, tissue or washcloth to avoid spreading germs.    Wash your hands and face often. Use soap and water.    For more tips, go to https://www.cdc.gov/coronavirus/2019-ncov/downloads/10Things.pdf.    How can I take care of myself?    1. Get lots of rest. Drink extra fluids (unless a doctor has told you not to).     2. Take Tylenol (acetaminophen) for fever or pain. If you have liver or kidney problems, ask your family doctor if it's okay to take Tylenol.     Adults can take either:     650 mg (two 325 mg pills) every 4 to 6 hours, or     1,000 mg (two 500 mg pills) every 8 hours as needed.     Note: Don't take more than 3,000 mg in one day.   Acetaminophen is found in many medicines (both prescribed and over-the-counter medicines). Read all labels to be sure you don't take too much.   For children, check the Tylenol bottle for the right dose. The dose is based on  the child's age or weight.    3. If you have other health problems (like cancer, heart failure, an organ transplant or severe kidney disease): Call your specialty clinic if you don't feel better in the next 2 days.    4. Know when to call 911: If your breathing is so bad that it keeps you from doing normal activities, call 911 or go to the emergency room. Tell them that you've been staying home and may have COVID-19.    Sign Up for [facility]  COVID-19 Symptoms  We know it's scary to hear you might have COVID-19. We want to track your symptoms to make  sure you're OK over the next 2 weeks.    Please look for an email from [employer]. This is a free, online program that we'll use to keep in touch. To sign up, click the link in the email you get.    If you don't get an email, please call your [facility] clinic. Ask them to sign you up for [employer].    You can learn more at [email or web address].  For informational purposes only. Not to replace the advice of your health care provider.   Copyright   2020 [facility]. Clinically reviewed by [employer]ain[employer]. All rights reserved. [employer] [id number] - 04/20.      Thank you for taking steps to prevent the spread of this virus.  o Limit your contact with others.  o Wear a simple mask to cover your cough.  o Wash your hands well and often.  o If you need medical care, go to [email or web address] or contact your health care provider.     For more about COVID-19 and caring for yourself at home, visit the CDC website at https://www.cdc.gov/coronavirus/2019-ncov/about/steps-when-sick.html.     To learn about care at [facility], please go to [email or web address].     [facility] clinical trials (COVID-19 research studies): clinicalaffairs.[facility]/n-clinical-trials.    Below are the COVID-19 hotlines at the [facility] ([facility]). Interpreters are available.     For health questions: Call 814-246-4978 or 1-370.665.6464 (7 a.m. to 7 p.m.)    For questions about schools and childcare: Call 408-969-4037 or 1-494.706.8242 (7 a.m. to 7 p.m.)

## 2020-11-19 ENCOUNTER — TELEPHONE (OUTPATIENT)
Dept: FAMILY MEDICINE | Facility: CLINIC | Age: 75
End: 2020-11-19

## 2020-11-19 ENCOUNTER — VIRTUAL VISIT (OUTPATIENT)
Dept: FAMILY MEDICINE | Facility: CLINIC | Age: 75
End: 2020-11-19
Payer: COMMERCIAL

## 2020-11-19 DIAGNOSIS — I25.10 CORONARY ARTERY DISEASE INVOLVING NATIVE CORONARY ARTERY OF NATIVE HEART WITHOUT ANGINA PECTORIS: ICD-10-CM

## 2020-11-19 DIAGNOSIS — I71.20 THORACIC AORTIC ANEURYSM WITHOUT RUPTURE (H): ICD-10-CM

## 2020-11-19 DIAGNOSIS — I71.40 ABDOMINAL AORTIC ANEURYSM (AAA) WITHOUT RUPTURE (H): ICD-10-CM

## 2020-11-19 DIAGNOSIS — I42.9 CARDIOMYOPATHY, UNSPECIFIED TYPE (H): ICD-10-CM

## 2020-11-19 DIAGNOSIS — M62.81 GENERALIZED MUSCLE WEAKNESS: ICD-10-CM

## 2020-11-19 DIAGNOSIS — I48.0 PAF (PAROXYSMAL ATRIAL FIBRILLATION) (H): ICD-10-CM

## 2020-11-19 DIAGNOSIS — Z95.5 S/P BARE METAL CORONARY ARTERY STENT: ICD-10-CM

## 2020-11-19 DIAGNOSIS — Z95.2 S/P TAVR (TRANSCATHETER AORTIC VALVE REPLACEMENT): ICD-10-CM

## 2020-11-19 DIAGNOSIS — I10 ESSENTIAL HYPERTENSION: ICD-10-CM

## 2020-11-19 DIAGNOSIS — N18.6 END STAGE RENAL DISEASE (H): ICD-10-CM

## 2020-11-19 DIAGNOSIS — N25.81 SECONDARY RENAL HYPERPARATHYROIDISM (H): ICD-10-CM

## 2020-11-19 DIAGNOSIS — U07.1 2019 NOVEL CORONAVIRUS DISEASE (COVID-19): Primary | ICD-10-CM

## 2020-11-19 PROCEDURE — 99214 OFFICE O/P EST MOD 30 MIN: CPT | Mod: GT | Performed by: FAMILY MEDICINE

## 2020-11-19 RX ORDER — DEXAMETHASONE 6 MG/1
6 TABLET ORAL DAILY
COMMUNITY
Start: 2020-11-16

## 2020-11-19 RX ORDER — ATORVASTATIN CALCIUM 80 MG/1
1 TABLET, FILM COATED ORAL AT BEDTIME
COMMUNITY
Start: 2020-11-06

## 2020-11-19 ASSESSMENT — PATIENT HEALTH QUESTIONNAIRE - PHQ9
SUM OF ALL RESPONSES TO PHQ QUESTIONS 1-9: 3
5. POOR APPETITE OR OVEREATING: NOT AT ALL

## 2020-11-19 ASSESSMENT — ANXIETY QUESTIONNAIRES
GAD7 TOTAL SCORE: 0
1. FEELING NERVOUS, ANXIOUS, OR ON EDGE: NOT AT ALL
IF YOU CHECKED OFF ANY PROBLEMS ON THIS QUESTIONNAIRE, HOW DIFFICULT HAVE THESE PROBLEMS MADE IT FOR YOU TO DO YOUR WORK, TAKE CARE OF THINGS AT HOME, OR GET ALONG WITH OTHER PEOPLE: NOT DIFFICULT AT ALL
2. NOT BEING ABLE TO STOP OR CONTROL WORRYING: NOT AT ALL
7. FEELING AFRAID AS IF SOMETHING AWFUL MIGHT HAPPEN: NOT AT ALL
3. WORRYING TOO MUCH ABOUT DIFFERENT THINGS: NOT AT ALL
6. BECOMING EASILY ANNOYED OR IRRITABLE: NOT AT ALL
5. BEING SO RESTLESS THAT IT IS HARD TO SIT STILL: NOT AT ALL

## 2020-11-19 NOTE — PROGRESS NOTES
"Dung Norton is a 75 year old male who is being evaluated via a billable video visit.      The patient has been notified of following:     \"This video visit will be conducted via a call between you and your physician/provider. We have found that certain health care needs can be provided without the need for an in-person physical exam.  This service lets us provide the care you need with a video conversation.  If a prescription is necessary we can send it directly to your pharmacy.  If lab work is needed we can place an order for that and you can then stop by our lab to have the test done at a later time.    Video visits are billed at different rates depending on your insurance coverage.  Please reach out to your insurance provider with any questions.    If during the course of the call the physician/provider feels a video visit is not appropriate, you will not be charged for this service.\"    Patient has given verbal consent for Video visit? Yes  How would you like to obtain your AVS? MyChart  If you are dropped from the video visit, the video invite should be resent to: Text to cell phone: [phone]  Will anyone else be joining your video visit? No    Subjective     Dung Norton is a 75 year old male who presents today via video visit for the following health issues:    [facility]       Hospital Follow-up Visit:    Hospital/Nursing Home/IP Rehab Facility: [facility]  Date of Admission: 11/10/20,11/12/2020,11/16/2020  Date of Discharge: 11/16/2020  Reason(s) for Admission: Weakness, Dizziness, Positive COVID-19    covid 19   Hospital Problem List   Principal Problem:  COVID-19  Active Problems:  CKD (chronic kidney disease) stage 5, GFR less than 15 ml/min (HC)  PVD (peripheral vascular disease) (HC)  Acquired hypothyroidism  Coronary artery disease involving native coronary artery     Was your hospitalization related to COVID-19? YES   How are you feeling today? Better  In the past 24 hours have you had " shortness of breath when speaking, walking, or climbing stairs? My breathing issues have improved  Do you have a cough? I don't have a cough  When is the last time you had a fever greater than 100? Last week in the hospital  Are you having any other symptoms? Loss of appetite   Do you have any other stressors you would like to discuss with your provider? No                 Was the patient in the ICU or did the patient experience delirium during hospitalization?  No          Problems taking medications regularly:  None  Medication changes since discharge: None  Problems adhering to non-medication therapy:  None    Summary of hospitalization:  CareEverywhere information obtained and reviewed  Diagnostic Tests/Treatments reviewed.  Follow up needed: yes   Other Healthcare Providers Involved in Patient s Care:         None  Update since discharge: improved.       Post Discharge Medication Reconciliation: discharge medications reconciled, continue medications without change.  Plan of care communicated with patient                  Video Start Time: 9.31 AM    Pt has ESRD  And on dialysis    Review of Systems   CONSTITUTIONAL: NEGATIVE for fever, chills, change in weight  ENT/MOUTH: NEGATIVE for ear, mouth and throat problems  RESP: NEGATIVE for significant cough or SOB  CV: NEGATIVE for chest pain, palpitations or peripheral edema  GI: NEGATIVE for nausea, abdominal pain, heartburn, or change in bowel habits  ROS otherwise negative      Objective           Vitals:  No vitals were obtained today due to virtual visit.    Physical Exam     GENERAL: Healthy, alert and no distress  EYES: Eyes grossly normal to inspection.  No discharge or erythema, or obvious scleral/conjunctival abnormalities.  RESP: No audible wheeze, cough, or visible cyanosis.  No visible retractions or increased work of breathing.    SKIN: Visible skin clear. No significant rash, abnormal pigmentation or lesions.  NEURO: Cranial nerves grossly intact.   Mentation and speech appropriate for age.  PSYCH: Mentation appears normal, affect normal/bright, judgement and insight intact, normal speech and appearance well-groomed.  9:53 AM     Reviewed care everywhere        Assessment & Plan     2019 novel coronavirus disease (COVID-19)  Pt is getting better-living with daughter who is taking care of him    Generalized muscle weakness  Getting better    Essential hypertension  Stable     End stage renal disease (H)  Sees Renal  On dialysis    Abdominal aortic aneurysm (AAA) without rupture (H)  Sees Dr Lopes    Secondary renal hyperparathyroidism (H)  Sees Renal      Thoracic aortic aneurysm without rupture (H)had a CT scan at [facility] in January  Recommend Repeat Ultrasound in January    Coronary artery disease involving native coronary artery of native heart without angina pectoris  Stable     S/P TAVR (transcatheter aortic valve replacement)      Cardiomyopathy, unspecified type (H)  EF was 35% prior to TAVR; post TAVR 55-60%      S/p bare metal coronary artery stent  Follow up 1 month  Pt has PAF and on 2  Blood Thinners  Sees  Can- Hold anticoagulants per Cardiology      pt has home PT/ot and home aide and daughter helps  Haley Baker MD  [facility]      Video-Visit Details    Type of service:  Video Visit    Video End Time:9:54 AM    Originating Location (pt. Location): Home    Distant Location (provider location):  [facility]     Platform used for Video Visit: Sarah Beth    Total 23 minutes  Another 105 minutes reviewing chart and  Care everywhere  Another 20 minutes reviewing chart and care everywhere

## 2020-11-20 ASSESSMENT — ANXIETY QUESTIONNAIRES: GAD7 TOTAL SCORE: 0

## 2020-11-20 NOTE — TELEPHONE ENCOUNTER
From emailed to Ml Doe,     It was also faxed to the fax number on the form [phone].    Called and informed Tereza this was completed. She received the email.    Bess Salomon,

## 2020-11-20 NOTE — TELEPHONE ENCOUNTER
Forms are being worked on with TC and Provider. Patient's daughter will be contacted one completed. Bess Salomon,

## 2020-11-20 NOTE — TELEPHONE ENCOUNTER
Selina, PT with [facility] called RN hotline requesting orders for home care PT 2 visits x 1 week  1 visit x 4 weeks, 3 tele health visits as needed to address strength, home program, endurance, balance and safety. Gave verbal ok for orders requested per RN protocol.    Zainab Powell RN  [facility]

## 2020-11-25 ENCOUNTER — TELEPHONE (OUTPATIENT)
Dept: FAMILY MEDICINE | Facility: CLINIC | Age: 75
End: 2020-11-25

## 2020-11-25 NOTE — TELEPHONE ENCOUNTER
Reason for call:  Order   Order or referral being requested: 7 additional occupational therapy visits   Reason for request: To address cognition, upper body strengthening and ADL/ IDAL training   Date needed: as soon as possible  Has the patient been seen by the PCP for this problem? YES    Additional comments: yes     Phone number to reach patient:  Home number on file [phone] (home)    Best Time:  any    Can we leave a detailed message on this number?  YES    Travel screening: Negative

## 2020-11-29 ENCOUNTER — MEDICAL CORRESPONDENCE (OUTPATIENT)
Dept: HEALTH INFORMATION MANAGEMENT | Facility: CLINIC | Age: 75
End: 2020-11-29

## 2020-11-30 ENCOUNTER — MEDICAL CORRESPONDENCE (OUTPATIENT)
Dept: HEALTH INFORMATION MANAGEMENT | Facility: CLINIC | Age: 75
End: 2020-11-30

## 2020-12-17 ENCOUNTER — MEDICAL CORRESPONDENCE (OUTPATIENT)
Dept: HEALTH INFORMATION MANAGEMENT | Facility: CLINIC | Age: 75
End: 2020-12-17

## 2020-12-22 ENCOUNTER — MEDICAL CORRESPONDENCE (OUTPATIENT)
Dept: HEALTH INFORMATION MANAGEMENT | Facility: CLINIC | Age: 75
End: 2020-12-22

## 2020-12-23 ENCOUNTER — MEDICAL CORRESPONDENCE (OUTPATIENT)
Dept: HEALTH INFORMATION MANAGEMENT | Facility: CLINIC | Age: 75
End: 2020-12-23

## 2020-12-23 ENCOUNTER — PATIENT OUTREACH (OUTPATIENT)
Dept: CARE COORDINATION | Facility: CLINIC | Age: 75
End: 2020-12-23

## 2020-12-23 NOTE — PROGRESS NOTES
Clinic Care Coordination Contact    Follow Up Progress Note      Assessment: Outreach to patient. No answer.     Per chart review. Patient had video visit with PCP on 11/19/20.    He had a virtual visit with Infectious disease on 12/3/20 for follow up of covid-19 pneumonia. Patient reported he was feeling much better, regaining his strength and had shortness of breath only with exertion. He has been working with home PT.      Patient had an ED visit on 12/9/20 at [facility] for edema in left upper extremity, this is NOT the same arm as he has dialysis fistula in. No DVT.    Per chart review patient has established care with [facility] provider at [facility].    Goals addressed this encounter: Met  Goals Addressed                 This Visit's Progress      COMPLETED: Functional (pt-stated)        Goal Statement: I want to get stronger    Date Goal set: 11/18/20    Barriers: Covid-19 virus    Strengths: Family support    Date to Achieve By: January 2021     Patient expressed understanding of goal: Yes, caregiver    Action steps to achieve this goal:  1. I will eat better  2. I will participate in home therapies           Plan:   Patient will continue primary care with [facility] providers.   Care Coordinator will close to [facility] care coordination.     Gladis Mcintyre RN, [facility] RN Coordinator  [facility]   12/23/2020    2:28 PM  [phone]

## 2021-03-25 ENCOUNTER — TRANSFERRED RECORDS (OUTPATIENT)
Dept: HEALTH INFORMATION MANAGEMENT | Facility: CLINIC | Age: 76
End: 2021-03-25

## 2022-11-25 NOTE — TELEPHONE ENCOUNTER
Anemia Management Note  SUBJECTIVE/OBJECTIVE:  Referred by Cecilia De La Torre on 9/11/2017  Primary Diagnosis: Anemia in Chronic Kidney Disease (N18.5, D63.1)     Secondary Diagnosis:  Chronic Kidney Disease, Stage 5 (N18.5)  Hgb goal range:  9-10  Epo/Darbo: Aranesp 100 mcg every 14 days As needed for hgb<10g/dL  RX expires on 12/5/2018  Iron regimen:  None  Labs exp: 9/12/2018  **Provide phone number for [facility] [phone] and instruction to schedule ancillary visit for aranesp injection. Send message to care team via pool - [facility] RN TRIAGE POOL as a telephone encounter**      Contact: **AUTH TO DISCUSS**Tereza Norton(daughter) [phone], Rabia Kapooririneo (daughter)[phone]                                        Ok to leave message regarding labs and appts per consent to communicate dated 12/12/17                              OK to speak with daughters Tereza and Rabia regarding Dung's anemia care plan per consent to communicate dated 12/12/17    Anemia Latest Ref Rng & Units 3/6/2018 3/12/2018 3/19/2018 3/26/2018 4/2/2018 4/9/2018 4/16/2018   EALRENE Dose - - - - - - - -   Hemoglobin 13.3 - 17.7 g/dL 9.9(L) 10.1(L) 10.1(L) 10.4(L) 10.5(L) 10.7(L) 10.1(L)   TSAT 15 - 46 % - - - - 17 - -   Ferritin 26 - 388 ng/mL - - - - 407(H) - -     BP Readings from Last 3 Encounters:   04/16/18 142/80   04/13/18 147/80   04/09/18 136/84     Wt Readings from Last 2 Encounters:   04/13/18 173 lb (78.5 kg)   03/14/18 171 lb (77.6 kg)     Labs are drawn every Monday      ASSESSMENT:  Hgb:Above goal - recommend hold dose  TSat: not at goal of >30% Ferritin: At goal (>100ng/mL)    PLAN:  Hold Aranesp and RTC for hgb then aranesp if needed in 2 week(s)    Orders needed to be renewed (for next follow-up date) in EPIC: None    Iron labs due:  4/30/18    Plan discussed with:  No call made  Plan provided by:  Judith  Reviewed 04/17/2018 VERONICA  NEXT FOLLOW-UP DATE:  4/23/18    Anemia Management Service  Daina Mock,GabriellaD and Malina  Ginger Hussein  Phone: [phone]  Fax: [phone]       (3) Not Aware of Limitations
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
## 2024-12-13 NOTE — TELEPHONE ENCOUNTER
Called and let Tereza Know these have been completed and confirmed faxed to Alfredo. She asked for a copy to be mailed to her:    Tereza Norton  [address] [place] [address]    Bess Salomon,    Ambulatory

## 2025-08-08 ENCOUNTER — TELEPHONE (OUTPATIENT)
Dept: FAMILY MEDICINE | Facility: CLINIC | Age: 80
End: 2025-08-08

## (undated) DEVICE — Device

## (undated) DEVICE — DRAIN PENROSE 1/4X18" LATEX

## (undated) DEVICE — BAG URINARY DRAIN LUBRISIL IC 4000ML LF 253509A

## (undated) DEVICE — CATH BALLOON EMERGE 3.0X15MM H7493918915300

## (undated) DEVICE — CATH ANGIO SUPERTORQUE PLUS JR4 6FRX100CM 533621

## (undated) DEVICE — SOL NACL 0.9% IRRIG 3000ML BAG 2B7477

## (undated) DEVICE — ESU GROUND PAD ADULT W/CORD E7507

## (undated) DEVICE — GUIDEWIRE TERUMO .035X180 ANG GR3508

## (undated) DEVICE — BAG DRAINAGE URO CATCHER II 4-040-14-10

## (undated) DEVICE — 6FR X 100CM INFINITI TL DIAGNOSTIC CATHETER, AMPLATZ, AR MOD, FEMORAL SELECTIVE THRULUMEN, 0.038IN MAX GUIDEWIRE (EA/1)

## (undated) DEVICE — DRAPE EXTREMITY UPPER 120X76" 29414

## (undated) DEVICE — EYE SPONGE SPEAR WECK CEL 0008685

## (undated) DEVICE — ESU ELEC LOOP 24FR 20750G

## (undated) DEVICE — INTRO SHEATH 6FRX25CM PINNACLE RSS606

## (undated) DEVICE — SPECIMEN CONTAINER 5OZ STERILE 2600SA

## (undated) DEVICE — SU SILK 4-0 TIE 12X30" A303H

## (undated) DEVICE — NDL 30GA 0.5" 305106

## (undated) DEVICE — WIRE GUIDE 0.035"X260CM SAFE-T-J EXCHANGE G00517

## (undated) DEVICE — WIRE GUIDE 0.035"X145CM AMPLATZ XSTIFF J THSCF-35-145-3-AES

## (undated) DEVICE — SU VICRYL 3-0 SH 27" J316H

## (undated) DEVICE — TUBING PRESSURE 30"

## (undated) DEVICE — SU PROLENE 5-0 RB-2DA 18" 8713H

## (undated) DEVICE — JELLY LUBRICATING SURGILUBE 2OZ TUBE

## (undated) DEVICE — CATH ANGIO SUPERTORQUE PLUS JL5 6FRX100CM 533622

## (undated) DEVICE — CATH ANGIO INFINITI JR4 4FRX100CM 538421

## (undated) DEVICE — SU SILK 2-0 TIE 12X30" A305H

## (undated) DEVICE — GUIDEWIRE VASC 325CM .014IN RTWR FLPY H80228240022

## (undated) DEVICE — INTRO SHEATH 6FRX10CM PINNACLE RSS602

## (undated) DEVICE — CATH FOLEY 3WAY 24FR 30ML SIL LUBRISIL IC 73024SI

## (undated) DEVICE — PAD CHUX UNDERPAD 23X24" 7136

## (undated) DEVICE — LINEN TOWEL PACK X6 WHITE 5487

## (undated) DEVICE — SU DERMABOND ADVANCED .7ML DNX12

## (undated) DEVICE — INTRO SHEATH 7FRX25CM PINNACLE RSS706

## (undated) DEVICE — PACK CYSTO UMMC CUSTOM

## (undated) DEVICE — SOL WATER IRRIG 1000ML BOTTLE 2F7114

## (undated) DEVICE — CATH BALLOON NC EMERGE 4.00X12MM H7493926712400

## (undated) DEVICE — SPONGE RAY-TEC 4X8" 7318

## (undated) DEVICE — BLADE CLIPPER SGL USE 9680

## (undated) DEVICE — KIT RIGHT HEART CATH H965601307191

## (undated) DEVICE — EVACUATOR BLADDER UROVAC LATEX M0067301250

## (undated) DEVICE — CATH ANGIO LANGSTRON 6FRX110CM 145DEG 4H 5540

## (undated) DEVICE — SU VICRYL 4-0 RB-1 27" UND J214H

## (undated) DEVICE — LINEN TOWEL PACK X30 5481

## (undated) DEVICE — CATH ANGIO SUPERTORQUE PLUS JL4 6FRX100CM 533620

## (undated) DEVICE — PACK AV FISTULA

## (undated) DEVICE — SOL NACL 0.9% INJ 1000ML BAG 07983-09

## (undated) DEVICE — SYR 10ML LL W/O NDL

## (undated) DEVICE — MANIFOLD KIT ANGIO AUTOMATED 014613

## (undated) DEVICE — SU SILK 3-0 TIE 12X30" A304H

## (undated) DEVICE — KIT ACCESSORY INTRO INFLATION SYS 20/30 PRIORITY 1000186-115

## (undated) DEVICE — CATH ANGIO INFINITI PIGTAIL 145 6 SH 6FRX110CM  534-652S

## (undated) DEVICE — GLOVE PROTEXIS POWDER FREE SMT 6.5  2D72PT65X

## (undated) DEVICE — CATH ANGIO INFINITI 3DRC 6FRX100CM 534676T

## (undated) DEVICE — CATH FOLEY 3WAY 24FR 30ML LATEX 0167SI24

## (undated) DEVICE — PACK HEART LEFT CUSTOM

## (undated) DEVICE — WIRE GUIDE 0.035"X150CM EMERALD STR 502542

## (undated) DEVICE — SU PROLENE 6-0 RB-2DA 18" 8714H

## (undated) DEVICE — PITCHER STERILE 1000ML  SSK9004A

## (undated) DEVICE — DRSG TELFA 3X8" 1238

## (undated) DEVICE — CATH ANGIO SUPERTORQUE IM 6FRX100CM 533660

## (undated) DEVICE — CONNECTOR WATER VALVE PERFUSION PACK STR 020272801

## (undated) DEVICE — VALVE HEMOSTASIS .096" COPILOT MECH 1003331

## (undated) DEVICE — ESU ELEC CUTTING LOOP 24/26FR .35MM BIPOLAR 27040GP1-S

## (undated) DEVICE — INTRO SHTH INTRO SHTH 6FR X 45

## (undated) DEVICE — LINEN TOWEL PACK X5 5464

## (undated) DEVICE — BASIN SET SINGLE STERILE 13752-624

## (undated) DEVICE — SOL WATER IRRIG 3000ML BAG 2B7117

## (undated) DEVICE — SUCTION MANIFOLD DORNOCH ULTRA CART UL-CL500

## (undated) DEVICE — PREP CHLORAPREP 26ML TINTED ORANGE  260815

## (undated) DEVICE — INTRODUCER SHEATH GREEN 6FRX24CM ARROW FLEX CL-07624

## (undated) DEVICE — CATH ANGIO 6FR MPA2 2 SH 100CM

## (undated) DEVICE — CATH ANGIO SUPERTORQUE PLUS AL2 6FRX100CM 533646

## (undated) DEVICE — DECANTER BAG 2002S

## (undated) DEVICE — SOL WATER IRRIG 1000ML BOTTLE 07139-09

## (undated) DEVICE — CATH PLUG W/CAP 000076

## (undated) DEVICE — GUIDEWIRE SENSOR DUAL FLEX STR 0.035"X150CM M0066703080

## (undated) DEVICE — DEVICE CATH STABILIZATION STATLOCK FOLEY 3-WAY FOL0105

## (undated) DEVICE — LINEN GOWN XLG 5407

## (undated) DEVICE — DRAPE SHEET REV FOLD 3/4 9349

## (undated) DEVICE — SU SILK 5-0 TIE 12X30" A302H

## (undated) DEVICE — SU MONOCRYL 4-0 PS-2 27" UND Y426H

## (undated) DEVICE — SYR 30ML SLIP TIP W/O NDL 302833

## (undated) DEVICE — SOL GLYCINE 1.5% 3000ML BAG 2B7317

## (undated) DEVICE — CATH BALLOON EMERGE 2.5X12MM H7493918912250

## (undated) DEVICE — CATH GUIDING BLUE YELLOW PTFE XB4 6FRX100CM 67005600

## (undated) DEVICE — INSERT FOGARTY 33MM TRACTION HYDRAJAW HYDRA33

## (undated) DEVICE — CATH ANGIO SUPERTORQUE AL1 6FRX100CM 532-645

## (undated) DEVICE — NDL BLUNT 18GA 1" W/O FILTER 305181

## (undated) DEVICE — GUIDEWIRE ASAHI SION BLUE 0.014"X180CM J-TIP AHW14R004J

## (undated) DEVICE — SYR 70ML TOOMEY 041170

## (undated) DEVICE — DRSG TELFA 3"X8" NON25720

## (undated) DEVICE — KIT HAND CONTROL ACIST 014644 AR-P54

## (undated) RX ORDER — FENTANYL CITRATE 50 UG/ML
INJECTION, SOLUTION INTRAMUSCULAR; INTRAVENOUS
Status: DISPENSED
Start: 2017-11-17

## (undated) RX ORDER — CIPROFLOXACIN 500 MG/1
TABLET, FILM COATED ORAL
Status: DISPENSED
Start: 2017-09-12

## (undated) RX ORDER — NICARDIPINE HYDROCHLORIDE 2.5 MG/ML
INJECTION INTRAVENOUS
Status: DISPENSED
Start: 2019-02-22

## (undated) RX ORDER — ASPIRIN 325 MG
TABLET ORAL
Status: DISPENSED
Start: 2019-04-22

## (undated) RX ORDER — PHENYLEPHRINE HCL IN 0.9% NACL 1 MG/10 ML
SYRINGE (ML) INTRAVENOUS
Status: DISPENSED
Start: 2017-11-17

## (undated) RX ORDER — HEPARIN SODIUM 1000 [USP'U]/ML
INJECTION, SOLUTION INTRAVENOUS; SUBCUTANEOUS
Status: DISPENSED
Start: 2017-11-17

## (undated) RX ORDER — ASPIRIN 81 MG/1
TABLET, CHEWABLE ORAL
Status: DISPENSED
Start: 2019-02-22

## (undated) RX ORDER — CIPROFLOXACIN 500 MG/1
TABLET, FILM COATED ORAL
Status: DISPENSED
Start: 2017-11-22

## (undated) RX ORDER — LABETALOL HYDROCHLORIDE 5 MG/ML
INJECTION, SOLUTION INTRAVENOUS
Status: DISPENSED
Start: 2018-03-05

## (undated) RX ORDER — NITROGLYCERIN 5 MG/ML
VIAL (ML) INTRAVENOUS
Status: DISPENSED
Start: 2019-04-22

## (undated) RX ORDER — SODIUM CHLORIDE 9 MG/ML
INJECTION, SOLUTION INTRAVENOUS
Status: DISPENSED
Start: 2019-04-22

## (undated) RX ORDER — ONDANSETRON 2 MG/ML
INJECTION INTRAMUSCULAR; INTRAVENOUS
Status: DISPENSED
Start: 2018-03-05

## (undated) RX ORDER — EPHEDRINE SULFATE 50 MG/ML
INJECTION, SOLUTION INTRAMUSCULAR; INTRAVENOUS; SUBCUTANEOUS
Status: DISPENSED
Start: 2018-03-05

## (undated) RX ORDER — SODIUM CHLORIDE, SODIUM LACTATE, POTASSIUM CHLORIDE, CALCIUM CHLORIDE 600; 310; 30; 20 MG/100ML; MG/100ML; MG/100ML; MG/100ML
INJECTION, SOLUTION INTRAVENOUS
Status: DISPENSED
Start: 2018-03-05

## (undated) RX ORDER — HEPARIN SODIUM 1000 [USP'U]/ML
INJECTION, SOLUTION INTRAVENOUS; SUBCUTANEOUS
Status: DISPENSED
Start: 2019-02-22

## (undated) RX ORDER — FENTANYL CITRATE 50 UG/ML
INJECTION, SOLUTION INTRAMUSCULAR; INTRAVENOUS
Status: DISPENSED
Start: 2019-02-22

## (undated) RX ORDER — PROPOFOL 10 MG/ML
INJECTION, EMULSION INTRAVENOUS
Status: DISPENSED
Start: 2018-03-05

## (undated) RX ORDER — LIDOCAINE HYDROCHLORIDE 20 MG/ML
INJECTION, SOLUTION EPIDURAL; INFILTRATION; INTRACAUDAL; PERINEURAL
Status: DISPENSED
Start: 2018-03-05

## (undated) RX ORDER — ACETAMINOPHEN 325 MG/1
TABLET ORAL
Status: DISPENSED
Start: 2017-11-17

## (undated) RX ORDER — PHENYLEPHRINE HCL IN 0.9% NACL 1 MG/10 ML
SYRINGE (ML) INTRAVENOUS
Status: DISPENSED
Start: 2019-02-22

## (undated) RX ORDER — MAGNESIUM HYDROXIDE 1200 MG/15ML
LIQUID ORAL
Status: DISPENSED
Start: 2017-07-16

## (undated) RX ORDER — PROPOFOL 10 MG/ML
INJECTION, EMULSION INTRAVENOUS
Status: DISPENSED
Start: 2017-11-17

## (undated) RX ORDER — LIDOCAINE HYDROCHLORIDE 20 MG/ML
INJECTION, SOLUTION EPIDURAL; INFILTRATION; INTRACAUDAL; PERINEURAL
Status: DISPENSED
Start: 2017-11-17

## (undated) RX ORDER — DOBUTAMINE HYDROCHLORIDE 200 MG/100ML
INJECTION INTRAVENOUS
Status: DISPENSED
Start: 2019-09-03

## (undated) RX ORDER — SODIUM CHLORIDE, SODIUM LACTATE, POTASSIUM CHLORIDE, CALCIUM CHLORIDE 600; 310; 30; 20 MG/100ML; MG/100ML; MG/100ML; MG/100ML
INJECTION, SOLUTION INTRAVENOUS
Status: DISPENSED
Start: 2019-03-04

## (undated) RX ORDER — REGADENOSON 0.08 MG/ML
INJECTION, SOLUTION INTRAVENOUS
Status: DISPENSED
Start: 2018-01-18

## (undated) RX ORDER — SODIUM CHLORIDE 9 MG/ML
INJECTION, SOLUTION INTRAVENOUS
Status: DISPENSED
Start: 2019-02-22

## (undated) RX ORDER — CEFAZOLIN SODIUM 2 G/100ML
INJECTION, SOLUTION INTRAVENOUS
Status: DISPENSED
Start: 2017-11-17

## (undated) RX ORDER — ONDANSETRON 2 MG/ML
INJECTION INTRAMUSCULAR; INTRAVENOUS
Status: DISPENSED
Start: 2017-07-16

## (undated) RX ORDER — FENTANYL CITRATE 50 UG/ML
INJECTION, SOLUTION INTRAMUSCULAR; INTRAVENOUS
Status: DISPENSED
Start: 2017-07-16

## (undated) RX ORDER — NITROGLYCERIN 5 MG/ML
VIAL (ML) INTRAVENOUS
Status: DISPENSED
Start: 2019-02-22

## (undated) RX ORDER — FENTANYL CITRATE 50 UG/ML
INJECTION, SOLUTION INTRAMUSCULAR; INTRAVENOUS
Status: DISPENSED
Start: 2018-03-05

## (undated) RX ORDER — PHENYLEPHRINE HCL IN 0.9% NACL 1 MG/10 ML
SYRINGE (ML) INTRAVENOUS
Status: DISPENSED
Start: 2018-03-05

## (undated) RX ORDER — METOPROLOL TARTRATE 1 MG/ML
INJECTION, SOLUTION INTRAVENOUS
Status: DISPENSED
Start: 2019-09-03

## (undated) RX ORDER — CEFAZOLIN SODIUM 2 G/100ML
INJECTION, SOLUTION INTRAVENOUS
Status: DISPENSED
Start: 2018-03-05

## (undated) RX ORDER — PHENYLEPHRINE HCL IN 0.9% NACL 1 MG/10 ML
SYRINGE (ML) INTRAVENOUS
Status: DISPENSED
Start: 2017-07-16

## (undated) RX ORDER — HEPARIN SODIUM 1000 [USP'U]/ML
INJECTION, SOLUTION INTRAVENOUS; SUBCUTANEOUS
Status: DISPENSED
Start: 2019-04-22

## (undated) RX ORDER — CIPROFLOXACIN 500 MG/1
TABLET, FILM COATED ORAL
Status: DISPENSED
Start: 2018-03-09

## (undated) RX ORDER — FENTANYL CITRATE 50 UG/ML
INJECTION, SOLUTION INTRAMUSCULAR; INTRAVENOUS
Status: DISPENSED
Start: 2019-04-22

## (undated) RX ORDER — EPHEDRINE SULFATE 50 MG/ML
INJECTION, SOLUTION INTRAMUSCULAR; INTRAVENOUS; SUBCUTANEOUS
Status: DISPENSED
Start: 2017-07-16

## (undated) RX ORDER — IVABRADINE 5 MG/1
TABLET, FILM COATED ORAL
Status: DISPENSED
Start: 2019-03-04